# Patient Record
Sex: FEMALE | Race: WHITE | Employment: PART TIME | URBAN - METROPOLITAN AREA
[De-identification: names, ages, dates, MRNs, and addresses within clinical notes are randomized per-mention and may not be internally consistent; named-entity substitution may affect disease eponyms.]

---

## 2017-01-17 ENCOUNTER — TELEPHONE (OUTPATIENT)
Dept: ONCOLOGY | Facility: CLINIC | Age: 47
End: 2017-01-17

## 2017-01-17 DIAGNOSIS — Z80.0 FAMILY HISTORY OF MALIGNANT NEOPLASM OF GASTROINTESTINAL TRACT: ICD-10-CM

## 2017-01-17 DIAGNOSIS — Z80.3 FAMILY HISTORY OF MALIGNANT NEOPLASM OF BREAST: Primary | ICD-10-CM

## 2017-01-17 DIAGNOSIS — Z80.8 FAMILY HISTORY OF THYROID CANCER: ICD-10-CM

## 2017-01-17 NOTE — Clinical Note
Cancer Risk Management  Program [facility]  [facility]  Mailing Address  Cancer Risk Management Program  [facility]  [address]  [facility] [address]  [place] [address]    New patient appointments  [phone]  January 17, 2017    Laura Jackson  [address]  [place] [address]      Dear Laura,    It was a pleasure speaking with you on the phone on 1/17/2017.  Here is a copy of the progress note from our discussion.  If you have any additional questions, please feel free to call.     Presenting Information:  I spoke to Laura by phone today to discuss her genetic testing results. Her blood was drawn on 12/6/2016. OvaNext testing was ordered  from [facility]. This testing was done because of Laura's family history of breast, GI and possible ovarian cancer.      Genetic Testing Result: NEGATIVE  Laura is negative for mutations in the  JEWELS, BARD1, BRCA1, BRCA2, BRIP1, CDH1, CHEK2, EPCAM, MLH1, MRE11A, MSH2, MSH6, MUTYH, NBN, NF1, PALB2, PMS2, PTEN, RAD50, RAD51C, RAD51D, SMARCA4, STK11, and TP53  genes.    No mutations were found in any of the 24 genes analyzed.  This test involved sequencing and deletion/duplication analysis of all genes with the exception of EPCAM (deletions/duplications only).    Testing did not detect a mutation associated with  Hereditary Breast and Ovarian Cancer syndrome (BRCA1, BRCA2), Stanley syndrome (MLH1, MSH2, MSH6, PMS2, EPCAM), Hereditary Diffuse Gastric Cancer syndrome (CDH1), Cowden syndrome (PTEN), Li Fraumeni syndrome (TP53), Peutz-Jeghers syndrome (STK11), MUTYH Associated Polyposis syndrome (MAP), or Neurofibromatosis (NF1).     Interpretation:  We discussed several different interpretations of this negative test result.    1. One explanation may be that there is a  different gene or combination of genes and environment that are associated with the cancers in Laura's relatives.    2. It is possible that one of her relatives diagnosed with cancer, such as her paternal aunt or maternal first cousin, did have a mutation in one of the genes tested and that Laura did not inherit it.  3. There is also a small possibility that there is a mutation in one of these genes and we could not find it with our current testing methods.       Screening:  Based on this negative test result, it is important for Laura and her relatives to refer back to the family history for appropriate cancer screening.    Based on her personal and family history, Laura has a 24.9% lifetime risk of developing breast cancer based on the IBIS7 model.  As such, Laura meets current National Comprehensive Cancer Network (NCCN) guidelines for high risk breast screening.  This includes annual breast MRI in addition to annual mammogram.  In addition, Laura should be receiving clinical breast exams by her physician.  A referral will be made to meet with JUSTO Padilla to discuss high risk breast screening.    We discussed that Laura's female relatives should talk to their physicians about early breast screening.  Women in Laura's family with a first- or second-degree relative diagnosed with breast cancer remain at increased risk for breast cancer given their family history.  Current recommendations are that mammograms begin 10 years prior to the youngest age of diagnosis, or at age 40, whichever comes first.  Breast screening should include monthly self-breast exam, annual or semi-annual clinical breast exam, and annual mammogram.  These screening options should be discussed with their care providers, to determine what screening is appropriate, or if additional screening (such as breast MRI) is necessary based on personal/family history factors. In addition, some relatives (such as her maternal  first cousin diagnosed with breast cancer in her 40's) may benefit from genetic counseling to discuss genetic testing options.  I also encouraged Laura to obtain additional information, if possible, regarding the gene mutation previously identified in her paternal first cousin's as this information may change how we assess her family's cancer risk, screening, and the testing we would offer.       Final screening recommendations for relatives should be made by each individual's managing physician. Other age appropriate cancer screening, including the recommendations by the American Cancer Society, are appropriate for Laura at this time.  These screening recommendations may change if there are changes to Laura's personal and/or family history.  As discussed previously, some family members may benefit from earlier and/or more frequent cancer screening and are encouraged to share their family history information with their physician.      Inheritance:  We reviewed autosomal dominant inheritance and the fact that mutations do not skip generations. Because Laura does not have an identifiable mutation in the 24 genes tested, she did not pass on a mutation in these genes to her children.    Summary:  We do not have an explanation for Laura's family history of cancer.  Because of that, it is important that she continue with cancer screening based on her personal and family history as discussed above.    Genetic testing is rapidly advancing, and new cancer susceptibility genes will most likely be identified in the future.  Therefore, I encouraged Laura to contact me annually or if there are changes in her personal or family history.  This may change how we assess her cancer risk, screening, and the testing we would offer.    Plan:  1.  I will send Laura a copy of her test results with this letter.    2. She plans to follow-up with Natalie Lim for high risk breast screening.  3. She should contact me annually,  or sooner if her family history changes.    If Laura has any further questions, I encouraged her to contact me at [phone].    Alee Muhammad MS, [facility]  Certified Genetic Counselor  [phone]

## 2017-01-17 NOTE — Clinical Note
Please print and send a copy of this letter and the patient's genetic testing report to the patient.    Please enclose test report: SEND OUTS MISCELLANEOUS order #[id number] (DOS 12/6/2016)

## 2017-01-24 DIAGNOSIS — J45.30 MILD PERSISTENT ASTHMA WITHOUT COMPLICATION: ICD-10-CM

## 2017-01-24 NOTE — TELEPHONE ENCOUNTER
levalbuterol (XOPENEX HFA) 45 MCG/ACT inhaler       Last Written Prescription Date: 04/22/2015  Last Fill Quantity: 1, # refills: 5    Last Office Visit with G, P or [facility] prescribing provider:  12/05/2015   Future Office Visit:       Date of Last Asthma Action Plan Letter:   Asthma Action Plan Q1 Year    Topic Date Due     Asthma Action Plan - yearly  04/14/2017      Asthma Control Test:   ACT Total Scores 4/14/2016   ACT TOTAL SCORE -   ASTHMA ER VISITS -   ASTHMA HOSPITALIZATIONS -   ACT TOTAL SCORE (Goal Greater than or Equal to 20) 21   In the past 12 months, how many times did you visit the emergency room for your asthma without being admitted to the hospital? 1   In the past 12 months, how many times were you hospitalized overnight because of your asthma? 0       Date of Last Spirometry Test:   No results found for this or any previous visit.        Tammi Casillas, CMA

## 2017-01-27 NOTE — TELEPHONE ENCOUNTER
1/17/2017    Referring Provider: Whitney Alva MD    Presenting Information:  I spoke to Laura by phone today to discuss her genetic testing results. Her blood was drawn on 12/6/2016. OvaNext testing was ordered  from [facility]. This testing was done because of Laura's family history of breast, GI and possible ovarian cancer.      Genetic Testing Result: NEGATIVE  Laura is negative for mutations in the  JEWELS, BARD1, BRCA1, BRCA2, BRIP1, CDH1, CHEK2, EPCAM, MLH1, MRE11A, MSH2, MSH6, MUTYH, NBN, NF1, PALB2, PMS2, PTEN, RAD50, RAD51C, RAD51D, SMARCA4, STK11, and TP53  genes.    No mutations were found in any of the 24 genes analyzed.  This test involved sequencing and deletion/duplication analysis of all genes with the exception of EPCAM (deletions/duplications only).    Testing did not detect a mutation associated with  Hereditary Breast and Ovarian Cancer syndrome (BRCA1, BRCA2), Stanley syndrome (MLH1, MSH2, MSH6, PMS2, EPCAM), Hereditary Diffuse Gastric Cancer syndrome (CDH1), Cowden syndrome (PTEN), Li Fraumeni syndrome (TP53), Peutz-Jeghers syndrome (STK11), MUTYH Associated Polyposis syndrome (MAP), or Neurofibromatosis (NF1).     Interpretation:  We discussed several different interpretations of this negative test result.    1. One explanation may be that there is a different gene or combination of genes and environment that are associated with the cancers in Laura's relatives.    2. It is possible that one of her relatives diagnosed with cancer, such as her paternal aunt or maternal first cousin, did have a mutation in one of the genes tested and that Laura did not inherit it.  3. There is also a small possibility that there is a mutation in one of these genes and we could not find it with our current testing methods.       Screening:  Based on this negative test result, it is important for Laura and her relatives to refer back to the family history for appropriate cancer screening.    Based on  her personal and family history, Laura has a 24.9% lifetime risk of developing breast cancer based on the IBIS7 model.  As such, Laura meets current National Comprehensive Cancer Network (NCCN) guidelines for high risk breast screening.  This includes annual breast MRI in addition to annual mammogram.  In addition, Laura should be receiving clinical breast exams by her physician.  A referral will be made to meet with JUSTO Padilla to discuss high risk breast screening.    We discussed that Larua's female relatives should talk to their physicians about early breast screening.  Women in Laura's family with a first- or second-degree relative diagnosed with breast cancer remain at increased risk for breast cancer given their family history.  Current recommendations are that mammograms begin 10 years prior to the youngest age of diagnosis, or at age 40, whichever comes first.  Breast screening should include monthly self-breast exam, annual or semi-annual clinical breast exam, and annual mammogram.  These screening options should be discussed with their care providers, to determine what screening is appropriate, or if additional screening (such as breast MRI) is necessary based on personal/family history factors. In addition, some relatives (such as her maternal first cousin diagnosed with breast cancer in her 40's) may benefit from genetic counseling to discuss genetic testing options.  I also encouraged Laura to obtain additional information, if possible, regarding the gene mutation previously identified in her paternal first cousin's as this information may change how we assess her family's cancer risk, screening, and the testing we would offer.       Final screening recommendations for relatives should be made by each individual's managing physician. Other age appropriate cancer screening, including the recommendations by the American Cancer Society, are appropriate for Laura at this time.   These screening recommendations may change if there are changes to Laura's personal and/or family history.  As discussed previously, some family members may benefit from earlier and/or more frequent cancer screening and are encouraged to share their family history information with their physician.      Inheritance:  We reviewed autosomal dominant inheritance and the fact that mutations do not skip generations. Because Laura does not have an identifiable mutation in the 24 genes tested, she did not pass on a mutation in these genes to her children.    Summary:  We do not have an explanation for Laura's family history of cancer.  Because of that, it is important that she continue with cancer screening based on her personal and family history as discussed above.    Genetic testing is rapidly advancing, and new cancer susceptibility genes will most likely be identified in the future.  Therefore, I encouraged Laura to contact me annually or if there are changes in her personal or family history.  This may change how we assess her cancer risk, screening, and the testing we would offer.    Plan:  1.  I will send Laura a copy of her test results with this letter.    2. She plans to follow-up with Natalie Lim for high risk breast screening.  3. She should contact me annually, or sooner if her family history changes.    If Laura has any further questions, I encouraged her to contact me at [phone].    Alee Muhammad MS, [employer]  Certified Genetic Counselor  [phone]

## 2017-01-30 ENCOUNTER — TELEPHONE (OUTPATIENT)
Dept: FAMILY MEDICINE | Facility: CLINIC | Age: 47
End: 2017-01-30

## 2017-01-30 RX ORDER — LEVALBUTEROL TARTRATE 45 UG/1
1-2 AEROSOL, METERED ORAL EVERY 4 HOURS PRN
Qty: 1 INHALER | Refills: 5 | Status: SHIPPED | OUTPATIENT
Start: 2017-01-30 | End: 2017-12-21

## 2017-01-30 NOTE — TELEPHONE ENCOUNTER
Xopenex is a not covered product. It requires prerequisite step therapy.    Wondering if you would be willing to change to either Proair or Ventolin???    If you want to continue with the Xopenex,     Here is her insurance info:    [id number]  BIN# [id number]  ID# [id number]  No GR#  And no PCN#    Please contact us when approved/denied for the Xopenex.  Joyce Rao  Pharmacy Technician  [facility]  Ph# [phone]  Fax# [phone]

## 2017-01-31 NOTE — TELEPHONE ENCOUNTER
Received fax from pharmacy stating patient requires Prior Authorization for levalbuterol (XOPENEX HFA) 45 MCG/ACT Inhaler.     Insurance information:   Name: Stephanie  Phone number: [phone]  ID number: [id number]    Initiate prior authorization or change medication?    If a prior authorization is to be initiated, please list the following:    -any medications the patient has tried and failed or any contraindications.  -is the patient currently on this medication, or has tried before?  -What is the diagnosis?  -Justification or other information that may be helpful.

## 2017-01-31 NOTE — TELEPHONE ENCOUNTER
Received fax from pharmacy stating patient requires Prior Authorization for levalbuterol (XOPENEX HFA) 45 MCG/ACT Inhaler.     Insurance information:    Name: Stephanie  Phone number: [phone]  ID number: [id number]    Initiate prior authorization or change medication?Xoponex inhaler    If a prior authorization is to be initiated, please list the following:    -any medications the patient has tried and failed or any contraindications. Albuterol causes Tachycardia  -is the patient currently on this medication, or has tried before?yes  -What is the diagnosis?asthma  -Justification or other information that may be helpful.   Pt needs this medicine to control her asthma

## 2017-02-06 NOTE — TELEPHONE ENCOUNTER
Called patient states she has tried this in the past and it caused tachycardia, prefers us to obtain PA for the Xopenex. Starla Mccurdy MA

## 2017-02-09 NOTE — TELEPHONE ENCOUNTER
Called to do the PA over the phone: Approval #[id number] good for 2/9/2017-2/9/2018. Mely Alvarado,     [facility] [phone] and let them know of the approval. They will contact

## 2017-06-15 DIAGNOSIS — J45.30 MILD PERSISTENT ASTHMA WITHOUT COMPLICATION: ICD-10-CM

## 2017-06-15 NOTE — TELEPHONE ENCOUNTER
Advair      Last Written Prescription Date: 8-11-16  Last Fill Quantity: 3,  # refills: 1   Last Office Visit with G, P or [facility] prescribing provider: 12-5-16    [facility]  Pharmacy [facility]  On Behalf of [facility]

## 2017-06-15 NOTE — LETTER
[facility]  [address] [place] [address]. [place] [address]    June 20, 2017    Laura Jackson  [address] [facility] [address]      Dear Laura,    We have been unsuccessful reaching you by telephone. You are do for an office visit regarding your asthma medications with me. You can schedule this one of a few ways. First using My Chart Scheduling, second is calling our Main number [phone] this line is open for scheduling 24 hours 7 days a week. Please let us know if you are getting care elsewhere.      Sincerely,        Whitney Alva MD/dt

## 2017-06-19 ENCOUNTER — HOSPITAL ENCOUNTER (OUTPATIENT)
Facility: CLINIC | Age: 47
Setting detail: OBSERVATION
Discharge: HOME | End: 2017-06-21
Attending: EMERGENCY MEDICINE | Admitting: OBSTETRICS & GYNECOLOGY
Payer: COMMERCIAL

## 2017-06-19 DIAGNOSIS — D25.1 INTRAMURAL LEIOMYOMA OF UTERUS: ICD-10-CM

## 2017-06-19 DIAGNOSIS — N93.9 ABNORMAL VAGINAL BLEEDING: ICD-10-CM

## 2017-06-19 DIAGNOSIS — N93.9 VAGINAL BLEEDING: Primary | ICD-10-CM

## 2017-06-19 LAB
BASOPHILS # BLD AUTO: 0 10E9/L (ref 0–0.2)
BASOPHILS NFR BLD AUTO: 0.2 %
DIFFERENTIAL METHOD BLD: ABNORMAL
EOSINOPHIL # BLD AUTO: 0.1 10E9/L (ref 0–0.7)
EOSINOPHIL NFR BLD AUTO: 1.6 %
ERYTHROCYTE [DISTWIDTH] IN BLOOD BY AUTOMATED COUNT: 13.5 % (ref 10–15)
HCG UR QL: NEGATIVE
HCT VFR BLD AUTO: 27 % (ref 35–47)
HGB BLD-MCNC: 8.6 G/DL (ref 11.7–15.7)
IMM GRANULOCYTES # BLD: 0 10E9/L (ref 0–0.4)
IMM GRANULOCYTES NFR BLD: 0.2 %
LYMPHOCYTES # BLD AUTO: 1.5 10E9/L (ref 0.8–5.3)
LYMPHOCYTES NFR BLD AUTO: 26 %
MCH RBC QN AUTO: 27.6 PG (ref 26.5–33)
MCHC RBC AUTO-ENTMCNC: 31.9 G/DL (ref 31.5–36.5)
MCV RBC AUTO: 87 FL (ref 78–100)
MONOCYTES # BLD AUTO: 0.4 10E9/L (ref 0–1.3)
MONOCYTES NFR BLD AUTO: 6.1 %
NEUTROPHILS # BLD AUTO: 3.8 10E9/L (ref 1.6–8.3)
NEUTROPHILS NFR BLD AUTO: 65.9 %
NRBC # BLD AUTO: 0 10*3/UL
NRBC BLD AUTO-RTO: 0 /100
PLATELET # BLD AUTO: 231 10E9/L (ref 150–450)
RBC # BLD AUTO: 3.12 10E12/L (ref 3.8–5.2)
WBC # BLD AUTO: 5.7 10E9/L (ref 4–11)

## 2017-06-19 PROCEDURE — 81025 URINE PREGNANCY TEST: CPT | Performed by: EMERGENCY MEDICINE

## 2017-06-19 PROCEDURE — 99285 EMERGENCY DEPT VISIT HI MDM: CPT | Mod: Z6 | Performed by: EMERGENCY MEDICINE

## 2017-06-19 PROCEDURE — 86923 COMPATIBILITY TEST ELECTRIC: CPT | Performed by: EMERGENCY MEDICINE

## 2017-06-19 PROCEDURE — 86850 RBC ANTIBODY SCREEN: CPT | Performed by: EMERGENCY MEDICINE

## 2017-06-19 PROCEDURE — 86901 BLOOD TYPING SEROLOGIC RH(D): CPT | Performed by: EMERGENCY MEDICINE

## 2017-06-19 PROCEDURE — 85025 COMPLETE CBC W/AUTO DIFF WBC: CPT | Performed by: EMERGENCY MEDICINE

## 2017-06-19 PROCEDURE — 25000128 H RX IP 250 OP 636: Performed by: EMERGENCY MEDICINE

## 2017-06-19 PROCEDURE — 96361 HYDRATE IV INFUSION ADD-ON: CPT | Performed by: EMERGENCY MEDICINE

## 2017-06-19 PROCEDURE — 99285 EMERGENCY DEPT VISIT HI MDM: CPT | Mod: 25 | Performed by: EMERGENCY MEDICINE

## 2017-06-19 PROCEDURE — 86900 BLOOD TYPING SEROLOGIC ABO: CPT | Performed by: EMERGENCY MEDICINE

## 2017-06-19 PROCEDURE — 96360 HYDRATION IV INFUSION INIT: CPT | Performed by: EMERGENCY MEDICINE

## 2017-06-19 PROCEDURE — 99207 ZZC APP CREDIT; MD BILLING SHARED VISIT: CPT | Mod: Z6 | Performed by: EMERGENCY MEDICINE

## 2017-06-19 RX ORDER — SODIUM CHLORIDE 9 MG/ML
INJECTION, SOLUTION INTRAVENOUS CONTINUOUS
Status: DISCONTINUED | OUTPATIENT
Start: 2017-06-19 | End: 2017-06-20

## 2017-06-19 RX ADMIN — SODIUM CHLORIDE: 9 INJECTION, SOLUTION INTRAVENOUS at 23:44

## 2017-06-19 ASSESSMENT — ENCOUNTER SYMPTOMS
WEAKNESS: 1
LIGHT-HEADEDNESS: 1

## 2017-06-19 NOTE — IP AVS SNAPSHOT
MRN:[id number]                      After Visit Summary   6/19/2017    Laura Jackson    MRN: [id number]           Thank you!     Thank you for choosing [facility] for your care. Our goal is always to provide you with excellent care. Hearing back from our patients is one way we can continue to improve our services. Please take a few minutes to complete the written survey that you may receive in the mail after you visit with us. Thank you!        Patient Information     Date Of Birth          1970        Designated Caregiver       Most Recent Value    Caregiver    Will someone help with your care after discharge? yes    Name of designated caregiver luann    Phone number of caregiver [phone] (cell)    Caregiver address same      About your hospital stay     You were admitted on:  June 20, 2017 You last received care in the:  [facility]    You were discharged on:  June 21, 2017       Who to Call     For medical emergencies, please call 911.  For non-urgent questions about your medical care, please call your primary care provider or clinic, [phone]  For questions related to your surgery, please call your surgery clinic        Attending Provider     Provider Specialty    Duglas Michelle MD Emergency Medicine    Ernestina Saunders MD OB/Gyn       Primary Care Provider Office Phone # Fax #    Whitney Alva [phone]      After Care Instructions     Diet Instructions       Resume pre-procedure diet            Discharge Instructions       Patient to follow up with appointment in 1-2 weeks. You may take 400-600mg ibuprofen every 6 hours for pain control. You may also take tylenol 650mg every 4 hours as needed. You should start taking a daily iron supplement 325mg once daily. As a caution, iron may cause constipation and you should take either Miralax and/or Senna to help prevent constipation. You can get these medications over the counter at any store.  If you have loose stools, discontinue Miralax and Senna. If your pain worsens or your nausea/vomiting is not resolved with pain medications return to ED for evaluation or go to urgent care if able. You should make follow up appointment with your primary doctor or OB/GYN in 2 weeks if you do not already have an appointment scheduled. Call clinic sooner if you are not feeling well, have questions or other concerns.            Discharge Instructions       Call for temperature > 100.4, foul smelling vaginal discharge, bleeding > 1 pad per hour x 2 hrs, pain not controlled by oral pain meds, severe constipation or severe nausea or vomiting.            Ice to affected area       Ice to operative site PRN            No Alcohol       For 24 hours post procedure            No driving or operating machinery        until the day after procedure            No lifting        No lifting over 15 lbs and no strenuous physical activity for 2 weeks            Shower       No shower for 24 hours post procedure. May shower Postoperative Day (POD)  0            Weight bearing status - As tolerated                 Further instructions from your care team       Start Provera to slow the bleeding  Follow-up as recommended by Gynecology, Dr Saunders  Return if any problems or concerns    Additional Information     If you use hormonal birth control (such as the pill, patch, ring or implants): You'll need a second form of birth control for 7 days (condoms, a diaphragm or contraceptive foam). While in the hospital, you received a medicine called Bridion. Your normal birth control will not work as well for a week after taking this medicine.          Pending Results     Date and Time Order Name Status Description    6/20/2017 0609 Surgical pathology exam In process             Statement of Approval     Ordered          06/21/17 0906  I have reviewed and agree with all the recommendations and orders detailed in this document.  EFFECTIVE NOW      Approved and electronically signed by:  Zulay Horowitz MD             Admission Information     Date & Time Provider Department Dept. Phone    6/19/2017 Ernestina Saunders MD [facility] [phone]      Your Vitals Were     Blood Pressure Pulse Temperature Respirations Weight Last Period    111/69 (BP Location: Left arm) 98 98.9  F (37.2  C) (Oral) 18 75.2 kg (165 lb 12.8 oz) 06/19/2017 (Exact Date)    Pulse Oximetry BMI (Body Mass Index)                99% 27.59 kg/m2          MyChart Information     [employer] gives you secure access to your electronic health record. If you see a primary care provider, you can also send messages to your care team and make appointments. If you have questions, please call your primary care clinic.  If you do not have a primary care provider, please call [phone] and they will assist you.        Care EveryWhere ID     This is your Care EveryWhere ID. This could be used by other organizations to access your [facility] medical records  [id number]        Equal Access to Services     ODESSA HOWARD AH: Hadii latoya wayneo Soandreia, waaxda luqadaha, qaybta kaalmada adeegyadiaz, ludmila alberts. So [facility] [phone].    ATENCIÓN: Si habla español, tiene a guido disposición servicios gratuitos de asistencia lingüística. Llame al [phone].    We comply with applicable federal civil rights laws and [place] laws. We do not discriminate on the basis of race, color, national origin, age, disability sex, sexual orientation or gender identity.               Review of your medicines      START taking        Dose / Directions    medroxyPROGESTERone 10 MG tablet   Commonly known as:  PROVERA        Dose:  10 mg   Take 1 tablet (10 mg) by mouth daily for 10 days   Quantity:  10 tablet   Refills:  1       ondansetron 4 MG ODT tab   Commonly known as:  ZOFRAN ODT        Dose:  4 mg   Take 1 tablet (4 mg) by mouth every 8 hours as needed for nausea   Quantity:  15 tablet    Refills:  0         CONTINUE these medicines which have NOT CHANGED        Dose / Directions    fluticasone 50 MCG/ACT spray   Commonly known as:  FLONASE   Used for:  Seasonal allergic rhinitis        Dose:  1-2 spray   Spray 1-2 sprays into both nostrils daily   Quantity:  3 Bottle   Refills:  3       fluticasone-salmeterol 100-50 MCG/DOSE diskus inhaler   Commonly known as:  ADVAIR   Used for:  Mild persistent asthma without complication        Dose:  1 puff   Inhale 1 puff into the lungs 2 times daily   Quantity:  3 Inhaler   Refills:  1       ibuprofen 800 MG tablet   Commonly known as:  ADVIL/MOTRIN   Used for:  Cervical strain, subsequent encounter, Acute pain of left shoulder        Dose:  800 mg   Take 1 tablet (800 mg) by mouth every 8 hours as needed for moderate pain   Quantity:  30 tablet   Refills:  1       levalbuterol 45 MCG/ACT Inhaler   Commonly known as:  XOPENEX HFA   Used for:  Mild persistent asthma without complication        Dose:  1-2 puff   Inhale 1-2 puffs into the lungs every 4 hours as needed for shortness of breath / dyspnea   Quantity:  1 Inhaler   Refills:  5       vitamin D 1000 UNITS capsule        Dose:  5 capsule   Take 5 capsules by mouth daily   Refills:  0            Where to get your medicines      Some of these will need a paper prescription and others can be bought over the counter. Ask your nurse if you have questions.     Bring a paper prescription for each of these medications     medroxyPROGESTERone 10 MG tablet    ondansetron 4 MG ODT tab                Protect others around you: Learn how to safely use, store and throw away your medicines at www.disposemymeds.org.             Medication List: This is a list of all your medications and when to take them. Check marks below indicate your daily home schedule. Keep this list as a reference.      Medications           Morning Afternoon Evening Bedtime As Needed    fluticasone 50 MCG/ACT spray   Commonly known as:  FLONASE    Spray 1-2 sprays into both nostrils daily   Next Dose Due:  Tomorrow AM                                   fluticasone-salmeterol 100-50 MCG/DOSE diskus inhaler   Commonly known as:  ADVAIR   Inhale 1 puff into the lungs 2 times daily   Next Dose Due:  This evening                                      ibuprofen 800 MG tablet   Commonly known as:  ADVIL/MOTRIN   Take 1 tablet (800 mg) by mouth every 8 hours as needed for moderate pain   Next Dose Due:  As needed                                   levalbuterol 45 MCG/ACT Inhaler   Commonly known as:  XOPENEX HFA   Inhale 1-2 puffs into the lungs every 4 hours as needed for shortness of breath / dyspnea   Next Dose Due:  As needed                                   medroxyPROGESTERone 10 MG tablet   Commonly known as:  PROVERA   Take 1 tablet (10 mg) by mouth daily for 10 days   Next Dose Due:  Tomorrow AM                                   ondansetron 4 MG ODT tab   Commonly known as:  ZOFRAN ODT   Take 1 tablet (4 mg) by mouth every 8 hours as needed for nausea   Last time this was given:  4 mg on 6/21/2017  9:24 AM   Next Dose Due:  3:00P if needed                                   vitamin D 1000 UNITS capsule   Take 5 capsules by mouth daily   Next Dose Due:  Tomorrow AM

## 2017-06-19 NOTE — LETTER
Transition Communication Hand-off for Care Transitions to Next Level of Care Provider    Name: Laura Jackson  MRN #: [id number]  Primary Care Provider: Whitney Alva     Primary Clinic: [address] [address]     Reason for Hospitalization:  Intramural leiomyoma of uterus [D25.1]  Abnormal vaginal bleeding [N93.9]  Admit Date/Time: 6/19/2017 10:13 PM  Discharge Date: 06/21/17    Payor Source: Payor: PREFERREDONE / Plan: [employer] ADMIN SERV OPEN ACCESS / Product Type: PPO /          Please see attached AVS for changes in medical plan of care and needs.    Mady Trevino, RN, BSN, PHN  RNCC  PH: [phone]  Pager: [phone]      AVS/Discharge Summary is the source of truth; this is a helpful guide for improved communication of patient story

## 2017-06-19 NOTE — IP AVS SNAPSHOT
[facility] [address]    [address] [place] [address]    Phone:  [phone]                                       After Visit Summary   6/19/2017    Laura Jackson    MRN: [id number]           After Visit Summary Signature Page     I have received my discharge instructions, and my questions have been answered. I have discussed any challenges I see with this plan with the nurse or doctor.    ..........................................................................................................................................  Patient/Patient Representative Signature      ..........................................................................................................................................  Patient Representative Print Name and Relationship to Patient    ..................................................               ................................................  Date                                            Time    ..........................................................................................................................................  Reviewed by Signature/Title    ...................................................              ..............................................  Date                                                            Time

## 2017-06-19 NOTE — ED AVS SNAPSHOT
[facility], Emergency Department    [address]    [place] [address]    Phone:  [phone]    Fax:  [phone]                                       Laura Jackson   MRN: [id number]    Department:  [facility], Emergency Department   Date of Visit:  6/19/2017           Patient Information     Date Of Birth          1970        Your diagnoses for this visit were:     Abnormal vaginal bleeding     Intramural leiomyoma of uterus        You were seen by Duglas Michelle MD.        Discharge Instructions       Start Provera to slow the bleeding  Follow-up as recommended by Gynecology, Dr Saunders  Return if any problems or concerns    24 Hour Appointment Hotline       To make an appointment at any [facility] clinic, call [phone] ([phone]). If you don't have a family doctor or clinic, we will help you find one. [facility] clinics are conveniently located to serve the needs of you and your family.             Review of your medicines      START taking        Dose / Directions Last dose taken    medroxyPROGESTERone 10 MG tablet   Commonly known as:  PROVERA   Dose:  10 mg   Quantity:  10 tablet        Take 1 tablet (10 mg) by mouth daily for 10 days   Refills:  1        ondansetron 4 MG ODT tab   Commonly known as:  ZOFRAN ODT   Dose:  4 mg   Quantity:  15 tablet        Take 1 tablet (4 mg) by mouth every 8 hours as needed for nausea   Refills:  0          Our records show that you are taking the medicines listed below. If these are incorrect, please call your family doctor or clinic.        Dose / Directions Last dose taken    fluticasone 50 MCG/ACT spray   Commonly known as:  FLONASE   Dose:  1-2 spray   Quantity:  3 Bottle        Spray 1-2 sprays into both nostrils daily   Refills:  3        fluticasone-salmeterol 100-50 MCG/DOSE diskus inhaler   Commonly known as:  ADVAIR   Dose:  1 puff   Quantity:  3 Inhaler        Inhale 1 puff into the lungs 2 times daily   Refills:  1         ibuprofen 800 MG tablet   Commonly known as:  ADVIL/MOTRIN   Dose:  800 mg   Quantity:  30 tablet        Take 1 tablet (800 mg) by mouth every 8 hours as needed for moderate pain   Refills:  1        levalbuterol 45 MCG/ACT Inhaler   Commonly known as:  XOPENEX HFA   Dose:  1-2 puff   Quantity:  1 Inhaler        Inhale 1-2 puffs into the lungs every 4 hours as needed for shortness of breath / dyspnea   Refills:  5        vitamin D 1000 UNITS capsule   Dose:  5 capsule        Take 5 capsules by mouth daily   Refills:  0                Prescriptions were sent or printed at these locations (2 Prescriptions)                   Other Prescriptions                Printed at Department/Unit printer (2 of 2)         medroxyPROGESTERone (PROVERA) 10 MG tablet               ondansetron (ZOFRAN ODT) 4 MG ODT tab                Procedures and tests performed during your visit     Assign Team    CBC with platelets differential    HCG qualitative urine    US Pelvic Complete w Transvaginal      Orders Needing Specimen Collection     None      Pending Results     No orders found for last 3 day(s).            Pending Culture Results     No orders found for last 3 day(s).            Pending Results Instructions     If you had any lab results that were not finalized at the time of your Discharge, you can call the ED Lab Result RN at [phone]. You will be contacted by this team for any positive Lab results or changes in treatment. The nurses are available 7 days a week from 10A to 6:30P.  You can leave a message 24 hours per day and they will return your call.        Thank you for choosing [facility]       Thank you for choosing [facility] for your care. Our goal is always to provide you with excellent care. Hearing back from our patients is one way we can continue to improve our services. Please take a few minutes to complete the written survey that you may receive in the mail after you visit with us. Thank you!        [facility]  Information     [employer] gives you secure access to your electronic health record. If you see a primary care provider, you can also send messages to your care team and make appointments. If you have questions, please call your primary care clinic.  If you do not have a primary care provider, please call [phone] and they will assist you.        Care EveryWhere ID     This is your Care EveryWhere ID. This could be used by other organizations to access your [facility] medical records  [id number]        After Visit Summary       This is your record. Keep this with you and show to your community pharmacist(s) and doctor(s) at your next visit.

## 2017-06-19 NOTE — ED AVS SNAPSHOT
[facility], [place], Emergency Department    [address]    [facility] [address]    Phone:  [phone]    Fax:  [phone]                                       Laura Jackson   MRN: [id number]    Department:  [facility], Emergency Department   Date of Visit:  6/19/2017           After Visit Summary Signature Page     I have received my discharge instructions, and my questions have been answered. I have discussed any challenges I see with this plan with the nurse or doctor.    ..........................................................................................................................................  Patient/Patient Representative Signature      ..........................................................................................................................................  Patient Representative Print Name and Relationship to Patient    ..................................................               ................................................  Date                                            Time    ..........................................................................................................................................  Reviewed by Signature/Title    ...................................................              ..............................................  Date                                                            Time

## 2017-06-20 ENCOUNTER — ANESTHESIA EVENT (OUTPATIENT)
Dept: SURGERY | Facility: CLINIC | Age: 47
End: 2017-06-20
Payer: COMMERCIAL

## 2017-06-20 ENCOUNTER — APPOINTMENT (OUTPATIENT)
Dept: ULTRASOUND IMAGING | Facility: CLINIC | Age: 47
End: 2017-06-20
Attending: EMERGENCY MEDICINE
Payer: COMMERCIAL

## 2017-06-20 ENCOUNTER — ANESTHESIA (OUTPATIENT)
Dept: SURGERY | Facility: CLINIC | Age: 47
End: 2017-06-20
Payer: COMMERCIAL

## 2017-06-20 PROBLEM — N93.9 VAGINAL BLEEDING: Status: ACTIVE | Noted: 2017-06-20

## 2017-06-20 LAB
ABO + RH BLD: NORMAL
ABO + RH BLD: NORMAL
BLD GP AB SCN SERPL QL: NORMAL
BLD PROD TYP BPU: NORMAL
BLD UNIT ID BPU: 0
BLD UNIT ID BPU: 0
BLOOD BANK CMNT PATIENT-IMP: NORMAL
BLOOD PRODUCT CODE: NORMAL
BLOOD PRODUCT CODE: NORMAL
BPU ID: NORMAL
BPU ID: NORMAL
CA-I BLD-SCNC: 4.1 MG/DL (ref 4.4–5.2)
CO2 BLDCOV-SCNC: 18 MMOL/L (ref 21–28)
ERYTHROCYTE [DISTWIDTH] IN BLOOD BY AUTOMATED COUNT: 13.6 % (ref 10–15)
ERYTHROCYTE [DISTWIDTH] IN BLOOD BY AUTOMATED COUNT: 14.2 % (ref 10–15)
FIBRINOGEN PPP-MCNC: 211 MG/DL (ref 200–420)
FIBRINOGEN PPP-MCNC: 215 MG/DL (ref 200–420)
GLUCOSE BLD-MCNC: 128 MG/DL (ref 70–99)
HCT VFR BLD AUTO: 23.4 % (ref 35–47)
HCT VFR BLD AUTO: 25.7 % (ref 35–47)
HCT VFR BLD CALC: 21 %PCV (ref 35–47)
HGB BLD CALC-MCNC: 7.1 G/DL (ref 11.7–15.7)
HGB BLD-MCNC: 7.6 G/DL (ref 11.7–15.7)
HGB BLD-MCNC: 8.4 G/DL (ref 11.7–15.7)
HGB BLD-MCNC: 8.5 G/DL (ref 11.7–15.7)
HGB BLD-MCNC: 8.6 G/DL (ref 11.7–15.7)
INR PPP: 1.12 (ref 0.86–1.14)
INR PPP: 1.12 (ref 0.86–1.14)
MAGNESIUM SERPL-MCNC: 1.7 MG/DL (ref 1.6–2.3)
MCH RBC QN AUTO: 27.6 PG (ref 26.5–33)
MCH RBC QN AUTO: 28 PG (ref 26.5–33)
MCHC RBC AUTO-ENTMCNC: 32.5 G/DL (ref 31.5–36.5)
MCHC RBC AUTO-ENTMCNC: 32.7 G/DL (ref 31.5–36.5)
MCV RBC AUTO: 85 FL (ref 78–100)
MCV RBC AUTO: 86 FL (ref 78–100)
NUM BPU REQUESTED: 6
PCO2 BLDV: 24 MM HG (ref 40–50)
PH BLDV: 7.49 PH (ref 7.32–7.43)
PHOSPHATE SERPL-MCNC: 2.7 MG/DL (ref 2.5–4.5)
PLATELET # BLD AUTO: 192 10E9/L (ref 150–450)
PLATELET # BLD AUTO: 236 10E9/L (ref 150–450)
PO2 BLDV: 75 MM HG (ref 25–47)
POTASSIUM BLD-SCNC: 3.1 MMOL/L (ref 3.4–5.3)
POTASSIUM SERPL-SCNC: 3.7 MMOL/L (ref 3.4–5.3)
RBC # BLD AUTO: 2.71 10E12/L (ref 3.8–5.2)
RBC # BLD AUTO: 3.04 10E12/L (ref 3.8–5.2)
SAO2 % BLDV FROM PO2: 96 %
SODIUM BLD-SCNC: 142 MMOL/L (ref 133–144)
SPECIMEN EXP DATE BLD: NORMAL
TRANSFUSION STATUS PATIENT QL: NORMAL
WBC # BLD AUTO: 6.9 10E9/L (ref 4–11)
WBC # BLD AUTO: 7.4 10E9/L (ref 4–11)

## 2017-06-20 PROCEDURE — 36000051 ZZH SURGERY LEVEL 2 1ST 30 MIN - UMMC: Performed by: OBSTETRICS & GYNECOLOGY

## 2017-06-20 PROCEDURE — 27210794 ZZH OR GENERAL SUPPLY STERILE: Performed by: OBSTETRICS & GYNECOLOGY

## 2017-06-20 PROCEDURE — 84100 ASSAY OF PHOSPHORUS: CPT | Performed by: OBSTETRICS & GYNECOLOGY

## 2017-06-20 PROCEDURE — 25000132 ZZH RX MED GY IP 250 OP 250 PS 637: Performed by: OBSTETRICS & GYNECOLOGY

## 2017-06-20 PROCEDURE — 84132 ASSAY OF SERUM POTASSIUM: CPT | Performed by: OBSTETRICS & GYNECOLOGY

## 2017-06-20 PROCEDURE — 85610 PROTHROMBIN TIME: CPT | Performed by: OBSTETRICS & GYNECOLOGY

## 2017-06-20 PROCEDURE — 96361 HYDRATE IV INFUSION ADD-ON: CPT | Performed by: EMERGENCY MEDICINE

## 2017-06-20 PROCEDURE — 82803 BLOOD GASES ANY COMBINATION: CPT

## 2017-06-20 PROCEDURE — 85384 FIBRINOGEN ACTIVITY: CPT | Mod: 91 | Performed by: EMERGENCY MEDICINE

## 2017-06-20 PROCEDURE — 25000565 ZZH ISOFLURANE, EA 15 MIN: Performed by: OBSTETRICS & GYNECOLOGY

## 2017-06-20 PROCEDURE — 25000125 ZZHC RX 250: Performed by: ANESTHESIOLOGY

## 2017-06-20 PROCEDURE — 25000128 H RX IP 250 OP 636: Performed by: NURSE ANESTHETIST, CERTIFIED REGISTERED

## 2017-06-20 PROCEDURE — 40000501 ZZHCL STATISTIC HEMATOCRIT ED POCT

## 2017-06-20 PROCEDURE — 36000053 ZZH SURGERY LEVEL 2 EA 15 ADDTL MIN - UMMC: Performed by: OBSTETRICS & GYNECOLOGY

## 2017-06-20 PROCEDURE — P9016 RBC LEUKOCYTES REDUCED: HCPCS | Performed by: EMERGENCY MEDICINE

## 2017-06-20 PROCEDURE — 83735 ASSAY OF MAGNESIUM: CPT | Performed by: OBSTETRICS & GYNECOLOGY

## 2017-06-20 PROCEDURE — 88305 TISSUE EXAM BY PATHOLOGIST: CPT | Performed by: OBSTETRICS & GYNECOLOGY

## 2017-06-20 PROCEDURE — 85610 PROTHROMBIN TIME: CPT | Performed by: EMERGENCY MEDICINE

## 2017-06-20 PROCEDURE — 88305 TISSUE EXAM BY PATHOLOGIST: CPT | Mod: 26 | Performed by: OBSTETRICS & GYNECOLOGY

## 2017-06-20 PROCEDURE — 36415 COLL VENOUS BLD VENIPUNCTURE: CPT | Performed by: OBSTETRICS & GYNECOLOGY

## 2017-06-20 PROCEDURE — 25000128 H RX IP 250 OP 636: Performed by: ANESTHESIOLOGY

## 2017-06-20 PROCEDURE — 40000498 ZZHCL STATISTIC POTASSIUM ED POCT

## 2017-06-20 PROCEDURE — 76830 TRANSVAGINAL US NON-OB: CPT

## 2017-06-20 PROCEDURE — 85027 COMPLETE CBC AUTOMATED: CPT | Mod: 91 | Performed by: EMERGENCY MEDICINE

## 2017-06-20 PROCEDURE — 37000008 ZZH ANESTHESIA TECHNICAL FEE, 1ST 30 MIN: Performed by: OBSTETRICS & GYNECOLOGY

## 2017-06-20 PROCEDURE — 85018 HEMOGLOBIN: CPT | Mod: 91 | Performed by: OBSTETRICS & GYNECOLOGY

## 2017-06-20 PROCEDURE — 71000014 ZZH RECOVERY PHASE 1 LEVEL 2 FIRST HR: Performed by: OBSTETRICS & GYNECOLOGY

## 2017-06-20 PROCEDURE — 59899 UNLISTED PX MAT CARE&DLVR: CPT | Mod: GC | Performed by: OBSTETRICS & GYNECOLOGY

## 2017-06-20 PROCEDURE — 71000015 ZZH RECOVERY PHASE 1 LEVEL 2 EA ADDTL HR: Performed by: OBSTETRICS & GYNECOLOGY

## 2017-06-20 PROCEDURE — 40000171 ZZH STATISTIC PRE-PROCEDURE ASSESSMENT III: Performed by: OBSTETRICS & GYNECOLOGY

## 2017-06-20 PROCEDURE — 25000128 H RX IP 250 OP 636: Performed by: OBSTETRICS & GYNECOLOGY

## 2017-06-20 PROCEDURE — 82330 ASSAY OF CALCIUM: CPT

## 2017-06-20 PROCEDURE — 27211024 ZZHC OR SUPPLY OTHER OPNP: Performed by: OBSTETRICS & GYNECOLOGY

## 2017-06-20 PROCEDURE — 25000128 H RX IP 250 OP 636: Performed by: EMERGENCY MEDICINE

## 2017-06-20 PROCEDURE — 85027 COMPLETE CBC AUTOMATED: CPT | Performed by: OBSTETRICS & GYNECOLOGY

## 2017-06-20 PROCEDURE — 25000125 ZZHC RX 250: Performed by: NURSE ANESTHETIST, CERTIFIED REGISTERED

## 2017-06-20 PROCEDURE — 40000502 ZZHCL STATISTIC GLUCOSE ED POCT

## 2017-06-20 PROCEDURE — 37000009 ZZH ANESTHESIA TECHNICAL FEE, EACH ADDTL 15 MIN: Performed by: OBSTETRICS & GYNECOLOGY

## 2017-06-20 PROCEDURE — 40000497 ZZHCL STATISTIC SODIUM ED POCT

## 2017-06-20 PROCEDURE — G0378 HOSPITAL OBSERVATION PER HR: HCPCS

## 2017-06-20 PROCEDURE — 85384 FIBRINOGEN ACTIVITY: CPT | Performed by: OBSTETRICS & GYNECOLOGY

## 2017-06-20 PROCEDURE — 58145 MYOMECTOMY VAG METHOD: CPT | Mod: GC | Performed by: OBSTETRICS & GYNECOLOGY

## 2017-06-20 PROCEDURE — C9399 UNCLASSIFIED DRUGS OR BIOLOG: HCPCS | Performed by: NURSE ANESTHETIST, CERTIFIED REGISTERED

## 2017-06-20 RX ORDER — ONDANSETRON 4 MG/1
4 TABLET, ORALLY DISINTEGRATING ORAL EVERY 6 HOURS PRN
Status: DISCONTINUED | OUTPATIENT
Start: 2017-06-20 | End: 2017-06-21 | Stop reason: HOSPADM

## 2017-06-20 RX ORDER — LIDOCAINE HYDROCHLORIDE 20 MG/ML
INJECTION, SOLUTION INFILTRATION; PERINEURAL PRN
Status: DISCONTINUED | OUTPATIENT
Start: 2017-06-20 | End: 2017-06-20

## 2017-06-20 RX ORDER — POTASSIUM CHLORIDE 750 MG/1
20-40 TABLET, EXTENDED RELEASE ORAL
Status: DISCONTINUED | OUTPATIENT
Start: 2017-06-20 | End: 2017-06-21 | Stop reason: HOSPADM

## 2017-06-20 RX ORDER — AMOXICILLIN 250 MG
1-2 CAPSULE ORAL 2 TIMES DAILY
Status: DISCONTINUED | OUTPATIENT
Start: 2017-06-20 | End: 2017-06-21 | Stop reason: HOSPADM

## 2017-06-20 RX ORDER — ONDANSETRON 2 MG/ML
4 INJECTION INTRAMUSCULAR; INTRAVENOUS EVERY 6 HOURS PRN
Status: DISCONTINUED | OUTPATIENT
Start: 2017-06-20 | End: 2017-06-21 | Stop reason: HOSPADM

## 2017-06-20 RX ORDER — POTASSIUM CHLORIDE 7.45 MG/ML
10 INJECTION INTRAVENOUS
Status: DISCONTINUED | OUTPATIENT
Start: 2017-06-20 | End: 2017-06-21 | Stop reason: HOSPADM

## 2017-06-20 RX ORDER — DEXAMETHASONE SODIUM PHOSPHATE 4 MG/ML
4 INJECTION, SOLUTION INTRA-ARTICULAR; INTRALESIONAL; INTRAMUSCULAR; INTRAVENOUS; SOFT TISSUE
Status: DISCONTINUED | OUTPATIENT
Start: 2017-06-20 | End: 2017-06-20 | Stop reason: HOSPADM

## 2017-06-20 RX ORDER — ACETAMINOPHEN 325 MG/1
650 TABLET ORAL EVERY 4 HOURS PRN
Status: DISCONTINUED | OUTPATIENT
Start: 2017-06-20 | End: 2017-06-21 | Stop reason: HOSPADM

## 2017-06-20 RX ORDER — OXYCODONE HYDROCHLORIDE 5 MG/1
5-10 TABLET ORAL
Status: DISCONTINUED | OUTPATIENT
Start: 2017-06-20 | End: 2017-06-21 | Stop reason: HOSPADM

## 2017-06-20 RX ORDER — NALOXONE HYDROCHLORIDE 0.4 MG/ML
.1-.4 INJECTION, SOLUTION INTRAMUSCULAR; INTRAVENOUS; SUBCUTANEOUS
Status: DISCONTINUED | OUTPATIENT
Start: 2017-06-20 | End: 2017-06-21 | Stop reason: HOSPADM

## 2017-06-20 RX ORDER — FENTANYL CITRATE 50 UG/ML
25-50 INJECTION, SOLUTION INTRAMUSCULAR; INTRAVENOUS
Status: DISCONTINUED | OUTPATIENT
Start: 2017-06-20 | End: 2017-06-20 | Stop reason: HOSPADM

## 2017-06-20 RX ORDER — MAGNESIUM SULFATE HEPTAHYDRATE 40 MG/ML
4 INJECTION, SOLUTION INTRAVENOUS EVERY 4 HOURS PRN
Status: DISCONTINUED | OUTPATIENT
Start: 2017-06-20 | End: 2017-06-21 | Stop reason: HOSPADM

## 2017-06-20 RX ORDER — NALOXONE HYDROCHLORIDE 0.4 MG/ML
.1-.4 INJECTION, SOLUTION INTRAMUSCULAR; INTRAVENOUS; SUBCUTANEOUS
Status: DISCONTINUED | OUTPATIENT
Start: 2017-06-20 | End: 2017-06-20

## 2017-06-20 RX ORDER — MEDROXYPROGESTERONE ACETATE 10 MG
10 TABLET ORAL DAILY
Qty: 10 TABLET | Refills: 1 | Status: SHIPPED | OUTPATIENT
Start: 2017-06-20 | End: 2017-06-21

## 2017-06-20 RX ORDER — SODIUM CHLORIDE 9 MG/ML
INJECTION, SOLUTION INTRAVENOUS
Status: DISCONTINUED
Start: 2017-06-20 | End: 2017-06-20 | Stop reason: HOSPADM

## 2017-06-20 RX ORDER — SODIUM CHLORIDE, SODIUM LACTATE, POTASSIUM CHLORIDE, CALCIUM CHLORIDE 600; 310; 30; 20 MG/100ML; MG/100ML; MG/100ML; MG/100ML
INJECTION, SOLUTION INTRAVENOUS CONTINUOUS
Status: DISCONTINUED | OUTPATIENT
Start: 2017-06-20 | End: 2017-06-21

## 2017-06-20 RX ORDER — SODIUM CHLORIDE 9 MG/ML
INJECTION, SOLUTION INTRAVENOUS CONTINUOUS PRN
Status: DISCONTINUED | OUTPATIENT
Start: 2017-06-20 | End: 2017-06-20

## 2017-06-20 RX ORDER — ONDANSETRON 4 MG/1
4 TABLET, ORALLY DISINTEGRATING ORAL EVERY 8 HOURS PRN
Qty: 15 TABLET | Refills: 0 | Status: SHIPPED | OUTPATIENT
Start: 2017-06-20 | End: 2017-06-26

## 2017-06-20 RX ORDER — ONDANSETRON 2 MG/ML
INJECTION INTRAMUSCULAR; INTRAVENOUS PRN
Status: DISCONTINUED | OUTPATIENT
Start: 2017-06-20 | End: 2017-06-20

## 2017-06-20 RX ORDER — POTASSIUM CL/LIDO/0.9 % NACL 10MEQ/0.1L
10 INTRAVENOUS SOLUTION, PIGGYBACK (ML) INTRAVENOUS
Status: DISCONTINUED | OUTPATIENT
Start: 2017-06-20 | End: 2017-06-21 | Stop reason: HOSPADM

## 2017-06-20 RX ORDER — POTASSIUM CHLORIDE 1.5 G/1.58G
20-40 POWDER, FOR SOLUTION ORAL
Status: DISCONTINUED | OUTPATIENT
Start: 2017-06-20 | End: 2017-06-21 | Stop reason: HOSPADM

## 2017-06-20 RX ORDER — ONDANSETRON 2 MG/ML
4 INJECTION INTRAMUSCULAR; INTRAVENOUS EVERY 30 MIN PRN
Status: DISCONTINUED | OUTPATIENT
Start: 2017-06-20 | End: 2017-06-20 | Stop reason: HOSPADM

## 2017-06-20 RX ORDER — MEPERIDINE HYDROCHLORIDE 25 MG/ML
12.5 INJECTION INTRAMUSCULAR; INTRAVENOUS; SUBCUTANEOUS EVERY 5 MIN PRN
Status: DISCONTINUED | OUTPATIENT
Start: 2017-06-20 | End: 2017-06-20 | Stop reason: HOSPADM

## 2017-06-20 RX ORDER — SODIUM CHLORIDE, SODIUM LACTATE, POTASSIUM CHLORIDE, CALCIUM CHLORIDE 600; 310; 30; 20 MG/100ML; MG/100ML; MG/100ML; MG/100ML
INJECTION, SOLUTION INTRAVENOUS CONTINUOUS
Status: DISCONTINUED | OUTPATIENT
Start: 2017-06-20 | End: 2017-06-20 | Stop reason: HOSPADM

## 2017-06-20 RX ORDER — ONDANSETRON 4 MG/1
4 TABLET, ORALLY DISINTEGRATING ORAL EVERY 30 MIN PRN
Status: DISCONTINUED | OUTPATIENT
Start: 2017-06-20 | End: 2017-06-20 | Stop reason: HOSPADM

## 2017-06-20 RX ORDER — POTASSIUM CHLORIDE 29.8 MG/ML
20 INJECTION INTRAVENOUS
Status: DISCONTINUED | OUTPATIENT
Start: 2017-06-20 | End: 2017-06-21 | Stop reason: HOSPADM

## 2017-06-20 RX ORDER — ETOMIDATE 2 MG/ML
INJECTION INTRAVENOUS PRN
Status: DISCONTINUED | OUTPATIENT
Start: 2017-06-20 | End: 2017-06-20

## 2017-06-20 RX ORDER — FENTANYL CITRATE 50 UG/ML
INJECTION, SOLUTION INTRAMUSCULAR; INTRAVENOUS PRN
Status: DISCONTINUED | OUTPATIENT
Start: 2017-06-20 | End: 2017-06-20

## 2017-06-20 RX ORDER — HYDROMORPHONE HYDROCHLORIDE 1 MG/ML
.3-.5 INJECTION, SOLUTION INTRAMUSCULAR; INTRAVENOUS; SUBCUTANEOUS EVERY 5 MIN PRN
Status: DISCONTINUED | OUTPATIENT
Start: 2017-06-20 | End: 2017-06-20 | Stop reason: HOSPADM

## 2017-06-20 RX ADMIN — HYDROMORPHONE HYDROCHLORIDE 0.3 MG: 1 INJECTION, SOLUTION INTRAMUSCULAR; INTRAVENOUS; SUBCUTANEOUS at 07:45

## 2017-06-20 RX ADMIN — PHENYLEPHRINE HYDROCHLORIDE 100 MCG: 10 INJECTION, SOLUTION INTRAMUSCULAR; INTRAVENOUS; SUBCUTANEOUS at 05:52

## 2017-06-20 RX ADMIN — ACETAMINOPHEN 650 MG: 325 TABLET, FILM COATED ORAL at 13:56

## 2017-06-20 RX ADMIN — FENTANYL CITRATE 25 MCG: 50 INJECTION, SOLUTION INTRAMUSCULAR; INTRAVENOUS at 07:08

## 2017-06-20 RX ADMIN — SODIUM CHLORIDE, POTASSIUM CHLORIDE, SODIUM LACTATE AND CALCIUM CHLORIDE 1000 ML: 600; 310; 30; 20 INJECTION, SOLUTION INTRAVENOUS at 01:26

## 2017-06-20 RX ADMIN — SODIUM CHLORIDE 1000 ML: 9 INJECTION, SOLUTION INTRAVENOUS at 02:48

## 2017-06-20 RX ADMIN — SENNOSIDES AND DOCUSATE SODIUM 2 TABLET: 8.6; 5 TABLET ORAL at 20:54

## 2017-06-20 RX ADMIN — PHENYLEPHRINE HYDROCHLORIDE 100 MCG: 10 INJECTION, SOLUTION INTRAMUSCULAR; INTRAVENOUS; SUBCUTANEOUS at 05:35

## 2017-06-20 RX ADMIN — SENNOSIDES AND DOCUSATE SODIUM 2 TABLET: 8.6; 5 TABLET ORAL at 11:36

## 2017-06-20 RX ADMIN — PHENYLEPHRINE HYDROCHLORIDE 100 MCG: 10 INJECTION, SOLUTION INTRAMUSCULAR; INTRAVENOUS; SUBCUTANEOUS at 06:01

## 2017-06-20 RX ADMIN — FENTANYL CITRATE 100 MCG: 50 INJECTION, SOLUTION INTRAMUSCULAR; INTRAVENOUS at 04:57

## 2017-06-20 RX ADMIN — SODIUM CHLORIDE, POTASSIUM CHLORIDE, SODIUM LACTATE AND CALCIUM CHLORIDE: 600; 310; 30; 20 INJECTION, SOLUTION INTRAVENOUS at 23:45

## 2017-06-20 RX ADMIN — SODIUM CHLORIDE, POTASSIUM CHLORIDE, SODIUM LACTATE AND CALCIUM CHLORIDE: 600; 310; 30; 20 INJECTION, SOLUTION INTRAVENOUS at 14:00

## 2017-06-20 RX ADMIN — ONDANSETRON 4 MG: 2 INJECTION INTRAMUSCULAR; INTRAVENOUS at 06:12

## 2017-06-20 RX ADMIN — FENTANYL CITRATE 25 MCG: 50 INJECTION, SOLUTION INTRAMUSCULAR; INTRAVENOUS at 06:50

## 2017-06-20 RX ADMIN — Medication 10 MG: at 05:48

## 2017-06-20 RX ADMIN — ACETAMINOPHEN 650 MG: 325 TABLET, FILM COATED ORAL at 07:35

## 2017-06-20 RX ADMIN — PHENYLEPHRINE HYDROCHLORIDE 100 MCG: 10 INJECTION, SOLUTION INTRAMUSCULAR; INTRAVENOUS; SUBCUTANEOUS at 05:17

## 2017-06-20 RX ADMIN — HYDROMORPHONE HYDROCHLORIDE 0.4 MG: 1 INJECTION, SOLUTION INTRAMUSCULAR; INTRAVENOUS; SUBCUTANEOUS at 07:33

## 2017-06-20 RX ADMIN — SODIUM CHLORIDE: 9 INJECTION, SOLUTION INTRAVENOUS at 04:51

## 2017-06-20 RX ADMIN — PHENYLEPHRINE HYDROCHLORIDE 100 MCG: 10 INJECTION, SOLUTION INTRAMUSCULAR; INTRAVENOUS; SUBCUTANEOUS at 05:28

## 2017-06-20 RX ADMIN — PHENYLEPHRINE HYDROCHLORIDE 100 MCG: 10 INJECTION, SOLUTION INTRAMUSCULAR; INTRAVENOUS; SUBCUTANEOUS at 05:22

## 2017-06-20 RX ADMIN — OXYCODONE HYDROCHLORIDE 5 MG: 5 TABLET ORAL at 11:36

## 2017-06-20 RX ADMIN — ACETAMINOPHEN 650 MG: 325 TABLET, FILM COATED ORAL at 20:54

## 2017-06-20 RX ADMIN — MIDAZOLAM HYDROCHLORIDE 2 MG: 1 INJECTION, SOLUTION INTRAMUSCULAR; INTRAVENOUS at 04:51

## 2017-06-20 RX ADMIN — SUGAMMADEX 200 MG: 100 INJECTION, SOLUTION INTRAVENOUS at 06:12

## 2017-06-20 RX ADMIN — ETOMIDATE 18 MG: 2 INJECTION INTRAVENOUS at 04:57

## 2017-06-20 RX ADMIN — OXYCODONE HYDROCHLORIDE 5 MG: 5 TABLET ORAL at 17:17

## 2017-06-20 RX ADMIN — LIDOCAINE HYDROCHLORIDE 100 MG: 20 INJECTION, SOLUTION INFILTRATION; PERINEURAL at 04:57

## 2017-06-20 RX ADMIN — SODIUM CHLORIDE, POTASSIUM CHLORIDE, SODIUM LACTATE AND CALCIUM CHLORIDE 100 ML/HR: 600; 310; 30; 20 INJECTION, SOLUTION INTRAVENOUS at 06:53

## 2017-06-20 RX ADMIN — SODIUM CHLORIDE 1000 ML: 9 INJECTION, SOLUTION INTRAVENOUS at 03:34

## 2017-06-20 RX ADMIN — PHENYLEPHRINE HYDROCHLORIDE 100 MCG: 10 INJECTION, SOLUTION INTRAMUSCULAR; INTRAVENOUS; SUBCUTANEOUS at 05:44

## 2017-06-20 RX ADMIN — Medication 50 MG: at 04:57

## 2017-06-20 ASSESSMENT — ACTIVITIES OF DAILY LIVING (ADL)
SWALLOWING: 0-->SWALLOWS FOODS/LIQUIDS WITHOUT DIFFICULTY
DRESS: 0-->INDEPENDENT
RETIRED_COMMUNICATION: 0-->UNDERSTANDS/COMMUNICATES WITHOUT DIFFICULTY
BATHING: 0-->INDEPENDENT
COGNITION: 0 - NO COGNITION ISSUES REPORTED
AMBULATION: 0-->INDEPENDENT
FALL_HISTORY_WITHIN_LAST_SIX_MONTHS: NO
TRANSFERRING: 0-->INDEPENDENT
TOILETING: 0-->INDEPENDENT
RETIRED_EATING: 0-->INDEPENDENT

## 2017-06-20 NOTE — ED PROVIDER NOTES
"  History     Chief Complaint   Patient presents with     Vaginal Bleeding     Pt has had heavy bleeding, worsening since yesterday. Reports she can go through a large pad in 10 secs. Reports dizziness/weakness. Has known fibroids     HPI  Laura Jackson is a 47 year old female who presents to the ED with vaginal bleeding. Patient states she is currently in the middle of her menses. She states she normally has heavier menses, but states for the past 4-5 days it has been irregular. She states she has \"gushing\" blood to the point where she cannot leave the toilet and has filled a karlo cup to try to measure how much bleeding she was having. She denies seeing clots and states the blood is bright red. She also has complaints of lightheadedness and weakness. She also reports she has known fibroids and ovarian cysts.     PAST MEDICAL HISTORY  Past Medical History:   Diagnosis Date     Family history of breast cancer 2/19/2014     Family history of breast cancer 2/19/2014     SVT (supraventricular tachycardia):  history of, no recurrence since 2008 10/11/2013    no recurrence since 2008      Uncomplicated asthma      PAST SURGICAL HISTORY  Past Surgical History:   Procedure Laterality Date     APPENDECTOMY       TONSILLECTOMY       FAMILY HISTORY  Family History   Problem Relation Age of Onset     DIABETES Mother      Cardiovascular Father      CHF and COPD     Breast Cancer Maternal Grandfather      Breast Cancer Paternal Grandmother      Breast Cancer Paternal Aunt      C.A.D. Paternal Grandfather      Breast Cancer Cousin      Asthma Father      Asthma Son      DIABETES Maternal Grandmother      Hypertension Mother      Hypertension Maternal Grandmother      Hyperlipidemia Mother      Breast Cancer Cousin      Colon Cancer Maternal Grandfather      SOCIAL HISTORY  Social History   Substance Use Topics     Smoking status: Never Smoker     Smokeless tobacco: Never Used     Alcohol use Yes      Comment: occ "     MEDICATIONS  Current Facility-Administered Medications   Medication     0.9% sodium chloride infusion     lactated ringers BOLUS 1,000 mL     Current Outpatient Prescriptions   Medication     fluticasone-salmeterol (ADVAIR) 100-50 MCG/DOSE diskus inhaler     levalbuterol (XOPENEX HFA) 45 MCG/ACT Inhaler     fluticasone (FLONASE) 50 MCG/ACT nasal spray     ibuprofen (ADVIL,MOTRIN) 800 MG tablet     Cholecalciferol (VITAMIN D) 1000 UNITS capsule     ALLERGIES  Allergies   Allergen Reactions     Penicillins      Swelling throat; age 4     Tetracycline      Vomiting; nauseous         I have reviewed the Medications, Allergies, Past Medical and Surgical History, and Social History in the Epic system.    Review of Systems   Genitourinary: Positive for vaginal bleeding.   Neurological: Positive for weakness and light-headedness.   All other systems reviewed and are negative.      Physical Exam   BP: (!) 161/108  Pulse: 120  Temp: 96.9  F (36.1  C)  Resp: 18  Weight: 75.2 kg (165 lb 12.8 oz)  SpO2: 100 %  Physical Exam   Constitutional: She is oriented to person, place, and time. No distress.   Cardiovascular: Tachycardia present.    Pulmonary/Chest: No respiratory distress.   Abdominal: She exhibits no distension. There is no tenderness.   Genitourinary: Uterus is enlarged.   Genitourinary Comments: Funduscopic exam showed a small amount of clotted blood and minimal fresh blood in the vaginal vault.  The cervix was effaced and what appears to be a uterine fibroid is evident at the dilated cervical os   Neurological: She is alert and oriented to person, place, and time.   Skin: There is pallor.   Nursing note and vitals reviewed.      ED Course     ED Course     Procedures        Medications   0.9% sodium chloride infusion ( Intravenous New Bag 6/19/17 4928)   lactated ringers BOLUS 1,000 mL (not administered)              Labs Ordered and Resulted from Time of ED Arrival Up to the Time of Departure from the ED   CBC  WITH PLATELETS DIFFERENTIAL - Abnormal; Notable for the following:        Result Value    RBC Count 3.12 (*)     Hemoglobin 8.6 (*)     Hematocrit 27.0 (*)     All other components within normal limits   HCG QUALITATIVE URINE   IP ASSIGN PROVIDER TEAM TO TREATMENT TEAM            Assessments & Plan (with Medical Decision Making)   This is a 47 year old female with heavy vaginal bleeding for the last for 5 days.  She has bled  down to 8.6 hemoglobin from 12.9.  On my examination, she has what I suspect to be a uterine fibroid easily visible at the cervical os. I suspect this is the source of her bleeding.  She is not pregnant. She s receiving IV fluids. She is going to get a pelvic ultrasound and also ObGyn will see her and evaluate and consult and decide treatment and disposition.  She ll be signed out to the overnight physician.  I suspect that ultimately she will need a hysterectomy as an outpatient.    This part of the medical record was transcribed by Randolph José Medical Scribe, from a dictation done by Duglas Michelle MD.       I have reviewed the nursing notes.    I have reviewed the findings, diagnosis, plan and need for follow up with the patient.    New Prescriptions    No medications on file       Final diagnoses:   Abnormal vaginal bleeding   Intramural leiomyoma of uterus       6/19/2017   [facility], [employer], EMERGENCY DEPARTMENT     Duglas Michelle MD  06/20/17 0129

## 2017-06-20 NOTE — PROGRESS NOTES
Brief Gyn Note    Called urgently to ED for ongoing VB. Pt symptomatic with dizziness and tachycardia. EBL ~350 in the last 30 minutes, just passed large 100cc clot.     Vitals:    06/20/17 0324 06/20/17 0325 06/20/17 0327 06/20/17 0328   BP:       Pulse:       Resp:       Temp:       TempSrc:       SpO2: 100% 100% 99% 100%   Weight:         Gen: Breathing rapidly  CV: Tachycardic, regular  Pelvic: Large clot passed, Tellez placed, 1 packing placed. Brisk bleeding noted.    I stat Hgb 7.1    A/P:  - Type and cross 4 units  - Anesthesia alerted  - Coags pending  - Second IV placed, bolus running  - NPO  - To OR for vaginal myomectomy, possible total abdominal hysterectomy. Discussed risks of surgery including bleeding requiring uterotonics, transfusion or additional procedures including hysterectomy; injury to bowel, bladder, blood vessels, nerves, uterus, fallopian tubes, ovaries; postoperative complications including infection, pain and DVT/PE. Consent signed. All questions answered.  - Transfuse 2U now    Dr. Saunders alerted and at bedside, agrees with plan as above.    Светлана De Leon MD   OB GYN PGY-3

## 2017-06-20 NOTE — PROGRESS NOTES
[facility] Gyn Progress Note     S: Patient is feeling SOB and unwell. She notes this just began. She took 1 tab oxycodone ~ 20 minutes ago. She has not noted any heavy vaginal bleeding. Pain well controlled on PO meds. No nausea or vomiting. Tolerating PO intake with regular diet. Ambulating without difficulty. Tellez in place.     O:  Patient Vitals for the past 24 hrs:   BP Temp Temp src Pulse Heart Rate Resp SpO2 Weight   06/20/17 1750 115/74 - - - 92 - - -   06/20/17 1735 136/78 98.8  F (37.1  C) Oral - 119 20 - -   06/20/17 1546 102/63 98.6  F (37  C) Oral - 72 16 98 % -   06/20/17 1406 112/63 98.5  F (36.9  C) Oral - 84 14 100 % -   06/20/17 1200 118/70 98.6  F (37  C) Oral 94 - 14 100 % -   06/20/17 1002 108/72 98.2  F (36.8  C) Oral - 73 16 100 % -   06/20/17 0813 125/78 97.4  F (36.3  C) Oral 80 - 14 100 % -   06/20/17 0745 125/81 - - - 73 10 100 % -   06/20/17 0730 130/85 - - - 83 10 100 % -   06/20/17 0715 129/85 - - - 83 10 100 % -   06/20/17 0701 - 99  F (37.2  C) Oral - - - - -   06/20/17 0700 130/85 - - - 93 10 100 % -     Gen: awake, alert, cooperative, no apparent distress, appears concerned  Cardiovascular: tachycardia  Respiratory: no increased work of breathing on room air  Abdomen: Soft, non distended, non tender, no masses palpated, no uterine fundus palpated  Extremeties: warm, well perfused, no edema    Assessment and Plan: Laura Jackson is a 47 year old female HD#1 for acute vaginal bleeding, POD#1 s/p transvaginal myomectomy & cook catheter placement. Vaginal balloon deflated. Doing well but with acute onset SOB and tachycardia to 118. Abdominal exam not concerning and no vaginal bleeding around uterine balloon, low concern for acute hemorrhage; however, given symptoms and possible need for further blood transfusion will assess Hgb now.     Postop Cares  - Pain: PO ibuprofen, tylenol and PRN oxycodone   - GI: regular, will place NPO if acute bleed. Discussed next step if acute  hemorrhage after cook balloon removed would be hysterectomy, she is ok with this if needed. PRN antiemetics and bowel regimen  - Encourage ambulation  - Plan to remove uterine balloon and cuadra at 2200 tonight    Dispo: 1-2 days pending resolution of vaginal bleeding with uterine balloon removed    Eva Crespo MD, MPH     OB/GYN Resident G4  6/20/2017 5:56 PM

## 2017-06-20 NOTE — ED NOTES
Patient felt large gush of bleeding upon discharge,  hat placed on toilet.   mL over 15 minutes.  Bleeding continuous stream, bright red.  IV placed, fluid bolus initiated.  Patient reporting she feels lightheaded and dizzy. BP wdl, pulse 105.

## 2017-06-20 NOTE — PROGRESS NOTES
Care Coordinator Progress Note     Admission Date/Time:  6/19/2017  Attending MD:  Ernestina Saunders MD     Data  Chart reviewed, discussed with interdisciplinary team.   Patient was admitted for:    Abnormal vaginal bleeding  Intramural leiomyoma of uterus.    Concerns with insurance coverage for discharge needs: None.  Current Living Situation: Patient lives with spouse.  Support System: Supportive  Services Involved: none at admit  Transportation: Family or Friend will provide      Assessment  Patient continues to require medical treatment, anticipate discharge to home independently.  RNCC will continue to follow as needs arise.     Plan  Anticipated Discharge Date:  1-2 days  Anticipated Discharge Plan:  Home with clinic follow up     Mady Trevino RN, BSN, PHN  RNCC  PH: [phone]  Pager: [phone]

## 2017-06-20 NOTE — ED NOTES
PGY-3 OB resident present to assess patient.  Several large clots passed vaginally  mL.  Vaginal packing placed by resident.  Patient continues to feel light headed and dizzy.  Patient pale and lethargic.  BP wdl.  Tachycardic, pulse 120.  Type and screen drawn and sent to lab,  Both PIVs infusing.  O2 applied via oxygen mask.

## 2017-06-20 NOTE — ANESTHESIA POSTPROCEDURE EVALUATION
Patient: Laura Jackson    Procedure(s):  Uterine Curettings and Fibroid Removal, Cook catheter placement; (No hysterectomy done at this time) - Wound Class: II-Clean Contaminated    Diagnosis:Prolapsing bleeding uterine fibroid  Diagnosis Additional Information: No value filed.    Anesthesia Type:  General, RSI, ETT    Note:  Anesthesia Post Evaluation    Patient location during evaluation: PACU  Patient participation: Able to fully participate in evaluation  Level of consciousness: awake and alert  Pain management: adequate  Airway patency: patent  Cardiovascular status: acceptable  Respiratory status: acceptable  Hydration status: acceptable  PONV: none     Anesthetic complications: None          Last vitals:  Vitals:    06/20/17 0436 06/20/17 0446 06/20/17 0628   BP: 129/77 128/80 129/85   Pulse:      Resp: 13 13    Temp: 37.3  C (99.1  F) 37.5  C (99.5  F)    SpO2: 100% 100% 100%         Electronically Signed By: Duglas Dixon MD, MD  June 20, 2017  6:38 AM

## 2017-06-20 NOTE — OP NOTE
[facility]  Operative Note    Patient: Laura Jackson   : 1970   MRN: [id number]     Date of Service: 2017     Pre-operative diagnosis:  1. Abnormal uterine bleeding  2. Suspected prolapsing uterine fibroid  3. Acute blood loss anemia    Post-operative diagnosis:  1. Same    Procedure:   1. Dilation and Curettage  2. Partial transvaginal myomectomy    Surgeon: Ernestina Saunders MD  Assistants: Светлана De Leon MD PGY-3    Anesthesia: General endotracheal    EBL: 300 mL  Urine: 800 mL clear  Fluids: 500 cystalloid, 600 mL (2U) PRBC    Specimens: Uterine fibroid, uterine curettings  Complications: none  Lines: Tellez, cook catheter with 70cc intrauterine 60cc vaginal balloon    Findings: EUA revealed 3 cm dilated cervix with palpable intracervical fibroid, 15 week uterus, no adnexal masses. Fibroid removed in pieces, no discreet fibroid palpable at end of case although bleeding ensued, requiring cook catheter placement for tamponade. Moderate tissue removed, submitted to Pathology.     Indications: Laura Jackson is a 47 year old female who presented to the ED with symptomatic acute blood loss anemia and vaginal bleeding. Ultrasound revealed prolapsing suspected uterine fibroid. Risks, benefits, and alternatives to the procedure were discussed. The patient's questions were answered, understanding confirmed, and the patient signed written informed consent.    Technique: The patient was taken to the operating room where she was placed in the dorsal lithotomy position. Anesthesia was administered and the patient was prepped and draped in the usual sterile fashion. A speculum was inserted into the vagina and the cervix visualized. A single-toothed tenaculum was placed at 12 o'clock on the cervix. The fibroid was grasped with double toothed tanaculum and ring forceps and removed with gentle tension and sequential grasping. The fibroid was removed in pieces as it was necrotic. A sharp curettage was performed with minimal  return of tissue. The cervix was palpated with no discrete fibroid revealed at end of case. Palpation of the uterine cavity to the fundus revealed apparent deformity secondary to intramural fibroids.  Slow bleeding continued from the site of the submucous fibroid.  The bleeding was controlled with tamponade using a Cook catheter balloon. No vaginal bleeding noted after insertion of Cook catheter. The tenaculum was removed from the cervix and good hemostasis was noted.  The speculum was removed from the vagina.  The patient tolerated the procedure well and was taken to the recovery room in stable condition.  Instrument, needle, and sponge counts were correct x2.  Dr. Saunders was present and scrubbed for the entire procedure.    Светлана De Leon MD  OB/GYN PGY3  06/20/17  6:14 AM

## 2017-06-20 NOTE — ANESTHESIA CARE TRANSFER NOTE
Patient: Laura Jackson    Procedure(s):  Vaginal myomectomy, possible abdominal hysterectomy - Wound Class: II-Clean Contaminated    Diagnosis: Prolapsing bleeding uterine fibroid  Diagnosis Additional Information: No value filed.    Anesthesia Type:   General, RSI, ETT     Note:  Airway :Face Mask  Patient transferred to:PACU        Vitals: (Last set prior to Anesthesia Care Transfer)    CRNA VITALS  6/20/2017 0555 - 6/20/2017 0630      6/20/2017             NIBP: 129/85    Ht Rate: 110                Electronically Signed By: SALAZAR Fournier CRNA  June 20, 2017  6:30 AM

## 2017-06-20 NOTE — PLAN OF CARE
Problem: Goal Outcome Summary  Goal: Goal Outcome Summary  Outcome: No Change  Pt in bed all shift. Arrived from PACU at 0830 this morning. CMS intact. Geoff patent. Cook device in place. MD will be up later to start deflating balloons. Pain meds given as needed. Tolerating clears. VSS. Electrolytes stable. Hgb 8.6 this morning. Pt able to make needs known.

## 2017-06-20 NOTE — DISCHARGE INSTRUCTIONS
Start Provera to slow the bleeding  Follow-up as recommended by Gynecology, Dr Saunders  Return if any problems or concerns

## 2017-06-20 NOTE — PLAN OF CARE
"Problem: Goal Outcome Summary  Goal: Goal Outcome Summary  VSS, cuadra patent and draining adequate amounts of clear yellow urine. Pt ambulated in hallway and sat up in the chair for about 10 minutes, pt reported some dizziness and got back into bed. Oxycodone given for pain, shortly after pt then c/o of a feeling of gushing vaginal bleeding. Janiya pad checked, with scant amount of drainage. Pt stated, \"I just don't feel right\", heart rate up to 135, /78. OB notified. OB then deflated one balloon of cook device at 1745. STAT Hemoglobin lab draw ordered and is currently in process. OB will return between 9-10 pm tonight to partially deflate the other balloon of the cook device. PIV infusing, pt is able to make needs known, call light within reach.      "

## 2017-06-20 NOTE — ANESTHESIA PREPROCEDURE EVALUATION
Anesthesia Evaluation     . Pt has had prior anesthetic. Type: General    No history of anesthetic complications          ROS/MED HX    ENT/Pulmonary:     (+)Mild Persistent asthma Treatment: Inhaler prn,  , . .    Neurologic:  - neg neurologic ROS     Cardiovascular:  - neg cardiovascular ROS   (+) ----. : . . . :. Other, .       METS/Exercise Tolerance:     Hematologic:  - neg hematologic  ROS       Musculoskeletal:  - neg musculoskeletal ROS       GI/Hepatic:     (+) GERD Asymptomatic on medication,       Renal/Genitourinary:  - ROS Renal section negative       Endo:  - neg endo ROS       Psychiatric:  - neg psychiatric ROS       Infectious Disease:  - neg infectious disease ROS       Malignancy:         Other:                     Physical Exam  Normal systems: cardiovascular, pulmonary and dental    Airway   Mallampati: II  TM distance: >3 FB  Neck ROM: full    Dental     Cardiovascular       Pulmonary                     Anesthesia Plan      History & Physical Review  History and physical reviewed and following examination; no interval change.    ASA Status:  3 emergent.        Plan for General, RSI and ETT with Intravenous and Etomidate induction. Maintenance will be Balanced.    PONV prophylaxis:  Ondansetron (or other 5HT-3) and Dexamethasone or Solumedrol  Additional equipment: Videolaryngoscope, 2nd IV and Arterial Line      Postoperative Care  Postoperative pain management:  IV analgesics and Oral pain medications.      Consents  Anesthetic plan, risks, benefits and alternatives discussed with:  Patient.  Use of blood products discussed: Yes.   Use of blood products discussed with Patient.  .                          .

## 2017-06-20 NOTE — CONSULTS
[facility] History and Physical    Laura Jackson MRN# [id number]   Age: 47 year old YOB: 1970     Date of Admission:  2017    Primary care provider: Whitney Alva             Chief Complaint:   Vaginal bleeding         History of Present Illness:   This patient is a 47 year old female with a known PMH of fibroids who presents to the ED with acute vaginal bleeding. She reports bleeding for the last 5 days, timed with her period. She has had multiple gushing episodes, filled 1.5 karlo cups of blood on the toilet this evening. This occurs about 3-5x daily, returns to normal period flow but then has another large gush. Overall she feels as though her bleeding is improving, this is the longest she has gone in the last 5 days with no bleeding. Bleeding has been scant since she has been in the ED. She has not seen gynecology for this but was aware of a fibroid history in pregnancy - she did see an OB at unknown location ~5 years ago for heavy bleeding, but then symptoms improved. Associated symptoms include dizziness and light headedness, which improved since she has been in the ED. No SOB or CP.     Update:  Of note, after evaluating the patient initially, she had a large gush of blood before discharge with ongoing bleeding >60cc in less than 5 minutes.          Past OB GYN History:   Ob Hx:    -    -      Gyn Hx:  - Known history of fibroids, no recent ultrasound - saw these 10 years ago with pregnancy US  - Menses is regular lasting 4-7 days every 4 weeks, LMP is exactly 4 weeks ago. Last month's period was slightly heavier lasting 9 days, before this periods were completely normal.  - Last pap 2 years ago  - Denies h/o abnormal pap smear  - Denies h/o STI  - Denies h/o ovarian cyst  - Denies incontinence, dyspareunia, dysuria. Reports urgency.  - Contraception: Vasectomy  - Denies recent weight gain or loss, reports recent bulk symptoms including feeling of fullness  when bending over         Past Medical History:     Past Medical History:   Diagnosis Date     Family history of breast cancer 2/19/2014     Family history of breast cancer 2/19/2014     SVT (supraventricular tachycardia):  history of, no recurrence since 2008 10/11/2013    no recurrence since 2008      Uncomplicated asthma    Fibroids  Patient Active Problem List    Diagnosis Date Noted     Foreign body (FB) in soft tissue 08/22/2016     Priority: Medium     History of supraventricular tachycardia 10/11/2013     Priority: Medium     no recurrence since 2008       Mild persistent asthma 02/19/2014     Family history of breast cancer 02/19/2014     CARDIOVASCULAR SCREENING; LDL GOAL LESS THAN 160 04/24/2013     Irritable bowel syndrome 04/24/2013     GERD (gastroesophageal reflux disease) 04/24/2013              Past Surgical History:      Past Surgical History:   Procedure Laterality Date     APPENDECTOMY       TONSILLECTOMY              Social History:     Social History   Substance Use Topics     Smoking status: Never Smoker     Smokeless tobacco: Never Used     Alcohol use Yes      Comment: occ            Family History:     Family History   Problem Relation Age of Onset     DIABETES Mother      Cardiovascular Father      CHF and COPD     Breast Cancer Maternal Grandfather      Breast Cancer Paternal Grandmother      Breast Cancer Paternal Aunt      C.A.D. Paternal Grandfather      Breast Cancer Cousin      Asthma Father      Asthma Son      DIABETES Maternal Grandmother      Hypertension Mother      Hypertension Maternal Grandmother      Hyperlipidemia Mother      Breast Cancer Cousin      Colon Cancer Maternal Grandfather             Immunizations:     Immunization History   Administered Date(s) Administered     Influenza (IIV3) 10/15/2013     Mantoux 08/30/2013, 09/10/2014     Pneumococcal 23 valent 05/27/2014     Tdap (Adacel,Boostrix) 05/04/2012            Allergies:     Allergies   Allergen Reactions      Penicillins      Swelling throat; age 4     Tetracycline      Vomiting; nauseous            Medications:     Current Facility-Administered Medications   Medication     0.9% sodium chloride infusion     Current Outpatient Prescriptions   Medication Sig     medroxyPROGESTERone (PROVERA) 10 MG tablet Take 1 tablet (10 mg) by mouth daily for 10 days     ondansetron (ZOFRAN ODT) 4 MG ODT tab Take 1 tablet (4 mg) by mouth every 8 hours as needed for nausea     fluticasone-salmeterol (ADVAIR) 100-50 MCG/DOSE diskus inhaler Inhale 1 puff into the lungs 2 times daily     levalbuterol (XOPENEX HFA) 45 MCG/ACT Inhaler Inhale 1-2 puffs into the lungs every 4 hours as needed for shortness of breath / dyspnea     fluticasone (FLONASE) 50 MCG/ACT nasal spray Spray 1-2 sprays into both nostrils daily     ibuprofen (ADVIL,MOTRIN) 800 MG tablet Take 1 tablet (800 mg) by mouth every 8 hours as needed for moderate pain     Cholecalciferol (VITAMIN D) 1000 UNITS capsule Take 5 capsules by mouth daily             Review of Systems:     CONSTITUTIONAL: negative for  fevers, chills, sweats, malaise and weight loss  RESPIRATORY:  negative for  chest pain, dyspnea  CARDIOVASCULAR:  negative for  chest pain, palpitations, edema  GASTROINTESTINAL:  positive for abdominal distention, negative for nausea, vomiting, diarrhea, constipation  GENITOURINARY:  positive for frequency and vaginal bleeding  negative for dysuria, urinary incontinence, hesitancy, hematuria and vaginal discharge, hot flashes and dyspareunia  HEMATOLOGIC/LYMPHATIC:  negative for easy bruising, lymphadenopathy and petechiae  ENDOCRINE:  negative for heat intolerance, cold intolerance, tremor, weight changes and change in bowel habits         Physical Exam:     Vitals:    06/19/17 2211 06/20/17 0200   BP: (!) 161/108 134/88   Pulse: 120 96   Resp: 18 16   Temp: 96.9  F (36.1  C)    TempSrc: Oral    SpO2: 100% 99%   Weight: 75.2 kg (165 lb 12.8 oz)      General: NAD, a/o  CV:  Tachycardic - resolved, regular, no m/r/g  Resp: CTAB, no increased WOB  Abdomen: Nontender, fundus not palpable, no guarding  : Normal external genitalia, normal vagina, cervix appears 3 cm dilated with submucosal fibroid visualized within the cervical canal, no active bleeding on exam. Bimanual mobile 12cm uterus, cervix 2cm dilated bimanually  Extremities: No edema or tenderness         Data:     Results for orders placed or performed during the hospital encounter of 06/19/17 (from the past 24 hour(s))   CBC with platelets differential   Result Value Ref Range    WBC 5.7 4.0 - 11.0 10e9/L    RBC Count 3.12 (L) 3.8 - 5.2 10e12/L    Hemoglobin 8.6 (L) 11.7 - 15.7 g/dL    Hematocrit 27.0 (L) 35.0 - 47.0 %    MCV 87 78 - 100 fl    MCH 27.6 26.5 - 33.0 pg    MCHC 31.9 31.5 - 36.5 g/dL    RDW 13.5 10.0 - 15.0 %    Platelet Count 231 150 - 450 10e9/L    Diff Method Automated Method     % Neutrophils 65.9 %    % Lymphocytes 26.0 %    % Monocytes 6.1 %    % Eosinophils 1.6 %    % Basophils 0.2 %    % Immature Granulocytes 0.2 %    Nucleated RBCs 0 0 /100    Absolute Neutrophil 3.8 1.6 - 8.3 10e9/L    Absolute Lymphocytes 1.5 0.8 - 5.3 10e9/L    Absolute Monocytes 0.4 0.0 - 1.3 10e9/L    Absolute Eosinophils 0.1 0.0 - 0.7 10e9/L    Absolute Basophils 0.0 0.0 - 0.2 10e9/L    Abs Immature Granulocytes 0.0 0 - 0.4 10e9/L    Absolute Nucleated RBC 0.0    HCG qualitative urine   Result Value Ref Range    HCG Qual Urine Negative NEG   US Pelvic Complete w Transvaginal    Narrative    US PELVIC COMPLETE WITH TRANSVAGINAL  6/20/2017 12:36 AM     INDICATION: Pelvic pain. Bleeding.    COMPARISON: None.    FINDINGS: Transabdominal followed by endovaginal ultrasound to better  evaluate uterus and ovaries. Uterus measures 11.9 x 5.0 x 7.1 cm.  Endometrial stripe measures 0.4 cm in thickness. There is a large  mixed echogenicity mass in the lower uterine segment/cervix measuring  6.4 x 5.7 x 6.8 cm most consistent with a fibroid.  There are two  smaller fibroids in the fundus, one on the right measuring 3.2 cm and  one on the left measuring 2.9 cm in maximal diameters. The right ovary  is not visualized. The left ovary contains a 2.6 cm cyst/dominant  follicle. No free fluid or fluid collections.      Impression    IMPRESSION:  1. Several uterine lesions most consistent with fibroids, the largest  is in the lower uterine segment/cervix measuring 6.8 cm.  2. Nonvisualized right ovary.  3. 2.6 cm cyst/follicle left ovary.    BRIGIDO KINGSTON MD             Assessment and Plan:   Assessment: 47 year old female with AUB-L and acute blood loss anemia with ongoing bleeding and large 6.8cm apparently prolapsing fibroid on pelvic US and pelvic exam. Initially tachycardic with symptomatic ABLA, however responded immediately to volume resuscitation in the ED and now stable. Initially scant vaginal bleeding and planned for outpatient surgical follow up with PO Provera for interval management, however she had a large vaginal bleed immediately before discharge from ED.     At this time we recommend admission to observation for acute vaginal bleeding. We discussed management including vaginal myomectomy. We also reviewed other management options if vaginal myomectomy was deemed inappropriate or not possible, including uterine artery embolization, medical management with lupron, or hysterectomy. We will plan on admitting for observation, serial CBC and transfusion as indicated. Anticipate add-on surgery in am 6/20/17 with Dr. Louise, exam under anesthesia and vaginal myomectomy. NPO at 0500 6/20 given busy OR schedule and likely later afternoon surgical case.    - CBC in am, transfuse as clinically indicated  - NPO at 0500  - LR 100cc/hr  - I/O's including pad weights for EBL  - Tylenol and roxicodone prn pain    The patient was discussed with Dr. Saunders who is in agreement with the treatment plan.  Patient understands and agrees with plan of care, all  questions answered.     Светлана De Leon MD PGY-3  6/19/2017 11:36 PM       Patient evaluated and examined by me.  Agree with resident note.  See later progress notes.       Ernestina Saunders MD

## 2017-06-20 NOTE — PROGRESS NOTES
S; feeling ok, tired.  Has napped a little.  She reports minimal cramping.  Minimal vaginal bleeding. Cook cath pulls when she gets up, so has mostly stayed in bed.    O: /70 (BP Location: Left arm)  Pulse 94  Temp 98.6  F (37  C) (Oral)  Resp 14  Wt 75.2 kg (165 lb 12.8 oz)  LMP 06/19/2017 (Exact Date)  SpO2 100%  Breastfeeding? No  BMI 27.59 kg/m2    Gen: lying comfortably in bed, NAD  Abd; SNT  Ex: no c/c/e    Hgb: 8.4 (s/p 2u PRBCs)    A/P; 47 year old female with prolapsing uterine fibroid causing acute hemorrhage.  S/p vaginal myomectomy and placement of cook catheter.   She is doing well, bleeding has been minimal with cook in place.  No s/sx of anemia at this time.  Discussed plan to monitor bleeding and if remains stable, will deflate vaginal balloon at  and monitor.  If bleeding still minimal, will sequentially deflate cervical balloon over several hours.  If hemorrhage restarts, plan for abdominal hysterectomy.  Will continue with clear liquid diet only until after balloon is deflated and bleeding stable.  Discussed need to remain inpt at least one more night to monitor bleeding after catheter removed.  She is agreeable.  Questions answered.    BARI NAM MD

## 2017-06-20 NOTE — ED NOTES
ED to Floor Handoff      S:  Laura Jackson is a 47 year old female who speaks English and lives with a spouse,  in a home  They arrived in the ED by car from home with a complaint of Vaginal Bleeding (Pt has had heavy bleeding, worsening since yesterday. Reports she can go through a large pad in 10 secs. Reports dizziness/weakness. Has known fibroids)    Initial vitals were:   BP: (!) 161/108  Pulse: 120  Temp: 96.9  F (36.1  C)  Resp: 18  Weight: 75.2 kg (165 lb 12.8 oz)  SpO2: 100 %  Allergies:   Allergies   Allergen Reactions     Penicillins      Swelling throat; age 4     Tetracycline      Vomiting; nauseous   .  The meds given in the ED and their home medications are:   Current Facility-Administered Medications   Medication     0.9% sodium chloride BOLUS     0.9% sodium chloride infusion     Current Outpatient Prescriptions   Medication     medroxyPROGESTERone (PROVERA) 10 MG tablet     ondansetron (ZOFRAN ODT) 4 MG ODT tab     fluticasone-salmeterol (ADVAIR) 100-50 MCG/DOSE diskus inhaler     levalbuterol (XOPENEX HFA) 45 MCG/ACT Inhaler     fluticasone (FLONASE) 50 MCG/ACT nasal spray     ibuprofen (ADVIL,MOTRIN) 800 MG tablet     Cholecalciferol (VITAMIN D) 1000 UNITS capsule     Social demographics are   Social History     Social History     Marital status:      Spouse name: N/A     Number of children: 2     Years of education: N/A     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 No     Social History Narrative       B:   The patient has been ill for 5 day(s) and during this time the symptoms have increased.  In the ED was diagnosed with    Final diagnoses:   Abnormal vaginal bleeding   Intramural leiomyoma of uterus    Infection/sepsis suspected:No Isolation type; No active isolations   A:   In the ED these meds were given:   Medications   0.9% sodium chloride infusion ( Intravenous Stopped 6/20/17 0126)   0.9% sodium chloride BOLUS (1,000 mLs Intravenous New Bag 6/20/17 0248)   lactated ringers BOLUS 1,000 mL (0 mLs Intravenous Stopped 6/20/17 0202)     Drips running?  Yes  Labs results   Labs Ordered and Resulted from Time of ED Arrival Up to the Time of Departure from the ED   CBC WITH PLATELETS DIFFERENTIAL - Abnormal; Notable for the following:        Result Value    RBC Count 3.12 (*)     Hemoglobin 8.6 (*)     Hematocrit 27.0 (*)     All other components within normal limits   HCG QUALITATIVE URINE   IP ASSIGN PROVIDER TEAM TO TREATMENT TEAM     Imaging Studies:   Recent Results (from the past 24 hour(s))   US Pelvic Complete w Transvaginal    Narrative    US PELVIC COMPLETE WITH TRANSVAGINAL  6/20/2017 12:36 AM     INDICATION: Pelvic pain. Bleeding.    COMPARISON: None.    FINDINGS: Transabdominal followed by endovaginal ultrasound to better  evaluate uterus and ovaries. Uterus measures 11.9 x 5.0 x 7.1 cm.  Endometrial stripe measures 0.4 cm in thickness. There is a large  mixed echogenicity mass in the lower uterine segment/cervix measuring  6.4 x 5.7 x 6.8 cm most consistent with a fibroid. There are two  smaller fibroids in the fundus, one on the right measuring 3.2 cm and  one on the left measuring 2.9 cm in maximal diameters. The right ovary  is not visualized. The left ovary contains a 2.6 cm cyst/dominant  follicle. No free fluid or fluid collections.      Impression    IMPRESSION:  1. Several uterine lesions most consistent with fibroids, the largest  is in the lower uterine segment/cervix measuring 6.8 cm.  2. Nonvisualized right ovary.  3. 2.6 cm cyst/follicle left ovary.    BRIGIDO KINGSTON MD     Recent vital signs BP (!)  136/96  Pulse 96  Temp 96.9  F (36.1  C) (Oral)  Resp 16  Wt 75.2 kg (165 lb 12.8 oz)  LMP 06/19/2017 (Exact Date)  SpO2 100%  Breastfeeding? No  BMI 27.59 kg/m2  Cardiac Rhythm: ,      Abnormal labs/tests/findings requiring intervention:---  Pain control: pt had none  Nausea control: pt had none  R:   Transfer assistance needed: Independent  Family present during ED course? No   Family currently present? No  Pt needs tele? No  Code Status: Full Code  Tasks needing to be completed:---    Melva Patterson  ascom-- [id number] [phone] ED  [phone] ED

## 2017-06-20 NOTE — ED NOTES
46 yo F with vaginal bleeding who was being discharged to home when she passed another 150cc of clot and bright red blood.  She was also lightheaded.  Discussed with OB/GYN and will admit for hysterectomy tomorrow.     Zack Lyles MD  06/20/17 7142

## 2017-06-21 VITALS
BODY MASS INDEX: 27.59 KG/M2 | TEMPERATURE: 98.9 F | RESPIRATION RATE: 18 BRPM | WEIGHT: 165.8 LBS | OXYGEN SATURATION: 99 % | SYSTOLIC BLOOD PRESSURE: 111 MMHG | HEART RATE: 98 BPM | DIASTOLIC BLOOD PRESSURE: 69 MMHG

## 2017-06-21 DIAGNOSIS — D25.9 FIBROID UTERUS: Primary | ICD-10-CM

## 2017-06-21 LAB
ERYTHROCYTE [DISTWIDTH] IN BLOOD BY AUTOMATED COUNT: 14.8 % (ref 10–15)
HCT VFR BLD AUTO: 25.1 % (ref 35–47)
HGB BLD-MCNC: 8.2 G/DL (ref 11.7–15.7)
MCH RBC QN AUTO: 27.7 PG (ref 26.5–33)
MCHC RBC AUTO-ENTMCNC: 32.7 G/DL (ref 31.5–36.5)
MCV RBC AUTO: 85 FL (ref 78–100)
PLATELET # BLD AUTO: 180 10E9/L (ref 150–450)
RBC # BLD AUTO: 2.96 10E12/L (ref 3.8–5.2)
WBC # BLD AUTO: 7 10E9/L (ref 4–11)

## 2017-06-21 PROCEDURE — 86900 BLOOD TYPING SEROLOGIC ABO: CPT | Performed by: EMERGENCY MEDICINE

## 2017-06-21 PROCEDURE — 25000125 ZZHC RX 250: Performed by: OBSTETRICS & GYNECOLOGY

## 2017-06-21 PROCEDURE — 25000132 ZZH RX MED GY IP 250 OP 250 PS 637: Performed by: OBSTETRICS & GYNECOLOGY

## 2017-06-21 PROCEDURE — 85027 COMPLETE CBC AUTOMATED: CPT | Performed by: EMERGENCY MEDICINE

## 2017-06-21 PROCEDURE — 86901 BLOOD TYPING SEROLOGIC RH(D): CPT | Performed by: EMERGENCY MEDICINE

## 2017-06-21 PROCEDURE — 36415 COLL VENOUS BLD VENIPUNCTURE: CPT | Performed by: EMERGENCY MEDICINE

## 2017-06-21 PROCEDURE — 86850 RBC ANTIBODY SCREEN: CPT | Performed by: EMERGENCY MEDICINE

## 2017-06-21 PROCEDURE — G0378 HOSPITAL OBSERVATION PER HR: HCPCS

## 2017-06-21 RX ADMIN — RANITIDINE HYDROCHLORIDE 150 MG: 150 TABLET, FILM COATED ORAL at 09:58

## 2017-06-21 RX ADMIN — ACETAMINOPHEN 650 MG: 325 TABLET, FILM COATED ORAL at 09:24

## 2017-06-21 RX ADMIN — ONDANSETRON 4 MG: 4 TABLET, ORALLY DISINTEGRATING ORAL at 09:24

## 2017-06-21 RX ADMIN — OXYCODONE HYDROCHLORIDE 5 MG: 5 TABLET ORAL at 02:44

## 2017-06-21 RX ADMIN — ACETAMINOPHEN 650 MG: 325 TABLET, FILM COATED ORAL at 02:44

## 2017-06-21 NOTE — DISCHARGE SUMMARY
[facility] Discharge Summary    Laura Jackson MRN# [id number]   Age: 47 year old YOB: 1970     Date of Admission:  6/19/2017  Date of Discharge:  6/21/2017  Admitting Physician:  Ernestina Saunders MD  Discharge Physician:  Zulay Horowitz MD     Admit Dx:   - AUB-L  - Acute blood loss anemia    Discharge Dx:  - s/p procedure below  - s/p blood transfusion  - acute blood loss anemia    Procedures:  - Transvaginal partial myomectomy, dilation and curettage  - Blood transfusion    Admit HPI:  Laura Jackson is a 47 year old female who presented to the ED with symptomatic acute blood loss anemia and vaginal bleeding. Ultrasound revealed prolapsing suspected uterine fibroid. She had an acute hemorrhage in the ED requiring vaginal packing. Urgent transvaginal myomectomy was recommended and she was taken to the OR for the above noted procedure.     Please see her admit H&P for full details of her PMH, PSH, Meds, Allergies and exam on admit.    Operative Course:  Surgery was uncomplicated. EBL from the delivery was 300. Findings include: EUA revealed 3 cm dilated cervix with palpable intracervical fibroid, 15 week uterus, no adnexal masses. Fibroid removed in pieces, no discreet fibroid palpable at end of case although bleeding ensued, requiring cook catheter placement for tamponade. The tissue removed did not amount to a 6.7cm fibroid as visualized on ultrasound.     Postoperative Course:  Her postoperative course was uncomplicated. She received a total of 2U PRBC intraoperatively. Postoperative hemoglobin remained stable at 8.1. The cook catheter was removed on POD#1. She . On POD#1, she was meeting all of her postoperative goals and deemed stable for discharge. She was voiding without difficulty, tolerating a regular diet without nausea and vomiting, her pain was well controlled on oral pain medicines and her vaginal bleeding was appropriate.    Discharge Medications:     Review of  your medicines      UNREVIEWED medicines. Ask your doctor about these medicines       Dose / Directions    fluticasone 50 MCG/ACT spray   Commonly known as:  FLONASE   Used for:  Seasonal allergic rhinitis        Dose:  1-2 spray   Spray 1-2 sprays into both nostrils daily   Quantity:  3 Bottle   Refills:  3       fluticasone-salmeterol 100-50 MCG/DOSE diskus inhaler   Commonly known as:  ADVAIR   Used for:  Mild persistent asthma without complication        Dose:  1 puff   Inhale 1 puff into the lungs 2 times daily   Quantity:  3 Inhaler   Refills:  1       ibuprofen 800 MG tablet   Commonly known as:  ADVIL/MOTRIN   Used for:  Cervical strain, subsequent encounter, Acute pain of left shoulder        Dose:  800 mg   Take 1 tablet (800 mg) by mouth every 8 hours as needed for moderate pain   Quantity:  30 tablet   Refills:  1       levalbuterol 45 MCG/ACT Inhaler   Commonly known as:  XOPENEX HFA   Used for:  Mild persistent asthma without complication        Dose:  1-2 puff   Inhale 1-2 puffs into the lungs every 4 hours as needed for shortness of breath / dyspnea   Quantity:  1 Inhaler   Refills:  5       vitamin D 1000 UNITS capsule        Dose:  5 capsule   Take 5 capsules by mouth daily   Refills:  0         START taking       Dose / Directions    medroxyPROGESTERone 10 MG tablet   Commonly known as:  PROVERA        Dose:  10 mg   Take 1 tablet (10 mg) by mouth daily for 10 days   Quantity:  10 tablet   Refills:  1       ondansetron 4 MG ODT tab   Commonly known as:  ZOFRAN ODT        Dose:  4 mg   Take 1 tablet (4 mg) by mouth every 8 hours as needed for nausea   Quantity:  15 tablet   Refills:  0            Where to get your medicines      Some of these will need a paper prescription and others can be bought over the counter. Ask your nurse if you have questions.     Bring a paper prescription for each of these medications      medroxyPROGESTERone 10 MG tablet     ondansetron 4 MG ODT tab                Discharge/Disposition:  Laura Jackson was discharged to home in stable condition with the following instructions/medications:  1) Call for temperature > 100.4, bright red vaginal bleeding >1 pad an hour x 2 hours, foul smelling vaginal discharge, pain not controlled by usual oral pain meds, persistent nausea and vomiting not controlled on medications, drainage or redness from incision site  4) She was instructed to follow-up in 2 weeks with ultrasound and MD appointment thereafter   5) Discharge activity:  No heavy lifting >20 lbs or strenuous activity for 6 weeks, pelvic rest for 6 weeks, no driving or operating machinery while on narcotics.    6:  SloFe two tablets po daily and iron rich diet

## 2017-06-21 NOTE — PROGRESS NOTES
Brief Progress Note    Evaluated patient at 12:30 am. Scant VB, almost none. Pain well controlled.    Vitals:    06/20/17 1750 06/20/17 1940 06/20/17 2202 06/21/17 0000   BP: 115/74 104/54 108/66 107/66   BP Location: Left arm Left arm Left arm Left arm   Pulse:  88 89 100   Resp:  18 16 18   Temp:  98.5  F (36.9  C) 98.7  F (37.1  C) 98.3  F (36.8  C)   TempSrc:  Oral Oral Oral   SpO2:  99% 98% 100%   Weight:         Gen: NAD, a/o  Pelvic: Cuadra removed. Cook catheter removed by 1/2, total 35cc in uterine balloon now. No VB observed.      A/P:  - Remove uterine balloon at ~0400 - continue to observe closely for vaginal bleeding. hgb stable with s/s of ongoing bleeding or hematometra at this time.  - Anticipate discharge in am  - Await spontaneous void, cuadra removed    Светлана De Leon MD  OB GYN PGY-3

## 2017-06-21 NOTE — PROGRESS NOTES
"[facility] Gyn Progress Note     S: Patient is feeling well. Notes no more CP, SOB or heart racing. She has not noted any heavy vaginal bleeding. Vaginal bleeding is \"like light period.\" She does desire definitive management going forward. Pain well controlled on PO meds. No nausea or vomiting. Tolerating PO intake with regular diet. Ambulating without difficulty or symptoms. Voiding w/o difficulty. Passing flatus and BMs.     O:  Patient Vitals for the past 24 hrs:   BP Temp Temp src Pulse Heart Rate Resp SpO2   06/21/17 0247 108/68 98.9  F (37.2  C) Oral - 91 18 100 %   06/21/17 0000 107/66 98.3  F (36.8  C) Oral 100 - 18 100 %   06/20/17 2202 108/66 98.7  F (37.1  C) Oral 89 - 16 98 %   06/20/17 1940 104/54 98.5  F (36.9  C) Oral 88 - 18 99 %   06/20/17 1750 115/74 - - - 92 - -   06/20/17 1735 136/78 98.8  F (37.1  C) Oral - 119 20 -   06/20/17 1546 102/63 98.6  F (37  C) Oral - 72 16 98 %   06/20/17 1406 112/63 98.5  F (36.9  C) Oral - 84 14 100 %   06/20/17 1200 118/70 98.6  F (37  C) Oral 94 - 14 100 %   06/20/17 1002 108/72 98.2  F (36.8  C) Oral - 73 16 100 %   06/20/17 0813 125/78 97.4  F (36.3  C) Oral 80 - 14 100 %   06/20/17 0745 125/81 - - - 73 10 100 %   06/20/17 0730 130/85 - - - 83 10 100 %   06/20/17 0715 129/85 - - - 83 10 100 %   06/20/17 0701 - 99  F (37.2  C) Oral - - - -   06/20/17 0700 130/85 - - - 93 10 100 %   06/20/17 0645 135/85 - - - 93 12 100 %     Gen: awake, alert, cooperative, no apparent distress, appears concerned  Cardiovascular: normal rate  Respiratory: no increased work of breathing on room air  Abdomen: Soft, non distended, non tender, no masses palpated, no uterine fundus palpated  Extremeties: warm, well perfused, no edema    Assessment and Plan: Laura Jackson is a 47 year old female HD#2 for acute vaginal bleeding, POD#2 s/p transvaginal myomectomy & cook catheter placement, now with cook catheter removed since 0430, light vaginal bleeding and asymptomatic from " acute blood loss anemia. Her Hgb is stable after 2 units pRBC transfusion. She is not meeting criteria for transfusion at this time. She will be recommended to take PO iron supplements on discharge. She is stable for discharge today.     Postop Cares  - Pain: PO ibuprofen, tylenol and PRN oxycodone. Declines Rx for tylenol/ibuprofen on discharge.  - GI: regular. PRN antiemetics and bowel regimen  - Encourage ambulation  - Discussed next steps and definitive management going forward. She is interested in hysterectomy. Discussed as long as bleeding stays stable that GYN team would recommend giving her time to increase her Hgb after acute blood loss anemia with timeframe for surgery being 1-2 months pending improvement in bleeding. Plan to return to clinic to discuss management options moving forward. Follow up in 1-2 weeks, sooner if concerns arise. Reviewed bleeding precautions.     Dispo: today as resolution of heavy vaginal bleeding with uterine balloon removed    Eva Crespo MD, MPH     OB/GYN Resident G4  6/21/2017 6:34 AM

## 2017-06-21 NOTE — PLAN OF CARE
Problem: Goal Outcome Summary  Goal: Goal Outcome Summary  Outcome: Improving  Pt is alert and oriented x 4. Pain is under control with Tylenol. DP, CMS and Neuro intact. VS monitored every 2 hours and stable. MD came and said to check every 4 hours now. Scant bleeding in the pad and was changed once. Cuadra is draining well. MD came and pulled out cuadra at 0040. MD also deflated cook catheter but still in place as this time. PIV is patent with IVF running. Pt sleeping most of the time want to rest.      2000  Improved vaginal bleeding - yes scant only  Postoperative status - Getting better -  pain under control.  Meeting postoperative goals - No     2200  Improved vaginal bleeding - Sleeping  Postoperative status - Getting better -  pain under control.  Meeting postoperative goals - No     0000  Improved vaginal bleeding - yes scant only  Postoperative status - Getting better -  pain under control.  Meeting postoperative goals - No

## 2017-06-21 NOTE — PLAN OF CARE
Problem: Goal Outcome Summary  Goal: Goal Outcome Summary  Outcome: Improving  MD came around 0400 and pulled out cook catheter. Pt Voiding good twice.      0200  Improved vaginal bleeding - yes scant only  Postoperative status - Getting better -  pain under control.  Meeting postoperative goals - No      0400  Improved vaginal bleeding - scant - cook catheter was pulled out  Postoperative status - Getting better -  pain under control.  Meeting postoperative goals - yes      0600  Improved vaginal bleeding - yes scant only  Postoperative status - Getting better -  pain under control.  Meeting postoperative goals - yes

## 2017-06-21 NOTE — PLAN OF CARE
Problem: Goal Outcome Summary  Goal: Goal Outcome Summary  Outcome: Improving    Patient VSS. Up and walking independently. Neuros and CMS intact. Tolerating regular diet. Voiding spontaneously. Pain adequately controlled with PRNs. Cramping pains, offered heat packs, declined. Educated on heat and ibuprofen. Reviewed discharge paperwork and patient given discharge medications. Sent Fe slow release to  Pharmacy in [facility]. [facility] and support home.    1200  Stable anemia: Yes, 8.2  Improved vaginal bleeding Yes, light spotting reported.   Postoperative status: Stable.  Meeting postoperative goals: Yes.     1000  Stable anemia:  Yes, 8.2.  Improved vaginal bleeding: Yes, not saturating pad placed this AM.   Postoperative status: Stable.  Meeting postoperative goals: Yes. Given Zantac due to GERD symptoms; also given Zofran for nausea and Tylenol for headache.     0800  Stable anemia:  Yes, 8.2.  Improved vaginal bleeding: Yes, not saturating pad placed this AM.   Postoperative status: Stable.  Meeting postoperative goals: Yes

## 2017-06-21 NOTE — PROGRESS NOTES
Brief Progress Note    Doing well. Voided. Scant VB. Reports stable cramping since procedure. Pain well controlled. No dizziness.    Vitals:    06/20/17 1940 06/20/17 2202 06/21/17 0000 06/21/17 0247   BP: 104/54 108/66 107/66 108/68   BP Location: Left arm Left arm Left arm Left arm   Pulse: 88 89 100    Resp: 18 16 18 18   Temp: 98.5  F (36.9  C) 98.7  F (37.1  C) 98.3  F (36.8  C) 98.9  F (37.2  C)   TempSrc: Oral Oral Oral Oral   SpO2: 99% 98% 100% 100%   Weight:         Gen: NAD, a/o  Pelvic: Cook catheter deflated entirely and removed. Scant VB on pad, no active bleeding observed.      A/P:  - Continue close monitoring, anticipate discharge in am if bleeding pattern continues to be minimal  - Pt has questions about possible hysterectomy for definitive treatment if fibroid was only partially removed; will establish f/u plan prior to DC including likely outpatient repeat pelvic US and surgical planning. She has plans for [facility] in August and would like prompt follow up.    Светлана De Leon MD  OB GYN PGY-3

## 2017-06-22 LAB — COPATH REPORT: NORMAL

## 2017-06-23 ENCOUNTER — TELEPHONE (OUTPATIENT)
Dept: FAMILY MEDICINE | Facility: CLINIC | Age: 47
End: 2017-06-23

## 2017-06-23 LAB
BLD PROD TYP BPU: NORMAL
BLD UNIT ID BPU: 0
BLOOD PRODUCT CODE: NORMAL
BPU ID: NORMAL
TRANSFUSION STATUS PATIENT QL: NORMAL

## 2017-06-23 NOTE — TELEPHONE ENCOUNTER
This patient was discharged from [facility] on 6/21/2017.    Discharge Diagnosis: Vaginal Bleeding, Abnormal Vaginal Bleeding    A follow-up visit has not been scheduled.   Next 5 appointments (look out 90 days)     Jul 13, 2017 11:30 AM CDT   SHORT with Ernestina Saunders MD   [facility]    [address] [place] [id number] [phone]              Number of ED/ER visits in the last 12 months:       Please follow-up with patient.    Mely Alvarado,

## 2017-06-23 NOTE — TELEPHONE ENCOUNTER
ED / Discharge Outreach Protocol    Patient Contact    Attempt # 1    Was call answered?  No.  Left message on voicemail with information to call me back.    Tamia Martinez RN - BC

## 2017-06-24 ENCOUNTER — HOSPITAL ENCOUNTER (EMERGENCY)
Facility: CLINIC | Age: 47
Discharge: HOME | End: 2017-06-24
Attending: EMERGENCY MEDICINE | Admitting: EMERGENCY MEDICINE
Payer: COMMERCIAL

## 2017-06-24 VITALS
DIASTOLIC BLOOD PRESSURE: 86 MMHG | TEMPERATURE: 98.1 F | OXYGEN SATURATION: 100 % | SYSTOLIC BLOOD PRESSURE: 126 MMHG | HEART RATE: 83 BPM | RESPIRATION RATE: 16 BRPM

## 2017-06-24 DIAGNOSIS — D62 ANEMIA DUE TO BLOOD LOSS, ACUTE: ICD-10-CM

## 2017-06-24 DIAGNOSIS — R53.83 FATIGUE, UNSPECIFIED TYPE: ICD-10-CM

## 2017-06-24 DIAGNOSIS — R00.2 PALPITATIONS: ICD-10-CM

## 2017-06-24 LAB
ABO + RH BLD: NORMAL
ABO + RH BLD: NORMAL
ALBUMIN SERPL-MCNC: 3.2 G/DL (ref 3.4–5)
ALBUMIN UR-MCNC: NEGATIVE MG/DL
ALP SERPL-CCNC: 40 U/L (ref 40–150)
ALT SERPL W P-5'-P-CCNC: 27 U/L (ref 0–50)
ANION GAP SERPL CALCULATED.3IONS-SCNC: 12 MMOL/L (ref 3–14)
APPEARANCE UR: CLEAR
AST SERPL W P-5'-P-CCNC: 16 U/L (ref 0–45)
BACTERIA #/AREA URNS HPF: ABNORMAL /HPF
BACTERIA SPEC CULT: NORMAL
BASOPHILS # BLD AUTO: 0 10E9/L (ref 0–0.2)
BASOPHILS NFR BLD AUTO: 0.2 %
BILIRUB SERPL-MCNC: 0.2 MG/DL (ref 0.2–1.3)
BILIRUB UR QL STRIP: NEGATIVE
BLD GP AB SCN SERPL QL: NORMAL
BLOOD BANK CMNT PATIENT-IMP: NORMAL
BUN SERPL-MCNC: 7 MG/DL (ref 7–30)
CALCIUM SERPL-MCNC: 8.8 MG/DL (ref 8.5–10.1)
CHLORIDE SERPL-SCNC: 109 MMOL/L (ref 94–109)
CO2 SERPL-SCNC: 24 MMOL/L (ref 20–32)
COLOR UR AUTO: ABNORMAL
CREAT SERPL-MCNC: 0.86 MG/DL (ref 0.52–1.04)
DIFFERENTIAL METHOD BLD: ABNORMAL
EOSINOPHIL # BLD AUTO: 0.1 10E9/L (ref 0–0.7)
EOSINOPHIL NFR BLD AUTO: 1.7 %
ERYTHROCYTE [DISTWIDTH] IN BLOOD BY AUTOMATED COUNT: 14.3 % (ref 10–15)
GFR SERPL CREATININE-BSD FRML MDRD: 71 ML/MIN/1.7M2
GLUCOSE SERPL-MCNC: 101 MG/DL (ref 70–99)
GLUCOSE UR STRIP-MCNC: NEGATIVE MG/DL
HCT VFR BLD AUTO: 27.5 % (ref 35–47)
HGB BLD-MCNC: 8.9 G/DL (ref 11.7–15.7)
HGB UR QL STRIP: ABNORMAL
IMM GRANULOCYTES # BLD: 0 10E9/L (ref 0–0.4)
IMM GRANULOCYTES NFR BLD: 0.5 %
KETONES UR STRIP-MCNC: NEGATIVE MG/DL
LEUKOCYTE ESTERASE UR QL STRIP: ABNORMAL
LYMPHOCYTES # BLD AUTO: 0.9 10E9/L (ref 0.8–5.3)
LYMPHOCYTES NFR BLD AUTO: 15.2 %
MCH RBC QN AUTO: 28 PG (ref 26.5–33)
MCHC RBC AUTO-ENTMCNC: 32.4 G/DL (ref 31.5–36.5)
MCV RBC AUTO: 87 FL (ref 78–100)
MICRO REPORT STATUS: NORMAL
MONOCYTES # BLD AUTO: 0.2 10E9/L (ref 0–1.3)
MONOCYTES NFR BLD AUTO: 3.7 %
MUCOUS THREADS #/AREA URNS LPF: PRESENT /LPF
NEUTROPHILS # BLD AUTO: 4.7 10E9/L (ref 1.6–8.3)
NEUTROPHILS NFR BLD AUTO: 78.7 %
NITRATE UR QL: NEGATIVE
NRBC # BLD AUTO: 0 10*3/UL
NRBC BLD AUTO-RTO: 0 /100
PH UR STRIP: 7 PH (ref 5–7)
PLATELET # BLD AUTO: 268 10E9/L (ref 150–450)
POTASSIUM SERPL-SCNC: 3.6 MMOL/L (ref 3.4–5.3)
PROT SERPL-MCNC: 6.4 G/DL (ref 6.8–8.8)
RBC # BLD AUTO: 3.18 10E12/L (ref 3.8–5.2)
RBC #/AREA URNS AUTO: 1 /HPF (ref 0–2)
SODIUM SERPL-SCNC: 145 MMOL/L (ref 133–144)
SP GR UR STRIP: 1 (ref 1–1.03)
SPECIMEN EXP DATE BLD: NORMAL
SPECIMEN SOURCE: NORMAL
SQUAMOUS #/AREA URNS AUTO: 1 /HPF (ref 0–1)
URN SPEC COLLECT METH UR: ABNORMAL
UROBILINOGEN UR STRIP-MCNC: NORMAL MG/DL (ref 0–2)
WBC # BLD AUTO: 6 10E9/L (ref 4–11)
WBC #/AREA URNS AUTO: 8 /HPF (ref 0–2)

## 2017-06-24 PROCEDURE — 25000125 ZZHC RX 250: Performed by: EMERGENCY MEDICINE

## 2017-06-24 PROCEDURE — 85025 COMPLETE CBC W/AUTO DIFF WBC: CPT | Performed by: EMERGENCY MEDICINE

## 2017-06-24 PROCEDURE — 81001 URINALYSIS AUTO W/SCOPE: CPT | Performed by: EMERGENCY MEDICINE

## 2017-06-24 PROCEDURE — 86901 BLOOD TYPING SEROLOGIC RH(D): CPT | Performed by: EMERGENCY MEDICINE

## 2017-06-24 PROCEDURE — 86850 RBC ANTIBODY SCREEN: CPT | Performed by: EMERGENCY MEDICINE

## 2017-06-24 PROCEDURE — 87086 URINE CULTURE/COLONY COUNT: CPT | Performed by: EMERGENCY MEDICINE

## 2017-06-24 PROCEDURE — 99285 EMERGENCY DEPT VISIT HI MDM: CPT | Mod: Z6 | Performed by: EMERGENCY MEDICINE

## 2017-06-24 PROCEDURE — 36415 COLL VENOUS BLD VENIPUNCTURE: CPT | Performed by: EMERGENCY MEDICINE

## 2017-06-24 PROCEDURE — 86900 BLOOD TYPING SEROLOGIC ABO: CPT | Performed by: EMERGENCY MEDICINE

## 2017-06-24 PROCEDURE — 80053 COMPREHEN METABOLIC PANEL: CPT | Performed by: EMERGENCY MEDICINE

## 2017-06-24 PROCEDURE — 96374 THER/PROPH/DIAG INJ IV PUSH: CPT | Performed by: EMERGENCY MEDICINE

## 2017-06-24 PROCEDURE — S0028 INJECTION, FAMOTIDINE, 20 MG: HCPCS | Performed by: EMERGENCY MEDICINE

## 2017-06-24 PROCEDURE — 25000128 H RX IP 250 OP 636: Performed by: EMERGENCY MEDICINE

## 2017-06-24 PROCEDURE — 99284 EMERGENCY DEPT VISIT MOD MDM: CPT | Mod: 25 | Performed by: EMERGENCY MEDICINE

## 2017-06-24 RX ORDER — SODIUM CHLORIDE 9 MG/ML
1000 INJECTION, SOLUTION INTRAVENOUS CONTINUOUS
Status: DISCONTINUED | OUTPATIENT
Start: 2017-06-24 | End: 2017-06-24 | Stop reason: HOSPADM

## 2017-06-24 RX ORDER — ONDANSETRON 2 MG/ML
4 INJECTION INTRAMUSCULAR; INTRAVENOUS EVERY 30 MIN PRN
Status: DISCONTINUED | OUTPATIENT
Start: 2017-06-24 | End: 2017-06-24 | Stop reason: HOSPADM

## 2017-06-24 RX ADMIN — SODIUM CHLORIDE 1000 ML: 9 INJECTION, SOLUTION INTRAVENOUS at 13:39

## 2017-06-24 RX ADMIN — SODIUM CHLORIDE 1000 ML: 9 INJECTION, SOLUTION INTRAVENOUS at 13:15

## 2017-06-24 RX ADMIN — FAMOTIDINE 20 MG: 10 INJECTION, SOLUTION INTRAVENOUS at 13:11

## 2017-06-24 ASSESSMENT — ENCOUNTER SYMPTOMS
SHORTNESS OF BREATH: 1
NAUSEA: 1
DIARRHEA: 0
DYSURIA: 0
VOMITING: 0
CONSTIPATION: 0
ROS SKIN COMMENTS: CLAMMY SKIN
HEMATURIA: 0
PALPITATIONS: 1

## 2017-06-24 NOTE — ED NOTES
Bed: ED05  Expected date: 6/24/17  Expected time: 11:56 AM  Means of arrival:   Comments:  A623 47 F SOB post uterine hemnorage

## 2017-06-24 NOTE — ED AVS SNAPSHOT
[facility], Emergency Department    [address]    [place] [address]    Phone:  [phone]    Fax:  [phone]                                       Laura Jackson   MRN: [id number]    Department:  [facility], Emergency Department   Date of Visit:  6/24/2017           Patient Information     Date Of Birth          1970        Your diagnoses for this visit were:     Anemia due to blood loss, acute     Fatigue, unspecified type     Palpitations        You were seen by Emmanuelle Phillips MD.        Discharge Instructions       Continue taking the tylenol 1000 mg up to 4 times a day and ibuprofen 400 mg-600 mg 3-4 times a day for discomfort.   Continue taking your iron tablets.   Drink plenty of fluids.   Your iron is stable.   Return to the ER if any other problems/concerns.     Future Appointments        Provider Department Dept Phone Center    7/13/2017 10:20 AM [facility] ULTRASOUND ROOM [address] [place] [phone] [facility]    7/13/2017 11:30 AM Ernestina Saunders MD [place] [phone] [facility]      24 Hour Appointment Hotline       To make an appointment at any [facility], call [phone] ([phone]). If you don't have a family doctor or clinic, we will help you find one. [facility] are conveniently located to serve the needs of you and your family.             Review of your medicines      Our records show that you are taking the medicines listed below. If these are incorrect, please call your family doctor or clinic.        Dose / Directions Last dose taken    ferrous sulfate 142 (45 FE) MG Tbcr   Commonly known as:  SLO-FE   Dose:  142 mg   Quantity:  90 tablet        Take 1 tablet (142 mg) by mouth daily   Refills:  0        fluticasone 50 MCG/ACT spray   Commonly known as:  FLONASE   Dose:  1-2 spray   Quantity:  3 Bottle        Spray 1-2 sprays into both nostrils daily   Refills:  3        fluticasone-salmeterol 100-50 MCG/DOSE  diskus inhaler   Commonly known as:  ADVAIR   Dose:  1 puff   Quantity:  3 Inhaler        Inhale 1 puff into the lungs 2 times daily   Refills:  1        ibuprofen 800 MG tablet   Commonly known as:  ADVIL/MOTRIN   Dose:  800 mg   Quantity:  30 tablet        Take 1 tablet (800 mg) by mouth every 8 hours as needed for moderate pain   Refills:  1        levalbuterol 45 MCG/ACT Inhaler   Commonly known as:  XOPENEX HFA   Dose:  1-2 puff   Quantity:  1 Inhaler        Inhale 1-2 puffs into the lungs every 4 hours as needed for shortness of breath / dyspnea   Refills:  5        ondansetron 4 MG ODT tab   Commonly known as:  ZOFRAN ODT   Dose:  4 mg   Quantity:  15 tablet        Take 1 tablet (4 mg) by mouth every 8 hours as needed for nausea   Refills:  0        vitamin D 1000 UNITS capsule   Dose:  5 capsule        Take 5 capsules by mouth daily   Refills:  0                Procedures and tests performed during your visit     ABO/Rh type and screen    CBC with platelets differential    Comprehensive metabolic panel    EKG 12-lead, tracing only    UA with Microscopic      Orders Needing Specimen Collection     Ordered          06/24/17 1344  Urine Culture - ROUTINE, Prio: Routine, Needs to be Collected     Scheduled Task Status   06/24/17 1345 Collect Urine Culture Open   Order Class:  PCU Collect                  Pending Results     Date and Time Order Name Status Description    6/24/2017 1221 EKG 12-lead, tracing only Preliminary             Pending Culture Results     No orders found from 6/22/2017 to 6/25/2017.            Pending Results Instructions     If you had any lab results that were not finalized at the time of your Discharge, you can call the ED Lab Result RN at [phone]. You will be contacted by this team for any positive Lab results or changes in treatment. The nurses are available 7 days a week from 10A to 6:30P.  You can leave a message 24 hours per day and they will return your call.        Thank you  for choosing [facility]       Thank you for choosing [facility] for your care. Our goal is always to provide you with excellent care. Hearing back from our patients is one way we can continue to improve our services. Please take a few minutes to complete the written survey that you may receive in the mail after you visit with us. Thank you!        [facility] Information     [facility] gives you secure access to your electronic health record. If you see a primary care provider, you can also send messages to your care team and make appointments. If you have questions, please call your primary care clinic.  If you do not have a primary care provider, please call [phone] and they will assist you.        Care EveryWhere ID     This is your Care EveryWhere ID. This could be used by other organizations to access your [facility] medical records  [id number]        Equal Access to Services     ODESSA HOWARD : Anna Cordero, natali dailey, adilene martinez, ludmila alberts. So [facility] [phone].    ATENCIÓN: Si habla español, tiene a guido disposición servicios gratuitos de asistencia lingüística. Llame al [phone].    We comply with applicable federal civil rights laws and [place] laws. We do not discriminate on the basis of race, color, national origin, age, disability sex, sexual orientation or gender identity.            After Visit Summary       This is your record. Keep this with you and show to your community pharmacist(s) and doctor(s) at your next visit.

## 2017-06-24 NOTE — ED PROVIDER NOTES
"  History     Chief Complaint   Patient presents with     Shortness of Breath     pt seen here 5 days ago for uterine hemorrage, went to surgery. Pt since has ongoing moderate low abd cramping \" like light labor\" and poor appitite/nausea/queasy stomach and has been taking zofran and zantac. (no emisis). this am pt felt weak short of breath, \" clammy\" enroute SOB resolved and pt feeling some better     Vaginal Bleeding     KARAN Jackson is a 47 year old female who has a history of fibroids. She was seen in the Emergency Department on 6/19/2017 for vaginal bleeding requiring emergent vaginal packing and subsequent admission for a transvaginal partial myomectomy (discharged 06/21/17). At that encounter, she reported that she was in the middle of her menses. She stated her menses were usually heavy; however, this cycle was unusually heavy.     Today, she presents with continued vaginal bleeding, cramping, and shortness of breath. She describes her vaginal bleeding as minimal with bright red with a few tiny clots. She states that her vaginal bleeding worsens while she is urinating or sitting. Patient reports bleeding through her \"panty shields\", but has not needed a full-size pad. She reports a \"labor like\" pelvic cramping yesterday (6/23/2017) with more bleeding. Today, she was lying down in her bed resting, when she began to have increased heart rate, \"clamy\" hands, and she felt \"shaky\" all of which lasted for 45 minutes with no increased vaginal bleeding. When these symptoms began, she took a Zofran, but her symptoms continued to worsen. She reports taking ibuprofen 2 tablets PO 2-3 times a day which improves her cramping. She is also taking Zantac and Zofran which improves her symptoms. Patient is currently on extended release iron and began taking MiraLAX at home. Patient reports normal bowel movements at home. She denies any vomiting, any problems with urination and denies any history of heart " problems.    I have reviewed the Medications, Allergies, Past Medical and Surgical History, and Social History in the [facility] system.    Past Medical History:   Diagnosis Date     Family history of breast cancer 2/19/2014     Family history of breast cancer 2/19/2014     SVT (supraventricular tachycardia):  history of, no recurrence since 2008 10/11/2013    no recurrence since 2008      Uncomplicated asthma        Past Surgical History:   Procedure Laterality Date     APPENDECTOMY       DILATION AND CURETTAGE N/A 6/20/2017    Procedure: DILATION AND CURETTAGE;  Uterine Curettings and Fibroid Removal, Cook catheter placement; (No hysterectomy done at this time);  Surgeon: Ernestina Saunders MD;  Location: [facility] OR     TONSILLECTOMY         Family History   Problem Relation Age of Onset     DIABETES Mother      Hypertension Mother      Hyperlipidemia Mother      Cardiovascular Father      CHF and COPD     Asthma Father      Breast Cancer Maternal Grandfather      Colon Cancer Maternal Grandfather      Breast Cancer Paternal Grandmother      Breast Cancer Paternal Aunt      C.A.D. Paternal Grandfather      Breast Cancer Cousin      Asthma Son      DIABETES Maternal Grandmother      Hypertension Maternal Grandmother        Social History   Substance Use Topics     Smoking status: Never Smoker     Smokeless tobacco: Never Used     Alcohol use Yes      Comment: occ       Current Facility-Administered Medications   Medication     ondansetron (ZOFRAN) injection 4 mg     0.9% sodium chloride infusion     Current Outpatient Prescriptions   Medication     ferrous sulfate (SLO-FE) 142 (45 FE) MG TBCR     ondansetron (ZOFRAN ODT) 4 MG ODT tab     fluticasone-salmeterol (ADVAIR) 100-50 MCG/DOSE diskus inhaler     fluticasone (FLONASE) 50 MCG/ACT nasal spray     ibuprofen (ADVIL,MOTRIN) 800 MG tablet     levalbuterol (XOPENEX HFA) 45 MCG/ACT Inhaler     Cholecalciferol (VITAMIN D) 1000 UNITS capsule        Allergies   Allergen Reactions      Penicillins      Swelling throat; age 4     Tetracycline      Vomiting; nauseous         Review of Systems   Respiratory: Positive for shortness of breath.    Cardiovascular: Positive for palpitations.   Gastrointestinal: Positive for nausea. Negative for constipation, diarrhea and vomiting.   Genitourinary: Positive for pelvic pain (cramping) and vaginal bleeding. Negative for dysuria and hematuria.   Skin:        Clammy skin   All other systems reviewed and are negative.      Physical Exam   BP: (!) 149/96  Pulse: 104  Temp: 98.5  F (36.9  C)  Resp: 18  SpO2: 100 %  Physical Exam   Constitutional: She is oriented to person, place, and time. She appears well-developed and well-nourished.   HENT:   Head: Normocephalic and atraumatic.   Neck: Normal range of motion. Neck supple.   Cardiovascular: Normal rate, regular rhythm and normal heart sounds.    Pulmonary/Chest: Effort normal and breath sounds normal. No respiratory distress. She has no wheezes. She has no rales.   Abdominal: Soft. She exhibits no distension. There is no tenderness. There is no rebound.   Genitourinary: Vagina normal. No vaginal discharge found.   Genitourinary Comments: Minimal vaginal spotting; no tenderness on exam   Musculoskeletal: She exhibits no tenderness.   Neurological: She is alert and oriented to person, place, and time.   Skin: Skin is warm and dry.   Psychiatric: She has a normal mood and affect. Her behavior is normal. Thought content normal.       ED Course   12:40 PM  The patient was seen and examined by Dr. Phillips in Room 5.     ED Course     Procedures             Critical Care time:  none     Results for orders placed or performed during the hospital encounter of 06/24/17   CBC with platelets differential   Result Value Ref Range    WBC 6.0 4.0 - 11.0 10e9/L    RBC Count 3.18 (L) 3.8 - 5.2 10e12/L    Hemoglobin 8.9 (L) 11.7 - 15.7 g/dL    Hematocrit 27.5 (L) 35.0 - 47.0 %    MCV 87 78 - 100 fl    MCH 28.0 26.5 - 33.0 pg     MCHC 32.4 31.5 - 36.5 g/dL    RDW 14.3 10.0 - 15.0 %    Platelet Count 268 150 - 450 10e9/L    Diff Method Automated Method     % Neutrophils 78.7 %    % Lymphocytes 15.2 %    % Monocytes 3.7 %    % Eosinophils 1.7 %    % Basophils 0.2 %    % Immature Granulocytes 0.5 %    Nucleated RBCs 0 0 /100    Absolute Neutrophil 4.7 1.6 - 8.3 10e9/L    Absolute Lymphocytes 0.9 0.8 - 5.3 10e9/L    Absolute Monocytes 0.2 0.0 - 1.3 10e9/L    Absolute Eosinophils 0.1 0.0 - 0.7 10e9/L    Absolute Basophils 0.0 0.0 - 0.2 10e9/L    Abs Immature Granulocytes 0.0 0 - 0.4 10e9/L    Absolute Nucleated RBC 0.0    Comprehensive metabolic panel   Result Value Ref Range    Sodium 145 (H) 133 - 144 mmol/L    Potassium 3.6 3.4 - 5.3 mmol/L    Chloride 109 94 - 109 mmol/L    Carbon Dioxide 24 20 - 32 mmol/L    Anion Gap 12 3 - 14 mmol/L    Glucose 101 (H) 70 - 99 mg/dL    Urea Nitrogen 7 7 - 30 mg/dL    Creatinine 0.86 0.52 - 1.04 mg/dL    GFR Estimate 71 >60 mL/min/1.7m2    GFR Estimate If Black 85 >60 mL/min/1.7m2    Calcium 8.8 8.5 - 10.1 mg/dL    Bilirubin Total 0.2 0.2 - 1.3 mg/dL    Albumin 3.2 (L) 3.4 - 5.0 g/dL    Protein Total 6.4 (L) 6.8 - 8.8 g/dL    Alkaline Phosphatase 40 40 - 150 U/L    ALT 27 0 - 50 U/L    AST 16 0 - 45 U/L   UA with Microscopic   Result Value Ref Range    Color Urine Straw     Appearance Urine Clear     Glucose Urine Negative NEG mg/dL    Bilirubin Urine Negative NEG    Ketones Urine Negative NEG mg/dL    Specific Gravity Urine 1.001 (L) 1.003 - 1.035    Blood Urine Small (A) NEG    pH Urine 7.0 5.0 - 7.0 pH    Protein Albumin Urine Negative NEG mg/dL    Urobilinogen mg/dL Normal 0.0 - 2.0 mg/dL    Nitrite Urine Negative NEG    Leukocyte Esterase Urine Trace (A) NEG    Source Midstream Urine     WBC Urine 8 (H) 0 - 2 /HPF    RBC Urine 1 0 - 2 /HPF    Bacteria Urine Few (A) NEG /HPF    Squamous Epithelial /HPF Urine 1 0 - 1 /HPF    Mucous Urine Present (A) NEG /LPF   EKG 12-lead, tracing only   Result Value Ref  Range    Interpretation ECG Click View Image link to view waveform and result    ABO/Rh type and screen   Result Value Ref Range    ABO A     RH(D)  Pos     Antibody Screen Neg     Test Valid Only At       [facility]    Specimen Expires 06/27/2017    Urine Culture   Result Value Ref Range    Specimen Description Midstream Urine     Special Requests Specimen received in preservative     Culture Micro Pending     Micro Report Status Pending    Urine Culture   Result Value Ref Range    Specimen Description Midstream Urine     Culture Micro       Duplicate request Canceled, Test credited 6/24/17.JT    Micro Report Status FINAL 06/24/2017      Medications   ondansetron (ZOFRAN) injection 4 mg (4 mg Intravenous Not Given 6/24/17 1249)   0.9% sodium chloride BOLUS (0 mLs Intravenous Hold 6/24/17 1404)     Followed by   0.9% sodium chloride infusion (0 mLs Intravenous Stopped 6/24/17 1405)   famotidine (PEPCID) injection 20 mg (20 mg Intravenous Given 6/24/17 1311)                 Labs Ordered and Resulted from Time of ED Arrival Up to the Time of Departure from the ED   CBC WITH PLATELETS DIFFERENTIAL - Abnormal; Notable for the following:        Result Value    RBC Count 3.18 (*)     Hemoglobin 8.9 (*)     Hematocrit 27.5 (*)     All other components within normal limits   COMPREHENSIVE METABOLIC PANEL - Abnormal; Notable for the following:     Sodium 145 (*)     Glucose 101 (*)     Albumin 3.2 (*)     Protein Total 6.4 (*)     All other components within normal limits   ROUTINE UA WITH MICROSCOPIC - Abnormal; Notable for the following:     Specific Gravity Urine 1.001 (*)     Blood Urine Small (*)     Leukocyte Esterase Urine Trace (*)     WBC Urine 8 (*)     Bacteria Urine Few (*)     Mucous Urine Present (*)     All other components within normal limits   ABO/RH TYPE AND SCREEN            Assessments & Plan (with Medical Decision Making): 47 year old female who presented to  the ER d/t feeling unwell and dizzy as well as some palpitations. Patient was recently discharged from the hospital a few days ago after being admitted for abrupt onset of worsening vaginal bleeding and subsequent vaginal myomectomy. Patient had lost blood at that time and had refused to transfusion prior to discharge period. Per review patients medical chart, her normal baseline hemoglobin is 12-13, then she bled down to 7.1. Patient here received IV fluids and is currently feeling better. Patients labs showed that her hemoglobin has increased to 8.9 since discharge. Patients symptoms were likely from abdominal cramping, ongoing weakness and fatigue from blood loss anemia. At this time patient has no tenderness and has not had any significant bleeding. I feel like she is stable for discharge. Case was discussed with the OB resident who agrees that patient s symptoms were likely due to post-surgical and persistent anemia. Patient verbalized understanding and is stable for discharge.       I have reviewed the nursing notes.    I have reviewed the findings, diagnosis, plan and need for follow up with the patient.    Discharge Medication List as of 6/24/2017  2:05 PM          Final diagnoses:   Anemia due to blood loss, acute   Fatigue, unspecified type   Palpitations     IWolfgang, am serving as a trained medical scribe to document services personally performed by Emmanuelle Phillips MD, based on the provider's statements to me.   IEmmanuelle MD, was physically present and have reviewed and verified the accuracy of this note documented by Wolfgang Holt.    6/24/2017   [facility], [place], EMERGENCY DEPARTMENT     Emmanuelle Phillips MD  06/24/17 9435

## 2017-06-24 NOTE — ED AVS SNAPSHOT
[facility], [place], Emergency Department    [address]    [facility] [address]    Phone:  [phone]    Fax:  [phone]                                       Laura Jackson   MRN: [id number]    Department:  [facility], Emergency Department   Date of Visit:  6/24/2017           After Visit Summary Signature Page     I have received my discharge instructions, and my questions have been answered. I have discussed any challenges I see with this plan with the nurse or doctor.    ..........................................................................................................................................  Patient/Patient Representative Signature      ..........................................................................................................................................  Patient Representative Print Name and Relationship to Patient    ..................................................               ................................................  Date                                            Time    ..........................................................................................................................................  Reviewed by Signature/Title    ...................................................              ..............................................  Date                                                            Time

## 2017-06-24 NOTE — DISCHARGE INSTRUCTIONS
Continue taking the tylenol 1000 mg up to 4 times a day and ibuprofen 400 mg-600 mg 3-4 times a day for discomfort.   Continue taking your iron tablets.   Drink plenty of fluids.   Your iron is stable.   Return to the ER if any other problems/concerns.

## 2017-06-25 LAB
BACTERIA SPEC CULT: NORMAL
Lab: NORMAL
MICRO REPORT STATUS: NORMAL
SPECIMEN SOURCE: NORMAL

## 2017-06-26 ENCOUNTER — HOSPITAL ENCOUNTER (EMERGENCY)
Facility: CLINIC | Age: 47
Discharge: HOME | End: 2017-06-26
Attending: EMERGENCY MEDICINE | Admitting: EMERGENCY MEDICINE
Payer: COMMERCIAL

## 2017-06-26 ENCOUNTER — APPOINTMENT (OUTPATIENT)
Dept: CT IMAGING | Facility: CLINIC | Age: 47
End: 2017-06-26
Attending: EMERGENCY MEDICINE
Payer: COMMERCIAL

## 2017-06-26 ENCOUNTER — OFFICE VISIT (OUTPATIENT)
Dept: FAMILY MEDICINE | Facility: CLINIC | Age: 47
End: 2017-06-26
Payer: COMMERCIAL

## 2017-06-26 VITALS
TEMPERATURE: 98.4 F | HEART RATE: 128 BPM | DIASTOLIC BLOOD PRESSURE: 74 MMHG | BODY MASS INDEX: 26.88 KG/M2 | SYSTOLIC BLOOD PRESSURE: 114 MMHG | WEIGHT: 161.5 LBS | OXYGEN SATURATION: 100 %

## 2017-06-26 VITALS
HEART RATE: 90 BPM | SYSTOLIC BLOOD PRESSURE: 145 MMHG | RESPIRATION RATE: 18 BRPM | TEMPERATURE: 98.5 F | DIASTOLIC BLOOD PRESSURE: 90 MMHG | OXYGEN SATURATION: 96 %

## 2017-06-26 DIAGNOSIS — R06.02 SOB (SHORTNESS OF BREATH): ICD-10-CM

## 2017-06-26 DIAGNOSIS — D62 ACUTE POSTHEMORRHAGIC ANEMIA: Primary | ICD-10-CM

## 2017-06-26 DIAGNOSIS — R00.0 TACHYCARDIA: ICD-10-CM

## 2017-06-26 DIAGNOSIS — R00.0 HEART RATE FAST: ICD-10-CM

## 2017-06-26 DIAGNOSIS — R07.9 CHEST PAIN, UNSPECIFIED TYPE: ICD-10-CM

## 2017-06-26 LAB
ANION GAP SERPL CALCULATED.3IONS-SCNC: 12 MMOL/L (ref 3–14)
BASOPHILS # BLD AUTO: 0 10E9/L (ref 0–0.2)
BASOPHILS NFR BLD AUTO: 0.4 %
BUN SERPL-MCNC: 9 MG/DL (ref 7–30)
CALCIUM SERPL-MCNC: 8.8 MG/DL (ref 8.5–10.1)
CHLORIDE SERPL-SCNC: 106 MMOL/L (ref 94–109)
CO2 SERPL-SCNC: 24 MMOL/L (ref 20–32)
CREAT SERPL-MCNC: 0.81 MG/DL (ref 0.52–1.04)
D DIMER PPP FEU-MCNC: 0.4 UG/ML FEU (ref 0–0.5)
DIFFERENTIAL METHOD BLD: ABNORMAL
EOSINOPHIL # BLD AUTO: 0.1 10E9/L (ref 0–0.7)
EOSINOPHIL NFR BLD AUTO: 0.9 %
ERYTHROCYTE [DISTWIDTH] IN BLOOD BY AUTOMATED COUNT: 13.9 % (ref 10–15)
GFR SERPL CREATININE-BSD FRML MDRD: 75 ML/MIN/1.7M2
GLUCOSE SERPL-MCNC: 101 MG/DL (ref 70–99)
HCT VFR BLD AUTO: 32.2 % (ref 35–47)
HGB BLD-MCNC: 10.2 G/DL (ref 11.7–15.7)
HGB BLD-MCNC: 9.6 G/DL (ref 11.7–15.7)
IMM GRANULOCYTES # BLD: 0 10E9/L (ref 0–0.4)
IMM GRANULOCYTES NFR BLD: 0.4 %
INTERPRETATION ECG - MUSE: NORMAL
LYMPHOCYTES # BLD AUTO: 0.8 10E9/L (ref 0.8–5.3)
LYMPHOCYTES NFR BLD AUTO: 14.9 %
MCH RBC QN AUTO: 27.3 PG (ref 26.5–33)
MCHC RBC AUTO-ENTMCNC: 31.7 G/DL (ref 31.5–36.5)
MCV RBC AUTO: 86 FL (ref 78–100)
MONOCYTES # BLD AUTO: 0.3 10E9/L (ref 0–1.3)
MONOCYTES NFR BLD AUTO: 4.8 %
NEUTROPHILS # BLD AUTO: 4.4 10E9/L (ref 1.6–8.3)
NEUTROPHILS NFR BLD AUTO: 78.6 %
NRBC # BLD AUTO: 0 10*3/UL
NRBC BLD AUTO-RTO: 0 /100
PLATELET # BLD AUTO: 341 10E9/L (ref 150–450)
POTASSIUM SERPL-SCNC: 3.8 MMOL/L (ref 3.4–5.3)
RBC # BLD AUTO: 3.74 10E12/L (ref 3.8–5.2)
SODIUM SERPL-SCNC: 142 MMOL/L (ref 133–144)
TROPONIN I SERPL-MCNC: NORMAL UG/L (ref 0–0.04)
TSH SERPL DL<=0.005 MIU/L-ACNC: 2.53 MU/L (ref 0.4–4)
WBC # BLD AUTO: 5.6 10E9/L (ref 4–11)

## 2017-06-26 PROCEDURE — 80048 BASIC METABOLIC PNL TOTAL CA: CPT | Performed by: EMERGENCY MEDICINE

## 2017-06-26 PROCEDURE — 25000125 ZZHC RX 250: Performed by: EMERGENCY MEDICINE

## 2017-06-26 PROCEDURE — 96360 HYDRATION IV INFUSION INIT: CPT | Mod: 59 | Performed by: EMERGENCY MEDICINE

## 2017-06-26 PROCEDURE — 36415 COLL VENOUS BLD VENIPUNCTURE: CPT | Performed by: NURSE PRACTITIONER

## 2017-06-26 PROCEDURE — 84443 ASSAY THYROID STIM HORMONE: CPT | Performed by: EMERGENCY MEDICINE

## 2017-06-26 PROCEDURE — 84484 ASSAY OF TROPONIN QUANT: CPT | Performed by: EMERGENCY MEDICINE

## 2017-06-26 PROCEDURE — 99285 EMERGENCY DEPT VISIT HI MDM: CPT | Mod: 25 | Performed by: EMERGENCY MEDICINE

## 2017-06-26 PROCEDURE — 93005 ELECTROCARDIOGRAM TRACING: CPT | Performed by: EMERGENCY MEDICINE

## 2017-06-26 PROCEDURE — 85018 HEMOGLOBIN: CPT | Performed by: NURSE PRACTITIONER

## 2017-06-26 PROCEDURE — 71260 CT THORAX DX C+: CPT

## 2017-06-26 PROCEDURE — 93010 ELECTROCARDIOGRAM REPORT: CPT | Mod: Z6 | Performed by: EMERGENCY MEDICINE

## 2017-06-26 PROCEDURE — 85379 FIBRIN DEGRADATION QUANT: CPT | Performed by: EMERGENCY MEDICINE

## 2017-06-26 PROCEDURE — 85025 COMPLETE CBC W/AUTO DIFF WBC: CPT | Performed by: EMERGENCY MEDICINE

## 2017-06-26 PROCEDURE — 99214 OFFICE O/P EST MOD 30 MIN: CPT | Performed by: NURSE PRACTITIONER

## 2017-06-26 PROCEDURE — 25000128 H RX IP 250 OP 636: Performed by: EMERGENCY MEDICINE

## 2017-06-26 RX ORDER — POLYETHYLENE GLYCOL 3350 17 G/17G
17 POWDER, FOR SOLUTION ORAL DAILY
COMMUNITY
End: 2017-08-01

## 2017-06-26 RX ORDER — SODIUM CHLORIDE 9 MG/ML
1000 INJECTION, SOLUTION INTRAVENOUS CONTINUOUS
Status: DISCONTINUED | OUTPATIENT
Start: 2017-06-26 | End: 2017-06-26 | Stop reason: HOSPADM

## 2017-06-26 RX ORDER — IOPAMIDOL 755 MG/ML
100 INJECTION, SOLUTION INTRAVASCULAR ONCE
Status: COMPLETED | OUTPATIENT
Start: 2017-06-26 | End: 2017-06-26

## 2017-06-26 RX ADMIN — SODIUM CHLORIDE 1000 ML: 9 INJECTION, SOLUTION INTRAVENOUS at 18:00

## 2017-06-26 RX ADMIN — SODIUM CHLORIDE 75 ML: 9 INJECTION, SOLUTION INTRAVENOUS at 18:49

## 2017-06-26 RX ADMIN — SODIUM CHLORIDE 1000 ML: 9 INJECTION, SOLUTION INTRAVENOUS at 19:34

## 2017-06-26 RX ADMIN — IOPAMIDOL 54 ML: 755 INJECTION, SOLUTION INTRAVENOUS at 18:46

## 2017-06-26 ASSESSMENT — PAIN SCALES - GENERAL: PAINLEVEL: MILD PAIN (3)

## 2017-06-26 NOTE — NURSING NOTE
"Chief Complaint   Patient presents with     Surgical Followup     right arm pain from IV, had surgery x1 week ago. Heart rate increases when walking and SOB. Chest pain earlier today with excessive burping.       Initial BP (!) 166/96 (BP Location: Left arm, Cuff Size: Adult Regular)  Pulse 120  Temp 98.4  F (36.9  C) (Oral)  Wt 161 lb 8 oz (73.3 kg)  LMP 06/19/2017 (Exact Date)  SpO2 100%  Breastfeeding? No  BMI 26.88 kg/m2 Estimated body mass index is 26.88 kg/(m^2) as calculated from the following:    Height as of 12/5/16: 5' 5\" (1.651 m).    Weight as of this encounter: 161 lb 8 oz (73.3 kg).  Medication Reconciliation: complete       Jeane Pacheco CMA      "

## 2017-06-26 NOTE — ED AVS SNAPSHOT
[facility], Emergency Department    [address] [place] [address]    Phone:  [phone]    Fax:  [phone]                                       Laura Jackson   MRN: [id number]    Department:  [facility], Emergency Department   Date of Visit:  6/26/2017           Patient Information     Date Of Birth          1970        Your diagnoses for this visit were:     Heart rate fast        You were seen by Ronnie Izquierdo MD.      Follow-up Information     Schedule an appointment as soon as possible for a visit with Whitney Alva MD.    Specialty:  Family Practice    Contact information:    [facility]  [address] [facility] [address] [phone]          Follow up with [facility], Emergency Department.    Specialty:  EMERGENCY MEDICINE    Why:  If symptoms worsen    Contact information:    [address] [place] [address] [phone]    Additional information:    The [facility] is located in the [facility].  is easily accessible from virtually any point in the [facility] area, via [address]        Discharge Instructions         Understanding Tachycardia    The heart has an electrical system that sends signals to control the heartbeat. Any abnormal change in the speed or pattern of the heartbeat is called an arrhythmia. If you have an arrhythmia that causes the heart to beat faster than normal, this is known as tachycardia. There are many types of tachycardia. They can affect the heart s upper chambers (atria), the heart s lower chambers (ventricles), or both.  What causes tachycardia?  Many things can cause tachycardia, including:    Damage to heart tissue from heart disease, past heart attack, or heart surgery    Abnormal electrical pathways in the heart    Problems with the heart s structure that you are born with     High blood pressure    Overactive thyroid    Use of certain medicines    Severe blood loss or  anemia    Dehydration    Severe stress, fear, or anxiety    Smoking    Too much alcohol or caffeine    Abuse of certain street drugs, such as cocaine    Infections    Certain inflammatory conditions    Chronic  pain syndromes  What are the symptoms of tachycardia?  Tachycardia can cause a fast, pounding, or irregular heartbeat. It can also make it harder for the heart to pump blood efficiently to the body. This may cause symptoms such as:    Shortness of breath    Tiredness    Dizziness or fainting    Chest pain  Some people with tachycardia may have no symptoms at all.  How is tachycardiatreated?  Treatment for tachycardia depends on the cause. It also depends on the type you have and how severe your symptoms are. Tachycardia in the ventricles is often more serious than in the atria. For this reason, it may need to be treated right away. Possible treatments include:    Treating the underlying cause. For instance, if a medicine is causing your tachycardia, changing the dosage or stopping the medicine with your doctor s guidance may correct the problem.    Lifestyle changes. These include getting enough sleep and reducing stress. They also include avoiding caffeine, alcohol, tobacco, and street drugs.    Vagal maneuvers.These are techniques that may help interrupt a fast heartbeat and slow it down. One example is to take a deep breath and bear down hard while holding your breath.     Medicines. These may be used to help slow down a fast heartbeat. They may also be used to regulate the pattern of the heartbeat.    Electrical cardioversion. Special pads or paddles are used to send one or more brief  electrical shocks to the heart. This can help restore the heartbeat to normal.    Ablation. A long thin tube called a catheter is inserted into a blood vessel and threaded to the heart. The catheter sends out hot or cold energy to the areas causing abnormal signals. This destroys the problem tissue or cells. This may stop  certain types of tachycardia.    Pacemaker. This is a device that is placed just under the skin in the chest. It sends paced signals to make the heart beat at a more normal rate and rhythm. You may need this when certain medicines that treat tachycardia also result in a slow heart rate.      Implantable cardioverter defibrillator (ICD). This is a device that is placed just under the skin in the chest or armpit. The ICD monitors your heart rate. When needed, it sends controlled burst of signals to the heart to overdrive a tachycardia.  It can also send a single stronger shock to the heart to stop a life-threatening type of tachycardia, if needed.    Surgery. During surgery, different techniques may be used to create scar tissue in the areas of the heart causing abnormal signals. This may help stop certain types of tachycardia.  What are the complications of tachycardia?  These can include:    Blood clots or stroke    Heart failure. With this problem, the heart muscle is so weak it no longer pumps blood well.    Sudden cardiac arrest. This is when the heart suddenly stops beating.  When should I call my healthcare provider?  Call your healthcare provider right away if you have any of these:    Symptoms that don t get better with treatment, or get worse    New symptoms   Date Last Reviewed: 5/1/2016 2000-2017 [employer]. [address] [place] [address], [place] [address]. All rights reserved. This information is not intended as a substitute for professional medical care. Always follow your healthcare professional's instructions.          Future Appointments        Provider Department Dept Phone Center    7/13/2017 10:20 AM [facility] ULTRASOUND ROOM 1 [facility] [phone] [facility]    7/13/2017 11:30 AM Ernestina Saunders MD [facility] [phone] [facility]      24 Hour Appointment Hotline       To make an appointment at any [facility], call [phone] ([phone]). If  you don't have a family doctor or clinic, we will help you find one. [facility] are conveniently located to serve the needs of you and your family.             Review of your medicines      Our records show that you are taking the medicines listed below. If these are incorrect, please call your family doctor or clinic.        Dose / Directions Last dose taken    ferrous sulfate 142 (45 FE) MG Tbcr   Commonly known as:  SLO-FE   Dose:  142 mg   Quantity:  90 tablet        Take 1 tablet (142 mg) by mouth daily   Refills:  0        fluticasone-salmeterol 100-50 MCG/DOSE diskus inhaler   Commonly known as:  ADVAIR   Dose:  1 puff   Quantity:  3 Inhaler        Inhale 1 puff into the lungs 2 times daily   Refills:  1        levalbuterol 45 MCG/ACT Inhaler   Commonly known as:  XOPENEX HFA   Dose:  1-2 puff   Quantity:  1 Inhaler        Inhale 1-2 puffs into the lungs every 4 hours as needed for shortness of breath / dyspnea   Refills:  5        polyethylene glycol powder   Commonly known as:  MIRALAX/GLYCOLAX   Dose:  17 g        Take 17 g by mouth daily   Refills:  0        TYLENOL PO   Dose:  325 mg        Take 325 mg by mouth every 8 hours as needed for mild pain or fever   Refills:  0        ZANTAC PO   Dose:  137 mg        Take 137 mg by mouth   Refills:  0                Procedures and tests performed during your visit     Basic metabolic panel    CBC with platelets differential    CT Chest Pulmonary Embolism w Contrast    Cardiac Continuous Monitoring    D dimer quantitative    EKG 12 lead    EKG 12-lead, tracing only    ISTAT creatinine nursing POCT    TSH with free T4 reflex    Troponin I      Orders Needing Specimen Collection     None      Pending Results     Date and Time Order Name Status Description    6/26/2017 1824 CT Chest Pulmonary Embolism w Contrast Preliminary     6/26/2017 1721 EKG 12-lead, tracing only Preliminary             Pending Culture Results     No orders found from 6/24/2017 to  6/27/2017.            Pending Results Instructions     If you had any lab results that were not finalized at the time of your Discharge, you can call the ED Lab Result RN at [phone]. You will be contacted by this team for any positive Lab results or changes in treatment. The nurses are available 7 days a week from 10A to 6:30P.  You can leave a message 24 hours per day and they will return your call.        Thank you for choosing [facility]       Thank you for choosing [facility] for your care. Our goal is always to provide you with excellent care. Hearing back from our patients is one way we can continue to improve our services. Please take a few minutes to complete the written survey that you may receive in the mail after you visit with us. Thank you!        [employer] Information     [employer] gives you secure access to your electronic health record. If you see a primary care provider, you can also send messages to your care team and make appointments. If you have questions, please call your primary care clinic.  If you do not have a primary care provider, please call [phone] and they will assist you.        Care EveryWhere ID     This is your Care EveryWhere ID. This could be used by other organizations to access your [facility] medical records  [id number]        Equal Access to Services     ODESSA HOWARD : Hadii latoya Cordero, natali dailey, adilene martinez, ludmila alberts. So [facility] [phone].    ATENCIÓN: Si habla español, tiene a guido disposición servicios gratuitos de asistencia lingüística. Llame al [phone].    We comply with applicable federal civil rights laws and [place] laws. We do not discriminate on the basis of race, color, national origin, age, disability sex, sexual orientation or gender identity.            After Visit Summary       This is your record. Keep this with you and show to your community pharmacist(s) and doctor(s) at your next  visit.

## 2017-06-26 NOTE — TELEPHONE ENCOUNTER
ED / Discharge Outreach Protocol    Patient Contact    Attempt # 2    Was call answered?  No.  Left message on voicemail with information to call me back.    Theresa Maldonado RN

## 2017-06-26 NOTE — ED PROVIDER NOTES
"    [facility] EMERGENCY DEPARTMENT ([facility])    June 26, 2017    ED 3 5:55 PM   History     Chief Complaint   Patient presents with     Shortness of Breath     palpitations and SOB off and on with exertion since 10:30 this am; did have chest pain this afternoon for \"a little bit\" but not now     Palpitations     palpitations and SOB off and on with exertion since 10:30 this am     The history is provided by the patient and medical records.     Laura Jackson is a 47 year old female who presents with intermittent palpitations and shortness of breath with exertion since this morning followed by a brief episode of chest pain. She has a history of asthma and SVT. She recently underwent D&C and partial transvaginal myomectomy for abnormal uterine bleeding, suspected prolapsing uterine fibroid and acute blood loss anemia. This was performed on 6/20/17 by Dr. Ernestina Saunders.  She had an ED visit on 6/24/17 for persistent vaginal bleeding as well as shortness of breath, clamminess, shakiness and tachycardia. She underwent extensive workup notable for hemoglobin of 8.9 and symptoms were felt to be due to anemia and discomfort with abdominal cramping. She was given IV fluids with improvement to symptoms.     Yesterday she felt much improved and only a little tired. She was active, went out for lunch without difficulty.  She had follow up in Primary care clinic this afternoon and noted recurrence of  tachycardia, lightheadedness and shortness of breath with exertion for the past 5 hours. In fact she was short of breath just walking to the .  She feels clammy short of breath and has palpitations similar to SVT whenever she walks short distances. When she sits for 20 minutes the symptoms subside.   She was noted to be tachycardic to 128, appearing pale and fatigued without peripheral edema. Clinic performed hemoglobin which was 9.6, improved from a week ago. She was sent to the ER for PE rule out.     She had 1 " episode of chest pain for 15 minutes and resolved with rest early this afternoon, over 6 hours ago. She denies changes in vision, dizziness. She states if it was worse she would've called 911 but didn't feel that it was that bad. She has not tried using her inhalers. She has had some spotting and old clots but no letha vaginal bleeding.  No abdominal pain or vaginal pain. She is not on exogenous hormones, no hx/o VTE.     I have reviewed the Medications, Allergies, Past Medical and Surgical History, and Social History in the [employer] system.  PAST MEDICAL HISTORY:   Past Medical History:   Diagnosis Date     Family history of breast cancer 2/19/2014     Family history of breast cancer 2/19/2014     SVT (supraventricular tachycardia):  history of, no recurrence since 2008 10/11/2013    no recurrence since 2008      Uncomplicated asthma        PAST SURGICAL HISTORY:   Past Surgical History:   Procedure Laterality Date     APPENDECTOMY       DILATION AND CURETTAGE N/A 6/20/2017    Procedure: DILATION AND CURETTAGE;  Uterine Curettings and Fibroid Removal, Cook catheter placement; (No hysterectomy done at this time);  Surgeon: Ernestina Saunders MD;  Location: [facility] OR     TONSILLECTOMY         FAMILY HISTORY:   Family History   Problem Relation Age of Onset     DIABETES Mother      Hypertension Mother      Hyperlipidemia Mother      Cardiovascular Father      CHF and COPD     Asthma Father      Breast Cancer Maternal Grandfather      Colon Cancer Maternal Grandfather      Breast Cancer Paternal Grandmother      Breast Cancer Paternal Aunt      C.A.D. Paternal Grandfather      Breast Cancer Cousin      Asthma Son      DIABETES Maternal Grandmother      Hypertension Maternal Grandmother        SOCIAL HISTORY:   Social History   Substance Use Topics     Smoking status: Never Smoker     Smokeless tobacco: Never Used     Alcohol use Yes      Comment: occ       Patient's Medications   New Prescriptions    No medications on file    Previous Medications    ACETAMINOPHEN (TYLENOL PO)    Take 325 mg by mouth every 8 hours as needed for mild pain or fever    FERROUS SULFATE (SLO-FE) 142 (45 FE) MG TBCR    Take 1 tablet (142 mg) by mouth daily    FLUTICASONE-SALMETEROL (ADVAIR) 100-50 MCG/DOSE DISKUS INHALER    Inhale 1 puff into the lungs 2 times daily    LEVALBUTEROL (XOPENEX HFA) 45 MCG/ACT INHALER    Inhale 1-2 puffs into the lungs every 4 hours as needed for shortness of breath / dyspnea    POLYETHYLENE GLYCOL (MIRALAX/GLYCOLAX) POWDER    Take 17 g by mouth daily    RANITIDINE HCL (ZANTAC PO)    Take 137 mg by mouth   Modified Medications    No medications on file   Discontinued Medications    No medications on file          Allergies   Allergen Reactions     Penicillins      Swelling throat; age 4     Tetracycline      Vomiting; nauseous      Review of Systems  All other systems negative except as noted in the HPI.    Physical Exam   BP: (!) 147/93  Pulse: 90  Temp: 98.3  F (36.8  C)  Resp: 16  SpO2: 100 %  Physical Exam  Gen: NAD, sitting on stretcher, conversant and pleasant, non-toxic appearing  HEENT: NCAT, PERRL, EOMI, MMM  Neck: trachea midline, supple with FROM  Cardio: tachy rate regular rhythm, no R/M/G, normally perfused and warm  Pulm: steady, non-labored respirations, normal WOB, CTAB, no w/r/r  Abd: soft nt/nd, no organomegaly, no CVA tenderness  Ext: normal peripheral pulses, no edema, neurovascularly intact distally.  Skin: no rashes or signs of trauma  Neuro: no focal deficits, 5/5 strength in all ext    ED Course     ED Course     Procedures             EKG Interpretation:      Interpreted by Ronnie Izquierdo  Symptoms at time of EKG: none   Rhythm: normal sinus   Rate: tachy  Axis: normal  Ectopy: none  Conduction: normal  ST Segments/ T Waves: No ST-T wave changes  Q Waves: none  Comparison to prior: Unchanged    Clinical Impression: normal EKG             Labs Ordered and Resulted from Time of ED Arrival Up to the Time of  Departure from the ED   CBC WITH PLATELETS DIFFERENTIAL - Abnormal; Notable for the following:        Result Value    RBC Count 3.74 (*)     Hemoglobin 10.2 (*)     Hematocrit 32.2 (*)     All other components within normal limits   BASIC METABOLIC PANEL - Abnormal; Notable for the following:     Glucose 101 (*)     All other components within normal limits   D DIMER QUANTITATIVE   TROPONIN I   CARDIAC CONTINUOUS MONITORING   ISTAT CREATININE NURSING POCT            Assessments & Plan (with Medical Decision Making)   Laura Jackson is a 47 year old female with a remote hx/o SVT, asthma, and recent DUB s/p myomectomy, with acute blood loss anemia, presenting with a few days of exertional SOB and palpitations. Minor episodes of CP, last one earlier today. On exam she is well appearing, interactive, but as I was watching her, her HR jumped to the 120s and she got hypoxic to the low 90s, which self-resolved about 2 min later, and on telemetry, was noted to be in sinus rhythm throughout, and this was a slow transition in HRs, didn't appear to have jumped into SVT or afib/flutter during this episode. After episode, she was back to feeling fine, well appearing, with mildly elevated HR to low 100s.     Certainly dysrhythmias or ischemia are on the ddx, as well as persistent symptoms from her anemia. Also likely is PE in the setting of recent surgery. Lower suspicion for PTX, PNA, pulmonary edema or cardiomyopathy. Initial ECG reassuring, non ischemic sinus rhythm. Labs all unremarkable with an improving hgb up to 10.2 today. Electrolytes & TSH nl. Trop neg, effectively ruling out ACS given timing of symptoms. Dimer neg, but given high risk, will obtain CTPA to definitively rule out. CTPE unremarkable, without acute PE, no obvious pulmonary pathology. I re-evaluated the patient, who was feeling much better after some IVF. She was very reassured by the negative work up and I explained it certainly could be due to  dysrhythmias vs dehydration, encouraged her to f/u with PCP for holter eval and possible cardiology f/u. Patient in agreement with plan, dcd to home with close PCP f/u and given RTED precautions.     I have reviewed the nursing notes.    I have reviewed the findings, diagnosis, plan and need for follow up with the patient.    New Prescriptions    No medications on file       Final diagnoses:   Heart rate fast   I, Citlali José, am serving as a trained medical scribe to document services personally performed by Ronnie Izquierdo MD, based on the provider's statements to me on December 22, 2016.  This document has been checked and approved by the attending provider.    I, Ronnie Izquierdo MD, was physically present and have reviewed and verified the accuracy of this note documented by Citlali José, medical scribe.       6/26/2017   [facility], [place], EMERGENCY DEPARTMENT     Ronnie Izquierdo MD  06/26/17 2013

## 2017-06-26 NOTE — ED NOTES
"AT 1808 Pt had sudden onset ST, rate 135 upon this nurse's arrival to pt room with O2 sats of 84%, and shortness of breath; then HR slowly decreased back down to low 110's by 1810; Started on NC, 3 lpm oxygen. Now at 1812, . Also reports \"my chest hurts a tiny bit\"; MD aware.  "

## 2017-06-26 NOTE — ED AVS SNAPSHOT
[facility], [place], Emergency Department    [address]    [facility] [address]    Phone:  [phone]    Fax:  [phone]                                       Laura Jackson   MRN: [id number]    Department:  [facility], Emergency Department   Date of Visit:  6/26/2017           After Visit Summary Signature Page     I have received my discharge instructions, and my questions have been answered. I have discussed any challenges I see with this plan with the nurse or doctor.    ..........................................................................................................................................  Patient/Patient Representative Signature      ..........................................................................................................................................  Patient Representative Print Name and Relationship to Patient    ..................................................               ................................................  Date                                            Time    ..........................................................................................................................................  Reviewed by Signature/Title    ...................................................              ..............................................  Date                                                            Time

## 2017-06-26 NOTE — PROGRESS NOTES
SUBJECTIVE:                                                    Laura Jackson is a 47 year old female who presents to clinic today for the following health issues:    Patient presents with:  Surgical Followup: right arm pain from IV, had surgery x1 week ago. Heart rate increases when walking and SOB. Chest pain earlier today with excessive burping.    Patient had a D and C and for uterine hemorrhage on 6/20/17.  Patient was seen in the ED on 6/24/17 for continued vaginal bleeding, tachycardia.  Her hgb had actually improved from surgery to 8.9 and her symptoms were thought to be due to anemia.    Patient complains of tachycardia, dizziness, and shortness of breath for the past 5 hours.  Patient had some mild chest pain this afternoon, which resolved.  This morning patient had an episode of excessive belching this am, but it has resolved as well.  She denies nausea, abdominal pain, cramping.  She complains of scant vaginal bleeding.    Problem list and histories reviewed & adjusted, as indicated.  Additional history: as documented    Patient Active Problem List   Diagnosis     CARDIOVASCULAR SCREENING; LDL GOAL LESS THAN 160     Irritable bowel syndrome     GERD (gastroesophageal reflux disease)     History of supraventricular tachycardia     Mild persistent asthma     Family history of breast cancer     Foreign body (FB) in soft tissue     Vaginal bleeding     Past Surgical History:   Procedure Laterality Date     APPENDECTOMY       DILATION AND CURETTAGE N/A 6/20/2017    Procedure: DILATION AND CURETTAGE;  Uterine Curettings and Fibroid Removal, Cook catheter placement; (No hysterectomy done at this time);  Surgeon: Ernestina Saunders MD;  Location: [facility]     TONSILLECTOMY         Social History   Substance Use Topics     Smoking status: Never Smoker     Smokeless tobacco: Never Used     Alcohol use Yes      Comment: occ     Family History   Problem Relation Age of Onset     DIABETES Mother      Hypertension Mother       Hyperlipidemia Mother      Cardiovascular Father      CHF and COPD     Asthma Father      Breast Cancer Maternal Grandfather      Colon Cancer Maternal Grandfather      Breast Cancer Paternal Grandmother      Breast Cancer Paternal Aunt      C.ABuzzD. Paternal Grandfather      Breast Cancer Cousin      Asthma Son      DIABETES Maternal Grandmother      Hypertension Maternal Grandmother          Current Outpatient Prescriptions   Medication Sig Dispense Refill     Acetaminophen (TYLENOL PO) Take 325 mg by mouth every 8 hours as needed for mild pain or fever       ferrous sulfate (SLO-FE) 142 (45 FE) MG TBCR Take 1 tablet (142 mg) by mouth daily 90 tablet 0     fluticasone-salmeterol (ADVAIR) 100-50 MCG/DOSE diskus inhaler Inhale 1 puff into the lungs 2 times daily 3 Inhaler 1     levalbuterol (XOPENEX HFA) 45 MCG/ACT Inhaler Inhale 1-2 puffs into the lungs every 4 hours as needed for shortness of breath / dyspnea 1 Inhaler 5     Allergies   Allergen Reactions     Penicillins      Swelling throat; age 4     Tetracycline      Vomiting; nauseous     BP Readings from Last 3 Encounters:   06/26/17 114/74   06/24/17 126/86   06/21/17 111/69    Wt Readings from Last 3 Encounters:   06/26/17 161 lb 8 oz (73.3 kg)   06/19/17 165 lb 12.8 oz (75.2 kg)   12/05/16 158 lb (71.7 kg)                  Labs reviewed in EPIC    Reviewed and updated as needed this visit by clinical staff  Tobacco  Allergies  Meds  Med Hx  Surg Hx  Fam Hx  Soc Hx      Reviewed and updated as needed this visit by Provider         ROS:  Constitutional, HEENT, cardiovascular, pulmonary, gi and gu systems are negative, except as otherwise noted.    OBJECTIVE:     /74  Pulse 128  Temp 98.4  F (36.9  C) (Oral)  Wt 161 lb 8 oz (73.3 kg)  LMP 06/19/2017 (Exact Date)  SpO2 100%  Breastfeeding? No  BMI 26.88 kg/m2  Body mass index is 26.88 kg/(m^2).  GENERAL: alert, mild distress, pale and fatigued  RESP: lungs clear to auscultation - no rales,  rhonchi or wheezes  CV: tachycardia, normal S1 S2, no S3 or S4, no murmur, click or rub, peripheral pulses strong and no peripheral edema  MS: no gross musculoskeletal defects noted, no edema    Diagnostic Test Results:  Results for orders placed or performed in visit on 06/26/17 (from the past 24 hour(s))   Hemoglobin   Result Value Ref Range    Hemoglobin 9.6 (L) 11.7 - 15.7 g/dL       ASSESSMENT/PLAN:     1. Acute posthemorrhagic anemia  Hgb is improving  - Hemoglobin    2. Tachycardia  Given patient's sudden onset shortness of breath, tachycardia with walking only several steps and chest pain, I recommended that she be seen in the ED to rule out PE and have further evaluation.  Patient agreed.  She declined ambulance transfer.  Report called to [facility] ED.    3. SOB (shortness of breath)  As above.     4. Chest pain, unspecified type  As above.       FUTURE APPOINTMENTS:       - Follow-up for annual visit or as needed    SALAZAR Koehler CNP  [facility]

## 2017-06-26 NOTE — MR AVS SNAPSHOT
After Visit Summary   6/26/2017    Laura Jackson    MRN: [id number]           Patient Information     Date Of Birth          1970        Visit Information        Provider Department      6/26/2017 3:40 PM Annie Kang APRN CNP [facility]        Today's Diagnoses     Acute posthemorrhagic anemia    -  1    Tachycardia        SOB (shortness of breath)        Chest pain, unspecified type           Follow-ups after your visit        Your next 10 appointments already scheduled     Jul 13, 2017 10:20 AM CDT   US PELVIC COMPLETE W TRANSVAGINAL with RDUS1   [facility] ([facility])    [address] [place] [address]  [address] [place] [id number] [phone]           Please bring a list of your medicines (including vitamins, minerals and over-the-counter drugs). Also, tell your doctor about any allergies you may have. Wear comfortable clothes and leave your valuables at home.  Adults: Drink six 8-ounce glasses of fluid one hour before your exam. Do NOT empty your bladder.  If you need to empty your bladder before your exam, try to release only a little bit of urine. Then, drink another 8oz glass of fluid.  Children: Children who are potty trained should drink at least 4 cups (32 oz) of liquid 45 minutes to one hour prior to the exam. The child s bladder must be full in order to achieve a diagnostic exam. If your child is very uncomfortable or has an urgent need to pee, please notify a technologist; they will try to find out how much longer the wait may be and provide instructions to help relieve the pressure. Occasionally it is medically necessary to insert a urinary catheter to fill the bladder.  Please call the Imaging Department at your exam site with any questions.            Jul 13, 2017 11:30 AM CDT   SHORT with Ernestina Saunders MD   [facility] ([facility])    [address] [place] [address]  [address] [place] [address]  [id number] [phone]              Who to contact     If you have questions or need follow up information about today's clinic visit or your schedule please contact [facility] directly at [phone].  Normal or non-critical lab and imaging results will be communicated to you by MyChart, letter or phone within 4 business days after the clinic has received the results. If you do not hear from us within 7 days, please contact the clinic through [employer] or phone. If you have a critical or abnormal lab result, we will notify you by phone as soon as possible.  Submit refill requests through [employer] or call your pharmacy and they will forward the refill request to us. Please allow 3 business days for your refill to be completed.          Additional Information About Your Visit        [employer] Information     [employer] gives you secure access to your electronic health record. If you see a primary care provider, you can also send messages to your care team and make appointments. If you have questions, please call your primary care clinic.  If you do not have a primary care provider, please call [phone] and they will assist you.        Care EveryWhere ID     This is your Care EveryWhere ID. This could be used by other organizations to access your [facility] medical records  [id number]        Your Vitals Were     Pulse Temperature Last Period Pulse Oximetry Breastfeeding? BMI (Body Mass Index)    128 98.4  F (36.9  C) (Oral) 06/19/2017 (Exact Date) 100% No 26.88 kg/m2       Blood Pressure from Last 3 Encounters:   06/26/17 114/74   06/24/17 126/86   06/21/17 111/69    Weight from Last 3 Encounters:   06/26/17 161 lb 8 oz (73.3 kg)   06/19/17 165 lb 12.8 oz (75.2 kg)   12/05/16 158 lb (71.7 kg)              We Performed the Following     Hemoglobin        Primary Care Provider Office Phone # Fax #    Whitney Alva [phone]       [address] [place]  [address]        Equal Access to Services     [facility]: Hadii latoya ruffin anastacio Cordero, waapda luqadaha, qaybta kadariadiaz martinez, ludmila alberts. So [facility] [phone].    ATENCIÓN: Si habla español, tiene a guiod disposición servicios gratuitos de asistencia lingüística. Llame al [phone].    We comply with applicable federal civil rights laws and [place] laws. We do not discriminate on the basis of race, color, national origin, age, disability sex, sexual orientation or gender identity.            Thank you!     Thank you for choosing [facility]  for your care. Our goal is always to provide you with excellent care. Hearing back from our patients is one way we can continue to improve our services. Please take a few minutes to complete the written survey that you may receive in the mail after your visit with us. Thank you!             Your Updated Medication List - Protect others around you: Learn how to safely use, store and throw away your medicines at www.disposemymeds.org.          This list is accurate as of: 6/26/17  4:50 PM.  Always use your most recent med list.                   Brand Name Dispense Instructions for use Diagnosis    ferrous sulfate 142 (45 FE) MG Tbcr    SLO-FE    90 tablet    Take 1 tablet (142 mg) by mouth daily    Abnormal vaginal bleeding, Vaginal bleeding       fluticasone-salmeterol 100-50 MCG/DOSE diskus inhaler    ADVAIR    3 Inhaler    Inhale 1 puff into the lungs 2 times daily    Mild persistent asthma without complication       levalbuterol 45 MCG/ACT Inhaler    XOPENEX HFA    1 Inhaler    Inhale 1-2 puffs into the lungs every 4 hours as needed for shortness of breath / dyspnea    Mild persistent asthma without complication       TYLENOL PO      Take 325 mg by mouth every 8 hours as needed for mild pain or fever

## 2017-06-27 LAB
INTERPRETATION ECG - MUSE: NORMAL
INTERPRETATION ECG - MUSE: NORMAL

## 2017-06-27 NOTE — DISCHARGE INSTRUCTIONS
Understanding Tachycardia    The heart has an electrical system that sends signals to control the heartbeat. Any abnormal change in the speed or pattern of the heartbeat is called an arrhythmia. If you have an arrhythmia that causes the heart to beat faster than normal, this is known as tachycardia. There are many types of tachycardia. They can affect the heart s upper chambers (atria), the heart s lower chambers (ventricles), or both.  What causes tachycardia?  Many things can cause tachycardia, including:    Damage to heart tissue from heart disease, past heart attack, or heart surgery    Abnormal electrical pathways in the heart    Problems with the heart s structure that you are born with     High blood pressure    Overactive thyroid    Use of certain medicines    Severe blood loss or anemia    Dehydration    Severe stress, fear, or anxiety    Smoking    Too much alcohol or caffeine    Abuse of certain street drugs, such as cocaine    Infections    Certain inflammatory conditions    Chronic  pain syndromes  What are the symptoms of tachycardia?  Tachycardia can cause a fast, pounding, or irregular heartbeat. It can also make it harder for the heart to pump blood efficiently to the body. This may cause symptoms such as:    Shortness of breath    Tiredness    Dizziness or fainting    Chest pain  Some people with tachycardia may have no symptoms at all.  How is tachycardiatreated?  Treatment for tachycardia depends on the cause. It also depends on the type you have and how severe your symptoms are. Tachycardia in the ventricles is often more serious than in the atria. For this reason, it may need to be treated right away. Possible treatments include:    Treating the underlying cause. For instance, if a medicine is causing your tachycardia, changing the dosage or stopping the medicine with your doctor s guidance may correct the problem.    Lifestyle changes. These include getting enough sleep and reducing stress.  They also include avoiding caffeine, alcohol, tobacco, and street drugs.    Vagal maneuvers.These are techniques that may help interrupt a fast heartbeat and slow it down. One example is to take a deep breath and bear down hard while holding your breath.     Medicines. These may be used to help slow down a fast heartbeat. They may also be used to regulate the pattern of the heartbeat.    Electrical cardioversion. Special pads or paddles are used to send one or more brief  electrical shocks to the heart. This can help restore the heartbeat to normal.    Ablation. A long thin tube called a catheter is inserted into a blood vessel and threaded to the heart. The catheter sends out hot or cold energy to the areas causing abnormal signals. This destroys the problem tissue or cells. This may stop certain types of tachycardia.    Pacemaker. This is a device that is placed just under the skin in the chest. It sends paced signals to make the heart beat at a more normal rate and rhythm. You may need this when certain medicines that treat tachycardia also result in a slow heart rate.      Implantable cardioverter defibrillator (ICD). This is a device that is placed just under the skin in the chest or armpit. The ICD monitors your heart rate. When needed, it sends controlled burst of signals to the heart to overdrive a tachycardia.  It can also send a single stronger shock to the heart to stop a life-threatening type of tachycardia, if needed.    Surgery. During surgery, different techniques may be used to create scar tissue in the areas of the heart causing abnormal signals. This may help stop certain types of tachycardia.  What are the complications of tachycardia?  These can include:    Blood clots or stroke    Heart failure. With this problem, the heart muscle is so weak it no longer pumps blood well.    Sudden cardiac arrest. This is when the heart suddenly stops beating.  When should I call my healthcare provider?  Call  your healthcare provider right away if you have any of these:    Symptoms that don t get better with treatment, or get worse    New symptoms   Date Last Reviewed: 5/1/2016 2000-2017 [employer]. [address] [place] [address], [place] [address]. All rights reserved. This information is not intended as a substitute for professional medical care. Always follow your healthcare professional's instructions.

## 2017-06-27 NOTE — ED NOTES
Responded to a call light, pt not feeling well. Upon arrival, per pt she had been back from imaging for about 5 minutes; Reconnected to monitor, HR . Switched to wall O2; pulse ox applied, reading 84% but pulse wave forms not correlating to EKG rhythm; readjusted fit and connections and sats improved to 97%.

## 2017-06-28 NOTE — PROGRESS NOTES
SUBJECTIVE:                                                    Laura Jackson is a 47 year old female who presents to clinic today for the following health issues:          Hospital Follow-up Visit:    Hospital/Nursing Home/IP Rehab Facility: [facility]  Date of Admission: 06/19/2017 06/24/2017 06/26/2017  Date of Discharge: 06/21/2017  Reason(s) for Admission:   Pt is s/p Myomectomy  Has been see at [place] [facility] twice  For tachycardia, Dizziness and Vaginal Bleeding  Her hemoglobin last ER visit was 10.2  She Feels better  Vaginal Bleeding is Less  ER notes reviewed             Problems taking medications regularly:  None       Medication changes since discharge: None       Problems adhering to non-medication therapy:  No  Pt is on Iron tablets    Summary of hospitalization:  [facility] discharge summary reviewed  Diagnostic Tests/Treatments reviewed.  Follow up needed: none  Other Healthcare Providers Involved in Patient s Care:         [facility]  Update since discharge: improved.     Post Discharge Medication Reconciliation: discharge medications reconciled, continue medications without change.  Plan of care communicated with patient     Coding guidelines for this visit:  Type of Medical   Decision Making Face-to-Face Visit       within 7 Days of discharge Face-to-Face Visit        within 14 days of discharge   Moderate Complexity [id number]   High Complexity [id number]            Asthma Follow-Up    Was ACT completed today?    Yes    ACT Total Scores 6/29/2017   ACT TOTAL SCORE (Goal Greater than or Equal to 20) 25   In the past 12 months, how many times did you visit the emergency room for your asthma without being admitted to the hospital? 0   In the past 12 months, how many times were you hospitalized overnight because of your asthma? 0       Recent asthma triggers that patient is dealing with: None        Problem list and histories reviewed & adjusted, as indicated.  Additional  history: as documented    Patient Active Problem List   Diagnosis     CARDIOVASCULAR SCREENING; LDL GOAL LESS THAN 160     Irritable bowel syndrome     GERD (gastroesophageal reflux disease)     History of supraventricular tachycardia     Mild persistent asthma     Family history of breast cancer     Foreign body (FB) in soft tissue     S/P myomectomy     Past Surgical History:   Procedure Laterality Date     APPENDECTOMY       DILATION AND CURETTAGE N/A 6/20/2017    Procedure: DILATION AND CURETTAGE;  Uterine Curettings and Fibroid Removal, Cook catheter placement; (No hysterectomy done at this time);  Surgeon: Ernestina Saunders MD;  Location: [facility] OR     TONSILLECTOMY         Social History   Substance Use Topics     Smoking status: Never Smoker     Smokeless tobacco: Never Used     Alcohol use Yes      Comment: occ     Family History   Problem Relation Age of Onset     DIABETES Mother      Hypertension Mother      Hyperlipidemia Mother      Cardiovascular Father      CHF and COPD     Asthma Father      Breast Cancer Maternal Grandfather      Colon Cancer Maternal Grandfather      Breast Cancer Paternal Grandmother      Breast Cancer Paternal Aunt      C.A.D. Paternal Grandfather      Breast Cancer Cousin      Asthma Son      DIABETES Maternal Grandmother      Hypertension Maternal Grandmother          Current Outpatient Prescriptions   Medication Sig Dispense Refill     Acetaminophen (TYLENOL PO) Take 325 mg by mouth every 8 hours as needed for mild pain or fever       polyethylene glycol (MIRALAX/GLYCOLAX) powder Take 17 g by mouth daily       RaNITidine HCl (ZANTAC PO) Take 137 mg by mouth       ferrous sulfate (SLO-FE) 142 (45 FE) MG TBCR Take 1 tablet (142 mg) by mouth daily 90 tablet 0     fluticasone-salmeterol (ADVAIR) 100-50 MCG/DOSE diskus inhaler Inhale 1 puff into the lungs 2 times daily 3 Inhaler 1     levalbuterol (XOPENEX HFA) 45 MCG/ACT Inhaler Inhale 1-2 puffs into the lungs every 4 hours as needed for  "shortness of breath / dyspnea 1 Inhaler 5     Allergies   Allergen Reactions     Penicillins      Swelling throat; age 4     Tetracycline      Vomiting; nauseous     Recent Labs   Lab Test  06/26/17   1737  06/24/17   1236   02/12/13   0852  07/20/11   LDL   --    --    --    --    --   110   HDL   --    --    --    --    --   65   TRIG   --    --    --    --    --   72   ALT   --   27   --   43   --    --    CR  0.81  0.86   --   0.73   < >   --    GFRESTIMATED  75  71   --   87   < >   --    GFRESTBLACK  >90   GFR Calc    85   --   >90   < >   --    POTASSIUM  3.8  3.6   < >  4.0   < >   --    TSH  2.53   --    --   1.97   < >   --     < > = values in this interval not displayed.      BP Readings from Last 3 Encounters:   06/29/17 130/76   06/26/17 145/90   06/26/17 114/74    Wt Readings from Last 3 Encounters:   06/29/17 158 lb 12.8 oz (72 kg)   06/26/17 161 lb 8 oz (73.3 kg)   06/19/17 165 lb 12.8 oz (75.2 kg)                  Labs reviewed in EPIC    Reviewed and updated as needed this visit by clinical staff       Reviewed and updated as needed this visit by Provider         ROS:  C: NEGATIVE for fever, chills, change in weight  E/M: NEGATIVE for ear, mouth and throat problems  R: NEGATIVE for significant cough or SOB  CV: gets palpitation if she walks -But better  MUSCULOSKELETAL: NEGATIVE for significant arthralgias or myalgia  PSYCHIATRIC: NEGATIVE for changes in mood or affect    OBJECTIVE:     /76  Pulse 88  Temp 98.6  F (37  C) (Oral)  Ht 5' 4.49\" (1.638 m)  Wt 158 lb 12.8 oz (72 kg)  LMP 06/15/2017  SpO2 100%  BMI 26.85 kg/m2  Body mass index is 26.85 kg/(m^2).  GENERAL: healthy, alert and no distress  NECK: no adenopathy, no asymmetry, masses, or scars and thyroid normal to palpation  RESP: lungs clear to auscultation - no rales, rhonchi or wheezes  CV: regular rate and rhythm, normal S1 S2, no S3 or S4, no murmur, click or rub, no peripheral edema and peripheral pulses " strong  ABDOMEN: soft, nontender, no hepatosplenomegaly, no masses and bowel sounds normal  MS: no gross musculoskeletal defects noted, no edema    Diagnostic Test Results:  No results found for this or any previous visit (from the past 24 hour(s)).  Results for orders placed or performed during the hospital encounter of 06/26/17   CT Chest Pulmonary Embolism w Contrast    Narrative    CT CHEST WITH CONTRAST   6/26/2017 7:00 PM     HISTORY: Evaluate for pulmonary embolus.    COMPARISON: None.    TECHNIQUE: Following the uneventful administration of Isovue-370, 54mL  intravenous contrast, helical sections were acquired through the lungs  according to the pulmonary embolism protocol. Coronal reconstructions  were generated. Radiation dose for this scan was reduced using  automated exposure control, adjustment of the mA and/or kV according  to the patient's size, or iterative reconstruction technique.    FINDINGS: No visualized pulmonary embolus. The thoracic aorta is  normal in caliber without dissection.    The lungs are clear. No pleural or pericardial effusion. No enlarged  lymph nodes in the chest.    Scan through the upper abdomen is unremarkable.      Impression    IMPRESSION:   1. No visualized pulmonary embolus.  2. No evidence of active pulmonary disease.    RASHAD GALVAN MD   CBC with platelets differential   Result Value Ref Range    WBC 5.6 4.0 - 11.0 10e9/L    RBC Count 3.74 (L) 3.8 - 5.2 10e12/L    Hemoglobin 10.2 (L) 11.7 - 15.7 g/dL    Hematocrit 32.2 (L) 35.0 - 47.0 %    MCV 86 78 - 100 fl    MCH 27.3 26.5 - 33.0 pg    MCHC 31.7 31.5 - 36.5 g/dL    RDW 13.9 10.0 - 15.0 %    Platelet Count 341 150 - 450 10e9/L    Diff Method Automated Method     % Neutrophils 78.6 %    % Lymphocytes 14.9 %    % Monocytes 4.8 %    % Eosinophils 0.9 %    % Basophils 0.4 %    % Immature Granulocytes 0.4 %    Nucleated RBCs 0 0 /100    Absolute Neutrophil 4.4 1.6 - 8.3 10e9/L    Absolute Lymphocytes 0.8 0.8 - 5.3 10e9/L     Absolute Monocytes 0.3 0.0 - 1.3 10e9/L    Absolute Eosinophils 0.1 0.0 - 0.7 10e9/L    Absolute Basophils 0.0 0.0 - 0.2 10e9/L    Abs Immature Granulocytes 0.0 0 - 0.4 10e9/L    Absolute Nucleated RBC 0.0    D dimer quantitative   Result Value Ref Range    D Dimer 0.4 0.0 - 0.50 ug/ml FEU   Basic metabolic panel   Result Value Ref Range    Sodium 142 133 - 144 mmol/L    Potassium 3.8 3.4 - 5.3 mmol/L    Chloride 106 94 - 109 mmol/L    Carbon Dioxide 24 20 - 32 mmol/L    Anion Gap 12 3 - 14 mmol/L    Glucose 101 (H) 70 - 99 mg/dL    Urea Nitrogen 9 7 - 30 mg/dL    Creatinine 0.81 0.52 - 1.04 mg/dL    GFR Estimate 75 >60 mL/min/1.7m2    GFR Estimate If Black >90   GFR Calc   >60 mL/min/1.7m2    Calcium 8.8 8.5 - 10.1 mg/dL   Troponin I   Result Value Ref Range    Troponin I ES  0.000 - 0.045 ug/L     <0.015  The 99th percentile for upper reference range is 0.045 ug/L.  Troponin values in   the range of 0.045 - 0.120 ug/L may be associated with risks of adverse   clinical events.     TSH with free T4 reflex   Result Value Ref Range    TSH 2.53 0.40 - 4.00 mU/L   EKG 12-lead, tracing only   Result Value Ref Range    Interpretation ECG Click View Image link to view waveform and result    EKG 12 lead   Result Value Ref Range    Interpretation ECG Click View Image link to view waveform and result        ASSESSMENT/PLAN:         1. Dizziness  better    2. Anemia, unspecified type  Improving  Pt on Iron tablets    3. S/P myomectomy  Stable   Has follow up with GYN    4. Mild persistent asthma without complication  controlled    5. History of supraventricular tachycardia      6. Visit for screening mammogram  Advised   - MA SCREENING DIGITAL BILAT - Future  (s+30); Future    7. Family history of breast cancer  Pt has been advised if any further symptoms Recommend a Holter  She will follow up as needed if any worse  Meanwhile Continue Iron tablets    Whitney Alva MD  [facility]  Pt has Been  off From work 6-19-17  To July 10th

## 2017-06-29 ENCOUNTER — OFFICE VISIT (OUTPATIENT)
Dept: FAMILY MEDICINE | Facility: CLINIC | Age: 47
End: 2017-06-29
Payer: COMMERCIAL

## 2017-06-29 VITALS
TEMPERATURE: 98.6 F | HEART RATE: 88 BPM | SYSTOLIC BLOOD PRESSURE: 130 MMHG | BODY MASS INDEX: 27.11 KG/M2 | OXYGEN SATURATION: 100 % | DIASTOLIC BLOOD PRESSURE: 76 MMHG | WEIGHT: 158.8 LBS | HEIGHT: 64 IN

## 2017-06-29 DIAGNOSIS — Z12.31 VISIT FOR SCREENING MAMMOGRAM: ICD-10-CM

## 2017-06-29 DIAGNOSIS — Z98.890 S/P MYOMECTOMY: ICD-10-CM

## 2017-06-29 DIAGNOSIS — Z86.79 HISTORY OF SUPRAVENTRICULAR TACHYCARDIA: ICD-10-CM

## 2017-06-29 DIAGNOSIS — R42 DIZZINESS: Primary | ICD-10-CM

## 2017-06-29 DIAGNOSIS — Z80.3 FAMILY HISTORY OF BREAST CANCER: ICD-10-CM

## 2017-06-29 DIAGNOSIS — D64.9 ANEMIA, UNSPECIFIED TYPE: ICD-10-CM

## 2017-06-29 DIAGNOSIS — J45.30 MILD PERSISTENT ASTHMA WITHOUT COMPLICATION: ICD-10-CM

## 2017-06-29 PROBLEM — N93.9 VAGINAL BLEEDING: Status: RESOLVED | Noted: 2017-06-20 | Resolved: 2017-06-29

## 2017-06-29 PROCEDURE — 99214 OFFICE O/P EST MOD 30 MIN: CPT | Performed by: FAMILY MEDICINE

## 2017-06-29 NOTE — LETTER
My Asthma Action Plan  Name: Laura Jackson   YOB: 1970  Date: 6/29/2017   My doctor: Whitney Alva MD   My clinic: [facility]        My Control Medicine: Fluticasone + salmeterol (Advair) -  Diskus 100/50 mcg 1 puff twice daily  My Rescue Medicine: Levalbuterol (Xopenex) inhaler see med sheet   My Asthma Severity: mild persistent  Avoid your asthma triggers: Levalbuterol (Xopenex) inhaler see med sheet               GREEN ZONE   Good Control    I feel good    No cough or wheeze    Can work, sleep and play without asthma symptoms       Take your asthma control medicine every day.     1. If exercise triggers your asthma, take your rescue medication    15 minutes before exercise or sports, and    During exercise if you have asthma symptoms  2. Spacer to use with inhaler: If you have a spacer, make sure to use it with your inhaler             YELLOW ZONE Getting Worse  I have ANY of these:    I do not feel good    Cough or wheeze    Chest feels tight    Wake up at night   1. Keep taking your Green Zone medications  2. Start taking your rescue medicine:    every 20 minutes for up to 1 hour. Then every 4 hours for 24-48 hours.  3. If you stay in the Yellow Zone for more than 12-24 hours, contact your doctor.  4. If you do not return to the Green Zone in 12-24 hours or you get worse, start taking your oral steroid medicine if prescribed by your provider.           RED ZONE Medical Alert - Get Help  I have ANY of these:    I feel awful    Medicine is not helping    Breathing getting harder    Trouble walking or talking    Nose opens wide to breathe       1. Take your rescue medicine NOW  2. If your provider has prescribed an oral steroid medicine, start taking it NOW  3. Call your doctor NOW  4. If you are still in the Red Zone after 20 minutes and you have not reached your doctor:    Take your rescue medicine again and    Call 911 or go to the emergency room right away    See your regular  doctor within 2 weeks of an Emergency Room or Urgent Care visit for follow-up treatment.        Electronically signed by: Whitney Alva, June 29, 2017    Annual Reminders:  Meet with Asthma Educator,  Flu Shot in the Fall, consider Pneumonia Vaccination for patients with asthma (aged 19 and older).    Pharmacy: [facility] [place] - [phone]                    Asthma Triggers  How To Control Things That Make Your Asthma Worse    Triggers are things that make your asthma worse.  Look at the list below to help you find your triggers and what you can do about them.  You can help prevent asthma flare-ups by staying away from your triggers.      Trigger                                                          What you can do   Cigarette Smoke  Tobacco smoke can make asthma worse. Do not allow smoking in your home, car or around you.  Be sure no one smokes at a child s day care or school.  If you smoke, ask your health care provider for ways to help you quit.  Ask family members to quit too.  Ask your health care provider for a referral to Quit Plan to help you quit smoking, or call 1-209-165-PLAN.     Colds, Flu, Bronchitis  These are common triggers of asthma. Wash your hands often.  Don t touch your eyes, nose or mouth.  Get a flu shot every year.     Dust Mites  These are tiny bugs that live in cloth or carpet. They are too small to see. Wash sheets and blankets in hot water every week.   Encase pillows and mattress in dust mite proof covers.  Avoid having carpet if you can. If you have carpet, vacuum weekly.   Use a dust mask and HEPA vacuum.   Pollen and Outdoor Mold  Some people are allergic to trees, grass, or weed pollen, or molds. Try to keep your windows closed.  Limit time out doors when pollen count is high.   Ask you health care provider about taking medicine during allergy season.     Animal Dander  Some people are allergic to skin flakes, urine or saliva from pets with fur  or feathers. Keep pets with fur or feathers out of your home.    If you can t keep the pet outdoors, then keep the pet out of your bedroom.  Keep the bedroom door closed.  Keep pets off cloth furniture and away from stuffed toys.     Mice, Rats, and Cockroaches  Some people are allergic to the waste from these pests.   Cover food and garbage.  Clean up spills and food crumbs.  Store grease in the refrigerator.   Keep food out of the bedroom.   Indoor Mold  This can be a trigger if your home has high moisture. Fix leaking faucets, pipes, or other sources of water.   Clean moldy surfaces.  Dehumidify basement if it is damp and smelly.   Smoke, Strong Odors, and Sprays  These can reduce air quality. Stay away from strong odors and sprays, such as perfume, powder, hair spray, paints, smoke incense, paint, cleaning products, candles and new carpet.   Exercise or Sports  Some people with asthma have this trigger. Be active!  Ask your doctor about taking medicine before sports or exercise to prevent symptoms.    Warm up for 5-10 minutes before and after sports or exercise.     Other Triggers of Asthma  Cold air:  Cover your nose and mouth with a scarf.  Sometimes laughing or crying can be a trigger.  Some medicines and food can trigger asthma.

## 2017-06-29 NOTE — PATIENT INSTRUCTIONS
[facility]    If you have any questions regarding to your visit please contact your care team:       Team Red:   Clinic Hours Telephone Number   Dr. Mitzi Torres, NP   7am-7pm  Monday - Thursday   7am-5pm  Fridays  [phone]  (Appointment scheduling available 24/7)    Questions about your visit?   Team Line  [phone]   [facility] [place] [facility] 11am-9pm Monday-Friday Saturday-Sunday- 9am-5pm   [place] - 5pm-9pm Monday-Friday Saturday-Sunday- 9am-5pm  [phone] - [place]   [phone] - [place]       What options do I have for visits at the clinic other than the traditional office visit?  To expand how we care for you, many of our providers are utilizing electronic visits (e-visits) and telephone visits, when medically appropriate, for interactions with their patients rather than a visit in the clinic.   We also offer nurse visits for many medical concerns. Just like any other service, we will bill your insurance company for this type of visit based on time spent on the phone with your provider. Not all insurance companies cover these visits. Please check with your medical insurance if this type of visit is covered. You will be responsible for any charges that are not paid by your insurance.      E-visits via NoFlo:  generally incur a $35.00 fee.  Telephone visits:  Time spent on the phone: *charged based on time that is spent on the phone in increments of 10 minutes. Estimated cost:   5-10 mins $30.00   11-20 mins. $59.00   21-30 mins. $85.00     Use Ubitexxt (secure email communication and access to your chart) to send your primary care provider a message or make an appointment. Ask someone on your Team how to sign up for NoFlo.  For a Price Quote for your services, please call our Consumer Price Line at [phone].      As always, Thank you for trusting us with your health care needs!  Discharged  by EVELIA Alejandro

## 2017-06-29 NOTE — NURSING NOTE
"Chief Complaint   Patient presents with     Hospital F/U     Letter for School/Work     Needs a letter for missing work       Initial /76  Pulse 121  Temp 98.6  F (37  C) (Oral)  Ht 5' 4.49\" (1.638 m)  Wt 158 lb 12.8 oz (72 kg)  LMP 06/15/2017  SpO2 100%  BMI 26.85 kg/m2 Estimated body mass index is 26.85 kg/(m^2) as calculated from the following:    Height as of this encounter: 5' 4.49\" (1.638 m).    Weight as of this encounter: 158 lb 12.8 oz (72 kg).  Medication Reconciliation: complete     Catia Simmons MA    "

## 2017-06-29 NOTE — MR AVS SNAPSHOT
After Visit Summary   6/29/2017    Laura Jackson    MRN: [id number]           Patient Information     Date Of Birth          1970        Visit Information        Provider Department      6/29/2017 9:40 AM Whitney Alva MD [facility]        Today's Diagnoses     Dizziness    -  1    Anemia, unspecified type        S/P myomectomy        Visit for screening mammogram          Care Instructions    [facility]    If you have any questions regarding to your visit please contact your care team:       Team Red:   Clinic Hours Telephone Number   Dr. Mitzi Torres, NP   7am-7pm  Monday - Thursday   7am-5pm  Fridays  [phone]  (Appointment scheduling available 24/7)    Questions about your visit?   Team Line  [phone]   [facility] [place] [facility] 11am-9pm Monday-Friday Saturday-Sunday- 9am-5pm   [place] - 5pm-9pm Monday-Friday Saturday-Sunday- 9am-5pm  [phone] - [facility]  [phone] - [place]       What options do I have for visits at the clinic other than the traditional office visit?  To expand how we care for you, many of our providers are utilizing electronic visits (e-visits) and telephone visits, when medically appropriate, for interactions with their patients rather than a visit in the clinic.   We also offer nurse visits for many medical concerns. Just like any other service, we will bill your insurance company for this type of visit based on time spent on the phone with your provider. Not all insurance companies cover these visits. Please check with your medical insurance if this type of visit is covered. You will be responsible for any charges that are not paid by your insurance.      E-visits via [employer]:  generally incur a $35.00 fee.  Telephone visits:  Time spent on the phone: *charged based on time that is spent on the phone in increments of 10 minutes. Estimated  cost:   5-10 mins $30.00   11-20 mins. $59.00   21-30 mins. $85.00     Use [employer] (secure email communication and access to your chart) to send your primary care provider a message or make an appointment. Ask someone on your Team how to sign up for [employer].  For a Price Quote for your services, please call our [employer] Line at [phone].      As always, Thank you for trusting us with your health care needs!  Discharged by EVELIA Alejandro            Follow-ups after your visit        Your next 10 appointments already scheduled     Jul 06, 2017 12:40 PM CDT   US PELVIC COMPLETE W TRANSVAGINAL with [email or web address]   [facility] ([facility])    [address] [place] [id number] [phone]           Please bring a list of your medicines (including vitamins, minerals and over-the-counter drugs). Also, tell your doctor about any allergies you may have. Wear comfortable clothes and leave your valuables at home.  Adults: Drink six 8-ounce glasses of fluid one hour before your exam. Do NOT empty your bladder.  If you need to empty your bladder before your exam, try to release only a little bit of urine. Then, drink another 8oz glass of fluid.  Children: Children who are potty trained should drink at least 4 cups (32 oz) of liquid 45 minutes to one hour prior to the exam. The child s bladder must be full in order to achieve a diagnostic exam. If your child is very uncomfortable or has an urgent need to pee, please notify a technologist; they will try to find out how much longer the wait may be and provide instructions to help relieve the pressure. Occasionally it is medically necessary to insert a urinary catheter to fill the bladder.  Please call the Imaging Department at your exam site with any questions.            Jul 13, 2017 11:30 AM CDT   SHORT with Ernestina Saunders MD   [facility] ([facility])    [address] [place] [address]  "[address]  [facility] [address] [phone]              Who to contact     If you have questions or need follow up information about today's clinic visit or your schedule please contact [facility] directly at [phone].  Normal or non-critical lab and imaging results will be communicated to you by MyChart, letter or phone within 4 business days after the clinic has received the results. If you do not hear from us within 7 days, please contact the clinic through MyChart or phone. If you have a critical or abnormal lab result, we will notify you by phone as soon as possible.  Submit refill requests through [employer] or call your pharmacy and they will forward the refill request to us. Please allow 3 business days for your refill to be completed.          Additional Information About Your Visit        [employer] Information     [employer] gives you secure access to your electronic health record. If you see a primary care provider, you can also send messages to your care team and make appointments. If you have questions, please call your primary care clinic.  If you do not have a primary care provider, please call [phone] and they will assist you.        Care EveryWhere ID     This is your Care EveryWhere ID. This could be used by other organizations to access your [facility] medical records  [id number]        Your Vitals Were     Pulse Temperature Height Last Period Pulse Oximetry BMI (Body Mass Index)    88 98.6  F (37  C) (Oral) 5' 4.49\" (1.638 m) 06/15/2017 100% 26.85 kg/m2       Blood Pressure from Last 3 Encounters:   06/29/17 130/76   06/26/17 145/90   06/26/17 114/74    Weight from Last 3 Encounters:   06/29/17 158 lb 12.8 oz (72 kg)   06/26/17 161 lb 8 oz (73.3 kg)   06/19/17 165 lb 12.8 oz (75.2 kg)              Today, you had the following     No orders found for display       Primary Care Provider Office Phone # Fax #    Whitney Alva [phone]       [facility] " [address] [facility] [address]        Equal Access to Services     JUAN ALBERTOELIGIO LEE : Hadii latoya ku anastacio Soandreia, waaxda luqadaha, qaybta kadariada raqueljesse, waxvon rafiain hayaanelsy garcíaleandra galiciamary alberts. So [facility] [place] [phone].    ATENCIÓN: Si habla español, tiene a guido disposición servicios gratuitos de asistencia lingüística. [facility] [phone].    We comply with applicable federal civil rights laws and [place] laws. We do not discriminate on the basis of race, color, national origin, age, disability sex, sexual orientation or gender identity.            Thank you!     Thank you for choosing [facility]  for your care. Our goal is always to provide you with excellent care. Hearing back from our patients is one way we can continue to improve our services. Please take a few minutes to complete the written survey that you may receive in the mail after your visit with us. Thank you!             Your Updated Medication List - Protect others around you: Learn how to safely use, store and throw away your medicines at www.disposemymeds.org.          This list is accurate as of: 6/29/17 10:10 AM.  Always use your most recent med list.                   Brand Name Dispense Instructions for use Diagnosis    ferrous sulfate 142 (45 FE) MG Tbcr    SLO-FE    90 tablet    Take 1 tablet (142 mg) by mouth daily    Abnormal vaginal bleeding, Vaginal bleeding       fluticasone-salmeterol 100-50 MCG/DOSE diskus inhaler    ADVAIR    3 Inhaler    Inhale 1 puff into the lungs 2 times daily    Mild persistent asthma without complication       levalbuterol 45 MCG/ACT Inhaler    XOPENEX HFA    1 Inhaler    Inhale 1-2 puffs into the lungs every 4 hours as needed for shortness of breath / dyspnea    Mild persistent asthma without complication       polyethylene glycol powder    MIRALAX/GLYCOLAX     Take 17 g by mouth daily        TYLENOL PO      Take 325 mg by mouth every 8 hours as needed for mild pain or fever         ZANTAC PO      Take 137 mg by mouth

## 2017-06-30 ASSESSMENT — ASTHMA QUESTIONNAIRES: ACT_TOTALSCORE: 25

## 2017-07-06 ENCOUNTER — TELEPHONE (OUTPATIENT)
Dept: OBGYN | Facility: CLINIC | Age: 47
End: 2017-07-06

## 2017-07-06 ENCOUNTER — OFFICE VISIT (OUTPATIENT)
Dept: FAMILY MEDICINE | Facility: CLINIC | Age: 47
End: 2017-07-06
Payer: COMMERCIAL

## 2017-07-06 ENCOUNTER — RADIANT APPOINTMENT (OUTPATIENT)
Dept: ULTRASOUND IMAGING | Facility: CLINIC | Age: 47
End: 2017-07-06
Attending: OBSTETRICS & GYNECOLOGY
Payer: COMMERCIAL

## 2017-07-06 VITALS
DIASTOLIC BLOOD PRESSURE: 82 MMHG | SYSTOLIC BLOOD PRESSURE: 120 MMHG | TEMPERATURE: 98.2 F | WEIGHT: 161.8 LBS | OXYGEN SATURATION: 100 % | BODY MASS INDEX: 27.35 KG/M2 | HEART RATE: 112 BPM

## 2017-07-06 DIAGNOSIS — R42 DIZZINESS: ICD-10-CM

## 2017-07-06 DIAGNOSIS — R00.2 PALPITATIONS: ICD-10-CM

## 2017-07-06 DIAGNOSIS — D25.9 FIBROID UTERUS: ICD-10-CM

## 2017-07-06 DIAGNOSIS — D50.9 IRON DEFICIENCY ANEMIA, UNSPECIFIED IRON DEFICIENCY ANEMIA TYPE: Primary | ICD-10-CM

## 2017-07-06 DIAGNOSIS — Z98.890 S/P MYOMECTOMY: ICD-10-CM

## 2017-07-06 DIAGNOSIS — Z86.79 HISTORY OF SUPRAVENTRICULAR TACHYCARDIA: ICD-10-CM

## 2017-07-06 LAB
ERYTHROCYTE [DISTWIDTH] IN BLOOD BY AUTOMATED COUNT: 14.3 % (ref 10–15)
HCT VFR BLD AUTO: 32.2 % (ref 35–47)
HGB BLD-MCNC: 10.1 G/DL (ref 11.7–15.7)
MCH RBC QN AUTO: 27.6 PG (ref 26.5–33)
MCHC RBC AUTO-ENTMCNC: 31.4 G/DL (ref 31.5–36.5)
MCV RBC AUTO: 88 FL (ref 78–100)
PLATELET # BLD AUTO: 346 10E9/L (ref 150–450)
RBC # BLD AUTO: 3.66 10E12/L (ref 3.8–5.2)
WBC # BLD AUTO: 6.1 10E9/L (ref 4–11)

## 2017-07-06 PROCEDURE — 85027 COMPLETE CBC AUTOMATED: CPT | Performed by: FAMILY MEDICINE

## 2017-07-06 PROCEDURE — 76830 TRANSVAGINAL US NON-OB: CPT | Performed by: OBSTETRICS & GYNECOLOGY

## 2017-07-06 PROCEDURE — 99213 OFFICE O/P EST LOW 20 MIN: CPT | Performed by: FAMILY MEDICINE

## 2017-07-06 PROCEDURE — 36415 COLL VENOUS BLD VENIPUNCTURE: CPT | Performed by: FAMILY MEDICINE

## 2017-07-06 ASSESSMENT — PAIN SCALES - GENERAL: PAINLEVEL: NO PAIN (0)

## 2017-07-06 NOTE — PROGRESS NOTES
SUBJECTIVE:                                                    Laura Jackson is a 47 year old female who presents to clinic today for the following health issues:      Chief Complaint   Patient presents with     RECHECK     For increased heart rate. Patient requesting to extend short term disability    pt needs Several Forms filled out  She is still getting palpitations with activity  No chest pain  No sob  She had a Ultrasound and has appointment with GYN  She had a Myomectomy 6-19-17  And has Been Off since then due To dizziness  And palpitations and still not feeling that she is able To go back to work          Problem list and histories reviewed & adjusted, as indicated.  Additional history: as documented    Patient Active Problem List   Diagnosis     CARDIOVASCULAR SCREENING; LDL GOAL LESS THAN 160     Irritable bowel syndrome     GERD (gastroesophageal reflux disease)     History of supraventricular tachycardia     Mild persistent asthma     Family history of breast cancer     Foreign body (FB) in soft tissue     S/P myomectomy     Past Surgical History:   Procedure Laterality Date     APPENDECTOMY       DILATION AND CURETTAGE N/A 6/20/2017    Procedure: DILATION AND CURETTAGE;  Uterine Curettings and Fibroid Removal, Cook catheter placement; (No hysterectomy done at this time);  Surgeon: Ernestina Saunders MD;  Location: [facility] OR     TONSILLECTOMY         Social History   Substance Use Topics     Smoking status: Never Smoker     Smokeless tobacco: Never Used     Alcohol use Yes      Comment: occ     Family History   Problem Relation Age of Onset     DIABETES Mother      Hypertension Mother      Hyperlipidemia Mother      Cardiovascular Father      CHF and COPD     Asthma Father      Breast Cancer Maternal Grandfather      Colon Cancer Maternal Grandfather      Breast Cancer Paternal Grandmother      Breast Cancer Paternal Aunt      C.ABuzzDBuzz Paternal Grandfather      Breast Cancer Cousin      Asthma Son       DIABETES Maternal Grandmother      Hypertension Maternal Grandmother          Current Outpatient Prescriptions   Medication Sig Dispense Refill     polyethylene glycol (MIRALAX/GLYCOLAX) powder Take 17 g by mouth daily       RaNITidine HCl (ZANTAC PO) Take 137 mg by mouth       ferrous sulfate (SLO-FE) 142 (45 FE) MG TBCR Take 1 tablet (142 mg) by mouth daily 90 tablet 0     fluticasone-salmeterol (ADVAIR) 100-50 MCG/DOSE diskus inhaler Inhale 1 puff into the lungs 2 times daily 3 Inhaler 1     levalbuterol (XOPENEX HFA) 45 MCG/ACT Inhaler Inhale 1-2 puffs into the lungs every 4 hours as needed for shortness of breath / dyspnea 1 Inhaler 5     Allergies   Allergen Reactions     Penicillins      Swelling throat; age 4     Tetracycline      Vomiting; nauseous     Recent Labs   Lab Test  06/26/17   1737  06/24/17   1236   02/12/13   0852  07/20/11   LDL   --    --    --    --    --   110   HDL   --    --    --    --    --   65   TRIG   --    --    --    --    --   72   ALT   --   27   --   43   --    --    CR  0.81  0.86   --   0.73   < >   --    GFRESTIMATED  75  71   --   87   < >   --    GFRESTBLACK  >90   GFR Calc    85   --   >90   < >   --    POTASSIUM  3.8  3.6   < >  4.0   < >   --    TSH  2.53   --    --   1.97   < >   --     < > = values in this interval not displayed.      BP Readings from Last 3 Encounters:   07/06/17 120/82   06/29/17 130/76   06/26/17 145/90    Wt Readings from Last 3 Encounters:   07/06/17 161 lb 12.8 oz (73.4 kg)   06/29/17 158 lb 12.8 oz (72 kg)   06/26/17 161 lb 8 oz (73.3 kg)                  Labs reviewed in EPIC    Reviewed and updated as needed this visit by clinical staff  Tobacco  Allergies  Meds  Med Hx  Surg Hx  Fam Hx  Soc Hx      Reviewed and updated as needed this visit by Provider         ROS:  C: NEGATIVE for fever, chills, change in weight  INTEGUMENTARY/SKIN: NEGATIVE for worrisome rashes, moles or lesions  E/M: NEGATIVE for ear, mouth and throat  problems  R: NEGATIVE for significant cough or SOB  CV: as above  : vaginal Bleeding has stopped  PSYCHIATRIC: NEGATIVE for changes in mood or affect    OBJECTIVE:     /82  Pulse 112  Temp 98.2  F (36.8  C) (Oral)  Wt 161 lb 12.8 oz (73.4 kg)  LMP 06/15/2017  SpO2 100%  Breastfeeding? No  BMI 27.35 kg/m2  Body mass index is 27.35 kg/(m^2).  GENERAL: healthy, alert and no distress  NECK: no adenopathy, no asymmetry, masses, or scars and thyroid normal to palpation  RESP: lungs clear to auscultation - no rales, rhonchi or wheezes  CV: regular rate and rhythm, normal S1 S2, no S3 or S4, no murmur, click or rub, no peripheral edema and peripheral pulses strong  ABDOMEN: soft, nontender, no hepatosplenomegaly, no masses and bowel sounds normal  MS: no gross musculoskeletal defects noted, no edema    Diagnostic Test Results:  Results for orders placed or performed in visit on 07/06/17 (from the past 24 hour(s))   CBC with platelets   Result Value Ref Range    WBC 6.1 4.0 - 11.0 10e9/L    RBC Count 3.66 (L) 3.8 - 5.2 10e12/L    Hemoglobin 10.1 (L) 11.7 - 15.7 g/dL    Hematocrit 32.2 (L) 35.0 - 47.0 %    MCV 88 78 - 100 fl    MCH 27.6 26.5 - 33.0 pg    MCHC 31.4 (L) 31.5 - 36.5 g/dL    RDW 14.3 10.0 - 15.0 %    Platelet Count 346 150 - 450 10e9/L       ASSESSMENT/PLAN:         ICD-10-CM    1. Iron deficiency anemia, unspecified iron deficiency anemia type D50.9 CBC with platelets   2. Palpitations R00.2 Zio Patch 48 Hours   3. S/P myomectomy Z98.890    4. History of supraventricular tachycardia Z86.79    5. Dizziness R42      Labs pending   Recommend Zio  If continued Problems  Recommend Echo  Forms filled out  Took 20 minutes to fill forms and the rest spent examining pt  Follow up if any worsening symptoms  Time off 6-19-17  To 7-17-17      Whitney Alva MD  [facility]

## 2017-07-06 NOTE — NURSING NOTE
"Chief Complaint   Patient presents with     RECHECK     For increased heart rate. Patient requesting to extend short term disability        Initial /90 (BP Location: Left arm, Cuff Size: Adult Regular)  Pulse 112  Temp 98.2  F (36.8  C) (Oral)  Wt 161 lb 12.8 oz (73.4 kg)  LMP 06/15/2017  SpO2 100%  Breastfeeding? No  BMI 27.35 kg/m2 Estimated body mass index is 27.35 kg/(m^2) as calculated from the following:    Height as of 6/29/17: 5' 4.49\" (1.638 m).    Weight as of this encounter: 161 lb 12.8 oz (73.4 kg).  Medication Reconciliation: complete       Jeane Pacheco, CMA      "

## 2017-07-06 NOTE — TELEPHONE ENCOUNTER
Pt had ultrasound done today, 7/6/17, and has a follow up to review the ultrasound 7/13. She would like the results of the ultrasound called to her before the appointment, and she will still see Dr. Saunders on 7/13 to go over the results as well.

## 2017-07-06 NOTE — MR AVS SNAPSHOT
After Visit Summary   7/6/2017    Laura Jackson    MRN: [id number]           Patient Information     Date Of Birth          1970        Visit Information        Provider Department      7/6/2017 6:40 PM Whitney Alva MD [facility]        Today's Diagnoses     Visit for screening mammogram    -  1    Iron deficiency anemia, unspecified iron deficiency anemia type        Palpitations          Care Instructions    [facility]    If you have any questions regarding to your visit please contact your care team:       Team Red:   Clinic Hours Telephone Number   Dr. Mitzi Torres, NP   7am-7pm  Monday - Thursday   7am-5pm  Fridays  [phone]  (Appointment scheduling available 24/7)    Questions about your visit?   Team Line  [phone]   [facility] [place] [facility] 11am-9pm Monday-Friday Saturday-Sunday- 9am-5pm   [place] - 5pm-9pm Monday-Friday Saturday-Sunday- 9am-5pm  [phone] - [facility]  [phone] - [place]       What options do I have for visits at the clinic other than the traditional office visit?  To expand how we care for you, many of our providers are utilizing electronic visits (e-visits) and telephone visits, when medically appropriate, for interactions with their patients rather than a visit in the clinic.   We also offer nurse visits for many medical concerns. Just like any other service, we will bill your insurance company for this type of visit based on time spent on the phone with your provider. Not all insurance companies cover these visits. Please check with your medical insurance if this type of visit is covered. You will be responsible for any charges that are not paid by your insurance.      E-visits via [employer]:  generally incur a $35.00 fee.  Telephone visits:  Time spent on the phone: *charged based on time that is spent on the phone in increments of 10  minutes. Estimated cost:   5-10 mins $30.00   11-20 mins. $59.00   21-30 mins. $85.00     Use [employer] (secure email communication and access to your chart) to send your primary care provider a message or make an appointment. Ask someone on your Team how to sign up for [employer].  For a Price Quote for your services, please call our [employer] Price Line at [phone].      As always, Thank you for trusting us with your health care needs!  Discharged By: An            Follow-ups after your visit        Your next 10 appointments already scheduled     Jul 13, 2017 10:00 AM CDT   SHORT with Ernestina Saunders MD   [facility] ([facility])    [address] [place] [id number] [phone]              Future tests that were ordered for you today     Open Future Orders        Priority Expected Expires Ordered    Zio Patch 48 Hours Routine  8/20/2017 7/6/2017    MA SCREENING DIGITAL BILAT - Future  (s+30) Routine  7/6/2018 7/6/2017            Who to contact     If you have questions or need follow up information about today's clinic visit or your schedule please contact [facility] directly at [phone].  Normal or non-critical lab and imaging results will be communicated to you by [employer], letter or phone within 4 business days after the clinic has received the results. If you do not hear from us within 7 days, please contact the clinic through [employer] or phone. If you have a critical or abnormal lab result, we will notify you by phone as soon as possible.  Submit refill requests through [employer] or call your pharmacy and they will forward the refill request to us. Please allow 3 business days for your refill to be completed.          Additional Information About Your Visit        [employer] Information     [employer] gives you secure access to your electronic health record. If you see a primary care provider, you can also send messages to your care team and make  appointments. If you have questions, please call your primary care clinic.  If you do not have a primary care provider, please call [phone] and they will assist you.        Care EveryWhere ID     This is your Care EveryWhere ID. This could be used by other organizations to access your [facility] medical records  [id number]        Your Vitals Were     Pulse Temperature Last Period Pulse Oximetry Breastfeeding? BMI (Body Mass Index)    112 98.2  F (36.8  C) (Oral) 06/15/2017 100% No 27.35 kg/m2       Blood Pressure from Last 3 Encounters:   07/06/17 120/82   06/29/17 130/76   06/26/17 145/90    Weight from Last 3 Encounters:   07/06/17 161 lb 12.8 oz (73.4 kg)   06/29/17 158 lb 12.8 oz (72 kg)   06/26/17 161 lb 8 oz (73.3 kg)              We Performed the Following     CBC with platelets        Primary Care Provider Office Phone # Fax #    Whitney Alva [phone]       [address] [address]        Equal Access to Services     [facility] New[facility] Memorial HospitalELSA : Hadii aad ku hadasho Sokrissali, waaxda luqadaha, qaybta kaalmada adeleandrayada, ludmila waters . So [facility] [phone].    ATENCIÓN: Si habla español, tiene a guido disposición servicios gratuitos de asistencia lingüística. [facility] [phone].    We comply with applicable federal civil rights laws and [place] laws. We do not discriminate on the basis of race, color, national origin, age, disability sex, sexual orientation or gender identity.            Thank you!     Thank you for choosing [facility]  for your care. Our goal is always to provide you with excellent care. Hearing back from our patients is one way we can continue to improve our services. Please take a few minutes to complete the written survey that you may receive in the mail after your visit with us. Thank you!             Your Updated Medication List - Protect others around you: Learn how to safely use, store and  throw away your medicines at www.disposemymeds.org.          This list is accurate as of: 7/6/17  7:12 PM.  Always use your most recent med list.                   Brand Name Dispense Instructions for use Diagnosis    ferrous sulfate 142 (45 FE) MG Tbcr    SLO-FE    90 tablet    Take 1 tablet (142 mg) by mouth daily    Abnormal vaginal bleeding, Vaginal bleeding       fluticasone-salmeterol 100-50 MCG/DOSE diskus inhaler    ADVAIR    3 Inhaler    Inhale 1 puff into the lungs 2 times daily    Mild persistent asthma without complication       levalbuterol 45 MCG/ACT Inhaler    XOPENEX HFA    1 Inhaler    Inhale 1-2 puffs into the lungs every 4 hours as needed for shortness of breath / dyspnea    Mild persistent asthma without complication       polyethylene glycol powder    MIRALAX/GLYCOLAX     Take 17 g by mouth daily        ZANTAC PO      Take 137 mg by mouth

## 2017-07-07 ENCOUNTER — ALLIED HEALTH/NURSE VISIT (OUTPATIENT)
Dept: NURSING | Facility: CLINIC | Age: 47
End: 2017-07-07
Payer: COMMERCIAL

## 2017-07-07 DIAGNOSIS — R00.2 PALPITATIONS: ICD-10-CM

## 2017-07-07 PROCEDURE — 0296T ZZHC  EXT ECG > 48HR TO 21 DAY RCRD W/CONECT INTL RCRD: CPT

## 2017-07-07 PROCEDURE — 0298T ZZC EXT ECG > 48HR TO 21 DAY REVIEW AND INTERPRETATN: CPT | Performed by: INTERNAL MEDICINE

## 2017-07-07 PROCEDURE — 0298T ZIO PATCH 48 HOURS: CPT

## 2017-07-07 NOTE — NURSING NOTE
"Chief Complaint   Patient presents with     Zio Patch     hook up per Dr. Alva       Initial LMP 06/15/2017 Estimated body mass index is 27.35 kg/(m^2) as calculated from the following:    Height as of 6/29/17: 5' 4.49\" (1.638 m).    Weight as of 7/6/17: 161 lb 12.8 oz (73.4 kg).  Medication Reconciliation: unable or not appropriate to perform   Per Dr. Alva, patient came in for zio patch hook up. Direction were given to patient and questions were answered.  Greta MARCANO CMA (AAMA)            "

## 2017-07-07 NOTE — PATIENT INSTRUCTIONS
[facility]    If you have any questions regarding to your visit please contact your care team:       Team Red:   Clinic Hours Telephone Number   Dr. Mitzi Torres, NP   7am-7pm  Monday - Thursday   7am-5pm  Fridays  [phone]  (Appointment scheduling available 24/7)    Questions about your visit?   Team Line  [phone]   [facility] [place] [facility] 11am-9pm Monday-Friday Saturday-Sunday- 9am-5pm   [place] - 5pm-9pm Monday-Friday Saturday-Sunday- 9am-5pm  [phone] - [place]   [phone] - [place]       What options do I have for visits at the clinic other than the traditional office visit?  To expand how we care for you, many of our providers are utilizing electronic visits (e-visits) and telephone visits, when medically appropriate, for interactions with their patients rather than a visit in the clinic.   We also offer nurse visits for many medical concerns. Just like any other service, we will bill your insurance company for this type of visit based on time spent on the phone with your provider. Not all insurance companies cover these visits. Please check with your medical insurance if this type of visit is covered. You will be responsible for any charges that are not paid by your insurance.      E-visits via [employer]:  generally incur a $35.00 fee.  Telephone visits:  Time spent on the phone: *charged based on time that is spent on the phone in increments of 10 minutes. Estimated cost:   5-10 mins $30.00   11-20 mins. $59.00   21-30 mins. $85.00     Use [employer] (secure email communication and access to your chart) to send your primary care provider a message or make an appointment. Ask someone on your Team how to sign up for [employer].  For a Price Quote for your services, please call our Consumer Price Line at [phone].      As always, Thank you for trusting us with your health care needs!  Discharged  By: An

## 2017-07-07 NOTE — MR AVS SNAPSHOT
After Visit Summary   7/7/2017    Laura Jackson    MRN: [id number]           Patient Information     Date Of Birth          1970        Visit Information        Provider Department      7/7/2017 9:00 AM FZ ANCILLARY [facility]        Today's Diagnoses     Palpitations           Follow-ups after your visit        Your next 10 appointments already scheduled     Jul 13, 2017 10:00 AM CDT   SHORT with Ernestina Saunders MD   [facility]    [address] [place] [id number] [phone]              Future tests that were ordered for you today     Open Future Orders        Priority Expected Expires Ordered    MA SCREENING DIGITAL BILAT - Future  (s+30) Routine  7/6/2018 7/6/2017            Who to contact     If you have questions or need follow up information about today's clinic visit or your schedule please contact [facility] directly at [phone].  Normal or non-critical lab and imaging results will be communicated to you by MyChart, letter or phone within 4 business days after the clinic has received the results. If you do not hear from us within 7 days, please contact the clinic through [employer] or phone. If you have a critical or abnormal lab result, we will notify you by phone as soon as possible.  Submit refill requests through [employer] or call your pharmacy and they will forward the refill request to us. Please allow 3 business days for your refill to be completed.          Additional Information About Your Visit        MyChart Information     [employer] gives you secure access to your electronic health record. If you see a primary care provider, you can also send messages to your care team and make appointments. If you have questions, please call your primary care clinic.  If you do not have a primary care provider, please call [phone] and they will assist you.        Care EveryWhere ID      This is your Care EveryWhere ID. This could be used by other organizations to access your [place] medical records  [id number]        Your Vitals Were     Last Period                   06/15/2017            Blood Pressure from Last 3 Encounters:   07/06/17 120/82   06/29/17 130/76   06/26/17 145/90    Weight from Last 3 Encounters:   07/06/17 161 lb 12.8 oz (73.4 kg)   06/29/17 158 lb 12.8 oz (72 kg)   06/26/17 161 lb 8 oz (73.3 kg)              We Performed the Following     Zio Patch 48 Hours        Primary Care Provider Office Phone # Fax #    Whitney Alva [phone]       [address] [address]        Equal Access to Services     ODESSA HOWARD : Hadii latoya ruffin hadasho Soomaali, waaxda luqadaha, qaybta kaalmada adeegyada, ludmila waters . So [facility] [phone].    ATENCIÓN: Si habla español, tiene a guido disposición servicios gratuitos de asistencia lingüística. Llame al [phone].    We comply with applicable federal civil rights laws and [place] laws. We do not discriminate on the basis of race, color, national origin, age, disability sex, sexual orientation or gender identity.            Thank you!     Thank you for choosing [facility]  for your care. Our goal is always to provide you with excellent care. Hearing back from our patients is one way we can continue to improve our services. Please take a few minutes to complete the written survey that you may receive in the mail after your visit with us. Thank you!             Your Updated Medication List - Protect others around you: Learn how to safely use, store and throw away your medicines at www.disposemymeds.org.          This list is accurate as of: 7/7/17  9:14 AM.  Always use your most recent med list.                   Brand Name Dispense Instructions for use Diagnosis    ferrous sulfate 142 (45 FE) MG Tbcr    SLO-FE    90 tablet    Take 1 tablet (142 mg)  by mouth daily    Abnormal vaginal bleeding, Vaginal bleeding       fluticasone-salmeterol 100-50 MCG/DOSE diskus inhaler    ADVAIR    3 Inhaler    Inhale 1 puff into the lungs 2 times daily    Mild persistent asthma without complication       levalbuterol 45 MCG/ACT Inhaler    XOPENEX HFA    1 Inhaler    Inhale 1-2 puffs into the lungs every 4 hours as needed for shortness of breath / dyspnea    Mild persistent asthma without complication       polyethylene glycol powder    MIRALAX/GLYCOLAX     Take 17 g by mouth daily        ZANTAC PO      Take 137 mg by mouth

## 2017-07-12 ENCOUNTER — APPOINTMENT (OUTPATIENT)
Dept: GENERAL RADIOLOGY | Facility: CLINIC | Age: 47
End: 2017-07-12
Attending: EMERGENCY MEDICINE
Payer: COMMERCIAL

## 2017-07-12 ENCOUNTER — HOSPITAL ENCOUNTER (EMERGENCY)
Facility: CLINIC | Age: 47
Discharge: HOME | End: 2017-07-12
Attending: EMERGENCY MEDICINE | Admitting: EMERGENCY MEDICINE
Payer: COMMERCIAL

## 2017-07-12 ENCOUNTER — APPOINTMENT (OUTPATIENT)
Dept: ULTRASOUND IMAGING | Facility: CLINIC | Age: 47
End: 2017-07-12
Attending: EMERGENCY MEDICINE
Payer: COMMERCIAL

## 2017-07-12 VITALS
SYSTOLIC BLOOD PRESSURE: 139 MMHG | HEIGHT: 65 IN | TEMPERATURE: 98.7 F | RESPIRATION RATE: 16 BRPM | OXYGEN SATURATION: 100 % | HEART RATE: 89 BPM | WEIGHT: 161 LBS | DIASTOLIC BLOOD PRESSURE: 92 MMHG | BODY MASS INDEX: 26.82 KG/M2

## 2017-07-12 DIAGNOSIS — R14.1 FLATULENCE, ERUCTATION, AND GAS PAIN: ICD-10-CM

## 2017-07-12 DIAGNOSIS — R14.3 FLATULENCE, ERUCTATION, AND GAS PAIN: ICD-10-CM

## 2017-07-12 DIAGNOSIS — R10.11 ABDOMINAL PAIN, RIGHT UPPER QUADRANT: ICD-10-CM

## 2017-07-12 DIAGNOSIS — R14.2 FLATULENCE, ERUCTATION, AND GAS PAIN: ICD-10-CM

## 2017-07-12 DIAGNOSIS — R06.09 DOE (DYSPNEA ON EXERTION): ICD-10-CM

## 2017-07-12 LAB
ALBUMIN SERPL-MCNC: 4.5 G/DL (ref 3.4–5)
ALP SERPL-CCNC: 54 U/L (ref 40–150)
ALT SERPL W P-5'-P-CCNC: 24 U/L (ref 0–50)
ANION GAP SERPL CALCULATED.3IONS-SCNC: 13 MMOL/L (ref 3–14)
AST SERPL W P-5'-P-CCNC: 19 U/L (ref 0–45)
BASOPHILS # BLD AUTO: 0 10E9/L (ref 0–0.2)
BASOPHILS NFR BLD AUTO: 0.4 %
BILIRUB SERPL-MCNC: 0.4 MG/DL (ref 0.2–1.3)
BUN SERPL-MCNC: 12 MG/DL (ref 7–30)
CALCIUM SERPL-MCNC: 9.3 MG/DL (ref 8.5–10.1)
CHLORIDE SERPL-SCNC: 104 MMOL/L (ref 94–109)
CO2 SERPL-SCNC: 24 MMOL/L (ref 20–32)
CREAT SERPL-MCNC: 0.84 MG/DL (ref 0.52–1.04)
DIFFERENTIAL METHOD BLD: ABNORMAL
EOSINOPHIL # BLD AUTO: 0.1 10E9/L (ref 0–0.7)
EOSINOPHIL NFR BLD AUTO: 0.7 %
ERYTHROCYTE [DISTWIDTH] IN BLOOD BY AUTOMATED COUNT: 14.5 % (ref 10–15)
GFR SERPL CREATININE-BSD FRML MDRD: 72 ML/MIN/1.7M2
GLUCOSE SERPL-MCNC: 100 MG/DL (ref 70–99)
HCT VFR BLD AUTO: 35.4 % (ref 35–47)
HGB BLD-MCNC: 11.3 G/DL (ref 11.7–15.7)
IMM GRANULOCYTES # BLD: 0 10E9/L (ref 0–0.4)
IMM GRANULOCYTES NFR BLD: 0.1 %
LIPASE SERPL-CCNC: 181 U/L (ref 73–393)
LYMPHOCYTES # BLD AUTO: 1.1 10E9/L (ref 0.8–5.3)
LYMPHOCYTES NFR BLD AUTO: 14.6 %
MCH RBC QN AUTO: 27.6 PG (ref 26.5–33)
MCHC RBC AUTO-ENTMCNC: 31.9 G/DL (ref 31.5–36.5)
MCV RBC AUTO: 87 FL (ref 78–100)
MONOCYTES # BLD AUTO: 0.5 10E9/L (ref 0–1.3)
MONOCYTES NFR BLD AUTO: 6.2 %
NEUTROPHILS # BLD AUTO: 5.7 10E9/L (ref 1.6–8.3)
NEUTROPHILS NFR BLD AUTO: 78 %
NRBC # BLD AUTO: 0 10*3/UL
NRBC BLD AUTO-RTO: 0 /100
PLATELET # BLD AUTO: 333 10E9/L (ref 150–450)
POTASSIUM SERPL-SCNC: 3.7 MMOL/L (ref 3.4–5.3)
PROT SERPL-MCNC: 8 G/DL (ref 6.8–8.8)
RBC # BLD AUTO: 4.09 10E12/L (ref 3.8–5.2)
SODIUM SERPL-SCNC: 141 MMOL/L (ref 133–144)
TROPONIN I SERPL-MCNC: NORMAL UG/L (ref 0–0.04)
WBC # BLD AUTO: 7.3 10E9/L (ref 4–11)

## 2017-07-12 PROCEDURE — S0028 INJECTION, FAMOTIDINE, 20 MG: HCPCS | Performed by: EMERGENCY MEDICINE

## 2017-07-12 PROCEDURE — 80053 COMPREHEN METABOLIC PANEL: CPT | Performed by: EMERGENCY MEDICINE

## 2017-07-12 PROCEDURE — 76705 ECHO EXAM OF ABDOMEN: CPT

## 2017-07-12 PROCEDURE — 99285 EMERGENCY DEPT VISIT HI MDM: CPT | Mod: 25 | Performed by: EMERGENCY MEDICINE

## 2017-07-12 PROCEDURE — 96361 HYDRATE IV INFUSION ADD-ON: CPT | Performed by: EMERGENCY MEDICINE

## 2017-07-12 PROCEDURE — 85025 COMPLETE CBC W/AUTO DIFF WBC: CPT | Performed by: EMERGENCY MEDICINE

## 2017-07-12 PROCEDURE — 25000128 H RX IP 250 OP 636: Performed by: EMERGENCY MEDICINE

## 2017-07-12 PROCEDURE — 83690 ASSAY OF LIPASE: CPT | Performed by: EMERGENCY MEDICINE

## 2017-07-12 PROCEDURE — 93005 ELECTROCARDIOGRAM TRACING: CPT | Performed by: EMERGENCY MEDICINE

## 2017-07-12 PROCEDURE — 74020 XR ABDOMEN 2 VW: CPT

## 2017-07-12 PROCEDURE — 25000125 ZZHC RX 250: Performed by: EMERGENCY MEDICINE

## 2017-07-12 PROCEDURE — 84484 ASSAY OF TROPONIN QUANT: CPT | Performed by: EMERGENCY MEDICINE

## 2017-07-12 PROCEDURE — 96375 TX/PRO/DX INJ NEW DRUG ADDON: CPT | Performed by: EMERGENCY MEDICINE

## 2017-07-12 PROCEDURE — 96374 THER/PROPH/DIAG INJ IV PUSH: CPT | Performed by: EMERGENCY MEDICINE

## 2017-07-12 PROCEDURE — 93010 ELECTROCARDIOGRAM REPORT: CPT | Mod: Z6 | Performed by: EMERGENCY MEDICINE

## 2017-07-12 PROCEDURE — 71020 XR CHEST 2 VW: CPT

## 2017-07-12 RX ORDER — SODIUM CHLORIDE 9 MG/ML
1000 INJECTION, SOLUTION INTRAVENOUS CONTINUOUS
Status: DISCONTINUED | OUTPATIENT
Start: 2017-07-12 | End: 2017-07-13 | Stop reason: HOSPADM

## 2017-07-12 RX ADMIN — SODIUM CHLORIDE 1000 ML: 9 INJECTION, SOLUTION INTRAVENOUS at 20:34

## 2017-07-12 RX ADMIN — FAMOTIDINE 20 MG: 10 INJECTION INTRAVENOUS at 20:31

## 2017-07-12 RX ADMIN — PROCHLORPERAZINE EDISYLATE 5 MG: 5 INJECTION INTRAMUSCULAR; INTRAVENOUS at 20:36

## 2017-07-12 ASSESSMENT — ENCOUNTER SYMPTOMS
ABDOMINAL PAIN: 1
LIGHT-HEADEDNESS: 0
DIZZINESS: 1
DIARRHEA: 0
NAUSEA: 0
VOMITING: 0
SHORTNESS OF BREATH: 1
ABDOMINAL DISTENTION: 1
BLOOD IN STOOL: 0

## 2017-07-12 NOTE — ED PROVIDER NOTES
"  History     Chief Complaint   Patient presents with     Abdominal Pain     Pt c/o RUQ abdominal pain with nausea. Pt also had vaginal bleeding this past weekend. Pt had recent uterus surgery due to bleeding fibroids.      KARAN Jackson is a 47 year old female with history of appendectomy who presents to the ED with RUQ abdominal pain. Per review of her chart, she recently had D&C and partial transvaginal myomectomy for abnormal uterine bleeding on 6/20/17 by Dr. Ernestina Subramanian. She states today she had a \"burping episode\" which started after breakfast this morning. She reports she has a history of reflux and has had similar symptoms before, but it usually resolves when she takes Zantac. She also has complaints of abdominal distension and RUQ abdominal pain which has been intermittent with episodes lasting 45-60 minutes and seems to be triggered with eating and drinking.  She states today she has taken Zantac and omeprazole.  She states her last bowel movement was 2 hours ago and looked normal.  She has been having regular bowel movements in the last few days.  She otherwise currently denies fevers, vomiting, nausea, melena, lightheadedness and chest pain.  She reports she still has exercise intolerance since her surgery.     She states she also feels \"anemic\" after her recent procedure and had her menses this past weekend. She reports her bleeding was less than baseline and changed pads every couple of hours.   Bleeding has since resolved with just trace brown spotting.      She has had her appendix removed.  Reports one previous evaluation for biliary issues many years ago but still has her GB.      PAST MEDICAL HISTORY  Past Medical History:   Diagnosis Date     Family history of breast cancer 2/19/2014     SVT (supraventricular tachycardia):  history of, no recurrence since 2008 10/11/2013    no recurrence since 2008      Uncomplicated asthma      PAST SURGICAL HISTORY  Past Surgical History:   Procedure " Laterality Date     APPENDECTOMY       DILATION AND CURETTAGE N/A 6/20/2017    Procedure: DILATION AND CURETTAGE;  Uterine Curettings and Fibroid Removal, [place] catheter placement; (No hysterectomy done at this time);  Surgeon: Ernestina Saunders MD;  Location: [facility] OR     TONSILLECTOMY       FAMILY HISTORY  Family History   Problem Relation Age of Onset     DIABETES Mother      Hypertension Mother      Hyperlipidemia Mother      Cardiovascular Father      CHF and COPD     Asthma Father      Breast Cancer Maternal Grandfather      Colon Cancer Maternal Grandfather      Breast Cancer Paternal Grandmother      Breast Cancer Paternal Aunt      C.A.D. Paternal Grandfather      Breast Cancer Cousin      Asthma Son      DIABETES Maternal Grandmother      Hypertension Maternal Grandmother      SOCIAL HISTORY  Social History   Substance Use Topics     Smoking status: Never Smoker     Smokeless tobacco: Never Used     Alcohol use Yes      Comment: occ     MEDICATIONS  No current facility-administered medications for this encounter.      Current Outpatient Prescriptions   Medication     Simethicone (GAS-X PO)     polyethylene glycol (MIRALAX/GLYCOLAX) powder     RaNITidine HCl (ZANTAC PO)     ferrous sulfate (SLO-FE) 142 (45 FE) MG TBCR     fluticasone-salmeterol (ADVAIR) 100-50 MCG/DOSE diskus inhaler     levalbuterol (XOPENEX HFA) 45 MCG/ACT Inhaler     ALLERGIES  Allergies   Allergen Reactions     Penicillins      Swelling throat; age 4     Tetracycline      Vomiting; nauseous       I have reviewed the Medications, Allergies, Past Medical and Surgical History, and Social History in the Epic system.    Review of Systems   Respiratory: Positive for shortness of breath.    Cardiovascular: Negative for chest pain.   Gastrointestinal: Positive for abdominal distention and abdominal pain. Negative for blood in stool, diarrhea, nausea and vomiting.   Genitourinary: Negative for vaginal bleeding.   Neurological: Positive for dizziness.  "Negative for light-headedness.   All other systems reviewed and are negative.      Physical Exam   BP: (!) 146/91  Pulse: 96  Temp: 98  F (36.7  C)  Resp: 18  Height: 165.1 cm (5' 5\")  Weight: 73 kg (161 lb)  SpO2: 100 %  Physical Exam  General: patient is alert and oriented and in no acute distress, frequently belching  Head: atraumatic and normocephalic   EENT: moist mucus membranes, pupils round and reactive, sclera anicteric  Neck: supple   Cardiovascular: regular rate and rhythm, extremities warm and well perfused, no lower extremity edema  Pulmonary: lungs clear to auscultation bilaterally   Abdomen: soft, nondistended, normal bowel sounds, no tenderness to palpation of the abdomen, no guarding, no CVA tenderness  Musculoskeletal: normal range of motion   Neurological: alert and oriented, moving all extremities symmetrically, gait normal   Skin: warm, dry     ED Course     ED Course     Procedures   6:39 PM  The patient was seen and examined by Dr. Victoria in Room 17.                EKG Interpretation:      Interpreted by Louise Victoria  Time reviewed: 1905  Symptoms at time of EKG: belching, SOB with exertion  Rhythm: normal sinus   Rate: normal  Axis: normal  Ectopy: none  Conduction: normal  ST Segments/ T Waves: No ST-T wave changes  Q Waves: none  Comparison to prior: Unchanged    Clinical Impression: normal EKG          Critical Care time:  none               Labs Ordered and Resulted from Time of ED Arrival Up to the Time of Departure from the ED - No data to display         Assessments & Plan (with Medical Decision Making)   Ms. Jackson is a 47 year old female with a history of SVT, asthma, and dysfunctional uterine bleeding status post myomectomy who presents with a prolonged episode of belching and right upper quadrant abdominal pain.  Differential diagnosis includes but is not limited to: biliary disease including cholelithiasis, cholecystitis, pancreatitis, gastritis, bowel obstruction, less " likely ACS, pleural effusion or  diaphragmatic irritation.  She is afebrile and hemodynamically stable.  She is able to swallow without difficulty and is tolerating her secretions.  She reports that she is able to get fluids down and does not feel like anything becomes lodged within her throat to suggest esophageal foreign body. However she does feel like eating or drinking anything tends to exacerbate her right upper quadrant pain. I had obtained an ultrasound of the gallbladder which is negative.  In addition, she has had a plain film of the abdomen shows no evidence of obstruction or free air.  She reports that she has been having regular bowel movements and denies any vomiting. At this point suspicion for bowel obstruction is low.  In addition, I have obtained a chest x-ray which shows no infiltrate or pleural effusion.  Less likely as the cause of her belching and abdominal pain would be ACS.  I have obtained an ECG as well as a troponin which is negative.  At this point,  I do not feel that further inpatient stress testing would be indicated based on her symptoms.  Given her recent history of significant uterine bleeding resulting in emergent packing and myomectomy as well as a 5-6 g hemoglobin drop, I  have obtained labs which show a hemoglobin today up to 11.3.  She does report that she just had her period over the weekend.  However she is not having any ongoing bleeding at this time.  She was given IV fluids, famotidine and compazine in the emergency department.  On reevaluation she is feeling much improved and belching as resolved.  Will plan to discharge from the emergency department with close return instructions.      This part of the medical record was transcribed by Radha Hong Scribe, from a dictation done by Louise Victoria MD.       I have reviewed the nursing notes.    I have reviewed the findings, diagnosis, plan and need for follow up with the patient.    New Prescriptions    No  medications on file       Final diagnoses:   Abdominal pain, right upper quadrant     I, Randolph José, am serving as a trained medical scribe to document services personally performed by Louise Victoria MD, based on the provider's statements to me.      I, Louise Victoria MD, was physically present and have reviewed and verified the accuracy of this note documented by Randloph José.     7/12/2017   [facility], [place], EMERGENCY DEPARTMENT     Louise Victoria MD  07/12/17 2035

## 2017-07-12 NOTE — ED AVS SNAPSHOT
[facility], [place], Emergency Department    [address]    [facility] [address]    Phone:  [phone]    Fax:  [phone]                                       Laura Jackson   MRN: [id number]    Department:  [facility], Emergency Department   Date of Visit:  7/12/2017           After Visit Summary Signature Page     I have received my discharge instructions, and my questions have been answered. I have discussed any challenges I see with this plan with the nurse or doctor.    ..........................................................................................................................................  Patient/Patient Representative Signature      ..........................................................................................................................................  Patient Representative Print Name and Relationship to Patient    ..................................................               ................................................  Date                                            Time    ..........................................................................................................................................  Reviewed by Signature/Title    ...................................................              ..............................................  Date                                                            Time

## 2017-07-12 NOTE — ED AVS SNAPSHOT
[facility], Emergency Department    [address]    [place] [address]    Phone:  [phone]    Fax:  [phone]                                       Laura Jackson   MRN: [id number]    Department:  [facility], Emergency Department   Date of Visit:  7/12/2017           Patient Information     Date Of Birth          1970        Your diagnoses for this visit were:     Abdominal pain, right upper quadrant        You were seen by Louise Victoria MD.        Discharge Instructions       Please make an appointment to follow up with Your Primary Care Provider in 2-5 days as needed.    Continue taking ranitidine at home.  Avoid gas-inducing foods and carbonated beverages.      If you have any worsening pain, fevers, intractable vomiting, chest pain or other concerns, return to the emergency department for re-evaluation.            *Abdominal Pain, Unknown Cause (Female)    The exact cause of your abdominal (stomach) pain is not certain. This does not mean that this is something to worry about, or the right tests were not done. Everyone likes to know the exact cause of the problem, but sometimes with abdominal pain, there is no clear-cut cause, and this could be a good thing. The good news is that your symptoms can be treated, and you will feel better.   Your condition does not seem serious now; however, sometimes the signs of a serious problem may take more time to appear. For this reason, it is important for you to watch for any new symptoms, problems, or worsening of your condition.  Over the next few days, the abdominal pain may come and go, or be continuous. Other common symptoms can include nausea and vomiting. Sometimes it can be difficult to tell if you feel nauseous, you may just feel bad and not associate that feeling with nausea. Constipation, diarrhea, and a fever may go along with the pain.  The pain may continue even if treated correctly over the following days. Depending on how  things go, sometimes the cause can become clear and may require further or different treatment. Additional evaluations, medications, or tests may be needed.  Home care  Your health care provider may prescribe medications for pain, symptoms, or an infection.  Follow the health care provider's instructions for taking these medications.  General care    Rest until your next exam. No strenuous activities.    Try to find positions that ease discomfort. A small pillow placed on the abdomen may help relieve pain.    Something warm on your abdomen (such as a heating pad) may help, but be careful not to burn yourself.  Diet    Do not force yourself to eat, especially if having cramps, vomiting, or diarrhea.    Water is important so you do not get dehydrated. Soup may also be good. Sports drinks may also help, especially if they are not too acidic. Make sure you don't drink sugary drinks as this can make things worse. Take liquids in small amounts. Do not guzzle them.    Caffeine sometimes makes the pain and cramping worse.    Avoid dairy products if you have vomiting or diarrhea.    Don't eat large amounts at a time. Wait a few minutes between bites.    Eat a diet low in fiber (called a low-residue diet). Foods allowed include refined breads, white rice, fruit and vegetable juices without pulp, tender meats. These foods will pass more easily through the intestine.    Avoid fried or fatty foods, dairy, alcohol and spicy foods until your symptoms go away.  Follow-up care  Follow up with your health care provider as instructed, or if your pain does not begin to improve in the next 24 hours.  When to seek medical care  Seek prompt medical care if any of the following occur:    Pain gets worse or moves to the right lower abdomen    New or worsening vomiting or diarrhea    Swelling of the abdomen    Unable to pass stool for more than three days    New fever over 101  F (38.3 C), or rising fever    Blood in vomit or bowel movements  (dark red or black color)    Jaundice (yellow color of eyes and skin)    Weakness, dizziness    Chest, arm, back, neck or jaw pain    Unexpected vaginal bleeding or missed period  Call 911  Call emergency services if any of the following occur:    Trouble breathing    Confusion    Fainting or loss of consciousness    Rapid heart rate    Seizure    9129-9692 Darrius Solares, [address] [facility], [place] [address]. All rights reserved. This information is not intended as a substitute for professional medical care. Always follow your healthcare professional's instructions.            Future Appointments        Provider Department Dept Phone Center    7/13/2017 10:00 AM Ernestina Saunders MD [facility] [phone] [facility]      24 Hour Appointment Hotline       To make an appointment at any [facility], call [phone] ([phone]). If you don't have a family doctor or clinic, we will help you find one. [facility] clinics are conveniently located to serve the needs of you and your family.             Review of your medicines      Our records show that you are taking the medicines listed below. If these are incorrect, please call your family doctor or clinic.        Dose / Directions Last dose taken    ferrous sulfate 142 (45 FE) MG Tbcr   Commonly known as:  SLO-FE   Dose:  142 mg   Quantity:  90 tablet        Take 1 tablet (142 mg) by mouth daily   Refills:  0        fluticasone-salmeterol 100-50 MCG/DOSE diskus inhaler   Commonly known as:  ADVAIR   Dose:  1 puff   Quantity:  3 Inhaler        Inhale 1 puff into the lungs 2 times daily   Refills:  1        GAS-X PO        Take by mouth as needed for intestinal gas   Refills:  0        levalbuterol 45 MCG/ACT Inhaler   Commonly known as:  XOPENEX HFA   Dose:  1-2 puff   Quantity:  1 Inhaler        Inhale 1-2 puffs into the lungs every 4 hours as needed for shortness of breath / dyspnea   Refills:  5        polyethylene glycol powder   Commonly known  as:  MIRALAX/GLYCOLAX   Dose:  17 g        Take 17 g by mouth daily   Refills:  0        ZANTAC PO   Dose:  137 mg        Take 137 mg by mouth   Refills:  0                Procedures and tests performed during your visit     CBC with platelets differential    Comprehensive metabolic panel    EKG 12-lead, tracing only    Lipase    Troponin I    US Abdomen Limited    XR Abdomen 2 Views    XR Chest 2 Views      Orders Needing Specimen Collection     None      Pending Results     No orders found from 7/10/2017 to 7/13/2017.            Pending Culture Results     No orders found from 7/10/2017 to 7/13/2017.            Pending Results Instructions     If you had any lab results that were not finalized at the time of your Discharge, you can call the ED Lab Result RN at [phone]. You will be contacted by this team for any positive Lab results or changes in treatment. The nurses are available 7 days a week from 10A to 6:30P.  You can leave a message 24 hours per day and they will return your call.        Thank you for choosing [facility]       Thank you for choosing [facility] for your care. Our goal is always to provide you with excellent care. Hearing back from our patients is one way we can continue to improve our services. Please take a few minutes to complete the written survey that you may receive in the mail after you visit with us. Thank you!        [employer] Information     [employer] gives you secure access to your electronic health record. If you see a primary care provider, you can also send messages to your care team and make appointments. If you have questions, please call your primary care clinic.  If you do not have a primary care provider, please call [phone] and they will assist you.        Care EveryWhere ID     This is your Care EveryWhere ID. This could be used by other organizations to access your [facility] medical records  [id number]        Equal Access to Services     ODESSA HOWARD AH: Domii latoya ruffin  anastacio Cordero, natali dailey, adilene sullivanmadiaz martinez, ludmila alberts. So [facility] [phone].    ATENCIÓN: Si habla español, tiene a guido disposición servicios gratuitos de asistencia lingüística. Llame al [phone].    We comply with applicable federal civil rights laws and [place] laws. We do not discriminate on the basis of race, color, national origin, age, disability sex, sexual orientation or gender identity.            After Visit Summary       This is your record. Keep this with you and show to your community pharmacist(s) and doctor(s) at your next visit.

## 2017-07-13 ENCOUNTER — OFFICE VISIT (OUTPATIENT)
Dept: OBGYN | Facility: CLINIC | Age: 47
End: 2017-07-13
Payer: COMMERCIAL

## 2017-07-13 ENCOUNTER — TELEPHONE (OUTPATIENT)
Dept: OTHER | Facility: CLINIC | Age: 47
End: 2017-07-13

## 2017-07-13 VITALS
DIASTOLIC BLOOD PRESSURE: 81 MMHG | BODY MASS INDEX: 26.79 KG/M2 | HEART RATE: 114 BPM | WEIGHT: 161 LBS | SYSTOLIC BLOOD PRESSURE: 136 MMHG | OXYGEN SATURATION: 98 %

## 2017-07-13 DIAGNOSIS — N92.4 EXCESSIVE BLEEDING IN PREMENOPAUSAL PERIOD: ICD-10-CM

## 2017-07-13 DIAGNOSIS — D25.9 UTERINE LEIOMYOMA, UNSPECIFIED LOCATION: Primary | ICD-10-CM

## 2017-07-13 LAB — INTERPRETATION ECG - MUSE: NORMAL

## 2017-07-13 PROCEDURE — 99214 OFFICE O/P EST MOD 30 MIN: CPT | Mod: 24 | Performed by: OBSTETRICS & GYNECOLOGY

## 2017-07-13 NOTE — NURSING NOTE
"Chief Complaint   Patient presents with     Consult       Initial LMP 07/08/2017 Estimated body mass index is 26.79 kg/(m^2) as calculated from the following:    Height as of 7/12/17: 5' 5\" (1.651 m).    Weight as of 7/12/17: 161 lb (73 kg).  BP completed using cuff size: regular    No obstetric history on file.    The following HM Due: NONE      The following patient reported/Care Every where data was sent to:  P ABSTRACT QUALITY INITIATIVES [13051]  none     n/a             "

## 2017-07-13 NOTE — DISCHARGE INSTRUCTIONS
Please make an appointment to follow up with Your Primary Care Provider in 2-5 days as needed.    Continue taking ranitidine at home.  Avoid gas-inducing foods and carbonated beverages.      If you have any worsening pain, fevers, intractable vomiting, chest pain or other concerns, return to the emergency department for re-evaluation.            *Abdominal Pain, Unknown Cause (Female)    The exact cause of your abdominal (stomach) pain is not certain. This does not mean that this is something to worry about, or the right tests were not done. Everyone likes to know the exact cause of the problem, but sometimes with abdominal pain, there is no clear-cut cause, and this could be a good thing. The good news is that your symptoms can be treated, and you will feel better.   Your condition does not seem serious now; however, sometimes the signs of a serious problem may take more time to appear. For this reason, it is important for you to watch for any new symptoms, problems, or worsening of your condition.  Over the next few days, the abdominal pain may come and go, or be continuous. Other common symptoms can include nausea and vomiting. Sometimes it can be difficult to tell if you feel nauseous, you may just feel bad and not associate that feeling with nausea. Constipation, diarrhea, and a fever may go along with the pain.  The pain may continue even if treated correctly over the following days. Depending on how things go, sometimes the cause can become clear and may require further or different treatment. Additional evaluations, medications, or tests may be needed.  Home care  Your health care provider may prescribe medications for pain, symptoms, or an infection.  Follow the health care provider's instructions for taking these medications.  General care    Rest until your next exam. No strenuous activities.    Try to find positions that ease discomfort. A small pillow placed on the abdomen may help relieve  pain.    Something warm on your abdomen (such as a heating pad) may help, but be careful not to burn yourself.  Diet    Do not force yourself to eat, especially if having cramps, vomiting, or diarrhea.    Water is important so you do not get dehydrated. Soup may also be good. Sports drinks may also help, especially if they are not too acidic. Make sure you don't drink sugary drinks as this can make things worse. Take liquids in small amounts. Do not guzzle them.    Caffeine sometimes makes the pain and cramping worse.    Avoid dairy products if you have vomiting or diarrhea.    Don't eat large amounts at a time. Wait a few minutes between bites.    Eat a diet low in fiber (called a low-residue diet). Foods allowed include refined breads, white rice, fruit and vegetable juices without pulp, tender meats. These foods will pass more easily through the intestine.    Avoid fried or fatty foods, dairy, alcohol and spicy foods until your symptoms go away.  Follow-up care  Follow up with your health care provider as instructed, or if your pain does not begin to improve in the next 24 hours.  When to seek medical care  Seek prompt medical care if any of the following occur:    Pain gets worse or moves to the right lower abdomen    New or worsening vomiting or diarrhea    Swelling of the abdomen    Unable to pass stool for more than three days    New fever over 101  F (38.3 C), or rising fever    Blood in vomit or bowel movements (dark red or black color)    Jaundice (yellow color of eyes and skin)    Weakness, dizziness    Chest, arm, back, neck or jaw pain    Unexpected vaginal bleeding or missed period  Call 911  Call emergency services if any of the following occur:    Trouble breathing    Confusion    Fainting or loss of consciousness    Rapid heart rate    Seizure    [id number] [facility], [address] [place] [address], [place] [address]. All rights reserved. This information is not intended as a substitute for  professional medical care. Always follow your healthcare professional's instructions.

## 2017-07-13 NOTE — MR AVS SNAPSHOT
After Visit Summary   7/13/2017    Laura Jackson    MRN: [id number]           Patient Information     Date Of Birth          1970        Visit Information        Provider Department      7/13/2017 10:00 AM Ernestina Saunders MD [facility]        Today's Diagnoses     Uterine leiomyoma, unspecified location    -  1    Excessive bleeding in premenopausal period           Follow-ups after your visit        Your next 10 appointments already scheduled     Jul 14, 2017  9:00 AM CDT   [facility]Chart Long with [facility] MD   [facility]    [address] [address]   [phone]            Jul 24, 2017  2:30 PM CDT   New Visit with Natalio Villa MD   [facility] ([facility]    [address]  [facility] [id number] [phone]              Who to contact     If you have questions or need follow up information about today's clinic visit or your schedule please contact [facility] directly at [phone].  Normal or non-critical lab and imaging results will be communicated to you by MyChart, letter or phone within 4 business days after the clinic has received the results. If you do not hear from us within 7 days, please contact the clinic through [employer] or phone. If you have a critical or abnormal lab result, we will notify you by phone as soon as possible.  Submit refill requests through [employer] or call your pharmacy and they will forward the refill request to us. Please allow 3 business days for your refill to be completed.          Additional Information About Your Visit        MyChart Information     [employer] gives you secure access to your electronic health record. If you see a primary care provider, you can also send messages to your care team and make appointments. If you have questions, please call your primary care  clinic.  If you do not have a primary care provider, please call [phone] and they will assist you.        Care EveryWhere ID     This is your Care EveryWhere ID. This could be used by other organizations to access your [facility] medical records  [id number]        Your Vitals Were     Pulse Last Period Pulse Oximetry Breastfeeding? BMI (Body Mass Index)       114 07/08/2017 98% No 26.79 kg/m2        Blood Pressure from Last 3 Encounters:   07/13/17 136/81   07/12/17 (!) 139/92   07/06/17 120/82    Weight from Last 3 Encounters:   07/13/17 161 lb (73 kg)   07/12/17 161 lb (73 kg)   07/06/17 161 lb 12.8 oz (73.4 kg)              Today, you had the following     No orders found for display       Primary Care Provider Office Phone # Fax #    Whitney Alva [phone]       [address] [address]        Equal Access to Services     ODESSA HOWARD AH: Hadii aad ku hadasho Soomaali, waaxda luqadaha, qaybta kaalmada adeegyada, waxay idiin haypriyankan bryan waters . So [facility] [phone].    ATENCIÓN: Si habla español, tiene a guido disposición servicios gratuitos de asistencia lingüística. Llame al [phone].    We comply with applicable federal civil rights laws and [place] laws. We do not discriminate on the basis of race, color, national origin, age, disability sex, sexual orientation or gender identity.            Thank you!     Thank you for choosing [facility]  for your care. Our goal is always to provide you with excellent care. Hearing back from our patients is one way we can continue to improve our services. Please take a few minutes to complete the written survey that you may receive in the mail after your visit with us. Thank you!             Your Updated Medication List - Protect others around you: Learn how to safely use, store and throw away your medicines at www.disposemymeds.org.          This list is accurate as of: 7/13/17   3:44 PM.  Always use your most recent med list.                   Brand Name Dispense Instructions for use Diagnosis    ferrous sulfate 142 (45 FE) MG Tbcr    SLO-FE    90 tablet    Take 1 tablet (142 mg) by mouth daily    Abnormal vaginal bleeding, Vaginal bleeding       fluticasone-salmeterol 100-50 MCG/DOSE diskus inhaler    ADVAIR    3 Inhaler    Inhale 1 puff into the lungs 2 times daily    Mild persistent asthma without complication       GAS-X PO      Take by mouth as needed for intestinal gas        levalbuterol 45 MCG/ACT Inhaler    XOPENEX HFA    1 Inhaler    Inhale 1-2 puffs into the lungs every 4 hours as needed for shortness of breath / dyspnea    Mild persistent asthma without complication       polyethylene glycol powder    MIRALAX/GLYCOLAX     Take 17 g by mouth daily        ZANTAC PO      Take 137 mg by mouth

## 2017-07-13 NOTE — PROGRESS NOTES
SUBJECTIVE:  Laura Jackson is a 47 year old  ( x 2) who presents to discuss fibroid uterus.  She is s/p emergent transvaginal partial myomectomy 17.  Blood transfusion was required, the myomectomy site was treated overnight with a Cook catheter to control hemorrhage.  Pathology showed leiomyoma with degenerate changes, no mitotic figures/tumor.  She just finished a period, flow was moderate, cramping was increased.  She still feels fatigued, went to the ED yesterday to assess RUQ pain, feels fine now.  Normal pap 2015.  She has cancelled her August travel plans ([place]).      We reviewed the ultrasound done 17.  A 5-6 cm lower uterine segment/cervical fibroid is seen, as well as two smaller intramural fibroids.        OBJECTIVE: /81  Pulse 114  Wt 161 lb (73 kg)  LMP 2017  SpO2 98%  Breastfeeding? No  BMI 26.79 kg/m2 Patient was not otherwise examined.      ASSESSMENT: Menorrhagia.  Fibroids.      PLAN: We discussed the findings, and the options.  We discussed hormonal suppression of ovulation with progesterone treatment, which might control menses, but discussed limitations of this option.   We discussed hysterectomy, noting that L/S and vaginal might not be feasible, and that the markedly enlarged fibroid would create increased surgical risk even if an open procedure were planned.  We discussed [place], and she is interested in this.  Questions answered.  Will refer to Dr. Farley at [facility].  Call prn.       Please note greater than 50% of this 25 minute appointment were spent in counseling with the patient of the issues described above in the history of present illness and in the plan, including evaluation and managment of menorrhagia, fibroids.

## 2017-07-13 NOTE — TELEPHONE ENCOUNTER
Pt referred from [facility] , Dr. Ernestina Saunders for consult regarding UFE.  Pt has h/o abnormal bleeding.  According to the patient she is unable to have a hysterectomy at this point due to the size of the fibroid, that the OB/GYN physician states it would be challenging.  She is pursuing UFE in hopes that it would shrink the fibroid and make it possible to have a hysterectomy in the future.  She no longer wishes to have children.    Instructed that we will need an MR of the pelvis to evaluate size and location of fibroids. This will be done at [facility], she agreed to the plan.  Informational packet will be sent to patient.   Consult scheduled with Dr. Villa on 7/24 at 2:30pm.  She has my contact number if needed to reschedule.  Gina Serrano RN  IR nurse clinician  [phone]

## 2017-07-14 ENCOUNTER — HOSPITAL ENCOUNTER (EMERGENCY)
Facility: CLINIC | Age: 47
Discharge: HOME | End: 2017-07-14
Attending: EMERGENCY MEDICINE | Admitting: EMERGENCY MEDICINE
Payer: COMMERCIAL

## 2017-07-14 ENCOUNTER — OFFICE VISIT (OUTPATIENT)
Dept: FAMILY MEDICINE | Facility: CLINIC | Age: 47
End: 2017-07-14
Payer: COMMERCIAL

## 2017-07-14 VITALS
HEART RATE: 79 BPM | WEIGHT: 159.13 LBS | RESPIRATION RATE: 16 BRPM | TEMPERATURE: 98.4 F | SYSTOLIC BLOOD PRESSURE: 121 MMHG | BODY MASS INDEX: 26.48 KG/M2 | DIASTOLIC BLOOD PRESSURE: 85 MMHG | OXYGEN SATURATION: 100 %

## 2017-07-14 VITALS
TEMPERATURE: 98 F | WEIGHT: 159 LBS | BODY MASS INDEX: 26.49 KG/M2 | HEART RATE: 95 BPM | OXYGEN SATURATION: 98 % | DIASTOLIC BLOOD PRESSURE: 74 MMHG | SYSTOLIC BLOOD PRESSURE: 112 MMHG | HEIGHT: 65 IN

## 2017-07-14 DIAGNOSIS — R10.2 PELVIC PAIN IN FEMALE: ICD-10-CM

## 2017-07-14 DIAGNOSIS — Z86.79 HISTORY OF SUPRAVENTRICULAR TACHYCARDIA: ICD-10-CM

## 2017-07-14 DIAGNOSIS — R42 DIZZINESS: ICD-10-CM

## 2017-07-14 DIAGNOSIS — D25.9 UTERINE LEIOMYOMA, UNSPECIFIED LOCATION: ICD-10-CM

## 2017-07-14 DIAGNOSIS — R11.0 NAUSEA: ICD-10-CM

## 2017-07-14 DIAGNOSIS — R10.84 ABDOMINAL PAIN, GENERALIZED: Primary | ICD-10-CM

## 2017-07-14 LAB
ALBUMIN UR-MCNC: NEGATIVE MG/DL
APPEARANCE UR: CLEAR
BASOPHILS # BLD AUTO: 0 10E9/L (ref 0–0.2)
BASOPHILS NFR BLD AUTO: 0.5 %
BILIRUB UR QL STRIP: NEGATIVE
COLOR UR AUTO: NORMAL
DIFFERENTIAL METHOD BLD: ABNORMAL
EOSINOPHIL # BLD AUTO: 0 10E9/L (ref 0–0.7)
EOSINOPHIL NFR BLD AUTO: 1 %
ERYTHROCYTE [DISTWIDTH] IN BLOOD BY AUTOMATED COUNT: 14.6 % (ref 10–15)
GLUCOSE UR STRIP-MCNC: NEGATIVE MG/DL
HCT VFR BLD AUTO: 36.2 % (ref 35–47)
HGB BLD-MCNC: 11.3 G/DL (ref 11.7–15.7)
HGB UR QL STRIP: NEGATIVE
IMM GRANULOCYTES # BLD: 0 10E9/L (ref 0–0.4)
IMM GRANULOCYTES NFR BLD: 0.3 %
KETONES UR STRIP-MCNC: NEGATIVE MG/DL
LEUKOCYTE ESTERASE UR QL STRIP: NEGATIVE
LYMPHOCYTES # BLD AUTO: 0.8 10E9/L (ref 0.8–5.3)
LYMPHOCYTES NFR BLD AUTO: 20.7 %
MCH RBC QN AUTO: 27 PG (ref 26.5–33)
MCHC RBC AUTO-ENTMCNC: 31.2 G/DL (ref 31.5–36.5)
MCV RBC AUTO: 87 FL (ref 78–100)
MONOCYTES # BLD AUTO: 0.2 10E9/L (ref 0–1.3)
MONOCYTES NFR BLD AUTO: 5.6 %
NEUTROPHILS # BLD AUTO: 2.9 10E9/L (ref 1.6–8.3)
NEUTROPHILS NFR BLD AUTO: 71.9 %
NITRATE UR QL: NEGATIVE
NRBC # BLD AUTO: 0 10*3/UL
NRBC BLD AUTO-RTO: 0 /100
PH UR STRIP: 7 PH (ref 5–7)
PLATELET # BLD AUTO: 277 10E9/L (ref 150–450)
RBC # BLD AUTO: 4.18 10E12/L (ref 3.8–5.2)
SP GR UR STRIP: 1.01 (ref 1–1.03)
URN SPEC COLLECT METH UR: NORMAL
UROBILINOGEN UR STRIP-MCNC: NORMAL MG/DL (ref 0–2)
WBC # BLD AUTO: 4 10E9/L (ref 4–11)

## 2017-07-14 PROCEDURE — 81003 URINALYSIS AUTO W/O SCOPE: CPT | Performed by: FAMILY MEDICINE

## 2017-07-14 PROCEDURE — 25000128 H RX IP 250 OP 636: Performed by: FAMILY MEDICINE

## 2017-07-14 PROCEDURE — 99213 OFFICE O/P EST LOW 20 MIN: CPT | Performed by: FAMILY MEDICINE

## 2017-07-14 PROCEDURE — 96374 THER/PROPH/DIAG INJ IV PUSH: CPT | Performed by: EMERGENCY MEDICINE

## 2017-07-14 PROCEDURE — 99284 EMERGENCY DEPT VISIT MOD MDM: CPT | Mod: 25 | Performed by: EMERGENCY MEDICINE

## 2017-07-14 PROCEDURE — 85025 COMPLETE CBC W/AUTO DIFF WBC: CPT | Performed by: FAMILY MEDICINE

## 2017-07-14 PROCEDURE — 99283 EMERGENCY DEPT VISIT LOW MDM: CPT | Mod: GC | Performed by: EMERGENCY MEDICINE

## 2017-07-14 RX ORDER — PROCHLORPERAZINE MALEATE 5 MG
5 TABLET ORAL EVERY 6 HOURS PRN
Qty: 10 TABLET | Refills: 0 | Status: SHIPPED | OUTPATIENT
Start: 2017-07-14 | End: 2017-08-01

## 2017-07-14 RX ADMIN — PROCHLORPERAZINE EDISYLATE 10 MG: 5 INJECTION INTRAMUSCULAR; INTRAVENOUS at 09:11

## 2017-07-14 ASSESSMENT — ENCOUNTER SYMPTOMS
POLYDIPSIA: 0
VOMITING: 0
ARTHRALGIAS: 0
FATIGUE: 1
DYSURIA: 0
ABDOMINAL PAIN: 1
DIARRHEA: 0
WHEEZING: 0
NAUSEA: 0
FEVER: 1
LIGHT-HEADEDNESS: 1
HEMATURIA: 0
COUGH: 0
FREQUENCY: 0
CHILLS: 1
SHORTNESS OF BREATH: 0
NUMBNESS: 0
WEAKNESS: 0
PALPITATIONS: 0
CONSTIPATION: 0
EYE PAIN: 0
FLANK PAIN: 0
MYALGIAS: 0

## 2017-07-14 NOTE — PATIENT INSTRUCTIONS
[facility]    If you have any questions regarding to your visit please contact your care team:       Team Red:   Clinic Hours Telephone Number   Dr. Mitzi Torres, NP   7am-7pm  Monday - Thursday   7am-5pm  Fridays  [phone]  (Appointment scheduling available 24/7)    Questions about your visit?   Team Line  [phone]   [facility] [place] [facility] 11am-9pm Monday-Friday Saturday-Sunday- 9am-5pm   [place] - 5pm-9pm Monday-Friday Saturday-Sunday- 9am-5pm  [phone] - [place]   [phone] - [place]       What options do I have for visits at the clinic other than the traditional office visit?  To expand how we care for you, many of our providers are utilizing electronic visits (e-visits) and telephone visits, when medically appropriate, for interactions with their patients rather than a visit in the clinic.   We also offer nurse visits for many medical concerns. Just like any other service, we will bill your insurance company for this type of visit based on time spent on the phone with your provider. Not all insurance companies cover these visits. Please check with your medical insurance if this type of visit is covered. You will be responsible for any charges that are not paid by your insurance.      E-visits via [employer]):  generally incur a $35.00 fee.  Telephone visits:  Time spent on the phone: *charged based on time that is spent on the phone in increments of 10 minutes. Estimated cost:   5-10 mins $30.00   11-20 mins. $59.00   21-30 mins. $85.00     Use [employer] (secure email communication and access to your chart) to send your primary care provider a message or make an appointment. Ask someone on your Team how to sign up for [employer]).  For a Price Quote for your services, please call our Consumer Price Line at [phone].      As always, Thank you for trusting us with your health care needs!    Discharged  by Starla Mccurdy MA.

## 2017-07-14 NOTE — MR AVS SNAPSHOT
After Visit Summary   7/14/2017    Laura Jackson    MRN: [id number]           Patient Information     Date Of Birth          1970        Visit Information        Provider Department      7/14/2017 2:20 PM Whitney Alva MD [facility]        Today's Diagnoses     Abdominal pain, generalized    -  1    Dizziness          Care Instructions    [facility]    If you have any questions regarding to your visit please contact your care team:       Team Red:   Clinic Hours Telephone Number   Dr. Mitzi Torres NP   7am-7pm  Monday - Thursday   7am-5pm  Fridays  [phone]  (Appointment scheduling available 24/7)    Questions about your visit?   Team Line  [phone]   Urgent Care - [place] [facility] - 11am-9pm Monday-Friday Saturday-Sunday- 9am-5pm   [place] - 5pm-9pm Monday-Friday Saturday-Sunday- 9am-5pm  [phone] - [facility]  [phone] - [place]       What options do I have for visits at the clinic other than the traditional office visit?  To expand how we care for you, many of our providers are utilizing electronic visits (e-visits) and telephone visits, when medically appropriate, for interactions with their patients rather than a visit in the clinic.   We also offer nurse visits for many medical concerns. Just like any other service, we will bill your insurance company for this type of visit based on time spent on the phone with your provider. Not all insurance companies cover these visits. Please check with your medical insurance if this type of visit is covered. You will be responsible for any charges that are not paid by your insurance.      E-visits via Inktank:  generally incur a $35.00 fee.  Telephone visits:  Time spent on the phone: *charged based on time that is spent on the phone in increments of 10 minutes. Estimated cost:   5-10 mins $30.00   11-20 mins. $59.00   21-30  mins. $85.00     Use IDEA SPHERE (secure email communication and access to your chart) to send your primary care provider a message or make an appointment. Ask someone on your Team how to sign up for IDEA SPHERE.  For a Price Quote for your services, please call our Consumer Price Line at [phone].      As always, Thank you for trusting us with your health care needs!    Discharged by Starla Mccurdy MA.            Follow-ups after your visit        Your next 10 appointments already scheduled     Jul 24, 2017  2:30 PM CDT   New Visit with Natalio Villa MD   [facility] ([facility])    [address]  [facility] [id number] [phone]              Future tests that were ordered for you today     Open Future Orders        Priority Expected Expires Ordered    NM Hepatobiliary Scan w GB EF Routine  7/14/2018 7/14/2017    MA SCREENING DIGITAL BILAT - Future  (s+30) Routine  7/14/2018 7/14/2017            Who to contact     If you have questions or need follow up information about today's clinic visit or your schedule please contact [facility] directly at [phone].  Normal or non-critical lab and imaging results will be communicated to you by Verutahart, letter or phone within 4 business days after the clinic has received the results. If you do not hear from us within 7 days, please contact the clinic through Verutahart or phone. If you have a critical or abnormal lab result, we will notify you by phone as soon as possible.  Submit refill requests through IDEA SPHERE or call your pharmacy and they will forward the refill request to us. Please allow 3 business days for your refill to be completed.          Additional Information About Your Visit        IDEA SPHERE Information     IDEA SPHERE gives you secure access to your electronic health record. If you see a primary care provider, you can also send messages to your care team and  "make appointments. If you have questions, please call your primary care clinic.  If you do not have a primary care provider, please call [phone] and they will assist you.        Care EveryWhere ID     This is your Care EveryWhere ID. This could be used by other organizations to access your [place] medical records  [id number]        Your Vitals Were     Pulse Temperature Height Last Period Pulse Oximetry BMI (Body Mass Index)    95 98  F (36.7  C) (Oral) 5' 5\" (1.651 m) 07/08/2017 98% 26.46 kg/m2       Blood Pressure from Last 3 Encounters:   07/14/17 112/74   07/14/17 121/85   07/13/17 136/81    Weight from Last 3 Encounters:   07/14/17 159 lb (72.1 kg)   07/14/17 159 lb 2 oz (72.2 kg)   07/13/17 161 lb (73 kg)               Primary Care Provider Office Phone # Fax #    Whitney Alva [phone]       [address] [address]        Equal Access to Services     ODESSA HOWARD AH: Hadii latoya chaves Soandreia, waaxda lutanya, qaybta karonnie martinez, ludmila alberts. So [facility] [phone].    ATENCIÓN: Si habla español, tiene a guido disposición servicios gratuitos de asistencia lingüística. [facility] [phone].    We comply with applicable federal civil rights laws and [place] laws. We do not discriminate on the basis of race, color, national origin, age, disability sex, sexual orientation or gender identity.            Thank you!     Thank you for choosing [facility]  for your care. Our goal is always to provide you with excellent care. Hearing back from our patients is one way we can continue to improve our services. Please take a few minutes to complete the written survey that you may receive in the mail after your visit with us. Thank you!             Your Updated Medication List - Protect others around you: Learn how to safely use, store and throw away your medicines at www.disposemymeds.org.          This " list is accurate as of: 7/14/17  2:53 PM.  Always use your most recent med list.                   Brand Name Dispense Instructions for use Diagnosis    ferrous sulfate 142 (45 FE) MG Tbcr    SLO-FE    90 tablet    Take 1 tablet (142 mg) by mouth daily    Abnormal vaginal bleeding, Vaginal bleeding       fluticasone-salmeterol 100-50 MCG/DOSE diskus inhaler    ADVAIR    3 Inhaler    Inhale 1 puff into the lungs 2 times daily    Mild persistent asthma without complication       levalbuterol 45 MCG/ACT Inhaler    XOPENEX HFA    1 Inhaler    Inhale 1-2 puffs into the lungs every 4 hours as needed for shortness of breath / dyspnea    Mild persistent asthma without complication       polyethylene glycol powder    MIRALAX/GLYCOLAX     Take 17 g by mouth daily        prochlorperazine 5 MG tablet    COMPAZINE    10 tablet    Take 1 tablet (5 mg) by mouth every 6 hours as needed for nausea or vomiting        ZANTAC PO      Take 137 mg by mouth        ZOFRAN PO

## 2017-07-14 NOTE — NURSING NOTE
"Chief Complaint   Patient presents with     Er F/u       Initial /74 (BP Location: Left arm, Patient Position: Chair, Cuff Size: Adult Regular)  Pulse 95  Temp 98  F (36.7  C) (Oral)  Ht 5' 5\" (1.651 m)  Wt 159 lb (72.1 kg)  LMP 07/08/2017  SpO2 98%  BMI 26.46 kg/m2 Estimated body mass index is 26.46 kg/(m^2) as calculated from the following:    Height as of this encounter: 5' 5\" (1.651 m).    Weight as of this encounter: 159 lb (72.1 kg).  Medication Reconciliation: complete    "

## 2017-07-14 NOTE — ED PROVIDER NOTES
"  History     Chief Complaint   Patient presents with     Abdominal Pain     states she had surgery 3 weeks ago for uterine issues. saw md yesterday. here today for sweating,shaking,cramping and not feeling well     HPI  Laura Jackson is a 47 year old female with a history of SVT and s/p myomectomy 6/20/17 for persistent uterine bleeding who is here for evaluation of pelvic and abdominal pain. Two days ago she was seen here in the ED for RUQ pain and belching with a negative US, EKG, and labs wnl. She was treated with compazine, famotidine, and IVF with resolution of her symptoms. Yesterday she was feeling well and saw her OB to review US and plans for possible uterine artery embolization. Per notes in chart she has been scheduled for pelvic MRI and a consult with IR at [facility] on 7/24, 10 days from now.     This morning she awoke at 5 am with sweating and shaking with abdominal pain. Her pain was localized to the pelvic area without radiation. She describes the pain as a severe cramping pain associated with tenderness when she pushed on the lower abdomen. Her pain has since resolved and she denies any abdominal pain at this time. She has not noted any vaginal bleeding but does report some clear yellow vaginal discharge since the myomectomy. The patient is feeling nauseous, but has not had any vomiting or diarrhea. She did take some zantac and zofran this morning without any relief. The patient denies any chest pain, shortness of breath, or palpitations. She stated \"I just feel awful\" and that she feels as if she would pass out if she stood up but does not further elaborate. Her LMP was approximately one week ago.     I have reviewed the Medications, Allergies, Past Medical and Surgical History, and Social History in the Epic system.    Review of Systems   Constitutional: Positive for chills, fatigue and fever (subjective).   HENT: Negative.    Eyes: Negative for pain and visual disturbance.   Respiratory: " Negative for cough, shortness of breath and wheezing.    Cardiovascular: Negative for chest pain and palpitations.   Gastrointestinal: Positive for abdominal pain (pelvic, now resolved ). Negative for constipation, diarrhea, nausea and vomiting.   Endocrine: Negative for polydipsia and polyuria.   Genitourinary: Positive for pelvic pain. Negative for dysuria, flank pain, frequency, hematuria and urgency.   Musculoskeletal: Negative for arthralgias and myalgias.   Skin: Negative.    Neurological: Positive for light-headedness (see HPI). Negative for weakness and numbness.   All other systems reviewed and are negative.      Physical Exam   BP: (P) 135/90  Pulse: (P) 87  Temp: (P) 98.4  F (36.9  C)  Resp: (P) 16  Weight: (P) 72.2 kg (159 lb 2 oz)  Physical Exam   Constitutional: She is oriented to person, place, and time. She appears well-developed and well-nourished. No distress.   HENT:   Head: Normocephalic and atraumatic.   Mouth/Throat: Oropharynx is clear and moist.   Eyes: EOM are normal. Pupils are equal, round, and reactive to light. Right eye exhibits no discharge. Left eye exhibits no discharge. No scleral icterus.   Neck: Normal range of motion. Neck supple. No tracheal deviation present.   Cardiovascular: Normal rate, regular rhythm and normal heart sounds.  Exam reveals no gallop and no friction rub.    No murmur heard.  Pulmonary/Chest: Effort normal and breath sounds normal. No stridor. No respiratory distress. She has no wheezes. She has no rales.   Abdominal: Soft. She exhibits no distension and no mass. There is tenderness (mild left pelvic tenderness with deep palpation). There is no rebound and no guarding.   Musculoskeletal: Normal range of motion. She exhibits no deformity.   Neurological: She is alert and oriented to person, place, and time. No cranial nerve deficit.   Skin: Skin is warm and dry. No rash noted. She is not diaphoretic. No erythema.   On visualized skin.    Psychiatric: She has a  normal mood and affect. Her behavior is normal.   Nursing note and vitals reviewed.      ED Course     ED Course   8:48 AM patient seen and evaluated, discussed with Dr Onofre, orders placed   9:47 AM rechecked patient she is feeling better after compazine, discussed lab results and plan for discharge   Procedures             Critical Care time:  none               Results for orders placed or performed during the hospital encounter of 07/14/17   CBC with platelets differential   Result Value Ref Range    WBC 4.0 4.0 - 11.0 10e9/L    RBC Count 4.18 3.8 - 5.2 10e12/L    Hemoglobin 11.3 (L) 11.7 - 15.7 g/dL    Hematocrit 36.2 35.0 - 47.0 %    MCV 87 78 - 100 fl    MCH 27.0 26.5 - 33.0 pg    MCHC 31.2 (L) 31.5 - 36.5 g/dL    RDW 14.6 10.0 - 15.0 %    Platelet Count 277 150 - 450 10e9/L    Diff Method Automated Method     % Neutrophils 71.9 %    % Lymphocytes 20.7 %    % Monocytes 5.6 %    % Eosinophils 1.0 %    % Basophils 0.5 %    % Immature Granulocytes 0.3 %    Nucleated RBCs 0 0 /100    Absolute Neutrophil 2.9 1.6 - 8.3 10e9/L    Absolute Lymphocytes 0.8 0.8 - 5.3 10e9/L    Absolute Monocytes 0.2 0.0 - 1.3 10e9/L    Absolute Eosinophils 0.0 0.0 - 0.7 10e9/L    Absolute Basophils 0.0 0.0 - 0.2 10e9/L    Abs Immature Granulocytes 0.0 0 - 0.4 10e9/L    Absolute Nucleated RBC 0.0    UA reflex to Microscopic and Culture   Result Value Ref Range    Color Urine Light Yellow     Appearance Urine Clear     Glucose Urine Negative NEG mg/dL    Bilirubin Urine Negative NEG    Ketones Urine Negative NEG mg/dL    Specific Gravity Urine 1.007 1.003 - 1.035    Blood Urine Negative NEG    pH Urine 7.0 5.0 - 7.0 pH    Protein Albumin Urine Negative NEG mg/dL    Urobilinogen mg/dL Normal 0.0 - 2.0 mg/dL    Nitrite Urine Negative NEG    Leukocyte Esterase Urine Negative NEG    Source Midstream Urine             Assessments & Plan (with Medical Decision Making)   Laura Jackson is a 47 year old female with hx of SVT and s/p myomectomy  on 6/20 here with pelvic pain, chills, sweats, and lightheadedness. DDx includes uterine fibroid pain, uterine bleeding, uterine infection, ovarian torsion, dysmenorrhea, and ovarian cyst as well as less likely SVT or dehydration. Exam showed only mild left pelvic tenderness with deep palpation and she appeared anxious. Orthostatic VS were unremarkable. Labs significant for wbc normal, hgb at baseline of 11.3, and a negative UA. She was treated with compazine in the ED and had resolution of her symptoms. Given the resolution of her pain and negative workup with labs and exam here in the ED this is most likely pain secondary to her uterine fibroids. Discussed the importance of following up as scheduled and discharged with prescription for compazine if nausea recurs. She was also advised of symptoms for which to seek further evaluation such as pain that does not subside, bleeding, or fever.     I have reviewed the nursing notes.    I have reviewed the findings, diagnosis, plan and need for follow up with the patient.    Discharge Medication List as of 7/14/2017  9:48 AM      START taking these medications    Details   prochlorperazine (COMPAZINE) 5 MG tablet Take 1 tablet (5 mg) by mouth every 6 hours as needed for nausea or vomiting, Disp-10 tablet, R-0, Local Print             Final diagnoses:   Uterine leiomyoma, unspecified location   Pelvic pain in female     Tha Rutherford MD   Family Medicine     7/14/2017   [facility], [place], EMERGENCY DEPARTMENT  This data collected with the Resident working in the Emergency Department.  Patient was seen and evaluated by myself and I repeated the history and physical exam with the patient.  The plan of care was discussed with them.  The key portions of the note including the entire assessment and plan reflect my documentation.           Braden Onofre MD  07/14/17 1111

## 2017-07-14 NOTE — ED AVS SNAPSHOT
[facility], Emergency Department    [address]    [place] [address]    Phone:  [phone]    Fax:  [phone]                                       Laura Jackson   MRN: [id number]    Department:  [facility], Emergency Department   Date of Visit:  7/14/2017           Patient Information     Date Of Birth          1970        Your diagnoses for this visit were:     Uterine leiomyoma, unspecified location     Pelvic pain in female        You were seen by Braden Onofre MD.        Discharge Instructions       Please follow up with your previously scheduled appointment with interventional radiology on 7/24.      Use ibuprofen and tylenol for pain as needed. You were also given a prescription for compazine to use as needed for nausea.     Call your healthcare provider right away if any of these occur:    Heavy bleeding or painful periods that continue or don t get better with treatment    Signs of anemia such as extreme fatigue, pale skin, shortness of breath with little exertion, or rapid heartbeat    Weakness, dizziness, or fainting    Severe pain in the pelvic or belly region    Swollen or enlarged belly    Fever greater than 100.4       Future Appointments        Provider Department Dept Phone Center    7/24/2017 2:30 PM Natalio Villa MD [facility] [phone] [facility]      24 Hour Appointment Hotline       To make an appointment at any [facility] clinic, call [phone]). If you don't have a family doctor or clinic, we will help you find one. [facility] clinics are conveniently located to serve the needs of you and your family.             Review of your medicines      START taking        Dose / Directions Last dose taken    prochlorperazine 5 MG tablet   Commonly known as:  COMPAZINE   Dose:  5 mg   Quantity:  10 tablet        Take 1 tablet (5 mg) by mouth every 6 hours as needed for nausea or vomiting   Refills:  0          Our records  show that you are taking the medicines listed below. If these are incorrect, please call your family doctor or clinic.        Dose / Directions Last dose taken    ferrous sulfate 142 (45 FE) MG Tbcr   Commonly known as:  SLO-FE   Dose:  142 mg   Quantity:  90 tablet        Take 1 tablet (142 mg) by mouth daily   Refills:  0        fluticasone-salmeterol 100-50 MCG/DOSE diskus inhaler   Commonly known as:  ADVAIR   Dose:  1 puff   Quantity:  3 Inhaler        Inhale 1 puff into the lungs 2 times daily   Refills:  1        GAS-X PO        Take by mouth as needed for intestinal gas   Refills:  0        levalbuterol 45 MCG/ACT Inhaler   Commonly known as:  XOPENEX HFA   Dose:  1-2 puff   Quantity:  1 Inhaler        Inhale 1-2 puffs into the lungs every 4 hours as needed for shortness of breath / dyspnea   Refills:  5        polyethylene glycol powder   Commonly known as:  MIRALAX/GLYCOLAX   Dose:  17 g        Take 17 g by mouth daily   Refills:  0        ZANTAC PO   Dose:  137 mg        Take 137 mg by mouth   Refills:  0        ZOFRAN PO        Refills:  0                Prescriptions were sent or printed at these locations (1 Prescription)                   Other Prescriptions                Printed at Department/Unit printer (1 of 1)         prochlorperazine (COMPAZINE) 5 MG tablet                Procedures and tests performed during your visit     CBC with platelets differential    Encourage fluids    Orthostatic blood pressure and pulse    UA reflex to Microscopic and Culture      Orders Needing Specimen Collection     None      Pending Results     No orders found from 7/12/2017 to 7/15/2017.            Pending Culture Results     No orders found from 7/12/2017 to 7/15/2017.            Pending Results Instructions     If you had any lab results that were not finalized at the time of your Discharge, you can call the ED Lab Result RN at [phone]. You will be contacted by this team for any positive Lab results or  changes in treatment. The nurses are available 7 days a week from 10A to 6:30P.  You can leave a message 24 hours per day and they will return your call.        Thank you for choosing [facility]       Thank you for choosing [facility] for your care. Our goal is always to provide you with excellent care. Hearing back from our patients is one way we can continue to improve our services. Please take a few minutes to complete the written survey that you may receive in the mail after you visit with us. Thank you!        [email or web address]Former[employer] Information     [employer] gives you secure access to your electronic health record. If you see a primary care provider, you can also send messages to your care team and make appointments. If you have questions, please call your primary care clinic.  If you do not have a primary care provider, please call [phone] and they will assist you.        Care EveryWhere ID     This is your Care EveryWhere ID. This could be used by other organizations to access your [facility] medical records  [id number]        Equal Access to Services     ODESSA HOWARD : Anna Cordero, natali dailey, adilene martinez, ludmila alberts. So [facility] [phone].    ATENCIÓN: Si habla español, tiene a guido disposición servicios gratuitos de asistencia lingüística. Llame al [phone].    We comply with applicable federal civil rights laws and [place] laws. We do not discriminate on the basis of race, color, national origin, age, disability sex, sexual orientation or gender identity.            After Visit Summary       This is your record. Keep this with you and show to your community pharmacist(s) and doctor(s) at your next visit.

## 2017-07-14 NOTE — DISCHARGE INSTRUCTIONS
Please follow up with your previously scheduled appointment with interventional radiology on 7/24.      Use ibuprofen and tylenol for pain as needed. You were also given a prescription for compazine to use as needed for nausea.     Call your healthcare provider right away if any of these occur:    Heavy bleeding or painful periods that continue or don t get better with treatment    Signs of anemia such as extreme fatigue, pale skin, shortness of breath with little exertion, or rapid heartbeat    Weakness, dizziness, or fainting    Severe pain in the pelvic or belly region    Swollen or enlarged belly    Fever greater than 100.4

## 2017-07-14 NOTE — ED AVS SNAPSHOT
[facility], [place], Emergency Department    [address]    [facility] [address]    Phone:  [phone]    Fax:  [phone]                                       Laura Jackson   MRN: [id number]    Department:  [facility], Emergency Department   Date of Visit:  7/14/2017           After Visit Summary Signature Page     I have received my discharge instructions, and my questions have been answered. I have discussed any challenges I see with this plan with the nurse or doctor.    ..........................................................................................................................................  Patient/Patient Representative Signature      ..........................................................................................................................................  Patient Representative Print Name and Relationship to Patient    ..................................................               ................................................  Date                                            Time    ..........................................................................................................................................  Reviewed by Signature/Title    ...................................................              ..............................................  Date                                                            Time

## 2017-07-14 NOTE — PROGRESS NOTES
SUBJECTIVE:                                                    Laura Jackson is a 47 year old female who presents to clinic today for the following health issues:      ED/UC Followup:    Facility: [facility]  Date of visit: 07/12/2017 and 07/13/2017  Reason for visit:  Abdominal Pain RUQ/some Belching/gets pain with Fatty foods  No pain at Present  Light headedness off and on  Current Status: states feels foggy, possibly from the compazine that was given earlier  Has been seen Twice in the last 1week-Notes ER reviewed   Has days when she has No symptoms .  These symptoms can wake her up from sleep  Pain will be RUQ or pelvic  Feels Dizzy and nauseated  She Feels Shaky  No Blurry vision  No headaches  No palpitations Now  All these symptoms started after her partial  Myomectomy  Feels Fine at Present  She has seen her GYN and is going to get surgery  Notes reviewed            Problem list and histories reviewed & adjusted, as indicated.  Additional history: as documented    Patient Active Problem List   Diagnosis     CARDIOVASCULAR SCREENING; LDL GOAL LESS THAN 160     Irritable bowel syndrome     GERD (gastroesophageal reflux disease)     History of supraventricular tachycardia     Mild persistent asthma     Family history of breast cancer     Foreign body (FB) in soft tissue     S/P myomectomy     Past Surgical History:   Procedure Laterality Date     APPENDECTOMY       DILATION AND CURETTAGE N/A 6/20/2017    Procedure: DILATION AND CURETTAGE;  Uterine Curettings and Fibroid Removal, Cook catheter placement; (No hysterectomy done at this time);  Surgeon: Ernestina Saunders MD;  Location: [facility] OR     TONSILLECTOMY         Social History   Substance Use Topics     Smoking status: Never Smoker     Smokeless tobacco: Never Used     Alcohol use Yes      Comment: occ     Family History   Problem Relation Age of Onset     DIABETES Mother      Hypertension Mother      Hyperlipidemia Mother      Cardiovascular Father       CHF and COPD     Asthma Father      Breast Cancer Maternal Grandfather      Colon Cancer Maternal Grandfather      Breast Cancer Paternal Grandmother      Breast Cancer Paternal Aunt      JAGDISHSTIVENJUAN DANIEL. Paternal Grandfather      Breast Cancer Cousin      Asthma Son      DIABETES Maternal Grandmother      Hypertension Maternal Grandmother          Current Outpatient Prescriptions   Medication Sig Dispense Refill     Ondansetron HCl (ZOFRAN PO)        prochlorperazine (COMPAZINE) 5 MG tablet Take 1 tablet (5 mg) by mouth every 6 hours as needed for nausea or vomiting 10 tablet 0     polyethylene glycol (MIRALAX/GLYCOLAX) powder Take 17 g by mouth daily       RaNITidine HCl (ZANTAC PO) Take 137 mg by mouth       ferrous sulfate (SLO-FE) 142 (45 FE) MG TBCR Take 1 tablet (142 mg) by mouth daily 90 tablet 0     fluticasone-salmeterol (ADVAIR) 100-50 MCG/DOSE diskus inhaler Inhale 1 puff into the lungs 2 times daily 3 Inhaler 1     levalbuterol (XOPENEX HFA) 45 MCG/ACT Inhaler Inhale 1-2 puffs into the lungs every 4 hours as needed for shortness of breath / dyspnea 1 Inhaler 5     Allergies   Allergen Reactions     Penicillins      Swelling throat; age 4     Tetracycline      Vomiting; nauseous     Recent Labs   Lab Test  07/12/17   1911  06/26/17   1737  06/24/17   1236   02/12/13   0852  07/20/11   LDL   --    --    --    --    --    --   110   HDL   --    --    --    --    --    --   65   TRIG   --    --    --    --    --    --   72   ALT  24   --   27   --   43   --    --    CR  0.84  0.81  0.86   --   0.73   < >   --    GFRESTIMATED  72  75  71   --   87   < >   --    GFRESTBLACK  87  >90   GFR Calc    85   --   >90   < >   --    POTASSIUM  3.7  3.8  3.6   < >  4.0   < >   --    TSH   --   2.53   --    --   1.97   < >   --     < > = values in this interval not displayed.      BP Readings from Last 3 Encounters:   07/14/17 112/74   07/14/17 121/85   07/13/17 136/81    Wt Readings from Last 3 Encounters:  "  07/14/17 159 lb (72.1 kg)   07/14/17 159 lb 2 oz (72.2 kg)   07/13/17 161 lb (73 kg)                  Labs reviewed in EPIC    Reviewed and updated as needed this visit by clinical staff  Tobacco  Allergies  Meds  Problems  Med Hx  Surg Hx  Fam Hx  Soc Hx        Reviewed and updated as needed this visit by Provider  Allergies  Meds  Problems         ROS:  C: NEGATIVE for fever, chills, change in weight  E/M: NEGATIVE for ear, mouth and throat problems  R: NEGATIVE for significant cough or SOB  CV: NEGATIVE for chest pain, palpitations or peripheral edema  GI: as above  : as above  HGB is better  ENDOCRINE: NEGATIVE for temperature intolerance, skin/hair changes    OBJECTIVE:     /74 (BP Location: Left arm, Patient Position: Chair, Cuff Size: Adult Regular)  Pulse 95  Temp 98  F (36.7  C) (Oral)  Ht 5' 5\" (1.651 m)  Wt 159 lb (72.1 kg)  LMP 07/08/2017  SpO2 98%  BMI 26.46 kg/m2  Body mass index is 26.46 kg/(m^2).  GENERAL: healthy, alert and no distress  NECK: no adenopathy, no asymmetry, masses, or scars and thyroid normal to palpation  RESP: lungs clear to auscultation - no rales, rhonchi or wheezes  CV: regular rate and rhythm, normal S1 S2, no S3 or S4, no murmur, click or rub, no peripheral edema and peripheral pulses strong  ABDOMEN: soft, nontender, no hepatosplenomegaly, no masses and bowel sounds normal  MS: no gross musculoskeletal defects noted, no edema    Diagnostic Test Results:  Labs reviewed   Imaging reviewed in epic    ASSESSMENT/PLAN:       ICD-10-CM    1. Abdominal pain, generalized R10.84 NM Hepatobiliary Scan w GB EF     CANCELED: MA SCREENING DIGITAL BILAT - Future  (s+30)   2. Dizziness R42 NM Hepatobiliary Scan w GB EF     CANCELED: MA SCREENING DIGITAL BILAT - Future  (s+30)   3. Nausea R11.0    4. Uterine leiomyoma, unspecified location D25.9    5. History of supraventricular tachycardia Z86.79      zio patch results pending   Advised If HIDA negative I would Like " her to see Internist at [place] if she continues to have these symptoms  Follow up if any further symptoms  Avoid fatty foods as Pt does Feel her symptoms get aggravated with this  Advised To Hold Iron in case she is having a Reaction to this and her HGB is better  Whitney Alva MD  [facility]

## 2017-07-17 ENCOUNTER — TELEPHONE (OUTPATIENT)
Dept: OBGYN | Facility: CLINIC | Age: 47
End: 2017-07-17

## 2017-07-17 NOTE — TELEPHONE ENCOUNTER
Paperwork was signed by Dr. Saunders for pt to be out of work 7/17/17-7/24/17.  It was faxed to [facility].  Left detailed message for pt on her home number. Isatu Biswas RN

## 2017-07-19 ENCOUNTER — HOSPITAL ENCOUNTER (OUTPATIENT)
Dept: NUCLEAR MEDICINE | Facility: CLINIC | Age: 47
Setting detail: NUCLEAR MEDICINE
Discharge: HOME | End: 2017-07-19
Attending: FAMILY MEDICINE | Admitting: FAMILY MEDICINE
Payer: COMMERCIAL

## 2017-07-19 DIAGNOSIS — R42 DIZZINESS: ICD-10-CM

## 2017-07-19 DIAGNOSIS — R10.84 ABDOMINAL PAIN, GENERALIZED: ICD-10-CM

## 2017-07-19 PROCEDURE — 78227 HEPATOBIL SYST IMAGE W/DRUG: CPT

## 2017-07-19 PROCEDURE — A9537 TC99M MEBROFENIN: HCPCS | Performed by: FAMILY MEDICINE

## 2017-07-19 PROCEDURE — 34300033 ZZH RX 343: Performed by: FAMILY MEDICINE

## 2017-07-19 RX ORDER — KIT FOR THE PREPARATION OF TECHNETIUM TC 99M MEBROFENIN 45 MG/10ML
4.8-7.2 INJECTION, POWDER, LYOPHILIZED, FOR SOLUTION INTRAVENOUS ONCE
Status: COMPLETED | OUTPATIENT
Start: 2017-07-19 | End: 2017-07-19

## 2017-07-19 RX ADMIN — MEBROFENIN 5 MCI.: 45 INJECTION, POWDER, LYOPHILIZED, FOR SOLUTION INTRAVENOUS at 14:54

## 2017-07-21 ENCOUNTER — TRANSFERRED RECORDS (OUTPATIENT)
Dept: HEALTH INFORMATION MANAGEMENT | Facility: CLINIC | Age: 47
End: 2017-07-21

## 2017-07-22 ENCOUNTER — HEALTH MAINTENANCE LETTER (OUTPATIENT)
Age: 47
End: 2017-07-22

## 2017-07-24 ENCOUNTER — OFFICE VISIT (OUTPATIENT)
Dept: OTHER | Facility: CLINIC | Age: 47
End: 2017-07-24
Attending: RADIOLOGY
Payer: COMMERCIAL

## 2017-07-24 ENCOUNTER — HOSPITAL ENCOUNTER (OUTPATIENT)
Facility: CLINIC | Age: 47
End: 2017-07-24
Admitting: RADIOLOGY

## 2017-07-24 VITALS — HEART RATE: 97 BPM | OXYGEN SATURATION: 98 % | DIASTOLIC BLOOD PRESSURE: 96 MMHG | SYSTOLIC BLOOD PRESSURE: 133 MMHG

## 2017-07-24 DIAGNOSIS — D25.0 INTRAMURAL AND SUBMUCOUS LEIOMYOMA OF UTERUS: ICD-10-CM

## 2017-07-24 DIAGNOSIS — D25.1 INTRAMURAL AND SUBMUCOUS LEIOMYOMA OF UTERUS: ICD-10-CM

## 2017-07-24 DIAGNOSIS — D25.9 FIBROID UTERUS: Primary | ICD-10-CM

## 2017-07-24 DIAGNOSIS — D25.1 INTRAMURAL LEIOMYOMA OF UTERUS: ICD-10-CM

## 2017-07-24 PROCEDURE — 99211 OFF/OP EST MAY X REQ PHY/QHP: CPT

## 2017-07-24 RX ORDER — SODIUM CHLORIDE 9 MG/ML
1000 INJECTION, SOLUTION INTRAVENOUS CONTINUOUS
Status: CANCELLED | OUTPATIENT
Start: 2017-07-24

## 2017-07-24 RX ORDER — LIDOCAINE 40 MG/G
CREAM TOPICAL
Status: CANCELLED | OUTPATIENT
Start: 2017-07-24

## 2017-07-24 RX ORDER — CLINDAMYCIN PHOSPHATE 900 MG/50ML
900 INJECTION, SOLUTION INTRAVENOUS
Status: CANCELLED | OUTPATIENT
Start: 2017-07-24

## 2017-07-24 NOTE — MR AVS SNAPSHOT
After Visit Summary   7/24/2017    Laura Jackson    MRN: [id number]           Patient Information     Date Of Birth          1970        Visit Information        Provider Department      7/24/2017 2:30 PM Natalio Villa MD [facility]        Today's Diagnoses     Fibroid uterus    -  1    Intramural leiomyoma of uterus          Care Instructions    Pt scheduled for UFE on 8/7/2017 with Dr. Villa at [facility].  Will need pre-op.  Instructions reviewed with patient.  Has contact number if needed.          Follow-ups after your visit        Your next 10 appointments already scheduled     Aug 01, 2017  3:40 PM CDT   Javiert Long with Whitney Alva MD   [facility] ([facility]    [address] [facility] [address]   [phone]            Aug 07, 2017 11:00 AM CDT   IR UTERINE ARTERY FIBROID EMBO with SHIR1   [facility] ([facility]    [address] [place] [id number]   [phone]           1. You need to be evaluated by a Gynecologist first. You must have an up to date pap smear. If your doctor feels it is necessary, an endometrial biopsy should be performed. 2. You will need an MRI exam to adequately visualize the uterus and the fibroids. We can without a doubt identify whether the fibroids are subserosal or pedunculated (on the outside of the uterus), intramural (within the muscle), or submucosal (on the inside lining of the uterus). There is no prep for this test. 3. You will need to meet with the Advanced Practice Nurse from Interventional Radiology in the clinic in preparation for the procedure. This appointment takes about one hour. 4. Laboratory tests are to be obtained prior to the exam (creatinine, Hgb/Hct, platelet count, and INR) 5. If you are over 50 years of age or have a history of heart disease you will need a 12 lead EKG prior to having this procedure  performed 6. If you have allergies to contrast medium (x-ray dye) or iodine, contact your doctor prior to the exam day for instructions. 7. If you have diabetes, check with your doctor to see if your insulin needs to be adjusted for the exam. 8. If you are taking Coumadin (to thin your blood) please contact your doctor at least 5 days before the exam for special instructions. 9. You should not have received barium (x-ray contrast) within 48 hours of the exam. 10. You should take Motrin 400 mg four times a day for two days prior to the exam, to help decrease the inflammatory process. 11. The day before your exam you may eat your regular diet and are encouraged to drink at least 2 quarts of clear liquids. Drink no alcoholic beverages for 24 hours prior to the exam. 12. The morning of the exam you may brush your teeth and take medications as directed with a sip of water. 13. Do not smoke for 24 hours prior to the exam. 14. Do not eat any solid food or milk products for 6 hours prior the exam. You may drink clear liquids until 2 hours prior the exam. Clear liquids include the following: water, Jell-O, clear broth, apple juice or any non-carbonated drink that you can see through (no pop!). 15. Tell the Radiology nurse if you have any allergies. 16. You will have a cuadra catheter placed in your bladder prior to the start of the exam. This will be removed once you are off of bedrest following the exam. 17. Following the exam you will need to remain on complete bedrest for 2-6 hours. The nurse will monitor your vital signs, puncture site, and the pulses and temperature of the leg that was punctured. 18. You will spend one night in the hospital for pain control. 19. You will need to have someone drive you home the day following the procedure. 20. Bring a list of all drugs you are taking; include supplements and over-the-counter medications. 21. Wear comfortable clothes and leave your valuables at home.              Who to  contact     If you have questions or need follow up information about today's clinic visit or your schedule please contact [facility] directly at [phone].  Normal or non-critical lab and imaging results will be communicated to you by MyChart, letter or phone within 4 business days after the clinic has received the results. If you do not hear from us within 7 days, please contact the clinic through MyChart or phone. If you have a critical or abnormal lab result, we will notify you by phone as soon as possible.  Submit refill requests through [facility] or call your pharmacy and they will forward the refill request to us. Please allow 3 business days for your refill to be completed.          Additional Information About Your Visit        Include [employer] Information     [facility] gives you secure access to your electronic health record. If you see a primary care provider, you can also send messages to your care team and make appointments. If you have questions, please call your primary care clinic.  If you do not have a primary care provider, please call [phone] and they will assist you.        Care EveryWhere ID     This is your Care EveryWhere ID. This could be used by other organizations to access your [facility] medical records  [id number]        Your Vitals Were     Pulse Last Period Pulse Oximetry             97 07/08/2017 98%          Blood Pressure from Last 3 Encounters:   07/24/17 (!) 133/96   07/14/17 112/74   07/14/17 121/85    Weight from Last 3 Encounters:   07/14/17 159 lb (72.1 kg)   07/14/17 159 lb 2 oz (72.2 kg)   07/13/17 161 lb (73 kg)               Primary Care Provider Office Phone # Fax #    Whitney Alva [phone]       [address] [address]        Equal Access to Services     ODESSA HOWARD : Anna Cordero, natali dailey, adilene martinez, ludmila alberts. So [facility]  [phone].    ATENCIÓN: Si carline patton, tiene a guido disposición servicios gratuitos de asistencia lingüística. Corina ford [phone].    We comply with applicable federal civil rights laws and [place] laws. We do not discriminate on the basis of race, color, national origin, age, disability sex, sexual orientation or gender identity.            Thank you!     Thank you for choosing [facility]  for your care. Our goal is always to provide you with excellent care. Hearing back from our patients is one way we can continue to improve our services. Please take a few minutes to complete the written survey that you may receive in the mail after your visit with us. Thank you!             Your Updated Medication List - Protect others around you: Learn how to safely use, store and throw away your medicines at www.disposemymeds.org.          This list is accurate as of: 7/24/17 11:59 PM.  Always use your most recent med list.                   Brand Name Dispense Instructions for use Diagnosis    ferrous sulfate 142 (45 FE) MG Tbcr    SLO-FE    90 tablet    Take 1 tablet (142 mg) by mouth daily    Abnormal vaginal bleeding, Vaginal bleeding       fluticasone-salmeterol 100-50 MCG/DOSE diskus inhaler    ADVAIR    3 Inhaler    Inhale 1 puff into the lungs 2 times daily    Mild persistent asthma without complication       levalbuterol 45 MCG/ACT Inhaler    XOPENEX HFA    1 Inhaler    Inhale 1-2 puffs into the lungs every 4 hours as needed for shortness of breath / dyspnea    Mild persistent asthma without complication       polyethylene glycol powder    MIRALAX/GLYCOLAX     Take 17 g by mouth daily        prochlorperazine 5 MG tablet    COMPAZINE    10 tablet    Take 1 tablet (5 mg) by mouth every 6 hours as needed for nausea or vomiting        ZANTAC PO      Take 137 mg by mouth        ZOFRAN PO

## 2017-07-24 NOTE — NURSING NOTE
Pt here for UFE consult.  Ob/GYN Dr. Saunders referral.  Pt is not able to have hysterectomy at this time due to size of fibroid and location.  Having heavy menstrual cycles.  Reviewed symptoms with patient.  10 minutes nurse to nurse time  Gina Serrano RN  IR nurse clinician  [phone]

## 2017-07-25 ENCOUNTER — TELEPHONE (OUTPATIENT)
Dept: OBGYN | Facility: CLINIC | Age: 47
End: 2017-07-25

## 2017-07-25 NOTE — TELEPHONE ENCOUNTER
[facility] sent request for more information.  Sent OV notes from 7/13 and discharge summary from the hospital dated 6/21/17.  Information was faxed to [facility].  Isatu Biswas RN

## 2017-07-25 NOTE — PROGRESS NOTES
"July 24, 2017      Ernestina Saunders MD  [facility]  [address]   [address]    Dear Dr. Saunders:    I had the pleasure of seeing your patient in clinic this afternoon.  As you recall, the patient has a fibroid in the lower uterine segment and extending into the cervix posteriorly in her uterus.  The largest fibroid is the fibroid in the lower uterine segment measuring 6.5 x 5.9 x 5.4 cm.  The fibroid does appear to be intramural, though extends to the endometrial/cervical canal, location likely explaining her symptoms of significant bleeding during her menstrual cycle.  The patient has a heavy menstrual cycle, as well as long lasting menstrual cycle.  She recently required a partial myomectomy and blood transfusion for significant blood loss related to her menstrual cycle.    On physical exam, the patient appears healthy and in no distress.  Blood pressure is 133/96, heart rate 97 and oxygenating at 98% on room air.  The patient has 2 children and has no desire for additional pregnancies.  The patient has allergies to PENICILLIN and TETRACYCLINE.    I had a lengthy discussion with the patient risks, benefits and expectations of uterine fibroid embolization.  The hope is that with devascularization of the fibroids, or dominant fibroids, there will be considerable improvement in the patient's menstrual cycle.  The patient likely will still have a menstrual cycle, though hope is that it will become more \"normal.\"  The patient is aware that there will be an overnight stay for uterine fibroid embolization and to expect at least mild pain, cramping and fatigue.  The patient may also have low-grade fevers.  Patient is undecided as to the possibility of subsequent hysterectomy and dependent upon symptom improvement after fibroid embolization.  Hope is that the fibroid would decrease in size after devascularization, perhaps approximately 50% current size of the dominant fibroid.  All " patient's questions were answered and she states desire to proceed with uterine fibroid embolization at this time.  The patient is scheduled on August 7, 2017.      I appreciate the opportunity in helping to care for your patient.  Please do not hesitate to contact me with any questions or concerns.    A total of 30 minutes was spent in face-to-face communication with the patient, greater than 50% of which was spent in counseling and coordination of care.        Natalio Gallo MD    D:  07/25/2017 00:01 T:  07/25/2017 07:58  Document:  [id number] BD\[id number]    cc: MD Whitney Saba MD

## 2017-07-26 NOTE — PATIENT INSTRUCTIONS
Pt scheduled for UFE on 8/7/2017 with Dr. Villa at [facility].  Will need pre-op.  Instructions reviewed with patient.  Has contact number if needed.  7/27 pt called, is pursuing other options.  Procedure cancelled.

## 2017-08-01 ENCOUNTER — OFFICE VISIT (OUTPATIENT)
Dept: FAMILY MEDICINE | Facility: CLINIC | Age: 47
End: 2017-08-01
Payer: COMMERCIAL

## 2017-08-01 VITALS
SYSTOLIC BLOOD PRESSURE: 126 MMHG | BODY MASS INDEX: 26.66 KG/M2 | DIASTOLIC BLOOD PRESSURE: 84 MMHG | TEMPERATURE: 98.7 F | HEART RATE: 90 BPM | HEIGHT: 65 IN | WEIGHT: 160 LBS | OXYGEN SATURATION: 100 %

## 2017-08-01 DIAGNOSIS — Z01.818 PREOP GENERAL PHYSICAL EXAM: Primary | ICD-10-CM

## 2017-08-01 DIAGNOSIS — J45.30 MILD PERSISTENT ASTHMA WITHOUT COMPLICATION: ICD-10-CM

## 2017-08-01 DIAGNOSIS — Z86.79 HISTORY OF SUPRAVENTRICULAR TACHYCARDIA: ICD-10-CM

## 2017-08-01 DIAGNOSIS — Z12.31 VISIT FOR SCREENING MAMMOGRAM: ICD-10-CM

## 2017-08-01 DIAGNOSIS — K21.9 GASTROESOPHAGEAL REFLUX DISEASE WITHOUT ESOPHAGITIS: ICD-10-CM

## 2017-08-01 LAB — BETA HCG QUAL IFA URINE: NEGATIVE

## 2017-08-01 PROCEDURE — 99214 OFFICE O/P EST MOD 30 MIN: CPT | Performed by: FAMILY MEDICINE

## 2017-08-01 PROCEDURE — 84703 CHORIONIC GONADOTROPIN ASSAY: CPT | Performed by: FAMILY MEDICINE

## 2017-08-01 RX ORDER — NICOTINE POLACRILEX 4 MG/1
20 GUM, CHEWING ORAL DAILY
COMMUNITY
End: 2017-09-11

## 2017-08-01 NOTE — MR AVS SNAPSHOT
After Visit Summary   8/1/2017    Laura Jackson    MRN: [id number]           Patient Information     Date Of Birth          1970        Visit Information        Provider Department      8/1/2017 5:40 PM Whitney Alva MD [facility]        Today's Diagnoses     Preop general physical exam    -  1    Visit for screening mammogram        Gastroesophageal reflux disease without esophagitis        History of supraventricular tachycardia        Mild persistent asthma without complication          Care Instructions    [facility] (formerly [facility])    If you have any questions regarding to your visit please contact your care team:       Team Red:   Clinic Hours Telephone Number   Dr. Mitzi Torres, NP   7am-7pm  Monday - Thursday   7am-5pm  Fridays  [phone]  (Appointment scheduling available 24/7)    Questions about your visit?   Team Line  [phone]   [facility] [place] [facility] 11am-9pm Monday-Friday Saturday-Sunday- 9am-5pm   [place] - 5pm-9pm Monday-Friday Saturday-Sunday- 9am-5pm  [phone] - [facility]  [phone] - [place]       What options do I have for visits at the clinic other than the traditional office visit?  To expand how we care for you, many of our providers are utilizing electronic visits (e-visits) and telephone visits, when medically appropriate, for interactions with their patients rather than a visit in the clinic.   We also offer nurse visits for many medical concerns. Just like any other service, we will bill your insurance company for this type of visit based on time spent on the phone with your provider. Not all insurance companies cover these visits. Please check with your medical insurance if this type of visit is covered. You will be responsible for any charges that are not paid by your insurance.      E-visits via [employer]:  generally incur a $35.00 fee.  Telephone  visits:  Time spent on the phone: *charged based on time that is spent on the phone in increments of 10 minutes. Estimated cost:   5-10 mins $30.00   11-20 mins. $59.00   21-30 mins. $85.00     Use Vendscreent (secure email communication and access to your chart) to send your primary care provider a message or make an appointment. Ask someone on your Team how to sign up for [employer].  For a Price Quote for your services, please call our Customcells Line at [phone].      As always, Thank you for trusting us with your health care needs!  Tika MOODY. MA    Before Your Surgery      Call your surgeon if there is any change in your health. This includes signs of a cold or flu (such as a sore throat, runny nose, cough, rash or fever).    Do not smoke, drink alcohol or take over the counter medicine (unless your surgeon or primary care doctor tells you to) for the 24 hours before and after surgery.    If you take prescribed drugs: Follow your doctor s orders about which medicines to take and which to stop until after surgery.    Eating and drinking prior to surgery: follow the instructions from your surgeon    Take a shower or bath the night before surgery. Use the soap your surgeon gave you to gently clean your skin. If you do not have soap from your surgeon, use your regular soap. Do not shave or scrub the surgery site.  Wear clean pajamas and have clean sheets on your bed.           Follow-ups after your visit        Your next 10 appointments already scheduled     Aug 04, 2017   Procedure with George Loyola MD   [facility] (--)    [address]  [facility] [address]   [phone]              Future tests that were ordered for you today     Open Future Orders        Priority Expected Expires Ordered    MA SCREENING DIGITAL BILAT - Future  (s+30) Routine  8/1/2018 8/1/2017            Who to contact     If you have questions or need follow up information about today's clinic  "visit or your schedule please contact [facility] directly at [phone].  Normal or non-critical lab and imaging results will be communicated to you by MyChart, letter or phone within 4 business days after the clinic has received the results. If you do not hear from us within 7 days, please contact the clinic through [employer] or phone. If you have a critical or abnormal lab result, we will notify you by phone as soon as possible.  Submit refill requests through Shopo or call your pharmacy and they will forward the refill request to us. Please allow 3 business days for your refill to be completed.          Additional Information About Your Visit        [employer] Information     Shopo gives you secure access to your electronic health record. If you see a primary care provider, you can also send messages to your care team and make appointments. If you have questions, please call your primary care clinic.  If you do not have a primary care provider, please call [phone] and they will assist you.        Care EveryWhere ID     This is your Care EveryWhere ID. This could be used by other organizations to access your [place] medical records  [id number]        Your Vitals Were     Pulse Temperature Height Last Period Pulse Oximetry Breastfeeding?    90 98.7  F (37.1  C) (Oral) 5' 5\" (1.651 m) 07/28/2017 100% No    BMI (Body Mass Index)                   26.63 kg/m2            Blood Pressure from Last 3 Encounters:   08/01/17 126/84   07/24/17 (!) 133/96   07/14/17 112/74    Weight from Last 3 Encounters:   08/01/17 160 lb (72.6 kg)   07/14/17 159 lb (72.1 kg)   07/14/17 159 lb 2 oz (72.2 kg)              We Performed the Following     Beta HCG qual IFA urine        Primary Care Provider Office Phone # Fax #    Whitney Alva [phone]       [facility] [address] [facility] [address]        Equal Access to Services     ODESSA HOWARD AH: Anna Cordero, " waapdiaz dailey, nashta karonnie martinez, ludmila montalvoaanelsy ah. So [facility] [phone].    ATENCIÓN: Si carline patton, tiene a guido disposición servicios gratuitos de asistencia lingüística. Parasame al [phone].    We comply with applicable federal civil rights laws and [place] laws. We do not discriminate on the basis of race, color, national origin, age, disability sex, sexual orientation or gender identity.            Thank you!     Thank you for choosing [facility]  for your care. Our goal is always to provide you with excellent care. Hearing back from our patients is one way we can continue to improve our services. Please take a few minutes to complete the written survey that you may receive in the mail after your visit with us. Thank you!             Your Updated Medication List - Protect others around you: Learn how to safely use, store and throw away your medicines at www.disposemymeds.org.          This list is accurate as of: 8/1/17  6:27 PM.  Always use your most recent med list.                   Brand Name Dispense Instructions for use Diagnosis    fluticasone-salmeterol 100-50 MCG/DOSE diskus inhaler    ADVAIR    3 Inhaler    Inhale 1 puff into the lungs 2 times daily    Mild persistent asthma without complication       levalbuterol 45 MCG/ACT Inhaler    XOPENEX HFA    1 Inhaler    Inhale 1-2 puffs into the lungs every 4 hours as needed for shortness of breath / dyspnea    Mild persistent asthma without complication       omeprazole 20 MG tablet      Take 20 mg by mouth daily        ZANTAC PO      Take 137 mg by mouth

## 2017-08-01 NOTE — PROGRESS NOTES
[facility]  [address] [place] [address]  [phone]  Dept: [phone]    PRE-OP EVALUATION:  Today's date: 2017    Laura Jackson (: 1970) presents for pre-operative evaluation assessment as requested by Dr. Loyola.  She requires evaluation and anesthesia risk assessment prior to undergoing surgery/procedure for treatment of fibroid .  Proposed procedure: Abdominal hysterectomy     Date of Surgery/ Procedure: 17  Time of Surgery/ Procedure: 2:00  Hospital/Surgical Facility: [facility]  Fax number for surgical facility:   Primary Physician: Whitney Alva  Type of Anesthesia Anticipated: General    Patient has a Health Care Directive or Living Will:  NO    1. NO - Do you have a history of heart attack, stroke, stent, bypass or surgery on an artery in the head, neck, heart or legs?  2. NO - Do you ever have any pain or discomfort in your chest?  3. NO - Do you have a history of  Heart Failure?  4. NO - Are you troubled by shortness of breath when: walking on the level, up a slight hill or at night?  5. YES - DO YOU CURRENTLY HAVE A COLD, BRONCHITIS OR OTHER RESPIRATORY INFECTION? At tail end of URI  6. NO - Do you have a cough, shortness of breath or wheezing?  7. NO - Do you sometimes get pains in the calves of your legs when you walk?  8. NO - Do you or anyone in your family have previous history of blood clots?  9. NO - Do you or does anyone in your family have a serious bleeding problem such as prolonged bleeding following surgeries or cuts?  10. NO - Have you ever had problems with anemia or been told to take iron pills?  11. YES - HAVE YOU HAD ANY ABNORMAL BLOOD LOSS SUCH AS BLACK, TARRY OR BLOODY STOOLS, OR ABNORMAL VAGINAL BLEEDING? Fibroid   12. YES - HAVE YOU EVER HAD A BLOOD TRANSFUSION? No complications  13. NO - Have you or any of your relatives ever had problems with anesthesia?  14. NO - Do you have sleep apnea, excessive snoring or daytime  drowsiness?  15. NO - Do you have any prosthetic heart valves?  16. NO - Do you have prosthetic joints?  17. NO - Is there any chance that you may be pregnant?        HPI:                                                      Brief HPI related to upcoming procedure: Pt is scheduled For above surgery-she has a Uterine Fibroid          MEDICAL HISTORY:                                                    Patient Active Problem List    Diagnosis Date Noted     S/P myomectomy 06/29/2017     Priority: Medium     Foreign body (FB) in soft tissue 08/22/2016     Priority: Medium     Mild persistent asthma 02/19/2014     Priority: Medium     Family history of breast cancer 02/19/2014     Priority: Medium     History of supraventricular tachycardia 10/11/2013     Priority: Medium     no recurrence since 2008       CARDIOVASCULAR SCREENING; LDL GOAL LESS THAN 160 04/24/2013     Priority: Medium     Irritable bowel syndrome 04/24/2013     Priority: Medium     GERD (gastroesophageal reflux disease) 04/24/2013     Priority: Medium      Past Medical History:   Diagnosis Date     Family history of breast cancer 2/19/2014     SVT (supraventricular tachycardia):  history of, no recurrence since 2008 10/11/2013    no recurrence since 2008      Uncomplicated asthma      Past Surgical History:   Procedure Laterality Date     APPENDECTOMY       DILATION AND CURETTAGE N/A 6/20/2017    Procedure: DILATION AND CURETTAGE;  Uterine Curettings and Fibroid Removal, Cook catheter placement; (No hysterectomy done at this time);  Surgeon: Ernestina Saunders MD;  Location: [facility] OR     TONSILLECTOMY       Current Outpatient Prescriptions   Medication Sig Dispense Refill     omeprazole 20 MG tablet Take 20 mg by mouth daily       RaNITidine HCl (ZANTAC PO) Take 137 mg by mouth       fluticasone-salmeterol (ADVAIR) 100-50 MCG/DOSE diskus inhaler Inhale 1 puff into the lungs 2 times daily 3 Inhaler 1     levalbuterol (XOPENEX HFA) 45 MCG/ACT Inhaler Inhale 1-2  puffs into the lungs every 4 hours as needed for shortness of breath / dyspnea 1 Inhaler 5     OTC products: None, except as noted above    Allergies   Allergen Reactions     Penicillins      Swelling throat; age 4     Tetracycline      Vomiting; nauseous      Latex Allergy: NO    Social History   Substance Use Topics     Smoking status: Never Smoker     Smokeless tobacco: Never Used     Alcohol use Yes      Comment: occ     History   Drug Use No     Social History     Social History     Marital status:      Spouse name: N/A     Number of children: 2     Years of education: N/A     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 No     Social History Narrative     Family History   Problem Relation Age of Onset     DIABETES Mother      Hypertension Mother      Hyperlipidemia Mother      Cardiovascular Father      CHF and COPD     Asthma Father      Breast Cancer Maternal Grandfather      Colon Cancer Maternal Grandfather      Breast Cancer Paternal Grandmother      Breast Cancer Paternal Aunt      C.A.D. Paternal Grandfather      Breast Cancer Cousin      Asthma Son      DIABETES Maternal Grandmother      Hypertension Maternal Grandmother        REVIEW OF SYSTEMS:                                                    C: NEGATIVE for fever, chills, change in weight  I: NEGATIVE for worrisome rashes, moles or lesions  E: NEGATIVE for vision changes or irritation  E/M: NEGATIVE for ear, mouth and throat problems  R: NEGATIVE for significant cough or SOB  B: NEGATIVE for masses, tenderness or discharge  CV: NEGATIVE for chest pain, palpitations or peripheral  "edema  GI: NEGATIVE for nausea, abdominal pain, heartburn, or change in bowel habits   female: as above  M: NEGATIVE for significant arthralgias or myalgia  N: NEGATIVE for weakness, dizziness or paresthesias  E: NEGATIVE for temperature intolerance, skin/hair changes  H: NEGATIVE for bleeding problems  P: NEGATIVE for changes in mood or affect    EXAM:                                                    /84  Pulse 90  Temp 98.7  F (37.1  C) (Oral)  Ht 5' 5\" (1.651 m)  Wt 160 lb (72.6 kg)  LMP 07/28/2017  SpO2 100%  Breastfeeding? No  BMI 26.63 kg/m2    GENERAL APPEARANCE: healthy, alert and no distress     EYES: EOMI, PERRL     HENT: ear canals and TM's normal and nose and mouth without ulcers or lesions     NECK: no adenopathy, no asymmetry, masses, or scars and thyroid normal to palpation     RESP: lungs clear to auscultation - no rales, rhonchi or wheezes     CV: regular rates and rhythm, normal S1 S2, no S3 or S4 and no murmur, click or rub     ABDOMEN:  soft, nontender, no HSM or masses and bowel sounds normal     MS: extremities normal- no gross deformities noted, no evidence of inflammation in joints, FROM in all extremities.     SKIN: no suspicious lesions or rashes     NEURO: Normal strength and tone, sensory exam grossly normal, mentation intact and speech normal     PSYCH: mentation appears normal. and affect normal/bright     LYMPHATICS: No axillary, cervical, or supraclavicular nodes    DIAGNOSTICS:                                                    hgb in epic done 7-12-17 was 11.3    Recent Labs   Lab Test  07/14/17   0903  07/12/17   1911   06/26/17   1737   06/20/17   1026   06/20/17   0351   HGB  11.3*  11.3*   < >  10.2*   < >  8.4*   < >   --    PLT  277  333   < >  341   < >  192   --    --    INR   --    --    --    --    --   1.12   --   1.12   NA   --   141   --   142   < >   --    --    --    POTASSIUM   --   3.7   --   3.8   < >  3.7   --    --    CR   --   0.84   --   0.81  "  < >   --    --    --     < > = values in this interval not displayed.        IMPRESSION:                                                    Reason for surgery/procedure: as above  Diagnosis/reason for consult: Preop    The proposed surgical procedure is considered INTERMEDIATE risk.    REVISED CARDIAC RISK INDEX  The patient has the following serious cardiovascular risks for perioperative complications such as (MI, PE, VFib and 3  AV Block):  No serious cardiac risks  INTERPRETATION: 0 risks: Class I (very low risk - 0.4% complication rate)    The patient has the following additional risks for perioperative complications:  No identified additional risks      ICD-10-CM    1. Visit for screening mammogram Z12.31 MA SCREENING DIGITAL BILAT - Future  (s+30)       RECOMMENDATIONS:                                                     Pt had a  transvaginal partial myomectomy 6/20/17.  Blood transfusion was required due to excessive Bleeding.  Pathology showed leiomyoma with degenerate changes, no mitotic figures/tumor.    She  is now scheduled For   Hysterectomy  Need to watch for excessive Bleeding  Her last hemoglobin had Improved   APPROVAL GIVEN to proceed with proposed procedure.       Signed Electronically by: Whitney Alva MD    Copy of this evaluation report is provided to requesting physician.    [facility] Preop Guidelines

## 2017-08-01 NOTE — NURSING NOTE
"Chief Complaint   Patient presents with     Pre-Op Exam       Initial /84  Pulse 90  Temp 98.7  F (37.1  C) (Oral)  Ht 5' 5\" (1.651 m)  Wt 160 lb (72.6 kg)  LMP 07/28/2017  SpO2 100%  Breastfeeding? No  BMI 26.63 kg/m2 Estimated body mass index is 26.63 kg/(m^2) as calculated from the following:    Height as of this encounter: 5' 5\" (1.651 m).    Weight as of this encounter: 160 lb (72.6 kg).  Medication Reconciliation: complete   Tika SANDOVAL MA      "

## 2017-08-01 NOTE — PATIENT INSTRUCTIONS
[facility]    If you have any questions regarding to your visit please contact your care team:       Team Red:   Clinic Hours Telephone Number   Dr. Mitzi Torres, NP   7am-7pm  Monday - Thursday   7am-5pm  Fridays  [phone]  (Appointment scheduling available 24/7)    Questions about your visit?   Team Line  [phone]   Urgent Care - [place] - 11am-9pm Monday-Friday Saturday-Sunday- 9am-5pm   [place] - 5pm-9pm Monday-Friday Saturday-Sunday- 9am-5pm  [phone] - [place]   [phone] - [place]       What options do I have for visits at the clinic other than the traditional office visit?  To expand how we care for you, many of our providers are utilizing electronic visits (e-visits) and telephone visits, when medically appropriate, for interactions with their patients rather than a visit in the clinic.   We also offer nurse visits for many medical concerns. Just like any other service, we will bill your insurance company for this type of visit based on time spent on the phone with your provider. Not all insurance companies cover these visits. Please check with your medical insurance if this type of visit is covered. You will be responsible for any charges that are not paid by your insurance.      E-visits via [employer]:  generally incur a $35.00 fee.  Telephone visits:  Time spent on the phone: *charged based on time that is spent on the phone in increments of 10 minutes. Estimated cost:   5-10 mins $30.00   11-20 mins. $59.00   21-30 mins. $85.00     Use Rapid Mobilet (secure email communication and access to your chart) to send your primary care provider a message or make an appointment. Ask someone on your Team how to sign up for [employer].  For a Price Quote for your services, please call our Consumer Price Line at [phone].      As always, Thank you for trusting us with your health care needs!  Tika SANDOVAL  MA    Before Your Surgery      Call your surgeon if there is any change in your health. This includes signs of a cold or flu (such as a sore throat, runny nose, cough, rash or fever).    Do not smoke, drink alcohol or take over the counter medicine (unless your surgeon or primary care doctor tells you to) for the 24 hours before and after surgery.    If you take prescribed drugs: Follow your doctor s orders about which medicines to take and which to stop until after surgery.    Eating and drinking prior to surgery: follow the instructions from your surgeon    Take a shower or bath the night before surgery. Use the soap your surgeon gave you to gently clean your skin. If you do not have soap from your surgeon, use your regular soap. Do not shave or scrub the surgery site.  Wear clean pajamas and have clean sheets on your bed.

## 2017-08-02 ASSESSMENT — ASTHMA QUESTIONNAIRES: ACT_TOTALSCORE: 22

## 2017-08-03 NOTE — H&P (VIEW-ONLY)
[facility]  [address] [place] [id number]  [phone]  Dept: [phone]    PRE-OP EVALUATION:  Today's date: 2017    Laura Jackson (: 1970) presents for pre-operative evaluation assessment as requested by Dr. Loyola.  She requires evaluation and anesthesia risk assessment prior to undergoing surgery/procedure for treatment of fibroid .  Proposed procedure: Abdominal hysterectomy     Date of Surgery/ Procedure: 17  Time of Surgery/ Procedure: 2:00  Hospital/Surgical Facility: [facility]  Fax number for surgical facility:   Primary Physician: Whitney Alva  Type of Anesthesia Anticipated: General    Patient has a Health Care Directive or Living Will:  NO    1. NO - Do you have a history of heart attack, stroke, stent, bypass or surgery on an artery in the head, neck, heart or legs?  2. NO - Do you ever have any pain or discomfort in your chest?  3. NO - Do you have a history of  Heart Failure?  4. NO - Are you troubled by shortness of breath when: walking on the level, up a slight hill or at night?  5. YES - DO YOU CURRENTLY HAVE A COLD, BRONCHITIS OR OTHER RESPIRATORY INFECTION? At tail end of URI  6. NO - Do you have a cough, shortness of breath or wheezing?  7. NO - Do you sometimes get pains in the calves of your legs when you walk?  8. NO - Do you or anyone in your family have previous history of blood clots?  9. NO - Do you or does anyone in your family have a serious bleeding problem such as prolonged bleeding following surgeries or cuts?  10. NO - Have you ever had problems with anemia or been told to take iron pills?  11. YES - HAVE YOU HAD ANY ABNORMAL BLOOD LOSS SUCH AS BLACK, TARRY OR BLOODY STOOLS, OR ABNORMAL VAGINAL BLEEDING? Fibroid   12. YES - HAVE YOU EVER HAD A BLOOD TRANSFUSION? No complications  13. NO - Have you or any of your relatives ever had problems with anesthesia?  14. NO - Do you have sleep apnea, excessive snoring or daytime  drowsiness?  15. NO - Do you have any prosthetic heart valves?  16. NO - Do you have prosthetic joints?  17. NO - Is there any chance that you may be pregnant?        HPI:                                                      Brief HPI related to upcoming procedure: Pt is scheduled For above surgery-she has a Uterine Fibroid          MEDICAL HISTORY:                                                    Patient Active Problem List    Diagnosis Date Noted     S/P myomectomy 06/29/2017     Priority: Medium     Foreign body (FB) in soft tissue 08/22/2016     Priority: Medium     Mild persistent asthma 02/19/2014     Priority: Medium     Family history of breast cancer 02/19/2014     Priority: Medium     History of supraventricular tachycardia 10/11/2013     Priority: Medium     no recurrence since 2008       CARDIOVASCULAR SCREENING; LDL GOAL LESS THAN 160 04/24/2013     Priority: Medium     Irritable bowel syndrome 04/24/2013     Priority: Medium     GERD (gastroesophageal reflux disease) 04/24/2013     Priority: Medium      Past Medical History:   Diagnosis Date     Family history of breast cancer 2/19/2014     SVT (supraventricular tachycardia):  history of, no recurrence since 2008 10/11/2013    no recurrence since 2008      Uncomplicated asthma      Past Surgical History:   Procedure Laterality Date     APPENDECTOMY       DILATION AND CURETTAGE N/A 6/20/2017    Procedure: DILATION AND CURETTAGE;  Uterine Curettings and Fibroid Removal, Cook catheter placement; (No hysterectomy done at this time);  Surgeon: Ernestina Saunders MD;  Location: [facility] OR     TONSILLECTOMY       Current Outpatient Prescriptions   Medication Sig Dispense Refill     omeprazole 20 MG tablet Take 20 mg by mouth daily       RaNITidine HCl (ZANTAC PO) Take 137 mg by mouth       fluticasone-salmeterol (ADVAIR) 100-50 MCG/DOSE diskus inhaler Inhale 1 puff into the lungs 2 times daily 3 Inhaler 1     levalbuterol (XOPENEX HFA) 45 MCG/ACT Inhaler Inhale 1-2  puffs into the lungs every 4 hours as needed for shortness of breath / dyspnea 1 Inhaler 5     OTC products: None, except as noted above    Allergies   Allergen Reactions     Penicillins      Swelling throat; age 4     Tetracycline      Vomiting; nauseous      Latex Allergy: NO    Social History   Substance Use Topics     Smoking status: Never Smoker     Smokeless tobacco: Never Used     Alcohol use Yes      Comment: occ     History   Drug Use No     Social History     Social History     Marital status:      Spouse name: N/A     Number of children: 2     Years of education: N/A     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 No     Social History Narrative     Family History   Problem Relation Age of Onset     DIABETES Mother      Hypertension Mother      Hyperlipidemia Mother      Cardiovascular Father      CHF and COPD     Asthma Father      Breast Cancer Maternal Grandfather      Colon Cancer Maternal Grandfather      Breast Cancer Paternal Grandmother      Breast Cancer Paternal Aunt      C.A.D. Paternal Grandfather      Breast Cancer Cousin      Asthma Son      DIABETES Maternal Grandmother      Hypertension Maternal Grandmother        REVIEW OF SYSTEMS:                                                    C: NEGATIVE for fever, chills, change in weight  I: NEGATIVE for worrisome rashes, moles or lesions  E: NEGATIVE for vision changes or irritation  E/M: NEGATIVE for ear, mouth and throat problems  R: NEGATIVE for significant cough or SOB  B: NEGATIVE for masses, tenderness or discharge  CV: NEGATIVE for chest pain, palpitations or peripheral  "edema  GI: NEGATIVE for nausea, abdominal pain, heartburn, or change in bowel habits   female: as above  M: NEGATIVE for significant arthralgias or myalgia  N: NEGATIVE for weakness, dizziness or paresthesias  E: NEGATIVE for temperature intolerance, skin/hair changes  H: NEGATIVE for bleeding problems  P: NEGATIVE for changes in mood or affect    EXAM:                                                    /84  Pulse 90  Temp 98.7  F (37.1  C) (Oral)  Ht 5' 5\" (1.651 m)  Wt 160 lb (72.6 kg)  LMP 07/28/2017  SpO2 100%  Breastfeeding? No  BMI 26.63 kg/m2    GENERAL APPEARANCE: healthy, alert and no distress     EYES: EOMI, PERRL     HENT: ear canals and TM's normal and nose and mouth without ulcers or lesions     NECK: no adenopathy, no asymmetry, masses, or scars and thyroid normal to palpation     RESP: lungs clear to auscultation - no rales, rhonchi or wheezes     CV: regular rates and rhythm, normal S1 S2, no S3 or S4 and no murmur, click or rub     ABDOMEN:  soft, nontender, no HSM or masses and bowel sounds normal     MS: extremities normal- no gross deformities noted, no evidence of inflammation in joints, FROM in all extremities.     SKIN: no suspicious lesions or rashes     NEURO: Normal strength and tone, sensory exam grossly normal, mentation intact and speech normal     PSYCH: mentation appears normal. and affect normal/bright     LYMPHATICS: No axillary, cervical, or supraclavicular nodes    DIAGNOSTICS:                                                    hgb in epic done 7-12-17 was 11.3    Recent Labs   Lab Test  07/14/17   0903  07/12/17   1911   06/26/17   1737   06/20/17   1026   06/20/17   0351   HGB  11.3*  11.3*   < >  10.2*   < >  8.4*   < >   --    PLT  277  333   < >  341   < >  192   --    --    INR   --    --    --    --    --   1.12   --   1.12   NA   --   141   --   142   < >   --    --    --    POTASSIUM   --   3.7   --   3.8   < >  3.7   --    --    CR   --   0.84   --   0.81  "  < >   --    --    --     < > = values in this interval not displayed.        IMPRESSION:                                                    Reason for surgery/procedure: as above  Diagnosis/reason for consult: Preop    The proposed surgical procedure is considered INTERMEDIATE risk.    REVISED CARDIAC RISK INDEX  The patient has the following serious cardiovascular risks for perioperative complications such as (MI, PE, VFib and 3  AV Block):  No serious cardiac risks  INTERPRETATION: 0 risks: Class I (very low risk - 0.4% complication rate)    The patient has the following additional risks for perioperative complications:  No identified additional risks      ICD-10-CM    1. Visit for screening mammogram Z12.31 MA SCREENING DIGITAL BILAT - Future  (s+30)       RECOMMENDATIONS:                                                     Pt had a  transvaginal partial myomectomy 6/20/17.  Blood transfusion was required due to excessive Bleeding.  Pathology showed leiomyoma with degenerate changes, no mitotic figures/tumor.    She  is now scheduled For   Hysterectomy  Need to watch for excessive Bleeding  Her last hemoglobin had Improved   APPROVAL GIVEN to proceed with proposed procedure.       Signed Electronically by: Whitney Alva MD    Copy of this evaluation report is provided to requesting physician.    [facility] Preop Guidelines

## 2017-08-04 ENCOUNTER — ANESTHESIA (OUTPATIENT)
Dept: SURGERY | Facility: CLINIC | Age: 47
End: 2017-08-04
Payer: COMMERCIAL

## 2017-08-04 ENCOUNTER — HOSPITAL ENCOUNTER (OUTPATIENT)
Facility: CLINIC | Age: 47
Discharge: HOME | End: 2017-08-06
Attending: OBSTETRICS & GYNECOLOGY | Admitting: OBSTETRICS & GYNECOLOGY
Payer: COMMERCIAL

## 2017-08-04 ENCOUNTER — ANESTHESIA EVENT (OUTPATIENT)
Dept: SURGERY | Facility: CLINIC | Age: 47
End: 2017-08-04
Payer: COMMERCIAL

## 2017-08-04 DIAGNOSIS — N81.2 UTEROVAGINAL PROLAPSE, INCOMPLETE: ICD-10-CM

## 2017-08-04 DIAGNOSIS — N80.30 ENDOMETRIOSIS OF PELVIC PERITONEUM: ICD-10-CM

## 2017-08-04 DIAGNOSIS — D25.1 INTRAMURAL LEIOMYOMA OF UTERUS: Primary | ICD-10-CM

## 2017-08-04 PROBLEM — R11.0 NAUSEA: Status: ACTIVE | Noted: 2017-08-04

## 2017-08-04 LAB
ABO + RH BLD: NORMAL
ABO + RH BLD: NORMAL
BLD GP AB SCN SERPL QL: NORMAL
BLOOD BANK CMNT PATIENT-IMP: NORMAL
HCG SERPL QL: NEGATIVE
HGB BLD-MCNC: 11.4 G/DL (ref 11.7–15.7)
SPECIMEN EXP DATE BLD: NORMAL

## 2017-08-04 PROCEDURE — 27210794 ZZH OR GENERAL SUPPLY STERILE: Performed by: OBSTETRICS & GYNECOLOGY

## 2017-08-04 PROCEDURE — 86850 RBC ANTIBODY SCREEN: CPT | Performed by: OBSTETRICS & GYNECOLOGY

## 2017-08-04 PROCEDURE — 71000013 ZZH RECOVERY PHASE 1 LEVEL 1 EA ADDTL HR: Performed by: OBSTETRICS & GYNECOLOGY

## 2017-08-04 PROCEDURE — 25000128 H RX IP 250 OP 636: Performed by: NURSE ANESTHETIST, CERTIFIED REGISTERED

## 2017-08-04 PROCEDURE — 25000125 ZZHC RX 250: Performed by: OBSTETRICS & GYNECOLOGY

## 2017-08-04 PROCEDURE — 40000936 ZZH STATISTIC OUTPATIENT (NON-OBS) NIGHT

## 2017-08-04 PROCEDURE — 25800025 ZZH RX 258: Performed by: OBSTETRICS & GYNECOLOGY

## 2017-08-04 PROCEDURE — 36415 COLL VENOUS BLD VENIPUNCTURE: CPT | Performed by: OBSTETRICS & GYNECOLOGY

## 2017-08-04 PROCEDURE — 84703 CHORIONIC GONADOTROPIN ASSAY: CPT | Performed by: OBSTETRICS & GYNECOLOGY

## 2017-08-04 PROCEDURE — 25000128 H RX IP 250 OP 636: Performed by: SURGERY

## 2017-08-04 PROCEDURE — 37000008 ZZH ANESTHESIA TECHNICAL FEE, 1ST 30 MIN: Performed by: OBSTETRICS & GYNECOLOGY

## 2017-08-04 PROCEDURE — 86900 BLOOD TYPING SEROLOGIC ABO: CPT | Performed by: OBSTETRICS & GYNECOLOGY

## 2017-08-04 PROCEDURE — 25000128 H RX IP 250 OP 636: Performed by: OBSTETRICS & GYNECOLOGY

## 2017-08-04 PROCEDURE — 40000935 ZZH STATISTIC OUTPATIENT (NON-OBS) EVE

## 2017-08-04 PROCEDURE — 37000009 ZZH ANESTHESIA TECHNICAL FEE, EACH ADDTL 15 MIN: Performed by: OBSTETRICS & GYNECOLOGY

## 2017-08-04 PROCEDURE — 25000132 ZZH RX MED GY IP 250 OP 250 PS 637: Performed by: OBSTETRICS & GYNECOLOGY

## 2017-08-04 PROCEDURE — 25000125 ZZHC RX 250: Performed by: NURSE ANESTHETIST, CERTIFIED REGISTERED

## 2017-08-04 PROCEDURE — 86901 BLOOD TYPING SEROLOGIC RH(D): CPT | Performed by: OBSTETRICS & GYNECOLOGY

## 2017-08-04 PROCEDURE — 25000566 ZZH SEVOFLURANE, EA 15 MIN: Performed by: OBSTETRICS & GYNECOLOGY

## 2017-08-04 PROCEDURE — 36000087 ZZH SURGERY LEVEL 8 EA 15 ADDTL MIN: Performed by: OBSTETRICS & GYNECOLOGY

## 2017-08-04 PROCEDURE — 27210995 ZZH RX 272: Performed by: OBSTETRICS & GYNECOLOGY

## 2017-08-04 PROCEDURE — 25000128 H RX IP 250 OP 636: Performed by: ANESTHESIOLOGY

## 2017-08-04 PROCEDURE — 40000170 ZZH STATISTIC PRE-PROCEDURE ASSESSMENT II: Performed by: OBSTETRICS & GYNECOLOGY

## 2017-08-04 PROCEDURE — 36000085 ZZH SURGERY LEVEL 8 1ST 30 MIN: Performed by: OBSTETRICS & GYNECOLOGY

## 2017-08-04 PROCEDURE — 71000012 ZZH RECOVERY PHASE 1 LEVEL 1 FIRST HR: Performed by: OBSTETRICS & GYNECOLOGY

## 2017-08-04 PROCEDURE — 85018 HEMOGLOBIN: CPT | Performed by: OBSTETRICS & GYNECOLOGY

## 2017-08-04 RX ORDER — IBUPROFEN 600 MG/1
600 TABLET, FILM COATED ORAL EVERY 6 HOURS PRN
Qty: 60 TABLET | Refills: 0 | Status: SHIPPED | OUTPATIENT
Start: 2017-08-04 | End: 2017-11-09

## 2017-08-04 RX ORDER — METOCLOPRAMIDE 10 MG/1
10 TABLET ORAL EVERY 6 HOURS PRN
Status: DISCONTINUED | OUTPATIENT
Start: 2017-08-04 | End: 2017-08-06 | Stop reason: HOSPADM

## 2017-08-04 RX ORDER — EPHEDRINE SULFATE 50 MG/ML
INJECTION, SOLUTION INTRAMUSCULAR; INTRAVENOUS; SUBCUTANEOUS PRN
Status: DISCONTINUED | OUTPATIENT
Start: 2017-08-04 | End: 2017-08-04

## 2017-08-04 RX ORDER — PROPOFOL 10 MG/ML
INJECTION, EMULSION INTRAVENOUS PRN
Status: DISCONTINUED | OUTPATIENT
Start: 2017-08-04 | End: 2017-08-04

## 2017-08-04 RX ORDER — HYDROMORPHONE HYDROCHLORIDE 1 MG/ML
.3-.5 INJECTION, SOLUTION INTRAMUSCULAR; INTRAVENOUS; SUBCUTANEOUS
Status: DISCONTINUED | OUTPATIENT
Start: 2017-08-04 | End: 2017-08-06 | Stop reason: HOSPADM

## 2017-08-04 RX ORDER — KETOROLAC TROMETHAMINE 30 MG/ML
30 INJECTION, SOLUTION INTRAMUSCULAR; INTRAVENOUS EVERY 6 HOURS
Status: COMPLETED | OUTPATIENT
Start: 2017-08-04 | End: 2017-08-05

## 2017-08-04 RX ORDER — LIDOCAINE HYDROCHLORIDE 20 MG/ML
INJECTION, SOLUTION INFILTRATION; PERINEURAL PRN
Status: DISCONTINUED | OUTPATIENT
Start: 2017-08-04 | End: 2017-08-04

## 2017-08-04 RX ORDER — METOCLOPRAMIDE HYDROCHLORIDE 5 MG/ML
10 INJECTION INTRAMUSCULAR; INTRAVENOUS EVERY 6 HOURS PRN
Status: DISCONTINUED | OUTPATIENT
Start: 2017-08-04 | End: 2017-08-06 | Stop reason: HOSPADM

## 2017-08-04 RX ORDER — OXYCODONE HYDROCHLORIDE 5 MG/1
5 TABLET ORAL ONCE
Status: DISCONTINUED | OUTPATIENT
Start: 2017-08-04 | End: 2017-08-04 | Stop reason: HOSPADM

## 2017-08-04 RX ORDER — NALOXONE HYDROCHLORIDE 0.4 MG/ML
.1-.4 INJECTION, SOLUTION INTRAMUSCULAR; INTRAVENOUS; SUBCUTANEOUS
Status: DISCONTINUED | OUTPATIENT
Start: 2017-08-04 | End: 2017-08-06 | Stop reason: HOSPADM

## 2017-08-04 RX ORDER — SODIUM CHLORIDE, SODIUM LACTATE, POTASSIUM CHLORIDE, CALCIUM CHLORIDE 600; 310; 30; 20 MG/100ML; MG/100ML; MG/100ML; MG/100ML
INJECTION, SOLUTION INTRAVENOUS CONTINUOUS
Status: DISCONTINUED | OUTPATIENT
Start: 2017-08-04 | End: 2017-08-04 | Stop reason: HOSPADM

## 2017-08-04 RX ORDER — GLYCOPYRROLATE 0.2 MG/ML
INJECTION, SOLUTION INTRAMUSCULAR; INTRAVENOUS PRN
Status: DISCONTINUED | OUTPATIENT
Start: 2017-08-04 | End: 2017-08-04

## 2017-08-04 RX ORDER — FENTANYL CITRATE 50 UG/ML
50-100 INJECTION, SOLUTION INTRAMUSCULAR; INTRAVENOUS
Status: DISCONTINUED | OUTPATIENT
Start: 2017-08-04 | End: 2017-08-04 | Stop reason: HOSPADM

## 2017-08-04 RX ORDER — FENTANYL CITRATE 50 UG/ML
INJECTION, SOLUTION INTRAMUSCULAR; INTRAVENOUS PRN
Status: DISCONTINUED | OUTPATIENT
Start: 2017-08-04 | End: 2017-08-04

## 2017-08-04 RX ORDER — OXYCODONE AND ACETAMINOPHEN 5; 325 MG/1; MG/1
1-2 TABLET ORAL
Status: DISCONTINUED | OUTPATIENT
Start: 2017-08-04 | End: 2017-08-06 | Stop reason: HOSPADM

## 2017-08-04 RX ORDER — CIPROFLOXACIN 2 MG/ML
400 INJECTION, SOLUTION INTRAVENOUS SEE ADMIN INSTRUCTIONS
Status: DISCONTINUED | OUTPATIENT
Start: 2017-08-04 | End: 2017-08-04 | Stop reason: HOSPADM

## 2017-08-04 RX ORDER — ONDANSETRON 4 MG/1
4 TABLET, ORALLY DISINTEGRATING ORAL EVERY 30 MIN PRN
Status: DISCONTINUED | OUTPATIENT
Start: 2017-08-04 | End: 2017-08-04 | Stop reason: HOSPADM

## 2017-08-04 RX ORDER — DEXAMETHASONE SODIUM PHOSPHATE 4 MG/ML
INJECTION, SOLUTION INTRA-ARTICULAR; INTRALESIONAL; INTRAMUSCULAR; INTRAVENOUS; SOFT TISSUE PRN
Status: DISCONTINUED | OUTPATIENT
Start: 2017-08-04 | End: 2017-08-04

## 2017-08-04 RX ORDER — SODIUM CHLORIDE, SODIUM LACTATE, POTASSIUM CHLORIDE, CALCIUM CHLORIDE 600; 310; 30; 20 MG/100ML; MG/100ML; MG/100ML; MG/100ML
INJECTION, SOLUTION INTRAVENOUS CONTINUOUS PRN
Status: DISCONTINUED | OUTPATIENT
Start: 2017-08-04 | End: 2017-08-04

## 2017-08-04 RX ORDER — FENTANYL CITRATE 50 UG/ML
25-50 INJECTION, SOLUTION INTRAMUSCULAR; INTRAVENOUS EVERY 5 MIN PRN
Status: DISCONTINUED | OUTPATIENT
Start: 2017-08-04 | End: 2017-08-04 | Stop reason: HOSPADM

## 2017-08-04 RX ORDER — IBUPROFEN 600 MG/1
600 TABLET, FILM COATED ORAL
Status: DISCONTINUED | OUTPATIENT
Start: 2017-08-04 | End: 2017-08-06 | Stop reason: HOSPADM

## 2017-08-04 RX ORDER — BUPIVACAINE HYDROCHLORIDE AND EPINEPHRINE 5; 5 MG/ML; UG/ML
INJECTION, SOLUTION PERINEURAL PRN
Status: DISCONTINUED | OUTPATIENT
Start: 2017-08-04 | End: 2017-08-04 | Stop reason: HOSPADM

## 2017-08-04 RX ORDER — SODIUM CHLORIDE, SODIUM LACTATE, POTASSIUM CHLORIDE, CALCIUM CHLORIDE 600; 310; 30; 20 MG/100ML; MG/100ML; MG/100ML; MG/100ML
INJECTION, SOLUTION INTRAVENOUS CONTINUOUS
Status: DISCONTINUED | OUTPATIENT
Start: 2017-08-04 | End: 2017-08-06 | Stop reason: HOSPADM

## 2017-08-04 RX ORDER — ACETAMINOPHEN 650 MG/1
650 SUPPOSITORY RECTAL EVERY 4 HOURS PRN
Status: DISCONTINUED | OUTPATIENT
Start: 2017-08-04 | End: 2017-08-06 | Stop reason: HOSPADM

## 2017-08-04 RX ORDER — HYDROMORPHONE HYDROCHLORIDE 1 MG/ML
.3-.5 INJECTION, SOLUTION INTRAMUSCULAR; INTRAVENOUS; SUBCUTANEOUS EVERY 10 MIN PRN
Status: DISCONTINUED | OUTPATIENT
Start: 2017-08-04 | End: 2017-08-04 | Stop reason: HOSPADM

## 2017-08-04 RX ORDER — ONDANSETRON 4 MG/1
4 TABLET, ORALLY DISINTEGRATING ORAL EVERY 6 HOURS PRN
Status: DISCONTINUED | OUTPATIENT
Start: 2017-08-04 | End: 2017-08-06 | Stop reason: HOSPADM

## 2017-08-04 RX ORDER — ACETAMINOPHEN 325 MG/1
975 TABLET ORAL ONCE
Status: COMPLETED | OUTPATIENT
Start: 2017-08-04 | End: 2017-08-04

## 2017-08-04 RX ORDER — FENTANYL CITRATE 50 UG/ML
25-50 INJECTION, SOLUTION INTRAMUSCULAR; INTRAVENOUS
Status: DISCONTINUED | OUTPATIENT
Start: 2017-08-04 | End: 2017-08-04 | Stop reason: HOSPADM

## 2017-08-04 RX ORDER — OXYCODONE HYDROCHLORIDE 5 MG/1
5-10 TABLET ORAL
Status: DISCONTINUED | OUTPATIENT
Start: 2017-08-04 | End: 2017-08-06 | Stop reason: HOSPADM

## 2017-08-04 RX ORDER — ONDANSETRON 2 MG/ML
4 INJECTION INTRAMUSCULAR; INTRAVENOUS EVERY 30 MIN PRN
Status: DISCONTINUED | OUTPATIENT
Start: 2017-08-04 | End: 2017-08-04 | Stop reason: HOSPADM

## 2017-08-04 RX ORDER — PROCHLORPERAZINE MALEATE 5 MG
5-10 TABLET ORAL EVERY 6 HOURS PRN
Status: DISCONTINUED | OUTPATIENT
Start: 2017-08-04 | End: 2017-08-06 | Stop reason: HOSPADM

## 2017-08-04 RX ORDER — CIPROFLOXACIN 2 MG/ML
400 INJECTION, SOLUTION INTRAVENOUS
Status: COMPLETED | OUTPATIENT
Start: 2017-08-04 | End: 2017-08-04

## 2017-08-04 RX ORDER — MEPERIDINE HYDROCHLORIDE 25 MG/ML
12.5 INJECTION INTRAMUSCULAR; INTRAVENOUS; SUBCUTANEOUS
Status: DISCONTINUED | OUTPATIENT
Start: 2017-08-04 | End: 2017-08-04 | Stop reason: HOSPADM

## 2017-08-04 RX ORDER — NALOXONE HYDROCHLORIDE 0.4 MG/ML
.1-.4 INJECTION, SOLUTION INTRAMUSCULAR; INTRAVENOUS; SUBCUTANEOUS
Status: DISCONTINUED | OUTPATIENT
Start: 2017-08-04 | End: 2017-08-04 | Stop reason: HOSPADM

## 2017-08-04 RX ORDER — MAGNESIUM HYDROXIDE 1200 MG/15ML
LIQUID ORAL PRN
Status: DISCONTINUED | OUTPATIENT
Start: 2017-08-04 | End: 2017-08-04 | Stop reason: HOSPADM

## 2017-08-04 RX ORDER — CITRIC ACID/SODIUM CITRATE 334-500MG
30 SOLUTION, ORAL ORAL ONCE
Status: DISCONTINUED | OUTPATIENT
Start: 2017-08-04 | End: 2017-08-04 | Stop reason: HOSPADM

## 2017-08-04 RX ORDER — KETOROLAC TROMETHAMINE 30 MG/ML
30 INJECTION, SOLUTION INTRAMUSCULAR; INTRAVENOUS ONCE
Status: COMPLETED | OUTPATIENT
Start: 2017-08-04 | End: 2017-08-04

## 2017-08-04 RX ORDER — NEOSTIGMINE METHYLSULFATE 1 MG/ML
VIAL (ML) INJECTION PRN
Status: DISCONTINUED | OUTPATIENT
Start: 2017-08-04 | End: 2017-08-04

## 2017-08-04 RX ORDER — ONDANSETRON 2 MG/ML
4 INJECTION INTRAMUSCULAR; INTRAVENOUS EVERY 6 HOURS PRN
Status: DISCONTINUED | OUTPATIENT
Start: 2017-08-04 | End: 2017-08-06 | Stop reason: HOSPADM

## 2017-08-04 RX ORDER — OXYCODONE HYDROCHLORIDE 5 MG/1
5-10 TABLET ORAL
Qty: 60 TABLET | Refills: 0 | Status: SHIPPED | OUTPATIENT
Start: 2017-08-04 | End: 2017-08-26

## 2017-08-04 RX ORDER — ACETAMINOPHEN 325 MG/1
650 TABLET ORAL EVERY 4 HOURS PRN
Status: DISCONTINUED | OUTPATIENT
Start: 2017-08-04 | End: 2017-08-06 | Stop reason: HOSPADM

## 2017-08-04 RX ORDER — PROPOFOL 10 MG/ML
INJECTION, EMULSION INTRAVENOUS CONTINUOUS PRN
Status: DISCONTINUED | OUTPATIENT
Start: 2017-08-04 | End: 2017-08-04

## 2017-08-04 RX ADMIN — SODIUM CHLORIDE, POTASSIUM CHLORIDE, SODIUM LACTATE AND CALCIUM CHLORIDE: 600; 310; 30; 20 INJECTION, SOLUTION INTRAVENOUS at 21:07

## 2017-08-04 RX ADMIN — PROPOFOL 150 MG: 10 INJECTION, EMULSION INTRAVENOUS at 14:08

## 2017-08-04 RX ADMIN — ROCURONIUM BROMIDE 50 MG: 10 INJECTION INTRAVENOUS at 14:11

## 2017-08-04 RX ADMIN — HYDROMORPHONE HYDROCHLORIDE 0.5 MG: 1 INJECTION, SOLUTION INTRAMUSCULAR; INTRAVENOUS; SUBCUTANEOUS at 17:37

## 2017-08-04 RX ADMIN — MEPERIDINE HYDROCHLORIDE 12.5 MG: 25 INJECTION, SOLUTION INTRAMUSCULAR; INTRAVENOUS; SUBCUTANEOUS at 17:14

## 2017-08-04 RX ADMIN — METRONIDAZOLE 500 MG: 500 INJECTION, SOLUTION INTRAVENOUS at 14:31

## 2017-08-04 RX ADMIN — PHENYLEPHRINE HYDROCHLORIDE 100 MCG: 10 INJECTION, SOLUTION INTRAMUSCULAR; INTRAVENOUS; SUBCUTANEOUS at 15:06

## 2017-08-04 RX ADMIN — HYDROMORPHONE HYDROCHLORIDE 0.5 MG: 1 INJECTION, SOLUTION INTRAMUSCULAR; INTRAVENOUS; SUBCUTANEOUS at 16:18

## 2017-08-04 RX ADMIN — CIPROFLOXACIN 400 MG: 2 INJECTION INTRAVENOUS at 14:23

## 2017-08-04 RX ADMIN — SODIUM CHLORIDE, POTASSIUM CHLORIDE, SODIUM LACTATE AND CALCIUM CHLORIDE: 600; 310; 30; 20 INJECTION, SOLUTION INTRAVENOUS at 15:40

## 2017-08-04 RX ADMIN — ONDANSETRON 4 MG: 2 SOLUTION INTRAMUSCULAR; INTRAVENOUS at 18:15

## 2017-08-04 RX ADMIN — GLYCOPYRROLATE 0.4 MG: 0.2 INJECTION, SOLUTION INTRAMUSCULAR; INTRAVENOUS at 15:54

## 2017-08-04 RX ADMIN — FENTANYL CITRATE 50 MCG: 50 INJECTION, SOLUTION INTRAMUSCULAR; INTRAVENOUS at 16:28

## 2017-08-04 RX ADMIN — NEOSTIGMINE METHYLSULFATE 3 MG: 1 INJECTION INTRAMUSCULAR; INTRAVENOUS; SUBCUTANEOUS at 15:54

## 2017-08-04 RX ADMIN — FENTANYL CITRATE 50 MCG: 50 INJECTION, SOLUTION INTRAMUSCULAR; INTRAVENOUS at 16:34

## 2017-08-04 RX ADMIN — Medication 5 MG: at 15:15

## 2017-08-04 RX ADMIN — LIDOCAINE HYDROCHLORIDE 100 MG: 20 INJECTION, SOLUTION INFILTRATION; PERINEURAL at 14:08

## 2017-08-04 RX ADMIN — KETOROLAC TROMETHAMINE 30 MG: 30 INJECTION, SOLUTION INTRAMUSCULAR at 16:43

## 2017-08-04 RX ADMIN — ACETAMINOPHEN 650 MG: 325 TABLET, FILM COATED ORAL at 21:15

## 2017-08-04 RX ADMIN — PHENYLEPHRINE HYDROCHLORIDE 100 MCG: 10 INJECTION, SOLUTION INTRAMUSCULAR; INTRAVENOUS; SUBCUTANEOUS at 15:15

## 2017-08-04 RX ADMIN — HYDROMORPHONE HYDROCHLORIDE 0.5 MG: 1 INJECTION, SOLUTION INTRAMUSCULAR; INTRAVENOUS; SUBCUTANEOUS at 16:46

## 2017-08-04 RX ADMIN — ROCURONIUM BROMIDE 10 MG: 10 INJECTION INTRAVENOUS at 15:06

## 2017-08-04 RX ADMIN — PROPOFOL 40 MCG/KG/MIN: 10 INJECTION, EMULSION INTRAVENOUS at 14:25

## 2017-08-04 RX ADMIN — MIDAZOLAM HYDROCHLORIDE 2 MG: 1 INJECTION, SOLUTION INTRAMUSCULAR; INTRAVENOUS at 14:09

## 2017-08-04 RX ADMIN — HYDROMORPHONE HYDROCHLORIDE 0.5 MG: 1 INJECTION, SOLUTION INTRAMUSCULAR; INTRAVENOUS; SUBCUTANEOUS at 17:02

## 2017-08-04 RX ADMIN — KETOROLAC TROMETHAMINE 30 MG: 30 INJECTION, SOLUTION INTRAMUSCULAR at 23:44

## 2017-08-04 RX ADMIN — DEXAMETHASONE SODIUM PHOSPHATE 4 MG: 4 INJECTION, SOLUTION INTRA-ARTICULAR; INTRALESIONAL; INTRAMUSCULAR; INTRAVENOUS; SOFT TISSUE at 14:38

## 2017-08-04 RX ADMIN — ACETAMINOPHEN 975 MG: 325 TABLET, FILM COATED ORAL at 13:54

## 2017-08-04 RX ADMIN — SODIUM CHLORIDE, POTASSIUM CHLORIDE, SODIUM LACTATE AND CALCIUM CHLORIDE: 600; 310; 30; 20 INJECTION, SOLUTION INTRAVENOUS at 14:03

## 2017-08-04 RX ADMIN — PHENYLEPHRINE HYDROCHLORIDE 100 MCG: 10 INJECTION, SOLUTION INTRAMUSCULAR; INTRAVENOUS; SUBCUTANEOUS at 14:23

## 2017-08-04 RX ADMIN — FENTANYL CITRATE 100 MCG: 50 INJECTION, SOLUTION INTRAMUSCULAR; INTRAVENOUS at 14:09

## 2017-08-04 ASSESSMENT — PAIN DESCRIPTION - DESCRIPTORS
DESCRIPTORS: CONSTANT
DESCRIPTORS: DISCOMFORT

## 2017-08-04 ASSESSMENT — ENCOUNTER SYMPTOMS
SEIZURES: 0
ORTHOPNEA: 0
DYSRHYTHMIAS: 1

## 2017-08-04 ASSESSMENT — LIFESTYLE VARIABLES: TOBACCO_USE: 0

## 2017-08-04 ASSESSMENT — COPD QUESTIONNAIRES: COPD: 0

## 2017-08-04 NOTE — ANESTHESIA PREPROCEDURE EVALUATION
Procedure: Procedure(s):  DAVINCI XI HYSTERECTOMY TOTAL, SALPINGECTOMY BILATERAL  Preop diagnosis: FIBROID    Allergies   Allergen Reactions     Penicillins      Swelling throat; age 4     Tetracycline      Vomiting; nauseous     Past Medical History:   Diagnosis Date     Family history of breast cancer 2/19/2014     SVT (supraventricular tachycardia):  history of, no recurrence since 2008 10/11/2013    no recurrence since 2008      Uncomplicated asthma      Past Surgical History:   Procedure Laterality Date     APPENDECTOMY       DILATION AND CURETTAGE N/A 6/20/2017    Procedure: DILATION AND CURETTAGE;  Uterine Curettings and Fibroid Removal, Cook catheter placement; (No hysterectomy done at this time);  Surgeon: Ernestina Saunders MD;  Location: [facility] OR     TONSILLECTOMY       Prior to Admission medications    Medication Sig Start Date End Date Taking? Authorizing Provider   Simethicone Extra Strength 125 MG CAPS Take 2 capsules by mouth daily as needed   Yes Reported, Patient   alpha-d-galactosidase (BEANO) tablet Take 2 tablets by mouth daily as needed for intestinal gas   Yes Reported, Patient   omeprazole 20 MG tablet Take 20 mg by mouth daily   Yes Reported, Patient   RaNITidine HCl (ZANTAC PO) Take 137 mg by mouth   Yes Reported, Patient   fluticasone-salmeterol (ADVAIR) 100-50 MCG/DOSE diskus inhaler Inhale 1 puff into the lungs 2 times daily 6/15/17  Yes Whitney Alva MD   levalbuterol (XOPENEX HFA) 45 MCG/ACT Inhaler Inhale 1-2 puffs into the lungs every 4 hours as needed for shortness of breath / dyspnea 1/30/17   Whitney Alva MD     No current Epic-ordered facility-administered medications on file.      No current [facility]-ordered outpatient prescriptions on file.     Wt Readings from Last 1 Encounters:   08/04/17 72.1 kg (158 lb 14.4 oz)     Temp Readings from Last 1 Encounters:   08/04/17 36.8  C (98.2  F) (Oral)     BP Readings from Last 6 Encounters:   08/04/17 132/87   08/01/17 126/84   07/24/17 (!) 133/96    07/14/17 112/74   07/14/17 121/85   07/13/17 136/81     Pulse Readings from Last 4 Encounters:   08/01/17 90   07/24/17 97   07/14/17 95   07/14/17 79     Resp Readings from Last 1 Encounters:   08/04/17 16     SpO2 Readings from Last 1 Encounters:   08/04/17 98%     Recent Labs   Lab Test  07/12/17   1911  06/26/17   1737   NA  141  142   POTASSIUM  3.7  3.8   CHLORIDE  104  106   CO2  24  24   ANIONGAP  13  12   GLC  100*  101*   BUN  12  9   CR  0.84  0.81   AZAEL  9.3  8.8     Recent Labs   Lab Test  08/04/17   1231  07/14/17   0903  07/12/17   1911   WBC   --   4.0  7.3   HGB  11.4*  11.3*  11.3*   PLT   --   277  333     Recent Labs   Lab Test  06/20/17   1026  06/20/17   0351   INR  1.12  1.12      RECENT LABS:     ECG: NSR, no abnormalities     Anesthesia Evaluation     . Pt has had prior anesthetic. Type: General    No history of anesthetic complications          ROS/MED HX    ENT/Pulmonary:     (+)Mild Persistent asthma Treatment: Inhaler prn,  , . .   (-) tobacco use, COPD, sleep apnea and recent URI   Neurologic:  - neg neurologic ROS    (-) seizures and CVA   Cardiovascular:  - neg cardiovascular ROS   (+) ----. : . . . :. dysrhythmias (Hx of SVT (last 2008) ) Other, .      (-) angina, hypertension, CAD, orthopnea/PND, syncope and angina   METS/Exercise Tolerance:  >4 METS   Hematologic:  - neg hematologic  ROS       Musculoskeletal:  - neg musculoskeletal ROS       GI/Hepatic:     (+) GERD Asymptomatic on medication,      (-) liver disease   Renal/Genitourinary:  - ROS Renal section negative    (-) renal disease   Endo:  - neg endo ROS    (-) Type II DM, thyroid disease and chronic steroid usage   Psychiatric:  - neg psychiatric ROS       Infectious Disease:  - neg infectious disease ROS       Malignancy:         Other:                     Physical Exam  Normal systems: cardiovascular, pulmonary and dental    Airway   Mallampati: II  TM distance: >3 FB  Neck ROM: full    Dental     Cardiovascular        Pulmonary    breath sounds clear to auscultation(-) no wheezes                        Anesthesia Plan      History & Physical Review  History and physical reviewed and following examination; no interval change.    ASA Status:  3 emergent.    NPO Status:  > 8 hours    Plan for General and ETT with Intravenous and Propofol induction. Maintenance will be Balanced.    PONV prophylaxis:  Ondansetron (or other 5HT-3) and Dexamethasone or Solumedrol  Additional equipment: Videolaryngoscope, 2nd IV and Arterial Line Type and screen   No Cephalosporin - would use Clinda for abx (had angioedema with PCN)       Postoperative Care  Postoperative pain management:  IV analgesics and Oral pain medications.      Consents  Anesthetic plan, risks, benefits and alternatives discussed with:  Patient.  Use of blood products discussed: Yes.   Consented to blood products.  .

## 2017-08-04 NOTE — IP AVS SNAPSHOT
[facility]    [address] [place] [id number]    Phone:  [phone]                                       After Visit Summary   8/4/2017    Laura Jackson    MRN: [id number]           After Visit Summary Signature Page     I have received my discharge instructions, and my questions have been answered. I have discussed any challenges I see with this plan with the nurse or doctor.    ..........................................................................................................................................  Patient/Patient Representative Signature      ..........................................................................................................................................  Patient Representative Print Name and Relationship to Patient    ..................................................               ................................................  Date                                            Time    ..........................................................................................................................................  Reviewed by Signature/Title    ...................................................              ..............................................  Date                                                            Time

## 2017-08-04 NOTE — BRIEF OP NOTE
[facility] Brief Operative Note    Pre-operative diagnosis: FIBROID   Post-operative diagnosis Fibroid uterus, Pelvic adhesions, Endometriosis of pelvic peritoneum, Apical prolapse.      Procedure: Procedure(s):  DAVINCI TOTAL HYSTERECTOMY; BILATERAL SALPINGECTOMY. BILATERAL URETERAL LYSIS. UTEROSACRAL-COLPOPEXY. EXCISION OF ENDOMETRIOSIS - Wound Class: II-Clean Contaminated  BILATERAL URETERAL LYSIS - Wound Class: II-Clean Contaminated   - Wound Class: II-Clean Contaminated  UTEROSACRAL COLPOPEXY - Wound Class: II-Clean Contaminated   Surgeon(s): Surgeon(s) and Role:     * George Loyola MD - Primary     * Jayshree Gauthier PA-C - Assisting   Estimated blood loss: 20 mL    Specimens:   ID Type Source Tests Collected by Time Destination   A :  Tissue Uterus, Cervix and Bilateral Fallopian Tubes SURGICAL PATHOLOGY EXAM George Loyola MD 8/4/2017  3:28 PM    B : LEFT PELVIC ENDOMETRIOSIS Tissue Pelvis, Left SURGICAL PATHOLOGY EXAM George Loyola MD 8/4/2017  3:49 PM       Findings: Bowel adehsions taken down from rectum to left lateral sidewall, and sigmoid adhesions taken down to visualize the left pelvic brim. A large fibroid was in the cervix, I had to do a left and right ureterolysis from the mid pelvic ureter, down to the cardinal ligament to perform colpotomy safely. After uterus removed, the apex was sagging, and I added a uterosacral colpopexy to renew her support of the cuff. There was an implant of endometriosis directly over the ureter, and this was excised off of the ureter.     Post-op cystoscopy showed no bladder entry and good urojets from the right and the left orifice.

## 2017-08-04 NOTE — ANESTHESIA CARE TRANSFER NOTE
Patient: Laura Jackson    Procedure(s):  DAVINCI TOTAL HYSTERECTOMY; BILATERAL SALPINGECTOMY. BILATERAL URETERAL LYSIS. UTEROSACRAL-COLPOPEXY. EXCISION OF ENDOMETRIOSIS - Wound Class: II-Clean Contaminated  BILATERAL URETERAL LYSIS - Wound Class: II-Clean Contaminated   - Wound Class: II-Clean Contaminated  UTEROSACRAL COLPOPEXY - Wound Class: II-Clean Contaminated    Diagnosis: FIBROID  Diagnosis Additional Information: No value filed.    Anesthesia Type:   General, ETT     Note:  Airway :Face Mask  Patient transferred to:PACU  Comments: Report to PACU RN      Vitals: (Last set prior to Anesthesia Care Transfer)    CRNA VITALS  8/4/2017 1541 - 8/4/2017 1618      8/4/2017             NIBP: (!)  129/99    Pulse: 109    NIBP Mean: 107    Ht Rate: 108    SpO2: 100 %    Resp Rate (observed): 20                Electronically Signed By: SALAZAR Mercado CRNA  August 4, 2017  4:18 PM

## 2017-08-04 NOTE — IP AVS SNAPSHOT
MRN:[id number]                      After Visit Summary   8/4/2017    Laura Jackson    MRN: [id number]           Thank you!     Thank you for choosing [facility] for your care. Our goal is always to provide you with excellent care. Hearing back from our patients is one way we can continue to improve our services. Please take a few minutes to complete the written survey that you may receive in the mail after you visit with us. Thank you!        Patient Information     Date Of Birth          1970        About your hospital stay     You were admitted on:  August 4, 2017 [facility]    You were discharged on:  August 6, 2017       Who to Call     For medical emergencies, please call 911.  For non-urgent questions about your medical care, please call your primary care provider or clinic, [phone]  For questions related to your surgery, please call your surgery clinic        Attending Provider     Provider George Cochran MD OB/Gyn       Primary Care Provider Office Phone # Fax #    Whitney Alva [phone]      After Care Instructions     Discharge Instructions       Resume pre procedure diet            Discharge Instructions       Pelvic Rest. No tampons, douching or intercourse for  6  weeks.            Discharge Instructions       Patient to arrange follow up appointment in 2  weeks            Ice to affected area       PRN as tolerated            No alcohol       NO ALCOHOL for 24 hours post procedure            No driving or operating machinery       No driving or operating machinery until day after procedure            No lifting       No lifting over 20 pounds and no strenuous physical activity.  For 6 weeks            Shower        Shower on Post-op day  1.   DO NOT take a bath                  Further instructions from your care team       Same Day Surgery Discharge Instructions for  Sedation and General  Anesthesia       It's not unusual to feel dizzy, light-headed or faint for up to 24 hours after surgery or while taking pain medication.  If you have these symptoms: sit for a few minutes before standing and have someone assist you when you get up to walk or use the bathroom.      You should rest and relax for the next 24 hours. We recommend you make arrangements to have an adult stay with you for at least 24 hours after your discharge.  Avoid hazardous and strenuous activity.      DO NOT DRIVE any vehicle or operate mechanical equipment for 24 hours following the end of your surgery.  Even though you may feel normal, your reactions may be affected by the medication you have received.      Do not drink alcoholic beverages for 24 hours following surgery.       Slowly progress to your regular diet as you feel able. It's not unusual to feel nauseated and/or vomit after receiving anesthesia.  If you develop these symptoms, drink clear liquids (apple juice, ginger ale, broth, 7-up, etc. ) until you feel better.  If your nausea and vomiting persists for 24 hours, please notify your surgeon.        All narcotic pain medications, along with inactivity and anesthesia, can cause constipation. Drinking plenty of liquids and increasing fiber intake will help.      For any questions of a medical nature, call your surgeon.      Do not make important decisions for 24 hours.      If you had general anesthesia, you may have a sore throat for a couple of days related to the breathing tube used during surgery.  You may use Cepacol lozenges to help with this discomfort.  If it worsens or if you develop a fever, contact your surgeon.       If you feel your pain is not well managed with the pain medications prescribed by your surgeon, please contact your surgeon's office to let them know so they can address your concerns.       YOU RECEIVED 975 MG ACETAMINOPHEN AT 1:55 pm. MAXIMUM DAILY DOSE IS 4000 MG.    Discharge Instructions for  Laparoscopic or Davinci Hysterectomy    Incisional Care  A small amount of drainage from your incisions is normal. You may wear Band Aids until the drainage stops. The day after surgery, if your incisions are dry, remove the Band Aids. Leave the Steri-Strips (small pieces of tape) in place. Let them fall off on their own, or gently remove them after 7 days.  Once your have removed your Band Aids, you may shower as usual. Wash your incisions with mild soap and water. Pat dry.  Don't use oils, powders, or lotions on your incisions.  General Care  Take your medications exactly as directed by your doctor.    You may have vaginal bleeding that can last for several weeks. Use pads only. Don't put anything in your vagina (tampons, douches or have sexual intercourse) until your doctor says it's safe to do so.  Avoid constipation.  Eat fruits, vegetables, and whole grains.  Drink 6-8 glasses of water a day, unless told to do otherwise.  Use a laxative or a mild stool softener if your doctor says it's okay.  Activity  Don't drive while you are still taking narcotic pain medications.  Don t do strenuous activities until the doctor says it's okay.  Walk as often as you feel able.    Pain  Your incisions may be tender or sore. You may also have pain in your upper back or shoulders. This is from the gas used to enlarge your abdomen to allow your doctor to see inside your pelvis and perform the procedure. This pain usually goes away in a day or two.   When to Call Your Doctor  Call your doctor right away if you have any of the following:  Fever above 100.4 F (38 C) or chills  Vaginal bleeding that soaks more than one sanitary pad per hour  A foul smelling discharge from the vagina  Difficulty urinating  Severe pain   Redness, swelling, or drainage at your incision sites  Follow-Up  Make a follow-up appointment as directed by your surgeon.    Cystoscopy Discharge Instructions      Diet:    Return to the diet that you were on before  "the procedure, unless you are given specific diet instructions.    It is important to drink 6-8 glasses of fluids per day at home - at least 3-4 glasses should be water.    Activity:    Walk short distances and increase as your strength allows.    You may climb stairs.    Do not do strenuous exercise or heavy lifting until approved by surgeon.    Do not drive while taking narcotic pain medications.    Bathing:    You may take a shower.    Call your physician if these signs/symptoms are present:    Pain that is not relieved by a short rest or ordered pain medications.    Temperature at or above 101.0 F or chills.    Inability or difficulty urinating.  Excessive blood in urine.    Any questions or concerns.    **If you have questions or concerns about your procedure,   call Dr. Loyola [phone]**     Telephone order received from DR Rhys Mills.   May take Zofran 4 mg every 6 hour by mouth As needed for nausea.   Prescription called in to [facility] pharmacy at [place]. Telephone no. [phone].  Patient instructed to  medicine at above pharmacy.        Pending Results     Date and Time Order Name Status Description    8/5/2017 0121 EKG 12-lead, tracing only Preliminary             Admission Information     Date & Time Provider Department Dept. Phone    8/4/2017 George Loyola MD [facility] [phone]      Your Vitals Were     Blood Pressure Pulse Temperature Respirations Height Weight    129/85 (BP Location: Left arm) 70 97.6  F (36.4  C) (Oral) 16 1.651 m (5' 5\") 72.1 kg (158 lb 14.4 oz)    Last Period Pulse Oximetry BMI (Body Mass Index)             07/28/2017 99% 26.44 kg/m2         [facility] Information     [facility] [employer] gives you secure access to your electronic health record. If you see a primary care provider, you can also send messages to your care team and make appointments. If you have questions, please call your primary care clinic.  If you do not have a primary care provider, please " call [phone] and they will assist you.        Care EveryWhere ID     This is your Care EveryWhere ID. This could be used by other organizations to access your [facility] medical records  [id number]        Equal Access to Services     ODESSA ALBERTS: Hadii aad ku hadblayneuriel Consuelo, waapda luqadaha, qaybta kaalmada michelle, ludmila myersnelsy adams graysonmary alberts. So [facility] [phone].    ATENCIÓN: Si habla español, tiene a guido disposición servicios gratuitos de asistencia lingüística. Llame al [phone].    We comply with applicable federal civil rights laws and [place] laws. We do not discriminate on the basis of race, color, national origin, age, disability sex, sexual orientation or gender identity.               Review of your medicines      START taking        Dose / Directions    ibuprofen 600 MG tablet   Commonly known as:  ADVIL/MOTRIN   Used for:  Intramural leiomyoma of uterus, Endometriosis of pelvic peritoneum, Uterovaginal prolapse, incomplete   Notes to Patient:  While you were at the hospital today you received Toradol, an antiinflammatory medication similar to Ibuprofen.  You should not take other antiinflammatory medication, such as Ibuprofen, Motrin, Advil, Aleve, Naprosyn, etc, until 10:45 pm  .           Dose:  600 mg   Take 1 tablet (600 mg) by mouth every 6 hours as needed for pain (mild)   Quantity:  60 tablet   Refills:  0       oxyCODONE 5 MG IR tablet   Commonly known as:  ROXICODONE   Used for:  Endometriosis of pelvic peritoneum, Intramural leiomyoma of uterus, Uterovaginal prolapse, incomplete        Dose:  5-10 mg   Take 1-2 tablets (5-10 mg) by mouth every 3 hours as needed for pain or other (Moderate to Severe)   Quantity:  60 tablet   Refills:  0         CONTINUE these medicines which have NOT CHANGED        Dose / Directions    alpha-d-galactosidase tablet        Dose:  2 tablet   Take 2 tablets by mouth daily as needed for intestinal gas   Refills:  0        fluticasone-salmeterol 100-50 MCG/DOSE diskus inhaler   Commonly known as:  ADVAIR   Used for:  Mild persistent asthma without complication        Dose:  1 puff   Inhale 1 puff into the lungs 2 times daily   Quantity:  3 Inhaler   Refills:  1       levalbuterol 45 MCG/ACT Inhaler   Commonly known as:  XOPENEX HFA   Used for:  Mild persistent asthma without complication        Dose:  1-2 puff   Inhale 1-2 puffs into the lungs every 4 hours as needed for shortness of breath / dyspnea   Quantity:  1 Inhaler   Refills:  5       omeprazole 20 MG tablet        Dose:  20 mg   Take 20 mg by mouth daily   Refills:  0       Simethicone Extra Strength 125 MG Caps        Dose:  2 capsule   Take 2 capsules by mouth daily as needed   Refills:  0       ZANTAC PO        Dose:  137 mg   Take 137 mg by mouth   Refills:  0            Where to get your medicines      Some of these will need a paper prescription and others can be bought over the counter. Ask your nurse if you have questions.     Bring a paper prescription for each of these medications     ibuprofen 600 MG tablet    oxyCODONE 5 MG IR tablet                Protect others around you: Learn how to safely use, store and throw away your medicines at www.disposemymeds.org.             Medication List: This is a list of all your medications and when to take them. Check marks below indicate your daily home schedule. Keep this list as a reference.      Medications           Morning Afternoon Evening Bedtime As Needed    alpha-d-galactosidase tablet   Take 2 tablets by mouth daily as needed for intestinal gas                                   fluticasone-salmeterol 100-50 MCG/DOSE diskus inhaler   Commonly known as:  ADVAIR   Inhale 1 puff into the lungs 2 times daily   Last time this was given:  1 puff on 8/6/2017  8:05 AM                                      ibuprofen 600 MG tablet   Commonly known as:  ADVIL/MOTRIN   Take 1 tablet (600 mg) by mouth every 6 hours as needed for  pain (mild)   Notes to Patient:  While you were at the hospital today you received Toradol, an antiinflammatory medication similar to Ibuprofen.  You should not take other antiinflammatory medication, such as Ibuprofen, Motrin, Advil, Aleve, Naprosyn, etc, until 10:45 pm  .                                      levalbuterol 45 MCG/ACT Inhaler   Commonly known as:  XOPENEX HFA   Inhale 1-2 puffs into the lungs every 4 hours as needed for shortness of breath / dyspnea                                   omeprazole 20 MG tablet   Take 20 mg by mouth daily                                   oxyCODONE 5 MG IR tablet   Commonly known as:  ROXICODONE   Take 1-2 tablets (5-10 mg) by mouth every 3 hours as needed for pain or other (Moderate to Severe)   Last time this was given:  5 mg on 8/6/2017  9:36 AM                                   Simethicone Extra Strength 125 MG Caps   Take 2 capsules by mouth daily as needed                                   ZANTAC PO   Take 137 mg by mouth   Last time this was given:  150 mg on 8/6/2017  8:04 AM

## 2017-08-04 NOTE — ANESTHESIA POSTPROCEDURE EVALUATION
Patient: Laura Jackson    Procedure(s):  DAVINCI TOTAL HYSTERECTOMY; BILATERAL SALPINGECTOMY. BILATERAL URETERAL LYSIS. UTEROSACRAL-COLPOPEXY. EXCISION OF ENDOMETRIOSIS - Wound Class: II-Clean Contaminated  BILATERAL URETERAL LYSIS - Wound Class: II-Clean Contaminated   - Wound Class: II-Clean Contaminated  UTEROSACRAL COLPOPEXY - Wound Class: II-Clean Contaminated    Diagnosis:FIBROID  Diagnosis Additional Information: No value filed.    Anesthesia Type:  General, ETT    Note:  Anesthesia Post Evaluation    Patient location during evaluation: PACU  Patient participation: Able to fully participate in evaluation  Level of consciousness: sleepy but conscious and responsive to verbal stimuli  Pain management: adequate  Airway patency: patent  Cardiovascular status: acceptable and hemodynamically stable  Respiratory status: acceptable and unassisted  Hydration status: acceptable  PONV: none     Anesthetic complications: None          Last vitals:  Vitals:    08/04/17 1613 08/04/17 1615 08/04/17 1630   BP: 136/84 136/84 129/83   Resp: 14 14 14   Temp: 36.2  C (97.1  F)     SpO2: 100% 100% 100%         Electronically Signed By: Jagdish Crocker MD  August 4, 2017  4:34 PM

## 2017-08-05 ENCOUNTER — APPOINTMENT (OUTPATIENT)
Dept: GENERAL RADIOLOGY | Facility: CLINIC | Age: 47
End: 2017-08-05
Attending: INTERNAL MEDICINE
Payer: COMMERCIAL

## 2017-08-05 LAB
ANION GAP SERPL CALCULATED.3IONS-SCNC: 7 MMOL/L (ref 3–14)
BUN SERPL-MCNC: 7 MG/DL (ref 7–30)
CALCIUM SERPL-MCNC: 8.4 MG/DL (ref 8.5–10.1)
CHLORIDE SERPL-SCNC: 106 MMOL/L (ref 94–109)
CO2 SERPL-SCNC: 25 MMOL/L (ref 20–32)
CREAT SERPL-MCNC: 0.66 MG/DL (ref 0.52–1.04)
D DIMER PPP FEU-MCNC: 0.9 UG/ML FEU (ref 0–0.5)
ERYTHROCYTE [DISTWIDTH] IN BLOOD BY AUTOMATED COUNT: 13.6 % (ref 10–15)
GFR SERPL CREATININE-BSD FRML MDRD: ABNORMAL ML/MIN/1.7M2
GLUCOSE SERPL-MCNC: 128 MG/DL (ref 70–99)
HCT VFR BLD AUTO: 32.4 % (ref 35–47)
HGB BLD-MCNC: 10.7 G/DL (ref 11.7–15.7)
MCH RBC QN AUTO: 27.1 PG (ref 26.5–33)
MCHC RBC AUTO-ENTMCNC: 33 G/DL (ref 31.5–36.5)
MCV RBC AUTO: 82 FL (ref 78–100)
PLATELET # BLD AUTO: 282 10E9/L (ref 150–450)
POTASSIUM SERPL-SCNC: 3.9 MMOL/L (ref 3.4–5.3)
RBC # BLD AUTO: 3.95 10E12/L (ref 3.8–5.2)
SODIUM SERPL-SCNC: 138 MMOL/L (ref 133–144)
TROPONIN I SERPL-MCNC: NORMAL UG/L (ref 0–0.04)
TROPONIN I SERPL-MCNC: NORMAL UG/L (ref 0–0.04)
WBC # BLD AUTO: 11.3 10E9/L (ref 4–11)

## 2017-08-05 PROCEDURE — 25000128 H RX IP 250 OP 636: Performed by: OBSTETRICS & GYNECOLOGY

## 2017-08-05 PROCEDURE — 93005 ELECTROCARDIOGRAM TRACING: CPT

## 2017-08-05 PROCEDURE — 85027 COMPLETE CBC AUTOMATED: CPT | Performed by: INTERNAL MEDICINE

## 2017-08-05 PROCEDURE — 40000934 ZZH STATISTIC OUTPATIENT (NON-OBS) DAY

## 2017-08-05 PROCEDURE — 25000128 H RX IP 250 OP 636: Performed by: INTERNAL MEDICINE

## 2017-08-05 PROCEDURE — 84484 ASSAY OF TROPONIN QUANT: CPT | Mod: 91 | Performed by: INTERNAL MEDICINE

## 2017-08-05 PROCEDURE — 85379 FIBRIN DEGRADATION QUANT: CPT | Performed by: INTERNAL MEDICINE

## 2017-08-05 PROCEDURE — 25000125 ZZHC RX 250: Performed by: OBSTETRICS & GYNECOLOGY

## 2017-08-05 PROCEDURE — 36415 COLL VENOUS BLD VENIPUNCTURE: CPT | Performed by: INTERNAL MEDICINE

## 2017-08-05 PROCEDURE — 71010 XR CHEST PORT 1 VW: CPT

## 2017-08-05 PROCEDURE — 40000936 ZZH STATISTIC OUTPATIENT (NON-OBS) NIGHT

## 2017-08-05 PROCEDURE — 25000132 ZZH RX MED GY IP 250 OP 250 PS 637: Performed by: OBSTETRICS & GYNECOLOGY

## 2017-08-05 PROCEDURE — 99202 OFFICE O/P NEW SF 15 MIN: CPT | Performed by: INTERNAL MEDICINE

## 2017-08-05 PROCEDURE — 25000132 ZZH RX MED GY IP 250 OP 250 PS 637: Performed by: PHYSICIAN ASSISTANT

## 2017-08-05 PROCEDURE — 80048 BASIC METABOLIC PNL TOTAL CA: CPT | Performed by: INTERNAL MEDICINE

## 2017-08-05 PROCEDURE — 40000935 ZZH STATISTIC OUTPATIENT (NON-OBS) EVE

## 2017-08-05 RX ORDER — SIMETHICONE 80 MG
80 TABLET,CHEWABLE ORAL DAILY PRN
Status: DISCONTINUED | OUTPATIENT
Start: 2017-08-05 | End: 2017-08-06 | Stop reason: HOSPADM

## 2017-08-05 RX ORDER — LEVALBUTEROL TARTRATE 45 UG/1
1-2 AEROSOL, METERED ORAL EVERY 4 HOURS PRN
Status: DISCONTINUED | OUTPATIENT
Start: 2017-08-05 | End: 2017-08-06 | Stop reason: HOSPADM

## 2017-08-05 RX ORDER — AMOXICILLIN 250 MG
1-2 CAPSULE ORAL 2 TIMES DAILY
Status: DISCONTINUED | OUTPATIENT
Start: 2017-08-05 | End: 2017-08-06 | Stop reason: HOSPADM

## 2017-08-05 RX ADMIN — ACETAMINOPHEN 650 MG: 325 TABLET, FILM COATED ORAL at 07:41

## 2017-08-05 RX ADMIN — ONDANSETRON 4 MG: 4 TABLET, ORALLY DISINTEGRATING ORAL at 23:59

## 2017-08-05 RX ADMIN — FLUTICASONE PROPIONATE AND SALMETEROL 1 PUFF: 50; 100 POWDER RESPIRATORY (INHALATION) at 20:21

## 2017-08-05 RX ADMIN — SODIUM CHLORIDE, POTASSIUM CHLORIDE, SODIUM LACTATE AND CALCIUM CHLORIDE: 600; 310; 30; 20 INJECTION, SOLUTION INTRAVENOUS at 03:40

## 2017-08-05 RX ADMIN — ACETAMINOPHEN 650 MG: 325 TABLET, FILM COATED ORAL at 13:52

## 2017-08-05 RX ADMIN — SODIUM CHLORIDE 2000 ML: 9 INJECTION, SOLUTION INTRAVENOUS at 01:34

## 2017-08-05 RX ADMIN — KETOROLAC TROMETHAMINE 30 MG: 30 INJECTION, SOLUTION INTRAMUSCULAR at 05:52

## 2017-08-05 RX ADMIN — RANITIDINE 150 MG: 150 TABLET ORAL at 10:57

## 2017-08-05 RX ADMIN — KETOROLAC TROMETHAMINE 30 MG: 30 INJECTION, SOLUTION INTRAMUSCULAR at 18:34

## 2017-08-05 RX ADMIN — SODIUM CHLORIDE, POTASSIUM CHLORIDE, SODIUM LACTATE AND CALCIUM CHLORIDE: 600; 310; 30; 20 INJECTION, SOLUTION INTRAVENOUS at 13:48

## 2017-08-05 RX ADMIN — ACETAMINOPHEN 650 MG: 325 TABLET, FILM COATED ORAL at 18:04

## 2017-08-05 RX ADMIN — ONDANSETRON 4 MG: 4 TABLET, ORALLY DISINTEGRATING ORAL at 09:30

## 2017-08-05 RX ADMIN — ONDANSETRON 4 MG: 4 TABLET, ORALLY DISINTEGRATING ORAL at 15:41

## 2017-08-05 RX ADMIN — FLUTICASONE PROPIONATE AND SALMETEROL 1 PUFF: 50; 100 POWDER RESPIRATORY (INHALATION) at 13:20

## 2017-08-05 RX ADMIN — SODIUM CHLORIDE, POTASSIUM CHLORIDE, SODIUM LACTATE AND CALCIUM CHLORIDE: 600; 310; 30; 20 INJECTION, SOLUTION INTRAVENOUS at 23:55

## 2017-08-05 RX ADMIN — SENNOSIDES AND DOCUSATE SODIUM 2 TABLET: 8.6; 5 TABLET ORAL at 16:13

## 2017-08-05 RX ADMIN — RANITIDINE 150 MG: 150 TABLET ORAL at 20:22

## 2017-08-05 RX ADMIN — KETOROLAC TROMETHAMINE 30 MG: 30 INJECTION, SOLUTION INTRAMUSCULAR at 12:33

## 2017-08-05 RX ADMIN — OXYCODONE HYDROCHLORIDE 5 MG: 5 TABLET ORAL at 10:46

## 2017-08-05 ASSESSMENT — PAIN DESCRIPTION - DESCRIPTORS: DESCRIPTORS: POUNDING

## 2017-08-05 NOTE — PLAN OF CARE
Problem: Goal Outcome Summary  Goal: Goal Outcome Summary  Outcome: Improving  Patient A&O. VSS,RA. IV infusing. On regular diet but not tolerating it well. C/O continuous nausea. PRN Zofran administered. C/O pain at incision sites. Incision CDI. PRN Oxycodone and Tylenol administered. Ice pack offered. SBA for transfer. Tele NSR.  Continue to monitor.

## 2017-08-05 NOTE — CODE/RAPID RESPONSE
Date of service: 8/5/2017     I came to evaluate this patient for chest pain and tachycardia. The patient is a 46 yo female s/p recent hysterectomy now under observation. The patient started having chest pain early this am and HR noted to be in the 120s. The patient reported having similar symptoms in the past but never given a reason why. On exam BP was 140s/70s, HR 120s, spO2 in upper 90s on room air. The patient's HR was regular with rate in the 100s. Lung sounds were clear. The patient's chest pain was non reproducible. EKG c/w sinus tachycardia. The patient appears to have symptomatic tachycardia with etiology unclear. She may have an AVNRT; however, may also be due to dehydration, anemia, or an electrolyte disturbance. Given recent operation, PE also on the differential. Will order CBC, BMP, d dimer, and troponin. Will order 2L NS.     15 minutes spent with patient     Rub[facility] Physician

## 2017-08-05 NOTE — PLAN OF CARE
Problem: Discharge Planning  Goal: Discharge Planning (Adult, OB, Behavioral, Peds)  Outcome: No Change  Patient began to c/o of nausea and wanted zofran.  She then started to have TC with heart rate reaching ~155.  This was associated with some moderate chest pain rating it at 7.  An RRT was called.  Doc then ordered a bolus, chest x-ray, EKG, and labs. Her HR subsequently came down but it did raise again into the 120s for short duration  ~5min each with her feeling not well.  She has had this happen before and was given fluids in the ED which helped.  She has only voided ~100cc since her return from surgery.  No diaphoresis but she did say she felt hot.  Temperature in room turned down.

## 2017-08-05 NOTE — PLAN OF CARE
Problem: Discharge Planning  Goal: Discharge Planning (Adult, OB, Behavioral, Peds)  Outcome: No Change  Patient A & O x 4.  Doing better since her bolus of 2000cc of fluid.  She voided 800cc.  No further TC with heartrate in the 70's.  Troponin negative.  DD slightly elevated and this was called to the house doc who said it is most likely elevated due to her surgery in lieu of her feeling better.  She has not had adequate intake of fluids yet but only ice chips and sips.  Her abdomin lap sites continue CDI with hypoactive BS. Telemetry SR.

## 2017-08-05 NOTE — PROGRESS NOTES
Doing well overall. She has to drink more and eat a little today. HEr pain is contrrolled but she has an easily upset stomach and that is what we have been contending with. She has bouts of tachycardia, and is weeks past, but is otherwise stable and doing well. I wonder if this is a Levoalbuterol effect. Anticipate her discharge home later today.

## 2017-08-05 NOTE — OR NURSING
Patient became tachycardic, , and states she was having a hard time catching her breath. Dr. Moran at bedside, patient states she has had similar episodes in the past with eating. Patient took her own meds for the discomfort B&O, simethicone and zantac, OK'd per Dr. Moran.

## 2017-08-06 VITALS
SYSTOLIC BLOOD PRESSURE: 129 MMHG | BODY MASS INDEX: 26.48 KG/M2 | OXYGEN SATURATION: 99 % | HEIGHT: 65 IN | HEART RATE: 70 BPM | WEIGHT: 158.9 LBS | DIASTOLIC BLOOD PRESSURE: 85 MMHG | TEMPERATURE: 97.6 F | RESPIRATION RATE: 16 BRPM

## 2017-08-06 LAB — GLUCOSE SERPL-MCNC: 100 MG/DL (ref 70–99)

## 2017-08-06 PROCEDURE — 88305 TISSUE EXAM BY PATHOLOGIST: CPT | Performed by: OBSTETRICS & GYNECOLOGY

## 2017-08-06 PROCEDURE — 25000125 ZZHC RX 250: Performed by: OBSTETRICS & GYNECOLOGY

## 2017-08-06 PROCEDURE — 88307 TISSUE EXAM BY PATHOLOGIST: CPT | Mod: 26 | Performed by: OBSTETRICS & GYNECOLOGY

## 2017-08-06 PROCEDURE — 88307 TISSUE EXAM BY PATHOLOGIST: CPT | Performed by: OBSTETRICS & GYNECOLOGY

## 2017-08-06 PROCEDURE — 25000132 ZZH RX MED GY IP 250 OP 250 PS 637: Performed by: PHYSICIAN ASSISTANT

## 2017-08-06 PROCEDURE — 88305 TISSUE EXAM BY PATHOLOGIST: CPT | Mod: 26 | Performed by: OBSTETRICS & GYNECOLOGY

## 2017-08-06 PROCEDURE — 82947 ASSAY GLUCOSE BLOOD QUANT: CPT | Performed by: OBSTETRICS & GYNECOLOGY

## 2017-08-06 PROCEDURE — 36415 COLL VENOUS BLD VENIPUNCTURE: CPT | Performed by: OBSTETRICS & GYNECOLOGY

## 2017-08-06 PROCEDURE — 25000132 ZZH RX MED GY IP 250 OP 250 PS 637: Performed by: OBSTETRICS & GYNECOLOGY

## 2017-08-06 PROCEDURE — 40000934 ZZH STATISTIC OUTPATIENT (NON-OBS) DAY

## 2017-08-06 RX ADMIN — ONDANSETRON 4 MG: 4 TABLET, ORALLY DISINTEGRATING ORAL at 09:36

## 2017-08-06 RX ADMIN — FLUTICASONE PROPIONATE AND SALMETEROL 1 PUFF: 50; 100 POWDER RESPIRATORY (INHALATION) at 08:05

## 2017-08-06 RX ADMIN — SENNOSIDES AND DOCUSATE SODIUM 2 TABLET: 8.6; 5 TABLET ORAL at 08:04

## 2017-08-06 RX ADMIN — OXYCODONE HYDROCHLORIDE 5 MG: 5 TABLET ORAL at 00:00

## 2017-08-06 RX ADMIN — ACETAMINOPHEN 650 MG: 325 TABLET, FILM COATED ORAL at 00:54

## 2017-08-06 RX ADMIN — OMEPRAZOLE 20 MG: 20 CAPSULE, DELAYED RELEASE ORAL at 08:04

## 2017-08-06 RX ADMIN — ACETAMINOPHEN 650 MG: 325 TABLET, FILM COATED ORAL at 04:53

## 2017-08-06 RX ADMIN — OXYCODONE HYDROCHLORIDE 5 MG: 5 TABLET ORAL at 09:36

## 2017-08-06 RX ADMIN — OXYCODONE HYDROCHLORIDE 5 MG: 5 TABLET ORAL at 04:53

## 2017-08-06 RX ADMIN — ACETAMINOPHEN 650 MG: 325 TABLET, FILM COATED ORAL at 09:36

## 2017-08-06 RX ADMIN — RANITIDINE 150 MG: 150 TABLET ORAL at 08:04

## 2017-08-06 NOTE — PLAN OF CARE
Problem: Goal Outcome Summary  Goal: Goal Outcome Summary  Outcome: Improving  A&O.  VSS, RA.  Lap site CDI/KIARA.  Pain managed with schedule tramadol and prn tylenol.  Nausea getting better, zofran given around 1600, since then no c/o nausea.  IV infusing @ 100 ml/hr.  Tolerating regular diet.  Tele NSR, no episode of tachy this shift.  Up ind in room.  Discharge tomorrow.

## 2017-08-06 NOTE — PROGRESS NOTES
Patient requested to Go home PRN Zofran for nausea. Called on call DR Mills for the order. Got verbal telephone order. Per patient order called in to [facility] pharmacy at [place].

## 2017-08-06 NOTE — DISCHARGE INSTRUCTIONS
Same Day Surgery Discharge Instructions for  Sedation and General Anesthesia       It's not unusual to feel dizzy, light-headed or faint for up to 24 hours after surgery or while taking pain medication.  If you have these symptoms: sit for a few minutes before standing and have someone assist you when you get up to walk or use the bathroom.      You should rest and relax for the next 24 hours. We recommend you make arrangements to have an adult stay with you for at least 24 hours after your discharge.  Avoid hazardous and strenuous activity.      DO NOT DRIVE any vehicle or operate mechanical equipment for 24 hours following the end of your surgery.  Even though you may feel normal, your reactions may be affected by the medication you have received.      Do not drink alcoholic beverages for 24 hours following surgery.       Slowly progress to your regular diet as you feel able. It's not unusual to feel nauseated and/or vomit after receiving anesthesia.  If you develop these symptoms, drink clear liquids (apple juice, ginger ale, broth, 7-up, etc. ) until you feel better.  If your nausea and vomiting persists for 24 hours, please notify your surgeon.        All narcotic pain medications, along with inactivity and anesthesia, can cause constipation. Drinking plenty of liquids and increasing fiber intake will help.      For any questions of a medical nature, call your surgeon.      Do not make important decisions for 24 hours.      If you had general anesthesia, you may have a sore throat for a couple of days related to the breathing tube used during surgery.  You may use Cepacol lozenges to help with this discomfort.  If it worsens or if you develop a fever, contact your surgeon.       If you feel your pain is not well managed with the pain medications prescribed by your surgeon, please contact your surgeon's office to let them know so they can address your concerns.       YOU RECEIVED 975 MG ACETAMINOPHEN AT 1:55 pm.  MAXIMUM DAILY DOSE IS 4000 MG.    Discharge Instructions for Laparoscopic or Davinci Hysterectomy    Incisional Care  A small amount of drainage from your incisions is normal. You may wear Band Aids until the drainage stops. The day after surgery, if your incisions are dry, remove the Band Aids. Leave the Steri-Strips (small pieces of tape) in place. Let them fall off on their own, or gently remove them after 7 days.  Once your have removed your Band Aids, you may shower as usual. Wash your incisions with mild soap and water. Pat dry.  Don't use oils, powders, or lotions on your incisions.  General Care  Take your medications exactly as directed by your doctor.    You may have vaginal bleeding that can last for several weeks. Use pads only. Don't put anything in your vagina (tampons, douches or have sexual intercourse) until your doctor says it's safe to do so.  Avoid constipation.  Eat fruits, vegetables, and whole grains.  Drink 6-8 glasses of water a day, unless told to do otherwise.  Use a laxative or a mild stool softener if your doctor says it's okay.  Activity  Don't drive while you are still taking narcotic pain medications.  Don t do strenuous activities until the doctor says it's okay.  Walk as often as you feel able.    Pain  Your incisions may be tender or sore. You may also have pain in your upper back or shoulders. This is from the gas used to enlarge your abdomen to allow your doctor to see inside your pelvis and perform the procedure. This pain usually goes away in a day or two.   When to Call Your Doctor  Call your doctor right away if you have any of the following:  Fever above 100.4 F (38 C) or chills  Vaginal bleeding that soaks more than one sanitary pad per hour  A foul smelling discharge from the vagina  Difficulty urinating  Severe pain   Redness, swelling, or drainage at your incision sites  Follow-Up  Make a follow-up appointment as directed by your surgeon.    Cystoscopy Discharge  Instructions      Diet:    Return to the diet that you were on before the procedure, unless you are given specific diet instructions.    It is important to drink 6-8 glasses of fluids per day at home - at least 3-4 glasses should be water.    Activity:    Walk short distances and increase as your strength allows.    You may climb stairs.    Do not do strenuous exercise or heavy lifting until approved by surgeon.    Do not drive while taking narcotic pain medications.    Bathing:    You may take a shower.    Call your physician if these signs/symptoms are present:    Pain that is not relieved by a short rest or ordered pain medications.    Temperature at or above 101.0 F or chills.    Inability or difficulty urinating.  Excessive blood in urine.    Any questions or concerns.    **If you have questions or concerns about your procedure,   call Dr. Loyola [phone]**     Telephone order received from DR Rhys Mills.   May take Zofran 4 mg every 6 hour by mouth As needed for nausea.   Prescription called in to [facility] pharmacy at [place]. Telephone no. [phone].  Patient instructed to  medicine at above pharmacy.

## 2017-08-06 NOTE — PLAN OF CARE
Problem: Goal Outcome Summary  Goal: Goal Outcome Summary  A&O. VSS, RA. Up IND.  Lap site CDI with liquid bandages, open to air. BS+. Belching, not passing flatus per pt. C/o 6/10 abdominal and back pain, received PRN Oxycodone, Tylenol, and ice packs w/relief. Voiding without difficulty. Nausea getting better, controlled on SL Zofran. IV infusing @ 100 ml/hr. Tolerating regular diet-rice chex overnight. Tele NSR, no episode of tachy this shift. Pending discharge today.

## 2017-08-06 NOTE — PROGRESS NOTES
POD 2    Feels better but still gassy. Scant bleeding, no bladder issues  VS reviewed  Inc healing well without erythema  abd soft  extr neg    A/P POD 2 s/p divinvi TL  D/C home, has meds and f/u scheduled

## 2017-08-06 NOTE — PROGRESS NOTES
Patient discharged to home by wheelchair at 11:49 AM 08/06/17.  Medication regimen and new medications discussed with patient and patient verbalizes understanding. Diet and activity and wound care discussed with patient. Upcoming appointments reviewed. No questions at this time.

## 2017-08-08 LAB
COPATH REPORT: NORMAL
INTERPRETATION ECG - MUSE: NORMAL

## 2017-08-24 NOTE — OP NOTE
DATE OF PROCEDURE:  08/04/2017       PREOPERATIVE DIAGNOSIS:  Symptomatic fibroid uterus.      POSTOPERATIVE DIAGNOSES:   1.  Symptomatic fibroid uterus.   2.  Pelvic adhesions.   3.  Endometriosis of the pelvic peritoneum.   4.  Apical prolapse of the vagina.      PROCEDURES:   1.  da Magdalene total laparoscopic hysterectomy.   2.  Bilateral salpingectomy.   3.  Bilateral ureterolysis.   4.  Uterosacral colpopexy.   5.  Excision of endometriosis.      ASSISTANT:  Jayshree Gauthier PA-C      ANESTHESIA:  General.      ESTIMATED BLOOD LOSS:  20 mL.      COMPLICATIONS:  None.      SPECIMENS:   1.  Uterus, cervix and bilateral fallopian tubes.   2.  Left pelvic endometriosis.      FINDINGS:  Bowel adhesions were taken down from the rectum to the left lateral sidewall, and sigmoid adhesions were taken down to help visualize the left pelvic brim.  A large fibroid was in the cervix, and this almost doubled the length of the otherwise normal uterus that sat up on top of it.  The fibroid was low enough that we had to do left and right ureterolysis down to the cardinal ligament to prevent from cutting the ureters, which were in direct contact with the distorted fibrotic cervix.  We did this ureterolysis from mid pelvis down to the cardinal ligament.  After this was done, we performed the colpotomy safely.  The uterus was removed and we saw that the vaginal apex was prolapsed after the removal of the fibroid, so we added a uterosacral colpopexy to renew the support of the vaginal apex.      We also incidentally found an implant of endometriosis that was directly overlying the left ureter.  This was excised off the ureter without entry into the ureter.      Postoperative cystoscopy showed no bladder entry and good ureteral jets from the right and left ureteral orifices.      DESCRIPTION OF OPERATIVE PROCEDURE:  After obtaining informed consent, Laura Jackson was prepped and draped in the usual manner for abdominovaginal  procedure.  A VCare device was inserted in the lower field with some difficulty.  A Tellez catheter was inserted in the bladder.  Gloves were changed and attention was turned to the abdomen.  We created a 3 cm umbilical incision and carried dissection down to the level of the fascia.  Fascia was nicked, undermined with Earl scissors and extended superiorly and inferiorly.  The parietal peritoneum was entered bluntly and the GelPort would retractor element was inserted without incident.  We then placed our GelPort cap, and pneumoperitoneum was created to 15.  I placed bilateral da Magdalene ports for the instrumentation 10 cm from midline on the right and left of the umbilicus.  At this point, the patient was placed in steep Trendelenburg and the da Magdalene robot was docked.      My assistant worked with the GelPort.  I took my place at the operative console.      Using a fenestrated bipolar grasper and a permanent cautery hook, I amputated the left fallopian tube from the left mesosalpinx.  We also then removed it from the ovary, taking care to cauterize isolated bleeders.  The tube was then sent off the field.  I then electrocoagulated the left utero-ovarian ligament and the left round ligament.  These were cut with scissors and we entered the broad ligament.  The broad ligament was  in the anterior and posterior leaves.  We took the anterior leaf down over the front of the uterus between the uterine specimen and the fibroid distortion.  The VCare cup could not be seen, but I started a bladder flap.  We cut the posterior aspect of the broad ligament down to the cervical isthmus, and this skeletonized the vasculature, which we initially took at the cervical isthmus with electrocautery x2, cutting with scissors.  I performed a dissection of the rectal adhesions using electrocautery and blunt dissection until the ureters could be visualized and the entire left ovary could be visualized.  We did then see  endometriosis of the peritoneum overlying the left ureter.  This was elevated and mobilized, we created an entry point use electrocautery on the retroperitoneum and then largely used blunt dissection to peel this growth off the ureter at that location.  The specimen was then cut free and sent off the field.  Next, we performed a similar uterine dissection on the right side as we did on the left, taking it down to the cervical isthmus.  At this point, we had a large amount of upper pressure with the VCare device, but it became evident that the ureters were closely apposed to the distorted fibroid uterus.  We dissected the ureter from the mid pelvic cavity down to the fibroid, and the ureter on the right and left were peeled off of the fibroid so they were .  Eventually using blunt dissection and occasional electrocautery, we were able to peel retroperitoneal tissue away from the fibroid enough to where we could see the cup of the VCare device.  Once this was complete and the ureters were again visualized, I electrocoagulated the pedicles at 3 and 9 o'clock to cut off blood flow to the cervical fibroid.  We then entered the vagina posteriorly over the VCare cup and extended the excision around until the specimen was freed.  The entire uterus was able to be pulled through the vagina.        Next, we assessed the vagina and found it was greatly slumped and the support was poor with the removal of the fibrotic tissue.  For this reason, we closed the fascia with 2-0 Covidien V-Loc 180 stitch in a running manner, left to right, and then we closed the peritoneum and augmented the fascial closure, incorporating the uterosacral ligaments to complete a uterosacral colpopexy and peritoneal closure.  Once this was done, the support looked excellent.  The ureters were seen to the right and left, and photographs were taken.  At this point, I scrubbed back into the case.      The da Magdalene robot was undocked,  instrumentation was removed, gel port was removed, and pneumoperitoneum was let down.  We closed the fascia with 0 looped Maxon in a running stitch.  Skin was closed with 4-0 Vicryl in a subcuticular stitch.  The wound was sealed with Dermabond.      I took my place at the lower field and evaluated the pelvic support, and I found it to be stage 0, and it was excellent.  We placed a cystoscope and inspected the ureteral orifices to the right and left, which were easily propulsing with concentrated urine coming from the right and left.  No bladder damage was visualized.  At this point, the operative procedure was complete.  Needle, sponge and instrument counts were correct.  The patient tolerated the procedure well.      DISPOSITION:  The patient is in satisfactory stable condition and is in recovery.         VENU FULTON MD             D: 2017 17:43   T: 2017 23:13   MT: [id number]      Name:     HUSEYIN MENDIOLA   MRN:      [id number]        Account:        [id number]   :      1970           Procedure Date: 2017      Document: [id number]

## 2017-08-26 ENCOUNTER — HOSPITAL ENCOUNTER (OUTPATIENT)
Facility: CLINIC | Age: 47
Setting detail: OBSERVATION
Discharge: HOME | End: 2017-08-27
Attending: EMERGENCY MEDICINE | Admitting: INTERNAL MEDICINE
Payer: COMMERCIAL

## 2017-08-26 DIAGNOSIS — D50.9 IRON DEFICIENCY ANEMIA, UNSPECIFIED IRON DEFICIENCY ANEMIA TYPE: ICD-10-CM

## 2017-08-26 DIAGNOSIS — R00.2 PALPITATIONS: ICD-10-CM

## 2017-08-26 DIAGNOSIS — R00.0 SINUS TACHYCARDIA: Primary | ICD-10-CM

## 2017-08-26 DIAGNOSIS — E86.0 DEHYDRATION: ICD-10-CM

## 2017-08-26 LAB
BASOPHILS # BLD AUTO: 0 10E9/L (ref 0–0.2)
BASOPHILS NFR BLD AUTO: 0.3 %
DIFFERENTIAL METHOD BLD: ABNORMAL
EOSINOPHIL # BLD AUTO: 0.2 10E9/L (ref 0–0.7)
EOSINOPHIL NFR BLD AUTO: 3.3 %
ERYTHROCYTE [DISTWIDTH] IN BLOOD BY AUTOMATED COUNT: 14.6 % (ref 10–15)
HCT VFR BLD AUTO: 33.6 % (ref 35–47)
HGB BLD-MCNC: 10.7 G/DL (ref 11.7–15.7)
IMM GRANULOCYTES # BLD: 0 10E9/L (ref 0–0.4)
IMM GRANULOCYTES NFR BLD: 0.3 %
LYMPHOCYTES # BLD AUTO: 2.1 10E9/L (ref 0.8–5.3)
LYMPHOCYTES NFR BLD AUTO: 33.8 %
MCH RBC QN AUTO: 25.9 PG (ref 26.5–33)
MCHC RBC AUTO-ENTMCNC: 31.8 G/DL (ref 31.5–36.5)
MCV RBC AUTO: 81 FL (ref 78–100)
MONOCYTES # BLD AUTO: 0.3 10E9/L (ref 0–1.3)
MONOCYTES NFR BLD AUTO: 5.2 %
NEUTROPHILS # BLD AUTO: 3.5 10E9/L (ref 1.6–8.3)
NEUTROPHILS NFR BLD AUTO: 57.1 %
NRBC # BLD AUTO: 0 10*3/UL
NRBC BLD AUTO-RTO: 0 /100
PLATELET # BLD AUTO: 306 10E9/L (ref 150–450)
RBC # BLD AUTO: 4.13 10E12/L (ref 3.8–5.2)
WBC # BLD AUTO: 6.1 10E9/L (ref 4–11)

## 2017-08-26 PROCEDURE — 93005 ELECTROCARDIOGRAM TRACING: CPT | Performed by: EMERGENCY MEDICINE

## 2017-08-26 PROCEDURE — 99285 EMERGENCY DEPT VISIT HI MDM: CPT | Mod: 25 | Performed by: EMERGENCY MEDICINE

## 2017-08-26 PROCEDURE — 93010 ELECTROCARDIOGRAM REPORT: CPT | Mod: Z6 | Performed by: EMERGENCY MEDICINE

## 2017-08-26 PROCEDURE — 85025 COMPLETE CBC W/AUTO DIFF WBC: CPT | Performed by: EMERGENCY MEDICINE

## 2017-08-26 PROCEDURE — 80048 BASIC METABOLIC PNL TOTAL CA: CPT | Performed by: EMERGENCY MEDICINE

## 2017-08-26 PROCEDURE — 84100 ASSAY OF PHOSPHORUS: CPT | Performed by: EMERGENCY MEDICINE

## 2017-08-26 PROCEDURE — 83735 ASSAY OF MAGNESIUM: CPT | Performed by: EMERGENCY MEDICINE

## 2017-08-26 RX ORDER — SODIUM CHLORIDE 9 MG/ML
1000 INJECTION, SOLUTION INTRAVENOUS CONTINUOUS
Status: DISCONTINUED | OUTPATIENT
Start: 2017-08-26 | End: 2017-08-27 | Stop reason: HOSPADM

## 2017-08-26 NOTE — IP AVS SNAPSHOT
MRN:[id number]                      After Visit Summary   8/26/2017    Laura Jackson    MRN: [id number]           Thank you!     Thank you for choosing [facility] for your care. Our goal is always to provide you with excellent care. Hearing back from our patients is one way we can continue to improve our services. Please take a few minutes to complete the written survey that you may receive in the mail after you visit with us. Thank you!        Patient Information     Date Of Birth          1970        Designated Caregiver       Most Recent Value    Caregiver    Will someone help with your care after discharge? yes    Name of designated caregiver llya-    Phone number of caregiver [phone]    Caregiver address same as pt      About your hospital stay     You were admitted on:  August 27, 2017 You last received care in the:  Unit 6D Observation [facility]    You were discharged on:  August 27, 2017        Reason for your hospital stay       Patient admitted for heart palpitations and weakness. Extensive work up including chest x-ray, EKG, chest CT, echocardiogram and blood work did not show contributing factors to patient's sinus tachycardia. Plan is to send patient home with [facility] and treat her tachycardia with Diltiazem.                  Who to Call     For medical emergencies, please call 911.  For non-urgent questions about your medical care, please call your primary care provider or clinic, [phone]          Attending Provider     Provider Specialty    Emmanuelle Phillips MD Emergency Medicine    Therese López MD Internal Medicine       Primary Care Provider Office Phone # Fax #    Whitney Alva [phone]      After Care Instructions     Activity       Your activity upon discharge: activity as tolerated            Diet       Follow this diet upon discharge: Advance to a regular diet as tolerated                  Follow-up  Appointments     Adult [facility] Follow-up and recommended labs and tests       Follow up with primary care provider, Whitney Alva, within 7 days to evaluate medication change response and Ziopatcg result.      Appointments on [place] and/or [facility] (with [facility] or [facility] provider or service). Call [phone] if you haven't heard regarding these appointments within 7 days of discharge.                  Additional Services     CARDIO  ADULT REFERRAL       [facility] is referring you to Cardiology Services.       The  Representative will assist you in the coordination of your Cardiology care as prescribed by your physician.    The  Representative will call you within 24 hours to help schedule your appointment, or you may contact the  Representative at: [phone].         Type of Referral: Cardiology Follow Up            Timeframe requested: 1-2 days       Coverage of these services is subject to the terms and limitations of your health insurance plan.  Please call member services at your health plan with any benefit or coverage questions.      If X-rays, CT or MRI's have been performed, please contact the facility where they were done to arrange for , prior to your scheduled appointment.  Please bring this referral request to your appointment and present it to your specialist.                  Pending Results     Date and Time Order Name Status Description    8/27/2017 1816 Zio Patch Holter In process     8/27/2017 1308 EKG 12-lead, complete Preliminary     8/26/2017 2336 EKG 12-lead, tracing only Preliminary             Statement of Approval     Ordered          08/27/17 1936  I have reviewed and agree with all the recommendations and orders detailed in this document.  EFFECTIVE NOW     Approved and electronically signed by:  Amado Meeks APRN CNP             Admission Information     Date & Time Provider Department Dept. Phone     "8/26/2017 Therese López MD Unit 6D Observation [facility] [phone]      Your Vitals Were     Blood Pressure Pulse Temperature Respirations Height Weight    124/85 (BP Location: Right arm) 109 98.6  F (37  C) (Oral) 18 1.651 m (5' 5\") 73.6 kg (162 lb 4.1 oz)    Last Period Pulse Oximetry BMI (Body Mass Index)             07/28/2017 96% 27 kg/m2         [employer] Information     [employer] gives you secure access to your electronic health record. If you see a primary care provider, you can also send messages to your care team and make appointments. If you have questions, please call your primary care clinic.  If you do not have a primary care provider, please call [phone] and they will assist you.        Care EveryWhere ID     This is your Care EveryWhere ID. This could be used by other organizations to access your [facility] medical records  [id number]        Equal Access to Services     ODESSA HOWARD AH: Anna Cordero, waapda asim, qaybta kaalmadiaz martinez, ludmila alberts. So [facility] [phone].    ATENCIÓN: Si habla español, tiene a guido disposición servicios gratuitos de asistencia lingüística. Llame al [phone].    We comply with applicable federal civil rights laws and [place] laws. We do not discriminate on the basis of race, color, national origin, age, disability sex, sexual orientation or gender identity.               Review of your medicines      START taking        Dose / Directions    diltiazem 120 MG 24 hr capsule   Commonly known as:  CARDIZEM CD   Used for:  Sinus tachycardia, Palpitations        Dose:  120 mg   Take 1 capsule (120 mg) by mouth daily   Quantity:  30 capsule   Refills:  3         CONTINUE these medicines which have NOT CHANGED        Dose / Directions    alpha-d-galactosidase tablet        Dose:  2 tablet   Take 2 tablets by mouth daily as needed for intestinal gas   Refills:  0       fluticasone-salmeterol 100-50 MCG/DOSE " diskus inhaler   Commonly known as:  ADVAIR   Used for:  Mild persistent asthma without complication        Dose:  1 puff   Inhale 1 puff into the lungs 2 times daily   Quantity:  3 Inhaler   Refills:  1       ibuprofen 600 MG tablet   Commonly known as:  ADVIL/MOTRIN   Used for:  Intramural leiomyoma of uterus, Endometriosis of pelvic peritoneum, Uterovaginal prolapse, incomplete        Dose:  600 mg   Take 1 tablet (600 mg) by mouth every 6 hours as needed for pain (mild)   Quantity:  60 tablet   Refills:  0       levalbuterol 45 MCG/ACT Inhaler   Commonly known as:  XOPENEX HFA   Used for:  Mild persistent asthma without complication        Dose:  1-2 puff   Inhale 1-2 puffs into the lungs every 4 hours as needed for shortness of breath / dyspnea   Quantity:  1 Inhaler   Refills:  5       omeprazole 20 MG tablet        Dose:  20 mg   Take 20 mg by mouth daily   Refills:  0       Simethicone Extra Strength 125 MG Caps        Dose:  2 capsule   Take 2 capsules by mouth daily as needed   Refills:  0            Where to get your medicines      These medications were sent to [facility] [place]  [place] - [address] [place] [address]  [address], [facility] [address]     Phone:  [phone]     diltiazem 120 MG 24 hr capsule                Protect others around you: Learn how to safely use, store and throw away your medicines at www.disposemymeds.org.             Medication List: This is a list of all your medications and when to take them. Check marks below indicate your daily home schedule. Keep this list as a reference.      Medications           Morning Afternoon Evening Bedtime As Needed    alpha-d-galactosidase tablet   Take 2 tablets by mouth daily as needed for intestinal gas   Last time this was given:  2 tablets on 8/27/2017  9:21 AM                                diltiazem 120 MG 24 hr capsule   Commonly known as:  CARDIZEM CD   Take 1 capsule (120 mg) by mouth daily                                 fluticasone-salmeterol 100-50 MCG/DOSE diskus inhaler   Commonly known as:  ADVAIR   Inhale 1 puff into the lungs 2 times daily                                ibuprofen 600 MG tablet   Commonly known as:  ADVIL/MOTRIN   Take 1 tablet (600 mg) by mouth every 6 hours as needed for pain (mild)                                levalbuterol 45 MCG/ACT Inhaler   Commonly known as:  XOPENEX HFA   Inhale 1-2 puffs into the lungs every 4 hours as needed for shortness of breath / dyspnea                                omeprazole 20 MG tablet   Take 20 mg by mouth daily                                Simethicone Extra Strength 125 MG Caps   Take 2 capsules by mouth daily as needed

## 2017-08-26 NOTE — IP AVS SNAPSHOT
Unit 6D Observation [address] [address]    Phone:  [phone]    Fax:  [phone]                                       After Visit Summary   8/26/2017    Laura Jackson    MRN: [id number]           After Visit Summary Signature Page     I have received my discharge instructions, and my questions have been answered. I have discussed any challenges I see with this plan with the nurse or doctor.    ..........................................................................................................................................  Patient/Patient Representative Signature      ..........................................................................................................................................  Patient Representative Print Name and Relationship to Patient    ..................................................               ................................................  Date                                            Time    ..........................................................................................................................................  Reviewed by Signature/Title    ...................................................              ..............................................  Date                                                            Time

## 2017-08-27 ENCOUNTER — APPOINTMENT (OUTPATIENT)
Dept: CT IMAGING | Facility: CLINIC | Age: 47
End: 2017-08-27
Attending: NURSE PRACTITIONER
Payer: COMMERCIAL

## 2017-08-27 ENCOUNTER — APPOINTMENT (OUTPATIENT)
Dept: CARDIOLOGY | Facility: CLINIC | Age: 47
End: 2017-08-27
Attending: NURSE PRACTITIONER
Payer: COMMERCIAL

## 2017-08-27 VITALS
HEART RATE: 109 BPM | RESPIRATION RATE: 18 BRPM | TEMPERATURE: 98.6 F | OXYGEN SATURATION: 96 % | DIASTOLIC BLOOD PRESSURE: 85 MMHG | SYSTOLIC BLOOD PRESSURE: 124 MMHG | HEIGHT: 65 IN | WEIGHT: 162.26 LBS | BODY MASS INDEX: 27.03 KG/M2

## 2017-08-27 PROBLEM — E86.0 DEHYDRATION: Status: ACTIVE | Noted: 2017-08-27

## 2017-08-27 LAB
ALBUMIN UR-MCNC: NEGATIVE MG/DL
ANION GAP SERPL CALCULATED.3IONS-SCNC: 6 MMOL/L (ref 3–14)
APPEARANCE UR: ABNORMAL
BASOPHILS # BLD AUTO: 0 10E9/L (ref 0–0.2)
BASOPHILS NFR BLD AUTO: 0.2 %
BILIRUB UR QL STRIP: NEGATIVE
BUN SERPL-MCNC: 13 MG/DL (ref 7–30)
CALCIUM SERPL-MCNC: 8.3 MG/DL (ref 8.5–10.1)
CHLORIDE SERPL-SCNC: 106 MMOL/L (ref 94–109)
CO2 SERPL-SCNC: 26 MMOL/L (ref 20–32)
COLOR UR AUTO: ABNORMAL
CREAT SERPL-MCNC: 0.82 MG/DL (ref 0.52–1.04)
DIFFERENTIAL METHOD BLD: ABNORMAL
EOSINOPHIL # BLD AUTO: 0.1 10E9/L (ref 0–0.7)
EOSINOPHIL NFR BLD AUTO: 2.3 %
ERYTHROCYTE [DISTWIDTH] IN BLOOD BY AUTOMATED COUNT: 14.6 % (ref 10–15)
FERRITIN SERPL-MCNC: 8 NG/ML (ref 8–252)
GFR SERPL CREATININE-BSD FRML MDRD: 75 ML/MIN/1.7M2
GLUCOSE SERPL-MCNC: 106 MG/DL (ref 70–99)
GLUCOSE UR STRIP-MCNC: NEGATIVE MG/DL
HCT VFR BLD AUTO: 32.5 % (ref 35–47)
HGB BLD-MCNC: 10.3 G/DL (ref 11.7–15.7)
HGB UR QL STRIP: NEGATIVE
IMM GRANULOCYTES # BLD: 0 10E9/L (ref 0–0.4)
IMM GRANULOCYTES NFR BLD: 0.4 %
INTERPRETATION ECG - MUSE: NORMAL
IRON SATN MFR SERPL: 7 % (ref 15–46)
IRON SERPL-MCNC: 26 UG/DL (ref 35–180)
KETONES UR STRIP-MCNC: NEGATIVE MG/DL
LEUKOCYTE ESTERASE UR QL STRIP: NEGATIVE
LYMPHOCYTES # BLD AUTO: 1.7 10E9/L (ref 0.8–5.3)
LYMPHOCYTES NFR BLD AUTO: 30 %
MAGNESIUM SERPL-MCNC: 2.2 MG/DL (ref 1.6–2.3)
MCH RBC QN AUTO: 25.5 PG (ref 26.5–33)
MCHC RBC AUTO-ENTMCNC: 31.7 G/DL (ref 31.5–36.5)
MCV RBC AUTO: 80 FL (ref 78–100)
MONOCYTES # BLD AUTO: 0.3 10E9/L (ref 0–1.3)
MONOCYTES NFR BLD AUTO: 4.9 %
NEUTROPHILS # BLD AUTO: 3.6 10E9/L (ref 1.6–8.3)
NEUTROPHILS NFR BLD AUTO: 62.2 %
NITRATE UR QL: NEGATIVE
NRBC # BLD AUTO: 0 10*3/UL
NRBC BLD AUTO-RTO: 0 /100
PH UR STRIP: 7 PH (ref 5–7)
PHOSPHATE SERPL-MCNC: 3.1 MG/DL (ref 2.5–4.5)
PLATELET # BLD AUTO: 288 10E9/L (ref 150–450)
POTASSIUM SERPL-SCNC: 3.6 MMOL/L (ref 3.4–5.3)
RBC # BLD AUTO: 4.04 10E12/L (ref 3.8–5.2)
RBC #/AREA URNS AUTO: 0 /HPF (ref 0–2)
SODIUM SERPL-SCNC: 138 MMOL/L (ref 133–144)
SOURCE: ABNORMAL
SP GR UR STRIP: 1 (ref 1–1.03)
SQUAMOUS #/AREA URNS AUTO: <1 /HPF (ref 0–1)
TIBC SERPL-MCNC: 399 UG/DL (ref 240–430)
TSH SERPL DL<=0.005 MIU/L-ACNC: 3.5 MU/L (ref 0.4–4)
UROBILINOGEN UR STRIP-MCNC: NORMAL MG/DL (ref 0–2)
WBC # BLD AUTO: 5.7 10E9/L (ref 4–11)
WBC #/AREA URNS AUTO: <1 /HPF (ref 0–2)

## 2017-08-27 PROCEDURE — G0378 HOSPITAL OBSERVATION PER HR: HCPCS

## 2017-08-27 PROCEDURE — 0296T ZIO PATCH HOLTER: CPT | Performed by: EMERGENCY MEDICINE

## 2017-08-27 PROCEDURE — 99207 ZZC APP CREDIT; MD BILLING SHARED VISIT: CPT | Mod: Z6 | Performed by: NURSE PRACTITIONER

## 2017-08-27 PROCEDURE — 82728 ASSAY OF FERRITIN: CPT | Performed by: PHYSICIAN ASSISTANT

## 2017-08-27 PROCEDURE — 93306 TTE W/DOPPLER COMPLETE: CPT | Mod: 26 | Performed by: INTERNAL MEDICINE

## 2017-08-27 PROCEDURE — 83550 IRON BINDING TEST: CPT | Performed by: PHYSICIAN ASSISTANT

## 2017-08-27 PROCEDURE — 93005 ELECTROCARDIOGRAM TRACING: CPT

## 2017-08-27 PROCEDURE — 85025 COMPLETE CBC W/AUTO DIFF WBC: CPT | Performed by: PHYSICIAN ASSISTANT

## 2017-08-27 PROCEDURE — 36415 COLL VENOUS BLD VENIPUNCTURE: CPT | Performed by: PHYSICIAN ASSISTANT

## 2017-08-27 PROCEDURE — 93010 ELECTROCARDIOGRAM REPORT: CPT | Performed by: INTERNAL MEDICINE

## 2017-08-27 PROCEDURE — 25000128 H RX IP 250 OP 636: Performed by: EMERGENCY MEDICINE

## 2017-08-27 PROCEDURE — 83540 ASSAY OF IRON: CPT | Performed by: PHYSICIAN ASSISTANT

## 2017-08-27 PROCEDURE — 25000132 ZZH RX MED GY IP 250 OP 250 PS 637: Performed by: PHYSICIAN ASSISTANT

## 2017-08-27 PROCEDURE — 81001 URINALYSIS AUTO W/SCOPE: CPT | Performed by: PHYSICIAN ASSISTANT

## 2017-08-27 PROCEDURE — 96361 HYDRATE IV INFUSION ADD-ON: CPT

## 2017-08-27 PROCEDURE — 96360 HYDRATION IV INFUSION INIT: CPT | Performed by: EMERGENCY MEDICINE

## 2017-08-27 PROCEDURE — 25000128 H RX IP 250 OP 636: Performed by: RADIOLOGY

## 2017-08-27 PROCEDURE — 84443 ASSAY THYROID STIM HORMONE: CPT | Performed by: PHYSICIAN ASSISTANT

## 2017-08-27 PROCEDURE — 93306 TTE W/DOPPLER COMPLETE: CPT

## 2017-08-27 PROCEDURE — 40000141 ZZH STATISTIC PERIPHERAL IV START W/O US GUIDANCE

## 2017-08-27 PROCEDURE — 0298T ZZC EXT ECG > 48HR TO 21 DAY REVIEW AND INTERPRETATN: CPT | Performed by: INTERNAL MEDICINE

## 2017-08-27 PROCEDURE — 25000132 ZZH RX MED GY IP 250 OP 250 PS 637: Performed by: INTERNAL MEDICINE

## 2017-08-27 PROCEDURE — 71260 CT THORAX DX C+: CPT

## 2017-08-27 PROCEDURE — 99203 OFFICE O/P NEW LOW 30 MIN: CPT | Mod: 24 | Performed by: INTERNAL MEDICINE

## 2017-08-27 PROCEDURE — 25000128 H RX IP 250 OP 636: Performed by: NURSE PRACTITIONER

## 2017-08-27 PROCEDURE — 99236 HOSP IP/OBS SAME DATE HI 85: CPT | Mod: Z6 | Performed by: PHYSICIAN ASSISTANT

## 2017-08-27 RX ORDER — ACETAMINOPHEN 325 MG/1
650 TABLET ORAL EVERY 4 HOURS PRN
Status: DISCONTINUED | OUTPATIENT
Start: 2017-08-27 | End: 2017-08-27 | Stop reason: HOSPADM

## 2017-08-27 RX ORDER — DILTIAZEM HYDROCHLORIDE 120 MG/1
120 CAPSULE, COATED, EXTENDED RELEASE ORAL DAILY
Qty: 30 CAPSULE | Refills: 3 | Status: SHIPPED | OUTPATIENT
Start: 2017-08-27 | End: 2017-09-26

## 2017-08-27 RX ORDER — NALOXONE HYDROCHLORIDE 0.4 MG/ML
.1-.4 INJECTION, SOLUTION INTRAMUSCULAR; INTRAVENOUS; SUBCUTANEOUS
Status: DISCONTINUED | OUTPATIENT
Start: 2017-08-27 | End: 2017-08-27 | Stop reason: HOSPADM

## 2017-08-27 RX ORDER — SIMETHICONE 125 MG
250 TABLET,CHEWABLE ORAL DAILY PRN
Status: DISCONTINUED | OUTPATIENT
Start: 2017-08-27 | End: 2017-08-27 | Stop reason: RX

## 2017-08-27 RX ORDER — AMOXICILLIN 250 MG
1-2 CAPSULE ORAL 2 TIMES DAILY PRN
Status: DISCONTINUED | OUTPATIENT
Start: 2017-08-27 | End: 2017-08-27 | Stop reason: HOSPADM

## 2017-08-27 RX ORDER — ONDANSETRON 2 MG/ML
4 INJECTION INTRAMUSCULAR; INTRAVENOUS EVERY 6 HOURS PRN
Status: DISCONTINUED | OUTPATIENT
Start: 2017-08-27 | End: 2017-08-27 | Stop reason: HOSPADM

## 2017-08-27 RX ORDER — IBUPROFEN 600 MG/1
600 TABLET, FILM COATED ORAL EVERY 6 HOURS PRN
Status: DISCONTINUED | OUTPATIENT
Start: 2017-08-27 | End: 2017-08-27 | Stop reason: HOSPADM

## 2017-08-27 RX ORDER — SIMETHICONE 80 MG
240 TABLET,CHEWABLE ORAL DAILY PRN
Status: DISCONTINUED | OUTPATIENT
Start: 2017-08-27 | End: 2017-08-27 | Stop reason: HOSPADM

## 2017-08-27 RX ORDER — POLYETHYLENE GLYCOL 3350 17 G/17G
17 POWDER, FOR SOLUTION ORAL DAILY
Status: DISCONTINUED | OUTPATIENT
Start: 2017-08-27 | End: 2017-08-27 | Stop reason: HOSPADM

## 2017-08-27 RX ORDER — ONDANSETRON 4 MG/1
4 TABLET, ORALLY DISINTEGRATING ORAL EVERY 6 HOURS PRN
Status: DISCONTINUED | OUTPATIENT
Start: 2017-08-27 | End: 2017-08-27 | Stop reason: HOSPADM

## 2017-08-27 RX ORDER — IOPAMIDOL 755 MG/ML
58 INJECTION, SOLUTION INTRAVASCULAR ONCE
Status: COMPLETED | OUTPATIENT
Start: 2017-08-27 | End: 2017-08-27

## 2017-08-27 RX ORDER — SODIUM CHLORIDE 9 MG/ML
INJECTION, SOLUTION INTRAVENOUS CONTINUOUS
Status: DISCONTINUED | OUTPATIENT
Start: 2017-08-27 | End: 2017-08-27 | Stop reason: HOSPADM

## 2017-08-27 RX ADMIN — SODIUM CHLORIDE 2000 ML: 9 INJECTION, SOLUTION INTRAVENOUS at 16:13

## 2017-08-27 RX ADMIN — IOPAMIDOL 58 ML: 755 INJECTION, SOLUTION INTRAVENOUS at 15:39

## 2017-08-27 RX ADMIN — SIMETHICONE CHEW TAB 80 MG 240 MG: 80 TABLET ORAL at 09:22

## 2017-08-27 RX ADMIN — SIMETHICONE CHEW TAB 80 MG 240 MG: 80 TABLET ORAL at 04:16

## 2017-08-27 RX ADMIN — SODIUM CHLORIDE 1000 ML: 9 INJECTION, SOLUTION INTRAVENOUS at 10:25

## 2017-08-27 RX ADMIN — SODIUM CHLORIDE 1000 ML: 9 INJECTION, SOLUTION INTRAVENOUS at 00:00

## 2017-08-27 RX ADMIN — FLUTICASONE FUROATE AND VILANTEROL TRIFENATATE 1 PUFF: 100; 25 POWDER RESPIRATORY (INHALATION) at 07:46

## 2017-08-27 RX ADMIN — SODIUM CHLORIDE: 9 INJECTION, SOLUTION INTRAVENOUS at 14:39

## 2017-08-27 RX ADMIN — OMEPRAZOLE 20 MG: 20 CAPSULE, DELAYED RELEASE ORAL at 07:46

## 2017-08-27 RX ADMIN — SODIUM CHLORIDE: 9 INJECTION, SOLUTION INTRAVENOUS at 11:13

## 2017-08-27 RX ADMIN — SODIUM CHLORIDE 1000 ML: 9 INJECTION, SOLUTION INTRAVENOUS at 17:23

## 2017-08-27 RX ADMIN — Medication 2 TABLET: at 09:21

## 2017-08-27 RX ADMIN — POLYETHYLENE GLYCOL 3350 17 G: 17 POWDER, FOR SOLUTION ORAL at 07:46

## 2017-08-27 ASSESSMENT — ENCOUNTER SYMPTOMS
PALPITATIONS: 1
SHORTNESS OF BREATH: 0
ABDOMINAL PAIN: 0
VOMITING: 0
FEVER: 0
ABDOMINAL DISTENTION: 1

## 2017-08-27 NOTE — CONSULTS
CARDIAC ELECTROPHYSIOLOGY CONSULT SERVICE   INITIAL CONSULT NOTE  August 27, 2017    Patient Name: Laura Jackson  Medical Record Number: [id number]  YOB: 1970  PCP: Whitney Alva      Reason for consult: Sinus tachycardia           Date of Service: 8/27/2017  Admit Date/Time: 8/26/2017 11:27 PM    Consult performed at the request of Therese López MD       Assessment:   Laura Jackson is a 47 year old female with symptoms consistent with autonomic dysfunction that mostly manifests as inappropriate sinus tachycardia and vagal symptoms.  It is not consistent with POTs.  The trigger was likely a combination of the viral illness she had prior to her surgery, the inflammation of surgery, or the blood she received. If her echo is wnl and she has no structural disease, she will need to adjust her po salt intake (increase), increase po fluid intake, and continue with physical rehab as retraining her autonomic system will be essential for complete recovery.        Recommendations:  -echo to rule out any structural dysfunction, if there is any RV strain or enlargement, please order CTPE to evaluate for PT  -start diltizem xl 120 mg daily to limit the tachycardia (start after she receives a zio patch), do order dilt if she has an abnormal ef  -increase salt and h20 intake  -Ziopatch monitor for 2 weeks, if dc'd today she will need to pick it up in [place] or here tomorrow    History of present illness:   Laura Jackson is a 47 year old female with recent urterine bleeding 2/2 to fibroids, she received 2 units of PRBC's secondary to hemorrhage, and had a recent viral illness. She reports that over the past 2 months she has had increasing episodes of palptiations that are associated with flushing, clamminess, and tachycardia with rates above 150 bpm.  She denies chest pain, or significant shortness of breath.  She denies LE edema, pnd, nor orthopnea.  She has notes a hx of SVT in the past ( 8 years ago).   THere are no sx when going for lying supine to standing.  At times she can exert herself, but over the past 2 days her stamina has been significantly decreased.         Allergies   Allergen Reactions     Penicillins      Swelling throat; age 4     Tetracycline      Vomiting; nauseous     Past Medical History:   Diagnosis Date     Family history of breast cancer 2/19/2014     SVT (supraventricular tachycardia):  history of, no recurrence since 2008 10/11/2013    no recurrence since 2008      Uncomplicated asthma      Past Surgical History:   Procedure Laterality Date     APPENDECTOMY       DAVINCI HYSTERECTOMY TOTAL, SALPINGECTOMY BILATERAL N/A 8/4/2017    Procedure: DAVINCI XI HYSTERECTOMY TOTAL, SALPINGECTOMY BILATERAL;  DAVINCI TOTAL HYSTERECTOMY; BILATERAL SALPINGECTOMY. BILATERAL URETERAL LYSIS. UTEROSACRAL-COLPOPEXY. EXCISION OF ENDOMETRIOSIS;  Surgeon: George Loyola MD;  [facility]     DAVINCI LYSIS OF ADHESIONS Bilateral 8/4/2017    Procedure: DAVINCI LYSIS OF ADHESIONS;  BILATERAL URETERAL LYSIS;  Surgeon: George Loyola MD;  Location: [facility]     DAVINCI SACROCOLPOPEXY, CYSTOSCOPY, COMBINED N/A 8/4/2017    Procedure: COMBINED DAVINCI SACROCOLPOPEXY, CYSTOSCOPY;  UTEROSACRAL COLPOPEXY;  Surgeon: George Loyola MD;  Location: [facility]     DAVINCI XI ASSISTED ABLATION / EXCISION OF ENDOMETRIOSIS  8/4/2017    Procedure: DAVINCI XI ASSISTED ABLATION / EXCISION OF ENDOMETRIOSIS;;  Surgeon: George Loyola MD;  Location: [facility]     DILATION AND CURETTAGE N/A 6/20/2017    Procedure: DILATION AND CURETTAGE;  Uterine Curettings and Fibroid Removal, Cook catheter placement; (No hysterectomy done at this time);  Surgeon: Ernestina Saunders MD;  [facility]     TONSILLECTOMY         Prescriptions Prior to Admission   Medication Sig Dispense Refill Last Dose     Simethicone Extra Strength 125 MG CAPS Take 2 capsules by mouth daily as needed   8/4/2017 at am     alpha-d-galactosidase (BEANO) tablet Take  2 tablets by mouth daily as needed for intestinal gas   8/4/2017 at am     ibuprofen (ADVIL/MOTRIN) 600 MG tablet Take 1 tablet (600 mg) by mouth every 6 hours as needed for pain (mild) 60 tablet 0      omeprazole 20 MG tablet Take 20 mg by mouth daily   8/4/2017 at am     fluticasone-salmeterol (ADVAIR) 100-50 MCG/DOSE diskus inhaler Inhale 1 puff into the lungs 2 times daily 3 Inhaler 1 8/4/2017 at am     levalbuterol (XOPENEX HFA) 45 MCG/ACT Inhaler Inhale 1-2 puffs into the lungs every 4 hours as needed for shortness of breath / dyspnea 1 Inhaler 5 More than a month at Unknown time         omeprazole  20 mg Oral Daily     fluticasone-vilanterol  1 puff Inhalation Daily     polyethylene glycol  17 g Oral Daily         Family History   Problem Relation Age of Onset     DIABETES Mother      Hypertension Mother      Hyperlipidemia Mother      Arrhythmia Mother      Cardiovascular Father      CHF and COPD     Asthma Father      Breast Cancer Maternal Grandfather      Colon Cancer Maternal Grandfather      Breast Cancer Paternal Grandmother      Breast Cancer Paternal Aunt      C.A.D. Paternal Grandfather      Breast Cancer Cousin      Asthma Son      DIABETES Maternal Grandmother      Hypertension Maternal Grandmother      Social History     Social History     Marital status:      Spouse name: N/A     Number of children: 2     Years of education: N/A     Occupational History     RN[employer]     Social History Main Topics     Smoking status: Never Smoker     Smokeless tobacco: Never Used     Alcohol use Yes      Comment: occasional     Drug use: No     Sexual activity: Yes     Partners: Male     Birth control/ protection: Male Surgical     Other Topics Concern     Parent/Sibling W/ Cabg, Mi Or Angioplasty Before 65f 55m? No     Social History Narrative        Review Of Systems:  A 4 point ROS was performed and negative other than that described in the HPI    Objective:  "  Vital signs: /81 (BP Location: Right arm)  Temp 98.5  F (36.9  C) (Oral)  Resp 16  Ht 1.651 m (5' 5\")  Wt 73.6 kg (162 lb 4.1 oz)  LMP 07/28/2017  SpO2 97%  BMI 27 kg/m2   No intake or output data in the 24 hours ending 08/27/17 1106  Wt Readings from Last 3 Encounters:   08/27/17 73.6 kg (162 lb 4.1 oz)   08/04/17 72.1 kg (158 lb 14.4 oz)   08/01/17 72.6 kg (160 lb)     General: No acute distress, pleasant and cooperative  HEENT: NC/AT  Neck: carotid pulses present w/o bruits  Chest/Lungs: CTA b/l  Cardiovascular: RRR, +S1/S2, no m/r/g, JVP wnl  Extremities: No cyanosis, clubbing, or edema; pulses intact distally  Muskuloskeletal: Normal muscle bulk and tone  Skin: No wounds or lesions  Neurological: A&Ox3      LABS:  CMP  Recent Labs  Lab 08/26/17  2344      POTASSIUM 3.6   CHLORIDE 106   CO2 26   ANIONGAP 6   *   BUN 13   CR 0.82   GFRESTIMATED 75   GFRESTBLACK >90   AZAEL 8.3*   MAG 2.2   PHOS 3.1     CBC  Recent Labs  Lab 08/27/17  0816 08/26/17  2344   WBC 5.7 6.1   RBC 4.04 4.13   HGB 10.3* 10.7*   HCT 32.5* 33.6*   MCV 80 81   MCH 25.5* 25.9*   MCHC 31.7 31.8   RDW 14.6 14.6    306     INRNo lab results found in last 7 days.  Arterial Blood GasNo lab results found in last 7 days.  LipidsNo lab results found in last 7 days.    Invalid input(s): TRI  TSH  Recent Labs  Lab 08/27/17  0816   TSH 3.50     [id number] input(s): HGBA1C  TroponinNo lab results found in last 7 days.    EKG:  reviewed     ECHO: pending    Imaging/Angiography: n/a        Thank you for allowing us to participate in the care of this very pleasant patient. Please do not hesitate to call if you have further questions or concerns.     Laura Jackson was seen and examined with Dr. Deleon, attending physician, who agrees with the above assessment and plan.       Hardy Godfrey MD  Cardiology Fellow  Pager: [phone]  August 27, 2017    EP STAFF NOTE  Patient seen and examined and management plan " personally reviewed with the patient. I agree with the note above by the CV/EP fellow.  Lalo Deleon MD [facility]  Cardiology - Electrophysiology

## 2017-08-27 NOTE — PLAN OF CARE
"Problem: Discharge Planning  Goal: Discharge Planning (Adult, OB, Behavioral, Peds)  Outpatient/Observation goals to be met before discharge home:     /87 (BP Location: Right arm)  Temp 98.5  F (36.9  C) (Oral)  Resp 16  Ht 1.651 m (5' 5\")  Wt 73.6 kg (162 lb 4.1 oz)  LMP 07/28/2017  SpO2 100%  BMI 27 kg/m2     -diagnostic tests and consults completed and resulted-NO  -vital signs normal or at patient baseline-YES, HR 84   -tolerating oral intake to maintain hydration-YES  -adequate pain control on oral analgesics-PT denies any pain  -returns to baseline functional status-NO  -safe disposition plan has been identified-NO  -resolution of lightheadedness and tachycardia-patient complains of some lightheadedness. No tachy since on unit    Nurse to notify provider when observation goals have been met and patient is ready for discharge.          "

## 2017-08-27 NOTE — PROGRESS NOTES
"Pt briskly walked in hallways, HR went up to 130; pt reports feeling \"not herself\", while resting in bed HR was 103.  Notified provider.  "

## 2017-08-27 NOTE — PLAN OF CARE
Problem: Discharge Planning  Goal: Discharge Planning (Adult, OB, Behavioral, Peds)  -diagnostic tests and consults completed and resulted : No  -vital signs normal or at patient baseline : Yes, except tachycardic  -tolerating oral intake to maintain hydration : Yes  -adequate pain control on oral analgesics : Yes  -returns to baseline functional status : No  -safe disposition plan has been identified: No   -resolution of lightheadedness and tachycardia: No, still tachycardic      Pt a/o x 4; VSS, except still tachycardic rhythm; pt denies pain, headache, dizziness, n/v, or SOB. Pt eating breakfast, ambulated to bathroom and voided without problems.      Pt walked in hallways and HR jumped to 130. Pt had consult with EP; waiting for additional testing; running ordered bolus of fluids.  Pt resting comfortably in bed.

## 2017-08-27 NOTE — ED PROVIDER NOTES
History     Chief Complaint   Patient presents with     Palpitations     HPI  Laura Jackson is a 47 year old female with a history GERD and paroxysmal SVT who presents for evaluation of palpitations. The patient reports that in June she developed an episode of tachycardia after uterine fibroid rupture s/p repair. She was found anemic at that time, required blood transfusion, and her tachycardia was thought blood related. Throughout the summer, the patient has had recurring episodes of tachycardia. She ultimately underwent hysterectomy earlier in the month, on 4 August, did have tachycardia while hospitalized, which was her last episode until today. She reports that tonight she was carrying her son to his bed when she suddenly developed racing, pounding palpitations, so checked her heart rate and measured this to 128. These palpitations lasted around 10 minutes. She reports that she had associated clamminess, diaphoresis, and overall felt unwell so comes in now for evaluation. Here, the patient reports that her palpitations are since resolved. The patient denies any recent inhaler use or caffeine use, and she states she's been eating/drinking as normal, staying well hydrated. The patient denies any shortness of breath presently, though did feel somewhat winded when she was experiencing the palpitations. The patient reports some residual intermittent abdominal discomfort since her recent hysterectomy, otherwise no new pain and no chest pain, specifically. She also has some lasting abdominal distension since her hysterectomy that is unchanged from baseline. She takes simethicone for these issues with relief. The patient denies any change in bowel movements, which have been regular on Miralax and stool softeners. In addition to hysterectomy, the patient does have a history of appendectomy.     No current facility-administered medications for this encounter.      Current Outpatient Prescriptions   Medication      Simethicone Extra Strength 125 MG CAPS     alpha-d-galactosidase (BEANO) tablet     ibuprofen (ADVIL/MOTRIN) 600 MG tablet     omeprazole 20 MG tablet     fluticasone-salmeterol (ADVAIR) 100-50 MCG/DOSE diskus inhaler     levalbuterol (XOPENEX HFA) 45 MCG/ACT Inhaler        Past Medical History:   Diagnosis Date     Family history of breast cancer 2/19/2014     SVT (supraventricular tachycardia):  history of, no recurrence since 2008 10/11/2013    no recurrence since 2008      Uncomplicated asthma        Past Surgical History:   Procedure Laterality Date     APPENDECTOMY       DAVINCI HYSTERECTOMY TOTAL, SALPINGECTOMY BILATERAL N/A 8/4/2017    Procedure: DAVINCI XI HYSTERECTOMY TOTAL, SALPINGECTOMY BILATERAL;  DAVINCI TOTAL HYSTERECTOMY; BILATERAL SALPINGECTOMY. BILATERAL URETERAL LYSIS. UTEROSACRAL-COLPOPEXY. EXCISION OF ENDOMETRIOSIS;  Surgeon: George Loyola MD;  [facility]     DAVINCI LYSIS OF ADHESIONS Bilateral 8/4/2017    Procedure: DAVINCI LYSIS OF ADHESIONS;  BILATERAL URETERAL LYSIS;  Surgeon: George Loyola MD;  [facility]: [facility]     DAVINCI SACROCOLPOPEXY, CYSTOSCOPY, COMBINED N/A 8/4/2017    Procedure: COMBINED DAVINCI SACROCOLPOPEXY, CYSTOSCOPY;  UTEROSACRAL COLPOPEXY;  Surgeon: George Loyola MD;  Location: [facility]     DAVINCI XI ASSISTED ABLATION / EXCISION OF ENDOMETRIOSIS  8/4/2017    Procedure: DAVINCI XI ASSISTED ABLATION / EXCISION OF ENDOMETRIOSIS;;  Surgeon: George Loyola MD;  [facility]: [facility]     DILATION AND CURETTAGE N/A 6/20/2017    Procedure: DILATION AND CURETTAGE;  Uterine Curettings and Fibroid Removal, Cook catheter placement; (No hysterectomy done at this time);  Surgeon: Ernestina Saunders MD;  [facility]     TONSILLECTOMY         Family History   Problem Relation Age of Onset     DIABETES Mother      Hypertension Mother      Hyperlipidemia Mother      Cardiovascular Father      CHF and COPD     Asthma Father      Breast Cancer Maternal Grandfather      Colon  "Cancer Maternal Grandfather      Breast Cancer Paternal Grandmother      Breast Cancer Paternal Aunt      ETHAN. Paternal Grandfather      Breast Cancer Cousin      Asthma Son      DIABETES Maternal Grandmother      Hypertension Maternal Grandmother        Social History   Substance Use Topics     Smoking status: Never Smoker     Smokeless tobacco: Never Used     Alcohol use Yes      Comment: occ        Allergies   Allergen Reactions     Penicillins      Swelling throat; age 4     Tetracycline      Vomiting; nauseous       I have reviewed the Medications, Allergies, Past Medical and Surgical History, and Social History in the Epic system.    Review of Systems   Constitutional: Negative for fever.   Respiratory: Negative for shortness of breath.    Cardiovascular: Positive for palpitations (resolved). Negative for chest pain.   Gastrointestinal: Positive for abdominal distention. Negative for abdominal pain and vomiting.   All other systems reviewed and are negative.      Physical Exam   Height: 165.1 cm (5' 5\")  Weight: 72.1 kg (158 lb 15.2 oz)  Physical Exam   Constitutional: She is oriented to person, place, and time. She appears well-developed and well-nourished.   HENT:   Head: Normocephalic and atraumatic.   Neck: Normal range of motion.   Cardiovascular: Normal rate, regular rhythm and normal heart sounds.    Pulmonary/Chest: Effort normal and breath sounds normal. No respiratory distress. She has no wheezes. She has no rales.   Abdominal: Soft. She exhibits no distension. There is no tenderness. There is no rebound.   Musculoskeletal: She exhibits no tenderness.   Neurological: She is alert and oriented to person, place, and time.   Skin: Skin is warm and dry.   Psychiatric: She has a normal mood and affect. Her behavior is normal. Thought content normal.       ED Course     ED Course     Procedures             EKG Interpretation:      Interpreted by Emmanuelle Phillips  Time reviewed: 8006  Symptoms at " time of EKG: none   Rhythm: normal sinus   Rate: normal  Axis: normal  Ectopy: none  Conduction: normal  ST Segments/ T Waves: No ST-T wave changes  Q Waves: none  Comparison to prior: Unchanged    Clinical Impression: normal EKG            Critical Care time:  none         Results for orders placed or performed during the hospital encounter of 08/26/17   CBC with platelets differential   Result Value Ref Range    WBC 6.1 4.0 - 11.0 10e9/L    RBC Count 4.13 3.8 - 5.2 10e12/L    Hemoglobin 10.7 (L) 11.7 - 15.7 g/dL    Hematocrit 33.6 (L) 35.0 - 47.0 %    MCV 81 78 - 100 fl    MCH 25.9 (L) 26.5 - 33.0 pg    MCHC 31.8 31.5 - 36.5 g/dL    RDW 14.6 10.0 - 15.0 %    Platelet Count 306 150 - 450 10e9/L    Diff Method Automated Method     % Neutrophils 57.1 %    % Lymphocytes 33.8 %    % Monocytes 5.2 %    % Eosinophils 3.3 %    % Basophils 0.3 %    % Immature Granulocytes 0.3 %    Nucleated RBCs 0 0 /100    Absolute Neutrophil 3.5 1.6 - 8.3 10e9/L    Absolute Lymphocytes 2.1 0.8 - 5.3 10e9/L    Absolute Monocytes 0.3 0.0 - 1.3 10e9/L    Absolute Eosinophils 0.2 0.0 - 0.7 10e9/L    Absolute Basophils 0.0 0.0 - 0.2 10e9/L    Abs Immature Granulocytes 0.0 0 - 0.4 10e9/L    Absolute Nucleated RBC 0.0    Basic metabolic panel   Result Value Ref Range    Sodium 138 133 - 144 mmol/L    Potassium 3.6 3.4 - 5.3 mmol/L    Chloride 106 94 - 109 mmol/L    Carbon Dioxide 26 20 - 32 mmol/L    Anion Gap 6 3 - 14 mmol/L    Glucose 106 (H) 70 - 99 mg/dL    Urea Nitrogen 13 7 - 30 mg/dL    Creatinine 0.82 0.52 - 1.04 mg/dL    GFR Estimate 75 >60 mL/min/1.7m2    GFR Estimate If Black >90 >60 mL/min/1.7m2    Calcium 8.3 (L) 8.5 - 10.1 mg/dL   EKG 12-lead, tracing only   Result Value Ref Range    Interpretation ECG Click View Image link to view waveform and result      Medications   0.9% sodium chloride BOLUS (0 mLs Intravenous Stopped 8/27/17 0109)     Followed by   0.9% sodium chloride infusion (not administered)          Labs Ordered and  Resulted from Time of ED Arrival Up to the Time of Departure from the ED - No data to display         Assessments & Plan (with Medical Decision Making)   The patient is a 47-year-old female who presented to the ER due to about 10 mins of palpitations. The patient states that at this time her heart rate had increased to the 130s. The patient's throughout her ER stay has been in a sinus rhythm with a rate of 80s-90s. The patient had a normal exam. Obtained labs that show that her hemoglobin is 10.7. This is similar to her post-op hemoglobin. The patient has no tenderness to be concerned about any acute injury or bleed. The patient has a history of having palpitations postoperatively and has also had a Zio patch done on 7/7/17. At the time the patient was found to have sinus tach but no other episodes of any concerning arrhythmia such as V tach, pauses, blocks, or A-fib. At this time I do not recommend further evaluation in the ER. I recommend that she follow up with cardiology to see whether any other medication would be appropriate. I did discuss possibly putting her on a beta blocker if she continues to have episodes of sinus tachycardia. At this time the patient is well-appearing and she has been evaluated before for concerning arrhythmia, so I do not think that she needs to be admitted for further monitoring at this time. The patient agrees with the plan of care. The patient will be discharged to home in stable condition.    1:27am: Pt ambulated and again HR increased to 130's.  Pt sat down and his HR dropped down to 80's. Possible sinus tachycardia is likely from dehydration. This could be from dehydration--pt is taking miralax for bowel regemin currently. Will admit to the obs unit for further care.     This part of the document was transcribed by Jose Vance for Emmanuelle Phillips MD.    I have reviewed the nursing notes.    I have reviewed the findings, diagnosis, plan and need for follow up with the  patient.    New Prescriptions    No medications on file       Final diagnoses:   Palpitations   Iron deficiency anemia, unspecified iron deficiency anemia type   Dehydration     IJsoe, am serving as a trained medical scribe to document services personally performed by Emmanuelle Phillips MD, based on the provider's statements to me.      Emmanuelle HYDE MD, was physically present and have reviewed and verified the accuracy of this note documented by Jose Vance.    8/26/2017   [facility], [place], EMERGENCY DEPARTMENT     Emmanuelle Phillisp MD  08/27/17 0129

## 2017-08-27 NOTE — H&P
"[facility] ED Observation Admission Note    Chief Complaint   Patient presents with     Palpitations       Assessment/Plan:  1. Palpitations: June 2017 developed an episode of tachycardia after a uterine fibroid rupture s/p repair during which she was tachycardia was thought to be related. Had a hysterectomy 8/4/17 and had tachycardia while hospitalized but resolved. She has done well with no more episodes until tonight when she got up to take her son to his room she suddenly developed felt her heart pounding and racing. Pulse was in the 130's. Associated with clamminess, diaphoresis, and overall felt \"icky\". Denies any associated chest pain, SOB. Denies use of alcohol, illicit drugs, caffeine, inhaler, soda. Denies any fever, chills, night sweats. Reports intermittent dysuria. In ED, HR 85-95, /91, RR 13-16, SaO2 % on RA, Temp 98.5. Labs show BMP with BUN/Creat 13/0.82 (baseline 0.6-0.8) otherwise normal. CBC with H/H 10.7/33.6 (recent baseline 7-11; 2012/2013 was 12-13) otherwise normal. In the ED the patient was given 1L NS bolus and placed on MIVF at 125ml/hr. Her HR throughout her ER stay has been in a sinus rhythm with a rate of 80s-90s. Zio patch done on 7/7/17 showed the patient to have sinus tach but no other episodes of any concerning arrhythmia such as V tach, pauses, blocks, or A-fib. She is being admitted to the observation unit for hydration and possible Cardiology consult.   -Admit to Observation Unit  -Continuous telemetry monitoring  -Check TSH with FT4, Magnesium  -Repeat CBC in AM  -Continue MIVF with NS at 125ml/hr  -Orthostatic vitals now and q8h and if positive give another bolus  -Follow up with Cardiology as outpatient unless persistent symptoms here    2. Dysuria: - Check UA with reflex    3. GERD: - Continue with Prilosec per home regimen    4. Asthma: - Advair per home regimen      HPI:    Laura Jackson is a 47 year old female with a history of asthma, GERD and paroxysmal SVT who " "presented to the ED for evaluation of palpitations. She reports that in June she developed an episode of tachycardia after a uterine fibroid rupture s/p repair. She was found anemic at that time and her tachycardia was thought to be  related. She subsequently had recurring episodes of tachycardia. She had a hysterectomy on 8/4/17 and had tachycardia while hospitalized but resolved. She has done well with no more episodes until tonight. She woke her son up from her bed and was leading him to his room when she suddenly developed felt her heart pounding and racing. She checked her pulse and was in the 130's. This was associated with clamminess, diaphoresis, and overall felt \"icky\" but unable to describe exactly how she felt. She presented to the ED within an hour and upon arrival it had resolved however continued to feel unwell. Denies any associated chest pain, SOB. She denies use of alcohol, illicit drugs, caffeine, inhaler, soda.  She reports a bit of dysuria intermittently. She had urinary frequency prior to the surgery which has resolved now. Denies any fever, chills, night sweats. She reports having ongoing abdominal pain since the surgery thought to be related to some adhesions she had developed from large uterus and fibroids compressing on her intestines. She has felt quite bloated and has been taking miralax and colace for the last 3 days. She has had about 2 soft-watery stools daily. She has been taking galactosidase and simethicone for this with little relief. She believes she has been eating and hydrating well. Her plan was to see a Cardiologist this week to get cleared and then follow up with GI for possible colonoscopy and Surgery.    In the ED, HR 85-95, /91, RR 13-16, SaO2 % on RA, Temp 98.5. Labs show BMP with BUN/Creat 13/0.82 (baseline 0.6-0.8) otherwise normal. CBC with H/H 10.7/33.6 (recent baseline 7-11; 2012/2013 was 12-13) otherwise normal. In the ED the patient was given 1L NS bolus " and placed on MIVF at 125ml/hr. Her HR throughout her ER stay has been in a sinus rhythm with a rate of 80s-90s. Zio patch done on 7/7/17 showed the patient to have sinus tach but no other episodes of any concerning arrhythmia such as V tach, pauses, blocks, or A-fib. She is being admitted to the observation unit for hydration and possible Cardiology consult.     On admission to the observation unit the patient was stable. She reports this is the best she has felt all night. When she stood up to use the bathroom her HR went from the 90's to the 140's. She denied any lightheadedness.     History:    Past Medical History:   Diagnosis Date     Family history of breast cancer 2/19/2014     SVT (supraventricular tachycardia):  history of, no recurrence since 2008 10/11/2013    no recurrence since 2008      Uncomplicated asthma        Past Surgical History:   Procedure Laterality Date     APPENDECTOMY       DAVINCI HYSTERECTOMY TOTAL, SALPINGECTOMY BILATERAL N/A 8/4/2017    Procedure: DAVINCI XI HYSTERECTOMY TOTAL, SALPINGECTOMY BILATERAL;  DAVINCI TOTAL HYSTERECTOMY; BILATERAL SALPINGECTOMY. BILATERAL URETERAL LYSIS. UTEROSACRAL-COLPOPEXY. EXCISION OF ENDOMETRIOSIS;  Surgeon: George Loyola MD;  Location: [facility] OR     DAVINCI LYSIS OF ADHESIONS Bilateral 8/4/2017    Procedure: DAVINCI LYSIS OF ADHESIONS;  BILATERAL URETERAL LYSIS;  Surgeon: George Loyola MD;  Location: [facility] OR     DAVINCI SACROCOLPOPEXY, CYSTOSCOPY, COMBINED N/A 8/4/2017    Procedure: COMBINED DAVINCI SACROCOLPOPEXY, CYSTOSCOPY;  UTEROSACRAL COLPOPEXY;  Surgeon: George Loyola MD;  Location: [facility] OR     DAVINCI XI ASSISTED ABLATION / EXCISION OF ENDOMETRIOSIS  8/4/2017    Procedure: DAVINCI XI ASSISTED ABLATION / EXCISION OF ENDOMETRIOSIS;;  Surgeon: George Loyola MD;  Location:  OR     DILATION AND CURETTAGE N/A 6/20/2017    Procedure: DILATION AND CURETTAGE;  Uterine Curettings and Fibroid Removal, Cook catheter placement; (No  hysterectomy done at this time);  Surgeon: Ernestina Saunders MD;  Location: [facility] OR     TONSILLECTOMY         Family History   Problem Relation Age of Onset     DIABETES Mother      Hypertension Mother      Hyperlipidemia Mother      Cardiovascular Father      CHF and COPD     Asthma Father      Breast Cancer Maternal Grandfather      Colon Cancer Maternal Grandfather      Breast Cancer Paternal Grandmother      Breast Cancer Paternal Aunt      DEANA Paternal Grandfather      Breast Cancer Cousin      Asthma Son      DIABETES Maternal Grandmother      Hypertension Maternal Grandmother        Social History     Social History     Marital status:      Spouse name: N/A     Number of children: 2     Years of education: N/A     Occupational History     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 No     Social History Narrative         No current facility-administered medications on file prior to encounter.   Current Outpatient Prescriptions on File Prior to Encounter:  Simethicone Extra Strength 125 MG CAPS Take 2 capsules by mouth daily as needed   alpha-d-galactosidase (BEANO) tablet Take 2 tablets by mouth daily as needed for intestinal gas   ibuprofen (ADVIL/MOTRIN) 600 MG tablet Take 1 tablet (600 mg) by mouth every 6 hours as needed for pain (mild)   omeprazole 20 MG tablet Take 20 mg by mouth daily   fluticasone-salmeterol (ADVAIR) 100-50 MCG/DOSE diskus inhaler Inhale 1 puff into the lungs 2 times daily   levalbuterol (XOPENEX HFA) 45 MCG/ACT Inhaler Inhale 1-2 puffs into the lungs every 4 hours as needed for shortness of breath / dyspnea       Data:    Results for orders placed or  performed during the hospital encounter of 08/26/17   CBC with platelets differential   Result Value Ref Range    WBC 6.1 4.0 - 11.0 10e9/L    RBC Count 4.13 3.8 - 5.2 10e12/L    Hemoglobin 10.7 (L) 11.7 - 15.7 g/dL    Hematocrit 33.6 (L) 35.0 - 47.0 %    MCV 81 78 - 100 fl    MCH 25.9 (L) 26.5 - 33.0 pg    MCHC 31.8 31.5 - 36.5 g/dL    RDW 14.6 10.0 - 15.0 %    Platelet Count 306 150 - 450 10e9/L    Diff Method Automated Method     % Neutrophils 57.1 %    % Lymphocytes 33.8 %    % Monocytes 5.2 %    % Eosinophils 3.3 %    % Basophils 0.3 %    % Immature Granulocytes 0.3 %    Nucleated RBCs 0 0 /100    Absolute Neutrophil 3.5 1.6 - 8.3 10e9/L    Absolute Lymphocytes 2.1 0.8 - 5.3 10e9/L    Absolute Monocytes 0.3 0.0 - 1.3 10e9/L    Absolute Eosinophils 0.2 0.0 - 0.7 10e9/L    Absolute Basophils 0.0 0.0 - 0.2 10e9/L    Abs Immature Granulocytes 0.0 0 - 0.4 10e9/L    Absolute Nucleated RBC 0.0    Basic metabolic panel   Result Value Ref Range    Sodium 138 133 - 144 mmol/L    Potassium 3.6 3.4 - 5.3 mmol/L    Chloride 106 94 - 109 mmol/L    Carbon Dioxide 26 20 - 32 mmol/L    Anion Gap 6 3 - 14 mmol/L    Glucose 106 (H) 70 - 99 mg/dL    Urea Nitrogen 13 7 - 30 mg/dL    Creatinine 0.82 0.52 - 1.04 mg/dL    GFR Estimate 75 >60 mL/min/1.7m2    GFR Estimate If Black >90 >60 mL/min/1.7m2    Calcium 8.3 (L) 8.5 - 10.1 mg/dL   EKG 12-lead, tracing only   Result Value Ref Range    Interpretation ECG Click View Image link to view waveform and result              EKG Interpretation:      EKG Number: 1  Interpreted by Slava Fong PA-C   Symptoms at time of EKG: none   Rhythm: normal sinus   Rate: normal  Axis: normal  Ectopy: none  Conduction: normal  ST Segments/ T Waves: No ST-T wave changes  Q Waves: none  Comparison to prior: Unchanged  Clinical Impression: normal EKG    ROS:    Review Of Systems  Skin: negative  Eyes: negative  Ears/Nose/Throat: negative  Respiratory: Shortness of breath- associated with SVT,  Dyspnea on exertion- associated with SVT, No cough and No hemoptysis  Cardiovascular: positive for palpitations and tachycardia, negative for, chest pain, exertional chest pain or pressure, lower extremity edema and syncope or near-syncope  Gastrointestinal: positive for heartburn, reflux, abdominal pain, excessive gas or bloating, constipation and diarrhea, negative for, vomiting, melena and hematochezia  Genitourinary: positive for dysuria and frequency, negative for and hematuria  Musculoskeletal: negative  Neurologic: lightheadedness  Psychiatric: negative  Hematologic/Lymphatic/Immunologic: negative for, chills and fever  Endocrine: negative  PCP: Dr. Alva    10 point ROS negative other than the symptoms noted above.      Exam:  Vitals:  B/P: 134/91, T: 98.5, P: Data Unavailable, R: 13    Constitutional: healthy, alert, no distress and cooperative  Head: Normocephalic. No masses, lesions, tenderness or abnormalities  Neck: Neck supple. No adenopathy. Thyroid symmetric, normal size,, Carotids without bruits.  ENT: ENT exam normal, no neck nodes or sinus tenderness  Cardiovascular: negative, PMI normal. No lifts, heaves, or thrills. RRR. No murmurs, clicks gallops or rub  Respiratory: negative, Percussion normal. Good diaphragmatic excursion. Lungs clear  Gastrointestinal: Abdomen soft, non-tender. BS normal. No masses, organomegaly  : Deferred  Musculoskeletal: extremities normal- no gross deformities noted, gait normal and normal muscle tone  Skin: no suspicious lesions or rashes  Neurologic: Gait normal. Reflexes normal and symmetric. Sensation grossly WNL.  Psychiatric: mentation appears normal and affect normal/bright  Hematologic/Lymphatic/Immunologic: normal ant/post cervical, axillary, supraclavicular and inguinal nodes    Consults: none  FEN: regular diet  DVT prophylaxis: encourage ambulation; expect short stay  GI prophylaxis: omeprazole  BM prophylaxis: miralax, pericolace  Code Status: full  code  Disposition: home once consult completed, improved symptoms, stable labs and vitals     Signed:  Slava Fong PA-C   August 27, 2017 at 1:10 AM

## 2017-08-27 NOTE — PROGRESS NOTES
[facility]  Daily Progress Note          Assessment & Plan:   Laura Jackson is a 47 year old female with history of supraventricular tachycardia; s/p Davinci total hysterectomy, bilateral salpingectomy, bilateral ureteral lysis, uterosacral-colpopexy and excision of endometriosis for uterine fibroid, pelvic adhesions, endometriosis of pelvic peritoneum, and apical prolapse; GERD, asthma, and now presenting with second episode of heart palpitation. The first one was in June 2017 when she had uterine fibroid rupture.     1. Sinus tachycardia on exertion  2. Anemia s/p recent surgery  3. Heart palpitation and weakness  EKG from 8/26/17 showed normal sinus rhythm and troponin x2 are negative. Zio patch from 7/7/17 showed sinus tach but no other findings concerning arrhythmia such as V tach, pauses, blocks, or A-fib. Patient received 1L NS bolus in the ED and 2 additional liters have been given in ED Observation unit.  Hgb of 10.7 with recent baseline of 7-11 (historically was 12-13 in 2013). Labs show normal electrolytes, BUN 13 and creatinine 0.82 (baseline 0.6-0.8). TSH is normal. D-dimer is elevated at 0.9 we will rule out PE. Patient's heart rate still goes up to 130s with activity but it is in the 80s while at rest; and BP remains normal 125/81.   -- Echo and chest CT  -- 2 liters of normal saline bolus  -- Iron studies to rule out iron deficiency    4. Dysuria:  -- Urinalysis came back negative, and dysuria has improved this morning.      5. GERD: -   -- Continue with Prilosec per home regimen    Asthma:   -- Advair per home regimen    FEN: General diet.  Lines: Peripheral IV  Prophylaxis: Short hospitalization, ambulate patient.          Consults:   Adult EP cardiology, recommended obtaining echocardiogram.          Discharge Planning:   If echocardiogram is negative, patient will go home with outpatient cardiology follow up.        Interval History:   Chief complaint of  "tachycardia and weakness.     HPI: June 2017 developed an episode of tachycardia after a uterine fibroid rupture s/p repair during which she was tachycardia was thought to be related. Had a hysterectomy 8/4/17 and had tachycardia while hospitalized but resolved. She has done well with no more episodes until tonight when she got up to take her son to his room she suddenly developed felt her heart pounding and racing. Pulse was in the 130's. Associated with clamminess, diaphoresis, and overall felt \"icky\". Denies any associated chest pain, SOB. Denies use of alcohol, illicit drugs, caffeine, inhaler, soda. Denies any fever, chills, night sweats. Reports intermittent dysuria. In ED, HR 85-95, /91, RR 13-16, SaO2 % on RA, Temp 98.5. Labs show BMP with BUN/Creat 13/0.82 (baseline 0.6-0.8) otherwise normal. CBC with H/H 10.7/33.6 (recent baseline 7-11; 2012/2013 was 12-13) otherwise normal. In the ED the patient was given 1L NS bolus and placed on MIVF at 125ml/hr. Her HR throughout her ER stay has been in a sinus rhythm with a rate of 80s-90s. Zio patch done on 7/7/17 showed the patient to have sinus tach but no other episodes of any concerning arrhythmia such as V tach, pauses, blocks, or A-fib. She is being admitted to the observation unit for hydration and possible     ROS:   Constitutional: No fevers/chills. Tolerating diet.   Cardiovascular: No chest pain or palpitations.   Respiratory: No cough or SOB.   GI: No abdominal pain. No N/V.      : No urinary complaints.   Musculoskeletal: Denies pain.           Physical Exam:   /81 (BP Location: Right arm)  Temp 98.5  F (36.9  C) (Oral)  Resp 16  Ht 1.651 m (5' 5\")  Wt 73.6 kg (162 lb 4.1 oz)  LMP 07/28/2017  SpO2 97%  BMI 27 kg/m2     GENERAL: Alert and oriented x 3. NAD.   HEENT: Anicteric sclera. Mucous membranes moist.   CV: RRR. S1, S2. No murmurs appreciated.   RESPIRATORY: Effort normal. Lungs CTAB with no wheezing, rales, rhonchi.   GI: " Abdomen soft and non distended with normoactive bowel sounds present in all quadrants. No tenderness, rebound, guarding.   NEUROLOGICAL: No focal deficits. Moves all extremities.    EXTREMITIES: No peripheral edema. Intact bilateral pedal pulses.   SKIN: No jaundice. No rashes.     Medication list reviewed.   Today's labs and imaging were reviewed.     SALAZAR Serrano, CNP  ED Observation Unit

## 2017-08-27 NOTE — ED NOTES
Pt. Presents to ED with c/o heart palpitations that started about an hour ago. S/P fibroid hemorrhage and hysterectomy about 3 weeks ago complicated by anemia with symptomatic tachycardia. Pt. Reports multiple EKGs, holter, and lab tests that have all been normal. Pt. A & O x 4, independent, ambulatory, AVSS on RA.

## 2017-08-27 NOTE — PLAN OF CARE
Problem: Discharge Planning  Goal: Discharge Planning (Adult, OB, Behavioral, Peds)  -diagnostic tests and consults completed and resulted : No  -vital signs normal or at patient baseline : Yes  -tolerating oral intake to maintain hydration : Yes  -adequate pain control on oral analgesics : Yes  -returns to baseline functional status : No  -safe disposition plan has been identified: No   -resolution of lightheadedness and tachycardia: No, still tachycardic     Pt a/o x 4; VSS, except still tachycardic rhythm; pt denies pain, headache, dizziness, n/v, or SOB. Pt eating breakfast, ambulated to bathroom and voided without problems.     Pt will walk hallways after eating.

## 2017-08-28 ENCOUNTER — TELEPHONE (OUTPATIENT)
Dept: FAMILY MEDICINE | Facility: CLINIC | Age: 47
End: 2017-08-28

## 2017-08-28 NOTE — TELEPHONE ENCOUNTER
This patient was discharged from [facility] on 8/27/2017.    Discharge Diagnosis: Palpitations, Sinus Tachycardia    A follow-up visit has not been scheduled.     Number of ED/ER visits in the last 12 months:       Please follow-up with patient.    Mely Alvarado,

## 2017-08-28 NOTE — PROGRESS NOTES
Discharge instruction reviewed.  Patient verbalized understanding. PIV removed, patient transported to the main lobby. Family awaiting at main lobby. Patient discharged.

## 2017-08-28 NOTE — TELEPHONE ENCOUNTER
ED / Discharge Outreach Protocol    Patient Contact    Attempt # 1    Was call answered?  No.  Left message on voicemail with information to call me back.    Benjamin FLORES, RN, BSN

## 2017-08-28 NOTE — DISCHARGE SUMMARY
ischarge Summary    Laura Jackson MRN# [id number]   YOB: 1970 Age: 47 year old     Date of Admission:  8/26/2017  Date of Discharge:  8/27/2017  Admitting Physician:  Therese López MD  Discharge Physician:  Emmanuelle Phillips MD (Contact: [phone])  Discharging Service:  Emergency Medicine     Primary Provider: Whitney Alva          Discharge Diagnosis:   Heart palpitations   Generalized weakness  Sinus tachycardia             Discharge Disposition:   Discharged to home           Condition on Discharge:   Discharge condition: Stable   Code status on discharge: Full Code           Procedures:   Imaging performed:   Chest CT               Discharge Medications:   Current Discharge Medication List      START taking these medications    Details   diltiazem (CARDIZEM CD) 120 MG 24 hr capsule Take 1 capsule (120 mg) by mouth daily  Qty: 30 capsule, Refills: 3    Associated Diagnoses: Sinus tachycardia; Palpitations         CONTINUE these medications which have NOT CHANGED    Details   Simethicone Extra Strength 125 MG CAPS Take 2 capsules by mouth daily as needed      alpha-d-galactosidase (BEANO) tablet Take 2 tablets by mouth daily as needed for intestinal gas      ibuprofen (ADVIL/MOTRIN) 600 MG tablet Take 1 tablet (600 mg) by mouth every 6 hours as needed for pain (mild)  Qty: 60 tablet, Refills: 0    Associated Diagnoses: Intramural leiomyoma of uterus; Endometriosis of pelvic peritoneum; Uterovaginal prolapse, incomplete      omeprazole 20 MG tablet Take 20 mg by mouth daily      fluticasone-salmeterol (ADVAIR) 100-50 MCG/DOSE diskus inhaler Inhale 1 puff into the lungs 2 times daily  Qty: 3 Inhaler, Refills: 1    Associated Diagnoses: Mild persistent asthma without complication      levalbuterol (XOPENEX HFA) 45 MCG/ACT Inhaler Inhale 1-2 puffs into the lungs every 4 hours as needed for shortness of breath / dyspnea  Qty: 1 Inhaler, Refills: 5    Associated Diagnoses: Mild persistent asthma  without complication                   Consultations:   Consultation during this admission received from cardiology             Brief History of Illness:   Laura Jackson is a 47 year old female with history of supraventricular tachycardia; s/p Davinci total hysterectomy, bilateral salpingectomy, bilateral ureteral lysis, uterosacral-colpopexy and excision of endometriosis for uterine fibroid, pelvic adhesions, endometriosis of pelvic peritoneum, and apical prolapse; GERD, asthma, and now presenting with second episode of heart palpitation. The first one was in June 2017 when she had uterine fibroid rupture. Ziopatch from 7/7/17 showed sinus tach but no other findings concerning arrhythmia such as V tach, pauses, blocks, or A-fib.        Hospital Course:   1. Sinus tachycardia on exertion  2. Anemia s/p recent surgery  3. Heart palpitation and weakness  EKG from 8/26/17 showed normal sinus rhythm and troponin x2 are negative. Patient received 1L NS bolus in the ED and 2 additional liters have been given in ED Observation unit.  Hgb of 10.7 with recent baseline of 7-11 (historically was 12-13 in 2013). Labs show normal electrolytes, BUN 13 and creatinine 0.82 (baseline 0.6-0.8). TSH is normal. D-dimer was elevated at 0.9 with last hospitalization. Chest CT was obtained on 8/27/17, and there was emboli seen on CT. Patient's heart rate still goes up to 130s with activity but it is in the 80s while at rest; and BP remains normal 125/81. Echocardiogram was also completed, which was normal. Plan is to mange patient's sinus tachycardia with Diltiazem, and also repeat Ziopatch.   --Follow up with primary care provider  --Review iron studies and replace if needed  --Review repeat Ziopatch result  --Start 120mg of oral Diltiazem             Final Day of Progress before Discharge:       Physical Exam:  Blood pressure 124/85, pulse 109, temperature 98.6  F (37  C), temperature source Oral, resp. rate 18, height 1.651 m (5'  "5\"), weight 73.6 kg (162 lb 4.1 oz), last menstrual period 07/28/2017, SpO2 96 %, not currently breastfeeding.    EXAM:  Constitutional: healthy, alert and no distress   Head: Normocephalic. No masses, lesions, tenderness or abnormalities   Neck: Neck supple. No adenopathy. Thyroid symmetric, normal size,, Carotids without bruits.   ENT: ENT exam normal, no neck nodes or sinus tenderness   Cardiovascular: RRR. No murmurs, clicks gallops or rub   Respiratory: . Good diaphragmatic excursion. Lungs clear   Gastrointestinal: Abdomen soft,. BS normal. No masses, organomegaly.   : Deferred   Musculoskeletal: extremities normal- no gross deformities noted, gait normal and normal muscle tone   Skin: no suspicious lesions or rashes   Neurologic: Gait normal. Reflexes normal and symmetric. Sensation grossly WNL.   Psychiatric: mentation appears normal and affect normal/bright   Hematologic/Lymphatic/Immunologic: normal ant/post cervical, axillary, supraclavicular and inguinal          Data:  All laboratory data reviewed             Significant Results:   None  Lab Results   Component Value Date    WBC 5.7 08/27/2017    HGB 10.3 (L) 08/27/2017    HCT 32.5 (L) 08/27/2017     08/27/2017     08/26/2017    POTASSIUM 3.6 08/26/2017    CHLORIDE 106 08/26/2017    CO2 26 08/26/2017    BUN 13 08/26/2017    CR 0.82 08/26/2017     (H) 08/26/2017    DD 0.9 (H) 08/05/2017    TROPI  08/05/2017     <0.015  The 99th percentile for upper reference range is 0.045 ug/L.  Troponin values in   the range of 0.045 - 0.120 ug/L may be associated with risks of adverse   clinical events.      AST 19 07/12/2017    ALT 24 07/12/2017    ALKPHOS 54 07/12/2017    BILITOTAL 0.4 07/12/2017    INR 1.12 06/20/2017      Recent Results (from the past 48 hour(s))   CT Chest Pulmonary Embolism w Contrast    Narrative    EXAMINATION: CT CHEST PULMONARY EMBOLISM W CONTRAST, 8/27/2017 3:56 PM    TECHNIQUE:  Helical CT images from the lung apices " through the upper  abdomen were obtained with IV contrast. Three-dimensional (3D)  post-processed angiographic images were reconstructed, archived to  PACS and used in the interpretation of this study.  Contrast: 58cc of Isovue 370    COMPARISON: Chest x-ray 8/5/2017, CT chest 6/26/2017    HISTORY: Sinus tachycardia and D-dimer of 0.9.    FINDINGS:    Chest: The contrast bolus adequately opacify the pulmonary arteries to  evaluate for pulmonary embolism and no pulmonary embolism is  identified. The main pulmonary artery is normal in caliber. Normal  heart size. No pericardial effusion. Normal caliber thoracic aorta. No  lymphadenopathy in the chest by size criteria. No pleural effusion or  pneumothorax.    Lungs: The central tracheobronchial tree is clear. Minimal bibasilar  atelectasis. The lungs are otherwise clear without findings concerning  for infection or suspicious pulmonary nodule.    Upper abdomen: Limited evaluation of the upper abdomen due to coverage  and contrast timing is unremarkable.    Bones and soft tissues: No acute or suspicious osseous abnormality.  Soft tissues are unremarkable.      Impression    IMPRESSION: No pulmonary embolism. Lungs are clear.    I have personally reviewed the examination and initial interpretation  and I agree with the findings.    GLADIS ALEJO MD                Pending Results:   Unresulted Labs Ordered in the Past 30 Days of this Admission     No orders found for last 61 day(s).                  Discharge Instructions and Follow-Up:     Discharge Procedure Orders  CARDIO  ADULT REFERRAL   Referral Type: CV Cardio consult     Reason for your hospital stay   Order Comments: Patient admitted for heart palpitations and weakness. Extensive work up including chest x-ray, EKG, chest CT, echocardiogram and blood work did not show contributing factors to patient's sinus tachycardia. Plan is to send patient home with Ziopatch and treat her tachycardia with  Diltiazem.     Adult [facility] Follow-up and recommended labs and tests   Order Comments: Follow up with primary care provider, Whitney Alva, within 7 days to evaluate medication change response and Ziopatcg result.      Appointments on [facility] and/or [facility] (with [facility] or [facility] provider or service). Call [phone] if you haven't heard regarding these appointments within 7 days of discharge.     Activity   Order Comments: Your activity upon discharge: activity as tolerated   Order Specific Question Answer Comments   Is discharge order? Yes      Full Code     Diet   Order Comments: Follow this diet upon discharge: Advance to a regular diet as tolerated   Order Specific Question Answer Comments   Is discharge order? Yes             Attestation:  Amado Meeks CNP  ED Observation    Time spent on patient: 60 minutes total including face to face and coordinating care time reviewing current illness, any medication changes, and the care plan for today.

## 2017-08-28 NOTE — PLAN OF CARE
Problem: Discharge Planning  Goal: Discharge Planning (Adult, OB, Behavioral, Peds)  -diagnostic tests and consults completed and resulted : No  -vital signs normal or at patient baseline : Yes, except tachycardic  -tolerating oral intake to maintain hydration : Yes  -adequate pain control on oral analgesics : Yes  -returns to baseline functional status : No  -safe disposition plan has been identified: Yes  -resolution of lightheadedness and tachycardia: No, still tachycardic  Pt waiting echo and ziopatch then will be discharging home per MD

## 2017-08-29 LAB — INTERPRETATION ECG - MUSE: NORMAL

## 2017-08-30 NOTE — PROGRESS NOTES
SUBJECTIVE:   Laura Jackson is a 47 year old female who presents to clinic today for the following health issues:          Hospital Follow-up Visit:    Hospital/Nursing Home/IP Rehab Facility: [facility]  Date of Admission:8-26-17  Date of Discharge: 8-27-17  Reason(s) for Admission  Heart palpitations   Generalized weakness  Sinus tachycardia:   Per Hospital Notes  Laura Jackson is a 47 year old female with history of supraventricular tachycardia; s/p Davinci total hysterectomy, bilateral salpingectomy, bilateral ureteral lysis, uterosacral-colpopexy and excision of endometriosis for uterine fibroid, pelvic adhesions, endometriosis of pelvic peritoneum, and apical prolapse; GERD, asthma, and now presenting with second episode of heart palpitation.            Problems taking medications regularly:  None       Medication changes since discharge: None       Problems adhering to non-medication therapy:  None    Summary of hospitalization:  [facility] discharge summary reviewed  Diagnostic Tests/Treatments reviewed.  Follow up needed: none  Other Healthcare Providers Involved in Patient s Care:         None  Update since discharge: improved.     Post Discharge Medication Reconciliation: discharge medications reconciled, continue medications without change.  Plan of care communicated with patient     Coding guidelines for this visit:  Type of Medical   Decision Making Face-to-Face Visit       within 7 Days of discharge Face-to-Face Visit        within 14 days of discharge   Moderate Complexity [id number]   High Complexity [id number]              Pt ahs had No More SVT  She has another Zio patch on  No chest pain or sob    Problem list and histories reviewed & adjusted, as indicated.  Additional history: as documented    Patient Active Problem List   Diagnosis     CARDIOVASCULAR SCREENING; LDL GOAL LESS THAN 160     Irritable bowel syndrome     GERD (gastroesophageal reflux disease)      History of supraventricular tachycardia     Mild persistent asthma     Family history of breast cancer     Foreign body (FB) in soft tissue     S/P myomectomy     Nausea     Dehydration     Past Surgical History:   Procedure Laterality Date     APPENDECTOMY       DAVINCI HYSTERECTOMY TOTAL, SALPINGECTOMY BILATERAL N/A 8/4/2017    Procedure: DAVINCI XI HYSTERECTOMY TOTAL, SALPINGECTOMY BILATERAL;  DAVINCI TOTAL HYSTERECTOMY; BILATERAL SALPINGECTOMY. BILATERAL URETERAL LYSIS. UTEROSACRAL-COLPOPEXY. EXCISION OF ENDOMETRIOSIS;  Surgeon: George Loyola MD;  [facility]     DAVINCI LYSIS OF ADHESIONS Bilateral 8/4/2017    Procedure: DAVINCI LYSIS OF ADHESIONS;  BILATERAL URETERAL LYSIS;  Surgeon: George Loyola MD;  [facility]     DAVINCI SACROCOLPOPEXY, CYSTOSCOPY, COMBINED N/A 8/4/2017    Procedure: COMBINED DAVINCI SACROCOLPOPEXY, CYSTOSCOPY;  UTEROSACRAL COLPOPEXY;  Surgeon: George Loyola MD;  [facility]     DAVINCI XI ASSISTED ABLATION / EXCISION OF ENDOMETRIOSIS  8/4/2017    Procedure: DAVINCI XI ASSISTED ABLATION / EXCISION OF ENDOMETRIOSIS;;  Surgeon: George Loyola MD;  [facility]     DILATION AND CURETTAGE N/A 6/20/2017    Procedure: DILATION AND CURETTAGE;  Uterine Curettings and Fibroid Removal, Cook catheter placement; (No hysterectomy done at this time);  Surgeon: Ernestina Saunders MD;  [facility]     TONSILLECTOMY         Social History   Substance Use Topics     Smoking status: Never Smoker     Smokeless tobacco: Never Used     Alcohol use Yes      Comment: occasional     Family History   Problem Relation Age of Onset     DIABETES Mother      Hypertension Mother      Hyperlipidemia Mother      Arrhythmia Mother      Cardiovascular Father      CHF and COPD     Asthma Father      Breast Cancer Maternal Grandfather      Colon Cancer Maternal Grandfather      Breast Cancer Paternal Grandmother      Breast Cancer Paternal Aunt      C.A.D. Paternal Grandfather       Breast Cancer Cousin      Asthma Son      DIABETES Maternal Grandmother      Hypertension Maternal Grandmother          Current Outpatient Prescriptions   Medication Sig Dispense Refill     IRON PO Take 450 mcg by mouth daily       Simethicone Extra Strength 125 MG CAPS Take 2 capsules by mouth daily as needed       alpha-d-galactosidase (BEANO) tablet Take 2 tablets by mouth daily as needed for intestinal gas       ibuprofen (ADVIL/MOTRIN) 600 MG tablet Take 1 tablet (600 mg) by mouth every 6 hours as needed for pain (mild) 60 tablet 0     omeprazole 20 MG tablet Take 20 mg by mouth daily       fluticasone-salmeterol (ADVAIR) 100-50 MCG/DOSE diskus inhaler Inhale 1 puff into the lungs 2 times daily 3 Inhaler 1     levalbuterol (XOPENEX HFA) 45 MCG/ACT Inhaler Inhale 1-2 puffs into the lungs every 4 hours as needed for shortness of breath / dyspnea 1 Inhaler 5     diltiazem (CARDIZEM CD) 120 MG 24 hr capsule Take 1 capsule (120 mg) by mouth daily (Patient not taking: Reported on 8/31/2017) 30 capsule 3     Allergies   Allergen Reactions     Penicillins      Swelling throat; age 4     Tetracycline      Vomiting; nauseous     Recent Labs   Lab Test  08/27/17   0816  08/26/17   2344  08/05/17   0135  07/12/17   1911  06/26/17   1737  06/24/17   1236   02/12/13   0852  07/20/11   LDL   --    --    --    --    --    --    --    --    --   110   HDL   --    --    --    --    --    --    --    --    --   65   TRIG   --    --    --    --    --    --    --    --    --   72   ALT   --    --    --   24   --   27   --   43   --    --    CR   --   0.82  0.66  0.84  0.81  0.86   --   0.73   < >   --    GFRESTIMATED   --   75  >90  Non  GFR Calc    72  75  71   --   87   < >   --    GFRESTBLACK   --   >90  >90  African American GFR Calc    87  >90   GFR Calc    85   --   >90   < >   --    POTASSIUM   --   3.6  3.9  3.7  3.8  3.6   < >  4.0   < >   --    TSH  3.50   --    --    --   2.53   --    --    "1.97   < >   --     < > = values in this interval not displayed.      BP Readings from Last 3 Encounters:   08/31/17 124/86   08/27/17 124/85   08/06/17 129/85    Wt Readings from Last 3 Encounters:   08/31/17 158 lb (71.7 kg)   08/27/17 162 lb 4.1 oz (73.6 kg)   08/04/17 158 lb 14.4 oz (72.1 kg)                  Labs reviewed in EPIC          Reviewed and updated as needed this visit by clinical staff       Reviewed and updated as needed this visit by Provider         ROS:  C: NEGATIVE for fever, chills, change in weight  INTEGUMENTARY/SKIN: NEGATIVE for worrisome rashes, moles or lesions  E/M: NEGATIVE for ear, mouth and throat problems  R: NEGATIVE for significant cough or SOB  CV: as above -recurrent SVT  GI: NEGATIVE for nausea, abdominal pain, heartburn, or change in bowel habits  MUSCULOSKELETAL: NEGATIVE for significant arthralgias or myalgia    OBJECTIVE:     /86  Pulse 105  Temp 98.3  F (36.8  C) (Oral)  Ht 5' 5\" (1.651 m)  Wt 158 lb (71.7 kg)  LMP 07/28/2017  SpO2 100%  BMI 26.29 kg/m2  Body mass index is 26.29 kg/(m^2).  GENERAL: healthy, alert and no distress  NECK: no adenopathy, no asymmetry, masses, or scars and thyroid normal to palpation  RESP: lungs clear to auscultation - no rales, rhonchi or wheezes  CV: regular rate and rhythm, normal S1 S2, no S3 or S4, no murmur, click or rub, no peripheral edema and peripheral pulses strong  ABDOMEN: soft, nontender, no hepatosplenomegaly, no masses and bowel sounds normal  MS: no gross musculoskeletal defects noted, no edema    Diagnostic Test Results:  No results found for this or any previous visit (from the past 24 hour(s)).  Results for orders placed or performed during the hospital encounter of 08/26/17   CT Chest Pulmonary Embolism w Contrast    Narrative    EXAMINATION: CT CHEST PULMONARY EMBOLISM W CONTRAST, 8/27/2017 3:56 PM    TECHNIQUE:  Helical CT images from the lung apices through the upper  abdomen were obtained with IV contrast. " Three-dimensional (3D)  post-processed angiographic images were reconstructed, archived to  PACS and used in the interpretation of this study.  Contrast: 58cc of Isovue 370    COMPARISON: Chest x-ray 8/5/2017, CT chest 6/26/2017    HISTORY: Sinus tachycardia and D-dimer of 0.9.    FINDINGS:    Chest: The contrast bolus adequately opacify the pulmonary arteries to  evaluate for pulmonary embolism and no pulmonary embolism is  identified. The main pulmonary artery is normal in caliber. Normal  heart size. No pericardial effusion. Normal caliber thoracic aorta. No  lymphadenopathy in the chest by size criteria. No pleural effusion or  pneumothorax.    Lungs: The central tracheobronchial tree is clear. Minimal bibasilar  atelectasis. The lungs are otherwise clear without findings concerning  for infection or suspicious pulmonary nodule.    Upper abdomen: Limited evaluation of the upper abdomen due to coverage  and contrast timing is unremarkable.    Bones and soft tissues: No acute or suspicious osseous abnormality.  Soft tissues are unremarkable.      Impression    IMPRESSION: No pulmonary embolism. Lungs are clear.    I have personally reviewed the examination and initial interpretation  and I agree with the findings.    GLADIS ALEJO MD   CBC with platelets differential   Result Value Ref Range    WBC 6.1 4.0 - 11.0 10e9/L    RBC Count 4.13 3.8 - 5.2 10e12/L    Hemoglobin 10.7 (L) 11.7 - 15.7 g/dL    Hematocrit 33.6 (L) 35.0 - 47.0 %    MCV 81 78 - 100 fl    MCH 25.9 (L) 26.5 - 33.0 pg    MCHC 31.8 31.5 - 36.5 g/dL    RDW 14.6 10.0 - 15.0 %    Platelet Count 306 150 - 450 10e9/L    Diff Method Automated Method     % Neutrophils 57.1 %    % Lymphocytes 33.8 %    % Monocytes 5.2 %    % Eosinophils 3.3 %    % Basophils 0.3 %    % Immature Granulocytes 0.3 %    Nucleated RBCs 0 0 /100    Absolute Neutrophil 3.5 1.6 - 8.3 10e9/L    Absolute Lymphocytes 2.1 0.8 - 5.3 10e9/L    Absolute Monocytes 0.3 0.0 - 1.3  10e9/L    Absolute Eosinophils 0.2 0.0 - 0.7 10e9/L    Absolute Basophils 0.0 0.0 - 0.2 10e9/L    Abs Immature Granulocytes 0.0 0 - 0.4 10e9/L    Absolute Nucleated RBC 0.0    Basic metabolic panel   Result Value Ref Range    Sodium 138 133 - 144 mmol/L    Potassium 3.6 3.4 - 5.3 mmol/L    Chloride 106 94 - 109 mmol/L    Carbon Dioxide 26 20 - 32 mmol/L    Anion Gap 6 3 - 14 mmol/L    Glucose 106 (H) 70 - 99 mg/dL    Urea Nitrogen 13 7 - 30 mg/dL    Creatinine 0.82 0.52 - 1.04 mg/dL    GFR Estimate 75 >60 mL/min/1.7m2    GFR Estimate If Black >90 >60 mL/min/1.7m2    Calcium 8.3 (L) 8.5 - 10.1 mg/dL   Magnesium   Result Value Ref Range    Magnesium 2.2 1.6 - 2.3 mg/dL   CBC with platelets differential   Result Value Ref Range    WBC 5.7 4.0 - 11.0 10e9/L    RBC Count 4.04 3.8 - 5.2 10e12/L    Hemoglobin 10.3 (L) 11.7 - 15.7 g/dL    Hematocrit 32.5 (L) 35.0 - 47.0 %    MCV 80 78 - 100 fl    MCH 25.5 (L) 26.5 - 33.0 pg    MCHC 31.7 31.5 - 36.5 g/dL    RDW 14.6 10.0 - 15.0 %    Platelet Count 288 150 - 450 10e9/L    Diff Method Automated Method     % Neutrophils 62.2 %    % Lymphocytes 30.0 %    % Monocytes 4.9 %    % Eosinophils 2.3 %    % Basophils 0.2 %    % Immature Granulocytes 0.4 %    Nucleated RBCs 0 0 /100    Absolute Neutrophil 3.6 1.6 - 8.3 10e9/L    Absolute Lymphocytes 1.7 0.8 - 5.3 10e9/L    Absolute Monocytes 0.3 0.0 - 1.3 10e9/L    Absolute Eosinophils 0.1 0.0 - 0.7 10e9/L    Absolute Basophils 0.0 0.0 - 0.2 10e9/L    Abs Immature Granulocytes 0.0 0 - 0.4 10e9/L    Absolute Nucleated RBC 0.0    UA reflex to Microscopic and Culture   Result Value Ref Range    Color Urine Straw     Appearance Urine Slightly Cloudy     Glucose Urine Negative NEG^Negative mg/dL    Bilirubin Urine Negative NEG^Negative    Ketones Urine Negative NEG^Negative mg/dL    Specific Gravity Urine 1.001 (L) 1.003 - 1.035    Blood Urine Negative NEG^Negative    pH Urine 7.0 5.0 - 7.0 pH    Protein Albumin Urine Negative NEG^Negative  mg/dL    Urobilinogen mg/dL Normal 0.0 - 2.0 mg/dL    Nitrite Urine Negative NEG^Negative    Leukocyte Esterase Urine Negative NEG^Negative    Source Unspecified Urine     RBC Urine 0 0 - 2 /HPF    WBC Urine <1 0 - 2 /HPF    Squamous Epithelial /HPF Urine <1 0 - 1 /HPF   Phosphorus   Result Value Ref Range    Phosphorus 3.1 2.5 - 4.5 mg/dL   TSH with free T4 reflex   Result Value Ref Range    TSH 3.50 0.40 - 4.00 mU/L   Iron and iron binding capacity   Result Value Ref Range    Iron 26 (L) 35 - 180 ug/dL    Iron Binding Cap 399 240 - 430 ug/dL    Iron Saturation Index 7 (L) 15 - 46 %   Ferritin   Result Value Ref Range    Ferritin 8 8 - 252 ng/mL   EKG 12-lead, tracing only   Result Value Ref Range    Interpretation ECG Click View Image link to view waveform and result    EKG 12-lead, complete   Result Value Ref Range    Interpretation ECG Click View Image link to view waveform and result    Cardiology Electrophysiology (EP) IP Consult: Patient to be seen: Routine within 24 hrs; Call back #: [phone]; Heart palpitations, weakness and sinus tachycardia.; Consultant may enter orders: Yes    Narrative    Lalo Deleon MD     8/27/2017  9:29 PM  CARDIAC ELECTROPHYSIOLOGY CONSULT SERVICE   INITIAL CONSULT NOTE  August 27, 2017    Patient Name: Laura Jackson  Medical Record Number: [id number]  YOB: 1970  PCP: Whitney Alva      Reason for consult: Sinus tachycardia           Date of Service: 8/27/2017  Admit Date/Time: 8/26/2017 11:27 PM    Consult performed at the request of Therese López MD       Assessment:   Laura Jackson is a 47 year old female with symptoms consistent   with autonomic dysfunction that mostly manifests as inappropriate   sinus tachycardia and vagal symptoms.  It is not consistent with   POTs.  The trigger was likely a combination of the viral illness   she had prior to her surgery, the inflammation of surgery, or the   blood she received. If her echo is wnl and she has no  structural   disease, she will need to adjust her po salt intake (increase),   increase po fluid intake, and continue with physical rehab as   retraining her autonomic system will be essential for complete   recovery.        Recommendations:  -echo to rule out any structural dysfunction, if there is any RV   strain or enlargement, please order CTPE to evaluate for PT  -start diltizem xl 120 mg daily to limit the tachycardia (start   after she receives a zio patch), do order dilt if she has an   abnormal ef  -increase salt and h20 intake  -Ziopatch monitor for 2 weeks, if dc'd today she will need to   pick it up in [place] or here tomorrow    History of present illness:   Laura Jackson is a 47 year old female with recent urterine   bleeding 2/2 to fibroids, she received 2 units of PRBC's   secondary to hemorrhage, and had a recent viral illness. She   reports that over the past 2 months she has had increasing   episodes of palptiations that are associated with flushing,   clamminess, and tachycardia with rates above 150 bpm.  She denies   chest pain, or significant shortness of breath.  She denies LE   edema, pnd, nor orthopnea.  She has notes a hx of SVT in the past   ( 8 years ago).  THere are no sx when going for lying supine to   standing.  At times she can exert herself, but over the past 2   days her stamina has been significantly decreased.         Allergies   Allergen Reactions     Penicillins      Swelling throat; age 4     Tetracycline      Vomiting; nauseous     Past Medical History:   Diagnosis Date     Family history of breast cancer 2/19/2014     SVT (supraventricular tachycardia):  history of, no recurrence   since 2008 10/11/2013    no recurrence since 2008      Uncomplicated asthma      Past Surgical History:   Procedure Laterality Date     APPENDECTOMY       DAVINCI HYSTERECTOMY TOTAL, SALPINGECTOMY BILATERAL N/A   8/4/2017    Procedure: DAVINCI XI HYSTERECTOMY TOTAL, SALPINGECTOMY   BILATERAL;   DAVINCI TOTAL HYSTERECTOMY; BILATERAL SALPINGECTOMY.   BILATERAL URETERAL LYSIS. UTEROSACRAL-COLPOPEXY. EXCISION OF   ENDOMETRIOSIS;  Surgeon: George Loyola MD;  [facility]       DAVINCI LYSIS OF ADHESIONS Bilateral 8/4/2017    Procedure: DAVINCI LYSIS OF ADHESIONS;  BILATERAL URETERAL   LYSIS;  Surgeon: George Loyola MD;  [facility]     DAVINCI SACROCOLPOPEXY, CYSTOSCOPY, COMBINED N/A 8/4/2017    Procedure: COMBINED DAVINCI SACROCOLPOPEXY, CYSTOSCOPY;    UTEROSACRAL COLPOPEXY;  Surgeon: George Loyola MD;    [facility]     DAVINCI XI ASSISTED ABLATION / EXCISION OF ENDOMETRIOSIS    8/4/2017    Procedure: DAVINCI XI ASSISTED ABLATION / EXCISION OF   ENDOMETRIOSIS;;  Surgeon: George Loyola MD;  [facility]     DILATION AND CURETTAGE N/A 6/20/2017    Procedure: DILATION AND CURETTAGE;  Uterine Curettings and   Fibroid Removal, Cook catheter placement; (No hysterectomy done   at this time);  Surgeon: Ernestina Saunders MD;  [facility]     TONSILLECTOMY         Prescriptions Prior to Admission   Medication Sig Dispense Refill Last Dose     Simethicone Extra Strength 125 MG CAPS Take 2 capsules by mouth   daily as needed   8/4/2017 at am     alpha-d-galactosidase (BEANO) tablet Take 2 tablets by mouth   daily as needed for intestinal gas   8/4/2017 at am     ibuprofen (ADVIL/MOTRIN) 600 MG tablet Take 1 tablet (600 mg)   by mouth every 6 hours as needed for pain (mild) 60 tablet 0      omeprazole 20 MG tablet Take 20 mg by mouth daily   8/4/2017 at   am     fluticasone-salmeterol (ADVAIR) 100-50 MCG/DOSE diskus inhaler   Inhale 1 puff into the lungs 2 times daily 3 Inhaler 1 8/4/2017   at am     levalbuterol (XOPENEX HFA) 45 MCG/ACT Inhaler Inhale 1-2 puffs   into the lungs every 4 hours as needed for shortness of breath /   dyspnea 1 Inhaler 5 More than a month at Unknown time         omeprazole  20 mg Oral Daily     fluticasone-vilanterol  1 puff Inhalation Daily      "polyethylene glycol  17 g Oral Daily         Family History   Problem Relation Age of Onset     DIABETES Mother      Hypertension Mother      Hyperlipidemia Mother      Arrhythmia Mother      Cardiovascular Father      CHF and COPD     Asthma Father      Breast Cancer Maternal Grandfather      Colon Cancer Maternal Grandfather      Breast Cancer Paternal Grandmother      Breast Cancer Paternal Aunt      DEANA Paternal Grandfather      Breast Cancer Cousin      Asthma Son      DIABETES Maternal Grandmother      Hypertension Maternal Grandmother      Social History     Social History     Marital status:      Spouse name: N/A     Number of children: 2     Years of education: N/A     Occupational History     RN[employer]     Social History Main Topics     Smoking status: Never Smoker     Smokeless tobacco: Never Used     Alcohol use Yes      Comment: occasional     Drug use: No     Sexual activity: Yes     Partners: Male     Birth control/ protection: Male Surgical     Other Topics Concern     Parent/Sibling W/ Cabg, Mi Or Angioplasty Before 65f 55m? No     Social History Narrative        Review Of Systems:  A 4 point ROS was performed and negative other than that   described in the HPI    Objective:   Vital signs: /81 (BP Location: Right arm)  Temp 98.5  F   (36.9  C) (Oral)  Resp 16  Ht 1.651 m (5' 5\")  Wt 73.6 kg (162   lb 4.1 oz)  LMP 07/28/2017  SpO2 97%  BMI 27 kg/m2   No intake or output data in the 24 hours ending 08/27/17 1106  Wt Readings from Last 3 Encounters:   08/27/17 73.6 kg (162 lb 4.1 oz)   08/04/17 72.1 kg (158 lb 14.4 oz)   08/01/17 72.6 kg (160 lb)     General: No acute distress, pleasant and cooperative  HEENT: NC/AT  Neck: carotid pulses present w/o bruits  Chest/Lungs: CTA b/l  Cardiovascular: RRR, +S1/S2, no m/r/g, JVP wnl  Extremities: No cyanosis, clubbing, or edema; pulses intact   distally  Muskuloskeletal: Normal muscle bulk and " tone  Skin: No wounds or lesions  Neurological: A&Ox3      LABS:  CMP  Recent Labs  Lab 17  2344      POTASSIUM 3.6   CHLORIDE 106   CO2 26   ANIONGAP 6   *   BUN 13   CR 0.82   GFRESTIMATED 75   GFRESTBLACK >90   AZAEL 8.3*   MAG 2.2   PHOS 3.1     CBC  Recent Labs  Lab 17  0816 17  2344   WBC 5.7 6.1   RBC 4.04 4.13   HGB 10.3* 10.7*   HCT 32.5* 33.6*   MCV 80 81   MCH 25.5* 25.9*   MCHC 31.7 31.8   RDW 14.6 14.6    306     INRNo lab results found in last 7 days.  Arterial Blood GasNo lab results found in last 7 days.  LipidsNo lab results found in last 7 days.    Invalid input(s): TRI  TSH  Recent Labs  Lab 17  0816   TSH 3.50     [id number] input(s): HGBA1C  TroponinNo lab results found in last 7 days.    EKG:  reviewed     ECHO: pending    Imaging/Angiography: n/a        Thank you for allowing us to participate in the care of this very   pleasant patient. Please do not hesitate to call if you have   further questions or concerns.     Laura Jackson was seen and examined with Dr. Deleon, attending   physician, who agrees with the above assessment and plan.       Hardy Godfrey MD  Cardiology Fellow  Pager: [phone]  2017    EP STAFF NOTE  Patient seen and examined and management plan personally reviewed   with the patient. I agree with the note above by the CV/EP   fellow.  Lalo Deleon MD [facility]  Cardiology - Electrophysiology     Echocardiogram Complete    Narrative    [id number]  [facility]  [id number]  [id number]^CAN^MISBIL^[facility],[place]  Echocardiography Laboratory  [address] [place] [id number]     Name: LAURA JACKSON  MRN: [id number]  : 1970  Study Date: 2017 06:29 PM  Age: 47 yrs  Gender: Female  Patient Location: [facility]  Reason For Study: Tachycardia  Ordering Physician: BARBRA NORTH  Performed By: Joyce Jindra, RDCS     BSA: 1.8 m2  Height: 65 in  Weight:  162 lb  BP: 124/85 mmHg  _____________________________________________________________________________  __        Procedure  Echocardiogram with two-dimensional, color and spectral Doppler performed.  _____________________________________________________________________________  __        Interpretation Summary  Left ventricular size is normal. Global and regional left ventricular function  is normal with an EF of 60-65%.  The right ventricle is normal size. Global right ventricular function is  normal.  The inferior vena cava is normal.  No pericardial effusion is present.  Previous study not available for comparison.  _____________________________________________________________________________  __        Left Ventricle  Left ventricular size is normal. Left ventricular wall thickness is normal.  Global and regional left ventricular function is normal with an EF of 60-65%.  Normal left ventricular filling for age. No regional wall motion abnormalities  are seen.     Right Ventricle  The right ventricle is normal size. Global right ventricular function is  normal.     Atria  Both atria appear normal. The atrial septum is intact as assessed by color  Doppler .        Mitral Valve  The mitral valve is normal. Trace mitral insufficiency is present.     Aortic Valve  Aortic valve is normal in structure and function.     Tricuspid Valve  The tricuspid valve is normal. Trace tricuspid insufficiency is present.  Pulmonary artery systolic pressure cannot be assessed. The peak velocity of  the tricuspid regurgitant jet is not obtainable.     Pulmonic Valve  The pulmonic valve is normal.     Vessels  The aorta root is normal. The inferior vena cava is normal. Estimated mean  right atrial pressure is 3 mmHg.     Pericardium  No pericardial effusion is present.        Compared to Previous Study  Previous study not available for comparison.  _____________________________________________________________________________  __      MMode/2D Measurements & Calculations  LA Volume (BP): 29.9 ml  LA Volume Index (BP): 16.5 ml/m2        Doppler Measurements & Calculations  MV E max mary: 95.1 cm/sec  MV A max mary: 62.1 cm/sec  MV E/A: 1.5  MV dec time: 0.23 sec              _____________________________________________________________________________  __        Report approved by: Yasmin Barrett 08/27/2017 07:15 PM          ASSESSMENT/PLAN:       1. Iron deficiency anemia, unspecified iron deficiency anemia type  Pt is on Iron tablets  Wondering what dose she can take  See PI    - CBC with platelets    2. Palpitations  Advised that she see Cardiologist due to Recurrent symptoms  Pt needs a EP study  - CARDIOLOGY EVAL ADULT REFERRAL    3. Sinus tachycardia  As above  - CARDIOLOGY EVAL ADULT REFERRAL    4. Visit for screening mammogram  Advised   - MA SCREENING DIGITAL BILAT - Future  (s+30); Future      Whitney Alva MD  [facility]

## 2017-08-31 ENCOUNTER — OFFICE VISIT (OUTPATIENT)
Dept: FAMILY MEDICINE | Facility: CLINIC | Age: 47
End: 2017-08-31
Payer: COMMERCIAL

## 2017-08-31 VITALS
OXYGEN SATURATION: 100 % | WEIGHT: 158 LBS | HEIGHT: 65 IN | SYSTOLIC BLOOD PRESSURE: 124 MMHG | BODY MASS INDEX: 26.33 KG/M2 | DIASTOLIC BLOOD PRESSURE: 86 MMHG | HEART RATE: 105 BPM | TEMPERATURE: 98.3 F

## 2017-08-31 DIAGNOSIS — R00.2 PALPITATIONS: ICD-10-CM

## 2017-08-31 DIAGNOSIS — Z12.31 VISIT FOR SCREENING MAMMOGRAM: ICD-10-CM

## 2017-08-31 DIAGNOSIS — D50.9 IRON DEFICIENCY ANEMIA, UNSPECIFIED IRON DEFICIENCY ANEMIA TYPE: Primary | ICD-10-CM

## 2017-08-31 DIAGNOSIS — R00.0 SINUS TACHYCARDIA: ICD-10-CM

## 2017-08-31 PROCEDURE — 99213 OFFICE O/P EST LOW 20 MIN: CPT | Performed by: FAMILY MEDICINE

## 2017-08-31 ASSESSMENT — PAIN SCALES - GENERAL: PAINLEVEL: NO PAIN (0)

## 2017-08-31 NOTE — NURSING NOTE
"Chief Complaint   Patient presents with     Hospital F/U       Initial /86  Pulse 105  Temp 98.3  F (36.8  C) (Oral)  Ht 5' 5\" (1.651 m)  Wt 158 lb (71.7 kg)  LMP 07/28/2017  SpO2 100%  BMI 26.29 kg/m2 Estimated body mass index is 26.29 kg/(m^2) as calculated from the following:    Height as of this encounter: 5' 5\" (1.651 m).    Weight as of this encounter: 158 lb (71.7 kg).  Medication Reconciliation: complete     Haely Pal MA       "

## 2017-08-31 NOTE — MR AVS SNAPSHOT
After Visit Summary   8/31/2017    Laura Jackson    MRN: [id number]           Patient Information     Date Of Birth          1970        Visit Information        Provider Department      8/31/2017 10:20 AM Whitney Alva MD [facility]        Today's Diagnoses     Iron deficiency anemia, unspecified iron deficiency anemia type    -  1    Palpitations        Sinus tachycardia        Visit for screening mammogram          Care Instructions    Take Iron sulfate 325 mg Twice daily  Take Colace 100 mg Twice daily  Follow up if any further symptoms  Take care  Whitney Alva MD    [facility]    If you have any questions regarding to your visit please contact your care team:       Team Red:   Clinic Hours Telephone Number   Dr. Mitzi Torres, NP   7am-7pm  Monday - Thursday   7am-5pm  Fridays  [phone]  (Appointment scheduling available 24/7)    Questions about your visit?   Team Line  [phone]   [facility] [place] [facility] 11am-9pm Monday-Friday Saturday-Sunday- 9am-5pm   [place] - 5pm-9pm Monday-Friday Saturday-Sunday- 9am-5pm  [phone] - [place]   [phone] - [place]       What options do I have for visits at the clinic other than the traditional office visit?  To expand how we care for you, many of our providers are utilizing electronic visits (e-visits) and telephone visits, when medically appropriate, for interactions with their patients rather than a visit in the clinic.   We also offer nurse visits for many medical concerns. Just like any other service, we will bill your insurance company for this type of visit based on time spent on the phone with your provider. Not all insurance companies cover these visits. Please check with your medical insurance if this type of visit is covered. You will be responsible for any charges that are not paid by your insurance.       E-visits via [employer]:  generally incur a $35.00 fee.  Telephone visits:  Time spent on the phone: *charged based on time that is spent on the phone in increments of 10 minutes. Estimated cost:   5-10 mins $30.00   11-20 mins. $59.00   21-30 mins. $85.00     Use [employer] (secure email communication and access to your chart) to send your primary care provider a message or make an appointment. Ask someone on your Team how to sign up for [employer].  For a Price Quote for your services, please call our Consumer Price Line at [phone].      As always, Thank you for trusting us with your health care needs!    Discharged by Starla Mccurdy MA.            Follow-ups after your visit        Additional Services     CARDIOLOGY EVAL ADULT REFERRAL       Your provider [facility] you to:  [facility] [phone]   [email or web address]    Please be aware that coverage of these services is subject to the terms and limitations of your health insurance plan.  Call member services at your health plan with any benefit or coverage questions.      Type of Referral:  New EP Consult    Timeframe requested:  Within 1 month    Please bring the following to your appointment:  >>   Any x-rays, CTs or MRIs which have been performed.  Contact the facility where they were done to arrange for  prior to your scheduled appointment.    >>   List of current medications  >>   This referral request   >>   Any documents/labs given to you for this referral                  Your next 10 appointments already scheduled     Dec 28, 2017  1:30 PM CST   (Arrive by 1:15 PM)   NEW ARRHYTHMIA with Phil Mitchell MD   [facility] [place] [facility])    [address]  3rd Floor  [facility] [address] [phone]              Future tests that were ordered for you today     Open  "Future Orders        Priority Expected Expires Ordered    MA SCREENING DIGITAL BILAT - Future  (s+30) Routine  8/30/2018 8/31/2017            Who to contact     If you have questions or need follow up information about today's clinic visit or your schedule please contact [facility] directly at [phone].  Normal or non-critical lab and imaging results will be communicated to you by MyChart, letter or phone within 4 business days after the clinic has received the results. If you do not hear from us within 7 days, please contact the clinic through [employer] or phone. If you have a critical or abnormal lab result, we will notify you by phone as soon as possible.  Submit refill requests through [employer] or call your pharmacy and they will forward the refill request to us. Please allow 3 business days for your refill to be completed.          Additional Information About Your Visit        [employer] Information     [employer] gives you secure access to your electronic health record. If you see a primary care provider, you can also send messages to your care team and make appointments. If you have questions, please call your primary care clinic.  If you do not have a primary care provider, please call [phone] and they will assist you.        Care EveryWhere ID     This is your Care EveryWhere ID. This could be used by other organizations to access your [place] medical records  [id number]        Your Vitals Were     Pulse Temperature Height Last Period Pulse Oximetry BMI (Body Mass Index)    105 98.3  F (36.8  C) (Oral) 5' 5\" (1.651 m) 07/28/2017 100% 26.29 kg/m2       Blood Pressure from Last 3 Encounters:   08/31/17 124/86   08/27/17 124/85   08/06/17 129/85    Weight from Last 3 Encounters:   08/31/17 158 lb (71.7 kg)   08/27/17 162 lb 4.1 oz (73.6 kg)   08/04/17 158 lb 14.4 oz (72.1 kg)              We Performed the Following     CARDIOLOGY EVAL ADULT REFERRAL     CBC with platelets        Primary Care " Provider Office Phone # Fax #    Whitney Alva [phone] [address] [place] [address]        Equal Access to Services     JUAN ALBERTOELIGIO LEE : Hadjaja ruffin rosanauriel Consuelo, waapda luqsilvia, qaannabellata kadariada michelle, ludmila foss laNelaleeann lexus. So [facility] [phone].    ATENCIÓN: Si habla español, tiene a guido disposición servicios gratuitos de asistencia lingüística. Llame al [phone].    We comply with applicable federal civil rights laws and [place] laws. We do not discriminate on the basis of race, color, national origin, age, disability sex, sexual orientation or gender identity.            Thank you!     Thank you for choosing [facility]  for your care. Our goal is always to provide you with excellent care. Hearing back from our patients is one way we can continue to improve our services. Please take a few minutes to complete the written survey that you may receive in the mail after your visit with us. Thank you!             Your Updated Medication List - Protect others around you: Learn how to safely use, store and throw away your medicines at www.disposemymeds.org.          This list is accurate as of: 8/31/17 10:46 AM.  Always use your most recent med list.                   Brand Name Dispense Instructions for use Diagnosis    alpha-d-galactosidase tablet      Take 2 tablets by mouth daily as needed for intestinal gas        diltiazem 120 MG 24 hr capsule    CARDIZEM CD    30 capsule    Take 1 capsule (120 mg) by mouth daily    Sinus tachycardia, Palpitations       fluticasone-salmeterol 100-50 MCG/DOSE diskus inhaler    ADVAIR    3 Inhaler    Inhale 1 puff into the lungs 2 times daily    Mild persistent asthma without complication       ibuprofen 600 MG tablet    ADVIL/MOTRIN    60 tablet    Take 1 tablet (600 mg) by mouth every 6 hours as needed for pain (mild)    Intramural leiomyoma of uterus, Endometriosis of pelvic peritoneum,  Uterovaginal prolapse, incomplete       IRON PO      Take 450 mcg by mouth daily        levalbuterol 45 MCG/ACT Inhaler    XOPENEX HFA    1 Inhaler    Inhale 1-2 puffs into the lungs every 4 hours as needed for shortness of breath / dyspnea    Mild persistent asthma without complication       omeprazole 20 MG tablet      Take 20 mg by mouth daily        Simethicone Extra Strength 125 MG Caps      Take 2 capsules by mouth daily as needed

## 2017-08-31 NOTE — PATIENT INSTRUCTIONS
Take Iron sulfate 325 mg Twice daily  Take Colace 100 mg Twice daily  Follow up if any further symptoms  Take care  Whitney Alva MD    [facility]    If you have any questions regarding to your visit please contact your care team:       Team Red:   Clinic Hours Telephone Number   Dr. Mitzi Torres, NP   7am-7pm  Monday - Thursday   7am-5pm  Fridays  [phone]  (Appointment scheduling available 24/7)    Questions about your visit?   Team Line  [phone]   Urgent Care - [place] - 11am-9pm Monday-Friday Saturday-Sunday- 9am-5pm   [place] - 5pm-9pm Monday-Friday Saturday-Sunday- 9am-5pm  [phone] - [place]   [phone] - [place]       What options do I have for visits at the clinic other than the traditional office visit?  To expand how we care for you, many of our providers are utilizing electronic visits (e-visits) and telephone visits, when medically appropriate, for interactions with their patients rather than a visit in the clinic.   We also offer nurse visits for many medical concerns. Just like any other service, we will bill your insurance company for this type of visit based on time spent on the phone with your provider. Not all insurance companies cover these visits. Please check with your medical insurance if this type of visit is covered. You will be responsible for any charges that are not paid by your insurance.      E-visits via [employer]:  generally incur a $35.00 fee.  Telephone visits:  Time spent on the phone: *charged based on time that is spent on the phone in increments of 10 minutes. Estimated cost:   5-10 mins $30.00   11-20 mins. $59.00   21-30 mins. $85.00     Use [employer] (secure email communication and access to your chart) to send your primary care provider a message or make an appointment. Ask someone on your Team how to sign up for [employer].  For a Price Quote for your services,  please call our Consumer Price Line at [phone].      As always, Thank you for trusting us with your health care needs!    Discharged by Starla Mccurdy MA.

## 2017-09-01 ENCOUNTER — CARE COORDINATION (OUTPATIENT)
Dept: CARDIOLOGY | Facility: CLINIC | Age: 47
End: 2017-09-01

## 2017-09-01 NOTE — PROGRESS NOTES
"Per call center:  \"Type of call: Sooner Appointment     Sooner Appointment   New or Return patient: New   Does a prior appointment exist: yes, 12/28/17   Time frame requested: 1-2 days   Reason caller wants sooner appointment: per discharge notes   Who to call back and call back number: Laura Ramirez at [phone] (ok to leave a message)\"    Reviewed w/ Theresa Sauceda who saw the pt in the hospital w/ Dr. Deleon.  She states no need to f/u w/ cardiology, primary is ok.  Spoke to pt - her primary yesterday suggested Dr. Mitchell for ? POTS.  Informed pt that the December appt is soonest for this, she saw EP who thought she would be ok w/ the current plan.  Pt verbalizes understanding - she will keep the 12/2017 appt for now if she continues to have sx's.  "

## 2017-09-08 NOTE — PROGRESS NOTES
SUBJECTIVE:   Laura Jackson is a 47 year old female who presents to clinic today for the following health issues:    Note 9/11/2017 --  Hospital Follow-up Visit:    Hospital/Nursing Home/IP Rehab Facility: [facility]  Date of Admission: 08/26/2017  Date of Discharge: 08/27/2017  Reason(s) for Admission: sinus tachycardia            Problems taking medications regularly:  None       Medication changes since discharge: diltazeam       Problems adhering to non-medication therapy:  None    Summary of hospitalization:  [facility] discharge summary reviewed  Diagnostic Tests/Treatments reviewed.  Follow up needed: probably cardiologist for further treatment of presumptive diagnosis of POTS (postural orthostatic tachycardia syndrome) , see assessment and plan section    Other Healthcare Providers Involved in Patient s Care:         as above  Update since discharge: slightly improved.     Post Discharge Medication Reconciliation: discharge medications reconciled and changed, per note/orders (see AVS).  Plan of care communicated with patient     Coding guidelines for this visit:  Type of Medical   Decision Making Face-to-Face Visit       within 7 Days of discharge Face-to-Face Visit        within 14 days of discharge   Moderate Complexity [id number]   High Complexity [id number]        -- Her PCP is Dr. Whitney Alva. Notes she had a hemorrhaging fibroid in July and that's when her sinus tachycardia began. Her hemoglobin was 7.1 in the hospital and they believe that's what was causing tachycardia. Hemoglobin is not normally low. After that, she had intermittent tachycardia until her hysterectomy. Then she had a hysterectomy on 8/4. During that time she had post-op sinus tachycardia and had to stay 2 days in the hospital. She went home and felt fine for 3 weeks, then had another episode of sinus tachycardia at home, at least 140 pulse. When she has tachycardia, she feels clammy and cold.  Patient just finished using a Zio patch. She saw cardiology and they believe it is related to some kind of autonomic dysfunction. Describes that when getting up to walk or sitting up, her heart rate increases. She has been taking iron supplements and she's had increases with her fatigue. She has felt fatigued recently and notes fluids tend to help her symptoms. Patient started on diltiazem after her Zio patch was done. Does not feel she has improved very much in the last two weeks. Denies caffeine use, pain, or anxiety.    Patient believes she has had symptoms of short lived tachycardia about 8 years ago.    Indicative for possible POTS ?    Right Wrist Pain -- Patient states she has developed a cyst in her right wrist and now her fingers are starting to become numb. Symptoms have been developing over the last year.     Right Elbow Pain -- In addition, she reports her right elbow has been painful since she had an IV in the hospital.    Additional Notes -- Declines influenza vaccination today.    Discharge Summary 8/29/2017 --  Physician Attestation   I, Emmanuelle Phillips, personally saw and evaluated Laura Jackson as part of a shared visit.  I have reviewed and discussed with the advanced practice provider their discharge plan.    My key history or physical exam findings from the day of discharge: Pt is a healthy 46 yo female who was admitted for palpitations and sinus tachycardia that occurs with walking.  Pt with normal CT chest and echocardiogram. Pt seen by Cards who feels symptoms are likely due to POTS.  Recommend d/c home with diltiaziem.  Pt is well appearing.   Will d/c home.      Key management decisions made by me: stable for d/c home with zio patch.      [facility] Note 8/27/2017 --  Brief History of Illness:  Laura Jackson is a 47 year old female with history of supraventricular tachycardia; s/p Davinci total hysterectomy, bilateral salpingectomy, bilateral ureteral  lysis, uterosacral-colpopexy and excision of endometriosis for uterine fibroid, pelvic adhesions, endometriosis of pelvic peritoneum, and apical prolapse; GERD, asthma, and now presenting with second episode of heart palpitation. The first one was in June 2017 when she had uterine fibroid rupture. Ziopatch from 7/7/17 showed sinus tach but no other findings concerning arrhythmia such as V tach, pauses, blocks, or A-fib.     Hospital Course:  1. Sinus tachycardia on exertion  2. Anemia s/p recent surgery  3. Heart palpitation and weakness  EKG from 8/26/17 showed normal sinus rhythm and troponin x2 are negative. Patient received 1L NS bolus in the ED and 2 additional liters have been given in ED Observation unit.  Hgb of 10.7 with recent baseline of 7-11 (historically was 12-13 in 2013). Labs show normal electrolytes, BUN 13 and creatinine 0.82 (baseline 0.6-0.8). TSH is normal. D-dimer was elevated at 0.9 with last hospitalization. Chest CT was obtained on 8/27/17, and there was emboli seen on CT. Patient's heart rate still goes up to 130s with activity but it is in the 80s while at rest; and BP remains normal 125/81. Echocardiogram was also completed, which was normal. Plan is to mange patient's sinus tachycardia with Diltiazem, and also repeat Ziopatch.   --Follow up with primary care provider  --Review iron studies and replace if needed  --Review repeat Ziopatch result  --Start 120mg of oral Diltiazem       Problem list and histories reviewed & adjusted, as indicated.  Additional history: as documented    Patient Active Problem List   Diagnosis     CARDIOVASCULAR SCREENING; LDL GOAL LESS THAN 160     Irritable bowel syndrome     GERD (gastroesophageal reflux disease)     History of supraventricular tachycardia     Mild persistent asthma     Family history of breast cancer     Foreign body (FB) in soft tissue     S/P myomectomy     Nausea     Dehydration     S/P ANAID (total abdominal hysterectomy)      Hypovitaminosis D     Past Surgical History:   Procedure Laterality Date     APPENDECTOMY       DAVINCI HYSTERECTOMY TOTAL, SALPINGECTOMY BILATERAL N/A 8/4/2017    Procedure: DAVINCI XI HYSTERECTOMY TOTAL, SALPINGECTOMY BILATERAL;  DAVINCI TOTAL HYSTERECTOMY; BILATERAL SALPINGECTOMY. BILATERAL URETERAL LYSIS. UTEROSACRAL-COLPOPEXY. EXCISION OF ENDOMETRIOSIS;  Surgeon: George Loyola MD;  Location[facility]     DAVINCI LYSIS OF ADHESIONS Bilateral 8/4/2017    Procedure: DAVINCI LYSIS OF ADHESIONS;  BILATERAL URETERAL LYSIS;  Surgeon: George Loyola MD;  Location[facility]     DAVINCI SACROCOLPOPEXY, CYSTOSCOPY, COMBINED N/A 8/4/2017    Procedure: COMBINED DAVINCI SACROCOLPOPEXY, CYSTOSCOPY;  UTEROSACRAL COLPOPEXY;  Surgeon: George Loyola MD;  Location: [facility] OR     DAVINCI XI ASSISTED ABLATION / EXCISION OF ENDOMETRIOSIS  8/4/2017    Procedure: DAVINCI XI ASSISTED ABLATION / EXCISION OF ENDOMETRIOSIS;;  Surgeon: George Loyola MD;  Location[facility]     DILATION AND CURETTAGE N/A 6/20/2017    Procedure: DILATION AND CURETTAGE;  Uterine Curettings and Fibroid Removal, Cook catheter placement; (No hysterectomy done at this time);  Surgeon: Ernestina Saunders MD;  [facility]     TONSILLECTOMY         Social History   Substance Use Topics     Smoking status: Never Smoker     Smokeless tobacco: Never Used     Alcohol use Yes      Comment: occasional     Family History   Problem Relation Age of Onset     DIABETES Mother      Hypertension Mother      Hyperlipidemia Mother      Arrhythmia Mother      Cardiovascular Father      CHF and COPD     Asthma Father      Breast Cancer Maternal Grandfather      Colon Cancer Maternal Grandfather      Breast Cancer Paternal Grandmother      Breast Cancer Paternal Aunt      C.A.D. Paternal Grandfather      Breast Cancer Cousin      Asthma Son      DIABETES Maternal Grandmother      Hypertension Maternal Grandmother          Current Outpatient Prescriptions   Medication Sig  "Dispense Refill     IRON PO Take 325 mg by mouth daily        diltiazem (CARDIZEM CD) 120 MG 24 hr capsule Take 1 capsule (120 mg) by mouth daily 30 capsule 3     Simethicone Extra Strength 125 MG CAPS Take 2 capsules by mouth daily as needed       fluticasone-salmeterol (ADVAIR) 100-50 MCG/DOSE diskus inhaler Inhale 1 puff into the lungs 2 times daily 3 Inhaler 1     levalbuterol (XOPENEX HFA) 45 MCG/ACT Inhaler Inhale 1-2 puffs into the lungs every 4 hours as needed for shortness of breath / dyspnea 1 Inhaler 5     ibuprofen (ADVIL/MOTRIN) 600 MG tablet Take 1 tablet (600 mg) by mouth every 6 hours as needed for pain (mild) (Patient not taking: Reported on 9/11/2017) 60 tablet 0     Labs reviewed in EPIC      Reviewed and updated as needed this visit by clinical staffTobacco  Allergies  Meds  Med Hx  Surg Hx  Fam Hx  Soc Hx      Reviewed and updated as needed this visit by Provider         ROS:  Constitutional, HEENT, cardiovascular, pulmonary, GI, , musculoskeletal, neuro, skin, endocrine and psych systems are negative, except as otherwise noted.    This document serves as a record of the services and decisions personally performed and made by Bj Butler MD. It was created on their behalf by Rickey Matos, a trained medical scribe. The creation of this document is based the provider's statements to the medical scribe.  Rickey Matos September 11, 2017 9:23 AM    OBJECTIVE:   /80  Pulse 97  Temp 97.2  F (36.2  C) (Oral)  Ht 1.651 m (5' 5\")  Wt 71.7 kg (158 lb)  LMP 07/28/2017  SpO2 100%  BMI 26.29 kg/m2  Body mass index is 26.29 kg/(m^2).  GENERAL: healthy, alert and no distress  EYES: Eyes grossly normal to inspection, EOMI, conjunctivae and sclerae normal  RESP: lungs clear to auscultation - no rales, rhonchi or wheezes  CV: regular rate and rhythm, normal S1 S2, no S3 or S4, no murmur, click or rub, no peripheral edema and peripheral pulses strong  SKIN: no suspicious lesions or " rashes to visible skin   NEURO: mentation intact and speech normal  PSYCH: mentation appears normal, affect normal/bright    Diagnostic Test Results:  Results for orders placed or performed during the hospital encounter of 08/26/17   CT Chest Pulmonary Embolism w Contrast    Narrative    EXAMINATION: CT CHEST PULMONARY EMBOLISM W CONTRAST, 8/27/2017 3:56 PM    TECHNIQUE:  Helical CT images from the lung apices through the upper  abdomen were obtained with IV contrast. Three-dimensional (3D)  post-processed angiographic images were reconstructed, archived to  PACS and used in the interpretation of this study.  Contrast: 58cc of Isovue 370    COMPARISON: Chest x-ray 8/5/2017, CT chest 6/26/2017    HISTORY: Sinus tachycardia and D-dimer of 0.9.    FINDINGS:    Chest: The contrast bolus adequately opacify the pulmonary arteries to  evaluate for pulmonary embolism and no pulmonary embolism is  identified. The main pulmonary artery is normal in caliber. Normal  heart size. No pericardial effusion. Normal caliber thoracic aorta. No  lymphadenopathy in the chest by size criteria. No pleural effusion or  pneumothorax.    Lungs: The central tracheobronchial tree is clear. Minimal bibasilar  atelectasis. The lungs are otherwise clear without findings concerning  for infection or suspicious pulmonary nodule.    Upper abdomen: Limited evaluation of the upper abdomen due to coverage  and contrast timing is unremarkable.    Bones and soft tissues: No acute or suspicious osseous abnormality.  Soft tissues are unremarkable.      Impression    IMPRESSION: No pulmonary embolism. Lungs are clear.    I have personally reviewed the examination and initial interpretation  and I agree with the findings.    GLADIS ALEJO MD   CBC with platelets differential   Result Value Ref Range    WBC 6.1 4.0 - 11.0 10e9/L    RBC Count 4.13 3.8 - 5.2 10e12/L    Hemoglobin 10.7 (L) 11.7 - 15.7 g/dL    Hematocrit 33.6 (L) 35.0 - 47.0 %    MCV 81  78 - 100 fl    MCH 25.9 (L) 26.5 - 33.0 pg    MCHC 31.8 31.5 - 36.5 g/dL    RDW 14.6 10.0 - 15.0 %    Platelet Count 306 150 - 450 10e9/L    Diff Method Automated Method     % Neutrophils 57.1 %    % Lymphocytes 33.8 %    % Monocytes 5.2 %    % Eosinophils 3.3 %    % Basophils 0.3 %    % Immature Granulocytes 0.3 %    Nucleated RBCs 0 0 /100    Absolute Neutrophil 3.5 1.6 - 8.3 10e9/L    Absolute Lymphocytes 2.1 0.8 - 5.3 10e9/L    Absolute Monocytes 0.3 0.0 - 1.3 10e9/L    Absolute Eosinophils 0.2 0.0 - 0.7 10e9/L    Absolute Basophils 0.0 0.0 - 0.2 10e9/L    Abs Immature Granulocytes 0.0 0 - 0.4 10e9/L    Absolute Nucleated RBC 0.0    Basic metabolic panel   Result Value Ref Range    Sodium 138 133 - 144 mmol/L    Potassium 3.6 3.4 - 5.3 mmol/L    Chloride 106 94 - 109 mmol/L    Carbon Dioxide 26 20 - 32 mmol/L    Anion Gap 6 3 - 14 mmol/L    Glucose 106 (H) 70 - 99 mg/dL    Urea Nitrogen 13 7 - 30 mg/dL    Creatinine 0.82 0.52 - 1.04 mg/dL    GFR Estimate 75 >60 mL/min/1.7m2    GFR Estimate If Black >90 >60 mL/min/1.7m2    Calcium 8.3 (L) 8.5 - 10.1 mg/dL   Magnesium   Result Value Ref Range    Magnesium 2.2 1.6 - 2.3 mg/dL   CBC with platelets differential   Result Value Ref Range    WBC 5.7 4.0 - 11.0 10e9/L    RBC Count 4.04 3.8 - 5.2 10e12/L    Hemoglobin 10.3 (L) 11.7 - 15.7 g/dL    Hematocrit 32.5 (L) 35.0 - 47.0 %    MCV 80 78 - 100 fl    MCH 25.5 (L) 26.5 - 33.0 pg    MCHC 31.7 31.5 - 36.5 g/dL    RDW 14.6 10.0 - 15.0 %    Platelet Count 288 150 - 450 10e9/L    Diff Method Automated Method     % Neutrophils 62.2 %    % Lymphocytes 30.0 %    % Monocytes 4.9 %    % Eosinophils 2.3 %    % Basophils 0.2 %    % Immature Granulocytes 0.4 %    Nucleated RBCs 0 0 /100    Absolute Neutrophil 3.6 1.6 - 8.3 10e9/L    Absolute Lymphocytes 1.7 0.8 - 5.3 10e9/L    Absolute Monocytes 0.3 0.0 - 1.3 10e9/L    Absolute Eosinophils 0.1 0.0 - 0.7 10e9/L    Absolute Basophils 0.0 0.0 - 0.2 10e9/L    Abs Immature Granulocytes  0.0 0 - 0.4 10e9/L    Absolute Nucleated RBC 0.0    UA reflex to Microscopic and Culture   Result Value Ref Range    Color Urine Straw     Appearance Urine Slightly Cloudy     Glucose Urine Negative NEG^Negative mg/dL    Bilirubin Urine Negative NEG^Negative    Ketones Urine Negative NEG^Negative mg/dL    Specific Gravity Urine 1.001 (L) 1.003 - 1.035    Blood Urine Negative NEG^Negative    pH Urine 7.0 5.0 - 7.0 pH    Protein Albumin Urine Negative NEG^Negative mg/dL    Urobilinogen mg/dL Normal 0.0 - 2.0 mg/dL    Nitrite Urine Negative NEG^Negative    Leukocyte Esterase Urine Negative NEG^Negative    Source Unspecified Urine     RBC Urine 0 0 - 2 /HPF    WBC Urine <1 0 - 2 /HPF    Squamous Epithelial /HPF Urine <1 0 - 1 /HPF   Phosphorus   Result Value Ref Range    Phosphorus 3.1 2.5 - 4.5 mg/dL   TSH with free T4 reflex   Result Value Ref Range    TSH 3.50 0.40 - 4.00 mU/L   Iron and iron binding capacity   Result Value Ref Range    Iron 26 (L) 35 - 180 ug/dL    Iron Binding Cap 399 240 - 430 ug/dL    Iron Saturation Index 7 (L) 15 - 46 %   Ferritin   Result Value Ref Range    Ferritin 8 8 - 252 ng/mL   EKG 12-lead, tracing only   Result Value Ref Range    Interpretation ECG Click View Image link to view waveform and result    EKG 12-lead, complete   Result Value Ref Range    Interpretation ECG Click View Image link to view waveform and result    Cardiology Electrophysiology (EP) IP Consult: Patient to be seen: Routine within 24 hrs; Call back #: [id number]; Heart palpitations, weakness and sinus tachycardia.; Consultant may enter orders: Yes    Narrative    Lalo Deleon MD     8/27/2017  9:29 PM  CARDIAC ELECTROPHYSIOLOGY CONSULT SERVICE   INITIAL CONSULT NOTE  August 27, 2017    Patient Name: Laura Jackson  Medical Record Number: [id number]  YOB: 1970  PCP: Whitney Alva      Reason for consult: Sinus tachycardia           Date of Service: 8/27/2017  Admit Date/Time: 8/26/2017 11:27 PM    Consult  performed at the request of Therese López MD       Assessment:   Laura Jackson is a 47 year old female with symptoms consistent   with autonomic dysfunction that mostly manifests as inappropriate   sinus tachycardia and vagal symptoms.  It is not consistent with   POTs.  The trigger was likely a combination of the viral illness   she had prior to her surgery, the inflammation of surgery, or the   blood she received. If her echo is wnl and she has no structural   disease, she will need to adjust her po salt intake (increase),   increase po fluid intake, and continue with physical rehab as   retraining her autonomic system will be essential for complete   recovery.        Recommendations:  -echo to rule out any structural dysfunction, if there is any RV   strain or enlargement, please order CTPE to evaluate for PT  -start diltizem xl 120 mg daily to limit the tachycardia (start   after she receives a zio patch), do order dilt if she has an   abnormal ef  -increase salt and h20 intake  -Ziopatch monitor for 2 weeks, if dc'd today she will need to   pick it up in [place] or here tomorrow    History of present illness:   Laura Jackson is a 47 year old female with recent urterine   bleeding 2/2 to fibroids, she received 2 units of PRBC's   secondary to hemorrhage, and had a recent viral illness. She   reports that over the past 2 months she has had increasing   episodes of palptiations that are associated with flushing,   clamminess, and tachycardia with rates above 150 bpm.  She denies   chest pain, or significant shortness of breath.  She denies LE   edema, pnd, nor orthopnea.  She has notes a hx of SVT in the past   ( 8 years ago).  THere are no sx when going for lying supine to   standing.  At times she can exert herself, but over the past 2   days her stamina has been significantly decreased.         Allergies   Allergen Reactions     Penicillins      Swelling throat; age 4     Tetracycline      Vomiting;  nauseous     Past Medical History:   Diagnosis Date     Family history of breast cancer 2/19/2014     SVT (supraventricular tachycardia):  history of, no recurrence   since 2008 10/11/2013    no recurrence since 2008      Uncomplicated asthma      Past Surgical History:   Procedure Laterality Date     APPENDECTOMY       DAVINCI HYSTERECTOMY TOTAL, SALPINGECTOMY BILATERAL N/A   8/4/2017    Procedure: DAVINCI XI HYSTERECTOMY TOTAL, SALPINGECTOMY   BILATERAL;  DAVINCI TOTAL HYSTERECTOMY; BILATERAL SALPINGECTOMY.   BILATERAL URETERAL LYSIS. UTEROSACRAL-COLPOPEXY. EXCISION OF   ENDOMETRIOSIS;  Surgeon: George Loyola MD;  [facility]       DAVINCI LYSIS OF ADHESIONS Bilateral 8/4/2017    Procedure: DAVINCI LYSIS OF ADHESIONS;  BILATERAL URETERAL   LYSIS;  Surgeon: George Loyola MD;  [facility]     DAVINCI SACROCOLPOPEXY, CYSTOSCOPY, COMBINED N/A 8/4/2017    Procedure: COMBINED DAVINCI SACROCOLPOPEXY, CYSTOSCOPY;    UTEROSACRAL COLPOPEXY;  Surgeon: George Loyola MD;    Location[facility]     DAVINCI XI ASSISTED ABLATION / EXCISION OF ENDOMETRIOSIS    8/4/2017    Procedure: DAVINCI XI ASSISTED ABLATION / EXCISION OF   ENDOMETRIOSIS;;  Surgeon: George Loyola MD;  [facility]   OR     DILATION AND CURETTAGE N/A 6/20/2017    Procedure: DILATION AND CURETTAGE;  Uterine Curettings and   Fibroid Removal, Cook catheter placement; (No hysterectomy done   at this time);  Surgeon: Ernestina Saunders MD;  [facility]     TONSILLECTOMY         Prescriptions Prior to Admission   Medication Sig Dispense Refill Last Dose     Simethicone Extra Strength 125 MG CAPS Take 2 capsules by mouth   daily as needed   8/4/2017 at am     alpha-d-galactosidase (BEANO) tablet Take 2 tablets by mouth   daily as needed for intestinal gas   8/4/2017 at am     ibuprofen (ADVIL/MOTRIN) 600 MG tablet Take 1 tablet (600 mg)   by mouth every 6 hours as needed for pain (mild) 60 tablet 0      omeprazole 20 MG tablet Take 20 mg by  "mouth daily   8/4/2017 at   am     fluticasone-salmeterol (ADVAIR) 100-50 MCG/DOSE diskus inhaler   Inhale 1 puff into the lungs 2 times daily 3 Inhaler 1 8/4/2017   at am     levalbuterol (XOPENEX HFA) 45 MCG/ACT Inhaler Inhale 1-2 puffs   into the lungs every 4 hours as needed for shortness of breath /   dyspnea 1 Inhaler 5 More than a month at Unknown time         omeprazole  20 mg Oral Daily     fluticasone-vilanterol  1 puff Inhalation Daily     polyethylene glycol  17 g Oral Daily         Family History   Problem Relation Age of Onset     DIABETES Mother      Hypertension Mother      Hyperlipidemia Mother      Arrhythmia Mother      Cardiovascular Father      CHF and COPD     Asthma Father      Breast Cancer Maternal Grandfather      Colon Cancer Maternal Grandfather      Breast Cancer Paternal Grandmother      Breast Cancer Paternal Aunt      C.A.D. Paternal Grandfather      Breast Cancer Cousin      Asthma Son      DIABETES Maternal Grandmother      Hypertension Maternal Grandmother      Social History     Social History     Marital status:      Spouse name: N/A     Number of children: 2     Years of education: N/A     Occupational History     RN[employer]     Social History Main Topics     Smoking status: Never Smoker     Smokeless tobacco: Never Used     Alcohol use Yes      Comment: occasional     Drug use: No     Sexual activity: Yes     Partners: Male     Birth control/ protection: Male Surgical     Other Topics Concern     Parent/Sibling W/ Cabg, Mi Or Angioplasty Before 65f 55m? No     Social History Narrative        Review Of Systems:  A 4 point ROS was performed and negative other than that   described in the HPI    Objective:   Vital signs: /81 (BP Location: Right arm)  Temp 98.5  F   (36.9  C) (Oral)  Resp 16  Ht 1.651 m (5' 5\")  Wt 73.6 kg (162   lb 4.1 oz)  LMP 07/28/2017  SpO2 97%  BMI 27 kg/m2   No intake or output data in the 24 hours " ending 08/27/17 1106  Wt Readings from Last 3 Encounters:   08/27/17 73.6 kg (162 lb 4.1 oz)   08/04/17 72.1 kg (158 lb 14.4 oz)   08/01/17 72.6 kg (160 lb)     General: No acute distress, pleasant and cooperative  HEENT: NC/AT  Neck: carotid pulses present w/o bruits  Chest/Lungs: CTA b/l  Cardiovascular: RRR, +S1/S2, no m/r/g, JVP wnl  Extremities: No cyanosis, clubbing, or edema; pulses intact   distally  Muskuloskeletal: Normal muscle bulk and tone  Skin: No wounds or lesions  Neurological: A&Ox3      LABS:  CMP  Recent Labs  Lab 08/26/17  2344      POTASSIUM 3.6   CHLORIDE 106   CO2 26   ANIONGAP 6   *   BUN 13   CR 0.82   GFRESTIMATED 75   GFRESTBLACK >90   AZAEL 8.3*   MAG 2.2   PHOS 3.1     CBC  Recent Labs  Lab 08/27/17  0816 08/26/17  2344   WBC 5.7 6.1   RBC 4.04 4.13   HGB 10.3* 10.7*   HCT 32.5* 33.6*   MCV 80 81   MCH 25.5* 25.9*   MCHC 31.7 31.8   RDW 14.6 14.6    306     INRNo lab results found in last 7 days.  Arterial Blood GasNo lab results found in last 7 days.  LipidsNo lab results found in last 7 days.    Invalid input(s): TRI  TSH  Recent Labs  Lab 08/27/17  0816   TSH 3.50     [id number] input(s): HGBA1C  TroponinNo lab results found in last 7 days.    EKG:  reviewed     ECHO: pending    Imaging/Angiography: n/a        Thank you for allowing us to participate in the care of this very   pleasant patient. Please do not hesitate to call if you have   further questions or concerns.     Laura Jackson was seen and examined with Dr. Deleon, attending   physician, who agrees with the above assessment and plan.       Hardy Godfrey MD  Cardiology Fellow  Pager: [phone]  August 27, 2017    EP STAFF NOTE  Patient seen and examined and management plan personally reviewed   with the patient. I agree with the note above by the CV/EP   fellow.  Lalo Deleon MD [facility]  Cardiology - Electrophysiology     Echocardiogram Complete    Narrative     [id number]  [facility]19  [id number]  [id number]^SHAWNBARRETTROCÍO^BARBRA^[facility],[place]  Echocardiography Laboratory  [address] [place] [id number]     Name: HUSEYIN MENDIOLA  MRN: [id number]  : 1970  Study Date: 2017 06:29 PM  Age: 47 yrs  Gender: Female  Patient Location: [facility]  Reason For Study: Tachycardia  Ordering Physician: BARBRA NORTH  Performed By: Joyce Schulte RDCS     BSA: 1.8 m2  Height: 65 in  Weight: 162 lb  BP: 124/85 mmHg  _____________________________________________________________________________  __        Procedure  Echocardiogram with two-dimensional, color and spectral Doppler performed.  _____________________________________________________________________________  __        Interpretation Summary  Left ventricular size is normal. Global and regional left ventricular function  is normal with an EF of 60-65%.  The right ventricle is normal size. Global right ventricular function is  normal.  The inferior vena cava is normal.  No pericardial effusion is present.  Previous study not available for comparison.  _____________________________________________________________________________  __        Left Ventricle  Left ventricular size is normal. Left ventricular wall thickness is normal.  Global and regional left ventricular function is normal with an EF of 60-65%.  Normal left ventricular filling for age. No regional wall motion abnormalities  are seen.     Right Ventricle  The right ventricle is normal size. Global right ventricular function is  normal.     Atria  Both atria appear normal. The atrial septum is intact as assessed by color  Doppler .        Mitral Valve  The mitral valve is normal. Trace mitral insufficiency is present.     Aortic Valve  Aortic valve is normal in structure and function.     Tricuspid Valve  The tricuspid valve is normal. Trace tricuspid insufficiency is present.  Pulmonary artery systolic pressure  cannot be assessed. The peak velocity of  the tricuspid regurgitant jet is not obtainable.     Pulmonic Valve  The pulmonic valve is normal.     Vessels  The aorta root is normal. The inferior vena cava is normal. Estimated mean  right atrial pressure is 3 mmHg.     Pericardium  No pericardial effusion is present.        Compared to Previous Study  Previous study not available for comparison.  _____________________________________________________________________________  __     MMode/2D Measurements & Calculations  LA Volume (BP): 29.9 ml  LA Volume Index (BP): 16.5 ml/m2        Doppler Measurements & Calculations  MV E max mary: 95.1 cm/sec  MV A max mary: 62.1 cm/sec  MV E/A: 1.5  MV dec time: 0.23 sec              _____________________________________________________________________________  __        Report approved by: Yasmin Barrett 08/27/2017 07:15 PM        ASSESSMENT/PLAN:     (R00.0) Sinus tachycardia  (primary encounter diagnosis)  Comment: new patient to the Internal Medicine department with a complicated history. We needed to review quite a bit of material. Based on history and symptoms there's recurrence sinus tachycardia associated marked fatigue and a postural component to all of this with standing generally a provoking factor. She has some remote history [ at least as far back as 8 years ago ] with occasional and not physiological, bursts of sinus tachycardia. What seems to have happened new is the recent history of bleeding and a severe anemia [ hemoglobin down to 7 range ] from uterine myoma and subsequently had a total abdominal hysterectomy. Her anemia has been steadily improving but she is slow to bounce back. There was some disagreement it seems at the hospital with the electrophysiology cardiologist not supporting the POTS (postural orthostatic tachycardia syndrome) diagnosis but this is nevertheless listed as a discharge diagnosis per the discharge summary. I am hopeful that as time passes  and her hemoglobin is fully corrected we may see this problem resolve. Further thoughts  1. Zio Patch still pending  2. Will complete short term disability paper work  For another month off work  3. Clearly this sinus tachycardia is not volume depleted status and her blood pressure was also high and this is also being treated with diltiazem and needs to be followed.  4. If necessary we can re-involve cardiologist as I have seen POTS (postural orthostatic tachycardia syndrome) really snowball and require daily infusion of saline ; patient acknowledged that she always feels a lot better after saline infusions  Plan: CBC with platelets differential, Iron and iron         binding capacity, Ferritin        Follow up as indicated on results , recheck appointment in one month     (D62) Anemia due to blood loss, acute  Comment: we need iron studies , further follow up depending on the test results   Plan: CBC with platelets differential, Iron and iron         binding capacity, Ferritin            (R53.83) Fatigue, unspecified type  Comment: multifactorial most likely , will check vitamin B 12    Plan: Vitamin B12, CBC with platelets differential,         Iron and iron binding capacity, Ferritin, Basic        metabolic panel            (Z90.710) S/P ANAID (total abdominal hysterectomy)  Comment: noted as a point of historical importance   Plan: as above     (Z12.31) Visit for screening mammogram  Comment: postponed for today   Plan: MA SCREENING DIGITAL BILAT - Future  (s+30)            (Z23) Need for prophylactic vaccination and inoculation against influenza  Comment: declines   Plan: as above     (R79.89) Low serum calcium  Comment: this is a minor finding and suspect secondary to low albumin but this warrants some follow up testing with free and ionized calcium   Plan: Calcium ionized, Basic metabolic panel            (E55.9) Hypovitaminosis D  Comment: recheck   Plan: Vitamin D Deficiency, Basic metabolic panel             (Z13.6) CARDIOVASCULAR SCREENING; LDL GOAL LESS THAN 160  Comment: unrelated problem to discuss   Plan: Lipid panel reflex to direct LDL        Follow up as indicated on results     (R20.0) Finger numbness, right 2nd/3rd digits, Active  Comment: her symptoms are of uncertain etiologies and she is concerned and wishes for a orthopedic consultation   Plan: ORTHOPEDICS ADULT REFERRAL            (M67.40) Ganglion cyst  Comment: as detailed above   Plan: ORTHOPEDICS ADULT REFERRAL            (M25.521) Right elbow pain  Comment: as detailed above   Plan: ORTHOPEDICS ADULT REFERRAL              Patient Instructions     - I will follow up with you about lab results, including ZIO patch results.    - Follow up with me in 2-4 weeks.    - You can follow up with an orthopedist about your cyst.      [facility]    If you have any questions regarding to your visit please contact your care team:     Team Pink:   Clinic Hours Telephone Number   Internal Medicine:  Dr. Trisha Kang, NP       7am-7pm  Monday - Thursday   7am-5pm  Fridays  [phone]  (Appointment scheduling available 24/7)    Questions about your visit?  Team Line  [phone]   Urgent Care - [place] and [place] - 11am-9pm Monday-Friday Saturday-Sunday- 9am-5pm   [place] - 5pm-9pm Monday-Friday Saturday-Sunday- 9am-5pm  [phone] - [place]   [phone] - [place]       What options do I have for visits at the clinic other than the traditional office visit?  To expand how we care for you, many of our providers are utilizing electronic visits (e-visits) and telephone visits, when medically appropriate, for interactions with their patients rather than a visit in the clinic.   We also offer nurse visits for many medical concerns. Just like any other service, we will bill your insurance company for this type of visit based on time spent on the phone with your provider. Not all  insurance companies cover these visits. Please check with your medical insurance if this type of visit is covered. You will be responsible for any charges that are not paid by your insurance.      E-visits via [employer]:  generally incur a $35.00 fee.  Telephone visits:  Time spent on the phone: *charged based on time that is spent on the phone in increments of 10 minutes. Estimated cost:   5-10 mins $30.00   11-20 mins. $59.00   21-30 mins. $85.00   Use [employer] (secure email communication and access to your chart) to send your primary care provider a message or make an appointment. Ask someone on your Team how to sign up for [employer]at[employer].    For a Price Quote for your services, please call our Consumer Price Line at [phone].    As always, Thank you for trusting us with your health care needs!    Discharged by Rocio JULIO CMA ([facility])      The information in this document, created by the medical scribe for me, accurately reflects the services I personally performed and the decisions made by me. I have reviewed and approved this document for accuracy.   MD Bj Mcknight MD  [facility]

## 2017-09-11 ENCOUNTER — OFFICE VISIT (OUTPATIENT)
Dept: INTERNAL MEDICINE | Facility: CLINIC | Age: 47
End: 2017-09-11
Payer: COMMERCIAL

## 2017-09-11 VITALS
WEIGHT: 158 LBS | BODY MASS INDEX: 26.33 KG/M2 | HEART RATE: 97 BPM | TEMPERATURE: 97.2 F | DIASTOLIC BLOOD PRESSURE: 80 MMHG | HEIGHT: 65 IN | SYSTOLIC BLOOD PRESSURE: 122 MMHG | OXYGEN SATURATION: 100 %

## 2017-09-11 DIAGNOSIS — Z90.710 S/P TAH (TOTAL ABDOMINAL HYSTERECTOMY): ICD-10-CM

## 2017-09-11 DIAGNOSIS — R00.0 SINUS TACHYCARDIA: Primary | ICD-10-CM

## 2017-09-11 DIAGNOSIS — R20.0 FINGER NUMBNESS: ICD-10-CM

## 2017-09-11 DIAGNOSIS — M25.521 RIGHT ELBOW PAIN: ICD-10-CM

## 2017-09-11 DIAGNOSIS — M67.40 GANGLION CYST: ICD-10-CM

## 2017-09-11 DIAGNOSIS — Z23 NEED FOR PROPHYLACTIC VACCINATION AND INOCULATION AGAINST INFLUENZA: ICD-10-CM

## 2017-09-11 DIAGNOSIS — R53.83 FATIGUE, UNSPECIFIED TYPE: ICD-10-CM

## 2017-09-11 DIAGNOSIS — E55.9 HYPOVITAMINOSIS D: ICD-10-CM

## 2017-09-11 DIAGNOSIS — R79.89 LOW SERUM CALCIUM: ICD-10-CM

## 2017-09-11 DIAGNOSIS — Z12.31 VISIT FOR SCREENING MAMMOGRAM: ICD-10-CM

## 2017-09-11 DIAGNOSIS — D62 ANEMIA DUE TO BLOOD LOSS, ACUTE: ICD-10-CM

## 2017-09-11 DIAGNOSIS — Z13.6 CARDIOVASCULAR SCREENING; LDL GOAL LESS THAN 160: ICD-10-CM

## 2017-09-11 LAB
ANION GAP SERPL CALCULATED.3IONS-SCNC: 8 MMOL/L (ref 3–14)
BASOPHILS # BLD AUTO: 0 10E9/L (ref 0–0.2)
BASOPHILS NFR BLD AUTO: 0.2 %
BUN SERPL-MCNC: 11 MG/DL (ref 7–30)
CA-I SERPL ISE-MCNC: 4.8 MG/DL (ref 4.4–5.2)
CALCIUM SERPL-MCNC: 9.2 MG/DL (ref 8.5–10.1)
CHLORIDE SERPL-SCNC: 103 MMOL/L (ref 94–109)
CHOLEST SERPL-MCNC: 194 MG/DL
CO2 SERPL-SCNC: 27 MMOL/L (ref 20–32)
CREAT SERPL-MCNC: 0.77 MG/DL (ref 0.52–1.04)
DIFFERENTIAL METHOD BLD: ABNORMAL
EOSINOPHIL # BLD AUTO: 0.1 10E9/L (ref 0–0.7)
EOSINOPHIL NFR BLD AUTO: 2.6 %
ERYTHROCYTE [DISTWIDTH] IN BLOOD BY AUTOMATED COUNT: 16.7 % (ref 10–15)
FERRITIN SERPL-MCNC: 14 NG/ML (ref 8–252)
GFR SERPL CREATININE-BSD FRML MDRD: 79 ML/MIN/1.7M2
GLUCOSE SERPL-MCNC: 96 MG/DL (ref 70–99)
HCT VFR BLD AUTO: 39 % (ref 35–47)
HDLC SERPL-MCNC: 65 MG/DL
HGB BLD-MCNC: 12.6 G/DL (ref 11.7–15.7)
IRON SATN MFR SERPL: 35 % (ref 15–46)
IRON SERPL-MCNC: 150 UG/DL (ref 35–180)
LDLC SERPL CALC-MCNC: 110 MG/DL
LYMPHOCYTES # BLD AUTO: 1.2 10E9/L (ref 0.8–5.3)
LYMPHOCYTES NFR BLD AUTO: 25.4 %
MCH RBC QN AUTO: 27 PG (ref 26.5–33)
MCHC RBC AUTO-ENTMCNC: 32.3 G/DL (ref 31.5–36.5)
MCV RBC AUTO: 84 FL (ref 78–100)
MONOCYTES # BLD AUTO: 0.3 10E9/L (ref 0–1.3)
MONOCYTES NFR BLD AUTO: 6.1 %
NEUTROPHILS # BLD AUTO: 3 10E9/L (ref 1.6–8.3)
NEUTROPHILS NFR BLD AUTO: 65.7 %
NONHDLC SERPL-MCNC: 129 MG/DL
PLATELET # BLD AUTO: 262 10E9/L (ref 150–450)
POTASSIUM SERPL-SCNC: 4 MMOL/L (ref 3.4–5.3)
RBC # BLD AUTO: 4.67 10E12/L (ref 3.8–5.2)
SODIUM SERPL-SCNC: 138 MMOL/L (ref 133–144)
TIBC SERPL-MCNC: 433 UG/DL (ref 240–430)
TRIGL SERPL-MCNC: 97 MG/DL
VIT B12 SERPL-MCNC: 477 PG/ML (ref 193–986)
WBC # BLD AUTO: 4.6 10E9/L (ref 4–11)

## 2017-09-11 PROCEDURE — 36415 COLL VENOUS BLD VENIPUNCTURE: CPT | Performed by: INTERNAL MEDICINE

## 2017-09-11 PROCEDURE — 82330 ASSAY OF CALCIUM: CPT | Performed by: INTERNAL MEDICINE

## 2017-09-11 PROCEDURE — 82607 VITAMIN B-12: CPT | Performed by: INTERNAL MEDICINE

## 2017-09-11 PROCEDURE — 83540 ASSAY OF IRON: CPT | Performed by: INTERNAL MEDICINE

## 2017-09-11 PROCEDURE — 83550 IRON BINDING TEST: CPT | Performed by: INTERNAL MEDICINE

## 2017-09-11 PROCEDURE — 80061 LIPID PANEL: CPT | Performed by: INTERNAL MEDICINE

## 2017-09-11 PROCEDURE — 80048 BASIC METABOLIC PNL TOTAL CA: CPT | Performed by: INTERNAL MEDICINE

## 2017-09-11 PROCEDURE — 82728 ASSAY OF FERRITIN: CPT | Performed by: INTERNAL MEDICINE

## 2017-09-11 PROCEDURE — 85025 COMPLETE CBC W/AUTO DIFF WBC: CPT | Performed by: INTERNAL MEDICINE

## 2017-09-11 PROCEDURE — 82306 VITAMIN D 25 HYDROXY: CPT | Performed by: INTERNAL MEDICINE

## 2017-09-11 PROCEDURE — 99215 OFFICE O/P EST HI 40 MIN: CPT | Performed by: INTERNAL MEDICINE

## 2017-09-11 NOTE — PATIENT INSTRUCTIONS
- I will follow up with you about lab results, including ZIO patch results.    - Follow up with me in 2-4 weeks.    - You can follow up with an orthopedist about your cyst.      [facility]    If you have any questions regarding to your visit please contact your care team:     Team Pink:   Clinic Hours Telephone Number   Internal Medicine:  Dr. Trisha Kang, NP       7am-7pm  Monday - Thursday   7am-5pm  Fridays  [phone]  (Appointment scheduling available 24/7)    Questions about your visit?  Team Line  [phone]   [facility] [place] [facility] 11am-9pm Monday-Friday Saturday-Sunday- 9am-5pm   [place] - 5pm-9pm Monday-Friday Saturday-Sunday- 9am-5pm  [phone] - [place]   [phone] - [place]       What options do I have for visits at the clinic other than the traditional office visit?  To expand how we care for you, many of our providers are utilizing electronic visits (e-visits) and telephone visits, when medically appropriate, for interactions with their patients rather than a visit in the clinic.   We also offer nurse visits for many medical concerns. Just like any other service, we will bill your insurance company for this type of visit based on time spent on the phone with your provider. Not all insurance companies cover these visits. Please check with your medical insurance if this type of visit is covered. You will be responsible for any charges that are not paid by your insurance.      E-visits via [employer]:  generally incur a $35.00 fee.  Telephone visits:  Time spent on the phone: *charged based on time that is spent on the phone in increments of 10 minutes. Estimated cost:   5-10 mins $30.00   11-20 mins. $59.00   21-30 mins. $85.00   Use [employer] (secure email communication and access to your chart) to send your primary care provider a message or make an appointment. Ask someone on your Team how to sign up for  [employer].    For a Price Quote for your services, please call our Consumer Price Line at [phone].    As always, Thank you for trusting us with your health care needs!    Discharged by Rocio JULIO CMA ([facility])

## 2017-09-11 NOTE — NURSING NOTE
"Chief Complaint   Patient presents with     Musculoskeletal Problem     right arm and wrist pain, possible cyst     Fatigue     still not feeling any better     Hospital F/U     sinus tachycardia        Initial /80  Pulse 97  Temp 97.2  F (36.2  C) (Oral)  Ht 5' 5\" (1.651 m)  Wt 158 lb (71.7 kg)  LMP 07/28/2017  SpO2 100%  BMI 26.29 kg/m2 Estimated body mass index is 26.29 kg/(m^2) as calculated from the following:    Height as of this encounter: 5' 5\" (1.651 m).    Weight as of this encounter: 158 lb (71.7 kg).  Medication Reconciliation: complete   Rocio JULIO CMA ([facility])      "

## 2017-09-11 NOTE — MR AVS SNAPSHOT
After Visit Summary   9/11/2017    Laura Jackson    MRN: [id number]           Patient Information     Date Of Birth          1970        Visit Information        Provider Department      9/11/2017 8:50 AM Bj Butler MD [facility]        Today's Diagnoses     Sinus tachycardia    -  1    Anemia due to blood loss, acute        Fatigue, unspecified type        S/P ANAID (total abdominal hysterectomy)        Visit for screening mammogram        Need for prophylactic vaccination and inoculation against influenza        Low serum calcium        Hypovitaminosis D        CARDIOVASCULAR SCREENING; LDL GOAL LESS THAN 160        Finger numbness, right 2nd/3rd digits        Ganglion cyst        Right elbow pain          Care Instructions    - I will follow up with you about lab results, including ZIO patch results.    - Follow up with me in 2-4 weeks.    - You can follow up with an orthopedist about your cyst.      [facility]    If you have any questions regarding to your visit please contact your care team:     Team Pink:   Clinic Hours Telephone Number   Internal Medicine:  Dr. Trisha Kang NP       7am-7pm  Monday - Thursday   7am-5pm  Fridays  [phone]  (Appointment scheduling available 24/7)    Questions about your visit?  Team Line  [phone]   Urgent Care - [place] and [place] - 11am-9pm Monday-Friday Saturday-Sunday- 9am-5pm   [place] - 5pm-9pm Monday-Friday Saturday-Sunday- 9am-5pm  [phone] - [place]   [phone] - [place]       What options do I have for visits at the clinic other than the traditional office visit?  To expand how we care for you, many of our providers are utilizing electronic visits (e-visits) and telephone visits, when medically appropriate, for interactions with their patients rather than a visit in the clinic.   We also offer nurse visits for many medical concerns. Just  like any other service, we will bill your insurance company for this type of visit based on time spent on the phone with your provider. Not all insurance companies cover these visits. Please check with your medical insurance if this type of visit is covered. You will be responsible for any charges that are not paid by your insurance.      E-visits via [employer]:  generally incur a $35.00 fee.  Telephone visits:  Time spent on the phone: *charged based on time that is spent on the phone in increments of 10 minutes. Estimated cost:   5-10 mins $30.00   11-20 mins. $59.00   21-30 mins. $85.00   Use [employer] (secure email communication and access to your chart) to send your primary care provider a message or make an appointment. Ask someone on your Team how to sign up for [employer].    For a Price Quote for your services, please call our [employer] Price Line at [phone].    As always, Thank you for trusting us with your health care needs!    Discharged by Rocio JULIO CMA ([facility])            Follow-ups after your visit        Additional Services     ORTHOPEDICS ADULT REFERRAL       Your provider has referred you to: [facility]: [facility] [phone]    [email or web address] [email or web address]    Please be aware that coverage of these services is subject to the terms and limitations of your health insurance plan.  Call member services at your health plan with any benefit or coverage questions.      Please bring the following to your appointment:    >>   Any x-rays, CTs or MRIs which have been performed.  Contact the facility where they were done to arrange for  prior to your scheduled appointment.    >>   List of current medications   >>   This referral request   >>   Any documents/labs given to you for this referral                  Your next 10 appointments already scheduled     Dec 28, 2017  1:30 PM CST   (Arrive by 1:15 PM)   NEW ARRHYTHMIA with Phil Mitchell MD   [facility]    [address]  3rd Floor  [place] [address] [phone]              Future tests that were ordered for you today     Open Future Orders        Priority Expected Expires Ordered    MA SCREENING DIGITAL BILAT - Future  (s+30) Routine  9/8/2018 9/11/2017            Who to contact     If you have questions or need follow up information about today's clinic visit or your schedule please contact [facility] directly at [phone].  Normal or non-critical lab and imaging results will be communicated to you by Fuel ([email or web address])hart, letter or phone within 4 business days after the clinic has received the results. If you do not hear from us within 7 days, please contact the clinic through [employer] or phone. If you have a critical or abnormal lab result, we will notify you by phone as soon as possible.  Submit refill requests through [email or web address] or call your pharmacy and they will forward the refill request to us. Please allow 3 business days for your refill to be completed.          Additional Information About Your Visit        [email or web address] Information     [email or web address] gives you secure access to your electronic health record. If you see a primary care provider, you can also send messages to your care team and make appointments. If you have questions, please call your primary care clinic.  If you do not have a primary care provider, please call [phone] and they will assist you.        Care EveryWhere ID     This is your Care EveryWhere ID. This could be used by other organizations to access your [place] medical records  [id number]        Your Vitals Were     Pulse Temperature Height Last Period Pulse Oximetry BMI (Body Mass Index)    97 97.2  F (36.2  C) (Oral) 5' 5\" (1.651 m) 07/28/2017 100% 26.29 kg/m2       Blood Pressure from Last 3 Encounters:   09/11/17 122/80   08/31/17 124/86   08/27/17 124/85    Weight from Last 3 Encounters:   09/11/17 158 lb (71.7 kg)   08/31/17 " 158 lb (71.7 kg)   08/27/17 162 lb 4.1 oz (73.6 kg)              We Performed the Following     Basic metabolic panel     Calcium ionized     CBC with platelets differential     Ferritin     Iron and iron binding capacity     Lipid panel reflex to direct LDL     ORTHOPEDICS ADULT REFERRAL     Vitamin B12     Vitamin D Deficiency        Primary Care Provider Office Phone # Fax #    Whitney Alva [phone]       [address] [address]        Equal Access to Services     [facility] : Hadii aad ku hadasho Soomaali, waaxda luqadaha, qaybta kaalmada adeegyada, waxay idiin hayaan adeeg khwashingtonmary lajostin . So [facility] [phone].    ATENCIÓN: Si minola espmy, tiene a guido disposición servicios gratuitos de asistencia lingüística. [facility] [phone].    We comply with applicable federal civil rights laws and [place] laws. We do not discriminate on the basis of race, color, national origin, age, disability sex, sexual orientation or gender identity.            Thank you!     Thank you for choosing [facility]  for your care. Our goal is always to provide you with excellent care. Hearing back from our patients is one way we can continue to improve our services. Please take a few minutes to complete the written survey that you may receive in the mail after your visit with us. Thank you!             Your Updated Medication List - Protect others around you: Learn how to safely use, store and throw away your medicines at www.disposemymeds.org.          This list is accurate as of: 9/11/17  9:40 AM.  Always use your most recent med list.                   Brand Name Dispense Instructions for use Diagnosis    diltiazem 120 MG 24 hr capsule    CARDIZEM CD    30 capsule    Take 1 capsule (120 mg) by mouth daily    Sinus tachycardia, Palpitations       fluticasone-salmeterol 100-50 MCG/DOSE diskus inhaler    ADVAIR    3 Inhaler    Inhale 1 puff into the lungs 2 times daily    Mild  persistent asthma without complication       ibuprofen 600 MG tablet    ADVIL/MOTRIN    60 tablet    Take 1 tablet (600 mg) by mouth every 6 hours as needed for pain (mild)    Intramural leiomyoma of uterus, Endometriosis of pelvic peritoneum, Uterovaginal prolapse, incomplete       IRON PO      Take 325 mg by mouth daily        levalbuterol 45 MCG/ACT Inhaler    XOPENEX HFA    1 Inhaler    Inhale 1-2 puffs into the lungs every 4 hours as needed for shortness of breath / dyspnea    Mild persistent asthma without complication       Simethicone Extra Strength 125 MG Caps      Take 2 capsules by mouth daily as needed

## 2017-09-12 ENCOUNTER — TRANSFERRED RECORDS (OUTPATIENT)
Dept: HEALTH INFORMATION MANAGEMENT | Facility: CLINIC | Age: 47
End: 2017-09-12

## 2017-09-12 LAB — DEPRECATED CALCIDIOL+CALCIFEROL SERPL-MC: 47 UG/L (ref 20–75)

## 2017-09-19 ENCOUNTER — TELEPHONE (OUTPATIENT)
Dept: INTERNAL MEDICINE | Facility: CLINIC | Age: 47
End: 2017-09-19

## 2017-09-19 ENCOUNTER — RADIANT APPOINTMENT (OUTPATIENT)
Dept: GENERAL RADIOLOGY | Facility: CLINIC | Age: 47
End: 2017-09-19
Attending: ORTHOPAEDIC SURGERY
Payer: COMMERCIAL

## 2017-09-19 ENCOUNTER — OFFICE VISIT (OUTPATIENT)
Dept: ORTHOPEDICS | Facility: CLINIC | Age: 47
End: 2017-09-19
Payer: COMMERCIAL

## 2017-09-19 VITALS — WEIGHT: 158 LBS | RESPIRATION RATE: 14 BRPM | HEIGHT: 65 IN | BODY MASS INDEX: 26.33 KG/M2

## 2017-09-19 DIAGNOSIS — M77.11 LATERAL EPICONDYLITIS OF RIGHT ELBOW: ICD-10-CM

## 2017-09-19 DIAGNOSIS — M67.431 GANGLION CYST OF WRIST, RIGHT: ICD-10-CM

## 2017-09-19 DIAGNOSIS — M25.521 RIGHT ELBOW PAIN: Primary | ICD-10-CM

## 2017-09-19 DIAGNOSIS — T82.898A: ICD-10-CM

## 2017-09-19 DIAGNOSIS — M25.521 RIGHT ELBOW PAIN: ICD-10-CM

## 2017-09-19 PROCEDURE — 73080 X-RAY EXAM OF ELBOW: CPT | Mod: RT

## 2017-09-19 PROCEDURE — 73110 X-RAY EXAM OF WRIST: CPT | Mod: RT

## 2017-09-19 PROCEDURE — 99214 OFFICE O/P EST MOD 30 MIN: CPT | Performed by: ORTHOPAEDIC SURGERY

## 2017-09-19 NOTE — MR AVS SNAPSHOT
After Visit Summary   9/19/2017    Laura Jackson    MRN: [id number]           Patient Information     Date Of Birth          1970        Visit Information        Provider Department      9/19/2017 10:15 AM Carmine Ribeiro MD [facility]        Today's Diagnoses     Right elbow pain    -  1    Ganglion cyst of wrist, right           Follow-ups after your visit        Your next 10 appointments already scheduled     Dec 28, 2017  1:30 PM CST   (Arrive by 1:15 PM)   NEW ARRHYTHMIA with Phil Mitchell MD   [facility] ([facility])    [address] [place] [address]  3rd [facility] [address] [phone]              Who to contact     If you have questions or need follow up information about today's clinic visit or your schedule please contact [facility] directly at [phone].  Normal or non-critical lab and imaging results will be communicated to you by MyChart, letter or phone within 4 business days after the clinic has received the results. If you do not hear from us within 7 days, please contact the clinic through [employer] or phone. If you have a critical or abnormal lab result, we will notify you by phone as soon as possible.  Submit refill requests through [employer] or call your pharmacy and they will forward the refill request to us. Please allow 3 business days for your refill to be completed.          Additional Information About Your Visit        MyChart Information     [employer] gives you secure access to your electronic health record. If you see a primary care provider, you can also send messages to your care team and make appointments. If you have questions, please call your primary care clinic.  If you do not have a primary care provider, please call [phone] and they will assist you.        Care EveryWhere ID     This is your Care EveryWhere ID. This could be used by other organizations to access your [place]  "medical records  [id number]        Your Vitals Were     Respirations Height Last Period BMI (Body Mass Index)          14 1.651 m (5' 5\") 07/28/2017 26.29 kg/m2         Blood Pressure from Last 3 Encounters:   09/11/17 122/80   08/31/17 124/86   08/27/17 124/85    Weight from Last 3 Encounters:   09/19/17 71.7 kg (158 lb)   09/11/17 71.7 kg (158 lb)   08/31/17 71.7 kg (158 lb)               Primary Care Provider Office Phone # Fax #    Whitney Alva [phone]       [address] [facility] [address]        Equal Access to Services     ELIGIO HOWARD : Hadii latoya ruffin hadasho Soandreia, waaxda luqadaha, qaybta kaalmada adeegyada, ludmila waters . [facility] [phone].    ATENCIÓN: Si habla español, tiene a guido disposición servicios gratuitos de asistencia lingüística. [facility] [phone].    We comply with applicable federal civil rights laws and [place] laws. We do not discriminate on the basis of race, color, national origin, age, disability sex, sexual orientation or gender identity.            Thank you!     Thank you for choosing [facility]  for your care. Our goal is always to provide you with excellent care. Hearing back from our patients is one way we can continue to improve our services. Please take a few minutes to complete the written survey that you may receive in the mail after your visit with us. Thank you!             Your Updated Medication List - Protect others around you: Learn how to safely use, store and throw away your medicines at www.disposemymeds.org.          This list is accurate as of: 9/19/17 11:42 AM.  Always use your most recent med list.                   Brand Name Dispense Instructions for use Diagnosis    diltiazem 120 MG 24 hr capsule    CARDIZEM CD    30 capsule    Take 1 capsule (120 mg) by mouth daily    Sinus tachycardia, Palpitations       fluticasone-salmeterol 100-50 MCG/DOSE diskus inhaler    ADVAIR    3 Inhaler "    Inhale 1 puff into the lungs 2 times daily    Mild persistent asthma without complication       ibuprofen 600 MG tablet    ADVIL/MOTRIN    60 tablet    Take 1 tablet (600 mg) by mouth every 6 hours as needed for pain (mild)    Intramural leiomyoma of uterus, Endometriosis of pelvic peritoneum, Uterovaginal prolapse, incomplete       IRON PO      Take 325 mg by mouth daily        levalbuterol 45 MCG/ACT Inhaler    XOPENEX HFA    1 Inhaler    Inhale 1-2 puffs into the lungs every 4 hours as needed for shortness of breath / dyspnea    Mild persistent asthma without complication       Simethicone Extra Strength 125 MG Caps      Take 2 capsules by mouth daily as needed

## 2017-09-19 NOTE — TELEPHONE ENCOUNTER
"Workability form filled out (one line) to state \"patient is unable to work from 6-19-17 to 10-10-17\" and will have Dr. Butler sign when he returns to the office on Thursday, September 21st.  Patient called and informed. Pam Taveras,     "

## 2017-09-19 NOTE — NURSING NOTE
"Chief Complaint   Patient presents with     Consult     Right elbow pain Pt states bothers her while sleeping and having elbow bent in some positions. Pt also states cyst in right wrist       Initial Resp 14  Ht 1.651 m (5' 5\")  Wt 71.7 kg (158 lb)  LMP 07/28/2017  BMI 26.29 kg/m2 Estimated body mass index is 26.29 kg/(m^2) as calculated from the following:    Height as of this encounter: 1.651 m (5' 5\").    Weight as of this encounter: 71.7 kg (158 lb).  Medication Reconciliation: complete   Laila Mora, EVELIA 9/19/2017 10:08 AM      "

## 2017-09-19 NOTE — PROGRESS NOTES
SUBJECTIVE:  Laura Jackson is a 47 year old female who is seen in consultation at the request of Bj Butler MD for right elbow pain and finger numbness that started 3 months ago.   Onset: Suspected cause due to IV placed in the antecubital fossa area that was in place there for 2 days. The site got red, swollen, and mild ecchymosis.  She doesn't think there was leakage of the IV or extravasation/    Present symptoms: Symptoms have been intermittent lately.   Pain flexing the elbow, swelling, stretching out the arm, driving a car, pain grasping/sqeezing in volar forearm.   Other problems: for years she had sharp severe pain in right wrist, pain putting pressure on the wrist, diffuse pain, ganglion cyst that popped up when getting blood pressure taken that has gradually resolved, numbness in dorsal 2nd and 3rd fingers     Previous treatment: Nothing    Patients past medical, surgical, social and family histories reviewed.   Social Hx: Occupation: Nurse.  [employer]    Past Medical History:   Diagnosis Date     Family history of breast cancer 2/19/2014     SVT (supraventricular tachycardia):  history of, no recurrence since 2008 10/11/2013    no recurrence since 2008      Uncomplicated asthma       Past Surgical History:   Procedure Laterality Date     APPENDECTOMY       DAVINCI HYSTERECTOMY TOTAL, SALPINGECTOMY BILATERAL N/A 8/4/2017    Procedure: DAVINCI XI HYSTERECTOMY TOTAL, SALPINGECTOMY BILATERAL;  DAVINCI TOTAL HYSTERECTOMY; BILATERAL SALPINGECTOMY. BILATERAL URETERAL LYSIS. UTEROSACRAL-COLPOPEXY. EXCISION OF ENDOMETRIOSIS;  Surgeon: George Loyola MD;  Location[facility]     DAVINCI LYSIS OF ADHESIONS Bilateral 8/4/2017    Procedure: DAVINCI LYSIS OF ADHESIONS;  BILATERAL URETERAL LYSIS;  Surgeon: George Loyola MD;  [facility]     DAVINCI SACROCOLPOPEXY, CYSTOSCOPY, COMBINED N/A 8/4/2017    Procedure: COMBINED DAVINCI SACROCOLPOPEXY, CYSTOSCOPY;  UTEROSACRAL COLPOPEXY;   "Surgeon: George Loyola MD;  Location[facility]     DAVINCI XI ASSISTED ABLATION / EXCISION OF ENDOMETRIOSIS  8/4/2017    Procedure: DAVINCI XI ASSISTED ABLATION / EXCISION OF ENDOMETRIOSIS;;  Surgeon: George Loyola MD;  [facility]     DILATION AND CURETTAGE N/A 6/20/2017    Procedure: DILATION AND CURETTAGE;  Uterine Curettings and Fibroid Removal, Cook catheter placement; (No hysterectomy done at this time);  Surgeon: Ernestina Saunders MD;  [facility]     TONSILLECTOMY        REVIEW OF SYSTEMS:   CONSTITUTIONAL:  NEGATIVE for fever, chills, change in weight  INTEGUMENTARY/SKIN:  NEGATIVE for worrisome rashes, moles or lesions  EYES:  NEGATIVE for vision changes or irritation  ENT/MOUTH:  NEGATIVE for ear, mouth and throat problems  RESP:  NEGATIVE for significant cough or SOB  BREAST:  NEGATIVE for masses, tenderness or discharge  CV:  NEGATIVE for chest pain, palpitations or peripheral edema  GI:  NEGATIVE for nausea, abdominal pain, heartburn, or change in bowel habits  :  Negative   MUSCULOSKELETAL:  See HPI above  NEURO:  NEGATIVE for weakness, dizziness or paresthesias  ENDOCRINE:  NEGATIVE for temperature intolerance, skin/hair changes  HEME/ALLERGY/IMMUNE:  NEGATIVE for bleeding problems  PSYCHIATRIC:  NEGATIVE for changes in mood or affect    EXAM:  Resp 14  Ht 1.651 m (5' 5\")  Wt 71.7 kg (158 lb)  LMP 07/28/2017  BMI 26.29 kg/m2  GENERAL APPEARANCE: healthy, alert and no distress   GAIT: NORMAL  SKIN: no suspicious lesions or rashes  NEURO: normal strength and tone, sentation intact, reflexes normal, mentation intact, speech normal, DTR symmetrically normal in upper extremities and DTR symmetrically normal in lower extremities   Sensation: normal    strength: somewhat diminished due to pain in the volar forearm   PSYCH:  mentation appears normal and affect normal/bright    MUSCULOSKELETAL:  RIGHT ELBOW:    Inspection: slight fullness in the area just below the antecubital " fossa  Tender: medial border of brachial radialis near the elbow, lateral epicondyle   Range of Motion: full, elbow; full, wrist  Some pain with resisted dorsiflexion of the wrist and 3rd finger, pain at the elbow   Pain in lateral elbow with resisted supination, worse with resisted pronation with pain in the wrist also  Tinel's: negative over pronator teres, superificial radial nerve  Mild pain with full radial and ulna deviation  Small mass in mid-carpal area, mobile, and soft   Finkelstein's: negative   Non-tender: 1st dorsal compartment, 3rd dorsal compartment, mass, wrist itself     X-RAY INTEPRETATION: Obtained today of the RIGHT ELBOW: 3-views, reviewed in the office with the patient by myself today and are essentially normal. No calcifications near the epicondyle.     Obtained today of the RIGHT WRIST: 3-views, reviewed in the office with the patient by myself today and are essentially normal, good spacing.     ASSESSMENT/PLAN:  1. Ganglion cyst, right wrist   2. Lateral epicondylitis   3. Suspected soft tissue inflammation due to traumatic IV placement     * discussed with patient history and clinical exam consistent with tennis elbow, or lateral epicondylitis, which is tendonitis at the lateral aspect of the elbow.    Treatment options:  * treatment is nonoperative, with surgical treatment reserved for recalcitrant symptoms despite long-term nonperative treatment regimen. Most cases expected to resolve over 1-2 years based on natural history.    Treatment includes:  * rest  * activity modification - avoid aggravating activities and reduce strenuous activities for 6-8 weeks  * ice, ice massage  * Physical therapy, modalities as indicated including ultrasound, massage, iontophoresis  * counterforce elbow brace/strap, available OTC  * wrist brace to prevent wrist extension  * modify lifting technique, palm upwards with lifting to relieve stress on extensor tendons of wrist.  * NSAIDS regularly three times  daily x2-3 weeks, if able to take  * cortisone injection discussed, which is normally placed at point of maximal tenderness, and distributed in a peppering fashion to stimulate a healing response.  *surgery    We decided to proceed with:  - NSAIDS  - PT ordered for lateral epicondylitis, and forearm IV trauma.  - Wrist brace for wrist pain and lateral epicondylitis pain    If numbness and tingling worsens, consider EMG at that time.     * return to clinic as needed.  Consider other treatment options listed if the present plan fails.    SUMANTH Ribeiro MD  Dept. Orthopedic Surgery  [facility]    This document serves as a record of the services and decisions personally performed and made by Dr. SUMANTH Ribeiro MD. It was created on his behalf by Willy Upton, a trained medical scribe. The creation of this record is based on the provider's personal observations and the statements of the patient. This document has been checked and approved by the attending provider.   Willy Upton September 19, 2017 11:18 AM

## 2017-09-19 NOTE — LETTER
9/19/2017         RE: Laura Jackson  [address]  [place] [address]        Dear Colleague,    Thank you for referring your patient, Laura Jackson, to the [facility]. Please see a copy of my visit note below.    SUBJECTIVE:  Laura Jackson is a 47 year old female who is seen in consultation at the request of Bj Butler MD for right elbow pain and finger numbness that started 3 months ago.   Onset: Suspected cause due to IV placed in the antecubital fossa area that was in place there for 2 days. The site got red, swollen, and mild ecchymosis.  She doesn't think there was leakage of the IV or extravasation/    Present symptoms: Symptoms have been intermittent lately.   Pain flexing the elbow, swelling, stretching out the arm, driving a car, pain grasping/sqeezing in volar forearm.   Other problems: for years she had sharp severe pain in right wrist, pain putting pressure on the wrist, diffuse pain, ganglion cyst that popped up when getting blood pressure taken that has gradually resolved, numbness in dorsal 2nd and 3rd fingers     Previous treatment: Nothing    Patients past medical, surgical, social and family histories reviewed.   Social Hx: Occupation: Nurse.  [employer]    Past Medical History:   Diagnosis Date     Family history of breast cancer 2/19/2014     SVT (supraventricular tachycardia):  history of, no recurrence since 2008 10/11/2013    no recurrence since 2008      Uncomplicated asthma       Past Surgical History:   Procedure Laterality Date     APPENDECTOMY       DAVINCI HYSTERECTOMY TOTAL, SALPINGECTOMY BILATERAL N/A 8/4/2017    Procedure: DAVINCI XI HYSTERECTOMY TOTAL, SALPINGECTOMY BILATERAL;  DAVINCI TOTAL HYSTERECTOMY; BILATERAL SALPINGECTOMY. BILATERAL URETERAL LYSIS. UTEROSACRAL-COLPOPEXY. EXCISION OF ENDOMETRIOSIS;  Surgeon: George Loyola MD;  [facility]     DAVINCI LYSIS OF ADHESIONS Bilateral 8/4/2017    Procedure: DAVINCI LYSIS  "OF ADHESIONS;  BILATERAL URETERAL LYSIS;  Surgeon: George Loyola MD;  [facility]     DAVINCI SACROCOLPOPEXY, CYSTOSCOPY, COMBINED N/A 8/4/2017    Procedure: COMBINED DAVINCI SACROCOLPOPEXY, CYSTOSCOPY;  UTEROSACRAL COLPOPEXY;  Surgeon: George Loyola MD;  [facility]     DAVINCI XI ASSISTED ABLATION / EXCISION OF ENDOMETRIOSIS  8/4/2017    Procedure: DAVINCI XI ASSISTED ABLATION / EXCISION OF ENDOMETRIOSIS;;  Surgeon: George Loyola MD;  [facility]     DILATION AND CURETTAGE N/A 6/20/2017    Procedure: DILATION AND CURETTAGE;  Uterine Curettings and Fibroid Removal, Cook catheter placement; (No hysterectomy done at this time);  Surgeon: Ernestina Saunders MD;  [facility]     TONSILLECTOMY        REVIEW OF SYSTEMS:   CONSTITUTIONAL:  NEGATIVE for fever, chills, change in weight  INTEGUMENTARY/SKIN:  NEGATIVE for worrisome rashes, moles or lesions  EYES:  NEGATIVE for vision changes or irritation  ENT/MOUTH:  NEGATIVE for ear, mouth and throat problems  RESP:  NEGATIVE for significant cough or SOB  BREAST:  NEGATIVE for masses, tenderness or discharge  CV:  NEGATIVE for chest pain, palpitations or peripheral edema  GI:  NEGATIVE for nausea, abdominal pain, heartburn, or change in bowel habits  :  Negative   MUSCULOSKELETAL:  See HPI above  NEURO:  NEGATIVE for weakness, dizziness or paresthesias  ENDOCRINE:  NEGATIVE for temperature intolerance, skin/hair changes  HEME/ALLERGY/IMMUNE:  NEGATIVE for bleeding problems  PSYCHIATRIC:  NEGATIVE for changes in mood or affect    EXAM:  Resp 14  Ht 1.651 m (5' 5\")  Wt 71.7 kg (158 lb)  LMP 07/28/2017  BMI 26.29 kg/m2  GENERAL APPEARANCE: healthy, alert and no distress   GAIT: NORMAL  SKIN: no suspicious lesions or rashes  NEURO: normal strength and tone, sentation intact, reflexes normal, mentation intact, speech normal, DTR symmetrically normal in upper extremities and DTR symmetrically normal in lower extremities   Sensation: normal "    strength: somewhat diminished due to pain in the volar forearm   PSYCH:  mentation appears normal and affect normal/bright    MUSCULOSKELETAL:  RIGHT ELBOW:    Inspection: slight fullness in the area just below the antecubital fossa  Tender: medial border of brachial radialis near the elbow, lateral epicondyle   Range of Motion: full, elbow; full, wrist  Some pain with resisted dorsiflexion of the wrist and 3rd finger, pain at the elbow   Pain in lateral elbow with resisted supination, worse with resisted pronation with pain in the wrist also  Tinel's: negative over pronator teres, superificial radial nerve  Mild pain with full radial and ulna deviation  Small mass in mid-carpal area, mobile, and soft   Finkelstein's: negative   Non-tender: 1st dorsal compartment, 3rd dorsal compartment, mass, wrist itself     X-RAY INTEPRETATION: Obtained today of the RIGHT ELBOW: 3-views, reviewed in the office with the patient by myself today and are essentially normal. No calcifications near the epicondyle.     Obtained today of the RIGHT WRIST: 3-views, reviewed in the office with the patient by myself today and are essentially normal, good spacing.     ASSESSMENT/PLAN:  1. Ganglion cyst, right wrist   2. Lateral epicondylitis   3. Suspected soft tissue inflammation due to traumatic IV placement     * discussed with patient history and clinical exam consistent with tennis elbow, or lateral epicondylitis, which is tendonitis at the lateral aspect of the elbow.    Treatment options:  * treatment is nonoperative, with surgical treatment reserved for recalcitrant symptoms despite long-term nonperative treatment regimen. Most cases expected to resolve over 1-2 years based on natural history.    Treatment includes:  * rest  * activity modification - avoid aggravating activities and reduce strenuous activities for 6-8 weeks  * ice, ice massage  * Physical therapy, modalities as indicated including ultrasound, massage,  iontophoresis  * counterforce elbow brace/strap, available OTC  * wrist brace to prevent wrist extension  * modify lifting technique, palm upwards with lifting to relieve stress on extensor tendons of wrist.  * NSAIDS regularly three times daily x2-3 weeks, if able to take  * cortisone injection discussed, which is normally placed at point of maximal tenderness, and distributed in a peppering fashion to stimulate a healing response.  *surgery    We decided to proceed with:  - NSAIDS  - PT ordered for lateral epicondylitis, and forearm IV trauma.  - Wrist brace for wrist pain and lateral epicondylitis pain    If numbness and tingling worsens, consider EMG at that time.     * return to clinic as needed.  Consider other treatment options listed if the present plan fails.    SUMANTH Ribeiro MD  Dept. Orthopedic Surgery  [facility]    This document serves as a record of the services and decisions personally performed and made by Dr. SUMANTH Ribeiro MD. It was created on his behalf by Willy Upton, a trained medical scribe. The creation of this record is based on the provider's personal observations and the statements of the patient. This document has been checked and approved by the attending provider.   Willy Upton September 19, 2017 11:18 AM    Again, thank you for allowing me to participate in the care of your patient.        Sincerely,        Carmine Ribeiro MD

## 2017-09-19 NOTE — TELEPHONE ENCOUNTER
Reason for Call:  Other call back    Detailed comments: Patient had a workability form faxed over. This was discussed yesterday with Dr. Butler's nurse. However, there is a line that needs to be filled out (thrid line ?) that she is unable to return to work at this time. Please state potential date back is October 10th.     Phone Number Patient can be reached at: Cell number on file:    Telephone Information:   Mobile [phone]       Best Time: any    Can we leave a detailed message on this number? YES    Call taken on 9/19/2017 at 12:43 PM by Ki Turner

## 2017-10-04 NOTE — PROGRESS NOTES
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 I met her for the first time last month and the reader is referred there for background.     Tachycardia -- Patient states she is not doing any better than last month. She has had recurrent bouts of tachycardia, abdominal symptoms, and cold and clammy hands, essentially Every single time she stands for much more then 5-10 minutes. She has difficulty doing house chores because of tachycardia. She tried to push herself for a while few hours last week but then had to rest for a few hours and considered going to the ER. Reports she tries not to stand often because of tachycardia and feels better when she sits or lays down. Has noticed hypertension with her tachycardia. We have absolutely convincing evidence of a problem here with hypertension AND sinus tachycardia which doesn't make any clinical sense to me.    She has not been able to work since June. Notes she had a hypovolemic event during her hemorrhage in June. She also had her first episode tachycardia at that time. At that time she also had abdominal symptoms develop. Describes abdominal symptoms as bloating and burping. Patient believes these symptoms are also onset with hot water and brushing her teeth. Also has fatigue and is flushed intermittently. Symptoms tend to last about 45 minutes to 3-4 hours. Saw a gastroenterologist and had a gastric emptying study. Was diagnosed with GERD and IBS at that time. She has been avoiding caffeine and alcohol. Interestingly we do have with the vital signs flow sheets evidence of recurrent sinus tachycardia for years and some history of gastrointestinal symptoms for 6-7 years .    Patient  had two Zio patches but never heard the results. The first 3 days of her second Zio patch were without Cardizem and the last few days were with Cardizem. She is following up with Cardiology. We did review these Zio Patch results and these were unremarkable     Reports she had Noro virus and it took her two years to recover. She notes her abdominal symptoms during this time are similar to now.    Problem list and histories reviewed & adjusted, as indicated.  Additional history: as documented    Patient Active Problem List   Diagnosis     CARDIOVASCULAR SCREENING; LDL GOAL LESS THAN 160     Irritable bowel syndrome     GERD (gastroesophageal reflux disease)     History of supraventricular tachycardia     Mild persistent asthma     Family history of breast cancer     Foreign body (FB) in soft tissue     S/P myomectomy     Nausea     Dehydration     S/P ANAID (total abdominal hysterectomy)     Hypovitaminosis D     Past Surgical History:   Procedure Laterality Date     APPENDECTOMY       DAVINCI HYSTERECTOMY TOTAL, SALPINGECTOMY BILATERAL N/A 8/4/2017    Procedure: DAVINCI XI HYSTERECTOMY TOTAL, SALPINGECTOMY BILATERAL;  DAVINCI TOTAL HYSTERECTOMY; BILATERAL SALPINGECTOMY. BILATERAL URETERAL LYSIS. UTEROSACRAL-COLPOPEXY. EXCISION OF ENDOMETRIOSIS;  Surgeon: George Loyola MD;  Location:  OR     DAVINCI LYSIS OF ADHESIONS Bilateral 8/4/2017    Procedure: DAVINCI LYSIS OF ADHESIONS;  BILATERAL URETERAL LYSIS;  Surgeon: George Loyola MD;  Location:  OR     DAVINCI SACROCOLPOPEXY, CYSTOSCOPY, COMBINED N/A 8/4/2017    Procedure: COMBINED DAVINCI SACROCOLPOPEXY, CYSTOSCOPY;  UTEROSACRAL COLPOPEXY;  Surgeon: George Loyola MD;  Location:  OR     DAVINCI XI ASSISTED ABLATION / EXCISION OF ENDOMETRIOSIS  8/4/2017    Procedure: DAVINCI XI ASSISTED ABLATION / EXCISION OF ENDOMETRIOSIS;;  Surgeon: George Loyola MD;  Location:  OR     DILATION AND CURETTAGE N/A 6/20/2017    Procedure: DILATION AND  CURETTAGE;  Uterine Curettings and Fibroid Removal, Cook catheter placement; (No hysterectomy done at this time);  Surgeon: Ernestina Saunders MD;  Location: [facility]     TONSILLECTOMY         Social History   Substance Use Topics     Smoking status: Never Smoker     Smokeless tobacco: Never Used     Alcohol use Yes      Comment: occasional     Family History   Problem Relation Age of Onset     DIABETES Mother      Hypertension Mother      Hyperlipidemia Mother      Arrhythmia Mother      Cardiovascular Father      CHF and COPD     Asthma Father      Breast Cancer Maternal Grandfather      Colon Cancer Maternal Grandfather      Breast Cancer Paternal Grandmother      Breast Cancer Paternal Aunt      C.A.D. Paternal Grandfather      Breast Cancer Cousin      Asthma Son      DIABETES Maternal Grandmother      Hypertension Maternal Grandmother          Current Outpatient Prescriptions   Medication Sig Dispense Refill     CARTIA  MG 24 hr capsule        DOCUSATE SODIUM PO Take 50 mg by mouth daily       IRON PO Take 325 mg by mouth daily        Simethicone Extra Strength 125 MG CAPS Take 2 capsules by mouth daily as needed       fluticasone-salmeterol (ADVAIR) 100-50 MCG/DOSE diskus inhaler Inhale 1 puff into the lungs 2 times daily 3 Inhaler 1     levalbuterol (XOPENEX HFA) 45 MCG/ACT Inhaler Inhale 1-2 puffs into the lungs every 4 hours as needed for shortness of breath / dyspnea 1 Inhaler 5     ibuprofen (ADVIL/MOTRIN) 600 MG tablet Take 1 tablet (600 mg) by mouth every 6 hours as needed for pain (mild) (Patient not taking: Reported on 10/5/2017) 60 tablet 0     Labs reviewed in EPIC      Reviewed and updated as needed this visit by clinical staff  Tobacco  Allergies  Meds       Reviewed and updated as needed this visit by Provider         ROS:  Constitutional, HEENT, cardiovascular, pulmonary, GI, , musculoskeletal, neuro, skin, endocrine and psych systems are negative, except as otherwise noted.    This document  serves as a record of the services and decisions personally performed and made by Bj Butler MD. It was created on their behalf by Rickey Matos, a trained medical scribe. The creation of this document is based the provider's statements to the medical scribe.  Rickey Matos October 5, 2017 4:25 PM    OBJECTIVE:   /90  Pulse 104  Temp 97.9  F (36.6  C) (Oral)  Wt 73.3 kg (161 lb 9.6 oz)  LMP 07/28/2017  SpO2 100%  BMI 26.89 kg/m2  Body mass index is 26.89 kg/(m^2).  GENERAL: healthy, alert and no distress  EYES: Eyes grossly normal to inspection, EOMI, conjunctivae and sclerae normal  RESP: lungs clear to auscultation - no rales, rhonchi or wheezes  CV: pulse rapid at 112  SKIN: no suspicious lesions or rashes to visible skin, skin cold and clammy  NEURO: mentation intact and speech normal  PSYCH: mentation appears normal, affect normal/bright    Diagnostic Test Results:  Results for orders placed or performed in visit on 09/19/17   X-ray rt Wrist G/E 3 vws*    Narrative    RIGHT WRIST THREE OR MORE VIEWS 9/19/2017 11:28 AM     HISTORY: Ganglion, right wrist.    COMPARISON: None      Impression    IMPRESSION: No evidence of fracture or osseous lesion. Joint spaces  are maintained.    ZAINAB MESSINA MD     ASSESSMENT/PLAN:     (R00.0) Sinus tachycardia  (primary encounter diagnosis)  Comment: the hospitalist notes and records from [facility] are suggestive of but not diagnostic for POTS (postural orthostatic tachycardia syndrome) versus other autonomic nervous system dysfunction   Plan: patient has decided she wants to go to the POTS (postural orthostatic tachycardia syndrome) center and the [facility] to be further evaluated. This is a reasonable goal and I will complete the [facility] referral form    (R23.1) Cold and clammy skin  Comment: a symptoms of POTS (postural orthostatic tachycardia syndrome) versus other  Plan: [facility] clinic referral     (Z12.31) Visit  for screening mammogram  Comment: as above   Plan: MA SCREENING DIGITAL BILAT - Future  (s+30)            (Z23) Need for prophylactic vaccination and inoculation against influenza  Comment: declines   Plan: declines     Patient Instructions     - I will generate a referral for the [facility].    [facility]    If you have any questions regarding to your visit please contact your care team:     Team Pink:   Clinic Hours Telephone Number   Internal Medicine:  Dr. Trisha Kang, NP       7am-7pm  Monday - Thursday   7am-5pm  Fridays  [phone]  (Appointment scheduling available 24/7)    Questions about your visit?  Team Line  [phone]   Urgent Care - [place] Lewis and [place] - 11am-9pm Monday-Friday Saturday-Sunday- 9am-5pm   [place] - 5pm-9pm Monday-Friday Saturday-Sunday- 9am-5pm  [phone] - [place]   [phone] - [place]       What options do I have for visits at the clinic other than the traditional office visit?  To expand how we care for you, many of our providers are utilizing electronic visits (e-visits) and telephone visits, when medically appropriate, for interactions with their patients rather than a visit in the clinic.   We also offer nurse visits for many medical concerns. Just like any other service, we will bill your insurance company for this type of visit based on time spent on the phone with your provider. Not all insurance companies cover these visits. Please check with your medical insurance if this type of visit is covered. You will be responsible for any charges that are not paid by your insurance.      E-visits via [employer]:  generally incur a $35.00 fee.  Telephone visits:  Time spent on the phone: *charged based on time that is spent on the phone in increments of 10 minutes. Estimated cost:   5-10 mins $30.00   11-20 mins. $59.00   21-30 mins. $85.00   Use [employer] (secure email communication and access  to your chart) to send your primary care provider a message or make an appointment. Ask someone on your Team how to sign up for [employer].    For a Price Quote for your services, please call our Consumer Price Line at [phone].    As always, Thank you for trusting us with your health care needs!    Jeane Pacheco CMA      The information in this document, created by the medical scribe for me, accurately reflects the services I personally performed and the decisions made by me. I have reviewed and approved this document for accuracy.   MD Bj Mcknight MD  [facility]

## 2017-10-05 ENCOUNTER — OFFICE VISIT (OUTPATIENT)
Dept: INTERNAL MEDICINE | Facility: CLINIC | Age: 47
End: 2017-10-05
Payer: COMMERCIAL

## 2017-10-05 VITALS
WEIGHT: 161.6 LBS | DIASTOLIC BLOOD PRESSURE: 90 MMHG | SYSTOLIC BLOOD PRESSURE: 140 MMHG | TEMPERATURE: 97.9 F | BODY MASS INDEX: 26.89 KG/M2 | OXYGEN SATURATION: 100 % | HEART RATE: 104 BPM

## 2017-10-05 DIAGNOSIS — Z12.31 VISIT FOR SCREENING MAMMOGRAM: ICD-10-CM

## 2017-10-05 DIAGNOSIS — Z23 NEED FOR PROPHYLACTIC VACCINATION AND INOCULATION AGAINST INFLUENZA: ICD-10-CM

## 2017-10-05 DIAGNOSIS — R20.9 COLD AND CLAMMY SKIN: ICD-10-CM

## 2017-10-05 DIAGNOSIS — R00.0 SINUS TACHYCARDIA: Primary | ICD-10-CM

## 2017-10-05 DIAGNOSIS — R23.1 COLD AND CLAMMY SKIN: ICD-10-CM

## 2017-10-05 PROCEDURE — 99214 OFFICE O/P EST MOD 30 MIN: CPT | Performed by: INTERNAL MEDICINE

## 2017-10-05 RX ORDER — DILTIAZEM HYDROCHLORIDE 120 MG/1
CAPSULE, EXTENDED RELEASE ORAL
COMMUNITY
Start: 2017-09-29 | End: 2017-11-09

## 2017-10-05 NOTE — NURSING NOTE
"Chief Complaint   Patient presents with     Other     Due for PHQ2     Tachycardia     Started acting up today but been ongoing condition ; POTS       Initial /90  Pulse 110  Temp 97.9  F (36.6  C) (Oral)  Wt 161 lb 9.6 oz (73.3 kg)  LMP 07/28/2017  SpO2 100%  BMI 26.89 kg/m2 Estimated body mass index is 26.89 kg/(m^2) as calculated from the following:    Height as of 9/19/17: 5' 5\" (1.651 m).    Weight as of this encounter: 161 lb 9.6 oz (73.3 kg).  Medication Reconciliation: complete     Catia Simmons MA    "

## 2017-10-05 NOTE — MR AVS SNAPSHOT
After Visit Summary   10/5/2017    Laura Jackson    MRN: [id number]           Patient Information     Date Of Birth          1970        Visit Information        Provider Department      10/5/2017 4:10 PM Bj Butler MD [facility]        Today's Diagnoses     Visit for screening mammogram        Need for prophylactic vaccination and inoculation against influenza          Care Instructions    - I will generate a referral for the [facility].    [facility]    If you have any questions regarding to your visit please contact your care team:     Team Pink:   Clinic Hours Telephone Number   Internal Medicine:  Dr. Trisha Kang NP       7am-7pm  Monday - Thursday   7am-5pm  Fridays  [phone]  (Appointment scheduling available 24/7)    Questions about your visit?  Team Line  [phone]   Urgent Care - [place] Lewis and [place] - 11am-9pm Monday-Friday Saturday-Sunday- 9am-5pm   [place] - 5pm-9pm Monday-Friday Saturday-Sunday- 9am-5pm  [phone] - [place]   [phone] - [place]       What options do I have for visits at the clinic other than the traditional office visit?  To expand how we care for you, many of our providers are utilizing electronic visits (e-visits) and telephone visits, when medically appropriate, for interactions with their patients rather than a visit in the clinic.   We also offer nurse visits for many medical concerns. Just like any other service, we will bill your insurance company for this type of visit based on time spent on the phone with your provider. Not all insurance companies cover these visits. Please check with your medical insurance if this type of visit is covered. You will be responsible for any charges that are not paid by your insurance.      E-visits via [employer]:  generally incur a $35.00 fee.  Telephone visits:  Time spent on the phone: *charged based on  time that is spent on the phone in increments of 10 minutes. Estimated cost:   5-10 mins $30.00   11-20 mins. $59.00   21-30 mins. $85.00   Use [employer] (secure email communication and access to your chart) to send your primary care provider a message or make an appointment. Ask someone on your Team how to sign up for [employer].    For a Price Quote for your services, please call our [employer] Line at [phone].    As always, Thank you for trusting us with your health care needs!    Jeane Pacheco CMA            Follow-ups after your visit        Your next 10 appointments already scheduled     Dec 28, 2017  1:30 PM CST   (Arrive by 1:15 PM)   NEW ARRHYTHMIA with Phil Mitchell MD   [facility]    [address] [place] [id number] [phone]              Who to contact     If you have questions or need follow up information about today's clinic visit or your schedule please contact [facility] directly at [phone].  Normal or non-critical lab and imaging results will be communicated to you by MyChart, letter or phone within 4 business days after the clinic has received the results. If you do not hear from us within 7 days, please contact the clinic through [employer] or phone. If you have a critical or abnormal lab result, we will notify you by phone as soon as possible.  Submit refill requests through [employer] or call your pharmacy and they will forward the refill request to us. Please allow 3 business days for your refill to be completed.          Additional Information About Your Visit        [employer] Information     [employer] gives you secure access to your electronic health record. If you see a primary care provider, you can also send messages to your care team and make appointments. If you have questions, please call your primary care clinic.  If you do not have a primary care provider, please call [phone] and they  will assist you.        Care EveryWhere ID     This is your Care EveryWhere ID. This could be used by other organizations to access your [facility] medical records  [id number]        Your Vitals Were     Pulse Temperature Last Period Pulse Oximetry BMI (Body Mass Index)       104 97.9  F (36.6  C) (Oral) 07/28/2017 100% 26.89 kg/m2        Blood Pressure from Last 3 Encounters:   10/05/17 140/90   09/11/17 122/80   08/31/17 124/86    Weight from Last 3 Encounters:   10/05/17 161 lb 9.6 oz (73.3 kg)   09/19/17 158 lb (71.7 kg)   09/11/17 158 lb (71.7 kg)              Today, you had the following     No orders found for display       Primary Care Provider Office Phone # Fax #    Whitney Alva [phone] [address] [facility] [address]        Equal Access to Services     ELIGIO HOWARD AH: Hadii latoya ruffin hadasho Soomaali, waaxda luqadaha, qaybta kaalmada adeegyada, ludmila waters . So [facility] [phone].    ATENCIÓN: Si habla español, tiene a guido disposición servicios gratuitos de asistencia lingüística. Llame al [phone].    We comply with applicable federal civil rights laws and [place] laws. We do not discriminate on the basis of race, color, national origin, age, disability, sex, sexual orientation, or gender identity.            Thank you!     Thank you for choosing [facility]  for your care. Our goal is always to provide you with excellent care. Hearing back from our patients is one way we can continue to improve our services. Please take a few minutes to complete the written survey that you may receive in the mail after your visit with us. Thank you!             Your Updated Medication List - Protect others around you: Learn how to safely use, store and throw away your medicines at www.disposemymeds.org.          This list is accurate as of: 10/5/17  4:47 PM.  Always use your most recent med list.                   Brand Name Dispense  Instructions for use Diagnosis    * diltiazem 120 MG 24 hr capsule    CARDIZEM CD    30 capsule    Take 1 capsule (120 mg) by mouth daily    Sinus tachycardia, Palpitations       * CARTIA  MG 24 hr capsule   Generic drug:  diltiazem           DOCUSATE SODIUM PO      Take 50 mg by mouth daily        fluticasone-salmeterol 100-50 MCG/DOSE diskus inhaler    ADVAIR    3 Inhaler    Inhale 1 puff into the lungs 2 times daily    Mild persistent asthma without complication       ibuprofen 600 MG tablet    ADVIL/MOTRIN    60 tablet    Take 1 tablet (600 mg) by mouth every 6 hours as needed for pain (mild)    Intramural leiomyoma of uterus, Endometriosis of pelvic peritoneum, Uterovaginal prolapse, incomplete       IRON PO      Take 325 mg by mouth daily        levalbuterol 45 MCG/ACT Inhaler    XOPENEX HFA    1 Inhaler    Inhale 1-2 puffs into the lungs every 4 hours as needed for shortness of breath / dyspnea    Mild persistent asthma without complication       Simethicone Extra Strength 125 MG Caps      Take 2 capsules by mouth daily as needed        * Notice:  This list has 2 medication(s) that are the same as other medications prescribed for you. Read the directions carefully, and ask your doctor or other care provider to review them with you.

## 2017-10-05 NOTE — PATIENT INSTRUCTIONS
- I will generate a referral for the [facility].    [facility]    If you have any questions regarding to your visit please contact your care team:     Team Pink:   Clinic Hours Telephone Number   Internal Medicine:  Dr. Trisha Kang, NP       7am-7pm  Monday - Thursday   7am-5pm  Fridays  [phone]  (Appointment scheduling available 24/7)    Questions about your visit?  Team Line  [phone]   Urgent Care - [place] Lewis and [place] - 11am-9pm Monday-Friday Saturday-Sunday- 9am-5pm   [place] - 5pm-9pm Monday-Friday Saturday-Sunday- 9am-5pm  [phone] - [place]   [phone] - [place]       What options do I have for visits at the clinic other than the traditional office visit?  To expand how we care for you, many of our providers are utilizing electronic visits (e-visits) and telephone visits, when medically appropriate, for interactions with their patients rather than a visit in the clinic.   We also offer nurse visits for many medical concerns. Just like any other service, we will bill your insurance company for this type of visit based on time spent on the phone with your provider. Not all insurance companies cover these visits. Please check with your medical insurance if this type of visit is covered. You will be responsible for any charges that are not paid by your insurance.      E-visits via [employer]:  generally incur a $35.00 fee.  Telephone visits:  Time spent on the phone: *charged based on time that is spent on the phone in increments of 10 minutes. Estimated cost:   5-10 mins $30.00   11-20 mins. $59.00   21-30 mins. $85.00   Use [employer] (secure email communication and access to your chart) to send your primary care provider a message or make an appointment. Ask someone on your Team how to sign up for [employer].    For a Price Quote for your services, please call our Consumer Price Line at [phone].    As always,  Thank you for trusting us with your health care needs!    Jeane Pacheco, EVELIA

## 2017-11-08 NOTE — PROGRESS NOTES
SUBJECTIVE:   Laura Jackson is a 47 year old female who presents to clinic today for the following health issues:       Fatigue, unspecified type  Visit for screening mammogram  Need for prophylactic vaccination and inoculation against influenza  Excessive daytime sleepiness  Hypermobile joints  Raynaud's phenomenon without gangrene  Multiple joint pain  Irritable bowel syndrome, unspecified type  History of supraventricular tachycardia  Heberden's nodes  Endometriosis  Pelvic adhesions  Rectal disorder     Patient has a tremendously wide array of different symptoms and it was all we could do today just to keep an accurate of the different issues discussed by patient . 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 Her history is well documented in prior office visit notes with me; it's a patient with a probable POTS (postural orthostatic tachycardia syndrome) diagnosis with paradoxical sinus tachycardia and hypertension . Today is  Further follow up in order to review many concerns patient has.    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 Also thinks her son has similar symptoms. She has tested her flexibility and can touch her hands flat to the floor. Many other of  Lit[place] of symptoms she has are reportedly consistent with Edilma-Danlos Syndrome. In addition, believes she has Raynaud's in her hands and feet because she noticed whitish color in her hands intermittently this summer. She showed me some photos she'd taken on her cell phone . Reports she has bumps on the medial aspect of her feet. Also reports she intermittently has joint edema in her fingers, which has been going on for years. She had a hysterectomy this summer at which they found adhesions and endometriosis. She'd had apparently no symptoms of endometriosis and so this was a surprise to  her.    Sinus Tachycardia, Cardiology -- Patient states the fogginess in her head is better. She feels much better if she gets at least 8 solid hours of sleep. Does not believe her fatigue has improved and has the desire to nap during the day. Has occasional snoring, but does not think she has sleep apnea. She says she called the [facility] and the case was not accepted at this time. Reports the tachycardia symptoms are improving for her.    GI Symptoms -- Patient notes she has had GI issues since she was young. Says she had abdominal pain, cramping, and diarrhea when she was young. She has an irritable bowel syndrome diagnosis However, the burping and bloating symptoms are new for her. Believes bloating symptoms are related to POTS. She feels her anal tissue is also bulging outwards but denies bleeding and it is not firm. She had children and rectal prolapse is recognized as a possibility emerging condition      Additional Notes -- Says she wants to get back to work, but does not think she is there yet. Possibly another 3-4 weeks.    Problem list and histories reviewed & adjusted, as indicated.  Additional history: as documented    Patient Active Problem List   Diagnosis     CARDIOVASCULAR SCREENING; LDL GOAL LESS THAN 160     Irritable bowel syndrome     GERD (gastroesophageal reflux disease)     History of supraventricular tachycardia     Mild persistent asthma     Family history of breast cancer     Foreign body (FB) in soft tissue     S/P myomectomy     Nausea     Dehydration     S/P ANAID (total abdominal hysterectomy)     Hypovitaminosis D     Pelvic adhesions     Endometriosis     Heberden's nodes     Past Surgical History:   Procedure Laterality Date     APPENDECTOMY       DAVINCI HYSTERECTOMY TOTAL, SALPINGECTOMY BILATERAL N/A 8/4/2017    Procedure: DAVINCI XI HYSTERECTOMY TOTAL, SALPINGECTOMY BILATERAL;  DAVINCI TOTAL HYSTERECTOMY; BILATERAL SALPINGECTOMY. BILATERAL URETERAL LYSIS. UTEROSACRAL-COLPOPEXY.  EXCISION OF ENDOMETRIOSIS;  Surgeon: George Loyola MD;  Location: [facility] OR     DAVINCI LYSIS OF ADHESIONS Bilateral 8/4/2017    Procedure: DAVINCI LYSIS OF ADHESIONS;  BILATERAL URETERAL LYSIS;  Surgeon: George Loyola MD;  Location: [facility] OR     DAVINCI SACROCOLPOPEXY, CYSTOSCOPY, COMBINED N/A 8/4/2017    Procedure: COMBINED DAVINCI SACROCOLPOPEXY, CYSTOSCOPY;  UTEROSACRAL COLPOPEXY;  Surgeon: George Loyola MD;  Location: [facility] OR     DAVINCI XI ASSISTED ABLATION / EXCISION OF ENDOMETRIOSIS  8/4/2017    Procedure: DAVINCI XI ASSISTED ABLATION / EXCISION OF ENDOMETRIOSIS;;  Surgeon: George Loyola MD;  Location: [facility] OR     DILATION AND CURETTAGE N/A 6/20/2017    Procedure: DILATION AND CURETTAGE;  Uterine Curettings and Fibroid Removal, Cook catheter placement; (No hysterectomy done at this time);  Surgeon: Ernestina Saunders MD;  Location: [facility]     TONSILLECTOMY         Social History   Substance Use Topics     Smoking status: Never Smoker     Smokeless tobacco: Never Used     Alcohol use Yes      Comment: occasional     Family History   Problem Relation Age of Onset     DIABETES Mother      Hypertension Mother      Hyperlipidemia Mother      Arrhythmia Mother      Cardiovascular Father      CHF and COPD     Asthma Father      Breast Cancer Maternal Grandfather      Colon Cancer Maternal Grandfather      Breast Cancer Paternal Grandmother      Breast Cancer Paternal Aunt      C.A.D. Paternal Grandfather      Breast Cancer Cousin      Asthma Son      DIABETES Maternal Grandmother      Hypertension Maternal Grandmother          Current Outpatient Prescriptions   Medication Sig Dispense Refill     multivitamin, therapeutic with minerals (MULTI-VITAMIN) TABS tablet Take 1 tablet by mouth daily       RaNITidine HCl (ZANTAC PO) Take 150 mg by mouth as needed for heartburn       CARTIA  MG 24 hr capsule Take 1 capsule (120 mg) by mouth daily 90 capsule 1     Simethicone Extra Strength 125 MG CAPS Take 2  capsules by mouth daily as needed       fluticasone-salmeterol (ADVAIR) 100-50 MCG/DOSE diskus inhaler Inhale 1 puff into the lungs 2 times daily 3 Inhaler 1     levalbuterol (XOPENEX HFA) 45 MCG/ACT Inhaler Inhale 1-2 puffs into the lungs every 4 hours as needed for shortness of breath / dyspnea 1 Inhaler 5     [DISCONTINUED] CARTIA  MG 24 hr capsule        Labs reviewed in EPIC      Reviewed and updated as needed this visit by clinical staff  Tobacco  Allergies  Meds  Med Hx  Surg Hx  Fam Hx  Soc Hx      Reviewed and updated as needed this visit by Provider         ROS:  Constitutional, HEENT, cardiovascular, pulmonary, GI, , musculoskeletal, neuro, skin, endocrine and psych systems are negative, except as otherwise noted.    This document serves as a record of the services and decisions personally performed and made by Bj Butler MD. It was created on their behalf by Rickey Matos, a trained medical scribe. The creation of this document is based the provider's statements to the medical scribe.  Rickey Matos November 9, 2017 12:19 PM    OBJECTIVE:   /88 (BP Location: Left arm, Cuff Size: Adult Regular)  Pulse 100  Temp 98.6  F (37  C) (Oral)  Wt 72.9 kg (160 lb 12.8 oz)  LMP 07/28/2017  SpO2 100%  Breastfeeding? No  BMI 26.76 kg/m2  Body mass index is 26.76 kg/(m^2).  GENERAL: healthy, alert and no distress  EYES: Eyes grossly normal to inspection, EOMI, conjunctivae and sclerae normal  SKIN: no suspicious lesions or rashes to visible skin   NEURO: mentation intact and speech normal  PSYCH: mentation appears normal, affect normal/bright  MUSCULOSKELETAL: some evidence of hypermobility with joints, no synovitis findings. There's some Heberdens nodes with both hands    Diagnostic Test Results:  Results for orders placed or performed in visit on 09/19/17   X-ray rt Wrist G/E 3 vws*    Narrative    RIGHT WRIST THREE OR MORE VIEWS 9/19/2017 11:28 AM     HISTORY: Ganglion, right  wrist.    COMPARISON: None      Impression    IMPRESSION: No evidence of fracture or osseous lesion. Joint spaces  are maintained.    ZAINAB MESSINA MD     ASSESSMENT/PLAN:     (R53.83) Fatigue, unspecified type  (primary encounter diagnosis)  Comment: patient is pushing me for answers / what's the treatment for POTS (postural orthostatic tachycardia syndrome), how is the syndrome diagnosed with certainty. Patient wants to stay with and keep her case within [place]. I suggested that we can ask around and try to find a health care provider who has special interest with POTS (postural orthostatic tachycardia syndrome).  Plan: NEUROLOGY ADULT REFERRAL            (Z12.31) Visit for screening mammogram  Comment: same day service today   Plan: MA SCREENING DIGITAL BILAT - Future  (s+30)            (Z23) Need for prophylactic vaccination and inoculation against influenza  Comment:   Plan:     (G47.19) Excessive daytime sleepiness  Comment: there is enough evidence here to warrant a consultation with a sleep disorder specialist  And probably a sleep study   Plan: SLEEP EVALUATION & MANAGEMENT REFERRAL - ADULT         -[facility]          [phone] (Age 15 and up)            (M24.9) Hypermobile joints  Comment: I am not sure what the diagnostic criteria are for Edilma-Danlos Syndrome and would wonder if rheumatology is the way to go. Will bounce this case off of Dr. Sebastian. Even if Edilma-Danlos Syndrome is not a rheumatologic diagnosis, the Raynauds phenomenon and joint pain suggest a reason for rheumatological consultation , patient agrees  Plan: RHEUMATOLOGY REFERRAL            (I73.00) Raynaud's phenomenon without gangrene  Comment: as it turns out , cartia which was originally prescribed for tachycardia , also treats Raynauds phenomenon   Plan: CARTIA  MG 24 hr capsule, RHEUMATOLOGY         REFERRAL            (M25.50) Multiple joint pain  Comment: as detailed above   Plan:  RHEUMATOLOGY REFERRAL            (K58.9) Irritable bowel syndrome, unspecified type  Comment: I find it hard to believe her gut symptoms are different then the underlying history of irritable bowel syndrome   Plan: we are not going to go the gastroenterology direction at this point . Circumstances may change down the road     (Z86.79) History of supraventricular tachycardia  Comment: as detailed above   Plan: CARTIA  MG 24 hr capsule, NEUROLOGY ADULT        REFERRAL            (M15.1) Heberden's nodes  Comment: as above   Plan: RHEUMATOLOGY REFERRAL            (N80.9) Endometriosis  Comment: this is a red herring I think  Plan: see operative report from her hysterectomy     (N73.6) Pelvic adhesions  Comment: as above   Plan: as above     (K62.9) Rectal disorder  Comment: this is again, likely a red herring  Plan: consider obstetrics and gynecology evaluation down the road , formally    Patient Instructions   - Follow up for a sleep study.    - Follow up for a mammogram.    - Follow up with a rheumatologist.     - Follow up with a neurologist.     The information in this document, created by the medical scribe for me, accurately reflects the services I personally performed and the decisions made by me. I have reviewed and approved this document for accuracy.   MD Bj Mcknight MD  [facility]

## 2017-11-09 ENCOUNTER — OFFICE VISIT (OUTPATIENT)
Dept: INTERNAL MEDICINE | Facility: CLINIC | Age: 47
End: 2017-11-09
Payer: COMMERCIAL

## 2017-11-09 VITALS
BODY MASS INDEX: 26.76 KG/M2 | DIASTOLIC BLOOD PRESSURE: 88 MMHG | SYSTOLIC BLOOD PRESSURE: 120 MMHG | OXYGEN SATURATION: 100 % | HEART RATE: 100 BPM | WEIGHT: 160.8 LBS | TEMPERATURE: 98.6 F

## 2017-11-09 DIAGNOSIS — Z86.79 HISTORY OF SUPRAVENTRICULAR TACHYCARDIA: ICD-10-CM

## 2017-11-09 DIAGNOSIS — M24.9 HYPERMOBILE JOINTS: ICD-10-CM

## 2017-11-09 DIAGNOSIS — M25.50 MULTIPLE JOINT PAIN: ICD-10-CM

## 2017-11-09 DIAGNOSIS — M15.1 HEBERDEN'S NODES: ICD-10-CM

## 2017-11-09 DIAGNOSIS — K62.9 RECTAL DISORDER: ICD-10-CM

## 2017-11-09 DIAGNOSIS — K58.9 IRRITABLE BOWEL SYNDROME, UNSPECIFIED TYPE: ICD-10-CM

## 2017-11-09 DIAGNOSIS — R53.83 FATIGUE, UNSPECIFIED TYPE: Primary | ICD-10-CM

## 2017-11-09 DIAGNOSIS — N73.6 PELVIC ADHESIONS: ICD-10-CM

## 2017-11-09 DIAGNOSIS — Z23 NEED FOR PROPHYLACTIC VACCINATION AND INOCULATION AGAINST INFLUENZA: ICD-10-CM

## 2017-11-09 DIAGNOSIS — I73.00 RAYNAUD'S PHENOMENON WITHOUT GANGRENE: ICD-10-CM

## 2017-11-09 DIAGNOSIS — G47.19 EXCESSIVE DAYTIME SLEEPINESS: ICD-10-CM

## 2017-11-09 DIAGNOSIS — Z12.31 VISIT FOR SCREENING MAMMOGRAM: ICD-10-CM

## 2017-11-09 DIAGNOSIS — N80.9 ENDOMETRIOSIS: ICD-10-CM

## 2017-11-09 PROCEDURE — 99214 OFFICE O/P EST MOD 30 MIN: CPT | Performed by: INTERNAL MEDICINE

## 2017-11-09 RX ORDER — MULTIPLE VITAMINS W/ MINERALS TAB 9MG-400MCG
1 TAB ORAL DAILY
COMMUNITY
End: 2018-06-25

## 2017-11-09 RX ORDER — DILTIAZEM HYDROCHLORIDE 120 MG/1
120 CAPSULE, EXTENDED RELEASE ORAL DAILY
Qty: 90 CAPSULE | Refills: 1 | Status: SHIPPED | OUTPATIENT
Start: 2017-11-09 | End: 2017-12-21

## 2017-11-09 ASSESSMENT — PAIN SCALES - GENERAL: PAINLEVEL: MODERATE PAIN (4)

## 2017-11-09 NOTE — MR AVS SNAPSHOT
After Visit Summary   11/9/2017    Laura Jackson    MRN: [id number]           Patient Information     Date Of Birth          1970        Visit Information        Provider Department      11/9/2017 11:50 AM Bj Butler MD [facility]        Today's Diagnoses     Fatigue, unspecified type    -  1    Visit for screening mammogram        Need for prophylactic vaccination and inoculation against influenza        Excessive daytime sleepiness        Hypermobile joints        Raynaud's phenomenon without gangrene        Multiple joint pain        Irritable bowel syndrome, unspecified type        History of supraventricular tachycardia        Heberden's nodes        Endometriosis        Pelvic adhesions        Rectal disorder          Care Instructions    - Follow up for a sleep study.    - Follow up for a mammogram.    - Follow up with a rheumatologist.     - Follow up with a neurologist.     [facility]    If you have any questions regarding to your visit please contact your care team:     Team Pink:   Clinic Hours Telephone Number   Internal Medicine:  Dr. Trisha Kang NP       7am-7pm  Monday - Thursday   7am-5pm  Fridays  [phone]  (Appointment scheduling available 24/7)    Questions about your visit?  Team Line  [phone]   Urgent Care - [place] - 11am-9pm Monday-Friday Saturday-Sunday- 9am-5pm   [place] - 5pm-9pm Monday-Friday Saturday-Sunday- 9am-5pm  [phone] - [place]   [phone] - [place]       What options do I have for visits at the clinic other than the traditional office visit?  To expand how we care for you, many of our providers are utilizing electronic visits (e-visits) and telephone visits, when medically appropriate, for interactions with their patients rather than a visit in the clinic.   We also offer nurse visits for many medical concerns. Just like any other  service, we will bill your insurance company for this type of visit based on time spent on the phone with your provider. Not all insurance companies cover these visits. Please check with your medical insurance if this type of visit is covered. You will be responsible for any charges that are not paid by your insurance.      E-visits via [employer]:  generally incur a $35.00 fee.  Telephone visits:  Time spent on the phone: *charged based on time that is spent on the phone in increments of 10 minutes. Estimated cost:   5-10 mins $30.00   11-20 mins. $59.00   21-30 mins. $85.00   Use Futurlink (secure email communication and access to your chart) to send your primary care provider a message or make an appointment. Ask someone on your Team how to sign up for Futurlink.    For a Price Quote for your services, please call our [employer] Price Line at [phone].    As always, Thank you for trusting us with your health care needs!    Discharge by JESSIE MESSINA             Follow-ups after your visit        Additional Services     NEUROLOGY ADULT REFERRAL       Your provider has referred you for the following:   Consult at [facility]: Neurology Clinic [facility] [phone]   [email or web address]  General Neurology    Please be aware that coverage of these services is subject to the terms and limitations of your health insurance plan.  Call member services at your health plan with any benefit or coverage questions.      Please bring the following with you to your appointment:    (1) Any X-Rays, CTs or MRIs which have been performed.  Contact the facility where they were done to arrange for  prior to your scheduled appointment.    (2) List of current medications  (3) This referral request   (4) Any documents/labs given to you for this referral            RHEUMATOLOGY REFERRAL       Your provider has referred you to: [facility]: [facility] [phone]    [email or web address]    Please be aware that coverage of these services is subject to the terms and limitations of your health insurance plan.  Call member services at your health plan with any benefit or coverage questions.      Please bring the following with you to your appointment:    (1) Any X-Rays, CTs or MRIs which have been performed.  Contact the facility where they were done to arrange for  prior to your scheduled appointment.    (2) List of current medications   (3) This referral request   (4) Any documents/labs given to you for this referral            SLEEP EVALUATION & MANAGEMENT REFERRAL - [facility]  [phone] (Age 15 and up)       Please be aware that coverage of these services is subject to the terms and limitations of your health insurance plan.  Call member services at your health plan with any benefit or coverage questions.      Please bring the following to your appointment:    >>   List of current medications   >>   This referral request   >>   Any documents/labs given to you for this referral                      Your next 10 appointments already scheduled     Dec 28, 2017  1:30 PM CST   (Arrive by 1:15 PM)   NEW ARRHYTHMIA with Phil Mitchell MD   [facility] [place]    [address] [place] [id number] [phone]              Future tests that were ordered for you today     Open Future Orders        Priority Expected Expires Ordered    SLEEP EVALUATION & MANAGEMENT REFERRAL - [facility]  [phone] (Age 15 and up) Routine  11/9/2018 11/9/2017    MA SCREENING DIGITAL BILAT - Future  (s+30) Routine  11/8/2018 11/9/2017            Who to contact     If you have questions or need follow up information about today's clinic visit or your schedule please contact [facility] directly at [phone].  Normal or  non-critical lab and imaging results will be communicated to you by ShinyBytehart, letter or phone within 4 business days after the clinic has received the results. If you do not hear from us within 7 days, please contact the clinic through Vertra or phone. If you have a critical or abnormal lab result, we will notify you by phone as soon as possible.  Submit refill requests through Vertra or call your pharmacy and they will forward the refill request to us. Please allow 3 business days for your refill to be completed.          Additional Information About Your Visit        Vertra Information     Vertra gives you secure access to your electronic health record. If you see a primary care provider, you can also send messages to your care team and make appointments. If you have questions, please call your primary care clinic.  If you do not have a primary care provider, please call [phone] and they will assist you.        Care EveryWhere ID     This is your Care EveryWhere ID. This could be used by other organizations to access your [place] medical records  [id number]        Your Vitals Were     Pulse Temperature Last Period Pulse Oximetry Breastfeeding? BMI (Body Mass Index)    100 98.6  F (37  C) (Oral) 07/28/2017 100% No 26.76 kg/m2       Blood Pressure from Last 3 Encounters:   11/09/17 120/88   10/05/17 140/90   09/11/17 122/80    Weight from Last 3 Encounters:   11/09/17 160 lb 12.8 oz (72.9 kg)   10/05/17 161 lb 9.6 oz (73.3 kg)   09/19/17 158 lb (71.7 kg)              We Performed the Following     NEUROLOGY ADULT REFERRAL     RHEUMATOLOGY REFERRAL          Today's Medication Changes          These changes are accurate as of: 11/9/17 12:34 PM.  If you have any questions, ask your nurse or doctor.               These medicines have changed or have updated prescriptions.        Dose/Directions    CARTIA  MG 24 hr capsule   This may have changed:    - how much to take  - how to take this  - when to take  this   Used for:  Raynaud's phenomenon without gangrene, History of supraventricular tachycardia   Generic drug:  diltiazem   Changed by:  Bj Butler MD        Dose:  120 mg   Take 1 capsule (120 mg) by mouth daily   Quantity:  90 capsule   Refills:  1            Where to get your medicines      These medications were sent to [facility] [place], [place] - [address]  [address], [place] [address]     Phone:  [phone]     CARTIA  MG 24 hr capsule                Primary Care Provider Office Phone # Fax #    Whitney Alva -[phone]       [address] [facility] [address]        Equal Access to Services     [facility]: Hadii aad ku hadasho Soomaali, waaxda luqadaha, qaybta kaalmada adeegyada, waxay rafiain hayleeann waters . So [facility] [phone].    ATENCIÓN: Si habla español, tiene a guido disposición servicios gratuitos de asistencia lingüística. [facility] [phone].    We comply with applicable federal civil rights laws and [place] laws. We do not discriminate on the basis of race, color, national origin, age, disability, sex, sexual orientation, or gender identity.            Thank you!     Thank you for choosing [facility]  for your care. Our goal is always to provide you with excellent care. Hearing back from our patients is one way we can continue to improve our services. Please take a few minutes to complete the written survey that you may receive in the mail after your visit with us. Thank you!             Your Updated Medication List - Protect others around you: Learn how to safely use, store and throw away your medicines at www.disposemymeds.org.          This list is accurate as of: 11/9/17 12:34 PM.  Always use your most recent med list.                   Brand Name Dispense Instructions for use Diagnosis    CARTIA  MG 24 hr capsule   Generic drug:  diltiazem     90 capsule    Take 1 capsule  (120 mg) by mouth daily    Raynaud's phenomenon without gangrene, History of supraventricular tachycardia       fluticasone-salmeterol 100-50 MCG/DOSE diskus inhaler    ADVAIR    3 Inhaler    Inhale 1 puff into the lungs 2 times daily    Mild persistent asthma without complication       levalbuterol 45 MCG/ACT Inhaler    XOPENEX HFA    1 Inhaler    Inhale 1-2 puffs into the lungs every 4 hours as needed for shortness of breath / dyspnea    Mild persistent asthma without complication       Multi-vitamin Tabs tablet      Take 1 tablet by mouth daily        Simethicone Extra Strength 125 MG Caps      Take 2 capsules by mouth daily as needed        ZANTAC PO      Take 150 mg by mouth as needed for heartburn

## 2017-11-09 NOTE — PATIENT INSTRUCTIONS
- Follow up for a sleep study.    - Follow up for a mammogram.    - Follow up with a rheumatologist.     - Follow up with a neurologist.     [facility]    If you have any questions regarding to your visit please contact your care team:     Team Pink:   Clinic Hours Telephone Number   Internal Medicine:  Dr. Trisha Kang, NP       7am-7pm  Monday - Thursday   7am-5pm  Fridays  [phone]  (Appointment scheduling available 24/7)    Questions about your visit?  Team Line  [phone]   [facility] [place] [facility] 11am-9pm Monday-Friday Saturday-Sunday- 9am-5pm   [place] - 5pm-9pm Monday-Friday Saturday-Sunday- 9am-5pm  [phone] - [place]   [phone] - [place]       What options do I have for visits at the clinic other than the traditional office visit?  To expand how we care for you, many of our providers are utilizing electronic visits (e-visits) and telephone visits, when medically appropriate, for interactions with their patients rather than a visit in the clinic.   We also offer nurse visits for many medical concerns. Just like any other service, we will bill your insurance company for this type of visit based on time spent on the phone with your provider. Not all insurance companies cover these visits. Please check with your medical insurance if this type of visit is covered. You will be responsible for any charges that are not paid by your insurance.      E-visits via [employer]:  generally incur a $35.00 fee.  Telephone visits:  Time spent on the phone: *charged based on time that is spent on the phone in increments of 10 minutes. Estimated cost:   5-10 mins $30.00   11-20 mins. $59.00   21-30 mins. $85.00   Use [employer] (secure email communication and access to your chart) to send your primary care provider a message or make an appointment. Ask someone on your Team how to sign up for Vyclone.    For a Price Quote for  your services, please call our Consumer Price Line at [phone].    As always, Thank you for trusting us with your health care needs!    Discharge by JESSIE MESSINA

## 2017-11-09 NOTE — NURSING NOTE
"Chief Complaint   Patient presents with     Heart Problem     follow-up POTS, due for PHQ2       Initial /88 (BP Location: Left arm, Cuff Size: Adult Regular)  Pulse 100  Temp 98.6  F (37  C) (Oral)  Wt 160 lb 12.8 oz (72.9 kg)  LMP 07/28/2017  SpO2 100%  Breastfeeding? No  BMI 26.76 kg/m2 Estimated body mass index is 26.76 kg/(m^2) as calculated from the following:    Height as of 9/19/17: 5' 5\" (1.651 m).    Weight as of this encounter: 160 lb 12.8 oz (72.9 kg).  Medication Reconciliation: complete       Jeane Pacheco, CMA      "

## 2017-11-14 ENCOUNTER — TELEPHONE (OUTPATIENT)
Dept: INTERNAL MEDICINE | Facility: CLINIC | Age: 47
End: 2017-11-14

## 2017-11-14 NOTE — TELEPHONE ENCOUNTER
Workability form from [facility] was faxed to [phone].  Left message for patient to return.  Need to know if patient has returned to work yet. Pam Taveras,

## 2017-11-14 NOTE — TELEPHONE ENCOUNTER
APPT INFO    Date /Time: 11/16/17   Reason for Appt: Fatigue/Tachycardia   Ref Provider/Clinic: Dr Butler, TODD Shaffer   Are there internal records? If yes, list: Yes,  See Above   Patient Contact (Y/N) & Call Details: No referred   Action: Reviewed records; Records are in EPIC     OUTSIDE RECORDS CHECKLIST     CLINIC NAME COMMENTS REC (x) IMG (x)

## 2017-11-14 NOTE — LETTER
[facility]  [address] [place] [id number]  [phone]          November 17, 2017      Re:  Laura Jackson   YOB: 1970      To Whom it May Concern:                                                                                                              Please allow patient to attend class on Wednesday, November 29th,   from 12:00pm till 1:30pm.     Sincerely,         Bj Butler MD/prem

## 2017-11-15 NOTE — TELEPHONE ENCOUNTER
Left messages on both phone numbers for patient to call back in regards to question below.    Jeane Pacheco, CMA

## 2017-11-16 ENCOUNTER — PRE VISIT (OUTPATIENT)
Dept: NEUROLOGY | Facility: CLINIC | Age: 47
End: 2017-11-16

## 2017-11-16 ENCOUNTER — OFFICE VISIT (OUTPATIENT)
Dept: NEUROLOGY | Facility: CLINIC | Age: 47
End: 2017-11-16

## 2017-11-16 VITALS
BODY MASS INDEX: 26.66 KG/M2 | WEIGHT: 160 LBS | SYSTOLIC BLOOD PRESSURE: 139 MMHG | DIASTOLIC BLOOD PRESSURE: 80 MMHG | HEART RATE: 88 BPM | HEIGHT: 65 IN

## 2017-11-16 DIAGNOSIS — G56.01 CARPAL TUNNEL SYNDROME OF RIGHT WRIST: Primary | ICD-10-CM

## 2017-11-16 DIAGNOSIS — M54.2 CERVICALGIA: ICD-10-CM

## 2017-11-16 ASSESSMENT — ENCOUNTER SYMPTOMS
HEARTBURN: 1
STIFFNESS: 1
COUGH DISTURBING SLEEP: 0
POSTURAL DYSPNEA: 0
HALLUCINATIONS: 0
HYPOTENSION: 0
MUSCLE CRAMPS: 1
PALPITATIONS: 1
ARTHRALGIAS: 1
NAUSEA: 1
DYSURIA: 0
HEMATURIA: 0
SPUTUM PRODUCTION: 0
COUGH: 0
HYPERTENSION: 1
JOINT SWELLING: 1
LIGHT-HEADEDNESS: 0
SYNCOPE: 0
SHORTNESS OF BREATH: 1
LOSS OF CONSCIOUSNESS: 0
BRUISES/BLEEDS EASILY: 0
EXERCISE INTOLERANCE: 1
POLYPHAGIA: 0
HEMOPTYSIS: 0
SNORES LOUDLY: 0
INCREASED ENERGY: 1
MEMORY LOSS: 0
SPEECH CHANGE: 1
FEVER: 0
JAUNDICE: 0
TREMORS: 0
DYSPNEA ON EXERTION: 0
DIZZINESS: 0
PARALYSIS: 0
NAIL CHANGES: 0
ORTHOPNEA: 0
WEIGHT GAIN: 0
BACK PAIN: 1
CHILLS: 0
BLOOD IN STOOL: 0
TINGLING: 0
SKIN CHANGES: 0
RECTAL PAIN: 1
DECREASED APPETITE: 0
DIFFICULTY URINATING: 0
LEG PAIN: 0
DISTURBANCES IN COORDINATION: 0
VOMITING: 0
FATIGUE: 1
BLOATING: 1
DEPRESSION: 0
WHEEZING: 0
FLANK PAIN: 0
ABDOMINAL PAIN: 0
ALTERED TEMPERATURE REGULATION: 1
DIARRHEA: 1
NIGHT SWEATS: 0
NECK PAIN: 1
POOR WOUND HEALING: 0
SLEEP DISTURBANCES DUE TO BREATHING: 0
CONSTIPATION: 0
DECREASED CONCENTRATION: 0
NERVOUS/ANXIOUS: 0
SWOLLEN GLANDS: 0
BOWEL INCONTINENCE: 0
HEADACHES: 1
INSOMNIA: 0
POLYDIPSIA: 0
NUMBNESS: 1
WEAKNESS: 0
SEIZURES: 0
WEIGHT LOSS: 0
MYALGIAS: 1
PANIC: 0
MUSCLE WEAKNESS: 0

## 2017-11-16 ASSESSMENT — PAIN SCALES - GENERAL: PAINLEVEL: NO PAIN (0)

## 2017-11-16 NOTE — MR AVS SNAPSHOT
After Visit Summary   11/16/2017    Laura Jackson    MRN: [id number]           Patient Information     Date Of Birth          1970        Visit Information        Provider Department      11/16/2017 11:30 AM Caio Antony MD [facility] Neurology        Today's Diagnoses     Carpal tunnel syndrome of right wrist    -  1    Cervicalgia           Follow-ups after your visit        Additional Services     ORTHOPEDICS ADULT REFERRAL       Your provider has referred you to: [facility]: Orthopaedic [facility] [phone]   [email or web address]   Hand surgery  Please be aware that coverage of these services is subject to the terms and limitations of your health insurance plan.  Call member services at your health plan with any benefit or coverage questions.      Please bring the following to your appointment:    >>   Any x-rays, CTs or MRIs which have been performed.  Contact the facility where they were done to arrange for  prior to your scheduled appointment.    >>   List of current medications   >>   This referral request   >>   Any documents/labs given to you for this referral            PT Referral ([facility])       *This therapy referral will be filtered to a centralized scheduling office at [facility] and the patient will receive a call to schedule an appointment at a [facility] location most convenient for them. *     [facility] provides Physical Therapy evaluation and treatment and many specialty services across the [facility] system.  If requesting a specialty program, please choose from the list below.    If you have not heard from the scheduling office within 2 business days, please call [phone] for all locations, with the exception of [place], please call [phone].  Treatment: Evaluation & Treatment  Special Instructions/Modalities:   Special Programs: eval and treat    Please be aware  "that coverage of these services is subject to the terms and limitations of your health insurance plan.  Call member services at your health plan with any benefit or coverage questions.      **Note to Provider:  If you are referring outside of [facility] for the therapy appointment, please list the name of the location in the \"special instructions\" above, print the referral and give to the patient to schedule the appointment.                  Your next 10 appointments already scheduled     Nov 28, 2017 10:00 AM CST   (Arrive by 9:45 AM)   New Patient Visit with SALAZAR [facility]al Surgery ([facility] [place])    [address] [place] [address]  4th [facility] [id number] [phone]            Dec 19, 2017 10:15 AM CST   (Arrive by 10:00 AM)   EMG with Chino Gatica MD   [facility] ([facility])    [address] [place] [id number] [phone]           Do not use lotions or creams on the area to be tested. If you are on blood thinners (Warfarin, Coumadin, Lovenox, etc), please contact your primary care physician to check if it is safe to stop them 3 days prior to testing. If you have anxiety, please check with your referring physician to obtain anti-anxiety medication for the procedure.            Dec 28, 2017  1:30 PM CST   (Arrive by 1:15 PM)   NEW ARRHYTHMIA with Phil Mitchell MD   [facility] ([facility])    [address] [place] [address]  3rd [facility] [address]   [phone]            Jan 05, 2018 10:00 AM CST   (Arrive by 9:45 AM)   NEW HAND with Rickey Rea MD   [facility] ([facility])    [address] [place] [id number] [phone]            Feb 08, 2018 10:20 AM CST   New Visit with Elliot Sebastian MD   [facility] [place] ([facility] " "[place])    [address] [facility]  [place] [address]   [phone]              Future tests that were ordered for you today     Open Future Orders        Priority Expected Expires Ordered    Erythrocyte sedimentation rate auto Routine  11/16/2018 11/16/2017    CRP inflammation Routine  11/16/2018 11/16/2017    ELP Routine  11/16/2018 11/16/2017    EMG - Needle EMG 2 Extremities ([address]) Routine  11/16/2018 11/16/2017            Who to contact     Please call your clinic at [phone] to:    Ask questions about your health    Make or cancel appointments    Discuss your medicines    Learn about your test results    Speak to your doctor   If you have compliments or concerns about an experience at your clinic, or if you wish to file a complaint, please contact [facility] Patient Relations at [phone] or email us at [email or web address]         Additional Information About Your Visit        Link[employer] Sounds Information     [employer] gives you secure access to your electronic health record. If you see a primary care provider, you can also send messages to your care team and make appointments. If you have questions, please call your primary care clinic.  If you do not have a primary care provider, please call [phone] and they will assist you.      [employer] is an electronic gateway that provides easy, online access to your medical records. With [employer], you can request a clinic appointment, read your test results, renew a prescription or communicate with your care team.     To access your existing account, please contact your [facility] or call [phone] for assistance.        Care EveryWhere ID     This is your Care EveryWhere ID. This could be used by other organizations to access your [facility] medical records  [id number]        Your Vitals Were     Pulse Height Last Period BMI (Body Mass Index)          88 1.651 m (5' 5\") 07/28/2017 26.63 kg/m2 "         Blood Pressure from Last 3 Encounters:   11/16/17 139/80   11/09/17 120/88   10/05/17 140/90    Weight from Last 3 Encounters:   11/16/17 72.6 kg (160 lb)   11/09/17 72.9 kg (160 lb 12.8 oz)   10/05/17 73.3 kg (161 lb 9.6 oz)              We Performed the Following     ORTHOPEDICS ADULT REFERRAL     PT Referral ([place])        Primary Care Provider Office Phone # Fax #    Whitney Alva [phone]       [address] [facility] [address]        Equal Access to Services     [facility]: Hadii aad ku hadasho Soomaali, waaxda luqadaha, qaybta kaalmada adeleandrayada, ludmila waters . So [facility] [phone].    ATENCIÓN: Si habla español, tiene a guido disposición servicios gratuitos de asistencia lingüística. Ll[facility] [phone].    We comply with applicable federal civil rights laws and [place] laws. We do not discriminate on the basis of race, color, national origin, age, disability, sex, sexual orientation, or gender identity.            Thank you!     Thank you for choosing [facility]  for your care. Our goal is always to provide you with excellent care. Hearing back from our patients is one way we can continue to improve our services. Please take a few minutes to complete the written survey that you may receive in the mail after your visit with us. Thank you!             Your Updated Medication List - Protect others around you: Learn how to safely use, store and throw away your medicines at www.disposemymeds.org.          This list is accurate as of: 11/16/17 12:48 PM.  Always use your most recent med list.                   Brand Name Dispense Instructions for use Diagnosis    CARTIA  MG 24 hr capsule   Generic drug:  diltiazem     90 capsule    Take 1 capsule (120 mg) by mouth daily    Raynaud's phenomenon without gangrene, History of supraventricular tachycardia       fluticasone-salmeterol 100-50 MCG/DOSE diskus inhaler    ADVAIR    3 Inhaler     Inhale 1 puff into the lungs 2 times daily    Mild persistent asthma without complication       levalbuterol 45 MCG/ACT Inhaler    XOPENEX HFA    1 Inhaler    Inhale 1-2 puffs into the lungs every 4 hours as needed for shortness of breath / dyspnea    Mild persistent asthma without complication       Multi-vitamin Tabs tablet      Take 1 tablet by mouth daily        Simethicone Extra Strength 125 MG Caps      Take 2 capsules by mouth daily as needed        ZANTAC PO      Take 150 mg by mouth as needed for heartburn

## 2017-11-16 NOTE — LETTER
2017      Bj Butler MD   [address] [place] [address]      RE: Laura Jackson   MRN: [id number]   : 1970      Dear Dr. Butler:      Thank you for referring Laura Jackson for neurologic consultation on 2017.  The patient comes with chief complaints of chronic neck pain, headaches related to neck pain, hypermobility, arthralgias, POTS disease, right hand paresthesias, and right hand weakness and pain.      The patient has a complicated history.  She developed uterine bleeding this early summer.  She ended up having a blood transfusion.  She initially had what sounds like an attempted D&C and partial resection of a fibroid.  She ended up having a partial hysterectomy done in August.  She was found at the time of the last surgery to have had endometriosis that she was not aware of.  She also had some adhesions that were released.  The patient still is anemic, but she has been on iron and she is gradually improving.  She developed orthostatic tachycardia syndrome when she started to have hemorrhage.  This exacerbated what she thought she probably had had in the past.  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  She also has had a previous history of what was thought to be SVT, but she thinks now this could have been POTS.  The patient has now been referred to our Cardiology specialist in orthostasis at [facility].  That consult is pending.  The patient also noted severe fatigue this last summer.  She developed Raynaud syndrome involving her hands.  She had it today when she came to see me.  With cold weather her hands turn white and blanched.  She has had for 6-7 years the same sensation in her feet with a hot shower.  She also did discuss with me chronic issues with burping as well as irritable bowel and diarrheal issues.  She has been diagnosed with irritable bowel years ago and she  had an extensive workup a few years ago when she was under insurance with [employer].  I was able to review that through the [employer] website.  The patient now has probably developed rectal prolapse.  She is going to have followup surgical referral for that and it is pending.  The patient has described hypermobility in all of her joints as long as she can recall.  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  She has noted chronic pain involving her fingers and toes.  The last few months she has noted nocturnal paresthesias in particular involving her right thumb and first 2 fingers.  She has developed a ganglion.  She did have orthopedic consultation and was diagnosed with epicondylitis.  She did discuss with me her work as a nurse and at home.  She really does not do repetitive work.  Coinciding with the development of her carpal tunnel symptoms she did develop the right wrist pain.  The patient also has noted some paresthesias of the left hand at night.  She has had chronic neck pain for as long as she can recall.  It hurts for her to turn her head to either side.  She cannot hold a phone to her shoulder.  She said with her chronic neck pain she has pain that refers to the back of her head.  She said it is difficult for her to turn her head in her car.  The patient did describe to me chronic joint issues, but she has had no radicular symptoms.  She denied Lhermitte sign.  She has not had decreased perirectal or genital sensation issues and no paresthesias involving her feet.  Except for her hand grasp she feels strong.  She denied any imbalance issues.  The patient has had some earlier urinary leakage before she had her hysterectomy.  She has not had chiropractic manipulation and has not done any yoga or unusual exercises with her neck.  The patient did not recall any recent neck trauma or head trauma.  She has noted that ibuprofen helps her headache and neck pain as well as her  joint pain.  She has had dog ticks on her dog in the past but not recently and she has not had wood tick exposure otherwise herself, including deer ticks, nor has she had skin rash.  The patient did note that she had had his ZIO Patch placed for 2 weeks after hospitalization.  She has not had a tilt table test.  She had blood work on 2017 that I reviewed with her.  She had normal metabolic profile.  Her vitamin D level was 47.  She had a ferritin level of only 14.  Her B12 level was 477 picograms.  Her TSH on 2017 was 3.5.  The patient described to me also back pain that she had about 10 years ago with the birth of one of her children.  She said she had physical therapy and that helped.  She discussed her family history with me.  The patient described to me her son's history.  One who is 11, at age 3, had some viral illness and had to have an NG tube placed.  He is still recovering from that.  The other has had asthma who is 10.  One child does have quite hypermobility of the joints.  She described her brother having prostate problems.  Her parents are both alive.  Her mother is alive at 85 and has hypertension, diabetes and lipid disorder.  Her father  at 74 of heart failure and chronic obstructive lung disease.  He was a chronic smoker.  The patient has also had prior tonsillectomy.  In addition, she has had an appendectomy.  She has had various medical problems including GERD, mild persistent asthma, as well as previously noted low vitamin D level.        ALLERGIES:   The patient has had allergies to tetracycline and penicillin.      CURRENT MEDICATIONS:  Include:   1.  Cardia that was started in 2017.  This has helped her tachycardia.     2.  She is on intermittent ranitidine.     3.  Daily multivitamin.     4.  Vitamin D.   5.  Dimethicone.    6.  p.r.n. Advair.   7.  Levalbuterol.      The patient has had an x-ray of her right wrist from 2017 when she was found to have the ganglion.   She has no evidence of fracture or osseous lesions.      The patient did have normal liver function tests done on 07/12/2017.  On 08/27/2017 her hemoglobin was 10.3 and her platelet count and white count were normal.  Her glucose was 106.      Neurologic examination revealed a pleasant woman.  Her blood pressure supine was 140/91 with a pulse of 77.  Waiting 3 minutes in a seated position it was 143/93 with a pulse of 83 and then waiting standing it was 128/90 with a pulse of 85.  I could not auscultate cervical bruits.  She had a regular cardiac rhythm without gallops or murmurs.  She had fairly good range of motion of her lower back.  In her neck she had mild decreased extension and slight decreased lateral rotation.  She had some tenderness in the midline of her cervical spine.  The patient did endorse having some pain in the front of her neck, especially on the left side.  She otherwise had normal gait, station, cerebellar testing, muscle stretch reflexes, plantar stimulation, strength except for moderate weakness of the right thenar muscles, careful cranial nerve examination, superficial and cortical sensory testing except for decreased sensation to pinprick involving the palmar surface of the second and third fingers were otherwise unremarkable.      IMPRESSION:   1.  Carpal tunnel syndrome.   2.  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   3.  Chronic neck pain.   4.  Cervicogenic headaches.   5.  Prior history to suggest POTS disease with improvement.   6.  Raynaud phenomenon.      The patient is going to keep her appointments now with Cardiology and Rheumatology.  She is going to undergo EMG testing of her right hand.  I have talked with her about going back to her orthopedist or hand surgeon.  I did discuss with her using a neutral wrist splint.  She is going to have ELP drawn for monoclonal protein today as well as sedimentation rate and C-reactive protein.  I did discuss with her physical therapy  for her neck.  She does want to take a conservative approach for her neck pain.  If she does not improve with that suggestion, I did suggest she return to me and have further tests done including MRI scan of her cervical spine.        Thank you for allowing me to see this patient.      Sincerely yours,      Caio Antony MD      The patient spent 1 hour with me today.  Over 50% of the time this involved counseling and coordination of care.  A complete review of medical systems was done and a positive review is listed in the report above.      D: 2017 19:28   T: 2017 07:37   MT: LOUISE    Name:     HUSEYIN MENDIOLA   MRN:      [id number]        Account:      [id number]   :      1970           Service Date: 2017    Document: [id number]

## 2017-11-17 NOTE — PROGRESS NOTES
2017      Bj Butler MD   [address] [place] [address]      RE: Laura Jackson   MRN: [id number]   : 1970      Dear Dr. Butler:      Thank you for referring Laura Jackson for neurologic consultation on 2017.  The patient comes with chief complaints of chronic neck pain, headaches related to neck pain, hypermobility, arthralgias, POTS disease, right hand paresthesias, and right hand weakness and pain.      The patient has a complicated history.  She developed uterine bleeding this early summer.  She ended up having a blood transfusion.  She initially had what sounds like an attempted D&C and partial resection of a fibroid.  She ended up having a partial hysterectomy done in August.  She was found at the time of the last surgery to have had endometriosis that she was not aware of.  She also had some adhesions that were released.  The patient still is anemic, but she has been on iron and she is gradually improving.  She developed orthostatic tachycardia syndrome when she started to have hemorrhage.  This exacerbated what she thought she probably had had in the past.  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  She also has had a previous history of what was thought to be SVT, but she thinks now this could have been POTS.  The patient has now been referred to our Cardiology specialist in orthostasis at [facility].  That consult is pending.  The patient also noted severe fatigue this last summer.  She developed Raynaud syndrome involving her hands.  She had it today when she came to see me.  With cold weather her hands turn white and blanched.  She has had for 6-7 years the same sensation in her feet with a hot shower.  She also did discuss with me chronic issues with burping as well as irritable bowel and diarrheal issues.  She has been diagnosed with irritable bowel years ago and she  had an extensive workup a few years ago when she was under insurance with [employer].  I was able to review that through the [employer] website.  The patient now has probably developed rectal prolapse.  She is going to have followup surgical referral for that and it is pending.  The patient has described hypermobility in all of her joints as long as she can recall.  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  She has noted chronic pain involving her fingers and toes.  The last few months she has noted nocturnal paresthesias in particular involving her right thumb and first 2 fingers.  She has developed a ganglion.  She did have orthopedic consultation and was diagnosed with epicondylitis.  She did discuss with me her work as a nurse and at home.  She really does not do repetitive work.  Coinciding with the development of her carpal tunnel symptoms she did develop the right wrist pain.  The patient also has noted some paresthesias of the left hand at night.  She has had chronic neck pain for as long as she can recall.  It hurts for her to turn her head to either side.  She cannot hold a phone to her shoulder.  She said with her chronic neck pain she has pain that refers to the back of her head.  She said it is difficult for her to turn her head in her car.  The patient did describe to me chronic joint issues, but she has had no radicular symptoms.  She denied Lhermitte sign.  She has not had decreased perirectal or genital sensation issues and no paresthesias involving her feet.  Except for her hand grasp she feels strong.  She denied any imbalance issues.  The patient has had some earlier urinary leakage before she had her hysterectomy.  She has not had chiropractic manipulation and has not done any yoga or unusual exercises with her neck.  The patient did not recall any recent neck trauma or head trauma.  She has noted that ibuprofen helps her headache and neck pain as well as her  joint pain.  She has had dog ticks on her dog in the past but not recently and she has not had wood tick exposure otherwise herself, including deer ticks, nor has she had skin rash.  The patient did note that she had had his ZIO Patch placed for 2 weeks after hospitalization.  She has not had a tilt table test.  She had blood work on 2017 that I reviewed with her.  She had normal metabolic profile.  Her vitamin D level was 47.  She had a ferritin level of only 14.  Her B12 level was 477 picograms.  Her TSH on 2017 was 3.5.  The patient described to me also back pain that she had about 10 years ago with the birth of one of her children.  She said she had physical therapy and that helped.  She discussed her family history with me.  The patient described to me her son's history.  One who is 11, at age 3, had some viral illness and had to have an NG tube placed.  He is still recovering from that.  The other has had asthma who is 10.  One child does have quite hypermobility of the joints.  She described her brother having prostate problems.  Her parents are both alive.  Her mother is alive at 85 and has hypertension, diabetes and lipid disorder.  Her father  at 74 of heart failure and chronic obstructive lung disease.  He was a chronic smoker.  The patient has also had prior tonsillectomy.  In addition, she has had an appendectomy.  She has had various medical problems including GERD, mild persistent asthma, as well as previously noted low vitamin D level.        ALLERGIES:   The patient has had allergies to tetracycline and penicillin.      CURRENT MEDICATIONS:  Include:   1.  Cardia that was started in 2017.  This has helped her tachycardia.     2.  She is on intermittent ranitidine.     3.  Daily multivitamin.     4.  Vitamin D.   5.  Dimethicone.    6.  p.r.n. Advair.   7.  Levalbuterol.      The patient has had an x-ray of her right wrist from 2017 when she was found to have the ganglion.   She has no evidence of fracture or osseous lesions.      The patient did have normal liver function tests done on 07/12/2017.  On 08/27/2017 her hemoglobin was 10.3 and her platelet count and white count were normal.  Her glucose was 106.      Neurologic examination revealed a pleasant woman.  Her blood pressure supine was 140/91 with a pulse of 77.  Waiting 3 minutes in a seated position it was 143/93 with a pulse of 83 and then waiting standing it was 128/90 with a pulse of 85.  I could not auscultate cervical bruits.  She had a regular cardiac rhythm without gallops or murmurs.  She had fairly good range of motion of her lower back.  In her neck she had mild decreased extension and slight decreased lateral rotation.  She had some tenderness in the midline of her cervical spine.  The patient did endorse having some pain in the front of her neck, especially on the left side.  She otherwise had normal gait, station, cerebellar testing, muscle stretch reflexes, plantar stimulation, strength except for moderate weakness of the right thenar muscles, careful cranial nerve examination, superficial and cortical sensory testing except for decreased sensation to pinprick involving the palmar surface of the second and third fingers were otherwise unremarkable.      IMPRESSION:   1.  Carpal tunnel syndrome.   2.  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   3.  Chronic neck pain.   4.  Cervicogenic headaches.   5.  Prior history to suggest POTS disease with improvement.   6.  Raynaud phenomenon.      The patient is going to keep her appointments now with Cardiology and Rheumatology.  She is going to undergo EMG testing of her right hand.  I have talked with her about going back to her orthopedist or hand surgeon.  I did discuss with her using a neutral wrist splint.  She is going to have ELP drawn for monoclonal protein today as well as sedimentation rate and C-reactive protein.  I did discuss with her physical therapy  for her neck.  She does want to take a conservative approach for her neck pain.  If she does not improve with that suggestion, I did suggest she return to me and have further tests done including MRI scan of her cervical spine.        Thank you for allowing me to see this patient.      Sincerely yours,      Gisele Antony MD      The patient spent 1 hour with me today.  Over 50% of the time this involved counseling and coordination of care.  A complete review of medical systems was done and a positive review is listed in the report above.         GISELE ANTONY MD             D: 2017 19:28   T: 2017 07:37   MT: LOUISE      Name:     HUSEYIN MENDIOLA   MRN:      [id number]        Account:      [id number]   :      1970           Service Date: 2017      Document: [id number]

## 2017-11-17 NOTE — TELEPHONE ENCOUNTER
LM for patient to return call.  Need to know if letter is to be faxed or picked up?  Also need to verify that she is returning to work 12-4-17, with no restrictions for 8 hour shift.  Pam Taveras,

## 2017-11-17 NOTE — TELEPHONE ENCOUNTER
Patient returned call.  Letter to be faxed to same number as work ability form ([phone]).  Patient to return to work 12-4-17 thru 1-4-18, 8 hour shifts only.  Plan is to return to 12 hour shift after that.  Form and letter given to Dr. Butler to sign. Pam Taveras,

## 2017-11-17 NOTE — TELEPHONE ENCOUNTER
Called patient and patient stated she is going back to work December 4th, 2017, Patient also stated that she will do only 8 hour shift instead of 12 hours, (Needs to be on work abilty forms). She needs to take a class prior starting work and needs approval letter saying it is ok to go to this class on November 29th, 12:00pm till 1:30pm. Please advise.       Rocio JULIO CMA ([facility])

## 2017-11-20 NOTE — TELEPHONE ENCOUNTER
Letter and form signed by Dr. Butler.  Letter and work ability form faxed to [phone]. Pam Taveras,

## 2017-11-21 ENCOUNTER — THERAPY VISIT (OUTPATIENT)
Dept: PHYSICAL THERAPY | Facility: CLINIC | Age: 47
End: 2017-11-21
Payer: COMMERCIAL

## 2017-11-21 DIAGNOSIS — M54.2 CERVICALGIA: Primary | ICD-10-CM

## 2017-11-21 PROCEDURE — 97161 PT EVAL LOW COMPLEX 20 MIN: CPT | Mod: GP | Performed by: PHYSICAL THERAPIST

## 2017-11-21 PROCEDURE — 97110 THERAPEUTIC EXERCISES: CPT | Mod: GP | Performed by: PHYSICAL THERAPIST

## 2017-11-21 NOTE — MR AVS SNAPSHOT
After Visit Summary   11/21/2017    Laura Jackson    MRN: [id number]           Patient Information     Date Of Birth          1970        Visit Information        Provider Department      11/21/2017 11:30 AM Moreno Frank, PT [facility]        Today's Diagnoses     Cervicalgia    -  1       Follow-ups after your visit        Your next 10 appointments already scheduled     Nov 28, 2017 10:00 AM CST   (Arrive by 9:45 AM)   New Patient Visit with SALAZAR Barnes [facility] ([facility] [place])    [address] [place] [id number]   [phone]            Nov 30, 2017  8:50 AM CST   [facility] Spine with Moreno Frank, PT   [facility] ([facility] [place])    [address] [address]   [phone]            Dec 08, 2017 10:10 AM CST   [facility] Spine with Moreno Frank, PT   [facility] ([facility] [place])    [address] [facility]  [address] [place] [address]   [phone]            Dec 19, 2017 10:15 AM CST   (Arrive by 10:00 AM)   EMG with Chino Gatica MD   [facility] ([facility])    [address] [place] [id number] [phone]           Do not use lotions or creams on the area to be tested. If you are on blood thinners (Warfarin, Coumadin, Lovenox, etc), please contact your primary care physician to check if it is safe to stop them 3 days prior to testing. If you have anxiety, please check with your referring physician to obtain anti-anxiety medication for the procedure.            Dec 28, 2017  1:30 PM CST   (Arrive by 1:15 PM)   NEW ARRHYTHMIA with Phil Mitchell MD   [facility] ([facility])    [address] [place] [id number]   [phone]            Jan 05, 2018 10:00 AM CST   (Arrive by 9:45 AM)   NEW HAND with Rickey Lou  MD Álvaro   [facility] ([facility])    [address]  4th Floor  [facility] [address] [phone]            Feb 08, 2018 10:20 AM CST   New Visit with Elliot Sebastian MD   [facility] ([facility])    [address]  [place] [id number] [phone]              Who to contact     If you have questions or need follow up information about today's clinic visit or your schedule please contact [facility] directly at [phone].  Normal or non-critical lab and imaging results will be communicated to you by MyChart, letter or phone within 4 business days after the clinic has received the results. If you do not hear from us within 7 days, please contact the clinic through [facility] or phone. If you have a critical or abnormal lab result, we will notify you by phone as soon as possible.  Submit refill requests through [employer] or call your pharmacy and they will forward the refill request to us. Please allow 3 business days for your refill to be completed.          Additional Information About Your Visit        MyChart Information     [employer] gives you secure access to your electronic health record. If you see a primary care provider, you can also send messages to your care team and make appointments. If you have questions, please call your primary care clinic.  If you do not have a primary care provider, please call [phone] and they will assist you.        Care EveryWhere ID     This is your Care EveryWhere ID. This could be used by other organizations to access your [facility] medical records  [id number]        Your Vitals Were     Last Period                   07/28/2017            Blood Pressure from Last 3 Encounters:   11/16/17 139/80   11/09/17 120/88   10/05/17 140/90    Weight from Last 3 Encounters:   11/16/17 72.6 kg (160 lb)   11/09/17 72.9 kg (160 lb 12.8 oz)   10/05/17 73.3 kg (161 lb 9.6 oz)              We  Performed the Following     HC PT EVAL, LOW COMPLEXITY     [facility] INITIAL EVAL REPORT     THERAPEUTIC EXERCISES        Primary Care Provider Office Phone # Fax #    Whitney Alva [phone] [address] [facility] [place] [address]        Equal Access to Services     ELIGIO LEE : Hadii aad ku hadasho Soomaali, waaxda luqadaha, qaybta kaalmada adeegyada, waxay rafiain hayaan adeeg graysonmary lajostin . So [place] [phone].    ATENCIÓN: Si habla español, tiene a guido disposición servicios gratuitos de asistencia lingüística. Llame al [phone].    We comply with applicable federal civil rights laws and [place] laws. We do not discriminate on the basis of race, color, national origin, age, disability, sex, sexual orientation, or gender identity.            Thank you!     Thank you for choosing [facility]  for your care. Our goal is always to provide you with excellent care. Hearing back from our patients is one way we can continue to improve our services. Please take a few minutes to complete the written survey that you may receive in the mail after your visit with us. Thank you!             Your Updated Medication List - Protect others around you: Learn how to safely use, store and throw away your medicines at www.disposemymeds.org.          This list is accurate as of: 11/21/17  1:12 PM.  Always use your most recent med list.                   Brand Name Dispense Instructions for use Diagnosis    CARTIA  MG 24 hr capsule   Generic drug:  diltiazem     90 capsule    Take 1 capsule (120 mg) by mouth daily    Raynaud's phenomenon without gangrene, History of supraventricular tachycardia       fluticasone-salmeterol 100-50 MCG/DOSE diskus inhaler    ADVAIR    3 Inhaler    Inhale 1 puff into the lungs 2 times daily    Mild persistent asthma without complication       levalbuterol 45 MCG/ACT Inhaler    XOPENEX HFA    1 Inhaler    Inhale 1-2 puffs into the lungs every 4 hours as needed for  shortness of breath / dyspnea    Mild persistent asthma without complication       Multi-vitamin Tabs tablet      Take 1 tablet by mouth daily        Simethicone Extra Strength 125 MG Caps      Take 2 capsules by mouth daily as needed        ZANTAC PO      Take 150 mg by mouth as needed for heartburn

## 2017-11-21 NOTE — PROGRESS NOTES
Subjective:    Patient is a 47 year old female presenting with rehab cervical spine hpi. The history is provided by the patient.   Laura Jackson is a 47 year old female with a cervical spine condition.  Condition occurred with:  Degenerative joint disease.  Condition occurred: at home.  This is a chronic condition  Pt reports many years of neck pain possibly related to hyper mobility.  .    Patient reports pain:  Cervical right side, cervical left side, lower cervical spine and upper cervical spine.  Radiates to:  Head.  Pain is described as sharp and is constant and reported as 3/10.  Associated symptoms:  Headache and loss of motion/stiffness.   Symptoms are exacerbated by change of position, looking up or down and rotating head             Pertinent medical history includes:  Osteoarthritis and heart problems.      Current medications:  Cardiac medication.  Current occupation is RN .                                    Objective:    System              Cervical/Thoracic Evaluation    AROM:  AROM Cervical:    Flexion:          40% loss ++   Extension:       LCS 80% loss, 60% loss Retrxn   Rotation:         Left: 60% loss ++      Right: 50% loss   Side Bend:      Left:     Right:       Headaches: cervical  Cervical Myotomes:  not assessed                  DTR's:  not assessed          Cervical Dermatomes:  not assessed                    Cervical Palpation:    Tenderness present at Left:    Upper Trap; Levator; Erector Spinae and Suboccipitals  Tenderness present at Right:    Upper Trap; Levator; Erector Spinae and Suboccipitals    Cervical Stability/Joint Clearing:      Left negative at: TLA LAT    Right negative at:  TLA LAT  Negative:ALAR Ligament and TLA AP  Spinal Segmental Conclusions:    Level:  Hypo at T2, T1, C7, C6, C5, C4, C3, C2 and C1      Cord Sign:  not assessed                                            General     ROS    Assessment/Plan:      Patient is a 47 year old female with cervical  complaints.    Patient has the following significant findings with corresponding treatment plan.                Diagnosis 1:  Neck pain   Pain -  manual therapy, self management and home program  Decreased ROM/flexibility - manual therapy, therapeutic exercise and home program  Impaired muscle performance - neuro re-education  Decreased function - therapeutic activities and home program    Therapy Evaluation Codes:   1) History comprised of:   Personal factors that impact the plan of care:      Past/current experiences.    Comorbidity factors that impact the plan of care are:      Edilma Danlos syndr.     Medications impacting care: Pain.  2) Examination of Body Systems comprised of:   Body structures and functions that impact the plan of care:      Cervical spine.   Activity limitations that impact the plan of care are:      Bathing, Driving, Reading/Computer work and Working.  3) Clinical presentation characteristics are:   Stable/Uncomplicated.  4) Decision-Making    Low complexity using standardized patient assessment instrument and/or measureable assessment of functional outcome.  Cumulative Therapy Evaluation is: Low complexity.    Previous and current functional limitations:  (See Goal Flow Sheet for this information)    Short term and Long term goals: (See Goal Flow Sheet for this information)     Communication ability:  Patient appears to be able to clearly communicate and understand verbal and written communication and follow directions correctly.  Treatment Explanation - The following has been discussed with the patient:   RX ordered/plan of care  Anticipated outcomes  Possible risks and side effects  This patient would benefit from PT intervention to resume normal activities.   Rehab potential is good.    Frequency:  1 X week, once daily  Duration:  for 6 weeks  Discharge Plan:  Achieve all LTG.  Independent in home treatment program.  Reach maximal therapeutic benefit.    Please refer to the daily  flowsheet for treatment today, total treatment time and time spent performing 1:1 timed codes.

## 2017-11-21 NOTE — LETTER
[facility]O PT  [address] [facility]  [address]  [place] [address]  [phone]    2017    Re: Laura Jackson   :   1970  MRN:  [id number]   REFERRING PHYSICIAN:   MD IBETH Tucker PT  Date of Initial Evaluation:  2017  Visits:  Rxs Used: 1  Reason for Referral:  Cervicalgia    EVALUATION SUMMARY    Subjective:  Patient is a 47 year old female presenting with rehab cervical spine hpi. The history is provided by the patient.   Laura Jackson is a 47 year old female with a cervical spine condition.  Condition occurred with:  Degenerative joint disease.  Condition occurred: at home.  This is a chronic condition  Pt reports many years of neck pain possibly related to hyper mobility.    Patient reports pain:  Cervical right side, cervical left side, lower cervical spine and upper cervical spine.  Radiates to:  Head.  Pain is described as sharp and is constant and reported as 3/10.  Associated symptoms:  Headache and loss of motion/stiffness.   Symptoms are exacerbated by change of position, looking up or down and rotating head Pertinent medical history includes:  Osteoarthritis and heart problems. Current medications:  Cardiac medication.  Current occupation is RN .                 Objective:  Cervical/Thoracic Evaluation  AROM:  AROM Cervical:  Flexion:          40% loss ++   Extension:       LCS 80% loss, 60% loss Retrxn   Rotation:         Left: 60% loss ++      Right: 50% loss   Side Bend:      Left:     Right:     Headaches: cervical  Cervical Myotomes:  not assessed  DTR's:  not assessed  Cervical Dermatomes:  not assessed  Cervical Palpation:    Tenderness present at Left:    Upper Trap; Levator; Erector Spinae and Suboccipitals  Tenderness present at Right:    Upper Trap; Levator; Erector Spinae and Suboccipitals  Cervical Stability/Joint Clearing:    Left negative at: TLA LAT  Right negative at:  TLA LAT  Negative:ALAR Ligament and TLA AP    Re:  Laura Jackson   :   1970    Spinal Segmental Conclusions:    Level:  Hypo at T2, T1, C7, C6, C5, C4, C3, C2 and C1  Cord Sign:  not assessed    Assessment/Plan:    Patient is a 47 year old female with cervical complaints.    Patient has the following significant findings with corresponding treatment plan.                Diagnosis 1:  Neck pain   Pain -  manual therapy, self management and home program  Decreased ROM/flexibility - manual therapy, therapeutic exercise and home program  Impaired muscle performance - neuro re-education  Decreased function - therapeutic activities and home program    Therapy Evaluation Codes:   1) History comprised of:   Personal factors that impact the plan of care:      Past/current experiences.    Comorbidity factors that impact the plan of care are:      Edilma Danlos syndr.     Medications impacting care: Pain.  2) Examination of Body Systems comprised of:   Body structures and functions that impact the plan of care:      Cervical spine.   Activity limitations that impact the plan of care are:      Bathing, Driving, Reading/Computer work and Working.  3) Clinical presentation characteristics are:   Stable/Uncomplicated.  4) Decision-Making    Low complexity using standardized patient assessment instrument and/or measureable assessment of functional outcome.  Cumulative Therapy Evaluation is: Low complexity.    Previous and current functional limitations:  (See Goal Flow Sheet for this information)    Short term and Long term goals: (See Goal Flow Sheet for this information)     Communication ability:  Patient appears to be able to clearly communicate and understand verbal and written communication and follow directions correctly.  Treatment Explanation - The following has been discussed with the patient:   RX ordered/plan of care  Anticipated outcomes  Possible risks and side effects  This patient would benefit from PT intervention to resume normal activities.   Rehab  potential is good.    Frequency:  1 X week, once daily  Duration:  for 6 weeks  Discharge Plan:  Achieve all LTG.  Independent in home treatment program.  Reach maximal therapeutic benefit.      Re: Laura MARK Renetta   :   1970    Please refer to the daily flowsheet for treatment today, total treatment time and time spent performing 1:1 timed codes.       Thank you for your referral.    INQUIRIES  Therapist: Moreno Frank, PT   IBETH [facility]COSO PT  [address] [address]  Phone: [phone]  Fax: [phone]

## 2017-11-22 NOTE — TELEPHONE ENCOUNTER
APPT INFO    Date /Time: 11/28/17   Reason for Appt: Rectal Prolapse   Ref Provider/Clinic: Self   Are there internal records? If yes, list: Yes  TODD Butler 11/09/17   Patient Contact (Y/N) & Call Details: No established pt  Told , no outside records   Action: Reviewed records; Records are in EPIC     OUTSIDE RECORDS CHECKLIST     CLINIC NAME COMMENTS REC (x) IMG (x)

## 2017-11-27 ENCOUNTER — TELEPHONE (OUTPATIENT)
Dept: SURGERY | Facility: CLINIC | Age: 47
End: 2017-11-27

## 2017-11-27 NOTE — TELEPHONE ENCOUNTER
Established Patient Telephone Reminder Call    Date of call:  11/27/17 12:40 PM  Phone numbers:  Cell number on file:    Telephone Information:   Mobile [phone]       Reached patient/confirmed appointment:  Yes  Appointment with: Amber Gregg  Reason for visit:  New patient Rectal prolapse  Jenny Shetlon, CMA

## 2017-11-28 ENCOUNTER — PRE VISIT (OUTPATIENT)
Dept: SURGERY | Facility: CLINIC | Age: 47
End: 2017-11-28

## 2017-11-28 ENCOUNTER — OFFICE VISIT (OUTPATIENT)
Dept: SURGERY | Facility: CLINIC | Age: 47
End: 2017-11-28

## 2017-11-28 VITALS
TEMPERATURE: 98.8 F | SYSTOLIC BLOOD PRESSURE: 131 MMHG | HEIGHT: 65 IN | OXYGEN SATURATION: 100 % | DIASTOLIC BLOOD PRESSURE: 90 MMHG | HEART RATE: 98 BPM | WEIGHT: 159.6 LBS | BODY MASS INDEX: 26.59 KG/M2

## 2017-11-28 DIAGNOSIS — D50.9 IRON DEFICIENCY ANEMIA, UNSPECIFIED IRON DEFICIENCY ANEMIA TYPE: ICD-10-CM

## 2017-11-28 DIAGNOSIS — K64.8 HEMORRHOID PROLAPSE: Primary | ICD-10-CM

## 2017-11-28 DIAGNOSIS — G56.01 CARPAL TUNNEL SYNDROME OF RIGHT WRIST: ICD-10-CM

## 2017-11-28 LAB
CRP SERPL-MCNC: <2.9 MG/L (ref 0–8)
ERYTHROCYTE [DISTWIDTH] IN BLOOD BY AUTOMATED COUNT: 14.3 % (ref 10–15)
ERYTHROCYTE [SEDIMENTATION RATE] IN BLOOD BY WESTERGREN METHOD: 8 MM/H (ref 0–20)
HCT VFR BLD AUTO: 41.9 % (ref 35–47)
HGB BLD-MCNC: 13.7 G/DL (ref 11.7–15.7)
MCH RBC QN AUTO: 29 PG (ref 26.5–33)
MCHC RBC AUTO-ENTMCNC: 32.7 G/DL (ref 31.5–36.5)
MCV RBC AUTO: 89 FL (ref 78–100)
PLATELET # BLD AUTO: 236 10E9/L (ref 150–450)
RBC # BLD AUTO: 4.72 10E12/L (ref 3.8–5.2)
WBC # BLD AUTO: 5 10E9/L (ref 4–11)

## 2017-11-28 RX ORDER — MULTIVIT-MIN/IRON/FOLIC ACID/K 18-600-40
1 CAPSULE ORAL DAILY
COMMUNITY

## 2017-11-28 ASSESSMENT — ENCOUNTER SYMPTOMS
DIARRHEA: 1
JOINT SWELLING: 1
BLOATING: 1
STIFFNESS: 1
TINGLING: 0
SMELL DISTURBANCE: 0
ALTERED TEMPERATURE REGULATION: 1
WEIGHT GAIN: 0
NUMBNESS: 1
MYALGIAS: 1
NECK PAIN: 1
INCREASED ENERGY: 1
BLOOD IN STOOL: 0
NAUSEA: 1
VOMITING: 0
TASTE DISTURBANCE: 0
HEADACHES: 1
NECK MASS: 0
FATIGUE: 1
PARALYSIS: 0
DIZZINESS: 0
POLYPHAGIA: 0
LOSS OF CONSCIOUSNESS: 0
SEIZURES: 0
HOARSE VOICE: 0
HEARTBURN: 1
WEAKNESS: 0
PALPITATIONS: 1
BACK PAIN: 0
EXERCISE INTOLERANCE: 1
RECTAL PAIN: 1
CHILLS: 0
CONSTIPATION: 0
SLEEP DISTURBANCES DUE TO BREATHING: 0
NIGHT SWEATS: 0
HYPERTENSION: 1
TROUBLE SWALLOWING: 0
SINUS CONGESTION: 0
DECREASED APPETITE: 0
HYPOTENSION: 0
ARTHRALGIAS: 1
POLYDIPSIA: 0
ABDOMINAL PAIN: 1
JAUNDICE: 0
SINUS PAIN: 0
SORE THROAT: 0
LEG PAIN: 0
FEVER: 0
LIGHT-HEADEDNESS: 0
MUSCLE WEAKNESS: 0
TREMORS: 0
MUSCLE CRAMPS: 1
HALLUCINATIONS: 0
BOWEL INCONTINENCE: 0
DISTURBANCES IN COORDINATION: 0
ORTHOPNEA: 0
WEIGHT LOSS: 0
SPEECH CHANGE: 0
MEMORY LOSS: 1

## 2017-11-28 ASSESSMENT — PAIN SCALES - GENERAL: PAINLEVEL: NO PAIN (0)

## 2017-11-28 NOTE — MR AVS SNAPSHOT
After Visit Summary   11/28/2017    Laura Jackson    MRN: [id number]           Patient Information     Date Of Birth          1970        Visit Information        Provider Department      11/28/2017 10:00 AM Amber Aceves APRN [facility] Colon and Rectal Surgery        Today's Diagnoses     Hemorrhoid prolapse    -  1       Follow-ups after your visit        Your next 10 appointments already scheduled     Nov 28, 2017 12:15 PM CST   LAB with  LAB   [facility] ([facility])    [address] [place] [id number] [phone]           Please do not eat 10-12 hours before your appointment if you are coming in fasting for labs on lipids, cholesterol, or glucose (sugar). This does not apply to pregnant women. Water, hot tea and black coffee (with nothing added) are okay. Do not drink other fluids, diet soda or chew gum.            Nov 30, 2017  8:50 AM CST   [facility] Spine with Moreno Frank, PT   [facility] [facility] ([facility] [place])    [address] [facility]  [address]  [facility] [address]   [phone]            Dec 08, 2017 10:10 AM CST   IBETH Spine with Moreno Beaversol, PT   [facility] ([facility] [place])    [address] [facility]  [address]  [facility] [address]   [phone]            Dec 19, 2017 10:15 AM CST   (Arrive by 10:00 AM)   EMG with Chino Gatica MD   [facility] ([facility])    [address] [place] [id number] [phone]           Do not use lotions or creams on the area to be tested. If you are on blood thinners (Warfarin, Coumadin, Lovenox, etc), please contact your primary care physician to check if it is safe to stop them 3 days prior to testing. If you have anxiety, please check with your referring physician to obtain anti-anxiety medication for the procedure.            Dec 28, 2017  1:30 PM CST   (Arrive by 1:15 PM)   NEW ARRHYTHMIA  with Phil Mitchell MD   [facility] ([facility])    [address]  3rd Floor  [facility] [address]   [phone]            Jan 05, 2018 10:00 AM CST   (Arrive by 9:45 AM)   NEW HAND with Rickey Rea MD   [facility] ([facility])    [address]  4th Floor  [facility] [address]   [phone]            Feb 08, 2018 10:20 AM CST   New Visit with Elliot [place] MD   [facility] ([facility])    [address] [facility] [id number] [phone]              Who to contact     Please call your clinic at [phone] to:    Ask questions about your health    Make or cancel appointments    Discuss your medicines    Learn about your test results    Speak to your doctor   If you have compliments or concerns about an experience at your clinic, or if you wish to file a complaint, please contact [facility] Patient Relations at [phone] or email us at [email or web address]         Additional Information About Your Visit        WebThriftStoreharAssmbly Information     Lovethelookt gives you secure access to your electronic health record. If you see a primary care provider, you can also send messages to your care team and make appointments. If you have questions, please call your primary care clinic.  If you do not have a primary care provider, please call [phone] and they will assist you.      Wimdu is an electronic gateway that provides easy, online access to your medical records. With Wimdu, you can request a clinic appointment, read your test results, renew a prescription or communicate with your care team.     To access your existing account, please contact your [facility] or call [phone] for assistance.        Care EveryWhere ID     This is your Care EveryWhere ID. This could be used by other organizations to  "access your [place] medical records  [id number]        Your Vitals Were     Pulse Temperature Height Last Period Pulse Oximetry BMI (Body Mass Index)    98 98.8  F (37.1  C) 5' 5\" 07/28/2017 100% 26.56 kg/m2       Blood Pressure from Last 3 Encounters:   11/28/17 131/90   11/16/17 139/80   11/09/17 120/88    Weight from Last 3 Encounters:   11/28/17 159 lb 9.6 oz   11/16/17 160 lb   11/09/17 160 lb 12.8 oz              We Performed the Following     ANOSCOPY W/WO BRUSH/WASH        Primary Care Provider Office Phone # Fax #    Whitney Alva [phone] [address] [facility]  [place] [address]        Equal Access to Services     [facility] : Hadii latoya chaves Soandreia, waaxda luqadaha, qaybta kaalmada adeegyada, ludmila waters . So [facility] [phone].    ATENCIÓN: Si habla español, tiene a guido disposición servicios gratuitos de asistencia lingüística. [facility] [phone].    We comply with applicable federal civil rights laws and [place] laws. We do not discriminate on the basis of race, color, national origin, age, disability, sex, sexual orientation, or gender identity.            Thank you!     Thank you for choosing [facility] COLON AND RECTAL SURGERY  for your care. Our goal is always to provide you with excellent care. Hearing back from our patients is one way we can continue to improve our services. Please take a few minutes to complete the written survey that you may receive in the mail after your visit with us. Thank you!             Your Updated Medication List - Protect others around you: Learn how to safely use, store and throw away your medicines at www.disposemymeds.org.          This list is accurate as of: 11/28/17 10:43 AM.  Always use your most recent med list.                   Brand Name Dispense Instructions for use Diagnosis    CARTIA  MG 24 hr capsule   Generic drug:  diltiazem     90 capsule    Take 1 capsule (120 mg) by mouth daily "    Raynaud's phenomenon without gangrene, History of supraventricular tachycardia       fluticasone-salmeterol 100-50 MCG/DOSE diskus inhaler    ADVAIR    3 Inhaler    Inhale 1 puff into the lungs 2 times daily    Mild persistent asthma without complication       levalbuterol 45 MCG/ACT Inhaler    XOPENEX HFA    1 Inhaler    Inhale 1-2 puffs into the lungs every 4 hours as needed for shortness of breath / dyspnea    Mild persistent asthma without complication       Multi-vitamin Tabs tablet      Take 1 tablet by mouth daily        Simethicone Extra Strength 125 MG Caps      Take 2 capsules by mouth daily as needed        vitamin D 2000 UNITS Caps      Take 1 capsule by mouth daily        ZANTAC PO      Take 150 mg by mouth as needed for heartburn

## 2017-11-28 NOTE — NURSING NOTE
"Chief Complaint   Patient presents with     Clinic Care Coordination - Initial     RECTAL PROLAPSE       Vitals:    11/28/17 0958   BP: 131/90   Pulse: 98   Temp: 98.8  F (37.1  C)   SpO2: 100%   Weight: 72.4 kg (159 lb 9.6 oz)   Height: 1.651 m (5' 5\")       Body mass index is 26.56 kg/(m^2).    Jenny Shelton, CMA                          "

## 2017-11-28 NOTE — LETTER
2017      RE: Laura Jackson  [address] [place] [address]       Colon and Rectal Surgery Consult Clinic Note    Date: 2017     Referring provider:  Bj Butler MD  [address] [facility] [place] [address]     RE: Laura Jackson  : 1970  JAN: 2017    Laura Jackson is a very pleasant 47 year old female with a complicated past medical history including POTS with a recent diagnosis of hemorrhoids or rectal prolapse.  Given these findings they were subsequently sent to the Colon and Rectal Surgery Clinic for an opinion on this and a new patient consultation.     Laura reports developing hemorrhoids after she had a hysterectomy in August for fibroids. Since that time she feels that her hemorrhoids are getting worse and she is concerned that she may have rectal prolapse. She is currently being worked up for possible Edilma Danlos syndrome and is concerned that this may be causing rectal prolapse. She has some skin on the outside of her anus between her anus and vagina that seems to be getting larger. She feels as though her anus is no longer as tight as it used to be and that increasing walking recently has made this worse. It is hard to keep clean. She has had some diarrhea recently with her POTS this seemed to make her symptoms worse. The diarrhea is now resolved. She has some tissue that comes out with bowel movements that occasionally needs to be manually reduced but occasionally goes back in on its own. If she has a pulling sensation in her rectum but denies any pain. She denies any bleeding. She has never had any anorectal procedures in the past. She has never had a colonoscopy. She does have a family history of her maternal grandfather with colon cancer at the age of 61. She is not on any blood thinners.    Assessment/Plan: 47 year old female with hemorrhoid prolapse. Examination on the commode reveals hemorrhoid prolapse with no evidence of full-thickness rectal  prolapse. Anoscopy with grade 2-3 internal hemorrhoids. No active bleeding. She does have a fairly large skin tags in the anterior perianal position extending toward the vagina. I discussed that this would need to be surgically removed if it is symptomatic that we cannot promise a good cosmetic result. Discussed banding of prolapsing internal hemorrhoids. Discussed the risks of bleeding and the band is placed and would've falls off in 2 weeks and that she may need multiple banding procedures until her symptoms improve. I do not think her hemorrhoids are large enough to warrant surgical hemorrhoidectomy at this time. She would like to think about this and come back for hemorrhoid banding at a more convenient time. Advised her to continue to manage constipation and diarrhea and avoid any straining and prolonged sitting on the toilet in the meantime.  Patient's questions were answered to her stated satisfaction and she is in agreement with this plan.    Medical history:  Past Medical History:   Diagnosis Date     Family history of breast cancer 2/19/2014     SVT (supraventricular tachycardia):  history of, no recurrence since 2008 10/11/2013    no recurrence since 2008      Uncomplicated asthma        Surgical history:  Past Surgical History:   Procedure Laterality Date     APPENDECTOMY       DAVINCI HYSTERECTOMY TOTAL, SALPINGECTOMY BILATERAL N/A 8/4/2017    Procedure: DAVINCI XI HYSTERECTOMY TOTAL, SALPINGECTOMY BILATERAL;  DAVINCI TOTAL HYSTERECTOMY; BILATERAL SALPINGECTOMY. BILATERAL URETERAL LYSIS. UTEROSACRAL-COLPOPEXY. EXCISION OF ENDOMETRIOSIS;  Surgeon: George Loyola MD;  Location: [facility] OR     DAVINCI LYSIS OF ADHESIONS Bilateral 8/4/2017    Procedure: DAVINCI LYSIS OF ADHESIONS;  BILATERAL URETERAL LYSIS;  Surgeon: George Loyola MD;  Location: [facility] OR     DAVINCI SACROCOLPOPEXY, CYSTOSCOPY, COMBINED N/A 8/4/2017    Procedure: COMBINED DAVINCI SACROCOLPOPEXY, CYSTOSCOPY;  UTEROSACRAL COLPOPEXY;   Surgeon: George Loyola MD;  Location:  OR     DAVINCI XI ASSISTED ABLATION / EXCISION OF ENDOMETRIOSIS  8/4/2017    Procedure: DAVINCI XI ASSISTED ABLATION / EXCISION OF ENDOMETRIOSIS;;  Surgeon: George Loyola MD;  Location: [facility] OR     DILATION AND CURETTAGE N/A 6/20/2017    Procedure: DILATION AND CURETTAGE;  Uterine Curettings and Fibroid Removal, Cook catheter placement; (No hysterectomy done at this time);  Surgeon: Ernestina Saunders MD;  Location: [facility] OR     TONSILLECTOMY         Problem list:  Patient Active Problem List    Diagnosis Date Noted     Cervicalgia 11/21/2017     Priority: Medium     Pelvic adhesions 11/09/2017     Priority: Medium     Endometriosis 11/09/2017     Priority: Medium     Heberden's nodes 11/09/2017     Priority: Medium     S/P ANAID (total abdominal hysterectomy) 09/11/2017     Priority: Medium     Hypovitaminosis D 09/11/2017     Priority: Medium     Dehydration 08/27/2017     Priority: Medium     Nausea 08/04/2017     Priority: Medium     S/P myomectomy 06/29/2017     Priority: Medium     Foreign body (FB) in soft tissue 08/22/2016     Priority: Medium     Mild persistent asthma 02/19/2014     Priority: Medium     Family history of breast cancer 02/19/2014     Priority: Medium     History of supraventricular tachycardia 10/11/2013     Priority: Medium     no recurrence since 2008       CARDIOVASCULAR SCREENING; LDL GOAL LESS THAN 160 04/24/2013     Priority: Medium     Irritable bowel syndrome 04/24/2013     Priority: Medium     GERD (gastroesophageal reflux disease) 04/24/2013     Priority: Medium       Medications:  Current Outpatient Prescriptions   Medication Sig Dispense Refill     Cholecalciferol (VITAMIN D) 2000 UNITS CAPS Take 1 capsule by mouth daily       multivitamin, therapeutic with minerals (MULTI-VITAMIN) TABS tablet Take 1 tablet by mouth daily       RaNITidine HCl (ZANTAC PO) Take 150 mg by mouth as needed for heartburn       CARTIA  MG 24 hr capsule  "Take 1 capsule (120 mg) by mouth daily 90 capsule 1     Simethicone Extra Strength 125 MG CAPS Take 2 capsules by mouth daily as needed       fluticasone-salmeterol (ADVAIR) 100-50 MCG/DOSE diskus inhaler Inhale 1 puff into the lungs 2 times daily 3 Inhaler 1     levalbuterol (XOPENEX HFA) 45 MCG/ACT Inhaler Inhale 1-2 puffs into the lungs every 4 hours as needed for shortness of breath / dyspnea 1 Inhaler 5       Allergies:  Allergies   Allergen Reactions     Penicillins      Swelling throat; age 4     Tetracycline      Vomiting; nauseous       Family history:  Family History   Problem Relation Age of Onset     DIABETES Mother      Hypertension Mother      Hyperlipidemia Mother      Arrhythmia Mother      Cardiovascular Father      CHF and COPD     Asthma Father      Breast Cancer Maternal Grandfather      Colon Cancer Maternal Grandfather      Breast Cancer Paternal Grandmother      Breast Cancer Paternal Aunt      C.A.D. Paternal Grandfather      Breast Cancer Cousin      Asthma Son      DIABETES Maternal Grandmother      Hypertension Maternal Grandmother        Social history:  Social History   Substance Use Topics     Smoking status: Never Smoker     Smokeless tobacco: Never Used     Alcohol use Yes      Comment: occasional    Marital status: .  Occupation: pediatric ICU nurse.    Nursing Notes:   Jenny Shelton CMA  11/28/2017 10:01 AM  Signed  Chief Complaint   Patient presents with     Clinic Care Coordination - Initial     RECTAL PROLAPSE       Vitals:    11/28/17 0958   BP: 131/90   Pulse: 98   Temp: 98.8  F (37.1  C)   SpO2: 100%   Weight: 72.4 kg (159 lb 9.6 oz)   Height: 1.651 m (5' 5\")       Body mass index is 26.56 kg/(m^2).    Jenny Shelton CMA                             Physical Examination:  /90  Pulse 98  Temp 98.8  F (37.1  C)  Ht 5' 5\"  Wt 159 lb 9.6 oz  LMP 07/28/2017  SpO2 100%  BMI 26.56 kg/m2  General: alert, oriented, in no acute distress, sitting " comfortably  HEENT: mucous membranes moist  Perianal external examination:  Perianal skin: Intact with no excoriation or lichenification.  Lesions: No evidence of an external lesion, nodularity, or induration in the perianal region.  Eversion of buttocks: There was not evidence of an anal fissure. Details: N/A.  Skin tags or external hemorrhoids: Yes: skin tag in the anterior midline.  Digital rectal examination: Was performed.   Sphincter tone: Good.  Palpable lesions: No.  Other: None.  Bimanual examination: was not performed      Anoscopy: Was performed.   Hemorrhoids: Yes. Grade 2-3 internal hemorrhoids without bleeding  Lesions: No    Patient was examined after straining on the commode. No full thickness prolapse noted. Hemorrhoidal prolapse circumferentially.    Total face to face time was 30 minutes, >50% counseling.    SALAZAR Franco, NP-C  Colon and Rectal Surgery   [facility]    This note was created using speech recognition software and may contain unintended word substitutions.    SALAZAR Franco CNP

## 2017-11-30 ENCOUNTER — THERAPY VISIT (OUTPATIENT)
Dept: PHYSICAL THERAPY | Facility: CLINIC | Age: 47
End: 2017-11-30
Payer: COMMERCIAL

## 2017-11-30 ENCOUNTER — CARE COORDINATION (OUTPATIENT)
Dept: NEUROLOGY | Facility: CLINIC | Age: 47
End: 2017-11-30

## 2017-11-30 DIAGNOSIS — M54.2 CERVICALGIA: ICD-10-CM

## 2017-11-30 LAB
ALBUMIN SERPL ELPH-MCNC: 4.5 G/DL (ref 3.7–5.1)
ALPHA1 GLOB SERPL ELPH-MCNC: 0.3 G/DL (ref 0.2–0.4)
ALPHA2 GLOB SERPL ELPH-MCNC: 0.8 G/DL (ref 0.5–0.9)
B-GLOBULIN SERPL ELPH-MCNC: 0.7 G/DL (ref 0.6–1)
GAMMA GLOB SERPL ELPH-MCNC: 0.8 G/DL (ref 0.7–1.6)
M PROTEIN SERPL ELPH-MCNC: 0 G/DL
PROT PATTERN SERPL ELPH-IMP: NORMAL

## 2017-11-30 PROCEDURE — 97140 MANUAL THERAPY 1/> REGIONS: CPT | Mod: GP | Performed by: PHYSICAL THERAPIST

## 2017-11-30 PROCEDURE — 97110 THERAPEUTIC EXERCISES: CPT | Mod: GP | Performed by: PHYSICAL THERAPIST

## 2017-11-30 PROCEDURE — 97112 NEUROMUSCULAR REEDUCATION: CPT | Mod: GP | Performed by: PHYSICAL THERAPIST

## 2017-11-30 NOTE — PROGRESS NOTES
Caio Antony MD McAllister, Angela, RN                   PLEASE TELL HER ALL BLOOD TESTS ARE FINE         11/30/17: called patient with above message. She verbalized understanding.

## 2017-11-30 NOTE — MR AVS SNAPSHOT
After Visit Summary   11/30/2017    Laura Jackson    MRN: [id number]           Patient Information     Date Of Birth          1970        Visit Information        Provider Department      11/30/2017 8:50 AM Moreno Frank, PT [facility]        Today's Diagnoses     Cervicalgia           Follow-ups after your visit        Your next 10 appointments already scheduled     Dec 01, 2017 10:30 AM CST   SHORT with Bj Butler MD   [facility] ([facility])    [address] [place] [address]   [phone]            Dec 08, 2017 10:10 AM CST   IBETH Spine with Moreno Frank PT   [facility] ([facility])    [address]  [address]  [facility] [address]   [phone]            Dec 19, 2017 10:15 AM CST   (Arrive by 10:00 AM)   EMG with Chino Gatica MD   [facility])    [address] [place] [address] [phone]           Do not use lotions or creams on the area to be tested. If you are on blood thinners (Warfarin, Coumadin, Lovenox, etc), please contact your primary care physician to check if it is safe to stop them 3 days prior to testing. If you have anxiety, please check with your referring physician to obtain anti-anxiety medication for the procedure.            Dec 28, 2017  1:30 PM CST   (Arrive by 1:15 PM)   NEW ARRHYTHMIA with Phil Mitchell MD   [facility] ([facility])    [address] [place] [address]  [address] [facility] [address] [phone]            Jan 05, 2018 10:00 AM CST   (Arrive by 9:45 AM)   NEW HAND with Rickey Rea MD   [facility])    [address] [place] [address]  [address] [facility] [address] [phone]            Feb 08, 2018 10:20 AM CST   New Visit with Elliot Sebastian MD   [facility] ([facility]  [place]) [address] [facility]  [place] [id number] [phone]              Who to contact     If you have questions or need follow up information about today's clinic visit or your schedule please contact [facility] directly at [phone].  Normal or non-critical lab and imaging results will be communicated to you by MyChart, letter or phone within 4 business days after the clinic has received the results. If you do not hear from us within 7 days, please contact the clinic through MyChart or phone. If you have a critical or abnormal lab result, we will notify you by phone as soon as possible.  Submit refill requests through Taltopia or call your pharmacy and they will forward the refill request to us. Please allow 3 business days for your refill to be completed.          Additional Information About Your Visit        MyChart Information     Taltopia gives you secure access to your electronic health record. If you see a primary care provider, you can also send messages to your care team and make appointments. If you have questions, please call your primary care clinic.  If you do not have a primary care provider, please call [phone] and they will assist you.        Care EveryWhere ID     This is your Care EveryWhere ID. This could be used by other organizations to access your [place] medical records  [id number]        Your Vitals Were     Last Period                   07/28/2017            Blood Pressure from Last 3 Encounters:   11/28/17 131/90   11/16/17 139/80   11/09/17 120/88    Weight from Last 3 Encounters:   11/28/17 72.4 kg (159 lb 9.6 oz)   11/16/17 72.6 kg (160 lb)   11/09/17 72.9 kg (160 lb 12.8 oz)              Today, you had the following     No orders found for display       Primary Care Provider Office Phone # Fax #    Whitney Alva [phone] [address]  [facility] [address]        Equal Access to Services     ODESSA HOWARD AH: Anna chaves  Sokrissali, waaxda luqadaha, qaybta kaalmada michelle, ludmila ferrisnelsy lexus. So [facility] [phone].    ATENCIÓN: Si minola abdi, tiene a guido disposición servicios gratuitos de asistencia lingüística. Corina al [phone].    We comply with applicable federal civil rights laws and [place] laws. We do not discriminate on the basis of race, color, national origin, age, disability, sex, sexual orientation, or gender identity.            Thank you!     Thank you for choosing [facility]  for your care. Our goal is always to provide you with excellent care. Hearing back from our patients is one way we can continue to improve our services. Please take a few minutes to complete the written survey that you may receive in the mail after your visit with us. Thank you!             Your Updated Medication List - Protect others around you: Learn how to safely use, store and throw away your medicines at www.disposemymeds.org.          This list is accurate as of: 11/30/17 12:02 PM.  Always use your most recent med list.                   Brand Name Dispense Instructions for use Diagnosis    CARTIA  MG 24 hr capsule   Generic drug:  diltiazem     90 capsule    Take 1 capsule (120 mg) by mouth daily    Raynaud's phenomenon without gangrene, History of supraventricular tachycardia       fluticasone-salmeterol 100-50 MCG/DOSE diskus inhaler    ADVAIR    3 Inhaler    Inhale 1 puff into the lungs 2 times daily    Mild persistent asthma without complication       levalbuterol 45 MCG/ACT Inhaler    XOPENEX HFA    1 Inhaler    Inhale 1-2 puffs into the lungs every 4 hours as needed for shortness of breath / dyspnea    Mild persistent asthma without complication       Multi-vitamin Tabs tablet      Take 1 tablet by mouth daily        Simethicone Extra Strength 125 MG Caps      Take 2 capsules by mouth daily as needed        vitamin D 2000 UNITS Caps      Take 1 capsule by mouth daily        ZANTAC PO       Take 150 mg by mouth as needed for heartburn

## 2017-12-01 ENCOUNTER — OFFICE VISIT (OUTPATIENT)
Dept: INTERNAL MEDICINE | Facility: CLINIC | Age: 47
End: 2017-12-01
Payer: COMMERCIAL

## 2017-12-01 VITALS
OXYGEN SATURATION: 99 % | DIASTOLIC BLOOD PRESSURE: 74 MMHG | BODY MASS INDEX: 26.46 KG/M2 | WEIGHT: 159 LBS | TEMPERATURE: 98.8 F | SYSTOLIC BLOOD PRESSURE: 122 MMHG | HEART RATE: 112 BPM

## 2017-12-01 DIAGNOSIS — Z12.31 VISIT FOR SCREENING MAMMOGRAM: ICD-10-CM

## 2017-12-01 DIAGNOSIS — Z86.79 HISTORY OF SUPRAVENTRICULAR TACHYCARDIA: Primary | ICD-10-CM

## 2017-12-01 DIAGNOSIS — Z12.39 BREAST CANCER SCREENING, HIGH RISK PATIENT: ICD-10-CM

## 2017-12-01 PROCEDURE — 99214 OFFICE O/P EST MOD 30 MIN: CPT | Performed by: INTERNAL MEDICINE

## 2017-12-01 RX ORDER — DILTIAZEM HYDROCHLORIDE 180 MG/1
180 CAPSULE, COATED, EXTENDED RELEASE ORAL DAILY
Qty: 30 CAPSULE | Refills: 2 | Status: SHIPPED | OUTPATIENT
Start: 2017-12-01 | End: 2018-03-06

## 2017-12-01 NOTE — NURSING NOTE
"Chief Complaint   Patient presents with     Heart Problem     follow-up for POTS, due for PHQ2       Initial /74  Pulse 112  Temp 98.8  F (37.1  C) (Oral)  Wt 159 lb (72.1 kg)  LMP 07/28/2017  SpO2 99%  BMI 26.46 kg/m2 Estimated body mass index is 26.46 kg/(m^2) as calculated from the following:    Height as of 11/28/17: 5' 5\" (1.651 m).    Weight as of this encounter: 159 lb (72.1 kg).  Medication Reconciliation: complete   Rocio JULIO CMA (AAMA)      "

## 2017-12-01 NOTE — MR AVS SNAPSHOT
After Visit Summary   12/1/2017    Laura Jackson    MRN: [id number]           Patient Information     Date Of Birth          1970        Visit Information        Provider Department      12/1/2017 10:30 AM Bj Butler MD [facility]        Today's Diagnoses     History of supraventricular tachycardia    -  1    Visit for screening mammogram        Breast cancer screening, high risk patient          Care Instructions    [facility]    If you have any questions regarding to your visit please contact your care team:     Team Pink:   Clinic Hours Telephone Number   Internal Medicine:  Dr. Trisha Kang NP       7am-7pm  Monday - Thursday   7am-5pm  Fridays  [phone]  (Appointment scheduling available 24/7)    Questions about your visit?  Team Line  [phone]   [facility] [place] [facility] [place] [facility] 11am-9pm Monday-Friday Saturday-Sunday- 9am-5pm   [place] - 5pm-9pm Monday-Friday Saturday-Sunday- 9am-5pm  [phone] - [place]   [phone] - [place]       What options do I have for visits at the clinic other than the traditional office visit?  To expand how we care for you, many of our providers are utilizing electronic visits (e-visits) and telephone visits, when medically appropriate, for interactions with their patients rather than a visit in the clinic.   We also offer nurse visits for many medical concerns. Just like any other service, we will bill your insurance company for this type of visit based on time spent on the phone with your provider. Not all insurance companies cover these visits. Please check with your medical insurance if this type of visit is covered. You will be responsible for any charges that are not paid by your insurance.      E-visits via Sova:  generally incur a $35.00 fee.  Telephone visits:  Time spent on the phone: *charged based on time that is spent on the phone in  increments of 10 minutes. Estimated cost:   5-10 mins $30.00   11-20 mins. $59.00   21-30 mins. $85.00   Use Shahiyahart (secure email communication and access to your chart) to send your primary care provider a message or make an appointment. Ask someone on your Team how to sign up for Shahiyahart.    For a Price Quote for your services, please call our Consumer Price Line at [phone].    As always, Thank you for trusting us with your health care needs!    Discharged by Rocio JULIO CMA ([facility])            Follow-ups after your visit        Your next 10 appointments already scheduled     Dec 08, 2017 10:10 AM CST   IBETH Spine with Moreno Frank PT   [facility] ([facility])    [address]  [address]  [facility] [address]   [phone]            Dec 19, 2017 10:15 AM CST   (Arrive by 10:00 AM)   EMG with Chino Gatica MD   [facility])    [address] [place] [id number] [phone]           Do not use lotions or creams on the area to be tested. If you are on blood thinners (Warfarin, Coumadin, Lovenox, etc), please contact your primary care physician to check if it is safe to stop them 3 days prior to testing. If you have anxiety, please check with your referring physician to obtain anti-anxiety medication for the procedure.            Dec 28, 2017  1:30 PM CST   (Arrive by 1:15 PM)   NEW ARRHYTHMIA with Phil Mitchell MD   [facility] ([facility])    [address] [place] [id number] [phone]            Jan 05, 2018 10:00 AM CST   (Arrive by 9:45 AM)   NEW HAND with Rickey Rea MD   [facility])    [address] [place] [id number] [phone]            Feb 08, 2018 10:20 AM CST   New Visit with Elliot Sebastian MD   [facility] ([facility]  [place]) [address]  [place] [address] [phone]              Who to contact     If you have questions or need follow up information about today's clinic visit or your schedule please contact [facility] directly at [phone].  Normal or non-critical lab and imaging results will be communicated to you by MyChart, letter or phone within 4 business days after the clinic has received the results. If you do not hear from us within 7 days, please contact the clinic through Shopographyhart or phone. If you have a critical or abnormal lab result, we will notify you by phone as soon as possible.  Submit refill requests through Zutux or call your pharmacy and they will forward the refill request to us. Please allow 3 business days for your refill to be completed.          Additional Information About Your Visit        MyChart Information     Zutux gives you secure access to your electronic health record. If you see a primary care provider, you can also send messages to your care team and make appointments. If you have questions, please call your primary care clinic.  If you do not have a primary care provider, please call [phone] and they will assist you.        Care EveryWhere ID     This is your Care EveryWhere ID. This could be used by other organizations to access your [place] medical records  [id number]        Your Vitals Were     Pulse Temperature Last Period Pulse Oximetry BMI (Body Mass Index)       112 98.8  F (37.1  C) (Oral) 07/28/2017 99% 26.46 kg/m2        Blood Pressure from Last 3 Encounters:   12/01/17 122/74   11/28/17 131/90   11/16/17 139/80    Weight from Last 3 Encounters:   12/01/17 159 lb (72.1 kg)   11/28/17 159 lb 9.6 oz (72.4 kg)   11/16/17 160 lb (72.6 kg)              Today, you had the following     No orders found for display         Today's Medication Changes          These changes are accurate as of: 12/1/17 10:56 AM.  If you have any questions, ask  your nurse or doctor.               These medicines have changed or have updated prescriptions.        Dose/Directions    * CARTIA  MG 24 hr capsule   This may have changed:  Another medication with the same name was added. Make sure you understand how and when to take each.   Used for:  Raynaud's phenomenon without gangrene, History of supraventricular tachycardia   Generic drug:  diltiazem   Changed by:  Bj Butler MD        Dose:  120 mg   Take 1 capsule (120 mg) by mouth daily   Quantity:  90 capsule   Refills:  1       * diltiazem 180 MG 24 hr capsule   Commonly known as:  CARDIZEM CD   This may have changed:  You were already taking a medication with the same name, and this prescription was added. Make sure you understand how and when to take each.   Used for:  History of supraventricular tachycardia   Changed by:  Bj Butler MD        Dose:  180 mg   Take 1 capsule (180 mg) by mouth daily   Quantity:  30 capsule   Refills:  2       * Notice:  This list has 2 medication(s) that are the same as other medications prescribed for you. Read the directions carefully, and ask your doctor or other care provider to review them with you.         Where to get your medicines      These medications were [place] [facility] [place] [address] [place] [address]  [address], [facility] [address]     Phone:  [phone]     diltiazem 180 MG 24 hr capsule                Primary Care Provider Office Phone # Fax #    Whitney Alva -[phone]       [address] [place] [address]        Equal Access to Services     [facility] AH: Hadii aad ku hadasho Soomaali, waaxda luqadaha, qaybta kaalmada adeegyada, ludmila waters . So [facility] [phone].    ATENCIÓN: Si habla español, tiene a guido disposición servicios gratuitos de asistencia lingüística. Llame al [phone].    We comply with applicable federal civil rights laws and [place]  laws. We do not discriminate on the basis of race, color, national origin, age, disability, sex, sexual orientation, or gender identity.            Thank you!     Thank you for choosing [facility]  for your care. Our goal is always to provide you with excellent care. Hearing back from our patients is one way we can continue to improve our services. Please take a few minutes to complete the written survey that you may receive in the mail after your visit with us. Thank you!             Your Updated Medication List - Protect others around you: Learn how to safely use, store and throw away your medicines at www.disposemymeds.org.          This list is accurate as of: 12/1/17 10:56 AM.  Always use your most recent med list.                   Brand Name Dispense Instructions for use Diagnosis    * CARTIA  MG 24 hr capsule   Generic drug:  diltiazem     90 capsule    Take 1 capsule (120 mg) by mouth daily    Raynaud's phenomenon without gangrene, History of supraventricular tachycardia       * diltiazem 180 MG 24 hr capsule    CARDIZEM CD    30 capsule    Take 1 capsule (180 mg) by mouth daily    History of supraventricular tachycardia       fluticasone-salmeterol 100-50 MCG/DOSE diskus inhaler    ADVAIR    3 Inhaler    Inhale 1 puff into the lungs 2 times daily    Mild persistent asthma without complication       levalbuterol 45 MCG/ACT Inhaler    XOPENEX HFA    1 Inhaler    Inhale 1-2 puffs into the lungs every 4 hours as needed for shortness of breath / dyspnea    Mild persistent asthma without complication       Multi-vitamin Tabs tablet      Take 1 tablet by mouth daily        Simethicone Extra Strength 125 MG Caps      Take 2 capsules by mouth daily as needed        vitamin D 2000 UNITS Caps      Take 1 capsule by mouth daily        ZANTAC PO      Take 150 mg by mouth as needed for heartburn        * Notice:  This list has 2 medication(s) that are the same as other medications prescribed for you.  Read the directions carefully, and ask your doctor or other care provider to review them with you.

## 2017-12-01 NOTE — PATIENT INSTRUCTIONS
[facility]    If you have any questions regarding to your visit please contact your care team:     Team Pink:   Clinic Hours Telephone Number   Internal Medicine:  Dr. Trisha Kang NP       7am-7pm  Monday - Thursday   7am-5pm  Fridays  [phone]  (Appointment scheduling available 24/7)    Questions about your visit?  Team Line  [phone]   Urgent Care - [place] - 11am-9pm Monday-Friday Saturday-Sunday- 9am-5pm   [place] - 5pm-9pm Monday-Friday Saturday-Sunday- 9am-5pm  [phone] - [place]   [phone] - [place]       What options do I have for visits at the clinic other than the traditional office visit?  To expand how we care for you, many of our providers are utilizing electronic visits (e-visits) and telephone visits, when medically appropriate, for interactions with their patients rather than a visit in the clinic.   We also offer nurse visits for many medical concerns. Just like any other service, we will bill your insurance company for this type of visit based on time spent on the phone with your provider. Not all insurance companies cover these visits. Please check with your medical insurance if this type of visit is covered. You will be responsible for any charges that are not paid by your insurance.      E-visits via FancyBox:  generally incur a $35.00 fee.  Telephone visits:  Time spent on the phone: *charged based on time that is spent on the phone in increments of 10 minutes. Estimated cost:   5-10 mins $30.00   11-20 mins. $59.00   21-30 mins. $85.00   Use The Sea Appt (secure email communication and access to your chart) to send your primary care provider a message or make an appointment. Ask someone on your Team how to sign up for FancyBox.    For a Price Quote for your services, please call our Consumer Price Line at [phone].    As always, Thank you for trusting us with your health care  needs!    Discharged by Rocio JULIO CMA ([facility])

## 2017-12-01 NOTE — PROGRESS NOTES
SUBJECTIVE:   Laura Jackson is a 47 year old female who presents to clinic today for the following health issues:    This is a further follow up with this highly complex challenging patient who has had quite a bit of office visit with me and also with referral to neurologist .    Raynaud syndrome --  Patient reports that she noticed / was diagnosed with Raynaud phenomenon involving her hands while at her neurology visit. She notes that with cold weather, her hands turn white and blanched. She states that she has been having problems handling objects in her freezer because the cold leads to pain in her fingers. Explained to patient the differences between Raynauds phenomenon versus Raynaud disease ; we have evidence of patient having the former not the latter . She does in fact have a pending rheumatologist also for the purpose of evaluation for Edilma-Danlos Syndrome . See most recent previous office visit with me.    Mammogram-- The procedure has previously been discussed with the patient and she states that she has not done it because she had a genetic testing. She was recommended to have a breast MRI after her genetic testing and she states that she is yet to get that done because she is trying to confirm if her insurance will cover the procedure. She states that if her insurance covers the procedure she will like to do the MRI in place of the mammogram. See medical genetics counselor office visit note within Epic electronic medical records.    Work-- Patient would like to go back to work full time. 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 This has been a patient with a POTS (postural orthostatic tachycardia syndrome) diagnosis and this remains not severe and not being treated with intravenous fluid , just Cardizem (diltiazem) at this point. See neurological  consultation office visit note. She does not have a diagnosis of hypertension     Blood pressure--  BP Readings from Last 6 Encounters:   12/01/17 122/74   11/28/17 131/90   11/16/17 139/80   11/09/17 120/88   10/05/17 140/90   09/11/17 122/80         Problem list and histories reviewed & adjusted, as indicated.  Additional history: as documented    Patient Active Problem List   Diagnosis     CARDIOVASCULAR SCREENING; LDL GOAL LESS THAN 160     Irritable bowel syndrome     GERD (gastroesophageal reflux disease)     History of supraventricular tachycardia     Mild persistent asthma     Family history of breast cancer     Foreign body (FB) in soft tissue     S/P myomectomy     Nausea     Dehydration     S/P ANAID (total abdominal hysterectomy)     Hypovitaminosis D     Pelvic adhesions     Endometriosis     Heberden's nodes     Cervicalgia     Past Surgical History:   Procedure Laterality Date     APPENDECTOMY       DAVINCI HYSTERECTOMY TOTAL, SALPINGECTOMY BILATERAL N/A 8/4/2017    Procedure: DAVINCI XI HYSTERECTOMY TOTAL, SALPINGECTOMY BILATERAL;  DAVINCI TOTAL HYSTERECTOMY; BILATERAL SALPINGECTOMY. BILATERAL URETERAL LYSIS. UTEROSACRAL-COLPOPEXY. EXCISION OF ENDOMETRIOSIS;  Surgeon: George Loyola MD;  Location:  OR     DAVINCI LYSIS OF ADHESIONS Bilateral 8/4/2017    Procedure: DAVINCI LYSIS OF ADHESIONS;  BILATERAL URETERAL LYSIS;  Surgeon: George Loyola MD;  Location:  OR     DAVINCI SACROCOLPOPEXY, CYSTOSCOPY, COMBINED N/A 8/4/2017    Procedure: COMBINED DAVINCI SACROCOLPOPEXY, CYSTOSCOPY;  UTEROSACRAL COLPOPEXY;  Surgeon: George Loyola MD;  Location:  OR     DAVINCI XI ASSISTED ABLATION / EXCISION OF ENDOMETRIOSIS  8/4/2017    Procedure: DAVINCI XI ASSISTED ABLATION / EXCISION OF ENDOMETRIOSIS;;  Surgeon: George Loyola MD;  Location:  OR     DILATION AND CURETTAGE N/A 6/20/2017    Procedure: DILATION AND CURETTAGE;  Uterine Curettings and Fibroid Removal, Cook catheter placement;  (No hysterectomy done at this time);  Surgeon: Ernestina Saunders MD;  Location: [facility]     TONSILLECTOMY         Social History   Substance Use Topics     Smoking status: Never Smoker     Smokeless tobacco: Never Used     Alcohol use Yes      Comment: occasional     Family History   Problem Relation Age of Onset     DIABETES Mother      Hypertension Mother      Hyperlipidemia Mother      Arrhythmia Mother      Cardiovascular Father      CHF and COPD     Asthma Father      Breast Cancer Maternal Grandfather      Colon Cancer Maternal Grandfather      Breast Cancer Paternal Grandmother      Breast Cancer Paternal Aunt      C.A.D. Paternal Grandfather      Breast Cancer Cousin      Asthma Son      DIABETES Maternal Grandmother      Hypertension Maternal Grandmother          Current Outpatient Prescriptions   Medication Sig Dispense Refill     diltiazem (CARDIZEM CD) 180 MG 24 hr capsule Take 1 capsule (180 mg) by mouth daily 30 capsule 2     Cholecalciferol (VITAMIN D) 2000 UNITS CAPS Take 1 capsule by mouth daily       multivitamin, therapeutic with minerals (MULTI-VITAMIN) TABS tablet Take 1 tablet by mouth daily       RaNITidine HCl (ZANTAC PO) Take 150 mg by mouth as needed for heartburn       CARTIA  MG 24 hr capsule Take 1 capsule (120 mg) by mouth daily 90 capsule 1     Simethicone Extra Strength 125 MG CAPS Take 2 capsules by mouth daily as needed       fluticasone-salmeterol (ADVAIR) 100-50 MCG/DOSE diskus inhaler Inhale 1 puff into the lungs 2 times daily 3 Inhaler 1     levalbuterol (XOPENEX HFA) 45 MCG/ACT Inhaler Inhale 1-2 puffs into the lungs every 4 hours as needed for shortness of breath / dyspnea 1 Inhaler 5     Labs reviewed in EPIC      Reviewed and updated as needed this visit by clinical staffTobacco  Allergies  Meds  Med Hx  Surg Hx  Fam Hx  Soc Hx      Reviewed and updated as needed this visit by Provider         ROS:  Constitutional, HEENT, cardiovascular, pulmonary, GI, ,  musculoskeletal, neuro, skin, endocrine and psych systems are negative, except as otherwise noted.    This document serves as a record of the services and decisions personally performed and made by Bj Butler MD. It was created on their behalf by Simeon Ceballos, a trained medical scribe. The creation of this document is based the provider's statements to the medical scribe.  Simeon Ceballos  December 1, 2017 10:41 AM    OBJECTIVE:   /74  Pulse 112  Temp 98.8  F (37.1  C) (Oral)  Wt 72.1 kg (159 lb)  LMP 07/28/2017  SpO2 99%  BMI 26.46 kg/m2  Body mass index is 26.46 kg/(m^2).  GENERAL: healthy, alert and no distress, good eye contact. Appears her stated age.  EYES: Eyes grossly normal to inspection, EOMI, conjunctivae and sclerae normal  SKIN: no suspicious lesions or rashes to visible skin   NEURO: Mentation intact and speech normal  PSYCH: mentation appears normal, affect normal/bright    Diagnostic Test Results:  Results for orders placed or performed in visit on 11/28/17   CBC with platelets   Result Value Ref Range    WBC 5.0 4.0 - 11.0 10e9/L    RBC Count 4.72 3.8 - 5.2 10e12/L    Hemoglobin 13.7 11.7 - 15.7 g/dL    Hematocrit 41.9 35.0 - 47.0 %    MCV 89 78 - 100 fl    MCH 29.0 26.5 - 33.0 pg    MCHC 32.7 31.5 - 36.5 g/dL    RDW 14.3 10.0 - 15.0 %    Platelet Count 236 150 - 450 10e9/L   Erythrocyte sedimentation rate auto   Result Value Ref Range    Sed Rate 8 0 - 20 mm/h   CRP inflammation   Result Value Ref Range    CRP Inflammation <2.9 0.0 - 8.0 mg/L   ELP   Result Value Ref Range    Albumin Fraction 4.5 3.7 - 5.1 g/dL    Alpha 1 Fraction 0.3 0.2 - 0.4 g/dL    Alpha 2 Fraction 0.8 0.5 - 0.9 g/dL    Beta Fraction 0.7 0.6 - 1.0 g/dL    Gamma Fraction 0.8 0.7 - 1.6 g/dL    Monoclonal Peak 0.0 0.0 g/dL    ELP Interpretation:       Essentially normal electrophoretic pattern. No monoclonal protein seen. Pathologic   significance requires clinical correlation. Louise Yanez M.D., Ph.D.           ASSESSMENT/PLAN:   (Z86.79) History of supraventricular tachycardia  (primary encounter diagnosis)  Comment: today we reviewed all of her diagnoses to some extent , largely patient wanted to touch base on a few other things before she gets back to work after a long absence. 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 This patient is ready to get back to work as soon as possible ! Because she continues with tachycardia even at the 120 milligram dose of Cardizem (diltiazem) we agreed to increase this to 180 milligrams. Also note that this is often helpful for Raynauds phenomenon symptoms   Plan: diltiazem (CARDIZEM CD) 180 MG 24 hr capsule        And rheumatological consultation pending    (Z12.31) Visit for screening mammogram  Comment: postponed / cancelled  Plan: patient is looking into this but seems stymied    (Z12.31) Breast cancer screening, high risk patient  Comment: I agreed to order breast MRI   Plan: need to clarify health insurance  Information     Patient Instructions     [facility]    If you have any questions regarding to your visit please contact your care team:     Team Pink:   Clinic Hours Telephone Number   Internal Medicine:  Dr. Trisha Kang, NP       7am-7pm  Monday - Thursday   7am-5pm  Fridays  [phone]  (Appointment scheduling available 24/7)    Questions about your visit?  Team Line  [phone]   Urgent Care - [place] and [place] - 11am-9pm Monday-Friday Saturday-Sunday- 9am-5pm   [place] - 5pm-9pm Monday-Friday Saturday-Sunday- 9am-5pm  [phone] - [place]   [phone] - [place]       What options do I have for visits at the clinic other than the traditional office visit?  To expand how we care for you, many of our providers are utilizing electronic visits (e-visits) and telephone visits, when medically appropriate, for interactions with their patients rather than a visit in the  clinic.   We also offer nurse visits for many medical concerns. Just like any other service, we will bill your insurance company for this type of visit based on time spent on the phone with your provider. Not all insurance companies cover these visits. Please check with your medical insurance if this type of visit is covered. You will be responsible for any charges that are not paid by your insurance.      E-visits via [email or web address]:  generally incur a $35.00 fee.  Telephone visits:  Time spent on the phone: *charged based on time that is spent on the phone in increments of 10 minutes. Estimated cost:   5-10 mins $30.00   11-20 mins. $59.00   21-30 mins. $85.00   Use [email or web address] (secure email communication and access to your chart) to send your primary care provider a message or make an appointment. Ask someone on your Team how to sign up for [employer].    For a Price Quote for your services, please call our Consumer Price Line at [phone].    As always, Thank you for trusting us with your health care needs!    Discharged by Rocio JULIO CMA ([facility])        The information in this document, created by the medical scribe for me, accurately reflects the services I personally performed and the decisions made by me. I have reviewed and approved this document for accuracy.   MD Bj Mcknight MD  [facility]

## 2017-12-01 NOTE — Clinical Note
See office visit notes . Breast MRI ordered. We need to find out if procedure is covered by health insurance

## 2017-12-06 ENCOUNTER — TELEPHONE (OUTPATIENT)
Dept: INTERNAL MEDICINE | Facility: CLINIC | Age: 47
End: 2017-12-06

## 2017-12-06 NOTE — TELEPHONE ENCOUNTER
No forms received.  I called patient and informed of this.  She will have them faxed to [phone].  Pam Taveras,

## 2017-12-06 NOTE — TELEPHONE ENCOUNTER
Reason for Call:  Workability form    Detailed comments: Patient would like to know if the workability paper work that was faxed over last Friday (12-1-17) has been completed. Please call.    Phone Number Patient can be reached at: Other phone number:[phone]       Best Time: any    Can we leave a detailed message on this number? YES    Call taken on 12/6/2017 at 9:29 AM by Salima Magana

## 2017-12-08 ENCOUNTER — THERAPY VISIT (OUTPATIENT)
Dept: PHYSICAL THERAPY | Facility: CLINIC | Age: 47
End: 2017-12-08
Payer: COMMERCIAL

## 2017-12-08 DIAGNOSIS — M54.2 CERVICALGIA: ICD-10-CM

## 2017-12-08 PROCEDURE — 97140 MANUAL THERAPY 1/> REGIONS: CPT | Mod: GP | Performed by: PHYSICAL THERAPIST

## 2017-12-08 PROCEDURE — 97110 THERAPEUTIC EXERCISES: CPT | Mod: GP | Performed by: PHYSICAL THERAPIST

## 2017-12-13 ENCOUNTER — TELEPHONE (OUTPATIENT)
Dept: RHEUMATOLOGY | Facility: CLINIC | Age: 47
End: 2017-12-13

## 2017-12-13 NOTE — LETTER
January 22, 2018    Laura Jackson  [address]  [place] [address]    Dear Laura Jackson,    Thank you for your interest in becoming a patient at [facility].    We are writing to inform you that after careful review of your records we have determined that you do not meet criteria for an appointment. Our practice currently consists of six physicians who are responsible for teaching and research commitments as well as patient care and we do not have the capacity to see all patients that are referred to us. In order to provide high quality care to our current patients, we limit new patients according to the likelihood that we would be able to provide additional tests or treatment options that would be of benefit.     It is the recommendation of the Rheumatologist that you would be better served by a community Rheumatologist where you may be able to get in to see someone sooner.     We appreciate your understanding and ask that if you have questions regarding this decision, that you direct them to your referring provider.     Sincerely,    The Division of Arthritis and Autoimmune Disorders

## 2017-12-13 NOTE — TELEPHONE ENCOUNTER
General Rheumatology Intake Form    1. What is the reason that you are referred to our clinic? Aelorstanosis,  diagnosed with clark. Gi Issues. Cracking joints. Joint Pain. Muscles Pulls. Joint hypermobility. Severe hip pain, needs PT. Patient states his son has similar sx's. If arthritis, what kind of arthritis? Osteoarthritis. If sarcoidosis, where is it? NA    2. What is the name of the doctor and clinic that referred you to us? Dr. Butler at [facility].     3. Have you seen a Rheumatologist in the past?  No  If yes, is this a second opinion or transfer of care? NA What is the reason that you do not want to go back to the previous Rheumatologist that you saw? NA    4. Have you been diagnosed with Fibromyalgia? No If yes, when and where? No    5. Who is your primary doctor/primary clinic? Dr. Butler at [facility].     6. Who manages your care for this issue now? Dr. Butler. Has future appt with cardiologist Dr Peres at [facility].      7. Have you had any labs/pathology/ imaging done that pertain to the reason that you are coming here? no If yes, where? NA  If sarcoidosis, have you had a biopsy done? NA If Yes, when and where? NA     8. Have you seen any specialist related to the reason you are coming here? No If yes, who and where? NA    9. Where are we expecting records from? [facility]     Ok to send PETER to [email or web address]    Aisha Smith LPN

## 2017-12-15 ENCOUNTER — THERAPY VISIT (OUTPATIENT)
Dept: PHYSICAL THERAPY | Facility: CLINIC | Age: 47
End: 2017-12-15
Payer: COMMERCIAL

## 2017-12-15 DIAGNOSIS — M54.2 CERVICALGIA: ICD-10-CM

## 2017-12-15 PROCEDURE — 97140 MANUAL THERAPY 1/> REGIONS: CPT | Mod: GP | Performed by: PHYSICAL THERAPIST

## 2017-12-15 PROCEDURE — 97110 THERAPEUTIC EXERCISES: CPT | Mod: GP | Performed by: PHYSICAL THERAPIST

## 2017-12-15 NOTE — TELEPHONE ENCOUNTER
APPT INFO    Date /Time: 1/5/18 10AM   Reason for Appt: Carpal tunnel syndrome of right wrist   Ref Provider/Clinic: Dr. Antony/ [facility]   Are there internal records? Yes/No?  IF YES, list clinic names: YES    [facility]- Dr. Antony   [place]- Dr. Butler    Images are in PACS   Are there outside records? Yes/No? NO   Patient Contact (Y/N) & Call Details: No- referral   Action: Reviewed records

## 2017-12-19 ENCOUNTER — TELEPHONE (OUTPATIENT)
Dept: FAMILY MEDICINE | Facility: CLINIC | Age: 47
End: 2017-12-19

## 2017-12-19 NOTE — PROGRESS NOTES
SUBJECTIVE:   Laura Jackson is a 47 year old female who presents to clinic today for the following health issues:       POTS (postural orthostatic tachycardia syndrome)  Visit for screening mammogram  Mild persistent asthma without complication  Hip pain, right  Family history of breast cancer       GI Symptoms/Tachycardia -- for complete relevant details please see most recent previous office visits with me since this summer. Patient has been on disabled states from work secondary to multiple issues and concerns but primarily a diagnosis of a probable POTS (postural orthostatic tachycardia syndrome) . She  Had went back to work for 2 weeks. However, it was difficult because she noticed worsened nausea, bloating, fatigue, and insomnia. Also describes that heat exaggerates symptoms. She was off of work for about 6 months prior to this attempt. She describes that her heart rate was about 120 while working and about 108 when resting at home. The worst symptom for her is fatigue and does not think this was present prior to June 2017. Reports she had a gastrointestinal infection about 6 years ago and she had similar symptoms. Symptoms gradually recovered over about 1.5 years. She is following up with Dr. Phil Mitchell, cardiologist  at the [facility] next week. 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 She is going to keep trying to go back to work until she is able to succeed.    BP Readings from Last 3 Encounters:   12/21/17 120/80   12/01/17 122/74   11/28/17 131/90     Pulse Readings from Last 3 Encounters:   12/21/17 105   12/01/17 112   11/28/17 98     Right Leg Pain -- Patient states she has developed right leg pain since going back to work. She believes it is sciatic pain and it goes from her right leg into her right toes.    Additional Notes -- She would like to have an MRI of her breasts, as  this is the screening recommended for her secondary to her increased genetic risks for breast cancer based on medical genetics counselor input, see [place] documentation     Problem list and histories reviewed & adjusted, as indicated.  Additional history: as documented    Patient Active Problem List   Diagnosis     CARDIOVASCULAR SCREENING; LDL GOAL LESS THAN 160     Irritable bowel syndrome     GERD (gastroesophageal reflux disease)     History of supraventricular tachycardia     Mild persistent asthma     Family history of breast cancer     Foreign body (FB) in soft tissue     S/P myomectomy     Nausea     Dehydration     S/P ANAID (total abdominal hysterectomy)     Hypovitaminosis D     Pelvic adhesions     Endometriosis     Heberden's nodes     Cervicalgia     Past Surgical History:   Procedure Laterality Date     APPENDECTOMY       DAVINCI HYSTERECTOMY TOTAL, SALPINGECTOMY BILATERAL N/A 8/4/2017    Procedure: DAVINCI XI HYSTERECTOMY TOTAL, SALPINGECTOMY BILATERAL;  DAVINCI TOTAL HYSTERECTOMY; BILATERAL SALPINGECTOMY. BILATERAL URETERAL LYSIS. UTEROSACRAL-COLPOPEXY. EXCISION OF ENDOMETRIOSIS;  Surgeon: George Loyola MD;  Location: [facility] OR     DAVINCI LYSIS OF ADHESIONS Bilateral 8/4/2017    Procedure: DAVINCI LYSIS OF ADHESIONS;  BILATERAL URETERAL LYSIS;  Surgeon: George Loyola MD;  Location:  OR     DAVINCI SACROCOLPOPEXY, CYSTOSCOPY, COMBINED N/A 8/4/2017    Procedure: COMBINED DAVINCI SACROCOLPOPEXY, CYSTOSCOPY;  UTEROSACRAL COLPOPEXY;  Surgeon: George Loyola MD;  Location:  OR     DAVINCI XI ASSISTED ABLATION / EXCISION OF ENDOMETRIOSIS  8/4/2017    Procedure: DAVINCI XI ASSISTED ABLATION / EXCISION OF ENDOMETRIOSIS;;  Surgeon: George Loyola MD;  Location:  OR     DILATION AND CURETTAGE N/A 6/20/2017    Procedure: DILATION AND CURETTAGE;  Uterine Curettings and Fibroid Removal, Cook catheter placement; (No hysterectomy done at this time);  Surgeon: Ernestina Saunders MD;  Location:   OR     TONSILLECTOMY         Social History   Substance Use Topics     Smoking status: Never Smoker     Smokeless tobacco: Never Used     Alcohol use Yes      Comment: occasional     Family History   Problem Relation Age of Onset     DIABETES Mother      Hypertension Mother      Hyperlipidemia Mother      Arrhythmia Mother      Cardiovascular Father      CHF and COPD     Asthma Father      Breast Cancer Maternal Grandfather      Colon Cancer Maternal Grandfather      Breast Cancer Paternal Grandmother      Breast Cancer Paternal Aunt      C.A.D. Paternal Grandfather      Breast Cancer Cousin      Asthma Son      DIABETES Maternal Grandmother      Hypertension Maternal Grandmother          Current Outpatient Prescriptions   Medication Sig Dispense Refill     levalbuterol (XOPENEX HFA) 45 MCG/ACT Inhaler Inhale 1-2 puffs into the lungs every 4 hours as needed for shortness of breath / dyspnea 1 Inhaler 5     fluticasone-salmeterol (ADVAIR) 100-50 MCG/DOSE diskus inhaler Inhale 1 puff into the lungs 2 times daily 3 Inhaler 1     diltiazem (CARDIZEM CD) 180 MG 24 hr capsule Take 1 capsule (180 mg) by mouth daily 30 capsule 2     Cholecalciferol (VITAMIN D) 2000 UNITS CAPS Take 1 capsule by mouth daily       multivitamin, therapeutic with minerals (MULTI-VITAMIN) TABS tablet Take 1 tablet by mouth daily       RaNITidine HCl (ZANTAC PO) Take 150 mg by mouth as needed for heartburn       [DISCONTINUED] CARTIA  MG 24 hr capsule Take 1 capsule (120 mg) by mouth daily (Patient not taking: Reported on 12/21/2017) 90 capsule 1     [DISCONTINUED] fluticasone-salmeterol (ADVAIR) 100-50 MCG/DOSE diskus inhaler Inhale 1 puff into the lungs 2 times daily 3 Inhaler 1     [DISCONTINUED] levalbuterol (XOPENEX HFA) 45 MCG/ACT Inhaler Inhale 1-2 puffs into the lungs every 4 hours as needed for shortness of breath / dyspnea 1 Inhaler 5     Labs reviewed in EPIC      Reviewed and updated as needed this visit by clinical  staff  Tobacco  Allergies  Meds  Med Hx  Surg Hx  Fam Hx  Soc Hx      Reviewed and updated as needed this visit by Provider         ROS:  Constitutional, HEENT, cardiovascular, pulmonary, GI, , musculoskeletal, neuro, skin, endocrine and psych systems are negative, except as otherwise noted.    This document serves as a record of the services and decisions personally performed and made by Bj Butler MD. It was created on their behalf by Rickey Matos, a trained medical scribe. The creation of this document is based the provider's statements to the medical scribe.  Rickey Matos December 21, 2017 5:04 PM    OBJECTIVE:   /80 (BP Location: Right arm, Cuff Size: Adult Regular)  Pulse 105  Temp 98.1  F (36.7  C) (Oral)  Wt 73.4 kg (161 lb 12.8 oz)  LMP 07/28/2017  SpO2 100%  Breastfeeding? No  BMI 26.92 kg/m2  Body mass index is 26.92 kg/(m^2).  GENERAL: healthy, alert and no distress  EYES: Eyes grossly normal to inspection, EOMI, conjunctivae and sclerae normal  SKIN: no suspicious lesions or rashes to visible skin   NEURO: mentation intact and speech normal  PSYCH: mentation appears normal, affect normal/bright    Diagnostic Test Results:  Results for orders placed or performed in visit on 11/28/17   CBC with platelets   Result Value Ref Range    WBC 5.0 4.0 - 11.0 10e9/L    RBC Count 4.72 3.8 - 5.2 10e12/L    Hemoglobin 13.7 11.7 - 15.7 g/dL    Hematocrit 41.9 35.0 - 47.0 %    MCV 89 78 - 100 fl    MCH 29.0 26.5 - 33.0 pg    MCHC 32.7 31.5 - 36.5 g/dL    RDW 14.3 10.0 - 15.0 %    Platelet Count 236 150 - 450 10e9/L   Erythrocyte sedimentation rate auto   Result Value Ref Range    Sed Rate 8 0 - 20 mm/h   CRP inflammation   Result Value Ref Range    CRP Inflammation <2.9 0.0 - 8.0 mg/L   ELP   Result Value Ref Range    Albumin Fraction 4.5 3.7 - 5.1 g/dL    Alpha 1 Fraction 0.3 0.2 - 0.4 g/dL    Alpha 2 Fraction 0.8 0.5 - 0.9 g/dL    Beta Fraction 0.7 0.6 - 1.0 g/dL    Gamma Fraction 0.8  0.7 - 1.6 g/dL    Monoclonal Peak 0.0 0.0 g/dL    ELP Interpretation:       Essentially normal electrophoretic pattern. No monoclonal protein seen. Pathologic   significance requires clinical correlation. Louise Yanez M.D., Ph.D.        ASSESSMENT/PLAN:     (R00.0,  I95.1) POTS (postural orthostatic tachycardia syndrome)  (primary encounter diagnosis)  Comment: I still am not certain this is the correct diagnosis but it is our tentative diagnosis   Plan: upcoming cardiology consultation is going to be a really good place to go from here at this point . We also completed paper work / documentation for her short term disability insurance     (Z12.31) Visit for screening mammogram  Comment: breast MRI ordered as detailed above   Plan: as above     (J45.30) Mild persistent asthma without complication  Comment: doing well. Refills provided   Plan: levalbuterol (XOPENEX HFA) 45 MCG/ACT Inhaler,         fluticasone-salmeterol (ADVAIR) 100-50 MCG/DOSE        diskus inhaler            (M25.551) Hip pain, right  Comment: there remains a question about Edilma-Danlos Syndrome and / or other hip enthesopathy   Plan: [facility] PT, HAND, AND CHIROPRACTIC REFERRAL        Jun Frank, physical therapist with [facility] has been arranged    (Z80.3) Family history of breast cancer  Comment: as detailed above   Plan: MR Breast Bilateral w/o & w Contrast              There are no Patient Instructions on file for this visit.  The information in this document, created by the medical scribe for me, accurately reflects the services I personally performed and the decisions made by me. I have reviewed and approved this document for accuracy.   MD Bj Mcknight MD  [facility]

## 2017-12-19 NOTE — TELEPHONE ENCOUNTER
Reason for call: FYI and form request  Patient called regarding (reason for call): Needs to go back on disability because she got sick after returning to work. [employer] will be sending paperwork to fill out. One of the forms is called workability form. She will be in on Thursday for appointment  Additional comments:She would like a call back from a nurse to talk further      Phone number to reach patient:  Other phone number:  [phone]*    Best Time:  anytime    Can we leave a detailed message on this number?  YES

## 2017-12-19 NOTE — TELEPHONE ENCOUNTER
Tried to go back to work. Symptoms were getting worse. Has an appt on Thursday. Was on disability prior so would like to go back on disability and will be faxing paper work. Just wanted to give a FYI regarding paperwork. Routing to Dr. Butler.    Greta Alfaro RN  [facility]

## 2017-12-21 ENCOUNTER — OFFICE VISIT (OUTPATIENT)
Dept: INTERNAL MEDICINE | Facility: CLINIC | Age: 47
End: 2017-12-21
Payer: COMMERCIAL

## 2017-12-21 VITALS
DIASTOLIC BLOOD PRESSURE: 80 MMHG | HEART RATE: 105 BPM | OXYGEN SATURATION: 100 % | SYSTOLIC BLOOD PRESSURE: 120 MMHG | WEIGHT: 161.8 LBS | TEMPERATURE: 98.1 F | BODY MASS INDEX: 26.92 KG/M2

## 2017-12-21 DIAGNOSIS — M25.551 HIP PAIN, RIGHT: ICD-10-CM

## 2017-12-21 DIAGNOSIS — Z12.31 VISIT FOR SCREENING MAMMOGRAM: ICD-10-CM

## 2017-12-21 DIAGNOSIS — J45.30 MILD PERSISTENT ASTHMA WITHOUT COMPLICATION: ICD-10-CM

## 2017-12-21 DIAGNOSIS — Z80.3 FAMILY HISTORY OF BREAST CANCER: ICD-10-CM

## 2017-12-21 DIAGNOSIS — G90.A POTS (POSTURAL ORTHOSTATIC TACHYCARDIA SYNDROME): Primary | ICD-10-CM

## 2017-12-21 PROCEDURE — 99214 OFFICE O/P EST MOD 30 MIN: CPT | Performed by: INTERNAL MEDICINE

## 2017-12-21 RX ORDER — LEVALBUTEROL TARTRATE 45 UG/1
1-2 AEROSOL, METERED ORAL EVERY 4 HOURS PRN
Qty: 1 INHALER | Refills: 5 | Status: SHIPPED | OUTPATIENT
Start: 2017-12-21 | End: 2019-01-25

## 2017-12-21 ASSESSMENT — PAIN SCALES - GENERAL: PAINLEVEL: MILD PAIN (3)

## 2017-12-21 NOTE — PATIENT INSTRUCTIONS
[facility]    If you have any questions regarding to your visit please contact your care team:     Team Pink:   Clinic Hours Telephone Number   Internal Medicine:  Dr. Trisha Kang NP       7am-7pm  Monday - Thursday   7am-5pm  Fridays  [phone]  (Appointment scheduling available 24/7)    Questions about your visit?  Team Line  [phone]   Urgent Care - [place] [facility] - 11am-9pm Monday-Friday Saturday-Sunday- 9am-5pm   [place] - 5pm-9pm Monday-Friday Saturday-Sunday- 9am-5pm  [phone] - [place]   [phone] - [place]       What options do I have for visits at the clinic other than the traditional office visit?  To expand how we care for you, many of our providers are utilizing electronic visits (e-visits) and telephone visits, when medically appropriate, for interactions with their patients rather than a visit in the clinic.   We also offer nurse visits for many medical concerns. Just like any other service, we will bill your insurance company for this type of visit based on time spent on the phone with your provider. Not all insurance companies cover these visits. Please check with your medical insurance if this type of visit is covered. You will be responsible for any charges that are not paid by your insurance.      E-visits via [employer]:  generally incur a $35.00 fee.  Telephone visits:  Time spent on the phone: *charged based on time that is spent on the phone in increments of 10 minutes. Estimated cost:   5-10 mins $30.00   11-20 mins. $59.00   21-30 mins. $85.00   Use [employer] (secure email communication and access to your chart) to send your primary care provider a message or make an appointment. Ask someone on your Team how to sign up for [employer].    For a Price Quote for your services, please call our Consumer Price Line at [phone].    As always, Thank you for trusting us with your health care  needs!    Jeane Pacheco, CMA

## 2017-12-21 NOTE — MR AVS SNAPSHOT
After Visit Summary   12/21/2017    Laura Jackson    MRN: [id number]           Patient Information     Date Of Birth          1970        Visit Information        Provider Department      12/21/2017 4:30 PM Bj Butler MD [facility]        Today's Diagnoses     Visit for screening mammogram    -  1    Mild persistent asthma without complication        Hip pain, right        Family history of breast cancer          Care Instructions    [facility]    If you have any questions regarding to your visit please contact your care team:     Team Pink:   Clinic Hours Telephone Number   Internal Medicine:  Dr. Trisha Kang NP       7am-7pm  Monday - Thursday   7am-5pm  Fridays  [phone]  (Appointment scheduling available 24/7)    Questions about your visit?  Team Line  [phone]   Urgent Care - [place] [facility] - 11am-9pm Monday-Friday Saturday-Sunday- 9am-5pm   [place] - 5pm-9pm Monday-Friday Saturday-Sunday- 9am-5pm  [phone] - [place]   [phone] - [place]       What options do I have for visits at the clinic other than the traditional office visit?  To expand how we care for you, many of our providers are utilizing electronic visits (e-visits) and telephone visits, when medically appropriate, for interactions with their patients rather than a visit in the clinic.   We also offer nurse visits for many medical concerns. Just like any other service, we will bill your insurance company for this type of visit based on time spent on the phone with your provider. Not all insurance companies cover these visits. Please check with your medical insurance if this type of visit is covered. You will be responsible for any charges that are not paid by your insurance.      E-visits via [employer]:  generally incur a $35.00 fee.  Telephone visits:  Time spent on the phone: *charged based on time that is spent  on the phone in increments of 10 minutes. Estimated cost:   5-10 mins $30.00   11-20 mins. $59.00   21-30 mins. $85.00   Use [employer] (secure email communication and access to your chart) to send your primary care provider a message or make an appointment. Ask someone on your Team how to sign up for [employer].    For a Price Quote for your services, please call our [employer] Price Line at [phone].    As always, Thank you for trusting us with your health care needs!    Jeane Pacheco CMA          Follow-ups after your visit        Additional Services     [facility] PT, HAND, AND CHIROPRACTIC REFERRAL       **This order will print in the [facility] Scheduling Office**    Physical Therapy, Hand Therapy and Chiropractic Care are available through:    *[facility]  *[facility]  *[facility]    Call one number to schedule at any of the above locations: [phone].    Your provider has referred you to: Physical Therapy at [facility] or [facility]    Indication/Reason for Referral: Hip Pain  Onset of Illness: many months   Therapy Orders: Evaluate and Treat  Special Programs: None  Special Request: None    Kieran Alcaraz      Additional Comments for the Therapist or Chiropractor: see office visit notes     Please be aware that coverage of these services is subject to the terms and limitations of your health insurance plan.  Call member services at your health plan with any benefit or coverage questions.      Please bring the following to your appointment:    *Your personal calendar for scheduling future appointments  *Comfortable clothing                  Your next 10 appointments already scheduled     Dec 22, 2017  9:30 AM CST   IB[facility] Spine with Moreno Frank, PT   [facility] ([facility] [place])    [address]  [address]  [facility] [address]   [phone]            Dec 26, 2017  8:45 AM CST   (Arrive by 8:30 AM)   EMG with Chino Gatica MD   [facility]G (  [facility])    [address] [place] [address]  [address] [facility] [place] [address]   [phone]           Do not use lotions or creams on the area to be tested. If you are on blood thinners (Warfarin, Coumadin, Lovenox, etc), please contact your primary care physician to check if it is safe to stop them 3 days prior to testing. If you have anxiety, please check with your referring physician to obtain anti-anxiety medication for the procedure.            Dec 28, 2017  1:30 PM CST   (Arrive by 1:15 PM)   NEW ARRHYTHMIA with Phil Mitchell MD   [facility] ([facility])    [address] [place] [address]   [phone]            Jan 04, 2018 10:10 AM CST   IBETH Spine with Moreno Frank, PT   [facility] ([facility] [place])    [address]  [address]  [facility] [address]   [phone]            Jan 05, 2018 10:00 AM CST   (Arrive by 9:45 AM)   NEW HAND with Rickey Rea MD   [facility] ([facility])    [address] [place] [address]   [phone]            Jan 11, 2018 10:10 AM CST   IBETH Spine with Moreno Frank, PT   [facility] ([facility] [place])    [address]  [address]  [facility] [place] [address]   [phone]            Feb 08, 2018 10:20 AM CST   New Visit with Elliot Sebastian MD   [facility] ([facility] [place])    [address] [facility] [address]   [phone]              Future tests that were ordered for you today     Open Future Orders        Priority Expected Expires Ordered    MR Breast Bilateral w/o & w Contrast Routine 12/21/2017 12/21/2018 12/21/2017            Who to contact     If you have questions or need follow up information about today's clinic visit or your schedule please contact [place] [facility] directly at [phone].  Normal or non-critical lab and imaging results will  be communicated to you by [employer], letter or phone within 4 business days after the clinic has received the results. If you do not hear from us within 7 days, please contact the clinic through [employer] or phone. If you have a critical or abnormal lab result, we will notify you by phone as soon as possible.  Submit refill requests through [employer] or call your pharmacy and they will forward the refill request to us. Please allow 3 business days for your refill to be completed.          Additional Information About Your Visit        [employer] Information     [employer] gives you secure access to your electronic health record. If you see a primary care provider, you can also send messages to your care team and make appointments. If you have questions, please call your primary care clinic.  If you do not have a primary care provider, please call [phone] and they will assist you.        Care EveryWhere ID     This is your Care EveryWhere ID. This could be used by other organizations to access your [place] medical records  [id number]        Your Vitals Were     Pulse Temperature Last Period Pulse Oximetry Breastfeeding? BMI (Body Mass Index)    105 98.1  F (36.7  C) (Oral) 07/28/2017 100% No 26.92 kg/m2       Blood Pressure from Last 3 Encounters:   12/21/17 120/80   12/01/17 122/74   11/28/17 131/90    Weight from Last 3 Encounters:   12/21/17 161 lb 12.8 oz (73.4 kg)   12/01/17 159 lb (72.1 kg)   11/28/17 159 lb 9.6 oz (72.4 kg)              We Performed the Following     [facility] PT, HAND, AND CHIROPRACTIC REFERRAL          Where to get your medicines      These medications were sent to [facility] - [address]  [address], [place] [address]     Phone:  [phone]     fluticasone-salmeterol 100-50 MCG/DOSE diskus inhaler    levalbuterol 45 MCG/ACT Inhaler          Primary Care Provider Office Phone # Fax #    Whitney Alva [phone]  [facility] [address]        Equal Access to Services     ODESSA HOWARD : Hadii aad ku hadblayneuriel Cordero, waapda lusaschagonzálezha, qaybta kadariadiaz martinez, ludmila chappellwashingtonmary alberts. So [facility] [phone].    ATENCIÓN: Si habla español, tiene a guido disposición servicios gratuitos de asistencia lingüística. [facility] [phone].    We comply with applicable federal civil rights laws and [place] laws. We do not discriminate on the basis of race, color, national origin, age, disability, sex, sexual orientation, or gender identity.            Thank you!     Thank you for choosing [facility]  for your care. Our goal is always to provide you with excellent care. Hearing back from our patients is one way we can continue to improve our services. Please take a few minutes to complete the written survey that you may receive in the mail after your visit with us. Thank you!             Your Updated Medication List - Protect others around you: Learn how to safely use, store and throw away your medicines at www.disposemymeds.org.          This list is accurate as of: 12/21/17  5:13 PM.  Always use your most recent med list.                   Brand Name Dispense Instructions for use Diagnosis    diltiazem 180 MG 24 hr capsule    CARDIZEM CD    30 capsule    Take 1 capsule (180 mg) by mouth daily    History of supraventricular tachycardia       fluticasone-salmeterol 100-50 MCG/DOSE diskus inhaler    ADVAIR    3 Inhaler    Inhale 1 puff into the lungs 2 times daily    Mild persistent asthma without complication       levalbuterol 45 MCG/ACT Inhaler    XOPENEX HFA    1 Inhaler    Inhale 1-2 puffs into the lungs every 4 hours as needed for shortness of breath / dyspnea    Mild persistent asthma without complication       Multi-vitamin Tabs tablet      Take 1 tablet by mouth daily        vitamin D 2000 UNITS Caps      Take 1 capsule by mouth daily        ZANTAC PO      Take 150 mg by mouth as  needed for heartburn

## 2017-12-21 NOTE — NURSING NOTE
"Chief Complaint   Patient presents with     RECHECK     POTS, symptoms worsening, could not return to work, fill out disability paperwork.     Other     request for PT for right leg pain, no known injuries       Initial /80 (BP Location: Right arm, Cuff Size: Adult Regular)  Pulse 105  Temp 98.1  F (36.7  C) (Oral)  Wt 161 lb 12.8 oz (73.4 kg)  LMP 07/28/2017  SpO2 100%  Breastfeeding? No  BMI 26.92 kg/m2 Estimated body mass index is 26.92 kg/(m^2) as calculated from the following:    Height as of 11/28/17: 5' 5\" (1.651 m).    Weight as of this encounter: 161 lb 12.8 oz (73.4 kg).  Medication Reconciliation: complete       Jeane Pacheco CMA    "

## 2017-12-22 ENCOUNTER — THERAPY VISIT (OUTPATIENT)
Dept: PHYSICAL THERAPY | Facility: CLINIC | Age: 47
End: 2017-12-22
Payer: COMMERCIAL

## 2017-12-22 DIAGNOSIS — M54.2 CERVICALGIA: ICD-10-CM

## 2017-12-22 PROCEDURE — 97140 MANUAL THERAPY 1/> REGIONS: CPT | Mod: GP | Performed by: PHYSICAL THERAPIST

## 2017-12-22 PROCEDURE — 97110 THERAPEUTIC EXERCISES: CPT | Mod: GP | Performed by: PHYSICAL THERAPIST

## 2017-12-27 ENCOUNTER — PRE VISIT (OUTPATIENT)
Dept: CARDIOLOGY | Facility: CLINIC | Age: 47
End: 2017-12-27

## 2017-12-27 NOTE — TELEPHONE ENCOUNTER
HPI:       Laura Jackson is a 47 year old female with symptoms consistent with autonomic dysfunction that mostly manifests as inappropriate sinus tachycardia and vagal symptoms.  It is not consistent with POTs.  The trigger was likely a combination of the viral illness she had prior to her surgery, the inflammation of surgery, or the blood she received. If her echo is wnl and she has no structural disease, she will need to adjust her po salt intake (increase), increase po fluid intake, and continue with physical rehab as retraining her autonomic system will be essential for complete recovery.     8/27/2017: in patient consult with Dr Deleon    PAST MEDICAL HISTORY:  Past Medical History:   Diagnosis Date     Family history of breast cancer 2/19/2014     SVT (supraventricular tachycardia):  history of, no recurrence since 2008 10/11/2013    no recurrence since 2008      Uncomplicated asthma        CURRENT MEDICATIONS:  Current Outpatient Prescriptions   Medication Sig Dispense Refill     levalbuterol (XOPENEX HFA) 45 MCG/ACT Inhaler Inhale 1-2 puffs into the lungs every 4 hours as needed for shortness of breath / dyspnea 1 Inhaler 5     fluticasone-salmeterol (ADVAIR) 100-50 MCG/DOSE diskus inhaler Inhale 1 puff into the lungs 2 times daily 3 Inhaler 1     diltiazem (CARDIZEM CD) 180 MG 24 hr capsule Take 1 capsule (180 mg) by mouth daily 30 capsule 2     Cholecalciferol (VITAMIN D) 2000 UNITS CAPS Take 1 capsule by mouth daily       multivitamin, therapeutic with minerals (MULTI-VITAMIN) TABS tablet Take 1 tablet by mouth daily       RaNITidine HCl (ZANTAC PO) Take 150 mg by mouth as needed for heartburn         PAST SURGICAL HISTORY:  Past Surgical History:   Procedure Laterality Date     APPENDECTOMY       DAVINCI HYSTERECTOMY TOTAL, SALPINGECTOMY BILATERAL N/A 8/4/2017    Procedure: DAVINCI XI HYSTERECTOMY TOTAL, SALPINGECTOMY BILATERAL;  DAVINCI TOTAL HYSTERECTOMY; BILATERAL SALPINGECTOMY. BILATERAL URETERAL  LYSIS. UTEROSACRAL-COLPOPEXY. EXCISION OF ENDOMETRIOSIS;  Surgeon: George Loyola MD;  [facility]     DAVINCI LYSIS OF ADHESIONS Bilateral 8/4/2017    Procedure: DAVINCI LYSIS OF ADHESIONS;  BILATERAL URETERAL LYSIS;  Surgeon: George Loyola MD;  [facility]     DAVINCI SACROCOLPOPEXY, CYSTOSCOPY, COMBINED N/A 8/4/2017    Procedure: COMBINED DAVINCI SACROCOLPOPEXY, CYSTOSCOPY;  UTEROSACRAL COLPOPEXY;  Surgeon: George Loyola MD;  Location[facility]     DAVINCI XI ASSISTED ABLATION / EXCISION OF ENDOMETRIOSIS  8/4/2017    Procedure: DAVINCI XI ASSISTED ABLATION / EXCISION OF ENDOMETRIOSIS;;  Surgeon: George Loyola MD;  [facility]     DILATION AND CURETTAGE N/A 6/20/2017    Procedure: DILATION AND CURETTAGE;  Uterine Curettings and Fibroid Removal, Cook catheter placement; (No hysterectomy done at this time);  Surgeon: Ernestina Saunders MD;  [facility]     TONSILLECTOMY         ALLERGIES:     Allergies   Allergen Reactions     Penicillins      Swelling throat; age 4     Tetracycline      Vomiting; nauseous       FAMILY HISTORY:  Family History   Problem Relation Age of Onset     DIABETES Mother      Hypertension Mother      Hyperlipidemia Mother      Arrhythmia Mother      Cardiovascular Father      CHF and COPD     Asthma Father      Breast Cancer Maternal Grandfather      Colon Cancer Maternal Grandfather      Breast Cancer Paternal Grandmother      Breast Cancer Paternal Aunt      C.A.D. Paternal Grandfather      Breast Cancer Cousin      Asthma Son      DIABETES Maternal Grandmother      Hypertension Maternal Grandmother          SOCIAL HISTORY:  Social History   Substance Use Topics     Smoking status: Never Smoker     Smokeless tobacco: Never Used     Alcohol use Yes      Comment: occasional       ROS:   Constitutional: No fever, chills, or sweats. Weight stable.   ENT: No visual disturbance, ear ache, epistaxis, sore throat.   Cardiovascular: As per HPI.   Respiratory: No cough,  hemoptysis.    GI: No nausea, vomiting, hematemesis, melena, or hematochezia.   : No hematuria.   Integument: Negative.   Psychiatric: Negative.   Hematologic:  Easy bruising, no easy bleeding.  Neuro: Negative.   Endocrinology: No significant heat or cold intolerance   Musculoskeletal: No myalgia.    Exam:  [facility] 07/28/2017  GENERAL APPEARANCE: healthy, alert and no distress  HEENT: no icterus, no xanthelasmas, normal pupil size and reaction, normal palate, mucosa moist, no central cyanosis  NECK: no adenopathy, no asymmetry, masses, or scars, thyroid normal to palpation and no bruits, JVP not elevated  RESPIRATORY: lungs clear to auscultation - no rales, rhonchi or wheezes, no use of accessory muscles, no retractions, respirations are unlabored, normal respiratory rate  CARDIOVASCULAR: regular rhythm, normal S1 with physiologic split S2, no S3 or S4 and no murmur, click or rub, precordium quiet with normal PMI.  ABDOMEN: soft, non tender, without hepatosplenomegaly, no masses palpable, bowel sounds normal, aorta not enlarged by palpation, no abdominal bruits  EXTREMITIES: peripheral pulses normal, no edema, no bruits  NEURO: alert and oriented to person/place/time, normal speech, gait and affect  VASC: Radial, femoral, dorsalis pedis and posterior tibialis pulses are normal in volumes and symmetric bilaterally. No bruits are heard.  SKIN: no ecchymoses, no rashes    Labs:  CBC RESULTS:   Lab Results   Component Value Date    WBC 5.0 11/28/2017    RBC 4.72 11/28/2017    HGB 13.7 11/28/2017    HCT 41.9 11/28/2017    MCV 89 11/28/2017    MCH 29.0 11/28/2017    MCHC 32.7 11/28/2017    RDW 14.3 11/28/2017     11/28/2017       BMP RESULTS:  Lab Results   Component Value Date     09/11/2017    POTASSIUM 4.0 09/11/2017    CHLORIDE 103 09/11/2017    CO2 27 09/11/2017    ANIONGAP 8 09/11/2017    GLC 96 09/11/2017    BUN 11 09/11/2017    CR 0.77 09/11/2017    GFRESTIMATED 79 09/11/2017    GFRESTBLACK >90  2017    AZAEL 9.2 2017        INR RESULTS:  Lab Results   Component Value Date    INR 1.12 2017    INR 1.12 2017       Procedures:    ECHOCARDIOGRAM:   Recent Results (from the past 8760 hour(s))   Echocardiogram Complete    Narrative    [id number]  ECH19  [id number]  [id number]^CAN^GERMÁNBIL^[facility],[place]  Echocardiography Laboratory  [address] [place] [address]     Name: HUSEYIN MENDIOLA  MRN: [id number]  : 1970  Study Date: 2017 06:29 PM  Age: 47 yrs  Gender: Female  Patient Location: [facility]  Reason For Study: Tachycardia  Ordering Physician: BARBRA NORTH  Performed By: Joyce Schulte RDCS     BSA: 1.8 m2  Height: 65 in  Weight: 162 lb  BP: 124/85 mmHg  _____________________________________________________________________________  __        Procedure  Echocardiogram with two-dimensional, color and spectral Doppler performed.  _____________________________________________________________________________  __        Interpretation Summary  Left ventricular size is normal. Global and regional left ventricular function  is normal with an EF of 60-65%.  The right ventricle is normal size. Global right ventricular function is  normal.  The inferior vena cava is normal.  No pericardial effusion is present.  Previous study not available for comparison.  _____________________________________________________________________________  __        Left Ventricle  Left ventricular size is normal. Left ventricular wall thickness is normal.  Global and regional left ventricular function is normal with an EF of 60-65%.  Normal left ventricular filling for age. No regional wall motion abnormalities  are seen.     Right Ventricle  The right ventricle is normal size. Global right ventricular function is  normal.     Atria  Both atria appear normal. The atrial septum is intact as assessed by color  Doppler .        Mitral Valve  The  mitral valve is normal. Trace mitral insufficiency is present.     Aortic Valve  Aortic valve is normal in structure and function.     Tricuspid Valve  The tricuspid valve is normal. Trace tricuspid insufficiency is present.  Pulmonary artery systolic pressure cannot be assessed. The peak velocity of  the tricuspid regurgitant jet is not obtainable.     Pulmonic Valve  The pulmonic valve is normal.     Vessels  The aorta root is normal. The inferior vena cava is normal. Estimated mean  right atrial pressure is 3 mmHg.     Pericardium  No pericardial effusion is present.        Compared to Previous Study  Previous study not available for comparison.  _____________________________________________________________________________  __     MMode/2D Measurements & Calculations  LA Volume (BP): 29.9 ml  LA Volume Index (BP): 16.5 ml/m2        Doppler Measurements & Calculations  MV E max mary: 95.1 cm/sec  MV A max mary: 62.1 cm/sec  MV E/A: 1.5  MV dec time: 0.23 sec              _____________________________________________________________________________  __        Report approved by: Yasmin Barrett 08/27/2017 07:15 PM            Assessment and Plan:       CC

## 2017-12-28 ENCOUNTER — OFFICE VISIT (OUTPATIENT)
Dept: CARDIOLOGY | Facility: CLINIC | Age: 47
End: 2017-12-28
Attending: INTERNAL MEDICINE
Payer: COMMERCIAL

## 2017-12-28 VITALS
HEART RATE: 124 BPM | DIASTOLIC BLOOD PRESSURE: 97 MMHG | BODY MASS INDEX: 26.99 KG/M2 | SYSTOLIC BLOOD PRESSURE: 147 MMHG | HEIGHT: 65 IN | OXYGEN SATURATION: 100 % | WEIGHT: 162 LBS

## 2017-12-28 DIAGNOSIS — G90.A POTS (POSTURAL ORTHOSTATIC TACHYCARDIA SYNDROME): Primary | ICD-10-CM

## 2017-12-28 PROCEDURE — 99213 OFFICE O/P EST LOW 20 MIN: CPT | Mod: GC | Performed by: INTERNAL MEDICINE

## 2017-12-28 PROCEDURE — 99213 OFFICE O/P EST LOW 20 MIN: CPT | Mod: ZF

## 2017-12-28 ASSESSMENT — PAIN SCALES - GENERAL: PAINLEVEL: NO PAIN (0)

## 2017-12-28 NOTE — PROGRESS NOTES
HPI: 47 year old with a PMHx of asthma, uterine fibroids s/b hysterectomy who presents for evaluation for palpitations and autonomic dysfunction.     Around 06/2017, patient had an episode of severe vaginal bleeding which required hysterectomy. During this acute illness, patient started having multifactorial symptoms throughout the day. Symptoms would include palpitations, fatigue, clamminess, sweating, flushing, nausea, and burping. Most of these symptoms would occur when patient stands for a long period of time. During these episodes, she notes that her HR is in the 150's and improves after she sits down or lays supine. Denies any episodes of passing out. When patient exerts herself (especially at work where she stands/walks a fair amount), her symptoms become much worse. However, when she is walking to dog (around 15 minutes), her symptoms are not exacerbated.      In addition to postural changes, patient does note similar symptoms when she takes a shower (warm or cold).     Interestingly, patient had very similar symptoms 6 years ago when she had a viral infection. Patient states that her symptoms took around 2 years before she felt back to baseline.     Patient was admitted in the hospital for the above symptoms. ECHO was ordered and was negative. Ziopatch showed episodes of sinus tachycardia. Patient was started on Diltiazem, which helped her HR during these episodes (would be around 130 instead of 150). In terms of lifestyle management, patient has tried to increase her water intake; however, she has not increased her salt intake.     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     Lastly, patient has a chart history of SVT. However, patient reports this was never formally diagnosed and likely related to sinus tachycardia.     Vitals:    12/28/17 1328 12/28/17 1331 12/28/17 1332 12/28/17 1333   BP: 131/83 (!) 138/92 (!) 154/94 (!) 147/97   BP  "Location: Right arm Right arm Right arm Right arm   Patient Position: Supine Sitting Standing Standing   Cuff Size: Adult Regular Adult Regular Adult Regular Adult Regular   Pulse: 102 115 121 124   SpO2: 100%      Weight: 73.5 kg (162 lb)      Height: 1.651 m (5' 5\")                PAST MEDICAL HISTORY:  Past Medical History:   Diagnosis Date     Family history of breast cancer 2/19/2014     SVT (supraventricular tachycardia):  history of, no recurrence since 2008 10/11/2013    no recurrence since 2008      Uncomplicated asthma        CURRENT MEDICATIONS:  Current Outpatient Prescriptions   Medication Sig Dispense Refill     levalbuterol (XOPENEX HFA) 45 MCG/ACT Inhaler Inhale 1-2 puffs into the lungs every 4 hours as needed for shortness of breath / dyspnea 1 Inhaler 5     fluticasone-salmeterol (ADVAIR) 100-50 MCG/DOSE diskus inhaler Inhale 1 puff into the lungs 2 times daily 3 Inhaler 1     diltiazem (CARDIZEM CD) 180 MG 24 hr capsule Take 1 capsule (180 mg) by mouth daily 30 capsule 2     Cholecalciferol (VITAMIN D) 2000 UNITS CAPS Take 1 capsule by mouth daily       multivitamin, therapeutic with minerals (MULTI-VITAMIN) TABS tablet Take 1 tablet by mouth daily       RaNITidine HCl (ZANTAC PO) Take 150 mg by mouth as needed for heartburn         PAST SURGICAL HISTORY:  Past Surgical History:   Procedure Laterality Date     APPENDECTOMY       DAVINCI HYSTERECTOMY TOTAL, SALPINGECTOMY BILATERAL N/A 8/4/2017    Procedure: DAVINCI XI HYSTERECTOMY TOTAL, SALPINGECTOMY BILATERAL;  DAVINCI TOTAL HYSTERECTOMY; BILATERAL SALPINGECTOMY. BILATERAL URETERAL LYSIS. UTEROSACRAL-COLPOPEXY. EXCISION OF ENDOMETRIOSIS;  Surgeon: George Loyola MD;  [facility]     DAVINCI LYSIS OF ADHESIONS Bilateral 8/4/2017    Procedure: DAVINCI LYSIS OF ADHESIONS;  BILATERAL URETERAL LYSIS;  Surgeon: George Loyola MD;  [facility]     DAVINCI SACROCOLPOPEXY, CYSTOSCOPY, COMBINED N/A 8/4/2017    Procedure: COMBINED DAVINCI " SACROCOLPOPEXY, CYSTOSCOPY;  UTEROSACRAL COLPOPEXY;  Surgeon: George Loyola MD;  [facility]     DAVINCI XI ASSISTED ABLATION / EXCISION OF ENDOMETRIOSIS  8/4/2017    Procedure: DAVINCI XI ASSISTED ABLATION / EXCISION OF ENDOMETRIOSIS;;  Surgeon: George Loyola MD;  [facility]     DILATION AND CURETTAGE N/A 6/20/2017    Procedure: DILATION AND CURETTAGE;  Uterine Curettings and Fibroid Removal, Cook catheter placement; (No hysterectomy done at this time);  Surgeon: Ernestina Saunders MD;  [facility]     TONSILLECTOMY         ALLERGIES:     Allergies   Allergen Reactions     Penicillins      Swelling throat; age 4     Tetracycline      Vomiting; nauseous       FAMILY HISTORY:  Family History   Problem Relation Age of Onset     DIABETES Mother      Hypertension Mother      Hyperlipidemia Mother      Arrhythmia Mother      Cardiovascular Father      CHF and COPD     Asthma Father      Breast Cancer Maternal Grandfather      Colon Cancer Maternal Grandfather      Breast Cancer Paternal Grandmother      Breast Cancer Paternal Aunt      C.A.D. Paternal Grandfather      Breast Cancer Cousin      Asthma Son      DIABETES Maternal Grandmother      Hypertension Maternal Grandmother          SOCIAL HISTORY:  Social History   Substance Use Topics     Smoking status: Never Smoker     Smokeless tobacco: Never Used     Alcohol use Yes      Comment: occasional       ROS:   Constitutional: No fever, chills, or sweats. Weight stable.   ENT: No visual disturbance, ear ache, epistaxis, sore throat.   Cardiovascular: As per HPI.   Respiratory: No cough, hemoptysis.    GI: No nausea, vomiting, hematemesis, melena, or hematochezia.   : No hematuria.   Integument: Negative.   Psychiatric: Negative.   Hematologic:  Easy bruising, no easy bleeding.  Neuro: Negative.   Endocrinology: No significant heat or cold intolerance   Musculoskeletal: No myalgia.    Exam:  BP (!) 147/97 (BP Location: Right arm, Patient Position:  "Standing, Cuff Size: Adult Regular)  Pulse 124  Ht 1.651 m (5' 5\")  Wt 73.5 kg (162 lb)  LMP 2017  SpO2 100%  BMI 26.96 kg/m2  Gen: NAD   HEENT: NC/AT   CV: RRR   Pulm: CTAB   GI: s/nt/nd   Ext: No edema   Neuro: No focal defects     Labs:  CBC RESULTS:   Lab Results   Component Value Date    WBC 5.0 2017    RBC 4.72 2017    HGB 13.7 2017    HCT 41.9 2017    MCV 89 2017    MCH 29.0 2017    MCHC 32.7 2017    RDW 14.3 2017     2017       BMP RESULTS:  Lab Results   Component Value Date     2017    POTASSIUM 4.0 2017    CHLORIDE 103 2017    CO2 27 2017    ANIONGAP 8 2017    GLC 96 2017    BUN 11 2017    CR 0.77 2017    GFRESTIMATED 79 2017    GFRESTBLACK >90 2017    AZAEL 9.2 2017        INR RESULTS:  Lab Results   Component Value Date    INR 1.12 2017    INR 1.12 2017       Procedures:    ECHOCARDIOGRAM:   Recent Results (from the past 8760 hour(s))   Echocardiogram Complete    Narrative    [id number]  ECH19  [id number]  [id number]^CAN^BARBRA^[facility],[place]  Echocardiography Laboratory  [address] [place] [id number]     Name: HUSEYIN MENDIOLA  MRN: [id number]  : 1970  Study Date: 2017 06:29 PM  Age: 47 yrs  Gender: Female  Patient Location: [facility]  Reason For Study: Tachycardia  Ordering Physician: BARBRA NORTH  Performed By: Joyce Schulte RDCS     BSA: 1.8 m2  Height: 65 in  Weight: 162 lb  BP: 124/85 mmHg  _____________________________________________________________________________  __        Procedure  Echocardiogram with two-dimensional, color and spectral Doppler performed.  _____________________________________________________________________________  __        Interpretation Summary  Left ventricular size is normal. Global and regional left ventricular function  is normal with " an EF of 60-65%.  The right ventricle is normal size. Global right ventricular function is  normal.  The inferior vena cava is normal.  No pericardial effusion is present.  Previous study not available for comparison.  _____________________________________________________________________________  __        Left Ventricle  Left ventricular size is normal. Left ventricular wall thickness is normal.  Global and regional left ventricular function is normal with an EF of 60-65%.  Normal left ventricular filling for age. No regional wall motion abnormalities  are seen.     Right Ventricle  The right ventricle is normal size. Global right ventricular function is  normal.     Atria  Both atria appear normal. The atrial septum is intact as assessed by color  Doppler .        Mitral Valve  The mitral valve is normal. Trace mitral insufficiency is present.     Aortic Valve  Aortic valve is normal in structure and function.     Tricuspid Valve  The tricuspid valve is normal. Trace tricuspid insufficiency is present.  Pulmonary artery systolic pressure cannot be assessed. The peak velocity of  the tricuspid regurgitant jet is not obtainable.     Pulmonic Valve  The pulmonic valve is normal.     Vessels  The aorta root is normal. The inferior vena cava is normal. Estimated mean  right atrial pressure is 3 mmHg.     Pericardium  No pericardial effusion is present.        Compared to Previous Study  Previous study not available for comparison.  _____________________________________________________________________________  __     MMode/2D Measurements & Calculations  LA Volume (BP): 29.9 ml  LA Volume Index (BP): 16.5 ml/m2        Doppler Measurements & Calculations  MV E max mary: 95.1 cm/sec  MV A max mary: 62.1 cm/sec  MV E/A: 1.5  MV dec time: 0.23 sec              _____________________________________________________________________________  __        Report approved by: Yasmin Barrett 08/27/2017 07:15 PM            Assessment  and Plan:   47 year old with a PMHx of asthma, uterine fibroids s/b hysterectomy who presents for evaluation for palpitations and autonomic dysfunction.     Patient's symptoms are consistent with autonomic dysfunction likely triggered by stress of an acute illness. She has symptoms of postural tachycardia syndrome as well as vagal mediated symptoms. Will initially treat with lifestyle management. If this fails, will consider autonomic testing and initiating ivabradine and florinef. Overall, since patient had similar episode 6 years ago that lasted two years; therefore,  it may take a very long time (around 2 years) for symptoms to fully resolve.    Plan:   1. Increase H20 and Salt Intake (given samples for SaltStick)  2. Encourage lower body isometric exercises and standing training.  3. Encouraged to use compression shorts to help improve venous return.   4. Continue Diltiazem. If these above steps do not work, will consider Florinef and Ivabradine.      RTC in 2-3 months to assess progress and adjust Rx as needed.     Sawyer Loco PGY-4   Cardiology Fellow   Pager: [phone]    I very much appreciated the opportunity to see and assess Mrs Laura Jackson in the clinic with CV Fellow Dr Loco. Today I spent 25 min with patient reviewing her history, past history of similar symptoms, and recommendations for management going forward. She voiced understanding and agreement.    Please do not hesitate to contact my office if you have any questions or concerns.      Phil Mitchell MD  Cardiac Arrhythmia Service  [facility]  [phone]        CC

## 2017-12-28 NOTE — NURSING NOTE
Chief Complaint   Patient presents with     New Patient     ortho b/p, new consult for presyncope, IAST (Adrienne consult 8/27/17)- echo and ziopatch 8/26/17     Vitals were taken and medications were reconciled. [place] BP taken  OSEI Prajapati  1:32 PM

## 2017-12-28 NOTE — LETTER
12/28/2017      RE: Laura Jackson  [address]  [place] [address]       Dear Colleague,    Thank you for the opportunity to participate in the care of your patient, Laura Jackson, at the [facility]. Please see a copy of my visit note below.    HPI: 47 year old with a PMHx of asthma, uterine fibroids s/b hysterectomy who presents for evaluation for palpitations and autonomic dysfunction.     Around 06/2017, patient had an episode of severe vaginal bleeding which required hysterectomy. During this acute illness, patient started having multifactorial symptoms throughout the day. Symptoms would include palpitations, fatigue, clamminess, sweating, flushing, nausea, and burping. Most of these symptoms would occur when patient stands for a long period of time. During these episodes, she notes that her HR is in the 150's and improves after she sits down or lays supine. Denies any episodes of passing out. When patient exerts herself (especially at work where she stands/walks a fair amount), her symptoms become much worse. However, when she is walking to dog (around 15 minutes), her symptoms are not exacerbated.      In addition to postural changes, patient does note similar symptoms when she takes a shower (warm or cold).     Interestingly, patient had very similar symptoms 6 years ago when she had a viral infection. Patient states that her symptoms took around 2 years before she felt back to baseline.     Patient was admitted in the hospital for the above symptoms. ECHO was ordered and was negative. Ziopatch showed episodes of sinus tachycardia. Patient was started on Diltiazem, which helped her HR during these episodes (would be around 130 instead of 150). In terms of lifestyle management, patient has tried to increase her water intake; however, she has not increased her salt intake.     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     Lastly, patient has a chart history of SVT. However, patient reports this was never formally diagnosed and likely related to sinus tachycardia.     Vitals:    12/28/17 1328 12/28/17 1331 12/28/17 1332 12/28/17 1333   BP: 131/83 (!) 138/92 (!) 154/94 (!) 147/97   BP Location: Right arm Right arm Right arm Right arm   Patient Position: Supine Sitting Standing Standing   Cuff Size: Adult Regular Adult Regular Adult Regular Adult Regular   Pulse: 102 115 121 124   SpO2: 100%      Weight: 73.5 kg (162 lb)      Height: 1.651 m (5' 5\")                PAST MEDICAL HISTORY:  Past Medical History:   Diagnosis Date     Family history of breast cancer 2/19/2014     SVT (supraventricular tachycardia):  history of, no recurrence since 2008 10/11/2013    no recurrence since 2008      Uncomplicated asthma        CURRENT MEDICATIONS:  Current Outpatient Prescriptions   Medication Sig Dispense Refill     levalbuterol (XOPENEX HFA) 45 MCG/ACT Inhaler Inhale 1-2 puffs into the lungs every 4 hours as needed for shortness of breath / dyspnea 1 Inhaler 5     fluticasone-salmeterol (ADVAIR) 100-50 MCG/DOSE diskus inhaler Inhale 1 puff into the lungs 2 times daily 3 Inhaler 1     diltiazem (CARDIZEM CD) 180 MG 24 hr capsule Take 1 capsule (180 mg) by mouth daily 30 capsule 2     Cholecalciferol (VITAMIN D) 2000 UNITS CAPS Take 1 capsule by mouth daily       multivitamin, therapeutic with minerals (MULTI-VITAMIN) TABS tablet Take 1 tablet by mouth daily       RaNITidine HCl (ZANTAC PO) Take 150 mg by mouth as needed for heartburn         PAST SURGICAL HISTORY:  Past Surgical History:   Procedure Laterality Date     APPENDECTOMY       DAVINCI HYSTERECTOMY TOTAL, SALPINGECTOMY BILATERAL N/A 8/4/2017    Procedure: DAVINCI XI HYSTERECTOMY TOTAL, SALPINGECTOMY BILATERAL;  DAVINCI TOTAL HYSTERECTOMY; BILATERAL SALPINGECTOMY. BILATERAL URETERAL LYSIS. UTEROSACRAL-COLPOPEXY. EXCISION OF " ENDOMETRIOSIS;  Surgeon: George Loyola MD;  Location: [facility] OR     DAVINCI LYSIS OF ADHESIONS Bilateral 8/4/2017    Procedure: DAVINCI LYSIS OF ADHESIONS;  BILATERAL URETERAL LYSIS;  Surgeon: George Loyola MD;  Location: [facility] OR     DAVINCI SACROCOLPOPEXY, CYSTOSCOPY, COMBINED N/A 8/4/2017    Procedure: COMBINED DAVINCI SACROCOLPOPEXY, CYSTOSCOPY;  UTEROSACRAL COLPOPEXY;  Surgeon: George Loyola MD;  Location: [facility] OR     DAVINCI XI ASSISTED ABLATION / EXCISION OF ENDOMETRIOSIS  8/4/2017    Procedure: DAVINCI XI ASSISTED ABLATION / EXCISION OF ENDOMETRIOSIS;;  Surgeon: George Loyola MD;  Location: [facility] OR     DILATION AND CURETTAGE N/A 6/20/2017    Procedure: DILATION AND CURETTAGE;  Uterine Curettings and Fibroid Removal, Cook catheter placement; (No hysterectomy done at this time);  Surgeon: Ernestina Saunders MD;  Location: [facility] OR     TONSILLECTOMY         ALLERGIES:     Allergies   Allergen Reactions     Penicillins      Swelling throat; age 4     Tetracycline      Vomiting; nauseous       FAMILY HISTORY:  Family History   Problem Relation Age of Onset     DIABETES Mother      Hypertension Mother      Hyperlipidemia Mother      Arrhythmia Mother      Cardiovascular Father      CHF and COPD     Asthma Father      Breast Cancer Maternal Grandfather      Colon Cancer Maternal Grandfather      Breast Cancer Paternal Grandmother      Breast Cancer Paternal Aunt      C.A.D. Paternal Grandfather      Breast Cancer Cousin      Asthma Son      DIABETES Maternal Grandmother      Hypertension Maternal Grandmother          SOCIAL HISTORY:  Social History   Substance Use Topics     Smoking status: Never Smoker     Smokeless tobacco: Never Used     Alcohol use Yes      Comment: occasional       ROS:   Constitutional: No fever, chills, or sweats. Weight stable.   ENT: No visual disturbance, ear ache, epistaxis, sore throat.   Cardiovascular: As per HPI.   Respiratory: No cough, hemoptysis.    GI: No nausea, vomiting,  "hematemesis, melena, or hematochezia.   : No hematuria.   Integument: Negative.   Psychiatric: Negative.   Hematologic:  Easy bruising, no easy bleeding.  Neuro: Negative.   Endocrinology: No significant heat or cold intolerance   Musculoskeletal: No myalgia.    Exam:  BP (!) 147/97 (BP Location: Right arm, Patient Position: Standing, Cuff Size: Adult Regular)  Pulse 124  Ht 1.651 m (5' 5\")  Wt 73.5 kg (162 lb)  LMP 2017  SpO2 100%  BMI 26.96 kg/m2  Gen: NAD   HEENT: NC/AT   CV: RRR   Pulm: CTAB   GI: s/nt/nd   Ext: No edema   Neuro: No focal defects     Labs:  CBC RESULTS:   Lab Results   Component Value Date    WBC 5.0 2017    RBC 4.72 2017    HGB 13.7 2017    HCT 41.9 2017    MCV 89 2017    MCH 29.0 2017    MCHC 32.7 2017    RDW 14.3 2017     2017       BMP RESULTS:  Lab Results   Component Value Date     2017    POTASSIUM 4.0 2017    CHLORIDE 103 2017    CO2 27 2017    ANIONGAP 8 2017    GLC 96 2017    BUN 11 2017    CR 0.77 2017    GFRESTIMATED 79 2017    GFRESTBLACK >90 2017    AZAEL 9.2 2017        INR RESULTS:  Lab Results   Component Value Date    INR 1.12 2017    INR 1.12 2017       Procedures:    ECHOCARDIOGRAM:   Recent Results (from the past 8760 hour(s))   Echocardiogram Complete    Narrative    [id number]  [id number]  [id number]  [id number]^CAN^BARBRA^[facility]  Echocardiography Laboratory  [address] [place] [id number]     Name: HUSEYIN MENDIOLA  MRN: [id number]  : 1970  Study Date: 2017 06:29 PM  Age: 47 yrs  Gender: Female  Patient Location: [facility]  Reason For Study: Tachycardia  Ordering Physician: BARBRA NORTH  Performed By: Joyce Schulte RDCS     BSA: 1.8 m2  Height: 65 in  Weight: 162 lb  BP: 124/85 " mmHg  _____________________________________________________________________________  __        Procedure  Echocardiogram with two-dimensional, color and spectral Doppler performed.  _____________________________________________________________________________  __        Interpretation Summary  Left ventricular size is normal. Global and regional left ventricular function  is normal with an EF of 60-65%.  The right ventricle is normal size. Global right ventricular function is  normal.  The inferior vena cava is normal.  No pericardial effusion is present.  Previous study not available for comparison.  _____________________________________________________________________________  __        Left Ventricle  Left ventricular size is normal. Left ventricular wall thickness is normal.  Global and regional left ventricular function is normal with an EF of 60-65%.  Normal left ventricular filling for age. No regional wall motion abnormalities  are seen.     Right Ventricle  The right ventricle is normal size. Global right ventricular function is  normal.     Atria  Both atria appear normal. The atrial septum is intact as assessed by color  Doppler .        Mitral Valve  The mitral valve is normal. Trace mitral insufficiency is present.     Aortic Valve  Aortic valve is normal in structure and function.     Tricuspid Valve  The tricuspid valve is normal. Trace tricuspid insufficiency is present.  Pulmonary artery systolic pressure cannot be assessed. The peak velocity of  the tricuspid regurgitant jet is not obtainable.     Pulmonic Valve  The pulmonic valve is normal.     Vessels  The aorta root is normal. The inferior vena cava is normal. Estimated mean  right atrial pressure is 3 mmHg.     Pericardium  No pericardial effusion is present.        Compared to Previous Study  Previous study not available for comparison.  _____________________________________________________________________________  __     MMode/2D  Measurements & Calculations  LA Volume (BP): 29.9 ml  LA Volume Index (BP): 16.5 ml/m2        Doppler Measurements & Calculations  MV E max mary: 95.1 cm/sec  MV A max mary: 62.1 cm/sec  MV E/A: 1.5  MV dec time: 0.23 sec              _____________________________________________________________________________  __        Report approved by: Yasmin Barrett 08/27/2017 07:15 PM            Assessment and Plan:   47 year old with a PMHx of asthma, uterine fibroids s/b hysterectomy who presents for evaluation for palpitations and autonomic dysfunction.     Patient's symptoms are consistent with autonomic dysfunction likely triggered by stress of an acute illness. She has symptoms of postural tachycardia syndrome as well as vagal mediated symptoms. Will initially treat with lifestyle management. If this fails, will consider autonomic testing and initiating ivabradine and florinef. Overall, since patient had similar episode 6 years ago that lasted two years; therefore,  it may take a very long time (around 2 years) for symptoms to fully resolve.    Plan:   1. Increase H20 and Salt Intake (given samples for SaltStick)  2. Encourage lower body isometric exercises and standing training.  3. Encouraged to use compression shorts to help improve venous return.   4. Continue Diltiazem. If these above steps do not work, will consider Florinef and Ivabradine.      RTC in 2-3 months to assess progress and adjust Rx as needed.     Sawyer Loco PGY-4   Cardiology Fellow   Pager: [phone]    I very much appreciated the opportunity to see and assess Mrs Laura Jackson in the clinic with CV Fellow Dr Loco. Today I spent 25 min with patient reviewing her history, past history of similar symptoms, and recommendations for management going forward. She voiced understanding and agreement.    Please do not hesitate to contact my office if you have any questions or concerns.      Sincerely,     Phil Mitchell MD

## 2017-12-28 NOTE — PATIENT INSTRUCTIONS
You will be scheduled for a follow up visit: 3 months with Dr Mitchell.    Medication changes: none    New Medications: none    Strategies to help with symptoms:   - Drink at least 60 ounces of water per day. In addition to 2 vitassium tablets twice daily. First dose early am and second dose early afternoon    - Standing Training.  Follow pamphlet included. Work up to maximum time gradually. You need to be consistent with these exercises.     - Supine and isometric exercises with or without band. The band will give you more resistence as you progress. These exercises will assist in increasing your intravascular pressure.  They also include swimming, rowing, recumbent bike.    If you have any questions regarding to your visit please contact your care team:     Cardiology  Telephone Number    Zulay Hardin [phone]   Or send a message to your provider via my chart.   For scheduling appts or procedures:    Gina Walker     [phone] or  [phone]   For the Device Clinic (Pacemakers and ICD's)   RN's :   Yen Marrero  During business hours: [phone]    After business hours:   [phone]- select option 4 and ask for job code [id number].    You can also contact us using My Chart. If you need assistance in setting this up, please contact our office or ask at your follow up visit.     If you need a medication refill please contact your pharmacy. Please allow 3 business days for your refill to be completed.     As always, Thank you for trusting us with your health care needs!    We encourage you to use My Chart as your primary form of communication if possible. If you need assistance in setting this up, please contact our office or ask at your follow up visit.     If you need a medication refill please contact your pharmacy. Please allow at least 3 business days for your refill to be completed.       Cardiology  Telephone Number    Zulay Hardin [phone]   Or send a message to your provider  via my chart.   For scheduling procedures:    [facility] [place]    Clinic appointments       [phone]   For the Device Clinic (Pacemakers and ICD's)   RN's :   Yen Marrero  During business hours: [phone]    After business hours:   [phone]- select option 4 and ask for job code [id number].          As always, Thank you for trusting us with your health care needs!

## 2017-12-28 NOTE — MR AVS SNAPSHOT
After Visit Summary   12/28/2017    Laura Jackson    MRN: [id number]           Patient Information     Date Of Birth          1970        Visit Information        Provider Department      12/28/2017 1:30 PM Phil Mitchell MD [facility]        Today's Diagnoses     POTS (postural orthostatic tachycardia syndrome)    -  1      Care Instructions    You will be scheduled for a follow up visit: 3 months with Dr Mitchell.    Medication changes: none    New Medications: none    Strategies to help with symptoms:   - Drink at least 60 ounces of water per day. In addition to 2 vitassium tablets twice daily. First dose early am and second dose early afternoon    - Standing Training.  Follow pamphlet included. Work up to maximum time gradually. You need to be consistent with these exercises.     - Supine and isometric exercises with or without band. The band will give you more resistence as you progress. These exercises will assist in increasing your intravascular pressure.  They also include swimming, rowing, recumbent bike.    If you have any questions regarding to your visit please contact your care team:     Cardiology  Telephone Number    Zulay Hardin [phone]   Or send a message to your provider via my chart.   For scheduling appts or procedures:    Gina Walker     [phone] or  [phone]   For the Device Clinic (Pacemakers and ICD's)   RN's :   Yen Marrero  During business hours: [phone]    After business hours:   [phone]- select option 4 and ask for job code [id number].    You can also contact us using My Chart. If you need assistance in setting this up, please contact our office or ask at your follow up visit.     If you need a medication refill please contact your pharmacy. Please allow 3 business days for your refill to be completed.     As always, Thank you for trusting us with your health care needs!    We encourage you to use My Chart as your  primary form of communication if possible. If you need assistance in setting this up, please contact our office or ask at your follow up visit.     If you need a medication refill please contact your pharmacy. Please allow at least 3 business days for your refill to be completed.       Cardiology  Telephone Number    Zulay Hardin [phone]   Or send a message to your provider via my chart.   For scheduling procedures:    [facility]    Clinic appointments       [phone]   For the Device Clinic (Pacemakers and ICD's)   RN's :   Yen Marrero  During business hours: [phone]    After business hours:   [phone]- select option 4 and ask for job code [id number].          As always, Thank you for trusting us with your health care needs!          Follow-ups after your visit        Additional Services     Follow-Up with Electrophysiologist       isabel                  Your next 10 appointments already scheduled     Jan 04, 2018 10:10 AM CST   IB[facility] Spine with Moreno Beaversol, PT   [facility] ([facility] [place])    [address] [address]   [phone]            Jan 05, 2018 10:00 AM CST   (Arrive by 9:45 AM)   NEW HAND with Rickey Rea MD   [facility] ([facility])    [address] [place] [id number] [phone]            Jan 11, 2018 10:10 AM CST   [facility] Spine with Moreno Frank, PT   [facility] ([facility] [place])    [address] [address]   [phone]            Jan 23, 2018 10:15 AM CST   (Arrive by 10:00 AM)   EMG with Chino Gatica MD   [facility] ([facility] [place])    [address] [place] [id number] [phone]           Do not use lotions or creams on the area to be tested. If you are on blood thinners (Warfarin, Coumadin, Lovenox, etc), please contact  your primary care physician to check if it is safe to stop them 3 days prior to testing. If you have anxiety, please check with your referring physician to obtain anti-anxiety medication for the procedure.            Feb 08, 2018 10:20 AM CST   New Visit with Elliot Sebastian MD   [facility] ([facility])    [address] [facility]  [place] [address]   [phone]            Mar 08, 2018  4:00 PM CST   (Arrive by 3:45 PM)   RETURN ARRHYTHMIA with Phil Mitchell MD   [facility] ([facility] [place])    [address] [facility]  3rd Floor  [facility] [address] [phone]              Future tests that were ordered for you today     Open Future Orders        Priority Expected Expires Ordered    Follow-Up with Electrophysiologist Routine 3/28/2018 6/26/2018 12/28/2017            Who to contact     If you have questions or need follow up information about today's clinic visit or your schedule please contact [facility] directly at [phone].  Normal or non-critical lab and imaging results will be communicated to you by [employer], letter or phone within 4 business days after the clinic has received the results. If you do not hear from us within 7 days, please contact the clinic through [employer] or phone. If you have a critical or abnormal lab result, we will notify you by phone as soon as possible.  Submit refill requests through [employer] or call your pharmacy and they will forward the refill request to us. Please allow 3 business days for your refill to be completed.          Additional Information About Your Visit        [employer] Information     [employer] gives you secure access to your electronic health record. If you see a primary care provider, you can also send messages to your care team and make appointments. If you have questions, please call your primary care clinic.  If you do not have a primary care provider, please call [phone] and they  "will assist you.        Care EveryWhere ID     This is your Care EveryWhere ID. This could be used by other organizations to access your [place] medical records  [id number]        Your Vitals Were     Pulse Height Last Period Pulse Oximetry BMI (Body Mass Index)       124 1.651 m (5' 5\") 07/28/2017 100% 26.96 kg/m2        Blood Pressure from Last 3 Encounters:   12/28/17 (!) 147/97   12/21/17 120/80   12/01/17 122/74    Weight from Last 3 Encounters:   12/28/17 73.5 kg (162 lb)   12/21/17 73.4 kg (161 lb 12.8 oz)   12/01/17 72.1 kg (159 lb)               Primary Care Provider Office Phone # Fax #    Bj Butler [phone] [address] [facility] [address]        Equal Access to Services     [facility] : Hadii aad ku hadasho Soomaali, waaxda luqadaha, qaybta kaalmada adeegyada, waxay rafiain haypriyankan bryan waters . So [facility] [phone].    ATENCIÓN: Si habla español, tiene a guido disposición servicios gratuitos de asistencia lingüística. Corina al [phone].    We comply with applicable federal civil rights laws and [place] laws. We do not discriminate on the basis of race, color, national origin, age, disability, sex, sexual orientation, or gender identity.            Thank you!     Thank you for choosing [facility]  for your care. Our goal is always to provide you with excellent care. Hearing back from our patients is one way we can continue to improve our services. Please take a few minutes to complete the written survey that you may receive in the mail after your visit with us. Thank you!             Your Updated Medication List - Protect others around you: Learn how to safely use, store and throw away your medicines at www.disposemymeds.org.          This list is accurate as of: 12/28/17  3:12 PM.  Always use your most recent med list.                   Brand Name Dispense Instructions for use Diagnosis    * diltiazem 120 MG 24 hr capsule    CARDIZEM CD    30 " capsule    Take 1 capsule (120 mg) by mouth daily    Sinus tachycardia, Palpitations       * diltiazem 180 MG 24 hr capsule    CARDIZEM CD    30 capsule    Take 1 capsule (180 mg) by mouth daily    History of supraventricular tachycardia       fluticasone-salmeterol 100-50 MCG/DOSE diskus inhaler    ADVAIR    3 Inhaler    Inhale 1 puff into the lungs 2 times daily    Mild persistent asthma without complication       levalbuterol 45 MCG/ACT Inhaler    XOPENEX HFA    1 Inhaler    Inhale 1-2 puffs into the lungs every 4 hours as needed for shortness of breath / dyspnea    Mild persistent asthma without complication       Multi-vitamin Tabs tablet      Take 1 tablet by mouth daily        vitamin D 2000 UNITS Caps      Take 1 capsule by mouth daily        ZANTAC PO      Take 150 mg by mouth as needed for heartburn        * Notice:  This list has 2 medication(s) that are the same as other medications prescribed for you. Read the directions carefully, and ask your doctor or other care provider to review them with you.

## 2018-01-02 ENCOUNTER — TELEPHONE (OUTPATIENT)
Dept: INTERNAL MEDICINE | Facility: CLINIC | Age: 48
End: 2018-01-02

## 2018-01-05 ENCOUNTER — PRE VISIT (OUTPATIENT)
Dept: ORTHOPEDICS | Facility: CLINIC | Age: 48
End: 2018-01-05

## 2018-01-05 ENCOUNTER — TELEPHONE (OUTPATIENT)
Dept: ORTHOPEDICS | Facility: CLINIC | Age: 48
End: 2018-01-05

## 2018-01-05 NOTE — TELEPHONE ENCOUNTER
Voicemail left for patient with new appointment information or 2/2/18 at 10:00am with Dr. Rea.  She was given our phone number to call back and confirm this date and time.

## 2018-01-11 ENCOUNTER — THERAPY VISIT (OUTPATIENT)
Dept: PHYSICAL THERAPY | Facility: CLINIC | Age: 48
End: 2018-01-11
Payer: COMMERCIAL

## 2018-01-11 DIAGNOSIS — M54.2 CERVICALGIA: ICD-10-CM

## 2018-01-11 PROCEDURE — 97140 MANUAL THERAPY 1/> REGIONS: CPT | Mod: GP | Performed by: PHYSICAL THERAPIST

## 2018-01-11 PROCEDURE — 97110 THERAPEUTIC EXERCISES: CPT | Mod: GP | Performed by: PHYSICAL THERAPIST

## 2018-01-11 NOTE — PROGRESS NOTES
[facility] Initial Evaluation  Subjective:  Patient is a 47 year old female presenting with rehab back hpi. The history is provided by the patient.                       Objective:  System    Physical Exam    General     ROS    Assessment/Plan:    {REHAB NOTES:798001}

## 2018-01-11 NOTE — MR AVS SNAPSHOT
After Visit Summary   1/11/2018    Laura Jackson    MRN: [id number]           Patient Information     Date Of Birth          1970        Visit Information        Provider Department      1/11/2018 10:10 AM Moreno Frank, PT [facility]        Today's Diagnoses     Cervicalgia           Follow-ups after your visit        Your next 10 appointments already scheduled     Jan 23, 2018 10:15 AM CST   (Arrive by 10:00 AM)   EMG with Chino Gatica MD   [facility] ([facility])    [address] [place] [address]  3rd [facility] [address]   [phone]           Do not use lotions or creams on the area to be tested. If you are on blood thinners (Warfarin, Coumadin, Lovenox, etc), please contact your primary care physician to check if it is safe to stop them 3 days prior to testing. If you have anxiety, please check with your referring physician to obtain anti-anxiety medication for the procedure.            Jan 26, 2018 10:50 AM CST   [facility] Spine with Moreno Frank, PT   [facility] ([facility])    [address]  [address]  [facility] [address]   [phone]            Feb 02, 2018 10:00 AM CST   (Arrive by 9:45 AM)   NEW HAND with Rickey Rea MD   [facility] ([facility])    [address] [place] [address]   [phone]            Feb 08, 2018 10:20 AM CST   New Visit with Elliot Sebastian MD   [facility] ([facility])    [address] [facility] [address]   [phone]            Feb 09, 2018  9:30 AM CST   [facility] Spine with Moreno Frank, PT   [facility] ([facility])    [address]  [address]  [facility] [address]   [phone]            Mar 08, 2018  4:00 PM CST   (Arrive by 3:45 PM)   RETURN ARRHYTHMIA with Phil Mitchell MD   [facility] ([facility])    [facility]  [facility]  [address] [place] [address] [phone]              Who to contact     If you have questions or need follow up information about today's clinic visit or your schedule please contact [facility] directly at [phone].  Normal or non-critical lab and imaging results will be communicated to you by MyChart, letter or phone within 4 business days after the clinic has received the results. If you do not hear from us within 7 days, please contact the clinic through MyChart or phone. If you have a critical or abnormal lab result, we will notify you by phone as soon as possible.  Submit refill requests through [employer] or call your pharmacy and they will forward the refill request to us. Please allow 3 business days for your refill to be completed.          Additional Information About Your Visit        MyChart Information     [employer] gives you secure access to your electronic health record. If you see a primary care provider, you can also send messages to your care team and make appointments. If you have questions, please call your primary care clinic.  If you do not have a primary care provider, please call [phone] and they will assist you.        Care EveryWhere ID     This is your Care EveryWhere ID. This could be used by other organizations to access your [facility] medical records  [id number]        Your Vitals Were     Last Period                   07/28/2017            Blood Pressure from Last 3 Encounters:   12/28/17 (!) 147/97   12/21/17 120/80   12/01/17 122/74    Weight from Last 3 Encounters:   12/28/17 73.5 kg (162 lb)   12/21/17 73.4 kg (161 lb 12.8 oz)   12/01/17 72.1 kg (159 lb)              We Performed the Following     MANUAL THER TECH,1+REGIONS,EA 15 MIN     THERAPEUTIC EXERCISES        Primary Care Provider Office Phone # Fax #    Bj Butler [phone]       [address] [place] [address]        Equal Access to Services     ODESSA  GAAR : Hadii aad ku hadblayeno Sokrissali, waaxda luqadaha, qaybta kaalmada adeegmirandada, ludmila rafiain hayaan bryan pipo jt . So [facility] [phone].    ATENCIÓN: Si habla español, tiene a guido disposición servicios gratuitos de asistencia lingüística. Llame al [phone].    We comply with applicable federal civil rights laws and [place] laws. We do not discriminate on the basis of race, color, national origin, age, disability, sex, sexual orientation, or gender identity.            Thank you!     Thank you for choosing [facility]  for your care. Our goal is always to provide you with excellent care. Hearing back from our patients is one way we can continue to improve our services. Please take a few minutes to complete the written survey that you may receive in the mail after your visit with us. Thank you!             Your Updated Medication List - Protect others around you: Learn how to safely use, store and throw away your medicines at www.disposemymeds.org.          This list is accurate as of: 1/11/18 11:01 AM.  Always use your most recent med list.                   Brand Name Dispense Instructions for use Diagnosis    diltiazem 180 MG 24 hr capsule    CARDIZEM CD    30 capsule    Take 1 capsule (180 mg) by mouth daily    History of supraventricular tachycardia       fluticasone-salmeterol 100-50 MCG/DOSE diskus inhaler    ADVAIR    3 Inhaler    Inhale 1 puff into the lungs 2 times daily    Mild persistent asthma without complication       levalbuterol 45 MCG/ACT Inhaler    XOPENEX HFA    1 Inhaler    Inhale 1-2 puffs into the lungs every 4 hours as needed for shortness of breath / dyspnea    Mild persistent asthma without complication       Multi-vitamin Tabs tablet      Take 1 tablet by mouth daily        vitamin D 2000 UNITS Caps      Take 1 capsule by mouth daily        ZANTAC PO      Take 150 mg by mouth as needed for heartburn

## 2018-01-22 NOTE — TELEPHONE ENCOUNTER
Referral has been processed and reviewed. It has been determined by the Rheumatologists that the patient does not meet the criteria for an appointment and would best be served by a community Rheumatologist. The patient has been informed of this.   Joyce Harris CMA  1/22/2018 11:55 AM

## 2018-01-23 ENCOUNTER — OFFICE VISIT (OUTPATIENT)
Dept: NEUROLOGY | Facility: CLINIC | Age: 48
End: 2018-01-23
Payer: COMMERCIAL

## 2018-01-23 DIAGNOSIS — G56.03 BILATERAL CARPAL TUNNEL SYNDROME: Primary | ICD-10-CM

## 2018-01-23 NOTE — MR AVS SNAPSHOT
After Visit Summary   1/23/2018    Laura Jackson    MRN: [id number]           Patient Information     Date Of Birth          1970        Visit Information        Provider Department      1/23/2018 10:15 AM Chino Gatica MD [facility] EMG        Today's Diagnoses     Bilateral carpal tunnel syndrome    -  1       Follow-ups after your visit        Your next 10 appointments already scheduled     Jan 26, 2018 10:50 AM CST   [facility] Spine with Moreno Beavers[place], PT   [facility] ([facility] [place])    [address] [facility] [address]  [facility] [address]   [phone]            Feb 02, 2018 10:00 AM CST   (Arrive by 9:45 AM)   NEW HAND with Rickey Rea MD   [facility] ([facility])    [address] [place] [address] [phone]            Feb 08, 2018 10:20 AM CST   New Visit with Elliot Sebastian MD   [facility]    [address] [facility] [address]   [phone]            Feb 09, 2018  9:30 AM CST   [facility] Spine with Moreno Beaversol, PT   [facility] ([facility] [place])    [address]  [facility] [address]   [phone]            Mar 08, 2018  4:00 PM CST   (Arrive by 3:45 PM)   RETURN ARRHYTHMIA with Phil Mitchell MD   [facility] ([facility])    [address] [place] [address] [phone]              Who to contact     Please call your clinic at [phone] to:    Ask questions about your health    Make or cancel appointments    Discuss your medicines    Learn about your test results    Speak to your doctor   If you have compliments or concerns about an experience at your clinic, or if you wish to file a complaint, please contact [facility] Patient Relations at [phone] or email us at [email or web address]          Additional Information About Your Visit        Emotte [employer] Information     [employer] gives you secure access to your electronic health record. If you see a primary care provider, you can also send messages to your care team and make appointments. If you have questions, please call your primary care clinic.  If you do not have a primary care provider, please call [phone] and they will assist you.      [employer] is an electronic gateway that provides easy, online access to your medical records. With [employer], you can request a clinic appointment, read your test results, renew a prescription or communicate with your care team.     To access your existing account, please contact your [facility] or call [phone] for assistance.        Care EveryWhere ID     This is your Care EveryWhere ID. This could be used by other organizations to access your [facility] medical records  [id number]        Your Vitals Were     Last Period                   07/28/2017            Blood Pressure from Last 3 Encounters:   12/28/17 (!) 147/97   12/21/17 120/80   12/01/17 122/74    Weight from Last 3 Encounters:   12/28/17 73.5 kg (162 lb)   12/21/17 73.4 kg (161 lb 12.8 oz)   12/01/17 72.1 kg (159 lb)              We Performed the Following     HC NCS MOTOR W OR W/O F-WAVE, 7 OR 8        Primary Care Provider Office Phone # Fax #    Bj Butler [phone] [address] [facility] [address]        Equal Access to Services     [facility]: Hadii aad ku hadasho Soomaali, waaxda luqadaha, qaybta kaalmada adeegyada, waxvon esther waters . So [facility] [phone].    ATENCIÓN: Si habla kimiañol, tiene a guido disposición servicios gratuitos de asistencia lingüística. Llame al [phone].    We comply with applicable federal civil rights laws and [place] laws. We do not discriminate on the basis of race, color, national origin, age, disability, sex, sexual  orientation, or gender identity.            Thank you!     Thank you for choosing [facility]  for your care. Our goal is always to provide you with excellent care. Hearing back from our patients is one way we can continue to improve our services. Please take a few minutes to complete the written survey that you may receive in the mail after your visit with us. Thank you!             Your Updated Medication List - Protect others around you: Learn how to safely use, store and throw away your medicines at www.disposemymeds.org.          This list is accurate as of: 1/23/18 10:50 AM.  Always use your most recent med list.                   Brand Name Dispense Instructions for use Diagnosis    diltiazem 180 MG 24 hr capsule    CARDIZEM CD    30 capsule    Take 1 capsule (180 mg) by mouth daily    History of supraventricular tachycardia       fluticasone-salmeterol 100-50 MCG/DOSE diskus inhaler    ADVAIR    3 Inhaler    Inhale 1 puff into the lungs 2 times daily    Mild persistent asthma without complication       levalbuterol 45 MCG/ACT Inhaler    XOPENEX HFA    1 Inhaler    Inhale 1-2 puffs into the lungs every 4 hours as needed for shortness of breath / dyspnea    Mild persistent asthma without complication       Multi-vitamin Tabs tablet      Take 1 tablet by mouth daily        vitamin D 2000 UNITS Caps      Take 1 capsule by mouth daily        ZANTAC PO      Take 150 mg by mouth as needed for heartburn

## 2018-01-23 NOTE — PROGRESS NOTES
[facility]  Electrodiagnostic Laboratory    Nerve Conduction & EMG Report          Patient:       Laura Jackson  Patient ID:    [id number]  Gender:        Female  YOB: 1970  Age:           47 Years 9 Months      Referring Physician: Caio Antony MD    History & Examination:    47 year old woman with right>left wrist pain and finger paresthesias. Query carpal tunnel syndrome.     Techniques: Motor and sensory conduction studies were done with surface recording electrodes. Temperature was monitored and recorded throughout the study. Upper extremities were maintained at a temperature of 32 degrees Centigrade or higher.       Results:    Bilateral median antidromic sensory NCSs showed mildly attenuated conduction velocities and normal SNAP amplitudes. Bilateral ulnar antidromic sensory NCSs showed normal SNAP amplitudes; conduction velocities were normal on the right and very mildly attenuated on the left. Bilateral median orthodromic mixed NCSs done with stimulation at the palm showed prolonged peak latencies (right>left); bilateral ulnar orthodromic mixed NCSs were normal. Right median motor NCS showed minimally prolonged distal latency, normal CMAP amplitudes and conduction velocity. Left median, and bilateral ulnar motor NCSs were normal. Right median and ulnar F-wave latencies were normal. Needle EMG examination was deferred (reason: not necessary to answer referral question).     Interpretation:    Mild-to-moderate right and mild left, median neuropathies at the wrist, as seen in carpal tunnel syndrome.    EMG Physician:    Chino Gatica MD       Sensory NCS      Nerve / Sites Rec. Site Onset Peak Ref. NP Amp Ref. PP Amp Dist Festus Ref. Temp     ms ms ms  V  V  V cm m/s m/s  C   R MEDIAN - Dig II Anti      Wrist Dig II 3.13 4.17  23.3 10.0 29.6 14 44.8 48.0 32.1   L MEDIAN - Dig II Anti      Wrist Dig II 3.07 3.96  14.7 10.0 25.4 14 45.6 48.0 32.1   R ULNAR - Dig V Anti       Wrist Dig V 2.40 3.28  14.6 8.0 13.4 12.5 52.2 48.0 32.1   L ULNAR - Dig V Anti      Wrist Dig V 2.71 3.59  20.5 8.0 38.3 12.5 46.2 48.0 30.8   R MEDIAN - Ulnar - Palmar      Median Wrist 1.98 2.60 2.40 23.6  34.5 8 40.4  32.1      Ulnar Wrist 1.15 1.72 2.40 19.9  18.1 8 69.8  32.2   L MEDIAN - Ulnar - Palmar      Median Wrist 1.77 2.40 2.40 29.5  51.1 8 45.2  32.1      Ulnar Wrist 1.25 1.77 2.40 17.4  22.9 8 64.0  32       Motor NCS      Nerve / Sites Rec. Site Lat Ref. Amp Ref. Rel Amp Dist Festus Ref. Dur. Area Temp.     ms ms mV mV % cm m/s m/s ms %  C   R MEDIAN - APB      Wrist APB 4.43 4.40 9.0 5.0 100 8   8.91 100 32.1      Elbow APB 8.33  8.7  96.6 23 58.9 48.0 8.70 86 32.2   L MEDIAN - APB      Wrist APB 4.06 4.40 8.3 5.0 100 8   8.70 100 32.1      Elbow APB 7.71  7.9  95.1 21 57.6 48.0 8.39 79.3 32   R ULNAR - ADM      Wrist ADM 2.81 3.50 11.2 5.0 100 8   7.76 100 32.2      B.Elbow ADM 6.25  11.1  98.8 20 58.2 48.0 7.45 96.1 32.2      A.Elbow ADM 7.81  9.2  82.6 9 57.6 48.0 8.07 86.7 32.2   L ULNAR - ADM      Wrist ADM 3.02 3.50 12.8 5.0 100 8   7.34 100 32.1      B.Elbow ADM 5.94  12.4  97 17 58.3 48.0 7.45 98.1 32      A.Elbow ADM 7.71  11.5  90.1 10 56.5 48.0 7.76 94.4 32.1       F  Wave      Nerve Min F Lat Max F Lat Mean FLat Temp.    ms ms ms  C   R MEDIAN 26.77 30.73 28.61 32.2   R ULNAR 26.67 29.32 27.34 32

## 2018-01-23 NOTE — LETTER
1/23/2018       RE: Laura Jackson  [address] [place] [address]     Dear Colleague,    Thank you for referring your patient, Laura Jackson, to the [facility] EMG at [facility]. Please see a copy of my visit note below.        [facility]  Electrodiagnostic Laboratory    Nerve Conduction & EMG Report          Patient:       Laura Jackson  Patient ID:    [id number]  Gender:        Female  YOB: 1970  Age:           47 Years 9 Months      Referring Physician: Caio Antony MD    History & Examination:    47 year old woman with right>left wrist pain and finger paresthesias. Query carpal tunnel syndrome.     Techniques: Motor and sensory conduction studies were done with surface recording electrodes. Temperature was monitored and recorded throughout the study. Upper extremities were maintained at a temperature of 32 degrees Centigrade or higher.       Results:    Bilateral median antidromic sensory NCSs showed mildly attenuated conduction velocities and normal SNAP amplitudes. Bilateral ulnar antidromic sensory NCSs showed normal SNAP amplitudes; conduction velocities were normal on the right and very mildly attenuated on the left. Bilateral median orthodromic mixed NCSs done with stimulation at the palm showed prolonged peak latencies (right>left); bilateral ulnar orthodromic mixed NCSs were normal. Right median motor NCS showed minimally prolonged distal latency, normal CMAP amplitudes and conduction velocity. Left median, and bilateral ulnar motor NCSs were normal. Right median and ulnar F-wave latencies were normal. Needle EMG examination was deferred (reason: not necessary to answer referral question).     Interpretation:    Mild-to-moderate right and mild left, median neuropathies at the wrist, as seen in carpal tunnel syndrome.    EMG Physician:    Chino Gatica MD       Sensory NCS      Nerve / Sites Rec. Site Onset Peak Ref. NP  Amp Ref. PP Amp Dist Festus Ref. Temp     ms ms ms  V  V  V cm m/s m/s  C   R MEDIAN - Dig II Anti      Wrist Dig II 3.13 4.17  23.3 10.0 29.6 14 44.8 48.0 32.1   L MEDIAN - Dig II Anti      Wrist Dig II 3.07 3.96  14.7 10.0 25.4 14 45.6 48.0 32.1   R ULNAR - Dig V Anti      Wrist Dig V 2.40 3.28  14.6 8.0 13.4 12.5 52.2 48.0 32.1   L ULNAR - Dig V Anti      Wrist Dig V 2.71 3.59  20.5 8.0 38.3 12.5 46.2 48.0 30.8   R MEDIAN - Ulnar - Palmar      Median Wrist 1.98 2.60 2.40 23.6  34.5 8 40.4  32.1      Ulnar Wrist 1.15 1.72 2.40 19.9  18.1 8 69.8  32.2   L MEDIAN - Ulnar - Palmar      Median Wrist 1.77 2.40 2.40 29.5  51.1 8 45.2  32.1      Ulnar Wrist 1.25 1.77 2.40 17.4  22.9 8 64.0  32       Motor NCS      Nerve / Sites Rec. Site Lat Ref. Amp Ref. Rel Amp Dist Festus Ref. Dur. Area Temp.     ms ms mV mV % cm m/s m/s ms %  C   R MEDIAN - APB      Wrist APB 4.43 4.40 9.0 5.0 100 8   8.91 100 32.1      Elbow APB 8.33  8.7  96.6 23 58.9 48.0 8.70 86 32.2   L MEDIAN - APB      Wrist APB 4.06 4.40 8.3 5.0 100 8   8.70 100 32.1      Elbow APB 7.71  7.9  95.1 21 57.6 48.0 8.39 79.3 32   R ULNAR - ADM      Wrist ADM 2.81 3.50 11.2 5.0 100 8   7.76 100 32.2      B.Elbow ADM 6.25  11.1  98.8 20 58.2 48.0 7.45 96.1 32.2      A.Elbow ADM 7.81  9.2  82.6 9 57.6 48.0 8.07 86.7 32.2   L ULNAR - ADM      Wrist ADM 3.02 3.50 12.8 5.0 100 8   7.34 100 32.1      B.Elbow ADM 5.94  12.4  97 17 58.3 48.0 7.45 98.1 32      A.Elbow ADM 7.71  11.5  90.1 10 56.5 48.0 7.76 94.4 32.1       F  Wave      Nerve Min F Lat Max F Lat Mean FLat Temp.    ms ms ms  C   R MEDIAN 26.77 30.73 28.61 32.2   R ULNAR 26.67 29.32 27.34 32                                    Again, thank you for allowing me to participate in the care of your patient.      Sincerely,    Chino Gatica MD

## 2018-01-25 ENCOUNTER — CARE COORDINATION (OUTPATIENT)
Dept: NEUROLOGY | Facility: CLINIC | Age: 48
End: 2018-01-25

## 2018-01-25 NOTE — PROGRESS NOTES
Caio Antony MD McAllister, Angela, RN                   thanks            Previous Messages       ----- Message -----      From: Jacque Betancur RN      Sent: 1/25/2018   2:46 PM        To: Caio Antony MD     She is already scheduled to see Álvaro 2/2     ThanksKiara   ----- Message -----      From: Caio Antony MD      Sent: 1/25/2018   1:58 PM        To: Jacque Betnacur RN     Emg confirms cts both hands; should return to prior orthopedist or be referred to Dr Summers here          1/25/18: called pt and she is already scheduled to see DR Rea. Above message sent to Cassia.

## 2018-01-26 ENCOUNTER — THERAPY VISIT (OUTPATIENT)
Dept: PHYSICAL THERAPY | Facility: CLINIC | Age: 48
End: 2018-01-26
Payer: COMMERCIAL

## 2018-01-26 DIAGNOSIS — M54.2 CERVICALGIA: ICD-10-CM

## 2018-01-26 PROCEDURE — 97110 THERAPEUTIC EXERCISES: CPT | Mod: GP | Performed by: PHYSICAL THERAPIST

## 2018-01-26 PROCEDURE — 97140 MANUAL THERAPY 1/> REGIONS: CPT | Mod: GP | Performed by: PHYSICAL THERAPIST

## 2018-02-02 ENCOUNTER — RADIANT APPOINTMENT (OUTPATIENT)
Dept: MRI IMAGING | Facility: CLINIC | Age: 48
End: 2018-02-02
Attending: ORTHOPAEDIC SURGERY
Payer: COMMERCIAL

## 2018-02-02 ENCOUNTER — OFFICE VISIT (OUTPATIENT)
Dept: ORTHOPEDICS | Facility: CLINIC | Age: 48
End: 2018-02-02
Attending: PSYCHIATRY & NEUROLOGY
Payer: COMMERCIAL

## 2018-02-02 VITALS — BODY MASS INDEX: 26.76 KG/M2 | HEIGHT: 65 IN | WEIGHT: 160.6 LBS

## 2018-02-02 DIAGNOSIS — M25.531 RIGHT WRIST PAIN: ICD-10-CM

## 2018-02-02 DIAGNOSIS — M25.531 RIGHT WRIST PAIN: Primary | ICD-10-CM

## 2018-02-02 ASSESSMENT — ENCOUNTER SYMPTOMS
SLEEP DISTURBANCES DUE TO BREATHING: 0
DIZZINESS: 0
DECREASED APPETITE: 0
HEMATURIA: 0
STIFFNESS: 1
SYNCOPE: 0
FLANK PAIN: 0
ABDOMINAL PAIN: 0
EXERCISE INTOLERANCE: 1
BLOOD IN STOOL: 0
FEVER: 0
LOSS OF CONSCIOUSNESS: 0
POLYDIPSIA: 0
TREMORS: 0
INCREASED ENERGY: 1
NECK PAIN: 1
DISTURBANCES IN COORDINATION: 0
MEMORY LOSS: 0
HEARTBURN: 1
MUSCLE WEAKNESS: 1
HYPOTENSION: 0
FATIGUE: 1
BOWEL INCONTINENCE: 0
SEIZURES: 0
ALTERED TEMPERATURE REGULATION: 1
NIGHT SWEATS: 0
RECTAL PAIN: 0
POLYPHAGIA: 0
ARTHRALGIAS: 1
WEAKNESS: 0
DIARRHEA: 1
NUMBNESS: 1
NAIL CHANGES: 0
CHILLS: 1
HYPERTENSION: 1
CONSTIPATION: 0
TINGLING: 1
MUSCLE CRAMPS: 1
VOMITING: 0
POOR WOUND HEALING: 0
LEG PAIN: 0
PALPITATIONS: 1
HALLUCINATIONS: 0
SKIN CHANGES: 0
BLOATING: 1
HEADACHES: 1
WEIGHT LOSS: 0
SPEECH CHANGE: 0
JAUNDICE: 0
LIGHT-HEADEDNESS: 0
DYSURIA: 0
NAUSEA: 1
BACK PAIN: 0
ORTHOPNEA: 0
PARALYSIS: 0
WEIGHT GAIN: 0
MYALGIAS: 1
DIFFICULTY URINATING: 0

## 2018-02-02 NOTE — NURSING NOTE
"Reason For Visit:   Chief Complaint   Patient presents with     Consult     Carpal tunnel syndrome of right wrist, per clinic coordinator in Neurology, EMG 1-23-17. Cyst on the top of right wrist. Pain im right arm.       Primary MD: Bj Butler  Ref. MD: Caio Antony.    Age: 47 year old    ?  No      Ht 1.651 m (5' 5\")  Wt 72.8 kg (160 lb 9.6 oz)  LMP 07/28/2017  BMI 26.73 kg/m2      Pain Assessment  Patient Currently in Pain: Yes  0-10 Pain Scale: 2  Primary Pain Location: Wrist (Arm as well)  Pain Orientation: Right  Pain Descriptors: Sharp, Throbbing, Numbness, Aching  Alleviating Factors: Stretching, Rest, NSAIDS, Exercise  Aggravating Factors: Movement (lifting heavy items and opening things)    Hand Dominance Evaluation  Hand Dominance: Right          QuickDASH Assessment  QuickDASH Main 2/2/2018   1.Open a tight or new jar. Moderate difficulty   2. Do heavy household chores (e.g., wash walls, floors) Moderate difficulty   3. Carry a shopping bag or briefcase. Mild difficulty   4. Wash your back. No difficulty   5. Use a knife to cut food. Mild difficulty   6. Recreational activities in which you take some force or impact through your arm, shoulder or hand (e.g., golf, hammering, tennis, etc.). Moderate difficulty   7. During the past week, to what extent has your arm, shoulder or hand problem interfered with your normal social activities with family, friends, neighbours or groups? Slightly   8. During the past week, were you limited in your work or other regular daily activities as a result of your arm, shoulder or hand problem? Moderately limited   9. Arm, shoulder or hand pain. Moderate   10.Tingling (pins and needles) in your arm,shoulder or hand. Mild   11. During the past week, how much difficulty have you had sleeping because of the pain in your arm, shoulder or hand? ([place] number) Mild difficulty   Quickdash Ability Score 34.09          Current Outpatient Prescriptions "   Medication Sig Dispense Refill     levalbuterol (XOPENEX HFA) 45 MCG/ACT Inhaler Inhale 1-2 puffs into the lungs every 4 hours as needed for shortness of breath / dyspnea 1 Inhaler 5     fluticasone-salmeterol (ADVAIR) 100-50 MCG/DOSE diskus inhaler Inhale 1 puff into the lungs 2 times daily 3 Inhaler 1     diltiazem (CARDIZEM CD) 180 MG 24 hr capsule Take 1 capsule (180 mg) by mouth daily 30 capsule 2     Cholecalciferol (VITAMIN D) 2000 UNITS CAPS Take 1 capsule by mouth daily       multivitamin, therapeutic with minerals (MULTI-VITAMIN) TABS tablet Take 1 tablet by mouth daily       RaNITidine HCl (ZANTAC PO) Take 150 mg by mouth as needed for heartburn         Allergies   Allergen Reactions     Penicillins      Swelling throat; age 4     Tetracycline      Vomiting; nauseous       JAYDEN, ESTUS, CMA

## 2018-02-02 NOTE — MR AVS SNAPSHOT
After Visit Summary   2/2/2018    Laura Jackson    MRN: [id number]           Patient Information     Date Of Birth          1970        Visit Information        Provider Department      2/2/2018 10:00 AM Rickey Rea MD [facility]        Today's Diagnoses     Right wrist pain    -  1       Follow-ups after your visit        Your next 10 appointments already scheduled     Feb 02, 2018 12:15 PM CST   (Arrive by 12:00 PM)   MR WRIST RIGHT W/O CONTRAST with [id number]   [facility] ([facility])    [address] [place] [id number] [phone]           Take your medicines as usual, unless your doctor tells you not to. Bring a list of your current medicines to your exam (including vitamins, minerals and over-the-counter drugs). Also bring the results of similar scans you may have had.  Please remove any body piercings and hair extensions before you arrive.  Follow your doctor s orders. If you do not, we may have to postpone your exam.  You will not have contrast for this exam. You do not need to do anything special to prepare.  The MRI machine uses a strong magnet. Please wear clothes without metal (snaps, zippers). A sweatsuit works well, or we may give you a hospital gown.   **IMPORTANT** THE INSTRUCTIONS BELOW ARE ONLY FOR THOSE PATIENTS WHO HAVE BEEN TOLD THEY WILL RECEIVE SEDATION OR GENERAL ANESTHESIA DURING THEIR MRI PROCEDURE:  IF YOU WILL RECEIVE SEDATION (take medicine to help you relax during your exam):   You must get the medicine from your doctor before you arrive. Bring the medicine to the exam. Do not take it at home.   Arrive one hour early. Bring someone who can take you home after the test. Your medicine will make you sleepy. After the exam, you may not drive, take a bus or take a taxi by yourself.   No eating 8 hours before your exam. You may have clear liquids up until 4 hours before your  exam. (Clear liquids include water, clear tea, black coffee and fruit juice without pulp.)  IF YOU WILL RECEIVE ANESTHESIA (be asleep for your exam):   Arrive 1 1/2 hours early. Bring someone who can take you home after the test. You may not drive, take a bus or take a taxi by yourself.   No eating 8 hours before your exam. You may have clear liquids up until 4 hours before your exam. (Clear liquids include water, clear tea, black coffee and fruit juice without pulp.)   You will spend four to five hours in the recovery room.  Please call the Imaging Department at your exam site with any questions.            Feb 08, 2018 10:20 AM CST   New Visit with Elliot Sebastian MD   [facility] ([facility])    [address] [facility] [address]   [phone]            Feb 09, 2018  9:30 AM CST   [facility] with Moreno Frank, PT   [facility] ([facility])    [address] [facility]  [address]  [facility] [address]   [phone]            Feb 23, 2018  9:40 AM CST   (Arrive by 9:25 AM)   NEW HAND with Rickey Rea MD   [facility] ([facility])    [address] [place] [id number] [phone]            Mar 08, 2018  4:00 PM CST   (Arrive by 3:45 PM)   RETURN ARRHYTHMIA with Phil Mitchell MD   [facility] ([facility])    [address] [place] [address]  [address] [place] [id number] [phone]              Future tests that were ordered for you today     Open Future Orders        Priority Expected Expires Ordered    MR Wrist Right w/o Contrast Routine  2/2/2019 2/2/2018            Who to contact     Please call your clinic at [phone] to:    Ask questions about your health    Make or cancel appointments    Discuss your medicines    Learn about your test results    Speak to your doctor   If you have compliments or concerns about an experience at your clinic, or if  "you wish to file a complaint, please contact [facility] Patient Relations at [phone] or email us at [email or web address]         Additional Information About Your Visit        [employer] Information     [employer] gives you secure access to your electronic health record. If you see a primary care provider, you can also send messages to your care team and make appointments. If you have questions, please call your primary care clinic.  If you do not have a primary care provider, please call [phone] and they will assist you.      [employer] is an electronic gateway that provides easy, online access to your medical records. With [employer], you can request a clinic appointment, read your test results, renew a prescription or communicate with your care team.     To access your existing account, please contact your [facility] or call [phone] for assistance.        Care EveryWhere ID     This is your Care EveryWhere ID. This could be used by other organizations to access your [place] medical records  [id number]        Your Vitals Were     Height Last Period BMI (Body Mass Index)             1.651 m (5' 5\") 07/28/2017 26.73 kg/m2          Blood Pressure from Last 3 Encounters:   12/28/17 (!) 147/97   12/21/17 120/80   12/01/17 122/74    Weight from Last 3 Encounters:   02/02/18 72.8 kg (160 lb 9.6 oz)   12/28/17 73.5 kg (162 lb)   12/21/17 73.4 kg (161 lb 12.8 oz)               Primary Care Provider Office Phone # Fax #    Bj Butler [phone] [address] [facility] [address]        Equal Access to Services     ELIGIO HOWARD : Hadii latoya chaves Soandreia, waaxda luqadaha, qaybta kaalmada michelle, ludmila alberts. So [facility] [phone].    ATENCIÓN: Si habla español, tiene a guido disposición servicios gratuitos de asistencia lingüística. Llame al [phone].    We comply with applicable " federal civil rights laws and [place] laws. We do not discriminate on the basis of race, color, national origin, age, disability, sex, sexual orientation, or gender identity.            Thank you!     Thank you for choosing [facility]  for your care. Our goal is always to provide you with excellent care. Hearing back from our patients is one way we can continue to improve our services. Please take a few minutes to complete the written survey that you may receive in the mail after your visit with us. Thank you!             Your Updated Medication List - Protect others around you: Learn how to safely use, store and throw away your medicines at www.disposemymeds.org.          This list is accurate as of 2/2/18 10:28 AM.  Always use your most recent med list.                   Brand Name Dispense Instructions for use Diagnosis    diltiazem 180 MG 24 hr capsule    CARDIZEM CD    30 capsule    Take 1 capsule (180 mg) by mouth daily    History of supraventricular tachycardia       fluticasone-salmeterol 100-50 MCG/DOSE diskus inhaler    ADVAIR    3 Inhaler    Inhale 1 puff into the lungs 2 times daily    Mild persistent asthma without complication       levalbuterol 45 MCG/ACT Inhaler    XOPENEX HFA    1 Inhaler    Inhale 1-2 puffs into the lungs every 4 hours as needed for shortness of breath / dyspnea    Mild persistent asthma without complication       Multi-vitamin Tabs tablet      Take 1 tablet by mouth daily        vitamin D 2000 UNITS Caps      Take 1 capsule by mouth daily        ZANTAC PO      Take 150 mg by mouth as needed for heartburn

## 2018-02-02 NOTE — PROGRESS NOTES
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 She said the numbness and tingling for years. Became worse during pregnancy, resolved somewhat after. She's been wearing splints. Also she completed dorsal wrist pain, swelling, mass. The mass has gotten smaller. No history of any trauma. Also had a history of an IV placed in the right antecubital fossa last year. She developed some erythema, tenderness around the IV site. That did resolve but she has ongoing intermittent pain in the antecubital crease.    The past medical history is reviewed and documented in the chart including allergies, medications, medical diagnoses, surgical procedures and review of systems. Significant for cardiac diagnoses supraventricular tachycardia, currently being seen by cardiology.    Physical examination demonstrates a very pleasant 47-year-old female, alert and oriented ×3, no apparent distress. 160 pounds, 5 foot 5.    Physical examination of the right wrist demonstrates slightly reduced flexion and extension. She has dorsal fullness consistent with either synovitis versus a dorsal wrist ganglion. It is mildly tender to palpation. No overlying skin changes. Phalen's and Tinel's and carpal tunnel compression test are all positive. No wrist instability, Putnam's is negative. No ulnar-sided tenderness.There is a palpable radial pulse and brisk capillary refill. No overlying skin changes, no erythema, no wounds or signs of infection. There is full sensation to light touch throughout. No motor deficits noted. Left hand demonstrates full range of motion, no neurovascular deficits but a very mildly positive Tinel's and Phalen's.    Examination of the right elbow demonstrates full range of motion. She is tender mostly over the biceps tendon. Biceps tendon is intact. Mild tenderness with resisted flexion but no significant tenderness  with resisted supination. No lateral or medial tenderness with respect to the elbow. No instability, no crepitance.    X-rays reviewed of the right wrist, right elbow. No fractures, no dislocations, no acute changes. She does have some very early mild ST-T joint arthritic changes.    Impression:  #1 right dorsal wrist mass, possible ganglion versus synovitis  #2 right carpal tunnel syndrome, mild left carpal tunnel syndrome  #3 right anterior elbow pain, biceps tendinitis, mild    Plan:    We discussed options, reviewed her x-rays. We'll start with an MRI scan of her right wrist to differentiate ganglion versus synovitis. We discussed possibility of a right carpal tunnel release and dorsal wrist mass excision. I'll see her back after the MRI is complete. She'll continue with the splint p.r.n. in the meantime.  Answers for HPI/ROS submitted by the patient on 2/2/2018   General Symptoms: Yes  Skin Symptoms: Yes  HENT Symptoms: No  EYE SYMPTOMS: No  HEART SYMPTOMS: Yes  LUNG SYMPTOMS: No  INTESTINAL SYMPTOMS: Yes  URINARY SYMPTOMS: Yes  GYNECOLOGIC SYMPTOMS: No  BREAST SYMPTOMS: No  SKELETAL SYMPTOMS: Yes  BLOOD SYMPTOMS: No  NERVOUS SYSTEM SYMPTOMS: Yes  MENTAL HEALTH SYMPTOMS: No  Fever: No  Loss of appetite: No  Weight loss: No  Weight gain: No  Fatigue: Yes  Night sweats: No  Chills: Yes  Increased stress: No  Excessive hunger: No  Excessive thirst: No  Feeling hot or cold when others believe the temperature is normal: Yes  Loss of height: No  Post-operative complications: No  Surgical site pain: No  Hallucinations: No  Change in or Loss of Energy: Yes  Hyperactivity: No  Confusion: No  Changes in hair: No  Changes in moles/birth marks: No  Itching: No  Rashes: No  Changes in nails: No  Acne: No  Hair in places you don't want it: No  Change in facial hair: No  Warts: No  Non-healing sores: No  Scarring: No  Flaking of skin: No  Color changes of hands/feet in cold : Yes  Sun sensitivity: No  Skin thickening:  No  Chest pain or pressure: No  Fast or irregular heartbeat: Yes  Pain in legs with walking: No  Trouble breathing while lying down: No  Fingers or toes appear blue: Yes  High blood pressure: Yes  Low blood pressure: No  Fainting: No  Murmurs: No  Pacemaker: No  Varicose veins: No  Edema or swelling: Yes  Wake up at night with shortness of breath: No  Light-headedness: No  Exercise intolerance: Yes  Heart burn or indigestion: Yes  Nausea: Yes  Vomiting: No  Abdominal pain: No  Bloating: Yes  Constipation: No  Diarrhea: Yes  Blood in stool: No  Black stools: No  Rectal or Anal pain: No  Fecal incontinence: No  Yellowing of skin or eyes: No  Vomit with blood: No  Change in stools: Yes  Trouble holding urine or incontinence: No  Pain or burning: No  Trouble starting or stopping: No  Increased frequency of urination: No  Blood in urine: No  Decreased frequency of urination: No  Frequent nighttime urination: No  Flank pain: No  Difficulty emptying bladder: No  Back pain: No  Muscle aches: Yes  Neck pain: Yes  Joint pain: Yes  Bone pain: No  Muscle cramps: Yes  Muscle weakness: Yes  Joint stiffness: Yes  Bone fracture: No  Trouble with coordination: No  Dizziness or trouble with balance: No  Fainting or black-out spells: No  Memory loss: No  Headache: Yes  Seizures: No  Speech problems: No  Tingling: Yes  Tremor: No  Weakness: No  Difficulty walking: No  Paralysis: No  Numbness: Yes

## 2018-02-02 NOTE — LETTER
2/2/2018       RE: Laura Jackson  [address]  [place] [address]     Dear Colleague,    Thank you for referring your patient, Laura Jackson, to the [facility] at [facility]. Please see a copy of my visit note below.    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 She said the numbness and tingling for years. Became worse during pregnancy, resolved somewhat after. She's been wearing splints. Also she completed dorsal wrist pain, swelling, mass. The mass has gotten smaller. No history of any trauma. Also had a history of an IV placed in the right antecubital fossa last year. She developed some erythema, tenderness around the IV site. That did resolve but she has ongoing intermittent pain in the antecubital crease.    The past medical history is reviewed and documented in the chart including allergies, medications, medical diagnoses, surgical procedures and review of systems. Significant for cardiac diagnoses supraventricular tachycardia, currently being seen by cardiology.    Physical examination demonstrates a very pleasant 47-year-old female, alert and oriented ×3, no apparent distress. 160 pounds, 5 foot 5.    Physical examination of the right wrist demonstrates slightly reduced flexion and extension. She has dorsal fullness consistent with either synovitis versus a dorsal wrist ganglion. It is mildly tender to palpation. No overlying skin changes. Phalen's and Tinel's and carpal tunnel compression test are all positive. No wrist instability, Putnam's is negative. No ulnar-sided tenderness.There is a palpable radial pulse and brisk capillary refill. No overlying skin changes, no erythema, no wounds or signs of infection. There is full sensation to light touch throughout. No motor deficits noted. Left hand demonstrates full range of  motion, no neurovascular deficits but a very mildly positive Tinel's and Phalen's.    Examination of the right elbow demonstrates full range of motion. She is tender mostly over the biceps tendon. Biceps tendon is intact. Mild tenderness with resisted flexion but no significant tenderness with resisted supination. No lateral or medial tenderness with respect to the elbow. No instability, no crepitance.    X-rays reviewed of the right wrist, right elbow. No fractures, no dislocations, no acute changes. She does have some very early mild ST-T joint arthritic changes.    Impression:  #1 right dorsal wrist mass, possible ganglion versus synovitis  #2 right carpal tunnel syndrome, mild left carpal tunnel syndrome  #3 right anterior elbow pain, biceps tendinitis, mild    Plan:    We discussed options, reviewed her x-rays. We'll start with an MRI scan of her right wrist to differentiate ganglion versus synovitis. We discussed possibility of a right carpal tunnel release and dorsal wrist mass excision. I'll see her back after the MRI is complete. She'll continue with the splint p.r.n. in the meantime.  Answers for HPI/ROS submitted by the patient on 2/2/2018   General Symptoms: Yes  Skin Symptoms: Yes  HENT Symptoms: No  EYE SYMPTOMS: No  HEART SYMPTOMS: Yes  LUNG SYMPTOMS: No  INTESTINAL SYMPTOMS: Yes  URINARY SYMPTOMS: Yes  GYNECOLOGIC SYMPTOMS: No  BREAST SYMPTOMS: No  SKELETAL SYMPTOMS: Yes  BLOOD SYMPTOMS: No  NERVOUS SYSTEM SYMPTOMS: Yes  MENTAL HEALTH SYMPTOMS: No  Fever: No  Loss of appetite: No  Weight loss: No  Weight gain: No  Fatigue: Yes  Night sweats: No  Chills: Yes  Increased stress: No  Excessive hunger: No  Excessive thirst: No  Feeling hot or cold when others believe the temperature is normal: Yes  Loss of height: No  Post-operative complications: No  Surgical site pain: No  Hallucinations: No  Change in or Loss of Energy: Yes  Hyperactivity: No  Confusion: No  Changes in hair: No  Changes in moles/birth  marks: No  Itching: No  Rashes: No  Changes in nails: No  Acne: No  Hair in places you don't want it: No  Change in facial hair: No  Warts: No  Non-healing sores: No  Scarring: No  Flaking of skin: No  Color changes of hands/feet in cold : Yes  Sun sensitivity: No  Skin thickening: No  Chest pain or pressure: No  Fast or irregular heartbeat: Yes  Pain in legs with walking: No  Trouble breathing while lying down: No  Fingers or toes appear blue: Yes  High blood pressure: Yes  Low blood pressure: No  Fainting: No  Murmurs: No  Pacemaker: No  Varicose veins: No  Edema or swelling: Yes  Wake up at night with shortness of breath: No  Light-headedness: No  Exercise intolerance: Yes  Heart burn or indigestion: Yes  Nausea: Yes  Vomiting: No  Abdominal pain: No  Bloating: Yes  Constipation: No  Diarrhea: Yes  Blood in stool: No  Black stools: No  Rectal or Anal pain: No  Fecal incontinence: No  Yellowing of skin or eyes: No  Vomit with blood: No  Change in stools: Yes  Trouble holding urine or incontinence: No  Pain or burning: No  Trouble starting or stopping: No  Increased frequency of urination: No  Blood in urine: No  Decreased frequency of urination: No  Frequent nighttime urination: No  Flank pain: No  Difficulty emptying bladder: No  Back pain: No  Muscle aches: Yes  Neck pain: Yes  Joint pain: Yes  Bone pain: No  Muscle cramps: Yes  Muscle weakness: Yes  Joint stiffness: Yes  Bone fracture: No  Trouble with coordination: No  Dizziness or trouble with balance: No  Fainting or black-out spells: No  Memory loss: No  Headache: Yes  Seizures: No  Speech problems: No  Tingling: Yes  Tremor: No  Weakness: No  Difficulty walking: No  Paralysis: No  Numbness: Yes    Rickey Rea MD

## 2018-02-06 ENCOUNTER — OFFICE VISIT (OUTPATIENT)
Dept: INTERNAL MEDICINE | Facility: CLINIC | Age: 48
End: 2018-02-06
Payer: COMMERCIAL

## 2018-02-06 VITALS
OXYGEN SATURATION: 100 % | DIASTOLIC BLOOD PRESSURE: 76 MMHG | RESPIRATION RATE: 18 BRPM | BODY MASS INDEX: 26.79 KG/M2 | SYSTOLIC BLOOD PRESSURE: 124 MMHG | WEIGHT: 161 LBS | TEMPERATURE: 97.3 F | HEART RATE: 106 BPM

## 2018-02-06 DIAGNOSIS — G90.9 AUTONOMIC NERVOUS SYSTEM DISORDER: Primary | ICD-10-CM

## 2018-02-06 PROBLEM — G90.A POTS (POSTURAL ORTHOSTATIC TACHYCARDIA SYNDROME): Status: ACTIVE | Noted: 2018-02-06

## 2018-02-06 PROCEDURE — 99213 OFFICE O/P EST LOW 20 MIN: CPT | Performed by: INTERNAL MEDICINE

## 2018-02-06 ASSESSMENT — PAIN SCALES - GENERAL: PAINLEVEL: NO PAIN (0)

## 2018-02-06 NOTE — PROGRESS NOTES
SUBJECTIVE:   Laura Jackson is a 47 year old female who presents to clinic today for the following health issues:    Follow-Up- POTS (postural orthostatic tachycardia syndrome)     Today is a further follow up regarding this patient with an unusual problem which appears to be an autonomic nervous system dysfunction . This is well documented history with multiple previous office visit notes. She has been limited in her abilities to function secondary to recurrent orthostatic hypertension and orthostatic sinus tachycardia as well as other symptoms of feeling extremely poorly. 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     She went to see Dr. Mitchell in Cardiology and he confirmed the POTS. He prescribed her some salt supplements, but they made her very edematous. She is following up with him every 3 months, he is guessing a 2 year recovery for her at this point based on prior experiences and history. She reports improvement with sleeping at least 8-10 hours a night, but is not currently getting that. When she misses her diltiazem she has a HR of 130 just walking up stairs. She still reports plenty of nausea, fatigue, and memory fog. Standing is still very hard for her. She tries to go for a walk everyday. If she stands for more than 20 minutes she feels her heart rate start to increase. The last time she felt improvement was when her hemoglobin was normal again. She feels she hasn't made too much progress since then. The diagnosis and long recovery time have reassured her when she doesn't seem to be making any progress. She definitely feels that being on the heart medicine has kept her out of the ER.     She is planning on seeing the rheumatologist as well this week for her joint hypermobility which she knows has been associated with POTS. She is worried that her son who has joint hypermobility will develop POTS as well. She is  considering having further testing done at [facility], but isn't sure if it would be beneficial for her because she already has the diagnosis of POTS. If she decides to have the testing done, she isn't sure if she will need a referral, but will call if she does. She has some additional long term disability insurance paper work that needs to be filled out for her unemployment and will fax them over later. 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       Problem list and histories reviewed & adjusted, as indicated.  Additional history: as documented    Patient Active Problem List   Diagnosis     CARDIOVASCULAR SCREENING; LDL GOAL LESS THAN 160     Irritable bowel syndrome     GERD (gastroesophageal reflux disease)     History of supraventricular tachycardia     Mild persistent asthma     Family history of breast cancer     Foreign body (FB) in soft tissue     S/P myomectomy     Nausea     Dehydration     S/P ANAID (total abdominal hysterectomy)     Hypovitaminosis D     Pelvic adhesions     Endometriosis     Heberden's nodes     Cervicalgia     Past Surgical History:   Procedure Laterality Date     APPENDECTOMY       DAVINCI HYSTERECTOMY TOTAL, SALPINGECTOMY BILATERAL N/A 8/4/2017    Procedure: DAVINCI XI HYSTERECTOMY TOTAL, SALPINGECTOMY BILATERAL;  DAVINCI TOTAL HYSTERECTOMY; BILATERAL SALPINGECTOMY. BILATERAL URETERAL LYSIS. UTEROSACRAL-COLPOPEXY. EXCISION OF ENDOMETRIOSIS;  Surgeon: George Loyola MD;  Location: [facility] OR     DAVINCI LYSIS OF ADHESIONS Bilateral 8/4/2017    Procedure: DAVINCI LYSIS OF ADHESIONS;  BILATERAL URETERAL LYSIS;  Surgeon: George Loyola MD;  Location: [facility] OR     DAVINCI SACROCOLPOPEXY, CYSTOSCOPY, COMBINED N/A 8/4/2017    Procedure: COMBINED DAVINCI SACROCOLPOPEXY, CYSTOSCOPY;  UTEROSACRAL COLPOPEXY;  Surgeon: George Loyola MD;  Location: [facility] OR     DAVINCI XI ASSISTED ABLATION / EXCISION OF ENDOMETRIOSIS  8/4/2017    Procedure: DAVINCI XI ASSISTED ABLATION / EXCISION  OF ENDOMETRIOSIS;;  Surgeon: George Loyola MD;  Location: [facility] OR     DILATION AND CURETTAGE N/A 6/20/2017    Procedure: DILATION AND CURETTAGE;  Uterine Curettings and Fibroid Removal, Cook catheter placement; (No hysterectomy done at this time);  Surgeon: Ernestina Saunders MD;  Location: [facility] OR     TONSILLECTOMY         Social History   Substance Use Topics     Smoking status: Never Smoker     Smokeless tobacco: Never Used     Alcohol use Yes      Comment: occasional     Family History   Problem Relation Age of Onset     DIABETES Mother      Hypertension Mother      Hyperlipidemia Mother      Arrhythmia Mother      Cardiovascular Father      CHF and COPD     Asthma Father      Breast Cancer Maternal Grandfather      Colon Cancer Maternal Grandfather      Breast Cancer Paternal Grandmother      Breast Cancer Paternal Aunt      C.A.D. Paternal Grandfather      Breast Cancer Cousin      Asthma Son      DIABETES Maternal Grandmother      Hypertension Maternal Grandmother            Reviewed and updated as needed this visit by clinical staff  Tobacco  Allergies  Meds  Med Hx  Surg Hx  Fam Hx  Soc Hx      Reviewed and updated as needed this visit by Provider     ROS:  Constitutional, HEENT, cardiovascular, pulmonary, GI, , musculoskeletal, neuro, skin, endocrine and psych systems are negative, except as otherwise noted.    This document serves as a record of the services and decisions personally performed and made by Bj Butler MD. It was created on his behalf by Kylah Gordillo, a trained medical scribe. The creation of this document is based on the provider's statements to the medical scribe.  Kylah Gordillo February 6, 2018 10:23 AM      OBJECTIVE:     /76  Pulse 106  Temp 97.3  F (36.3  C) (Oral)  Resp 18  Wt 73 kg (161 lb)  LMP 07/28/2017  SpO2 100%  BMI 26.79 kg/m2  Body mass index is 26.79 kg/(m^2).  GENERAL: healthy, alert and no distress  SKIN: no suspicious lesions or rashes to visible  skin  NEURO: mentation intact and speech normal  PSYCH: mentation appears normal, affect normal/bright    Diagnostic Test Results:  No results found for this or any previous visit (from the past 24 hour(s)).    ASSESSMENT/PLAN:       (G90.9) Autonomic nervous system disorder  (primary encounter diagnosis)  Comment: I am curious to see if the POTS (postural orthostatic tachycardia syndrome) is an accurate enough diagnosis to formally add to her problem list at this point . I think it is but will run this past Dr. Mitchell  Plan: I will continue to complete paper work for this patient . Will see her back in 3 months for an update / revision of her paper work     The information in this document, created by the medical scribe for me, accurately reflects the services I personally performed and the decisions made by me. I have reviewed and approved this document for accuracy.   MD Bj Mcknight MD  [facility]

## 2018-02-06 NOTE — MR AVS SNAPSHOT
After Visit Summary   2/6/2018    Laura Jackson    MRN: [id number]           Patient Information     Date Of Birth          1970        Visit Information        Provider Department      2/6/2018 10:10 AM Bj Butler MD [facility] Instructions    [facility]    If you have any questions regarding to your visit please contact your care team:     Team Pink:   Clinic Hours Telephone Number   Internal Medicine:  Dr. Trisha Kang NP       7am-7pm  Monday - Thursday   7am-5pm  Fridays  [phone]  (Appointment scheduling available 24/7)    Questions about your visit?  Team Line  [phone]   Urgent Care - [place] and [place] - 11am-9pm Monday-Friday Saturday-Sunday- 9am-5pm   [place] - 5pm-9pm Monday-Friday Saturday-Sunday- 9am-5pm  [phone] - [place]   [phone] - [place]       What options do I have for visits at the clinic other than the traditional office visit?  To expand how we care for you, many of our providers are utilizing electronic visits (e-visits) and telephone visits, when medically appropriate, for interactions with their patients rather than a visit in the clinic.   We also offer nurse visits for many medical concerns. Just like any other service, we will bill your insurance company for this type of visit based on time spent on the phone with your provider. Not all insurance companies cover these visits. Please check with your medical insurance if this type of visit is covered. You will be responsible for any charges that are not paid by your insurance.      E-visits via [email or web address]:  generally incur a $35.00 fee.  Telephone visits:  Time spent on the phone: *charged based on time that is spent on the phone in increments of 10 minutes. Estimated cost:   5-10 mins $30.00   11-20 mins. $59.00   21-30 mins. $85.00   Use [email or web address] (secure email communication and access to your  chart) to send your primary care provider a message or make an appointment. Ask someone on your Team how to sign up for Elephantit.    For a Price Quote for your services, please call our Consumer Price Line at [phone].    As always, Thank you for trusting us with your health care needs!    Discharged By:  JESSIE CLARK            Follow-ups after your visit        Your next 10 appointments already scheduled     Feb 08, 2018 10:20 AM CST   [facility]   [facility]    [address] [address]   [phone]            Feb 09, 2018  9:30 AM CST   [facility] Sp[facility] with Moreno Frank PT   [facility] ([facility])    [address]  [address]  [facility] [facility] [address]   [phone]            Feb 23, 2018  9:40 AM CST   (Arrive by 9:25 AM)   NEW HAND with Rickey Rea MD   [facility] ([facility])    [address] [place] [address]  4th Floor  [facility] [id number] [phone]            Mar 08, 2018  4:00 PM CST   (Arrive by 3:45 PM)   RETURN ARRHYTHMIA with Phil Mitchell MD   [facility] ([facility])    [address] [place] [address]  [address] [place] [id number] [phone]              Who to contact     If you have questions or need follow up information about today's clinic visit or your schedule please contact [facility] directly at [phone].  Normal or non-critical lab and imaging results will be communicated to you by MyChart, letter or phone within 4 business days after the clinic has received the results. If you do not hear from us within 7 days, please contact the clinic through Rosterbothart or phone. If you have a critical or abnormal lab result, we will notify you by phone as soon as possible.  Submit refill requests through [employer]Perfect or call your pharmacy and they will forward the refill request to us. Please allow 3 business  days for your refill to be completed.          Additional Information About Your Visit        MyChart Information     [employer] gives you secure access to your electronic health record. If you see a primary care provider, you can also send messages to your care team and make appointments. If you have questions, please call your primary care clinic.  If you do not have a primary care provider, please call [phone] and they will assist you.        Care EveryWhere ID     This is your Care EveryWhere ID. This could be used by other organizations to access your [facility] medical records  [id number]        Your Vitals Were     Pulse Temperature Respirations Last Period Pulse Oximetry BMI (Body Mass Index)    106 97.3  F (36.3  C) (Oral) 18 07/28/2017 100% 26.79 kg/m2       Blood Pressure from Last 3 Encounters:   02/06/18 124/76   12/28/17 (!) 147/97   12/21/17 120/80    Weight from Last 3 Encounters:   02/06/18 161 lb (73 kg)   02/02/18 160 lb 9.6 oz (72.8 kg)   12/28/17 162 lb (73.5 kg)              Today, you had the following     No orders found for display       Primary Care Provider Office Phone # Fax #    Bj Butler [phone] [address] [address]        Equal Access to Services     [facility]: Hadii aad ku hadasho Soomaali, waaxda luqadaha, qaybta kaalmada adeegyada, waxay idiin hayleeann waters . So [facility] [phone].    ATENCIÓN: Si habla español, tiene a guido disposición servicios gratuitos de asistencia lingüística. Llame al [phone].    We comply with applicable federal civil rights laws and [place] laws. We do not discriminate on the basis of race, color, national origin, age, disability, sex, sexual orientation, or gender identity.            Thank you!     Thank you for choosing [facility]  for your care. Our goal is always to provide you with excellent care. Hearing back from our patients is one way we can continue to  improve our services. Please take a few minutes to complete the written survey that you may receive in the mail after your visit with us. Thank you!             Your Updated Medication List - Protect others around you: Learn how to safely use, store and throw away your medicines at www.disposemymeds.org.          This list is accurate as of 2/6/18 10:43 AM.  Always use your most recent med list.                   Brand Name Dispense Instructions for use Diagnosis    diltiazem 180 MG 24 hr capsule    CARDIZEM CD    30 capsule    Take 1 capsule (180 mg) by mouth daily    History of supraventricular tachycardia       fluticasone-salmeterol 100-50 MCG/DOSE diskus inhaler    ADVAIR    3 Inhaler    Inhale 1 puff into the lungs 2 times daily    Mild persistent asthma without complication       levalbuterol 45 MCG/ACT Inhaler    XOPENEX HFA    1 Inhaler    Inhale 1-2 puffs into the lungs every 4 hours as needed for shortness of breath / dyspnea    Mild persistent asthma without complication       Multi-vitamin Tabs tablet      Take 1 tablet by mouth daily        vitamin D 2000 UNITS Caps      Take 1 capsule by mouth daily        ZANTAC PO      Take 150 mg by mouth as needed for heartburn

## 2018-02-06 NOTE — Clinical Note
Asif Mitchell, I am curious as to what you think about this patient . Do you think we should add POTS (postural orthostatic tachycardia syndrome) to her problem list at this point ? Etc. Thank you so very much !   Bj Butler MD

## 2018-02-06 NOTE — PATIENT INSTRUCTIONS
[facility]    If you have any questions regarding to your visit please contact your care team:     Team Pink:   Clinic Hours Telephone Number   Internal Medicine:  Dr. Trisha Kang NP       7am-7pm  Monday - Thursday   7am-5pm  Fridays  [phone]  (Appointment scheduling available 24/7)    Questions about your visit?  Team Line  [phone]   [facility] [place] [facility] - 11am-9pm Monday-Friday Saturday-Sunday- 9am-5pm   [place] - 5pm-9pm Monday-Friday Saturday-Sunday- 9am-5pm  [phone] - [place]   [phone] - [place]       What options do I have for visits at the clinic other than the traditional office visit?  To expand how we care for you, many of our providers are utilizing electronic visits (e-visits) and telephone visits, when medically appropriate, for interactions with their patients rather than a visit in the clinic.   We also offer nurse visits for many medical concerns. Just like any other service, we will bill your insurance company for this type of visit based on time spent on the phone with your provider. Not all insurance companies cover these visits. Please check with your medical insurance if this type of visit is covered. You will be responsible for any charges that are not paid by your insurance.      E-visits via EverythingMe:  generally incur a $35.00 fee.  Telephone visits:  Time spent on the phone: *charged based on time that is spent on the phone in increments of 10 minutes. Estimated cost:   5-10 mins $30.00   11-20 mins. $59.00   21-30 mins. $85.00   Use "[employer]t (secure email communication and access to your chart) to send your primary care provider a message or make an appointment. Ask someone on your Team how to sign up for EverythingMe.    For a Price Quote for your services, please call our Consumer Price Line at [phone].    As always, Thank you for trusting us with your health care  needs!    Discharged By:  JESSIE CLARK

## 2018-02-09 ENCOUNTER — THERAPY VISIT (OUTPATIENT)
Dept: PHYSICAL THERAPY | Facility: CLINIC | Age: 48
End: 2018-02-09
Payer: COMMERCIAL

## 2018-02-09 DIAGNOSIS — M54.2 CERVICALGIA: ICD-10-CM

## 2018-02-09 PROCEDURE — 97110 THERAPEUTIC EXERCISES: CPT | Mod: GP | Performed by: PHYSICAL THERAPIST

## 2018-02-09 PROCEDURE — 97140 MANUAL THERAPY 1/> REGIONS: CPT | Mod: GP | Performed by: PHYSICAL THERAPIST

## 2018-02-09 PROCEDURE — 97112 NEUROMUSCULAR REEDUCATION: CPT | Mod: GP | Performed by: PHYSICAL THERAPIST

## 2018-02-16 ENCOUNTER — OFFICE VISIT (OUTPATIENT)
Dept: RHEUMATOLOGY | Facility: CLINIC | Age: 48
End: 2018-02-16
Payer: COMMERCIAL

## 2018-02-16 VITALS
DIASTOLIC BLOOD PRESSURE: 86 MMHG | HEART RATE: 88 BPM | SYSTOLIC BLOOD PRESSURE: 125 MMHG | BODY MASS INDEX: 27.16 KG/M2 | HEIGHT: 65 IN | OXYGEN SATURATION: 99 % | WEIGHT: 163 LBS

## 2018-02-16 DIAGNOSIS — M19.041 PRIMARY OSTEOARTHRITIS OF BOTH HANDS: ICD-10-CM

## 2018-02-16 DIAGNOSIS — M19.042 PRIMARY OSTEOARTHRITIS OF BOTH HANDS: ICD-10-CM

## 2018-02-16 DIAGNOSIS — M35.7 HYPERMOBILITY SYNDROME: Primary | ICD-10-CM

## 2018-02-16 PROCEDURE — 99203 OFFICE O/P NEW LOW 30 MIN: CPT | Performed by: INTERNAL MEDICINE

## 2018-02-16 NOTE — PROGRESS NOTES
Rheumatology Clinic Visit      Laura Jackson MRN# [id number]   YOB: 1970 Age: 47 year old      Date of visit: 2/16/18   Referring provider: Dr. Bj Butler  PCP: Dr. Bj Butler    Chief Complaint   Patient presents with:  Consult: hypermobile      Assessment and Plan     1.  Hypermobility syndrome: Beighton score for joint hypermobility: 7.  Reportedly since childhood she has been having joint issues.  She also reports having breathing issues with some shortness of breath but no wheezing that does respond to inhalers; she was also diagnosed with asthma, per patient.  Also with POTS and is following with cardiology.  We reviewed the diagnosis of hypermobility syndrome.  Note that her son also appears to have hypermobility syndrome based on pictures that she showed me on her phone.  Refer to genetics at the [facility].  Refer to physical therapy.  Encouraged her to continue following with cardiology.  Note that I also encouraged her to have her 9yo son evaluated by a pediatric geneticist; she is going to speak with her son's pediatrician for a referral.    2.  Osteoarthritis the bilateral hands: Heberden's and Virginie's nodes bilaterally.  Symptoms are consistent with osteoarthritis.    3.  Raynaud's phenomenon: She does not have other symptoms to suggest an associated rheumatologic disorder, and it is well controlled with Cardizem at this time.  We reviewed the diagnosis and other treatment options if needed.  We also reviewed the importance of staying warm.    Ms. Jackson verbalized agreement with and understanding of the rational for the diagnosis and treatment plan.  All questions were answered to best of my ability and the patient's satisfaction. Ms. Jackson was advised to contact the clinic with any questions that may arise after the clinic visit.      Thank you for involving me in the care of the patient    Return to clinic: No scheduled return appointment in rheumatology needed at this  time. Return PRN.    HPI   Laura Jackson is a 47 year old female with a past medical history significant for irritable bowel syndrome, GERD, asthma, total abdominal hysterectomy, history of endometriosis, postural orthostatic tachycardia syndrome, and hypermobility syndrome who is seen in consultation at the request of Dr. Bj Butler for evaluation of hypermobility syndrome, and Heberden's nodes, Raynaud's phenomenon, and joint pain.    Today, Ms. Jackson reports that she has hypermobile joints.  She says it for a long time, ever since she was a child, she realized that many of her joint issues are probably due to having hypermobile joints.  She often has clicking and grinding of several joints.  Some pain in her PIPs and DIPs, much worse of the DIPs.  DIP and PIP pain is worse with more activity and improves with rest.  Morning stiffness for no more than 30 minutes.  She says that she is able to put her thumbs to the ipsilateral forearms, hyperextend her fingers at the MCP joints, and place her hands flat on the floor without bending her knees.  She says that her son, who is 10 years old, can also do many like this and shows me pictures on her phone of him standing with 1 foot facing forward and one foot facing backwards, also with one knee bent backwards but it was difficult to see the entire picture for that one.  Followed by cardiology for postural orthostatic tachycardia syndrome and has had an echocardiogram.  Has not been to physical therapy.  Raynaud's phenomenon is treated with Cardizem that controls symptoms well.    Denies fevers, chills, nausea, vomiting, constipation, diarrhea; but does have a history of stomachaches on occasion and has been diagnosed with irritable bowel syndrome in the past. No chest pain/pressure.  She will palpitations when she does not take Cardizem.  Occasional shortness of breath without wheezing that responds to albuterol; she says that she has been diagnosed with asthma.   No LE swelling. No neck pain. No oral or nasal sores.  Erythematous areas on her face that have been diagnosed as acne by dermatology.  No sicca symptoms. No photosensitivity or photophobia. No eye pain or redness. No history of inflammatory eye disease.  No history of DVT or pulmonary embolism; 1 first trimester miscarriage.  No history of serositis.  No seizure history.  No renal disorder.  No pain or difficulty swallowing.  No skin thickening or tightening.    Tobacco: None  EtOH: Occasional  Drugs: None  Occupation: Nurse    ROS   GEN: No fevers, chills, night sweats, or weight change  SKIN: See HPI  HEENT: No epistaxis. No oral or nasal ulcers.  CV: See HPI  PULM: See HPI  GI: See HPI  : No blood in urine.  MSK: See HPI.  NEURO: No numbness, tingling, or weakness.  EXT: No LE swelling  PSYCH: Negative    Active Problem List     Patient Active Problem List   Diagnosis     CARDIOVASCULAR SCREENING; LDL GOAL LESS THAN 160     Irritable bowel syndrome     GERD (gastroesophageal reflux disease)     History of supraventricular tachycardia     Mild persistent asthma     Family history of breast cancer     Foreign body (FB) in soft tissue     S/P myomectomy     Nausea     Dehydration     S/P ANAID (total abdominal hysterectomy)     Hypovitaminosis D     Pelvic adhesions     Endometriosis     Heberden's nodes     Cervicalgia     POTS (postural orthostatic tachycardia syndrome)     Hypermobility syndrome     Past Medical History     Past Medical History:   Diagnosis Date     Family history of breast cancer 2/19/2014     SVT (supraventricular tachycardia):  history of, no recurrence since 2008 10/11/2013    no recurrence since 2008      Uncomplicated asthma      Past Surgical History     Past Surgical History:   Procedure Laterality Date     APPENDECTOMY       DAVINCI HYSTERECTOMY TOTAL, SALPINGECTOMY BILATERAL N/A 8/4/2017    Procedure: DAVINCI XI HYSTERECTOMY TOTAL, SALPINGECTOMY BILATERAL;  DAVINCI TOTAL HYSTERECTOMY;  BILATERAL SALPINGECTOMY. BILATERAL URETERAL LYSIS. UTEROSACRAL-COLPOPEXY. EXCISION OF ENDOMETRIOSIS;  Surgeon: George Loyola MD;  Location: [facility]     DAVINCI LYSIS OF ADHESIONS Bilateral 8/4/2017    Procedure: DAVINCI LYSIS OF ADHESIONS;  BILATERAL URETERAL LYSIS;  Surgeon: George Loyola MD;  Location: [facility]     DAVINCI SACROCOLPOPEXY, CYSTOSCOPY, COMBINED N/A 8/4/2017    Procedure: COMBINED DAVINCI SACROCOLPOPEXY, CYSTOSCOPY;  UTEROSACRAL COLPOPEXY;  Surgeon: George Loyola MD;  Location: [facility]     DAVINCI XI ASSISTED ABLATION / EXCISION OF ENDOMETRIOSIS  8/4/2017    Procedure: DAVINCI XI ASSISTED ABLATION / EXCISION OF ENDOMETRIOSIS;;  Surgeon: George Loyola MD;  Location: [facility]     DILATION AND CURETTAGE N/A 6/20/2017    Procedure: DILATION AND CURETTAGE;  Uterine Curettings and Fibroid Removal, Cook catheter placement; (No hysterectomy done at this time);  Surgeon: Ernestina Saunders MD;  Location: [facility]     TONSILLECTOMY       Allergy     Allergies   Allergen Reactions     Penicillins      Swelling throat; age 4     Tetracycline      Vomiting; nauseous     Current Medication List     Current Outpatient Prescriptions   Medication Sig     levalbuterol (XOPENEX HFA) 45 MCG/ACT Inhaler Inhale 1-2 puffs into the lungs every 4 hours as needed for shortness of breath / dyspnea     fluticasone-salmeterol (ADVAIR) 100-50 MCG/DOSE diskus inhaler Inhale 1 puff into the lungs 2 times daily     diltiazem (CARDIZEM CD) 180 MG 24 hr capsule Take 1 capsule (180 mg) by mouth daily     Cholecalciferol (VITAMIN D) 2000 UNITS CAPS Take 1 capsule by mouth daily     multivitamin, therapeutic with minerals (MULTI-VITAMIN) TABS tablet Take 1 tablet by mouth daily     RaNITidine HCl (ZANTAC PO) Take 150 mg by mouth as needed for heartburn     No current facility-administered medications for this visit.          Social History   See HPI    Family History     Family History   Problem Relation Age of Onset     DIABETES  "Mother      Hypertension Mother      Hyperlipidemia Mother      Arrhythmia Mother      Cardiovascular Father      CHF and COPD     Asthma Father      Breast Cancer Maternal Grandfather      Colon Cancer Maternal Grandfather      Breast Cancer Paternal Grandmother      Breast Cancer Paternal Aunt      RAVEN.A.D. Paternal Grandfather      Breast Cancer Cousin      Asthma Son      DIABETES Maternal Grandmother      Hypertension Maternal Grandmother        Physical Exam     Temp Readings from Last 3 Encounters:   02/06/18 97.3  F (36.3  C) (Oral)   12/21/17 98.1  F (36.7  C) (Oral)   12/01/17 98.8  F (37.1  C) (Oral)     BP Readings from Last 5 Encounters:   02/16/18 125/86   02/06/18 124/76   12/28/17 (!) 147/97   12/21/17 120/80   12/01/17 122/74     Pulse Readings from Last 1 Encounters:   02/16/18 88     Resp Readings from Last 1 Encounters:   02/06/18 18     Estimated body mass index is 27.12 kg/(m^2) as calculated from the following:    Height as of this encounter: 1.651 m (5' 5\").    Weight as of this encounter: 73.9 kg (163 lb).    GEN: NAD  HEENT: MMM. No oral lesions.  Anicteric, noninjected sclera  CV: S1, S2. RRR. No m/r/g.  PULM: CTA bilaterally. No w/c.  ABD: +BS.   MSK: Heberden's and Virginie's nodes present.  Grinding of the bilateral first CMC joints there were tender to palpation. No synovial swelling of the hands.  No synovial swelling or tenderness to palpation of the wrists, elbows, shoulders, knees, ankles, or MTPs.  Negative MCP and MTP squeeze.  Hips nontender to palpation.  She is able to passively dorsiflex the fifth MCPs by at least 90 , oppose the thumbs to the volar aspect of the ipsilateral forearms, hyperextend the elbows by at least 10 , and place hands flat on the floor without bending the knees.  She was unable to hyperextend the knees by at least 10 .    NEURO: UE and LE strengths 5/5 and equal bilaterally.   SKIN: No rash  EXT: No LE edema  PSYCH: Alert. Appropriate.    Labs / Imaging " (select studies)     CBC  Recent Labs   Lab Test  11/28/17   1046  09/11/17   0942  08/27/17   0816  08/26/17   2344   WBC  5.0  4.6  5.7  6.1   RBC  4.72  4.67  4.04  4.13   HGB  13.7  12.6  10.3*  10.7*   HCT  41.9  39.0  32.5*  33.6*   MCV  89  84  80  81   RDW  14.3  16.7*  14.6  14.6   PLT  236  262  288  306   MCH  29.0  27.0  25.5*  25.9*   MCHC  32.7  32.3  31.7  31.8   NEUTROPHIL   --   65.7  62.2  57.1   LYMPH   --   25.4  30.0  33.8   MONOCYTE   --   6.1  4.9  5.2   EOSINOPHIL   --   2.6  2.3  3.3   BASOPHIL   --   0.2  0.2  0.3   ANEU   --   3.0  3.6  3.5   ALYM   --   1.2  1.7  2.1   ELISEO   --   0.3  0.3  0.3   AEOS   --   0.1  0.1  0.2   ABAS   --   0.0  0.0  0.0     CMP  Recent Labs   Lab Test  09/11/17   0942  08/26/17   2344  08/06/17   0645  08/05/17   0135  07/12/17   1911  06/26/17   1737  06/24/17   1236   02/12/13   0852   NA  138  138   --   138  141  142  145*   < >  139   POTASSIUM  4.0  3.6   --   3.9  3.7  3.8  3.6   < >  4.0   CHLORIDE  103  106   --   106  104  106  109   --   102   CO2  27  26   --   25  24  24  24   --   27   ANIONGAP  8  6   --   7  13  12  12   --   9   GLC  96  106*  100*  128*  100*  101*  101*   < >  83   BUN  11  13   --   7  12  9  7   --   13   CR  0.77  0.82   --   0.66  0.84  0.81  0.86   --   0.73   GFRESTIMATED  79  75   --   >90  Non  GFR Calc    72  75  71   --   87   GFRESTBLACK  >90  >90   --   >90   GFR Calc    87  >90   GFR Calc    85   --   >90   AZAEL  9.2  8.3*   --   8.4*  9.3  8.8  8.8   --   9.3   BILITOTAL   --    --    --    --   0.4   --   0.2   --   0.5   ALBUMIN   --    --    --    --   4.5   --   3.2*   --   4.1   PROTTOTAL   --    --    --    --   8.0   --   6.4*   --   7.0   ALKPHOS   --    --    --    --   54   --   40   --   42   AST   --    --    --    --   19   --   16   --   34   ALT   --    --    --    --   24   --   27   --   43    < > = values in this interval not displayed.      Calcium/VitaminD  Recent Labs   Lab Test  09/11/17   0942  08/26/17   2344  08/05/17   0135   AZAEL  9.2  8.3*  8.4*   VITDT  47   --    --      ESR/CRP  Recent Labs   Lab Test  11/28/17   1046   SED  8   CRP  <2.9     TSH/T4  Recent Labs   Lab Test  08/27/17   0816  06/26/17   1737  02/12/13   0852   TSH  3.50  2.53  1.97     Lipid Panel  Recent Labs   Lab Test  09/11/17   0942 07/20/11   CHOL  194  189   TRIG  97  72   HDL  65  65   LDL  110*  110   NHDL  129   --      UA  Recent Labs   Lab Test  08/27/17   0300  07/14/17   0908  06/24/17   1235  09/03/14   1640  01/19/13   1408   COLOR  Straw  Light Yellow  Straw  Yellow  Yellow   APPEARANCE  Slightly Cloudy  Clear  Clear  Clear  Clear   URINEGLC  Negative  Negative  Negative  Negative  Negative   URINEBILI  Negative  Negative  Negative  Negative  Negative   SG  1.001*  1.007  1.001*  1.015  <=1.005   URINEPH  7.0  7.0  7.0  6.5  6.0   PROTEIN  Negative  Negative  Negative  Negative  Negative   UROBILINOGEN   --    --    --   0.2  0.2   NITRITE  Negative  Negative  Negative  Negative  Negative   UBLD  Negative  Negative  Small*  Moderate*  Negative   LEUKEST  Negative  Negative  Trace*  Negative  Trace*   WBCU  <1   --   8*  2-5*  O - 2   RBCU  0   --   1  2-5*  O - 2   SQUAMOUSEPI   --    --    --   Few  Few   BACTERIA   --    --   Few*   --    --    MUCOUS   --    --   Present*   --    --      Urine Microscopic  Recent Labs   Lab Test  08/27/17   0300  06/24/17   1235  09/03/14   1640  01/19/13   1408   WBCU  <1  8*  2-5*  O - 2   RBCU  0  1  2-5*  O - 2   SQUAMOUSEPI   --    --   Few  Few   BACTERIA   --   Few*   --    --    MUCOUS   --   Present*   --    --      Immunization History     Immunization History   Administered Date(s) Administered     Influenza (IIV3) PF 10/15/2013, 10/27/2017     Mantoux Tuberculin Skin Test 08/30/2013, 09/10/2014     Pneumococcal 23 valent 05/27/2014     Tdap (Adacel,Boostrix) 05/04/2012          Chart documentation done in  part with Dragon Voice recognition Software. Although reviewed after completion, some word and grammatical error may remain.    Elliot Sebastian MD

## 2018-02-16 NOTE — PATIENT INSTRUCTIONS
Dr. Sebastian s Rheumatology Clinics  Locations Clinic Hours Telephone Number     Miesha [facility]er  [address]  [place] [address]     Wednesday: 7:20AM - 4:00PM  Thursday:     7:20AM - 4:00PM     Friday:          7:20AM - 11:00AM       To schedule an appointment with  Dr. Sebastian,  please contact  Specialty [phone]       Miesha Lopez  [address]  [place] [address]       Monday:       7:20AM - 4:00PM        Miesha Lewis  [address]  [place] [address]       Tuesday:      7:20AM - 4:00PM          Thank you!    Nhi Bass CMA

## 2018-02-16 NOTE — MR AVS SNAPSHOT
After Visit Summary   2018    Laura Jackson    MRN: [id number]           Patient Information     Date Of Birth          1970        Visit Information        Provider Department      2018 9:20 AM Elliot Sebastian MD [facility]        Today's Diagnoses     Hypermobility syndrome    -  1      Care Instructions    Dr. Sebastian s Rheumatology Clinics  Locations Clinic Hours Telephone Number     Miesha Graham  [address]  [place] [address]     Wednesday: 7:20AM - 4:00PM  Thursday:     7:20AM - 4:00PM     Friday:          7:20AM - 11:00AM       To schedule an appointment with  Dr. Sebastian,  please contact  Specialty [phone]       Heth John  [address]  [place] [address]       Monday:       7:20AM - 4:00PM        Heth Angelika Lewis  [address] [place] [address]       Tuesday:      7:20AM - 4:00PM          Thank you!    Nhi Bass CMA              Follow-ups after your visit        Additional Services     GENETICS REFERRAL       Your provider has referred you to: [facility]    Hypermobility syndrome; POTS (following with cardiology at the [facility])    Please be aware that coverage of these services is subject to the terms and limitations of your health insurance plan.  Call member services at your health plan with any benefit or coverage questions.      Please bring the following with you to your appointment:    (1) Any X-Rays, CTs or MRIs which have been performed.  Contact the facility where they were done to arrange for  prior to your scheduled appointment.   (2) List of current medications   (3) This referral request   (4) Any documents/labs given to you for this referral            [facility] PT, HAND, AND CHIROPRACTIC REFERRAL       **This order will print in the [facility] Scheduling Office**    Physical Therapy, Hand Therapy and Chiropractic Care are available through:    *[facility]  *[facility]  *[facility]    Call one number to schedule at any of the above locations: [phone].    Your provider has referred you to: Physical Therapy at [facility] or [facility]    Indication/Reason for Referral: Multiple joint pain due to hypermobility.  Needs general strengthening to help prevent hyperextension  Onset of Illness: years  Therapy Orders: Evaluate and Treat  Special Programs: None  Special Request: None    Kieran Alcaraz      Additional Comments for the Therapist or Chiropractor: hypermobility syndrome    Please be aware that coverage of these services is subject to the terms and limitations of your health insurance plan.  Call member services at your health plan with any benefit or coverage questions.      Please bring the following to your appointment:    *Your personal calendar for scheduling future appointments  *Comfortable clothing                  Your next 10 appointments already scheduled     Feb 23, 2018  9:40 AM CST   (Arrive by 9:25 AM)   NEW HAND with Rickey Rea MD   [facility] ([facility])    [address] [place] [address]  4th Floor  [facility] [address] [phone]            Feb 23, 2018 12:40 PM CST   [facility] Spine with Moreno S Sondrol, PT   [facility] ([facility])    [address]  [facility] [address]   [phone]            Mar 08, 2018 10:10 AM CST   [facility] Spine with Moreno S Sondrol, PT   [facility] ([facility])    [address]  [facility] [address]   [phone]            Mar 08, 2018  4:00 PM CST   (Arrive by 3:45 PM)   RETURN ARRHYTHMIA with Phil Mitchell MD   [facility] ([facility])    [address] [place] [address]  [address] [place] [address] [phone]            Mar 22, 2018 10:10 AM CDT   [facility] Spine with Moreno S Sondrol, PT   [facility] ([facility])    [address]  [place] [id number] [phone]              Who to contact     If you have questions or need follow up information about today's clinic visit or your schedule please contact [facility] directly at [phone].  Normal or non-critical lab and imaging results will be communicated to you by MyChart, letter or phone within 4 business days after the clinic has received the results. If you do not hear from us within 7 days, please contact the clinic through [employer] or phone. If you have a critical or abnormal lab result, we will notify you by phone as soon as possible.  Submit refill requests through [employer] or call your pharmacy and they will forward the refill request to us. Please allow 3 business days for your refill to be completed.          Additional Information About Your Visit        [employer] Information     [employer] gives you secure access to your electronic health record. If you see a primary care provider, you can also send messages to your care team and make appointments. If you have questions, please call your primary care clinic.  If you do not have a primary care provider, please call [phone] and they will assist you.        Care EveryWhere ID     This is your Care EveryWhere ID. This could be used by other organizations to access your [facility] medical records  [id number]        Your Vitals Were     Pulse Height Last Period Pulse Oximetry BMI (Body Mass Index)       88 1.651 m (5' 5\") 07/28/2017 99% 27.12 kg/m2        Blood Pressure from Last 3 Encounters:   02/16/18 125/86   02/06/18 124/76   12/28/17 (!) 147/97    Weight from Last 3 Encounters:   02/16/18 73.9 kg (163 lb)   02/06/18 73 kg (161 lb)   02/02/18 72.8 kg (160 lb 9.6 oz)              We Performed the Following     GENETICS REFERRAL     [facility] PT, HAND, AND CHIROPRACTIC REFERRAL        Primary Care Provider Office Phone # Fax #    Bj Butler [phone] [address] [place] [facility] [address]        Equal Access to Services     ELIGIO HOWARD : Hadii latoya ruffin rosanauriel Sokrissali, waaxda luqadaha, qaybta kaalmadiaz martinez, ludmila chappellwashingtonmary alberts. So [facility] [phone].    ATENCIÓN: Si habla español, tiene a guido disposición servicios gratuitos de asistencia lingüística. [facility] [phone].    We comply with applicable federal civil rights laws and [place] laws. We do not discriminate on the basis of race, color, national origin, age, disability, sex, sexual orientation, or gender identity.            Thank you!     Thank you for choosing [facility]  for your care. Our goal is always to provide you with excellent care. Hearing back from our patients is one way we can continue to improve our services. Please take a few minutes to complete the written survey that you may receive in the mail after your visit with us. Thank you!             Your Updated Medication List - Protect others around you: Learn how to safely use, store and throw away your medicines at www.disposemymeds.org.          This list is accurate as of 2/16/18 10:11 AM.  Always use your most recent med list.                   Brand Name Dispense Instructions for use Diagnosis    diltiazem 180 MG 24 hr capsule    CARDIZEM CD    30 capsule    Take 1 capsule (180 mg) by mouth daily    History of supraventricular tachycardia       fluticasone-salmeterol 100-50 MCG/DOSE diskus inhaler    ADVAIR    3 Inhaler    Inhale 1 puff into the lungs 2 times daily    Mild persistent asthma without complication       levalbuterol 45 MCG/ACT Inhaler    XOPENEX HFA    1 Inhaler    Inhale 1-2 puffs into the lungs every 4 hours as needed for shortness of breath / dyspnea    Mild persistent asthma without complication       Multi-vitamin Tabs tablet      Take 1 tablet by mouth daily        vitamin D 2000 UNITS Caps      Take 1 capsule by mouth daily        ZANTAC PO      Take 150 mg by mouth as needed for heartburn

## 2018-02-16 NOTE — NURSING NOTE
"Chief Complaint   Patient presents with     Consult     Patient states she has POTS and would like to talk about it and see if there is anything she can do.       Initial /86 (BP Location: Left arm, Patient Position: Chair, Cuff Size: Adult Regular)  Pulse 88  Ht 1.651 m (5' 5\")  Wt 73.9 kg (163 lb)  LMP 07/28/2017  SpO2 99%  BMI 27.12 kg/m2 Estimated body mass index is 27.12 kg/(m^2) as calculated from the following:    Height as of this encounter: 1.651 m (5' 5\").    Weight as of this encounter: 73.9 kg (163 lb).  BP completed using cuff size: regular         RAPID3 (0-30) Cumulative Score  9.3          RAPID3 Weighted Score (divide #4 by 3 and that is the weighted score)  3.1         "

## 2018-02-21 ENCOUNTER — TELEPHONE (OUTPATIENT)
Dept: INTERNAL MEDICINE | Facility: CLINIC | Age: 48
End: 2018-02-21

## 2018-02-21 NOTE — TELEPHONE ENCOUNTER
Reason for Call:  Workability paper work    Detailed comments: Please call to discuss dates on the paper work are incorrect.     Phone Number Patient can be reached at: [phone]    Best Time: any    Can we leave a detailed message on this number? YES    Call taken on 2/21/2018 at 9:59 AM by Salima Magana

## 2018-02-21 NOTE — TELEPHONE ENCOUNTER
Spoke to patient.  She said the last form we faxed to [facility] and to Munira Solomon was incorrect.  The dates we listed were 12-14-17 to 3-14-18.  Patient stated she and Dr. Butler had discussed this when she was seen last on 2-6-18, and he was giving her 3 months from that date.  New workability form completed with dates patient unable to work as 12-14-17 to 5-6-18, and faxed to [facility] at [phone], and also copy to Munira Borrero at [phone].  Pam Taveras,

## 2018-02-23 ENCOUNTER — OFFICE VISIT (OUTPATIENT)
Dept: ORTHOPEDICS | Facility: CLINIC | Age: 48
End: 2018-02-23
Payer: COMMERCIAL

## 2018-02-23 ENCOUNTER — THERAPY VISIT (OUTPATIENT)
Dept: PHYSICAL THERAPY | Facility: CLINIC | Age: 48
End: 2018-02-23
Payer: COMMERCIAL

## 2018-02-23 DIAGNOSIS — G56.01 CARPAL TUNNEL SYNDROME OF RIGHT WRIST: ICD-10-CM

## 2018-02-23 DIAGNOSIS — M77.11 LATERAL EPICONDYLITIS OF RIGHT ELBOW: ICD-10-CM

## 2018-02-23 DIAGNOSIS — M67.431 GANGLION CYST OF WRIST, RIGHT: Primary | ICD-10-CM

## 2018-02-23 DIAGNOSIS — M54.2 CERVICALGIA: ICD-10-CM

## 2018-02-23 PROCEDURE — 97110 THERAPEUTIC EXERCISES: CPT | Mod: GP | Performed by: PHYSICAL THERAPIST

## 2018-02-23 PROCEDURE — 97140 MANUAL THERAPY 1/> REGIONS: CPT | Mod: GP | Performed by: PHYSICAL THERAPIST

## 2018-02-23 ASSESSMENT — ENCOUNTER SYMPTOMS
CHILLS: 1
RECTAL PAIN: 0
BLOOD IN STOOL: 0
MYALGIAS: 1
WEIGHT LOSS: 0
LEG PAIN: 0
HEARTBURN: 1
FEVER: 0
NECK PAIN: 1
JOINT SWELLING: 1
NUMBNESS: 0
DYSURIA: 0
MUSCLE CRAMPS: 0
HEMATURIA: 0
DIZZINESS: 0
ABDOMINAL PAIN: 0
HYPERTENSION: 1
LIGHT-HEADEDNESS: 0
BOWEL INCONTINENCE: 0
TREMORS: 0
BLOATING: 1
LOSS OF CONSCIOUSNESS: 0
WEIGHT GAIN: 1
POLYPHAGIA: 0
SPEECH CHANGE: 0
DISTURBANCES IN COORDINATION: 0
HALLUCINATIONS: 0
DIFFICULTY URINATING: 0
BACK PAIN: 0
PARALYSIS: 0
POLYDIPSIA: 0
JAUNDICE: 0
SEIZURES: 0
ORTHOPNEA: 0
DIARRHEA: 1
HEADACHES: 1
FLANK PAIN: 0
DOUBLE VISION: 0
EYE REDNESS: 0
SYNCOPE: 0
WEAKNESS: 1
INCREASED ENERGY: 1
EXERCISE INTOLERANCE: 1
DECREASED APPETITE: 0
MEMORY LOSS: 0
CONSTIPATION: 1
STIFFNESS: 1
EYE IRRITATION: 0
PALPITATIONS: 1
TINGLING: 1
ARTHRALGIAS: 1
EYE WATERING: 0
ALTERED TEMPERATURE REGULATION: 1
FATIGUE: 1
SLEEP DISTURBANCES DUE TO BREATHING: 0
HYPOTENSION: 0
EYE PAIN: 0
NAUSEA: 1
NIGHT SWEATS: 0
MUSCLE WEAKNESS: 1
VOMITING: 0

## 2018-02-23 NOTE — NURSING NOTE
Reason For Visit:   Chief Complaint   Patient presents with     RECHECK     Carpal tunnel, right wrist. Pt stated that she would like to discuss her right elbow because that seems to be botering her more then the wrist.      Results     MRI follow up.        Primary MD: Bj Butler    Age: 47 year old    ?  No      Pain Assessment  Patient Currently in Pain: Yes  0-10 Pain Scale: 3  Primary Pain Location: Wrist  Pain Orientation: Right  Other Pain Locations: Right elbow.     Hand Dominance Evaluation  Hand Dominance: Right          QuickDASH Assessment  QuickDASH Main 2/23/2018   1.Open a tight or new jar. Moderate difficulty   2. Do heavy household chores (e.g., wash walls, floors) Moderate difficulty   3. Carry a shopping bag or briefcase. Mild difficulty   4. Wash your back. No difficulty   5. Use a knife to cut food. Mild difficulty   6. Recreational activities in which you take some force or impact through your arm, shoulder or hand (e.g., golf, hammering, tennis, etc.). Moderate difficulty   7. During the past week, to what extent has your arm, shoulder or hand problem interfered with your normal social activities with family, friends, neighbours or groups? Slightly   8. During the past week, were you limited in your work or other regular daily activities as a result of your arm, shoulder or hand problem? Moderately limited   9. Arm, shoulder or hand pain. Moderate   10.Tingling (pins and needles) in your arm,shoulder or hand. Mild   11. During the past week, how much difficulty have you had sleeping because of the pain in your arm, shoulder or hand? (Kiana number) Moderate difficulty   Quickdash Ability Score 36.36          Current Outpatient Prescriptions   Medication Sig Dispense Refill     levalbuterol (XOPENEX HFA) 45 MCG/ACT Inhaler Inhale 1-2 puffs into the lungs every 4 hours as needed for shortness of breath / dyspnea 1 Inhaler 5     fluticasone-salmeterol (ADVAIR) 100-50 MCG/DOSE diskus  inhaler Inhale 1 puff into the lungs 2 times daily 3 Inhaler 1     diltiazem (CARDIZEM CD) 180 MG 24 hr capsule Take 1 capsule (180 mg) by mouth daily 30 capsule 2     Cholecalciferol (VITAMIN D) 2000 UNITS CAPS Take 1 capsule by mouth daily       multivitamin, therapeutic with minerals (MULTI-VITAMIN) TABS tablet Take 1 tablet by mouth daily       RaNITidine HCl (ZANTAC PO) Take 150 mg by mouth as needed for heartburn         Allergies   Allergen Reactions     Penicillins      Swelling throat; age 4     Tetracycline      Vomiting; nauseous       Mara NANI BurnhamN

## 2018-02-23 NOTE — MR AVS SNAPSHOT
After Visit Summary   2/23/2018    Laura Jackson    MRN: [id number]           Patient Information     Date Of Birth          1970        Visit Information        Provider Department      2/23/2018 9:40 AM Rickey Rea MD [facility]        Today's Diagnoses     Ganglion cyst of wrist, right    -  1    Lateral epicondylitis of right elbow        Carpal tunnel syndrome of right wrist           Follow-ups after your visit        Your next 10 appointments already scheduled     Feb 23, 2018 12:40 PM CST   [facility] with Moreno LUTZ Sondrol, PT   [facility] [place] [facility] ([facility] [place])    [address] [facility]  [address]  [place] [address]   [phone]            Mar 08, 2018 10:10 AM CST   [facility] with Moreno LUTZ Sondrol, PT   [facility] [place] [facility] ([facility] [place])    [address]  [place] [address]   [phone]            Mar 08, 2018  4:00 PM CST   (Arrive by 3:45 PM)   RETURN ARRHYTHMIA with Phil Mitchell MD   [facility] ([facility]    [address] [place] [address]  [address] [place] [id number] [phone]            Mar 22, 2018 10:10 AM CDT   [facility] Sp[facility] with Moreno Thrasherdrol, PT   [facility] [place] [facility] ([facility] [place])    [address]  [address]  [place] [address]   [phone]              Who to contact     Please call your clinic at [phone] to:    Ask questions about your health    Make or cancel appointments    Discuss your medicines    Learn about your test results    Speak to your doctor            Additional Information About Your Visit        MyChart Information     [employer] gives you secure access to your electronic health record. If you see a primary care provider, you can also send messages to your care team and make appointments. If you have questions, please call your primary care clinic.  If you do not have a primary care provider, please call [phone] and they will assist you.       [employer] is an electronic gateway that provides easy, online access to your medical records. With [employer], you can request a clinic appointment, read your test results, renew a prescription or communicate with your care team.     To access your existing account, please contact your [facility] or call [phone] for assistance.        Care EveryWhere ID     This is your Care EveryWhere ID. This could be used by other organizations to access your [facility] medical records  [id number]        Your Vitals Were     Last Period                   07/28/2017            Blood Pressure from Last 3 Encounters:   02/16/18 125/86   02/06/18 124/76   12/28/17 (!) 147/97    Weight from Last 3 Encounters:   02/16/18 73.9 kg (163 lb)   02/06/18 73 kg (161 lb)   02/02/18 72.8 kg (160 lb 9.6 oz)              Today, you had the following     No orders found for display       Primary Care Provider Office Phone # Fax #    Bj Butler [phone] [address] [facility] [address] [address]        Equal Access to Services     [facility]: Hadii latoya ku hadasho Soandreia, waaxda luqadaha, qaybta kaalmada michelle, ludmila waters . So [facility] [phone].    ATENCIÓN: Si habla español, tiene a guido disposición servicios gratalissaos de asistencia lingüística. [facility] [phone].    We comply with applicable federal civil rights laws and [place] laws. We do not discriminate on the basis of race, color, national origin, age, disability, sex, sexual orientation, or gender identity.            Thank you!     Thank you for choosing [facility]  for your care. Our goal is always to provide you with excellent care. Hearing back from our patients is one way we can continue to improve our services. Please take a few minutes to complete the written survey that you may receive in the mail after your visit with us. Thank you!             Your Updated  Medication List - Protect others around you: Learn how to safely use, store and throw away your medicines at www.disposemymeds.org.          This list is accurate as of 2/23/18 10:15 AM.  Always use your most recent med list.                   Brand Name Dispense Instructions for use Diagnosis    diltiazem 180 MG 24 hr capsule    CARDIZEM CD    30 capsule    Take 1 capsule (180 mg) by mouth daily    History of supraventricular tachycardia       fluticasone-salmeterol 100-50 MCG/DOSE diskus inhaler    ADVAIR    3 Inhaler    Inhale 1 puff into the lungs 2 times daily    Mild persistent asthma without complication       levalbuterol 45 MCG/ACT Inhaler    XOPENEX HFA    1 Inhaler    Inhale 1-2 puffs into the lungs every 4 hours as needed for shortness of breath / dyspnea    Mild persistent asthma without complication       Multi-vitamin Tabs tablet      Take 1 tablet by mouth daily        vitamin D 2000 UNITS Caps      Take 1 capsule by mouth daily        ZANTAC PO      Take 150 mg by mouth as needed for heartburn

## 2018-02-23 NOTE — LETTER
2/23/2018       RE: Laura Jackson  [address]  [place] [address]     Dear Colleague,    Thank you for referring your patient, Laura Jackson, to the [facility] at [facility]. Please see a copy of my visit note below.    Follow-up right upper extremity. Complains of right elbow pain, lateral aspect. No trauma. Also intermittent right wrist pain, occasional numbness and tingling in the right hand. Again she has had an EMG in the past and that was consistent with carpal tunnel syndrome. Overall her wrist feels better though. Minimal ulnar-sided wrist pain but does get some clicking, that has been going on quite a long time.  We did review her MRI together. That demonstrated a dorsal ganglion cyst and possible tear of the central portion of her TFCC.    The past medical history is reviewed and documented in the chart including allergies, medications, medical diagnoses, surgical procedures and review of systems.    Physical examination of the right upper extremity demonstrates full range of motion of the right elbow, wrist, and small joints of the hand. She has tenderness over the right elbow lateral epicondyle and pain with resisted wrist extension. Slight swelling over the dorsal wrist consistent with a ganglion is noted, minimal tenderness. Tinel's with respect to carpal tunnel is negative. Phalen's is positive. No tenderness over the TFCC, no DRUJ instability, no tenderness in the area of the fovea. No radial sided wrist tenderness today.There is a palpable radial pulse and brisk capillary refill. No overlying skin changes, no erythema, no wounds or signs of infection. There is full sensation to light touch throughout. No motor deficits noted.    Impression:  #1 right dorsal wrist ganglion  #2 right carpal tunnel syndrome  #3 right elbow lateral epicondylitis  #4 possible right TFCC tear    Plan:   Mostly she is symptomatic at this point from the  elbow. We discussed lateral epicondylitis and its treatment. I discussed with her home exercise program and stretching and some activity modifications. She is starting hand therapy soon and I think they can work on her elbow as well. We discussed surgical versus nonsurgical treatment. I don't think her symptoms with respect to her wrist ganglion or her carpal tunnel syndrome are severe enough to warrant any surgical treatment yet. She is minimally symptomatic with respect to the ulnar wrist, no indications for wrist arthroscopy. We did discuss all of her diagnoses and treatment options. She has a wrist splint, she'll continue with that p.r.n. I'll plan on seeing her back on an as-needed basis. She has our contact information should problems arise.   Answers for HPI/ROS submitted by the patient on 2/23/2018   General Symptoms: Yes  Skin Symptoms: No  HENT Symptoms: No  EYE SYMPTOMS: Yes  HEART SYMPTOMS: Yes  LUNG SYMPTOMS: No  INTESTINAL SYMPTOMS: Yes  URINARY SYMPTOMS: Yes  GYNECOLOGIC SYMPTOMS: No  BREAST SYMPTOMS: No  SKELETAL SYMPTOMS: Yes  BLOOD SYMPTOMS: No  NERVOUS SYSTEM SYMPTOMS: Yes  MENTAL HEALTH SYMPTOMS: No  Fever: No  Loss of appetite: No  Weight loss: No  Weight gain: Yes  Fatigue: Yes  Night sweats: No  Chills: Yes  Increased stress: Yes  Excessive hunger: No  Excessive thirst: No  Feeling hot or cold when others believe the temperature is normal: Yes  Loss of height: No  Post-operative complications: No  Surgical site pain: No  Hallucinations: No  Change in or Loss of Energy: Yes  Hyperactivity: No  Confusion: No  Eye pain: No  Vision loss: Yes  Dry eyes: No  Watery eyes: No  Eye bulging: No  Double vision: No  Flashing of lights: No  Spots: No  Floaters: No  Redness: No  Crossed eyes: No  Yellowing of eyes: No  Eye irritation: No  Chest pain or pressure: No  Fast or irregular heartbeat: Yes  Pain in legs with walking: No  Trouble breathing while lying down: No  Fingers or toes appear blue: Yes  High  blood pressure: Yes  Low blood pressure: No  Fainting: No  Murmurs: No  Pacemaker: No  Varicose veins: No  Edema or swelling: Yes  Wake up at night with shortness of breath: No  Light-headedness: No  Exercise intolerance: Yes  Heart burn or indigestion: Yes  Nausea: Yes  Vomiting: No  Abdominal pain: No  Bloating: Yes  Constipation: Yes  Diarrhea: Yes  Blood in stool: No  Black stools: No  Rectal or Anal pain: No  Fecal incontinence: No  Yellowing of skin or eyes: No  Vomit with blood: No  Change in stools: Yes  Trouble holding urine or incontinence: No  Pain or burning: No  Trouble starting or stopping: No  Increased frequency of urination: No  Blood in urine: No  Decreased frequency of urination: No  Frequent nighttime urination: No  Flank pain: No  Difficulty emptying bladder: No  Back pain: No  Muscle aches: Yes  Neck pain: Yes  Swollen joints: Yes  Joint pain: Yes  Bone pain: No  Muscle cramps: No  Muscle weakness: Yes  Joint stiffness: Yes  Bone fracture: No  Trouble with coordination: No  Dizziness or trouble with balance: No  Fainting or black-out spells: No  Memory loss: No  Headache: Yes  Seizures: No  Speech problems: No  Tingling: Yes  Tremor: No  Weakness: Yes  Difficulty walking: No  Paralysis: No  Numbness: No    Again, thank you for allowing me to participate in the care of your patient.      Sincerely,    Rickey Rea MD

## 2018-02-23 NOTE — PROGRESS NOTES
Follow-up right upper extremity. Complains of right elbow pain, lateral aspect. No trauma. Also intermittent right wrist pain, occasional numbness and tingling in the right hand. Again she has had an EMG in the past and that was consistent with carpal tunnel syndrome. Overall her wrist feels better though. Minimal ulnar-sided wrist pain but does get some clicking, that has been going on quite a long time.  We did review her MRI together. That demonstrated a dorsal ganglion cyst and possible tear of the central portion of her TFCC.    The past medical history is reviewed and documented in the chart including allergies, medications, medical diagnoses, surgical procedures and review of systems.    Physical examination of the right upper extremity demonstrates full range of motion of the right elbow, wrist, and small joints of the hand. She has tenderness over the right elbow lateral epicondyle and pain with resisted wrist extension. Slight swelling over the dorsal wrist consistent with a ganglion is noted, minimal tenderness. Tinel's with respect to carpal tunnel is negative. Phalen's is positive. No tenderness over the TFCC, no DRUJ instability, no tenderness in the area of the fovea. No radial sided wrist tenderness today.There is a palpable radial pulse and brisk capillary refill. No overlying skin changes, no erythema, no wounds or signs of infection. There is full sensation to light touch throughout. No motor deficits noted.    Impression:  #1 right dorsal wrist ganglion  #2 right carpal tunnel syndrome  #3 right elbow lateral epicondylitis  #4 possible right TFCC tear    Plan:   Mostly she is symptomatic at this point from the elbow. We discussed lateral epicondylitis and its treatment. I discussed with her home exercise program and stretching and some activity modifications. She is starting hand therapy soon and I think they can work on her elbow as well. We discussed surgical versus nonsurgical treatment. I  don't think her symptoms with respect to her wrist ganglion or her carpal tunnel syndrome are severe enough to warrant any surgical treatment yet. She is minimally symptomatic with respect to the ulnar wrist, no indications for wrist arthroscopy. We did discuss all of her diagnoses and treatment options. She has a wrist splint, she'll continue with that p.r.n. I'll plan on seeing her back on an as-needed basis. She has our contact information should problems arise.   Answers for HPI/ROS submitted by the patient on 2/23/2018   General Symptoms: Yes  Skin Symptoms: No  HENT Symptoms: No  EYE SYMPTOMS: Yes  HEART SYMPTOMS: Yes  LUNG SYMPTOMS: No  INTESTINAL SYMPTOMS: Yes  URINARY SYMPTOMS: Yes  GYNECOLOGIC SYMPTOMS: No  BREAST SYMPTOMS: No  SKELETAL SYMPTOMS: Yes  BLOOD SYMPTOMS: No  NERVOUS SYSTEM SYMPTOMS: Yes  MENTAL HEALTH SYMPTOMS: No  Fever: No  Loss of appetite: No  Weight loss: No  Weight gain: Yes  Fatigue: Yes  Night sweats: No  Chills: Yes  Increased stress: Yes  Excessive hunger: No  Excessive thirst: No  Feeling hot or cold when others believe the temperature is normal: Yes  Loss of height: No  Post-operative complications: No  Surgical site pain: No  Hallucinations: No  Change in or Loss of Energy: Yes  Hyperactivity: No  Confusion: No  Eye pain: No  Vision loss: Yes  Dry eyes: No  Watery eyes: No  Eye bulging: No  Double vision: No  Flashing of lights: No  Spots: No  Floaters: No  Redness: No  Crossed eyes: No  Yellowing of eyes: No  Eye irritation: No  Chest pain or pressure: No  Fast or irregular heartbeat: Yes  Pain in legs with walking: No  Trouble breathing while lying down: No  Fingers or toes appear blue: Yes  High blood pressure: Yes  Low blood pressure: No  Fainting: No  Murmurs: No  Pacemaker: No  Varicose veins: No  Edema or swelling: Yes  Wake up at night with shortness of breath: No  Light-headedness: No  Exercise intolerance: Yes  Heart burn or indigestion: Yes  Nausea: Yes  Vomiting:  No  Abdominal pain: No  Bloating: Yes  Constipation: Yes  Diarrhea: Yes  Blood in stool: No  Black stools: No  Rectal or Anal pain: No  Fecal incontinence: No  Yellowing of skin or eyes: No  Vomit with blood: No  Change in stools: Yes  Trouble holding urine or incontinence: No  Pain or burning: No  Trouble starting or stopping: No  Increased frequency of urination: No  Blood in urine: No  Decreased frequency of urination: No  Frequent nighttime urination: No  Flank pain: No  Difficulty emptying bladder: No  Back pain: No  Muscle aches: Yes  Neck pain: Yes  Swollen joints: Yes  Joint pain: Yes  Bone pain: No  Muscle cramps: No  Muscle weakness: Yes  Joint stiffness: Yes  Bone fracture: No  Trouble with coordination: No  Dizziness or trouble with balance: No  Fainting or black-out spells: No  Memory loss: No  Headache: Yes  Seizures: No  Speech problems: No  Tingling: Yes  Tremor: No  Weakness: Yes  Difficulty walking: No  Paralysis: No  Numbness: No

## 2018-03-06 ENCOUNTER — MYC REFILL (OUTPATIENT)
Dept: INTERNAL MEDICINE | Facility: CLINIC | Age: 48
End: 2018-03-06

## 2018-03-06 DIAGNOSIS — J45.30 MILD PERSISTENT ASTHMA WITHOUT COMPLICATION: ICD-10-CM

## 2018-03-06 NOTE — TELEPHONE ENCOUNTER
Message from [employer]:  Original authorizing provider: MD Laura Mcknight would like a refill of the following medications:  levalbuterol (XOPENEX HFA) 45 MCG/ACT Inhaler [Bj Butler MD]  fluticasone-salmeterol (ADVAIR) 100-50 MCG/DOSE diskus inhaler [Bj Butler MD]    Preferred pharmacy: [facility] [place] [place] [facility] [address]    Comment:

## 2018-03-06 NOTE — LETTER
[facility]  [address]  [place] [address]  [phone]    March 7, 2018      Laura Jackson  [address] [facility]  [place] [address]      Dear Laura,     Your clinic record indicates that you are due for an asthma update. We have a survey tool called an ACT (or Asthma Control Test) we use to measure the level of control of your asthma. Please complete the enclosed questionnaire and mail it back to us in the self-addressed stamped envelope.     If you have questions about this letter please contact your provider.     Sincerely,    Your [facility]

## 2018-03-06 NOTE — TELEPHONE ENCOUNTER
MA please call and confirm this is not a duplicate request. Please obtain new ACT from patient as well, and then route back to RN refills. Thank you!    Sylvia Plata RN on 3/6/2018 at 2:32 PM

## 2018-03-07 ENCOUNTER — TELEPHONE (OUTPATIENT)
Dept: INTERNAL MEDICINE | Facility: CLINIC | Age: 48
End: 2018-03-07

## 2018-03-07 RX ORDER — LEVALBUTEROL TARTRATE 45 UG/1
1-2 AEROSOL, METERED ORAL EVERY 4 HOURS PRN
Qty: 1 INHALER | Refills: 5
Start: 2018-03-07

## 2018-03-07 NOTE — TELEPHONE ENCOUNTER
Prior Authorization Retail Medication Request    Updated Specialty Medication: levalbuterol (XOPENEX HFA) 45 MCG/ACT Inhaler  Medication/Dose:  ICD code (if different than what is on RX):  Previously Tried and Failed:    Important Lab Values:  Rationale:    Insurance Name: Brijesh  Insurance ID: [id number]  Insurance Phone Number: [phone]    Pharmacy Information (if different than what is on RX)  Name:  Phone:      Updated Retail Medication Request:  Medication/Dose:  ICD code (if different than what is on RX):  Previously Tried and Failed:??????  Rationale:    Insurance Name:   Insurance ID: [id number]      Pharmacy Information (if different than what is on RX)  Name:[facility]  Phone:[phone]

## 2018-03-07 NOTE — TELEPHONE ENCOUNTER
Pharmacy closed. Please call after 9 to confirm duplicate.    fluticasone-salmeterol (ADVAIR) 100-50 MCG/DOSE diskus inhaler 3 Inhaler 1 12/21/2017  No      Sig: Inhale 1 puff into the lungs 2 times daily     Class: E-Prescribe     Route: Inhalation     Order: [id number]     E-Prescribing Status: Receipt confirmed by pharmacy (12/21/2017  5:07 PM CST)     levalbuterol (XOPENEX HFA) 45 MCG/ACT Inhaler 1 Inhaler 5 12/21/2017  No      Sig: Inhale 1-2 puffs into the lungs every 4 hours as needed for shortness of breath / dyspnea     Class: E-Prescribe     Route: Inhalation     Order: [id number]     E-Prescribing Status: Receipt confirmed by pharmacy (12/21/2017  5:07 PM CST)     ACT and letter mailed to patient.  Greta MARCANO CMA ([facility])

## 2018-03-07 NOTE — TELEPHONE ENCOUNTER
Spoke to pharmacy. Patient has refills on file still. Spoke to patient and informed her she still has refills and an ACT will be mailed out today for her to complete.   Greta MARCANO CMA ([facility])

## 2018-03-08 ENCOUNTER — THERAPY VISIT (OUTPATIENT)
Dept: PHYSICAL THERAPY | Facility: CLINIC | Age: 48
End: 2018-03-08
Payer: COMMERCIAL

## 2018-03-08 DIAGNOSIS — M54.2 CERVICALGIA: ICD-10-CM

## 2018-03-08 PROCEDURE — 97110 THERAPEUTIC EXERCISES: CPT | Mod: GP | Performed by: PHYSICAL THERAPIST

## 2018-03-08 PROCEDURE — 97140 MANUAL THERAPY 1/> REGIONS: CPT | Mod: GP | Performed by: PHYSICAL THERAPIST

## 2018-03-08 NOTE — MR AVS SNAPSHOT
After Visit Summary   3/8/2018    Laura Jackson    MRN: [id number]           Patient Information     Date Of Birth          1970        Visit Information        Provider Department      3/8/2018 10:10 AM Moreno Frank PT [facility]        Today's Diagnoses     Cervicalgia           Follow-ups after your visit        Your next 10 appointments already scheduled     Mar 22, 2018 10:10 AM CDT   [facility] Spine with BELTRAN Majano PT ([facility]er)    [address]  [address]  [facility] [id number] [phone]            Jun 21, 2018  6:00 PM CDT   (Arrive by 5:45 PM)   RETURN ARRHYTHMIA with Phil Mitchell MD   [facility]    [address]  [address]  [facility] [address] [phone]              Who to contact     If you have questions or need follow up information about today's clinic visit or your schedule please contact [facility] directly at [phone].  Normal or non-critical lab and imaging results will be communicated to you by Stykyhart, letter or phone within 4 business days after the clinic has received the results. If you do not hear from us within 7 days, please contact the clinic through [employer] Deskt[employer] or phone. If you have a critical or abnormal lab result, we will notify you by phone as soon as possible.  Submit refill requests through [employer] or call your pharmacy and they will forward the refill request to us. Please allow 3 business days for your refill to be completed.          Additional Information About Your Visit        Stykyhart Information     TravelShark gives you secure access to your electronic health record. If you see a primary care provider, you can also send messages to your care team and make appointments. If you have questions, please call your primary care clinic.  If you do not have a primary care provider, please call [phone] and they will assist you.        Care  EveryWhere ID     This is your Care EveryWhere ID. This could be used by other organizations to access your [place] medical records  [id number]        Your Vitals Were     Last Period                   07/28/2017            Blood Pressure from Last 3 Encounters:   02/16/18 125/86   02/06/18 124/76   12/28/17 (!) 147/97    Weight from Last 3 Encounters:   02/16/18 73.9 kg (163 lb)   02/06/18 73 kg (161 lb)   02/02/18 72.8 kg (160 lb 9.6 oz)              We Performed the Following     MANUAL THER TECH,1+REGIONS,EA 15 MIN     THERAPEUTIC EXERCISES        Primary Care Provider Office Phone # Fax #    Bj Butler [phone] [address] [facility] [place] [address]        Equal Access to Services     [facility]: Hadii aad ku hadasho Soomaali, waaxda luqadaha, qaybta kaalmada adeegyada, waxay rafiain hayaanelsy adams khvalentin waters . So [facility] [phone].    ATENCIÓN: Si habla español, tiene a guido disposición servicios gratuitos de asistencia lingüística. [facility] [phone].    We comply with applicable federal civil rights laws and [place] laws. We do not discriminate on the basis of race, color, national origin, age, disability, sex, sexual orientation, or gender identity.            Thank you!     Thank you for choosing [facility]  for your care. Our goal is always to provide you with excellent care. Hearing back from our patients is one way we can continue to improve our services. Please take a few minutes to complete the written survey that you may receive in the mail after your visit with us. Thank you!             Your Updated Medication List - Protect others around you: Learn how to safely use, store and throw away your medicines at www.disposemymeds.org.          This list is accurate as of 3/8/18 10:56 AM.  Always use your most recent med list.                   Brand Name Dispense Instructions for use Diagnosis    diltiazem 180 MG 24 hr capsule    CARDIZEM CD    30 capsule     Take 1 capsule (180 mg) by mouth daily    History of supraventricular tachycardia       fluticasone-salmeterol 100-50 MCG/DOSE diskus inhaler    ADVAIR    3 Inhaler    Inhale 1 puff into the lungs 2 times daily    Mild persistent asthma without complication       levalbuterol 45 MCG/ACT Inhaler    XOPENEX HFA    1 Inhaler    Inhale 1-2 puffs into the lungs every 4 hours as needed for shortness of breath / dyspnea    Mild persistent asthma without complication       Multi-vitamin Tabs tablet      Take 1 tablet by mouth daily        vitamin D 2000 UNITS Caps      Take 1 capsule by mouth daily        ZANTAC PO      Take 150 mg by mouth as needed for heartburn

## 2018-03-13 NOTE — TELEPHONE ENCOUNTER
PA Initiation    Medication: xopenex hfa requires prerequisite therapy  Insurance Company: [facility] - Phone [phone] Fax [phone]  Pharmacy Filling the Rx: [facility] - [address]  Filling Pharmacy Phone: [phone]  Filling Pharmacy Fax:    Start Date: 3/13/2018

## 2018-03-15 NOTE — TELEPHONE ENCOUNTER
Prior Authorization Approval    Authorization Effective Date: 3/14/2018  Authorization Expiration Date: 3/14/2019  Medication: xopenex hfa - APPROVED  Approved Dose/Quantity: 1 FOR 30  Reference #: [id number]   Insurance Company: [employer] [phone] Fax [phone]  Expected CoPay: $11.00     CoPay Card Available:      Foundation Assistance Needed:    Which Pharmacy is filling the prescription (Not needed for infusion/clinic administered): [facility] - [address] [facility]  Pharmacy Notified: Yes  Patient Notified: Yes

## 2018-03-26 ENCOUNTER — TELEPHONE (OUTPATIENT)
Dept: RHEUMATOLOGY | Facility: CLINIC | Age: 48
End: 2018-03-26

## 2018-03-26 ENCOUNTER — THERAPY VISIT (OUTPATIENT)
Dept: OCCUPATIONAL THERAPY | Facility: CLINIC | Age: 48
End: 2018-03-26
Payer: COMMERCIAL

## 2018-03-26 DIAGNOSIS — M79.642 BILATERAL HAND PAIN: Primary | ICD-10-CM

## 2018-03-26 DIAGNOSIS — M79.641 BILATERAL HAND PAIN: Primary | ICD-10-CM

## 2018-03-26 PROCEDURE — 97110 THERAPEUTIC EXERCISES: CPT | Mod: GO | Performed by: OCCUPATIONAL THERAPIST

## 2018-03-26 PROCEDURE — 97165 OT EVAL LOW COMPLEX 30 MIN: CPT | Mod: GO | Performed by: OCCUPATIONAL THERAPIST

## 2018-03-26 PROCEDURE — 97112 NEUROMUSCULAR REEDUCATION: CPT | Mod: GO | Performed by: OCCUPATIONAL THERAPIST

## 2018-03-26 ASSESSMENT — ENCOUNTER SYMPTOMS
NUMBNESS: 0
BLOOD IN STOOL: 0
CHILLS: 0
FEVER: 0
WHEEZING: 0
WEIGHT LOSS: 0
INCREASED ENERGY: 1
POSTURAL DYSPNEA: 0
NECK PAIN: 1
MYALGIAS: 1
POLYDIPSIA: 0
MUSCLE CRAMPS: 1
HYPERTENSION: 1
ABDOMINAL PAIN: 0
DOUBLE VISION: 0
WEIGHT GAIN: 1
EYE REDNESS: 0
LIGHT-HEADEDNESS: 1
DIZZINESS: 0
SLEEP DISTURBANCES DUE TO BREATHING: 1
COUGH DISTURBING SLEEP: 0
DIARRHEA: 1
ORTHOPNEA: 1
DECREASED APPETITE: 0
EYE PAIN: 0
CONSTIPATION: 0
SNORES LOUDLY: 0
HYPOTENSION: 0
PARALYSIS: 0
WEAKNESS: 0
SEIZURES: 0
NAIL CHANGES: 0
LEG PAIN: 0
RECTAL PAIN: 0
JOINT SWELLING: 1
SYNCOPE: 0
SHORTNESS OF BREATH: 1
TREMORS: 0
ALTERED TEMPERATURE REGULATION: 1
DIFFICULTY URINATING: 0
HEMATURIA: 0
BLOATING: 1
FLANK PAIN: 0
ARTHRALGIAS: 1
BOWEL INCONTINENCE: 0
DYSURIA: 0
SPEECH CHANGE: 0
NAUSEA: 1
JAUNDICE: 0
HALLUCINATIONS: 0
COUGH: 0
SPUTUM PRODUCTION: 0
FATIGUE: 1
MUSCLE WEAKNESS: 1
HEMOPTYSIS: 0
LOSS OF CONSCIOUSNESS: 0
EXERCISE INTOLERANCE: 1
NIGHT SWEATS: 1
PALPITATIONS: 1
EYE IRRITATION: 0
TINGLING: 0
EYE WATERING: 0
POLYPHAGIA: 0
STIFFNESS: 1
HEADACHES: 1
HEARTBURN: 1
MEMORY LOSS: 1
BACK PAIN: 0
SKIN CHANGES: 0
POOR WOUND HEALING: 0
DYSPNEA ON EXERTION: 0
VOMITING: 0

## 2018-03-26 NOTE — TELEPHONE ENCOUNTER
Referral was placed on 2/16/2018.  Please send a referral to the patient; and get consent from the patient to [facility] the [facility] and when the consent is received then also fax the referral to the [facility].    Elliot Sebastian MD  3/26/2018 5:02 PM

## 2018-03-26 NOTE — PROGRESS NOTES
"Hand Therapy Initial Evaluation    Current Date:  3/26/2018    Subjective:  Laura Jackson is a 47 year old right hand dominant female.    Diagnosis:   Bilateral hand and wrist pain due to hypermobilitity   DOI:  2/16/18 (therapy referral)    Patient reports symptoms of pain, stiffness/loss of motion, weakness/loss of strength and snapping of bilateral elbows, wrists and hands which occurred due to hypermobility since a young age. Also history of right wrist ganglion, CTS and LEP in the past year. Since onset symptoms are gradually getting worse.  Special tests:  x-ray, EMG and MRI.  Previous treatment: none.  General health as reported by patient is fair.  Pertinent medical history includes:asthma, Postural orthostatic tachycardia syndrome (POTS), CTS, joint hypermobility (to be tested for EDS).  Medical allergies:see list in EMR.  Surgical history: other: hysterectomy.  Medication history: cardiac, pain.    Occupational Profile Information:  Current occupation is RN  Currently not working due to recent diagnosis of POTS  Job Tasks: prolonged standing  Prior functional level:  independent-shared household chores  Barriers include:none  Mobility: No difficulty  Transportation: drives  Leisure activities/hobbies: Piano    Functional Outcome Measure:  Upper Extremity Functional Index  SCORE:   Column Totals: 63/80  (A lower score indicates greater disability.)    Objective:  Pain Report:  VAS(0-10) 3/26/18   Today: 2/10   Location:  elbows, wrists and hands  Pain Quality:  Aching  Frequency: constant    Pain is worst:  daytime  Exacerbated by:  Gripping, cutting, firm pressure  Relieved by:  Cracking joints, stretching  Progression:  Gradually worsing  ROM:  Moderate hypermobility especially of thumbs and finger DIP > PIP joints; pt frequently \"cracks\" and \"snaps\" thumb and finger joints to relieve pain    Observation:   3/26/18     Right Left   PIP joint nodules + +   DIP joint nodules + +     Strength:   "   (Measured in pounds)   3/26/18    Trials Right Left   1 50+ slight wrist pain 55-     Lat Pinch 3/26/18    Trials Right Left   1 15- 15-     3 Pt Pinch 3/26/18    Trials Right Left   1 15+ 14-     Edema:  Reports intermittent swelling of fingers, none on exam today    Sensation:  Decreased Median Nerve distribution right > left per patient report    Special Tests:   3/26/18   Tinels at CT R:-  L:+   Phalens R:+ 15 secs  L:-   Resisted ECRB R:+   Resisted ECU R:-   Resisted EDC R:+ slight   Resisted Supination R:-     Palpation:  Right elbow 3/26/18   ECRB +   ECU -   EDC -   PIN site + slight   Extensors  -     Assessment:  Patient presents with symptoms consistent with diagnosis of right LEP, right CTS and bilateral hand/thumb pain due to hypermobility, with conservative intervention.     Patient's limitations or Problem List includes:  Pain, Weakness, Decreased stability, Hypermobility, Decreased  and Decreased pinch of right elbow and bilateral hands and thumbs which interferes with the patient's ability to perform Self Care Tasks (eating, bathing), Work Tasks, Sleep Patterns, Recreational Activities, Household Chores and Driving  as compared to previous level of function.    Rehab Potential:  Good - Return to full activity, some limitations    Patient will benefit from skilled Occupational Therapy to increase overall strength,  strength, pinch strength and stability of wrist and decrease pain to return to previous activity level and resume normal daily tasks and to reach their rehab potential.    Barriers to Learning:  No barrier    Communication Issues:  Patient appears to be able to clearly communicate and understand verbal and written communication and follow directions correctly.    Chart Review: Chart Review and Simple history review with patient    Identified Performance Deficits: bathing/showering, driving and community mobility, home establishment and management, meal preparation and  "cleanup, school, work and leisure activities    Assessment of Occupational Performance:  5 or more Performance Deficits    Clinical Decision Making (Complexity): Low complexity    Treatment Explanation:  The following has been discussed with the patient:  RX ordered/plan of care  Anticipated outcomes  Possible risks and side effects    Frequency:  2 X a month, once daily  Duration:  for 2 months    Treatment Plan:   Modalities:  Paraffin  Therapeutic Exercise:  AROM, Tendon Gliding, Isometrics and Stabilization  Neuromuscular re-education:  Proprioceptive Training  Orthotic Fabrication:  Finger, hand or forearm based orthoses  Self Care:  Self Care Tasks; Diagnostic education, joint protection education    Discharge Plan:  Achieve all LTG.  Independent in home treatment program.  Reach maximal therapeutic benefit.    Home Exercise Program:  Place and hold pinch for stable thumb position  Hard \"C\" for thumb stabilization  Wrist isometrics in DTM for ECRB and FCU  Avoid positions of deformity and hyperextension  TFM to right LEP  Keep elbows at sides and palms up when lifting to decrease load to right LEP  Wear right prefab wrist splint sleeping    Next Visit:  See in 2 weeks  Assess response to HEP  Continue to progress stabilization exercises for thumb, hand and wrist  Continue to work on retraining and education to avoid hyperextension of joints with ADL's  Consider orthoses to prevent hyperextension or to resting joints while sleeping  "

## 2018-03-26 NOTE — MR AVS SNAPSHOT
After Visit Summary   3/26/2018    Laura Jackson    MRN: [id number]           Patient Information     Date Of Birth          1970        Visit Information        Provider Department      3/26/2018 3:30 PM Louise Bowers [facility] [place] [facility]        Today's Diagnoses     Bilateral hand pain    -  1       Follow-ups after your visit        Your next 10 appointments already scheduled     Mar 29, 2018  2:00 PM CDT   (Arrive by 1:45 PM)   RETURN ARRHYTHMIA with Phil Mitchell MD   [facility] ([facility])    [address] [place] [address]  [address] [place] [address] [phone]              Who to contact     If you have questions or need follow up information about today's clinic visit or your schedule please contact [facility] directly at [phone].  Normal or non-critical lab and imaging results will be communicated to you by MyChart, letter or phone within 4 business days after the clinic has received the results. If you do not hear from us within 7 days, please contact the clinic through Footmarkshart or phone. If you have a critical or abnormal lab result, we will notify you by phone as soon as possible.  Submit refill requests through [employer] or call your pharmacy and they will forward the refill request to us. Please allow 3 business days for your refill to be completed.          Additional Information About Your Visit        MyChart Information     [employer] gives you secure access to your electronic health record. If you see a primary care provider, you can also send messages to your care team and make appointments. If you have questions, please call your primary care clinic.  If you do not have a primary care provider, please call [phone] and they will assist you.        Care EveryWhere ID     This is your Care EveryWhere ID. This could be used by other organizations to  access your [place] medical records  [id number]        Your Vitals Were     Last Period                   07/28/2017            Blood Pressure from Last 3 Encounters:   02/16/18 125/86   02/06/18 124/76   12/28/17 (!) 147/97    Weight from Last 3 Encounters:   02/16/18 73.9 kg (163 lb)   02/06/18 73 kg (161 lb)   02/02/18 72.8 kg (160 lb 9.6 oz)              We Performed the Following     HC OT EVAL, LOW COMPLEXITY     [facility] INITIAL EVAL REPORT     NEUROMUSCULAR RE-EDUCATION     THERAPEUTIC EXERCISES        Primary Care Provider Office Phone # Fax #    Bj Butler [phone]       [address] [facility] [address]        Equal Access to Services     ODESSA HOWARD : Hadii aad ku hadasho Soomaali, waaxda luqadaha, qaybta kaalmada adeegyada, waxay rafiain hayleeann waters . So [facility] [phone].    ATENCIÓN: Si habla español, tiene a guido disposición servicios gratuitos de asistencia lingüística. Llame al [phone].    We comply with applicable federal civil rights laws and [place] laws. We do not discriminate on the basis of race, color, national origin, age, disability, sex, sexual orientation, or gender identity.            Thank you!     Thank you for choosing [facility]  for your care. Our goal is always to provide you with excellent care. Hearing back from our patients is one way we can continue to improve our services. Please take a few minutes to complete the written survey that you may receive in the mail after your visit with us. Thank you!             Your Updated Medication List - Protect others around you: Learn how to safely use, store and throw away your medicines at www.disposemymeds.org.          This list is accurate as of 3/26/18  5:46 PM.  Always use your most recent med list.                   Brand Name Dispense Instructions for use Diagnosis    diltiazem 180 MG 24 hr capsule    CARDIZEM CD    30 capsule    Take 1  capsule (180 mg) by mouth daily    History of supraventricular tachycardia       fluticasone-salmeterol 100-50 MCG/DOSE diskus inhaler    ADVAIR    3 Inhaler    Inhale 1 puff into the lungs 2 times daily    Mild persistent asthma without complication       levalbuterol 45 MCG/ACT Inhaler    XOPENEX HFA    1 Inhaler    Inhale 1-2 puffs into the lungs every 4 hours as needed for shortness of breath / dyspnea    Mild persistent asthma without complication       Multi-vitamin Tabs tablet      Take 1 tablet by mouth daily        vitamin D 2000 UNITS Caps      Take 1 capsule by mouth daily        ZANTAC PO      Take 150 mg by mouth as needed for heartburn

## 2018-03-26 NOTE — TELEPHONE ENCOUNTER
Reason for call:  Other   Patient called regarding (reason for call): Referral to [facility]   Additional comments: patient needs order/referral to [facility] faxed [phone]    Phone: [phone]    Phone number to reach patient:  Cell number on file:    Telephone Information:   Mobile [phone]       Best Time:  anytime    Can we leave a detailed message on this number?  YES

## 2018-03-27 RX ORDER — LEVALBUTEROL TARTRATE 45 UG/1
1-2 AEROSOL, METERED ORAL EVERY 4 HOURS PRN
Qty: 1 INHALER | Refills: 5 | Status: CANCELLED | OUTPATIENT
Start: 2018-03-27

## 2018-03-27 NOTE — TELEPHONE ENCOUNTER
Noted. Pt has active refills on file. Closing encounter. No further action needed.    Greta Alfaro RN  [facility]

## 2018-03-27 NOTE — TELEPHONE ENCOUNTER
Referral faxed to [facility] at [phone]. Patient notified and gave verbal consent.     Jose Rafael Kinney, EVELIA

## 2018-03-28 ASSESSMENT — ASTHMA QUESTIONNAIRES: ACT_TOTALSCORE: 23

## 2018-03-29 ENCOUNTER — PRE VISIT (OUTPATIENT)
Dept: CARDIOLOGY | Facility: CLINIC | Age: 48
End: 2018-03-29

## 2018-03-29 ENCOUNTER — OFFICE VISIT (OUTPATIENT)
Dept: CARDIOLOGY | Facility: CLINIC | Age: 48
End: 2018-03-29
Attending: INTERNAL MEDICINE
Payer: COMMERCIAL

## 2018-03-29 ENCOUNTER — HOSPITAL ENCOUNTER (OUTPATIENT)
Facility: CLINIC | Age: 48
End: 2018-03-29
Admitting: INTERNAL MEDICINE

## 2018-03-29 VITALS
SYSTOLIC BLOOD PRESSURE: 142 MMHG | OXYGEN SATURATION: 96 % | HEIGHT: 65 IN | WEIGHT: 165.1 LBS | DIASTOLIC BLOOD PRESSURE: 90 MMHG | BODY MASS INDEX: 27.51 KG/M2 | HEART RATE: 103 BPM

## 2018-03-29 DIAGNOSIS — G90.A POTS (POSTURAL ORTHOSTATIC TACHYCARDIA SYNDROME): Primary | ICD-10-CM

## 2018-03-29 DIAGNOSIS — G90.9 AUTONOMIC DYSFUNCTION: Primary | ICD-10-CM

## 2018-03-29 DIAGNOSIS — G90.A POTS (POSTURAL ORTHOSTATIC TACHYCARDIA SYNDROME): ICD-10-CM

## 2018-03-29 DIAGNOSIS — G90.9 AUTONOMIC DYSFUNCTION: ICD-10-CM

## 2018-03-29 LAB — ERYTHROCYTE [SEDIMENTATION RATE] IN BLOOD BY WESTERGREN METHOD: 5 MM/H (ref 0–20)

## 2018-03-29 PROCEDURE — 86141 C-REACTIVE PROTEIN HS: CPT | Performed by: INTERNAL MEDICINE

## 2018-03-29 PROCEDURE — G0463 HOSPITAL OUTPT CLINIC VISIT: HCPCS | Mod: ZF

## 2018-03-29 PROCEDURE — 36415 COLL VENOUS BLD VENIPUNCTURE: CPT | Performed by: INTERNAL MEDICINE

## 2018-03-29 PROCEDURE — 99203 OFFICE O/P NEW LOW 30 MIN: CPT | Mod: GC | Performed by: INTERNAL MEDICINE

## 2018-03-29 PROCEDURE — 85652 RBC SED RATE AUTOMATED: CPT | Performed by: INTERNAL MEDICINE

## 2018-03-29 RX ORDER — LIDOCAINE 40 MG/G
CREAM TOPICAL
Status: CANCELLED | OUTPATIENT
Start: 2018-03-29

## 2018-03-29 RX ORDER — SODIUM CHLORIDE 9 MG/ML
INJECTION, SOLUTION INTRAVENOUS CONTINUOUS
Status: CANCELLED | OUTPATIENT
Start: 2018-03-29

## 2018-03-29 ASSESSMENT — PAIN SCALES - GENERAL: PAINLEVEL: NO PAIN (0)

## 2018-03-29 NOTE — PATIENT INSTRUCTIONS
You will be scheduled for a follow up visit: To be determined.      Medication changes: None     New Medications: None    1.  Have labs drawn today- CRP and ESR  2.  It is recommended that you follow up with your primary care provider in regards to a referral to Gastroenterology.    3.  Please hold your Diltiazem 1 day prior to your Tilt Table Study    You have been scheduled for a Tilt Table/Autonomic study with Dr Mitchell on April 25, 2018.    Below are instructions, if you have questions please contact our office.     You should arrive at the [address] at 7 am    2. Report to the [facility] waiting room to check in. Your procedure will be done in the Catheterization Lab.     3. Wear comfortable clothing. (sweat/yoga pants, t-shirt). Please allow 4-5 hours for this procedure to be completed.     4. Do not eat or drink ANYTHING for 6 hours prior to arrival.     5. You may take any scheduled medications the morning of your procedure with a SIP of water- unless instructed by your physician differently.     DO NOT TAKE ANY VITAMINS, MINERALS OR HERBAL SUPPLEMENTS.     6. You may drive yourself to and from this procedure.       Thanks,     FRANK [facility]   Office: [phone]           If you have any questions regarding to your visit please contact your care team:      Cardiology  Telephone Number    Zulay Hardin [phone]   Or send a message to your provider via my chart.   For scheduling appts or procedures:    Gina Walker    [phone] or  [phone]   For the Device Clinic (Pacemakers and ICD's)   RN's :   Yen Marrero  During business hours: [phone]     After business hours:   [phone]- select option 4 and ask for job code [id number].    You can also contact us using My Chart. If you need assistance in setting this up, please contact our office or ask at your follow up visit.      If you need a medication refill please contact your pharmacy.  Please allow 3 business days for your refill to be completed.      As always, Thank you for trusting us with your health care needs!     We encourage you to use My Chart as your primary form of communication if possible. If you need assistance in setting this up, please contact our office or ask at your follow up visit.      If you need a medication refill please contact your pharmacy. Please allow at least 3 business days for your refill to be completed.         Cardiology  Telephone Number    Zulay Hardin [phone]   Or send a message to your provider via my chart.   For scheduling procedures:    [facility]     Clinic appointments       [phone]   For the Device Clinic (Pacemakers and ICD's)   RN's :   Yen Marrero  During business hours: [phone]     After business hours:   [phone]- select option 4 and ask for job code [id number].             As always, Thank you for trusting us with your health care needs!

## 2018-03-29 NOTE — MR AVS SNAPSHOT
After Visit Summary   3/29/2018    Laura Jackson    MRN: [id number]           Patient Information     Date Of Birth          1970        Visit Information        Provider Department      3/29/2018 2:00 PM Phil Mitchell MD [facility]        Today's Diagnoses     Autonomic dysfunction    -  1    POTS (postural orthostatic tachycardia syndrome)          Care Instructions    You will be scheduled for a follow up visit: To be determined.      Medication changes: None     New Medications: None    1.  Have labs drawn today- CRP and ESR  2.  It is recommended that you follow up with your primary care provider in regards to a referral to Gastroenterology.    3.  Please hold your Diltiazem 1 day prior to your Tilt Table Study    You have been scheduled for a Tilt Table/Autonomic study with Dr Mitchell on April 25, 2018.    Below are instructions, if you have questions please contact our office.     You should arrive at the [address] at 7 am    2. Report to the [facility] waiting room to check in. Your procedure will be done in the Catheterization Lab.     3. Wear comfortable clothing. (sweat/yoga pants, t-shirt). Please allow 4-5 hours for this procedure to be completed.     4. Do not eat or drink ANYTHING for 6 hours prior to arrival.     5. You may take any scheduled medications the morning of your procedure with a SIP of water- unless instructed by your physician differently.     DO NOT TAKE ANY VITAMINS, MINERALS OR HERBAL SUPPLEMENTS.     6. You may drive yourself to and from this procedure.       Thanks,     FRANK Willson Procedure   Office: [phone]           If you have any questions regarding to your visit please contact your care team:      Cardiology  Telephone Number    Zulay Hardin [phone]   Or send a message to your provider via my chart.   For scheduling appts or procedures:    Gina Walker    [phone] or  [phone]   For the  Device Clinic (Pacemakers and ICD's)   RN's :   Yen Marrero  During business hours: [phone]     After business hours:   [phone]- select option 4 and ask for job code [id number].    You can also contact us using My Chart. If you need assistance in setting this up, please contact our office or ask at your follow up visit.      If you need a medication refill please contact your pharmacy. Please allow 3 business days for your refill to be completed.      As always, Thank you for trusting us with your health care needs!     We encourage you to use My Chart as your primary form of communication if possible. If you need assistance in setting this up, please contact our office or ask at your follow up visit.      If you need a medication refill please contact your pharmacy. Please allow at least 3 business days for your refill to be completed.         Cardiology  Telephone Number    Zulay Hardin [phone]   Or send a message to your provider via my chart.   For scheduling procedures:    [facility] appointments       [phone]   For the Device Clinic (Pacemakers and ICD's)   RN's :   Yen Marrero  During business hours: [phone]     After business hours:   [phone]- select option 4 and ask for job code [id number].             As always, Thank you for trusting us with your health care needs!                Follow-ups after your visit        Your next 10 appointments already scheduled     Mar 29, 2018  3:15 PM CDT   Lab with  LAB    Health Lab ([facility])    [address] [address] [phone]              Future tests that were ordered for you today     Open Future Orders        Priority Expected Expires Ordered    CRP cardiac risk Routine 3/29/2018 3/29/2019 3/29/2018    Erythrocyte sedimentation rate auto Routine 3/29/2018 3/29/2019 3/29/2018    EP Tilt Table Study Routine  "4/5/2018 7/4/2018 3/29/2018            Who to contact     If you have questions or need follow up information about today's clinic visit or your schedule please contact [facility] directly at [phone].  Normal or non-critical lab and imaging results will be communicated to you by MyChart, letter or phone within 4 business days after the clinic has received the results. If you do not hear from us within 7 days, please contact the clinic through Innobitshart or phone. If you have a critical or abnormal lab result, we will notify you by phone as soon as possible.  Submit refill requests through [employer]Rock or call your pharmacy and they will forward the refill request to us. Please allow 3 business days for your refill to be completed.          Additional Information About Your Visit        InnobitsharPetLove Information     [employer] gives you secure access to your electronic health record. If you see a primary care provider, you can also send messages to your care team and make appointments. If you have questions, please call your primary care clinic.  If you do not have a primary care provider, please call [phone] and they will assist you.        Care EveryWhere ID     This is your Care EveryWhere ID. This could be used by other organizations to access your [place] medical records  [id number]        Your Vitals Were     Pulse Height Last Period Pulse Oximetry BMI (Body Mass Index)       103 1.651 m (5' 5\") 07/28/2017 96% 27.47 kg/m2        Blood Pressure from Last 3 Encounters:   03/29/18 142/90   02/16/18 125/86   02/06/18 124/76    Weight from Last 3 Encounters:   03/29/18 74.9 kg (165 lb 1.6 oz)   02/16/18 73.9 kg (163 lb)   02/06/18 73 kg (161 lb)              We Performed the Following     Follow-Up with Electrophysiologist        Primary Care Provider Office Phone # Fax #    Bj Butler [phone] [address] [facility] [place] [address]        Equal Access to Services     ODESSA HOWARD " AH: Hadii latoya booneblayneo Sokrissali, waaxda luqadaha, qaybta kaalmada michelle, ludmlia rafiain hayaanelsy garcíaleandra foss jt alberts. So [facility] [phone].    ATENCIÓN: Si habla español, tiene a guido disposición servicios gratuitos de asistencia lingüística. Llame al [phone].    We comply with applicable federal civil rights laws and [place] laws. We do not discriminate on the basis of race, color, national origin, age, disability, sex, sexual orientation, or gender identity.            Thank you!     Thank you for choosing [facility]  for your care. Our goal is always to provide you with excellent care. Hearing back from our patients is one way we can continue to improve our services. Please take a few minutes to complete the written survey that you may receive in the mail after your visit with us. Thank you!             Your Updated Medication List - Protect others around you: Learn how to safely use, store and throw away your medicines at www.disposemymeds.org.          This list is accurate as of 3/29/18  3:11 PM.  Always use your most recent med list.                   Brand Name Dispense Instructions for use Diagnosis    diltiazem 180 MG 24 hr capsule    CARDIZEM CD    30 capsule    Take 1 capsule (180 mg) by mouth daily    History of supraventricular tachycardia       fluticasone-salmeterol 100-50 MCG/DOSE diskus inhaler    ADVAIR    3 Inhaler    Inhale 1 puff into the lungs 2 times daily    Mild persistent asthma without complication       levalbuterol 45 MCG/ACT Inhaler    XOPENEX HFA    1 Inhaler    Inhale 1-2 puffs into the lungs every 4 hours as needed for shortness of breath / dyspnea    Mild persistent asthma without complication       Multi-vitamin Tabs tablet      Take 1 tablet by mouth daily        vitamin D 2000 UNITS Caps      Take 1 capsule by mouth daily        ZANTAC PO      Take 150 mg by mouth as needed for heartburn

## 2018-03-29 NOTE — PROGRESS NOTES
HPI: 47 year old with a PMHx of asthma, uterine fibroids s/b hysterectomy who presents for follow up.     Around 06/2017, patient had an episode of severe vaginal bleeding which required hysterectomy. During this acute illness, patient started having multifactorial symptoms throughout the day. Symptoms would include palpitations, fatigue, clamminess, sweating, flushing, nausea, and burping. Most of these symptoms would occur when patient stands for a long period of time. During these episodes, she notes that her HR is in the 150's and improves after she sits down or lays supine. Denies any episodes of passing out. When patient exerts herself (especially at work where she stands/walks a fair amount), her symptoms become much worse. However, when she is walking to dog (around 15 minutes), her symptoms are not exacerbated.      In addition to postural changes, patient does note similar symptoms when she takes a shower (warm or cold).     Interestingly, patient had very similar symptoms 6 years ago when she had a viral infection. Patient states that her symptoms took around 2 years before she felt back to baseline.     Patient was admitted in the hospital for the above symptoms. ECHO was ordered and was negative. Ziopatch showed episodes of sinus tachycardia. Patient was started on Diltiazem, which helped her HR during these episodes (would be around 130 instead of 150). In terms of lifestyle management, patient has tried to increase her water intake; however, she has not increased her salt intake.     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     Lastly, patient has a chart history of SVT. However, patient reports this was never formally diagnosed and likely related to sinus tachycardia.     Since her last visit, she has noted only slight improvement in her symptoms. She still gets daily palpitations and fatigue associated with postural change or with  "prolonged standing. She does not get chest pain or dizziness. She notes her HR is >130 and -450s during these symptoms. Her HR is otherwise in 80s when she is asymptomatic.  She also has GI symptoms specifically with shower or some foods she will get bloated. She is nauseated all the time.   She drinks ~90 oz water mostly daily.  She also described intermittent rash in her right leg and dust mite allergy.    Vitals:    03/29/18 1353 03/29/18 1359 03/29/18 1401 03/29/18 1402   BP: 130/80 135/87 137/88 142/90   BP Location: Left arm Left arm Left arm Left arm   Patient Position: Supine Sitting Standing Standing   Cuff Size: Adult Regular Adult Regular Adult Regular Adult Regular   Pulse: 90 105 109 103   SpO2: 97% 96% 97% 96%   Weight: 74.9 kg (165 lb 1.6 oz)      Height: 1.651 m (5' 5\")                PAST MEDICAL HISTORY:  Past Medical History:   Diagnosis Date     Family history of breast cancer 2/19/2014     SVT (supraventricular tachycardia):  history of, no recurrence since 2008 10/11/2013    no recurrence since 2008      Uncomplicated asthma        CURRENT MEDICATIONS:  Current Outpatient Prescriptions   Medication Sig Dispense Refill     diltiazem (CARDIZEM CD) 180 MG 24 hr capsule Take 1 capsule (180 mg) by mouth daily 30 capsule 2     levalbuterol (XOPENEX HFA) 45 MCG/ACT Inhaler Inhale 1-2 puffs into the lungs every 4 hours as needed for shortness of breath / dyspnea 1 Inhaler 5     fluticasone-salmeterol (ADVAIR) 100-50 MCG/DOSE diskus inhaler Inhale 1 puff into the lungs 2 times daily 3 Inhaler 1     Cholecalciferol (VITAMIN D) 2000 UNITS CAPS Take 1 capsule by mouth daily       multivitamin, therapeutic with minerals (MULTI-VITAMIN) TABS tablet Take 1 tablet by mouth daily       RaNITidine HCl (ZANTAC PO) Take 150 mg by mouth as needed for heartburn         PAST SURGICAL HISTORY:  Past Surgical History:   Procedure Laterality Date     APPENDECTOMY       C STOMACH SURGERY PROCEDURE UNLISTED       " DAVINCI HYSTERECTOMY TOTAL, SALPINGECTOMY BILATERAL N/A 8/4/2017    Procedure: DAVINCI XI HYSTERECTOMY TOTAL, SALPINGECTOMY BILATERAL;  DAVINCI TOTAL HYSTERECTOMY; BILATERAL SALPINGECTOMY. BILATERAL URETERAL LYSIS. UTEROSACRAL-COLPOPEXY. EXCISION OF ENDOMETRIOSIS;  Surgeon: George Loyola MD;  Location: [facility] OR     DAVINCI LYSIS OF ADHESIONS Bilateral 8/4/2017    Procedure: DAVINCI LYSIS OF ADHESIONS;  BILATERAL URETERAL LYSIS;  Surgeon: George Loyola MD;  Location: [facility] OR     DAVINCI SACROCOLPOPEXY, CYSTOSCOPY, COMBINED N/A 8/4/2017    Procedure: COMBINED DAVINCI SACROCOLPOPEXY, CYSTOSCOPY;  UTEROSACRAL COLPOPEXY;  Surgeon: George Loyola MD;  Location: [facility] OR     DAVINCI XI ASSISTED ABLATION / EXCISION OF ENDOMETRIOSIS  8/4/2017    Procedure: DAVINCI XI ASSISTED ABLATION / EXCISION OF ENDOMETRIOSIS;;  Surgeon: George Loyola MD;  Location: [facility] OR     DILATION AND CURETTAGE N/A 6/20/2017    Procedure: DILATION AND CURETTAGE;  Uterine Curettings and Fibroid Removal, Cook catheter placement; (No hysterectomy done at this time);  Surgeon: Ernestina Saunders MD;  Location: [facility] OR     TONSILLECTOMY         ALLERGIES:     Allergies   Allergen Reactions     Penicillins      Swelling throat; age 4     Tetracycline      Vomiting; nauseous       FAMILY HISTORY:  Family History   Problem Relation Age of Onset     DIABETES Mother      Hypertension Mother      Hyperlipidemia Mother      Arrhythmia Mother      Cardiovascular Father      CHF and COPD     Asthma Father      Breast Cancer Maternal Grandfather      Colon Cancer Maternal Grandfather      Breast Cancer Paternal Grandmother      Breast Cancer Paternal Aunt      C.A.D. Paternal Grandfather      Breast Cancer Cousin      Asthma Son      DIABETES Maternal Grandmother      Hypertension Maternal Grandmother          SOCIAL HISTORY:  Social History   Substance Use Topics     Smoking status: Never Smoker     Smokeless tobacco: Never Used     Alcohol use Yes       Comment: occasional       ROS:   Constitutional: No fever, chills, or sweats. Weight stable.   ENT: No visual disturbance, ear ache, epistaxis, sore throat.   Cardiovascular: As per HPI.   Respiratory: No cough, hemoptysis.    GI: No nausea, vomiting, hematemesis, melena, or hematochezia.   : No hematuria.   Integument: Negative.   Psychiatric: Negative.   Hematologic:  Easy bruising, no easy bleeding.  Neuro: Negative.   Endocrinology: No significant heat or cold intolerance   Musculoskeletal: No myalgia.    Answers for HPI/ROS submitted by the patient on 3/26/2018   General Symptoms: Yes  Skin Symptoms: Yes  HENT Symptoms: No  EYE SYMPTOMS: Yes  HEART SYMPTOMS: Yes  LUNG SYMPTOMS: Yes  INTESTINAL SYMPTOMS: Yes  URINARY SYMPTOMS: Yes  GYNECOLOGIC SYMPTOMS: No  BREAST SYMPTOMS: No  SKELETAL SYMPTOMS: Yes  BLOOD SYMPTOMS: No  NERVOUS SYSTEM SYMPTOMS: Yes  MENTAL HEALTH SYMPTOMS: No  Fever: No  Loss of appetite: No  Weight loss: No  Weight gain: Yes  Fatigue: Yes  Night sweats: Yes  Chills: No  Increased stress: Yes  Excessive hunger: No  Excessive thirst: No  Feeling hot or cold when others believe the temperature is normal: Yes  Loss of height: No  Post-operative complications: No  Surgical site pain: No  Hallucinations: No  Change in or Loss of Energy: Yes  Hyperactivity: No  Confusion: No  Changes in hair: No  Changes in moles/birth marks: No  Itching: No  Rashes: No  Changes in nails: No  Acne: No  Hair in places you don't want it: No  Change in facial hair: No  Warts: No  Non-healing sores: No  Scarring: No  Flaking of skin: No  Color changes of hands/feet in cold : Yes  Sun sensitivity: No  Skin thickening: No  Eye pain: No  Vision loss: Yes  Dry eyes: No  Watery eyes: No  Eye bulging: No  Double vision: No  Flashing of lights: No  Spots: No  Floaters: No  Redness: No  Crossed eyes: No  Tunnel Vision: No  Yellowing of eyes: No  Eye irritation: No  Cough: No  Sputum or phlegm: No  Coughing up blood:  "No  Difficulty breating or shortness of breath: Yes  Snoring: No  Wheezing: No  Difficulty breathing on exertion: No  Nighttime Cough: No  Difficulty breathing when lying flat: No  Chest pain or pressure: Yes  Fast or irregular heartbeat: Yes  Pain in legs with walking: No  Trouble breathing while lying down: Yes  Fingers or toes appear blue: No  High blood pressure: Yes  Low blood pressure: No  Fainting: No  Murmurs: No  Pacemaker: No  Varicose veins: No  Edema or swelling: Yes  Wake up at night with shortness of breath: Yes  Light-headedness: Yes  Exercise intolerance: Yes  Heart burn or indigestion: Yes  Nausea: Yes  Vomiting: No  Abdominal pain: No  Bloating: Yes  Constipation: No  Diarrhea: Yes  Blood in stool: No  Black stools: No  Rectal or Anal pain: No  Fecal incontinence: No  Yellowing of skin or eyes: No  Vomit with blood: No  Change in stools: Yes  Trouble holding urine or incontinence: Yes  Pain or burning: No  Trouble starting or stopping: No  Increased frequency of urination: Yes  Blood in urine: No  Decreased frequency of urination: No  Frequent nighttime urination: No  Flank pain: No  Difficulty emptying bladder: No  Back pain: No  Muscle aches: Yes  Neck pain: Yes  Swollen joints: Yes  Joint pain: Yes  Bone pain: No  Muscle cramps: Yes  Muscle weakness: Yes  Joint stiffness: Yes  Bone fracture: No  Dizziness or trouble with balance: No  Fainting or black-out spells: No  Memory loss: Yes  Headache: Yes  Seizures: No  Speech problems: No  Tingling: No  Tremor: No  Weakness: No  Difficulty walking: No  Paralysis: No  Numbness: No    Exam:  /90 (BP Location: Left arm, Patient Position: Standing, Cuff Size: Adult Regular)  Pulse 103  Ht 1.651 m (5' 5\")  Wt 74.9 kg (165 lb 1.6 oz)  LMP 07/28/2017  SpO2 96%  BMI 27.47 kg/m2  Gen: NAD   HEENT: NC/AT   CV: RRR   Pulm: CTAB   GI: s/nt/nd   Ext: No edema   Neuro: No focal defects     Labs:  CBC RESULTS:   Lab Results   Component Value Date    WBC " 5.0 2017    RBC 4.72 2017    HGB 13.7 2017    HCT 41.9 2017    MCV 89 2017    MCH 29.0 2017    MCHC 32.7 2017    RDW 14.3 2017     2017       BMP RESULTS:  Lab Results   Component Value Date     2017    POTASSIUM 4.0 2017    CHLORIDE 103 2017    CO2 27 2017    ANIONGAP 8 2017    GLC 96 2017    BUN 11 2017    CR 0.77 2017    GFRESTIMATED 79 2017    GFRESTBLACK >90 2017    AZAEL 9.2 2017        INR RESULTS:  Lab Results   Component Value Date    INR 1.12 2017    INR 1.12 2017       Procedures:    ECHOCARDIOGRAM:   Recent Results (from the past 8760 hour(s))   Echocardiogram Complete    Narrative    [id number]  [id number]  [id number]  [id number]^CAN^BARBRA^[facility]  Echocardiography Laboratory  [address] [place] [address]     Name: HUSEYIN MENDIOLA  MRN: [id number]  : 1970  Study Date: 2017 06:29 PM  Age: 47 yrs  Gender: Female  Patient Location: [facility]  Reason For Study: Tachycardia  Ordering Physician: BARBRA NORTH  Performed By: Joyce Schulte RDCS     BSA: 1.8 m2  Height: 65 in  Weight: 162 lb  BP: 124/85 mmHg  _____________________________________________________________________________  __        Procedure  Echocardiogram with two-dimensional, color and spectral Doppler performed.  _____________________________________________________________________________  __        Interpretation Summary  Left ventricular size is normal. Global and regional left ventricular function  is normal with an EF of 60-65%.  The right ventricle is normal size. Global right ventricular function is  normal.  The inferior vena cava is normal.  No pericardial effusion is present.  Previous study not available for comparison.  _____________________________________________________________________________  __         Left Ventricle  Left ventricular size is normal. Left ventricular wall thickness is normal.  Global and regional left ventricular function is normal with an EF of 60-65%.  Normal left ventricular filling for age. No regional wall motion abnormalities  are seen.     Right Ventricle  The right ventricle is normal size. Global right ventricular function is  normal.     Atria  Both atria appear normal. The atrial septum is intact as assessed by color  Doppler .        Mitral Valve  The mitral valve is normal. Trace mitral insufficiency is present.     Aortic Valve  Aortic valve is normal in structure and function.     Tricuspid Valve  The tricuspid valve is normal. Trace tricuspid insufficiency is present.  Pulmonary artery systolic pressure cannot be assessed. The peak velocity of  the tricuspid regurgitant jet is not obtainable.     Pulmonic Valve  The pulmonic valve is normal.     Vessels  The aorta root is normal. The inferior vena cava is normal. Estimated mean  right atrial pressure is 3 mmHg.     Pericardium  No pericardial effusion is present.        Compared to Previous Study  Previous study not available for comparison.  _____________________________________________________________________________  __     MMode/2D Measurements & Calculations  LA Volume (BP): 29.9 ml  LA Volume Index (BP): 16.5 ml/m2        Doppler Measurements & Calculations  MV E max mary: 95.1 cm/sec  MV A max mary: 62.1 cm/sec  MV E/A: 1.5  MV dec time: 0.23 sec              _____________________________________________________________________________  __        Report approved by: Yasmin Barrett 08/27/2017 07:15 PM            Assessment and Plan:   47 year old with a PMHx of asthma, uterine fibroids s/b hysterectomy who presents for followup.    # Possible autonomic dysfunction  # GI symptoms      Plan:   -Tilt table study to better understand her physiology  - Consider Mast cell activation labs after tilt table study given intermittent  right leg rash and dust mite allergy  -will ask her PCP to refer her to GI for evaluation of her GI symptoms.     Follow up will be decided based on results of the tilt table study    Bakari Thao PGY-5   Cardiology Fellow   Pager: [phone]        I very much appreciated the opportunity to see and assess Ms Laura Jackson in the clinic with CV Fellow Dr Thao.    Today I spent 40 min with p[atient reviewing history, Rx strategy and proposed autonomic studies. She voiced understanding and agreement     Please do not hesitate to contact my office if you have any questions or concerns.      Phil Mitchell MD  Cardiac Arrhythmia Service  [facility]  [phone]    CC

## 2018-03-29 NOTE — PROGRESS NOTES
"Patient seen 12/2017 - \"Patient's symptoms are consistent with autonomic dysfunction likely triggered by stress of an acute illness. She has symptoms of postural tachycardia syndrome as well as vagal mediated symptoms. Will initially treat with lifestyle management. If this fails, will consider autonomic testing and initiating ivabradine and florinef\"   "

## 2018-03-29 NOTE — NURSING NOTE
Chief Complaint   Patient presents with     Follow Up For     47 year old female with history of palpitations and autonomic dysfunction presenting for follow up     Vitals were taken and medications were reconciled.  OSEI Cabrera  1:55 PM

## 2018-03-29 NOTE — LETTER
3/29/2018      RE: Laura Jackson  [address]  [place] [address]       Dear Colleague,    Thank you for the opportunity to participate in the care of your patient, Laura Jackson, at the [facility] at [facility]. Please see a copy of my visit note below.    HPI: 47 year old with a PMHx of asthma, uterine fibroids s/b hysterectomy who presents for follow up.     Around 06/2017, patient had an episode of severe vaginal bleeding which required hysterectomy. During this acute illness, patient started having multifactorial symptoms throughout the day. Symptoms would include palpitations, fatigue, clamminess, sweating, flushing, nausea, and burping. Most of these symptoms would occur when patient stands for a long period of time. During these episodes, she notes that her HR is in the 150's and improves after she sits down or lays supine. Denies any episodes of passing out. When patient exerts herself (especially at work where she stands/walks a fair amount), her symptoms become much worse. However, when she is walking to dog (around 15 minutes), her symptoms are not exacerbated.      In addition to postural changes, patient does note similar symptoms when she takes a shower (warm or cold).     Interestingly, patient had very similar symptoms 6 years ago when she had a viral infection. Patient states that her symptoms took around 2 years before she felt back to baseline.     Patient was admitted in the hospital for the above symptoms. ECHO was ordered and was negative. Ziopatch showed episodes of sinus tachycardia. Patient was started on Diltiazem, which helped her HR during these episodes (would be around 130 instead of 150). In terms of lifestyle management, patient has tried to increase her water intake; however, she has not increased her salt intake.     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; however, she currently on disability given the above symptoms.  "    Lastly, patient has a chart history of SVT. However, patient reports this was never formally diagnosed and likely related to sinus tachycardia.     Since her last visit, she has noted only slight improvement in her symptoms. She still gets daily palpitations and fatigue associated with postural change or with prolonged standing. She does not get chest pain or dizziness. She notes her HR is >130 and -450s during these symptoms. Her HR is otherwise in 80s when she is asymptomatic.  She also has GI symptoms specifically with shower or some foods she will get bloated. She is nauseated all the time.   She drinks ~90 oz water mostly daily.  She also described intermittent rash in her right leg and dust mite allergy.    Vitals:    03/29/18 1353 03/29/18 1359 03/29/18 1401 03/29/18 1402   BP: 130/80 135/87 137/88 142/90   BP Location: Left arm Left arm Left arm Left arm   Patient Position: Supine Sitting Standing Standing   Cuff Size: Adult Regular Adult Regular Adult Regular Adult Regular   Pulse: 90 105 109 103   SpO2: 97% 96% 97% 96%   Weight: 74.9 kg (165 lb 1.6 oz)      Height: 1.651 m (5' 5\")                PAST MEDICAL HISTORY:  Past Medical History:   Diagnosis Date     Family history of breast cancer 2/19/2014     SVT (supraventricular tachycardia):  history of, no recurrence since 2008 10/11/2013    no recurrence since 2008      Uncomplicated asthma        CURRENT MEDICATIONS:  Current Outpatient Prescriptions   Medication Sig Dispense Refill     diltiazem (CARDIZEM CD) 180 MG 24 hr capsule Take 1 capsule (180 mg) by mouth daily 30 capsule 2     levalbuterol (XOPENEX HFA) 45 MCG/ACT Inhaler Inhale 1-2 puffs into the lungs every 4 hours as needed for shortness of breath / dyspnea 1 Inhaler 5     fluticasone-salmeterol (ADVAIR) 100-50 MCG/DOSE diskus inhaler Inhale 1 puff into the lungs 2 times daily 3 Inhaler 1     Cholecalciferol (VITAMIN D) 2000 UNITS CAPS Take 1 capsule by mouth daily       " multivitamin, therapeutic with minerals (MULTI-VITAMIN) TABS tablet Take 1 tablet by mouth daily       RaNITidine HCl (ZANTAC PO) Take 150 mg by mouth as needed for heartburn         PAST SURGICAL HISTORY:  Past Surgical History:   Procedure Laterality Date     APPENDECTOMY       C STOMACH SURGERY PROCEDURE UNLISTED       DAVINCI HYSTERECTOMY TOTAL, SALPINGECTOMY BILATERAL N/A 8/4/2017    Procedure: DAVINCI XI HYSTERECTOMY TOTAL, SALPINGECTOMY BILATERAL;  DAVINCI TOTAL HYSTERECTOMY; BILATERAL SALPINGECTOMY. BILATERAL URETERAL LYSIS. UTEROSACRAL-COLPOPEXY. EXCISION OF ENDOMETRIOSIS;  Surgeon: Geroge Loyola MD;  Location: [facility] OR     DAVINCI LYSIS OF ADHESIONS Bilateral 8/4/2017    Procedure: DAVINCI LYSIS OF ADHESIONS;  BILATERAL URETERAL LYSIS;  Surgeon: George Loyola MD;  Location: [facility] OR     DAVINCI SACROCOLPOPEXY, CYSTOSCOPY, COMBINED N/A 8/4/2017    Procedure: COMBINED DAVINCI SACROCOLPOPEXY, CYSTOSCOPY;  UTEROSACRAL COLPOPEXY;  Surgeon: George Loyola MD;  Location: [facility] OR     DAVINCI XI ASSISTED ABLATION / EXCISION OF ENDOMETRIOSIS  8/4/2017    Procedure: DAVINCI XI ASSISTED ABLATION / EXCISION OF ENDOMETRIOSIS;;  Surgeon: George Loyola MD;  Location: [facility] OR     DILATION AND CURETTAGE N/A 6/20/2017    Procedure: DILATION AND CURETTAGE;  Uterine Curettings and Fibroid Removal, Cook catheter placement; (No hysterectomy done at this time);  Surgeon: Ernestina Saunders MD;  Location: [facility]     TONSILLECTOMY         ALLERGIES:     Allergies   Allergen Reactions     Penicillins      Swelling throat; age 4     Tetracycline      Vomiting; nauseous       FAMILY HISTORY:  Family History   Problem Relation Age of Onset     DIABETES Mother      Hypertension Mother      Hyperlipidemia Mother      Arrhythmia Mother      Cardiovascular Father      CHF and COPD     Asthma Father      Breast Cancer Maternal Grandfather      Colon Cancer Maternal Grandfather      Breast Cancer Paternal Grandmother      Breast  "Cancer Paternal Aunt      ETHAN. Paternal Grandfather      Breast Cancer Cousin      Asthma Son      DIABETES Maternal Grandmother      Hypertension Maternal Grandmother          SOCIAL HISTORY:  Social History   Substance Use Topics     Smoking status: Never Smoker     Smokeless tobacco: Never Used     Alcohol use Yes      Comment: occasional       ROS:   Constitutional: No fever, chills, or sweats. Weight stable.   ENT: No visual disturbance, ear ache, epistaxis, sore throat.   Cardiovascular: As per HPI.   Respiratory: No cough, hemoptysis.    GI: No nausea, vomiting, hematemesis, melena, or hematochezia.   : No hematuria.   Integument: Negative.   Psychiatric: Negative.   Hematologic:  Easy bruising, no easy bleeding.  Neuro: Negative.   Endocrinology: No significant heat or cold intolerance   Musculoskeletal: No myalgia.      Exam:  /90 (BP Location: Left arm, Patient Position: Standing, Cuff Size: Adult Regular)  Pulse 103  Ht 1.651 m (5' 5\")  Wt 74.9 kg (165 lb 1.6 oz)  LMP 07/28/2017  SpO2 96%  BMI 27.47 kg/m2  Gen: NAD   HEENT: NC/AT   CV: RRR   Pulm: CTAB   GI: s/nt/nd   Ext: No edema   Neuro: No focal defects     Labs:  CBC RESULTS:   Lab Results   Component Value Date    WBC 5.0 11/28/2017    RBC 4.72 11/28/2017    HGB 13.7 11/28/2017    HCT 41.9 11/28/2017    MCV 89 11/28/2017    MCH 29.0 11/28/2017    MCHC 32.7 11/28/2017    RDW 14.3 11/28/2017     11/28/2017       BMP RESULTS:  Lab Results   Component Value Date     09/11/2017    POTASSIUM 4.0 09/11/2017    CHLORIDE 103 09/11/2017    CO2 27 09/11/2017    ANIONGAP 8 09/11/2017    GLC 96 09/11/2017    BUN 11 09/11/2017    CR 0.77 09/11/2017    GFRESTIMATED 79 09/11/2017    GFRESTBLACK >90 09/11/2017    AZAEL 9.2 09/11/2017        INR RESULTS:  Lab Results   Component Value Date    INR 1.12 06/20/2017    INR 1.12 06/20/2017       Procedures:    ECHOCARDIOGRAM:   Recent Results (from the past 8760 hour(s))   Echocardiogram " Complete    Narrative    [id number]  [id number]  [id number]  [id number]^BRENTJEMIMAJANNETH^GERMÁNBIL^[facility]  Echocardiography Laboratory  [address] [place] [id number]     Name: HUSEYIN MENDIOLA  MRN: [id number]  : 1970  Study Date: 2017 06:29 PM  Age: 47 yrs  Gender: Female  Patient Location: [facility]  Reason For Study: Tachycardia  Ordering Physician: BARBRA NORTH  Performed By: Joyce Schulte RDCS     BSA: 1.8 m2  Height: 65 in  Weight: 162 lb  BP: 124/85 mmHg  _____________________________________________________________________________  __        Procedure  Echocardiogram with two-dimensional, color and spectral Doppler performed.  _____________________________________________________________________________  __        Interpretation Summary  Left ventricular size is normal. Global and regional left ventricular function  is normal with an EF of 60-65%.  The right ventricle is normal size. Global right ventricular function is  normal.  The inferior vena cava is normal.  No pericardial effusion is present.  Previous study not available for comparison.  _____________________________________________________________________________  __        Left Ventricle  Left ventricular size is normal. Left ventricular wall thickness is normal.  Global and regional left ventricular function is normal with an EF of 60-65%.  Normal left ventricular filling for age. No regional wall motion abnormalities  are seen.     Right Ventricle  The right ventricle is normal size. Global right ventricular function is  normal.     Atria  Both atria appear normal. The atrial septum is intact as assessed by color  Doppler .        Mitral Valve  The mitral valve is normal. Trace mitral insufficiency is present.     Aortic Valve  Aortic valve is normal in structure and function.     Tricuspid Valve  The tricuspid valve is normal. Trace tricuspid insufficiency is present.  Pulmonary  artery systolic pressure cannot be assessed. The peak velocity of  the tricuspid regurgitant jet is not obtainable.     Pulmonic Valve  The pulmonic valve is normal.     Vessels  The aorta root is normal. The inferior vena cava is normal. Estimated mean  right atrial pressure is 3 mmHg.     Pericardium  No pericardial effusion is present.        Compared to Previous Study  Previous study not available for comparison.  _____________________________________________________________________________  __     MMode/2D Measurements & Calculations  LA Volume (BP): 29.9 ml  LA Volume Index (BP): 16.5 ml/m2        Doppler Measurements & Calculations  MV E max mary: 95.1 cm/sec  MV A max mary: 62.1 cm/sec  MV E/A: 1.5  MV dec time: 0.23 sec              _____________________________________________________________________________  __        Report approved by: Yasmin Barrett 08/27/2017 07:15 PM            Assessment and Plan:   47 year old with a PMHx of asthma, uterine fibroids s/b hysterectomy who presents for followup.    # Possible autonomic dysfunction  # GI symptoms      Plan:   -Tilt table study to better understand her physiology  - Consider Mast cell activation labs after tilt table study given intermittent right leg rash and dust mite allergy  -will ask her PCP to refer her to GI for evaluation of her GI symptoms.     Follow up will be decided based on results of the tilt table study    Bakari Thao PGY-5   Cardiology Fellow   Pager: [phone]        I very much appreciated the opportunity to see and assess Ms Laura Jackson in the clinic with CV Fellow Dr Thao.    Today I spent 40 min with p[atient reviewing history, Rx strategy and proposed autonomic studies. She voiced understanding and agreement     Please do not hesitate to contact my office if you have any questions or concerns.      Phil Mitchell MD  Cardiac Arrhythmia Service  [facility]  [phone]

## 2018-03-29 NOTE — NURSING NOTE
Labs:  Will call patient with results of labs.  Patient demonstrated understanding of this information and agreed to call with further questions or concerns.     Med Reconcile: Reviewed and verified all current medications with the patient. The updated medication list was printed and given to the patient.    Return Appointment: Follow up to be determined based on results of testing. Patient given instructions regarding scheduling next clinic visit. Patient demonstrated understanding of this information and agreed to call with further questions or concerns.    EP Procedure: Patient given instructions regarding  tilt table Discussed purpose, preparation, and procedure with the patient. Patient demonstrated understanding of this information and agreed to call with further questions or concerns.    Patient stated she understood all health information given and agreed to call with further questions or concerns.    Scott Bass RN  RN Care Coordinator  [facility]  [phone]

## 2018-03-30 LAB — CRP SERPL HS-MCNC: 1.1 MG/L

## 2018-04-09 ENCOUNTER — TELEPHONE (OUTPATIENT)
Dept: INTERNAL MEDICINE | Facility: CLINIC | Age: 48
End: 2018-04-09

## 2018-04-09 DIAGNOSIS — G90.A POTS (POSTURAL ORTHOSTATIC TACHYCARDIA SYNDROME): Primary | ICD-10-CM

## 2018-04-09 DIAGNOSIS — G90.9 AUTONOMIC DYSFUNCTION: ICD-10-CM

## 2018-04-09 NOTE — TELEPHONE ENCOUNTER
This has been previously discussed with patient . Please help see to it that appointment is generated , please place orders as requested for referral order     Bj Butler MD

## 2018-04-09 NOTE — TELEPHONE ENCOUNTER
Noted that Dr. Socrates Phan is a neurologist at [facility].   Please advise and review referral amina'd up    Lizy José RN

## 2018-04-09 NOTE — TELEPHONE ENCOUNTER
Reason for Call:  Other call back    Detailed comments:  Patient calling to get a referral to see a doctor for her POTS diagnosis. She would also like to have autonomic testing. Please call and let know.     The testing is done at [facility]. Dr lul angel. The fax number is [phone] they need a referral.         Phone Number Patient can be reached at: Cell number on file:    Telephone Information:   Mobile [phone]       Best Time:  Any     Can we leave a detailed message on this number? YES    Call taken on 4/9/2018 at 10:57 AM by Zulay López

## 2018-04-09 NOTE — TELEPHONE ENCOUNTER
Called patient let her know referral was faxed to [facility] [place].  Tracey Chamberlain,

## 2018-04-23 ENCOUNTER — TELEPHONE (OUTPATIENT)
Dept: INTERNAL MEDICINE | Facility: CLINIC | Age: 48
End: 2018-04-23

## 2018-04-23 NOTE — TELEPHONE ENCOUNTER
Reason for Call:  Other Referral    Detailed comments: patient stating [facility] still has not received the referral to see the Neurologist and is asking for it to be resent. Please fax To # [phone]     Phone Number Patient can be reached at: Cell number on file:    Telephone Information:   Mobile [phone]       Best Time: any time    Can we leave a detailed message on this number? YES    Call taken on 4/23/2018 at 2:23 PM by Celine Lagunas

## 2018-04-24 ENCOUNTER — TELEPHONE (OUTPATIENT)
Dept: CARDIOLOGY | Facility: CLINIC | Age: 48
End: 2018-04-24

## 2018-04-24 NOTE — TELEPHONE ENCOUNTER
Patient calling. [facility] needs more info for the referral.  They need ? Did not say.     Please call [phone].

## 2018-04-24 NOTE — TELEPHONE ENCOUNTER
See 4-9-18, telephone encounter.  Referral reprinted and faxed to [facility] at [phone].  Patient called and informed that this was re-faxed. Pam Taveras,

## 2018-04-24 NOTE — TELEPHONE ENCOUNTER
Provided further details to [facility] regarding referral. Faxed LOV to genetics dept. per their request. ([phone])     Ramon Viveros RN....4/24/2018 2:34 PM

## 2018-04-24 NOTE — TELEPHONE ENCOUNTER
Health Call Center    Phone Message    May a detailed message be left on voicemail: no    Reason for Call: Other: Pt called in to cancel her tilt table test tomorrow, 4/25, with Dr. Mitchell. Pt wants to wait until she sees a neurologist to reschedule.     Action Taken: Message routed to:  [facility] CARDIOLOGY ADULT [facility]       Call to cath lab, cancelled tilt study for tomorrow  My chart msg sent to pt to call when/if she would like to reschedule

## 2018-04-29 ENCOUNTER — HOSPITAL ENCOUNTER (EMERGENCY)
Facility: CLINIC | Age: 48
Discharge: HOME | End: 2018-04-30
Attending: INTERNAL MEDICINE | Admitting: INTERNAL MEDICINE
Payer: COMMERCIAL

## 2018-04-29 DIAGNOSIS — R23.2 FLUSHING: ICD-10-CM

## 2018-04-29 DIAGNOSIS — I73.00 RAYNAUD'S DISEASE WITHOUT GANGRENE: ICD-10-CM

## 2018-04-29 DIAGNOSIS — G90.A POTS (POSTURAL ORTHOSTATIC TACHYCARDIA SYNDROME): ICD-10-CM

## 2018-04-29 DIAGNOSIS — R00.0 SINUS TACHYCARDIA: ICD-10-CM

## 2018-04-29 PROCEDURE — 85025 COMPLETE CBC W/AUTO DIFF WBC: CPT | Performed by: INTERNAL MEDICINE

## 2018-04-29 PROCEDURE — 80048 BASIC METABOLIC PNL TOTAL CA: CPT | Performed by: INTERNAL MEDICINE

## 2018-04-29 PROCEDURE — 93005 ELECTROCARDIOGRAM TRACING: CPT | Performed by: INTERNAL MEDICINE

## 2018-04-29 PROCEDURE — 93010 ELECTROCARDIOGRAM REPORT: CPT | Mod: Z6 | Performed by: INTERNAL MEDICINE

## 2018-04-29 PROCEDURE — 84484 ASSAY OF TROPONIN QUANT: CPT | Performed by: INTERNAL MEDICINE

## 2018-04-29 PROCEDURE — 83735 ASSAY OF MAGNESIUM: CPT | Performed by: INTERNAL MEDICINE

## 2018-04-29 PROCEDURE — 99285 EMERGENCY DEPT VISIT HI MDM: CPT | Mod: 25 | Performed by: INTERNAL MEDICINE

## 2018-04-29 NOTE — ED AVS SNAPSHOT
[facility], Emergency Department    [address], [place] [address]    Phone:  [phone]                                       Laura Jackson   MRN: [id number]    Department:  [facility], Emergency Department   Date of Visit:  4/29/2018           Patient Information     Date Of Birth          1970        Your diagnoses for this visit were:     POTS (postural orthostatic tachycardia syndrome)     Sinus tachycardia     Flushing     Raynaud's disease without gangrene        You were seen by Roscoe Winn MD.      Follow-up Information     Follow up with Bj Butler MD.    Specialty:  Internal Medicine    Contact information:    [address] [facility] [address] [facility] [address] [facility] [place] [id number] [phone]          Discharge Instructions       Continue the diltiazem.  Follow up with your POTS specialist as scheduled.  Return as needed for new or worsening symptoms.    Your next 10 appointments already scheduled     Apr 30, 2018  8:45 AM CDT   (Arrive by 8:30 AM)   MR BREAST BILATERAL W/O & W CONTRAST with MGMR1   [facility]    [address] [place] [address] [phone]           Take your medicines as usual, unless your doctor tells you not to. Bring a list of your current medicines to your exam (including vitamins, minerals and over-the-counter drugs).  The timing of your exam may depend on the start of your last period. If you re in menopause, you may have the exam anytime.  Please bring any previous mammograms or breast MRIs from other facilities to the MRI dept. Do not mail these items to us.   You will have IV contrast for this exam.  You do not need to do anything special to prepare.  The MRI machine uses a strong magnet. Please wear clothes without metal (snaps, zippers). A sweatsuit works well, or we may give you a hospital gown.  Please remove any body piercings and hair extensions before you arrive. You will also remove watches,  jewelry, hairpins, wallets, dentures, partial dental plates and hearing aids. You may wear contact lenses, and you may be able to wear your rings. We have a safe place to keep your personal items, but it is safer to leave them at home.  **IMPORTANT** THE INSTRUCTIONS BELOW ARE ONLY FOR THOSE PATIENTS WHO HAVE BEEN PRESCRIBED SEDATION OR GENERAL ANESTHESIA DURING THEIR MRI PROCEDURE:  IF YOUR DOCTOR PRESCRIBED ORAL SEDATION (take medicine to help you relax during your exam):   You must get the medicine from your doctor (oral medication) before you arrive. Bring the medicine to the exam. Do not take it at home. You ll be told when to take it upon arriving for your exam.   Arrive one hour early. Bring someone who can take you home after the test. Your medicine will make you sleepy. After the exam, you may not drive, take a bus or take a taxi by yourself.  IF YOUR DOCTOR PRESCRIBED IV SEDATION:   Arrive one hour early. Bring someone who can take you home after the test. Your medicine will make you sleepy. After the exam, you may not drive, take a bus or take a taxi by yourself.   No eating 6 hours before your exam. You may have clear liquids up until 4 hours before your exam. (Clear liquids include water, clear tea, black coffee and fruit juice without pulp.)  IF YOUR DOCTOR PRESCRIBED ANESTHESIA (be asleep for your exam):   Arrive 1 1/2 hours early. Bring someone who can take you home after the test. You may not drive, take a bus or take a taxi by yourself.   No eating 8 hours before your exam. You may have clear liquids up until 4 hours before your exam. (Clear liquids include water, clear tea, black coffee and fruit juice without pulp.)   You will spend four to five hours in the recovery room.  If you have any questions, please contact your Imaging Department exam site.              24 Hour Appointment Hotline       To make an appointment at any [facility], call [phone] ([phone]). If you don't have  a family doctor or clinic, we will help you find one. [place] clinics are conveniently located to serve the needs of you and your family.             Review of your medicines      Our records show that you are taking the medicines listed below. If these are incorrect, please call your family doctor or clinic.        Dose / Directions Last dose taken    diltiazem 180 MG 24 hr capsule   Commonly known as:  CARDIZEM CD   Dose:  180 mg   Quantity:  30 capsule        Take 1 capsule (180 mg) by mouth daily   Refills:  2        fluticasone-salmeterol 100-50 MCG/DOSE diskus inhaler   Commonly known as:  ADVAIR   Dose:  1 puff   Quantity:  3 Inhaler        Inhale 1 puff into the lungs 2 times daily   Refills:  1        levalbuterol 45 MCG/ACT Inhaler   Commonly known as:  XOPENEX HFA   Dose:  1-2 puff   Quantity:  1 Inhaler        Inhale 1-2 puffs into the lungs every 4 hours as needed for shortness of breath / dyspnea   Refills:  5        Multi-vitamin Tabs tablet   Dose:  1 tablet        Take 1 tablet by mouth daily   Refills:  0        vitamin D 2000 units Caps   Dose:  1 capsule        Take 1 capsule by mouth daily   Refills:  0        ZANTAC PO   Dose:  150 mg        Take 150 mg by mouth as needed for heartburn   Refills:  0                Procedures and tests performed during your visit     Basic metabolic panel    CBC with platelets differential    EKG 12 lead    Magnesium    Troponin I      Orders Needing Specimen Collection     None      Pending Results     Date and Time Order Name Status Description    4/29/2018 2254 EKG 12 lead Preliminary             Pending Culture Results     No orders found for last 3 day(s).            Pending Results Instructions     If you had any lab results that were not finalized at the time of your Discharge, you can call the ED Lab Result RN at [phone]. You will be contacted by this team for any positive Lab results or changes in treatment. The nurses are available 7 days a week  from 10A to 6:30P.  You can leave a message 24 hours per day and they will return your call.        Thank you for choosing [facility]       Thank you for choosing [facility] for your care. Our goal is always to provide you with excellent care. Hearing back from our patients is one way we can continue to improve our services. Please take a few minutes to complete the written survey that you may receive in the mail after you visit with us. Thank you!        [employer] Information     [employer] gives you secure access to your electronic health record. If you see a primary care provider, you can also send messages to your care team and make appointments. If you have questions, please call your primary care clinic.  If you do not have a primary care provider, please call [phone] and they will assist you.        Care EveryWhere ID     This is your Care EveryWhere ID. This could be used by other organizations to access your [facility] medical records  [id number]        Equal Access to Services     ODESSA HOWARD : Anna Cordero, natali dailey, adilene martinez, ludmila waters . So [facility] [phone].    ATENCIÓN: Si habla español, tiene a guido disposición servicios gratuitos de asistencia lingüística. Llame al [phone].    We comply with applicable federal civil rights laws and [place] laws. We do not discriminate on the basis of race, color, national origin, age, disability, sex, sexual orientation, or gender identity.            After Visit Summary       This is your record. Keep this with you and show to your community pharmacist(s) and doctor(s) at your next visit.

## 2018-04-29 NOTE — ED AVS SNAPSHOT
[facility], [place], Emergency Department    [address] [place] [id number]    Phone:  [phone]                                       Laura Jackson   MRN: [id number]    Department:  [facility], Emergency Department   Date of Visit:  4/29/2018           After Visit Summary Signature Page     I have received my discharge instructions, and my questions have been answered. I have discussed any challenges I see with this plan with the nurse or doctor.    ..........................................................................................................................................  Patient/Patient Representative Signature      ..........................................................................................................................................  Patient Representative Print Name and Relationship to Patient    ..................................................               ................................................  Date                                            Time    ..........................................................................................................................................  Reviewed by Signature/Title    ...................................................              ..............................................  Date                                                            Time

## 2018-04-30 VITALS
TEMPERATURE: 98.5 F | HEIGHT: 65 IN | WEIGHT: 161 LBS | OXYGEN SATURATION: 98 % | RESPIRATION RATE: 19 BRPM | DIASTOLIC BLOOD PRESSURE: 79 MMHG | HEART RATE: 106 BPM | BODY MASS INDEX: 26.82 KG/M2 | SYSTOLIC BLOOD PRESSURE: 137 MMHG

## 2018-04-30 LAB
ANION GAP SERPL CALCULATED.3IONS-SCNC: 9 MMOL/L (ref 3–14)
BASOPHILS # BLD AUTO: 0 10E9/L (ref 0–0.2)
BASOPHILS NFR BLD AUTO: 0.3 %
BUN SERPL-MCNC: 16 MG/DL (ref 7–30)
CALCIUM SERPL-MCNC: 8.8 MG/DL (ref 8.5–10.1)
CHLORIDE SERPL-SCNC: 106 MMOL/L (ref 94–109)
CO2 SERPL-SCNC: 24 MMOL/L (ref 20–32)
CREAT SERPL-MCNC: 0.87 MG/DL (ref 0.52–1.04)
DIFFERENTIAL METHOD BLD: NORMAL
EOSINOPHIL # BLD AUTO: 0.2 10E9/L (ref 0–0.7)
EOSINOPHIL NFR BLD AUTO: 2 %
ERYTHROCYTE [DISTWIDTH] IN BLOOD BY AUTOMATED COUNT: 13.2 % (ref 10–15)
GFR SERPL CREATININE-BSD FRML MDRD: 69 ML/MIN/1.7M2
GLUCOSE SERPL-MCNC: 95 MG/DL (ref 70–99)
HCT VFR BLD AUTO: 42.1 % (ref 35–47)
HGB BLD-MCNC: 14.3 G/DL (ref 11.7–15.7)
IMM GRANULOCYTES # BLD: 0 10E9/L (ref 0–0.4)
IMM GRANULOCYTES NFR BLD: 0.3 %
LYMPHOCYTES # BLD AUTO: 1.9 10E9/L (ref 0.8–5.3)
LYMPHOCYTES NFR BLD AUTO: 25.4 %
MAGNESIUM SERPL-MCNC: 2.1 MG/DL (ref 1.6–2.3)
MCH RBC QN AUTO: 30.3 PG (ref 26.5–33)
MCHC RBC AUTO-ENTMCNC: 34 G/DL (ref 31.5–36.5)
MCV RBC AUTO: 89 FL (ref 78–100)
MONOCYTES # BLD AUTO: 0.4 10E9/L (ref 0–1.3)
MONOCYTES NFR BLD AUTO: 5.9 %
NEUTROPHILS # BLD AUTO: 5 10E9/L (ref 1.6–8.3)
NEUTROPHILS NFR BLD AUTO: 66.1 %
NRBC # BLD AUTO: 0 10*3/UL
NRBC BLD AUTO-RTO: 0 /100
PLATELET # BLD AUTO: 250 10E9/L (ref 150–450)
POTASSIUM SERPL-SCNC: 3.6 MMOL/L (ref 3.4–5.3)
RBC # BLD AUTO: 4.72 10E12/L (ref 3.8–5.2)
SODIUM SERPL-SCNC: 140 MMOL/L (ref 133–144)
TROPONIN I SERPL-MCNC: <0.015 UG/L (ref 0–0.04)
WBC # BLD AUTO: 7.5 10E9/L (ref 4–11)

## 2018-04-30 PROCEDURE — 25000128 H RX IP 250 OP 636: Performed by: INTERNAL MEDICINE

## 2018-04-30 PROCEDURE — 96360 HYDRATION IV INFUSION INIT: CPT | Performed by: INTERNAL MEDICINE

## 2018-04-30 RX ORDER — SODIUM CHLORIDE 9 MG/ML
1000 INJECTION, SOLUTION INTRAVENOUS CONTINUOUS
Status: DISCONTINUED | OUTPATIENT
Start: 2018-04-30 | End: 2018-04-30 | Stop reason: HOSPADM

## 2018-04-30 RX ADMIN — SODIUM CHLORIDE 1000 ML: 9 INJECTION, SOLUTION INTRAVENOUS at 00:45

## 2018-04-30 ASSESSMENT — ENCOUNTER SYMPTOMS
VOMITING: 0
FEVER: 0
PALPITATIONS: 1
HEADACHES: 0
COUGH: 0
FATIGUE: 1
COLOR CHANGE: 1
ADENOPATHY: 0
CONFUSION: 0
NUMBNESS: 0
BACK PAIN: 0
LIGHT-HEADEDNESS: 0
WHEEZING: 0
DIFFICULTY URINATING: 0
WEAKNESS: 0
NAUSEA: 0
CHILLS: 0
SHORTNESS OF BREATH: 0
DIAPHORESIS: 1
ABDOMINAL PAIN: 0

## 2018-04-30 NOTE — ED PROVIDER NOTES
History     Chief Complaint   Patient presents with     Palpitations     HPI  Laura Jackson is a 48 year old female who presents with tachycardia, flushing, clamminess, and vasospasm in her hands and toes. She has been experiencing similar episodes for months since a hysterectomy. She was diagnosed with hyperadrenergic cariant POTS. Trails of KERRY socks and increased fluids and salt were not much help in the past. Diltiazem has been helpful. She was to have had a tilt table test last week, but decided to seek consultation with a physician who specializes in POTS instead.    She has never had syncope. She usually becomes hypertensive and tachycardic. She has no headache, visual changes, cough, sputum, shortness of breath, nausea, vomiting or abdominal pain. She has no leg pain or swelling.    PAST MEDICAL HISTORY:   Past Medical History:   Diagnosis Date     Family history of breast cancer 2/19/2014     SVT (supraventricular tachycardia):  history of, no recurrence since 2008 10/11/2013    no recurrence since 2008      Uncomplicated asthma        PAST SURGICAL HISTORY:   Past Surgical History:   Procedure Laterality Date     APPENDECTOMY       C STOMACH SURGERY PROCEDURE UNLISTED       DAVINCI HYSTERECTOMY TOTAL, SALPINGECTOMY BILATERAL N/A 8/4/2017    Procedure: DAVINCI XI HYSTERECTOMY TOTAL, SALPINGECTOMY BILATERAL;  DAVINCI TOTAL HYSTERECTOMY; BILATERAL SALPINGECTOMY. BILATERAL URETERAL LYSIS. UTEROSACRAL-COLPOPEXY. EXCISION OF ENDOMETRIOSIS;  Surgeon: George Loyola MD;  Location:  OR     DAVINCI LYSIS OF ADHESIONS Bilateral 8/4/2017    Procedure: DAVINCI LYSIS OF ADHESIONS;  BILATERAL URETERAL LYSIS;  Surgeon: George Loyola MD;  Location:  OR     DENISAINCI SACROCOLPOPEXY, CYSTOSCOPY, COMBINED N/A 8/4/2017    Procedure: COMBINED DAVINCI SACROCOLPOPEXY, CYSTOSCOPY;  UTEROSACRAL COLPOPEXY;  Surgeon: George Loyola MD;  Location: [facility] OR     DAVINCI XI ASSISTED ABLATION / EXCISION OF  ENDOMETRIOSIS  8/4/2017    Procedure: DAVINCI XI ASSISTED ABLATION / EXCISION OF ENDOMETRIOSIS;;  Surgeon: George Loyola MD;  Location: [facility] OR     DILATION AND CURETTAGE N/A 6/20/2017    Procedure: DILATION AND CURETTAGE;  Uterine Curettings and Fibroid Removal, Cook catheter placement; (No hysterectomy done at this time);  Surgeon: Ernestina Saunders MD;  Location: [facility] OR     TONSILLECTOMY         FAMILY HISTORY:   Family History   Problem Relation Age of Onset     DIABETES Mother      Hypertension Mother      Hyperlipidemia Mother      Arrhythmia Mother      Cardiovascular Father      CHF and COPD     Asthma Father      Breast Cancer Maternal Grandfather      Colon Cancer Maternal Grandfather      Breast Cancer Paternal Grandmother      Breast Cancer Paternal Aunt      C.A.D. Paternal Grandfather      Breast Cancer Cousin      Asthma Son      DIABETES Maternal Grandmother      Hypertension Maternal Grandmother        SOCIAL HISTORY:   Social History   Substance Use Topics     Smoking status: Never Smoker     Smokeless tobacco: Never Used     Alcohol use Yes      Comment: occasional         I have reviewed the Medications, Allergies, Past Medical and Surgical History, and Social History in the Epic system.    Review of Systems   Constitutional: Positive for diaphoresis and fatigue. Negative for chills and fever.   HENT: Negative for congestion.    Eyes: Negative for visual disturbance.   Respiratory: Negative for cough, shortness of breath and wheezing.    Cardiovascular: Positive for palpitations. Negative for chest pain.   Gastrointestinal: Negative for abdominal pain, nausea and vomiting.   Genitourinary: Negative for difficulty urinating.   Musculoskeletal: Negative for back pain.   Skin: Positive for color change. Negative for rash.   Neurological: Negative for weakness, light-headedness, numbness and headaches.   Hematological: Negative for adenopathy.   Psychiatric/Behavioral: Negative for confusion.  "      Physical Exam   BP: (!) 162/93  Pulse: 106  Heart Rate: 85  Temp: 98.5  F (36.9  C)  Resp: 18  Height: 165.1 cm (5' 5\")  Weight: 73 kg (161 lb)  SpO2: 100 %      Physical Exam   Constitutional: She is oriented to person, place, and time. She appears well-developed and well-nourished. No distress.   Flushed face. Hyperemia of fingers .   HENT:   Head: Normocephalic and atraumatic.   Right Ear: External ear normal.   Left Ear: External ear normal.   Nose: Nose normal.   Mouth/Throat: Oropharynx is clear and moist.   Eyes: EOM are normal. Pupils are equal, round, and reactive to light.   Neck: Normal range of motion.   Cardiovascular: Regular rhythm, S1 normal and S2 normal.  Tachycardia present.  Exam reveals no gallop.    No murmur heard.  Pulmonary/Chest: Effort normal and breath sounds normal. She has no wheezes. She has no rales.   Abdominal: Soft. Bowel sounds are normal. There is no tenderness. There is no rebound and no guarding.   Musculoskeletal: She exhibits no edema or tenderness.   Neurological: She is alert and oriented to person, place, and time. No cranial nerve deficit. Coordination normal.   Skin: Skin is warm and dry. There is erythema.   Psychiatric: She has a normal mood and affect. Her behavior is normal.   Nursing note and vitals reviewed.      ED Course     ED Course     Procedures             EKG Interpretation:      Interpreted by VANESSA DOE  Time reviewed: 2257  Symptoms at time of EKG: palpitations, flushing   Rhythm: sinus tachycardia  Rate: 100-110  Axis: Normal  Ectopy: none  Conduction: normal  ST Segments/ T Waves: No ST-T wave changes and No acute ischemic changes  Q Waves: none  Comparison to prior: No old EKG available    Clinical Impression: sinus tachycardia    Labs/Imaging    Results for orders placed or performed during the hospital encounter of 04/29/18 (from the past 24 hour(s))   EKG 12 lead   Result Value Ref Range    Interpretation ECG Click View Image link to " view waveform and result    Basic metabolic panel   Result Value Ref Range    Sodium 140 133 - 144 mmol/L    Potassium 3.6 3.4 - 5.3 mmol/L    Chloride 106 94 - 109 mmol/L    Carbon Dioxide 24 20 - 32 mmol/L    Anion Gap 9 3 - 14 mmol/L    Glucose 95 70 - 99 mg/dL    Urea Nitrogen 16 7 - 30 mg/dL    Creatinine 0.87 0.52 - 1.04 mg/dL    GFR Estimate 69 >60 mL/min/1.7m2    GFR Estimate If Black 84 >60 mL/min/1.7m2    Calcium 8.8 8.5 - 10.1 mg/dL   CBC with platelets differential   Result Value Ref Range    WBC 7.5 4.0 - 11.0 10e9/L    RBC Count 4.72 3.8 - 5.2 10e12/L    Hemoglobin 14.3 11.7 - 15.7 g/dL    Hematocrit 42.1 35.0 - 47.0 %    MCV 89 78 - 100 fl    MCH 30.3 26.5 - 33.0 pg    MCHC 34.0 31.5 - 36.5 g/dL    RDW 13.2 10.0 - 15.0 %    Platelet Count 250 150 - 450 10e9/L    Diff Method Automated Method     % Neutrophils 66.1 %    % Lymphocytes 25.4 %    % Monocytes 5.9 %    % Eosinophils 2.0 %    % Basophils 0.3 %    % Immature Granulocytes 0.3 %    Nucleated RBCs 0 0 /100    Absolute Neutrophil 5.0 1.6 - 8.3 10e9/L    Absolute Lymphocytes 1.9 0.8 - 5.3 10e9/L    Absolute Monocytes 0.4 0.0 - 1.3 10e9/L    Absolute Eosinophils 0.2 0.0 - 0.7 10e9/L    Absolute Basophils 0.0 0.0 - 0.2 10e9/L    Abs Immature Granulocytes 0.0 0 - 0.4 10e9/L    Absolute Nucleated RBC 0.0    Magnesium   Result Value Ref Range    Magnesium 2.1 1.6 - 2.3 mg/dL   Troponin I   Result Value Ref Range    Troponin I ES <0.015 0.000 - 0.045 ug/L     0215: Feeling better after 1 liter IV saline. Heart rate down to 85.    Assessments & Plan (with Medical Decision Making)   Impression:  Middle aged female who has developed symptomatic POTS following a hysterectomy last year. She has not had syncope and appears to have hyperadrenergic variant with hypertensive and tachycardic response to postural change along with flushing diaphoresis and vasoconstriction in the hands. She has had improvement with diltiazem, but still has daily symptoms. Today, her  symptoms were more pronounced than usual. She arrived in sinus tachycardia with rate in the 120's. There was no other ectopy. Blood pressure was mildly elevated. She had facial flushing and redness of the fingers without ischemic changes. Labs including CBC, electrolytes and troponin are normal. She felt much improved after 1 liter of IV saline.      I have reviewed the nursing notes.    I have reviewed the findings, diagnosis, plan and need for follow up with the patient.    Discharge Medication List as of 4/30/2018  1:57 AM          Final diagnoses:   POTS (postural orthostatic tachycardia syndrome)   Sinus tachycardia   Flushing   Raynaud's disease without gangrene       4/29/2018   [facility], [place], EMERGENCY DEPARTMENT     Roscoe Winn MD  04/30/18 4546

## 2018-04-30 NOTE — DISCHARGE INSTRUCTIONS
Continue the diltiazem.  Follow up with your POTS specialist as scheduled.  Return as needed for new or worsening symptoms.

## 2018-04-30 NOTE — ED TRIAGE NOTES
Pt arrived in triage with concerns of palpitations, feeling clammy, shakey. Pt says she has POTS (postural orthostatic tachycardia syndrome) and was feeling the symptoms she has before she could be in SVT. Pt HR is 90's in triage. VSS

## 2018-05-01 ENCOUNTER — OFFICE VISIT (OUTPATIENT)
Dept: FAMILY MEDICINE | Facility: CLINIC | Age: 48
End: 2018-05-01
Payer: COMMERCIAL

## 2018-05-01 VITALS
SYSTOLIC BLOOD PRESSURE: 140 MMHG | TEMPERATURE: 99 F | DIASTOLIC BLOOD PRESSURE: 76 MMHG | WEIGHT: 165 LBS | RESPIRATION RATE: 18 BRPM | OXYGEN SATURATION: 99 % | HEART RATE: 110 BPM | BODY MASS INDEX: 27.46 KG/M2

## 2018-05-01 DIAGNOSIS — J45.30 MILD PERSISTENT ASTHMA WITHOUT COMPLICATION: ICD-10-CM

## 2018-05-01 DIAGNOSIS — Z86.79 HISTORY OF SUPRAVENTRICULAR TACHYCARDIA: ICD-10-CM

## 2018-05-01 LAB — INTERPRETATION ECG - MUSE: NORMAL

## 2018-05-01 PROCEDURE — 99213 OFFICE O/P EST LOW 20 MIN: CPT | Performed by: INTERNAL MEDICINE

## 2018-05-01 RX ORDER — DILTIAZEM HYDROCHLORIDE 180 MG/1
180 CAPSULE, COATED, EXTENDED RELEASE ORAL DAILY
Qty: 30 CAPSULE | Refills: 2 | Status: SHIPPED | OUTPATIENT
Start: 2018-05-01 | End: 2018-05-31

## 2018-05-01 NOTE — PATIENT INSTRUCTIONS
[facility]    If you have any questions regarding to your visit please contact your care team:     Team Pink:   Clinic Hours Telephone Number   Internal Medicine:  Dr. Trisha Kang NP       7am-7pm  Monday - Thursday   7am-5pm  Fridays  [phone]  (Appointment scheduling available 24/7)    Questions about your recent visit?  Team Line  [phone]   Urgent Care - [place] and [place] - 11am-9pm Monday-Friday Saturday-Sunday- 9am-5pm   [place] - 5pm-9pm Monday-Friday Saturday-Sunday- 9am-5pm  [phone] - [place]  [phone] - [place]       What options do I have for a visit other than an office visit? We offer electronic visits (e-visits) and telephone visits, when medically appropriate.  Please check with your medical insurance to see if these types of visits are covered, as you will be responsible for any charges that are not paid by your insurance.      You can use [employer] (secure electronic communication) to access to your chart, send your primary care provider a message, or make an appointment. Ask a team member how to get started.     For a price quote for your services, please call our Consumer Price Line at [phone] or our Imaging Cost estimation line at [phone] (for imaging tests).  Rocio JULIO CMA ([facility])

## 2018-05-01 NOTE — MR AVS SNAPSHOT
After Visit Summary   5/1/2018    Laura Jackson    MRN: [id number]           Patient Information     Date Of Birth          1970        Visit Information        Provider Department      5/1/2018 2:30 PM Bj Butler MD [facility]        Today's Diagnoses     History of supraventricular tachycardia        Mild persistent asthma without complication          Care Instructions    [facility]    If you have any questions regarding to your visit please contact your care team:     Team Pink:   Clinic Hours Telephone Number   Internal Medicine:  Dr. Trisha Kang NP       7am-7pm  Monday - Thursday   7am-5pm  Fridays  [phone]  (Appointment scheduling available 24/7)    Questions about your recent visit?  Team Line  [phone]   Urgent Care - [place] and [facility] - 11am-9pm Monday-Friday Saturday-Sunday- 9am-5pm   [place] - 5pm-9pm Monday-Friday Saturday-Sunday- 9am-5pm  [phone] - [place]  [phone] - [place]       What options do I have for a visit other than an office visit? We offer electronic visits (e-visits) and telephone visits, when medically appropriate.  Please check with your medical insurance to see if these types of visits are covered, as you will be responsible for any charges that are not paid by your insurance.      You can use [employer] (secure electronic communication) to access to your chart, send your primary care provider a message, or make an appointment. Ask a team member how to get started.     For a price quote for your services, please call our Consumer Price Line at [phone] or our Imaging Cost estimation line at [phone] (for imaging tests).  Rocio JULIO CMA ([facility])            Follow-ups after your visit        Who to contact     If you have questions or need follow up information about today's clinic visit or your schedule please contact [facility]  directly at [phone].  Normal or non-critical lab and imaging results will be communicated to you by MyChart, letter or phone within 4 business days after the clinic has received the results. If you do not hear from us within 7 days, please contact the clinic through [employer] or phone. If you have a critical or abnormal lab result, we will notify you by phone as soon as possible.  Submit refill requests through [employer] or call your pharmacy and they will forward the refill request to us. Please allow 3 business days for your refill to be completed.          Additional Information About Your Visit        [employer] Information     [employer] gives you secure access to your electronic health record. If you see a primary care provider, you can also send messages to your care team and make appointments. If you have questions, please call your primary care clinic.  If you do not have a primary care provider, please call [phone] and they will assist you.        Care EveryWhere ID     This is your Care EveryWhere ID. This could be used by other organizations to access your [place] medical records  [id number]        Your Vitals Were     Pulse Temperature Respirations Last Period Pulse Oximetry BMI (Body Mass Index)    110 99  F (37.2  C) (Oral) 18 07/28/2017 99% 27.46 kg/m2       Blood Pressure from Last 3 Encounters:   05/01/18 140/76   04/30/18 137/79   03/29/18 142/90    Weight from Last 3 Encounters:   05/01/18 165 lb (74.8 kg)   04/29/18 161 lb (73 kg)   03/29/18 165 lb 1.6 oz (74.9 kg)              Today, you had the following     No orders found for display       Primary Care Provider Office Phone # Fax #    Bj Butler [phone]       [address] [facility] [place] [address]        Equal Access to Services     ODESSA HOWARD : Hadii latoya Cordero, waaxda geminiadaha, qaybta kaalmada michelle, ludmila alberts. So [facility] [phone].    ATENCIÓN: Faraz crowley  español, tiene a guido disposición servicios gratuitos de asistencia lingüística. Corina ford [phone].    We comply with applicable federal civil rights laws and [place] laws. We do not discriminate on the basis of race, color, national origin, age, disability, sex, sexual orientation, or gender identity.            Thank you!     Thank you for choosing [facility]  for your care. Our goal is always to provide you with excellent care. Hearing back from our patients is one way we can continue to improve our services. Please take a few minutes to complete the written survey that you may receive in the mail after your visit with us. Thank you!             Your Updated Medication List - Protect others around you: Learn how to safely use, store and throw away your medicines at www.disposemymeds.org.          This list is accurate as of 5/1/18  3:10 PM.  Always use your most recent med list.                   Brand Name Dispense Instructions for use Diagnosis    diltiazem 180 MG 24 hr capsule    CARDIZEM CD    30 capsule    Take 1 capsule (180 mg) by mouth daily    History of supraventricular tachycardia       fluticasone-salmeterol 100-50 MCG/DOSE diskus inhaler    ADVAIR    3 Inhaler    Inhale 1 puff into the lungs 2 times daily    Mild persistent asthma without complication       levalbuterol 45 MCG/ACT Inhaler    XOPENEX HFA    1 Inhaler    Inhale 1-2 puffs into the lungs every 4 hours as needed for shortness of breath / dyspnea    Mild persistent asthma without complication       Multi-vitamin Tabs tablet      Take 1 tablet by mouth daily        vitamin D 2000 units Caps      Take 1 capsule by mouth daily        ZANTAC PO      Take 150 mg by mouth as needed for heartburn

## 2018-05-01 NOTE — PROGRESS NOTES
SUBJECTIVE:   Laura Jackson is a 48 year old female who presents to clinic today for the following health issues:        ED/UC Followup:    Facility:  [facility]  Date of visit: 04/29/2018  Reason for visit: tachycardia  Current Status: persistent symptoms         History of supraventricular tachycardia  Mild persistent asthma without complication   Probable POTS (postural orthostatic tachycardia syndrome)     This is a patient with a history of probably POTS (postural orthostatic tachycardia syndrome) and other issues. She's unfortunately been unable to work secondary to multiple issues and concerns and symptoms. This is all well documented history with office visit notes including cardiologist and others.    Last week despite a period of stability and possibly some modest improvements, she had a big recurrence of symptoms , and went to the emergency room for tachycardia , cold hands, sweaty feet. See emergency room evaluation with [employer] electronic medical records . Usually sitting helps but not this time. Got intravenous fluid , got better.    She just cannot return to work and brought with her a slip from her disability insurance carrier for completion. This is Epic electronic medical records ; tentative return to work date is 7/20/2018. Meanwhile she is to be seeing a neurologist at [facility] with a speciality with the autonomic nervous system. A tilt table testing was ordered but postponed to see Dr. Davis.    Has gained weight over the last year, roughly 7 pounds which is blamed on her relative inactivity. Trying walking but often heart rate hits 140. Continued feelings of unwellness. There are also symptoms of gastroenterologic distress and a question about Edilma-Danlos Syndrome possibilities .      Family history of a paternal uncle with children with heart diseases such as coarctation of the aorta    Problem list and histories reviewed & adjusted, as  indicated.  Additional history: as documented    Patient Active Problem List   Diagnosis     CARDIOVASCULAR SCREENING; LDL GOAL LESS THAN 160     Irritable bowel syndrome     GERD (gastroesophageal reflux disease)     History of supraventricular tachycardia     Mild persistent asthma     Family history of breast cancer     Foreign body (FB) in soft tissue     S/P myomectomy     Nausea     Dehydration     S/P ANAID (total abdominal hysterectomy)     Hypovitaminosis D     Pelvic adhesions     Endometriosis     Heberden's nodes     Cervicalgia     POTS (postural orthostatic tachycardia syndrome)     Hypermobility syndrome     Bilateral hand pain     Past Surgical History:   Procedure Laterality Date     APPENDECTOMY       C STOMACH SURGERY PROCEDURE UNLISTED       DAVINCI HYSTERECTOMY TOTAL, SALPINGECTOMY BILATERAL N/A 8/4/2017    Procedure: DAVINCI XI HYSTERECTOMY TOTAL, SALPINGECTOMY BILATERAL;  DAVINCI TOTAL HYSTERECTOMY; BILATERAL SALPINGECTOMY. BILATERAL URETERAL LYSIS. UTEROSACRAL-COLPOPEXY. EXCISION OF ENDOMETRIOSIS;  Surgeon: George Loyola MD;  Location: [facility] OR     DAVINCI LYSIS OF ADHESIONS Bilateral 8/4/2017    Procedure: DAVINCI LYSIS OF ADHESIONS;  BILATERAL URETERAL LYSIS;  Surgeon: George Loyola MD;  Location: [facility] OR     DAVINCI SACROCOLPOPEXY, CYSTOSCOPY, COMBINED N/A 8/4/2017    Procedure: COMBINED DAVINCI SACROCOLPOPEXY, CYSTOSCOPY;  UTEROSACRAL COLPOPEXY;  Surgeon: George Loyola MD;  Location: [facility] OR     DAVINCI XI ASSISTED ABLATION / EXCISION OF ENDOMETRIOSIS  8/4/2017    Procedure: DAVINCI XI ASSISTED ABLATION / EXCISION OF ENDOMETRIOSIS;;  Surgeon: George Loyola MD;  Location: [facility] OR     DILATION AND CURETTAGE N/A 6/20/2017    Procedure: DILATION AND CURETTAGE;  Uterine Curettings and Fibroid Removal, Cook catheter placement; (No hysterectomy done at this time);  Surgeon: Ernestina Saunders MD;  Location: [facility]     TONSILLECTOMY         Social History   Substance Use Topics     Smoking  status: Never Smoker     Smokeless tobacco: Never Used     Alcohol use Yes      Comment: occasional     Family History   Problem Relation Age of Onset     DIABETES Mother      Hypertension Mother      Hyperlipidemia Mother      Arrhythmia Mother      Cardiovascular Father      CHF and COPD     Asthma Father      Breast Cancer Maternal Grandfather      Colon Cancer Maternal Grandfather      Breast Cancer Paternal Grandmother      Breast Cancer Paternal Aunt      C.A.D. Paternal Grandfather      Breast Cancer Cousin      Asthma Son      DIABETES Maternal Grandmother      Hypertension Maternal Grandmother      Breast Cancer Cousin      Breast Cancer Cousin      Breast Cancer Cousin          Current Outpatient Prescriptions   Medication Sig Dispense Refill     Cholecalciferol (VITAMIN D) 2000 UNITS CAPS Take 1 capsule by mouth daily       diltiazem (CARDIZEM CD) 180 MG 24 hr capsule Take 1 capsule (180 mg) by mouth daily 30 capsule 2     fluticasone-salmeterol (ADVAIR) 100-50 MCG/DOSE diskus inhaler Inhale 1 puff into the lungs 2 times daily 3 Inhaler 1     levalbuterol (XOPENEX HFA) 45 MCG/ACT Inhaler Inhale 1-2 puffs into the lungs every 4 hours as needed for shortness of breath / dyspnea 1 Inhaler 5     multivitamin, therapeutic with minerals (MULTI-VITAMIN) TABS tablet Take 1 tablet by mouth daily       RaNITidine HCl (ZANTAC PO) Take 150 mg by mouth as needed for heartburn       Allergies   Allergen Reactions     Penicillins      Swelling throat; age 4     Tetracycline      Vomiting; nauseous     BP Readings from Last 3 Encounters:   05/01/18 140/76   04/30/18 137/79   03/29/18 142/90    Wt Readings from Last 3 Encounters:   05/01/18 165 lb (74.8 kg)   04/29/18 161 lb (73 kg)   03/29/18 165 lb 1.6 oz (74.9 kg)                  Labs reviewed in EPIC    Reviewed and updated as needed this visit by clinical staff       Reviewed and updated as needed this visit by Provider         ROS:  Constitutional, HEENT,  cardiovascular, pulmonary, gi and gu systems are negative, except as otherwise noted.    OBJECTIVE:                                                    /76  Pulse 110  Temp 99  F (37.2  C) (Oral)  Resp 18  Wt 165 lb (74.8 kg)  LMP 07/28/2017  SpO2 99%  BMI 27.46 kg/m2  Body mass index is 27.46 kg/(m^2).  GENERAL APPEARANCE: healthy, alert and no distress  EYES: Eyes grossly normal to inspection, PERRL and conjunctivae and sclerae normal  NEURO: Normal strength and tone, mentation intact and speech normal  PSYCH: mentation appears normal and affect normal/bright    Diagnostic test results:  Diagnostic Test Results:  No orders of the defined types were placed in this encounter.         ASSESSMENT/PLAN:                                                    1. History of supraventricular tachycardia  Refills provided   - diltiazem (CARDIZEM CD) 180 MG 24 hr capsule; Take 1 capsule (180 mg) by mouth daily  Dispense: 30 capsule; Refill: 2    2. Mild persistent asthma without complication  Refills provided   - fluticasone-salmeterol (ADVAIR) 100-50 MCG/DOSE diskus inhaler; Inhale 1 puff into the lungs 2 times daily  Dispense: 3 Inhaler; Refill: 1    3. Probable POTS (postural orthostatic tachycardia syndrome) - noted as a point of historical importance . Return to work letter completed [ further time off as detailed above ]/ await input from neurologist associated with [facility]     Follow up with Provider - mid-July     Bj Butler MD  [facility]

## 2018-05-25 ENCOUNTER — TRANSFERRED RECORDS (OUTPATIENT)
Dept: HEALTH INFORMATION MANAGEMENT | Facility: CLINIC | Age: 48
End: 2018-05-25

## 2018-05-29 ENCOUNTER — TELEPHONE (OUTPATIENT)
Dept: FAMILY MEDICINE | Facility: CLINIC | Age: 48
End: 2018-05-29

## 2018-05-29 DIAGNOSIS — Z86.79 HISTORY OF SUPRAVENTRICULAR TACHYCARDIA: ICD-10-CM

## 2018-05-29 NOTE — TELEPHONE ENCOUNTER
Reason for call:  Med request/question  Patient called regarding (reason for call): prescription-cartia xt-she would like to go down to 120 mg. Having decreased reynaud symptoms with the warmer weather and would like to try less  Additional comments:Please call to discuss further    Phone number to reach patient: [phone]    Best Time: anytime    Can we leave a detailed message on this number?  YES

## 2018-05-30 NOTE — TELEPHONE ENCOUNTER
LM on patient's VM with updates that Dr. Butler is not in the office today. We will forward the message to DR. Butler and call her back once DR. Butler addresses    Please advise    Lizy José RN

## 2018-05-31 RX ORDER — DILTIAZEM HYDROCHLORIDE 120 MG/1
120 CAPSULE, COATED, EXTENDED RELEASE ORAL DAILY
Qty: 30 CAPSULE | Refills: 5 | Status: SHIPPED | OUTPATIENT
Start: 2018-05-31 | End: 2018-07-31

## 2018-05-31 NOTE — TELEPHONE ENCOUNTER
Detailed message left on patient's VM with Dr. Butler's message below  Requested that she call back with any questions    Lizy José RN

## 2018-05-31 NOTE — TELEPHONE ENCOUNTER
This is a completely reasonable idea    1. Send in new prescription as detailed below. 30 day with refills times 5  2. Discontinue old dose  3. Patient to update our office in 1-2 weeks with how things go        Bj Butler MD

## 2018-06-11 PROBLEM — M79.641 BILATERAL HAND PAIN: Status: RESOLVED | Noted: 2018-03-26 | Resolved: 2018-06-11

## 2018-06-11 PROBLEM — M79.642 BILATERAL HAND PAIN: Status: RESOLVED | Noted: 2018-03-26 | Resolved: 2018-06-11

## 2018-06-25 ENCOUNTER — OFFICE VISIT (OUTPATIENT)
Dept: CARDIOLOGY | Facility: CLINIC | Age: 48
End: 2018-06-25
Payer: COMMERCIAL

## 2018-06-25 VITALS
WEIGHT: 166 LBS | HEART RATE: 78 BPM | BODY MASS INDEX: 27.62 KG/M2 | SYSTOLIC BLOOD PRESSURE: 122 MMHG | DIASTOLIC BLOOD PRESSURE: 83 MMHG | OXYGEN SATURATION: 99 %

## 2018-06-25 DIAGNOSIS — R00.0 TACHYCARDIA: Primary | ICD-10-CM

## 2018-06-25 PROCEDURE — 99214 OFFICE O/P EST MOD 30 MIN: CPT | Performed by: NURSE PRACTITIONER

## 2018-06-25 RX ORDER — METOPROLOL SUCCINATE 25 MG/1
25 TABLET, EXTENDED RELEASE ORAL DAILY
Qty: 90 TABLET | Refills: 3 | Status: SHIPPED | OUTPATIENT
Start: 2018-06-25 | End: 2018-07-31

## 2018-06-25 NOTE — MR AVS SNAPSHOT
After Visit Summary   6/25/2018    Laura Jackson    MRN: [id number]           Patient Information     Date Of Birth          1970        Visit Information        Provider Department      6/25/2018 9:00 AM Marylou Lim APRN CNP [facility] PHYSICIANS HEART AT [facility]        Today's Diagnoses     Tachycardia    -  1      Care Instructions    Thank you for coming to the [facility] Heart @ [facility]; please note the following instructions:    1. Start Toprol XL 25 mg once daily.    2.  Try to drink 50-60 ounces of 50/50 electrolyte fluids/water mix per day.      Examples: Propel or Pedialyte. Gatorade and Powerade are higher in sugar/calories and are okay in moderation or if you are an athlete.    3.  Avoid salt tablets.     4. Isometric exercises.  These exercises will assist in increasing intravascular pressure.  They also include rowing and recumbent bike. Start with 10 minutes 1-3/day and increase from there with a goal of 30 continuous minutes daily.     5. Try to eat six small meals per day. Try to keep these meals low in carbohydrates and low in gluten.     6. During hot summer hours, try to stay in cool -air conditioned climates.     7. Labs (blood and urine) next week.  HOLD ranitidine for 1 week prior to the labs    Follow up in one month.  7/31 (Tuesday) at 9:00 am     If you have any questions regarding your visit please contact your care team:     Cardiology  Telephone Number   Rosanna WRIGHT, JAS SANDOVAL, RN   Brittany MURRELL, OSEI DUBON MA   [phone]    *After hours: [phone]   For scheduling appts:     [phone] or    [phone] (select option 1)    *After hours: [phone]     For the Device Clinic (Pacemakers and ICD's)  RN's :  Yen White   During business hours: [phone]    *After business hours:  [phone] (select option 4)      Normal test result notifications will be released via SmarterShade or mailed  within 7 business days.  All other test results, will be communicated via telephone once reviewed by your cardiologist.    If you need a medication refill please contact your pharmacy.  Please allow 3 business days for your refill to be completed.    As always, thank you for trusting us with your health care needs!            Follow-ups after your visit        Additional Services     Follow-Up with  Advanced Practice Provider                 Your next 10 appointments already scheduled     Jul 31, 2018  9:00 AM CDT   Return Visit with SALAZAR Rajan CNP   [facility] PHYSICIANS HEART AT [facility])    [address] [place] [address] [address] [facility] [address]   [phone]            Oct 05, 2018 12:00 PM CDT   (Arrive by 11:45 AM)   RETURN ARRHYTHMIA with Phil Mitchell MD   [facility] ([facility])    [address]  [address] [place] [id number] [phone]              Future tests that were ordered for you today     Open Future Orders        Priority Expected Expires Ordered    Follow-Up with  Advanced Practice Provider Routine 7/23/2018 9/30/2018 6/25/2018    Prostaglandins D2 Urine: 24 hour Routine 7/2/2018 9/30/2018 6/25/2018    Histamine urine Routine 7/2/2018 9/30/2018 6/25/2018    Histamine Blood Routine 7/2/2018 9/30/2018 6/25/2018    N-Methylhistamine Urine 24 hour Routine 7/2/2018 9/30/2018 6/25/2018    Metanephrine random or 24 hr urine Routine 7/2/2018 9/30/2018 6/25/2018    Metanephrines Plasma Free Routine 7/2/2018 9/30/2018 6/25/2018    Anti Nuclear Brit IgG by IFA with Reflex Routine 7/2/2018 9/30/2018 6/25/2018            Who to contact     If you have questions or need follow up information about today's clinic visit or your schedule please contact [facility] PHYSIC[facility]ANS HEART [facility] directly at [phone].  Normal or non-critical lab and imaging results will be  communicated to you by Xtraicehart, letter or phone within 4 business days after the clinic has received the results. If you do not hear from us within 7 days, please contact the clinic through Kaleidoscope or phone. If you have a critical or abnormal lab result, we will notify you by phone as soon as possible.  Submit refill requests through Kaleidoscope or call your pharmacy and they will forward the refill request to us. Please allow 3 business days for your refill to be completed.          Additional Information About Your Visit        Kaleidoscope Information     Kaleidoscope gives you secure access to your electronic health record. If you see a primary care provider, you can also send messages to your care team and make appointments. If you have questions, please call your primary care clinic.  If you do not have a primary care provider, please call [phone] and they will assist you.        Care EveryWhere ID     This is your Care EveryWhere ID. This could be used by other organizations to access your [facility] medical records  [id number]        Your Vitals Were     Pulse Last Period Pulse Oximetry BMI (Body Mass Index)          78 07/28/2017 99% 27.62 kg/m2         Blood Pressure from Last 3 Encounters:   06/25/18 122/83   05/01/18 140/76   04/30/18 137/79    Weight from Last 3 Encounters:   06/25/18 75.3 kg (166 lb)   05/01/18 74.8 kg (165 lb)   04/29/18 73 kg (161 lb)                 Today's Medication Changes          These changes are accurate as of 6/25/18  9:53 AM.  If you have any questions, ask your nurse or doctor.               Start taking these medicines.        Dose/Directions    metoprolol succinate 25 MG 24 hr tablet   Commonly known as:  TOPROL-XL   Used for:  Tachycardia   Started by:  Marylou Lim APRN CNP        Dose:  25 mg   Take 1 tablet (25 mg) by mouth daily   Quantity:  90 tablet   Refills:  3            Where to get your medicines      These medications were sent to [facility] [place]  - [place], [place] - [address]  [address], [place] [address]     Phone:  [phone]     metoprolol succinate 25 MG 24 hr tablet                Primary Care Provider Office Phone # Fax #    Bj Butler [phone] [address]  [place] [address]        Equal Access to Services     [facility]: Hadii aad ku hadasho Soomaali, waaxda luqadaha, qaybta kaalmada adeegyada, waxay idiin hayaan adeeg kharash la'aan ah. So [facility] [phone].    ATENCIÓN: Si habla español, tiene a guido disposición servicios gratuitos de asistencia lingüística. Llame al [phone].    We comply with applicable federal civil rights laws and [place] laws. We do not discriminate on the basis of race, color, national origin, age, disability, sex, sexual orientation, or gender identity.            Thank you!     Thank you for choosing [facility] PHYSICIANS HEART AT [facility]  for your care. Our goal is always to provide you with excellent care. Hearing back from our patients is one way we can continue to improve our services. Please take a few minutes to complete the written survey that you may receive in the mail after your visit with us. Thank you!             Your Updated Medication List - Protect others around you: Learn how to safely use, store and throw away your medicines at www.disposemymeds.org.          This list is accurate as of 6/25/18  9:53 AM.  Always use your most recent med list.                   Brand Name Dispense Instructions for use Diagnosis    diltiazem 120 MG 24 hr capsule    CARDIZEM CD    30 capsule    Take 1 capsule (120 mg) by mouth daily    History of supraventricular tachycardia       fluticasone-salmeterol 100-50 MCG/DOSE diskus inhaler    ADVAIR    3 Inhaler    Inhale 1 puff into the lungs 2 times daily    Mild persistent asthma without complication       levalbuterol 45 MCG/ACT Inhaler    XOPENEX HFA    1 Inhaler    Inhale 1-2  puffs into the lungs every 4 hours as needed for shortness of breath / dyspnea    Mild persistent asthma without complication       metoprolol succinate 25 MG 24 hr tablet    TOPROL-XL    90 tablet    Take 1 tablet (25 mg) by mouth daily    Tachycardia       OMEPRAZOLE PO      Take 20 mg by mouth every morning        vitamin D 2000 units Caps      Take 1 capsule by mouth daily        ZANTAC PO      Take 150 mg by mouth as needed for heartburn

## 2018-06-25 NOTE — PATIENT INSTRUCTIONS
Thank you for coming to the [facility] Heart @ [facility]; please note the following instructions:    1. Start Toprol XL 25 mg once daily.    2.  Try to drink 50-60 ounces of 50/50 electrolyte fluids/water mix per day.      Examples: Propel or Pedialyte. Gatorade and Powerade are higher in sugar/calories and are okay in moderation or if you are an athlete.    3.  Avoid salt tablets.     4. Isometric exercises.  These exercises will assist in increasing intravascular pressure.  They also include rowing and recumbent bike. Start with 10 minutes 1-3/day and increase from there with a goal of 30 continuous minutes daily.     5. Try to eat six small meals per day. Try to keep these meals low in carbohydrates and low in gluten.     6. During hot summer hours, try to stay in cool -air conditioned climates.     7. Labs (blood and urine) next week.  HOLD ranitidine for 1 week prior to the labs    Follow up in one month.  7/31 (Tuesday) at 9:00 am     If you have any questions regarding your visit please contact your care team:     Cardiology  Telephone Number   Rosanna WRIGHT, JAS SANDOVAL, JAS MURRELL, OSEI DUBON, MA   [phone]    *After hours: [phone]   For scheduling appts:     [phone] or    [phone] (select option 1)    *After hours: [phone]     For the Device Clinic (Pacemakers and ICD's)  RN's :  Yen White   During business hours: [phone]    *After business hours:  [phone] (select option 4)      Normal test result notifications will be released via CoachBase or mailed within 7 business days.  All other test results, will be communicated via telephone once reviewed by your cardiologist.    If you need a medication refill please contact your pharmacy.  Please allow 3 business days for your refill to be completed.    As always, thank you for trusting us with your health care needs!

## 2018-06-25 NOTE — PROGRESS NOTES
"    Clinical Cardiac Electrophysiology  [facility]    HPI: Laura Jackson is a 48 year old female who presents for follow up of palpitations and sinus tachycardia.  The patient has a past medical history significant for POTS, hypermobility syndrome, asthma, IBS, uterine fibroids s/p hysterectomy, and GERD. Last summer (2017) she underwent a hysterectomy for severe vaginal bleeding and during this time she started having symptoms of palpitations, fatigue, flushing, nausea, and lightheadedness. She also started having numbness, tingling and her fingers would turn white so she was diagnosed with Raynaud's. She was started on diltiazem and today states that her finger symptoms have greatly improved with symptoms only when she is handling items just out of the freezer. Of note, patient had very similar symptoms 6 years ago when she had a viral infection and she states that her symptoms took around 2 years before she felt back to baseline. ECHO was ordered and was negative. Ziopatch showed episodes of sinus tachycardia. Tilt was done at an outside facility and showed normal responses except during deep breathing exercises.     She continues to have significant palpitations and heart rates in the 120-130s (improved from 140-150s a year ago) \"at random\" dozens of times throughout the day such as when she is sitting pulling weeds, bending down to get laundry, or normal light housework. When she exerts herself, such as standing or walking, palpitations are much more prominent.  Of note, patient is a CV ICU nurse at [employer]; however, she currently on disability given the above symptoms. She is drinking five 12 oz (60oz) of water a day. She has taken salt tabs in the past which caused her feet to swell. She also has intermittent rashes and dust mite allergy.  Denies chest pain or pressure, syncope, dyspnea at rest or with exertion,  orthopnea, PND, abdominal edema, pedal edema, or claudication.        Current Outpatient " Prescriptions   Medication Sig Dispense Refill     Cholecalciferol (VITAMIN D) 2000 UNITS CAPS Take 1 capsule by mouth daily       diltiazem (CARDIZEM CD/CARTIA XT) 120 MG 24 hr capsule Take 1 capsule (120 mg) by mouth daily 30 capsule 5     fluticasone-salmeterol (ADVAIR) 100-50 MCG/DOSE diskus inhaler Inhale 1 puff into the lungs 2 times daily 3 Inhaler 1     levalbuterol (XOPENEX HFA) 45 MCG/ACT Inhaler Inhale 1-2 puffs into the lungs every 4 hours as needed for shortness of breath / dyspnea 1 Inhaler 5     OMEPRAZOLE PO Take 20 mg by mouth every morning       RaNITidine HCl (ZANTAC PO) Take 150 mg by mouth as needed for heartburn         Past Medical History:   Diagnosis Date     Family history of breast cancer 2/19/2014     Family history of breast cancer      SVT (supraventricular tachycardia):  history of, no recurrence since 2008 10/11/2013    no recurrence since 2008      Uncomplicated asthma        Past Surgical History:   Procedure Laterality Date     APPENDECTOMY       C STOMACH SURGERY PROCEDURE UNLISTED       DAVINCI HYSTERECTOMY TOTAL, SALPINGECTOMY BILATERAL N/A 8/4/2017    Procedure: DAVINCI XI HYSTERECTOMY TOTAL, SALPINGECTOMY BILATERAL;  DAVINCI TOTAL HYSTERECTOMY; BILATERAL SALPINGECTOMY. BILATERAL URETERAL LYSIS. UTEROSACRAL-COLPOPEXY. EXCISION OF ENDOMETRIOSIS;  Surgeon: George Loyola MD;  Location:  OR     DAVINCI LYSIS OF ADHESIONS Bilateral 8/4/2017    Procedure: DAVINCI LYSIS OF ADHESIONS;  BILATERAL URETERAL LYSIS;  Surgeon: George Loyola MD;  Location:  OR     DAVINCI SACROCOLPOPEXY, CYSTOSCOPY, COMBINED N/A 8/4/2017    Procedure: COMBINED DAVINCI SACROCOLPOPEXY, CYSTOSCOPY;  UTEROSACRAL COLPOPEXY;  Surgeon: George Loyola MD;  Location:  OR     DAVINCI XI ASSISTED ABLATION / EXCISION OF ENDOMETRIOSIS  8/4/2017    Procedure: DAVINCI XI ASSISTED ABLATION / EXCISION OF ENDOMETRIOSIS;;  Surgeon: George Loyola MD;  Location:  OR     DILATION AND CURETTAGE N/A  6/20/2017    Procedure: DILATION AND CURETTAGE;  Uterine Curettings and Fibroid Removal, Cook catheter placement; (No hysterectomy done at this time);  Surgeon: Ernestina Saunders MD;  Location: [facility]     TONSILLECTOMY         Family History   Problem Relation Age of Onset     Diabetes Mother      Hypertension Mother      Hyperlipidemia Mother      Arrhythmia Mother      Cardiovascular Father      CHF and COPD     Asthma Father      Breast Cancer Maternal Grandfather      Colon Cancer Maternal Grandfather      Breast Cancer Paternal Grandmother      Breast Cancer Paternal Aunt      C.A.D. Paternal Grandfather      Breast Cancer Cousin      Asthma Son      Diabetes Maternal Grandmother      Hypertension Maternal Grandmother      Breast Cancer Cousin      Breast Cancer Cousin      Breast Cancer Cousin        Social History   Substance Use Topics     Smoking status: Never Smoker     Smokeless tobacco: Never Used     Alcohol use Yes      Comment: occasional       Allergies   Allergen Reactions     Penicillins      Swelling throat; age 4     Tetracycline      Vomiting; nauseous         ROS:   Negative except for as indicated in HPI.    Physical Examination:  Vitals: /83 (BP Location: Right arm, Patient Position: Chair, Cuff Size: Adult Large)  Pulse 78  Wt 75.3 kg (166 lb)  LMP 07/28/2017  SpO2 99%  BMI 27.62 kg/m2  BMI= Body mass index is 27.62 kg/(m^2).    GENERAL APPEARANCE: healthy, alert, and no acute distress  HEENT: no icterus, no xanthelasmas, normal pupil size and reaction, no cyanosis.  NECK: no asymmetry, no cervical bruits, no JVD   CHEST: lungs clear to auscultation - no rales, rhonchi or wheezes, no use of accessory muscles, no retractions, respirations are unlabored, normal respiratory rate  CARDIOVASCULAR: regular rhythm, normal S1 with physiologic split S2, no S3 or S4 and no murmur, click or rub  ABDOMEN:  no abdominal bruits, soft, non-tender  EXTREMITIES: no clubbing, cyanosis, or edema  NEURO:  alert and oriented to person/place/time, normal speech, gait and affect  VASC: Radial and posterior tibialis pulses +2 and symmetric bilaterally.   SKIN: no ecchymoses, no rashes    Laboratory:  Lab Results   Component Value Date    WBC 7.5 2018    RBC 4.72 2018    HGB 14.3 2018    HCT 42.1 2018    MCV 89 2018    MCH 30.3 2018    MCHC 34.0 2018    RDW 13.2 2018     2018     Lab Results   Component Value Date     2018    POTASSIUM 3.6 2018    CHLORIDE 106 2018    CO2 24 2018    ANIONGAP 9 2018    GLC 95 2018    BUN 16 2018    CR 0.87 2018    GFRESTIMATED 69 2018    GFRESTBLACK 84 2018    AZAEL 8.8 2018      Lab Results   Component Value Date    INR 1.12 2017    INR 1.12 2017     No results found for: CKTOTAL, CKMB, TROPN  Cholesterol (mg/dL)   Date Value   2017 194   2011 189     HDL Cholesterol (mg/dL)   Date Value   2017 65   2011 65     LDL Cholesterol Calculated (mg/dL)   Date Value   2017 110 (H)   2011 110       ECHO:   Recent Results (from the past 8760 hour(s))   Echocardiogram Complete    Narrative    [id number]  [id number]  [id number]  [id number]^CAN^GERMÁNBIL^[facility]  Echocardiography Laboratory  [address] [place] [id number]     Name: HUSEYIN MENDIOLA  MRN: [id number]  : 1970  Study Date: 2017 06:29 PM  Age: 47 yrs  Gender: Female  Patient Location: [facility]  Reason For Study: Tachycardia  Ordering Physician: BARBRA NORTH  Performed By: Joyce Schulte, NATACHA     BSA: 1.8 m2  Height: 65 in  Weight: 162 lb  BP: 124/85 mmHg  _____________________________________________________________________________  __        Procedure  Echocardiogram with two-dimensional, color and spectral Doppler  performed.  _____________________________________________________________________________  __        Interpretation Summary  Left ventricular size is normal. Global and regional left ventricular function  is normal with an EF of 60-65%.  The right ventricle is normal size. Global right ventricular function is  normal.  The inferior vena cava is normal.  No pericardial effusion is present.  Previous study not available for comparison.  _____________________________________________________________________________  __        Left Ventricle  Left ventricular size is normal. Left ventricular wall thickness is normal.  Global and regional left ventricular function is normal with an EF of 60-65%.  Normal left ventricular filling for age. No regional wall motion abnormalities  are seen.     Right Ventricle  The right ventricle is normal size. Global right ventricular function is  normal.     Atria  Both atria appear normal. The atrial septum is intact as assessed by color  Doppler .        Mitral Valve  The mitral valve is normal. Trace mitral insufficiency is present.     Aortic Valve  Aortic valve is normal in structure and function.     Tricuspid Valve  The tricuspid valve is normal. Trace tricuspid insufficiency is present.  Pulmonary artery systolic pressure cannot be assessed. The peak velocity of  the tricuspid regurgitant jet is not obtainable.     Pulmonic Valve  The pulmonic valve is normal.     Vessels  The aorta root is normal. The inferior vena cava is normal. Estimated mean  right atrial pressure is 3 mmHg.     Pericardium  No pericardial effusion is present.        Compared to Previous Study  Previous study not available for comparison.  _____________________________________________________________________________  __     MMode/2D Measurements & Calculations  LA Volume (BP): 29.9 ml  LA Volume Index (BP): 16.5 ml/m2        Doppler Measurements & Calculations  MV E max mary: 95.1 cm/sec  MV A max mary: 62.1  cm/sec  MV E/A: 1.5  MV dec time: 0.23 sec              _____________________________________________________________________________  __        Report approved by: Yasmin Barrett 08/27/2017 07:15 PM        Assessment and recommendations:    #1. Palpitations and sinus tachycardia: Reassurance provided that the cause of palpitations is likely benign as it is not associated with syncope, dyspnea, or chest pain without risk factors for structural heart disease.  1. Try to drink 50-60 ounces of 50/50 electrolyte fluids/water mix per day.    Examples: Propel or Pedialyte. Gatorade and Powerade are higher in sugar/calories and are okay in moderation or if you are an athlete.  2.  Avoid salt tablets.   3. Medications: As her Rynaud's improved significantly, will continue low dose diltiazem 120 mg daily.  Her symptoms are severe and frequent enough that they effect her ability to work and her HR and BP have room for additional therapy, so will start Toprol XL 25 mg daily for palpitations and tachycardia (she has asthma and is not a good candidate for propanolol).  4. Isometric exercises.  These exercises will assist in increasing intravascular pressure.  They also include swimming, rowing, recumbent bike. Discussed starting with 10 minutes 1-3/day and working up to 30 continuous minutes daily.  5. Try to eat six small meals per day. Try to keep these meals low in carbohydrates and low in gluten.   7. During hot summer hours, try to stay in cool -air conditioned climates.   8. She has a history of intermittent skin rashes and dust mite allergies so will do labs next week (her last ranitidine was this past weekend) for work up for Mast Cell Activation.      FOLLOW UP:  Follow up in one month or follow up sooner as needed for symptoms.    Patient expresses understanding and agreement with the plan.    I appreciate the chance to help with Laura pollock. Please let me know if you have any questions or  concerns.    Marylou LINCOLN, NP-C

## 2018-06-25 NOTE — NURSING NOTE
Diet: Patient instructed regarding a heart healthy diet, including discussion of reduced fat and sodium intake. Patient demonstrated understanding of this information and agreed to call with further questions or concerns.  Labs: advised to hold ranitidine 1 week prior to the ablation. Patient demonstrated understanding of this information and agreed to call with further questions or concerns.   Med Reconcile: Reviewed and verified all current medications with the patient. The updated medication list was printed and given to the patient.  New Medication: Patient was educated regarding newly prescribed medication, including discussion of  the indication, administration, side effects, and when to report to MD or RN. Patient demonstrated understanding of this information and agreed to call with further questions or concerns.  Start Toprol 25 mg daily  Return Appointment: Patient given instructions regarding scheduling next clinic visit. Patient demonstrated understanding of this information and agreed to call with further questions or concerns.  1 month f/u     Patient requested office visit notes and autonomic testing to be faxed to [facility] Attn:  Munira Aggarwal [phone].  Information faxed [facility]  Patient stated she understood all health information given and agreed to call with further questions or concerns.  Rosanna Rodriguez RN

## 2018-06-25 NOTE — LETTER
"6/25/2018      RE: Laura Jackson  [address]  [place] [address]       Dear Colleague,    Thank you for the opportunity to participate in the care of your patient, Laura Jackson, at the [facility] AT [facility] at [facility]. Please see a copy of my visit note below.        Clinical Cardiac Electrophysiology  [facility]    HPI: Laura Jackson is a 48 year old female who presents for follow up of palpitations and sinus tachycardia.  The patient has a past medical history significant for POTS, hypermobility syndrome, asthma, IBS, uterine fibroids s/p hysterectomy, and GERD. Last summer (2017) she underwent a hysterectomy for severe vaginal bleeding and during this time she started having symptoms of palpitations, fatigue, flushing, nausea, and lightheadedness. She also started having numbness, tingling and her fingers would turn white so she was diagnosed with Raynaud's. She was started on diltiazem and today states that her finger symptoms have greatly improved with symptoms only when she is handling items just out of the freezer. Of note, patient had very similar symptoms 6 years ago when she had a viral infection and she states that her symptoms took around 2 years before she felt back to baseline. ECHO was ordered and was negative. Ziopatch showed episodes of sinus tachycardia. Tilt was done at an outside facility and showed normal responses except during deep breathing exercises.     She continues to have significant palpitations and heart rates in the 120-130s (improved from 140-150s a year ago) \"at random\" dozens of times throughout the day such as when she is sitting pulling weeds, bending down to get laundry, or normal light housework. When she exerts herself, such as standing or walking, palpitations are much more prominent.  Of note, patient is a CV ICU nurse at [employer]; however, she currently on disability given the " above symptoms. She is drinking five 12 oz (60oz) of water a day. She has taken salt tabs in the past which caused her feet to swell. She also has intermittent rashes and dust mite allergy.  Denies chest pain or pressure, syncope, dyspnea at rest or with exertion,  orthopnea, PND, abdominal edema, pedal edema, or claudication.        Current Outpatient Prescriptions   Medication Sig Dispense Refill     Cholecalciferol (VITAMIN D) 2000 UNITS CAPS Take 1 capsule by mouth daily       diltiazem (CARDIZEM CD/CARTIA XT) 120 MG 24 hr capsule Take 1 capsule (120 mg) by mouth daily 30 capsule 5     fluticasone-salmeterol (ADVAIR) 100-50 MCG/DOSE diskus inhaler Inhale 1 puff into the lungs 2 times daily 3 Inhaler 1     levalbuterol (XOPENEX HFA) 45 MCG/ACT Inhaler Inhale 1-2 puffs into the lungs every 4 hours as needed for shortness of breath / dyspnea 1 Inhaler 5     OMEPRAZOLE PO Take 20 mg by mouth every morning       RaNITidine HCl (ZANTAC PO) Take 150 mg by mouth as needed for heartburn         Past Medical History:   Diagnosis Date     Family history of breast cancer 2/19/2014     Family history of breast cancer      SVT (supraventricular tachycardia):  history of, no recurrence since 2008 10/11/2013    no recurrence since 2008      Uncomplicated asthma        Past Surgical History:   Procedure Laterality Date     APPENDECTOMY       C STOMACH SURGERY PROCEDURE UNLISTED       DAVINCI HYSTERECTOMY TOTAL, SALPINGECTOMY BILATERAL N/A 8/4/2017    Procedure: DAVINCI XI HYSTERECTOMY TOTAL, SALPINGECTOMY BILATERAL;  DAVINCI TOTAL HYSTERECTOMY; BILATERAL SALPINGECTOMY. BILATERAL URETERAL LYSIS. UTEROSACRAL-COLPOPEXY. EXCISION OF ENDOMETRIOSIS;  Surgeon: George Loyola MD;  [facility]     DAVINCI LYSIS OF ADHESIONS Bilateral 8/4/2017    Procedure: DAVINCI LYSIS OF ADHESIONS;  BILATERAL URETERAL LYSIS;  Surgeon: George Loyola MD;  Location: [facility]     DAVINCI SACROCOLPOPEXY, CYSTOSCOPY, COMBINED N/A 8/4/2017     Procedure: COMBINED DAVINCI SACROCOLPOPEXY, CYSTOSCOPY;  UTEROSACRAL COLPOPEXY;  Surgeon: George Loyola MD;  Location: [facility] OR     DAVINCI XI ASSISTED ABLATION / EXCISION OF ENDOMETRIOSIS  8/4/2017    Procedure: DAVINCI XI ASSISTED ABLATION / EXCISION OF ENDOMETRIOSIS;;  Surgeon: George Loyola MD;  [facility]     DILATION AND CURETTAGE N/A 6/20/2017    Procedure: DILATION AND CURETTAGE;  Uterine Curettings and Fibroid Removal, Cook catheter placement; (No hysterectomy done at this time);  Surgeon: Ernestina Saunders MD;  [facility]     TONSILLECTOMY         Family History   Problem Relation Age of Onset     Diabetes Mother      Hypertension Mother      Hyperlipidemia Mother      Arrhythmia Mother      Cardiovascular Father      CHF and COPD     Asthma Father      Breast Cancer Maternal Grandfather      Colon Cancer Maternal Grandfather      Breast Cancer Paternal Grandmother      Breast Cancer Paternal Aunt      C.A.D. Paternal Grandfather      Breast Cancer Cousin      Asthma Son      Diabetes Maternal Grandmother      Hypertension Maternal Grandmother      Breast Cancer Cousin      Breast Cancer Cousin      Breast Cancer Cousin        Social History   Substance Use Topics     Smoking status: Never Smoker     Smokeless tobacco: Never Used     Alcohol use Yes      Comment: occasional       Allergies   Allergen Reactions     Penicillins      Swelling throat; age 4     Tetracycline      Vomiting; nauseous         ROS:   Negative except for as indicated in HPI.    Physical Examination:  Vitals: /83 (BP Location: Right arm, Patient Position: Chair, Cuff Size: Adult Large)  Pulse 78  Wt 75.3 kg (166 lb)  LMP 07/28/2017  SpO2 99%  BMI 27.62 kg/m2  BMI= Body mass index is 27.62 kg/(m^2).    GENERAL APPEARANCE: healthy, alert, and no acute distress  HEENT: no icterus, no xanthelasmas, normal pupil size and reaction, no cyanosis.  NECK: no asymmetry, no cervical bruits, no JVD   CHEST: lungs clear  to auscultation - no rales, rhonchi or wheezes, no use of accessory muscles, no retractions, respirations are unlabored, normal respiratory rate  CARDIOVASCULAR: regular rhythm, normal S1 with physiologic split S2, no S3 or S4 and no murmur, click or rub  ABDOMEN:  no abdominal bruits, soft, non-tender  EXTREMITIES: no clubbing, cyanosis, or edema  NEURO: alert and oriented to person/place/time, normal speech, gait and affect  VASC: Radial and posterior tibialis pulses +2 and symmetric bilaterally.   SKIN: no ecchymoses, no rashes    Laboratory:  Lab Results   Component Value Date    WBC 7.5 2018    RBC 4.72 2018    HGB 14.3 2018    HCT 42.1 2018    MCV 89 2018    MCH 30.3 2018    MCHC 34.0 2018    RDW 13.2 2018     2018     Lab Results   Component Value Date     2018    POTASSIUM 3.6 2018    CHLORIDE 106 2018    CO2 24 2018    ANIONGAP 9 2018    GLC 95 2018    BUN 16 2018    CR 0.87 2018    GFRESTIMATED 69 2018    GFRESTBLACK 84 2018    AZAEL 8.8 2018      Lab Results   Component Value Date    INR 1.12 2017    INR 1.12 2017     No results found for: CKTOTAL, CKMB, TROPN  Cholesterol (mg/dL)   Date Value   2017 194   2011 189     HDL Cholesterol (mg/dL)   Date Value   2017 65   2011 65     LDL Cholesterol Calculated (mg/dL)   Date Value   2017 110 (H)   2011 110       ECHO:   Recent Results (from the past 8760 hour(s))   Echocardiogram Complete    Narrative    [id number]  [id number]  [id number]  [id number]^HAGI-SALAAD^MISBIL^[facility],[place]  Echocardiography Laboratory  [address] [place] [id number]     Name: HUSEYIN MENDIOLA  MRN: [id number]  : 1970  Study Date: 2017 06:29 PM  Age: 47 yrs  Gender: Female  Patient Location: [facility]  Reason For Study: Tachycardia  Ordering  Physician: BARBRA NORTH  Performed By: Joyce Schulte RDCS     BSA: 1.8 m2  Height: 65 in  Weight: 162 lb  BP: 124/85 mmHg  _____________________________________________________________________________  __        Procedure  Echocardiogram with two-dimensional, color and spectral Doppler performed.  _____________________________________________________________________________  __        Interpretation Summary  Left ventricular size is normal. Global and regional left ventricular function  is normal with an EF of 60-65%.  The right ventricle is normal size. Global right ventricular function is  normal.  The inferior vena cava is normal.  No pericardial effusion is present.  Previous study not available for comparison.  _____________________________________________________________________________  __        Left Ventricle  Left ventricular size is normal. Left ventricular wall thickness is normal.  Global and regional left ventricular function is normal with an EF of 60-65%.  Normal left ventricular filling for age. No regional wall motion abnormalities  are seen.     Right Ventricle  The right ventricle is normal size. Global right ventricular function is  normal.     Atria  Both atria appear normal. The atrial septum is intact as assessed by color  Doppler .        Mitral Valve  The mitral valve is normal. Trace mitral insufficiency is present.     Aortic Valve  Aortic valve is normal in structure and function.     Tricuspid Valve  The tricuspid valve is normal. Trace tricuspid insufficiency is present.  Pulmonary artery systolic pressure cannot be assessed. The peak velocity of  the tricuspid regurgitant jet is not obtainable.     Pulmonic Valve  The pulmonic valve is normal.     Vessels  The aorta root is normal. The inferior vena cava is normal. Estimated mean  right atrial pressure is 3 mmHg.     Pericardium  No pericardial effusion is present.        Compared to Previous Study  Previous study not available  for comparison.  _____________________________________________________________________________  __     MMode/2D Measurements & Calculations  LA Volume (BP): 29.9 ml  LA Volume Index (BP): 16.5 ml/m2        Doppler Measurements & Calculations  MV E max mary: 95.1 cm/sec  MV A max mary: 62.1 cm/sec  MV E/A: 1.5  MV dec time: 0.23 sec              _____________________________________________________________________________  __        Report approved by: Yasmin Barrett 08/27/2017 07:15 PM        Assessment and recommendations:    #1. Palpitations and sinus tachycardia: Reassurance provided that the cause of palpitations is likely benign as it is not associated with syncope, dyspnea, or chest pain without risk factors for structural heart disease.  1. Try to drink 50-60 ounces of 50/50 electrolyte fluids/water mix per day.    Examples: Propel or Pedialyte. Gatorade and Powerade are higher in sugar/calories and are okay in moderation or if you are an athlete.  2.  Avoid salt tablets.   3. Medications: As her Rynaud's improved significantly, will continue low dose diltiazem 120 mg daily.  Her symptoms are severe and frequent enough that they effect her ability to work and her HR and BP have room for additional therapy, so will start Toprol XL 25 mg daily for palpitations and tachycardia (she has asthma and is not a good candidate for propanolol).  4. Isometric exercises.  These exercises will assist in increasing intravascular pressure.  They also include swimming, rowing, recumbent bike. Discussed starting with 10 minutes 1-3/day and working up to 30 continuous minutes daily.  5. Try to eat six small meals per day. Try to keep these meals low in carbohydrates and low in gluten.   7. During hot summer hours, try to stay in cool -air conditioned climates.   8. She has a history of intermittent skin rashes and dust mite allergies so will do labs next week (her last ranitidine was this past weekend) for work up for Mast  Cell Activation.      FOLLOW UP:  Follow up in one month or follow up sooner as needed for symptoms.    Patient expresses understanding and agreement with the plan.    I appreciate the chance to help with Laura pollock. Please let me know if you have any questions or concerns.    Marylou LINCOLN, NP-C

## 2018-06-25 NOTE — NURSING NOTE
"Chief Complaint   Patient presents with     RECHECK     Arrhythmia F/U, bringing table results, per Pt, Pt of Dr. Mitchell at [facility]. Pt reports palpitaions, pounding, lightheaded, dizzy and very fatigued.       Initial /83 (BP Location: Right arm, Patient Position: Chair, Cuff Size: Adult Large)  Pulse 78  Wt 75.3 kg (166 lb)  LMP 07/28/2017  SpO2 99%  BMI 27.62 kg/m2 Estimated body mass index is 27.62 kg/(m^2) as calculated from the following:    Height as of 4/29/18: 1.651 m (5' 5\").    Weight as of this encounter: 75.3 kg (166 lb)..  BP completed using cuff size: tatyana Osorio MA    "

## 2018-07-03 DIAGNOSIS — R00.0 TACHYCARDIA: ICD-10-CM

## 2018-07-03 LAB
COLLECT DURATION TIME UR: NORMAL H
MISCELLANEOUS TEST: NORMAL
PROSTAGLANDIN D2 24H UR-MRATE: NORMAL
SPECIMEN VOL 24H UR: NORMAL L

## 2018-07-03 PROCEDURE — 84150 ASSAY OF PROSTAGLANDIN: CPT | Mod: 90 | Performed by: NURSE PRACTITIONER

## 2018-07-03 PROCEDURE — 83088 ASSAY OF HISTAMINE: CPT | Mod: 90 | Performed by: NURSE PRACTITIONER

## 2018-07-03 PROCEDURE — 36415 COLL VENOUS BLD VENIPUNCTURE: CPT | Performed by: NURSE PRACTITIONER

## 2018-07-03 PROCEDURE — 86038 ANTINUCLEAR ANTIBODIES: CPT | Performed by: NURSE PRACTITIONER

## 2018-07-03 PROCEDURE — 99000 SPECIMEN HANDLING OFFICE-LAB: CPT | Performed by: NURSE PRACTITIONER

## 2018-07-03 PROCEDURE — 82542 COL CHROMOTOGRAPHY QUAL/QUAN: CPT | Mod: 90 | Performed by: NURSE PRACTITIONER

## 2018-07-03 PROCEDURE — 83835 ASSAY OF METANEPHRINES: CPT | Mod: 90 | Performed by: NURSE PRACTITIONER

## 2018-07-03 PROCEDURE — 86039 ANTINUCLEAR ANTIBODIES (ANA): CPT | Performed by: NURSE PRACTITIONER

## 2018-07-05 LAB
ANA PAT SER IF-IMP: ABNORMAL
ANA SER QL IF: ABNORMAL
ANA TITR SER IF: ABNORMAL {TITER}

## 2018-07-06 LAB
ANNOTATION COMMENT IMP: NORMAL
COLLECT DURATION TIME UR: 24 HR
CREAT 24H UR-MRATE: 1471 MG/D (ref 700–1600)
CREAT SERPL-MCNC: 55 MG/DL
METANEPH 24H UR-MCNC: 18 UG/L
METANEPH 24H UR-MRATE: 48 UG/D (ref 39–143)
METANEPH+NORMETANEPH UR-IMP: NORMAL
METANEPH/CREAT 24H UR: 33 UG/G CRT (ref 0–300)
METANEPHS SERPL-SCNC: <0.1 NMOL/L (ref 0–0.49)
NORMETANEPHRINE 24H UR-MCNC: 122 UG/L
NORMETANEPHRINE 24H UR-MRATE: 326 UG/D (ref 109–393)
NORMETANEPHRINE SERPL-SCNC: 0.41 NMOL/L (ref 0–0.89)
NORMETANEPHRINE/CREAT 24H UR: 222 UG/G CRT (ref 0–400)
SPECIMEN VOL ?TM UR: 2675 ML

## 2018-07-06 NOTE — PROGRESS NOTES
SUBJECTIVE:   Laura Jackson is a 48 year old female who presents to clinic today for the following health issues:        Patient presents with:  RECHECK: follow up POTS, started new med 1 week ago   Health Maintenance: hiv, mammo, colon cancer, pap, aap       POTS (postural orthostatic tachycardia syndrome)  Cervicalgia  Screen for colon cancer  Visit for screening mammogram  Screening for malignant neoplasm of cervix  Screening for HIV (human immunodeficiency virus)  Hypovitaminosis D     Patient is in to revisit her ongoing issues with POTS (postural orthostatic tachycardia syndrome) and reasons she can't get back to work yet. Remains on long term disability insurance and needs her forms redone. This will be an extension from 7/20/2018 to 10/20/2018    Not a lot of changes, she still sees cardiologist and metoprolol is to be started with planned gradual dose increases as tolerated    She had autonomic nervous system testing at [facility] [ Socrates Phan MD, MS with [facility]]. Per patient the tilt table testing was normal but tachycardia seen with some of the tests, overall inconclusive testing/    Seeing a cardiology nurse practitioner associated with Dr. Mitchell, some pending tests including prostaglandin levels , testing the ama antibody     She's trying to exercise more via swimming / trying rowing. Still is easily fatigued but doing better then a year ago.    Regarding disability form, we agreed to extend this out for 3 more months. 7/20/2018 to 10/20/2018    Had physical therapy for her neck; she had been doing well. Recurrence of neck pain, she has a 25 year history of cervicalgia, on and off . She is doing the exercises but can't get back to the level she was at before. Wants another referral to the [facility]     Problem list and histories reviewed & adjusted, as indicated.  Additional history: as documented    Patient Active  Problem List   Diagnosis     CARDIOVASCULAR SCREENING; LDL GOAL LESS THAN 160     Irritable bowel syndrome     GERD (gastroesophageal reflux disease)     History of supraventricular tachycardia     Mild persistent asthma     Family history of breast cancer     Foreign body (FB) in soft tissue     S/P myomectomy     Nausea     Dehydration     S/P ANAID (total abdominal hysterectomy)     Hypovitaminosis D     Pelvic adhesions     Endometriosis     Heberden's nodes     Cervicalgia     POTS (postural orthostatic tachycardia syndrome)     Hypermobility syndrome     Past Surgical History:   Procedure Laterality Date     APPENDECTOMY       C STOMACH SURGERY PROCEDURE UNLISTED       DAVINCI HYSTERECTOMY TOTAL, SALPINGECTOMY BILATERAL N/A 8/4/2017    Procedure: DAVINCI XI HYSTERECTOMY TOTAL, SALPINGECTOMY BILATERAL;  DAVINCI TOTAL HYSTERECTOMY; BILATERAL SALPINGECTOMY. BILATERAL URETERAL LYSIS. UTEROSACRAL-COLPOPEXY. EXCISION OF ENDOMETRIOSIS;  Surgeon: George Loyola MD;  [facility]     DAVINCI LYSIS OF ADHESIONS Bilateral 8/4/2017    Procedure: DAVINCI LYSIS OF ADHESIONS;  BILATERAL URETERAL LYSIS;  Surgeon: George Loyola MD;  [facility]: [facility]     DAVINCI SACROCOLPOPEXY, CYSTOSCOPY, COMBINED N/A 8/4/2017    Procedure: COMBINED DAVINCI SACROCOLPOPEXY, CYSTOSCOPY;  UTEROSACRAL COLPOPEXY;  Surgeon: George Loyola MD;  Location: [facility]     DAVINCI XI ASSISTED ABLATION / EXCISION OF ENDOMETRIOSIS  8/4/2017    Procedure: DAVINCI XI ASSISTED ABLATION / EXCISION OF ENDOMETRIOSIS;;  Surgeon: George oLyola MD;  [facility]: [facility]     DILATION AND CURETTAGE N/A 6/20/2017    Procedure: DILATION AND CURETTAGE;  Uterine Curettings and Fibroid Removal, Cook catheter placement; (No hysterectomy done at this time);  Surgeon: Ernestina Saunders MD;  [facility]     HYSTERECTOMY, PAP NO LONGER INDICATED       TONSILLECTOMY         Social History   Substance Use Topics     Smoking status: Never Smoker     Smokeless tobacco:  Never Used     Alcohol use Yes      Comment: occasional     Family History   Problem Relation Age of Onset     Diabetes Mother      Hypertension Mother      Hyperlipidemia Mother      Arrhythmia Mother      Cardiovascular Father      CHF and COPD     Asthma Father      Breast Cancer Maternal Grandfather      Colon Cancer Maternal Grandfather      Breast Cancer Paternal Grandmother      Breast Cancer Paternal Aunt      C.A.D. Paternal Grandfather      Breast Cancer Cousin      Asthma Son      Diabetes Maternal Grandmother      Hypertension Maternal Grandmother      Breast Cancer Cousin      Breast Cancer Cousin      Breast Cancer Cousin          Current Outpatient Prescriptions   Medication Sig Dispense Refill     Cholecalciferol (VITAMIN D) 2000 UNITS CAPS Take 1 capsule by mouth daily       diltiazem (CARDIZEM CD/CARTIA XT) 120 MG 24 hr capsule Take 1 capsule (120 mg) by mouth daily 30 capsule 5     fluticasone-salmeterol (ADVAIR) 100-50 MCG/DOSE diskus inhaler Inhale 1 puff into the lungs 2 times daily 3 Inhaler 1     levalbuterol (XOPENEX HFA) 45 MCG/ACT Inhaler Inhale 1-2 puffs into the lungs every 4 hours as needed for shortness of breath / dyspnea 1 Inhaler 5     metoprolol succinate (TOPROL-XL) 25 MG 24 hr tablet Take 1 tablet (25 mg) by mouth daily 90 tablet 3     OMEPRAZOLE PO Take 20 mg by mouth every morning       RaNITidine HCl (ZANTAC PO) Take 150 mg by mouth as needed for heartburn       Allergies   Allergen Reactions     Penicillins Swelling     Swelling throat; age 4     Tetracycline      Other reaction(s): Abdominal Pain  Vomiting; nauseous     BP Readings from Last 3 Encounters:   07/09/18 136/68   06/25/18 122/83   05/01/18 140/76    Wt Readings from Last 3 Encounters:   07/09/18 166 lb (75.3 kg)   06/25/18 166 lb (75.3 kg)   05/01/18 165 lb (74.8 kg)                  Labs reviewed in EPIC    Reviewed and updated as needed this visit by clinical staff       Reviewed and updated as needed this  visit by Provider         ROS:  Constitutional, HEENT, cardiovascular, pulmonary, gi and gu systems are negative, except as otherwise noted.    OBJECTIVE:                                                    /68  Pulse 103  Temp 99.5  F (37.5  C) (Oral)  Resp 16  Wt 166 lb (75.3 kg)  LMP 07/28/2017  SpO2 98%  BMI 27.62 kg/m2  Body mass index is 27.62 kg/(m^2).  GENERAL APPEARANCE: healthy, alert and no distress  EYES: Eyes grossly normal to inspection, PERRL and conjunctivae and sclerae normal  NEURO: Normal strength and tone, mentation intact and speech normal  PSYCH: mentation appears normal and affect normal/bright    Diagnostic test results:  Diagnostic Test Results:  Orders Placed This Encounter   Procedures     GASTROENTEROLOGY ADULT REF PROCEDURE ONLY     [facility] PT, HAND, AND CHIROPRACTIC REFERRAL          ASSESSMENT/PLAN:                                                    1. POTS (postural orthostatic tachycardia syndrome)  As her primary care physician I am filling out the disability forms. I am part of her care team with neurologists and cardiologists    2. Cervicalgia  Re=referred to the [facility]   - [facility] PT, HAND, AND CHIROPRACTIC REFERRAL    3. Screen for colon cancer  A separate matter. She is in fact due for the colonoscopy and patient agrees and we scheduled this today   - GASTROENTEROLOGY ADULT REF PROCEDURE ONLY    4. Visit for screening mammogram  She is waiting to have her breast MRI     5. Screening for malignant neoplasm of cervix  Pap and pelvic exams no longer indicated , I corrected the healthcare maintenance list today , she's had a hysterectomy     6. Screening for HIV (human immunodeficiency virus)  Postponed     7. Hypovitaminosis D  Testing is postponed       Follow up with Provider - 3 months      Bj Butler MD  [facility]

## 2018-07-08 ENCOUNTER — HEALTH MAINTENANCE LETTER (OUTPATIENT)
Age: 48
End: 2018-07-08

## 2018-07-08 LAB
CREAT SERPL-MCNC: 56 MG/DL
HISTAMINE UR-SCNC: 84 NMOL/L
HISTAMINE/CREAT 24H UR-SRTO: 150 NMOL/G CRT (ref 0–450)

## 2018-07-09 ENCOUNTER — OFFICE VISIT (OUTPATIENT)
Dept: FAMILY MEDICINE | Facility: CLINIC | Age: 48
End: 2018-07-09
Payer: COMMERCIAL

## 2018-07-09 VITALS
TEMPERATURE: 99.5 F | SYSTOLIC BLOOD PRESSURE: 136 MMHG | DIASTOLIC BLOOD PRESSURE: 68 MMHG | BODY MASS INDEX: 27.62 KG/M2 | RESPIRATION RATE: 16 BRPM | HEART RATE: 103 BPM | OXYGEN SATURATION: 98 % | WEIGHT: 166 LBS

## 2018-07-09 DIAGNOSIS — E55.9 HYPOVITAMINOSIS D: ICD-10-CM

## 2018-07-09 DIAGNOSIS — G90.A POTS (POSTURAL ORTHOSTATIC TACHYCARDIA SYNDROME): Primary | ICD-10-CM

## 2018-07-09 DIAGNOSIS — Z12.4 SCREENING FOR MALIGNANT NEOPLASM OF CERVIX: ICD-10-CM

## 2018-07-09 DIAGNOSIS — Z12.31 VISIT FOR SCREENING MAMMOGRAM: ICD-10-CM

## 2018-07-09 DIAGNOSIS — M54.2 CERVICALGIA: ICD-10-CM

## 2018-07-09 DIAGNOSIS — Z12.11 SCREEN FOR COLON CANCER: ICD-10-CM

## 2018-07-09 DIAGNOSIS — Z11.4 SCREENING FOR HIV (HUMAN IMMUNODEFICIENCY VIRUS): ICD-10-CM

## 2018-07-09 LAB
HISTAMINE BLD-SCNC: 630 NMOL/L (ref 180–1800)
RESULT: NORMAL
SEND OUTS MISC TEST CODE: NORMAL
SEND OUTS MISC TEST SPECIMEN: NORMAL
TEST NAME: NORMAL

## 2018-07-09 PROCEDURE — 99214 OFFICE O/P EST MOD 30 MIN: CPT | Performed by: INTERNAL MEDICINE

## 2018-07-09 NOTE — PROGRESS NOTES
Subjective:  HPI                    Objective:  System    Physical Exam    General     ROS    Assessment/Plan:    DISCHARGE REPORT    Progress reporting period is from 11.21.17 to 7.9.1.     SUBJECTIVE  Subjective:  i had HAs last week, not severe    Current Pain level: 5/10   Initial Pain level: 8/10   Changes in function: No changes noted in function since last SOAP note   Adverse reactions: None;   ,     Patient has failed to return to therapy so current objective findings are unknown.    OBJECTIVE  Objective: initial tightness, lessens       ASSESSMENT/PLAN  Updated problem list and treatment plan: Diagnosis 1:  Neck/HA     STG/LTGs have been met or progress has been made towards goals:  Yes, see goal sheet at last visit   Assessment of Progress: Patient is meeting short term goals and is progressing towards long term goals.  Patient has not returned to therapy.  Current status is unknown and discharge G code cannot be reported.  Self Management Plans:  Patient has been instructed in a home treatment program.  Patient  has been instructed in self management of symptoms.    Laura continues to require the following intervention to meet STG and LTG's:   The patient failed to complete scheduled/ordered appointments so current information is unknown.  We will discharge this patient from PT.    Recommendations:      Please refer to the daily flowsheet for treatment today, total treatment time and time spent performing 1:1 timed codes.

## 2018-07-09 NOTE — MR AVS SNAPSHOT
After Visit Summary   7/9/2018    Laura Jackson    MRN: [id number]           Patient Information     Date Of Birth          1970        Visit Information        Provider Department      7/9/2018 9:50 AM Bj Butler MD [facility]        Today's Diagnoses     POTS (postural orthostatic tachycardia syndrome)    -  1    Cervicalgia        Screen for colon cancer        Visit for screening mammogram        Screening for malignant neoplasm of cervix        Screening for HIV (human immunodeficiency virus)        Hypovitaminosis D          Care Instructions    [facility]    If you have any questions regarding to your visit please contact your care team:     Team Pink:   Clinic Hours Telephone Number   Internal Medicine:  Dr. Trisha Kang NP       7am-7pm  Monday - Thursday   7am-5pm  Fridays  [phone]  (Appointment scheduling available 24/7)    Questions about your recent visit?  Team Line  [phone]   [facility] [place] [facility] 11am-9pm Monday-Friday Saturday-Sunday- 9am-5pm   [place] - 5pm-9pm Monday-Friday Saturday-Sunday- 9am-5pm  [phone] - [place]  [phone] - [place]       What options do I have for a visit other than an office visit? We offer electronic visits (e-visits) and telephone visits, when medically appropriate.  Please check with your medical insurance to see if these types of visits are covered, as you will be responsible for any charges that are not paid by your insurance.      You can use PurePhoto (secure electronic communication) to access to your chart, send your primary care provider a message, or make an appointment. Ask a team member how to get started.     For a price quote for your services, please call our Consumer Price Line at [phone] or our Imaging Cost estimation line at [phone] (for imaging tests).  Rocio JULIO CMA ([facility])            Follow-ups  after your visit        Additional Services     GASTROENTEROLOGY ADULT REF PROCEDURE ONLY       Last Lab Result: Creatinine (mg/dL)       Date                     Value                 04/29/2018               0.87             ----------  Body mass index is 27.62 kg/(m^2).     Needed:  No  Language:  English    Patient will be contacted to schedule procedure.     Please be aware that coverage of these services is subject to the terms and limitations of your health insurance plan.  Call member services at your health plan with any benefit or coverage questions.  Any procedures must be performed at a [facility] facility OR coordinated by your clinic's referral office.    Please bring the following with you to your appointment:    (1) Any X-Rays, CTs or MRIs which have been performed.  Contact the facility where they were done to arrange for  prior to your scheduled appointment.    (2) List of current medications   (3) This referral request   (4) Any documents/labs given to you for this referral            [facility] PT, HAND, AND CHIROPRACTIC REFERRAL       **This order will print in the [facility] Scheduling Office**    Physical Therapy, Hand Therapy and Chiropractic Care are available through:    *[facility]  *[facility]  *[facility]    Call one number to schedule at any of the above locations: [phone].    Your provider has referred you to: [facility] - PT and/or Chiropractic Care determined by clinical presentation at [facility] or [facility] Initial Visit    Indication/Reason for Referral: Neck Pain  Onset of Illness: chronic   Therapy Orders: Evaluate and Treat  Special Programs: None  Special Request: Jada Alcaraz      Additional Comments for the Therapist or Chiropractor: prior treatment, to be resumed    Please be aware that coverage of these services is subject to the terms and limitations of your health insurance plan.  Call member  services at your health plan with any benefit or coverage questions.      Please bring the following to your appointment:    *Your personal calendar for scheduling future appointments  *Comfortable clothing                  Your next 10 appointments already scheduled     Jul 31, 2018  9:00 AM CDT   Return Visit with SALAZAR Rajan CNP   [facility] PHYSICIANS HEART AT [facility] ([facility])    [address] [place] [address] 2nd Floor  [facility] [address] [phone]            Oct 05, 2018 12:00 PM CDT   (Arrive by 11:45 AM)   RETURN ARRHYTHMIA with Phil Mitchell MD   [facility] ([facility])    [address]  [address] [place] [address] [phone]              Who to contact     If you have questions or need follow up information about today's clinic visit or your schedule please contact [facility] directly at [phone].  Normal or non-critical lab and imaging results will be communicated to you by MyChart, letter or phone within 4 business days after the clinic has received the results. If you do not hear from us within 7 days, please contact the clinic through [employer] or phone. If you have a critical or abnormal lab result, we will notify you by phone as soon as possible.  Submit refill requests through [employer] or call your pharmacy and they will forward the refill request to us. Please allow 3 business days for your refill to be completed.          Additional Information About Your Visit        [employer] [employer] Information     [employer] gives you secure access to your electronic health record. If you see a primary care provider, you can also send messages to your care team and make appointments. If you have questions, please call your primary care clinic.  If you do not have a primary care provider, please call [phone] and they will assist you.        Care EveryWhere ID     This is your Care EveryWhere ID.  This could be used by other organizations to access your [place] medical records  [id number]        Your Vitals Were     Pulse Temperature Respirations Last Period Pulse Oximetry BMI (Body Mass Index)    103 99.5  F (37.5  C) (Oral) 16 07/28/2017 98% 27.62 kg/m2       Blood Pressure from Last 3 Encounters:   07/09/18 136/68   06/25/18 122/83   05/01/18 140/76    Weight from Last 3 Encounters:   07/09/18 166 lb (75.3 kg)   06/25/18 166 lb (75.3 kg)   05/01/18 165 lb (74.8 kg)              We Performed the Following     GASTROENTEROLOGY ADULT REF PROCEDURE ONLY     [facility] PT, HAND, AND CHIROPRACTIC REFERRAL        Primary Care Provider Office Phone # Fax #    Bj Butler [phone] [address] [address]        Equal Access to Services     [facility]: Hadii aad ku hadasho Soomaali, waaxda luqadaha, qaybta kaalmada adeegyada, waxay rafiain hayleeann waters . So [facility] [phone].    ATENCIÓN: Si habla español, tiene a guido disposición servicios gratuitos de asistencia lingüística. Corina al [phone].    We comply with applicable federal civil rights laws and [place] laws. We do not discriminate on the basis of race, color, national origin, age, disability, sex, sexual orientation, or gender identity.            Thank you!     Thank you for choosing [facility]  for your care. Our goal is always to provide you with excellent care. Hearing back from our patients is one way we can continue to improve our services. Please take a few minutes to complete the written survey that you may receive in the mail after your visit with us. Thank you!             Your Updated Medication List - Protect others around you: Learn how to safely use, store and throw away your medicines at www.disposemymeds.org.          This list is accurate as of 7/9/18 10:17 AM.  Always use your most recent med list.                   Brand Name Dispense Instructions for use  Diagnosis    diltiazem 120 MG 24 hr capsule    CARDIZEM CD    30 capsule    Take 1 capsule (120 mg) by mouth daily    History of supraventricular tachycardia       fluticasone-salmeterol 100-50 MCG/DOSE diskus inhaler    ADVAIR    3 Inhaler    Inhale 1 puff into the lungs 2 times daily    Mild persistent asthma without complication       levalbuterol 45 MCG/ACT Inhaler    XOPENEX HFA    1 Inhaler    Inhale 1-2 puffs into the lungs every 4 hours as needed for shortness of breath / dyspnea    Mild persistent asthma without complication       metoprolol succinate 25 MG 24 hr tablet    TOPROL-XL    90 tablet    Take 1 tablet (25 mg) by mouth daily    Tachycardia       OMEPRAZOLE PO      Take 20 mg by mouth every morning        vitamin D 2000 units Caps      Take 1 capsule by mouth daily        ZANTAC PO      Take 150 mg by mouth as needed for heartburn

## 2018-07-09 NOTE — PATIENT INSTRUCTIONS
[facility]    If you have any questions regarding to your visit please contact your care team:     Team Pink:   Clinic Hours Telephone Number   Internal Medicine:  Dr. Trisha Kang NP       7am-7pm  Monday - Thursday   7am-5pm  Fridays  [phone]  (Appointment scheduling available 24/7)    Questions about your recent visit?  Team Line  [phone]   Ur[facility] - [place] [facility] - 11am-9pm Monday-Friday Saturday-Sunday- 9am-5pm   [place] - 5pm-9pm Monday-Friday Saturday-Sunday- 9am-5pm  [phone] - [place]  [phone] - [place]       What options do I have for a visit other than an office visit? We offer electronic visits (e-visits) and telephone visits, when medically appropriate.  Please check with your medical insurance to see if these types of visits are covered, as you will be responsible for any charges that are not paid by your insurance.      You can use MONOQI (secure electronic communication) to access to your chart, send your primary care provider a message, or make an appointment. Ask a team member how to get started.     For a price quote for your services, please call our Consumer Price Line at [phone] or our Imaging Cost estimation line at [phone] (for imaging tests).  Rocio JULIO CMA ([facility])

## 2018-07-12 DIAGNOSIS — G90.A POTS (POSTURAL ORTHOSTATIC TACHYCARDIA SYNDROME): Primary | ICD-10-CM

## 2018-07-16 ENCOUNTER — THERAPY VISIT (OUTPATIENT)
Dept: PHYSICAL THERAPY | Facility: CLINIC | Age: 48
End: 2018-07-16
Payer: COMMERCIAL

## 2018-07-16 DIAGNOSIS — R00.0 TACHYCARDIA: ICD-10-CM

## 2018-07-16 DIAGNOSIS — M54.2 CERVICALGIA: Primary | ICD-10-CM

## 2018-07-16 LAB
COLLECT DURATION TIME UR: NORMAL H
MISCELLANEOUS TEST: NORMAL
PROSTAGLANDIN D2 24H UR-MRATE: NORMAL
RESULT: NORMAL
SEND OUTS MISC TEST CODE: NORMAL
SEND OUTS MISC TEST SPECIMEN: NORMAL
SPECIMEN VOL 24H UR: NORMAL L
TEST NAME: NORMAL

## 2018-07-16 PROCEDURE — 97110 THERAPEUTIC EXERCISES: CPT | Mod: GP | Performed by: PHYSICAL THERAPIST

## 2018-07-16 PROCEDURE — 97140 MANUAL THERAPY 1/> REGIONS: CPT | Mod: GP | Performed by: PHYSICAL THERAPIST

## 2018-07-16 PROCEDURE — 99000 SPECIMEN HANDLING OFFICE-LAB: CPT | Performed by: NURSE PRACTITIONER

## 2018-07-16 PROCEDURE — 84150 ASSAY OF PROSTAGLANDIN: CPT | Mod: 90 | Performed by: NURSE PRACTITIONER

## 2018-07-26 ENCOUNTER — THERAPY VISIT (OUTPATIENT)
Dept: PHYSICAL THERAPY | Facility: CLINIC | Age: 48
End: 2018-07-26
Payer: COMMERCIAL

## 2018-07-26 DIAGNOSIS — M54.2 CERVICALGIA: ICD-10-CM

## 2018-07-26 PROCEDURE — 97140 MANUAL THERAPY 1/> REGIONS: CPT | Mod: GP | Performed by: PHYSICAL THERAPIST

## 2018-07-26 PROCEDURE — 97110 THERAPEUTIC EXERCISES: CPT | Mod: GP | Performed by: PHYSICAL THERAPIST

## 2018-07-29 ENCOUNTER — HEALTH MAINTENANCE LETTER (OUTPATIENT)
Age: 48
End: 2018-07-29

## 2018-07-29 LAB
RESULT: NORMAL
SEND OUTS MISC TEST CODE: NORMAL
SEND OUTS MISC TEST SPECIMEN: NORMAL
TEST NAME: NORMAL

## 2018-07-31 ENCOUNTER — OFFICE VISIT (OUTPATIENT)
Dept: CARDIOLOGY | Facility: CLINIC | Age: 48
End: 2018-07-31
Payer: COMMERCIAL

## 2018-07-31 VITALS
SYSTOLIC BLOOD PRESSURE: 126 MMHG | BODY MASS INDEX: 27.96 KG/M2 | DIASTOLIC BLOOD PRESSURE: 89 MMHG | HEART RATE: 120 BPM | OXYGEN SATURATION: 99 % | WEIGHT: 168 LBS

## 2018-07-31 DIAGNOSIS — Z86.79 HISTORY OF SUPRAVENTRICULAR TACHYCARDIA: ICD-10-CM

## 2018-07-31 DIAGNOSIS — I10 BENIGN ESSENTIAL HYPERTENSION: ICD-10-CM

## 2018-07-31 DIAGNOSIS — R00.0 TACHYCARDIA: ICD-10-CM

## 2018-07-31 DIAGNOSIS — G90.9 AUTONOMIC DYSFUNCTION: Primary | ICD-10-CM

## 2018-07-31 LAB
COLLECT DURATION TIME UR: 24 H
CREAT UR-MCNC: 57 MG/DL
LAB SCANNED RESULT: NORMAL
ME-HISTAMINE/CREAT 24H UR: 79 MCG/G CR (ref 30–200)
SPECIMEN VOL 24H UR: 2675 ML

## 2018-07-31 PROCEDURE — 99214 OFFICE O/P EST MOD 30 MIN: CPT | Performed by: NURSE PRACTITIONER

## 2018-07-31 RX ORDER — DILTIAZEM HYDROCHLORIDE 180 MG/1
180 CAPSULE, COATED, EXTENDED RELEASE ORAL DAILY
Qty: 30 CAPSULE | Refills: 3 | Status: SHIPPED | OUTPATIENT
Start: 2018-07-31 | End: 2018-12-04

## 2018-07-31 NOTE — MR AVS SNAPSHOT
After Visit Summary   7/31/2018    Laura Jackson    MRN: [id number]           Patient Information     Date Of Birth          1970        Visit Information        Provider Department      7/31/2018 9:00 AM Marylou Lim APRN CNP [facility] PHYSICIANS HEART AT [employer]        Today's Diagnoses     Autonomic dysfunction    -  1    Tachycardia        Benign essential hypertension        History of supraventricular tachycardia          Care Instructions    Thank you for coming to the [facility] @ [facility]; please note the following instructions:    1. Increase to diltiazem 180 mg by mouth once daily.    2. Standing training protocol: Discussed the following instructions with patient. Stand 6 inches from wall with ankles together and lean backwards against the wall if needed. Do this for 5 minutes 2x daily for 1st week, 10 minutes 2x daily for 2nd week, 15 minutes 2x daily for 3rd week, 20 minutes 2x daily for 4th week, 25 minutes 2x daily for 5th week, 30 minutes 2x daily for 6th week. If you feel you are going to pass out, lay down and your symptoms should go away. Be sure the area around where you are standing is safe in case fainting results before you have the chance to lay down. Over this 6 week time period, your tolerance to standing training should increase greatly. If standing training is stopped, this benefit will wear off over a period of time.     3. Try to eat six small meals per day. Try to keep these meals low in carbohydrates and low in gluten.     4. During hot summer hours, try to stay in cool -air conditioned climates.     Follow up: Follow up in six weeks.     If you have any questions regarding your visit please contact your care team:     Cardiology  Telephone Number   Rosanna WRIGHT, JAS SANDOVAL, JAS MURRELL, OSEI DUBON MA   [phone]    *After hours: [phone]   For scheduling appts:     [phone] or     [phone] (select option 1)    *After hours: [phone]     For the Device Clinic (Pacemakers and ICD's)  RN's :  Yen White   During business hours: [phone]    *After business hours:  [phone] (select option 4)      Normal test result notifications will be released via OneTagt or mailed within 7 business days.  All other test results, will be communicated via telephone once reviewed by your cardiologist.    If you need a medication refill please contact your pharmacy.  Please allow 3 business days for your refill to be completed.    As always, thank you for trusting us with your health care needs!            Follow-ups after your visit        Additional Services     Follow-Up with  Advanced Practice Provider                 Your next 10 appointments already scheduled     Aug 02, 2018  7:30 AM CDT   [facility] Spine with Moerno Frank, PT   [facility] ([facility])    [address]  [address]  [facility] [address]   [phone]            Aug 13, 2018  9:30 AM CDT   [facility] Spine with Moreno Frank PT   [facility] ([facility] [place])    [address]  [address]  [facility] [address]   [phone]            Aug 20, 2018   Procedure with Osman Hahn MD   [facility] (--)    [address]  [facility] [address]   [phone]            Sep 17, 2018  9:30 AM CDT   Return Visit with SALAZAR Rajan CNP   [facility] PHYSICIANS HEART AT [facility] ([facility])    [address] 2nd [address]  [facility] [address]   [phone]            Oct 05, 2018 12:00 PM CDT   (Arrive by 11:45 AM)   RETURN ARRHYTHMIA with Pihl Mitchell MD   [facility] ([facility])    [address]  [address] [place] [address] [phone]              Future tests that were ordered for you today     Open Future Orders        Priority Expected Expires Ordered    Follow-Up  with  Advanced Practice Provider Routine 9/18/2018 11/5/2018 7/31/2018            Who to contact     If you have questions or need follow up information about today's clinic visit or your schedule please contact [facility] PHYSICIANS HEART AT [facility] directly at [phone].  Normal or non-critical lab and imaging results will be communicated to you by MyChart, letter or phone within 4 business days after the clinic has received the results. If you do not hear from us within 7 days, please contact the clinic through [employer]OC[employer] or phone. If you have a critical or abnormal lab result, we will notify you by phone as soon as possible.  Submit refill requests through angelMD or call your pharmacy and they will forward the refill request to us. Please allow 3 business days for your refill to be completed.          Additional Information About Your Visit        MyChart Information     angelMD gives you secure access to your electronic health record. If you see a primary care provider, you can also send messages to your care team and make appointments. If you have questions, please call your primary care clinic.  If you do not have a primary care provider, please call [phone] and they will assist you.        Care EveryWhere ID     This is your Care EveryWhere ID. This could be used by other organizations to access your [place] medical records  [id number]        Your Vitals Were     Pulse Last Period Pulse Oximetry BMI (Body Mass Index)          93 07/28/2017 99% 27.96 kg/m2         Blood Pressure from Last 3 Encounters:   07/31/18 140/83   07/09/18 136/68   06/25/18 122/83    Weight from Last 3 Encounters:   07/31/18 76.2 kg (168 lb)   07/09/18 75.3 kg (166 lb)   06/25/18 75.3 kg (166 lb)                 Today's Medication Changes          These changes are accurate as of 7/31/18 10:13 AM.  If you have any questions, ask your nurse or doctor.               These medicines have changed or have  updated prescriptions.        Dose/Directions    diltiazem 180 MG 24 hr capsule   This may have changed:    - medication strength  - how much to take   Used for:  History of supraventricular tachycardia   Changed by:  Marylou Lim APRN CNP        Dose:  180 mg   Take 1 capsule (180 mg) by mouth daily   Quantity:  30 capsule   Refills:  3         Stop taking these medicines if you haven't already. Please contact your care team if you have questions.     metoprolol succinate 25 MG 24 hr tablet   Commonly known as:  TOPROL-XL   Stopped by:  Marylou Lim APRN CNP                Where to get your medicines      These medications were sent to [facility] - [place] - [address]  [address], [place] [address]     Phone:  [phone]     diltiazem 180 MG 24 hr capsule                Primary Care Provider Office Phone # Fax #    Bj Butler [phone] [address] [facility] [address]        Equal Access to Services     [facility]: Hadii aad ku hadasho Soomaali, waaxda luqadaha, qaybta kaalmada adeegyada, waxay idiin hayleeann waters . So [facility] [phone].    ATENCIÓN: Si habla español, tiene a guido disposición servicios gratuitos de asistencia lingüística. [facility] [phone].    We comply with applicable federal civil rights laws and [place] laws. We do not discriminate on the basis of race, color, national origin, age, disability, sex, sexual orientation, or gender identity.            Thank you!     Thank you for choosing [facility] PHYSICIANS HEART AT [facility]  for your care. Our goal is always to provide you with excellent care. Hearing back from our patients is one way we can continue to improve our services. Please take a few minutes to complete the written survey that you may receive in the mail after your visit with us. Thank you!             Your Updated Medication List -  Protect others around you: Learn how to safely use, store and throw away your medicines at www.disposemymeds.org.          This list is accurate as of 7/31/18 10:13 AM.  Always use your most recent med list.                   Brand Name Dispense Instructions for use Diagnosis    diltiazem 180 MG 24 hr capsule     30 capsule    Take 1 capsule (180 mg) by mouth daily    History of supraventricular tachycardia       fluticasone-salmeterol 100-50 MCG/DOSE diskus inhaler    ADVAIR    3 Inhaler    Inhale 1 puff into the lungs 2 times daily    Mild persistent asthma without complication       levalbuterol 45 MCG/ACT Inhaler    XOPENEX HFA    1 Inhaler    Inhale 1-2 puffs into the lungs every 4 hours as needed for shortness of breath / dyspnea    Mild persistent asthma without complication       OMEPRAZOLE PO      Take 20 mg by mouth every morning        vitamin D 2000 units Caps      Take 1 capsule by mouth daily        ZANTAC PO      Take 150 mg by mouth as needed for heartburn

## 2018-07-31 NOTE — PATIENT INSTRUCTIONS
Thank you for coming to the [facility] @ [facility]; please note the following instructions:    1. Increase to diltiazem 180 mg by mouth once daily.    2. Standing training protocol: Discussed the following instructions with patient. Stand 6 inches from wall with ankles together and lean backwards against the wall if needed. Do this for 5 minutes 2x daily for 1st week, 10 minutes 2x daily for 2nd week, 15 minutes 2x daily for 3rd week, 20 minutes 2x daily for 4th week, 25 minutes 2x daily for 5th week, 30 minutes 2x daily for 6th week. If you feel you are going to pass out, lay down and your symptoms should go away. Be sure the area around where you are standing is safe in case fainting results before you have the chance to lay down. Over this 6 week time period, your tolerance to standing training should increase greatly. If standing training is stopped, this benefit will wear off over a period of time.     3. Try to eat six small meals per day. Try to keep these meals low in carbohydrates and low in gluten.     4. During hot summer hours, try to stay in cool -air conditioned climates.     Follow up: Follow up in six weeks.     If you have any questions regarding your visit please contact your care team:     Cardiology  Telephone Number   Rosanna WRIGHT, JAS SANDOVAL, JAS MURRELL, OSEI DUBON MA   [phone]    *After hours: [phone]   For scheduling appts:     [phone] or    [phone] (select option 1)    *After hours: [phone]     For the Device Clinic (Pacemakers and ICD's)  RN's :  Yen White   During business hours: [phone]    *After business hours:  [phone] (select option 4)      Normal test result notifications will be released via [employer] or mailed within 7 business days.  All other test results, will be communicated via telephone once reviewed by your cardiologist.    If you need a medication refill please contact your pharmacy.  Please allow 3  business days for your refill to be completed.    As always, thank you for trusting us with your health care needs!

## 2018-07-31 NOTE — PROGRESS NOTES
"    Clinical Cardiac Electrophysiology  [facility]    HPI: Laura Jackson is a 48 year old female who presents for follow up of POTS.  The patient has a past medical history significant for POTS, hypermobility syndrome, asthma, IBS, uterine fibroids s/p hysterectomy, and GERD. Last summer (2017) she underwent a hysterectomy for severe vaginal bleeding and during this time she started having symptoms of palpitations, fatigue, flushing, nausea, and lightheadedness. She also started having numbness, tingling and her fingers would turn white so she was diagnosed with Raynaud's. She was started on diltiazem and stated that her finger symptoms have greatly improved. Of note, patient had very similar symptoms 6 years ago when she had a viral infection and she states that her symptoms took around 2 years before she felt back to baseline. ECHO was ordered and was negative. Ziopatch showed episodes of sinus tachycardia. Tilt was done at an outside facility and showed normal responses except during deep breathing exercises but she states that it was very cold in the room where the test was done and that she usually doesn't have a problem with tachycardia when it is cold. Work up for mast cell disorder was normal.     She trailed metoprolol XL 25 mg daily for 4 weeks and was not able to tolerate due to extreme fatigue and brain \"fogginess\".  She stopped taking the metoprolol 3 days ago and the brain fog and fatigue improved but have not resolved.  GI symptoms of bloating and diarrhea decreased on metoprolol and have worsened now that she is off the medication. She is swimming 5 days a week in short bursts as she could tolerate. She continues to have significant palpitations and heart rates in the 120-130s dozens of times throughout the day with normal activites. She is tolerating standing more now than a year ago but still has to sit and take breaks often because of palpitations, lightheadedness, racing heart, and nausea. " "When she exerts herself, such as standing or walking, or with position changes palpitations are much more prominent. She has lightheadedness and gets nauseated with the tachycardia.  She does not feel like she will pass out but feels very \"off\" and lightheded.  Of note, patient is a CV ICU nurse at [employer]; however, she currently on disability given the above symptoms. She is drinking five 80 oz of water/propel  a day.  Denies chest pain or pressure, syncope, dyspnea at rest or with exertion,  orthopnea, PND, abdominal edema, pedal edema, or claudication.      Orthostatic BPs: Supine 124/79 HR 86, Sitting 135/88 , standing 1 minute 132/88 , and standing 3 minutes 126/89 .    Current Outpatient Prescriptions   Medication Sig Dispense Refill     Cholecalciferol (VITAMIN D) 2000 UNITS CAPS Take 1 capsule by mouth daily       diltiazem (CARDIZEM CD/CARTIA XT) 120 MG 24 hr capsule Take 1 capsule (120 mg) by mouth daily 30 capsule 5     fluticasone-salmeterol (ADVAIR) 100-50 MCG/DOSE diskus inhaler Inhale 1 puff into the lungs 2 times daily 3 Inhaler 1     levalbuterol (XOPENEX HFA) 45 MCG/ACT Inhaler Inhale 1-2 puffs into the lungs every 4 hours as needed for shortness of breath / dyspnea 1 Inhaler 5     OMEPRAZOLE PO Take 20 mg by mouth every morning       RaNITidine HCl (ZANTAC PO) Take 150 mg by mouth as needed for heartburn       metoprolol succinate (TOPROL-XL) 25 MG 24 hr tablet Take 1 tablet (25 mg) by mouth daily (Patient not taking: Reported on 7/31/2018) 90 tablet 3       Past Medical History:   Diagnosis Date     Family history of breast cancer 2/19/2014     Family history of breast cancer      SVT (supraventricular tachycardia):  history of, no recurrence since 2008 10/11/2013    no recurrence since 2008      Uncomplicated asthma        Past Surgical History:   Procedure Laterality Date     APPENDECTOMY       C STOMACH SURGERY PROCEDURE UNLISTED       DAVINCI HYSTERECTOMY TOTAL, " SALPINGECTOMY BILATERAL N/A 8/4/2017    Procedure: DAVINCI XI HYSTERECTOMY TOTAL, SALPINGECTOMY BILATERAL;  DAVINCI TOTAL HYSTERECTOMY; BILATERAL SALPINGECTOMY. BILATERAL URETERAL LYSIS. UTEROSACRAL-COLPOPEXY. EXCISION OF ENDOMETRIOSIS;  Surgeon: George Loyola MD;  Location: [facility] OR     DAVINCI LYSIS OF ADHESIONS Bilateral 8/4/2017    Procedure: DAVINCI LYSIS OF ADHESIONS;  BILATERAL URETERAL LYSIS;  Surgeon: George Loyola MD;  Location: [facility] OR     DAVINCI SACROCOLPOPEXY, CYSTOSCOPY, COMBINED N/A 8/4/2017    Procedure: COMBINED DAVINCI SACROCOLPOPEXY, CYSTOSCOPY;  UTEROSACRAL COLPOPEXY;  Surgeon: George Loyola MD;  Location: [facility] OR     DAVINCI XI ASSISTED ABLATION / EXCISION OF ENDOMETRIOSIS  8/4/2017    Procedure: DAVINCI XI ASSISTED ABLATION / EXCISION OF ENDOMETRIOSIS;;  Surgeon: George Loyola MD;  Location: [facility] OR     DILATION AND CURETTAGE N/A 6/20/2017    Procedure: DILATION AND CURETTAGE;  Uterine Curettings and Fibroid Removal, Cook catheter placement; (No hysterectomy done at this time);  Surgeon: Ernestina Saunders MD;  Location: [facility]     HYSTERECTOMY, PAP NO LONGER INDICATED       TONSILLECTOMY         Family History   Problem Relation Age of Onset     Diabetes Mother      Hypertension Mother      Hyperlipidemia Mother      Arrhythmia Mother      Cardiovascular Father      CHF and COPD     Asthma Father      Breast Cancer Maternal Grandfather      Colon Cancer Maternal Grandfather      Breast Cancer Paternal Grandmother      Breast Cancer Paternal Aunt      C.A.D. Paternal Grandfather      Breast Cancer Cousin      Asthma Son      Diabetes Maternal Grandmother      Hypertension Maternal Grandmother      Breast Cancer Cousin      Breast Cancer Cousin      Breast Cancer Cousin        Social History   Substance Use Topics     Smoking status: Never Smoker     Smokeless tobacco: Never Used     Alcohol use Yes      Comment: occasional       Allergies   Allergen Reactions     Penicillins Swelling      Swelling throat; age 4     Tetracycline      Other reaction(s): Abdominal Pain  Vomiting; nauseous         ROS:   Negative except for as indicated in HPI.    Physical Examination:  Vitals: /83 (BP Location: Right arm, Patient Position: Chair, Cuff Size: Adult Regular)  Pulse 93  Wt 76.2 kg (168 lb)  LMP 07/28/2017  SpO2 99%  BMI 27.96 kg/m2  BMI= Body mass index is 27.96 kg/(m^2).    GENERAL APPEARANCE: healthy, alert, and no acute distress  HEENT: no icterus, no xanthelasmas, normal pupil size and reaction, no cyanosis.  NECK: no asymmetry, no cervical bruits, no JVD   CHEST: lungs clear to auscultation - no rales, rhonchi or wheezes, no use of accessory muscles, no retractions, respirations are unlabored, normal respiratory rate  CARDIOVASCULAR: regular rhythm, normal S1 with physiologic split S2, no S3 or S4 and no murmur, click or rub  ABDOMEN:  no abdominal bruits, soft, non-tender  EXTREMITIES: no clubbing, cyanosis, or edema  NEURO: alert and oriented to person/place/time, normal speech, gait and affect  VASC: Radial and posterior tibialis pulses +2 and symmetric bilaterally.   SKIN: no ecchymoses, no rashes    Laboratory:  Lab Results   Component Value Date    WBC 7.5 04/29/2018    RBC 4.72 04/29/2018    HGB 14.3 04/29/2018    HCT 42.1 04/29/2018    MCV 89 04/29/2018    MCH 30.3 04/29/2018    MCHC 34.0 04/29/2018    RDW 13.2 04/29/2018     04/29/2018     Lab Results   Component Value Date     04/29/2018    POTASSIUM 3.6 04/29/2018    CHLORIDE 106 04/29/2018    CO2 24 04/29/2018    ANIONGAP 9 04/29/2018    GLC 95 04/29/2018    BUN 16 04/29/2018    CR 0.87 04/29/2018    GFRESTIMATED 69 04/29/2018    GFRESTBLACK 84 04/29/2018    AZAEL 8.8 04/29/2018      Lab Results   Component Value Date    INR 1.12 06/20/2017    INR 1.12 06/20/2017     No results found for: CKTOTAL, CKMB, TROPN  Cholesterol (mg/dL)   Date Value   09/11/2017 194   07/20/2011 189     HDL Cholesterol (mg/dL)   Date  Value   2017 65   2011 65     LDL Cholesterol Calculated (mg/dL)   Date Value   2017 110 (H)   2011 110       ECHO:   Recent Results (from the past 8760 hour(s))   Echocardiogram Complete    Narrative    [id number]  ECH19  [id number]  [id number]^CAN^BARBRA^[facility],[place]  Echocardiography Laboratory  [address] [place] [address]     Name: HUSEYIN MENDIOLA  MRN: [id number]  : 1970  Study Date: 2017 06:29 PM  Age: 47 yrs  Gender: Female  Patient Location: [facility]  Reason For Study: Tachycardia  Ordering Physician: BARBRA NORTH  Performed By: Joyce Schulte RDCS     BSA: 1.8 m2  Height: 65 in  Weight: 162 lb  BP: 124/85 mmHg  _____________________________________________________________________________  __        Procedure  Echocardiogram with two-dimensional, color and spectral Doppler performed.  _____________________________________________________________________________  __        Interpretation Summary  Left ventricular size is normal. Global and regional left ventricular function  is normal with an EF of 60-65%.  The right ventricle is normal size. Global right ventricular function is  normal.  The inferior vena cava is normal.  No pericardial effusion is present.  Previous study not available for comparison.  _____________________________________________________________________________  __        Left Ventricle  Left ventricular size is normal. Left ventricular wall thickness is normal.  Global and regional left ventricular function is normal with an EF of 60-65%.  Normal left ventricular filling for age. No regional wall motion abnormalities  are seen.     Right Ventricle  The right ventricle is normal size. Global right ventricular function is  normal.     Atria  Both atria appear normal. The atrial septum is intact as assessed by color  Doppler .        Mitral Valve  The mitral valve is normal. Trace  mitral insufficiency is present.     Aortic Valve  Aortic valve is normal in structure and function.     Tricuspid Valve  The tricuspid valve is normal. Trace tricuspid insufficiency is present.  Pulmonary artery systolic pressure cannot be assessed. The peak velocity of  the tricuspid regurgitant jet is not obtainable.     Pulmonic Valve  The pulmonic valve is normal.     Vessels  The aorta root is normal. The inferior vena cava is normal. Estimated mean  right atrial pressure is 3 mmHg.     Pericardium  No pericardial effusion is present.        Compared to Previous Study  Previous study not available for comparison.  _____________________________________________________________________________  __     MMode/2D Measurements & Calculations  LA Volume (BP): 29.9 ml  LA Volume Index (BP): 16.5 ml/m2        Doppler Measurements & Calculations  MV E max mary: 95.1 cm/sec  MV A max mary: 62.1 cm/sec  MV E/A: 1.5  MV dec time: 0.23 sec              _____________________________________________________________________________  __        Report approved by: Yasmin Barrett 08/27/2017 07:15 PM        Assessment and recommendations:    #Autonomic dysfunction/POTS:   1. Try to drink 50-60 ounces of 50/50 electrolyte fluids/water mix per day.    Examples: Propel or Pedialyte. Gatorade and Powerade are higher in sugar/calories and are okay in moderation or if you are an athlete.    2. Standing training protocol: Discussed the following instructions with patient. Stand 6 inches from wall with ankles together and lean backwards against the wall if needed. Do this for 5 minutes 2x daily for 1st week, 10 minutes 2x daily for 2nd week, 15 minutes 2x daily for 3rd week, 20 minutes 2x daily for 4th week, 25 minutes 2x daily for 5th week, 30 minutes 2x daily for 6th week. If you feel you are going to pass out, lay down and your symptoms should go away. Be sure the area around where you are standing is safe in case fainting results  "before you have the chance to lay down. Over this 6 week time period, your tolerance to standing training should increase greatly. If standing training is stopped, this benefit will wear off over a period of time.     3. Try to eat six small meals per day. Try to keep these meals low in carbohydrates and low in gluten.     4. During hot summer hours, try to stay in cool -air conditioned climates.     #3. Hypertension: She has been unable to tolerate BB therapy due to fatigue and brain \"fogginess\". She noted an improvement in her hands when she was started on low dose diltiazem. Will uptitrate diltiazem at night to control both HR and BP.    1. Increase to diltiazem 180 mg by mouth once daily.     FOLLOW UP:  Follow up in six weeks or follow up sooner as needed for symptoms.    Patient expresses understanding and agreement with the plan.    I appreciate the chance to help with Laura pollock. Please let me know if you have any questions or concerns.    Marylou LINCOLN, NP-C  "

## 2018-07-31 NOTE — NURSING NOTE
"Chief Complaint   Patient presents with     RECHECK      1 mo fu POTS. Tachycardia. Pt reports feeling palpitations, rapid heart rate, very fatigued.        Initial /83 (BP Location: Right arm, Patient Position: Chair, Cuff Size: Adult Regular)  Pulse 93  Wt 76.2 kg (168 lb)  LMP 07/28/2017  SpO2 99%  BMI 27.96 kg/m2 Estimated body mass index is 27.96 kg/(m^2) as calculated from the following:    Height as of 4/29/18: 1.651 m (5' 5\").    Weight as of this encounter: 76.2 kg (168 lb)..  BP completed using cuff size: regular    Elida Osorio MA    "

## 2018-07-31 NOTE — LETTER
"7/31/2018      RE: Laura Jackson  [address]  [facility] [address]       Dear Colleague,    Thank you for the opportunity to participate in the care of your patient, Laura Jackson, at the [facility] AT [facility] at [facility]. Please see a copy of my visit note below.        Clinical Cardiac Electrophysiology  [facility]    HPI: Laura Jackson is a 48 year old female who presents for follow up of POTS.  The patient has a past medical history significant for POTS, hypermobility syndrome, asthma, IBS, uterine fibroids s/p hysterectomy, and GERD. Last summer (2017) she underwent a hysterectomy for severe vaginal bleeding and during this time she started having symptoms of palpitations, fatigue, flushing, nausea, and lightheadedness. She also started having numbness, tingling and her fingers would turn white so she was diagnosed with Raynaud's. She was started on diltiazem and stated that her finger symptoms have greatly improved. Of note, patient had very similar symptoms 6 years ago when she had a viral infection and she states that her symptoms took around 2 years before she felt back to baseline. ECHO was ordered and was negative. Ziopatch showed episodes of sinus tachycardia. Tilt was done at an outside facility and showed normal responses except during deep breathing exercises but she states that it was very cold in the room where the test was done and that she usually doesn't have a problem with tachycardia when it is cold. Work up for mast cell disorder was normal.     She trailed metoprolol XL 25 mg daily for 4 weeks and was not able to tolerate due to extreme fatigue and brain \"fogginess\".  She stopped taking the metoprolol 3 days ago and the brain fog and fatigue improved but have not resolved.  GI symptoms of bloating and diarrhea decreased on metoprolol and have worsened now that she is off the medication. She is " "swimming 5 days a week in short bursts as she could tolerate. She continues to have significant palpitations and heart rates in the 120-130s dozens of times throughout the day with normal activites. She is tolerating standing more now than a year ago but still has to sit and take breaks often because of palpitations, lightheadedness, racing heart, and nausea. When she exerts herself, such as standing or walking, or with position changes palpitations are much more prominent. She has lightheadedness and gets nauseated with the tachycardia.  She does not feel like she will pass out but feels very \"off\" and lightheded.  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 She is drinking five 80 oz of water/propel  a day.  Denies chest pain or pressure, syncope, dyspnea at rest or with exertion,  orthopnea, PND, abdominal edema, pedal edema, or claudication.      Orthostatic BPs: Supine 124/79 HR 86, Sitting 135/88 , standing 1 minute 132/88 , and standing 3 minutes 126/89 .    Current Outpatient Prescriptions   Medication Sig Dispense Refill     Cholecalciferol (VITAMIN D) 2000 UNITS CAPS Take 1 capsule by mouth daily       diltiazem (CARDIZEM CD/CARTIA XT) 120 MG 24 hr capsule Take 1 capsule (120 mg) by mouth daily 30 capsule 5     fluticasone-salmeterol (ADVAIR) 100-50 MCG/DOSE diskus inhaler Inhale 1 puff into the lungs 2 times daily 3 Inhaler 1     levalbuterol (XOPENEX HFA) 45 MCG/ACT Inhaler Inhale 1-2 puffs into the lungs every 4 hours as needed for shortness of breath / dyspnea 1 Inhaler 5     OMEPRAZOLE PO Take 20 mg by mouth every morning       RaNITidine HCl (ZANTAC PO) Take 150 mg by mouth as needed for heartburn       metoprolol succinate (TOPROL-XL) 25 MG 24 hr tablet Take 1 tablet (25 mg) by mouth daily (Patient not taking: Reported on 7/31/2018) 90 tablet 3       Past Medical History:   Diagnosis Date     Family history of breast " cancer 2/19/2014     Family history of breast cancer      SVT (supraventricular tachycardia):  history of, no recurrence since 2008 10/11/2013    no recurrence since 2008      Uncomplicated asthma        Past Surgical History:   Procedure Laterality Date     APPENDECTOMY       C STOMACH SURGERY PROCEDURE UNLISTED       DAVINCI HYSTERECTOMY TOTAL, SALPINGECTOMY BILATERAL N/A 8/4/2017    Procedure: DAVINCI XI HYSTERECTOMY TOTAL, SALPINGECTOMY BILATERAL;  DAVINCI TOTAL HYSTERECTOMY; BILATERAL SALPINGECTOMY. BILATERAL URETERAL LYSIS. UTEROSACRAL-COLPOPEXY. EXCISION OF ENDOMETRIOSIS;  Surgeon: George Loyola MD;  Location: [facility] OR     DAVINCI LYSIS OF ADHESIONS Bilateral 8/4/2017    Procedure: DAVINCI LYSIS OF ADHESIONS;  BILATERAL URETERAL LYSIS;  Surgeon: George Loyola MD;  Location: [facility] OR     DAVINCI SACROCOLPOPEXY, CYSTOSCOPY, COMBINED N/A 8/4/2017    Procedure: COMBINED DAVINCI SACROCOLPOPEXY, CYSTOSCOPY;  UTEROSACRAL COLPOPEXY;  Surgeon: George Loyola MD;  Location: [facility] OR     DAVINCI XI ASSISTED ABLATION / EXCISION OF ENDOMETRIOSIS  8/4/2017    Procedure: DAVINCI XI ASSISTED ABLATION / EXCISION OF ENDOMETRIOSIS;;  Surgeon: George Loyola MD;  Location: [facility] OR     DILATION AND CURETTAGE N/A 6/20/2017    Procedure: DILATION AND CURETTAGE;  Uterine Curettings and Fibroid Removal, Cook catheter placement; (No hysterectomy done at this time);  Surgeon: Ernestina Saunders MD;  Location: [facility] OR     HYSTERECTOMY, PAP NO LONGER INDICATED       TONSILLECTOMY         Family History   Problem Relation Age of Onset     Diabetes Mother      Hypertension Mother      Hyperlipidemia Mother      Arrhythmia Mother      Cardiovascular Father      CHF and COPD     Asthma Father      Breast Cancer Maternal Grandfather      Colon Cancer Maternal Grandfather      Breast Cancer Paternal Grandmother      Breast Cancer Paternal Aunt      C.A.D. Paternal Grandfather      Breast Cancer Cousin      Asthma Son      Diabetes Maternal  Grandmother      Hypertension Maternal Grandmother      Breast Cancer Cousin      Breast Cancer Cousin      Breast Cancer Cousin        Social History   Substance Use Topics     Smoking status: Never Smoker     Smokeless tobacco: Never Used     Alcohol use Yes      Comment: occasional       Allergies   Allergen Reactions     Penicillins Swelling     Swelling throat; age 4     Tetracycline      Other reaction(s): Abdominal Pain  Vomiting; nauseous         ROS:   Negative except for as indicated in HPI.    Physical Examination:  Vitals: /83 (BP Location: Right arm, Patient Position: Chair, Cuff Size: Adult Regular)  Pulse 93  Wt 76.2 kg (168 lb)  LMP 07/28/2017  SpO2 99%  BMI 27.96 kg/m2  BMI= Body mass index is 27.96 kg/(m^2).    GENERAL APPEARANCE: healthy, alert, and no acute distress  HEENT: no icterus, no xanthelasmas, normal pupil size and reaction, no cyanosis.  NECK: no asymmetry, no cervical bruits, no JVD   CHEST: lungs clear to auscultation - no rales, rhonchi or wheezes, no use of accessory muscles, no retractions, respirations are unlabored, normal respiratory rate  CARDIOVASCULAR: regular rhythm, normal S1 with physiologic split S2, no S3 or S4 and no murmur, click or rub  ABDOMEN:  no abdominal bruits, soft, non-tender  EXTREMITIES: no clubbing, cyanosis, or edema  NEURO: alert and oriented to person/place/time, normal speech, gait and affect  VASC: Radial and posterior tibialis pulses +2 and symmetric bilaterally.   SKIN: no ecchymoses, no rashes    Laboratory:  Lab Results   Component Value Date    WBC 7.5 04/29/2018    RBC 4.72 04/29/2018    HGB 14.3 04/29/2018    HCT 42.1 04/29/2018    MCV 89 04/29/2018    MCH 30.3 04/29/2018    MCHC 34.0 04/29/2018    RDW 13.2 04/29/2018     04/29/2018     Lab Results   Component Value Date     04/29/2018    POTASSIUM 3.6 04/29/2018    CHLORIDE 106 04/29/2018    CO2 24 04/29/2018    ANIONGAP 9 04/29/2018    GLC 95 04/29/2018    BUN 16  2018    CR 0.87 2018    GFRESTIMATED 69 2018    GFRESTBLACK 84 2018    AZAEL 8.8 2018      Lab Results   Component Value Date    INR 1.12 2017    INR 1.12 2017     No results found for: CKTOTAL, CKMB, TROPN  Cholesterol (mg/dL)   Date Value   2017 194   2011 189     HDL Cholesterol (mg/dL)   Date Value   2017 65   2011 65     LDL Cholesterol Calculated (mg/dL)   Date Value   2017 110 (H)   2011 110       ECHO:   Recent Results (from the past 8760 hour(s))   Echocardiogram Complete    Narrative    [id number]  ECH19  [id number]  [id number]^CAN^BARBRA^[facility],[place]  Echocardiography Laboratory  [address] [place] [id number]     Name: HUSEYIN MENDIOLA  MRN: [id number]  : 1970  Study Date: 2017 06:29 PM  Age: 47 yrs  Gender: Female  Patient Location: [facility]  Reason For Study: Tachycardia  Ordering Physician: BARBRA NORTH  Performed By: Joyce Schulte RDCS     BSA: 1.8 m2  Height: 65 in  Weight: 162 lb  BP: 124/85 mmHg  _____________________________________________________________________________  __        Procedure  Echocardiogram with two-dimensional, color and spectral Doppler performed.  _____________________________________________________________________________  __        Interpretation Summary  Left ventricular size is normal. Global and regional left ventricular function  is normal with an EF of 60-65%.  The right ventricle is normal size. Global right ventricular function is  normal.  The inferior vena cava is normal.  No pericardial effusion is present.  Previous study not available for comparison.  _____________________________________________________________________________  __        Left Ventricle  Left ventricular size is normal. Left ventricular wall thickness is normal.  Global and regional left ventricular function is normal with an EF of  60-65%.  Normal left ventricular filling for age. No regional wall motion abnormalities  are seen.     Right Ventricle  The right ventricle is normal size. Global right ventricular function is  normal.     Atria  Both atria appear normal. The atrial septum is intact as assessed by color  Doppler .        Mitral Valve  The mitral valve is normal. Trace mitral insufficiency is present.     Aortic Valve  Aortic valve is normal in structure and function.     Tricuspid Valve  The tricuspid valve is normal. Trace tricuspid insufficiency is present.  Pulmonary artery systolic pressure cannot be assessed. The peak velocity of  the tricuspid regurgitant jet is not obtainable.     Pulmonic Valve  The pulmonic valve is normal.     Vessels  The aorta root is normal. The inferior vena cava is normal. Estimated mean  right atrial pressure is 3 mmHg.     Pericardium  No pericardial effusion is present.        Compared to Previous Study  Previous study not available for comparison.  _____________________________________________________________________________  __     MMode/2D Measurements & Calculations  LA Volume (BP): 29.9 ml  LA Volume Index (BP): 16.5 ml/m2        Doppler Measurements & Calculations  MV E max mary: 95.1 cm/sec  MV A max mary: 62.1 cm/sec  MV E/A: 1.5  MV dec time: 0.23 sec              _____________________________________________________________________________  __        Report approved by: Yasmin Barrett 08/27/2017 07:15 PM        Assessment and recommendations:    #Autonomic dysfunction/POTS:   1. Try to drink 50-60 ounces of 50/50 electrolyte fluids/water mix per day.    Examples: Propel or Pedialyte. Gatorade and Powerade are higher in sugar/calories and are okay in moderation or if you are an athlete.    2. Standing training protocol: Discussed the following instructions with patient. Stand 6 inches from wall with ankles together and lean backwards against the wall if needed. Do this for 5 minutes 2x  "daily for 1st week, 10 minutes 2x daily for 2nd week, 15 minutes 2x daily for 3rd week, 20 minutes 2x daily for 4th week, 25 minutes 2x daily for 5th week, 30 minutes 2x daily for 6th week. If you feel you are going to pass out, lay down and your symptoms should go away. Be sure the area around where you are standing is safe in case fainting results before you have the chance to lay down. Over this 6 week time period, your tolerance to standing training should increase greatly. If standing training is stopped, this benefit will wear off over a period of time.     3. Try to eat six small meals per day. Try to keep these meals low in carbohydrates and low in gluten.     4. During hot summer hours, try to stay in cool -air conditioned climates.     #3. Hypertension: She has been unable to tolerate BB therapy due to fatigue and brain \"fogginess\". She noted an improvement in her hands when she was started on low dose diltiazem. Will uptitrate diltiazem at night to control both HR and BP.    1. Increase to diltiazem 180 mg by mouth once daily.     FOLLOW UP:  Follow up in six weeks or follow up sooner as needed for symptoms.    Patient expresses understanding and agreement with the plan.    I appreciate the chance to help with Laura pollock. Please let me know if you have any questions or concerns.    Marylou LINCOLN, NP-C      "

## 2018-08-02 ENCOUNTER — THERAPY VISIT (OUTPATIENT)
Dept: PHYSICAL THERAPY | Facility: CLINIC | Age: 48
End: 2018-08-02
Payer: COMMERCIAL

## 2018-08-02 DIAGNOSIS — M54.2 CERVICALGIA: ICD-10-CM

## 2018-08-02 PROCEDURE — 97110 THERAPEUTIC EXERCISES: CPT | Mod: GP | Performed by: PHYSICAL THERAPIST

## 2018-08-02 PROCEDURE — 97140 MANUAL THERAPY 1/> REGIONS: CPT | Mod: GP | Performed by: PHYSICAL THERAPIST

## 2018-08-13 ENCOUNTER — THERAPY VISIT (OUTPATIENT)
Dept: PHYSICAL THERAPY | Facility: CLINIC | Age: 48
End: 2018-08-13
Payer: COMMERCIAL

## 2018-08-13 DIAGNOSIS — M54.2 CERVICALGIA: ICD-10-CM

## 2018-08-13 PROCEDURE — 97110 THERAPEUTIC EXERCISES: CPT | Mod: GP | Performed by: PHYSICAL THERAPIST

## 2018-08-13 PROCEDURE — 97140 MANUAL THERAPY 1/> REGIONS: CPT | Mod: GP | Performed by: PHYSICAL THERAPIST

## 2018-08-20 ENCOUNTER — SURGERY (OUTPATIENT)
Age: 48
End: 2018-08-20

## 2018-08-20 ENCOUNTER — HOSPITAL ENCOUNTER (OUTPATIENT)
Facility: AMBULATORY SURGERY CENTER | Age: 48
Discharge: HOME | End: 2018-08-20
Attending: SURGERY | Admitting: SURGERY
Payer: COMMERCIAL

## 2018-08-20 VITALS
OXYGEN SATURATION: 98 % | TEMPERATURE: 97.7 F | SYSTOLIC BLOOD PRESSURE: 123 MMHG | HEIGHT: 65 IN | RESPIRATION RATE: 16 BRPM | WEIGHT: 168 LBS | BODY MASS INDEX: 27.99 KG/M2 | DIASTOLIC BLOOD PRESSURE: 88 MMHG

## 2018-08-20 LAB — COLONOSCOPY: NORMAL

## 2018-08-20 PROCEDURE — G8918 PT W/O PREOP ORDER IV AB PRO: HCPCS

## 2018-08-20 PROCEDURE — 45380 COLONOSCOPY AND BIOPSY: CPT

## 2018-08-20 PROCEDURE — 88305 TISSUE EXAM BY PATHOLOGIST: CPT | Performed by: PATHOLOGY

## 2018-08-20 PROCEDURE — 45380 COLONOSCOPY AND BIOPSY: CPT | Mod: PT | Performed by: SURGERY

## 2018-08-20 PROCEDURE — 99152 MOD SED SAME PHYS/QHP 5/>YRS: CPT | Mod: 59 | Performed by: SURGERY

## 2018-08-20 PROCEDURE — G8907 PT DOC NO EVENTS ON DISCHARG: HCPCS

## 2018-08-20 RX ORDER — FENTANYL CITRATE 50 UG/ML
INJECTION, SOLUTION INTRAMUSCULAR; INTRAVENOUS PRN
Status: DISCONTINUED | OUTPATIENT
Start: 2018-08-20 | End: 2018-08-20 | Stop reason: HOSPADM

## 2018-08-20 RX ORDER — LIDOCAINE 40 MG/G
CREAM TOPICAL
Status: DISCONTINUED | OUTPATIENT
Start: 2018-08-20 | End: 2018-08-21 | Stop reason: HOSPADM

## 2018-08-20 RX ORDER — SODIUM CHLORIDE, SODIUM LACTATE, POTASSIUM CHLORIDE, CALCIUM CHLORIDE 600; 310; 30; 20 MG/100ML; MG/100ML; MG/100ML; MG/100ML
INJECTION, SOLUTION INTRAVENOUS CONTINUOUS PRN
Status: DISCONTINUED | OUTPATIENT
Start: 2018-08-20 | End: 2018-08-20 | Stop reason: HOSPADM

## 2018-08-20 RX ADMIN — SODIUM CHLORIDE, SODIUM LACTATE, POTASSIUM CHLORIDE, CALCIUM CHLORIDE 75 ML/HR: 600; 310; 30; 20 INJECTION, SOLUTION INTRAVENOUS at 07:50

## 2018-08-20 RX ADMIN — FENTANYL CITRATE 50 MCG: 50 INJECTION, SOLUTION INTRAMUSCULAR; INTRAVENOUS at 07:47

## 2018-08-20 RX ADMIN — FENTANYL CITRATE 50 MCG: 50 INJECTION, SOLUTION INTRAMUSCULAR; INTRAVENOUS at 07:54

## 2018-08-20 RX ADMIN — FENTANYL CITRATE 50 MCG: 50 INJECTION, SOLUTION INTRAMUSCULAR; INTRAVENOUS at 07:50

## 2018-08-20 RX ADMIN — FENTANYL CITRATE 100 MCG: 50 INJECTION, SOLUTION INTRAMUSCULAR; INTRAVENOUS at 07:44

## 2018-08-20 NOTE — DISCHARGE INSTRUCTIONS
If you want to have the banding drink 1 bottle of magnesium citrate the day before the banding.      If not helping then make an appointment by familia [phone],  for banding and will need 45 minutes.     You will benefit from MAC for your next colonoscopy. .  Make sure your doctor orders it with MAC.  This is just added to the orders and is just typed into the area where they ask questions about your taking blood thinners.  This is administered by anesthesia to make you more comfortable than I can do safely during your colonoscopy.  This is done with anesthesia.   You have a lot of sharp turns in your colon and ansesthesia may be able to keep you more comfortable.  You will need and History and physical for this.  Just remind your primary doctor about this when ordering your next colonoscopy.    I would recommend that you see a  gastrointestinal  Doctor for your diarrhea and abdomen discomfort.    Call one of the following  Two Gastrointestinal  Doctors:   Az Chacko at [facility] [phone]   [email or web address]  Or   [facility]: Gastroenterology Clinic [facility] [phone]   [email or web address]

## 2018-08-20 NOTE — LETTER
[facility]           [address] [facility] [place] [address]           Tel [phone]  Laura Jackson  [address] [place] [address]      August 22, 2018    Dear Laura,  This letter is to inform you of the results of your pathology report on your colonoscopy.  If you have questions please feel free to call my assistant  At [phone] .    Your pathology report was:  A. ILEUM BIOPSY:   - Small intestinal mucosa with no significant histologic abnormality   - Negative for active inflammation and lymphocytic enteritis     B. COLON, RANDOM BIOPSY:   - Colonic mucosa with no significant histologic abnormality   - Negative for active inflammation and lymphocytic colitis     No reason seen for your diarrhea.   Remember:  You will benefit from MAC.  Make sure your doctor orders it with MAC.  This is just added to the orders and is just typed into the area where they ask questions about your taking blood thinners.  This is administered by anesthesia to make you more comfortable than I can do safely during your colonoscopy.  This is done with anesthesia.   You have a lot of sharp turns in your colon and ansesthesia may be able to keep you more comfortable.  You will need and History and physical for this.  Just remind your primary doctor about this when ordering your next colonoscopy.    Normal, please follow up by having a repeat colonoscopy in 10 years.  Unless you find out your grandfather had colon cancer.   Then 5 years.    If you do have further questions please don t hesitate to call my assistant at  .  We do not have someone answering the phone all the time at my assistants number so if leave a message may take a day or so to get back to you.  So if more urgent then call the below number.    To make an appointment call [phone]: .   Sincerely,     Osman Hahn M.D.  ___

## 2018-08-22 LAB — COPATH REPORT: NORMAL

## 2018-08-27 ENCOUNTER — THERAPY VISIT (OUTPATIENT)
Dept: PHYSICAL THERAPY | Facility: CLINIC | Age: 48
End: 2018-08-27
Payer: COMMERCIAL

## 2018-08-27 DIAGNOSIS — M54.2 CERVICALGIA: ICD-10-CM

## 2018-08-27 PROCEDURE — 97140 MANUAL THERAPY 1/> REGIONS: CPT | Mod: GP | Performed by: PHYSICAL THERAPIST

## 2018-08-27 PROCEDURE — 97110 THERAPEUTIC EXERCISES: CPT | Mod: GP | Performed by: PHYSICAL THERAPIST

## 2018-09-17 ENCOUNTER — TELEPHONE (OUTPATIENT)
Dept: INTERNAL MEDICINE | Facility: CLINIC | Age: 48
End: 2018-09-17

## 2018-09-17 NOTE — TELEPHONE ENCOUNTER
Patient had workability form done needs to know the date it was done so she knows when she has to renew it. Please call patient on her mobile phone.    Tracey Chamberlain,

## 2018-09-17 NOTE — TELEPHONE ENCOUNTER
Per last work ability, patient unable to work 7-20-18 to 10-20-18.  Patient called and informed.  Pam Taveras,

## 2018-09-18 ENCOUNTER — TELEPHONE (OUTPATIENT)
Dept: INTERNAL MEDICINE | Facility: CLINIC | Age: 48
End: 2018-09-18

## 2018-09-18 ENCOUNTER — OFFICE VISIT (OUTPATIENT)
Dept: CARDIOLOGY | Facility: CLINIC | Age: 48
End: 2018-09-18
Payer: COMMERCIAL

## 2018-09-18 VITALS
OXYGEN SATURATION: 98 % | BODY MASS INDEX: 28.46 KG/M2 | HEART RATE: 98 BPM | WEIGHT: 171 LBS | SYSTOLIC BLOOD PRESSURE: 125 MMHG | DIASTOLIC BLOOD PRESSURE: 78 MMHG

## 2018-09-18 DIAGNOSIS — I10 BENIGN ESSENTIAL HYPERTENSION: ICD-10-CM

## 2018-09-18 DIAGNOSIS — G90.A POTS (POSTURAL ORTHOSTATIC TACHYCARDIA SYNDROME): Primary | ICD-10-CM

## 2018-09-18 DIAGNOSIS — I47.11 INAPPROPRIATE SINUS TACHYCARDIA (H): ICD-10-CM

## 2018-09-18 PROCEDURE — 99213 OFFICE O/P EST LOW 20 MIN: CPT | Performed by: NURSE PRACTITIONER

## 2018-09-18 RX ORDER — IVABRADINE 5 MG/1
5 TABLET, FILM COATED ORAL 2 TIMES DAILY WITH MEALS
Qty: 60 TABLET | Refills: 11 | Status: SHIPPED | OUTPATIENT
Start: 2018-09-18 | End: 2018-09-27

## 2018-09-18 NOTE — LETTER
"9/18/2018      RE: Laura Jackson  [address]  [place] [address]       Dear Colleague,    Thank you for the opportunity to participate in the care of your patient, Laura Jackson, at the [facility] AT [facility]. Please see a copy of my visit note below.        Clinical Cardiac Electrophysiology      HPI:   Laura Jackson is a 48 year old female who presents for follow up of POTS/inappropriate sinus tachycardia and hypertension.  The patient has a past medical history significant for POTS/inappropriate sinus tachycardia, hypermobility syndrome, asthma, IBS, uterine fibroids s/p hysterectomy, and GERD. Last summer (2017) she underwent a hysterectomy for severe vaginal bleeding and during this time she started having symptoms of palpitations, fatigue, flushing, nausea, and lightheadedness. She also started having numbness, tingling and her fingers would turn white so she was diagnosed with Raynaud's. She was started on diltiazem and stated that her finger symptoms have greatly improved. Of note, patient had very similar symptoms 6 years ago when she had a viral infection and she states that her symptoms took around 2 years before she felt back to baseline. ECHO was ordered and was negative. Ziopatch showed episodes of sinus tachycardia. Tilt was done at an outside facility and showed normal responses except during deep breathing exercises but she states that it was very cold in the room where the test was done and that she usually doesn't have a problem with tachycardia when it is cold. Work up for mast cell disorder was normal. She trailed metoprolol XL 25 mg daily for 4 weeks and was not able to tolerate due to extreme fatigue and brain \"fogginess\".     Reviewed current interval:  Orthostatic BPs today: supine 119/78 HR 82, sitting 141/83  , standing 131/85 HR 97, standing 1 minute 135/91 , standing 3 " minutes 139/96     Current interval history:  States that she had to leave a wedding early because she had to stand and after 30 minutes because of symptoms of racing heart, a little nausea, a little lightheadedness, she had to leave and sit down, better after about 45 minutes and drinking a lot of water. Patient states that she has episodes of feeling off and racing heart which are worse with standing or position changes or hot tempatures and improve with sitting or laying down, she gets these episodes several times/day with every time she stands or walks.  States that she has not had episodes of syncope presyncope.  States is drinking 60 oz of water/electrolyte mix daily plus additional water.  Patient has not had problems taking medication, rarely misses dose.  Patient has been doing standing training and has not noted improvement in episodes. Patient is eating small meals throughout the day.  Patient is is not wearing compression shorts. Activity level is light to moderate, has been able to swim daily for short periods of time with frequent rest breaks. About 5 days ago she had some pedal edema for a day which is now resolved. Denies chest pain, pedal or abdominal edema, PND, orthopnea, or claudication.     Current Outpatient Prescriptions   Medication Sig Dispense Refill     Cholecalciferol (VITAMIN D) 2000 UNITS CAPS Take 1 capsule by mouth daily       diltiazem 180 MG 24 hr capsule Take 1 capsule (180 mg) by mouth daily 30 capsule 3     fluticasone-salmeterol (ADVAIR) 100-50 MCG/DOSE diskus inhaler Inhale 1 puff into the lungs 2 times daily 3 Inhaler 1     levalbuterol (XOPENEX HFA) 45 MCG/ACT Inhaler Inhale 1-2 puffs into the lungs every 4 hours as needed for shortness of breath / dyspnea 1 Inhaler 5     OMEPRAZOLE PO Take 20 mg by mouth every morning       RaNITidine HCl (ZANTAC PO) Take 150 mg by mouth as needed for heartburn         Past Medical History:   Diagnosis Date     Benign essential  hypertension 7/31/2018     Family history of breast cancer 2/19/2014     Family history of breast cancer      SVT (supraventricular tachycardia):  history of, no recurrence since 2008 10/11/2013    no recurrence since 2008      Uncomplicated asthma        Past Surgical History:   Procedure Laterality Date     APPENDECTOMY       COLONOSCOPY N/A 8/20/2018    Procedure: COMBINED COLONOSCOPY, SINGLE OR MULTIPLE BIOPSY/POLYPECTOMY BY BIOPSY;;  Surgeon: Osman Hahn MD;  Location: [facility]     COLONOSCOPY WITH CO2 INSUFFLATION N/A 8/20/2018    Procedure: COLONOSCOPY WITH CO2 INSUFFLATION;  COLON-SCREENING / LUBKA ;  Surgeon: Osman Hahn MD;  Location: [facility]     DAVINCI HYSTERECTOMY TOTAL, SALPINGECTOMY BILATERAL N/A 8/4/2017    Procedure: DAVINCI XI HYSTERECTOMY TOTAL, SALPINGECTOMY BILATERAL;  DAVINCI TOTAL HYSTERECTOMY; BILATERAL SALPINGECTOMY. BILATERAL URETERAL LYSIS. UTEROSACRAL-COLPOPEXY. EXCISION OF ENDOMETRIOSIS;  Surgeon: George Loyola MD;  Location: [facility]     DAVINCI LYSIS OF ADHESIONS Bilateral 8/4/2017    Procedure: DAVINCI LYSIS OF ADHESIONS;  BILATERAL URETERAL LYSIS;  Surgeon: George Loyola MD;  Location: [facility]     DAVINCI SACROCOLPOPEXY, CYSTOSCOPY, COMBINED N/A 8/4/2017    Procedure: COMBINED DAVINCI SACROCOLPOPEXY, CYSTOSCOPY;  UTEROSACRAL COLPOPEXY;  Surgeon: George Loyola MD;  Location: [facility]     DAVINCI XI ASSISTED ABLATION / EXCISION OF ENDOMETRIOSIS  8/4/2017    Procedure: DAVINCI XI ASSISTED ABLATION / EXCISION OF ENDOMETRIOSIS;;  Surgeon: George Loyola MD;  Location: [facility]     DILATION AND CURETTAGE N/A 6/20/2017    Procedure: DILATION AND CURETTAGE;  Uterine Curettings and Fibroid Removal, Cook catheter placement; (No hysterectomy done at this time);  Surgeon: Ernestina Saunders MD;  [facility]: [facility]     HYSTERECTOMY, PAP NO LONGER INDICATED       TONSILLECTOMY         Family History   Problem Relation Age of Onset     Diabetes Mother      Hypertension Mother       Hyperlipidemia Mother      Arrhythmia Mother      Cardiovascular Father      CHF and COPD     Asthma Father      Breast Cancer Maternal Grandfather      Colon Cancer Maternal Grandfather      Breast Cancer Paternal Grandmother      Breast Cancer Paternal Aunt      [email or web address]. Paternal Grandfather      Breast Cancer Cousin      Asthma Son      Diabetes Maternal Grandmother      Hypertension Maternal Grandmother      Breast Cancer Cousin      Breast Cancer Cousin      Breast Cancer Cousin        Social History   Substance Use Topics     Smoking status: Never Smoker     Smokeless tobacco: Never Used     Alcohol use Yes      Comment: rare, two sips every six months       Allergies   Allergen Reactions     Penicillins Swelling     Swelling throat; age 4     Tetracycline      Other reaction(s): Abdominal Pain  Vomiting; nauseous         ROS:   A complete review of systems was performed and is negative except as noted in the HPI.    Physical Examination:  Vitals: /78 (BP Location: Right arm, Patient Position: Chair, Cuff Size: Adult Large)  Pulse 98  Wt 77.6 kg (171 lb)  LMP 07/28/2017  SpO2 98%  BMI 28.46 kg/m2  BMI= Body mass index is 28.46 kg/(m^2).    GENERAL APPEARANCE: healthy, alert, and no acute distress  HEENT: no icterus, no xanthelasmas, normal pupil size and reaction, no cyanosis.  NECK: no asymmetry, no cervical bruits, no JVD   RESPIRATORY: lungs clear to auscultation - no rales, rhonchi or wheezes, no use of accessory muscles, no retractions, respirations are unlabored, normal respiratory rate  CARDIOVASCULAR: regular rhythm, normal S1 with physiologic split S2, no S3 or S4 and no murmur, click or rub  GI:  no abdominal bruits, soft, non-tender  EXTREMITIES: no clubbing, cyanosis, or edema  NEURO: alert and oriented to person/place/time, normal speech, gait and affect  VASC: Radial and posterior tibialis pulses +2 and symmetric bilaterally. No cyanosis. No edema  SKIN: no ecchymoses, no  cathryn    Laboratory:  Lab Results   Component Value Date    WBC 7.5 04/29/2018    RBC 4.72 04/29/2018    HGB 14.3 04/29/2018    HCT 42.1 04/29/2018    MCV 89 04/29/2018    MCH 30.3 04/29/2018    MCHC 34.0 04/29/2018    RDW 13.2 04/29/2018     04/29/2018     Lab Results   Component Value Date     04/29/2018    POTASSIUM 3.6 04/29/2018    GLC 95 04/29/2018    BUN 16 04/29/2018    CR 0.87 04/29/2018    GFRESTIMATED 69 04/29/2018    GFRESTBLACK 84 04/29/2018    AZAEL 8.8 04/29/2018      Lab Results   Component Value Date    INR 1.12 06/20/2017    INR 1.12 06/20/2017     No results found for: CKTOTAL, CKMB, TROPN  Cholesterol (mg/dL)   Date Value   09/11/2017 194   07/20/2011 189     HDL Cholesterol (mg/dL)   Date Value   09/11/2017 65   07/20/2011 65     LDL Cholesterol Calculated (mg/dL)   Date Value   09/11/2017 110 (H)   07/20/2011 110     Assessment and recommendations:    #1. POTS/Inappropriate sinus tachycardia:   1. Try to drink 50-60 ounces of 50/50 electrolyte fluids/water mix per day.  Examples: Propel or Pedialyte. Gatorade and Powerade are higher in sugar/calories and are okay in moderation or if you are an athlete.   2. As she has persistent symptoms associated with tachycardia, will initiate ivabradine 5 mg twice daily. Continue diltiazem.  Can consider adding florinef at follow up in no improvement in symptoms. Although ivabradine is not available in all countries and its use for inappropriate sinus tachycardia would be considered an off-label use in the [place], the 2015 HRS consensus statement and the 2015 ACC/AHA/HRS guideline support the use of ivabradine alone or in conjunction with AV mery agent for inappropriate sinus tachycardia.  4. Isometric exercises.  These exercises will assist in increasing intravascular pressure. They also include swimming, rowing, recumbent bike.   5. Try to eat six small meals per day. Try to keep these meals low in carbohydrates and low in gluten.   6.  Bike compression shorts or Spanx- use as much as possible, especially when standing for longer periods of time.   7. During hot summer hours, try to stay in cool -air conditioned climates.     #2. Hypertension: Counseled patient on risks of uncontrolled blood pressure and treatment options and risks.   1. Medications: Continue diltiazem 180 mg daily, take in the evening.       FOLLOW UP:  Follow up in 3 months or follow up sooner as needed for symptoms.    Patient expresses understanding and agreement with the plan.    I appreciate the chance to help with Laura Jackson 's care. Please let me know if you have any questions or concerns.    Marylou LINCOLN, NP-C    Please do not hesitate to contact me if you have any questions/concerns.     Sincerely,     SALAZAR Velásquez CNP

## 2018-09-18 NOTE — PROGRESS NOTES
"    Clinical Cardiac Electrophysiology      HPI:   Laura Jakcson is a 48 year old female who presents for follow up of POTS/inappropriate sinus tachycardia and hypertension.  The patient has a past medical history significant for POTS/inappropriate sinus tachycardia, hypermobility syndrome, asthma, IBS, uterine fibroids s/p hysterectomy, and GERD. Last summer (2017) she underwent a hysterectomy for severe vaginal bleeding and during this time she started having symptoms of palpitations, fatigue, flushing, nausea, and lightheadedness. She also started having numbness, tingling and her fingers would turn white so she was diagnosed with Raynaud's. She was started on diltiazem and stated that her finger symptoms have greatly improved. Of note, patient had very similar symptoms 6 years ago when she had a viral infection and she states that her symptoms took around 2 years before she felt back to baseline. ECHO was ordered and was negative. Ziopatch showed episodes of sinus tachycardia. Tilt was done at an outside facility and showed normal responses except during deep breathing exercises but she states that it was very cold in the room where the test was done and that she usually doesn't have a problem with tachycardia when it is cold. Work up for mast cell disorder was normal. She trailed metoprolol XL 25 mg daily for 4 weeks and was not able to tolerate due to extreme fatigue and brain \"fogginess\".     Reviewed current interval:  Orthostatic BPs today: supine 119/78 HR 82, sitting 141/83  , standing 131/85 HR 97, standing 1 minute 135/91 , standing 3 minutes 139/96     Current interval history:  States that she had to leave a wedding early because she had to stand and after 30 minutes because of symptoms of racing heart, a little nausea, a little lightheadedness, she had to leave and sit down, better after about 45 minutes and drinking a lot of water. Patient states that she has episodes of feeling " off and racing heart which are worse with standing or position changes or hot tempatures and improve with sitting or laying down, she gets these episodes several times/day with every time she stands or walks.  States that she has not had episodes of syncope presyncope.  States is drinking 60 oz of water/electrolyte mix daily plus additional water.  Patient has not had problems taking medication, rarely misses dose.  Patient has been doing standing training and has not noted improvement in episodes. Patient is eating small meals throughout the day.  Patient is is not wearing compression shorts. Activity level is light to moderate, has been able to swim daily for short periods of time with frequent rest breaks. About 5 days ago she had some pedal edema for a day which is now resolved. Denies chest pain, pedal or abdominal edema, PND, orthopnea, or claudication.     Current Outpatient Prescriptions   Medication Sig Dispense Refill     Cholecalciferol (VITAMIN D) 2000 UNITS CAPS Take 1 capsule by mouth daily       diltiazem 180 MG 24 hr capsule Take 1 capsule (180 mg) by mouth daily 30 capsule 3     fluticasone-salmeterol (ADVAIR) 100-50 MCG/DOSE diskus inhaler Inhale 1 puff into the lungs 2 times daily 3 Inhaler 1     levalbuterol (XOPENEX HFA) 45 MCG/ACT Inhaler Inhale 1-2 puffs into the lungs every 4 hours as needed for shortness of breath / dyspnea 1 Inhaler 5     OMEPRAZOLE PO Take 20 mg by mouth every morning       RaNITidine HCl (ZANTAC PO) Take 150 mg by mouth as needed for heartburn         Past Medical History:   Diagnosis Date     Benign essential hypertension 7/31/2018     Family history of breast cancer 2/19/2014     Family history of breast cancer      SVT (supraventricular tachycardia):  history of, no recurrence since 2008 10/11/2013    no recurrence since 2008      Uncomplicated asthma        Past Surgical History:   Procedure Laterality Date     APPENDECTOMY       COLONOSCOPY N/A 8/20/2018     Procedure: COMBINED COLONOSCOPY, SINGLE OR MULTIPLE BIOPSY/POLYPECTOMY BY BIOPSY;;  Surgeon: Osman Hahn MD;  Location: [facility]     COLONOSCOPY WITH CO2 INSUFFLATION N/A 8/20/2018    Procedure: COLONOSCOPY WITH CO2 INSUFFLATION;  COLON-SCREENING / LUBKA ;  Surgeon: Osman Hahn MD;  Location: [facility]     DAVINCI HYSTERECTOMY TOTAL, SALPINGECTOMY BILATERAL N/A 8/4/2017    Procedure: DAVINCI XI HYSTERECTOMY TOTAL, SALPINGECTOMY BILATERAL;  DAVINCI TOTAL HYSTERECTOMY; BILATERAL SALPINGECTOMY. BILATERAL URETERAL LYSIS. UTEROSACRAL-COLPOPEXY. EXCISION OF ENDOMETRIOSIS;  Surgeon: George Loyola MD;  Location: [facility]     DAVINCI LYSIS OF ADHESIONS Bilateral 8/4/2017    Procedure: DAVINCI LYSIS OF ADHESIONS;  BILATERAL URETERAL LYSIS;  Surgeon: George Loyola MD;  Location: [facility]     DAVINCI SACROCOLPOPEXY, CYSTOSCOPY, COMBINED N/A 8/4/2017    Procedure: COMBINED DAVINCI SACROCOLPOPEXY, CYSTOSCOPY;  UTEROSACRAL COLPOPEXY;  Surgeon: George Loyola MD;  Location: [facility]     DAVINCI XI ASSISTED ABLATION / EXCISION OF ENDOMETRIOSIS  8/4/2017    Procedure: DAVINCI XI ASSISTED ABLATION / EXCISION OF ENDOMETRIOSIS;;  Surgeon: George Loyola MD;  Location: [facility]     DILATION AND CURETTAGE N/A 6/20/2017    Procedure: DILATION AND CURETTAGE;  Uterine Curettings and Fibroid Removal, Cook catheter placement; (No hysterectomy done at this time);  Surgeon: Ernestina Saunders MD;  [facility]: [facility]     HYSTERECTOMY, PAP NO LONGER INDICATED       TONSILLECTOMY         Family History   Problem Relation Age of Onset     Diabetes Mother      Hypertension Mother      Hyperlipidemia Mother      Arrhythmia Mother      Cardiovascular Father      CHF and COPD     Asthma Father      Breast Cancer Maternal Grandfather      Colon Cancer Maternal Grandfather      Breast Cancer Paternal Grandmother      Breast Cancer Paternal Aunt      C.A.D. Paternal Grandfather      Breast Cancer Cousin      Asthma Son      Diabetes  Maternal Grandmother      Hypertension Maternal Grandmother      Breast Cancer Cousin      Breast Cancer Cousin      Breast Cancer Cousin        Social History   Substance Use Topics     Smoking status: Never Smoker     Smokeless tobacco: Never Used     Alcohol use Yes      Comment: rare, two sips every six months       Allergies   Allergen Reactions     Penicillins Swelling     Swelling throat; age 4     Tetracycline      Other reaction(s): Abdominal Pain  Vomiting; nauseous         ROS:   A complete review of systems was performed and is negative except as noted in the HPI.    Physical Examination:  Vitals: /78 (BP Location: Right arm, Patient Position: Chair, Cuff Size: Adult Large)  Pulse 98  Wt 77.6 kg (171 lb)  LMP 07/28/2017  SpO2 98%  BMI 28.46 kg/m2  BMI= Body mass index is 28.46 kg/(m^2).    GENERAL APPEARANCE: healthy, alert, and no acute distress  HEENT: no icterus, no xanthelasmas, normal pupil size and reaction, no cyanosis.  NECK: no asymmetry, no cervical bruits, no JVD   RESPIRATORY: lungs clear to auscultation - no rales, rhonchi or wheezes, no use of accessory muscles, no retractions, respirations are unlabored, normal respiratory rate  CARDIOVASCULAR: regular rhythm, normal S1 with physiologic split S2, no S3 or S4 and no murmur, click or rub  GI:  no abdominal bruits, soft, non-tender  EXTREMITIES: no clubbing, cyanosis, or edema  NEURO: alert and oriented to person/place/time, normal speech, gait and affect  VASC: Radial and posterior tibialis pulses +2 and symmetric bilaterally. No cyanosis. No edema  SKIN: no ecchymoses, no rashes    Laboratory:  Lab Results   Component Value Date    WBC 7.5 04/29/2018    RBC 4.72 04/29/2018    HGB 14.3 04/29/2018    HCT 42.1 04/29/2018    MCV 89 04/29/2018    MCH 30.3 04/29/2018    MCHC 34.0 04/29/2018    RDW 13.2 04/29/2018     04/29/2018     Lab Results   Component Value Date     04/29/2018    POTASSIUM 3.6 04/29/2018    GLC 95  04/29/2018    BUN 16 04/29/2018    CR 0.87 04/29/2018    GFRESTIMATED 69 04/29/2018    GFRESTBLACK 84 04/29/2018    AZAEL 8.8 04/29/2018      Lab Results   Component Value Date    INR 1.12 06/20/2017    INR 1.12 06/20/2017     No results found for: CKTOTAL, CKMB, TROPN  Cholesterol (mg/dL)   Date Value   09/11/2017 194   07/20/2011 189     HDL Cholesterol (mg/dL)   Date Value   09/11/2017 65   07/20/2011 65     LDL Cholesterol Calculated (mg/dL)   Date Value   09/11/2017 110 (H)   07/20/2011 110     Assessment and recommendations:    #1. POTS/Inappropriate sinus tachycardia:   1. Try to drink 50-60 ounces of 50/50 electrolyte fluids/water mix per day.  Examples: Propel or Pedialyte. Gatorade and Powerade are higher in sugar/calories and are okay in moderation or if you are an athlete.   2. As she has persistent symptoms associated with tachycardia, will initiate ivabradine 5 mg twice daily. Continue diltiazem.  Can consider adding florinef at follow up in no improvement in symptoms. Although ivabradine is not available in all countries and its use for inappropriate sinus tachycardia would be considered an off-label use in the [place], the 2015 HRS consensus statement and the 2015 ACC/AHA/HRS guideline support the use of ivabradine alone or in conjunction with AV mery agent for inappropriate sinus tachycardia.  4. Isometric exercises.  These exercises will assist in increasing intravascular pressure. They also include swimming, rowing, recumbent bike.   5. Try to eat six small meals per day. Try to keep these meals low in carbohydrates and low in gluten.   6. Bike compression shorts or Spanx- use as much as possible, especially when standing for longer periods of time.   7. During hot summer hours, try to stay in cool -air conditioned climates.     #2. Hypertension: Counseled patient on risks of uncontrolled blood pressure and treatment options and risks.   1. Medications: Continue diltiazem 180 mg daily, take  in the evening.       FOLLOW UP:  Follow up in 3 months or follow up sooner as needed for symptoms.    Patient expresses understanding and agreement with the plan.    I appreciate the chance to help with Laura Jackson 's care. Please let me know if you have any questions or concerns.    Marylou LINCOLN, NP-C

## 2018-09-18 NOTE — MR AVS SNAPSHOT
After Visit Summary   9/18/2018    Laura Jackson    MRN: [id number]           Patient Information     Date Of Birth          1970        Visit Information        Provider Department      9/18/2018 10:15 AM Marylou Lim APRN CNP [facility] AT [facility]        Today's Diagnoses     POTS (postural orthostatic tachycardia syndrome)    -  1    Inappropriate sinus tachycardia        Benign essential hypertension          Care Instructions    Thank you for coming to the [facility] @ [facility]; please note the following instructions:    1. Medication changes today: Start ivabradine 5 mg twice daily. Please contact the clinic if you have trouble getting the medication.   2. Tests needed: None  3. Further instructions:    Isometric exercises.  These exercises will assist in increasing intravascular pressure. They also include swimming, rowing, recumbent bike.     Try to eat six small meals per day. Try to keep these meals low in carbohydrates and low in gluten.     Bike compression shorts or Spanx- use as much as possible, especially when standing for longer periods of time.     During hot summer hours, try to stay in cool -air conditioned climates.    Try to drink 50-60 ounces of 50/50 electrolyte fluids/water mix per day.  Examples: Propel or Pedialyte.    If you have any questions regarding your visit please contact your care team:     Cardiology  Telephone Number   Rosanna WRGIHT, JAS SANDOVAL, JAS MURRELL, OSEI DUBON MA   [phone]    *After hours: [phone]   For scheduling appts:     [phone] or    [phone] (select option 1)    *After hours: [phone]     For the Device Clinic (Pacemakers and ICD's)  RN's :  Yen White   During business hours: [phone]    *After business hours:  [phone] (select option 4)      Normal test result notifications will be released via [employer] or mailed within 7  business days.  All other test results, will be communicated via telephone once reviewed by your cardiologist.    If you need a medication refill please contact your pharmacy.  Please allow 3 business days for your refill to be completed.    As always, thank you for trusting us with your health care needs!    Ivabradine tablets  Brand Name: Corlanor  What is this medicine?  IVABRADINE (eye VAB ra imani) is used to reduce the risk of going to the hospital due to worsening heart failure.  How should I use this medicine?  Take this medicine by mouth with a glass of water. Follow the directions on the prescription label. Avoid grapefruit juice. Take your medicine at regular intervals. Do not take it more often than directed. Do not stop taking except on your doctor's advice.  Talk to your pediatrician regarding the use of this medicine in children. Special care may be needed.  What side effects may I notice from receiving this medicine?  Side effects that you should report to your doctor or health care professional as soon as possible:    allergic reactions like skin rash, itching or hives, swelling of the face, lips, or tongue    high blood pressure    signs and symptoms of a dangerous change in heartbeat or heart rhythm like chest pain; dizziness; fast or irregular heartbeat; palpitations; feeling faint or lightheaded; breathing problems    unusually slow heartbeat  Side effects that usually do not require medical attention (report to your doctor or health care professional if they continue or are bothersome):    changes in vision  What may interact with this medicine?  Do not take this medicine with any of the following medications:    certain medicines for fungal infections like ketoconazole and itraconazole    certain antibiotics called macrolide antibiotics like clarithromycin and telithromycin    certain medicines for HIV disease called protease inhibitors like nelfinavir    nefazodone  This medicine may also  interact with the following medications:    amiodarone    beta-blockers like metoprolol and propranolol    calcium channel blockers like diltiazem and verapamil    certain medicines for seizures like barbiturates and phenytoin    digoxin    grapefruit juice    rifampicin    Hong's wort  What if I miss a dose?  If you miss a dose, take it as soon as you can. If it is almost time for your next dose, take only that dose. Do not take double or extra doses.  Where should I keep my medicine?  Keep out of the reach of children.  Store at room temperature between 20 and 25 degrees C (68 and 77 degrees F). Throw away any unused medicine after the expiration date.  What should I tell my health care provider before I take this medicine?  They need to know if you have any of these conditions:    certain heart conditions like sick sinus syndrome, sinoatrial block, or third degree atrioventricular block    heart failure that has recently worsened    liver disease    low blood pressure (less than 90/50 mmHg)    low resting heart rate (less than 60 beats per minute)    pacemaker    an unusual or allergic reaction to ivabradine, other medicines, foods, dyes, or preservatives    pregnant or trying to get pregnant    breast-feeding  What should I watch for while using this medicine?  Tell your doctor or health care professional if your symptoms do not start to get better or if they get worse.  Tell your doctor or health care professional right away if you have any changes in your eyesight.  Do not become pregnant while taking this medicine. Women who are able to get pregnant must use birth control while taking this medicine. Women should inform their doctor if they wish to become pregnant or think they might be pregnant. There is a potential for serious side effects to an unborn child. Talk to your health care professional or pharmacist for more information.  NOTE:This sheet is a summary. It may not cover all possible information.  If you have questions about this medicine, talk to your doctor, pharmacist, or health care provider. Copyright  2018 Else[employer]                Follow-ups after your visit        Additional Services     Follow-Up with EP Cardiac  Advanced Practice Provider- 3 Months                 Your next 10 appointments already scheduled     Oct 05, 2018 12:00 PM CDT   (Arrive by 11:45 AM)   RETURN ARRHYTHMIA with Phil Mitchell MD   [facility] ([facility])    [address] [place] [address]  [address]  [facility] [address]   [phone]            Dec 18, 2018 12:15 PM CST   Return Visit with SALAZAR Rajan CNP   [facility]YS[facility] AT [facility] ([facility])    [address] [place] [address] 2nd Floor  [facility] [id number] [phone]              Future tests that were ordered for you today     Open Future Orders        Priority Expected Expires Ordered    Follow-Up with EP Cardiac  Advanced Practice Provider- 3 Months Routine 12/17/2018 3/17/2019 9/18/2018            Who to contact     If you have questions or need follow up information about today's clinic visit or your schedule please contact [facility] AT [facility] directly at [phone].  Normal or non-critical lab and imaging results will be communicated to you by MyChart, letter or phone within 4 business days after the clinic has received the results. If you do not hear from us within 7 days, please contact the clinic through MyChart or phone. If you have a critical or abnormal lab result, we will notify you by phone as soon as possible.  Submit refill requests through [employer] or call your pharmacy and they will forward the refill request to us. Please allow 3 business days for your refill to be completed.          Additional Information About Your Visit        Evcarcohart Information     [employer] gives you secure access to your electronic health record. If you see  a primary care provider, you can also send messages to your care team and make appointments. If you have questions, please call your primary care clinic.  If you do not have a primary care provider, please call [phone] and they will assist you.        Care EveryWhere ID     This is your Care EveryWhere ID. This could be used by other organizations to access your [facility] medical records  [id number]        Your Vitals Were     Pulse Last Period Pulse Oximetry BMI (Body Mass Index)          98 07/28/2017 98% 28.46 kg/m2         Blood Pressure from Last 3 Encounters:   09/18/18 125/78   08/20/18 123/88   07/31/18 126/89    Weight from Last 3 Encounters:   09/18/18 77.6 kg (171 lb)   08/15/18 76.2 kg (168 lb)   07/31/18 76.2 kg (168 lb)                 Today's Medication Changes          These changes are accurate as of 9/18/18 11:15 AM.  If you have any questions, ask your nurse or doctor.               Start taking these medicines.        Dose/Directions    ivabradine 5 MG tablet   Commonly known as:  CORLANOR   Used for:  Inappropriate sinus tachycardia   Started by:  Marylou Lim APRN CNP        Dose:  5 mg   Take 1 tablet (5 mg) by mouth 2 times daily (with meals)   Quantity:  60 tablet   Refills:  11            Where to get your medicines      These medications were sent to [facility]  [address] [place] [address]  [address] [facility] [address], [place] [address]     Phone:  [phone]     ivabradine 5 MG tablet                Primary Care Provider Office Phone # Fax #    Bj Butler [phone] [address] [facility]  [facility] [address]        Equal Access to Services     [facility] AH: Hadjaja Cordero, waaxda luqadaha, qaybta kaalmada michelle, ludmila alberts. So [facility] [phone].    ATENCIÓN: Si habla español, tiene a guido disposición servicios gratuitos de asistencia lingüística. Llame al  [phone].    We comply with applicable federal civil rights laws and [place] laws. We do not discriminate on the basis of race, color, national origin, age, disability, sex, sexual orientation, or gender identity.            Thank you!     Thank you for choosing [facility] AT [facility]  for your care. Our goal is always to provide you with excellent care. Hearing back from our patients is one way we can continue to improve our services. Please take a few minutes to complete the written survey that you may receive in the mail after your visit with us. Thank you!             Your Updated Medication List - Protect others around you: Learn how to safely use, store and throw away your medicines at www.disposemymeds.org.          This list is accurate as of 9/18/18 11:15 AM.  Always use your most recent med list.                   Brand Name Dispense Instructions for use Diagnosis    diltiazem 180 MG 24 hr capsule     30 capsule    Take 1 capsule (180 mg) by mouth daily    History of supraventricular tachycardia       fluticasone-salmeterol 100-50 MCG/DOSE diskus inhaler    ADVAIR    3 Inhaler    Inhale 1 puff into the lungs 2 times daily    Mild persistent asthma without complication       ivabradine 5 MG tablet    CORLANOR    60 tablet    Take 1 tablet (5 mg) by mouth 2 times daily (with meals)    Inappropriate sinus tachycardia       levalbuterol 45 MCG/ACT Inhaler    XOPENEX HFA    1 Inhaler    Inhale 1-2 puffs into the lungs every 4 hours as needed for shortness of breath / dyspnea    Mild persistent asthma without complication       OMEPRAZOLE PO      Take 20 mg by mouth every morning        vitamin D 2000 units Caps      Take 1 capsule by mouth daily        ZANTAC PO      Take 150 mg by mouth as needed for heartburn

## 2018-09-18 NOTE — PATIENT INSTRUCTIONS
Thank you for coming to the [facility] @ [facility]; please note the following instructions:    1. Medication changes today: Start ivabradine 5 mg twice daily. Please contact the clinic if you have trouble getting the medication.   2. Tests needed: None  3. Further instructions:    Isometric exercises.  These exercises will assist in increasing intravascular pressure. They also include swimming, rowing, recumbent bike.     Try to eat six small meals per day. Try to keep these meals low in carbohydrates and low in gluten.     Bike compression shorts or Spanx- use as much as possible, especially when standing for longer periods of time.     During hot summer hours, try to stay in cool -air conditioned climates.    Try to drink 50-60 ounces of 50/50 electrolyte fluids/water mix per day.  Examples: Propel or Pedialyte.    If you have any questions regarding your visit please contact your care team:     Cardiology  Telephone Number   Rosanna WRIGHT, JAS SANDOVAL, JAS MURRELL, OSEI DUBON MA   [phone]    *After hours: [phone]   For scheduling appts:     [phone] or    [phone] (select option 1)    *After hours: [phone]     For the Device Clinic (Pacemakers and ICD's)  RN's :  Yen White   During business hours: [phone]    *After business hours:  [phone] (select option 4)      Normal test result notifications will be released via [employer] or mailed within 7 business days.  All other test results, will be communicated via telephone once reviewed by your cardiologist.    If you need a medication refill please contact your pharmacy.  Please allow 3 business days for your refill to be completed.    As always, thank you for trusting us with your health care needs!    Ivabradine tablets  Brand Name: Corlanor  What is this medicine?  IVABRADINE (eye VAB victoria diallo) is used to reduce the risk of going to the hospital due to worsening heart failure.  How should I use  this medicine?  Take this medicine by mouth with a glass of water. Follow the directions on the prescription label. Avoid grapefruit juice. Take your medicine at regular intervals. Do not take it more often than directed. Do not stop taking except on your doctor's advice.  Talk to your pediatrician regarding the use of this medicine in children. Special care may be needed.  What side effects may I notice from receiving this medicine?  Side effects that you should report to your doctor or health care professional as soon as possible:    allergic reactions like skin rash, itching or hives, swelling of the face, lips, or tongue    high blood pressure    signs and symptoms of a dangerous change in heartbeat or heart rhythm like chest pain; dizziness; fast or irregular heartbeat; palpitations; feeling faint or lightheaded; breathing problems    unusually slow heartbeat  Side effects that usually do not require medical attention (report to your doctor or health care professional if they continue or are bothersome):    changes in vision  What may interact with this medicine?  Do not take this medicine with any of the following medications:    certain medicines for fungal infections like ketoconazole and itraconazole    certain antibiotics called macrolide antibiotics like clarithromycin and telithromycin    certain medicines for HIV disease called protease inhibitors like nelfinavir    nefazodone  This medicine may also interact with the following medications:    amiodarone    beta-blockers like metoprolol and propranolol    calcium channel blockers like diltiazem and verapamil    certain medicines for seizures like barbiturates and phenytoin    digoxin    grapefruit juice    rifampicin    [place]'s wort  What if I miss a dose?  If you miss a dose, take it as soon as you can. If it is almost time for your next dose, take only that dose. Do not take double or extra doses.  Where should I keep my medicine?  Keep out of the  reach of children.  Store at room temperature between 20 and 25 degrees C (68 and 77 degrees F). Throw away any unused medicine after the expiration date.  What should I tell my health care provider before I take this medicine?  They need to know if you have any of these conditions:    certain heart conditions like sick sinus syndrome, sinoatrial block, or third degree atrioventricular block    heart failure that has recently worsened    liver disease    low blood pressure (less than 90/50 mmHg)    low resting heart rate (less than 60 beats per minute)    pacemaker    an unusual or allergic reaction to ivabradine, other medicines, foods, dyes, or preservatives    pregnant or trying to get pregnant    breast-feeding  What should I watch for while using this medicine?  Tell your doctor or health care professional if your symptoms do not start to get better or if they get worse.  Tell your doctor or health care professional right away if you have any changes in your eyesight.  Do not become pregnant while taking this medicine. Women who are able to get pregnant must use birth control while taking this medicine. Women should inform their doctor if they wish to become pregnant or think they might be pregnant. There is a potential for serious side effects to an unborn child. Talk to your health care professional or pharmacist for more information.  NOTE:This sheet is a summary. It may not cover all possible information. If you have questions about this medicine, talk to your doctor, pharmacist, or health care provider. Copyright  2018 Elsevier

## 2018-09-18 NOTE — NURSING NOTE
"Chief Complaint   Patient presents with     RECHECK      6 weeks fu POTS. Pt reports chest pressure, SOB, palpitations and fatigue.       Initial /78 (BP Location: Right arm, Patient Position: Chair, Cuff Size: Adult Large)  Pulse 98  Wt 77.6 kg (171 lb)  LMP 07/28/2017  SpO2 98%  BMI 28.46 kg/m2 Estimated body mass index is 28.46 kg/(m^2) as calculated from the following:    Height as of 8/15/18: 1.651 m (5' 5\").    Weight as of this encounter: 77.6 kg (171 lb)..  BP completed using cuff size: tatyana Osorio MA    "

## 2018-09-18 NOTE — TELEPHONE ENCOUNTER
Prior Authorization Retail Medication Request    Medication/Dose: corlanor 5mg requires a prior auth  ICD code (if different than what is on RX):  R000  Previously Tried and Failed:  ???  Rationale:  ???    Insurance Name:  [employer]  Insurance ID:  [id number]      Pharmacy Information (if different than what is on RX)  Name:  [facility]  Phone:  [phone]

## 2018-09-19 ENCOUNTER — TRANSFERRED RECORDS (OUTPATIENT)
Dept: HEALTH INFORMATION MANAGEMENT | Facility: CLINIC | Age: 48
End: 2018-09-19

## 2018-09-20 NOTE — TELEPHONE ENCOUNTER
This PA request was submitted to the insurance by the Central Prior Authorization Team.  If you have any questions in regards to this request, we can be reached at [phone].     Thanks    PA Initiation    Medication: ivabradine (CORLANOR) 5 MG tablet  Insurance Company: [employer] - Phone [phone] Fax [phone]  Pharmacy Filling the Rx: [facility] [place] [place] - [address] [facility]  Filling Pharmacy Phone: [phone]  Filling Pharmacy Fax:    Start Date: 9/20/2018

## 2018-09-24 NOTE — TELEPHONE ENCOUNTER
PRIOR AUTHORIZATION DENIED    Medication: ivabradine (CORLANOR) 5 MG tablet - DENIED     Denial Date: 9/24/2018    Denial Rational: Your left ventricular ejection fraction is not less than or equal to 35%; it is normal 60-65%.            Appeal Information: Please provide a letter of medical necessity

## 2018-09-24 NOTE — PROGRESS NOTES
SUBJECTIVE:   Laura Jackson is a 48 year old female who presents to clinic today for the following health issues:    Postural Orthostatic Tachycardia Syndrome  Laura reports that she was cut off from long term disability insurance [employer] a few months ago. She notes no improvement in her condition yet. She follows with a Marylou Lim CNP of cardiology and Neurologist Dr. Socrates Phan. She expects she will be beginning a new medication shortly called Corlanor  (ivabradine). She plans to return to work and says its not a question of if, but rather of when. She continues to have a great deal of fatigue. With every position change or any exercise she has persistent marked episodes tachycardia. She has been participating in some mild swimming exercises, but continues to note tachycardia.    Her current disability form will   or so and I will redo this to continue her work absence through until late 2019. No repeat office visit necessary with patient until then.    Problem list and histories reviewed & adjusted, as indicated.  Additional history: as documented    Patient Active Problem List   Diagnosis     CARDIOVASCULAR SCREENING; LDL GOAL LESS THAN 160     Irritable bowel syndrome     GERD (gastroesophageal reflux disease)     History of supraventricular tachycardia     Mild persistent asthma     Family history of breast cancer     Foreign body (FB) in soft tissue     S/P myomectomy     Nausea     Dehydration     S/P ANAID (total abdominal hysterectomy)     Hypovitaminosis D     Pelvic adhesions     Endometriosis     Heberden's nodes     POTS (postural orthostatic tachycardia syndrome)     Hypermobility syndrome     Cervicalgia     Autonomic dysfunction     Benign essential hypertension     Past Surgical History:   Procedure Laterality Date     APPENDECTOMY       COLONOSCOPY N/A 2018    Procedure: COMBINED COLONOSCOPY, SINGLE OR MULTIPLE BIOPSY/POLYPECTOMY BY BIOPSY;;   Surgeon: Osman Hahn MD;  Location: [facility]     COLONOSCOPY WITH CO2 INSUFFLATION N/A 8/20/2018    Procedure: COLONOSCOPY WITH CO2 INSUFFLATION;  COLON-SCREENING / LUBKA ;  Surgeon: Osman Hahn MD;  Location: [facility]     DAVINCI HYSTERECTOMY TOTAL, SALPINGECTOMY BILATERAL N/A 8/4/2017    Procedure: DAVINCI XI HYSTERECTOMY TOTAL, SALPINGECTOMY BILATERAL;  DAVINCI TOTAL HYSTERECTOMY; BILATERAL SALPINGECTOMY. BILATERAL URETERAL LYSIS. UTEROSACRAL-COLPOPEXY. EXCISION OF ENDOMETRIOSIS;  Surgeon: George Loyola MD;  Location: [facility] OR     DAVINCI LYSIS OF ADHESIONS Bilateral 8/4/2017    Procedure: DAVINCI LYSIS OF ADHESIONS;  BILATERAL URETERAL LYSIS;  Surgeon: George Loyola MD;  Location: [facility]     DAVINCI SACROCOLPOPEXY, CYSTOSCOPY, COMBINED N/A 8/4/2017    Procedure: COMBINED DAVINCI SACROCOLPOPEXY, CYSTOSCOPY;  UTEROSACRAL COLPOPEXY;  Surgeon: George Loyola MD;  Location: [facility]     DAVINCI XI ASSISTED ABLATION / EXCISION OF ENDOMETRIOSIS  8/4/2017    Procedure: DAVINCI XI ASSISTED ABLATION / EXCISION OF ENDOMETRIOSIS;;  Surgeon: George Loyola MD;  Location: [facility]     DILATION AND CURETTAGE N/A 6/20/2017    Procedure: DILATION AND CURETTAGE;  Uterine Curettings and Fibroid Removal, Cook catheter placement; (No hysterectomy done at this time);  Surgeon: Ernestina Saunders MD;  Location: [facility]     HYSTERECTOMY, PAP NO LONGER INDICATED       TONSILLECTOMY         Social History   Substance Use Topics     Smoking status: Never Smoker     Smokeless tobacco: Never Used     Alcohol use Yes      Comment: rare, two sips every six months     Family History   Problem Relation Age of Onset     Diabetes Mother      Hypertension Mother      Hyperlipidemia Mother      Arrhythmia Mother      Cardiovascular Father      CHF and COPD     Asthma Father      Breast Cancer Maternal Grandfather      Colon Cancer Maternal Grandfather      Breast Cancer Paternal Grandmother      Breast Cancer Paternal Aunt   "    JAGDISHAJUAN DANIEL. Paternal Grandfather      Breast Cancer Cousin      Asthma Son      Diabetes Maternal Grandmother      Hypertension Maternal Grandmother      Breast Cancer Cousin      Breast Cancer Cousin      Breast Cancer Cousin          BP Readings from Last 3 Encounters:   09/28/18 124/80   09/18/18 125/78   08/20/18 123/88    Wt Readings from Last 3 Encounters:   09/28/18 77.3 kg (170 lb 6.4 oz)   09/18/18 77.6 kg (171 lb)   08/15/18 76.2 kg (168 lb)        Reviewed and updated as needed this visit by clinical staff  Tobacco  Allergies  Meds  Med Hx  Surg Hx  Fam Hx  Soc Hx      Reviewed and updated as needed this visit by Provider       ROS:  Constitutional, HEENT, cardiovascular, pulmonary, GI, , musculoskeletal, neuro, skin, endocrine and psych systems are negative, except as otherwise noted.    This document serves as a record of the services and decisions personally performed and made by Bj Butler MD. It was created on his behalf by Troy Doty, a trained medical scribe. The creation of this document is based on the provider's statements to the medical scribe.  Troy Doty 9:27 AM September 28, 2018    OBJECTIVE:     /80  Pulse 95  Temp 99  F (37.2  C) (Oral)  Resp 16  Ht 1.651 m (5' 5\")  Wt 77.3 kg (170 lb 6.4 oz)  LMP 07/28/2017  SpO2 99%  BMI 28.36 kg/m2  Body mass index is 28.36 kg/(m^2).  GENERAL: healthy, alert and no distress  EYES: Eyes grossly normal to inspection, PERRL and conjunctivae and sclerae normal  SKIN: no suspicious lesions or rashes to visible skin  NEURO: Normal strength and tone, mentation intact and speech normal  PSYCH: mentation appears normal, affect normal/bright    Diagnostic Test Results:  No results found for this or any previous visit (from the past 24 hour(s)).    ASSESSMENT/PLAN:     (R00.0,  I95.1) POTS (postural orthostatic tachycardia syndrome)  (primary encounter diagnosis)  Comment: see office visit notes with tilt table testing results and " cardiology consultation   Plan: patient was cut off from long term disability insurance [employer] and has been recommended to pursue social security disability however in this patients case this seems inappropriate to me. The social security disability track is appropriate when we honestly feel a patient has no realistic hopes of recovery and won't be returning to work. 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 She has had a flare-up of this chronic illness and is expected to recover from this most likely within 3-6 months although we don't really have exact answers. I wrote a letter of appeal regarding the [employer] insurance decision and patient will also be discussing this situation with her sub-specialists      (Z12.31) Visit for screening mammogram  Comment:   Plan: MA SCREENING DIGITAL BILAT - Future  (s+30)            (Z11.4) Screening for HIV (human immunodeficiency virus)  Comment: postponed   Plan:     (Z23) Need for prophylactic vaccination and inoculation against influenza  Comment:   Plan:     See Patient Instructions    The information in this document, created by the medical scribe for me, accurately reflects the services I personally performed and the decisions made by me. I have reviewed and approved this document for accuracy prior to leaving the patient care area.  September 28, 2018 9:33 AM    Bj Butler MD  [facility]

## 2018-09-27 DIAGNOSIS — I47.11 INAPPROPRIATE SINUS TACHYCARDIA (H): ICD-10-CM

## 2018-09-27 RX ORDER — IVABRADINE 5 MG/1
5 TABLET, FILM COATED ORAL 2 TIMES DAILY WITH MEALS
Qty: 60 TABLET | Refills: 11 | Status: SHIPPED | OUTPATIENT
Start: 2018-09-27 | End: 2018-12-04

## 2018-09-28 ENCOUNTER — OFFICE VISIT (OUTPATIENT)
Dept: FAMILY MEDICINE | Facility: CLINIC | Age: 48
End: 2018-09-28
Payer: COMMERCIAL

## 2018-09-28 VITALS
HEIGHT: 65 IN | HEART RATE: 95 BPM | TEMPERATURE: 99 F | SYSTOLIC BLOOD PRESSURE: 124 MMHG | DIASTOLIC BLOOD PRESSURE: 80 MMHG | WEIGHT: 170.4 LBS | OXYGEN SATURATION: 99 % | BODY MASS INDEX: 28.39 KG/M2 | RESPIRATION RATE: 16 BRPM

## 2018-09-28 DIAGNOSIS — G90.A POTS (POSTURAL ORTHOSTATIC TACHYCARDIA SYNDROME): Primary | ICD-10-CM

## 2018-09-28 DIAGNOSIS — Z23 NEED FOR PROPHYLACTIC VACCINATION AND INOCULATION AGAINST INFLUENZA: ICD-10-CM

## 2018-09-28 DIAGNOSIS — Z11.4 SCREENING FOR HIV (HUMAN IMMUNODEFICIENCY VIRUS): ICD-10-CM

## 2018-09-28 DIAGNOSIS — Z12.31 VISIT FOR SCREENING MAMMOGRAM: ICD-10-CM

## 2018-09-28 PROCEDURE — 99213 OFFICE O/P EST LOW 20 MIN: CPT | Performed by: INTERNAL MEDICINE

## 2018-09-28 ASSESSMENT — PAIN SCALES - GENERAL: PAINLEVEL: NO PAIN (0)

## 2018-09-28 NOTE — LETTER
[facility]  [address]  [place] [address]  [phone]          September 28, 2018    Laura Jackson                                                                                                                     [address]  [place] [address]      Dear Laura, and to long term disability insurance     This letter accompanies this patients appeal process regarding dismissal from long term disability insurance carrier [employer] [ case number [id number] ]. Issues; this patient has a fundamental diagnosis of POTS (postural orthostatic tachycardia syndrome) . She is being followed and cared for by cardiologist and neurologist . These sub-specialists as well as myself, predict this patient is going to recover from this current bout of disabling symptoms. We foresee patient returning to work and is not an appropriate candidate for a social security disability trajectory .     Symptoms include at this point, severe fatigue and recurrence episodes of marked tachycardia with position changes or any significant exercise. Again, however, it is predicted for patient to recover and return to work ! The exact time of recovery remains uncertain . Current treatment seems promising with trials of a new medication [ Corlanor  (ivabradine) ] and potential cardiac rehabilitation .    Again, my sense here is that converting someone over to social security disability is appropriate only when we are essentially giving up on likelihood of recovery and assume no return to work would happen and I feel this is not an appropriate stance with this patient.    Sincerely,         Bj Butler MD

## 2018-09-28 NOTE — MR AVS SNAPSHOT
After Visit Summary   9/28/2018    Laura Jackson    MRN: [id number]           Patient Information     Date Of Birth          1970        Visit Information        Provider Department      9/28/2018 9:10 AM Bj Butler MD [facility]        Today's Diagnoses     POTS (postural orthostatic tachycardia syndrome)    -  1    Visit for screening mammogram        Screening for HIV (human immunodeficiency virus)        Need for prophylactic vaccination and inoculation against influenza          Care Instructions    [facility]    If you have any questions regarding to your visit please contact your care team:     Team Pink:   Clinic Hours Telephone Number   Internal Medicine:  Dr. Trisha Kang NP 7am-7pm  Monday - Thursday   7am-5pm  Fridays  [phone]  (Appointment scheduling available 24/7)   Urgent Care - [place] - 11am-9pm Monday-Friday Saturday-Sunday- 9am-5pm   [place] - 5pm-9pm Monday-Friday Saturday-Sunday- 9am-5pm  [phone] - [place]  [phone] - [place]       What options do I have for a visit other than an office visit? We offer electronic visits (e-visits) and telephone visits, when medically appropriate.  Please check with your medical insurance to see if these types of visits are covered, as you will be responsible for any charges that are not paid by your insurance.      You can use Newsreps (secure electronic communication) to access to your chart, send your primary care provider a message, or make an appointment. Ask a team member how to get started.     For a price quote for your services, please call our Consumer Price Line at [phone] or our Imaging Cost estimation line at [phone] (for imaging tests).    Catia Simmons MA            Follow-ups after your visit        Your next 10 appointments already scheduled     Oct 05, 2018 12:00 PM CDT   (Arrive by 11:45  "AM)   RETURN ARRHYTHMIA with Phil Mitchell MD   [facility] ([facility]    [address]  [address] [place] [address] [phone]            Dec 18, 2018 12:15 PM CST   Return Visit with SALAZAR Rajan CNP   [facility] ([facility])    [address] [place] [address] 2nd Floor  [facility] [address] [phone]              Who to contact     If you have questions or need follow up information about today's clinic visit or your schedule please contact [facility] directly at [phone].  Normal or non-critical lab and imaging results will be communicated to you by Baboohart, letter or phone within 4 business days after the clinic has received the results. If you do not hear from us within 7 days, please contact the clinic through Baboohart or phone. If you have a critical or abnormal lab result, we will notify you by phone as soon as possible.  Submit refill requests through Bloomz or call your pharmacy and they will forward the refill request to us. Please allow 3 business days for your refill to be completed.          Additional Information About Your Visit        BabooharBuyanihan Information     Bloomz gives you secure access to your electronic health record. If you see a primary care provider, you can also send messages to your care team and make appointments. If you have questions, please call your primary care clinic.  If you do not have a primary care provider, please call [phone] and they will assist you.        Care EveryWhere ID     This is your Care EveryWhere ID. This could be used by other organizations to access your [place] medical records  [id number]        Your Vitals Were     Pulse Temperature Respirations Height Last Period Pulse Oximetry    95 99  F (37.2  C) (Oral) 16 5' 5\" (1.651 m) 07/28/2017 99%    BMI (Body Mass Index)                   28.36 kg/m2            " Blood Pressure from Last 3 Encounters:   09/28/18 124/80   09/18/18 125/78   08/20/18 123/88    Weight from Last 3 Encounters:   09/28/18 170 lb 6.4 oz (77.3 kg)   09/18/18 171 lb (77.6 kg)   08/15/18 168 lb (76.2 kg)              Today, you had the following     No orders found for display       Primary Care Provider Office Phone # Fax #    Bj Butler [phone] [address] [address]        Equal Access to Services     [facility]: Hadii latoya ruffin hadasho Soomaali, waaxda luqadaha, qaybta kaalmada adeleandrayada, ludmila waters . So [facility] [phone].    ATENCIÓN: Si habla español, tiene a guido disposición servicios gratuitos de asistencia lingüística. [facility] [phone].    We comply with applicable federal civil rights laws and [place] laws. We do not discriminate on the basis of race, color, national origin, age, disability, sex, sexual orientation, or gender identity.            Thank you!     Thank you for choosing [facility]  for your care. Our goal is always to provide you with excellent care. Hearing back from our patients is one way we can continue to improve our services. Please take a few minutes to complete the written survey that you may receive in the mail after your visit with us. Thank you!             Your Updated Medication List - Protect others around you: Learn how to safely use, store and throw away your medicines at www.disposemymeds.org.          This list is accurate as of 9/28/18  9:49 AM.  Always use your most recent med list.                   Brand Name Dispense Instructions for use Diagnosis    diltiazem 180 MG 24 hr capsule     30 capsule    Take 1 capsule (180 mg) by mouth daily    History of supraventricular tachycardia       fluticasone-salmeterol 100-50 MCG/DOSE diskus inhaler    ADVAIR    3 Inhaler    Inhale 1 puff into the lungs 2 times daily    Mild persistent asthma without complication        ivabradine 5 MG tablet    CORLANOR    60 tablet    Take 1 tablet (5 mg) by mouth 2 times daily (with meals)    Inappropriate sinus tachycardia       levalbuterol 45 MCG/ACT Inhaler    XOPENEX HFA    1 Inhaler    Inhale 1-2 puffs into the lungs every 4 hours as needed for shortness of breath / dyspnea    Mild persistent asthma without complication       OMEPRAZOLE PO      Take 20 mg by mouth every morning        vitamin D 2000 units Caps      Take 1 capsule by mouth daily        ZANTAC PO      Take 150 mg by mouth as needed for heartburn

## 2018-09-28 NOTE — PATIENT INSTRUCTIONS
[facility] ([facility])    If you have any questions regarding to your visit please contact your care team:     Team Pink:   Clinic Hours Telephone Number   Internal Medicine:  Dr. Trisha Kang NP 7am-7pm  Monday - Thursday   7am-5pm  Fridays  [phone]  (Appointment scheduling available 24/7)   Urgent Care - [place] and [facility] - 11am-9pm Monday-Friday Saturday-Sunday- 9am-5pm   [place] - 5pm-9pm Monday-Friday Saturday-Sunday- 9am-5pm  [phone] - [place]  [phone] - [place]       What options do I have for a visit other than an office visit? We offer electronic visits (e-visits) and telephone visits, when medically appropriate.  Please check with your medical insurance to see if these types of visits are covered, as you will be responsible for any charges that are not paid by your insurance.      You can use [employer] (secure electronic communication) to access to your chart, send your primary care provider a message, or make an appointment. Ask a team member how to get started.     For a price quote for your services, please call our Consumer Price Line at [phone] or our Imaging Cost estimation line at [phone] (for imaging tests).    Catia Simmons MA

## 2018-10-01 NOTE — TELEPHONE ENCOUNTER
Per Marylou Lim:      I called the patient and will mail her a written Rx to take to the pharm of her choice to pay out of pocket.  (Routing comment     Rosanna Rodriguez, RN

## 2018-10-05 ENCOUNTER — OFFICE VISIT (OUTPATIENT)
Dept: CARDIOLOGY | Facility: CLINIC | Age: 48
End: 2018-10-05
Attending: INTERNAL MEDICINE
Payer: COMMERCIAL

## 2018-10-05 VITALS
HEIGHT: 64 IN | DIASTOLIC BLOOD PRESSURE: 85 MMHG | WEIGHT: 169 LBS | HEART RATE: 99 BPM | OXYGEN SATURATION: 96 % | BODY MASS INDEX: 28.85 KG/M2 | SYSTOLIC BLOOD PRESSURE: 121 MMHG

## 2018-10-05 DIAGNOSIS — G90.A POTS (POSTURAL ORTHOSTATIC TACHYCARDIA SYNDROME): Primary | ICD-10-CM

## 2018-10-05 PROCEDURE — G0463 HOSPITAL OUTPT CLINIC VISIT: HCPCS | Mod: ZF

## 2018-10-05 PROCEDURE — 99214 OFFICE O/P EST MOD 30 MIN: CPT | Mod: ZP | Performed by: INTERNAL MEDICINE

## 2018-10-05 ASSESSMENT — PAIN SCALES - GENERAL: PAINLEVEL: NO PAIN (0)

## 2018-10-05 NOTE — PATIENT INSTRUCTIONS
You will be scheduled for a follow up visit: 6 months with Marylou and annually with Dr Mitchell    We encourage you to use My Chart as your primary form of communication if possible. If you need assistance in setting this up, please contact our office or ask at your follow up visit.     If you need a medication refill please contact your pharmacy. Please allow at least 3 business days for your refill to be completed.       Cardiology  Telephone Number    Zulay Hardin [phone]   Or send a message to your provider via my chart.   For scheduling procedures:    [facility]    Clinic appointments       [phone]   For the Device Clinic (Pacemakers and ICD's)   RN's :   Yen Marrero  During business hours: [phone]    After business hours:   [phone]- select option 4 and ask for job code [id number].          As always, Thank you for trusting us with your health care needs!

## 2018-10-05 NOTE — NURSING NOTE
Vitals completed successfully and medication reconciled. Kylah Rodriguez MA  Chief Complaint   Patient presents with     Follow Up For     Fatimah MCMILLAN/NATALY

## 2018-10-05 NOTE — MR AVS SNAPSHOT
After Visit Summary   10/5/2018    Laura Jackson    MRN: [id number]           Patient Information     Date Of Birth          1970        Visit Information        Provider Department      10/5/2018 12:00 PM Phil Mitchell MD [facility] [place]        Today's Diagnoses     POTS (postural orthostatic tachycardia syndrome)    -  1      Care Instructions    You will be scheduled for a follow up visit: 6 months with Marylou and annually with Dr Mitchell    We encourage you to use My Chart as your primary form of communication if possible. If you need assistance in setting this up, please contact our office or ask at your follow up visit.     If you need a medication refill please contact your pharmacy. Please allow at least 3 business days for your refill to be completed.       Cardiology  Telephone Number    Zulay Hardin [phone]   Or send a message to your provider via my chart.   For scheduling procedures:    [place]    Clinic appointments       [phone]   For the Device Clinic (Pacemakers and ICD's)   RN's :   Yen Marrero  During business hours: [phone]    After business hours:   [phone]- select option 4 and ask for job code [id number].          As always, Thank you for trusting us with your health care needs!          Follow-ups after your visit        Additional Services     Follow-Up with EP Cardiac Advanced Practice Provider- 6 Months           Follow-Up with Electrophysiologist - 1 Year                 Your next 10 appointments already scheduled     Dec 18, 2018 12:15 PM CST   Return Visit with SALAZAR Rajan CNP   [facility])    [address] [place] [id number] [phone]              Future tests that were ordered for you today     Open Future Orders        Priority Expected Expires Ordered    Follow-Up with  "Electrophysiologist - 1 Year Routine 10/5/2019 10/6/2019 10/5/2018    Follow-Up with EP Cardiac Advanced Practice Provider- 6 Months Routine 4/3/2019 7/2/2019 10/5/2018            Who to contact     If you have questions or need follow up information about today's clinic visit or your schedule please contact [facility] directly at [phone].  Normal or non-critical lab and imaging results will be communicated to you by Zervanthart, letter or phone within 4 business days after the clinic has received the results. If you do not hear from us within 7 days, please contact the clinic through [employer] or phone. If you have a critical or abnormal lab result, we will notify you by phone as soon as possible.  Submit refill requests through [employer] or call your pharmacy and they will forward the refill request to us. Please allow 3 business days for your refill to be completed.          Additional Information About Your Visit        Zervanthardough Information     [employer] gives you secure access to your electronic health record. If you see a primary care provider, you can also send messages to your care team and make appointments. If you have questions, please call your primary care clinic.  If you do not have a primary care provider, please call [phone] and they will assist you.        Care EveryWhere ID     This is your Care EveryWhere ID. This could be used by other organizations to access your [place] medical records  [id number]        Your Vitals Were     Pulse Height Last Period Pulse Oximetry BMI (Body Mass Index)       99 1.626 m (5' 4\") 07/28/2017 96% 29.01 kg/m2        Blood Pressure from Last 3 Encounters:   10/05/18 121/85   09/28/18 124/80   09/18/18 125/78    Weight from Last 3 Encounters:   10/05/18 76.7 kg (169 lb)   09/28/18 77.3 kg (170 lb 6.4 oz)   09/18/18 77.6 kg (171 lb)               Primary Care Provider Office Phone # Fax #    Bj Butler [phone] [address] [facility] " [place] [address]        Equal Access to Services     [facility] Center[facility] : Hadii latoya ruffin anastacio Soandreia, waaxda luqadaha, qaybta kadariadiaz adamsmirandadiaz, ludmila santana hayleeann chappellwashingtonmary alberts. So [facility] [phone].    ATENCIÓN: Si habla español, tiene a guido disposición servicios gratuitos de asistencia lingüística. [facility] [phone].    We comply with applicable federal civil rights laws and [place] laws. We do not discriminate on the basis of race, color, national origin, age, disability, sex, sexual orientation, or gender identity.            Thank you!     Thank you for choosing [facility]  for your care. Our goal is always to provide you with excellent care. Hearing back from our patients is one way we can continue to improve our services. Please take a few minutes to complete the written survey that you may receive in the mail after your visit with us. Thank you!             Your Updated Medication List - Protect others around you: Learn how to safely use, store and throw away your medicines at www.disposemymeds.org.          This list is accurate as of 10/5/18 12:37 PM.  Always use your most recent med list.                   Brand Name Dispense Instructions for use Diagnosis    diltiazem 180 MG 24 hr capsule     30 capsule    Take 1 capsule (180 mg) by mouth daily    History of supraventricular tachycardia       fluticasone-salmeterol 100-50 MCG/DOSE diskus inhaler    ADVAIR    3 Inhaler    Inhale 1 puff into the lungs 2 times daily    Mild persistent asthma without complication       ivabradine 5 MG tablet    CORLANOR    60 tablet    Take 1 tablet (5 mg) by mouth 2 times daily (with meals)    Inappropriate sinus tachycardia       levalbuterol 45 MCG/ACT Inhaler    XOPENEX HFA    1 Inhaler    Inhale 1-2 puffs into the lungs every 4 hours as needed for shortness of breath / dyspnea    Mild persistent asthma without complication       OMEPRAZOLE PO      Take 20 mg by mouth every morning         vitamin D 2000 units Caps      Take 1 capsule by mouth daily        ZANTAC PO      Take 150 mg by mouth as needed for heartburn

## 2018-10-05 NOTE — LETTER
10/5/2018      RE: Laura Jackson  [address]  [place] [address]       Dear Colleague,    Thank you for the opportunity to participate in the care of your patient, Laura Jackson, at the [facility]. Please see a copy of my visit note below.        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    She is currently disabled from her usual work due to sense of tachycardia and fatigue when standing for long periods.      She has a past medical history significant for POTS/inappropriate sinus tachycardia, hypermobility syndrome, asthma, IBS, uterine fibroids s/p hysterectomy, and GERD.  However autonomic studies undertaken at [facility] last May did not reveal significant dysautonomia.    Patient has had cardiac evaluation by echo which was normal.  Ambulatory ECG monitor showed episodes of sinus tachycardia. She has not had episodes of syncope presyncope.      Laura states is drinking 60 oz of water/electrolyte (Propel) mix daily plus additional water.     Patient has been doing standing training and has not noted improvement in episodes. Patient is eating small meals throughout the day.  Patient is is not wearing compression shorts.     She has ordered ivabradine from [facility] but has not yet received the medication.    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        Current Outpatient Prescriptions   Medication Sig Dispense Refill     Cholecalciferol (VITAMIN D) 2000 UNITS CAPS Take 1 capsule by mouth daily       diltiazem 180 MG 24 hr capsule Take 1 capsule (180 mg) by mouth daily 30  capsule 3     fluticasone-salmeterol (ADVAIR) 100-50 MCG/DOSE diskus inhaler Inhale 1 puff into the lungs 2 times daily 3 Inhaler 1     levalbuterol (XOPENEX HFA) 45 MCG/ACT Inhaler Inhale 1-2 puffs into the lungs every 4 hours as needed for shortness of breath / dyspnea 1 Inhaler 5     OMEPRAZOLE PO Take 20 mg by mouth every morning       RaNITidine HCl (ZANTAC PO) Take 150 mg by mouth as needed for heartburn       ivabradine (CORLANOR) 5 MG tablet Take 1 tablet (5 mg) by mouth 2 times daily (with meals) (Patient not taking: Reported on 9/28/2018) 60 tablet 11       Past Medical History:   Diagnosis Date     Benign essential hypertension 7/31/2018     Family history of breast cancer 2/19/2014     Family history of breast cancer      SVT (supraventricular tachycardia):  history of, no recurrence since 2008 10/11/2013    no recurrence since 2008      Uncomplicated asthma        Past Surgical History:   Procedure Laterality Date     APPENDECTOMY       COLONOSCOPY N/A 8/20/2018    Procedure: COMBINED COLONOSCOPY, SINGLE OR MULTIPLE BIOPSY/POLYPECTOMY BY BIOPSY;;  Surgeon: Osman Hahn MD;  Location: [facility] OR     COLONOSCOPY WITH CO2 INSUFFLATION N/A 8/20/2018    Procedure: COLONOSCOPY WITH CO2 INSUFFLATION;  COLON-SCREENING / LUBKA ;  Surgeon: Osman Hahn MD;  Location: [facility] OR     DAVINCI HYSTERECTOMY TOTAL, SALPINGECTOMY BILATERAL N/A 8/4/2017    Procedure: DAVINCI XI HYSTERECTOMY TOTAL, SALPINGECTOMY BILATERAL;  DAVINCI TOTAL HYSTERECTOMY; BILATERAL SALPINGECTOMY. BILATERAL URETERAL LYSIS. UTEROSACRAL-COLPOPEXY. EXCISION OF ENDOMETRIOSIS;  Surgeon: Geogre Loyola MD;  Location: [facility] OR     DAVINCI LYSIS OF ADHESIONS Bilateral 8/4/2017    Procedure: DAVINCI LYSIS OF ADHESIONS;  BILATERAL URETERAL LYSIS;  Surgeon: George Loyola MD;  Location: [facility] OR     DAVINCI SACROCOLPOPEXY, CYSTOSCOPY, COMBINED N/A 8/4/2017    Procedure: COMBINED DAVINCI SACROCOLPOPEXY, CYSTOSCOPY;  UTEROSACRAL COLPOPEXY;   "Surgeon: George Loyola MD;  Location: [facility] OR     DAVINCI XI ASSISTED ABLATION / EXCISION OF ENDOMETRIOSIS  8/4/2017    Procedure: DAVINCI XI ASSISTED ABLATION / EXCISION OF ENDOMETRIOSIS;;  Surgeon: George Loyola MD;  Location: [facility] OR     DILATION AND CURETTAGE N/A 6/20/2017    Procedure: DILATION AND CURETTAGE;  Uterine Curettings and Fibroid Removal, Cook catheter placement; (No hysterectomy done at this time);  Surgeon: Ernestina Saunders MD;  Location: [facility] OR     HYSTERECTOMY, PAP NO LONGER INDICATED       TONSILLECTOMY         Family History   Problem Relation Age of Onset     Diabetes Mother      Hypertension Mother      Hyperlipidemia Mother      Arrhythmia Mother      Cardiovascular Father      CHF and COPD     Asthma Father      Breast Cancer Maternal Grandfather      Colon Cancer Maternal Grandfather      Breast Cancer Paternal Grandmother      Breast Cancer Paternal Aunt      C.A.D. Paternal Grandfather      Breast Cancer Cousin      Asthma Son      Diabetes Maternal Grandmother      Hypertension Maternal Grandmother      Breast Cancer Cousin      Breast Cancer Cousin      Breast Cancer Cousin        Social History   Substance Use Topics     Smoking status: Never Smoker     Smokeless tobacco: Never Used     Alcohol use Yes      Comment: rare, two sips every six months       Allergies   Allergen Reactions     Penicillins Swelling     Swelling throat; age 4     Tetracycline      Other reaction(s): Abdominal Pain  Vomiting; nauseous         ROS:   A complete review of systems was performed and is negative except as noted in the HPI.    Physical Examination:  Vitals: /85 (BP Location: Left arm, Patient Position: Standing, Cuff Size: Adult Regular)  Pulse 99  Ht 1.626 m (5' 4\")  Wt 76.7 kg (169 lb)  LMP 07/28/2017  SpO2 96%  BMI 29.01 kg/m2  BMI= Body mass index is 29.01 kg/(m^2).    GENERAL APPEARANCE: healthy, alert, and no acute distress  HEENT: no icterus, no xanthelasmas, normal pupil size " and reaction, no cyanosis.  NECK: no asymmetry, no cervical bruits, no JVD   RESPIRATORY: lungs clear to auscultation - no rales, rhonchi or wheezes, no use of accessory muscles, no retractions, respirations are unlabored, normal respiratory rate  CARDIOVASCULAR: regular rhythm, normal S1 with physiologic split S2, no S3 or S4 and no murmur, click or rub  GI:  no abdominal bruits, soft, non-tender  EXTREMITIES: no clubbing, cyanosis, or edema  NEURO: alert and oriented to person/place/time, normal speech, gait and affect  VASC: Radial and posterior tibialis pulses +2 and symmetric bilaterally. No cyanosis. No edema  SKIN: no ecchymoses, no rashes    Laboratory:  Lab Results   Component Value Date    WBC 7.5 04/29/2018    RBC 4.72 04/29/2018    HGB 14.3 04/29/2018    HCT 42.1 04/29/2018    MCV 89 04/29/2018    MCH 30.3 04/29/2018    MCHC 34.0 04/29/2018    RDW 13.2 04/29/2018     04/29/2018     Lab Results   Component Value Date     04/29/2018    POTASSIUM 3.6 04/29/2018    GLC 95 04/29/2018    BUN 16 04/29/2018    CR 0.87 04/29/2018    GFRESTIMATED 69 04/29/2018    GFRESTBLACK 84 04/29/2018    AZAEL 8.8 04/29/2018      Lab Results   Component Value Date    INR 1.12 06/20/2017    INR 1.12 06/20/2017     No results found for: CKTOTAL, CKMB, TROPN  Cholesterol (mg/dL)   Date Value   09/11/2017 194   07/20/2011 189     HDL Cholesterol (mg/dL)   Date Value   09/11/2017 65   07/20/2011 65     LDL Cholesterol Calculated (mg/dL)   Date Value   09/11/2017 110 (H)   07/20/2011 110     Assessment and recommendations:  1.  Pots physiology without underlying evidence of significant autonomic dysfunction    2.  Reported rapid heart beating with moderate physical activity that limits her from returning to work and causes excessive fatigue.  We did discuss the role of recumbent exercise, fluid intake including changing propel to Pedialyte, and maintaining high level of dietary salt.  I pointed out that recovery from her  symptoms may take several years and would rely primarily on the exercise component of her treatment prescription.  However, ivabradine may be useful as a means of allowing her to be more active and exercise more vigorously.  Today I recommended that she find indoor swimming and rolling exercise facility.    We will follow-up Nita in the clinic at [place] with Marylou Lim CNP in about 6 months time.  Absent substantial worsening of her symptoms I would reassess her in this clinic in about 1 year      I very much appreciated the opportunity to see and assess Laura Jackson in the clinic today . Please do not hesitate to contact my office if you have any questions or concerns.      Phil Mitchell MD  Cardiac Arrhythmia Service  [facility]  [phone]

## 2018-10-05 NOTE — PROGRESS NOTES
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    She is currently disabled from her usual work due to sense of tachycardia and fatigue when standing for long periods.      She has a past medical history significant for POTS/inappropriate sinus tachycardia, hypermobility syndrome, asthma, IBS, uterine fibroids s/p hysterectomy, and GERD.  However autonomic studies undertaken at  last May did not reveal significant dysautonomia.    Patient has had cardiac evaluation by echo which was normal.  Ambulatory ECG monitor showed episodes of sinus tachycardia. She has not had episodes of syncope presyncope.      Laura states is drinking 60 oz of water/electrolyte (Propel) mix daily plus additional water.     Patient has been doing standing training and has not noted improvement in episodes. Patient is eating small meals throughout the day.  Patient is is not wearing compression shorts.     She has ordered ivabradine from [facility] but has not yet received the medication.    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        Current Outpatient Prescriptions   Medication Sig Dispense Refill     Cholecalciferol (VITAMIN D) 2000 UNITS CAPS Take 1 capsule by mouth daily       diltiazem 180 MG 24 hr capsule Take 1 capsule (180 mg) by mouth daily 30 capsule 3     fluticasone-salmeterol (ADVAIR) 100-50 MCG/DOSE diskus inhaler Inhale 1 puff into the lungs 2 times daily 3 Inhaler 1     levalbuterol (XOPENEX HFA) 45 MCG/ACT Inhaler Inhale 1-2 puffs into the lungs every 4 hours as needed for shortness of breath / dyspnea 1 Inhaler 5     OMEPRAZOLE PO Take 20 mg by  mouth every morning       RaNITidine HCl (ZANTAC PO) Take 150 mg by mouth as needed for heartburn       ivabradine (CORLANOR) 5 MG tablet Take 1 tablet (5 mg) by mouth 2 times daily (with meals) (Patient not taking: Reported on 9/28/2018) 60 tablet 11       Past Medical History:   Diagnosis Date     Benign essential hypertension 7/31/2018     Family history of breast cancer 2/19/2014     Family history of breast cancer      SVT (supraventricular tachycardia):  history of, no recurrence since 2008 10/11/2013    no recurrence since 2008      Uncomplicated asthma        Past Surgical History:   Procedure Laterality Date     APPENDECTOMY       COLONOSCOPY N/A 8/20/2018    Procedure: COMBINED COLONOSCOPY, SINGLE OR MULTIPLE BIOPSY/POLYPECTOMY BY BIOPSY;;  Surgeon: Osman Hahn MD;  Location: [facility]     COLONOSCOPY WITH CO2 INSUFFLATION N/A 8/20/2018    Procedure: COLONOSCOPY WITH CO2 INSUFFLATION;  COLON-SCREENING / LUBKA ;  Surgeon: Osman Hahn MD;  Location: [facility]     DAVINCI HYSTERECTOMY TOTAL, SALPINGECTOMY BILATERAL N/A 8/4/2017    Procedure: DAVINCI XI HYSTERECTOMY TOTAL, SALPINGECTOMY BILATERAL;  DAVINCI TOTAL HYSTERECTOMY; BILATERAL SALPINGECTOMY. BILATERAL URETERAL LYSIS. UTEROSACRAL-COLPOPEXY. EXCISION OF ENDOMETRIOSIS;  Surgeon: George Loyola MD;  Location:  OR     DAVINCI LYSIS OF ADHESIONS Bilateral 8/4/2017    Procedure: DAVINCI LYSIS OF ADHESIONS;  BILATERAL URETERAL LYSIS;  Surgeon: George Loyola MD;  Location:  OR     DAVINCI SACROCOLPOPEXY, CYSTOSCOPY, COMBINED N/A 8/4/2017    Procedure: COMBINED DAVINCI SACROCOLPOPEXY, CYSTOSCOPY;  UTEROSACRAL COLPOPEXY;  Surgeon: George Loyola MD;  Location:  OR     DAVINCI XI ASSISTED ABLATION / EXCISION OF ENDOMETRIOSIS  8/4/2017    Procedure: DAVINCI XI ASSISTED ABLATION / EXCISION OF ENDOMETRIOSIS;;  Surgeon: George Loyola MD;  Location:  OR     DILATION AND CURETTAGE N/A 6/20/2017    Procedure: DILATION AND  "CURETTAGE;  Uterine Curettings and Fibroid Removal, Cook catheter placement; (No hysterectomy done at this time);  Surgeon: Ernestina Saunders MD;  [facility]: [facility]     HYSTERECTOMY, PAP NO LONGER INDICATED       TONSILLECTOMY         Family History   Problem Relation Age of Onset     Diabetes Mother      Hypertension Mother      Hyperlipidemia Mother      Arrhythmia Mother      Cardiovascular Father      CHF and COPD     Asthma Father      Breast Cancer Maternal Grandfather      Colon Cancer Maternal Grandfather      Breast Cancer Paternal Grandmother      Breast Cancer Paternal Aunt      C.A.D. Paternal Grandfather      Breast Cancer Cousin      Asthma Son      Diabetes Maternal Grandmother      Hypertension Maternal Grandmother      Breast Cancer Cousin      Breast Cancer Cousin      Breast Cancer Cousin        Social History   Substance Use Topics     Smoking status: Never Smoker     Smokeless tobacco: Never Used     Alcohol use Yes      Comment: rare, two sips every six months       Allergies   Allergen Reactions     Penicillins Swelling     Swelling throat; age 4     Tetracycline      Other reaction(s): Abdominal Pain  Vomiting; nauseous         ROS:   A complete review of systems was performed and is negative except as noted in the HPI.    Physical Examination:  Vitals: /85 (BP Location: Left arm, Patient Position: Standing, Cuff Size: Adult Regular)  Pulse 99  Ht 1.626 m (5' 4\")  Wt 76.7 kg (169 lb)  LMP 07/28/2017  SpO2 96%  BMI 29.01 kg/m2  BMI= Body mass index is 29.01 kg/(m^2).    GENERAL APPEARANCE: healthy, alert, and no acute distress  HEENT: no icterus, no xanthelasmas, normal pupil size and reaction, no cyanosis.  NECK: no asymmetry, no cervical bruits, no JVD   RESPIRATORY: lungs clear to auscultation - no rales, rhonchi or wheezes, no use of accessory muscles, no retractions, respirations are unlabored, normal respiratory rate  CARDIOVASCULAR: regular rhythm, normal S1 with physiologic split " S2, no S3 or S4 and no murmur, click or rub  GI:  no abdominal bruits, soft, non-tender  EXTREMITIES: no clubbing, cyanosis, or edema  NEURO: alert and oriented to person/place/time, normal speech, gait and affect  VASC: Radial and posterior tibialis pulses +2 and symmetric bilaterally. No cyanosis. No edema  SKIN: no ecchymoses, no rashes    Laboratory:  Lab Results   Component Value Date    WBC 7.5 04/29/2018    RBC 4.72 04/29/2018    HGB 14.3 04/29/2018    HCT 42.1 04/29/2018    MCV 89 04/29/2018    MCH 30.3 04/29/2018    MCHC 34.0 04/29/2018    RDW 13.2 04/29/2018     04/29/2018     Lab Results   Component Value Date     04/29/2018    POTASSIUM 3.6 04/29/2018    GLC 95 04/29/2018    BUN 16 04/29/2018    CR 0.87 04/29/2018    GFRESTIMATED 69 04/29/2018    GFRESTBLACK 84 04/29/2018    AZAEL 8.8 04/29/2018      Lab Results   Component Value Date    INR 1.12 06/20/2017    INR 1.12 06/20/2017     No results found for: CKTOTAL, CKMB, TROPN  Cholesterol (mg/dL)   Date Value   09/11/2017 194   07/20/2011 189     HDL Cholesterol (mg/dL)   Date Value   09/11/2017 65   07/20/2011 65     LDL Cholesterol Calculated (mg/dL)   Date Value   09/11/2017 110 (H)   07/20/2011 110     Assessment and recommendations:  1.  Pots physiology without underlying evidence of significant autonomic dysfunction    2.  Reported rapid heart beating with moderate physical activity that limits her from returning to work and causes excessive fatigue.  We did discuss the role of recumbent exercise, fluid intake including changing propel to Pedialyte, and maintaining high level of dietary salt.  I pointed out that recovery from her symptoms may take several years and would rely primarily on the exercise component of her treatment prescription.  However, ivabradine may be useful as a means of allowing her to be more active and exercise more vigorously.  Today I recommended that she find indoor swimming and rolling exercise facility.    We  will follow-up Nita in the clinic at [place] with Marylou Lim CNP in about 6 months time.  Absent substantial worsening of her symptoms I would reassess her in this clinic in about 1 year      I very much appreciated the opportunity to see and assess Laura Jackson in the clinic today . Please do not hesitate to contact my office if you have any questions or concerns.      Phil Mitchell MD  Cardiac Arrhythmia Service  [facility]  [phone]

## 2018-10-16 ENCOUNTER — TELEPHONE (OUTPATIENT)
Dept: FAMILY MEDICINE | Facility: CLINIC | Age: 48
End: 2018-10-16

## 2018-10-16 NOTE — TELEPHONE ENCOUNTER
Reason for Call:  Other call back    Detailed comments: Patient called wanting the new workability form filled out and faxed to [phone] and [phone] claim number [id number]. Starting on 10/21/18 for 3 months.     Phone Number Patient can be reached at: Cell number on file:    Telephone Information:   Mobile [phone]       Best Time: any    Can we leave a detailed message on this number? YES    Call taken on 10/16/2018 at 10:55 AM by Soumya Rodriguez

## 2018-10-22 NOTE — TELEPHONE ENCOUNTER
Work ability form completed, signed and faxed to [phone].  Copy also faxed to Munira Solomon at [phone].  Pam Taveras,

## 2018-12-03 ENCOUNTER — TELEPHONE (OUTPATIENT)
Dept: CARDIOLOGY | Facility: CLINIC | Age: 48
End: 2018-12-03

## 2018-12-03 DIAGNOSIS — I47.11 INAPPROPRIATE SINUS TACHYCARDIA (H): ICD-10-CM

## 2018-12-03 DIAGNOSIS — Z86.79 HISTORY OF SUPRAVENTRICULAR TACHYCARDIA: ICD-10-CM

## 2018-12-03 NOTE — TELEPHONE ENCOUNTER
[facility] Call Center    Phone Message    May a detailed message be left on voicemail: yes    Reason for Call: Other: [facility] faxed over Drug interaction form and is waiting for the form to be faxed back before they can release the medication. Please fax the form over to [phone]. Please call Reny with any questions.      Action Taken: Message routed to:  [facility] ([facility]):  cardio       Called patient. Discussed with patient that she will be stopping the diltiazem when she is ready to start taking the ivabradine.  She will not be taking both medications at the same time.  Patient verbalizes understanding and agreement with plan.     Marylou Lim CNP

## 2018-12-04 ENCOUNTER — TELEPHONE (OUTPATIENT)
Dept: INTERNAL MEDICINE | Facility: CLINIC | Age: 48
End: 2018-12-04

## 2018-12-04 RX ORDER — IVABRADINE 5 MG/1
5 TABLET, FILM COATED ORAL 2 TIMES DAILY WITH MEALS
Qty: 180 TABLET | Refills: 3 | Status: SHIPPED | OUTPATIENT
Start: 2018-12-04 | End: 2019-08-26

## 2018-12-04 NOTE — TELEPHONE ENCOUNTER
The prior authorization phone # IS [phone] HER ID # is [id number] and her insurance is [employer] ([employer])

## 2018-12-05 RX ORDER — DILTIAZEM HYDROCHLORIDE 180 MG/1
180 CAPSULE, COATED, EXTENDED RELEASE ORAL DAILY
Qty: 30 CAPSULE | Refills: 0 | Status: SHIPPED | OUTPATIENT
Start: 2018-12-05 | End: 2019-01-22

## 2018-12-05 NOTE — TELEPHONE ENCOUNTER
Prior Authorization Retail Medication Request    Medication/Dose: corlanor 5mg needs a prior authorization see documentation section for notes  ICD code (if different than what is on RX):  R000  Previously Tried and Failed:  Unknown, pa has been tried before  Rationale:  unknown    Insurance Name:  [employer] [place]  Insurance ID:  [id number]      Pharmacy Information (if different than what is on RX)  Name:  [facility]   Phone:  [phone]

## 2018-12-05 NOTE — TELEPHONE ENCOUNTER
Patient is requesting diltiazem. Routing to cardiology.    Routing refill request to provider for review/approval because:  Drug not active on patient's medication list  A break in medication    Shelia Myles RN    She will stop diltiazem when she gets her ivabradine. Ordered her diltiazem to cover until she gets the ivabradine mail order. No refills on diltiazem.    Marylou Lim

## 2018-12-05 NOTE — TELEPHONE ENCOUNTER
Please write a letter of medical necessity and we will submit it to the insurance along with any supporting information that they require.      Thanks,    Central Prior Authorization Team   [phone]

## 2018-12-05 NOTE — TELEPHONE ENCOUNTER
Called [facility] to initiate a prior authorization but the rep informed me that a PA was already initiated and denies in September and the next step would be to appeal the denial.    appeal initiated:  Attn:  [facility] Clinical Review  Fax:  [phone]  Reference # [id number]  Clinical notes and tests attached.    Rosanna Rodriguez RN  Care Coordinator  [facility]  [phone]

## 2018-12-06 NOTE — TELEPHONE ENCOUNTER
MEDICATION APPEAL APPROVED    Medication: corlanor 5mg needs a prior authorization see documentation section for notes  Authorization Effective Date: 12/6/2018  Authorization Expiration Date: 12/6/2019  Approved Dose/Quantity:  Reference #:     Insurance Company: [employer] - Phone [phone] Fax [phone]  Expected CoPay:       CoPay Card Available:      Foundation Assistance Needed:    Which Pharmacy is filling the prescription (Not needed for infusion/clinic administered): [facility] - [address]

## 2018-12-06 NOTE — TELEPHONE ENCOUNTER
PA has been approved; pharmacy informed and they will inform the patient. Please close encounter if finished.     Thanks,     Magi Palmer Prior Authorization Team   [phone]   (Routing comment    NOTED   Rosanna Rodriguez RN

## 2019-01-21 NOTE — PROGRESS NOTES
"    Heart Care - Clinical Cardiac Electrophysiology       HPI:   Laura Jackson is a 48 year old female who presents for follow up of POTS/inappropriate sinus tachycardia and hypertension.  The patient has a past medical history significant for POTS/inappropriate sinus tachycardia, hypermobility syndrome, asthma, IBS, uterine fibroids s/p hysterectomy, and GERD. Last summer (2017) she underwent a hysterectomy for severe vaginal bleeding and during this time she started having symptoms of palpitations, fatigue, flushing, nausea, and lightheadedness. She also started having numbness, tingling and her fingers would turn white so she was diagnosed with Raynaud's. She was started on diltiazem and stated that her finger symptoms have greatly improved. Of note, patient had very similar symptoms 6 years ago when she had a viral infection and she states that her symptoms took around 2 years before she felt back to baseline. ECHO was normal. Ziopatch showed episodes of sinus tachycardia. Tilt was done at an outside facility and showed normal responses except during deep breathing exercises but she states that it was very cold in the room where the test was done and that she usually doesn't have a problem with tachycardia when it is cold. Work up for mast cell disorder was normal. She trailed metoprolol XL 25 mg daily for 4 weeks and was not able to tolerate due to extreme fatigue and brain \"fogginess\".  Ivabradine was initiated for tachycardia. 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     Reviewed current interval:  - Orthostatic BPs today: supine 124/81 HR 72, sitting 127/87  HR75 a little lightheaded, standing 145/87 HR 77 no symptoms, standing 1 minute 139/88 HR 74 no symptoms, standing 3 minutes 129/87 HR 76 no symptoms  - Presenting EKG personally reviewed tracings: NSR, vent rate 71 bpm, OK interval 158 ms, QRS duration 86 ms, QTc 419 " ms.    Current interval history:  Patient states that she was able to start taking the ivabradine a month ago, she stopped her diltiazem when she started the ivabradine.  She notes that her symptoms have not complete resolved but have greatly improved and that her heart rates have decreased from the 100s to the 70s on her Fitbit immediately after starting the ivabradine and have remained improved. States that she has a cold/flu-like symptoms for the past 3 weeks which is now improving. She has had one episode of racing heart and palpitations which occurred a week ago after taking cold medication, lasted about 2.5 hours and then resolved. States she plans to start back to work with light duty in February and increase to regular duty as symptoms allow. She works as an ICU nurse.   States she is drinking 50-60 oz of water/electrolyte mix daily.  Patient has not had problems taking medication.  Activity level is moderate, she has been slowly increasing her exercise over the past month although her progress has been limited by her URI. Denies chest pain or pressure, pedal, PND, orthopnea, or claudication.     Current Outpatient Medications   Medication Sig Dispense Refill     Cholecalciferol (VITAMIN D) 2000 UNITS CAPS Take 1 capsule by mouth daily       diltiazem ER COATED BEADS (CARDIZEM CD/CARTIA XT) 180 MG 24 hr capsule Take 1 capsule (180 mg) by mouth daily 30 capsule 0     fluticasone-salmeterol (ADVAIR) 100-50 MCG/DOSE diskus inhaler Inhale 1 puff into the lungs 2 times daily 3 Inhaler 1     ivabradine (CORLANOR) 5 MG tablet Take 1 tablet (5 mg) by mouth 2 times daily (with meals) 180 tablet 3     levalbuterol (XOPENEX HFA) 45 MCG/ACT Inhaler Inhale 1-2 puffs into the lungs every 4 hours as needed for shortness of breath / dyspnea 1 Inhaler 5     OMEPRAZOLE PO Take 20 mg by mouth every morning       RaNITidine HCl (ZANTAC PO) Take 150 mg by mouth as needed for heartburn         Past Medical History:   Diagnosis  Date     Benign essential hypertension 7/31/2018     Family history of breast cancer 2/19/2014     Family history of breast cancer      SVT (supraventricular tachycardia):  history of, no recurrence since 2008 10/11/2013    no recurrence since 2008      Uncomplicated asthma        Past Surgical History:   Procedure Laterality Date     APPENDECTOMY       COLONOSCOPY N/A 8/20/2018    Procedure: COMBINED COLONOSCOPY, SINGLE OR MULTIPLE BIOPSY/POLYPECTOMY BY BIOPSY;;  Surgeon: Osman Hahn MD;  Location: [facility] OR     COLONOSCOPY WITH CO2 INSUFFLATION N/A 8/20/2018    Procedure: COLONOSCOPY WITH CO2 INSUFFLATION;  COLON-SCREENING / LUBKA ;  Surgeon: Osman Hahn MD;  Location: [facility] OR     DAVINCI HYSTERECTOMY TOTAL, SALPINGECTOMY BILATERAL N/A 8/4/2017    Procedure: DAVINCI XI HYSTERECTOMY TOTAL, SALPINGECTOMY BILATERAL;  DAVINCI TOTAL HYSTERECTOMY; BILATERAL SALPINGECTOMY. BILATERAL URETERAL LYSIS. UTEROSACRAL-COLPOPEXY. EXCISION OF ENDOMETRIOSIS;  Surgeon: George Loyola MD;  Location: [facility] OR     DAVINCI LYSIS OF ADHESIONS Bilateral 8/4/2017    Procedure: DAVINCI LYSIS OF ADHESIONS;  BILATERAL URETERAL LYSIS;  Surgeon: George Loyola MD;  Location: [facility] OR     DAVINCI SACROCOLPOPEXY, CYSTOSCOPY, COMBINED N/A 8/4/2017    Procedure: COMBINED DAVINCI SACROCOLPOPEXY, CYSTOSCOPY;  UTEROSACRAL COLPOPEXY;  Surgeon: George Loyola MD;  Location: [facility] OR     DAVINCI XI ASSISTED ABLATION / EXCISION OF ENDOMETRIOSIS  8/4/2017    Procedure: DAVINCI XI ASSISTED ABLATION / EXCISION OF ENDOMETRIOSIS;;  Surgeon: George Loyola MD;  Location: [facility] OR     DILATION AND CURETTAGE N/A 6/20/2017    Procedure: DILATION AND CURETTAGE;  Uterine Curettings and Fibroid Removal, Cook catheter placement; (No hysterectomy done at this time);  Surgeon: Ernestina Saunders MD;  Location: [facility] OR     HYSTERECTOMY, PAP NO LONGER INDICATED       TONSILLECTOMY         Family History   Problem Relation Age of Onset     Diabetes  Mother      Hypertension Mother      Hyperlipidemia Mother      Arrhythmia Mother      Cardiovascular Father         CHF and COPD     Asthma Father      Breast Cancer Maternal Grandfather      Colon Cancer Maternal Grandfather      Breast Cancer Paternal Grandmother      Breast Cancer Paternal Aunt      C.A.D. Paternal Grandfather      Breast Cancer Cousin      Asthma Son      Diabetes Maternal Grandmother      Hypertension Maternal Grandmother      Breast Cancer Cousin      Breast Cancer Cousin      Breast Cancer Cousin        Social History     Tobacco Use     Smoking status: Never Smoker     Smokeless tobacco: Never Used   Substance Use Topics     Alcohol use: Yes     Comment: rare, two sips every six months       Allergies   Allergen Reactions     Penicillins Swelling     Swelling throat; age 4     Tetracycline      Other reaction(s): Abdominal Pain  Vomiting; nauseous         ROS:   A complete review of systems was performed and is negative except as noted in the HPI.    Physical Examination:  Vitals: LMP 07/28/2017   BMI= There is no height or weight on file to calculate BMI.    GENERAL APPEARANCE: healthy, alert, and no acute distress  HEENT: no icterus, no xanthelasmas, normal pupil size and reaction, no cyanosis.  NECK: no asymmetry, no cervical bruits, no JVD   RESPIRATORY: Cough noted in clinic. Lungs clear to auscultation - no rales, rhonchi or wheezes, no use of accessory muscles, no retractions, respirations are unlabored, normal respiratory rate  CARDIOVASCULAR: regular rhythm, normal S1 with physiologic split S2, no S3 or S4 and no murmur, click or rub  GI:  no abdominal bruits, soft, non-tender  EXTREMITIES: no clubbing, cyanosis, or edema  NEURO: alert and oriented to person/place/time, normal speech, gait and affect  VASC: Radial and posterior tibialis pulses +2 and symmetric bilaterally. No cyanosis. No edema  SKIN: no ecchymoses, no rashes    Assessment and recommendations:    #1.  POTS/Inappropriate sinus tachycardia:   1. Try to drink 50-60 ounces of 50/50 electrolyte fluids/water mix per day.  Examples: Propel or Pedialyte. Gatorade and Powerade are higher in sugar/calories and are okay in moderation or if you are an athlete.   2. Continue initiate ivabradine 5 mg twice daily for inappropriate sinus tachycardia. She stopped the diltiazem a month ago when she started the ivabradine, will not restart now as her heart rates are improved but can consider in the future if she needs duel therapy to control heart rate.    4. Isometric exercises.  These exercises will assist in increasing intravascular pressure. They also include swimming, rowing, recumbent bike. Max heart rate during exercise is 170 bpm.   5. Try to eat six small meals per day. Try to keep these meals low in carbohydrates and low in gluten.   6. Bike compression shorts or Spanx- use as much as possible, especially when standing for longer periods of time.   7. During hot summer hours, try to stay in cool -air conditioned climates.  8. Orthostats and EKG done today normal.      #2. Hypertension: Counseled patient on risks of uncontrolled blood pressure and treatment options and risks.   1. Medications: Her BPs have been normal. None at this time, consider restarting diltiazem 180 mg daily, take in the evening, if needed in the future for blood pressure control.       FOLLOW UP:  Follow up in 6 months or follow up sooner as needed for symptoms.    Patient expresses understanding and agreement with the plan.    I appreciate the chance to help with Laura Jackson 's care. Please let me know if you have any questions or concerns.    Marylou LINCOLN, NP-C

## 2019-01-22 ENCOUNTER — OFFICE VISIT (OUTPATIENT)
Dept: CARDIOLOGY | Facility: CLINIC | Age: 49
End: 2019-01-22
Attending: NURSE PRACTITIONER

## 2019-01-22 ENCOUNTER — TELEPHONE (OUTPATIENT)
Dept: FAMILY MEDICINE | Facility: CLINIC | Age: 49
End: 2019-01-22

## 2019-01-22 DIAGNOSIS — I10 BENIGN ESSENTIAL HYPERTENSION: ICD-10-CM

## 2019-01-22 DIAGNOSIS — G90.A POTS (POSTURAL ORTHOSTATIC TACHYCARDIA SYNDROME): ICD-10-CM

## 2019-01-22 DIAGNOSIS — I47.11 INAPPROPRIATE SINUS TACHYCARDIA (H): Primary | ICD-10-CM

## 2019-01-22 PROCEDURE — 99213 OFFICE O/P EST LOW 20 MIN: CPT | Performed by: NURSE PRACTITIONER

## 2019-01-22 PROCEDURE — 93000 ELECTROCARDIOGRAM COMPLETE: CPT | Performed by: NURSE PRACTITIONER

## 2019-01-22 NOTE — PATIENT INSTRUCTIONS
Thank you for coming to the [facility]; please note the following instructions:    1. Inappropriate sinus tachycardia:   1. Try to drink 50-60 ounces of 50/50 electrolyte fluids/water mix per day.  Examples: Propel or Pedialyte. Gatorade and Powerade are higher in sugar/calories and are okay in moderation or if you are an athlete.   2. Continue ivabradine 5 mg twice daily for inappropriate sinus tachycardia.  Can consider restarting diltiazem in the future if you need duel therapy to control heart rate.    4. Isometric exercises.  These exercises will assist in increasing intravascular pressure. They also include swimming, rowing, recumbent bike. Max heart rate during exercise is 170 bpm.   5. Try to eat six small meals per day. Try to keep these meals low in carbohydrates and low in gluten.   6. Bike compression shorts or Spanx- use as much as possible, especially when standing for longer periods of time.   7. During hot summer hours, try to stay in cool -air conditioned climates.     #2. Hypertension:  1. Your BPs have been normal. No meds at this time, consider restarting diltiazem 180 mg daily (in the evening) if needed in the future for blood pressure control.       FOLLOW UP:  Follow up in 6 months or follow up sooner as needed for symptoms.    If you have any questions regarding your visit please contact your care team:     Cardiology  Telephone Number   Rosanna WRIGHT, JAS SANDOVAL, JAS MURRELL, OSEI DUBON MA   [phone]    *After hours: [phone]   For scheduling appts:     [phone] or    [phone] (select option 1)    *After hours: [phone]       Normal test result notifications will be released via [employer] or mailed within 7 business days.  All other test results, will be communicated via telephone once reviewed by your cardiologist.    If you need a medication refill please contact your pharmacy.  Please allow 3 business days for your refill to be  completed.    As always, thank you for trusting us with your health care needs!

## 2019-01-22 NOTE — TELEPHONE ENCOUNTER
Reason for Call:  Form, our goal is to have forms completed with 72 hours, however, some forms may require a visit or additional information.    Type of letter, form or note:  Workability    Who is the form from?: Patient    Where did the form come from: Patient or family brought in       What clinic location was the form placed at?: [facility]    Where the form was placed: 's Box    What number is listed as a contact on the form?: [phone] Fax        Additional comments: N/A    Call taken on 1/22/2019 at 1:36 PM by Vivienne Bass

## 2019-01-22 NOTE — LETTER
"1/22/2019      RE: Laura Jackson  [address]  [place] [address]       Dear Colleague,    Thank you for the opportunity to participate in the care of your patient, Laura Jackson, at the [facility] HEART AT [facility]. Please see a copy of my visit note below.        Heart Care - Clinical Cardiac Electrophysiology       HPI:   Laura Jackson is a 48 year old female who presents for follow up of POTS/inappropriate sinus tachycardia and hypertension.  The patient has a past medical history significant for POTS/inappropriate sinus tachycardia, hypermobility syndrome, asthma, IBS, uterine fibroids s/p hysterectomy, and GERD. Last summer (2017) she underwent a hysterectomy for severe vaginal bleeding and during this time she started having symptoms of palpitations, fatigue, flushing, nausea, and lightheadedness. She also started having numbness, tingling and her fingers would turn white so she was diagnosed with Raynaud's. She was started on diltiazem and stated that her finger symptoms have greatly improved. Of note, patient had very similar symptoms 6 years ago when she had a viral infection and she states that her symptoms took around 2 years before she felt back to baseline. ECHO was normal. Ziopatch showed episodes of sinus tachycardia. Tilt was done at an outside facility and showed normal responses except during deep breathing exercises but she states that it was very cold in the room where the test was done and that she usually doesn't have a problem with tachycardia when it is cold. Work up for mast cell disorder was normal. She trailed metoprolol XL 25 mg daily for 4 weeks and was not able to tolerate due to extreme fatigue and brain \"fogginess\".  Ivabradine was initiated for tachycardia. 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 "     Reviewed current interval:  - Orthostatic BPs today: supine 124/81 HR 72, sitting 127/87  HR75 a little lightheaded, standing 145/87 HR 77 no symptoms, standing 1 minute 139/88 HR 74 no symptoms, standing 3 minutes 129/87 HR 76 no symptoms  - Presenting EKG personally reviewed tracings: NSR, vent rate 71 bpm, SC interval 158 ms, QRS duration 86 ms, QTc 419 ms.    Current interval history:  Patient states that she was able to start taking the ivabradine a month ago, she stopped her diltiazem when she started the ivabradine.  She notes that her symptoms have not complete resolved but have greatly improved and that her heart rates have decreased from the 100s to the 70s on her Fitbit immediately after starting the ivabradine and have remained improved. States that she has a cold/flu-like symptoms for the past 3 weeks which is now improving. She has had one episode of racing heart and palpitations which occurred a week ago after taking cold medication, lasted about 2.5 hours and then resolved. States she plans to start back to work with light duty in February and increase to regular duty as symptoms allow. She works as an ICU nurse.   States she is drinking 50-60 oz of water/electrolyte mix daily.  Patient has not had problems taking medication.  Activity level is moderate, she has been slowly increasing her exercise over the past month although her progress has been limited by her URI. Denies chest pain or pressure, pedal, PND, orthopnea, or claudication.     Current Outpatient Medications   Medication Sig Dispense Refill     Cholecalciferol (VITAMIN D) 2000 UNITS CAPS Take 1 capsule by mouth daily       diltiazem ER COATED BEADS (CARDIZEM CD/CARTIA XT) 180 MG 24 hr capsule Take 1 capsule (180 mg) by mouth daily 30 capsule 0     fluticasone-salmeterol (ADVAIR) 100-50 MCG/DOSE diskus inhaler Inhale 1 puff into the lungs 2 times daily 3 Inhaler 1     ivabradine (CORLANOR) 5 MG tablet Take 1 tablet (5 mg) by mouth 2  times daily (with meals) 180 tablet 3     levalbuterol (XOPENEX HFA) 45 MCG/ACT Inhaler Inhale 1-2 puffs into the lungs every 4 hours as needed for shortness of breath / dyspnea 1 Inhaler 5     OMEPRAZOLE PO Take 20 mg by mouth every morning       RaNITidine HCl (ZANTAC PO) Take 150 mg by mouth as needed for heartburn         Past Medical History:   Diagnosis Date     Benign essential hypertension 7/31/2018     Family history of breast cancer 2/19/2014     Family history of breast cancer      SVT (supraventricular tachycardia):  history of, no recurrence since 2008 10/11/2013    no recurrence since 2008      Uncomplicated asthma        Past Surgical History:   Procedure Laterality Date     APPENDECTOMY       COLONOSCOPY N/A 8/20/2018    Procedure: COMBINED COLONOSCOPY, SINGLE OR MULTIPLE BIOPSY/POLYPECTOMY BY BIOPSY;;  Surgeon: Osman Hahn MD;  Location: [facility] OR     COLONOSCOPY WITH CO2 INSUFFLATION N/A 8/20/2018    Procedure: COLONOSCOPY WITH CO2 INSUFFLATION;  COLON-SCREENING / LUBKA ;  Surgeon: Osman Hahn MD;  Location: [facility] OR     DAVINCI HYSTERECTOMY TOTAL, SALPINGECTOMY BILATERAL N/A 8/4/2017    Procedure: DAVINCI XI HYSTERECTOMY TOTAL, SALPINGECTOMY BILATERAL;  DAVINCI TOTAL HYSTERECTOMY; BILATERAL SALPINGECTOMY. BILATERAL URETERAL LYSIS. UTEROSACRAL-COLPOPEXY. EXCISION OF ENDOMETRIOSIS;  Surgeon: George Loyola MD;  Location:  OR     DAVINCI LYSIS OF ADHESIONS Bilateral 8/4/2017    Procedure: DAVINCI LYSIS OF ADHESIONS;  BILATERAL URETERAL LYSIS;  Surgeon: George Loyola MD;  Location:  OR     DAVINCI SACROCOLPOPEXY, CYSTOSCOPY, COMBINED N/A 8/4/2017    Procedure: COMBINED DAVINCI SACROCOLPOPEXY, CYSTOSCOPY;  UTEROSACRAL COLPOPEXY;  Surgeon: George Loyola MD;  Location:  OR     DAVINCI XI ASSISTED ABLATION / EXCISION OF ENDOMETRIOSIS  8/4/2017    Procedure: DAVINCI XI ASSISTED ABLATION / EXCISION OF ENDOMETRIOSIS;;  Surgeon: George Loyola MD;  Location:  OR      DILATION AND CURETTAGE N/A 6/20/2017    Procedure: DILATION AND CURETTAGE;  Uterine Curettings and Fibroid Removal, Cook catheter placement; (No hysterectomy done at this time);  Surgeon: Ernestina Saunders MD;  Location: [facility] OR     HYSTERECTOMY, PAP NO LONGER INDICATED       TONSILLECTOMY         Family History   Problem Relation Age of Onset     Diabetes Mother      Hypertension Mother      Hyperlipidemia Mother      Arrhythmia Mother      Cardiovascular Father         CHF and COPD     Asthma Father      Breast Cancer Maternal Grandfather      Colon Cancer Maternal Grandfather      Breast Cancer Paternal Grandmother      Breast Cancer Paternal Aunt      C.A.D. Paternal Grandfather      Breast Cancer Cousin      Asthma Son      Diabetes Maternal Grandmother      Hypertension Maternal Grandmother      Breast Cancer Cousin      Breast Cancer Cousin      Breast Cancer Cousin        Social History     Tobacco Use     Smoking status: Never Smoker     Smokeless tobacco: Never Used   Substance Use Topics     Alcohol use: Yes     Comment: rare, two sips every six months       Allergies   Allergen Reactions     Penicillins Swelling     Swelling throat; age 4     Tetracycline      Other reaction(s): Abdominal Pain  Vomiting; nauseous         ROS:   A complete review of systems was performed and is negative except as noted in the HPI.    Physical Examination:  Vitals: LMP 07/28/2017   BMI= There is no height or weight on file to calculate BMI.    GENERAL APPEARANCE: healthy, alert, and no acute distress  HEENT: no icterus, no xanthelasmas, normal pupil size and reaction, no cyanosis.  NECK: no asymmetry, no cervical bruits, no JVD   RESPIRATORY: Cough noted in clinic. Lungs clear to auscultation - no rales, rhonchi or wheezes, no use of accessory muscles, no retractions, respirations are unlabored, normal respiratory rate  CARDIOVASCULAR: regular rhythm, normal S1 with physiologic split S2, no S3 or S4 and no murmur, click or  rub  GI:  no abdominal bruits, soft, non-tender  EXTREMITIES: no clubbing, cyanosis, or edema  NEURO: alert and oriented to person/place/time, normal speech, gait and affect  VASC: Radial and posterior tibialis pulses +2 and symmetric bilaterally. No cyanosis. No edema  SKIN: no ecchymoses, no rashes    Assessment and recommendations:    #1. POTS/Inappropriate sinus tachycardia:   1. Try to drink 50-60 ounces of 50/50 electrolyte fluids/water mix per day.  Examples: Propel or Pedialyte. Gatorade and Powerade are higher in sugar/calories and are okay in moderation or if you are an athlete.   2. Continue initiate ivabradine 5 mg twice daily for inappropriate sinus tachycardia. She stopped the diltiazem a month ago when she started the ivabradine, will not restart now as her heart rates are improved but can consider in the future if she needs duel therapy to control heart rate.    4. Isometric exercises.  These exercises will assist in increasing intravascular pressure. They also include swimming, rowing, recumbent bike. Max heart rate during exercise is 170 bpm.   5. Try to eat six small meals per day. Try to keep these meals low in carbohydrates and low in gluten.   6. Bike compression shorts or Spanx- use as much as possible, especially when standing for longer periods of time.   7. During hot summer hours, try to stay in cool -air conditioned climates.  8. Orthostats and EKG done today normal.      #2. Hypertension: Counseled patient on risks of uncontrolled blood pressure and treatment options and risks.   1. Medications: Her BPs have been normal. None at this time, consider restarting diltiazem 180 mg daily, take in the evening, if needed in the future for blood pressure control.       FOLLOW UP:  Follow up in 6 months or follow up sooner as needed for symptoms.    Patient expresses understanding and agreement with the plan.    I appreciate the chance to help with Laura Jackson 's care. Please let me know if  you have any questions or concerns.    Marylou LINCOLN, NP-C

## 2019-01-22 NOTE — NURSING NOTE
"  Chief Complaint   Patient presents with     RECHECK     3 month follow up POTS, HTN. Pt reports palpitations, SOB, lightheaded and fatigue.       Initial LMP 07/28/2017  Estimated body mass index is 29.01 kg/m  as calculated from the following:    Height as of 10/5/18: 1.626 m (5' 4\").    Weight as of 10/5/18: 76.7 kg (169 lb)..  BP completed using cuff size: regular    EKG was done.  Orthostatics done. See vitals section.  OSEI Mcnulty    "

## 2019-01-25 ENCOUNTER — OFFICE VISIT (OUTPATIENT)
Dept: FAMILY MEDICINE | Facility: CLINIC | Age: 49
End: 2019-01-25

## 2019-01-25 VITALS
DIASTOLIC BLOOD PRESSURE: 76 MMHG | HEART RATE: 77 BPM | SYSTOLIC BLOOD PRESSURE: 124 MMHG | OXYGEN SATURATION: 100 % | RESPIRATION RATE: 18 BRPM | WEIGHT: 175 LBS | TEMPERATURE: 97.6 F | HEIGHT: 65 IN | BODY MASS INDEX: 29.16 KG/M2

## 2019-01-25 DIAGNOSIS — G90.A POTS (POSTURAL ORTHOSTATIC TACHYCARDIA SYNDROME): Primary | ICD-10-CM

## 2019-01-25 DIAGNOSIS — J45.30 MILD PERSISTENT ASTHMA WITHOUT COMPLICATION: ICD-10-CM

## 2019-01-25 PROCEDURE — 99214 OFFICE O/P EST MOD 30 MIN: CPT | Performed by: INTERNAL MEDICINE

## 2019-01-25 RX ORDER — LEVALBUTEROL TARTRATE 45 UG/1
1-2 AEROSOL, METERED ORAL EVERY 4 HOURS PRN
Qty: 1 INHALER | Refills: 5 | Status: SHIPPED | OUTPATIENT
Start: 2019-01-25 | End: 2019-04-30

## 2019-01-25 ASSESSMENT — MIFFLIN-ST. JEOR: SCORE: 1406.73

## 2019-01-25 NOTE — PROGRESS NOTES
SUBJECTIVE:   Laura Jackson is a 48 year old female who presents to clinic today for the following health issues:    POTS (postural orthostatic tachycardia syndrome)   Patient began medication Corlanor  (ivabradine) in December and this has proved to have great benefit for her. She even showed me a home log of her daily heart rates which drastically improved from before to after beginning this medication. She says that she is still easily fatigued, but she attributes this to an inability to perform physical conditioning. However, she was able to bike for 10 minutes solid yesterday and originally she was not able to do even 2 minutes of exercise without significant tachycardia symptoms. She notes weight gain with inability to exercise. Her symptoms were present for about 20 years or more with more recent exacerbation prompting her most extensive testing for a disease process. She had some gastrointestinal symptoms with use of Corlanor, but these resolved after about 2 weeks. She is planning to return to work soon with light duty and we will fill out paperwork at next visit stating her expected return date.    Wt Readings from Last 5 Encounters:   01/25/19 79.4 kg (175 lb)   10/05/18 76.7 kg (169 lb)   09/28/18 77.3 kg (170 lb 6.4 oz)   09/18/18 77.6 kg (171 lb)   08/15/18 76.2 kg (168 lb)       Problem list and histories reviewed & adjusted, as indicated.  Additional history: as documented    Patient Active Problem List   Diagnosis     CARDIOVASCULAR SCREENING; LDL GOAL LESS THAN 160     Irritable bowel syndrome     GERD (gastroesophageal reflux disease)     History of supraventricular tachycardia     Mild persistent asthma     Family history of breast cancer     Foreign body (FB) in soft tissue     S/P myomectomy     Nausea     Dehydration     S/P ANAID (total abdominal hysterectomy)     Hypovitaminosis D     Pelvic adhesions     Endometriosis     Heberden's nodes     POTS (postural orthostatic tachycardia  syndrome)     Hypermobility syndrome     Cervicalgia     Autonomic dysfunction     Benign essential hypertension     Past Surgical History:   Procedure Laterality Date     APPENDECTOMY       COLONOSCOPY N/A 8/20/2018    Procedure: COMBINED COLONOSCOPY, SINGLE OR MULTIPLE BIOPSY/POLYPECTOMY BY BIOPSY;;  Surgeon: Osman Hahn MD;  Location: [facility] OR     COLONOSCOPY WITH CO2 INSUFFLATION N/A 8/20/2018    Procedure: COLONOSCOPY WITH CO2 INSUFFLATION;  COLON-SCREENING / LUBKA ;  Surgeon: Osman Hahn MD;  Location: [facility] OR     DAVINCI HYSTERECTOMY TOTAL, SALPINGECTOMY BILATERAL N/A 8/4/2017    Procedure: DAVINCI XI HYSTERECTOMY TOTAL, SALPINGECTOMY BILATERAL;  DAVINCI TOTAL HYSTERECTOMY; BILATERAL SALPINGECTOMY. BILATERAL URETERAL LYSIS. UTEROSACRAL-COLPOPEXY. EXCISION OF ENDOMETRIOSIS;  Surgeon: George Loyola MD;  Location: [facility] OR     DAVINCI LYSIS OF ADHESIONS Bilateral 8/4/2017    Procedure: DAVINCI LYSIS OF ADHESIONS;  BILATERAL URETERAL LYSIS;  Surgeon: George Loyola MD;  Location: [facility] OR     DAVINCI SACROCOLPOPEXY, CYSTOSCOPY, COMBINED N/A 8/4/2017    Procedure: COMBINED DAVINCI SACROCOLPOPEXY, CYSTOSCOPY;  UTEROSACRAL COLPOPEXY;  Surgeon: George Loyola MD;  Location: [facility] OR     DAVINCI XI ASSISTED ABLATION / EXCISION OF ENDOMETRIOSIS  8/4/2017    Procedure: DAVINCI XI ASSISTED ABLATION / EXCISION OF ENDOMETRIOSIS;;  Surgeon: George Loyola MD;  Location: [facility] OR     DILATION AND CURETTAGE N/A 6/20/2017    Procedure: DILATION AND CURETTAGE;  Uterine Curettings and Fibroid Removal, Cook catheter placement; (No hysterectomy done at this time);  Surgeon: Ernestina Saunders MD;  Location: [facility] OR     HYSTERECTOMY, PAP NO LONGER INDICATED       TONSILLECTOMY         Social History     Tobacco Use     Smoking status: Never Smoker     Smokeless tobacco: Never Used   Substance Use Topics     Alcohol use: Yes     Comment: rare, two sips every six months     Family History   Problem Relation Age  "of Onset     Diabetes Mother      Hypertension Mother      Hyperlipidemia Mother      Arrhythmia Mother      Cardiovascular Father         CHF and COPD     Asthma Father      Breast Cancer Maternal Grandfather      Colon Cancer Maternal Grandfather      Breast Cancer Paternal Grandmother      Breast Cancer Paternal Aunt      [email or web address]. Paternal Grandfather      Breast Cancer Cousin      Asthma Son      Diabetes Maternal Grandmother      Hypertension Maternal Grandmother      Breast Cancer Cousin      Breast Cancer Cousin      Breast Cancer Cousin          BP Readings from Last 3 Encounters:   01/25/19 124/76   10/05/18 121/85   09/28/18 124/80    Wt Readings from Last 3 Encounters:   01/25/19 79.4 kg (175 lb)   10/05/18 76.7 kg (169 lb)   09/28/18 77.3 kg (170 lb 6.4 oz)        Reviewed and updated as needed this visit by clinical staff       Reviewed and updated as needed this visit by Provider       ROS:  Constitutional, HEENT, cardiovascular, pulmonary, GI, , musculoskeletal, neuro, skin, endocrine and psych systems are negative, except as otherwise noted.    This document serves as a record of the services and decisions personally performed and made by Bj Butler MD. It was created on his behalf by Troy Doty, a trained medical scribe. The creation of this document is based on the provider's statements to the medical scribe.  Troy Doty 11:02 AM January 25, 2019    OBJECTIVE:     /76   Pulse 77   Temp 97.6  F (36.4  C) (Oral)   Resp 18   Ht 1.638 m (5' 4.5\")   Wt 79.4 kg (175 lb)   LMP 07/28/2017   SpO2 100%   BMI 29.57 kg/m    Body mass index is 29.57 kg/m .  GENERAL: healthy, alert and no distress  EYES: Eyes grossly normal to inspection, PERRL and conjunctivae and sclerae normal  SKIN: no suspicious lesions or rashes to visible skin   NEURO: Normal strength and tone, mentation intact and speech normal  PSYCH: mentation appears normal, affect normal/bright    Diagnostic Test Results:  No " results found for this or any previous visit (from the past 24 hour(s)).    ASSESSMENT/PLAN:     (R00.0,  I95.1) POTS (postural orthostatic tachycardia syndrome)  (primary encounter diagnosis)  Comment: paper work is completed. I am amazed to see the positive response this patient seems to have from the Corlanor  (ivabradine)   Plan: it is nothing short of a miracle that it looks like long awaited and long sought for relief from unremitting symptoms of POTS (postural orthostatic tachycardia syndrome) and tachycardia is finally arriving ! Patient is out by my completion of her long term insurance forms until sometime in March but she is chomping at the bit now and really is hoping to get back to work by mid- February     (J45.30) Mild persistent asthma without complication  Comment: refills provided   Plan: fluticasone-salmeterol (ADVAIR) 100-50 MCG/DOSE        inhaler, levalbuterol (XOPENEX HFA) 45 MCG/ACT         inhaler              See Patient Instructions    The information in this document, created by the medical scribe for me, accurately reflects the services I personally performed and the decisions made by me. I have reviewed and approved this document for accuracy prior to leaving the patient care area.  January 25, 2019 11:03 AM    Bj Butler MD  [facility]

## 2019-01-25 NOTE — Clinical Note
Just letting you know that I have never seen a POTS (postural orthostatic tachycardia syndrome) patient with such a tremendously possibly response to anything ! The Corlanor  (ivabradine) is working wonders for this patient !Thank you Bj Butler MD

## 2019-02-08 ENCOUNTER — TELEPHONE (OUTPATIENT)
Dept: FAMILY MEDICINE | Facility: CLINIC | Age: 49
End: 2019-02-08

## 2019-02-08 NOTE — TELEPHONE ENCOUNTER
Reason for Call:  Form, our goal is to have forms completed with 72 hours, however, some forms may require a visit or additional information.    Type of letter, form or note:  disability    Who is the form from?: Patient    Where did the form come from: Patient or family brought in       What clinic location was the form placed at?: [facility]    Where the form was placed: 's Box    What number is listed as a contact on the form?: [phone]       Additional comments: none    Call taken on 2/8/2019 at 1:10 PM by Ken Martini

## 2019-02-11 NOTE — TELEPHONE ENCOUNTER
Patient calling. She would like to go back to work on 2-19-19 instead of 2-20-19.  Please call and let know.

## 2019-02-11 NOTE — TELEPHONE ENCOUNTER
Spoke to patient.  Date changed to 2-19-19, on work ability form and this was faxed to [phone].  Pam Taveras,

## 2019-03-15 ENCOUNTER — OFFICE VISIT (OUTPATIENT)
Dept: FAMILY MEDICINE | Facility: CLINIC | Age: 49
End: 2019-03-15
Payer: COMMERCIAL

## 2019-03-15 ENCOUNTER — ANCILLARY PROCEDURE (OUTPATIENT)
Dept: GENERAL RADIOLOGY | Facility: CLINIC | Age: 49
End: 2019-03-15
Attending: INTERNAL MEDICINE
Payer: COMMERCIAL

## 2019-03-15 VITALS
SYSTOLIC BLOOD PRESSURE: 110 MMHG | DIASTOLIC BLOOD PRESSURE: 70 MMHG | HEIGHT: 65 IN | WEIGHT: 174 LBS | BODY MASS INDEX: 28.99 KG/M2 | RESPIRATION RATE: 20 BRPM | OXYGEN SATURATION: 100 % | HEART RATE: 83 BPM | TEMPERATURE: 97.3 F

## 2019-03-15 DIAGNOSIS — R53.83 FATIGUE, UNSPECIFIED TYPE: ICD-10-CM

## 2019-03-15 DIAGNOSIS — M79.644 THUMB PAIN, RIGHT: ICD-10-CM

## 2019-03-15 DIAGNOSIS — R51.9 NONINTRACTABLE EPISODIC HEADACHE, UNSPECIFIED HEADACHE TYPE: ICD-10-CM

## 2019-03-15 DIAGNOSIS — Z13.6 CARDIOVASCULAR SCREENING; LDL GOAL LESS THAN 160: ICD-10-CM

## 2019-03-15 DIAGNOSIS — E55.9 HYPOVITAMINOSIS D: ICD-10-CM

## 2019-03-15 DIAGNOSIS — G90.A POTS (POSTURAL ORTHOSTATIC TACHYCARDIA SYNDROME): Primary | ICD-10-CM

## 2019-03-15 DIAGNOSIS — I10 BENIGN ESSENTIAL HYPERTENSION: ICD-10-CM

## 2019-03-15 DIAGNOSIS — G90.9 AUTONOMIC DYSFUNCTION: ICD-10-CM

## 2019-03-15 DIAGNOSIS — Z12.31 VISIT FOR SCREENING MAMMOGRAM: ICD-10-CM

## 2019-03-15 DIAGNOSIS — R60.0 EDEMA OF LOWER EXTREMITY: ICD-10-CM

## 2019-03-15 DIAGNOSIS — Z11.4 SCREENING FOR HIV (HUMAN IMMUNODEFICIENCY VIRUS): ICD-10-CM

## 2019-03-15 DIAGNOSIS — R63.5 WEIGHT GAIN: ICD-10-CM

## 2019-03-15 LAB
ALBUMIN SERPL-MCNC: 4.2 G/DL (ref 3.4–5)
ALP SERPL-CCNC: 52 U/L (ref 40–150)
ALT SERPL W P-5'-P-CCNC: 29 U/L (ref 0–50)
ANION GAP SERPL CALCULATED.3IONS-SCNC: 8 MMOL/L (ref 3–14)
AST SERPL W P-5'-P-CCNC: 17 U/L (ref 0–45)
BASOPHILS # BLD AUTO: 0 10E9/L (ref 0–0.2)
BASOPHILS NFR BLD AUTO: 0.6 %
BILIRUB SERPL-MCNC: 0.4 MG/DL (ref 0.2–1.3)
BUN SERPL-MCNC: 12 MG/DL (ref 7–30)
CALCIUM SERPL-MCNC: 9 MG/DL (ref 8.5–10.1)
CHLORIDE SERPL-SCNC: 104 MMOL/L (ref 94–109)
CHOLEST SERPL-MCNC: 209 MG/DL
CO2 SERPL-SCNC: 27 MMOL/L (ref 20–32)
CREAT SERPL-MCNC: 0.9 MG/DL (ref 0.52–1.04)
DIFFERENTIAL METHOD BLD: NORMAL
EOSINOPHIL # BLD AUTO: 0.1 10E9/L (ref 0–0.7)
EOSINOPHIL NFR BLD AUTO: 1.8 %
ERYTHROCYTE [DISTWIDTH] IN BLOOD BY AUTOMATED COUNT: 13.3 % (ref 10–15)
GFR SERPL CREATININE-BSD FRML MDRD: 74 ML/MIN/{1.73_M2}
GLUCOSE SERPL-MCNC: 90 MG/DL (ref 70–99)
HCT VFR BLD AUTO: 43.4 % (ref 35–47)
HDLC SERPL-MCNC: 65 MG/DL
HGB BLD-MCNC: 14.3 G/DL (ref 11.7–15.7)
LDLC SERPL CALC-MCNC: 123 MG/DL
LYMPHOCYTES # BLD AUTO: 1.3 10E9/L (ref 0.8–5.3)
LYMPHOCYTES NFR BLD AUTO: 26.5 %
MCH RBC QN AUTO: 30 PG (ref 26.5–33)
MCHC RBC AUTO-ENTMCNC: 32.9 G/DL (ref 31.5–36.5)
MCV RBC AUTO: 91 FL (ref 78–100)
MONOCYTES # BLD AUTO: 0.3 10E9/L (ref 0–1.3)
MONOCYTES NFR BLD AUTO: 5.9 %
NEUTROPHILS # BLD AUTO: 3.3 10E9/L (ref 1.6–8.3)
NEUTROPHILS NFR BLD AUTO: 65.2 %
NONHDLC SERPL-MCNC: 144 MG/DL
PLATELET # BLD AUTO: 259 10E9/L (ref 150–450)
POTASSIUM SERPL-SCNC: 4.2 MMOL/L (ref 3.4–5.3)
PROT SERPL-MCNC: 7.5 G/DL (ref 6.8–8.8)
RBC # BLD AUTO: 4.76 10E12/L (ref 3.8–5.2)
SODIUM SERPL-SCNC: 139 MMOL/L (ref 133–144)
TRIGL SERPL-MCNC: 105 MG/DL
TSH SERPL DL<=0.005 MIU/L-ACNC: 1.88 MU/L (ref 0.4–4)
WBC # BLD AUTO: 5.1 10E9/L (ref 4–11)

## 2019-03-15 PROCEDURE — 73140 X-RAY EXAM OF FINGER(S): CPT | Mod: RT

## 2019-03-15 PROCEDURE — 80053 COMPREHEN METABOLIC PANEL: CPT | Performed by: INTERNAL MEDICINE

## 2019-03-15 PROCEDURE — 87389 HIV-1 AG W/HIV-1&-2 AB AG IA: CPT | Performed by: INTERNAL MEDICINE

## 2019-03-15 PROCEDURE — 84443 ASSAY THYROID STIM HORMONE: CPT | Performed by: INTERNAL MEDICINE

## 2019-03-15 PROCEDURE — 82306 VITAMIN D 25 HYDROXY: CPT | Performed by: INTERNAL MEDICINE

## 2019-03-15 PROCEDURE — 99214 OFFICE O/P EST MOD 30 MIN: CPT | Performed by: INTERNAL MEDICINE

## 2019-03-15 PROCEDURE — 80061 LIPID PANEL: CPT | Performed by: INTERNAL MEDICINE

## 2019-03-15 PROCEDURE — 36415 COLL VENOUS BLD VENIPUNCTURE: CPT | Performed by: INTERNAL MEDICINE

## 2019-03-15 PROCEDURE — 85025 COMPLETE CBC W/AUTO DIFF WBC: CPT | Performed by: INTERNAL MEDICINE

## 2019-03-15 ASSESSMENT — MIFFLIN-ST. JEOR: SCORE: 1402.2

## 2019-03-15 ASSESSMENT — PAIN SCALES - GENERAL: PAINLEVEL: MILD PAIN (3)

## 2019-03-15 NOTE — PATIENT INSTRUCTIONS
1) Follow-up with your cardiologist for recommendations on dosing of your medication    2) We will be doing lab testing today and follow-up with your results    3) We have a brain MRI ordered to find a possible cause for headaches    4) We will do an x-ray of the right thumb today

## 2019-03-15 NOTE — PROGRESS NOTES
SUBJECTIVE:   Laura Jackson is a 48 year old female who presents to clinic today for the following health issues:       POTS (postural orthostatic tachycardia syndrome)  Autonomic dysfunction  Visit for screening mammogram  Screening for HIV (human immunodeficiency virus)  Benign essential hypertension  Edema of lower extremity  Nonintractable episodic headache, unspecified headache type  Fatigue, unspecified type  Weight gain  CARDIOVASCULAR SCREENING; LDL GOAL LESS THAN 160  Hypovitaminosis D  Thumb pain, right     Laura Jackson is a patient with a diagnosis of POTS (postural orthostatic tachycardia syndrome) and has returned to work part time after close to 2 years on disabled status. Her great breakthrough came from Corlanor  (ivabradine) and she's really done well with this. Unfortunately today there was really a plethora of unrelated matters to discuss , and this appointment took 30 minutes     POTS, Headaches, Fatigue   Laura has been back to work for 3 weeks now. She notes improvements in tachycardia symptoms. She continues with pounding headaches though that fluctuate in intensity and often occur when standing up. These have been occurring for more than a year, but recently became much more intense, especially over the last 10 days. She has to sit down during these episodes which helps. She has associated symptoms of neck pain and pressure with her headaches. Her headaches are often worse in the evenings. Patient also reports associated symptoms of Tinnitus and has a large amount of pain with pressure changes. She had a few times where her vision blacked out 2-3 years ago, but denies feeling faint during these. Based on her own research a correlation between POTS (postural orthostatic tachycardia syndrome) and Arnold Chiari malformation has been reported in the literature which greatly concerns her in addition to these other symptoms. Denies symptoms of nausea, vomiting. She says that over the  last few weeks her heart rate is lower than usual at night, but is still taking Corlanor  (ivabradine) medication. She plans to follow-up with cardiologist for recommendations on dose. She does note a significant improvement in cognition. She has requested thyroid testing and other tests due to weight gain and symptoms of fatigue. She often takes, or wishes she could take, naps during the day.    BP Readings from Last 6 Encounters:   03/15/19 110/70   01/25/19 124/76   10/05/18 121/85   09/28/18 124/80   09/18/18 125/78   08/20/18 123/88     Wt Readings from Last 5 Encounters:   03/15/19 78.9 kg (174 lb)   01/25/19 79.4 kg (175 lb)   10/05/18 76.7 kg (169 lb)   09/28/18 77.3 kg (170 lb 6.4 oz)   09/18/18 77.6 kg (171 lb)     Lower extremity edema  She notes increase in weight and increased water retention throughout the day. She wears compression stockings to mitigate lower extremity swelling.     IBS  She appreciated an increase in Irritable bowel syndrome symptoms since she has been back to work 3 weeks ago, including diarrhea and cramping. She has clearly diarrhea predominant irritable bowel syndrome     Right hand pain/lump  She reports a hard lump on the right thumb that appeared recently. She noted swelling when this appeared. When she bends her wrist she feels pain. She has had ganglion cysts in this arm previously.     Additional Information   She has requested a 3-D breast MRI due to complications with mammogram screening including dense breast tissue as well as a strong family history of breast cancer and reportedly some genetic counseling     Problem list and histories reviewed & adjusted, as indicated.  Additional history: as documented    Patient Active Problem List   Diagnosis     CARDIOVASCULAR SCREENING; LDL GOAL LESS THAN 160     Irritable bowel syndrome     GERD (gastroesophageal reflux disease)     History of supraventricular tachycardia     Mild persistent asthma     Family history of breast  cancer     Foreign body (FB) in soft tissue     S/P myomectomy     Nausea     Dehydration     S/P ANAID (total abdominal hysterectomy)     Hypovitaminosis D     Pelvic adhesions     Endometriosis     Heberden's nodes     POTS (postural orthostatic tachycardia syndrome)     Hypermobility syndrome     Cervicalgia     Autonomic dysfunction     Benign essential hypertension     Past Surgical History:   Procedure Laterality Date     APPENDECTOMY       COLONOSCOPY N/A 8/20/2018    Procedure: COMBINED COLONOSCOPY, SINGLE OR MULTIPLE BIOPSY/POLYPECTOMY BY BIOPSY;;  Surgeon: Osman Hahn MD;  [facility]     COLONOSCOPY WITH CO2 INSUFFLATION N/A 8/20/2018    Procedure: COLONOSCOPY WITH CO2 INSUFFLATION;  COLON-SCREENING / LUBKA ;  Surgeon: Osman Hahn MD;  Location: [facility]     DAVINCI HYSTERECTOMY TOTAL, SALPINGECTOMY BILATERAL N/A 8/4/2017    Procedure: DAVINCI XI HYSTERECTOMY TOTAL, SALPINGECTOMY BILATERAL;  DAVINCI TOTAL HYSTERECTOMY; BILATERAL SALPINGECTOMY. BILATERAL URETERAL LYSIS. UTEROSACRAL-COLPOPEXY. EXCISION OF ENDOMETRIOSIS;  Surgeon: George Loyola MD;  Location: [facility]     DAVINCI LYSIS OF ADHESIONS Bilateral 8/4/2017    Procedure: DAVINCI LYSIS OF ADHESIONS;  BILATERAL URETERAL LYSIS;  Surgeon: George Loyola MD;  [facility]     DAVINCI SACROCOLPOPEXY, CYSTOSCOPY, COMBINED N/A 8/4/2017    Procedure: COMBINED DAVINCI SACROCOLPOPEXY, CYSTOSCOPY;  UTEROSACRAL COLPOPEXY;  Surgeon: George Loyola MD;  [facility]     DAVINCI XI ASSISTED ABLATION / EXCISION OF ENDOMETRIOSIS  8/4/2017    Procedure: DAVINCI XI ASSISTED ABLATION / EXCISION OF ENDOMETRIOSIS;;  Surgeon: George Loyola MD;  [facility]     DILATION AND CURETTAGE N/A 6/20/2017    Procedure: DILATION AND CURETTAGE;  Uterine Curettings and Fibroid Removal, Cook catheter placement; (No hysterectomy done at this time);  Surgeon: Ernestina Saunders MD;  [facility]     HYSTERECTOMY, PAP NO LONGER INDICATED   "     TONSILLECTOMY         Social History     Tobacco Use     Smoking status: Never Smoker     Smokeless tobacco: Never Used   Substance Use Topics     Alcohol use: Yes     Comment: rare, two sips every six months     Family History   Problem Relation Age of Onset     Diabetes Mother      Hypertension Mother      Hyperlipidemia Mother      Arrhythmia Mother      Cardiovascular Father         CHF and COPD     Asthma Father      Breast Cancer Maternal Grandfather      Colon Cancer Maternal Grandfather      Breast Cancer Paternal Grandmother      Breast Cancer Paternal Aunt      C.A.D. Paternal Grandfather      Breast Cancer Cousin      Asthma Son      Diabetes Maternal Grandmother      Hypertension Maternal Grandmother      Breast Cancer Cousin      Breast Cancer Cousin      Breast Cancer Cousin          BP Readings from Last 3 Encounters:   03/15/19 110/70   01/25/19 124/76   10/05/18 121/85    Wt Readings from Last 3 Encounters:   03/15/19 78.9 kg (174 lb)   01/25/19 79.4 kg (175 lb)   10/05/18 76.7 kg (169 lb)           Reviewed and updated as needed this visit by clinical staff  Tobacco  Allergies  Meds  Med Hx  Surg Hx  Fam Hx  Soc Hx      Reviewed and updated as needed this visit by Provider       ROS:  Constitutional, HEENT, cardiovascular, pulmonary, GI, , musculoskeletal, neuro, skin, endocrine and psych systems are negative, except as otherwise noted.    This document serves as a record of the services and decisions personally performed and made by Bj Butler MD. It was created on his behalf by Troy Doty, a trained medical scribe. The creation of this document is based on the provider's statements to the medical scribe.  Troy Doty 10:55 AM March 15, 2019    OBJECTIVE:     /70   Pulse 83   Temp 97.3  F (36.3  C) (Oral)   Resp 20   Ht 1.638 m (5' 4.5\")   Wt 78.9 kg (174 lb)   LMP 07/28/2017   SpO2 100%   Breastfeeding? No   BMI 29.41 kg/m    Body mass index is 29.41 " kg/m .  GENERAL: healthy, alert and no distress  EYES: Eyes grossly normal to inspection, PERRL and conjunctivae and sclerae normal  MS: she has a hard lump on the base joint of the right thumb  SKIN: no suspicious lesions or rashes to visible skin   NEURO: Normal strength and tone, mentation intact and speech normal  PSYCH: mentation appears normal, affect normal/bright    Diagnostic Test Results:  No results found for this or any previous visit (from the past 24 hour(s)).    ASSESSMENT/PLAN:     (R00.0,  I95.1) POTS (postural orthostatic tachycardia syndrome)  (primary encounter diagnosis)  Comment: testing as appropriate   Plan: TSH with free T4 reflex, CBC with platelets         differential, Comprehensive metabolic panel            (G90.9) Autonomic dysfunction  Comment: as detailed above   Plan: TSH with free T4 reflex, CBC with platelets         differential, Comprehensive metabolic panel            (Z12.31) Visit for screening mammogram  Comment: an unrelated matter   Plan: MR Breast Bilateral w Contrast            (Z11.4) Screening for HIV (human immunodeficiency virus)  Comment: an unrelated matter ,    Routine screening   Plan: HIV Screening            (I10) Benign essential hypertension  Comment: controlled within acceptable limits   Plan:     (R60.0) Edema of lower extremity  Comment: controlled within acceptable limits   Plan:     (R51) Nonintractable episodic headache, unspecified headache type  Comment: MRI of brain is ordered  Plan:     (R53.83) Fatigue, unspecified type  Comment: screening  Laboratory studies   Plan: TSH with free T4 reflex, CBC with platelets         differential, Comprehensive metabolic panel            (R63.5) Weight gain  Comment:   Plan: TSH with free T4 reflex, CBC with platelets         differential, Comprehensive metabolic panel            (Z13.6) CARDIOVASCULAR SCREENING; LDL GOAL LESS THAN 160  Comment: routine screening   Plan: Lipid panel reflex to direct LDL Fasting             (E55.9) Hypovitaminosis D  Comment: routine screening   Plan: Vitamin D Deficiency            (M79.644) Thumb pain, right  Comment: check xray  Plan: XR Finger Right G/E 2 Views              See Patient Instructions    The information in this document, created by the medical scribe for me, accurately reflects the services I personally performed and the decisions made by me. I have reviewed and approved this document for accuracy prior to leaving the patient care area.  March 15, 2019 10:56 AM    Bj Butler MD  [facility]

## 2019-03-16 ASSESSMENT — ASTHMA QUESTIONNAIRES: ACT_TOTALSCORE: 23

## 2019-03-18 LAB
DEPRECATED CALCIDIOL+CALCIFEROL SERPL-MC: 69 UG/L (ref 20–75)
HIV 1+2 AB+HIV1 P24 AG SERPL QL IA: NONREACTIVE

## 2019-04-04 ENCOUNTER — HOSPITAL ENCOUNTER (OUTPATIENT)
Dept: MRI IMAGING | Facility: CLINIC | Age: 49
Discharge: HOME | End: 2019-04-04
Attending: INTERNAL MEDICINE | Admitting: INTERNAL MEDICINE
Payer: COMMERCIAL

## 2019-04-04 ENCOUNTER — HOSPITAL ENCOUNTER (OUTPATIENT)
Dept: MRI IMAGING | Facility: CLINIC | Age: 49
End: 2019-04-04
Attending: INTERNAL MEDICINE
Payer: COMMERCIAL

## 2019-04-04 DIAGNOSIS — Z12.31 VISIT FOR SCREENING MAMMOGRAM: ICD-10-CM

## 2019-04-04 DIAGNOSIS — R51.9 NONINTRACTABLE EPISODIC HEADACHE, UNSPECIFIED HEADACHE TYPE: ICD-10-CM

## 2019-04-04 DIAGNOSIS — G90.9 AUTONOMIC DYSFUNCTION: ICD-10-CM

## 2019-04-04 DIAGNOSIS — G90.A POTS (POSTURAL ORTHOSTATIC TACHYCARDIA SYNDROME): ICD-10-CM

## 2019-04-04 PROCEDURE — 70553 MRI BRAIN STEM W/O & W/DYE: CPT

## 2019-04-04 PROCEDURE — 25500064 ZZH RX 255 OP 636: Performed by: INTERNAL MEDICINE

## 2019-04-04 PROCEDURE — 77049 MRI BREAST C-+ W/CAD BI: CPT

## 2019-04-04 PROCEDURE — A9585 GADOBUTROL INJECTION: HCPCS | Performed by: INTERNAL MEDICINE

## 2019-04-04 RX ORDER — GADOBUTROL 604.72 MG/ML
7.5 INJECTION INTRAVENOUS ONCE
Status: COMPLETED | OUTPATIENT
Start: 2019-04-04 | End: 2019-04-04

## 2019-04-04 RX ADMIN — GADOBUTROL 7.5 ML: 604.72 INJECTION INTRAVENOUS at 12:48

## 2019-04-12 ENCOUNTER — OFFICE VISIT (OUTPATIENT)
Dept: FAMILY MEDICINE | Facility: CLINIC | Age: 49
End: 2019-04-12
Payer: COMMERCIAL

## 2019-04-12 VITALS
DIASTOLIC BLOOD PRESSURE: 74 MMHG | OXYGEN SATURATION: 98 % | WEIGHT: 177.8 LBS | SYSTOLIC BLOOD PRESSURE: 116 MMHG | RESPIRATION RATE: 18 BRPM | TEMPERATURE: 97 F | BODY MASS INDEX: 30.05 KG/M2 | HEART RATE: 75 BPM

## 2019-04-12 DIAGNOSIS — M18.11 PRIMARY OSTEOARTHRITIS OF FIRST CARPOMETACARPAL JOINT OF RIGHT HAND: Primary | ICD-10-CM

## 2019-04-12 DIAGNOSIS — G90.A POTS (POSTURAL ORTHOSTATIC TACHYCARDIA SYNDROME): ICD-10-CM

## 2019-04-12 PROCEDURE — 99213 OFFICE O/P EST LOW 20 MIN: CPT | Performed by: INTERNAL MEDICINE

## 2019-04-12 NOTE — PROGRESS NOTES
Last appointment with me was 3-15-19    Preventative healthcare maintenance goals including asthma action plan [ printed ] , ShingRx vaccination against herpes zoster, preventative health care visit

## 2019-04-12 NOTE — PROGRESS NOTES
SUBJECTIVE:   Laura Jackson is a 48 year old female who presents to clinic today for the following   health issues:       Last appointment with me was 3-15-19     Preventative healthcare maintenance goals including asthma action plan [ printed ] , ShingRx vaccination against herpes zoster, preventative health care visit     YESI   Laura came in today for the completion of a form. She is planning on working 8-12 hours/week at this time and no more than 2 shifts in a row. Continues with fatigue but has notable improvement in her symptoms. She plans to return back to work full time without restrictions around the end of May. Her highest measured heart rate in the last few weeks has been around 115-118. We agreed as long as everything goes according to plan, to sign off on her final form we don't need a face to face encounter. Forms will be faxed to our office on an as needed basis     Osteoarthritis of the Right Thumb   She notes increased stiffness and now a clicking noise that began recently in her right thumb. She had issues with her thumb previously. However, since returning to work she has been using a computer for multiple hours at a time and attributes her recent increase in thumb pain to this. She used to see a hand therapist and plans to return to this specialist for additional treatment options.    Additional history: as documented    Reviewed  and updated as needed this visit by clinical staff  Tobacco  Allergies  Med Hx  Surg Hx  Fam Hx  Soc Hx      Reviewed and updated as needed this visit by Provider       Patient Active Problem List   Diagnosis     CARDIOVASCULAR SCREENING; LDL GOAL LESS THAN 160     Irritable bowel syndrome     GERD (gastroesophageal reflux disease)     History of supraventricular tachycardia     Mild persistent asthma     Family history of breast cancer     Foreign body (FB) in soft tissue     S/P myomectomy     Nausea     Dehydration     S/P ANAID (total abdominal  hysterectomy)     Hypovitaminosis D     Pelvic adhesions     Endometriosis     Heberden's nodes     POTS (postural orthostatic tachycardia syndrome)     Hypermobility syndrome     Cervicalgia     Autonomic dysfunction     Benign essential hypertension     Past Surgical History:   Procedure Laterality Date     APPENDECTOMY       COLONOSCOPY N/A 8/20/2018    Procedure: COMBINED COLONOSCOPY, SINGLE OR MULTIPLE BIOPSY/POLYPECTOMY BY BIOPSY;;  Surgeon: Osman Hahn MD;  [facility]: [facility]     COLONOSCOPY WITH CO2 INSUFFLATION N/A 8/20/2018    Procedure: COLONOSCOPY WITH CO2 INSUFFLATION;  COLON-SCREENING / LUBKA ;  Surgeon: Osman Hahn MD;  [facility]: [facility]     DAVINCI HYSTERECTOMY TOTAL, SALPINGECTOMY BILATERAL N/A 8/4/2017    Procedure: DAVINCI XI HYSTERECTOMY TOTAL, SALPINGECTOMY BILATERAL;  DAVINCI TOTAL HYSTERECTOMY; BILATERAL SALPINGECTOMY. BILATERAL URETERAL LYSIS. UTEROSACRAL-COLPOPEXY. EXCISION OF ENDOMETRIOSIS;  Surgeon: George Loyola MD;  Location: [facility]     DAVINCI LYSIS OF ADHESIONS Bilateral 8/4/2017    Procedure: DAVINCI LYSIS OF ADHESIONS;  BILATERAL URETERAL LYSIS;  Surgeon: George Loyola MD;  [facility]     DAVINCI SACROCOLPOPEXY, CYSTOSCOPY, COMBINED N/A 8/4/2017    Procedure: COMBINED DAVINCI SACROCOLPOPEXY, CYSTOSCOPY;  UTEROSACRAL COLPOPEXY;  Surgeon: George Loyola MD;  Location: [facility]     DAVINCI XI ASSISTED ABLATION / EXCISION OF ENDOMETRIOSIS  8/4/2017    Procedure: DAVINCI XI ASSISTED ABLATION / EXCISION OF ENDOMETRIOSIS;;  Surgeon: George Loyola MD;  [facility]     DILATION AND CURETTAGE N/A 6/20/2017    Procedure: DILATION AND CURETTAGE;  Uterine Curettings and Fibroid Removal, Cook catheter placement; (No hysterectomy done at this time);  Surgeon: Ernestina Saunders MD;  [facility]     HYSTERECTOMY, PAP NO LONGER INDICATED       TONSILLECTOMY         Social History     Tobacco Use     Smoking status: Never Smoker     Smokeless tobacco:  Never Used   Substance Use Topics     Alcohol use: Yes     Comment: rare, two sips every six months     Family History   Problem Relation Age of Onset     Diabetes Mother      Hypertension Mother      Hyperlipidemia Mother      Arrhythmia Mother      Cardiovascular Father         CHF and COPD     Asthma Father      Breast Cancer Maternal Grandfather      Colon Cancer Maternal Grandfather      Breast Cancer Paternal Grandmother      Breast Cancer Paternal Aunt      C.A.D. Paternal Grandfather      Breast Cancer Cousin      Asthma Son      Diabetes Maternal Grandmother      Hypertension Maternal Grandmother      Breast Cancer Cousin      Breast Cancer Cousin      Breast Cancer Cousin          BP Readings from Last 3 Encounters:   04/12/19 116/74   03/15/19 110/70   01/25/19 124/76    Wt Readings from Last 3 Encounters:   04/12/19 80.6 kg (177 lb 12.8 oz)   03/15/19 78.9 kg (174 lb)   01/25/19 79.4 kg (175 lb)         ROS:  Constitutional, HEENT, cardiovascular, pulmonary, GI, , musculoskeletal, neuro, skin, endocrine and psych systems are negative, except as otherwise noted.    This document serves as a record of the services and decisions personally performed and made by Bj Butler MD. It was created on his behalf by Troy Doty, a trained medical scribe. The creation of this document is based on the provider's statements to the medical scribe.  Troy Doty 8:55 AM April 12, 2019    OBJECTIVE:     /74   Pulse 75   Temp 97  F (36.1  C) (Oral)   Resp 18   Wt 80.6 kg (177 lb 12.8 oz)   LMP 07/28/2017   SpO2 98%   BMI 30.05 kg/m    Body mass index is 30.05 kg/m .  GENERAL: healthy, alert and no distress  EYES: Eyes grossly normal to inspection, PERRL and conjunctivae and sclerae normal  SKIN: no suspicious lesions or rashes to visible skin   MUSCULOSKELETAL; there is some subtle thenar eminence atrophy and evidence of 1st carpometacarpal (CMC) joint degenerative joint disease   NEURO: Normal  strength and tone, mentation intact and speech normal  PSYCH: mentation appears normal, affect normal/bright    Diagnostic Test Results:  No results found for this or any previous visit (from the past 24 hour(s)).    ASSESSMENT/PLAN:     (M18.11) Primary osteoarthritis of first carpometacarpal joint of right hand  (primary encounter diagnosis)  Comment: a splint is provided today for thumb support. To be worn on an as needed basis   Plan: order for DME            (R00.0,  I95.1) POTS (postural orthostatic tachycardia syndrome)  Comment: paper work signed and stamped with our address stamp  Plan: anticipate patient with a full return to work without restrictions as of 5- [ keeping our fingers crossed ! ]    See Patient Instructions    The information in this document, created by the medical scribe for me, accurately reflects the services I personally performed and the decisions made by me. I have reviewed and approved this document for accuracy prior to leaving the patient care area.  April 12, 2019 8:56 AM    Bj Butler MD  [facility]

## 2019-04-12 NOTE — LETTER
My Asthma Action Plan  Name: Laura Jackson   YOB: 1970  Date: 4/11/2019   My doctor: Bj Butler MD   My clinic: [facility]        My Control Medicine: Fluticasone + salmeterol (Advair) -  Diskus 100/50 mcg  1 puffs bid  My Rescue Medicine: Levalbuterol (Xopenex) nebulizer solution     My Asthma Severity: mild persistent  Avoid your asthma triggers: Patient is unaware of triggers  None            GREEN ZONE   Good Control    I feel good    No cough or wheeze    Can work, sleep and play without asthma symptoms       Take your asthma control medicine every day.     1. If exercise triggers your asthma, take your rescue medication    15 minutes before exercise or sports, and    During exercise if you have asthma symptoms  2. Spacer to use with inhaler: If you have a spacer, make sure to use it with your inhaler             YELLOW ZONE Getting Worse  I have ANY of these:    I do not feel good    Cough or wheeze    Chest feels tight    Wake up at night   1. Keep taking your Green Zone medications  2. Start taking your rescue medicine:    every 20 minutes for up to 1 hour. Then every 4 hours for 24-48 hours.  3. If you stay in the Yellow Zone for more than 12-24 hours, contact your doctor.  4. If you do not return to the Green Zone in 12-24 hours or you get worse, start taking your oral steroid medicine if prescribed by your provider.           RED ZONE Medical Alert - Get Help  I have ANY of these:    I feel awful    Medicine is not helping    Breathing getting harder    Trouble walking or talking    Nose opens wide to breathe       1. Take your rescue medicine NOW  2. If your provider has prescribed an oral steroid medicine, start taking it NOW  3. Call your doctor NOW  4. If you are still in the Red Zone after 20 minutes and you have not reached your doctor:    Take your rescue medicine again and    Call 911 or go to the emergency room right away    See your regular doctor within 2  weeks of an Emergency Room or Urgent Care visit for follow-up treatment.          Annual Reminders:  Meet with Asthma Educator,  Flu Shot in the Fall, consider Pneumonia Vaccination for patients with asthma (aged 19 and older).    Pharmacy: [facility] - [address] [facility]                      Asthma Triggers  How To Control Things That Make Your Asthma Worse    Triggers are things that make your asthma worse.  Look at the list below to help you find your triggers and what you can do about them.  You can help prevent asthma flare-ups by staying away from your triggers.      Trigger                                                          What you can do   Cigarette Smoke  Tobacco smoke can make asthma worse. Do not allow smoking in your home, car or around you.  Be sure no one smokes at a child s day care or school.  If you smoke, ask your health care provider for ways to help you quit.  Ask family members to quit too.  Ask your health care provider for a referral to Quit Plan to help you quit smoking, or call 1-505-571-PLAN.     Colds, Flu, Bronchitis  These are common triggers of asthma. Wash your hands often.  Don t touch your eyes, nose or mouth.  Get a flu shot every year.     Dust Mites  These are tiny bugs that live in cloth or carpet. They are too small to see. Wash sheets and blankets in hot water every week.   Encase pillows and mattress in dust mite proof covers.  Avoid having carpet if you can. If you have carpet, vacuum weekly.   Use a dust mask and HEPA vacuum.   Pollen and Outdoor Mold  Some people are allergic to trees, grass, or weed pollen, or molds. Try to keep your windows closed.  Limit time out doors when pollen count is high.   Ask you health care provider about taking medicine during allergy season.     Animal Dander  Some people are allergic to skin flakes, urine or saliva from pets with fur or feathers. Keep pets with fur or feathers out of your home.    If  you can t keep the pet outdoors, then keep the pet out of your bedroom.  Keep the bedroom door closed.  Keep pets off cloth furniture and away from stuffed toys.     Mice, Rats, and Cockroaches  Some people are allergic to the waste from these pests.   Cover food and garbage.  Clean up spills and food crumbs.  Store grease in the refrigerator.   Keep food out of the bedroom.   Indoor Mold  This can be a trigger if your home has high moisture. Fix leaking faucets, pipes, or other sources of water.   Clean moldy surfaces.  Dehumidify basement if it is damp and smelly.   Smoke, Strong Odors, and Sprays  These can reduce air quality. Stay away from strong odors and sprays, such as perfume, powder, hair spray, paints, smoke incense, paint, cleaning products, candles and new carpet.   Exercise or Sports  Some people with asthma have this trigger. Be active!  Ask your doctor about taking medicine before sports or exercise to prevent symptoms.    Warm up for 5-10 minutes before and after sports or exercise.     Other Triggers of Asthma  Cold air:  Cover your nose and mouth with a scarf.  Sometimes laughing or crying can be a trigger.  Some medicines and food can trigger asthma.

## 2019-04-30 ENCOUNTER — TELEPHONE (OUTPATIENT)
Dept: FAMILY MEDICINE | Facility: CLINIC | Age: 49
End: 2019-04-30

## 2019-04-30 ENCOUNTER — MYC REFILL (OUTPATIENT)
Dept: FAMILY MEDICINE | Facility: CLINIC | Age: 49
End: 2019-04-30

## 2019-04-30 DIAGNOSIS — J45.30 MILD PERSISTENT ASTHMA WITHOUT COMPLICATION: ICD-10-CM

## 2019-04-30 NOTE — TELEPHONE ENCOUNTER
Prior Authorization Retail Medication Request    Medication/Dose: (Xopenex HFA 45mcg/Act Aero)  ICD code (if different than what is on RX):  J45.30  Previously Tried and Failed:  None  Rationale:  Patient stable on current medications    Insurance Name:  [facility]  Insurance ID:  [id number]      Pharmacy Information (if different than what is on RX)  Name:  [facility]  Phone:  [phone]

## 2019-05-01 RX ORDER — LEVALBUTEROL TARTRATE 45 UG/1
1-2 AEROSOL, METERED ORAL EVERY 4 HOURS PRN
Qty: 1 INHALER | Refills: 5 | Status: SHIPPED | OUTPATIENT
Start: 2019-05-01 | End: 2020-03-11

## 2019-05-01 NOTE — TELEPHONE ENCOUNTER
We need prior authorization. Because patient has paroxsysmal supraventricular tachycardia and sensitivity to beta agonist therapy , needs XOPENEX HFA  (levalbuterol tartrate) Inhalation Aerosol     Bj Butler MD

## 2019-05-06 NOTE — TELEPHONE ENCOUNTER
Central Prior Authorization Team   Phone: [phone]      PA Initiation    Medication: (Xopenex HFA 45mcg/Act Aero)  Insurance Company: [employer] - Phone [phone] Fax [phone]  Pharmacy Filling the Rx: [facility] - [address]  Filling Pharmacy Phone: [phone]  Filling Pharmacy Fax:    Start Date: 5/6/2019

## 2019-05-06 NOTE — TELEPHONE ENCOUNTER
Prior Authorization Approval    Authorization Effective Date: 5/6/2019  Authorization Expiration Date: 5/5/2020  Medication: (Xopenex HFA 45mcg/Act Aero)  Approved Dose/Quantity:    Reference #: [id number]   Insurance Company: "[employer]" - Phone [phone] Fax [phone]  Expected CoPay:       CoPay Card Available:      Foundation Assistance Needed:    Which Pharmacy is filling the prescription (Not needed for infusion/clinic administered): [facility] - [address]  Pharmacy Notified: Yes  Patient Notified: Yes

## 2019-05-30 ENCOUNTER — TELEPHONE (OUTPATIENT)
Dept: FAMILY MEDICINE | Facility: CLINIC | Age: 49
End: 2019-05-30

## 2019-05-30 NOTE — TELEPHONE ENCOUNTER
Reason for Call:  Form, our goal is to have forms completed with 72 hours, however, some forms may require a visit or additional information.    Type of letter, form or note:  medical    Who is the form from?: Patient    Where did the form come from: Patient or family brought in       What clinic location was the form placed at?: [facility]    Where the form was placed: Dr. Espinoza Box/Folder    What number is listed as a contact on the form?: [phone]       Additional comments: please fax forms to [phone] when signed    Call taken on 5/30/2019 at 1:14 PM by Iris Driscoll

## 2019-06-03 ENCOUNTER — TELEPHONE (OUTPATIENT)
Dept: INTERNAL MEDICINE | Facility: CLINIC | Age: 49
End: 2019-06-03

## 2019-06-03 ENCOUNTER — THERAPY VISIT (OUTPATIENT)
Dept: PHYSICAL THERAPY | Facility: CLINIC | Age: 49
End: 2019-06-03
Attending: INTERNAL MEDICINE
Payer: COMMERCIAL

## 2019-06-03 DIAGNOSIS — M54.50 LOW BACK PAIN: Primary | ICD-10-CM

## 2019-06-03 DIAGNOSIS — M54.59 MECHANICAL LOW BACK PAIN: ICD-10-CM

## 2019-06-03 PROCEDURE — 97140 MANUAL THERAPY 1/> REGIONS: CPT | Mod: GP | Performed by: PHYSICAL THERAPIST

## 2019-06-03 PROCEDURE — 97110 THERAPEUTIC EXERCISES: CPT | Mod: GP | Performed by: PHYSICAL THERAPIST

## 2019-06-03 PROCEDURE — 97161 PT EVAL LOW COMPLEX 20 MIN: CPT | Mod: GP | Performed by: PHYSICAL THERAPIST

## 2019-06-03 NOTE — TELEPHONE ENCOUNTER
----- Message from Bj Butler MD sent at 6/3/2019 10:04 AM CDT -----  Can you get this started with an encounter ? I can sign and / or finish [facility] ordering if necessary     Bj Butler MD      ----- Message -----  From: Moreno Frank, PT  Sent: 6/3/2019   9:38 AM  To: MD Dr Carrie Mcknight Kirsten has returned for treatment of her LBP.  Could I ask you to enter an order for PT?? Thank you. She last saw you in April.    Lexus LUTZ

## 2019-06-03 NOTE — PROGRESS NOTES
[facility] Initial Evaluation  Subjective:  Pt reports reaggrivation of her usual LBP. She might have done some heavy lifting. She denies LE pain but mostly left side LBP.     The history is provided by the patient.   Laura Jackson is a 49 year old female with a sacroiliac and lumbar condition.  Condition occurred with:  Lifting.  Condition occurred: at home.  This is a recurrent and new condition  4.1.19.    Patient reports pain:  SI joint left and lumbar spine left.  Radiates to:  Gluteals left.  Pain is described as aching, stabbing and sharp and is intermittent and reported as 4/10 and 8/10.  Associated symptoms:  Loss of motion/stiffness. Pain is the same all the time.  Symptoms are exacerbated by bending and lifting and relieved by nothing.  Since onset symptoms are gradually worsening.        General health as reported by patient is good.  Pertinent medical history includes:  Other (alison Danlos syndrome.).      Current medications:  Pain medication.  Current occupation is RN, now working again .        Barriers include:  None as reported by the patient.    Red flags:  None as reported by the patient.                        Objective:  System         Lumbar/SI Evaluation              Lumbar Palpation:    Tenderness present at Left:    Quadratus Lumborum; Piriformis; Iliac Crest; Gluteus Medius and Greater Trochanter  Tenderness present at Right: Piriformis; Iliac Crest; Gluteus Medius and Greater Trochanter  Tenderness not present at Right:  Quadratus Lumborum    Lumbar Provocation:    Left positive with:  Mobility  Right positive with: Mobility  Spinal Segmental Conclusions:     Level: Hypo noted at S1      SI joint/Sacrum:    Rt SI dysf, post rot, flare       Left negative at:    Ilium Ventromedial  Right positive at:    Ilium Ventromedial    Sacral conclusion right:  Upslip, posterior inominate, locked and outflare                                             General      ROS    Assessment/Plan:    Patient is a 49 year old female with lumbar complaints.    Patient has the following significant findings with corresponding treatment plan.                Diagnosis 1:  lbp  Pain -  manual therapy, self management, directional preference exercise and home program  Impaired muscle performance - neuro re-education  Decreased function - therapeutic activities    Therapy Evaluation Codes:   1) History comprised of:   Personal factors that impact the plan of care:      Coping style, Overall behavior pattern and Past/current experiences.    Comorbidity factors that impact the plan of care are:      alison danlos.     Medications impacting care: Pain.  2) Examination of Body Systems comprised of:   Body structures and functions that impact the plan of care:      Lumbar spine.   Activity limitations that impact the plan of care are:      Bending, Lifting, Sitting, Squatting/kneeling and Walking.  3) Clinical presentation characteristics are:   Stable/Uncomplicated.  4) Decision-Making    Low complexity using standardized patient assessment instrument and/or measureable assessment of functional outcome.  Cumulative Therapy Evaluation is: Low complexity.    Previous and current functional limitations:  (See Goal Flow Sheet for this information)    Short term and Long term goals: (See Goal Flow Sheet for this information)     Communication ability:  Patient appears to be able to clearly communicate and understand verbal and written communication and follow directions correctly.  Treatment Explanation - The following has been discussed with the patient:   RX ordered/plan of care  Anticipated outcomes  Possible risks and side effects  This patient would benefit from PT intervention to resume normal activities.   Rehab potential is good.    Frequency:  1 X week, once daily  Duration:  for 6 weeks  Discharge Plan:  Achieve all LTG.  Independent in home treatment program.  Reach maximal therapeutic  benefit.    Please refer to the daily flowsheet for treatment today, total treatment time and time spent performing 1:1 timed codes.

## 2019-06-25 ENCOUNTER — THERAPY VISIT (OUTPATIENT)
Dept: PHYSICAL THERAPY | Facility: CLINIC | Age: 49
End: 2019-06-25
Payer: COMMERCIAL

## 2019-06-25 DIAGNOSIS — M54.59 MECHANICAL LOW BACK PAIN: ICD-10-CM

## 2019-06-25 PROCEDURE — 97112 NEUROMUSCULAR REEDUCATION: CPT | Mod: GP | Performed by: PHYSICAL THERAPIST

## 2019-06-25 NOTE — PROGRESS NOTES
Subjective:  HPI                    Objective:  System    Physical Exam    General     ROS    Assessment/Plan:    DISCHARGE REPORT    Progress reporting period is from 6.3 to 6.25.19.     SUBJECTIVE              Changes in function: Yes, see goal flow sheet for change in function   Adverse reactions: None;   ,     The subjective and objective information are from the last SOAP note on this patient.    OBJECTIVE  Objective: neg SI  tests, indep HEP , full LS AROM       ASSESSMENT/PLAN  Updated problem list and treatment plan: Diagnosis 1:  lbp    STG/LTGs have been met or progress has been made towards goals:  Yes (See Goal flow sheet completed today.)  Assessment of Progress: The patient's condition is improving.  The patient has met all of their long term goals.  Self Management Plans:  Patient is independent in a home treatment program.  Patient is independent in self management of symptoms.    Laura continues to require the following intervention to meet STG and LTG's:   We will discharge this patient from PT.    Recommendations:  This patient is ready to be discharged from therapy and continue their home treatment program.    Please refer to the daily flowsheet for treatment today, total treatment time and time spent performing 1:1 timed codes.

## 2019-07-19 ENCOUNTER — OFFICE VISIT (OUTPATIENT)
Dept: FAMILY MEDICINE | Facility: CLINIC | Age: 49
End: 2019-07-19
Payer: COMMERCIAL

## 2019-07-19 VITALS
SYSTOLIC BLOOD PRESSURE: 126 MMHG | BODY MASS INDEX: 30.18 KG/M2 | OXYGEN SATURATION: 98 % | HEART RATE: 78 BPM | DIASTOLIC BLOOD PRESSURE: 72 MMHG | TEMPERATURE: 98.1 F | WEIGHT: 178.6 LBS | RESPIRATION RATE: 20 BRPM

## 2019-07-19 DIAGNOSIS — M79.644 CHRONIC THUMB PAIN, RIGHT: ICD-10-CM

## 2019-07-19 DIAGNOSIS — G89.29 CHRONIC THUMB PAIN, RIGHT: ICD-10-CM

## 2019-07-19 DIAGNOSIS — M65.311 TRIGGER THUMB OF RIGHT HAND: Primary | ICD-10-CM

## 2019-07-19 PROCEDURE — 99213 OFFICE O/P EST LOW 20 MIN: CPT | Performed by: FAMILY MEDICINE

## 2019-07-19 NOTE — PROGRESS NOTES
Subjective     Laura Jackson is a 49 year old female who presents to clinic today for the following health issues:    HPI   Chief Complaint   Patient presents with     RECHECK     Right thumb ; decreased in movement and sometimes locks up ; swelling and now interfering with work     Patient presents with right thumb pain  Lump in joint, painful, worsening, limiting activity  Resting for about 1 week, so a little better today  Applying ice as well.    BP Readings from Last 3 Encounters:   07/23/19 142/78   07/19/19 126/72   04/12/19 116/74    Wt Readings from Last 3 Encounters:   07/23/19 80.7 kg (178 lb)   07/19/19 81 kg (178 lb 9.6 oz)   04/12/19 80.6 kg (177 lb 12.8 oz)                      Reviewed and updated as needed this visit by Provider  Tobacco  Allergies  Meds  Problems  Med Hx  Surg Hx  Fam Hx         Review of Systems   ROS COMP: Constitutional, HEENT, cardiovascular, pulmonary, gi and gu systems are negative, except as otherwise noted.      Objective    /72   Pulse 78   Temp 98.1  F (36.7  C) (Oral)   Resp 20   Wt 81 kg (178 lb 9.6 oz)   LMP 07/28/2017   SpO2 98%   BMI 30.18 kg/m    Body mass index is 30.18 kg/m .  Physical Exam   GENERAL: healthy, alert and no distress  EYES: Eyes grossly normal to inspection, PERRL and conjunctivae and sclerae normal  NECK: no adenopathy, no asymmetry, masses, or scars and thyroid normal to palpation  MS: Pain with thumb extension, palpable contracture palmar aspect base of right thumb  SKIN: no suspicious lesions or rashes  NEURO: Normal strength and tone, mentation intact and speech normal  PSYCH: mentation appears normal, affect normal/bright          Assessment & Plan       ICD-10-CM    1. Trigger thumb of right hand M65.311    2. Chronic thumb pain, right M79.644 ORTHO  REFERRAL    G89.29      Will refer to ortho for definitive treatment.     Follow up if symptoms worsen or fail to improve.   Rickey Sorto MD  [facility]

## 2019-07-23 ENCOUNTER — OFFICE VISIT (OUTPATIENT)
Dept: ORTHOPEDICS | Facility: CLINIC | Age: 49
End: 2019-07-23
Payer: COMMERCIAL

## 2019-07-23 VITALS
HEART RATE: 82 BPM | OXYGEN SATURATION: 99 % | HEIGHT: 64 IN | DIASTOLIC BLOOD PRESSURE: 78 MMHG | BODY MASS INDEX: 30.39 KG/M2 | SYSTOLIC BLOOD PRESSURE: 142 MMHG | WEIGHT: 178 LBS

## 2019-07-23 DIAGNOSIS — M65.311 TRIGGER THUMB OF RIGHT HAND: Primary | ICD-10-CM

## 2019-07-23 DIAGNOSIS — M79.644 THUMB PAIN, RIGHT: ICD-10-CM

## 2019-07-23 DIAGNOSIS — M67.431 GANGLION CYST OF WRIST, RIGHT: ICD-10-CM

## 2019-07-23 DIAGNOSIS — G56.01 CARPAL TUNNEL SYNDROME OF RIGHT WRIST: ICD-10-CM

## 2019-07-23 DIAGNOSIS — M24.131 DEGENERATIVE TEAR OF TRIANGULAR FIBROCARTILAGE COMPLEX (TFCC) OF RIGHT WRIST: ICD-10-CM

## 2019-07-23 PROCEDURE — 99213 OFFICE O/P EST LOW 20 MIN: CPT | Mod: 25 | Performed by: ORTHOPAEDIC SURGERY

## 2019-07-23 PROCEDURE — 20550 NJX 1 TENDON SHEATH/LIGAMENT: CPT | Mod: F5 | Performed by: ORTHOPAEDIC SURGERY

## 2019-07-23 RX ORDER — TRIAMCINOLONE ACETONIDE 40 MG/ML
20 INJECTION, SUSPENSION INTRA-ARTICULAR; INTRAMUSCULAR
Status: DISCONTINUED | OUTPATIENT
Start: 2019-07-23 | End: 2024-05-20

## 2019-07-23 RX ADMIN — TRIAMCINOLONE ACETONIDE 20 MG: 40 INJECTION, SUSPENSION INTRA-ARTICULAR; INTRAMUSCULAR at 09:01

## 2019-07-23 ASSESSMENT — MIFFLIN-ST. JEOR: SCORE: 1407.4

## 2019-07-23 NOTE — PROGRESS NOTES
Hand / Upper Extremity Injection/Arthrocentesis: R thumb A1  Date/Time: 7/23/2019 9:01 AM  Performed by: Carmine Ribeiro MD  Authorized by: Carmine Ribeiro MD     Indications:  Pain  Needle Size:  25 G  Guidance: landmark    Approach:  Volar  Condition: trigger finger    Location:  Thumb    Site:  R thumb A1  Medications:  20 mg triamcinolone 40 MG/ML  Outcome:  Tolerated well, no immediate complications  Procedure discussed: discussed risks, benefits, and alternatives    Consent Given by:  Patient  Timeout: timeout called immediately prior to procedure    Prep: patient was prepped and draped in usual sterile fashion

## 2019-07-23 NOTE — LETTER
"    7/23/2019         RE: Laura Jackson  [address]  [place] [address]        Dear Colleague,    Thank you for referring your patient, Laura Jackson, to the [facility]. Please see a copy of my visit note below.    SUBJECTIVE:   Laura Jackson is a 49 year old female who is seen in consultation at the request of Dr. Vazquez for evaluation of right hand/thumb pain. Has been present approximately 4 months. No known injury.    Present symptoms:   Right thumb ; decreased in movement and sometimes locks up ; swelling and now interfering with work   Patient presents with right thumb pain  Lump in joint, painful, worsening, limiting activity     Locks up, and now is too stiff to bend now.  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  Also has a history of carpal tunnel syndrome, and was told she needed surgery by Dr. Rea, and history of ganglion cyst of the wrist, and TFC tear seen on MRI    carpal tunnel syndrome bilateral: EMG 2018:  \"Mild-to-moderate right and mild left, median neuropathies at the wrist, as seen in carpal tunnel syndrome\"    Recently has been back to work after a 2 year hiatus, and the wrist is worse    Treatments tried to this point: thumb spica brace never helped, nsaids    Orthopedic PMH: history of ganglion cyst of wrist, lateral epicondylitis    Review of Systems:  Constitutional:  NEGATIVE for fever, chills, change in weight  Integumentary/Skin:  NEGATIVE for worrisome rashes, moles or lesions  Eyes:  NEGATIVE for vision changes or irritation  ENT/Mouth:  NEGATIVE for ear, mouth and throat problems  Resp:  NEGATIVE for significant cough or SOB  Breast:  NEGATIVE for masses, tenderness or discharge  CV:  NEGATIVE for chest pain, palpitations or peripheral edema  GI:  NEGATIVE for nausea, abdominal pain, heartburn, or change in bowel habits  :  Negative   Musculoskeletal:  See HPI above  Neuro:  NEGATIVE for weakness, dizziness or paresthesias  Endocrine:  NEGATIVE for " temperature intolerance, skin/hair changes  Heme/allergy/immune:  NEGATIVE for bleeding problems  Psychiatric:  NEGATIVE for changes in mood or affect    Past Medical History:   Past Medical History:   Diagnosis Date     Benign essential hypertension 7/31/2018     Family history of breast cancer 2/19/2014     Family history of breast cancer      SVT (supraventricular tachycardia):  history of, no recurrence since 2008 10/11/2013    no recurrence since 2008      Uncomplicated asthma      Past Surgical History:   Past Surgical History:   Procedure Laterality Date     APPENDECTOMY       COLONOSCOPY N/A 8/20/2018    Procedure: COMBINED COLONOSCOPY, SINGLE OR MULTIPLE BIOPSY/POLYPECTOMY BY BIOPSY;;  Surgeon: Osman Hahn MD;  Location: [facility]     COLONOSCOPY WITH CO2 INSUFFLATION N/A 8/20/2018    Procedure: COLONOSCOPY WITH CO2 INSUFFLATION;  COLON-SCREENING / LUBKA ;  Surgeon: Osman Hanh MD;  Location: [facility]     DAVINCI HYSTERECTOMY TOTAL, SALPINGECTOMY BILATERAL N/A 8/4/2017    Procedure: DAVINCI XI HYSTERECTOMY TOTAL, SALPINGECTOMY BILATERAL;  DAVINCI TOTAL HYSTERECTOMY; BILATERAL SALPINGECTOMY. BILATERAL URETERAL LYSIS. UTEROSACRAL-COLPOPEXY. EXCISION OF ENDOMETRIOSIS;  Surgeon: George Loyola MD;  Location: [facility]     DAVINCI LYSIS OF ADHESIONS Bilateral 8/4/2017    Procedure: DAVINCI LYSIS OF ADHESIONS;  BILATERAL URETERAL LYSIS;  Surgeon: George Loyola MD;  [facility]     DAVINCI SACROCOLPOPEXY, CYSTOSCOPY, COMBINED N/A 8/4/2017    Procedure: COMBINED DAVINCI SACROCOLPOPEXY, CYSTOSCOPY;  UTEROSACRAL COLPOPEXY;  Surgeon: George Loyola MD;  Location[facility]     DAVINCI XI ASSISTED ABLATION / EXCISION OF ENDOMETRIOSIS  8/4/2017    Procedure: DAVINCI XI ASSISTED ABLATION / EXCISION OF ENDOMETRIOSIS;;  Surgeon: George Loyola MD;  Location[facility]     DILATION AND CURETTAGE N/A 6/20/2017    Procedure: DILATION AND CURETTAGE;  Uterine Curettings and Fibroid Removal, Cook  "catheter placement; (No hysterectomy done at this time);  Surgeon: Ernestina Saunders MD;  [facility]     HYSTERECTOMY, PAP NO LONGER INDICATED       TONSILLECTOMY       Family History:   Family History   Problem Relation Age of Onset     Diabetes Mother      Hypertension Mother      Hyperlipidemia Mother      Arrhythmia Mother      Cardiovascular Father         CHF and COPD     Asthma Father      Breast Cancer Maternal Grandfather      Colon Cancer Maternal Grandfather      Breast Cancer Paternal Grandmother      Breast Cancer Paternal Aunt      C.A.D. Paternal Grandfather      Breast Cancer Cousin      Asthma Son      Diabetes Maternal Grandmother      Hypertension Maternal Grandmother      Breast Cancer Cousin      Breast Cancer Cousin      Breast Cancer Cousin      Social History:   Social History     Tobacco Use     Smoking status: Never Smoker     Smokeless tobacco: Never Used   Substance Use Topics     Alcohol use: Yes     Comment: rare, two sips every six months     OBJECTIVE:  Physical Exam:  /78 (BP Location: Left arm, Patient Position: Sitting, Cuff Size: Adult Regular)   Pulse 82   Ht 1.626 m (5' 4\")   Wt 80.7 kg (178 lb)   LMP 07/28/2017   SpO2 99%   BMI 30.55 kg/m     General Appearance: healthy, alert and no distress   Skin: no suspicious lesions or rashes  Neuro: Normal strength and tone, mentation intact and speech normal  Vascular: good pulses, and cappillary refill   Lymph: no lymphadenopathy   Psych:  mentation appears normal and affect normal/bright  Resp: no increased work of breathing     Right Hand Exam:  Inspection: small dorsal ganglion. No thenar atrophy  ROM: wrist is full.  Without pain  Tender: A1 pulley right thum.  ++ triggering, can't flex enough to lock, or \"extend enough to lock\"     X-rays:  None    MRI, 2/2/18: right wrist:   1. Suspected partial thickness tearing of the central portion of  triangular fibrocartilage.  2.  20 x 4 x 12 mm ganglion cyst intimate with " dorsal intercarpal  ligament.     ASSESSMENT:   Right trigger thumb.  Atypical presentation.  Has hypermobility, and triggering in extension.  right carpal tunnel syndrome --mild-moderate  Right wrist ganglion--smaller, and less symptomatic than 2 years ago  Right wrist TFC tear    PLAN:   Not considering surgery now  Trigger thumb injection: Procedure Note:   Informed consent obtained. Risks, benefits and complications of the injection were discussed with the patient and the patient elected to proceed. A Right thumb trigger finger/flexor tenosynovial, A1 pulley- area steroid injection was performed using 0.5 cc  Kenalog-40 40mg per mL after sterile prep. This was tolerated well by the patient.     ultrasound guided carpal tunnel injection ordered.      SUMANTH Ribeiro MD  Dept. Orthopedic Surgery  [facility]       Hand / Upper Extremity Injection/Arthrocentesis: R thumb A1  Date/Time: 7/23/2019 9:01 AM  Performed by: Carmine Ribeiro MD  Authorized by: Carmine Ribeiro MD     Indications:  Pain  Needle Size:  25 G  Guidance: landmark    Approach:  Volar  Condition: trigger finger    Location:  Thumb    Site:  R thumb A1  Medications:  20 mg triamcinolone 40 MG/ML  Outcome:  Tolerated well, no immediate complications  Procedure discussed: discussed risks, benefits, and alternatives    Consent Given by:  Patient  Timeout: timeout called immediately prior to procedure    Prep: patient was prepped and draped in usual sterile fashion            Again, thank you for allowing me to participate in the care of your patient.        Sincerely,        Carmine Ribeiro MD

## 2019-07-23 NOTE — PROGRESS NOTES
"SUBJECTIVE:   Laura Jackson is a 49 year old female who is seen in consultation at the request of Dr. Vazquez for evaluation of right hand/thumb pain. Has been present approximately 4 months. No known injury.    Present symptoms:   Right thumb ; decreased in movement and sometimes locks up ; swelling and now interfering with work   Patient presents with right thumb pain  Lump in joint, painful, worsening, limiting activity     Locks up, and now is too stiff to bend now.  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  Also has a history of carpal tunnel syndrome, and was told she needed surgery by Dr. Rea, and history of ganglion cyst of the wrist, and TFC tear seen on MRI    carpal tunnel syndrome bilateral: EMG 2018:  \"Mild-to-moderate right and mild left, median neuropathies at the wrist, as seen in carpal tunnel syndrome\"    Recently has been back to work after a 2 year hiatus, and the wrist is worse    Treatments tried to this point: thumb spica brace never helped, nsaids    Orthopedic PMH: history of ganglion cyst of wrist, lateral epicondylitis    Review of Systems:  Constitutional:  NEGATIVE for fever, chills, change in weight  Integumentary/Skin:  NEGATIVE for worrisome rashes, moles or lesions  Eyes:  NEGATIVE for vision changes or irritation  ENT/Mouth:  NEGATIVE for ear, mouth and throat problems  Resp:  NEGATIVE for significant cough or SOB  Breast:  NEGATIVE for masses, tenderness or discharge  CV:  NEGATIVE for chest pain, palpitations or peripheral edema  GI:  NEGATIVE for nausea, abdominal pain, heartburn, or change in bowel habits  :  Negative   Musculoskeletal:  See HPI above  Neuro:  NEGATIVE for weakness, dizziness or paresthesias  Endocrine:  NEGATIVE for temperature intolerance, skin/hair changes  Heme/allergy/immune:  NEGATIVE for bleeding problems  Psychiatric:  NEGATIVE for changes in mood or affect    Past Medical History:   Past Medical History:   Diagnosis Date     Benign essential " hypertension 7/31/2018     Family history of breast cancer 2/19/2014     Family history of breast cancer      SVT (supraventricular tachycardia):  history of, no recurrence since 2008 10/11/2013    no recurrence since 2008      Uncomplicated asthma      Past Surgical History:   Past Surgical History:   Procedure Laterality Date     APPENDECTOMY       COLONOSCOPY N/A 8/20/2018    Procedure: COMBINED COLONOSCOPY, SINGLE OR MULTIPLE BIOPSY/POLYPECTOMY BY BIOPSY;;  Surgeon: Osman Hahn MD;  [facility]     COLONOSCOPY WITH CO2 INSUFFLATION N/A 8/20/2018    Procedure: COLONOSCOPY WITH CO2 INSUFFLATION;  COLON-SCREENING / LUBKA ;  Surgeon: Osman Hahn MD;  [facility]     DAVINCI HYSTERECTOMY TOTAL, SALPINGECTOMY BILATERAL N/A 8/4/2017    Procedure: DAVINCI XI HYSTERECTOMY TOTAL, SALPINGECTOMY BILATERAL;  DAVINCI TOTAL HYSTERECTOMY; BILATERAL SALPINGECTOMY. BILATERAL URETERAL LYSIS. UTEROSACRAL-COLPOPEXY. EXCISION OF ENDOMETRIOSIS;  Surgeon: George Loyola MD;  [facility]     DAVINCI LYSIS OF ADHESIONS Bilateral 8/4/2017    Procedure: DAVINCI LYSIS OF ADHESIONS;  BILATERAL URETERAL LYSIS;  Surgeon: George Loyola MD;  [facility]     DAVINCI SACROCOLPOPEXY, CYSTOSCOPY, COMBINED N/A 8/4/2017    Procedure: COMBINED DAVINCI SACROCOLPOPEXY, CYSTOSCOPY;  UTEROSACRAL COLPOPEXY;  Surgeon: George Loyola MD;  [facility]     DAVINCI XI ASSISTED ABLATION / EXCISION OF ENDOMETRIOSIS  8/4/2017    Procedure: DAVINCI XI ASSISTED ABLATION / EXCISION OF ENDOMETRIOSIS;;  Surgeon: George Loyola MD;  [facility]     DILATION AND CURETTAGE N/A 6/20/2017    Procedure: DILATION AND CURETTAGE;  Uterine Curettings and Fibroid Removal, Cook catheter placement; (No hysterectomy done at this time);  Surgeon: Ernestina Saunders MD;  [facility]     HYSTERECTOMY, PAP NO LONGER INDICATED       TONSILLECTOMY       Family History:   Family History   Problem Relation Age of Onset      "Diabetes Mother      Hypertension Mother      Hyperlipidemia Mother      Arrhythmia Mother      Cardiovascular Father         CHF and COPD     Asthma Father      Breast Cancer Maternal Grandfather      Colon Cancer Maternal Grandfather      Breast Cancer Paternal Grandmother      Breast Cancer Paternal Aunt      C.A.D. Paternal Grandfather      Breast Cancer Cousin      Asthma Son      Diabetes Maternal Grandmother      Hypertension Maternal Grandmother      Breast Cancer Cousin      Breast Cancer Cousin      Breast Cancer Cousin      Social History:   Social History     Tobacco Use     Smoking status: Never Smoker     Smokeless tobacco: Never Used   Substance Use Topics     Alcohol use: Yes     Comment: rare, two sips every six months     OBJECTIVE:  Physical Exam:  /78 (BP Location: Left arm, Patient Position: Sitting, Cuff Size: Adult Regular)   Pulse 82   Ht 1.626 m (5' 4\")   Wt 80.7 kg (178 lb)   LMP 07/28/2017   SpO2 99%   BMI 30.55 kg/m    General Appearance: healthy, alert and no distress   Skin: no suspicious lesions or rashes  Neuro: Normal strength and tone, mentation intact and speech normal  Vascular: good pulses, and cappillary refill   Lymph: no lymphadenopathy   Psych:  mentation appears normal and affect normal/bright  Resp: no increased work of breathing     Right Hand Exam:  Inspection: small dorsal ganglion. No thenar atrophy  ROM: wrist is full.  Without pain  Tender: A1 pulley right thum.  ++ triggering, can't flex enough to lock, or \"extend enough to lock\"     X-rays:  None    MRI, 2/2/18: right wrist:   1. Suspected partial thickness tearing of the central portion of  triangular fibrocartilage.  2.  20 x 4 x 12 mm ganglion cyst intimate with dorsal intercarpal  ligament.     ASSESSMENT:   Right trigger thumb.  Atypical presentation.  Has hypermobility, and triggering in extension.  right carpal tunnel syndrome --mild-moderate  Right wrist ganglion--smaller, and less symptomatic " than 2 years ago  Right wrist TFC tear    PLAN:   Not considering surgery now  Trigger thumb injection: Procedure Note:   Informed consent obtained. Risks, benefits and complications of the injection were discussed with the patient and the patient elected to proceed. A Right thumb trigger finger/flexor tenosynovial, A1 pulley- area steroid injection was performed using 0.5 cc  Kenalog-40 40mg per mL after sterile prep. This was tolerated well by the patient.     ultrasound guided carpal tunnel injection ordered.      SUMANTH Ribeiro MD  Dept. Orthopedic Surgery  [facility]

## 2019-07-25 ENCOUNTER — TELEPHONE (OUTPATIENT)
Dept: CARDIOLOGY | Facility: CLINIC | Age: 49
End: 2019-07-25

## 2019-07-25 NOTE — TELEPHONE ENCOUNTER
Aug. 13 needs to be rescheduled to 8/27/19 or later note.        Left message to return clinic call to .  Torin Brown L.P.N.

## 2019-08-08 ENCOUNTER — OFFICE VISIT (OUTPATIENT)
Dept: ORTHOPEDICS | Facility: CLINIC | Age: 49
End: 2019-08-08
Payer: COMMERCIAL

## 2019-08-08 VITALS — HEIGHT: 64 IN | BODY MASS INDEX: 30.55 KG/M2

## 2019-08-08 DIAGNOSIS — G56.01 RIGHT CARPAL TUNNEL SYNDROME: Primary | ICD-10-CM

## 2019-08-08 PROCEDURE — 20526 THER INJECTION CARP TUNNEL: CPT | Mod: RT | Performed by: FAMILY MEDICINE

## 2019-08-08 PROCEDURE — 76942 ECHO GUIDE FOR BIOPSY: CPT | Performed by: FAMILY MEDICINE

## 2019-08-08 RX ORDER — BETAMETHASONE SODIUM PHOSPHATE AND BETAMETHASONE ACETATE 3; 3 MG/ML; MG/ML
6 INJECTION, SUSPENSION INTRA-ARTICULAR; INTRALESIONAL; INTRAMUSCULAR; SOFT TISSUE
Status: DISCONTINUED | OUTPATIENT
Start: 2019-08-08 | End: 2022-09-21

## 2019-08-08 RX ORDER — ROPIVACAINE HYDROCHLORIDE 5 MG/ML
1 INJECTION, SOLUTION EPIDURAL; INFILTRATION; PERINEURAL
Status: DISCONTINUED | OUTPATIENT
Start: 2019-08-08 | End: 2024-05-20

## 2019-08-08 RX ADMIN — BETAMETHASONE SODIUM PHOSPHATE AND BETAMETHASONE ACETATE 6 MG: 3; 3 INJECTION, SUSPENSION INTRA-ARTICULAR; INTRALESIONAL; INTRAMUSCULAR; SOFT TISSUE at 10:25

## 2019-08-08 RX ADMIN — ROPIVACAINE HYDROCHLORIDE 1 ML: 5 INJECTION, SOLUTION EPIDURAL; INFILTRATION; PERINEURAL at 10:25

## 2019-08-08 NOTE — PATIENT INSTRUCTIONS
[facility] Injection Discharge Instructions      You may shower, however avoid swimming, tub baths or hot tubs for 24 hours following your procedure    You may have a mild to moderate increase in pain for several days following the injection.    It may take up to 14 days for the steroid medication to start working although you may feel the effect as early as a few days after the procedure.    You may use ice packs for 10-15 minutes, 3 to 4 times a day at the injection site for comfort    You may use anti-inflammatory medications (such as Ibuprofen or Aleve or Advil) or Tylenol for pain control if necessary    If you were fasting, you may resume your normal diet and medications after the procedure      If you experience any of the following, call [facility] @ [phone] or [phone]  -Fever over 100 degree F  -Swelling, bleeding, redness, drainage, warmth at the injection site  - New or worsening pain

## 2019-08-08 NOTE — PROGRESS NOTES
Hand / Upper Extremity Injection/Arthrocentesis: R carpal tunnel  Date/Time: 8/8/2019 10:25 AM  Performed by: Castillo Latif MD  Authorized by: Castillo Latif MD     Indications:  Pain and therapeutic  Needle Size:  27 G  Guidance: ultrasound    Approach:  Volar  Condition: carpal tunnel      Site:  R carpal tunnel  Medications:  6 mg betamethasone acet & sod phos 6 (3-3) MG/ML; 1 mL ropivacaine 5 MG/ML  Outcome:  Tolerated well, no immediate complications  Procedure discussed: discussed risks, benefits, and alternatives    Consent Given by:  Patient  Timeout: timeout called immediately prior to procedure    Prep: patient was prepped and draped in usual sterile fashion     Patient reported increased numbness in median distribution after injection.  Aftercare instructions given to patient.  Plan to follow-up as previously discussed with referring provider.     Castillo Latif MD [facility]

## 2019-08-08 NOTE — LETTER
8/8/2019         RE: Laura Jackson  [address]  [place] [address]        Dear Colleague,    Thank you for referring your patient, Laura Jackson, to the [facility]. Please see a copy of my visit note below.    Hand / Upper Extremity Injection/Arthrocentesis: R carpal tunnel  Date/Time: 8/8/2019 10:25 AM  Performed by: Castillo Latif MD  Authorized by: Castillo Latif MD     Indications:  Pain and therapeutic  Needle Size:  27 G  Guidance: ultrasound    Approach:  Volar  Condition: carpal tunnel      Site:  R carpal tunnel  Medications:  6 mg betamethasone acet & sod phos 6 (3-3) MG/ML; 1 mL ropivacaine 5 MG/ML  Outcome:  Tolerated well, no immediate complications  Procedure discussed: discussed risks, benefits, and alternatives    Consent Given by:  Patient  Timeout: timeout called immediately prior to procedure    Prep: patient was prepped and draped in usual sterile fashion     Patient reported increased numbness in median distribution after injection.  Aftercare instructions given to patient.  Plan to follow-up as previously discussed with referring provider.     Castillo Latif MD [facility]            Again, thank you for allowing me to participate in the care of your patient.        Sincerely,        Castillo Latif MD

## 2019-08-20 ENCOUNTER — DOCUMENTATION ONLY (OUTPATIENT)
Dept: CARE COORDINATION | Facility: CLINIC | Age: 49
End: 2019-08-20

## 2019-08-26 ENCOUNTER — OFFICE VISIT (OUTPATIENT)
Dept: CARDIOLOGY | Facility: CLINIC | Age: 49
End: 2019-08-26
Attending: NURSE PRACTITIONER
Payer: COMMERCIAL

## 2019-08-26 ENCOUNTER — TELEPHONE (OUTPATIENT)
Dept: CARDIOLOGY | Facility: CLINIC | Age: 49
End: 2019-08-26

## 2019-08-26 VITALS
BODY MASS INDEX: 30.59 KG/M2 | SYSTOLIC BLOOD PRESSURE: 131 MMHG | DIASTOLIC BLOOD PRESSURE: 86 MMHG | HEART RATE: 77 BPM | HEIGHT: 64 IN | WEIGHT: 179.2 LBS | OXYGEN SATURATION: 99 %

## 2019-08-26 DIAGNOSIS — I10 BENIGN ESSENTIAL HYPERTENSION: ICD-10-CM

## 2019-08-26 DIAGNOSIS — I47.11 INAPPROPRIATE SINUS TACHYCARDIA (H): Primary | ICD-10-CM

## 2019-08-26 PROCEDURE — G0463 HOSPITAL OUTPT CLINIC VISIT: HCPCS | Mod: ZF

## 2019-08-26 PROCEDURE — 99212 OFFICE O/P EST SF 10 MIN: CPT | Mod: ZP | Performed by: NURSE PRACTITIONER

## 2019-08-26 RX ORDER — IVABRADINE 5 MG/1
5 TABLET, FILM COATED ORAL 2 TIMES DAILY WITH MEALS
Qty: 180 TABLET | Refills: 3 | Status: SHIPPED | OUTPATIENT
Start: 2019-08-26 | End: 2020-02-20

## 2019-08-26 ASSESSMENT — ENCOUNTER SYMPTOMS
MYALGIAS: 0
CHILLS: 0
POLYPHAGIA: 0
DECREASED APPETITE: 0
DIARRHEA: 1
BACK PAIN: 0
ABDOMINAL PAIN: 0
BOWEL INCONTINENCE: 0
HALLUCINATIONS: 0
ALTERED TEMPERATURE REGULATION: 0
SKIN CHANGES: 1
FEVER: 0
SYNCOPE: 0
LIGHT-HEADEDNESS: 0
SLEEP DISTURBANCES DUE TO BREATHING: 0
WEIGHT LOSS: 0
HYPOTENSION: 0
POOR WOUND HEALING: 0
NAIL CHANGES: 0
CONSTIPATION: 0
JOINT SWELLING: 1
HEARTBURN: 1
BLOOD IN STOOL: 0
FATIGUE: 1
STIFFNESS: 1
LEG PAIN: 0
RECTAL PAIN: 1
ORTHOPNEA: 0
WEIGHT GAIN: 0
ARTHRALGIAS: 1
MUSCLE CRAMPS: 0
VOMITING: 0
PALPITATIONS: 1
INCREASED ENERGY: 0
NIGHT SWEATS: 0
BLOATING: 1
NAUSEA: 1
JAUNDICE: 0
POLYDIPSIA: 0
HYPERTENSION: 0
EXERCISE INTOLERANCE: 1
MUSCLE WEAKNESS: 1
NECK PAIN: 1

## 2019-08-26 ASSESSMENT — MIFFLIN-ST. JEOR: SCORE: 1412.85

## 2019-08-26 ASSESSMENT — PAIN SCALES - GENERAL: PAINLEVEL: NO PAIN (0)

## 2019-08-26 NOTE — TELEPHONE ENCOUNTER
The provider Marylou Lim is aware that the patient rescheduled her appointment for today at [facility].

## 2019-08-26 NOTE — TELEPHONE ENCOUNTER
[facility] Call Center    Phone Message    May a detailed message be left on voicemail: yes    Reason for Call: Other: Pt thought she had an appt with Nicola today in [place]. Rescheduling today at 3pm at the [facility] location.      Action Taken: Message routed to:  [facility] ([facility]):  Cardiology

## 2019-08-26 NOTE — PROGRESS NOTES
"    Heart Care - Clinical Cardiac Electrophysiology       HPI: Laura Jackson is a 49 year old female who presents for follow up of inappropriate sinus tachycardia and hypertension.  The patient has a past medical history significant for inappropriate sinus tachycardia, hypermobility syndrome, asthma, IBS, uterine fibroids s/p hysterectomy, and GERD. In summer of 2017, she underwent a hysterectomy for severe vaginal bleeding and during this time she started having symptoms of palpitations, fatigue, flushing, nausea, and lightheadedness. She also started having numbness, tingling and her fingers would turn white so she was diagnosed with Raynaud's. She was started on diltiazem and stated that her finger symptoms have greatly improved. Of note, patient had very similar symptoms 6 years prior to hysterectomy when she had a viral infection and she states that her symptoms took around 2 years before she felt back to baseline. ECHO was normal. Ziopatch showed episodes of sinus tachycardia. Tilt was done at an outside facility and showed normal responses except during deep breathing exercises but she states that it was very cold in the room where the test was done and that she usually doesn't have a problem with tachycardia when it is cold. Work up for mast cell disorder was normal. She trailed metoprolol XL 25 mg daily for 4 weeks and was not able to tolerate due to extreme fatigue and brain \"fogginess\". 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  Ivabradine was initiated for inappropriate sinus tachycardia earlier this year and her symptoms improved to the point that she was able to return to work.     Current Interval history:   Patient states she has had increased fatigue off and on over the past 6 weeks, feels that it is worse when it is very hot outside. States that she has been able to work and exercise without problems. Patient " states that never misses doses of medication.  States that if she is even 2 hours late with her ivabradine she will have racing heart so she is careful to take her medicine at the same time every day. States that activity level is moderate, swims 3-5 days/week without problems.  Had one episode of dizziness in July when it was very hot outside at her cousins wedding, lasted less than an hour. Notes some pedal edema at the end of the work day, improves with elevation and compression stocking use. Denies chest pain or pressure, syncope, dyspnea at rest or with exertion, palpitations, orthopnea, PND, abdominal edema, or claudication.      Current Outpatient Medications   Medication Sig Dispense Refill     Cholecalciferol (VITAMIN D) 2000 UNITS CAPS Take 1 capsule by mouth daily       fluticasone-salmeterol (ADVAIR) 100-50 MCG/DOSE inhaler Inhale 1 puff into the lungs 2 times daily 3 Inhaler 1     ivabradine (CORLANOR) 5 MG tablet Take 1 tablet (5 mg) by mouth 2 times daily (with meals) 180 tablet 3     levalbuterol (XOPENEX HFA) 45 MCG/ACT inhaler Inhale 1-2 puffs into the lungs every 4 hours as needed for shortness of breath / dyspnea 1 Inhaler 5     OMEPRAZOLE PO Take 20 mg by mouth every morning       order for DME Equipment being ordered: wrist splint with thumb support 1 Device 0     RaNITidine HCl (ZANTAC PO) Take 150 mg by mouth as needed for heartburn         Past Medical History:   Diagnosis Date     Benign essential hypertension 7/31/2018     Family history of breast cancer 2/19/2014     Family history of breast cancer      SVT (supraventricular tachycardia):  history of, no recurrence since 2008 10/11/2013    no recurrence since 2008      Uncomplicated asthma        Past Surgical History:   Procedure Laterality Date     APPENDECTOMY       COLONOSCOPY N/A 8/20/2018    Procedure: COMBINED COLONOSCOPY, SINGLE OR MULTIPLE BIOPSY/POLYPECTOMY BY BIOPSY;;  Surgeon: Osman Hahn MD;  Location:  OR      COLONOSCOPY WITH CO2 INSUFFLATION N/A 8/20/2018    Procedure: COLONOSCOPY WITH CO2 INSUFFLATION;  COLON-SCREENING / LUBKA ;  Surgeon: Osman Hahn MD;  Location: [facility] OR     DAVINCI HYSTERECTOMY TOTAL, SALPINGECTOMY BILATERAL N/A 8/4/2017    Procedure: DAVINCI XI HYSTERECTOMY TOTAL, SALPINGECTOMY BILATERAL;  DAVINCI TOTAL HYSTERECTOMY; BILATERAL SALPINGECTOMY. BILATERAL URETERAL LYSIS. UTEROSACRAL-COLPOPEXY. EXCISION OF ENDOMETRIOSIS;  Surgeon: George Loyola MD;  Location: [facility] OR     DAVINCI LYSIS OF ADHESIONS Bilateral 8/4/2017    Procedure: DAVINCI LYSIS OF ADHESIONS;  BILATERAL URETERAL LYSIS;  Surgeon: George Loyola MD;  Location: [facility] OR     DAVINCI SACROCOLPOPEXY, CYSTOSCOPY, COMBINED N/A 8/4/2017    Procedure: COMBINED DAVINCI SACROCOLPOPEXY, CYSTOSCOPY;  UTEROSACRAL COLPOPEXY;  Surgeon: George Loyola MD;  Location: [facility] OR     DAVINCI XI ASSISTED ABLATION / EXCISION OF ENDOMETRIOSIS  8/4/2017    Procedure: DAVINCI XI ASSISTED ABLATION / EXCISION OF ENDOMETRIOSIS;;  Surgeon: George Loyola MD;  Location: [facility] OR     DILATION AND CURETTAGE N/A 6/20/2017    Procedure: DILATION AND CURETTAGE;  Uterine Curettings and Fibroid Removal, Cook catheter placement; (No hysterectomy done at this time);  Surgeon: Ernestina Saunders MD;  Location: [facility] OR     HYSTERECTOMY, PAP NO LONGER INDICATED       TONSILLECTOMY         Family History   Problem Relation Age of Onset     Diabetes Mother      Hypertension Mother      Hyperlipidemia Mother      Arrhythmia Mother      Cardiovascular Father         CHF and COPD     Asthma Father      Breast Cancer Maternal Grandfather      Colon Cancer Maternal Grandfather      Breast Cancer Paternal Grandmother      Breast Cancer Paternal Aunt      C.A.D. Paternal Grandfather      Breast Cancer Cousin      Asthma Son      Diabetes Maternal Grandmother      Hypertension Maternal Grandmother      Breast Cancer Cousin      Breast Cancer Cousin      Breast Cancer Cousin   "      Social History     Tobacco Use     Smoking status: Never Smoker     Smokeless tobacco: Never Used   Substance Use Topics     Alcohol use: Yes     Comment: rare, two sips every six months       Allergies   Allergen Reactions     Penicillins Swelling     Swelling throat; age 4     Tetracycline GI Disturbance     Other reaction(s): Abdominal Pain  Vomiting; nauseous  Other reaction(s): Abdominal Pain  Vomiting; nauseous         ROS:   A complete review of systems was performed and is negative except as noted in the HPI.     Physical Examination:  Vitals: /86 (BP Location: Left arm, Patient Position: Chair, Cuff Size: Adult Regular)   Pulse 77   Ht 1.626 m (5' 4\")   Wt 81.3 kg (179 lb 3.2 oz)   LMP 07/28/2017   SpO2 99%   BMI 30.76 kg/m    BMI= Body mass index is 30.76 kg/m .    GENERAL APPEARANCE: healthy, alert, and no acute distress  HEENT: no icterus, no xanthelasmas, normal pupil size and reaction, no cyanosis.  NECK: no asymmetry, no cervical bruits, no JVD   RESPIRATORY: lungs clear to auscultation - no rales, rhonchi or wheezes, no use of accessory muscles, no retractions, respirations are unlabored, normal respiratory rate  CARDIOVASCULAR: regular rhythm, normal S1 with physiologic split S2, no S3 or S4 and no murmur, click or rub  GI:  no abdominal bruits, soft, non-tender  EXTREMITIES: no clubbing  NEURO: alert and oriented to person/place/time, normal speech, gait and affect  VASC: Radial and posterior tibialis pulses +2 and symmetric bilaterally. No cyanosis or edema.   SKIN: no ecchymoses, no rashes    Assessment and recommendations:    # Inappropriate sinus tachycardia:   1. Try to drink 50-60 ounces of 50/50 electrolyte fluids/water mix per day.  Examples: Propel or Pedialyte. Gatorade and Powerade are higher in sugar/calories and are okay in moderation or if you are an athlete.   2. Continue ivabradine 5 mg twice daily for inappropriate sinus tachycardia.   3. Isometric exercises. "  These exercises will assist in increasing intravascular pressure. They also include swimming, rowing, recumbent bike. Max heart rate during exercise is 170 bpm.   4. Try to eat six small meals per day. Try to keep these meals low in carbohydrates and low in gluten.   5. Bike compression shorts or Spanx- use as much as possible, especially when standing for longer periods of time.   6. During hot summer hours, try to stay in cool -air conditioned climates.    # Hypertension: Counseled patient on risks of uncontrolled blood pressure and treatment options and risks.   1. Medications: Her BPs have been good. None at this time. Consider restarting diltiazem if needed in the future for blood pressure control.       FOLLOW UP:  Follow up in 6 months or follow up sooner as needed for symptoms.    Patient expresses understanding and agreement with the plan.    I appreciate the chance to help with Laura Jackson's care. Please let me know if you have any questions or concerns.    Marylou LINCOLN, CNP

## 2019-08-26 NOTE — NURSING NOTE
Chief Complaint   Patient presents with     Follow Up     : 6 month follow up     Vitals were taken and medications reconciled.     Jimmy Pineda CMA  3:11 PM

## 2019-08-26 NOTE — PATIENT INSTRUCTIONS
Cardiology Provider you saw in clinic today: Marylou LINCOLN NP-C    Labs/Tests needed:  None today.      Follow-up Visit:  6 months or sooner as needed for symptoms or problems    Further Instructions:    1. Try to drink 50-60 ounces of 50/50 electrolyte fluids/water mix per day.  Examples: Propel or Pedialyte. Gatorade and Powerade are higher in sugar/calories and are okay in moderation or if you are an athlete.   2. Continue ivabradine 5 mg twice daily for inappropriate sinus tachycardia.   3. Isometric exercises.  These exercises will assist in increasing intravascular pressure. They also include swimming, rowing, recumbent bike. Max heart rate during exercise is 170 bpm.   4. Try to eat six small meals per day. Try to keep these meals low in carbohydrates and low in gluten.   5. Bike compression shorts or Spanx- use as much as possible, especially when standing for longer periods of time.   6. During hot summer hours, try to stay in cool -air conditioned climates.    You will receive all normal lab and testing results via [employer] or mail if not reviewed in clinic today. Please contact our office if you need assistance with setting up [facility].    If you need a medication refill please contact your pharmacy. Please allow 3 business days for your refill to be completed.     As always, thank you for trusting us with your health care needs!    If you have any questions regarding your visit please contact your care team:   Cardiology  Telephone Number    EP RN  Electrophysiology Nurse Coordinator [phone]     Call for EP procedure scheduling concerns  FRANK Willson  [phone]           Device Clinic (Pacemakers, ICDs, Loop)   RN's : Theresa Barlow Connie, Dawn During business hours: [phone]    After business hours:   [phone]- select option 4 and ask for job code [id number].

## 2019-08-26 NOTE — LETTER
"8/26/2019      RE: Laura Jackson  [address]  [place] [address]       Dear Colleague,    Thank you for the opportunity to participate in the care of your patient, Laura Jackson, at the [facility]. Please see a copy of my visit note below.  Heart Care - Clinical Cardiac Electrophysiology       HPI: Laura Jackson is a 49 year old female who presents for follow up of inappropriate sinus tachycardia and hypertension.  The patient has a past medical history significant for inappropriate sinus tachycardia, hypermobility syndrome, asthma, IBS, uterine fibroids s/p hysterectomy, and GERD. In summer of 2017, she underwent a hysterectomy for severe vaginal bleeding and during this time she started having symptoms of palpitations, fatigue, flushing, nausea, and lightheadedness. She also started having numbness, tingling and her fingers would turn white so she was diagnosed with Raynaud's. She was started on diltiazem and stated that her finger symptoms have greatly improved. Of note, patient had very similar symptoms 6 years prior to hysterectomy when she had a viral infection and she states that her symptoms took around 2 years before she felt back to baseline. ECHO was normal. Ziopatch showed episodes of sinus tachycardia. Tilt was done at an outside facility and showed normal responses except during deep breathing exercises but she states that it was very cold in the room where the test was done and that she usually doesn't have a problem with tachycardia when it is cold. Work up for mast cell disorder was normal. She trailed metoprolol XL 25 mg daily for 4 weeks and was not able to tolerate due to extreme fatigue and brain \"fogginess\". 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  Ivabradine was initiated for inappropriate sinus tachycardia earlier this year and her " symptoms improved to the point that she was able to return to work.     Current Interval history:   Patient states she has had increased fatigue off and on over the past 6 weeks, feels that it is worse when it is very hot outside. States that she has been able to work and exercise without problems. Patient states that never misses doses of medication.  States that if she is even 2 hours late with her ivabradine she will have racing heart so she is careful to take her medicine at the same time every day. States that activity level is moderate, swims 3-5 days/week without problems.  Had one episode of dizziness in July when it was very hot outside at her cousins wedding, lasted less than an hour. Notes some pedal edema at the end of the work day, improves with elevation and compression stocking use. Denies chest pain or pressure, syncope, dyspnea at rest or with exertion, palpitations, orthopnea, PND, abdominal edema, or claudication.      Current Outpatient Medications   Medication Sig Dispense Refill     Cholecalciferol (VITAMIN D) 2000 UNITS CAPS Take 1 capsule by mouth daily       fluticasone-salmeterol (ADVAIR) 100-50 MCG/DOSE inhaler Inhale 1 puff into the lungs 2 times daily 3 Inhaler 1     ivabradine (CORLANOR) 5 MG tablet Take 1 tablet (5 mg) by mouth 2 times daily (with meals) 180 tablet 3     levalbuterol (XOPENEX HFA) 45 MCG/ACT inhaler Inhale 1-2 puffs into the lungs every 4 hours as needed for shortness of breath / dyspnea 1 Inhaler 5     OMEPRAZOLE PO Take 20 mg by mouth every morning       order for DME Equipment being ordered: wrist splint with thumb support 1 Device 0     RaNITidine HCl (ZANTAC PO) Take 150 mg by mouth as needed for heartburn         Past Medical History:   Diagnosis Date     Benign essential hypertension 7/31/2018     Family history of breast cancer 2/19/2014     Family history of breast cancer      SVT (supraventricular tachycardia):  history of, no recurrence since 2008 10/11/2013     no recurrence since 2008      Uncomplicated asthma        Past Surgical History:   Procedure Laterality Date     APPENDECTOMY       COLONOSCOPY N/A 8/20/2018    Procedure: COMBINED COLONOSCOPY, SINGLE OR MULTIPLE BIOPSY/POLYPECTOMY BY BIOPSY;;  Surgeon: Osman Hahn MD;  Location: [facility]     COLONOSCOPY WITH CO2 INSUFFLATION N/A 8/20/2018    Procedure: COLONOSCOPY WITH CO2 INSUFFLATION;  COLON-SCREENING / LUBKA ;  Surgeon: Osman Hahn MD;  Location: [facility] OR     DAVINCI HYSTERECTOMY TOTAL, SALPINGECTOMY BILATERAL N/A 8/4/2017    Procedure: DAVINCI XI HYSTERECTOMY TOTAL, SALPINGECTOMY BILATERAL;  DAVINCI TOTAL HYSTERECTOMY; BILATERAL SALPINGECTOMY. BILATERAL URETERAL LYSIS. UTEROSACRAL-COLPOPEXY. EXCISION OF ENDOMETRIOSIS;  Surgeon: George Loyola MD;  Location: [facility] OR     DAVINCI LYSIS OF ADHESIONS Bilateral 8/4/2017    Procedure: DAVINCI LYSIS OF ADHESIONS;  BILATERAL URETERAL LYSIS;  Surgeon: George Loyola MD;  Location: [facility] OR     DAVINCI SACROCOLPOPEXY, CYSTOSCOPY, COMBINED N/A 8/4/2017    Procedure: COMBINED DAVINCI SACROCOLPOPEXY, CYSTOSCOPY;  UTEROSACRAL COLPOPEXY;  Surgeon: George Loyola MD;  Location: [facility] OR     DAVINCI XI ASSISTED ABLATION / EXCISION OF ENDOMETRIOSIS  8/4/2017    Procedure: DAVINCI XI ASSISTED ABLATION / EXCISION OF ENDOMETRIOSIS;;  Surgeon: George Loyola MD;  Location: [facility] OR     DILATION AND CURETTAGE N/A 6/20/2017    Procedure: DILATION AND CURETTAGE;  Uterine Curettings and Fibroid Removal, Cook catheter placement; (No hysterectomy done at this time);  Surgeon: Ernestina Saunders MD;  [facility]     HYSTERECTOMY, PAP NO LONGER INDICATED       TONSILLECTOMY         Family History   Problem Relation Age of Onset     Diabetes Mother      Hypertension Mother      Hyperlipidemia Mother      Arrhythmia Mother      Cardiovascular Father         CHF and COPD     Asthma Father      Breast Cancer Maternal Grandfather      Colon Cancer Maternal Grandfather  "     Breast Cancer Paternal Grandmother      Breast Cancer Paternal Aunt      DEANA Paternal Grandfather      Breast Cancer Cousin      Asthma Son      Diabetes Maternal Grandmother      Hypertension Maternal Grandmother      Breast Cancer Cousin      Breast Cancer Cousin      Breast Cancer Cousin        Social History     Tobacco Use     Smoking status: Never Smoker     Smokeless tobacco: Never Used   Substance Use Topics     Alcohol use: Yes     Comment: rare, two sips every six months       Allergies   Allergen Reactions     Penicillins Swelling     Swelling throat; age 4     Tetracycline GI Disturbance     Other reaction(s): Abdominal Pain  Vomiting; nauseous  Other reaction(s): Abdominal Pain  Vomiting; nauseous         ROS:   A complete review of systems was performed and is negative except as noted in the HPI.     Physical Examination:  Vitals: /86 (BP Location: Left arm, Patient Position: Chair, Cuff Size: Adult Regular)   Pulse 77   Ht 1.626 m (5' 4\")   Wt 81.3 kg (179 lb 3.2 oz)   LMP 07/28/2017   SpO2 99%   BMI 30.76 kg/m     BMI= Body mass index is 30.76 kg/m .    GENERAL APPEARANCE: healthy, alert, and no acute distress  HEENT: no icterus, no xanthelasmas, normal pupil size and reaction, no cyanosis.  NECK: no asymmetry, no cervical bruits, no JVD   RESPIRATORY: lungs clear to auscultation - no rales, rhonchi or wheezes, no use of accessory muscles, no retractions, respirations are unlabored, normal respiratory rate  CARDIOVASCULAR: regular rhythm, normal S1 with physiologic split S2, no S3 or S4 and no murmur, click or rub  GI:  no abdominal bruits, soft, non-tender  EXTREMITIES: no clubbing  NEURO: alert and oriented to person/place/time, normal speech, gait and affect  VASC: Radial and posterior tibialis pulses +2 and symmetric bilaterally. No cyanosis or edema.   SKIN: no ecchymoses, no rashes    Assessment and recommendations:    # Inappropriate sinus tachycardia:   1. Try to drink " 50-60 ounces of 50/50 electrolyte fluids/water mix per day.  Examples: Propel or Pedialyte. Gatorade and Powerade are higher in sugar/calories and are okay in moderation or if you are an athlete.   2. Continue ivabradine 5 mg twice daily for inappropriate sinus tachycardia.   3. Isometric exercises.  These exercises will assist in increasing intravascular pressure. They also include swimming, rowing, recumbent bike. Max heart rate during exercise is 170 bpm.   4. Try to eat six small meals per day. Try to keep these meals low in carbohydrates and low in gluten.   5. Bike compression shorts or Spanx- use as much as possible, especially when standing for longer periods of time.   6. During hot summer hours, try to stay in cool -air conditioned climates.    # Hypertension: Counseled patient on risks of uncontrolled blood pressure and treatment options and risks.   1. Medications: Her BPs have been good. None at this time. Consider restarting diltiazem if needed in the future for blood pressure control.       FOLLOW UP:  Follow up in 6 months or follow up sooner as needed for symptoms.    Patient expresses understanding and agreement with the plan.    I appreciate the chance to help with Laura Jackson's care. Please let me know if you have any questions or concerns.    Marylou LINCOLN, CNP

## 2019-11-03 ENCOUNTER — HEALTH MAINTENANCE LETTER (OUTPATIENT)
Age: 49
End: 2019-11-03

## 2020-01-17 ENCOUNTER — OFFICE VISIT (OUTPATIENT)
Dept: FAMILY MEDICINE | Facility: CLINIC | Age: 50
End: 2020-01-17
Payer: COMMERCIAL

## 2020-01-17 VITALS
BODY MASS INDEX: 30.55 KG/M2 | TEMPERATURE: 98.2 F | HEART RATE: 99 BPM | SYSTOLIC BLOOD PRESSURE: 132 MMHG | OXYGEN SATURATION: 100 % | WEIGHT: 178 LBS | DIASTOLIC BLOOD PRESSURE: 72 MMHG

## 2020-01-17 DIAGNOSIS — R23.8 RASH, VESICULAR: Primary | ICD-10-CM

## 2020-01-17 DIAGNOSIS — B02.9 HERPES ZOSTER WITHOUT COMPLICATION: ICD-10-CM

## 2020-01-17 PROCEDURE — 99213 OFFICE O/P EST LOW 20 MIN: CPT | Performed by: FAMILY MEDICINE

## 2020-01-17 RX ORDER — VALACYCLOVIR HYDROCHLORIDE 1 G/1
1000 TABLET, FILM COATED ORAL 3 TIMES DAILY
Qty: 21 TABLET | Refills: 0 | Status: SHIPPED | OUTPATIENT
Start: 2020-01-17 | End: 2020-01-24

## 2020-01-17 NOTE — PROGRESS NOTES
Subjective     Laura Jackson is a 49 year old female who presents to clinic today for the following health issues:    HPI   Rash      Duration: 24 hours    Description  Location: back of left thigh  Itching: moderate    Intensity:  moderate    Accompanying signs and symptoms: burns    History (similar episodes/previous evaluation): None    Precipitating or alleviating factors:  New exposures:  None  Recent travel: no      Therapies tried and outcome: hydrocortisone cream -  usually effective    Noticed rash yesterday in the back of left thigh.  It is itchy and noted that had some bumps.  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    Review of Systems    HEENT, cardiovascular, pulmonary, gi and gu systems are negative, except as otherwise noted.      Objective    /72   Pulse 99   Temp 98.2  F (36.8  C) (Oral)   Wt 80.7 kg (178 lb)   LMP 07/28/2017   SpO2 100%   BMI 30.55 kg/m    Body mass index is 30.55 kg/m .  Physical Exam   GENERAL: healthy, alert and no distress  RESP: lungs clear to auscultation - no rales, rhonchi or wheezes  CV: regular rate and rhythm, no murmur, click or rub, no peripheral edema   MS: Erythematous patch in back of Lt thigh with vesicles    Assessment & Plan     Laura was seen today for derm problem.    Diagnoses and all orders for this visit:    Rash, vesicular   Discussed nature of rash, could be shingles or contact dermatitis, given location unlikely to be contact dermatitis. Given the nature of her job will empirically treat her for shingles.  -     valACYclovir (VALTREX) 1000 mg tablet; Take 1 tablet (1,000 mg) by mouth 3 times daily for 7 days    Herpes zoster without complication  -     valACYclovir (VALTREX) 1000 mg tablet; Take 1 tablet (1,000 mg) by mouth 3 times daily for 7 days    Return in about 2 weeks (around 1/31/2020).    Osman Hutchison MD  [facility]

## 2020-01-17 NOTE — PATIENT INSTRUCTIONS
Patient Education     Shingles (Herpes Zoster)     Talk to your healthcare provider about the shingles vaccine.     Shingles is also called herpes zoster. It is a painful skin rash caused by the herpes zoster virus. This is the same virus that causes chickenpox. After a person has chickenpox, the virus remains inactive in the nerve cells. Years later, the virus can become active again and travel to the skin. Most people have shingles only once, but it is possible to have it more than once.  What are the risk factors for shingles?  Anyone who has ever had chickenpox can develop shingles. But your risk is greater if you:    Are 50 years of age or older    Have an illness that weakens your immune system, such as HIV/AIDS    Have cancer, especially Hodgkin disease or lymphoma    Take medicines that weaken your immune system  What are the symptoms of shingles?    The first sign of shingles is usually pain, burning, tingling, or itching on one part of your face or body. You may also feel as if you have the flu, with fever and chills.    A red rash with small blisters appears within a few days. The rash may appear as follows:   ? The blisters can occur anywhere, but they re most common on the back, chest, or abdomen.  ? They usually appear on only one side of the body, spreading along the nerve pathway where the virus was inactive.   ? The rash can also form around an eye, along one side of the face or neck, or in the mouth.  ? In a few people, usually those with weakened immune systems, shingles appear on more than one part of the body at once.    After a few days, the blisters become dry and form a crust. The crust falls off in days to weeks. The blisters generally do not leave scars.  How is shingles treated?  For most people, shingles heals on its own in a few weeks. But treatment is recommended to help relieve pain, speed healing, and reduce the risk of complications. Antiviral medicines are prescribed within the  first 72 hours of the appearance of the rash. To lessen symptoms:    Apply ice packs (wrapped in a thin towel) or cool compresses, or soak in a cool bath.    Use calamine lotion to calm itchy skin.    Ask your healthcare provider about over-the-counter pain relievers. If your pain is severe, your healthcare provider may prescribe stronger pain medicines.  What are the complications of shingles?  Shingles often goes away with no lasting effects. But some people have serious problems long after the blisters have healed:    Postherpetic neuralgia. This is the most common complication. It is severe nerve pain at the place where the rash used to be. It can last for months, or even years after you have had shingles. Medicines can be prescribed to help relieve the pain and improve quality of life.    Bacterial infection. Shingles blisters may become infected with bacteria. Antibiotic medicine is used to treat the infection.    Eye problems. A person with shingles on the face should see his or her healthcare provider right away. Shingles can cause serious problems with vision, and even blindness.  Very rarely shingles can also lead to pneumonia, hearing problems, brain inflammation, or even death.   When to seek medical care  Contact your healthcare provider if you experience any of the following:    Symptoms that don t go away with treatment    A rash or blisters near your eye    Increased drainage, fever, or rash after treatment, or severe pain that doesn t go away   How can shingles be prevented?  You can only get shingles if you have had chickenpox in the past. Those who have never had chickenpox can get the virus from you. Although instead of developing shingles, the person may get chickenpox. Until your blisters form scabs, avoid contact with others, especially the following:    Pregnant women who have never had chickenpox or the vaccine    Infants who were born early (prematurely) or who had low weight at  birth    People with weak immune system (for example, people receiving chemotherapy for cancer, people who have had organ transplants, or people with HIV infections)     The shingles vaccine  Shingles vaccines are available to help prevent shingles or make it less painful. Vaccination is recommended for adults 50 and older, even if you've had shingles in the past. Talk with your healthcare provider about the most appropriate time for you to get vaccinated, and which vaccine is best for you.   Date Last Reviewed: 10/1/2016    5908-4676 [employer]. [address] [place] [address], [place] [address]. All rights reserved. This information is not intended as a substitute for professional medical care. Always follow your healthcare professional's instructions.

## 2020-01-24 ENCOUNTER — OFFICE VISIT (OUTPATIENT)
Dept: INTERNAL MEDICINE | Facility: CLINIC | Age: 50
End: 2020-01-24
Payer: COMMERCIAL

## 2020-01-24 VITALS
OXYGEN SATURATION: 99 % | TEMPERATURE: 97.4 F | DIASTOLIC BLOOD PRESSURE: 74 MMHG | HEART RATE: 74 BPM | HEIGHT: 64 IN | WEIGHT: 181.2 LBS | BODY MASS INDEX: 30.93 KG/M2 | RESPIRATION RATE: 16 BRPM | SYSTOLIC BLOOD PRESSURE: 122 MMHG

## 2020-01-24 DIAGNOSIS — B02.9 HERPES ZOSTER WITHOUT COMPLICATION: ICD-10-CM

## 2020-01-24 DIAGNOSIS — R10.84 ABDOMINAL PAIN, GENERALIZED: Primary | ICD-10-CM

## 2020-01-24 PROCEDURE — 99213 OFFICE O/P EST LOW 20 MIN: CPT | Performed by: INTERNAL MEDICINE

## 2020-01-24 RX ORDER — FAMOTIDINE 20 MG/1
20 TABLET, FILM COATED ORAL 2 TIMES DAILY
COMMUNITY
End: 2020-10-08

## 2020-01-24 ASSESSMENT — MIFFLIN-ST. JEOR: SCORE: 1421.92

## 2020-01-24 NOTE — PROGRESS NOTES
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 I was quite involved with her case when she was disabled for approximately 2 years so this an amazingly good situation now.    Today has 2 issues , the main one is that she had a minor flare-up with Herpes zoster . This was with left lower extremity , roughly a L3-4 dermatomal distribution. She was treated with Valaciclovir (Valtrex) and was taken out of work because of her diagnosis as she has frequent contact with bone marrow transplant patients and other immune compromised state patients. She needs a  Note to return to work     The other concern is her chronic abdominal symptoms which have never resolved since her earlier times when she was still off work from POTS (postural orthostatic tachycardia syndrome). She wants a referral to a gastroenterologist.     Patient Active Problem List   Diagnosis     CARDIOVASCULAR SCREENING; LDL GOAL LESS THAN 160     Irritable bowel syndrome     GERD (gastroesophageal reflux disease)     History of supraventricular tachycardia     Mild persistent asthma     Family history of breast cancer     Foreign body (FB) in soft tissue     S/P myomectomy     Nausea     Dehydration     S/P ANAID (total abdominal hysterectomy)     Hypovitaminosis D     Pelvic adhesions     Endometriosis     Heberden's nodes     POTS (postural orthostatic tachycardia syndrome)     Hypermobility syndrome     Cervicalgia     Autonomic dysfunction     Benign essential hypertension      Past Surgical History:   Procedure Laterality Date     APPENDECTOMY       COLONOSCOPY N/A 8/20/2018    Procedure: COMBINED COLONOSCOPY, SINGLE OR MULTIPLE BIOPSY/POLYPECTOMY BY BIOPSY;;  Surgeon: Osman Hahn MD;  Location: [facility] OR     COLONOSCOPY WITH CO2 INSUFFLATION N/A 8/20/2018    Procedure: COLONOSCOPY WITH CO2 INSUFFLATION;  COLON-SCREENING / LUBKA ;  Surgeon: Osman Hahn MD;  Location: [facility] OR     DAVINCI HYSTERECTOMY TOTAL, SALPINGECTOMY BILATERAL N/A 8/4/2017    Procedure: DAVINCI XI HYSTERECTOMY TOTAL, SALPINGECTOMY BILATERAL;  DAVINCI TOTAL HYSTERECTOMY; BILATERAL SALPINGECTOMY. BILATERAL URETERAL LYSIS. UTEROSACRAL-COLPOPEXY. EXCISION OF ENDOMETRIOSIS;  Surgeon: George Loyola MD;  Location: [facility] OR     DAVINCI LYSIS OF ADHESIONS Bilateral 8/4/2017    Procedure: DAVINCI LYSIS OF ADHESIONS;  BILATERAL URETERAL LYSIS;  Surgeon: George Loyola MD;  Location: [facility] OR     DAVINCI SACROCOLPOPEXY, CYSTOSCOPY, COMBINED N/A 8/4/2017    Procedure: COMBINED DAVINCI SACROCOLPOPEXY, CYSTOSCOPY;  UTEROSACRAL COLPOPEXY;  Surgeon: George Loyola MD;  Location: [facility] OR     DAVINCI XI ASSISTED ABLATION / EXCISION OF ENDOMETRIOSIS  8/4/2017    Procedure: DAVINCI XI ASSISTED ABLATION / EXCISION OF ENDOMETRIOSIS;;  Surgeon: George Loyola MD;  Location: [facility] OR     DILATION AND CURETTAGE N/A 6/20/2017    Procedure: DILATION AND CURETTAGE;  Uterine Curettings and Fibroid Removal, Cook catheter placement; (No hysterectomy done at this time);  Surgeon: Ernestina Saunders MD;  Location: [facility]     HYSTERECTOMY, PAP NO LONGER INDICATED       TONSILLECTOMY         Social History     Tobacco Use     Smoking status: Never Smoker     Smokeless tobacco: Never Used   Substance Use Topics     Alcohol use: Yes     Comment: rare, two sips every six months     Family History   Problem Relation Age of Onset     Diabetes Mother      Hypertension Mother      Hyperlipidemia Mother      Arrhythmia Mother       Cardiovascular Father         CHF and COPD     Asthma Father      Breast Cancer Maternal Grandfather      Colon Cancer Maternal Grandfather      Breast Cancer Paternal Grandmother      Breast Cancer Paternal Aunt      RAVEN.A.D. Paternal Grandfather      Breast Cancer Cousin      Asthma Son      Diabetes Maternal Grandmother      Hypertension Maternal Grandmother      Breast Cancer Cousin      Breast Cancer Cousin      Breast Cancer Cousin          Current Outpatient Medications   Medication Sig Dispense Refill     Cholecalciferol (VITAMIN D) 2000 UNITS CAPS Take 1 capsule by mouth daily       famotidine (PEPCID) 20 MG tablet Take 20 mg by mouth 2 times daily       fluticasone-salmeterol (ADVAIR) 100-50 MCG/DOSE inhaler Inhale 1 puff into the lungs 2 times daily 3 Inhaler 1     ivabradine (CORLANOR) 5 MG tablet Take 1 tablet (5 mg) by mouth 2 times daily (with meals) 180 tablet 3     levalbuterol (XOPENEX HFA) 45 MCG/ACT inhaler Inhale 1-2 puffs into the lungs every 4 hours as needed for shortness of breath / dyspnea 1 Inhaler 5     OMEPRAZOLE PO Take 20 mg by mouth every morning       order for DME Equipment being ordered: wrist splint with thumb support 1 Device 0     Allergies   Allergen Reactions     Penicillins Swelling     Swelling throat; age 4     Tetracycline GI Disturbance     Other reaction(s): Abdominal Pain  Vomiting; nauseous  Other reaction(s): Abdominal Pain  Vomiting; nauseous     BP Readings from Last 3 Encounters:   01/24/20 122/74   01/17/20 132/72   08/26/19 131/86    Wt Readings from Last 3 Encounters:   01/24/20 82.2 kg (181 lb 3.2 oz)   01/17/20 80.7 kg (178 lb)   08/26/19 81.3 kg (179 lb 3.2 oz)                  Reviewed and updated as needed this visit by Provider         Review of Systems   ROS COMP: Constitutional, HEENT, cardiovascular, pulmonary, gi and gu systems are negative, except as otherwise noted.      Objective    /74   Pulse 74   Temp 97.4  F (36.3  C) (Tympanic)   Resp 16  "  Ht 1.626 m (5' 4\")   Wt 82.2 kg (181 lb 3.2 oz)   LMP 07/28/2017   SpO2 99%   BMI 31.10 kg/m    Body mass index is 31.1 kg/m .  Physical Exam   GENERAL: healthy, alert and no distress  EYES: Eyes grossly normal to inspection, PERRL and conjunctivae and sclerae normal  MS: no gross musculoskeletal defects noted, no edema  NEURO: Normal strength and tone, mentation intact and speech normal  PSYCH: mentation appears normal, affect normal/bright    Diagnostic Test Results:  Orders Placed This Encounter   Procedures     GASTROENTEROLOGY ADULT REF CONSULT ONLY             (R10.84) Abdominal pain, generalized  (primary encounter diagnosis)  Comment: we agreed on [facility] as a referral source  Plan: GASTROENTEROLOGY ADULT REF CONSULT ONLY            (B02.9) Herpes zoster without complication  Comment: recommended she still get the ShingRx vaccination against herpes zoster but needs to wait at the very least 2-4 weeks and best is a few months . Likelihood of a second case of shingles is low however well documented as a possibility   Plan: see letter section with physicians letter of support for return to work        "

## 2020-01-24 NOTE — LETTER
[facility]  [address]  [place] [address]  [phone]          January 24, 2020    Laura Jackson                                                                                                                     [address]  [place] [address]            Dear Laura, and To Whom it May Concern,     This patient had a minor eruption of shingles [ Herpes zoster ]. This was a small cluster of blisters on left thigh. She was treated with Valaciclovir (Valtrex) and the lesions have resolved. I think she should be allowed to return to work right away .    Sincerely,         Bj Butler MD

## 2020-01-28 NOTE — PROGRESS NOTES
SUBJECTIVE:   Laura Jackson is here in follow up of right hand/thumb pain. She was also sent for a carpal tunnel steroid injection. Since the injections, she hasn't had anymore more triggering of the thumb, and no further carpal tunnel symptoms. The last few weeks, she's had pain around the base of the thumb. She denies numbness in the fingers.     Last injection(s):  1. Right thumb A1 pulley-area steroid injection on 7/23/2019. Length of effectiveness: It continues to be effective.     Review of Systems:  Constitutional/General: Negative for fever, chills, change in weight  Integumentary/Skin: Negative for worrisome rashes, moles, or lesions  Neuro: Negative for weakness, dizziness, or paresthesias   Psychiatric: negative for changes in mood or affect    This document serves as a record of the services and decisions personally performed and made by SUMANTH Ribeiro MD. It was created on his behalf by Kaykay Euceda, a trained medical scribe. The creation of this document is based the provider's statements to the medical scribe.    Scribe Kaykay Euceda 4:06 PM 1/29/2020       OBJECTIVE:  Physical Exam:  /76 (BP Location: Left arm, Patient Position: Sitting, Cuff Size: Adult Regular)   Pulse 75   LMP 07/28/2017   SpO2 97%   General Appearance: healthy, alert and no distress   Skin: no suspicious lesions or rashes  Neuro: Normal strength and tone, mentation intact and speech normal  Vascular: good pulses, and capillary refill   Lymph: no lymphadenopathy   Psych:  mentation appears normal and affect normal/bright  Resp: no increased work of breathing    Right Hand Exam:  Tender: Over 1st CMC joint  ROM: No restrictions in motion of the 1st CMC joint, but there is pain with 1st CMC motion.  Some crepitations.  Ganglion prominence when she palmar flexes her wrist.   Non-tender: over the ganglion cyst.     MRI:  From 2/2/2018 of the right wrist showed: Partial thickness TFC tear and a ganglion cyst.       ASSESSMENT:   1. 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 Scapho lunate ganglion cyst - I don't think this is contributing much to her pain    PLAN:   We discussed wearing a thumb spica brace for pain intermittently.  nsaids as needed    We will consider steroid injection later, if needed.     Return to clinic: AS NEEDED     The information in this document, created by a scribe for me, accurately reflects the services I personally performed and the decisions made by me. I have reviewed and approved this document for accuracy.     SUMANTH Ribeiro MD  Dept. Orthopedic Surgery  [facility]

## 2020-01-29 ENCOUNTER — OFFICE VISIT (OUTPATIENT)
Dept: ORTHOPEDICS | Facility: CLINIC | Age: 50
End: 2020-01-29
Payer: COMMERCIAL

## 2020-01-29 VITALS — SYSTOLIC BLOOD PRESSURE: 134 MMHG | HEART RATE: 75 BPM | DIASTOLIC BLOOD PRESSURE: 76 MMHG | OXYGEN SATURATION: 97 %

## 2020-01-29 DIAGNOSIS — M67.431 GANGLION CYST OF WRIST, RIGHT: ICD-10-CM

## 2020-01-29 DIAGNOSIS — M79.644 THUMB PAIN, RIGHT: ICD-10-CM

## 2020-01-29 DIAGNOSIS — M18.11 PRIMARY OSTEOARTHRITIS OF FIRST CARPOMETACARPAL JOINT OF RIGHT HAND: Primary | ICD-10-CM

## 2020-01-29 PROCEDURE — 99213 OFFICE O/P EST LOW 20 MIN: CPT | Performed by: ORTHOPAEDIC SURGERY

## 2020-01-29 ASSESSMENT — PAIN SCALES - GENERAL: PAINLEVEL: MODERATE PAIN (4)

## 2020-01-29 NOTE — LETTER
1/29/2020         RE: Laura Jackson  [address]  [place] [address]        Dear Colleague,    Thank you for referring your patient, Laura Jackson, to the [facility]. Please see a copy of my visit note below.    SUBJECTIVE:   Laura Jackson is here in follow up of right hand/thumb pain. She was also sent for a carpal tunnel steroid injection. Since the injections, she hasn't had anymore more triggering of the thumb, and no further carpal tunnel symptoms. The last few weeks, she's had pain around the base of the thumb. She denies numbness in the fingers.     Last injection(s):  1. Right thumb A1 pulley-area steroid injection on 7/23/2019. Length of effectiveness: It continues to be effective.     Review of Systems:  Constitutional/General: Negative for fever, chills, change in weight  Integumentary/Skin: Negative for worrisome rashes, moles, or lesions  Neuro: Negative for weakness, dizziness, or paresthesias   Psychiatric: negative for changes in mood or affect    This document serves as a record of the services and decisions personally performed and made by SUMANTH Ribeiro MD. It was created on his behalf by Kaykay Euceda, a trained medical scribe. The creation of this document is based the provider's statements to the medical scribe.    Scribanna Ornelas Said 4:06 PM 1/29/2020       OBJECTIVE:  Physical Exam:  /76 (BP Location: Left arm, Patient Position: Sitting, Cuff Size: Adult Regular)   Pulse 75   LMP 07/28/2017   SpO2 97%   General Appearance: healthy, alert and no distress   Skin: no suspicious lesions or rashes  Neuro: Normal strength and tone, mentation intact and speech normal  Vascular: good pulses, and capillary refill   Lymph: no lymphadenopathy   Psych:  mentation appears normal and affect normal/bright  Resp: no increased work of breathing    Right Hand Exam:  Tender: Over 1st CMC joint  ROM: No restrictions in motion of the 1st CMC joint, but there is  pain with 1st CMC motion.  Some crepitations.  Ganglion prominence when she palmar flexes her wrist.   Non-tender: over the ganglion cyst.     MRI:  From 2/2/2018 of the right wrist showed: Partial thickness TFC tear and a ganglion cyst.      ASSESSMENT:   1. 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 Scapho lunate ganglion cyst - I don't think this is contributing much to her pain    PLAN:   We discussed wearing a thumb spica brace for pain intermittently.  nsaids as needed    We will consider steroid injection later, if needed.     Return to clinic: AS NEEDED     The information in this document, created by a scribe for me, accurately reflects the services I personally performed and the decisions made by me. I have reviewed and approved this document for accuracy.     SUMANTH Ribeiro MD  Dept. Orthopedic Surgery  [facility]     Again, thank you for allowing me to participate in the care of your patient.        Sincerely,        Carmine Ribeiro MD

## 2020-02-03 ENCOUNTER — TELEPHONE (OUTPATIENT)
Dept: FAMILY MEDICINE | Facility: CLINIC | Age: 50
End: 2020-02-03

## 2020-02-03 DIAGNOSIS — M54.2 CERVICALGIA: Primary | ICD-10-CM

## 2020-02-03 DIAGNOSIS — M35.7 HYPERMOBILITY SYNDROME: ICD-10-CM

## 2020-02-03 DIAGNOSIS — M54.59 MECHANICAL LOW BACK PAIN: ICD-10-CM

## 2020-02-03 NOTE — TELEPHONE ENCOUNTER
Reason for Call:  Other Referral    Detailed comments: Patient would like referral for Physical Therapy for neck pain. Physical Therapy requesting she gets a new referral before they can make an appointment with her.    Phone Number Patient can be reached at: Cell number on file:    Telephone Information:   Mobile [phone]       Best Time: any    Can we leave a detailed message on this number? YES    Call taken on 2/3/2020 at 3:59 PM by Hardy Moya

## 2020-02-04 NOTE — TELEPHONE ENCOUNTER
Called and spoke with patient. Reports she has been seeing PT at [facility] for neck and back pain and they are requesting a new order for PT for neck pain, has other referrals but those are only for low back pain.    Referral amina'd up if appropriate.    Shelia Langston, RN

## 2020-02-10 ENCOUNTER — THERAPY VISIT (OUTPATIENT)
Dept: PHYSICAL THERAPY | Facility: CLINIC | Age: 50
End: 2020-02-10
Attending: INTERNAL MEDICINE
Payer: COMMERCIAL

## 2020-02-10 DIAGNOSIS — M54.59 MECHANICAL LOW BACK PAIN: ICD-10-CM

## 2020-02-10 DIAGNOSIS — M54.2 CERVICALGIA: ICD-10-CM

## 2020-02-10 PROCEDURE — 97161 PT EVAL LOW COMPLEX 20 MIN: CPT | Mod: GP | Performed by: PHYSICAL THERAPIST

## 2020-02-10 PROCEDURE — 97110 THERAPEUTIC EXERCISES: CPT | Mod: GP | Performed by: PHYSICAL THERAPIST

## 2020-02-10 NOTE — PROGRESS NOTES
[facility] Initial Evaluation  Subjective:  The history is provided by the patient.   Laura Jackson being seen for daily recurring neck pain, worsening w/ time. aggrivated by sleeping. .   Date of Onset: ongoing, chronic  Where condition occurred: at home.HPI: Documentation: chronic, Edilma Danlos   and reported as 6/10 on pain scale. General health as reported by patient is good. Pertinent medical history includes:  Other, heart problems and high blood pressure. Other medical history details: DDD, Edilma Danlos , POTS, tachycardia.      Current medications:  Cardiac medication.   Primary job tasks include:  Prolonged standing, repetitive tasks, pushing/pulling and lifting/carrying.  Pain is described as aching, sharp and stabbing and is constant. Pain is worse during the night. Since onset symptoms are gradually worsening.   There was significant improvement following previous treatment.   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    Barriers include:  None as reported by patient.  Red flags:  None as reported by patient.  Type of problem:  Cervical spine   Condition occurred with:  Degenerative joint disease. This is a chronic condition    Patient reports pain:  Cervical left side. Radiates to:  Head, shoulder right and shoulder left. Associated symptoms:  Loss of motion/stiffness and headache. Symptoms are exacerbated by certain positions and lying down and relieved by nothing.                      Objective:  System              Cervical/Thoracic Evaluation    AROM:  AROM Cervical:    Flexion:            Extension:       Retxn 50% loss, + pain to ext   Rotation:         Left: 30% loss ++      Right: full   Side Bend:      Left: 30% loss ++     Right:  30% loss ++       Headaches: cervical  Cervical Myotomes:  not assessed                  DTR's:  not assessed          Cervical Dermatomes:  not assessed                    Cervical Palpation:     Tenderness present at Left:    Upper Trap; Levator; Erector Spinae and Suboccipitals  Tenderness present at Right:    Upper Trap; Levator; Erector Spinae and Suboccipitals        Cord Sign:  not assessed                                            General     ROS    Assessment/Plan:    Patient is a 49 year old female with cervical complaints.    Patient has the following significant findings with corresponding treatment plan.                Diagnosis 1:  Neck pain   Pain -  manual therapy, self management and home program  Decreased ROM/flexibility - manual therapy, therapeutic exercise and home program  Decreased joint mobility - manual therapy, therapeutic exercise and home program  Decreased proprioception - neuro re-education, therapeutic activities and home program  Impaired muscle performance - neuro re-education and home program  Decreased function - therapeutic activities and home program    Therapy Evaluation Codes:   1) History comprised of:   Personal factors that impact the plan of care:      Coping style, Overall behavior pattern and Past/current experiences.    Comorbidity factors that impact the plan of care are:      Overweight and POTS, tachycardia, alison danlos .     Medications impacting care: High blood pressure and Pain.  2) Examination of Body Systems comprised of:   Body structures and functions that impact the plan of care:      Cervical spine.   Activity limitations that impact the plan of care are:      Driving, Dressing, Lifting, Reading/Computer work and Working.  3) Clinical presentation characteristics are:   Stable/Uncomplicated.  4) Decision-Making    Low complexity using standardized patient assessment instrument and/or measureable assessment of functional outcome.  Cumulative Therapy Evaluation is: Low complexity.    Previous and current functional limitations:  (See Goal Flow Sheet for this information)    Short term and Long term goals: (See Goal Flow Sheet for this information)      Communication ability:  Patient appears to be able to clearly communicate and understand verbal and written communication and follow directions correctly.  Treatment Explanation - The following has been discussed with the patient:   RX ordered/plan of care  Anticipated outcomes  Possible risks and side effects  This patient would benefit from PT intervention to resume normal activities.   Rehab potential is good.    Frequency:  1 X week, once daily every other wk   Duration:  for 8 weeks  Discharge Plan:  Achieve all LTG.  Independent in home treatment program.  Reach maximal therapeutic benefit.    Please refer to the daily flowsheet for treatment today, total treatment time and time spent performing 1:1 timed codes.

## 2020-02-10 NOTE — LETTER
IBETH MOSCOSO PT  [address]  [address]  [place] [address]  [phone]    2020    Re: Laura Jackson   :   1970  MRN:  [id number]   REFERRING PHYSICIAN:   MD IBETH Mcknight PT  Date of Initial Evaluation:  2/10/2020  Visits:  Rxs Used: 1  Reason for Referral:     Cervicalgia  Mechanical low back pain    EVALUATION SUMMARY    [facility] Initial Evaluation  Subjective:  The history is provided by the patient.   Laura Jackson being seen for daily recurring neck pain, worsening w/ time. aggrivated by sleeping. .   Date of Onset: ongoing, chronic  Where condition occurred: at home.HPI: Documentation: chronic, Edilma Danlos   and reported as 6/10 on pain scale. General health as reported by patient is good. Pertinent medical history includes:  Other, heart problems and high blood pressure. Other medical history details: DDD, Edilma Danlos , POTS, tachycardia.  Current medications:  Cardiac medication.   Primary job tasks include:  Prolonged standing, repetitive tasks, pushing/pulling and lifting/carrying.  Pain is described as aching, sharp and stabbing and is constant. Pain is worse during the night. Since onset symptoms are gradually worsening.   There was significant improvement following previous treatment.   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    Barriers include:  None as reported by patient.  Red flags:  None as reported by patient.  Type of problem:  Cervical spine  Condition occurred with:  Degenerative joint disease. This is a chronic condition    Patient reports pain:  Cervical left side. Radiates to:  Head, shoulder right and shoulder left. Associated symptoms:  Loss of motion/stiffness and headache. Symptoms are exacerbated by certain positions and lying down and relieved by nothing.               Objective:  Cervical/Thoracic Evaluation  AROM:  AROM Cervical:  Flexion:             Extension:       Retxn 50% loss, + pain to ext   Rotation:         Left: 30% loss ++      Right: full   Side Bend:      Left: 30% loss ++     Right:  30% loss ++     Re: Laura Jackson   :   1970    Headaches: cervical  Cervical Myotomes:  not assessed  DTR's:  not assessed  Cervical Dermatomes:  not assessed    Cervical Palpation:    Tenderness present at Left:    Upper Trap; Levator; Erector Spinae and Suboccipitals  Tenderness present at Right:    Upper Trap; Levator; Erector Spinae and Suboccipitals    Cord Sign:  not assessed    Assessment/Plan:    Patient is a 49 year old female with cervical complaints.    Patient has the following significant findings with corresponding treatment plan.                Diagnosis 1:  Neck pain   Pain -  manual therapy, self management and home program  Decreased ROM/flexibility - manual therapy, therapeutic exercise and home program  Decreased joint mobility - manual therapy, therapeutic exercise and home program  Decreased proprioception - neuro re-education, therapeutic activities and home program  Impaired muscle performance - neuro re-education and home program  Decreased function - therapeutic activities and home program    Therapy Evaluation Codes:   1) History comprised of:   Personal factors that impact the plan of care:      Coping style, Overall behavior pattern and Past/current experiences.    Comorbidity factors that impact the plan of care are:      Overweight and POTS, tachycardia, alison danlos .     Medications impacting care: High blood pressure and Pain.  2) Examination of Body Systems comprised of:   Body structures and functions that impact the plan of care:      Cervical spine.   Activity limitations that impact the plan of care are:      Driving, Dressing, Lifting, Reading/Computer work and Working.  3) Clinical presentation characteristics are:   Stable/Uncomplicated.  4) Decision-Making    Low complexity using standardized patient assessment  instrument and/or measureable assessment of functional outcome.  Cumulative Therapy Evaluation is: Low complexity.    Previous and current functional limitations:  (See Goal Flow Sheet for this information)    Short term and Long term goals: (See Goal Flow Sheet for this information)     Communication ability:  Patient appears to be able to clearly communicate and understand verbal and written communication and follow directions correctly.  Treatment Explanation - The following has been discussed with the patient:   RX ordered/plan of care  Anticipated outcomes  Possible risks and side effects  This patient would benefit from PT intervention to resume normal activities.   Rehab potential is good.  Re: Laura Jackson   :   1970    Frequency:  1 X week, once daily every other wk   Duration:  for 8 weeks  Discharge Plan:  Achieve all LTG.  Independent in home treatment program.  Reach maximal therapeutic benefit.    Please refer to the daily flowsheet for treatment today, total treatment time and time spent performing 1:1 timed codes.         Thank you for your referral.    INQUIRIES  Therapist: Moreno Frank PT   IBETH MOSCOSO PT  [address] [address]  [place] [address]  Phone: [phone]  Fax: [phone]

## 2020-02-19 ASSESSMENT — ENCOUNTER SYMPTOMS
VOMITING: 0
DECREASED APPETITE: 0
HEARTBURN: 1
FATIGUE: 1
SKIN CHANGES: 1
ORTHOPNEA: 0
BOWEL INCONTINENCE: 0
RECTAL PAIN: 0
JAUNDICE: 0
POOR WOUND HEALING: 0
NECK MASS: 0
CONSTIPATION: 0
BLOOD IN STOOL: 0
CHILLS: 0
TASTE DISTURBANCE: 0
NIGHT SWEATS: 0
MUSCLE WEAKNESS: 1
MUSCLE CRAMPS: 0
STIFFNESS: 1
ALTERED TEMPERATURE REGULATION: 0
HALLUCINATIONS: 0
NAIL CHANGES: 0
ABDOMINAL PAIN: 1
DIARRHEA: 1
HOARSE VOICE: 1
EXERCISE INTOLERANCE: 1
SLEEP DISTURBANCES DUE TO BREATHING: 0
JOINT SWELLING: 0
HYPERTENSION: 0
BLOATING: 0
FEVER: 0
SYNCOPE: 0
INCREASED ENERGY: 0
LIGHT-HEADEDNESS: 1
BACK PAIN: 0
WEIGHT GAIN: 0
POLYPHAGIA: 0
NECK PAIN: 1
SORE THROAT: 1
ARTHRALGIAS: 1
POLYDIPSIA: 0
SMELL DISTURBANCE: 0
LEG PAIN: 1
PALPITATIONS: 1
NAUSEA: 1
WEIGHT LOSS: 0
TROUBLE SWALLOWING: 0
SINUS PAIN: 0
HYPOTENSION: 0

## 2020-02-20 ENCOUNTER — OFFICE VISIT (OUTPATIENT)
Dept: CARDIOLOGY | Facility: CLINIC | Age: 50
End: 2020-02-20
Attending: NURSE PRACTITIONER
Payer: COMMERCIAL

## 2020-02-20 VITALS — OXYGEN SATURATION: 99 % | WEIGHT: 177.8 LBS | HEIGHT: 64 IN | BODY MASS INDEX: 30.35 KG/M2

## 2020-02-20 DIAGNOSIS — R00.0 SINUS TACHYCARDIA: ICD-10-CM

## 2020-02-20 DIAGNOSIS — I47.11 INAPPROPRIATE SINUS TACHYCARDIA (H): Primary | ICD-10-CM

## 2020-02-20 LAB — INTERPRETATION ECG - MUSE: NORMAL

## 2020-02-20 PROCEDURE — 99214 OFFICE O/P EST MOD 30 MIN: CPT | Mod: 25 | Performed by: INTERNAL MEDICINE

## 2020-02-20 PROCEDURE — 93005 ELECTROCARDIOGRAM TRACING: CPT | Mod: ZF

## 2020-02-20 PROCEDURE — G0463 HOSPITAL OUTPT CLINIC VISIT: HCPCS | Mod: 25,ZF

## 2020-02-20 RX ORDER — IVABRADINE 5 MG/1
5 TABLET, FILM COATED ORAL 2 TIMES DAILY WITH MEALS
Qty: 180 TABLET | Refills: 4 | Status: SHIPPED | OUTPATIENT
Start: 2020-02-20 | End: 2021-03-31

## 2020-02-20 ASSESSMENT — PAIN SCALES - GENERAL: PAINLEVEL: NO PAIN (0)

## 2020-02-20 ASSESSMENT — MIFFLIN-ST. JEOR: SCORE: 1406.5

## 2020-02-20 NOTE — NURSING NOTE
Chief Complaint   Patient presents with     Follow Up     49yoF w/ hx of IST, OH, HTN, presents for annual f/up.     Vitals were taken, medications reconciled, EKG performed and Orthostatic blood pressure done.     Jimmy Pineda CMA    11:56 AM

## 2020-02-20 NOTE — LETTER
2/20/2020      RE: Laura Jackson  [address]  [place] [address]       Dear Colleague,    Thank you for the opportunity to participate in the care of your patient, Laura Jackson, at the [facility] at [facility]. Please see a copy of my visit note below.    Clinical Cardiac Electrophysiology      HPI:     Laura Jackson is a 49 year old female who presents for follow up of inappropriate sinus tachycardia and hypertension.  The patient has a past medical history significant for inappropriate sinus tachycardia, hypermobility syndrome, asthma, IBS, uterine fibroids s/p hysterectomy, and GERD.    Laura underwent autonomic studies at [facility] some years ago no significant abnormalities were identified.  Nonetheless she continues to have symptoms consistent with autonomic dysfunction, particularly in terms of gastrointestinal paresis.  This occurs intermittently and she has learned to live with it.    Her overall exertional energy level is much improved compared to what it was when she was first seen here.  Ivabradine has made a major difference from her perspective.  Initially her insurance company refused coverage but subsequently they relented and she gets her medicines locally.  We will refill her prescription today.    Today I reviewed with Laura the long-term effects of ivabradine.  I pointed out that we do not have adequate experience but that she should be aware that the drug is probably not safe in pregnancy.  What will happen when she becomes older is entirely uncertain but the drug does seem to be tolerated in patients with heart failure who 1 would think would be higher risk individuals.    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  She has no new cardiac or other illness issues to report at the present time.      Current Outpatient Medications   Medication Sig  Dispense Refill     Cholecalciferol (VITAMIN D) 2000 UNITS CAPS Take 1 capsule by mouth daily       famotidine (PEPCID) 20 MG tablet Take 20 mg by mouth 2 times daily       fluticasone-salmeterol (ADVAIR) 100-50 MCG/DOSE inhaler Inhale 1 puff into the lungs 2 times daily 3 Inhaler 1     ivabradine (CORLANOR) 5 MG tablet Take 1 tablet (5 mg) by mouth 2 times daily (with meals) 180 tablet 3     levalbuterol (XOPENEX HFA) 45 MCG/ACT inhaler Inhale 1-2 puffs into the lungs every 4 hours as needed for shortness of breath / dyspnea 1 Inhaler 5     OMEPRAZOLE PO Take 20 mg by mouth every morning       order for DME Equipment being ordered: wrist splint with thumb support 1 Device 0       Past Medical History:   Diagnosis Date     Benign essential hypertension 7/31/2018     Family history of breast cancer 2/19/2014     Family history of breast cancer      SVT (supraventricular tachycardia):  history of, no recurrence since 2008 10/11/2013    no recurrence since 2008      Uncomplicated asthma        Past Surgical History:   Procedure Laterality Date     APPENDECTOMY       COLONOSCOPY N/A 8/20/2018    Procedure: COMBINED COLONOSCOPY, SINGLE OR MULTIPLE BIOPSY/POLYPECTOMY BY BIOPSY;;  Surgeon: Osman Hahn MD;  Location: [facility] OR     COLONOSCOPY WITH CO2 INSUFFLATION N/A 8/20/2018    Procedure: COLONOSCOPY WITH CO2 INSUFFLATION;  COLON-SCREENING / LUBKA ;  Surgeon: Osman Hahn MD;  Location: [facility] OR     DAVINCI HYSTERECTOMY TOTAL, SALPINGECTOMY BILATERAL N/A 8/4/2017    Procedure: DAVINCI XI HYSTERECTOMY TOTAL, SALPINGECTOMY BILATERAL;  DAVINCI TOTAL HYSTERECTOMY; BILATERAL SALPINGECTOMY. BILATERAL URETERAL LYSIS. UTEROSACRAL-COLPOPEXY. EXCISION OF ENDOMETRIOSIS;  Surgeon: George Loyola MD;  Location: [facility] OR     DAVINCI LYSIS OF ADHESIONS Bilateral 8/4/2017    Procedure: DAVINCI LYSIS OF ADHESIONS;  BILATERAL URETERAL LYSIS;  Surgeon: George Loyola MD;  Location: [facility] OR     DAVINCI SACROCOLPOPEXY,  "CYSTOSCOPY, COMBINED N/A 8/4/2017    Procedure: COMBINED DAVINCI SACROCOLPOPEXY, CYSTOSCOPY;  UTEROSACRAL COLPOPEXY;  Surgeon: George Loyola MD;  Location: [facility] OR     DAVINCI XI ASSISTED ABLATION / EXCISION OF ENDOMETRIOSIS  8/4/2017    Procedure: DAVINCI XI ASSISTED ABLATION / EXCISION OF ENDOMETRIOSIS;;  Surgeon: George Loyola MD;  Location: [facility] OR     DILATION AND CURETTAGE N/A 6/20/2017    Procedure: DILATION AND CURETTAGE;  Uterine Curettings and Fibroid Removal, Cook catheter placement; (No hysterectomy done at this time);  Surgeon: Ernestina Saunders MD;  Location: [facility] OR     HYSTERECTOMY, PAP NO LONGER INDICATED       TONSILLECTOMY         Family History   Problem Relation Age of Onset     Diabetes Mother      Hypertension Mother      Hyperlipidemia Mother      Arrhythmia Mother      Cardiovascular Father         CHF and COPD     Asthma Father      Breast Cancer Maternal Grandfather      Colon Cancer Maternal Grandfather      Breast Cancer Paternal Grandmother      Breast Cancer Paternal Aunt      C.A.D. Paternal Grandfather      Breast Cancer Cousin      Asthma Son      Diabetes Maternal Grandmother      Hypertension Maternal Grandmother      Breast Cancer Cousin      Breast Cancer Cousin      Breast Cancer Cousin        Social History     Tobacco Use     Smoking status: Never Smoker     Smokeless tobacco: Never Used   Substance Use Topics     Alcohol use: Yes     Comment: rare, two sips every six months       Allergies   Allergen Reactions     Penicillins Swelling     Swelling throat; age 4     Tetracycline GI Disturbance     Other reaction(s): Abdominal Pain  Vomiting; nauseous  Other reaction(s): Abdominal Pain  Vomiting; nauseous         ROS:   A complete review of systems was performed and is negative except as noted in the HPI.    Physical Examination:  Vitals: Ht 1.626 m (5' 4\")   Wt 80.6 kg (177 lb 12.8 oz)   LMP 07/28/2017   SpO2 99%   BMI 30.52 kg/m     BMI= Body mass index is 30.52 " kg/m .    GENERAL APPEARANCE: healthy, alert, and no acute distress  HEENT: no icterus, no xanthelasmas, normal pupil size and reaction, no cyanosis.  NECK: no asymmetry, no cervical bruits, no JVD   RESPIRATORY: lungs clear to auscultation - no rales, rhonchi or wheezes, no use of accessory muscles, no retractions, respirations are unlabored, normal respiratory rate  CARDIOVASCULAR: regular rhythm, normal S1 with physiologic split S2, no S3 or S4 and no murmur, click or rub  GI:  no abdominal bruits, soft, non-tender  EXTREMITIES: no clubbing, cyanosis, or edema  NEURO: alert and oriented to person/place/time, normal speech, gait and affect  VASC:  pulses +2 and symmetric bilaterally. No cyanosis. No edema  SKIN: no ecchymoses, no rashes    Laboratory:  Lab Results   Component Value Date    WBC 5.1 03/15/2019    RBC 4.76 03/15/2019    HGB 14.3 03/15/2019    HCT 43.4 03/15/2019    MCV 91 03/15/2019    MCH 30.0 03/15/2019    MCHC 32.9 03/15/2019    RDW 13.3 03/15/2019     03/15/2019     Lab Results   Component Value Date     03/15/2019    POTASSIUM 4.2 03/15/2019    GLC 90 03/15/2019    BUN 12 03/15/2019    CR 0.90 03/15/2019    GFRESTIMATED 74 03/15/2019    GFRESTBLACK 86 03/15/2019    AZAEL 9.0 03/15/2019      Lab Results   Component Value Date    INR 1.12 06/20/2017    INR 1.12 06/20/2017     No results found for: CKTOTAL, CKMB, TROPN  Cholesterol (mg/dL)   Date Value   03/15/2019 209 (H)   09/11/2017 194   07/20/2011 189     HDL Cholesterol (mg/dL)   Date Value   03/15/2019 65   09/11/2017 65   07/20/2011 65     LDL Cholesterol Calculated (mg/dL)   Date Value   03/15/2019 123 (H)   09/11/2017 110 (H)   07/20/2011 110     Assessment and recommendations:  1.  Pots physiology which is much improved with the ivabradine.  Patient is now able to return to work    We will refill her ivabradine prescription today.  Follow-up appointment in 1 year will be scheduled but we would be happy to see her sooner should  circumstances necessitate.      I very much appreciated the opportunity to see and assess Laura Jackson in the clinic today . Please do not hesitate to contact my office if you have any questions or concerns.      Phil Mitchell MD  Cardiac Arrhythmia Service  [facility]  [phone]

## 2020-02-20 NOTE — PATIENT INSTRUCTIONS
You were seen in the Electrophysiology Clinic today by: Dr. Phil Mitchell MD    Plan:     Medication Changes:     None    Labs/Tests Needed:    None    Follow up visit:    1 year with Dr. Mitchell    Further Instructions:    Ivabradine has been refilled.      Your Care Team:  [facility] Cardiology   Telephone Number     Coreen Aponte RN [phone]     For scheduling appts or procedures:    Gina Walker   [phone]   For the Device Clinic (Pacemakers, ICDs, Loop Recorders)    During business hours: [phone]  After business hours:   [phone]- select option 4 and ask for job code [id number].       Cardiovascular Clinic:   [address] [place] [address]. [place] [address]      As always, Thank you for trusting us with your health care needs!

## 2020-02-20 NOTE — PROGRESS NOTES
Clinical Cardiac Electrophysiology      HPI:     Laura Jackson is a 49 year old female who presents for follow up of inappropriate sinus tachycardia and hypertension.  The patient has a past medical history significant for inappropriate sinus tachycardia, hypermobility syndrome, asthma, IBS, uterine fibroids s/p hysterectomy, and GERD.    Laura underwent autonomic studies at [facility] some years ago no significant abnormalities were identified.  Nonetheless she continues to have symptoms consistent with autonomic dysfunction, particularly in terms of gastrointestinal paresis.  This occurs intermittently and she has learned to live with it.    Her overall exertional energy level is much improved compared to what it was when she was first seen here.  Ivabradine has made a major difference from her perspective.  Initially her insurance company refused coverage but subsequently they relented and she gets her medicines locally.  We will refill her prescription today.    Today I reviewed with Laura the long-term effects of ivabradine.  I pointed out that we do not have adequate experience but that she should be aware that the drug is probably not safe in pregnancy.  What will happen when she becomes older is entirely uncertain but the drug does seem to be tolerated in patients with heart failure who 1 would think would be higher risk individuals.    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  She has no new cardiac or other illness issues to report at the present time.      Current Outpatient Medications   Medication Sig Dispense Refill     Cholecalciferol (VITAMIN D) 2000 UNITS CAPS Take 1 capsule by mouth daily       famotidine (PEPCID) 20 MG tablet Take 20 mg by mouth 2 times daily       fluticasone-salmeterol (ADVAIR) 100-50 MCG/DOSE inhaler Inhale 1 puff into the lungs 2 times daily 3 Inhaler 1     ivabradine (CORLANOR) 5  MG tablet Take 1 tablet (5 mg) by mouth 2 times daily (with meals) 180 tablet 3     levalbuterol (XOPENEX HFA) 45 MCG/ACT inhaler Inhale 1-2 puffs into the lungs every 4 hours as needed for shortness of breath / dyspnea 1 Inhaler 5     OMEPRAZOLE PO Take 20 mg by mouth every morning       order for DME Equipment being ordered: wrist splint with thumb support 1 Device 0       Past Medical History:   Diagnosis Date     Benign essential hypertension 7/31/2018     Family history of breast cancer 2/19/2014     Family history of breast cancer      SVT (supraventricular tachycardia):  history of, no recurrence since 2008 10/11/2013    no recurrence since 2008      Uncomplicated asthma        Past Surgical History:   Procedure Laterality Date     APPENDECTOMY       COLONOSCOPY N/A 8/20/2018    Procedure: COMBINED COLONOSCOPY, SINGLE OR MULTIPLE BIOPSY/POLYPECTOMY BY BIOPSY;;  Surgeon: Osman Hahn MD;  [facility]     COLONOSCOPY WITH CO2 INSUFFLATION N/A 8/20/2018    Procedure: COLONOSCOPY WITH CO2 INSUFFLATION;  COLON-SCREENING / LUBKA ;  Surgeon: Osman Hahn MD;  Location[facility]     DAVINCI HYSTERECTOMY TOTAL, SALPINGECTOMY BILATERAL N/A 8/4/2017    Procedure: DAVINCI XI HYSTERECTOMY TOTAL, SALPINGECTOMY BILATERAL;  DAVINCI TOTAL HYSTERECTOMY; BILATERAL SALPINGECTOMY. BILATERAL URETERAL LYSIS. UTEROSACRAL-COLPOPEXY. EXCISION OF ENDOMETRIOSIS;  Surgeon: George Loyola MD;  Location[facility]     DAVINCI LYSIS OF ADHESIONS Bilateral 8/4/2017    Procedure: DAVINCI LYSIS OF ADHESIONS;  BILATERAL URETERAL LYSIS;  Surgeon: George Loyola MD;  [facility]     DAVINCI SACROCOLPOPEXY, CYSTOSCOPY, COMBINED N/A 8/4/2017    Procedure: COMBINED DAVINCI SACROCOLPOPEXY, CYSTOSCOPY;  UTEROSACRAL COLPOPEXY;  Surgeon: George Loyola MD;  [facility]     DAVINCI XI ASSISTED ABLATION / EXCISION OF ENDOMETRIOSIS  8/4/2017    Procedure: DAVINCI XI ASSISTED ABLATION / EXCISION OF ENDOMETRIOSIS;;   "Surgeon: George Loyola MD;  [facility]     DILATION AND CURETTAGE N/A 6/20/2017    Procedure: DILATION AND CURETTAGE;  Uterine Curettings and Fibroid Removal, Cook catheter placement; (No hysterectomy done at this time);  Surgeon: Ernestina Saunders MD;  [facility]     HYSTERECTOMY, PAP NO LONGER INDICATED       TONSILLECTOMY         Family History   Problem Relation Age of Onset     Diabetes Mother      Hypertension Mother      Hyperlipidemia Mother      Arrhythmia Mother      Cardiovascular Father         CHF and COPD     Asthma Father      Breast Cancer Maternal Grandfather      Colon Cancer Maternal Grandfather      Breast Cancer Paternal Grandmother      Breast Cancer Paternal Aunt      C.A.D. Paternal Grandfather      Breast Cancer Cousin      Asthma Son      Diabetes Maternal Grandmother      Hypertension Maternal Grandmother      Breast Cancer Cousin      Breast Cancer Cousin      Breast Cancer Cousin        Social History     Tobacco Use     Smoking status: Never Smoker     Smokeless tobacco: Never Used   Substance Use Topics     Alcohol use: Yes     Comment: rare, two sips every six months       Allergies   Allergen Reactions     Penicillins Swelling     Swelling throat; age 4     Tetracycline GI Disturbance     Other reaction(s): Abdominal Pain  Vomiting; nauseous  Other reaction(s): Abdominal Pain  Vomiting; nauseous         ROS:   A complete review of systems was performed and is negative except as noted in the HPI.    Physical Examination:  Vitals: Ht 1.626 m (5' 4\")   Wt 80.6 kg (177 lb 12.8 oz)   LMP 07/28/2017   SpO2 99%   BMI 30.52 kg/m    BMI= Body mass index is 30.52 kg/m .    GENERAL APPEARANCE: healthy, alert, and no acute distress  HEENT: no icterus, no xanthelasmas, normal pupil size and reaction, no cyanosis.  NECK: no asymmetry, no cervical bruits, no JVD   RESPIRATORY: lungs clear to auscultation - no rales, rhonchi or wheezes, no use of accessory muscles, no retractions, " respirations are unlabored, normal respiratory rate  CARDIOVASCULAR: regular rhythm, normal S1 with physiologic split S2, no S3 or S4 and no murmur, click or rub  GI:  no abdominal bruits, soft, non-tender  EXTREMITIES: no clubbing, cyanosis, or edema  NEURO: alert and oriented to person/place/time, normal speech, gait and affect  VASC:  pulses +2 and symmetric bilaterally. No cyanosis. No edema  SKIN: no ecchymoses, no rashes    Laboratory:  Lab Results   Component Value Date    WBC 5.1 03/15/2019    RBC 4.76 03/15/2019    HGB 14.3 03/15/2019    HCT 43.4 03/15/2019    MCV 91 03/15/2019    MCH 30.0 03/15/2019    MCHC 32.9 03/15/2019    RDW 13.3 03/15/2019     03/15/2019     Lab Results   Component Value Date     03/15/2019    POTASSIUM 4.2 03/15/2019    GLC 90 03/15/2019    BUN 12 03/15/2019    CR 0.90 03/15/2019    GFRESTIMATED 74 03/15/2019    GFRESTBLACK 86 03/15/2019    AZAEL 9.0 03/15/2019      Lab Results   Component Value Date    INR 1.12 06/20/2017    INR 1.12 06/20/2017     No results found for: CKTOTAL, CKMB, TROPN  Cholesterol (mg/dL)   Date Value   03/15/2019 209 (H)   09/11/2017 194   07/20/2011 189     HDL Cholesterol (mg/dL)   Date Value   03/15/2019 65   09/11/2017 65   07/20/2011 65     LDL Cholesterol Calculated (mg/dL)   Date Value   03/15/2019 123 (H)   09/11/2017 110 (H)   07/20/2011 110     Assessment and recommendations:  1.  Pots physiology which is much improved with the ivabradine.  Patient is now able to return to work    We will refill her ivabradine prescription today.  Follow-up appointment in 1 year will be scheduled but we would be happy to see her sooner should circumstances necessitate.      I very much appreciated the opportunity to see and assess Laura Jackson in the clinic today . Please do not hesitate to contact my office if you have any questions or concerns.      Phil Mitchell MD  Cardiac Arrhythmia Service  [facility]  [phone]  Answers for  HPI/ROS submitted by the patient on 2/19/2020   General Symptoms: Yes  Skin Symptoms: Yes  HENT Symptoms: Yes  EYE SYMPTOMS: No  HEART SYMPTOMS: Yes  LUNG SYMPTOMS: No  INTESTINAL SYMPTOMS: Yes  URINARY SYMPTOMS: No  GYNECOLOGIC SYMPTOMS: No  BREAST SYMPTOMS: No  SKELETAL SYMPTOMS: Yes  BLOOD SYMPTOMS: No  NERVOUS SYSTEM SYMPTOMS: No  MENTAL HEALTH SYMPTOMS: No  Fever: No  Loss of appetite: No  Weight loss: No  Weight gain: No  Fatigue: Yes  Night sweats: No  Chills: No  Increased stress: No  Excessive hunger: No  Excessive thirst: No  Feeling hot or cold when others believe the temperature is normal: No  Loss of height: No  Post-operative complications: No  Surgical site pain: No  Hallucinations: No  Change in or Loss of Energy: No  Hyperactivity: No  Confusion: No  Changes in hair: No  Changes in moles/birth marks: Yes  Itching: No  Rashes: No  Changes in nails: No  Acne: No  Hair in places you don't want it: No  Change in facial hair: No  Warts: No  Non-healing sores: No  Scarring: No  Flaking of skin: No  Color changes of hands/feet in cold : Yes  Sun sensitivity: No  Skin thickening: No  Ear pain: No  Ear discharge: No  Hearing loss: No  Tinnitus: No  Nosebleeds: No  Sinus pain: No  Trouble swallowing: No   Voice hoarseness: Yes  Mouth sores: No  Sore throat: Yes  Tooth pain: No  Gum tenderness: No  Bleeding gums: No  Change in taste: No  Change in sense of smell: No  Dry mouth: No  Hearing aid used: No  Neck lump: No  Chest pain or pressure: No  Fast or irregular heartbeat: Yes  Pain in legs with walking: Yes  Trouble breathing while lying down: No  Fingers or toes appear blue: No  High blood pressure: No  Low blood pressure: No  Fainting: No  Murmurs: No  Pacemaker: No  Varicose veins: No  Edema or swelling: Yes  Wake up at night with shortness of breath: No  Light-headedness: Yes  Exercise intolerance: Yes  Heart burn or indigestion: Yes  Nausea: Yes  Vomiting: No  Abdominal pain: Yes  Bloating:  No  Constipation: No  Diarrhea: Yes  Blood in stool: No  Black stools: No  Rectal or Anal pain: No  Fecal incontinence: No  Yellowing of skin or eyes: No  Vomit with blood: No  Change in stools: No  Back pain: No  Neck pain: Yes  Swollen joints: No  Joint pain: Yes  Bone pain: No  Muscle cramps: No  Muscle weakness: Yes  Joint stiffness: Yes  Bone fracture: No

## 2020-02-21 ENCOUNTER — OFFICE VISIT (OUTPATIENT)
Dept: ORTHOPEDICS | Facility: CLINIC | Age: 50
End: 2020-02-21
Payer: COMMERCIAL

## 2020-02-21 ENCOUNTER — ANCILLARY PROCEDURE (OUTPATIENT)
Dept: GENERAL RADIOLOGY | Facility: CLINIC | Age: 50
End: 2020-02-21
Attending: PEDIATRICS
Payer: COMMERCIAL

## 2020-02-21 VITALS
DIASTOLIC BLOOD PRESSURE: 82 MMHG | SYSTOLIC BLOOD PRESSURE: 122 MMHG | BODY MASS INDEX: 30.22 KG/M2 | WEIGHT: 177 LBS | HEIGHT: 64 IN

## 2020-02-21 DIAGNOSIS — S89.91XA INJURY OF RIGHT KNEE, INITIAL ENCOUNTER: Primary | ICD-10-CM

## 2020-02-21 DIAGNOSIS — S89.91XA INJURY OF RIGHT KNEE, INITIAL ENCOUNTER: ICD-10-CM

## 2020-02-21 PROCEDURE — 99204 OFFICE O/P NEW MOD 45 MIN: CPT | Performed by: PEDIATRICS

## 2020-02-21 PROCEDURE — 73562 X-RAY EXAM OF KNEE 3: CPT

## 2020-02-21 ASSESSMENT — MIFFLIN-ST. JEOR: SCORE: 1402.87

## 2020-02-21 NOTE — LETTER
2/21/2020         RE: Laura Jackson  [address]  [place] [address]        Dear Colleague,    Thank you for referring your patient, Laura Jackson, to the [facility]. Please see a copy of my visit note below.    Sports Medicine Clinic Visit    PCP: Bj Butler    Laura Jackson is a 49 year old female who is seen  as a self referral presenting with a right knee injury.  While lifting her dog off of the bed, she twisted her knee which was still on the bed when she was lifting and twisting.  She has continued to have instability and swelling.  Worse at night.  Works 12 hour shifts.    Pain with walking, stairs.      Injury: twisted knee   **  Resting past 3 days, off work.   Has noted more giving way sensation. + nighttime pain.  Thinks was flexed, twisted to some valgus stress. Noted a pop type sensation, not audible.  + pain at rest.   Still some swelling, more swelling initially.  limping with pain.  Stairs difficult.      Location of Pain: right lateral knee   Duration of Pain: 11 day(s)  Rating of Pain at worst: 8/10  Rating of Pain Currently: 3/10  Symptoms are better with: Ice, Ibuprofen and Rest  Symptoms are worse with: twisting, walking, stairs   Additional Features:   Positive: swelling, bruising, popping, grinding and instability   Negative: catching, locking, paresthesias, numbness, weakness, pain in other joints and systemic symptoms  Other evaluation and/or treatments so far consists of: Ice and Ibuprofen  Prior History of related problems: denies     Social History: Nurse    Review of Systems  Musculoskeletal: as above  Remainder of review of systems is negative including constitutional, CV, pulmonary, GI, Skin and Neurologic except as noted in HPI or medical history.    Past Medical History:   Diagnosis Date     Benign essential hypertension 7/31/2018     Family history of breast cancer 2/19/2014     Family history of breast cancer      SVT  (supraventricular tachycardia):  history of, no recurrence since 2008 10/11/2013    no recurrence since 2008      Uncomplicated asthma      Past Surgical History:   Procedure Laterality Date     APPENDECTOMY       COLONOSCOPY N/A 8/20/2018    Procedure: COMBINED COLONOSCOPY, SINGLE OR MULTIPLE BIOPSY/POLYPECTOMY BY BIOPSY;;  Surgeon: Osman Hahn MD;  [facility]: [facility]     COLONOSCOPY WITH CO2 INSUFFLATION N/A 8/20/2018    Procedure: COLONOSCOPY WITH CO2 INSUFFLATION;  COLON-SCREENING / LUBKA ;  Surgeon: Osman Hahn MD;  [facility]: [facility]     DAVINCI HYSTERECTOMY TOTAL, SALPINGECTOMY BILATERAL N/A 8/4/2017    Procedure: DAVINCI XI HYSTERECTOMY TOTAL, SALPINGECTOMY BILATERAL;  DAVINCI TOTAL HYSTERECTOMY; BILATERAL SALPINGECTOMY. BILATERAL URETERAL LYSIS. UTEROSACRAL-COLPOPEXY. EXCISION OF ENDOMETRIOSIS;  Surgeon: George Loyola MD;  Location: [facility]     DAVINCI LYSIS OF ADHESIONS Bilateral 8/4/2017    Procedure: DAVINCI LYSIS OF ADHESIONS;  BILATERAL URETERAL LYSIS;  Surgeon: George Loyola MD;  [facility]     DAVINCI SACROCOLPOPEXY, CYSTOSCOPY, COMBINED N/A 8/4/2017    Procedure: COMBINED DAVINCI SACROCOLPOPEXY, CYSTOSCOPY;  UTEROSACRAL COLPOPEXY;  Surgeon: George Loyola MD;  [facility]     DAVINCI XI ASSISTED ABLATION / EXCISION OF ENDOMETRIOSIS  8/4/2017    Procedure: DAVINCI XI ASSISTED ABLATION / EXCISION OF ENDOMETRIOSIS;;  Surgeon: George Loyola MD;  [facility]     DILATION AND CURETTAGE N/A 6/20/2017    Procedure: DILATION AND CURETTAGE;  Uterine Curettings and Fibroid Removal, Cook catheter placement; (No hysterectomy done at this time);  Surgeon: Ernestina Saunders MD;  [facility]     HYSTERECTOMY, PAP NO LONGER INDICATED       TONSILLECTOMY       Family History   Problem Relation Age of Onset     Diabetes Mother      Hypertension Mother      Hyperlipidemia Mother      Arrhythmia Mother      Cardiovascular Father         CHF and COPD     Asthma Father       Breast Cancer Maternal Grandfather      Colon Cancer Maternal Grandfather      Breast Cancer Paternal Grandmother      Breast Cancer Paternal Aunt      RAVEN.[email or web address] Paternal Grandfather      Breast Cancer Cousin      Asthma Son      Diabetes Maternal Grandmother      Hypertension Maternal Grandmother      Breast Cancer Cousin      Breast Cancer Cousin      Breast Cancer Cousin      Social History     Socioeconomic History     Marital status:      Spouse name: Not on file     Number of children: 2     Years of education: Not on file     Highest education level: Not on file   Occupational History     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[employer]     Employer: [employer]   Social Needs     Financial resource strain: Not on file     Food insecurity:     Worry: Not on file     Inability: Not on file     Transportation needs:     Medical: Not on file     Non-medical: Not on file   Tobacco Use     Smoking status: Never Smoker     Smokeless tobacco: Never Used   Substance and Sexual Activity     Alcohol use: Yes     Comment: rare, two sips every six months     Drug use: No     Sexual activity: Yes     Partners: Male     Birth control/protection: Male Surgical, Female Surgical   Lifestyle     Physical activity:     Days per week: Not on file     Minutes per session: Not on file     Stress: Not on file   Relationships     Social connections:     Talks on phone: Not on file     Gets together: Not on file     Attends [employer] service: Not on file     Active member of club or organization: Not on file     Attends meetings of clubs or organizations: Not on file     Relationship status: Not on file     Intimate partner violence:     Fear of current or ex partner: Not on file     Emotionally abused: Not on file     Physically abused: Not on file     Forced sexual activity: Not on file   Other Topics Concern     Parent/sibling w/ CABG, MI or angioplasty before 65F 55M? No   Social History Narrative     Not on file  "      Objective  /82   Ht 1.626 m (5' 4\")   Wt 80.3 kg (177 lb)   LMP 07/28/2017   BMI 30.38 kg/m       GENERAL APPEARANCE: healthy, alert and no distress   GAIT: NORMAL  SKIN: no suspicious lesions or rashes  NEURO: Normal strength and tone, mentation intact and speech normal  PSYCH:  mentation appears normal and affect normal/bright  HEENT: no scleral icterus  CV: distal perfusion intact  RESP: nonlabored breathing    Right Knee exam    ROM:        Full active and passive ROM with flexion and extension    Inspection:       no visible ecchymosis        effusion noted mild to moderate    Skin:       no visible deformities       well perfused       capillary refill brisk    Patellar Motion:        Crepitus noted in the patellofemoral joint bilat    Tender:        medial joint line       lateral joint line    Non Tender:         remainder of knee area    Special Tests:        + pain with Ryland       neg (-) Lachman       neg (-) anterior drawer       neg (-) posterior drawer       neg (-) varus       neg (-) valgus       + lateral pain with forced extension          Radiology  Visualized radiographs of right knee, and reviewed the images with the patient.  Impression: no acute abnormality. Appears to have minimal right knee degenerative change.      Recent Results (from the past 744 hour(s))   XR Knee Standing AP Bilat [place] Bilat Lat Right    Narrative    KNEE STANDING AP BILATERAL [place] BILATERAL LATERAL RIGHT 2/21/2020  11:01 AM     HISTORY: Injury of right knee, initial encounter.    COMPARISON: None.      Impression    IMPRESSION: 3 views of the right knee show no acute bony abnormality.  Minimal medial joint space narrowing and minimal marginal bony  spurring at the patellofemoral joint are noted. No joint space  effusion. Two views of the left knee are included and show no acute or  chronic bony or soft tissue abnormality.    BRETT BOBO MD           Assessment:  1. Injury of right knee, " initial encounter        Plan:  Discussed the assessment with the patient. Potential underlying degenerative change; other cartilage abnormality (meniscus) also consideration.  We discussed the following: symptom treatment, activity modification/rest, imaging, rehab, medication and support for the affected area. Following discussion, plan:  Topical Treatments: Ice or Heat prn  Over the counter medication: prn  MRI of the right knee next. Evaluate for internal derangement, vs arthrosis.  Activity Modification: discussed  Rehab: may be consideration  Medical Equipment: compression, bracing options reviewed; she desired hinge brace, provided.  Follow up: call with MRI results.  Questions answered. The patient indicates understanding of these issues and agrees with the plan.    Glen Gutierrez DO, [facility]                Patient Instructions   MRI right knee next; will call with results  In the meantime, icing, over the counter medication, rest from activities as needed  May use compression/brace for comfort with activities if desired       Advanced imaging is done by appointment. Some insurance require a prior authorization to be completed which may delay the time until you are able to schedule your appointment.   Please call [facility] [place] [facility] [place]: [phone] to schedule your MRI.  Depending on your availability you can usually schedule within the next 1-2 days.    The clinic will call you with results, if you have not heard from the clinic within 3-4 days following your MRI please contact us at the number listed below.   If you have any further questions for your physician or physician s care team you can call [phone] and use option 3 to leave a voice message. Calls received during business hours will be returned same day.                Disclaimer: This note consists of symbols derived from keyboarding, dictation and/or voice recognition software. As a result, there may be errors in the script  that have gone undetected. Please consider this when interpreting information found in this chart.        Again, thank you for allowing me to participate in the care of your patient.        Sincerely,        Glen Gutierrez, DO

## 2020-02-21 NOTE — PROGRESS NOTES
Sports Medicine Clinic Visit    PCP: Bj Butler Renetta is a 49 year old female who is seen  as a self referral presenting with a right knee injury.  While lifting her dog off of the bed, she twisted her knee which was still on the bed when she was lifting and twisting.  She has continued to have instability and swelling.  Worse at night.  Works 12 hour shifts.    Pain with walking, stairs.      Injury: twisted knee   **  Resting past 3 days, off work.   Has noted more giving way sensation. + nighttime pain.  Thinks was flexed, twisted to some valgus stress. Noted a pop type sensation, not audible.  + pain at rest.   Still some swelling, more swelling initially.  limping with pain.  Stairs difficult.      Location of Pain: right lateral knee   Duration of Pain: 11 day(s)  Rating of Pain at worst: 8/10  Rating of Pain Currently: 3/10  Symptoms are better with: Ice, Ibuprofen and Rest  Symptoms are worse with: twisting, walking, stairs   Additional Features:   Positive: swelling, bruising, popping, grinding and instability   Negative: catching, locking, paresthesias, numbness, weakness, pain in other joints and systemic symptoms  Other evaluation and/or treatments so far consists of: Ice and Ibuprofen  Prior History of related problems: denies     Social History: Nurse    Review of Systems  Musculoskeletal: as above  Remainder of review of systems is negative including constitutional, CV, pulmonary, GI, Skin and Neurologic except as noted in HPI or medical history.    Past Medical History:   Diagnosis Date     Benign essential hypertension 7/31/2018     Family history of breast cancer 2/19/2014     Family history of breast cancer      SVT (supraventricular tachycardia):  history of, no recurrence since 2008 10/11/2013    no recurrence since 2008      Uncomplicated asthma      Past Surgical History:   Procedure Laterality Date     APPENDECTOMY       COLONOSCOPY N/A 8/20/2018    Procedure: COMBINED  COLONOSCOPY, SINGLE OR MULTIPLE BIOPSY/POLYPECTOMY BY BIOPSY;;  Surgeon: Osman Hahn MD;  Location: [facility]     COLONOSCOPY WITH CO2 INSUFFLATION N/A 8/20/2018    Procedure: COLONOSCOPY WITH CO2 INSUFFLATION;  COLON-SCREENING / LUBKA ;  Surgeon: Osman Hahn MD;  Location: [facility]     DAVINCI HYSTERECTOMY TOTAL, SALPINGECTOMY BILATERAL N/A 8/4/2017    Procedure: DAVINCI XI HYSTERECTOMY TOTAL, SALPINGECTOMY BILATERAL;  DAVINCI TOTAL HYSTERECTOMY; BILATERAL SALPINGECTOMY. BILATERAL URETERAL LYSIS. UTEROSACRAL-COLPOPEXY. EXCISION OF ENDOMETRIOSIS;  Surgeon: George Loyola MD;  Location: [facility]     DAVINCI LYSIS OF ADHESIONS Bilateral 8/4/2017    Procedure: DAVINCI LYSIS OF ADHESIONS;  BILATERAL URETERAL LYSIS;  Surgeon: George Loyola MD;  Location: [facility]     DAVINCI SACROCOLPOPEXY, CYSTOSCOPY, COMBINED N/A 8/4/2017    Procedure: COMBINED DAVINCI SACROCOLPOPEXY, CYSTOSCOPY;  UTEROSACRAL COLPOPEXY;  Surgeon: George Loyola MD;  Location: [facility]     DAVINCI XI ASSISTED ABLATION / EXCISION OF ENDOMETRIOSIS  8/4/2017    Procedure: DAVINCI XI ASSISTED ABLATION / EXCISION OF ENDOMETRIOSIS;;  Surgeon: George Loyola MD;  Location: [facility]     DILATION AND CURETTAGE N/A 6/20/2017    Procedure: DILATION AND CURETTAGE;  Uterine Curettings and Fibroid Removal, Cook catheter placement; (No hysterectomy done at this time);  Surgeon: Ernestina Saunders MD;  Location: [facility]     HYSTERECTOMY, PAP NO LONGER INDICATED       TONSILLECTOMY       Family History   Problem Relation Age of Onset     Diabetes Mother      Hypertension Mother      Hyperlipidemia Mother      Arrhythmia Mother      Cardiovascular Father         CHF and COPD     Asthma Father      Breast Cancer Maternal Grandfather      Colon Cancer Maternal Grandfather      Breast Cancer Paternal Grandmother      Breast Cancer Paternal Aunt      C.A.D. 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[employer]     Employer: [employer]   Social Needs     Financial resource strain: Not on file     Food insecurity:     Worry: Not on file     Inability: Not on file     Transportation needs:     Medical: Not on file     Non-medical: Not on file   Tobacco Use     Smoking status: Never Smoker     Smokeless tobacco: Never Used   Substance and Sexual Activity     Alcohol use: Yes     Comment: rare, two sips every six months     Drug use: No     Sexual activity: Yes     Partners: Male     Birth control/protection: Male Surgical, Female Surgical   Lifestyle     Physical activity:     Days per week: Not on file     Minutes per session: Not on file     Stress: Not on file   Relationships     Social connections:     Talks on phone: Not on file     Gets together: Not on file     Attends Mandaen service: Not on file     Active member of club or organization: Not on file     Attends meetings of clubs or organizations: Not on file     Relationship status: Not on file     Intimate partner violence:     Fear of current or ex partner: Not on file     Emotionally abused: Not on file     Physically abused: Not on file     Forced sexual activity: Not on file   Other Topics Concern     Parent/sibling w/ CABG, MI or angioplasty before 65F 55M? No   Social History Narrative     Not on file       Objective  /82   Ht 1.626 m (5' 4\")   Wt 80.3 kg (177 lb)   LMP 07/28/2017   BMI 30.38 kg/m      GENERAL APPEARANCE: healthy, alert and no distress   GAIT: NORMAL  SKIN: no suspicious lesions or rashes  NEURO: Normal strength and tone, mentation intact " and speech normal  PSYCH:  mentation appears normal and affect normal/bright  HEENT: no scleral icterus  CV: distal perfusion intact  RESP: nonlabored breathing    Right Knee exam    ROM:        Full active and passive ROM with flexion and extension    Inspection:       no visible ecchymosis        effusion noted mild to moderate    Skin:       no visible deformities       well perfused       capillary refill brisk    Patellar Motion:        Crepitus noted in the patellofemoral joint bilat    Tender:        medial joint line       lateral joint line    Non Tender:         remainder of knee area    Special Tests:        + pain with Ryland       neg (-) Lachman       neg (-) anterior drawer       neg (-) posterior drawer       neg (-) varus       neg (-) valgus       + lateral pain with forced extension          Radiology  Visualized radiographs of right knee, and reviewed the images with the patient.  Impression: no acute abnormality. Appears to have minimal right knee degenerative change.      Recent Results (from the past 744 hour(s))   XR Knee Standing AP Bilat [place] Bilat Lat Right    Narrative    KNEE STANDING AP BILATERAL [place] BILATERAL LATERAL RIGHT 2/21/2020  11:01 AM     HISTORY: Injury of right knee, initial encounter.    COMPARISON: None.      Impression    IMPRESSION: 3 views of the right knee show no acute bony abnormality.  Minimal medial joint space narrowing and minimal marginal bony  spurring at the patellofemoral joint are noted. No joint space  effusion. Two views of the left knee are included and show no acute or  chronic bony or soft tissue abnormality.    BRETT BOBO MD           Assessment:  1. Injury of right knee, initial encounter        Plan:  Discussed the assessment with the patient. Potential underlying degenerative change; other cartilage abnormality (meniscus) also consideration.  We discussed the following: symptom treatment, activity modification/rest, imaging, rehab,  medication and support for the affected area. Following discussion, plan:  Topical Treatments: Ice or Heat prn  Over the counter medication: prn  MRI of the right knee next. Evaluate for internal derangement, vs arthrosis.  Activity Modification: discussed  Rehab: may be consideration  Medical Equipment: compression, bracing options reviewed; she desired hinge brace, provided.  Follow up: call with MRI results.  Questions answered. The patient indicates understanding of these issues and agrees with the plan.    Glen Gutierrez, DO, CAQ                Patient Instructions   MRI right knee next; will call with results  In the meantime, icing, over the counter medication, rest from activities as needed  May use compression/brace for comfort with activities if desired       Advanced imaging is done by appointment. Some insurance require a prior authorization to be completed which may delay the time until you are able to schedule your appointment.   Please call [place] [facility] [place] [facility] [place]: [phone] to schedule your MRI.  Depending on your availability you can usually schedule within the next 1-2 days.    The clinic will call you with results, if you have not heard from the clinic within 3-4 days following your MRI please contact us at the number listed below.   If you have any further questions for your physician or physician s care team you can call [phone] and use option 3 to leave a voice message. Calls received during business hours will be returned same day.                Disclaimer: This note consists of symbols derived from keyboarding, dictation and/or voice recognition software. As a result, there may be errors in the script that have gone undetected. Please consider this when interpreting information found in this chart.

## 2020-02-21 NOTE — PATIENT INSTRUCTIONS
MRI right knee next; will call with results  In the meantime, icing, over the counter medication, rest from activities as needed  May use compression/brace for comfort with activities if desired       Advanced imaging is done by appointment. Some insurance require a prior authorization to be completed which may delay the time until you are able to schedule your appointment.   Please call [place] [facility] [place]: [phone] to schedule your MRI.  Depending on your availability you can usually schedule within the next 1-2 days.    The clinic will call you with results, if you have not heard from the clinic within 3-4 days following your MRI please contact us at the number listed below.   If you have any further questions for your physician or physician s care team you can call [phone] and use option 3 to leave a voice message. Calls received during business hours will be returned same day.

## 2020-02-24 ENCOUNTER — THERAPY VISIT (OUTPATIENT)
Dept: PHYSICAL THERAPY | Facility: CLINIC | Age: 50
End: 2020-02-24
Payer: COMMERCIAL

## 2020-02-24 DIAGNOSIS — M54.2 CERVICALGIA: Primary | ICD-10-CM

## 2020-02-24 PROCEDURE — 97140 MANUAL THERAPY 1/> REGIONS: CPT | Mod: GP | Performed by: PHYSICAL THERAPIST

## 2020-02-25 ENCOUNTER — ANCILLARY PROCEDURE (OUTPATIENT)
Dept: MRI IMAGING | Facility: CLINIC | Age: 50
End: 2020-02-25
Attending: PEDIATRICS
Payer: COMMERCIAL

## 2020-02-25 ENCOUNTER — TELEPHONE (OUTPATIENT)
Dept: ORTHOPEDICS | Facility: CLINIC | Age: 50
End: 2020-02-25

## 2020-02-25 DIAGNOSIS — S89.91XA INJURY OF RIGHT KNEE, INITIAL ENCOUNTER: ICD-10-CM

## 2020-02-25 DIAGNOSIS — S89.91XA INJURY OF RIGHT KNEE, INITIAL ENCOUNTER: Primary | ICD-10-CM

## 2020-02-25 DIAGNOSIS — M76.31 IT BAND SYNDROME, RIGHT: ICD-10-CM

## 2020-02-25 DIAGNOSIS — J45.30 MILD PERSISTENT ASTHMA WITHOUT COMPLICATION: ICD-10-CM

## 2020-02-25 DIAGNOSIS — S80.00XA CONTUSION OF KNEE: ICD-10-CM

## 2020-02-25 PROCEDURE — 73721 MRI JNT OF LWR EXTRE W/O DYE: CPT | Mod: TC

## 2020-02-25 NOTE — TELEPHONE ENCOUNTER
Results for orders placed or performed in visit on 02/25/20   MR Knee Right w/o Contrast    Narrative    MR right knee without contrast 2/25/2020 1:08 PM    Techniques: Multiplanar multisequence imaging of the right knee was  obtained without administration of intra-articular or intravenous  contrast using routing protocol.    History: evaluate lateral knee pain and effusion; Injury of right  knee, initial encounter    Comparison: Radiograph dated 2/21/2020    Findings:    MENISCI:  Medial meniscus: In the medial meniscus that is linear increased  signal within the posterior horn that does not communicate with the  articular surfaces, consistent with intrameniscal early degeneration.    Lateral meniscus: The lateral meniscus is intact, no evidence of tear,  mildly increased intrasubstance signal..    LIGAMENTS  Cruciate ligaments: Intact.  Medial supporting structures: Intact.  Lateral supporting structures: There is fluid deep and superficial to  the iliotibial band with some edema extending into the meniscal  capsular junction of the lateral meniscus. Overall, the iliotibial  band is intact the lateral collateral ligament and biceps femoris  tendons are preserved, the popliteus tendon is intact.     EXTENSOR MECHANISM  Intact.    FLUID  Small joint effusion. No substantial Baker's cyst.    OSSEOUS and ARTICULAR STRUCTURES  Bones: No fracture, contusion, or osseous lesion is seen.    Patellofemoral compartment: Moderate to high-grade cartilage loss of  the median ridge and medial patellar facet..    Medial compartment: Moderate grade cartilage fissuring of the distal  femoral condyle of the medial compartment.    Lateral compartment: Mild cartilage fraying.        Impression    Impression:  1. Contusion pattern of the anterolateral aspect of the knee with  edema deep and superficial to the iliotibial band, this can be also  significant anterior tibial band syndrome. Edema at the  meniscocapsular junction of the  lateral meniscus, also in keeping with  an contusion pattern. No evidence of fracture or high-grade  ligamentous tear.  2. Increased signal within both, the medial and lateral menisci that  is not communicating with the articular surfaces this can be seen in  early meniscal degeneration.  3. Small joint effusion.   4. Moderate to high-grade cartilage loss of the median ridge of the  patella and moderate grade cartilage fissuring of the distal femoral  condyle in the medial compartment.    JUTTA ELLERMANN, MD

## 2020-02-28 NOTE — TELEPHONE ENCOUNTER
"Fluid around IT band laterally. Also cartilage loss in patellofemoral compartment and medial compartment (degenerative change, \"arthritis\"). Meniscal degeneration, not clearly a tear. No ligament tear.  Given the more prominent lateral knee pain after injury, this is consistent with inflammation at distal IT band. Contusion per report. Would consider this more of a strain, given the mechanism at onset.  Advise icing. Would offer PT. IT band issue has potential for improvement with time.  Additional consideration may be injection; would have her recheck first if interested (injection for joint vs IT band), favor letting this calm down first if able, given acute issue.  If PT, then recheck 3-4 weeks.  Thanks.  Glen Gutierrez, , CAQ      "

## 2020-02-28 NOTE — TELEPHONE ENCOUNTER
Went over results and recommendations. Requested results and recommendations be available through [employer]. Send her a message and released results.     PT order placed.    Patient did note that her pain has shifted and is now on the inside.    Paula Ruiz ATC     Gilbert's disease    Headache    Meniere disease    Vertigo

## 2020-03-02 ENCOUNTER — TRANSFERRED RECORDS (OUTPATIENT)
Dept: HEALTH INFORMATION MANAGEMENT | Facility: CLINIC | Age: 50
End: 2020-03-02

## 2020-03-11 ENCOUNTER — MYC REFILL (OUTPATIENT)
Dept: FAMILY MEDICINE | Facility: CLINIC | Age: 50
End: 2020-03-11

## 2020-03-11 DIAGNOSIS — J45.30 MILD PERSISTENT ASTHMA WITHOUT COMPLICATION: ICD-10-CM

## 2020-03-11 NOTE — TELEPHONE ENCOUNTER
Disp  Refills  Start  End  RACHID    levalbuterol (XOPENEX HFA) 45 MCG/ACT inhaler  1 Inhaler  5  5/1/2019   No    Sig - Route: Inhale 1-2 puffs into the lungs every 4 hours as needed for shortness of breath / dyspnea - Inhalation    Sent to pharmacy as: levalbuterol (XOPENEX HFA) 45 MCG/ACT inhaler    Class: E-Prescribe    Order: [id number]    E-Prescribing Status: Receipt confirmed by pharmacy (5/1/2019  2:35 PM CDT)      Debra Nguyen MA on 3/11/2020 at 12:27 PM

## 2020-03-11 NOTE — LETTER
March 13, 2020      Laura Jackson  [address] [facility]  [place] [address]      Dear Laura,       Your provider has sent a 30 day sb refill of levalbuterol (XOPENEX HFA) 45 MCG/ACT inhaler. You are due for an appointment for further refills. Appointment options could include: an in person office visit, telephone visit or Evisit through [employer]. Please contact the clinic to schedule an appointment for further refills.     Sincerely,     [facility] [place]

## 2020-03-13 RX ORDER — LEVALBUTEROL TARTRATE 45 UG/1
1-2 AEROSOL, METERED ORAL EVERY 4 HOURS PRN
Qty: 1 INHALER | Refills: 0 | Status: SHIPPED | OUTPATIENT
Start: 2020-03-13 | End: 2021-12-20

## 2020-03-13 NOTE — TELEPHONE ENCOUNTER
"Patient is due for appointment for asthma. Please notify.    Medication is being filled for 1 time refill only due to:  Patient needs to be seen because due for asthma f/u appt.    Requested Prescriptions   Pending Prescriptions Disp Refills     levalbuterol (XOPENEX HFA) 45 MCG/ACT inhaler 1 Inhaler 5     Sig: Inhale 1-2 puffs into the lungs every 4 hours as needed for shortness of breath / dyspnea       Short-Acting Beta Agonist Inhalers Protocol  Failed - 3/11/2020 12:33 PM        Failed - Asthma control assessment score within normal limits in last 6 months     Please review ACT score.           Passed - Patient is age 12 or older        Passed - Medication is active on med list        Passed - Recent (6 mo) or future (30 days) visit within the authorizing provider's specialty     Patient had office visit in the last 6 months or has a visit in the next 30 days with authorizing provider or within the authorizing provider's specialty.  See \"Patient Info\" tab in inbasket, or \"Choose Columns\" in Meds & Orders section of the refill encounter.               "

## 2020-03-16 ENCOUNTER — VIRTUAL VISIT (OUTPATIENT)
Dept: FAMILY MEDICINE | Facility: OTHER | Age: 50
End: 2020-03-16

## 2020-03-16 NOTE — PROGRESS NOTES
"Date: 2020 15:38:37  Clinician: Keagan Sheldon  Clinician NPI: [id number]  Patient: Laura oseguera  Patient : 1970  Patient Address: [address] [place], [place] [address] [address]  Patient Phone: [phone]  Visit Protocol: URI  Patient Summary:  Laura is a 49 year old ( : 1970 ) female who initiated a Visit for COVID-19 (Coronavirus) evaluation and screening. When asked the question \"Please sign me up to receive news, health information and promotions from [employer].\", Laura responded \"No\".    Laura states her symptoms started 1-2 days ago.   Her symptoms consist of malaise, a headache, enlarged lymph nodes, facial pain or pressure, myalgia, a sore throat, and a cough.   Symptom details     Cough: Laura coughs a few times an hour and her cough is not more bothersome at night. Phlegm does not come into her throat when she coughs. She does not believe her cough is caused by post-nasal drip.     Sore throat: Laura reports having mild throat pain (1-3 on a 10 point pain scale), does not have exudate on her tonsils, and can swallow liquids. The lymph nodes in her neck are enlarged. A rash has not appeared on the skin since the sore throat started.     Facial pain or pressure: The facial pain or pressure does not feel worse when bending or leaning forward.     Headache: She states the headache is moderate (4-6 on a 10 point pain scale).      Laura denies having ear pain, rhinitis, fever, chills, wheezing, nasal congestion, and teeth pain. She also denies having recent facial or sinus surgery in the past 60 days and taking antibiotic medication for the symptoms. She is not experiencing dyspnea.   Precipitating events  Laura is not sure if she has been exposed to someone with strep throat. She has not recently been exposed to someone with influenza. Laura has been in close contact with the following high risk individuals: people with asthma, heart disease or diabetes, immunocompromised people, " adults 65 or older, pregnant women, and children under the age of 5.   Pertinent COVID-19 (Coronavirus) information  Laura has not traveled internationally or to the areas where COVID-19 (Coronavirus) is widespread in the last 14 days before the start of her symptoms.   Laura has not had close contact with a suspected or laboratory-confirmed COVID-19 patient within 14 days of symptom onset.   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   Pertinent medical history  Laura does not get yeast infections when she takes antibiotics.   Laura does not need a return to work/school note.   Weight: 179 lbs   Laura does not smoke or use smokeless tobacco.   Additional information as reported by the patient (free text):  had fever sat, Sunday. Returned Sat 3/14 from 8 day driving trip to [place]. Stayed at hotels, foreign travelers present.   Weight: 179 lbs    MEDICATIONS: famotidine oral, Xopenex inhalation, omeprazole-sodium bicarbonate oral, Advair Diskus inhalation, Corlanor oral, ALLERGIES: Penicillins, Tetracyclines  Clinician Response:  Dear Laura,   Based on the information you have provided, it is recommended that you go to one of our designated Coronavirus (Covid-19) testing centers to get a test done from your car.  We are offering testing from your car in [facility] and [place].   To schedule a visit at [facility] or [place], please call [phone] to find out when the next available testing window is. Testing will be done in 1 hour blocks so that you can wait at home until we are available to more quickly perform your testing from the car.   To complete testing in [place] ONLY, follow the instructions below:    Go as soon as possible during the hours noted to [facility] [address], [place] [address].  Hours: M-F 7:30am-5pm    What to expect:   When you arrive please come park in the parking lot.   Be prepared to present photo ID   Call [phone] and let them know you have arrived.    They will ask for information to get you registered for a visit and will ask for the description of your car and where you are parked.    They will add you to the queue to get your test (you will stay in your car the entire time).   You will then be met by a provider who will perform a brief assessment in your car and collect samples to test for coronavirus and possibly influenza or RSV.    Isolate yourself while traveling.  Do Not allow any visitors within 6 feet.  Do Not go to work or school.  Do Not go to [facility],  centers, shopping, or other public places.  Do Not shake hands.  Avoid close contact with others (hugging, kissing).Protect Others:     Cover Your Mouth and Nose with a mask, disposable tissue or wash cloth to avoid spreading germs to others.  Wash your hands and face frequently with soap and water   Fever Medicines:    For fever relief, take acetaminophen or ibuprofen.  Treat fevers above 101deg F (38.3deg C) to lower fevers and make you more comfortable.   Acetaminophen (e.g., Tylenol): Take 650 mg (two 325 mg pills) by mouth every 4-6 hours as needed of regular strength Tylenol or 1,000 mg (two 500 mg pills) every 8 hours as needed of Extra Strength Tylenol.   Ibuprofen (e.g., Motrin, Advil): Take 400 mg (two 200 mg pills) by mouth every 6 hours as needed.   Acetaminophen is thought to be safer than ibuprofen or naproxen for people over 65 years old. Acetaminophen is in many OTC and prescription medicines. It might be in more than one medicine that you are taking. You need to be careful and not take an overdose. Before taking any medicine, read all the instructions on the package.  Caution -NSAIDs (e.g., ibuprofen, naproxen): Do not take nonsteroidal anti-inflammatory drugs (NSAIDs) if you have stomach  problems, kidney disease, heart failure, or other contraindications to using this type of medicine. Do not take NSAID medicines for over 7 days without consulting your PCP. Do not take NSAID medicines if you are pregnant. Do not take NSAID medicines if you are also taking blood thinners.    Call or submit a new visit if: Breathing difficulty develops or you become worse.  Thank you for limiting contact with others, wearing a simple mask to cover your cough, practice good hand hygiene habits and accessing our virtual services where possible to limit the spread of this virus.  For more information about COVID19 and options for caring for yourself at home, please visit the CDC website at https://www.cdc.gov/coronavirus/2019-ncov/about/steps-when-sick.html  For more options for care at [facility], please visit our website at [email or web address]    Diagnosis: Cough  Diagnosis ICD: R05

## 2020-03-19 ENCOUNTER — VIRTUAL VISIT (OUTPATIENT)
Dept: FAMILY MEDICINE | Facility: OTHER | Age: 50
End: 2020-03-19

## 2020-03-19 NOTE — PROGRESS NOTES
"Date: 2020 09:22:14  Clinician: Jeane Hdz  Clinician NPI: [id number]  Patient: Laura oseugera  Patient : 1970  Patient Address: [address] [place] [address] [place] [address]  Patient Phone: [phone]  Visit Protocol: URI  Patient Summary:  Laura is a 49 year old ( : 1970 ) female who initiated a Visit for COVID-19 (Coronavirus) evaluation and screening. When asked the question \"Please sign me up to receive news, health information and promotions from [employer].\", Laura responded \"No\".    Laura states her symptoms started suddenly 3-6 days ago. After her symptoms started, they improved and then got worse again.   Her symptoms consist of a sore throat, a cough, malaise, a headache, enlarged lymph nodes, facial pain or pressure, myalgia, and chills. Laura also feels feverish but was unable to measure her temperature.   Symptom details     Cough: Laura coughs a few times an hour and her cough is not more bothersome at night. Phlegm does not come into her throat when she coughs. She does not believe her cough is caused by post-nasal drip.     Sore throat: Laura reports having mild throat pain (1-3 on a 10 point pain scale), does not have exudate on her tonsils, and can swallow liquids. The lymph nodes in her neck are enlarged. A rash has not appeared on the skin since the sore throat started.     Facial pain or pressure: The facial pain or pressure does not feel worse when bending or leaning forward.     Headache: She states the headache is mild (1-3 on a 10 point pain scale).      Laura denies having nasal congestion, ear pain, rhinitis, teeth pain, and wheezing. She also denies having a sinus infection within the past year, having recent facial or sinus surgery in the past 60 days, and taking antibiotic medication for the symptoms. She is not experiencing dyspnea.   Precipitating events  Laura is not sure if she has been exposed to someone with strep throat. She has not recently been " exposed to someone with influenza. Laura has been in close contact with the following high risk individuals: children under the age of 5, people with asthma, heart disease or diabetes, adults 65 or older, and immunocompromised people.   Pertinent COVID-19 (Coronavirus) information  Laura has not traveled internationally or to the areas where COVID-19 (Coronavirus) is widespread, including cruise ship travel in the last 14 days before the start of her symptoms.   Laura has not had a close contact with a laboratory-confirmed COVID-19 patient within 14 days of symptom onset. She also has not had a close contact with a suspected COVID-19 patient within 14 days of symptom onset.   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   Pertinent medical history  Laura does not get yeast infections when she takes antibiotics.   Laura needs a return to work/school note.   Weight: 178 lbs   Laura does not smoke or use smokeless tobacco.   Additional information as reported by the patient (free text): I have POTS, mild asthma   Weight: 178 lbs    MEDICATIONS: famotidine oral, Xopenex inhalation, omeprazole-sodium bicarbonate oral, Advair Diskus inhalation, Corlanor oral, ALLERGIES: Tetracyclines, Penicillins  Clinician Response:  Dear Laura,   Based on the information you have provided, you do have symptoms that are consistent with Coronavirus (COVID-19).  The coronavirus causes mild to severe respiratory illness with the most common symptoms including fever, cough and difficulty breathing. Unfortunately, many viruses cause similar symptoms and it can be difficult to distinguish between viruses, especially in mild cases, so we are presuming that anyone with cough or fever has coronavirus at this time.  Coronavirus/COVID-19 has reached the point of community spread in [place], meaning that we are finding the virus in people with no known exposure risk for kristofer the virus. Given the increasing  commonness of coronavirus in the community we are no longer testing patients who are not critically ill.  If you are a health care worker, you should refer to your employee health office for instructions about returning to work.  For everyone else who has cough or fever, you should assume you are infected with coronavirus. Accordingly, you should self-quarantine for seven days from the first day your symptoms started OR 72 hours after your cough and fever completely resolve - WHICHEVER is LONGER. You should call if you find increasing shortness of breath, wheezing or sustained fever above 101.5. If you are significantly short of breath or experience chest pain you should call 911 or report to the nearest emergency department for urgent evaluation.    Isolate yourself at home.   Do Not allow any visitors  Do Not go to work or school  Do Not go to [employer],  centers, shopping, or other public places.  Do Not shake hands.  Avoid close contact with others (hugging, kissing).   Protect Others:    Cover Your Mouth and Nose with a mask, disposable tissue or wash cloth to avoid spreading germs to others.  Wash your hands and face frequently with soap and water.   If you develop significant shortness of breath that prevents you from doing normal activities, please call 911 or proceed to the nearest emergency room and alert them immediately that you have been in self-isolation for possible coronavirus.   For more information about COVID19 and options for caring for yourself at home, please visit the CDC website at https://www.cdc.gov/coronavirus/2019-ncov/about/steps-when-sick.htmlFor more options for care at [facility], please visit our website at [email or web address]     Diagnosis: Cough  Diagnosis ICD: R05

## 2020-03-25 ENCOUNTER — VIRTUAL VISIT (OUTPATIENT)
Dept: FAMILY MEDICINE | Facility: OTHER | Age: 50
End: 2020-03-25
Payer: COMMERCIAL

## 2020-03-25 PROCEDURE — 99421 OL DIG E/M SVC 5-10 MIN: CPT | Performed by: PHYSICIAN ASSISTANT

## 2020-03-25 NOTE — PROGRESS NOTES
"Date: 2020 07:57:32  Clinician: Valeria Watson  Clinician NPI: [id number]  Patient: Laura oseguera  Patient : 1970  Patient Address: [address] [place] [address] [place] [address]  Patient Phone: [phone]  Visit Protocol: URI  Patient Summary:  Laura is a 49 year old ( : 1970 ) female who initiated a Visit for COVID-19 (Coronavirus) evaluation and screening. When asked the question \"Please sign me up to receive news, health information and promotions from [employer].\", Laura responded \"No\".    Laura states her symptoms started suddenly 10-13 days ago. After her symptoms started, they improved and then got worse again.   Her symptoms consist of malaise and a cough.   Symptom details   Cough: Laura coughs a few times an hour and her cough is more bothersome at night. Phlegm does not come into her throat when she coughs. She does not believe her cough is caused by post-nasal drip.    Laura denies having enlarged lymph nodes, chills, ear pain, rhinitis, teeth pain, headache, fever, facial pain or pressure, myalgias, wheezing, sore throat, and nasal congestion. She also denies taking antibiotic medication for the symptoms and having recent facial or sinus surgery in the past 60 days. She is not experiencing dyspnea.   Precipitating events  She has not recently been exposed to someone with influenza. Laura has been in close contact with the following high risk individuals: adults 65 or older, children under the age of 5, people with asthma, heart disease or diabetes, and immunocompromised people.   Pertinent COVID-19 (Coronavirus) information  Laura has not traveled internationally or to the areas where COVID-19 (Coronavirus) is widespread, including cruise ship travel in the last 14 days before the start of her symptoms.   Laura has not had a close contact with a laboratory-confirmed COVID-19 patient within 14 days of symptom onset. She also has not had a close contact with a suspected " COVID-19 patient within 14 days of symptom onset.   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 She provides direct patient care. She is a family member of a healthcare worker or lives with someone who is a healthcare worker.   Pertinent medical history  Laura does not get yeast infections when she takes antibiotics.   Laura needs a return to work/school note.   Weight: 178 lbs   Laura does not smoke or use smokeless tobacco.   Additional information as reported by the patient (free text): After domestic travel 3/9-14, stayed at hotels with international guests.  and I had fever 3/14-15, mild sore throat, swollen glands. Self iso for 7 days per MD instructions. Then 3/21 nocs started mild cough, thought was my asthma but now also coughing during the day. Non productive, no SOB. 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 Supposed to be at work today but I called in.   Weight: 178 lbs  A synchronous phone visit was initiated by the provider for the following reason: Clarify symptom duration    MEDICATIONS: famotidine oral, Xopenex inhalation, omeprazole-sodium bicarbonate oral, Advair Diskus inhalation, Corlanor oral, ALLERGIES: Tetracyclines, Penicillins  Clinician Response:  Dear Laura,   Based on the information you have provided, you do have symptoms that are consistent with Coronavirus (COVID-19).  The coronavirus causes mild to severe respiratory illness with the most common symptoms including fever, cough and difficulty breathing. Unfortunately, many viruses cause similar symptoms and it can be difficult to distinguish between viruses, especially in mild cases, so we are presuming that anyone with cough or fever has coronavirus at this time.  Coronavirus/COVID-19 has reached the point of community spread in [place], meaning that we are finding the virus in people with no known exposure risk for kristofer the virus. Given the increasing  commonness of coronavirus in the community we are no longer testing patients who are not critically ill.  If you are a health care worker, you should refer to your employee health office for instructions about testing and returning to work.  For everyone else who has cough or fever, you should assume you are infected with coronavirus. Since you will not be tested but have symptoms that may be consistent with coronavirus, the CDC recommends you stay in self-isolation until these three things have happened:    You have had no fever for at least 72 hours (that is three full days of no fever without the use of medicine that reduces fevers)    AND   Other symptoms have improved (for example, when your cough or shortness of breath have improved)   AND   At least 7 days have passed since your symptoms first appeared.   How to Isolate:   Isolate yourself at home.  Do Not allow any visitors  Do Not go to work or school  Do Not go to [employer],  centers, shopping, or other public places.  Do Not shake hands.  Avoid close contact with others (hugging, kissing).   Protect Others:   Cover Your Mouth and Nose with a mask, disposable tissue or wash cloth to avoid spreading germs to others.  Wash your hands and face frequently with soap and water.   We know it can be scary to hear that you might have COVID-19. Our team can help track your symptoms and make sure you are doing ok over the next two weeks using a program called [employer] to keep in touch. When you receive an email from [employer], please consider enrolling in our monitoring program. There is no cost to you for monitoring. Here is a URL where you can learn more: [email or web address]  Managing Symptoms:   At this time, we primarily recommend Tylenol (Acetaminophen) for fever or pain. If you have liver or kidney problems, contact your primary care provider for instructions on use of tylenol. Adults can take 650 mg (two 325 mg pills) by mouth every 4-6  hours as needed OR 1,000 mg (two 500 mg pills) every 8 hours as needed. MAXIMUM DAILY DOSE: 3,000mg. For children, refer to dosing on bottle based on age or weight.   If you develop significant shortness of breath that prevents you from doing normal activities, please call 911 or proceed to the nearest emergency room and alert them immediately that you have been in self-isolation for possible coronavirus.  For more information about COVID19 and options for caring for yourself at home, please visit the CDC website at https://www.cdc.gov/coronavirus/2019-ncov/about/steps-when-sick.htmlFor more options for care at [facility], please visit our website at [email or web address]    Diagnosis: Cough  Diagnosis ICD: R05  Additional Clinician Notes: As we discussed, contact Employee Health for instructions on testing and return to work.  Synchronous Triage: phone, status: completed, duration: 239 seconds

## 2020-03-26 ENCOUNTER — RESULTS ONLY (OUTPATIENT)
Dept: LAB | Age: 50
End: 2020-03-26

## 2020-03-26 ENCOUNTER — OFFICE VISIT (OUTPATIENT)
Dept: URGENT CARE | Facility: URGENT CARE | Age: 50
End: 2020-03-26
Payer: COMMERCIAL

## 2020-03-26 DIAGNOSIS — Z20.822 SUSPECTED COVID-19 VIRUS INFECTION: Primary | ICD-10-CM

## 2020-03-26 PROCEDURE — 99207 ZZC NO BILLABLE SERVICE THIS VISIT: CPT

## 2020-03-26 NOTE — PATIENT INSTRUCTIONS
Please use the information at the end of this document to sign up for [facility] [employer] where you can get your results and a message about those results sent to you through the [employer] application. If you do not have mychart we will call you with your results but it may take longer.    Regardless of if you have been tested or not:  Patient who have symptoms (cough, fever, or shortness of breath), need to isolate for 7 days from when symptoms started AND 72 hours after fever resolves (without fever reducing medications) AND improvement of respiratory symptoms (whichever is longer).      Isolate yourself at home (in own room/own bathroom if possible)    Do Not allow any visitors    Do Not go to work or school    Do Not go to [employer],  centers, shopping, or other public places.    Do Not shake hands.    Avoid close and intimate contact with others (hugging, kissing).    Follow CDC recommendations for household cleaning of frequently touched services.     After the initial 7 days, continue to isolate yourself from household members as much as possible. To continue decrease the risk of community spread and exposure, you and any members of your household should limit activities in public for 14 days after starting home isolation.     You can reference the following CDC link for helpful home isolation/care tips:  https://www.cdc.gov/coronavirus/2019-ncov/downloads/10Things.pdf    Protect Others:    Cover Your Mouth and Nose with a mask, disposable tissue or wash cloth to avoid spreading germs to others.    Wash your hands and face frequently with soap and water    Call Back If: Breathing difficulty develops or you become worse.    For more information about COVID19 and options for caring for yourself at home, please visit the CDC website at https://www.cdc.gov/coronavirus/2019-ncov/about/steps-when-sick.html  For more options for care at [facility], please visit our website at  [email or web address]

## 2020-03-27 LAB
COVID-19 VIRUS PCR TO MAYO - RESULT: NORMAL
SARS-COV-2 RNA SPEC QL NAA+PROBE: ABNORMAL
SPECIMEN SOURCE: ABNORMAL
SPECIMEN SOURCE: NORMAL

## 2020-05-26 DIAGNOSIS — J45.30 MILD PERSISTENT ASTHMA WITHOUT COMPLICATION: ICD-10-CM

## 2020-05-28 ENCOUNTER — MYC MEDICAL ADVICE (OUTPATIENT)
Dept: FAMILY MEDICINE | Facility: CLINIC | Age: 50
End: 2020-05-28

## 2020-05-28 DIAGNOSIS — J45.30 MILD PERSISTENT ASTHMA WITHOUT COMPLICATION: ICD-10-CM

## 2020-05-29 NOTE — TELEPHONE ENCOUNTER
Called and left patient VM to return call to complete ACT  Kenneth Moya CMA on 5/29/2020 at 8:02 AM

## 2020-06-17 ASSESSMENT — ASTHMA QUESTIONNAIRES: ACT_TOTALSCORE: 21

## 2020-06-19 PROCEDURE — 86769 SARS-COV-2 COVID-19 ANTIBODY: CPT | Performed by: FAMILY MEDICINE

## 2020-06-19 PROCEDURE — 36415 COLL VENOUS BLD VENIPUNCTURE: CPT | Performed by: FAMILY MEDICINE

## 2020-06-19 NOTE — LETTER
June 21, 2020        Laura Jackson  [address]  [place] [address]      COVID-19 Antibody, IgG   Date Value Ref Range Status   06/19/2020 Negative NEG^Negative Final     Comment:     Negative results do not rule out SARS-CoV-2 infection, particularly in those   who have been in contact with the virus.  Follow-up testing with a molecular   diagnostic should be considered to rule out infection in these individuals.  Results from antibody testing should not be used as the sole basis to diagnose   or exclude SARS-CoV-2 infection or to inform infection status.           You have tested NEGATIVE for COVID-19 antibodies. This suggests you have not had or been exposed to COVID-19. But it does not mean that for sure.     The test finds antibodies in most people 10 days after they get sick. For some people, it takes longer than 10 days for antibodies to show up. Others may never show antibodies against COVID-19, especially if they have weak immune systems.    If you have COVID-19 symptoms now, please stay home and away from others.     What is antibody testing?    This is a kind of blood test. We take a small sample of your blood, and then test it for something called  antibodies.      Your body makes antibodies to fight infection. If your blood has antibodies for a certain germ, it means you ve been infected with that germ in the past.     Sometimes, antibodies stay in your body for years after you ve had the infection. They can be there even if the germ didn t make you sick. They are a sign that your body fought off the infection.    Will this test find antibodies in everyone who s had COVID-19?    No. The test finds antibodies in most people 10 days after they get sick. For some people, it takes longer than 10 days for antibodies to show up. Others may never show antibodies against COVID-19, especially if they have weak immune systems.    What does it mean if the test finds COVID-19 antibodies?    If we find  these antibodies, it suggests:     This person has had the virus.     Their body s immune system fought the virus.     We don t know if this will help protect someone from getting COVID-19 again. Scientists are still learning about this.    What are the signs of COVID-19?    Signs of COVID-19 can appear from 2 to 14 days (up to 2 weeks) after you re infected. Some people have no symptoms or only mild symptoms. Others get very sick. The most common symptoms are:          Cough    Shortness of breath or trouble breathing  Or at least 2 of these symptoms:    Fever    Chills    Repeated shaking with chills    Muscle pain    Headache    Sore throat    Losing your sense of taste or smell    You may have other symptoms. Please contact your doctor or clinic for any symptoms that worry you.    Where can I get more information?     To learn the [facility] guidelines for staying home, please visit the [place] Department of Health website at [email or web address]    To learn more about COVID-19 and how to care for yourself at home, please visit the CDC website at https://www.cdc.gov/coronavirus/2019-ncov/about/steps-when-sick.html    For more options for care at [facility], please visit our website at [email or web address]    [facility] COVID-19 Hotline:  [phone]

## 2020-06-21 LAB
COVID-19 ANTIBODY IGG: NEGATIVE
LAB TEST METHOD: NORMAL

## 2020-07-20 ENCOUNTER — THERAPY VISIT (OUTPATIENT)
Dept: PHYSICAL THERAPY | Facility: CLINIC | Age: 50
End: 2020-07-20
Payer: COMMERCIAL

## 2020-07-20 DIAGNOSIS — M25.561 RIGHT KNEE PAIN: ICD-10-CM

## 2020-07-20 PROCEDURE — 97161 PT EVAL LOW COMPLEX 20 MIN: CPT | Mod: GP | Performed by: PHYSICAL THERAPIST

## 2020-07-20 PROCEDURE — 97110 THERAPEUTIC EXERCISES: CPT | Mod: GP | Performed by: PHYSICAL THERAPIST

## 2020-07-20 ASSESSMENT — ACTIVITIES OF DAILY LIVING (ADL)
PAIN: THE SYMPTOM AFFECTS MY ACTIVITY SEVERELY
RISE FROM A CHAIR: ACTIVITY IS SOMEWHAT DIFFICULT
WEAKNESS: THE SYMPTOM AFFECTS MY ACTIVITY MODERATELY
GIVING WAY, BUCKLING OR SHIFTING OF KNEE: I DO NOT HAVE THE SYMPTOM
LIMPING: THE SYMPTOM AFFECTS MY ACTIVITY MODERATELY
HOW_WOULD_YOU_RATE_THE_OVERALL_FUNCTION_OF_YOUR_KNEE_DURING_YOUR_USUAL_DAILY_ACTIVITIES?: ABNORMAL
KNEEL ON THE FRONT OF YOUR KNEE: ACTIVITY IS VERY DIFFICULT
GO DOWN STAIRS: ACTIVITY IS VERY DIFFICULT
GO UP STAIRS: ACTIVITY IS VERY DIFFICULT
WALK: ACTIVITY IS MINIMALLY DIFFICULT
KNEE_ACTIVITY_OF_DAILY_LIVING_SUM: 29
SWELLING: THE SYMPTOM AFFECTS MY ACTIVITY SLIGHTLY
SQUAT: I AM UNABLE TO DO THE ACTIVITY
HOW_WOULD_YOU_RATE_THE_CURRENT_FUNCTION_OF_YOUR_KNEE_DURING_YOUR_USUAL_DAILY_ACTIVITIES_ON_A_SCALE_FROM_0_TO_100_WITH_100_BEING_YOUR_LEVEL_OF_KNEE_FUNCTION_PRIOR_TO_YOUR_INJURY_AND_0_BEING_THE_INABILITY_TO_PERFORM_ANY_OF_YOUR_USUAL_DAILY_ACTIVITIES?: 60
SIT WITH YOUR KNEE BENT: ACTIVITY IS VERY DIFFICULT
RAW_SCORE: 29
STAND: ACTIVITY IS SOMEWHAT DIFFICULT
KNEE_ACTIVITY_OF_DAILY_LIVING_SCORE: 41.43
STIFFNESS: THE SYMPTOM AFFECTS MY ACTIVITY MODERATELY
AS_A_RESULT_OF_YOUR_KNEE_INJURY,_HOW_WOULD_YOU_RATE_YOUR_CURRENT_LEVEL_OF_DAILY_ACTIVITY?: ABNORMAL

## 2020-07-20 NOTE — LETTER
IBETH MOSCOSO PT  [address]  [address]  [place] [address]  [phone]    2020    Re: Laura Jackson   :   1970  MRN:  [id number]   REFERRING PHYSICIAN:   MD IBETH Duque PT  Date of Initial Evaluation:  2020  Visits:  Rxs Used: 1  Reason for Referral:  Right knee pain    EVALUATION SUMMARY    [facility] Initial Evaluation  Subjective:  The history is provided by the patient. No  was used.   Patient Health History  Laura Jackson being seen for Knee pain/ mobility changes.   Problem began: 2020.   Problem occurred: Twisted knee getting out of bed   Pain is reported as 2/10 on pain scale.  General health as reported by patient is good.  Pertinent medical history includes: asthma and other. Other medical history details: Joint hyper mobility/ alison danlos, joint pain.   Red flags:  None as reported by patient.  Medical allergies: none.   Surgeries include:  Other. Other surgery history details: Foot, hysterectomy, appendectomy, tonsillectomy.    Current medications:  Cardiac medication.    Current occupation is RN.   Primary job tasks include:  Lifting/carrying, prolonged standing and pushing/pulling.               Therapist Generated HPI Evaluation  Type of problem:  Right knee.  This is a new condition.  Condition occurred with:  A twist.  Patient reports pain:  Anterior.    Associated symptoms:  Loss of strength. Exacerbated by: squatting, getting up off the ground, lunging.  and relieved by rest (exercising in the pool).    Goal: being able to do stairs, getting up off of the ground. Pt reports she cannot squat without increased pain.                         Re: Laura Jackson   :   1970    Objective:  Gait:    Gait Type:  Antalgic   Weight Bearing Status:  WBAT   Assistive Devices:  None  Deviations:  Pelvis:  Incr pelvic rotationHip:  Trendelenberg R and Trendelenberg L       Hip  Evaluation  Hip Strength:    Extension:  Left: 4-/5   Pain:strong/pain freeRight: 3+/5     Pain: strong/pain free    Abduction:  Left: 3+/5      Pain:strong/pain freeRight: 2-/5     Pain:weak/pain free  Adduction:  Left: /5   Pain:strong/pain free       Knee Evaluation:  ROM:    AROM  Extension:  Left: 0    Right:  0  Flexion: Left: 140    Right: 140  Pain: no pain with AROM.    Strength:   Extension:  Left: 4+/5   Strong/pain free  Pain:      Right: 3+/5   Strong/pain free  Pain:  Flexion:  Left: 4+/5   Strong/pain free  Pain:      Right: 4-/5   Strong/pain free  Pain:    Quad Set Left:  Good    Pain: -   Quad Set Right:  Fair and delayed    Pain: -    Assessment/Plan:    Patient is a 50 year old female with right side knee complaints.    Patient has the following significant findings with corresponding treatment plan.                Diagnosis 1:  Right Knee Pain  Pain -  manual therapy, self management, education and home program  Decreased strength - therapeutic exercise and therapeutic activities  Impaired gait - gait training    Previous and current functional limitations:  (See Goal Flow Sheet for this information)    Short term and Long term goals: (See Goal Flow Sheet for this information)     Communication ability:  Patient appears to be able to clearly communicate and understand verbal and written communication and follow directions correctly.  Treatment Explanation - The following has been discussed with the patient:   RX ordered/plan of care  Anticipated outcomes  Possible risks and side effects  This patient would benefit from PT intervention to resume normal activities.   Rehab potential is excellent.    Frequency:  1 X week, once daily  Duration:  for 6 weeks  Discharge Plan:  Achieve all LTG.  Independent in home treatment program.  Reach maximal therapeutic benefit.    Please refer to the daily flowsheet for treatment today, total treatment time and time spent performing 1:1 timed codes.       Thank  you for your referral.    INQUIRIES  Therapist: GLENNY Cramer PT  [address]  [place] [address]  Phone: [phone]  Fax: [phone]

## 2020-07-20 NOTE — PROGRESS NOTES
[facility] Initial Evaluation  Subjective:  The history is provided by the patient. No  was used.   Patient Health History  Laura Jackson being seen for Knee pain/ mobility changes.     Problem began: 2/2/2020.   Problem occurred: Twisted knee getting out of bed   Pain is reported as 2/10 on pain scale.  General health as reported by patient is good.  Pertinent medical history includes: asthma and other. Other medical history details: Joint hyper mobility/ alison danlos, joint pain.   Red flags:  None as reported by patient.  Medical allergies: none.   Surgeries include:  Other. Other surgery history details: Foot, hysterectomy, appendectomy, tonsillectomy.    Current medications:  Cardiac medication.    Current occupation is RN.   Primary job tasks include:  Lifting/carrying, prolonged standing and pushing/pulling.                  Therapist Generated HPI Evaluation         Type of problem:  Right knee.    This is a new condition.  Condition occurred with:  A twist.    Patient reports pain:  Anterior.        Associated symptoms:  Loss of strength. Exacerbated by: squatting, getting up off the ground, lunging.  and relieved by rest (exercising in the pool).          Goal: being able to do stairs, getting up off of the ground. Pt reports she cannot squat without increased pain.                     Objective:    Gait:    Gait Type:  Antalgic   Weight Bearing Status:  WBAT   Assistive Devices:  None  Deviations:  Pelvis:  Incr pelvic rotationHip:  Trendelenberg R and Trendelenberg L                                                 Hip Evaluation    Hip Strength:      Extension:  Left: 4-/5   Pain:strong/pain freeRight: 3+/5     Pain: strong/pain free    Abduction:  Left: 3+/5      Pain:strong/pain freeRight: 2-/5     Pain:weak/pain free  Adduction:  Left: /5   Pain:strong/pain free                         Knee Evaluation:  ROM:    AROM      Extension:  Left: 0    Right:   0  Flexion: Left: 140    Right: 140    Pain: no pain with AROM.    Strength:     Extension:  Left: 4+/5   Strong/pain free  Pain:      Right: 3+/5   Strong/pain free  Pain:  Flexion:  Left: 4+/5   Strong/pain free  Pain:      Right: 4-/5   Strong/pain free  Pain:    Quad Set Left:  Good    Pain: -   Quad Set Right:  Fair and delayed    Pain: -                  General     ROS    Assessment/Plan:    Patient is a 50 year old female with right side knee complaints.    Patient has the following significant findings with corresponding treatment plan.                Diagnosis 1:  Right Knee Pain  Pain -  manual therapy, self management, education and home program  Decreased strength - therapeutic exercise and therapeutic activities  Impaired gait - gait training        Previous and current functional limitations:  (See Goal Flow Sheet for this information)    Short term and Long term goals: (See Goal Flow Sheet for this information)     Communication ability:  Patient appears to be able to clearly communicate and understand verbal and written communication and follow directions correctly.  Treatment Explanation - The following has been discussed with the patient:   RX ordered/plan of care  Anticipated outcomes  Possible risks and side effects  This patient would benefit from PT intervention to resume normal activities.   Rehab potential is excellent.    Frequency:  1 X week, once daily  Duration:  for 6 weeks  Discharge Plan:  Achieve all LTG.  Independent in home treatment program.  Reach maximal therapeutic benefit.    Please refer to the daily flowsheet for treatment today, total treatment time and time spent performing 1:1 timed codes.

## 2020-07-27 ENCOUNTER — THERAPY VISIT (OUTPATIENT)
Dept: PHYSICAL THERAPY | Facility: CLINIC | Age: 50
End: 2020-07-27
Attending: PEDIATRICS
Payer: COMMERCIAL

## 2020-07-27 DIAGNOSIS — M25.561 RIGHT KNEE PAIN: ICD-10-CM

## 2020-07-27 DIAGNOSIS — M76.31 IT BAND SYNDROME, RIGHT: ICD-10-CM

## 2020-07-27 DIAGNOSIS — S89.91XA INJURY OF RIGHT KNEE, INITIAL ENCOUNTER: ICD-10-CM

## 2020-07-27 DIAGNOSIS — S80.00XA CONTUSION OF KNEE: ICD-10-CM

## 2020-07-27 PROCEDURE — 97140 MANUAL THERAPY 1/> REGIONS: CPT | Mod: GP | Performed by: PHYSICAL THERAPIST

## 2020-07-27 PROCEDURE — 97112 NEUROMUSCULAR REEDUCATION: CPT | Mod: GP | Performed by: PHYSICAL THERAPIST

## 2020-07-27 PROCEDURE — 97110 THERAPEUTIC EXERCISES: CPT | Mod: GP | Performed by: PHYSICAL THERAPIST

## 2020-07-27 NOTE — PROGRESS NOTES
Subjective:  HPI  Physical Exam                    Objective:  System    Physical Exam    General     ROS    Assessment/Plan:    SUBJECTIVE  Subjective changes as noted by pt:  Feels the same. Gets swelling in the knee.  Up and down stairs still a problem.      Current pain level: 2/10     Changes in function:  None     Adverse reaction to treatment or activity:  None    OBJECTIVE  Changes in objective findings:  Yes, 6-7/10 pain R knee returning upright from full squat.     Dec pain to 4/10 following repeated R knee extension with self overpressure.         ASSESSMENT  Laura continues to require intervention to meet STG and LTG's: PT  No change of symptoms has been noted.  Response to therapy has shown lack of progress in  pain level  Progress made towards STG/LTG?  None    PLAN  Continue current treatment plan until patient demonstrates readiness to progress to higher level exercises.    PTA/ATC plan:  N/A    Please refer to the daily flowsheet for treatment today, total treatment time and time spent performing 1:1 timed codes.

## 2020-08-05 ENCOUNTER — THERAPY VISIT (OUTPATIENT)
Dept: PHYSICAL THERAPY | Facility: CLINIC | Age: 50
End: 2020-08-05
Payer: COMMERCIAL

## 2020-08-05 DIAGNOSIS — M25.561 RIGHT KNEE PAIN: ICD-10-CM

## 2020-08-05 PROCEDURE — 97140 MANUAL THERAPY 1/> REGIONS: CPT | Mod: GP | Performed by: PHYSICAL THERAPIST

## 2020-08-05 PROCEDURE — 97110 THERAPEUTIC EXERCISES: CPT | Mod: GP | Performed by: PHYSICAL THERAPIST

## 2020-08-05 NOTE — PROGRESS NOTES
Subjective:  HPI  Physical Exam                    Objective:  System    Physical Exam    General     ROS    Assessment/Plan:    SUBJECTIVE  Subjective changes as noted by pt:  Better. Less pain and stairs feel better. Performing the knee extension with self overpressure 2x/day. 80% improvement.        Current pain level: 0/10     Changes in function:  Yes (See Goal flowsheet attached for changes in current functional level)     Adverse reaction to treatment or activity:  None    OBJECTIVE  Changes in objective findings:  Yes, R knee hyperextension AROM 3 deg.         ASSESSMENT  Laura continues to require intervention to meet STG and LTG's: PT  Patient's symptoms are resolving.  Patient is progressing as expected.  Response to therapy has shown an improvement in  pain level and ROM   Progress made towards STG/LTG?  Yes (See Goal flowsheet attached for updates on achievement of STG and LTG)    PLAN  Continue current treatment plan until patient demonstrates readiness to progress to higher level exercises.    PTA/ATC plan:  N/A    Please refer to the daily flowsheet for treatment today, total treatment time and time spent performing 1:1 timed codes.

## 2020-08-11 ENCOUNTER — VIRTUAL VISIT (OUTPATIENT)
Dept: FAMILY MEDICINE | Facility: OTHER | Age: 50
End: 2020-08-11
Payer: COMMERCIAL

## 2020-08-11 PROCEDURE — 99421 OL DIG E/M SVC 5-10 MIN: CPT | Performed by: PHYSICIAN ASSISTANT

## 2020-08-11 NOTE — PROGRESS NOTES
"Date: 2020 10:14:07  Clinician: Farhat Cavanaugh  Clinician NPI: [id number]  Patient: Laura oseguera  Patient : 1970  Patient Address: [address] [place] [address] [place] [address]  Patient Phone: [phone]  Visit Protocol: URI  Patient Summary:  Laura is a 50 year old ( : 1970 ) female who initiated a Visit for COVID-19 (Coronavirus) evaluation and screening. When asked the question \"Please sign me up to receive news, health information and promotions from [employer].\", Laura responded \"No\".    When asked when her symptoms started, Laura reported that she does not have any symptoms.   She denies taking antibiotic medication in the past month and having recent facial or sinus surgery in the past 60 days.    Pertinent COVID-19 (Coronavirus) information  In the past 14 days, Laura has not worked in a congregate living setting.   She either works or volunteers as a healthcare worker or a , or works or volunteers in a healthcare facility. She provides direct patient care. 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 She lives with a healthcare worker.   Laura has had a close contact with a laboratory-confirmed COVID-19 patient in the last 14 days. She was not exposed at her work. Additional information about contact with COVID-19 (Coronavirus) patient as reported by the patient (free text): Post  helped my mother sit down next to relative outside. I wore a mask, they didn't.    Patient reported they are not living in the same household with a COVID-19 positive patient.  Patient was in an enclosed space for greater than 15 minutes with a COVID-19 patient.  Since 2019, Laura and has not had upper respiratory infection or influenza-like illness. Has not been diagnosed with lab-confirmed COVID-19 test   Pertinent medical history  Laura does not get yeast " infections when she takes antibiotics.   Laura needs a return to work/school note.   Weight: 179 lbs   Laura does not smoke or use smokeless tobacco.   Weight: 179 lbs    MEDICATIONS: Corlanor oral, omeprazole-sodium bicarbonate oral, famotidine oral, Advair Diskus inhalation, Xopenex inhalation, ALLERGIES: Tetracyclines, Penicillins  Clinician Response:  Dear Laura,   Your symptoms show that you may have coronavirus (COVID-19). This illness can cause fever, cough and trouble breathing. Many people get a mild case and get better on their own. Some people can get very sick.  What should I do?  We would like to test you for this virus.   1. Please call [phone] to schedule your visit. Explain that you were referred by [facility] to have a COVID-19 test. Be ready to share your [facility] visit ID number.  The following will serve as your written order for this COVID Test, ordered by me, for the indication of suspected COVID [Z20.828]: The test will be ordered in [employer], our electronic health record, after you are scheduled. It will show as ordered and authorized by Keagan Sheldon MD.  Order: COVID-19 (Coronavirus) PCR for SYMPTOMATIC testing from [facility].      2. When it's time for your COVID test:  Stay at least 6 feet away from others. (If someone will drive you to your test, stay in the backseat, as far away from the  as you can.)   Cover your mouth and nose with a mask, tissue or washcloth.  Go straight to the testing site. Don't make any stops on the way there or back.      3.Starting now: Stay home and away from others (self-isolate) until:   You've had no fever---and no medicine that reduces fever---for one full day (24 hours). And...   Your other symptoms have gotten better. For example, your cough or breathing has improved. And...   At least 10 days have passed since your symptoms started.       During this time, don't leave the house except for testing or medical care.   Stay in your own room, even for  "meals. Use your own bathroom if you can.   Stay away from others in your home. No hugging, kissing or shaking hands. No visitors.  Don't go to work, school or anywhere else.    Clean \"high touch\" surfaces often (doorknobs, counters, handles, etc.). Use a household cleaning spray or wipes. You'll find a full list of  on the EPA website: www.epa.gov/pesticide-registration/list-n-disinfectants-use-against-sars-cov-2.   Cover your mouth and nose with a mask, tissue or washcloth to avoid spreading germs.  Wash your hands and face often. Use soap and water.  Caregivers in these groups are at risk for severe illness due to COVID-19:  o People 65 years and older  o People who live in a nursing home or long-term care facility  o People with chronic disease (lung, heart, cancer, diabetes, kidney, liver, immunologic)  o People who have a weakened immune system, including those who:   Are in cancer treatment  Take medicine that weakens the immune system, such as corticosteroids  Had a bone marrow or organ transplant  Have an immune deficiency  Have poorly controlled HIV or AIDS  Are obese (body mass index of 40 or higher)  Smoke regularly   o Caregivers should wear gloves while washing dishes, handling laundry and cleaning bedrooms and bathrooms.  o Use caution when washing and drying laundry: Don't shake dirty laundry, and use the warmest water setting that you can.  o For more tips, go to www.cdc.gov/coronavirus/2019-ncov/downloads/10Things.pdf.    4.Sign up for [employer]. We know it's scary to hear that you might have COVID-19. We want to track your symptoms to make sure you're okay over the next 2 weeks. Please look for an email from [employer]---this is a free, online program that we'll use to keep in touch. To sign up, follow the link in the email. Learn more at http://[email or web address]/[email or web address]  How can I take care of myself?   Get lots of rest. Drink extra fluids (unless a doctor has told you not to).   " Take Tylenol (acetaminophen) for fever or pain. If you have liver or kidney problems, ask your family doctor if it's okay to take Tylenol.   Adults can take either:    650 mg (two 325 mg pills) every 4 to 6 hours, or...   1,000 mg (two 500 mg pills) every 8 hours as needed.    Note: Don't take more than 3,000 mg in one day. Acetaminophen is found in many medicines (both prescribed and over-the-counter medicines). Read all labels to be sure you don't take too much.   For children, check the Tylenol bottle for the right dose. The dose is based on the child's age or weight.    If you have other health problems (like cancer, heart failure, an organ transplant or severe kidney disease): Call your specialty clinic if you don't feel better in the next 2 days.       Know when to call 911. Emergency warning signs include:    Trouble breathing or shortness of breath Pain or pressure in the chest that doesn't go away Feeling confused like you haven't felt before, or not being able to wake up Bluish-colored lips or face.  Where can I get more information?   [facility] -- About COVID-19: [email or web address]   CDC -- What to Do If You're Sick: www.cdc.gov/coronavirus/2019-ncov/about/steps-when-sick.html   CDC -- Ending Home Isolation: www.cdc.gov/coronavirus/2019-ncov/hcp/disposition-in-home-patients.html   CDC -- Caring for Someone: www.cdc.gov/coronavirus/2019-ncov/if-you-are-sick/care-for-someone.html   [facility] -- Interim Guidance for Hospital Discharge to Home: www.health.[facility].[place].us/diseases/coronavirus/hcp/hospdischarge.pdf   [facility] clinical trials (COVID-19 research studies): [email or web address].[email or web address]/umn-clinical-trials    Below are the COVID-19 hotlines at the [place] Department of Health ([facility]). Interpreters are available.    For health questions: Call 847-314-3961 or 1-466.925.2327 (7 a.m. to 7 p.m.) For questions about schools and childcare: Call 873-334-2651 or 1-998.283.9579 (7 a.m.  to 7 p.m.)    Diagnosis: Contact with and (suspected) exposure to other viral communicable diseases  Diagnosis ICD: Z20.828

## 2020-08-12 DIAGNOSIS — Z20.822 ENCOUNTER FOR LABORATORY TESTING FOR COVID-19 VIRUS: Primary | ICD-10-CM

## 2020-08-12 PROCEDURE — U0003 INFECTIOUS AGENT DETECTION BY NUCLEIC ACID (DNA OR RNA); SEVERE ACUTE RESPIRATORY SYNDROME CORONAVIRUS 2 (SARS-COV-2) (CORONAVIRUS DISEASE [COVID-19]), AMPLIFIED PROBE TECHNIQUE, MAKING USE OF HIGH THROUGHPUT TECHNOLOGIES AS DESCRIBED BY CMS-2020-01-R: HCPCS | Performed by: FAMILY MEDICINE

## 2020-08-13 LAB
SARS-COV-2 RNA SPEC QL NAA+PROBE: NOT DETECTED
SPECIMEN SOURCE: NORMAL

## 2020-08-18 NOTE — PROGRESS NOTES
Subjective:  HPI  Physical Exam                    Objective:  System    Physical Exam    General     ROS    Assessment/Plan:    SUBJECTIVE  Subjective changes as noted by pt:  The knee pain is better but still having some issues. The R hamstring feels like it's getting tight sometimes as well as some calf pain which is new. Squatting can still give pain. Notes that after bending a lot yesterday picking weeds her posterior knee and calf hurt more.        Current pain level: 0/10     Changes in function:  Yes (See Goal flowsheet attached for changes in current functional level)     Adverse reaction to treatment or activity:  None    OBJECTIVE  Changes in objective findings:  Yes, cues needed for correct technique on R knee extension stretch. Pain decreased on squat following repeated extension. Lumbar AROM min loss extension. No change in squat pain following pressups.         ASSESSMENT  Laura continues to require intervention to meet STG and LTG's: PT  Patient is progressing as expected.  Response to therapy has shown an improvement in  pain level  Progress made towards STG/LTG?  Yes (See Goal flowsheet attached for updates on achievement of STG and LTG)    PLAN  Continue current treatment plan until patient demonstrates readiness to progress to higher level exercises.    PTA/ATC plan:  N/A    Please refer to the daily flowsheet for treatment today, total treatment time and time spent performing 1:1 timed codes.

## 2020-08-19 ENCOUNTER — THERAPY VISIT (OUTPATIENT)
Dept: PHYSICAL THERAPY | Facility: CLINIC | Age: 50
End: 2020-08-19
Payer: COMMERCIAL

## 2020-08-19 DIAGNOSIS — M25.561 RIGHT KNEE PAIN: ICD-10-CM

## 2020-08-19 PROCEDURE — 97110 THERAPEUTIC EXERCISES: CPT | Mod: GP | Performed by: PHYSICAL THERAPIST

## 2020-08-19 PROCEDURE — 97140 MANUAL THERAPY 1/> REGIONS: CPT | Mod: GP | Performed by: PHYSICAL THERAPIST

## 2020-08-27 ENCOUNTER — THERAPY VISIT (OUTPATIENT)
Dept: PHYSICAL THERAPY | Facility: CLINIC | Age: 50
End: 2020-08-27
Payer: COMMERCIAL

## 2020-08-27 DIAGNOSIS — M25.561 RIGHT KNEE PAIN: ICD-10-CM

## 2020-08-27 PROCEDURE — 97140 MANUAL THERAPY 1/> REGIONS: CPT | Mod: GP | Performed by: PHYSICAL THERAPIST

## 2020-08-27 PROCEDURE — 97110 THERAPEUTIC EXERCISES: CPT | Mod: GP | Performed by: PHYSICAL THERAPIST

## 2020-08-27 NOTE — PROGRESS NOTES
Subjective:  HPI  Physical Exam                    Objective:  System    Physical Exam    General     ROS    Assessment/Plan:    SUBJECTIVE  Subjective changes as noted by pt:  Not a good week because was walking more and on a beach at [place] and the knee has been more sore. It has calmed down a bit the past few days. Having less of the leg pain (possibly back) this week. Has tried pressups a few times.        Current pain level: 1/10     Changes in function:  None  Adverse reaction to treatment or activity:  None    OBJECTIVE  Changes in objective findings:  Yes, R knee hyperextension AROM 2 deg. 7/10 with full squat.         ASSESSMENT  Laura continues to require intervention to meet STG and LTG's: PT  Patient has experienced an exacerbation of symptoms.  Response to therapy has shown lack of progress in  pain level  Progress made towards STG/LTG?  None    PLAN  Continue current treatment plan until patient demonstrates readiness to progress to higher level exercises.    PTA/ATC plan:  N/A    Please refer to the daily flowsheet for treatment today, total treatment time and time spent performing 1:1 timed codes.

## 2020-09-11 ENCOUNTER — OFFICE VISIT (OUTPATIENT)
Dept: ORTHOPEDICS | Facility: CLINIC | Age: 50
End: 2020-09-11
Payer: COMMERCIAL

## 2020-09-11 DIAGNOSIS — G56.01 CARPAL TUNNEL SYNDROME OF RIGHT WRIST: Primary | ICD-10-CM

## 2020-09-11 DIAGNOSIS — G56.02 CARPAL TUNNEL SYNDROME OF LEFT WRIST: ICD-10-CM

## 2020-09-11 NOTE — LETTER
9/11/2020         RE: Laura Jackson  [address]  [place] [address]        Dear Colleague,    Thank you for referring your patient, Laura Jackson, to the [facility]. Please see a copy of my visit note below.    Laura is a former patient of ours, here for evaluation of bilateral hands.  She complains mostly of bilateral hand numbness and tingling, right worse than left.  It does wake her up at night.  Splints do provide some relief.  She also has a mass which is painful over the dorsal right wrist and volar left wrist.  She gets occasional locking catching of her right thumb.  She also has pain at the base of her right thumb.  The most Intermatic aspect of her hands though is definitely the numbness and tingling.    The past medical history was reviewed and documented in the chart.  This includes medications, surgeries, social history as well as review of systems.    Physical examination of bilateral hands demonstrates a ganglion over the dorsal right wrist and the volar left wrist.  These are minimally tender.  She has a positive Tinel's, Phalen's, carpal tunnel compression test bilaterally.  Has some mild tenderness over the right thumb A1 pulley but no locking or catching of the digit noted.  Mild tenderness noted over the right thumb CMC joint.  Bilaterally, no motor, no sensory deficits are noted.  No significant atrophy.  There is brisk capillary refill in all digits and a palpable radial pulse.  No overlying skin changes noted.    Impression:  #1 bilateral, right greater than left carpal tunnel syndrome  #2 right dorsal wrist ganglion, left volar wrist ganglion  #3 mild right thumb CMC osteoarthritis, possible right trigger thumb    Plan:  Laura and I discussed options.  As most of her symptoms are coming from the carpal tunnel syndrome I have recommended starting with that.  We can start with a right carpal tunnel release and then left carpal tunnel release 2 weeks later.   We discussed the risks and benefits of surgery as well as the anticipated perioperative course.  These risks include but are not limited to infection, bleeding, stiffness, scarring, injury to blood vessels, tendons, nerves.  All questions were answered.  Arrangements for surgery will be made at Laura's earliest possible convenience.    Again, thank you for allowing me to participate in the care of your patient.        Sincerely,        Rickey Rea MD

## 2020-09-11 NOTE — PROGRESS NOTES
Laura is a former patient of ours, here for evaluation of bilateral hands.  She complains mostly of bilateral hand numbness and tingling, right worse than left.  It does wake her up at night.  Splints do provide some relief.  She also has a mass which is painful over the dorsal right wrist and volar left wrist.  She gets occasional locking catching of her right thumb.  She also has pain at the base of her right thumb.  The most Intermatic aspect of her hands though is definitely the numbness and tingling.    The past medical history was reviewed and documented in the chart.  This includes medications, surgeries, social history as well as review of systems.    Physical examination of bilateral hands demonstrates a ganglion over the dorsal right wrist and the volar left wrist.  These are minimally tender.  She has a positive Tinel's, Phalen's, carpal tunnel compression test bilaterally.  Has some mild tenderness over the right thumb A1 pulley but no locking or catching of the digit noted.  Mild tenderness noted over the right thumb CMC joint.  Bilaterally, no motor, no sensory deficits are noted.  No significant atrophy.  There is brisk capillary refill in all digits and a palpable radial pulse.  No overlying skin changes noted.    Impression:  #1 bilateral, right greater than left carpal tunnel syndrome  #2 right dorsal wrist ganglion, left volar wrist ganglion  #3 mild right thumb CMC osteoarthritis, possible right trigger thumb    Plan:  Laura and I discussed options.  As most of her symptoms are coming from the carpal tunnel syndrome I have recommended starting with that.  We can start with a right carpal tunnel release and then left carpal tunnel release 2 weeks later.  We discussed the risks and benefits of surgery as well as the anticipated perioperative course.  These risks include but are not limited to infection, bleeding, stiffness, scarring, injury to blood vessels, tendons, nerves.  All questions were  answered.  Arrangements for surgery will be made at Laura's earliest possible convenience.

## 2020-09-11 NOTE — NURSING NOTE
Teaching Flowsheet   Relevant Diagnosis: Bilateral carpal tunnel syndrome  Teaching Topic: Right open carpal tunnel release, Left open carpal tunnel release.    CSC under local anesthetic with Dr Rickey Rea.  MD states she could have the surgeries 2 weeks apart  Patient works as a pediatric ICU nurse, does a lot of lifting so discussed importance of weight limitations of lifting, pushing, pulling etc after this surgery.  BMI 30.38    Person(s) involved in teaching:   Patient     Motivation Level:  Asks Questions: Yes  Eager to Learn: Yes  Cooperative: Yes  Receptive (willing/able to accept information): Yes  Any cultural factors/Gnosticist beliefs that may influence understanding or compliance? No       Patient demonstrates understanding of the following:  Reason for the appointment, diagnosis and treatment plan: Yes  Knowledge of proper use of medications and conditions for which they are ordered (with special attention to potential side effects or drug interactions): Yes  Which situations necessitate calling provider and whom to contact: Yes       Teaching Concerns Addressed:        Proper use and care of  (medical equip, care aids, etc.): Yes  Nutritional needs and diet plan: Yes  Pain management techniques: Yes  Wound Care: Yes  How and/when to access community resources: Yes     Instructional Materials Used/Given: Preoperative teaching packet, antibacterial soap.  Cynthia Gayle RN

## 2020-09-11 NOTE — NURSING NOTE
Reason For Visit:   Chief Complaint   Patient presents with     Follow Up     Discuss right carpal tunnel surgery. Patient stated that her carpal tunnel has worsened. Patient stated that she has increased pain in her right thumb and hand. Patient also stated that she has ganglion cysts on her left hand as well.         Pain Assessment  Patient Currently in Pain: Yes  Primary Pain Location: Hand(Bilateral)        Allergies   Allergen Reactions     Penicillins Swelling     Swelling throat; age 4     Tetracycline GI Disturbance     Other reaction(s): Abdominal Pain  Vomiting; nauseous  Other reaction(s): Abdominal Pain  Vomiting; nauseous           Mara Burnham LPN

## 2020-09-14 ENCOUNTER — TELEPHONE (OUTPATIENT)
Dept: ORTHOPEDICS | Facility: CLINIC | Age: 50
End: 2020-09-14

## 2020-09-14 NOTE — TELEPHONE ENCOUNTER
Attempted to reach out to Laura to schedule surgery with Dr. Rea. Left detailed message that Dr. Rea preforms surgeries on Friday afternoons with the first available being 9/25/20. Also informed patient that I see a note in her chart stating that we can schedule her left side 2 weeks later. Left best call back number of [phone].

## 2020-09-15 ENCOUNTER — THERAPY VISIT (OUTPATIENT)
Dept: PHYSICAL THERAPY | Facility: CLINIC | Age: 50
End: 2020-09-15
Payer: COMMERCIAL

## 2020-09-15 DIAGNOSIS — M25.561 RIGHT KNEE PAIN: Primary | ICD-10-CM

## 2020-09-15 PROCEDURE — 97140 MANUAL THERAPY 1/> REGIONS: CPT | Mod: GP | Performed by: PHYSICAL THERAPIST

## 2020-09-15 PROCEDURE — 97110 THERAPEUTIC EXERCISES: CPT | Mod: GP | Performed by: PHYSICAL THERAPIST

## 2020-09-15 NOTE — PROGRESS NOTES
Subjective:  HPI  Physical Exam                    Objective:  System    Physical Exam    General     ROS    Assessment/Plan:    PROGRESS  REPORT    Progress reporting period is from 7/20/20 to 9/15/20.       SUBJECTIVE  Subjective changes noted by patient:  Feeling pretty good. Walking and stairs better, squatting can still feel it. Feels good after clinic sessions and feels like she can't produce the same stretch on her own for her knee as in clinic. 12 hour shifts at work are fine. Overall about 80% improved.         Current pain level is 0/10  .     Previous pain level was  2/10  .   Changes in function:  Yes (See Goal flowsheet attached for changes in current functional level)  Adverse reaction to treatment or activity: None    OBJECTIVE  Changes noted in objective findings:  Yes, R knee hyperextension PROM 3 deg. 6/10 pain in R knee when rising from full squat.         ASSESSMENT/PLAN  Updated problem list and treatment plan: Diagnosis 1:  R knee pain, strain  Pain -  manual therapy, self management, education, directional preference exercise and home program  Decreased ROM/flexibility - manual therapy, therapeutic exercise, therapeutic activity and home program  Decreased strength - therapeutic exercise, therapeutic activities and home program  Inflammation - self management/home program  Impaired muscle performance - neuro re-education and home program  Decreased function - therapeutic activities and home program  STG/LTGs have been met or progress has been made towards goals:  Yes (See Goal flow sheet completed today.)  Assessment of Progress: The patient's progress has plateaued.  Self Management Plans:  Patient has been instructed in a home treatment program.  Patient  has been instructed in self management of symptoms.  I have re-evaluated this patient and find that the nature, scope, duration and intensity of the therapy is appropriate for the medical condition of the patient.  Laura continues to  require the following intervention to meet STG and LTG's:  PT    Recommendations:  This patient would benefit from continued therapy.     Frequency:  1 X week, once daily  Duration:  for 4 weeks        Please refer to the daily flowsheet for treatment today, total treatment time and time spent performing 1:1 timed codes.

## 2020-09-17 DIAGNOSIS — J45.30 MILD PERSISTENT ASTHMA WITHOUT COMPLICATION: ICD-10-CM

## 2020-09-17 NOTE — TELEPHONE ENCOUNTER
Prescription approved per [facility].    Theresa Maldonado RN

## 2020-09-21 NOTE — PROGRESS NOTES
Subjective:  HPI  Physical Exam                    Objective:  System    Physical Exam    General     ROS    Assessment/Plan:    SUBJECTIVE  Subjective changes as noted by pt:  Aggravated the knee again over the weekend. Was on a small stool changing a lighbulb, slipped off and landed pretty normally but the R knee really bothered afterward. It's been calming down a bit since then.       Current pain level: 2/10     Changes in function:  Yes (See Goal flowsheet attached for changes in current functional level)     Adverse reaction to treatment or activity:  None    OBJECTIVE  Changes in objective findings:  Yes, full squat 6/10. Positive Ryland's on R lateral knee.         ASSESSMENT  Laura continues to require intervention to meet STG and LTG's: PT  Patient has experienced an exacerbation of symptoms.  Response to therapy has shown a worsening of  pain level  Progress made towards STG/LTG?  Yes (See Goal flowsheet attached for updates on achievement of STG and LTG)    PLAN  Continue current treatment plan until patient demonstrates readiness to progress to higher level exercises.    PTA/ATC plan:  N/A    Please refer to the daily flowsheet for treatment today, total treatment time and time spent performing 1:1 timed codes.

## 2020-09-22 ENCOUNTER — THERAPY VISIT (OUTPATIENT)
Dept: PHYSICAL THERAPY | Facility: CLINIC | Age: 50
End: 2020-09-22
Payer: COMMERCIAL

## 2020-09-22 DIAGNOSIS — M25.561 RIGHT KNEE PAIN: ICD-10-CM

## 2020-09-22 PROCEDURE — 97110 THERAPEUTIC EXERCISES: CPT | Mod: GP | Performed by: PHYSICAL THERAPIST

## 2020-09-22 PROCEDURE — 97140 MANUAL THERAPY 1/> REGIONS: CPT | Mod: GP | Performed by: PHYSICAL THERAPIST

## 2020-09-23 PROBLEM — G56.02 CARPAL TUNNEL SYNDROME OF LEFT WRIST: Status: ACTIVE | Noted: 2020-09-23

## 2020-09-23 PROBLEM — G56.01 CARPAL TUNNEL SYNDROME OF RIGHT WRIST: Status: ACTIVE | Noted: 2020-09-23

## 2020-09-23 NOTE — TELEPHONE ENCOUNTER
Patient is scheduled for surgery with Dr. Rea      Spoke or left message with: Spoke with Laura    Date of Surgery: Right 10/23/20 Left 11/6/20    Location: [facility]    Informed patient they will need an adult  Yes    Pre-op with surgeon (if applicable): Complete    H&P: Patient to schedule with Bj Butler      Additional imaging/appointments: Patient will await call from covid team    Surgery packet: Given in clinic    Additional comments: patient will await call from pre op nurses 1-2 days prior to surgery for arrival time

## 2020-09-28 ENCOUNTER — THERAPY VISIT (OUTPATIENT)
Dept: PHYSICAL THERAPY | Facility: CLINIC | Age: 50
End: 2020-09-28
Payer: COMMERCIAL

## 2020-09-28 DIAGNOSIS — M25.561 RIGHT KNEE PAIN: ICD-10-CM

## 2020-09-28 PROCEDURE — 97110 THERAPEUTIC EXERCISES: CPT | Mod: GP | Performed by: PHYSICAL THERAPIST

## 2020-09-28 PROCEDURE — 97140 MANUAL THERAPY 1/> REGIONS: CPT | Mod: GP | Performed by: PHYSICAL THERAPIST

## 2020-09-28 NOTE — PROGRESS NOTES
Subjective:  HPI  Physical Exam                    Objective:  System    Physical Exam    General     ROS    Assessment/Plan:    DISCHARGE REPORT    Progress reporting period is from 7/20/20 to 9/28/20.       SUBJECTIVE  Subjective changes noted by patient:  Laura reports about 50% improvement in symptoms. Has struggled to gain further improvement with PT sessions over the past two months. Continues to notice that any squatting is uncomfortable (particularly at work as a nurse). Plans to seek ortho consult (Dr. Espitia).           Current pain level is 2/10  .     Previous pain level was  2/10  .   Changes in function:  Yes (See Goal flowsheet attached for changes in current functional level)  Adverse reaction to treatment or activity: None    OBJECTIVE  Changes noted in objective findings:  Yes, 5/10 R knee pain with full squat (increases slightly on way up).     Pain dec to 3-4/10 on squat after repeated R knee extension in slight hip IR)        ASSESSMENT/PLAN  Updated problem list and treatment plan: Diagnosis 1:  R knee pain, possible meniscal tear  Pain -  manual therapy, self management, education, directional preference exercise and home program  Decreased ROM/flexibility - manual therapy, therapeutic exercise, therapeutic activity and home program  Decreased joint mobility - manual therapy, therapeutic exercise, therapeutic activity and home program  Inflammation - self management/home program  Impaired muscle performance - neuro re-education and home program  Decreased function - therapeutic activities and home program  STG/LTGs have been met or progress has been made towards goals:  Yes (See Goal flow sheet completed today.)  Assessment of Progress: The patient is no longer making progress in all 3 of the following areas: subjectively, objectively and functionally.  Self Management Plans:  Patient is independent in a home treatment program.  Patient is independent in self management of symptoms.  I  have re-evaluated this patient and find that the nature, scope, duration and intensity of the therapy is appropriate for the medical condition of the patient.  Laura continues to require the following intervention to meet STG and LTG's:  PT intervention is no longer required to meet STG/LTG.    Recommendations:  This patient would benefit from further evaluation.    Please refer to the daily flowsheet for treatment today, total treatment time and time spent performing 1:1 timed codes.

## 2020-09-28 NOTE — TELEPHONE ENCOUNTER
DIAGNOSIS: R knee/ Contusion of knee; R It band syndrome/ [place] [facility] [place],   APPOINTMENT DATE: 10/1   NOTES STATUS DETAILS   OFFICE NOTE from referring provider N/A    OFFICE NOTE from other specialist Internal    DISCHARGE SUMMARY from hospital N/A    DISCHARGE REPORT from the ER N/A    OPERATIVE REPORT N/A    MEDICATION LIST Internal    MRI Internal    CT SCAN N/A    XRAYS (IMAGES & REPORTS) Internal

## 2020-10-06 DIAGNOSIS — Z11.59 ENCOUNTER FOR SCREENING FOR OTHER VIRAL DISEASES: Primary | ICD-10-CM

## 2020-10-07 DIAGNOSIS — Z11.59 ENCOUNTER FOR SCREENING FOR OTHER VIRAL DISEASES: Primary | ICD-10-CM

## 2020-10-08 ENCOUNTER — OFFICE VISIT (OUTPATIENT)
Dept: INTERNAL MEDICINE | Facility: CLINIC | Age: 50
End: 2020-10-08
Payer: COMMERCIAL

## 2020-10-08 VITALS
HEART RATE: 70 BPM | WEIGHT: 181 LBS | DIASTOLIC BLOOD PRESSURE: 86 MMHG | TEMPERATURE: 98.1 F | OXYGEN SATURATION: 98 % | BODY MASS INDEX: 31.07 KG/M2 | SYSTOLIC BLOOD PRESSURE: 120 MMHG | RESPIRATION RATE: 16 BRPM

## 2020-10-08 DIAGNOSIS — Z01.818 PRE-OPERATIVE GENERAL PHYSICAL EXAMINATION: Primary | ICD-10-CM

## 2020-10-08 DIAGNOSIS — Z23 NEED FOR PROPHYLACTIC VACCINATION AND INOCULATION AGAINST INFLUENZA: ICD-10-CM

## 2020-10-08 LAB
ANION GAP SERPL CALCULATED.3IONS-SCNC: 7 MMOL/L (ref 3–14)
BUN SERPL-MCNC: 16 MG/DL (ref 7–30)
CALCIUM SERPL-MCNC: 9.3 MG/DL (ref 8.5–10.1)
CHLORIDE SERPL-SCNC: 104 MMOL/L (ref 94–109)
CO2 SERPL-SCNC: 27 MMOL/L (ref 20–32)
CREAT SERPL-MCNC: 0.89 MG/DL (ref 0.52–1.04)
GFR SERPL CREATININE-BSD FRML MDRD: 74 ML/MIN/{1.73_M2}
GLUCOSE SERPL-MCNC: 87 MG/DL (ref 70–99)
HGB BLD-MCNC: 13.4 G/DL (ref 11.7–15.7)
POTASSIUM SERPL-SCNC: 4.2 MMOL/L (ref 3.4–5.3)
SODIUM SERPL-SCNC: 138 MMOL/L (ref 133–144)

## 2020-10-08 PROCEDURE — 90682 RIV4 VACC RECOMBINANT DNA IM: CPT | Performed by: INTERNAL MEDICINE

## 2020-10-08 PROCEDURE — 80048 BASIC METABOLIC PNL TOTAL CA: CPT | Performed by: INTERNAL MEDICINE

## 2020-10-08 PROCEDURE — 90471 IMMUNIZATION ADMIN: CPT | Performed by: INTERNAL MEDICINE

## 2020-10-08 PROCEDURE — 99214 OFFICE O/P EST MOD 30 MIN: CPT | Mod: 25 | Performed by: INTERNAL MEDICINE

## 2020-10-08 PROCEDURE — 85018 HEMOGLOBIN: CPT | Performed by: INTERNAL MEDICINE

## 2020-10-08 NOTE — PROGRESS NOTES
[facility]  [address] [address]  [phone]  Dept: [phone]    PRE-OP EVALUATION:  Today's date: 10/8/2020    Laura Jackson (: 1970) presents for pre-operative evaluation assessment as requested by Dr. Carey.  She requires evaluation and anesthesia risk assessment prior to undergoing surgery/procedure for treatment of Rt hand . She has bilateral carpal tunnel syndrome but the right side has gotten worse and worse to the point that surgery is planned    Fax number for surgical facility:   Primary Physician: Bj Butler  Type of Anesthesia Anticipated: to be determined    Preop Questionnnaire:  Pre-op Questionnaire 10/7/2020   Surgery Location: [facility] and Surgery- [facility]   Surgeon: Dr. Price   Surgery/Procedure: Carpal tunnel release, both hands   Surgery Date: 10/20/2020, 2020   Time of Surgery: Afternoon   Where patient plans to recover: At home with family   1. Have you ever had a heart attack or stroke? No   2. Have you ever had surgery on your heart or blood vessels, such as a stent placement, a coronary artery bypass, or surgery on an artery in your head, neck, heart, or legs? No   3. Do you have chest pain with activity? No   4. Do you have a history of  heart failure? No   5. Do you currently have a cold, bronchitis or symptoms of other infection? No   6. Do you have a cough, shortness of breath, or wheezing? YES - Asthma    7. Do you or anyone in your family have previous history of blood clots? No   8. Do you or does anyone in your family have a serious bleeding problem such as prolonged bleeding following surgeries or cuts? No   9. Have you ever had problems with anemia or been told to take iron pills? YES - about 3 years    10. Have you had any abnormal blood loss such as black, tarry or bloody stools, or abnormal vaginal bleeding? No   11. Have you ever had a blood transfusion? YES - about 3 years ago    11a. Have  you ever had a transfusion reaction? No   Are you willing to have a blood transfusion if it is medically needed before, during, or after your surgery? Yes   13. Have you or any of your relatives ever had problems with anesthesia? No   14. Do you have sleep apnea, excessive snoring or daytime drowsiness? No   15. Do you have any artifical heart valves or other implanted medical devices like a pacemaker, defibrillator, or continuous glucose monitor? No   16. Do you have artificial joints? No   17. Are you allergic to latex? No   18. Is there any chance that you may be pregnant? No         HPI:     HPI related to upcoming procedure: progressive carpal tunnel syndrome symptoms and has gotten to the point that surgery is necessary       See problem list for active medical problems.  Problems all longstanding and stable, except as noted/documented.  See ROS for pertinent symptoms related to these conditions.      MEDICAL HISTORY:     Patient Active Problem List    Diagnosis Date Noted     Carpal tunnel syndrome of right wrist 09/23/2020     Priority: Medium     Added automatically from request for surgery [id number]       Carpal tunnel syndrome of left wrist 09/23/2020     Priority: Medium     Added automatically from request for surgery [id number]       Autonomic dysfunction 07/31/2018     Priority: Medium     Benign essential hypertension 07/31/2018     Priority: Medium     Cervicalgia 07/16/2018     Priority: Medium     Hypermobility syndrome 02/16/2018     Priority: Medium     POTS (postural orthostatic tachycardia syndrome) 02/06/2018     Priority: Medium     Pelvic adhesions 11/09/2017     Priority: Medium     Endometriosis 11/09/2017     Priority: Medium     Heberden's nodes 11/09/2017     Priority: Medium     S/P ANAID (total abdominal hysterectomy) 09/11/2017     Priority: Medium     Hypovitaminosis D 09/11/2017     Priority: Medium     Dehydration 08/27/2017     Priority: Medium     Nausea 08/04/2017     Priority:  Medium     S/P myomectomy 06/29/2017     Priority: Medium     Foreign body (FB) in soft tissue 08/22/2016     Priority: Medium     Mild persistent asthma 02/19/2014     Priority: Medium     Family history of breast cancer 02/19/2014     Priority: Medium     History of supraventricular tachycardia 10/11/2013     Priority: Medium     no recurrence since 2008       CARDIOVASCULAR SCREENING; LDL GOAL LESS THAN 160 04/24/2013     Priority: Medium     Irritable bowel syndrome 04/24/2013     Priority: Medium     GERD (gastroesophageal reflux disease) 04/24/2013     Priority: Medium      Past Medical History:   Diagnosis Date     Benign essential hypertension 7/31/2018     Family history of breast cancer 2/19/2014     Family history of breast cancer      SVT (supraventricular tachycardia):  history of, no recurrence since 2008 10/11/2013    no recurrence since 2008      Uncomplicated asthma      Past Surgical History:   Procedure Laterality Date     APPENDECTOMY       COLONOSCOPY N/A 8/20/2018    Procedure: COMBINED COLONOSCOPY, SINGLE OR MULTIPLE BIOPSY/POLYPECTOMY BY BIOPSY;;  Surgeon: Osman Hahn MD;  Location: [facility] OR     COLONOSCOPY WITH CO2 INSUFFLATION N/A 8/20/2018    Procedure: COLONOSCOPY WITH CO2 INSUFFLATION;  COLON-SCREENING / LUBKA ;  Surgeon: Osman Hahn MD;  Location: [facility] OR     DAVINCI HYSTERECTOMY TOTAL, SALPINGECTOMY BILATERAL N/A 8/4/2017    Procedure: DAVINCI XI HYSTERECTOMY TOTAL, SALPINGECTOMY BILATERAL;  DAVINCI TOTAL HYSTERECTOMY; BILATERAL SALPINGECTOMY. BILATERAL URETERAL LYSIS. UTEROSACRAL-COLPOPEXY. EXCISION OF ENDOMETRIOSIS;  Surgeon: George Loyola MD;  Location: [facility] OR     DAVINCI LYSIS OF ADHESIONS Bilateral 8/4/2017    Procedure: DAVINCI LYSIS OF ADHESIONS;  BILATERAL URETERAL LYSIS;  Surgeon: George Loyola MD;  Location: [facility] OR     DAVINCI SACROCOLPOPEXY, CYSTOSCOPY, COMBINED N/A 8/4/2017    Procedure: COMBINED DAVINCI SACROCOLPOPEXY, CYSTOSCOPY;  UTEROSACRAL  COLPOPEXY;  Surgeon: George Loyola MD;  Location: [facility] OR     DAVINCI XI ASSISTED ABLATION / EXCISION OF ENDOMETRIOSIS  8/4/2017    Procedure: DAVINCI XI ASSISTED ABLATION / EXCISION OF ENDOMETRIOSIS;;  Surgeon: George Loyola MD;  Location: [facility] OR     DILATION AND CURETTAGE N/A 6/20/2017    Procedure: DILATION AND CURETTAGE;  Uterine Curettings and Fibroid Removal, Cook catheter placement; (No hysterectomy done at this time);  Surgeon: Ernestina Saunders MD;  Location: [facility] OR     HYSTERECTOMY, PAP NO LONGER INDICATED       TONSILLECTOMY       Current Outpatient Medications   Medication Sig Dispense Refill     Cholecalciferol (VITAMIN D) 2000 UNITS CAPS Take 1 capsule by mouth daily       famotidine (PEPCID) 20 MG tablet Take 20 mg by mouth 2 times daily       fluticasone-salmeterol (ADVAIR) 100-50 MCG/DOSE inhaler Inhale 1 puff into the lungs 2 times daily 3 Inhaler 0     ivabradine 5 MG PO tablet Take 1 tablet (5 mg) by mouth 2 times daily (with meals) 180 tablet 4     levalbuterol (XOPENEX HFA) 45 MCG/ACT inhaler Inhale 1-2 puffs into the lungs every 4 hours as needed for shortness of breath / dyspnea Need appointment in clinic for further refills. 1 Inhaler 0     OMEPRAZOLE PO Take 20 mg by mouth every morning       order for DME Equipment being ordered: hinged knee brace, Large 1 Device 0     order for DME Equipment being ordered: wrist splint with thumb support 1 Device 0     OTC products: None, except as noted above    Allergies   Allergen Reactions     Penicillins Swelling     Swelling throat; age 4     Tetracycline GI Disturbance     Other reaction(s): Abdominal Pain  Vomiting; nauseous  Other reaction(s): Abdominal Pain  Vomiting; nauseous      Latex Allergy: NO    Social History     Tobacco Use     Smoking status: Never Smoker     Smokeless tobacco: Never Used   Substance Use Topics     Alcohol use: Yes     Comment: rare, two sips every six months     History   Drug Use No       REVIEW OF SYSTEMS:    CONSTITUTIONAL: NEGATIVE for fever, chills, change in weight  ENT/MOUTH: NEGATIVE for ear, mouth and throat problems  RESP: NEGATIVE for significant cough or SOB  CV: NEGATIVE for chest pain, palpitations or peripheral edema  Review of systems     EXAM:   /86   Pulse 70   Temp 98.1  F (36.7  C) (Oral)   Resp 16   Wt 82.1 kg (181 lb)   LMP 07/28/2017   SpO2 98%   BMI 31.07 kg/m    GENERAL APPEARANCE: healthy, alert and no distress  HENT: ear canals and TM's normal and nose and mouth without ulcers or lesions  RESP: lungs clear to auscultation - no rales, rhonchi or wheezes  CV: regular rate and rhythm, normal S1 S2, no S3 or S4 and no murmur, click or rub   ABDOMEN: soft, nontender, no HSM or masses and bowel sounds normal  NEURO: Normal strength and tone, sensory exam grossly normal, mentation intact and speech normal    DIAGNOSTICS:       Recent Labs   Lab Test 03/15/19  1134 04/29/18  2314 06/20/17  1026 06/20/17  1026 06/20/17  0351 06/20/17  0351   HGB 14.3 14.3   < > 8.4*   < >  --     250   < > 192  --   --    INR  --   --   --  1.12  --  1.12    140   < >  --   --   --    POTASSIUM 4.2 3.6   < > 3.7  --   --    CR 0.90 0.87   < >  --   --   --     < > = values in this interval not displayed.        IMPRESSION:   Reason for surgery/procedure: carpal tunnel syndrome symptoms   Diagnosis/reason for consult: assess and minimize preoperative risk per consulting surgeon     The proposed surgical procedure is considered INTERMEDIATE risk.    REVISED CARDIAC RISK INDEX  The patient has the following serious cardiovascular risks for perioperative complications such as (MI, PE, VFib and 3  AV Block):  No serious cardiac risks  INTERPRETATION: 0 risks: Class I (very low risk - 0.4% complication rate)    The patient has the following additional risks for perioperative complications:  No identified additional risks      ICD-10-CM    1. Need for prophylactic vaccination and inoculation against  influenza  Z23 INFLUENZA QUAD, RECOMBINANT, P-FREE (RIV4) (FLUBLOCK) [23166]     Vaccine Administration, Initial [20468]       RECOMMENDATIONS:       --Patient is to take all scheduled medications on the day of surgery EXCEPT for modifications listed below.    APPROVAL GIVEN to proceed with proposed procedure, without further diagnostic evaluation       Signed Electronically by: Bj Butler MD    Copy of this evaluation report is provided to requesting physician.    [facility] Preop Guidelines    Revised Cardiac Risk Index

## 2020-10-08 NOTE — LETTER
October 9, 2020      Laura Jackson  [address]  [place] [address]        Dear Laura:    We are writing to inform you of your test results.    These are all okay for surgery.     Resulted Orders   Basic metabolic panel   Result Value Ref Range    Sodium 138 133 - 144 mmol/L    Potassium 4.2 3.4 - 5.3 mmol/L    Chloride 104 94 - 109 mmol/L    Carbon Dioxide 27 20 - 32 mmol/L    Anion Gap 7 3 - 14 mmol/L    Glucose 87 70 - 99 mg/dL      Comment:      Non Fasting    Urea Nitrogen 16 7 - 30 mg/dL    Creatinine 0.89 0.52 - 1.04 mg/dL    GFR Estimate 74 >60 mL/min/[1.73_m2]      Comment:      Non  GFR Calc  Starting 12/18/2018, serum creatinine based estimated GFR (eGFR) will be   calculated using the Chronic Kidney Disease Epidemiology Collaboration   (CKD-EPI) equation.      GFR Estimate If Black 86 >60 mL/min/[1.73_m2]      Comment:       GFR Calc  Starting 12/18/2018, serum creatinine based estimated GFR (eGFR) will be   calculated using the Chronic Kidney Disease Epidemiology Collaboration   (CKD-EPI) equation.      Calcium 9.3 8.5 - 10.1 mg/dL   Hemoglobin   Result Value Ref Range    Hemoglobin 13.4 11.7 - 15.7 g/dL     If you have any questions or concerns, please call the clinic at the number listed above.     Sincerely,        Bj Butler MD/prem

## 2020-10-12 ENCOUNTER — OFFICE VISIT (OUTPATIENT)
Dept: ORTHOPEDICS | Facility: CLINIC | Age: 50
End: 2020-10-12
Payer: COMMERCIAL

## 2020-10-12 ENCOUNTER — PRE VISIT (OUTPATIENT)
Dept: ORTHOPEDICS | Facility: CLINIC | Age: 50
End: 2020-10-12

## 2020-10-12 VITALS — BODY MASS INDEX: 30.9 KG/M2 | WEIGHT: 181 LBS | HEIGHT: 64 IN

## 2020-10-12 DIAGNOSIS — G89.29 CHRONIC PAIN OF RIGHT KNEE: Primary | ICD-10-CM

## 2020-10-12 DIAGNOSIS — M25.561 CHRONIC PAIN OF RIGHT KNEE: Primary | ICD-10-CM

## 2020-10-12 PROCEDURE — 20610 DRAIN/INJ JOINT/BURSA W/O US: CPT | Mod: RT | Performed by: ORTHOPAEDIC SURGERY

## 2020-10-12 PROCEDURE — 99207 PR DROP WITH A PROCEDURE: CPT | Performed by: ORTHOPAEDIC SURGERY

## 2020-10-12 RX ORDER — LIDOCAINE HYDROCHLORIDE 5 MG/ML
8 INJECTION, SOLUTION INFILTRATION; INTRAVENOUS
Status: DISCONTINUED | OUTPATIENT
Start: 2020-10-12 | End: 2022-09-21

## 2020-10-12 RX ORDER — TRIAMCINOLONE ACETONIDE 40 MG/ML
40 INJECTION, SUSPENSION INTRA-ARTICULAR; INTRAMUSCULAR
Status: DISCONTINUED | OUTPATIENT
Start: 2020-10-12 | End: 2024-05-20

## 2020-10-12 RX ADMIN — TRIAMCINOLONE ACETONIDE 40 MG: 40 INJECTION, SUSPENSION INTRA-ARTICULAR; INTRAMUSCULAR at 10:58

## 2020-10-12 RX ADMIN — LIDOCAINE HYDROCHLORIDE 8 ML: 5 INJECTION, SOLUTION INFILTRATION; INTRAVENOUS at 10:58

## 2020-10-12 ASSESSMENT — ENCOUNTER SYMPTOMS
MUSCLE CRAMPS: 1
ORTHOPNEA: 0
HYPERTENSION: 0
JOINT SWELLING: 1
DIZZINESS: 0
PARALYSIS: 0
SYNCOPE: 0
BACK PAIN: 0
TINGLING: 1
MUSCLE WEAKNESS: 0
NECK PAIN: 1
HYPOTENSION: 0
LIGHT-HEADEDNESS: 0
MEMORY LOSS: 0
PALPITATIONS: 1
SLEEP DISTURBANCES DUE TO BREATHING: 0
WEAKNESS: 1
STIFFNESS: 1
MYALGIAS: 0
DISTURBANCES IN COORDINATION: 0
LEG PAIN: 0
NUMBNESS: 1
EXERCISE INTOLERANCE: 0
LOSS OF CONSCIOUSNESS: 0
SEIZURES: 0
TREMORS: 0
SPEECH CHANGE: 0
ARTHRALGIAS: 1

## 2020-10-12 ASSESSMENT — MIFFLIN-ST. JEOR: SCORE: 1416.01

## 2020-10-12 NOTE — PROGRESS NOTES
CHIEF CONCERN:   Chief Complaint   Patient presents with     Consult     Right knee        HISTORY:   Laura Jackson is a 50 year old female who presents to clinic today for evaluation of right knee pain. Pt states that the pain began in February after suffering a twisting injury. She underwent an MRI at that time, followed by taking NSAIDs. She was not able to initially attend physical therapy as had been recommended due to COVID, but eventually ended up doing multiple sessions. It helped her, but her knee would flare up again after doing too much work. Denies contralateral symptoms. Denies numbness or tingling. Still has consistent pain which varies in severity. Is still able to work, but has decreased her recreational activity due to this. Is having carpel tunnel surgery performed next week.      PAST MEDICAL HISTORY: (Reviewed with the patient and in the EPIC medical record)  Past Medical History:   Diagnosis Date     Benign essential hypertension 7/31/2018     Family history of breast cancer 2/19/2014     Family history of breast cancer      SVT (supraventricular tachycardia):  history of, no recurrence since 2008 10/11/2013    no recurrence since 2008      Uncomplicated asthma        PAST SURGICAL HISTORY: (Reviewed with the patient and in the EPIC medical record)  Past Surgical History:   Procedure Laterality Date     ABDOMEN SURGERY  6/2017    myectomy     APPENDECTOMY       COLONOSCOPY N/A 8/20/2018    Procedure: COMBINED COLONOSCOPY, SINGLE OR MULTIPLE BIOPSY/POLYPECTOMY BY BIOPSY;;  Surgeon: Osman Hahn MD;  Location: [facility]     COLONOSCOPY WITH CO2 INSUFFLATION N/A 8/20/2018    Procedure: COLONOSCOPY WITH CO2 INSUFFLATION;  COLON-SCREENING / LUBKA ;  Surgeon: Osman Hahn MD;  Location: [facility]     DAVINCI HYSTERECTOMY TOTAL, SALPINGECTOMY BILATERAL N/A 8/4/2017    Procedure: DAVINCI XI HYSTERECTOMY TOTAL, SALPINGECTOMY BILATERAL;  DAVINCI TOTAL HYSTERECTOMY; BILATERAL  SALPINGECTOMY. BILATERAL URETERAL LYSIS. UTEROSACRAL-COLPOPEXY. EXCISION OF ENDOMETRIOSIS;  Surgeon: George Loyola MD;  Location: [facility]     DAVINCI LYSIS OF ADHESIONS Bilateral 8/4/2017    Procedure: DAVINCI LYSIS OF ADHESIONS;  BILATERAL URETERAL LYSIS;  Surgeon: George Loyola MD;  [facility]     DAVINCI SACROCOLPOPEXY, CYSTOSCOPY, COMBINED N/A 8/4/2017    Procedure: COMBINED DAVINCI SACROCOLPOPEXY, CYSTOSCOPY;  UTEROSACRAL COLPOPEXY;  Surgeon: George Loyola MD;  [facility]     DAVINCI XI ASSISTED ABLATION / EXCISION OF ENDOMETRIOSIS  8/4/2017    Procedure: DAVINCI XI ASSISTED ABLATION / EXCISION OF ENDOMETRIOSIS;;  Surgeon: George Loyola MD;  [facility]     DILATION AND CURETTAGE N/A 6/20/2017    Procedure: DILATION AND CURETTAGE;  Uterine Curettings and Fibroid Removal, Cook catheter placement; (No hysterectomy done at this time);  Surgeon: Ernestina Saunders MD;  [facility]     HYSTERECTOMY, PAP NO LONGER INDICATED       ORTHOPEDIC SURGERY  6/1986    Heel spur , toe surgery     TONSILLECTOMY         MEDICATIONS: (Reviewed with the patient and in the [facility] medical record)  Notable medications include:  Current Outpatient Medications   Medication Sig Dispense Refill     Cholecalciferol (VITAMIN D) 2000 UNITS CAPS Take 1 capsule by mouth daily       fluticasone-salmeterol (ADVAIR) 100-50 MCG/DOSE inhaler Inhale 1 puff into the lungs 2 times daily 3 Inhaler 0     ivabradine 5 MG PO tablet Take 1 tablet (5 mg) by mouth 2 times daily (with meals) 180 tablet 4     levalbuterol (XOPENEX HFA) 45 MCG/ACT inhaler Inhale 1-2 puffs into the lungs every 4 hours as needed for shortness of breath / dyspnea Need appointment in clinic for further refills. 1 Inhaler 0     OMEPRAZOLE PO Take 20 mg by mouth every morning       order for DME Equipment being ordered: hinged knee brace, Large 1 Device 0     order for DME Equipment being ordered: wrist splint with thumb support 1 Device 0     "    ALLERGIES: (Reviewed with the patient and in the EPIC medical record)  Allergies   Allergen Reactions     Penicillins Swelling     Swelling throat; age 4     Tetracycline GI Disturbance     Other reaction(s): Abdominal Pain  Vomiting; nauseous  Other reaction(s): Abdominal Pain  Vomiting; nauseous       SOCIAL HISTORY: (Reviewed with the patient and in the medical record)  --Tobacco: no  --Occupation: ICU nurse  --Avocation/Sport: gardening, outdoor activity, walking    FAMILY HISTORY: (Reviewed with the patient and in the medical record)  -- No family history of bleeding, clotting, or difficulty with anesthesia    REVIEW OF SYSTEMS: (Reviewed with the patient and on the health intake form)  -- A comprehensive 10 point review of systems was conducted and is negative except as noted in the HPI    EXAM:   General: Awake, Alert and Oriented, No acute Distress. Articulate and Interactive  Ht 1.626 m (5' 4\")   Wt 82.1 kg (181 lb)   LMP 07/28/2017   BMI 31.07 kg/m       Right lower extremity and knee exam:    Skin is Warm and Well perfused, no suggestion of infection, no previous incisions    Mild effusion. Mild lateral and medial joint line pain. No other tenderness to palpation    Full range of motion, negative McMurrays    Stable to varus and valgus stress    Negative Lachmans, negative posterior drawer    Negative patellar tilt, negative patellar laxity, no J-sign present    EHL/FHL/TA/GS 5/5    Sensation intact L3-S1    Brisk distal capillary refill    IMAGING:  Plain Radiographs: AP, lateral, and sunrise views of right knee reviewed and demonstrate no acute osseous abnormality and mild degenerative change with mild medial joint space narrowing and signs of patellofemoral arthritis    MRI: Right knee MRI demonstrates intact cruciate's, no obvious meniscal pathology, mild degenerative changes of the medial compartment and and moderate patellofemoral changes    ASSESSMENT:  1. 50 year old female with right knee " arthritis, primarily patellofemoral    PLAN:  1. Will provide steroid injection today  2. Continue with therapy and NSAIDs if desired  3. Can follow up as needed if symptoms fail to improve or change    Audie Rao MD  Fellow, Sports Medicine & Shoulder  [facility]

## 2020-10-12 NOTE — PROGRESS NOTES
Patient seen and examined with the resident.     Assesment: Right knee PF OA and early MC OA    Plan: Will try an injection to right knee, I think this could quiet knee down and make more comfortable, patient to do home exercise program.    After written informed consent obtained and signed, after sufficient prepping and sterile technique, 40 mg of kenalog and , 8 cc of 1% lidocaine were injected without complication into the Right knee. The patient tolerated the injection well and a sterile dressing was applied.    I agree with history, physical and imaging as well as the assessment and plan as detailed by Dr. Rao.       Large Joint Injection/Arthocentesis: R knee joint    Date/Time: 10/12/2020 10:58 AM  Performed by: Julio Espitia MD  Authorized by: Julio Espitia MD     Indications:  Pain  Needle Size:  22 G  Guidance: landmark guided    Approach:  Anterolateral  Location:  Knee      Medications:  40 mg triamcinolone 40 MG/ML; 8 mL lidocaine (PF) 0.5 %  Procedure discussed: discussed risks, benefits, and alternatives    Consent Given by:  Patient  Timeout: timeout called immediately prior to procedure    Prep: patient was prepped and draped in usual sterile fashion

## 2020-10-12 NOTE — NURSING NOTE
[address] [address]  Dept: [phone]  ______________________________________________________________________________    Patient: Laura Jackson   : 1970   MRN: [id number]   2020    INVASIVE PROCEDURE SAFETY CHECKLIST    Date: 2020   Procedure:R knee CSI   Patient Name: Laura Jackson  MRN: [id number]  YOB: 1970    Action: Complete sections as appropriate. Any discrepancy results in a HARD COPY until resolved.     PRE PROCEDURE:  Patient ID verified with 2 identifiers (name and  or MRN): Yes  Procedure and site verified with patient/designee (when able): Yes  Accurate consent documentation in medical record: Yes  H&P (or appropriate assessment) documented in medical record: Yes  H&P must be up to 20 days prior to procedure and updates within 24 hours of procedure as applicable: NA  Relevant diagnostic and radiology test results appropriately labeled and displayed as applicable: Yes  Procedure site(s) marked with provider initials: NA    TIMEOUT:  Time-Out performed immediately prior to starting procedure, including verbal and active participation of all team members addressing the following:Yes  * Correct patient identify  * Confirmed that the correct side and site are marked  * An accurate procedure consent form  * Agreement on the procedure to be done  * Correct patient position  * Relevant images and results are properly labeled and appropriately displayed  * The need to administer antibiotics or fluids for irrigation purposes during the procedure as applicable   * Safety precautions based on patient history or medication use    DURING PROCEDURE: Verification of correct person, site, and procedures any time the responsibility for care of the patient is transferred to another member of the care team.       Prior to injection, verified patient identity using patient's name and date of birth.  Due  to injection administration, patient instructed to remain in clinic for 15 minutes  afterwards, and to report any adverse reaction to me immediately.    Joint injection was performed.      Drug Amount Wasted:  Yes: 42 mg/ml   Vial/Syringe: Single dose vial  Expiration Date:  1/1/24      Jeane Barros, ATC  October 12, 2020

## 2020-10-12 NOTE — LETTER
10/12/2020         RE: Laura Jackson  [address]  [place] [address]        Dear Colleague,    Thank you for referring your patient, Laura Jackson, to the [facility] [place]. Please see a copy of my visit note below.    CHIEF CONCERN:   Chief Complaint   Patient presents with     Consult     Right knee        HISTORY:   Laura Jackson is a 50 year old female who presents to clinic today for evaluation of right knee pain. Pt states that the pain began in February after suffering a twisting injury. She underwent an MRI at that time, followed by taking NSAIDs. She was not able to initially attend physical therapy as had been recommended due to COVID, but eventually ended up doing multiple sessions. It helped her, but her knee would flare up again after doing too much work. Denies contralateral symptoms. Denies numbness or tingling. Still has consistent pain which varies in severity. Is still able to work, but has decreased her recreational activity due to this. Is having carpel tunnel surgery performed next week.      PAST MEDICAL HISTORY: (Reviewed with the patient and in the [facility] medical record)  Past Medical History:   Diagnosis Date     Benign essential hypertension 7/31/2018     Family history of breast cancer 2/19/2014     Family history of breast cancer      SVT (supraventricular tachycardia):  history of, no recurrence since 2008 10/11/2013    no recurrence since 2008      Uncomplicated asthma        PAST SURGICAL HISTORY: (Reviewed with the patient and in the [facility] medical record)  Past Surgical History:   Procedure Laterality Date     ABDOMEN SURGERY  6/2017    myectomy     APPENDECTOMY       COLONOSCOPY N/A 8/20/2018    Procedure: COMBINED COLONOSCOPY, SINGLE OR MULTIPLE BIOPSY/POLYPECTOMY BY BIOPSY;;  Surgeon: Osman Hahn MD;  Location: [facility]     COLONOSCOPY WITH CO2 INSUFFLATION N/A 8/20/2018    Procedure: COLONOSCOPY WITH CO2 INSUFFLATION;   COLON-SCREENING / LUBKA ;  Surgeon: Osman Hahn MD;  Location: [facility]     DAVINCI HYSTERECTOMY TOTAL, SALPINGECTOMY BILATERAL N/A 8/4/2017    Procedure: DAVINCI XI HYSTERECTOMY TOTAL, SALPINGECTOMY BILATERAL;  DAVINCI TOTAL HYSTERECTOMY; BILATERAL SALPINGECTOMY. BILATERAL URETERAL LYSIS. UTEROSACRAL-COLPOPEXY. EXCISION OF ENDOMETRIOSIS;  Surgeon: George Loyola MD;  Location: [facility]     DAVINCI LYSIS OF ADHESIONS Bilateral 8/4/2017    Procedure: DAVINCI LYSIS OF ADHESIONS;  BILATERAL URETERAL LYSIS;  Surgeon: George Loyola MD;  [facility]     DAVINCI SACROCOLPOPEXY, CYSTOSCOPY, COMBINED N/A 8/4/2017    Procedure: COMBINED DAVINCI SACROCOLPOPEXY, CYSTOSCOPY;  UTEROSACRAL COLPOPEXY;  Surgeon: George Loyola MD;  Location: [facility]     DAVINCI XI ASSISTED ABLATION / EXCISION OF ENDOMETRIOSIS  8/4/2017    Procedure: DAVINCI XI ASSISTED ABLATION / EXCISION OF ENDOMETRIOSIS;;  Surgeon: George Loyola MD;  [facility]     DILATION AND CURETTAGE N/A 6/20/2017    Procedure: DILATION AND CURETTAGE;  Uterine Curettings and Fibroid Removal, Cook catheter placement; (No hysterectomy done at this time);  Surgeon: Ernestina Saunders MD;  [facility]     HYSTERECTOMY, PAP NO LONGER INDICATED       ORTHOPEDIC SURGERY  6/1986    Heel spur , toe surgery     TONSILLECTOMY         MEDICATIONS: (Reviewed with the patient and in the [facility] medical record)  Notable medications include:  Current Outpatient Medications   Medication Sig Dispense Refill     Cholecalciferol (VITAMIN D) 2000 UNITS CAPS Take 1 capsule by mouth daily       fluticasone-salmeterol (ADVAIR) 100-50 MCG/DOSE inhaler Inhale 1 puff into the lungs 2 times daily 3 Inhaler 0     ivabradine 5 MG PO tablet Take 1 tablet (5 mg) by mouth 2 times daily (with meals) 180 tablet 4     levalbuterol (XOPENEX HFA) 45 MCG/ACT inhaler Inhale 1-2 puffs into the lungs every 4 hours as needed for shortness of breath / dyspnea Need appointment in clinic for  "further refills. 1 Inhaler 0     OMEPRAZOLE PO Take 20 mg by mouth every morning       order for DME Equipment being ordered: hinged knee brace, Large 1 Device 0     order for DME Equipment being ordered: wrist splint with thumb support 1 Device 0        ALLERGIES: (Reviewed with the patient and in the EPIC medical record)  Allergies   Allergen Reactions     Penicillins Swelling     Swelling throat; age 4     Tetracycline GI Disturbance     Other reaction(s): Abdominal Pain  Vomiting; nauseous  Other reaction(s): Abdominal Pain  Vomiting; nauseous       SOCIAL HISTORY: (Reviewed with the patient and in the medical record)  --Tobacco: no  --Occupation: ICU nurse  --Avocation/Sport: gardening, outdoor activity, walking    FAMILY HISTORY: (Reviewed with the patient and in the medical record)  -- No family history of bleeding, clotting, or difficulty with anesthesia    REVIEW OF SYSTEMS: (Reviewed with the patient and on the health intake form)  -- A comprehensive 10 point review of systems was conducted and is negative except as noted in the HPI    EXAM:   General: Awake, Alert and Oriented, No acute Distress. Articulate and Interactive  Ht 1.626 m (5' 4\")   Wt 82.1 kg (181 lb)   LMP 07/28/2017   BMI 31.07 kg/m       Right lower extremity and knee exam:    Skin is Warm and Well perfused, no suggestion of infection, no previous incisions    Mild effusion. Mild lateral and medial joint line pain. No other tenderness to palpation    Full range of motion, negative McMurrays    Stable to varus and valgus stress    Negative Lachmans, negative posterior drawer    Negative patellar tilt, negative patellar laxity, no J-sign present    EHL/FHL/TA/GS 5/5    Sensation intact L3-S1    Brisk distal capillary refill    IMAGING:  Plain Radiographs: AP, lateral, and sunrise views of right knee reviewed and demonstrate no acute osseous abnormality and mild degenerative change with mild medial joint space narrowing and signs of " patellofemoral arthritis    MRI: Right knee MRI demonstrates intact cruciate's, no obvious meniscal pathology, mild degenerative changes of the medial compartment and and moderate patellofemoral changes    ASSESSMENT:  1. 50 year old female with right knee arthritis, primarily patellofemoral    PLAN:  1. Will provide steroid injection today  2. Continue with therapy and NSAIDs if desired  3. Can follow up as needed if symptoms fail to improve or change    Audie Rao MD  Fellow, Sports Medicine & Shoulder  [facility]        Patient seen and examined with the resident.     Assesment: Right knee PF OA and early MC OA    Plan: Will try an injection to right knee, I think this could quiet knee down and make more comfortable, patient to do home exercise program.    After written informed consent obtained and signed, after sufficient prepping and sterile technique, 40 mg of kenalog and , 8 cc of 1% lidocaine were injected without complication into the Right knee. The patient tolerated the injection well and a sterile dressing was applied.    I agree with history, physical and imaging as well as the assessment and plan as detailed by Dr. Rao.       Large Joint Injection/Arthocentesis: R knee joint    Date/Time: 10/12/2020 10:58 AM  Performed by: Julio Espitia MD  Authorized by: Julio Espitia MD     Indications:  Pain  Needle Size:  22 G  Guidance: landmark guided    Approach:  Anterolateral  Location:  Knee      Medications:  40 mg triamcinolone 40 MG/ML; 8 mL lidocaine (PF) 0.5 %  Procedure discussed: discussed risks, benefits, and alternatives    Consent Given by:  Patient  Timeout: timeout called immediately prior to procedure    Prep: patient was prepped and draped in usual sterile fashion         Again, thank you for allowing me to participate in the care of your patient.        Sincerely,        Julio Espitia MD

## 2020-10-15 ENCOUNTER — TELEPHONE (OUTPATIENT)
Dept: ORTHOPEDICS | Facility: CLINIC | Age: 50
End: 2020-10-15

## 2020-10-15 ENCOUNTER — DOCUMENTATION ONLY (OUTPATIENT)
Dept: CARE COORDINATION | Facility: CLINIC | Age: 50
End: 2020-10-15

## 2020-10-15 NOTE — TELEPHONE ENCOUNTER
[facility] Call Center    Phone Message    May a detailed message be left on voicemail: yes     Reason for Call: Other: Patient's date of surgery is 10/20 and she still hasn't received a call to schedule the covid test yet.     Action Taken: Message routed to:  [facility] ([facility] Orthopedic    Travel Screening: Not Applicable

## 2020-10-15 NOTE — TELEPHONE ENCOUNTER
Spoke with patient.  Gave her Covid test scheduling phone number, also called the test number to have them reach out to her.    She has forms for [employer] for work, informed her of the correct phone and fax number and sent message to [employer] to watch for paperwork.  Cynthia Gayle RN

## 2020-10-16 DIAGNOSIS — Z11.59 ENCOUNTER FOR SCREENING FOR OTHER VIRAL DISEASES: ICD-10-CM

## 2020-10-16 PROCEDURE — U0003 INFECTIOUS AGENT DETECTION BY NUCLEIC ACID (DNA OR RNA); SEVERE ACUTE RESPIRATORY SYNDROME CORONAVIRUS 2 (SARS-COV-2) (CORONAVIRUS DISEASE [COVID-19]), AMPLIFIED PROBE TECHNIQUE, MAKING USE OF HIGH THROUGHPUT TECHNOLOGIES AS DESCRIBED BY CMS-2020-01-R: HCPCS | Performed by: ORTHOPAEDIC SURGERY

## 2020-10-17 LAB
SARS-COV-2 RNA SPEC QL NAA+PROBE: NOT DETECTED
SPECIMEN SOURCE: NORMAL

## 2020-10-20 ENCOUNTER — HOSPITAL ENCOUNTER (OUTPATIENT)
Facility: AMBULATORY SURGERY CENTER | Age: 50
Discharge: HOME | End: 2020-10-20
Attending: ORTHOPAEDIC SURGERY | Admitting: ORTHOPAEDIC SURGERY
Payer: COMMERCIAL

## 2020-10-20 ENCOUNTER — DOCUMENTATION ONLY (OUTPATIENT)
Dept: ORTHOPEDICS | Facility: CLINIC | Age: 50
End: 2020-10-20

## 2020-10-20 ENCOUNTER — DOCUMENTATION ONLY (OUTPATIENT)
Dept: CARE COORDINATION | Facility: CLINIC | Age: 50
End: 2020-10-20

## 2020-10-20 VITALS
DIASTOLIC BLOOD PRESSURE: 62 MMHG | BODY MASS INDEX: 29.99 KG/M2 | OXYGEN SATURATION: 95 % | HEIGHT: 65 IN | SYSTOLIC BLOOD PRESSURE: 109 MMHG | TEMPERATURE: 97.5 F | HEART RATE: 74 BPM | RESPIRATION RATE: 14 BRPM | WEIGHT: 180 LBS

## 2020-10-20 DIAGNOSIS — G56.01 CARPAL TUNNEL SYNDROME OF RIGHT WRIST: Primary | ICD-10-CM

## 2020-10-20 PROCEDURE — 64721 CARPAL TUNNEL SURGERY: CPT | Mod: RT | Performed by: ORTHOPAEDIC SURGERY

## 2020-10-20 PROCEDURE — 64721 CARPAL TUNNEL SURGERY: CPT | Mod: RT

## 2020-10-20 RX ORDER — ACETAMINOPHEN 325 MG/1
650 TABLET ORAL EVERY 4 HOURS PRN
Qty: 50 TABLET | Refills: 0 | Status: SHIPPED | OUTPATIENT
Start: 2020-10-20 | End: 2021-03-11

## 2020-10-20 RX ORDER — BACITRACIN ZINC 500 [USP'U]/G
OINTMENT TOPICAL PRN
Status: DISCONTINUED | OUTPATIENT
Start: 2020-10-20 | End: 2020-10-20 | Stop reason: HOSPADM

## 2020-10-20 RX ORDER — LIDOCAINE HYDROCHLORIDE AND EPINEPHRINE 10; 10 MG/ML; UG/ML
10 INJECTION, SOLUTION INFILTRATION; PERINEURAL ONCE
Status: COMPLETED | OUTPATIENT
Start: 2020-10-20 | End: 2020-10-20

## 2020-10-20 RX ORDER — IBUPROFEN 600 MG/1
600 TABLET, FILM COATED ORAL EVERY 6 HOURS PRN
Qty: 30 TABLET | Refills: 0 | Status: SHIPPED | OUTPATIENT
Start: 2020-10-20 | End: 2021-03-11

## 2020-10-20 ASSESSMENT — MIFFLIN-ST. JEOR: SCORE: 1427.35

## 2020-10-20 NOTE — PROGRESS NOTES
Completed patient's [place] medical certification form and faxed copy to [phone]. Also completed [facility] short term disability forms and faxed copy to [phone]. Copies were sent to medical records for scanning. Patient was informed via email.

## 2020-10-20 NOTE — OP NOTE
Procedure Date: 10/20/2020      PREOPERATIVE DIAGNOSIS:  Right carpal tunnel syndrome.      POSTOPERATIVE DIAGNOSIS:  Right carpal tunnel syndrome.      PROCEDURE:  Right carpal tunnel release.      SURGEON:  Rickey Rea MD      ASSISTANT: Elliot Ag MD, PGY-4     ANESTHESIA:  Local anesthesia only.      HISTORY OF PRESENT ILLNESS AND INDICATIONS:  Ms. Jackson is a 50-year-old female with right carpal tunnel syndrome.  She presents today for a right carpal tunnel release.  We discussed the risks and benefits.  All questions were answered:  Operative consents were signed.     PROCEDURE NOTE:  After correct identification of the side and site, a preoperative local anesthetic block was performed in the pre-op holding area with lidocaine and epinephrine into the skin and subcutaneous tissues around the right carpal tunnel.  The patient was taken to the OR and the right hand was placed on a hand table.  Tourniquet was placed on the right forearm but was not inflated.  The hand was prepped and draped in standard sterile fashion.  A 2 cm incision was made over the palm in line with the radial aspect of the ring finger extending proximally from Ellison cardinal line.  The soft tissues were dissected down through the palmar fascia and to the transverse carpal ligament.  This was identified and then released completely from distal to proximal under direct vision.  The distal forearm antebrachial fascia was likewise released under direct vision.  Median nerve was hyperemic after release.  There were no masses within the carpal tunnel.  The flexor tendons themselves appeared to be within normal limits.  The wound was irrigated copiously and then closed with subcuticular 5-0 Monocryl.  Right hand was placed in a soft sterile bandage.  Blood loss approximately 5 mL and she was taken to the recovery room in stable condition.      Dr. Rea was present and scrubbed for the entirety of the procedure.     Elliot  MD Ancelmo  Orthopaedic Surgery PGY-4  #: [phone]

## 2020-10-20 NOTE — DISCHARGE INSTRUCTIONS
[facility]  Home Care Following Your Procedure  Call a doctor if you have signs of infection (fever, growing tenderness at the surgery site, a large amount of drainage or bleeding, severe pain, foul-smelling drainage, redness, swelling).         Tylenol/Acetaminophen Consumption  To help encourage the safe use of acetaminophen, the makers of TYLENOL  have lowered the maximum daily dose for single-ingredient Extra Strength TYLENOL  (acetaminophen) products sold in the [place] from 8 pills per day (4,000 mg) to 6 pills per day (3,000 mg). The dosing interval has also changed from 2 pills every 4-6 hours to 2 pills every 6 hours.    If you feel your pain relief is insufficient, you may take Tylenol/Acetaminophen in addition to your narcotic pain medication.     Be careful not to exceed 3,000 mg of Tylenol/Acetaminophen in a 24 hour period from all sources.    If you are taking extra strength Tylenol/acetaminophen (500 mg), the maximum dose is 6 tablets in 24 hours.    If you are taking regular strength acetaminophen (325 mg), the maximum dose is 9 tablets in 24 hours.  Your doctor is:  Dr. Rickey Rea, Orthopaedics: [phone]                                    Or dial [phone] and ask for the resident on call for:  Orthopaedics  For emergency care, call the:  [place]:  [phone] (TTY for hearing impaired: [phone])

## 2020-10-30 ENCOUNTER — OFFICE VISIT (OUTPATIENT)
Dept: ORTHOPEDICS | Facility: CLINIC | Age: 50
End: 2020-10-30
Payer: COMMERCIAL

## 2020-10-30 DIAGNOSIS — G56.01 CARPAL TUNNEL SYNDROME OF RIGHT WRIST: Primary | ICD-10-CM

## 2020-10-30 PROCEDURE — 99024 POSTOP FOLLOW-UP VISIT: CPT | Performed by: ORTHOPAEDIC SURGERY

## 2020-10-30 NOTE — NURSING NOTE
Reason For Visit:   Chief Complaint   Patient presents with     RECHECK     DOS 10/23/2020 Right CTR       Primary MD: Bj Butler    Age: 50 year old    ?  No      LMP 07/28/2017       Pain Assessment  Patient Currently in Pain: Yes  0-10 Pain Scale: 2(Increases with use)  Primary Pain Location: Hand(Right)               QuickDASH Assessment  QuickDASH Main 2/23/2018   1.Open a tight or new jar. Moderate difficulty   2. Do heavy household chores (e.g., wash walls, floors) Moderate difficulty   3. Carry a shopping bag or briefcase. Mild difficulty   4. Wash your back. No difficulty   5. Use a knife to cut food. Mild difficulty   6. Recreational activities in which you take some force or impact through your arm, shoulder or hand (e.g., golf, hammering, tennis, etc.). Moderate difficulty   7. During the past week, to what extent has your arm, shoulder or hand problem interfered with your normal social activities with family, friends, neighbours or groups? Slightly   8. During the past week, were you limited in your work or other regular daily activities as a result of your arm, shoulder or hand problem? Moderately limited   9. Arm, shoulder or hand pain. Moderate   10.Tingling (pins and needles) in your arm,shoulder or hand. Mild   11. During the past week, how much difficulty have you had sleeping because of the pain in your arm, shoulder or hand? ([place] number) Moderate difficulty   Quickdash Ability Score 36.36          Current Outpatient Medications   Medication Sig Dispense Refill     acetaminophen (TYLENOL) 325 MG tablet Take 2 tablets (650 mg) by mouth every 4 hours as needed for mild pain 50 tablet 0     Cholecalciferol (VITAMIN D) 2000 UNITS CAPS Take 1 capsule by mouth daily       fluticasone-salmeterol (ADVAIR) 100-50 MCG/DOSE inhaler Inhale 1 puff into the lungs 2 times daily 3 Inhaler 0     ibuprofen (ADVIL/MOTRIN) 600 MG tablet Take 1 tablet (600 mg) by mouth every 6 hours as needed for  other (mild and/or inflammatory pain) 30 tablet 0     ivabradine 5 MG PO tablet Take 1 tablet (5 mg) by mouth 2 times daily (with meals) 180 tablet 4     levalbuterol (XOPENEX HFA) 45 MCG/ACT inhaler Inhale 1-2 puffs into the lungs every 4 hours as needed for shortness of breath / dyspnea Need appointment in clinic for further refills. 1 Inhaler 0     OMEPRAZOLE PO Take 20 mg by mouth every morning       order for DME Equipment being ordered: hinged knee brace, Large 1 Device 0     order for DME Equipment being ordered: wrist splint with thumb support 1 Device 0       Allergies   Allergen Reactions     Penicillins Swelling     Swelling throat; age 4     Tetracycline GI Disturbance     Other reaction(s): Abdominal Pain  Vomiting; nauseous  Other reaction(s): Abdominal Pain  Vomiting; nauseous       Alee Romeo, ATC

## 2020-10-30 NOTE — PROGRESS NOTES
Follow-up right carpal tunnel release.  Doing well.  No further numbness or tingling in the right hand.  No further night pain.  Some pain over the small finger, ulnar aspect of the base of her hand.  Some without with gripping and grasping.  No fevers or chills.  Ongoing symptoms in the left hand, numbness and tingling in the median nerve distribution.    The past medical history was reviewed and documented in the chart.  This includes medications, surgeries, social history as well as review of systems.    Physical examination the right hand demonstrates a well-healed incision, no signs of any infection.  Tinel's and Phalen's are negative now.  These remain positive on the contralateral left hand.  Does have some tenderness over the hypothenar eminence.  No tenderness over the thenar eminence.  Bilaterally, no motor, no sensory deficits are noted.  No significant atrophy.  There is brisk capillary refill in all digits and a palpable radial pulse.  No overlying skin changes noted.    Impression: Status post right carpal tunnel release, left carpal tunnel syndrome, right hand mild pillar pain    Plan: We will have her see therapy for her right hand.  She was given that referral today.  We have already planned on a left carpal tunnel release next Friday.  Arrangements have been made.  We again discussed the risk and benefits and anticipated perioperative course.

## 2020-10-31 RX ORDER — LIDOCAINE HYDROCHLORIDE AND EPINEPHRINE 10; 10 MG/ML; UG/ML
10 INJECTION, SOLUTION INFILTRATION; PERINEURAL ONCE
OUTPATIENT
Start: 2020-11-06

## 2020-11-03 DIAGNOSIS — Z11.59 ENCOUNTER FOR SCREENING FOR OTHER VIRAL DISEASES: ICD-10-CM

## 2020-11-03 PROCEDURE — U0003 INFECTIOUS AGENT DETECTION BY NUCLEIC ACID (DNA OR RNA); SEVERE ACUTE RESPIRATORY SYNDROME CORONAVIRUS 2 (SARS-COV-2) (CORONAVIRUS DISEASE [COVID-19]), AMPLIFIED PROBE TECHNIQUE, MAKING USE OF HIGH THROUGHPUT TECHNOLOGIES AS DESCRIBED BY CMS-2020-01-R: HCPCS | Performed by: ORTHOPAEDIC SURGERY

## 2020-11-04 LAB
SARS-COV-2 RNA SPEC QL NAA+PROBE: NOT DETECTED
SPECIMEN SOURCE: NORMAL

## 2020-11-06 ENCOUNTER — HOSPITAL ENCOUNTER (OUTPATIENT)
Facility: AMBULATORY SURGERY CENTER | Age: 50
Discharge: HOME | End: 2020-11-06
Attending: ORTHOPAEDIC SURGERY | Admitting: ORTHOPAEDIC SURGERY
Payer: COMMERCIAL

## 2020-11-06 VITALS
RESPIRATION RATE: 16 BRPM | HEART RATE: 70 BPM | DIASTOLIC BLOOD PRESSURE: 75 MMHG | TEMPERATURE: 97.7 F | HEIGHT: 65 IN | SYSTOLIC BLOOD PRESSURE: 125 MMHG | WEIGHT: 180 LBS | BODY MASS INDEX: 29.99 KG/M2 | OXYGEN SATURATION: 98 %

## 2020-11-06 DIAGNOSIS — G56.02 CARPAL TUNNEL SYNDROME OF LEFT WRIST: Primary | ICD-10-CM

## 2020-11-06 PROCEDURE — 64721 CARPAL TUNNEL SURGERY: CPT | Mod: LT | Performed by: ORTHOPAEDIC SURGERY

## 2020-11-06 PROCEDURE — 64721 CARPAL TUNNEL SURGERY: CPT

## 2020-11-06 RX ORDER — LIDOCAINE HYDROCHLORIDE AND EPINEPHRINE 10; 10 MG/ML; UG/ML
10 INJECTION, SOLUTION INFILTRATION; PERINEURAL ONCE
Status: COMPLETED | OUTPATIENT
Start: 2020-11-06 | End: 2020-11-06

## 2020-11-06 RX ORDER — LIDOCAINE HYDROCHLORIDE AND EPINEPHRINE 10; 10 MG/ML; UG/ML
INJECTION, SOLUTION INFILTRATION; PERINEURAL PRN
Status: DISCONTINUED | OUTPATIENT
Start: 2020-11-06 | End: 2020-11-06 | Stop reason: HOSPADM

## 2020-11-06 RX ADMIN — LIDOCAINE HYDROCHLORIDE AND EPINEPHRINE 10 ML: 10; 10 INJECTION, SOLUTION INFILTRATION; PERINEURAL at 12:58

## 2020-11-06 ASSESSMENT — MIFFLIN-ST. JEOR: SCORE: 1427.35

## 2020-11-06 NOTE — OP NOTE
Procedure Date: 11/06/2020      DATE OF PROCEDURE:  11/06/2020      PREOPERATIVE DIAGNOSIS:  Left carpal tunnel syndrome.      POSTOPERATIVE DIAGNOSIS:  Left carpal tunnel syndrome.      PROCEDURE:  Left carpal tunnel release.      SURGEON:  Breanne Rea MD      ASSISTANT:  Whitney Lazaro MD, PGY-4      ANESTHESIA:  Local.      HISTORY OF PRESENT ILLNESS AND INDICATIONS:  The patient is a 50-year-old female with a history of bilateral carpal tunnel syndrome.  She is status post a recent right carpal tunnel release and doing well from that standpoint.  She presents today for a left carpal tunnel release.  We discussed the risks and benefits.  All questions were answered, and operative consents were signed.      PROCEDURE NOTE:  The patient was anesthetized over the carpal tunnel area with 1% lidocaine with epinephrine in the preoperative holding area.  She was taken to the OR, placed supine on an OR table.  Left hand was placed on a hand table.  Tourniquet was placed on the left forearm.  Left hand was prepped and draped in a standard sterile fashion.  A 1.5 cm incision was made in line with the radial aspect of the ring finger.  The soft tissues were dissected down to the palmar fascia, through the palmar fascia and to the transverse carpal ligament.  This was identified and then released from its midportion distally completely and then proximally completely under direct vision as well.  The distal forearm antebrachial fascia was likewise released under direct vision.  There were no masses within the carpal tunnel.  The flexor tendons appeared to be within normal limits.  No masses were noted.  The wound was irrigated copiously.  The skin was closed with subcuticular 5-0 Monocryl.  The hand was placed in a soft sterile bandage.      BLOOD LOSS:  5 mL.      COMPLICATIONS:  No complications.      She was taken to recovery room in stable condition.         BREANNE REA MD             D: 11/06/2020   T:  2020   MT: al      Name:     HUSEYIN MENDIOLA   MRN:      [id number]        Account:        [id number]   :      1970           Procedure Date: 2020      Document: [id number]

## 2020-11-06 NOTE — DISCHARGE INSTRUCTIONS
[facility]  Home Care Following Your Procedure  Call a doctor if you have signs of infection (fever, growing tenderness at the surgery site, a large amount of drainage or bleeding, severe pain, foul-smelling drainage, redness, swelling).         Tylenol/Acetaminophen Consumption  To help encourage the safe use of acetaminophen, the makers of TYLENOL  have lowered the maximum daily dose for single-ingredient Extra Strength TYLENOL  (acetaminophen) products sold in the [place] from 8 pills per day (4,000 mg) to 6 pills per day (3,000 mg). The dosing interval has also changed from 2 pills every 4-6 hours to 2 pills every 6 hours.    If you feel your pain relief is insufficient, you may take Tylenol/Acetaminophen in addition to your narcotic pain medication.     Be careful not to exceed 3,000 mg of Tylenol/Acetaminophen in a 24 hour period from all sources.    If you are taking extra strength Tylenol/acetaminophen (500 mg), the maximum dose is 6 tablets in 24 hours.    If you are taking regular strength acetaminophen (325 mg), the maximum dose is 9 tablets in 24 hours.    Your doctor is:  Dr. Rickey Rea, Orthopaedics: [phone]                                    Or dial [phone] and ask for the resident on call for:  Orthopaedics  For emergency care, call the:  [place]:  [phone] (TTY for hearing impaired: [phone])

## 2020-11-09 ENCOUNTER — THERAPY VISIT (OUTPATIENT)
Dept: OCCUPATIONAL THERAPY | Facility: CLINIC | Age: 50
End: 2020-11-09
Attending: ORTHOPAEDIC SURGERY
Payer: COMMERCIAL

## 2020-11-09 DIAGNOSIS — G56.02 CARPAL TUNNEL SYNDROME OF LEFT WRIST: ICD-10-CM

## 2020-11-09 DIAGNOSIS — M79.642 BILATERAL HAND PAIN: Primary | ICD-10-CM

## 2020-11-09 DIAGNOSIS — M79.641 BILATERAL HAND PAIN: Primary | ICD-10-CM

## 2020-11-09 DIAGNOSIS — G56.01 CARPAL TUNNEL SYNDROME OF RIGHT WRIST: ICD-10-CM

## 2020-11-09 PROCEDURE — 97140 MANUAL THERAPY 1/> REGIONS: CPT | Mod: GO | Performed by: OCCUPATIONAL THERAPIST

## 2020-11-09 PROCEDURE — 97165 OT EVAL LOW COMPLEX 30 MIN: CPT | Mod: GO | Performed by: OCCUPATIONAL THERAPIST

## 2020-11-09 PROCEDURE — 97110 THERAPEUTIC EXERCISES: CPT | Mod: GO | Performed by: OCCUPATIONAL THERAPIST

## 2020-11-09 NOTE — PROGRESS NOTES
Hand Therapy Initial Evaluation    Current Date:  11/9/2020    Subjective:  Laura Jackson is a 50 year old right hand dominant female.    Diagnosis:  Bilateral CTS R > L  DOS:  10/20/2020 right  Post:  2w 6d  DOS: 11/6/2020 left  Post:  0w 3d  Procedure:  Bilateral CTR    Patient reports symptoms of pain, stiffness/loss of motion, weakness/loss of strength, edema, numbness and tingling of bilateral hands which occurred due to gradual onset over the past 15 years. Since onset symptoms are gradually getting better since surgery.  Special tests:  EMG.  Previous treatment: cortisone injection right hand with temporary relief and night splints.  General health as reported by patient is good.  Pertinent medical history includes:Asthma, tachycardia, EDS/hypermobility.  Medical allergies:see list in EMR.  Surgical history: orthopedic: B CTR, other: hysterectomy, tonsils.  Medication history: Anti-inflammatory, Pain.    Occupational Profile Information:  Current occupation is RN  Currently not working due to present treatment problem, RTW 12/18/2020  Job Tasks: Lifting, Carrying, Prolonged Standing, Pushing, Pulling  Prior functional level:  independent-shared household chores  Barriers include:none  Mobility: No difficulty  Transportation: drives    Functional Outcome Measure:  Upper Extremity Functional Index  SCORE:   Column Totals: 24/80  (A lower score indicates greater disability.)    Objective:  Pain Level (Scale 0-10)   11/9/2020   At Rest 0-2   With Use 5-6     Pain Description  Date 11/9/2020   Location Wrists and palms   Pain Quality Burning and Sharp   Frequency intermittent     Pain is worst  daytime or nighttime   Exacerbated by  overuse   Relieved by rest   Progression Gradually improving     Edema (Circumference measured in cm)  Mild right volar wrist  Left wrist not assessed, still bandaged     Scar   Right:  Sensitivity: mild   Quality:  Moderately adherent     Left:  Still bandaged    Sensation    Decreased Median Nerve distribution per pt report has resolved bilateral hands since surgery    ROM  Pain Report: - none  + mild    ++ moderate    +++ severe   Note: Left wrist ROM not assessed, still bandaged after surgery   Wrist 11/9/2020   AROM (PROM) Right   Extension 65   Flexion 70   RD 15   UD 30     Strength   Contraindicated bilaterally     Assessment:  Patient presents with symptoms consistent with diagnosis of bilateral hand Carpal Tunnel Syndrome, with surgical intervention.    Patient's limitations or Problem List includes:  Pain, Decreased ROM/motion, Increased edema, Weakness, Sensory disturbance, Adherent scarring, Decreased  and Decreased pinch of bilateral hands which interferes with the patient's ability to perform Self Care Tasks (dressing, eating, bathing), Work Tasks, Sleep Patterns, Recreational Activities, Household Chores and Driving  as compared to previous level of function.    Rehab Potential:  Excellent - Return to full activity, no limitations    Patient will benefit from skilled Occupational Therapy to increase ROM, flexibility, overall strength,  strength, pinch strength and sensation and decrease pain, edema and adherence of scarring to return to previous activity level and resume normal daily tasks and to reach their rehab potential.    Barriers to Learning:  No barrier    Communication Issues:  Patient appears to be able to clearly communicate and understand verbal and written communication and follow directions correctly.    Chart Review: Chart Review and Simple history review with patient    Identified Performance Deficits: bathing/showering, dressing, hygiene and grooming, driving and community mobility, home establishment and management, meal preparation and cleanup, sleep, work and leisure activities    Assessment of Occupational Performance:  5 or more Performance Deficits    Clinical Decision Making (Complexity): Low complexity    Treatment Explanation:  The  following has been discussed with the patient:    RX ordered/plan of care  Anticipated outcomes  Possible risks and side effects    Plan:  Frequency:  1 X week, once daily  Duration:  for 6 weeks    Treatment Plan:    Modalities:    US   Therapeutic Exercise:    AROM, PROM, Tendon Gliding, Isotonics, Isometrics and Stabilization  Neuromuscular re-ed:   Nerve Gliding, Desensitization and Kinesiotaping  Manual Techniques:   Scar mobilization  Self Care:    Self Care Tasks and Ergonomic Considerations    Discharge Plan:    Achieve all LTG.  Independent in home treatment program.  Reach maximal therapeutic benefit.    Home Exercise Program:  Warmth for stiffness  Scar massage, circular and 3 vector (right, add left as indicated)  Scar pad with tubigrip sleeve sleeping (right only)  AROM wrist E/F and R/U  Tendon gliding with 3 fists and FDS  Distal median nerve gliding  Consider use of OTC wrist splints sleeping as needed for comfort    Next Visit:  See in 1 week  Progress to  and pinch strengthening at 4 weeks post   Consider US and scar pad for scar softening

## 2020-11-13 ENCOUNTER — OFFICE VISIT (OUTPATIENT)
Dept: ORTHOPEDICS | Facility: CLINIC | Age: 50
End: 2020-11-13
Payer: COMMERCIAL

## 2020-11-13 DIAGNOSIS — G56.01 CARPAL TUNNEL SYNDROME OF RIGHT WRIST: Primary | ICD-10-CM

## 2020-11-13 PROCEDURE — 99024 POSTOP FOLLOW-UP VISIT: CPT | Mod: GC | Performed by: ORTHOPAEDIC SURGERY

## 2020-11-13 NOTE — LETTER
11/13/2020    RE: Laura Jackson  [address]  [place] [address]    Dear Colleague,    Thank you for referring your patient, Laura Jackson, to the [facility] [place]. Please see a copy of my visit note below.    Orthopedic Note    Patient is following up 1 week s/p left carpal tunnel release and 3 weeks s/p right carpal tunnel release Doing well today. Denies pain. Has been working with therapy for hand ROM which is very helpful. She does state she has ongoing redness at the right incision.    On examination of the left wrist incision is healing.  No redness or drainage.  No paresthesias in the median radial or ulnar nerve distributions.  Patient is able to fully make a fist and fully extend her fingers.  Examination of the right wrist with mild erythema around the incision with small palpable nodules, likely where the Monocryl subcutaneous sutures were located.  There is also a small amount of faint erythema proximal to the volar wrist crease.  Able to fully make a fist and extend her fingers.  Sensation intact diffusely throughout the hand.  She does exhibit pillar pain through the left thenar eminence.    Assessment/plan: This is a 50-year-old female 3 weeks status post right open carpal tunnel release in 1 week status post left open carpal tunnel release.  She is doing very well.  She should continue working with physical therapy.  She will return to work 6 weeks after her left-sided surgery.  She can follow-up with us as needed for further questions or concerns.  Patient agreeable with the plan.    Patient seen and discussed with Dr. Rea who agrees with above assessment and plan.    Whitney Lazaro MD  Orthopedic Surgery, PGY4    Patient was seen and examined with the resident.  I agree with the assessment and plan of care.        Rickey Rea MD

## 2020-11-13 NOTE — NURSING NOTE
Reason For Visit:   Chief Complaint   Patient presents with     RECHECK     DOS 10/23/2020 Right CTR, 11/6/2020 Left CTR       Primary MD: Bj Butler    Age: 50 year old    ?  No      LMP 07/28/2017       Pain Assessment  Patient Currently in Pain: Yes  0-10 Pain Scale: 2  Primary Pain Location: Hand(Left)               QuickDASH Assessment  QuickDASH Main 2/23/2018   1.Open a tight or new jar. Moderate difficulty   2. Do heavy household chores (e.g., wash walls, floors) Moderate difficulty   3. Carry a shopping bag or briefcase. Mild difficulty   4. Wash your back. No difficulty   5. Use a knife to cut food. Mild difficulty   6. Recreational activities in which you take some force or impact through your arm, shoulder or hand (e.g., golf, hammering, tennis, etc.). Moderate difficulty   7. During the past week, to what extent has your arm, shoulder or hand problem interfered with your normal social activities with family, friends, neighbours or groups? Slightly   8. During the past week, were you limited in your work or other regular daily activities as a result of your arm, shoulder or hand problem? Moderately limited   9. Arm, shoulder or hand pain. Moderate   10.Tingling (pins and needles) in your arm,shoulder or hand. Mild   11. During the past week, how much difficulty have you had sleeping because of the pain in your arm, shoulder or hand? ([place] number) Moderate difficulty   Quickdash Ability Score 36.36          Current Outpatient Medications   Medication Sig Dispense Refill     acetaminophen (TYLENOL) 325 MG tablet Take 2 tablets (650 mg) by mouth every 4 hours as needed for mild pain 50 tablet 0     Cholecalciferol (VITAMIN D) 2000 UNITS CAPS Take 1 capsule by mouth daily       fluticasone-salmeterol (ADVAIR) 100-50 MCG/DOSE inhaler Inhale 1 puff into the lungs 2 times daily 3 Inhaler 0     ibuprofen (ADVIL/MOTRIN) 600 MG tablet Take 1 tablet (600 mg) by mouth every 6 hours as needed for  other (mild and/or inflammatory pain) 30 tablet 0     ivabradine 5 MG PO tablet Take 1 tablet (5 mg) by mouth 2 times daily (with meals) 180 tablet 4     levalbuterol (XOPENEX HFA) 45 MCG/ACT inhaler Inhale 1-2 puffs into the lungs every 4 hours as needed for shortness of breath / dyspnea Need appointment in clinic for further refills. 1 Inhaler 0     OMEPRAZOLE PO Take 20 mg by mouth every morning       order for DME Equipment being ordered: hinged knee brace, Large 1 Device 0     order for DME Equipment being ordered: wrist splint with thumb support 1 Device 0       Allergies   Allergen Reactions     Penicillins Swelling     Swelling throat; age 4     Tetracycline GI Disturbance     Other reaction(s): Abdominal Pain  Vomiting; nauseous  Other reaction(s): Abdominal Pain  Vomiting; nauseous       Alee Romeo, ATC

## 2020-11-13 NOTE — PROGRESS NOTES
Orthopedic Note    Patient is following up 1 week s/p left carpal tunnel release and 3 weeks s/p right carpal tunnel release Doing well today. Denies pain. Has been working with therapy for hand ROM which is very helpful. She does state she has ongoing redness at the right incision.    On examination of the left wrist incision is healing.  No redness or drainage.  No paresthesias in the median radial or ulnar nerve distributions.  Patient is able to fully make a fist and fully extend her fingers.  Examination of the right wrist with mild erythema around the incision with small palpable nodules, likely where the Monocryl subcutaneous sutures were located.  There is also a small amount of faint erythema proximal to the volar wrist crease.  Able to fully make a fist and extend her fingers.  Sensation intact diffusely throughout the hand.  She does exhibit pillar pain through the left thenar eminence.    Assessment/plan: This is a 50-year-old female 3 weeks status post right open carpal tunnel release in 1 week status post left open carpal tunnel release.  She is doing very well.  She should continue working with physical therapy.  She will return to work 6 weeks after her left-sided surgery.  She can follow-up with us as needed for further questions or concerns.  Patient agreeable with the plan.    Patient seen and discussed with Dr. Rea who agrees with above assessment and plan.    Whitney Lazaro MD  Orthopedic Surgery, PGY4    Patient was seen and examined with the resident.  I agree with the assessment and plan of care.

## 2020-11-16 ENCOUNTER — THERAPY VISIT (OUTPATIENT)
Dept: OCCUPATIONAL THERAPY | Facility: CLINIC | Age: 50
End: 2020-11-16
Payer: COMMERCIAL

## 2020-11-16 ENCOUNTER — HEALTH MAINTENANCE LETTER (OUTPATIENT)
Age: 50
End: 2020-11-16

## 2020-11-16 DIAGNOSIS — M79.642 BILATERAL HAND PAIN: ICD-10-CM

## 2020-11-16 DIAGNOSIS — G56.02 CARPAL TUNNEL SYNDROME OF LEFT WRIST: ICD-10-CM

## 2020-11-16 DIAGNOSIS — M79.641 BILATERAL HAND PAIN: ICD-10-CM

## 2020-11-16 DIAGNOSIS — G56.01 CARPAL TUNNEL SYNDROME OF RIGHT WRIST: ICD-10-CM

## 2020-11-16 PROCEDURE — 97140 MANUAL THERAPY 1/> REGIONS: CPT | Mod: GO | Performed by: OCCUPATIONAL THERAPIST

## 2020-11-16 PROCEDURE — 97110 THERAPEUTIC EXERCISES: CPT | Mod: GO | Performed by: OCCUPATIONAL THERAPIST

## 2020-11-16 NOTE — PROGRESS NOTES
SOAP note objective information for 11/16/2020:    Diagnosis:  Bilateral CTS R > L  DOS:  10/20/2020 right  Post:  4w 0d  DOS: 11/6/2020 left  Post:  1w 3d  Procedure:  Bilateral CTR    Pain Level (Scale 0-10)   11/9/2020 11/16/2020   At Rest 0-2 1-2   With Use 5-6 4     ROM  Wrist 11/9/2020 11/16/2020   AROM (PROM) Right Right   Extension 65 65   Flexion 70 75   RD 15    UD 30      Wrist 11/16/2020   AROM (PROM) Left   Extension 70   Flexion 65   RD 25   UD 40     Strength   (Measured in pounds)  Pain Report: - none  + mild    ++ moderate    +++ severe    11/16/2020 11/16/2020   Trials Left Right   1  2  3 Contra-  indicated 18+  12+  12+   Average  14     Lat Pinch 11/16/2020 11/16/2020   Trials Left Right   1  2  3 Contra-  indicated 5+  6+  7+   Average  6     3 Pt Pinch 11/16/2020 11/16/2020   Trials Left Right   1  2  3 Contra-  indicated 6+  5+  5+   Average  5     Home Exercise Program:  Warmth for stiffness  Scar massage, circular and 3 vector (right, add left as able)  Scar pad with tubigrip sleeve sleeping (right, add left as able)  AROM wrist E/F and R/U  Tendon gliding with 3 fists and FDS  Distal median nerve gliding   and pinch strengthening (right only)  Consider use of OTC wrist splints sleeping as needed for comfort    Next Visit:  See in 1 week  Assess response to  and pinch strengthening for right, add for left at 4 weeks post  Consider US for scar softening as indicated    Please refer to the daily flowsheet for treatment today, total treatment time and time spent performing 1:1 timed codes.

## 2020-11-23 ENCOUNTER — THERAPY VISIT (OUTPATIENT)
Dept: OCCUPATIONAL THERAPY | Facility: CLINIC | Age: 50
End: 2020-11-23
Payer: COMMERCIAL

## 2020-11-23 DIAGNOSIS — G56.01 CARPAL TUNNEL SYNDROME OF RIGHT WRIST: ICD-10-CM

## 2020-11-23 DIAGNOSIS — M79.641 BILATERAL HAND PAIN: ICD-10-CM

## 2020-11-23 DIAGNOSIS — G56.02 CARPAL TUNNEL SYNDROME OF LEFT WRIST: ICD-10-CM

## 2020-11-23 DIAGNOSIS — M79.642 BILATERAL HAND PAIN: ICD-10-CM

## 2020-11-23 PROCEDURE — 97110 THERAPEUTIC EXERCISES: CPT | Mod: GO | Performed by: OCCUPATIONAL THERAPIST

## 2020-11-23 PROCEDURE — 97140 MANUAL THERAPY 1/> REGIONS: CPT | Mod: GO | Performed by: OCCUPATIONAL THERAPIST

## 2020-11-23 PROCEDURE — 97035 APP MDLTY 1+ULTRASOUND EA 15: CPT | Mod: GO | Performed by: OCCUPATIONAL THERAPIST

## 2020-11-23 NOTE — PROGRESS NOTES
SOAP note objective information for 11/23/2020:    Diagnosis:  Bilateral CTS R > L  DOS:  10/20/2020 right  Post:  4w 6d  DOS: 11/6/2020 left  Post:  2w 3d  Procedure:  Bilateral CTR    Pain Level (Scale 0-10)   11/9/2020 11/16/2020 11/23/2020   At Rest 0-2 1-2    With Use 5-6 4 6-7     ROM  Wrist 11/9/2020 11/16/2020 11/23/2020   AROM (PROM) Right Right Right   Extension 65 65 65   Flexion 70 75 70   RD 15     UD 30       Wrist 11/16/2020 11/23/2020   AROM (PROM) Left Left   Extension 70 70   Flexion 65 70   RD 25    UD 40      Strength   (Measured in pounds)  Pain Report: - none  + mild    ++ moderate    +++ severe    11/16/2020 11/16/2020   Trials Left Right   1  2  3 Contra-  indicated 18+  12+  12+   Average  14     Lat Pinch 11/16/2020 11/16/2020   Trials Left Right   1  2  3 Contra-  indicated 5+  6+  7+   Average  6     3 Pt Pinch 11/16/2020 11/16/2020   Trials Left Right   1  2  3 Contra-  indicated 6+  5+  5+   Average  5     Home Exercise Program:  Warmth for stiffness  Scar massage, circular and 3 vector (right, add left as able)  Scar pad with tubigrip sleeve sleeping (right, add left as able)  AROM wrist E/F and R/U  Tendon gliding with 3 fists and FDS  Distal median nerve gliding   and pinch strengthening (right only)  Consider use of OTC wrist splints sleeping as needed for comfort    Next Visit:  See in 1 week  Add  and pinch strengthening for left at 4 weeks post  Continue US for scar softening for right wrist, add left as indicated     Please refer to the daily flowsheet for treatment today, total treatment time and time spent performing 1:1 timed codes.

## 2020-11-30 ENCOUNTER — THERAPY VISIT (OUTPATIENT)
Dept: OCCUPATIONAL THERAPY | Facility: CLINIC | Age: 50
End: 2020-11-30
Payer: COMMERCIAL

## 2020-11-30 DIAGNOSIS — G56.02 CARPAL TUNNEL SYNDROME OF LEFT WRIST: ICD-10-CM

## 2020-11-30 DIAGNOSIS — G56.01 CARPAL TUNNEL SYNDROME OF RIGHT WRIST: ICD-10-CM

## 2020-11-30 DIAGNOSIS — M79.642 BILATERAL HAND PAIN: ICD-10-CM

## 2020-11-30 DIAGNOSIS — M79.641 BILATERAL HAND PAIN: ICD-10-CM

## 2020-11-30 PROCEDURE — 97140 MANUAL THERAPY 1/> REGIONS: CPT | Mod: GO | Performed by: OCCUPATIONAL THERAPIST

## 2020-11-30 PROCEDURE — 97110 THERAPEUTIC EXERCISES: CPT | Mod: GO | Performed by: OCCUPATIONAL THERAPIST

## 2020-11-30 PROCEDURE — 97035 APP MDLTY 1+ULTRASOUND EA 15: CPT | Mod: GO | Performed by: OCCUPATIONAL THERAPIST

## 2020-11-30 NOTE — PROGRESS NOTES
SOAP note objective information for 11/30/2020:    Diagnosis:  Bilateral CTS R > L  DOS:  10/20/2020 right  Post:  5w 6d  DOS: 11/6/2020 left  Post:  3w 3d  Procedure:  Bilateral CTR    Pain Level (Scale 0-10)   11/9/2020 11/16/2020 11/23/2020 11/30/2020   At Rest 0-2 1-2  2   With Use 5-6 4 6-7 6-7     ROM  Wrist 11/9/2020 11/16/2020 11/23/2020   AROM (PROM) Right Right Right   Extension 65 65 65   Flexion 70 75 70   RD 15     UD 30       Wrist 11/16/2020 11/23/2020   AROM (PROM) Left Left   Extension 70 70   Flexion 65 70   RD 25    UD 40      Strength   (Measured in pounds)  Pain Report: - none  + mild    ++ moderate    +++ severe    11/16/2020 11/16/2020   Trials Left Right   1  2  3 Contra-  indicated 18+  12+  12+   Average  14     Lat Pinch 11/16/2020 11/16/2020   Trials Left Right   1  2  3 Contra-  indicated 5+  6+  7+   Average  6     3 Pt Pinch 11/16/2020 11/16/2020   Trials Left Right   1  2  3 Contra-  indicated 6+  5+  5+   Average  5     Home Exercise Program:  Warmth for stiffness  Scar massage, circular and 3 vector (use dycem)  Scar pad with tubigrip sleeve sleeping  AROM wrist E/F and R/U  Tendon gliding with 3 fists and FDS  Distal median nerve gliding   and pinch strengthening (right only, add left in 4 days at 4 weeks post)  Consider use of OTC wrist splints sleeping as needed for comfort    Next Visit:  See in 1 week   Assess response to  and pinch strengthening for left and use of dycem for scar mobs  Continue US for scar softening for bilateral wrists    Please refer to the daily flowsheet for treatment today, total treatment time and time spent performing 1:1 timed codes.

## 2020-12-07 ENCOUNTER — THERAPY VISIT (OUTPATIENT)
Dept: OCCUPATIONAL THERAPY | Facility: CLINIC | Age: 50
End: 2020-12-07
Payer: COMMERCIAL

## 2020-12-07 DIAGNOSIS — G56.01 CARPAL TUNNEL SYNDROME OF RIGHT WRIST: ICD-10-CM

## 2020-12-07 DIAGNOSIS — M79.641 BILATERAL HAND PAIN: ICD-10-CM

## 2020-12-07 DIAGNOSIS — G56.02 CARPAL TUNNEL SYNDROME OF LEFT WRIST: ICD-10-CM

## 2020-12-07 DIAGNOSIS — M79.642 BILATERAL HAND PAIN: ICD-10-CM

## 2020-12-07 PROCEDURE — 97140 MANUAL THERAPY 1/> REGIONS: CPT | Mod: GO | Performed by: OCCUPATIONAL THERAPIST

## 2020-12-07 PROCEDURE — 97035 APP MDLTY 1+ULTRASOUND EA 15: CPT | Mod: GO | Performed by: OCCUPATIONAL THERAPIST

## 2020-12-07 PROCEDURE — 97110 THERAPEUTIC EXERCISES: CPT | Mod: GO | Performed by: OCCUPATIONAL THERAPIST

## 2020-12-07 NOTE — PROGRESS NOTES
SOAP note objective information for 12/7/2020:    Diagnosis:  Bilateral CTS R > L  DOS:  10/20/2020 right  Post:  6w 6d  DOS: 11/6/2020 left  Post:  4w 3d  Procedure:  Bilateral CTR    Pain Level (Scale 0-10)   11/9/2020 11/16/2020 11/23/2020 11/30/2020 12/7/2020   At Rest 0-2 1-2  2 2   With Use 5-6 4 6-7 6-7 5-6     ROM  Wrist 11/9/2020 11/16/2020 11/23/2020   AROM (PROM) Right Right Right   Extension 65 65 65   Flexion 70 75 70   RD 15     UD 30       Wrist 11/16/2020 11/23/2020   AROM (PROM) Left Left   Extension 70 70   Flexion 65 70   RD 25    UD 40      Strength   (Measured in pounds)  Pain Report: - none  + mild    ++ moderate    +++ severe    11/16/2020 11/16/2020 12/7/2020 12/7/2020   Trials Left Right Left Right   1  2  3 Contra-  indicated 18+  12+  12+ 24+  18+  16+ 27+  22+  25+   Average  14 19 25     Lat Pinch 11/16/2020 11/16/2020 12/7/2020 12/7/2020   Trials Left Right Left Right   1  2  3 Contra-  indicated 5+  6+  7+ 7  8  8 6  5  7   Average  6 8 6     3 Pt Pinch 11/16/2020 11/16/2020 12/7/2020 12/7/2020   Trials Left Right Left Ritht   1  2  3 Contra-  indicated 6+  5+  5+ 7+  6+  5+ 6+  5+  5+   Average  5 6 5     Home Exercise Program:  Warmth for stiffness  Scar massage, circular and 3 vector (use dycem)  Textures for desensitization   Scar pad with tubigrip sleeve sleeping  AROM wrist E/F and R/U  Tendon gliding with 3 fists and FDS  Distal median nerve gliding   and pinch strengthening bilaterally   Consider use of OTC wrist splints sleeping as needed for comfort    Next Visit:  See in 1 week   Assess response to desensitization   Continue US for scar softening for bilateral wrists  Progress Report and UEFI to determine POC    Please refer to the daily flowsheet for treatment today, total treatment time and time spent performing 1:1 timed codes.

## 2020-12-14 ENCOUNTER — THERAPY VISIT (OUTPATIENT)
Dept: OCCUPATIONAL THERAPY | Facility: CLINIC | Age: 50
End: 2020-12-14
Payer: COMMERCIAL

## 2020-12-14 ENCOUNTER — TELEPHONE (OUTPATIENT)
Dept: ORTHOPEDICS | Facility: CLINIC | Age: 50
End: 2020-12-14

## 2020-12-14 DIAGNOSIS — M79.641 BILATERAL HAND PAIN: ICD-10-CM

## 2020-12-14 DIAGNOSIS — G56.01 CARPAL TUNNEL SYNDROME OF RIGHT WRIST: ICD-10-CM

## 2020-12-14 DIAGNOSIS — M79.642 BILATERAL HAND PAIN: ICD-10-CM

## 2020-12-14 DIAGNOSIS — G56.02 CARPAL TUNNEL SYNDROME OF LEFT WRIST: ICD-10-CM

## 2020-12-14 PROCEDURE — 97035 APP MDLTY 1+ULTRASOUND EA 15: CPT | Mod: GO | Performed by: OCCUPATIONAL THERAPIST

## 2020-12-14 PROCEDURE — 97140 MANUAL THERAPY 1/> REGIONS: CPT | Mod: GO | Performed by: OCCUPATIONAL THERAPIST

## 2020-12-14 PROCEDURE — 97110 THERAPEUTIC EXERCISES: CPT | Mod: GO | Performed by: OCCUPATIONAL THERAPIST

## 2020-12-14 NOTE — TELEPHONE ENCOUNTER
Patient had bilateral carpal tunnel releases.  Right side was approximately 8 wks ago and just now starting to feel normal in last few days.  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  Her MARIA L will  Friday this week  but she is requesting to extend this and go back on the .  Her left palm is still swollen, scar , wakes early AM and can't sleep due to  pain, but it is much improved.  Takes Tylenol every night and it helps her with pain through night. She is healing but slowly.  Her hand numbness and tingling is resolved so she is happy.  Going to hand therapy.   Message to Dr Rea to approve additional time off, then will have Allyson M update forms. Patient agrees. Cynthia Gayle RN

## 2020-12-14 NOTE — PROGRESS NOTES
Hand Therapy Progress Note    Current Date:  12/14/2020    Reporting period is 11/9/2020 to 12/14/2020    Diagnosis:  Bilateral CTS R > L  DOS:  10/20/2020 right  Post:  7w 6d  DOS: 11/6/2020 left  Post:  5w 3d  Procedure:  Bilateral CTR    Subjective:   Subjective changes noted by patient:  The right hand is finally feeling better, the left is still inflamed and tender and I don't feel ready to RTW.  Functional changes noted by patient:  No Change to Household Chores  Patient has noted adverse reaction to:  None    Objective:  Pain Level (Scale 0-10)   11/9/2020 12/14/2020   At Rest 0-2 2 right  3-4 left   With Use 5-6 4-5 right  7-8 left     Pain Description  Date 11/9/2020 12/14/2020   Location Wrists and palms wrists   Pain Quality Burning and Sharp Aching, burning and sharp   Frequency intermittent   Constant ache, intermittent sharp pain   Pain is worst  daytime or nighttime Daytime and nighttime   Exacerbated by  overuse overuse   Relieved by rest Rest, NSAID's   Progression Gradually improving Slowly improving     Edema (Circumference measured in cm)  Mild right volar wrist  Moderate left volar wrist     Scar   Right:  Sensitivity: Mild   Quality:  Mild to moderately adherent     Left:  Sensitivity: Moderate   Quality:  Moderately adherent     Sensation   Decreased Median Nerve distribution per pt report has resolved bilateral hands since surgery    ROM  Pain Report: - none  + mild    ++ moderate    +++ severe   Wrist 11/9/2020 12/14/2020   AROM (PROM) Right Right   Extension 65 65   Flexion 70 75   RD 15 15   UD 30 35     Wrist 11/16/2020 12/14/2020   AROM (PROM) Left Left   Extension 70 75   Flexion 65 65   RD 25 25   UD 40 40     Strength   (Measured in pounds)  Pain Report: - none  + mild    ++ moderate    +++ severe    11/16/2020 11/16/2020 12/7/2020 12/7/2020   Trials Left Right Left Right   1  2  3 Contra-  indicated 18+  12+  12+ 24+  18+  16+ 27+  22+  25+   Average  14 19 25     Lat Pinch  11/16/2020 11/16/2020 12/7/2020 12/7/2020   Trials Left Right Left Right   1  2  3 Contra-  indicated 5+  6+  7+ 7  8  8 6  5  7   Average  6 8 6     3 Pt Pinch 11/16/2020 11/16/2020 12/7/2020 12/7/2020   Trials Left Right Left Ritht   1  2  3 Contra-  indicated 6+  5+  5+ 7+  6+  5+ 6+  5+  5+   Average  5 6 5     Please refer to the daily flowsheet for treatment provided today.     Assessment:  Response to therapy has been improvement to:  ROM of Wrist:  All Planes  Flexibility:  tendon gliding is improved and scars are less adherent right > left  Edema:  Decreased on observation volar wrists, right > left  Response to therapy has been lack of progress in:  Strength:   and pinch  Pain:  intensity of pain is decreased on right, increased on left    Overall Assessment:  Patient is progressing slowly and would benefit from continued therapy to achieve rehab potential  STG/LTG:  STGoals have been reviewed and no progress has been made;  see goal sheet for details and changes.  I have re-evaluated this patient and find that the nature, scope, duration and intensity of the therapy is appropriate for the medical condition of the patient.    Plan:  Frequency/Duration:  Recommend continuing with the current treatment plan. 1 X week, once daily  for 4 weeks    Recommendations for Continued Therapy  Treatment Plan:    Modalities:    US   Therapeutic Exercise:    AROM, PROM, Tendon Gliding, Isotonics, Isometrics and Stabilization  Neuromuscular re-ed:   Nerve Gliding, Desensitization and Kinesiotaping  Manual Techniques:   Scar mobilization  Self Care:    Self Care Tasks and Ergonomic Considerations    Home Exercise Program:  Warmth for stiffness  Scar massage, circular and 3 vector (use dycem)  Textures for desensitization   Scar pad with tubigrip sleeve sleeping  AROM wrist E/F and R/U  Tendon gliding with 3 fists and FDS  Distal median nerve gliding   and pinch strengthening bilaterally   Consider use of OTC wrist  splints sleeping as needed for comfort    Next Visit:  See in 1 week   Continue US for scar softening for bilateral wrists  Pt to contact MD to discuss extending work MARIA L

## 2020-12-16 NOTE — TELEPHONE ENCOUNTER
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  Information passed along to Allyson BANERJEE to request update of return to work dates.  Patient supposed to return to work this week and she cannot.  Patient informed via My Chart message. Cynthia Gayle RN

## 2020-12-17 NOTE — TELEPHONE ENCOUNTER
Per Allyson BANERJEE, paperwork for updated return to work dates were filled out and faxed to both [place] and [facility] today.  Patient informed. Cynthia Gayle RN

## 2020-12-21 ENCOUNTER — THERAPY VISIT (OUTPATIENT)
Dept: OCCUPATIONAL THERAPY | Facility: CLINIC | Age: 50
End: 2020-12-21
Payer: COMMERCIAL

## 2020-12-21 DIAGNOSIS — M79.642 BILATERAL HAND PAIN: Primary | ICD-10-CM

## 2020-12-21 DIAGNOSIS — G56.02 CARPAL TUNNEL SYNDROME OF LEFT WRIST: ICD-10-CM

## 2020-12-21 DIAGNOSIS — M79.641 BILATERAL HAND PAIN: Primary | ICD-10-CM

## 2020-12-21 DIAGNOSIS — G56.01 CARPAL TUNNEL SYNDROME OF RIGHT WRIST: ICD-10-CM

## 2020-12-21 PROCEDURE — 97035 APP MDLTY 1+ULTRASOUND EA 15: CPT | Mod: GO | Performed by: OCCUPATIONAL THERAPIST

## 2020-12-21 PROCEDURE — 97140 MANUAL THERAPY 1/> REGIONS: CPT | Mod: GO | Performed by: OCCUPATIONAL THERAPIST

## 2020-12-21 PROCEDURE — 97110 THERAPEUTIC EXERCISES: CPT | Mod: GO | Performed by: OCCUPATIONAL THERAPIST

## 2020-12-21 NOTE — PROGRESS NOTES
SOAP note objective information for 12/21/2020:    Diagnosis:  Bilateral CTS R > L  DOS:  10/20/2020 right  Post:  8w 6d  DOS: 11/6/2020 left  Post:  6w 3d  Procedure:  Bilateral CTR    Pain Level (Scale 0-10)   11/9/2020 12/14/2020 12/21/2020   At Rest 0-2 2 right  3-4 left 2-3 right  4 left   With Use 5-6 4-5 right  7-8 left 5 right  7 left     ROM  Pain Report: - none  + mild    ++ moderate    +++ severe   Wrist 11/9/2020 12/14/2020   AROM (PROM) Right Right   Extension 65 65   Flexion 70 75   RD 15 15   UD 30 35     Wrist 11/16/2020 12/14/2020   AROM (PROM) Left Left   Extension 70 75   Flexion 65 65   RD 25 25   UD 40 40     Strength   (Measured in pounds)  Pain Report: - none  + mild    ++ moderate    +++ severe    11/16/2020 11/16/2020 12/7/2020 12/7/2020   Trials Left Right Left Right   1  2  3 Contra-  indicated 18+  12+  12+ 24+  18+  16+ 27+  22+  25+   Average  14 19 25     Home Exercise Program:  Warmth for stiffness  Scar massage, circular and 3 vector (use dycem)  Textures for desensitization   Scar pad with tubigrip sleeve sleeping  AROM wrist E/F and R/U  Tendon gliding with 3 fists and FDS  Distal median nerve gliding   and pinch strengthening bilaterally   Wrist E/F isotonics   Consider use of OTC wrist splints sleeping as needed for comfort    Next Visit:  See in 1 week   Continue US for scar softening for bilateral wrists  Assess response to wrist isotonics     Please refer to the daily flowsheet for treatment today, total treatment time and time spent performing 1:1 timed codes.

## 2020-12-24 DIAGNOSIS — J45.30 MILD PERSISTENT ASTHMA WITHOUT COMPLICATION: ICD-10-CM

## 2020-12-24 NOTE — TELEPHONE ENCOUNTER
Requested Prescriptions   Pending Prescriptions Disp Refills     fluticasone-salmeterol (ADVAIR) 100-50 MCG/DOSE inhaler 3 Inhaler 0     Sig: Inhale 1 puff into the lungs 2 times daily       There is no refill protocol information for this order

## 2020-12-28 ENCOUNTER — THERAPY VISIT (OUTPATIENT)
Dept: OCCUPATIONAL THERAPY | Facility: CLINIC | Age: 50
End: 2020-12-28
Payer: COMMERCIAL

## 2020-12-28 DIAGNOSIS — G56.01 CARPAL TUNNEL SYNDROME OF RIGHT WRIST: ICD-10-CM

## 2020-12-28 DIAGNOSIS — M79.642 BILATERAL HAND PAIN: ICD-10-CM

## 2020-12-28 DIAGNOSIS — M79.641 BILATERAL HAND PAIN: ICD-10-CM

## 2020-12-28 DIAGNOSIS — G56.02 CARPAL TUNNEL SYNDROME OF LEFT WRIST: ICD-10-CM

## 2020-12-28 PROCEDURE — 97140 MANUAL THERAPY 1/> REGIONS: CPT | Mod: GO | Performed by: OCCUPATIONAL THERAPIST

## 2020-12-28 PROCEDURE — 97035 APP MDLTY 1+ULTRASOUND EA 15: CPT | Mod: GO | Performed by: OCCUPATIONAL THERAPIST

## 2020-12-28 PROCEDURE — 97110 THERAPEUTIC EXERCISES: CPT | Mod: GO | Performed by: OCCUPATIONAL THERAPIST

## 2020-12-28 NOTE — PROGRESS NOTES
SOAP note objective information for 12/28/2020:    Diagnosis:  Bilateral CTS R > L  DOS:  10/20/2020 right  Post:  9w 6d  DOS: 11/6/2020 left  Post:  7w 3d  Procedure:  Bilateral CTR    Pain Level (Scale 0-10)   11/9/2020 12/14/2020 12/21/2020 12/28/2020   At Rest 0-2 2 right  3-4 left 2-3 right  4 left 0-2 right  2 left   With Use 5-6 4-5 right  7-8 left 5 right  7 left 4 right  6 left     ROM  Pain Report: - none  + mild    ++ moderate    +++ severe   Wrist 11/9/2020 12/14/2020   AROM (PROM) Right Right   Extension 65 65   Flexion 70 75   RD 15 15   UD 30 35     Wrist 11/16/2020 12/14/2020   AROM (PROM) Left Left   Extension 70 75   Flexion 65 65   RD 25 25   UD 40 40     Strength   (Measured in pounds)  Pain Report: - none  + mild    ++ moderate    +++ severe    11/16/2020 11/16/2020 12/7/2020 12/7/2020   Trials Left Right Left Right   1  2  3 Contra-  indicated 18+  12+  12+ 24+  18+  16+ 27+  22+  25+   Average  14 19 25     Home Exercise Program:  Warmth for stiffness  Scar massage, circular and 3 vector (use dycem)  Textures for desensitization   Scar pad with tubigrip sleeve sleeping  AROM wrist E/F and R/U  Tendon gliding with 3 fists and FDS  Distal median nerve gliding   and pinch strengthening bilaterally   Wrist E/F isotonics   Consider use of OTC wrist splints sleeping as needed for comfort    Next Visit:  See 1 more visit next week prior to RTW  Continue US for scar softening for bilateral wrists  Progress Report and UEFI  Consider discharge to [facility]    Please refer to the daily flowsheet for treatment today, total treatment time and time spent performing 1:1 timed codes.

## 2020-12-29 ENCOUNTER — MYC MEDICAL ADVICE (OUTPATIENT)
Dept: FAMILY MEDICINE | Facility: CLINIC | Age: 50
End: 2020-12-29

## 2020-12-29 NOTE — TELEPHONE ENCOUNTER
Prescription approved per [facility] Refill Protocol.  Theresa Maldonado RN

## 2020-12-30 ASSESSMENT — ASTHMA QUESTIONNAIRES: ACT_TOTALSCORE: 22

## 2021-01-04 ENCOUNTER — THERAPY VISIT (OUTPATIENT)
Dept: OCCUPATIONAL THERAPY | Facility: CLINIC | Age: 51
End: 2021-01-04
Payer: COMMERCIAL

## 2021-01-04 DIAGNOSIS — M79.641 BILATERAL HAND PAIN: ICD-10-CM

## 2021-01-04 DIAGNOSIS — G56.01 CARPAL TUNNEL SYNDROME OF RIGHT WRIST: ICD-10-CM

## 2021-01-04 DIAGNOSIS — M79.642 BILATERAL HAND PAIN: ICD-10-CM

## 2021-01-04 DIAGNOSIS — G56.02 CARPAL TUNNEL SYNDROME OF LEFT WRIST: ICD-10-CM

## 2021-01-04 PROCEDURE — 97140 MANUAL THERAPY 1/> REGIONS: CPT | Mod: GO | Performed by: OCCUPATIONAL THERAPIST

## 2021-01-04 PROCEDURE — 97110 THERAPEUTIC EXERCISES: CPT | Mod: GO | Performed by: OCCUPATIONAL THERAPIST

## 2021-01-04 PROCEDURE — 97035 APP MDLTY 1+ULTRASOUND EA 15: CPT | Mod: GO | Performed by: OCCUPATIONAL THERAPIST

## 2021-01-04 NOTE — PROGRESS NOTES
Hand Therapy Progress Note    Current Date:  1/4/2021    Reporting period is 12/14/2020 to 1/4/2021    Diagnosis:  Bilateral CTS R > L  DOS:  10/20/2020 right  Post:  10w 6d  DOS: 11/6/2020 left  Post:  8w 3d  Procedure:  Bilateral CTR    Subjective:   Subjective changes noted by patient:  The right hand is feeling pretty good, the left is slowly improving, less constant pain.  Functional changes noted by patient:  Improvement in Household Chores  Patient has noted adverse reaction to:  None    Functional Outcome Measure:  Upper Extremity Functional Index  SCORE:   Column Totals: 63/80  (A lower score indicates greater disability.)    Objective:  Pain Level (Scale 0-10)   11/9/2020 12/14/2020 1/4/2021   At Rest 0-2 2 right  3-4 left 0 right  2-3 left   With Use 5-6 4-5 right  7-8 left 3 right  5 left     Pain Description  Date 11/9/2020 12/14/2020 1/4/2021   Location Wrists and palms wrists wrists   Pain Quality Burning and Sharp Aching, burning and sharp burning   Frequency intermittent   Constant ache, intermittent sharp pain intermittent   Pain is worst  daytime or nighttime Daytime and nighttime daytime   Exacerbated by  overuse overuse overuse   Relieved by rest Rest, NSAID's rest   Progression Gradually improving Slowly improving Slowly improving      Edema (Circumference measured in cm)  Mild right volar wrist  Mild to Moderate left volar wrist     Scar   Right:  Sensitivity: Mild   Quality:  Mild to moderately adherent     Left:  Sensitivity: Mild to moderate   Quality:  Moderately adherent     Sensation   Decreased Median Nerve distribution per pt report has resolved bilateral hands since surgery    ROM  Pain Report: - none  + mild    ++ moderate    +++ severe   Wrist 11/9/2020 12/14/2020 1/4/2021   AROM (PROM) Right Right Right   Extension 65 65 65   Flexion 70 75 75   RD 15 15 20   UD 30 35 40     Wrist 11/16/2020 12/14/2020 1/4/2021   AROM (PROM) Left Left Left   Extension 70 75 75   Flexion 65 65 70    RD 25 25 25   UD 40 40 40     Strength   (Measured in pounds)  Pain Report: - none  + mild    ++ moderate    +++ severe   Note: tools out for calibration, unable to reassess 1/4/21   11/16/2020 11/16/2020 12/7/2020 12/7/2020   Trials Left Right Left Right   1  2  3 Contra-  indicated 18+  12+  12+ 24+  18+  16+ 27+  22+  25+   Average  14 19 25     Lat Pinch 11/16/2020 11/16/2020 12/7/2020 12/7/2020   Trials Left Right Left Right   1  2  3 Contra-  indicated 5+  6+  7+ 7  8  8 6  5  7   Average  6 8 6     3 Pt Pinch 11/16/2020 11/16/2020 12/7/2020 12/7/2020   Trials Left Right Left Ritht   1  2  3 Contra-  indicated 6+  5+  5+ 7+  6+  5+ 6+  5+  5+   Average  5 6 5     Please refer to the daily flowsheet for treatment provided today.     Assessment:  Response to therapy has been improvement to:  ROM of Wrists:  All Planes  Flexibility:  tendon gliding is improved and scars are less adherent   Pain:  intensity of pain is decreased    Overall Assessment:  Patient is progressing well and is ready to decrease frequency of treatment in the clinic.  Patient would benefit from continued therapy to achieve rehab potential  STG/LTG:  STGoals have been reviewed and progress or achievement has occurred;  see goal sheet for details and updates.  I have re-evaluated this patient and find that the nature, scope, duration and intensity of the therapy is appropriate for the medical condition of the patient.    Plan:  Frequency/Duration:  Recommend continuing with the current treatment plan. 2 X a month, once daily  for 1month    Recommendations for Continued Therapy  Treatment Plan:    Modalities:    US   Therapeutic Exercise:    AROM, PROM, Tendon Gliding, Isotonics, Isometrics and Stabilization  Neuromuscular re-ed:   Nerve Gliding, Desensitization and Kinesiotaping  Manual Techniques:   Scar mobilization  Self Care:    Self Care Tasks and Ergonomic Considerations    Home Exercise Program:  Warmth for stiffness  Scar  massage, circular and 3 vector (use dycem)  Textures for desensitization   Scar pad with tubigrip sleeve sleeping  AROM wrist E/F and R/U  Tendon gliding with 3 fists and FDS  Distal median nerve gliding   and pinch strengthening bilaterally   Wrist E/F isotonics   Consider use of OTC wrist splints sleeping as needed for comfort    Next Visit:  See 1 more visit after RTW  Continue US for scar softening for bilateral wrists  Plan to discharge to [facility] if doing well

## 2021-01-05 ENCOUNTER — E-VISIT (OUTPATIENT)
Dept: URGENT CARE | Facility: CLINIC | Age: 51
End: 2021-01-05
Payer: COMMERCIAL

## 2021-01-05 DIAGNOSIS — R09.81 NASAL CONGESTION: ICD-10-CM

## 2021-01-05 DIAGNOSIS — Z20.822 SUSPECTED COVID-19 VIRUS INFECTION: ICD-10-CM

## 2021-01-05 PROCEDURE — 99421 OL DIG E/M SVC 5-10 MIN: CPT | Performed by: EMERGENCY MEDICINE

## 2021-01-11 ENCOUNTER — THERAPY VISIT (OUTPATIENT)
Dept: OCCUPATIONAL THERAPY | Facility: CLINIC | Age: 51
End: 2021-01-11
Payer: COMMERCIAL

## 2021-01-11 DIAGNOSIS — M79.642 BILATERAL HAND PAIN: ICD-10-CM

## 2021-01-11 DIAGNOSIS — G56.02 CARPAL TUNNEL SYNDROME OF LEFT WRIST: ICD-10-CM

## 2021-01-11 DIAGNOSIS — M79.641 BILATERAL HAND PAIN: ICD-10-CM

## 2021-01-11 DIAGNOSIS — G56.01 CARPAL TUNNEL SYNDROME OF RIGHT WRIST: ICD-10-CM

## 2021-01-11 PROCEDURE — 97140 MANUAL THERAPY 1/> REGIONS: CPT | Mod: GO | Performed by: OCCUPATIONAL THERAPIST

## 2021-01-11 PROCEDURE — 97110 THERAPEUTIC EXERCISES: CPT | Mod: GO | Performed by: OCCUPATIONAL THERAPIST

## 2021-01-11 PROCEDURE — 97035 APP MDLTY 1+ULTRASOUND EA 15: CPT | Mod: GO | Performed by: OCCUPATIONAL THERAPIST

## 2021-01-11 NOTE — PROGRESS NOTES
Hand Therapy Progress/Discharge Note    Current Date:  1/4/2021    Reporting period is 1/4/2021 to 1/11/2021    Diagnosis:  Bilateral CTS R > L  DOS:  10/20/2020 right  Post:  11w 6d  DOS: 11/6/2020 left  Post:  9w 3d  Procedure:  Bilateral CTR    Subjective:   Subjective changes noted by patient:  The right hand is feeling really good, the left is slowly improving, less constant pain.  Functional changes noted by patient:  Improvement in Household Chores  Patient has noted adverse reaction to:  None    Functional Outcome Measure:  Upper Extremity Functional Index  SCORE:   Column Totals: 63/80  (A lower score indicates greater disability.)    Objective:  Pain Level (Scale 0-10)   11/9/2020 12/14/2020 1/4/2021 1/11/2021   At Rest 0-2 2 right  3-4 left 0 right  2-3 left 0 right  0-2 left   With Use 5-6 4-5 right  7-8 left 3 right  5 left 2-3 right  4-5 left     Pain Description  Date 11/9/2020 12/14/2020 1/4/2021   Location Wrists and palms wrists wrists   Pain Quality Burning and Sharp Aching, burning and sharp burning   Frequency intermittent   Constant ache, intermittent sharp pain intermittent   Pain is worst  daytime or nighttime Daytime and nighttime daytime   Exacerbated by  overuse overuse overuse   Relieved by rest Rest, NSAID's rest   Progression Gradually improving Slowly improving Slowly improving      Edema (Circumference measured in cm)  Mild right volar wrist  Mild to Moderate left volar wrist     Scar   Right:  Sensitivity: Mild   Quality:  Mild to moderately adherent     Left:  Sensitivity: Mild to moderate   Quality:  Moderately adherent     Sensation   Decreased Median Nerve distribution per pt report has resolved bilateral hands since surgery    ROM  Pain Report: - none  + mild    ++ moderate    +++ severe   Wrist 11/9/2020 12/14/2020 1/4/2021   AROM (PROM) Right Right Right   Extension 65 65 65   Flexion 70 75 75   RD 15 15 20   UD 30 35 40     Wrist 11/16/2020 12/14/2020 1/4/2021   AROM (PROM)  Left Left Left   Extension 70 75 75   Flexion 65 65 70   RD 25 25 25   UD 40 40 40     Strength   (Measured in pounds)  Pain Report: - none  + mild    ++ moderate    +++ severe   Note: tools out for calibration, unable to reassess 1/4/21   11/16/2020 11/16/2020 12/7/2020 12/7/2020 1/11/2021 1/11/2021   Trials Left Right Left Right Left Right   1  2  3 Contra-  indicated 18+  12+  12+ 24+  18+  16+ 27+  22+  25+ 34  32  34 39  32  30   Average  14 19 25 33 34     Lat Pinch 11/16/2020 11/16/2020 12/7/2020 12/7/2020 1/11/2021 1/11/2021   Trials Left Right Left Right Left Right   1  2  3 Contra-  indicated 5+  6+  7+ 7  8  8 6  5  7 11  10  10 10  10  12   Average  6 8 6 10 11     3 Pt Pinch 11/16/2020 11/16/2020 12/7/2020 12/7/2020 1/11/2021 1/11/2020   Trials Left Right Left Right Left Right   1  2  3 Contra-  indicated 6+  5+  5+ 7+  6+  5+ 6+  5+  5+ 9  8  8 8  8  7   Average  5 6 5 8 8     Please refer to the daily flowsheet for treatment provided today.     Assessment:  Response to therapy has been improvement to:  ROM of Wrists:  All Planes  Flexibility:  tendon gliding is improved and scars are less adherent   Pain:  intensity of pain is decreased    Overall Assessment:  Patient is progressing well and is independent in home exercise program.  STG/LTG:  STGoals have been reviewed and progress or achievement has occurred;  see goal sheet for details and updates.  I have re-evaluated this patient and find that the nature, scope, duration and intensity of the therapy is appropriate for the medical condition of the patient.    Plan:  Frequency/Duration:  Discharge from Hand Therapy; continue home program.    Recommendations for Continued Therapy  Home Exercise Program:  Warmth for stiffness  Scar massage, circular and 3 vector (use dycem)  Textures for desensitization   Scar pad with tubigrip sleeve sleeping  AROM wrist E/F and R/U  Tendon gliding with 3 fists and FDS  Distal median nerve gliding   and pinch  strengthening bilaterally   Wrist E/F isotonics   Consider use of OTC wrist splints sleeping as needed for comfort

## 2021-02-02 ENCOUNTER — OFFICE VISIT (OUTPATIENT)
Dept: FAMILY MEDICINE | Facility: CLINIC | Age: 51
End: 2021-02-02
Payer: COMMERCIAL

## 2021-02-02 VITALS
SYSTOLIC BLOOD PRESSURE: 160 MMHG | HEART RATE: 72 BPM | DIASTOLIC BLOOD PRESSURE: 77 MMHG | HEIGHT: 64 IN | TEMPERATURE: 97.8 F | BODY MASS INDEX: 31.41 KG/M2 | WEIGHT: 184 LBS | OXYGEN SATURATION: 98 %

## 2021-02-02 DIAGNOSIS — H69.93 DYSFUNCTION OF BOTH EUSTACHIAN TUBES: Primary | ICD-10-CM

## 2021-02-02 PROCEDURE — 99214 OFFICE O/P EST MOD 30 MIN: CPT | Performed by: FAMILY MEDICINE

## 2021-02-02 RX ORDER — FLUTICASONE PROPIONATE 50 MCG
1 SPRAY, SUSPENSION (ML) NASAL DAILY
COMMUNITY
End: 2022-02-23

## 2021-02-02 RX ORDER — AZELASTINE 1 MG/ML
1 SPRAY, METERED NASAL 2 TIMES DAILY
Qty: 30 ML | Refills: 2 | Status: SHIPPED | OUTPATIENT
Start: 2021-02-02 | End: 2021-06-24

## 2021-02-02 RX ORDER — NEOMYCIN SULFATE, POLYMYXIN B SULFATE AND HYDROCORTISONE 10; 3.5; 1 MG/ML; MG/ML; [USP'U]/ML
3 SUSPENSION/ DROPS AURICULAR (OTIC) 3 TIMES DAILY
Qty: 5 ML | Refills: 0 | Status: SHIPPED | OUTPATIENT
Start: 2021-02-02 | End: 2021-02-12

## 2021-02-02 ASSESSMENT — MIFFLIN-ST. JEOR: SCORE: 1426.13

## 2021-02-02 ASSESSMENT — PAIN SCALES - GENERAL: PAINLEVEL: NO PAIN (0)

## 2021-02-02 NOTE — PROGRESS NOTES
"  Assessment & Plan       ICD-10-CM    1. Dysfunction of both eustachian tubes  H69.83 azelastine (ASTELIN) 0.1 % nasal spray     neomycin-polymyxin-hydrocortisone (CORTISPORIN) 3.5-17992-9 otic suspension     OTOLARYNGOLOGY REFERRAL             Return in about 2 weeks (around 2/16/2021) for With Specialist.    Rickey Sorto MD  [facility] BLANKA Sanchez is a 50 year old who presents to clinic today for the following health issues     HPI     Concern - Ear  Onset: 12/26/20  Description: Feels like a head cold, muffled sound, hearing is worse in the right ear  Intensity: moderate to severe  Progression of Symptoms:  same  Accompanying Signs & Symptoms: Floaters in the eye- going back to eye doctor on Friday to follow up, dizzy  Previous history of similar problem: No  Precipitating factors:        Worsened by: None  Alleviating factors:        Improved by: Flonase, laying down  Therapies tried and outcome: Claritin- slightly better      Dec 21's COVID vaccine  Dec 23rd started seeing flashes of light  Saw eye doctor - possible PVD w/ retraction  Dec 26th - right sided hearing deficit, no headache  Patient is an ICU nurse  4 days ago, spinning sensation when laying down, right sided muffled hearing  Started taking flonase along with claritin for about 10 days  Decongestants makes symptoms associated with clark worse    BP Readings from Last 6 Encounters:   02/16/21 (!) 143/88   02/09/21 131/87   02/02/21 (!) 160/77   11/06/20 125/75   10/20/20 109/62   10/08/20 120/86           Review of Systems   Constitutional, HEENT, cardiovascular, pulmonary, gi and gu systems are negative, except as otherwise noted.      Objective    BP (!) 160/77 (BP Location: Right arm, Patient Position: Chair, Cuff Size: Adult Regular)   Pulse 72   Temp 97.8  F (36.6  C) (Oral)   Ht 1.62 m (5' 3.78\")   Wt 83.5 kg (184 lb)   LMP 07/28/2017   SpO2 98%   BMI 31.80 kg/m    Body mass index is 31.8 " kg/m .  Physical Exam   GENERAL: healthy, alert and no distress  EYES: Eyes grossly normal to inspection, PERRL and conjunctivae and sclerae normal  HENT: ear canals and TM's normal, nose and mouth without ulcers or lesions  NECK: no adenopathy, no asymmetry, masses, or scars and thyroid normal to palpation  SKIN: no suspicious lesions or rashes  NEURO: Normal strength and tone, mentation intact and speech normal  PSYCH: mentation appears normal, affect normal/bright

## 2021-02-09 ENCOUNTER — OFFICE VISIT (OUTPATIENT)
Dept: OTOLARYNGOLOGY | Facility: CLINIC | Age: 51
End: 2021-02-09
Payer: COMMERCIAL

## 2021-02-09 ENCOUNTER — OFFICE VISIT (OUTPATIENT)
Dept: AUDIOLOGY | Facility: CLINIC | Age: 51
End: 2021-02-09
Payer: COMMERCIAL

## 2021-02-09 VITALS — SYSTOLIC BLOOD PRESSURE: 131 MMHG | DIASTOLIC BLOOD PRESSURE: 87 MMHG | OXYGEN SATURATION: 99 % | HEART RATE: 64 BPM

## 2021-02-09 DIAGNOSIS — R42 DIZZINESS: ICD-10-CM

## 2021-02-09 DIAGNOSIS — H90.3 SNHL (SENSORY-NEURAL HEARING LOSS), ASYMMETRICAL: Primary | ICD-10-CM

## 2021-02-09 PROCEDURE — 92550 TYMPANOMETRY & REFLEX THRESH: CPT | Performed by: AUDIOLOGIST

## 2021-02-09 PROCEDURE — 69801 INCISE INNER EAR: CPT | Mod: RT | Performed by: OTOLARYNGOLOGY

## 2021-02-09 PROCEDURE — 99203 OFFICE O/P NEW LOW 30 MIN: CPT | Mod: 25 | Performed by: OTOLARYNGOLOGY

## 2021-02-09 PROCEDURE — 92557 COMPREHENSIVE HEARING TEST: CPT | Performed by: AUDIOLOGIST

## 2021-02-09 PROCEDURE — 99207 PR NO CHARGE LOS: CPT | Performed by: AUDIOLOGIST

## 2021-02-09 NOTE — PROGRESS NOTES
AUDIOLOGY REPORT:    Patient was referred from ENT by Dr. Burnett for audiology evaluation. The patient reports ear pressure that started in late December and was not accompanied by any other sinus symptoms. She notes that this started a few days after her first dose of the COVID vaccine. The patient also reports an episode of vertigo 2 weeks ago, noting that it lasted 20-30 minutes until she went to sleep, and was better but not completely back to normal when she woke up. The patient also reports decreased hearing in the right ear. She reports intermittent bilateral tinnitus that she had before the dizziness and ear pressure but she notes that it has gotten worse. The patient notes that her symptoms are better when lying down. She reports a history of ear infections as a child and a family history of hearing loss.    Testing:    Otoscopy:   Otoscopic exam indicates ears are clear of cerumen bilaterally     Tympanograms:    RIGHT: normal eardrum mobility     LEFT:   normal eardrum mobility    Reflexes (reported by stimulus ear):  RIGHT: Ipsilateral is present at normal levels  RIGHT: Contralateral is absent at frequencies tested  LEFT:   Ipsilateral is present at normal levels  LEFT:   Contralateral is absent at frequencies tested    Thresholds:   Pure Tone Thresholds assessed using conventional audiometry with good reliability from 250-8000 Hz bilaterally using insert earphones and circumaural headphones     RIGHT:  normal hearing sensitivity through 500 Hz sloping to mild to moderate  sensorineural hearing loss    LEFT:    normal hearing sensitivity through 4000 Hz sloping to mild likely sensorineural hearing loss    Speech Reception Threshold:    RIGHT: 25 dB HL    LEFT:   10 dB HL  Results are in agreement with pure tone average.     Word Recognition Score:     RIGHT: 92% at 70 dB HL using NU-6 recorded word list.    LEFT:   96% at 55 dB HL using NU-6 recorded word list.    Discussed results with the patient.      Patient was returned to ENT for follow up.     Vernon Castañeda, CCC-A  Licensed Audiologist #[id number]  2/9/2021

## 2021-02-09 NOTE — PROGRESS NOTES
I am seeing this patient in consultation for dysfunction of both eustachian tubes at the request of the provider Dr. Rickey Chen.    Chief Complaint - Hearing loss    History of Present Illness - Laura Jackson is a 50 year old female who presents to me today with hearing loss in the right ear. About six weeks ago she she noted hearing loss with head pressure and right headache, she felt she was in a fog. Then about 10 days ago she got significant vertigo (it lasted for a few hours). She has intermittent head pressure still. The hearing loss is constant. The vertigo has gone away. No prior vertigo episodes. she has right tinnitus now. There is no history of chronic ear disease or ear surgery. She had ear infections as a child, no tubes. With regards to recreational, , and work-related noise exposure she denies. No family history of hearing loss at a young age. She tried ear drops, no help.     Past Medical History -   Patient Active Problem List   Diagnosis     CARDIOVASCULAR SCREENING; LDL GOAL LESS THAN 160     Irritable bowel syndrome     GERD (gastroesophageal reflux disease)     History of supraventricular tachycardia     Mild persistent asthma     Family history of breast cancer     Foreign body (FB) in soft tissue     S/P myomectomy     Nausea     Dehydration     S/P ANAID (total abdominal hysterectomy)     Hypovitaminosis D     Pelvic adhesions     Endometriosis     Heberden's nodes     POTS (postural orthostatic tachycardia syndrome)     Hypermobility syndrome     Cervicalgia     Autonomic dysfunction     Benign essential hypertension       Current Medications -   Current Outpatient Medications:      acetaminophen (TYLENOL) 325 MG tablet, Take 2 tablets (650 mg) by mouth every 4 hours as needed for mild pain, Disp: 50 tablet, Rfl: 0     azelastine (ASTELIN) 0.1 % nasal spray, Spray 1 spray into both nostrils 2 times daily, Disp: 30 mL, Rfl: 2     Cholecalciferol (VITAMIN D) 2000 UNITS CAPS,  Take 1 capsule by mouth daily, Disp: , Rfl:      fluticasone-salmeterol (ADVAIR) 100-50 MCG/DOSE inhaler, Inhale 1 puff into the lungs 2 times daily, Disp: 3 Inhaler, Rfl: 0     ibuprofen (ADVIL/MOTRIN) 600 MG tablet, Take 1 tablet (600 mg) by mouth every 6 hours as needed for other (mild and/or inflammatory pain), Disp: 30 tablet, Rfl: 0     ivabradine 5 MG PO tablet, Take 1 tablet (5 mg) by mouth 2 times daily (with meals), Disp: 180 tablet, Rfl: 4     levalbuterol (XOPENEX HFA) 45 MCG/ACT inhaler, Inhale 1-2 puffs into the lungs every 4 hours as needed for shortness of breath / dyspnea Need appointment in clinic for further refills., Disp: 1 Inhaler, Rfl: 0     neomycin-polymyxin-hydrocortisone (CORTISPORIN) 3.5-67171-9 otic suspension, Place 3 drops into the right ear 3 times daily for 10 days, Disp: 5 mL, Rfl: 0     OMEPRAZOLE PO, Take 20 mg by mouth every morning, Disp: , Rfl:      fluticasone (FLONASE) 50 MCG/ACT nasal spray, Spray 1 spray into both nostrils daily, Disp: , Rfl:      order for DME, Equipment being ordered: hinged knee brace, Large (Patient not taking: Reported on 2/2/2021), Disp: 1 Device, Rfl: 0     order for DME, Equipment being ordered: wrist splint with thumb support (Patient not taking: Reported on 2/2/2021), Disp: 1 Device, Rfl: 0    Current Facility-Administered Medications:      betamethasone acet & sod phos (CELESTONE) injection 6 mg, 6 mg, , , Castillo Latif MD, 6 mg at 08/08/19 1025     lidocaine (PF) 0.5 % injection SOLN 8 mL, 8 mL, , , Julio Espitia MD, 8 mL at 10/12/20 1058     ropivacaine (NAROPIN) injection 1 mL, 1 mL, , , Castillo Latif MD, 1 mL at 08/08/19 1025     triamcinolone (KENALOG-40) injection 20 mg, 20 mg, , , Carmine Ribeiro MD, 20 mg at 07/23/19 0901     triamcinolone (KENALOG-40) injection 40 mg, 40 mg, , , Julio Espitia MD, 40 mg at 10/12/20 1058    Allergies -   Allergies   Allergen Reactions     Penicillins Swelling      Swelling throat; age 4     Tetracycline GI Disturbance     Other reaction(s): Abdominal Pain  Vomiting; nauseous  Other reaction(s): Abdominal Pain  Vomiting; nauseous       Social History -   Social History     Socioeconomic History     Marital status:      Spouse name: Not on file     Number of children: 2     Years of education: Not on file     Highest education level: Not on file   Occupational History     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[employer]     Employer: [employer]   Social Needs     Financial resource strain: Not on file     Food insecurity     Worry: Not on file     Inability: Not on file     Transportation needs     Medical: Not on file     Non-medical: Not on file   Tobacco Use     Smoking status: Never Smoker     Smokeless tobacco: Never Used   Substance and Sexual Activity     Alcohol use: Yes     Comment: 1@ 3 months     Drug use: No     Sexual activity: Yes     Partners: Male     Birth control/protection: Male Surgical, Female Surgical   Lifestyle     Physical activity     Days per week: Not on file     Minutes per session: Not on file     Stress: Not on file   Relationships     Social connections     Talks on phone: Not on file     Gets together: Not on file     Attends Shinto service: Not on file     Active member of club or organization: Not on file     Attends meetings of clubs or organizations: Not on file     Relationship status: Not on file     Intimate partner violence     Fear of current or ex partner: Not on file     Emotionally abused: Not on file     Physically abused: Not on file     Forced sexual activity: Not on file   Other Topics Concern     Parent/sibling w/ CABG, MI or angioplasty before 65F 55M? No   Social History Narrative     Not on file       Family History -   Family History   Problem Relation Age of Onset     Diabetes Mother      Hypertension Mother      Hyperlipidemia Mother      Arrhythmia Mother      Cardiovascular Father         CHF and COPD      Asthma Father      Breast Cancer Maternal Grandfather      Colon Cancer Maternal Grandfather      Breast Cancer Paternal Grandmother      Breast Cancer Paternal Aunt      C.A.D. Paternal Grandfather      Breast Cancer Cousin      Asthma Son      Diabetes Maternal Grandmother      Hypertension Maternal Grandmother      Breast Cancer Cousin      Breast Cancer Cousin      Breast Cancer Cousin        Review of Systems - As per HPI and PMHx, otherwise 7 system review is negative.    Physical Exam  /87   Pulse 64   LMP 07/28/2017   SpO2 99%   General - The patient is in no distress.  Alert and oriented to person and place, answers questions and cooperates with examination appropriately.   Voice and Breathing - The patient was breathing comfortably without the use of accessory muscles. There was no wheezing, stridor, or stertor.  The patients voice was clear and strong.  Ears - The auricles are normal. The tympanic membranes are normal in appearance, bony landmarks are intact.  No retraction, perforation, or masses.  No fluid or purulence was seen in the external canal or the middle ear. No evidence of infection of the middle ear or external canal, cerumen was normal in appearance.  Eyes - Extraocular movements intact. PERRL. Sclera were not icteric or injected.  Mouth - Examination of the oral cavity showed pink, healthy mucosa. No lesions or ulcerations noted.  The tongue was mobile and midline.  Throat - The walls of the oropharynx were smooth, symmetric, and had no lesions or ulcerations. The uvula was midline on elevation.    Neck - Soft, non-tender. Palpation of the occipital, submental, submandibular, internal jugular chain, and supraclavicular nodes did not demonstrate any abnormal lymph nodes or masses. Parotid glands had no masses. Palpation of the thyroid was soft and smooth, with no nodules or goiter appreciated.  The trachea was mobile and midline.  Neurological - Cranial nerves 2 through 12 were  grossly intact. House-Brackmann grade 1 out of 6 bilaterally. Finger-nose-finger normal. Normal gait.       Audiologic Studies - An audiogram and tympanogram were performed today as part of the evaluation and personally reviewed. The tympanogram shows a normal Type A curve, with normal canal volume and middle ear pressure.  There is no sign of eustachian tube dysfunction or middle ear effusion.  The audiogram showed a significant down-sloping mid to high high frequency sensorineural hearing loss on the right, asymmetric from the left which only shows mild sensorineural hearing loss in the high tones.    Procedure - I discussed the risks, benefits, and alternatives to right transtympanic dexamethasone injection including perforation, infection, and possible outcomes. Patient agrees and signed the consent. I placed the patient supine and used the otomicroscope. I used phenol on a small cotton wisp and applied this to the right tympanic membrane. After a good lydia, I used a spinal needle with dexamethasone (10 mg/ml) and injected 0.6 ml into the middle ear space. The patient laid supine for 25 minutes.       Assessment and Plan - Laura Jackson is a 50 year old female who presents to me today with sudden right-sided asymmetric sensorineural hearing loss. Started 6 weeks ago. Etiology is unknown. She has POTS and high dose steroids may significantly negatively effect her. I recommend a right-sided transtympanic dexamethasone injection. I also recommend MRI brain to rule-out retrocochlear pathology. She can consider a right hearing aid if this fails. Return in 1 week for audiogram.       Karri Burnett MD  Otolaryngology  [facility]

## 2021-02-09 NOTE — LETTER
2/9/2021         RE: Laura Jackson  [address]  [place] [address]        Dear Colleague,    Thank you for referring your patient, Laura Jackson, [facility]. Please see a copy of my visit note below.    See clinic note for earlier in the day for H&P and transtympanic dexamethasone injection.       Again, thank you for allowing me to participate in the care of your patient.        Sincerely,        Karri Burnett MD

## 2021-02-09 NOTE — LETTER
2/9/2021         RE: Laura Jackson  [address]  [place] [address]        Dear Colleague,    Thank you for referring your patient, Laura Jackson, to the [facility]. Please see a copy of my visit note below.    I am seeing this patient in consultation for dysfunction of both eustachian tubes at the request of the provider Dr. Rickey Chen.    Chief Complaint - Hearing loss    History of Present Illness - Laura Jackson is a 50 year old female who presents to me today with hearing loss in the right ear. About six weeks ago she she noted hearing loss with head pressure and right headache, she felt she was in a fog. Then about 10 days ago she got significant vertigo (it lasted for a few hours). She has intermittent head pressure still. The hearing loss is constant. The vertigo has gone away. No prior vertigo episodes. she has right tinnitus now. There is no history of chronic ear disease or ear surgery. She had ear infections as a child, no tubes. With regards to recreational, , and work-related noise exposure she denies. No family history of hearing loss at a young age. She tried ear drops, no help.     Past Medical History -   Patient Active Problem List   Diagnosis     CARDIOVASCULAR SCREENING; LDL GOAL LESS THAN 160     Irritable bowel syndrome     GERD (gastroesophageal reflux disease)     History of supraventricular tachycardia     Mild persistent asthma     Family history of breast cancer     Foreign body (FB) in soft tissue     S/P myomectomy     Nausea     Dehydration     S/P ANAID (total abdominal hysterectomy)     Hypovitaminosis D     Pelvic adhesions     Endometriosis     Heberden's nodes     POTS (postural orthostatic tachycardia syndrome)     Hypermobility syndrome     Cervicalgia     Autonomic dysfunction     Benign essential hypertension       Current Medications -   Current Outpatient Medications:      acetaminophen (TYLENOL) 325 MG tablet, Take 2  tablets (650 mg) by mouth every 4 hours as needed for mild pain, Disp: 50 tablet, Rfl: 0     azelastine (ASTELIN) 0.1 % nasal spray, Spray 1 spray into both nostrils 2 times daily, Disp: 30 mL, Rfl: 2     Cholecalciferol (VITAMIN D) 2000 UNITS CAPS, Take 1 capsule by mouth daily, Disp: , Rfl:      fluticasone-salmeterol (ADVAIR) 100-50 MCG/DOSE inhaler, Inhale 1 puff into the lungs 2 times daily, Disp: 3 Inhaler, Rfl: 0     ibuprofen (ADVIL/MOTRIN) 600 MG tablet, Take 1 tablet (600 mg) by mouth every 6 hours as needed for other (mild and/or inflammatory pain), Disp: 30 tablet, Rfl: 0     ivabradine 5 MG PO tablet, Take 1 tablet (5 mg) by mouth 2 times daily (with meals), Disp: 180 tablet, Rfl: 4     levalbuterol (XOPENEX HFA) 45 MCG/ACT inhaler, Inhale 1-2 puffs into the lungs every 4 hours as needed for shortness of breath / dyspnea Need appointment in clinic for further refills., Disp: 1 Inhaler, Rfl: 0     neomycin-polymyxin-hydrocortisone (CORTISPORIN) 3.5-61236-2 otic suspension, Place 3 drops into the right ear 3 times daily for 10 days, Disp: 5 mL, Rfl: 0     OMEPRAZOLE PO, Take 20 mg by mouth every morning, Disp: , Rfl:      fluticasone (FLONASE) 50 MCG/ACT nasal spray, Spray 1 spray into both nostrils daily, Disp: , Rfl:      order for DME, Equipment being ordered: hinged knee brace, Large (Patient not taking: Reported on 2/2/2021), Disp: 1 Device, Rfl: 0     order for DME, Equipment being ordered: wrist splint with thumb support (Patient not taking: Reported on 2/2/2021), Disp: 1 Device, Rfl: 0    Current Facility-Administered Medications:      betamethasone acet & sod phos (CELESTONE) injection 6 mg, 6 mg, , , Castillo Latif MD, 6 mg at 08/08/19 1025     lidocaine (PF) 0.5 % injection SOLN 8 mL, 8 mL, , , Julio Espitia MD, 8 mL at 10/12/20 1058     ropivacaine (NAROPIN) injection 1 mL, 1 mL, , , Castillo Latif MD, 1 mL at 08/08/19 1025     triamcinolone (KENALOG-40) injection 20  mg, 20 mg, , , Carmine Ribeiro MD, 20 mg at 07/23/19 0901     triamcinolone (KENALOG-40) injection 40 mg, 40 mg, , , Julio Espitia MD, 40 mg at 10/12/20 1058    Allergies -   Allergies   Allergen Reactions     Penicillins Swelling     Swelling throat; age 4     Tetracycline GI Disturbance     Other reaction(s): Abdominal Pain  Vomiting; nauseous  Other reaction(s): Abdominal Pain  Vomiting; nauseous       Social History -   Social History     Socioeconomic History     Marital status:      Spouse name: Not on file     Number of children: 2     Years of education: Not on file     Highest education level: Not on file   Occupational History     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[employer]     Employer: [employer]   Social Needs     Financial resource strain: Not on file     Food insecurity     Worry: Not on file     Inability: Not on file     Transportation needs     Medical: Not on file     Non-medical: Not on file   Tobacco Use     Smoking status: Never Smoker     Smokeless tobacco: Never Used   Substance and Sexual Activity     Alcohol use: Yes     Comment: 1@ 3 months     Drug use: No     Sexual activity: Yes     Partners: Male     Birth control/protection: Male Surgical, Female Surgical   Lifestyle     Physical activity     Days per week: Not on file     Minutes per session: Not on file     Stress: Not on file   Relationships     Social connections     Talks on phone: Not on file     Gets together: Not on file     Attends Rastafari service: Not on file     Active member of club or organization: Not on file     Attends meetings of clubs or organizations: Not on file     Relationship status: Not on file     Intimate partner violence     Fear of current or ex partner: Not on file     Emotionally abused: Not on file     Physically abused: Not on file     Forced sexual activity: Not on file   Other Topics Concern     Parent/sibling w/ CABG, MI or angioplasty before 65F 55M? No   Social  History Narrative     Not on file       Family History -   Family History   Problem Relation Age of Onset     Diabetes Mother      Hypertension Mother      Hyperlipidemia Mother      Arrhythmia Mother      Cardiovascular Father         CHF and COPD     Asthma Father      Breast Cancer Maternal Grandfather      Colon Cancer Maternal Grandfather      Breast Cancer Paternal Grandmother      Breast Cancer Paternal Aunt      C.A.D. Paternal Grandfather      Breast Cancer Cousin      Asthma Son      Diabetes Maternal Grandmother      Hypertension Maternal Grandmother      Breast Cancer Cousin      Breast Cancer Cousin      Breast Cancer Cousin        Review of Systems - As per HPI and PMHx, otherwise 7 system review is negative.    Physical Exam  /87   Pulse 64   LMP 07/28/2017   SpO2 99%   General - The patient is in no distress.  Alert and oriented to person and place, answers questions and cooperates with examination appropriately.   Voice and Breathing - The patient was breathing comfortably without the use of accessory muscles. There was no wheezing, stridor, or stertor.  The patients voice was clear and strong.  Ears - The auricles are normal. The tympanic membranes are normal in appearance, bony landmarks are intact.  No retraction, perforation, or masses.  No fluid or purulence was seen in the external canal or the middle ear. No evidence of infection of the middle ear or external canal, cerumen was normal in appearance.  Eyes - Extraocular movements intact. PERRL. Sclera were not icteric or injected.  Mouth - Examination of the oral cavity showed pink, healthy mucosa. No lesions or ulcerations noted.  The tongue was mobile and midline.  Throat - The walls of the oropharynx were smooth, symmetric, and had no lesions or ulcerations. The uvula was midline on elevation.    Neck - Soft, non-tender. Palpation of the occipital, submental, submandibular, internal jugular chain, and supraclavicular nodes did not  demonstrate any abnormal lymph nodes or masses. Parotid glands had no masses. Palpation of the thyroid was soft and smooth, with no nodules or goiter appreciated.  The trachea was mobile and midline.  Neurological - Cranial nerves 2 through 12 were grossly intact. House-Brackmann grade 1 out of 6 bilaterally. Finger-nose-finger normal. Normal gait.       Audiologic Studies - An audiogram and tympanogram were performed today as part of the evaluation and personally reviewed. The tympanogram shows a normal Type A curve, with normal canal volume and middle ear pressure.  There is no sign of eustachian tube dysfunction or middle ear effusion.  The audiogram showed a significant down-sloping mid to high high frequency sensorineural hearing loss on the right, asymmetric from the left which only shows mild sensorineural hearing loss in the high tones.    Procedure - I discussed the risks, benefits, and alternatives to right transtympanic dexamethasone injection including perforation, infection, and possible outcomes. Patient agrees and signed the consent. I placed the patient supine and used the otomicroscope. I used phenol on a small cotton wisp and applied this to the right tympanic membrane. After a good lydia, I used a spinal needle with dexamethasone (10 mg/ml) and injected 0.6 ml into the middle ear space. The patient laid supine for 25 minutes.       Assessment and Plan - Laura Jackson is a 50 year old female who presents to me today with sudden right-sided asymmetric sensorineural hearing loss. Started 6 weeks ago. Etiology is unknown. She has POTS and high dose steroids may significantly negatively effect her. I recommend a right-sided transtympanic dexamethasone injection. I also recommend MRI brain to rule-out retrocochlear pathology. She can consider a right hearing aid if this fails. Return in 1 week for audiogram.       Karri Burnett MD  Otolaryngology  [facility]          Again, thank you for  allowing me to participate in the care of your patient.        Sincerely,        Karri Burnett MD

## 2021-02-12 ENCOUNTER — HOSPITAL ENCOUNTER (OUTPATIENT)
Dept: MRI IMAGING | Facility: CLINIC | Age: 51
Discharge: HOME | End: 2021-02-12
Attending: OTOLARYNGOLOGY | Admitting: OTOLARYNGOLOGY
Payer: COMMERCIAL

## 2021-02-12 DIAGNOSIS — H90.3 SNHL (SENSORY-NEURAL HEARING LOSS), ASYMMETRICAL: ICD-10-CM

## 2021-02-12 PROCEDURE — 70553 MRI BRAIN STEM W/O & W/DYE: CPT

## 2021-02-12 PROCEDURE — 255N000002 HC RX 255 OP 636: Performed by: OTOLARYNGOLOGY

## 2021-02-12 PROCEDURE — A9585 GADOBUTROL INJECTION: HCPCS | Performed by: OTOLARYNGOLOGY

## 2021-02-12 RX ORDER — GADOBUTROL 604.72 MG/ML
8 INJECTION INTRAVENOUS ONCE
Status: COMPLETED | OUTPATIENT
Start: 2021-02-12 | End: 2021-02-12

## 2021-02-12 RX ADMIN — GADOBUTROL 8 ML: 604.72 INJECTION INTRAVENOUS at 09:21

## 2021-02-15 NOTE — PROGRESS NOTES
Chief Complaint - sudden sensorineural hearing loss recheck    History of Present Illness - Laura Jackson is a 50 year old female who returns to me today with hearing loss in the right ear. About 7 weeks ago she she noted hearing loss with head pressure and right headache. She felt she was in a fog. Then about 2.5 weeks ago she got significant vertigo (it lasted for a few hours). She has intermittent head pressure still. The hearing loss is constant. The vertigo has gone away. No prior vertigo episodes. There is no history of chronic ear disease or ear surgery. She had ear infections as a child, no tubes. With regards to recreational, , and work-related noise exposure she denies any. No family history of hearing loss at a young age. She tried ear drops, no help. I noted asymmetric right sensorineural hearing loss last week. She has POTS and she didn't want to try oral prednisone. Therefore, we proceeded with a right transtympanic dexamethasone injection. MRI brain was normal. She returns and notes head pressure is better. However, hearing is unchanged. She has bilateral ear crackling. Tinnitus comes and goes.     Past Medical History -   Patient Active Problem List   Diagnosis     CARDIOVASCULAR SCREENING; LDL GOAL LESS THAN 160     Irritable bowel syndrome     GERD (gastroesophageal reflux disease)     History of supraventricular tachycardia     Mild persistent asthma     Family history of breast cancer     Foreign body (FB) in soft tissue     S/P myomectomy     Nausea     Dehydration     S/P ANAID (total abdominal hysterectomy)     Hypovitaminosis D     Pelvic adhesions     Endometriosis     Heberden's nodes     POTS (postural orthostatic tachycardia syndrome)     Hypermobility syndrome     Cervicalgia     Autonomic dysfunction     Benign essential hypertension       Current Medications -   Current Outpatient Medications:      acetaminophen (TYLENOL) 325 MG tablet, Take 2 tablets (650 mg) by mouth every 4  hours as needed for mild pain, Disp: 50 tablet, Rfl: 0     azelastine (ASTELIN) 0.1 % nasal spray, Spray 1 spray into both nostrils 2 times daily, Disp: 30 mL, Rfl: 2     Cholecalciferol (VITAMIN D) 2000 UNITS CAPS, Take 1 capsule by mouth daily, Disp: , Rfl:      fluticasone (FLONASE) 50 MCG/ACT nasal spray, Spray 1 spray into both nostrils daily, Disp: , Rfl:      fluticasone-salmeterol (ADVAIR) 100-50 MCG/DOSE inhaler, Inhale 1 puff into the lungs 2 times daily, Disp: 3 Inhaler, Rfl: 0     ibuprofen (ADVIL/MOTRIN) 600 MG tablet, Take 1 tablet (600 mg) by mouth every 6 hours as needed for other (mild and/or inflammatory pain), Disp: 30 tablet, Rfl: 0     ivabradine 5 MG PO tablet, Take 1 tablet (5 mg) by mouth 2 times daily (with meals), Disp: 180 tablet, Rfl: 4     levalbuterol (XOPENEX HFA) 45 MCG/ACT inhaler, Inhale 1-2 puffs into the lungs every 4 hours as needed for shortness of breath / dyspnea Need appointment in clinic for further refills., Disp: 1 Inhaler, Rfl: 0     OMEPRAZOLE PO, Take 20 mg by mouth every morning, Disp: , Rfl:      order for DME, Equipment being ordered: hinged knee brace, Large (Patient not taking: Reported on 2/2/2021), Disp: 1 Device, Rfl: 0     order for DME, Equipment being ordered: wrist splint with thumb support (Patient not taking: Reported on 2/2/2021), Disp: 1 Device, Rfl: 0    Current Facility-Administered Medications:      betamethasone acet & sod phos (CELESTONE) injection 6 mg, 6 mg, , , Castillo Latif MD, 6 mg at 08/08/19 1025     lidocaine (PF) 0.5 % injection SOLN 8 mL, 8 mL, , , Julio Espitia MD, 8 mL at 10/12/20 1058     ropivacaine (NAROPIN) injection 1 mL, 1 mL, , , Castillo Latif MD, 1 mL at 08/08/19 1025     triamcinolone (KENALOG-40) injection 20 mg, 20 mg, , , Carmine Ribeiro MD, 20 mg at 07/23/19 0901     triamcinolone (KENALOG-40) injection 40 mg, 40 mg, , , Julio Espitia MD, 40 mg at 10/12/20 1058    Allergies -    Allergies   Allergen Reactions     Penicillins Swelling     Swelling throat; age 4     Tetracycline GI Disturbance     Other reaction(s): Abdominal Pain  Vomiting; nauseous  Other reaction(s): Abdominal Pain  Vomiting; nauseous       Social History -   Social History     Socioeconomic History     Marital status:      Spouse name: Not on file     Number of children: 2     Years of education: Not on file     Highest education level: Not on file   Occupational History     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[employer]     Employer: [employer]   Social Needs     Financial resource strain: Not on file     Food insecurity     Worry: Not on file     Inability: Not on file     Transportation needs     Medical: Not on file     Non-medical: Not on file   Tobacco Use     Smoking status: Never Smoker     Smokeless tobacco: Never Used   Substance and Sexual Activity     Alcohol use: Yes     Comment: 1@ 3 months     Drug use: No     Sexual activity: Yes     Partners: Male     Birth control/protection: Male Surgical, Female Surgical   Lifestyle     Physical activity     Days per week: Not on file     Minutes per session: Not on file     Stress: Not on file   Relationships     Social connections     Talks on phone: Not on file     Gets together: Not on file     Attends Faith service: Not on file     Active member of club or organization: Not on file     Attends meetings of clubs or organizations: Not on file     Relationship status: Not on file     Intimate partner violence     Fear of current or ex partner: Not on file     Emotionally abused: Not on file     Physically abused: Not on file     Forced sexual activity: Not on file   Other Topics Concern     Parent/sibling w/ CABG, MI or angioplasty before 65F 55M? No   Social History Narrative     Not on file       Family History -   Family History   Problem Relation Age of Onset     Diabetes Mother      Hypertension Mother      Hyperlipidemia Mother      Arrhythmia  Mother      Cardiovascular Father         CHF and COPD     Asthma Father      Breast Cancer Maternal Grandfather      Colon Cancer Maternal Grandfather      Breast Cancer Paternal Grandmother      Breast Cancer Paternal Aunt      C.A.D. Paternal Grandfather      Breast Cancer Cousin      Asthma Son      Diabetes Maternal Grandmother      Hypertension Maternal Grandmother      Breast Cancer Cousin      Breast Cancer Cousin      Breast Cancer Cousin        Physical Exam  General - The patient is in no distress.  Alert and oriented to person and place, answers questions and cooperates with examination appropriately.   Voice and Breathing - The patient was breathing comfortably without the use of accessory muscles. There was no wheezing, stridor, or stertor.  The patients voice was clear and strong.  Ears - The auricles are normal. The tympanic membranes are normal in appearance, bony landmarks are intact.  No retraction, perforation, or masses.  Injection site has healed. No fluid or purulence was seen in the external canal or the middle ear. No evidence of infection of the middle ear or external canal, cerumen was normal in appearance.  Eyes - Extraocular movements intact. Sclera were not icteric or injected.  Mouth - Examination of the oral cavity showed pink, healthy mucosa. No lesions or ulcerations noted.  The tongue was mobile and midline.  Throat - The walls of the oropharynx were smooth, symmetric, and had no lesions or ulcerations. The uvula was midline on elevation.    Neck - Soft, non-tender. Palpation of the occipital, submental, submandibular, internal jugular chain, and supraclavicular nodes did not demonstrate any abnormal lymph nodes or masses. Parotid glands had no masses. Palpation of the thyroid was soft and smooth, with no nodules or goiter appreciated.  The trachea was mobile and midline.  Neurological - Cranial nerves 2 through 12 were grossly intact. House-Brackmann grade 1 out of 6 bilaterally.  Finger-nose-finger normal. Normal gait.       Audiologic Studies - An audiogram and tympanogram were performed today as part of the evaluation and personally reviewed. The tympanogram shows a normal Type A curve, with normal canal volume and middle ear pressure.  There is no sign of eustachian tube dysfunction or middle ear effusion.  The audiogram showed a significant down-sloping mid to high high frequency sensorineural hearing loss on the right, asymmetric from the left which only shows mild sensorineural hearing loss in the high tones. No change since last visit.       Assessment and Plan - Laura Jackson is a 50 year old female who returns to me today with sudden right-sided asymmetric sensorineural hearing loss. Started 7 weeks ago. Etiology is unknown. MRI brain was normal. I performed a right-sided transtympanic dexamethasone injection. Hearing is still the same. She is interested in trying oral prednisone. I prescribed a prednisone burst and taper. Return in 2 weeks with audiogram.       Karri Burnett MD  Otolaryngology  [facility]

## 2021-02-16 ENCOUNTER — OFFICE VISIT (OUTPATIENT)
Dept: OTOLARYNGOLOGY | Facility: CLINIC | Age: 51
End: 2021-02-16
Payer: COMMERCIAL

## 2021-02-16 ENCOUNTER — OFFICE VISIT (OUTPATIENT)
Dept: AUDIOLOGY | Facility: CLINIC | Age: 51
End: 2021-02-16
Payer: COMMERCIAL

## 2021-02-16 VITALS — SYSTOLIC BLOOD PRESSURE: 143 MMHG | OXYGEN SATURATION: 100 % | HEART RATE: 61 BPM | DIASTOLIC BLOOD PRESSURE: 88 MMHG

## 2021-02-16 DIAGNOSIS — H90.3 SNHL (SENSORY-NEURAL HEARING LOSS), ASYMMETRICAL: Primary | ICD-10-CM

## 2021-02-16 PROCEDURE — 99207 PR NO CHARGE LOS: CPT | Performed by: AUDIOLOGIST

## 2021-02-16 PROCEDURE — 92557 COMPREHENSIVE HEARING TEST: CPT | Performed by: AUDIOLOGIST

## 2021-02-16 PROCEDURE — 99214 OFFICE O/P EST MOD 30 MIN: CPT | Performed by: OTOLARYNGOLOGY

## 2021-02-16 PROCEDURE — 92550 TYMPANOMETRY & REFLEX THRESH: CPT | Performed by: AUDIOLOGIST

## 2021-02-16 RX ORDER — PREDNISONE 20 MG/1
TABLET ORAL
Qty: 20 TABLET | Refills: 0 | Status: SHIPPED | OUTPATIENT
Start: 2021-02-16 | End: 2021-12-08

## 2021-02-16 NOTE — LETTER
2/16/2021         RE: Laura Jackson  [address]  [place] [address]        Dear Colleague,    Thank you for referring your patient, Laura Jackson, to the [facility]. Please see a copy of my visit note below.    Chief Complaint - sudden sensorineural hearing loss recheck    History of Present Illness - Laura Jackson is a 50 year old female who returns to me today with hearing loss in the right ear. About 7 weeks ago she she noted hearing loss with head pressure and right headache. She felt she was in a fog. Then about 2.5 weeks ago she got significant vertigo (it lasted for a few hours). She has intermittent head pressure still. The hearing loss is constant. The vertigo has gone away. No prior vertigo episodes. There is no history of chronic ear disease or ear surgery. She had ear infections as a child, no tubes. With regards to recreational, , and work-related noise exposure she denies any. No family history of hearing loss at a young age. She tried ear drops, no help. I noted asymmetric right sensorineural hearing loss last week. She has POTS and she didn't want to try oral prednisone. Therefore, we proceeded with a right transtympanic dexamethasone injection. MRI brain was normal. She returns and notes head pressure is better. However, hearing is unchanged. She has bilateral ear crackling. Tinnitus comes and goes.     Past Medical History -   Patient Active Problem List   Diagnosis     CARDIOVASCULAR SCREENING; LDL GOAL LESS THAN 160     Irritable bowel syndrome     GERD (gastroesophageal reflux disease)     History of supraventricular tachycardia     Mild persistent asthma     Family history of breast cancer     Foreign body (FB) in soft tissue     S/P myomectomy     Nausea     Dehydration     S/P ANAID (total abdominal hysterectomy)     Hypovitaminosis D     Pelvic adhesions     Endometriosis     Heberden's nodes     POTS (postural orthostatic tachycardia syndrome)      Hypermobility syndrome     Cervicalgia     Autonomic dysfunction     Benign essential hypertension       Current Medications -   Current Outpatient Medications:      acetaminophen (TYLENOL) 325 MG tablet, Take 2 tablets (650 mg) by mouth every 4 hours as needed for mild pain, Disp: 50 tablet, Rfl: 0     azelastine (ASTELIN) 0.1 % nasal spray, Spray 1 spray into both nostrils 2 times daily, Disp: 30 mL, Rfl: 2     Cholecalciferol (VITAMIN D) 2000 UNITS CAPS, Take 1 capsule by mouth daily, Disp: , Rfl:      fluticasone (FLONASE) 50 MCG/ACT nasal spray, Spray 1 spray into both nostrils daily, Disp: , Rfl:      fluticasone-salmeterol (ADVAIR) 100-50 MCG/DOSE inhaler, Inhale 1 puff into the lungs 2 times daily, Disp: 3 Inhaler, Rfl: 0     ibuprofen (ADVIL/MOTRIN) 600 MG tablet, Take 1 tablet (600 mg) by mouth every 6 hours as needed for other (mild and/or inflammatory pain), Disp: 30 tablet, Rfl: 0     ivabradine 5 MG PO tablet, Take 1 tablet (5 mg) by mouth 2 times daily (with meals), Disp: 180 tablet, Rfl: 4     levalbuterol (XOPENEX HFA) 45 MCG/ACT inhaler, Inhale 1-2 puffs into the lungs every 4 hours as needed for shortness of breath / dyspnea Need appointment in clinic for further refills., Disp: 1 Inhaler, Rfl: 0     OMEPRAZOLE PO, Take 20 mg by mouth every morning, Disp: , Rfl:      order for DME, Equipment being ordered: hinged knee brace, Large (Patient not taking: Reported on 2/2/2021), Disp: 1 Device, Rfl: 0     order for DME, Equipment being ordered: wrist splint with thumb support (Patient not taking: Reported on 2/2/2021), Disp: 1 Device, Rfl: 0    Current Facility-Administered Medications:      betamethasone acet & sod phos (CELESTONE) injection 6 mg, 6 mg, , , Castillo Latif MD, 6 mg at 08/08/19 1025     lidocaine (PF) 0.5 % injection SOLN 8 mL, 8 mL, , , Julio Espitia MD, 8 mL at 10/12/20 1058     ropivacaine (NAROPIN) injection 1 mL, 1 mL, , , Castillo Latif MD, 1 mL at  08/08/19 1025     triamcinolone (KENALOG-40) injection 20 mg, 20 mg, , , Carmine Ribeiro MD, 20 mg at 07/23/19 0901     triamcinolone (KENALOG-40) injection 40 mg, 40 mg, , , Julio Espitia MD, 40 mg at 10/12/20 1058    Allergies -   Allergies   Allergen Reactions     Penicillins Swelling     Swelling throat; age 4     Tetracycline GI Disturbance     Other reaction(s): Abdominal Pain  Vomiting; nauseous  Other reaction(s): Abdominal Pain  Vomiting; nauseous       Social History -   Social History     Socioeconomic History     Marital status:      Spouse name: Not on file     Number of children: 2     Years of education: Not on file     Highest education level: Not on file   Occupational History     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[employer]     Employer: [employer]   Social Needs     Financial resource strain: Not on file     Food insecurity     Worry: Not on file     Inability: Not on file     Transportation needs     Medical: Not on file     Non-medical: Not on file   Tobacco Use     Smoking status: Never Smoker     Smokeless tobacco: Never Used   Substance and Sexual Activity     Alcohol use: Yes     Comment: 1@ 3 months     Drug use: No     Sexual activity: Yes     Partners: Male     Birth control/protection: Male Surgical, Female Surgical   Lifestyle     Physical activity     Days per week: Not on file     Minutes per session: Not on file     Stress: Not on file   Relationships     Social connections     Talks on phone: Not on file     Gets together: Not on file     Attends Jain service: Not on file     Active member of club or organization: Not on file     Attends meetings of clubs or organizations: Not on file     Relationship status: Not on file     Intimate partner violence     Fear of current or ex partner: Not on file     Emotionally abused: Not on file     Physically abused: Not on file     Forced sexual activity: Not on file   Other Topics Concern      Parent/sibling w/ CABG, MI or angioplasty before 65F 55M? No   Social History Narrative     Not on file       Family History -   Family History   Problem Relation Age of Onset     Diabetes Mother      Hypertension Mother      Hyperlipidemia Mother      Arrhythmia Mother      Cardiovascular Father         CHF and COPD     Asthma Father      Breast Cancer Maternal Grandfather      Colon Cancer Maternal Grandfather      Breast Cancer Paternal Grandmother      Breast Cancer Paternal Aunt      C.A.D. Paternal Grandfather      Breast Cancer Cousin      Asthma Son      Diabetes Maternal Grandmother      Hypertension Maternal Grandmother      Breast Cancer Cousin      Breast Cancer Cousin      Breast Cancer Cousin        Physical Exam  General - The patient is in no distress.  Alert and oriented to person and place, answers questions and cooperates with examination appropriately.   Voice and Breathing - The patient was breathing comfortably without the use of accessory muscles. There was no wheezing, stridor, or stertor.  The patients voice was clear and strong.  Ears - The auricles are normal. The tympanic membranes are normal in appearance, bony landmarks are intact.  No retraction, perforation, or masses.  Injection site has healed. No fluid or purulence was seen in the external canal or the middle ear. No evidence of infection of the middle ear or external canal, cerumen was normal in appearance.  Eyes - Extraocular movements intact. Sclera were not icteric or injected.  Mouth - Examination of the oral cavity showed pink, healthy mucosa. No lesions or ulcerations noted.  The tongue was mobile and midline.  Throat - The walls of the oropharynx were smooth, symmetric, and had no lesions or ulcerations. The uvula was midline on elevation.    Neck - Soft, non-tender. Palpation of the occipital, submental, submandibular, internal jugular chain, and supraclavicular nodes did not demonstrate any abnormal lymph nodes or masses.  Parotid glands had no masses. Palpation of the thyroid was soft and smooth, with no nodules or goiter appreciated.  The trachea was mobile and midline.  Neurological - Cranial nerves 2 through 12 were grossly intact. House-Brackmann grade 1 out of 6 bilaterally. Finger-nose-finger normal. Normal gait.       Audiologic Studies - An audiogram and tympanogram were performed today as part of the evaluation and personally reviewed. The tympanogram shows a normal Type A curve, with normal canal volume and middle ear pressure.  There is no sign of eustachian tube dysfunction or middle ear effusion.  The audiogram showed a significant down-sloping mid to high high frequency sensorineural hearing loss on the right, asymmetric from the left which only shows mild sensorineural hearing loss in the high tones. No change since last visit.       Assessment and Plan - Laura Jackson is a 50 year old female who returns to me today with sudden right-sided asymmetric sensorineural hearing loss. Started 7 weeks ago. Etiology is unknown. MRI brain was normal. I performed a right-sided transtympanic dexamethasone injection. Hearing is still the same. She is interested in trying oral prednisone. I prescribed a prednisone burst and taper. Return in 2 weeks with audiogram.       Karri Burnett MD  Otolaryngology  [facility]          Again, thank you for allowing me to participate in the care of your patient.        Sincerely,        Karri Burnett MD

## 2021-02-16 NOTE — PROGRESS NOTES
AUDIOLOGY REPORT:    Patient was referred from ENT by Dr. Burnett for audiology evaluation. The patient was last seen on 2/9/2021 following a sudden hearing loss in the right ear. Results from that day showed mild to moderate sensorineural hearing loss in the mid to high frequencies of the right ear and mild high frequency sensorineural hearing loss in the left ear. The patient returns today for follow up following a steroid injection last week.    Testing:    Otoscopy:   Otoscopic exam indicates ears are clear of cerumen bilaterally     Tympanograms:    RIGHT: normal eardrum mobility     LEFT:   normal eardrum mobility    Reflexes (reported by stimulus ear):  RIGHT: Ipsilateral is present at normal levels  RIGHT: Contralateral is absent at frequencies tested  LEFT:   Ipsilateral is present at normal levels  LEFT:   Contralateral is present at elevated levels     Thresholds:   Pure Tone Thresholds assessed using conventional audiometry with good reliability from 250-8000 Hz bilaterally using insert earphones and circumaural headphones     RIGHT:  mild to moderately severe sensorineural hearing loss with normal hearing sensitivity at 500-1000 Hz    LEFT:    normal hearing sensitivity through 4000 Hz sloping to mild likely sensorineural hearing loss    Speech Reception Threshold:    RIGHT: 20 dB HL    LEFT:   14 dB HL  Results are in agreement with pure tone average.     Word Recognition Score:     RIGHT: 92% at 70 dB HL using NU-6 recorded word list.    LEFT:   100% at 55 dB HL using NU-6 recorded word list.    Discussed results with the patient. Thresholds are essentially stable compared to 2/9/2021, with the exception of 250 Hz in the right ear which is 20 dB poorer today.    Patient was returned to ENT for follow up.     Vernon Castañeda, CCC-A  Licensed Audiologist #[id number]  2/16/2021

## 2021-02-26 ENCOUNTER — OFFICE VISIT (OUTPATIENT)
Dept: INTERNAL MEDICINE | Facility: CLINIC | Age: 51
End: 2021-02-26
Payer: COMMERCIAL

## 2021-02-26 VITALS
WEIGHT: 181.4 LBS | HEART RATE: 76 BPM | TEMPERATURE: 97.7 F | OXYGEN SATURATION: 97 % | RESPIRATION RATE: 18 BRPM | DIASTOLIC BLOOD PRESSURE: 86 MMHG | BODY MASS INDEX: 31.35 KG/M2 | SYSTOLIC BLOOD PRESSURE: 118 MMHG

## 2021-02-26 DIAGNOSIS — M25.50 MULTIPLE JOINT PAIN: ICD-10-CM

## 2021-02-26 DIAGNOSIS — H53.8 FLASHING LIGHTS SEEN: ICD-10-CM

## 2021-02-26 DIAGNOSIS — H90.3 ASYMMETRICAL SENSORINEURAL HEARING LOSS: Primary | ICD-10-CM

## 2021-02-26 DIAGNOSIS — G90.A POTS (POSTURAL ORTHOSTATIC TACHYCARDIA SYNDROME): ICD-10-CM

## 2021-02-26 PROCEDURE — 99214 OFFICE O/P EST MOD 30 MIN: CPT | Performed by: INTERNAL MEDICINE

## 2021-02-26 NOTE — PROGRESS NOTES
Assessment & Plan     Asymmetrical sensorineural hearing loss  Today's appointment was a further follow up appointment with this patient who has had an unusual set of symptoms that began in mid December and these tie in just exactly after she received her first vaccine for Coronavirus (COVID-19) . We are not claiming a causative relationship between the vaccination and her symptoms but this is an observation of what may be a coincidence. At any rate she's had the onset of flashing lights seen with both eyes and some floaters and some feelings of pressure in her head. These symptoms are basically lingered for 2 months nights per week and are only improved recently since she began and is nearly completed with an oral steroid taper. She's had Ear, Nose, and Throat specialist consultation, ophthalmologic consultation and a brain MRI all of which are basically unremarkable. at this point we simply do not know exactly what could be causing these symptoms especially if there was a unifying diagnosis . As of yet we do not have such a diagnosis. Her underlying history is already unusual given her diagnosis of POTS (postural orthostatic tachycardia syndrome) for which she's been treated with Corlanor  (ivabradine) and sees the cardiologist Dr. Mitchell. Patient was more or less asking me for my opinion and requesting we order tests I felt could be appropriate as well as place a referral to see a rheumatology which we did.    - CRP inflammation; Future  - Erythrocyte sedimentation rate auto; Future  - Rheumatology Referral; Future  - CBC with platelets differential; Future  - Comprehensive metabolic panel; Future    Flashing lights seen  Possibly the visual symptoms are explained from a posterior vitreous detachment Flashes  Another common symptom of a posterior vitreous detachment is seeing flashing lights in the very periphery of the vision fields. As the vitreous gel pulls loose from the back of the eye, it tugs on the  "wallpaper lining the back of the eye. This wallpaper is called the retina. I did not actually have access to the ophthalmologic consultation but patient tells me this is what she was told was possibly the cause of her symptoms   - CRP inflammation; Future  - Erythrocyte sedimentation rate auto; Future  - Rheumatology Referral; Future  - CBC with platelets differential; Future  - Comprehensive metabolic panel; Future    POTS (postural orthostatic tachycardia syndrome)  This may be completely an unrelated matter   - CRP inflammation; Future  - Erythrocyte sedimentation rate auto; Future  - Rheumatology Referral; Future  - CBC with platelets differential; Future  - Comprehensive metabolic panel; Future    Multiple joint pain  As we discussed her history she's bringing up chronic generalized joint pains which she felt better targeted the referral the rheumatology   - CRP inflammation; Future  - Erythrocyte sedimentation rate auto; Future  - Rheumatology Referral; Future  - CBC with platelets differential; Future  - Comprehensive metabolic panel; Future    Review of the result(s) of each unique test - reviewed office notes with ENT and her audiogram and brain MRI  25 minutes spent on the date of the encounter doing chart review, history and exam, documentation and further activities as noted above       BMI:   Estimated body mass index is 31.35 kg/m  as calculated from the following:    Height as of 2/2/21: 1.62 m (5' 3.78\").    Weight as of this encounter: 82.3 kg (181 lb 6.4 oz).   Weight management plan: Discussed healthy diet and exercise guidelines    Return in about 6 months (around 8/26/2021), or if symptoms worsen or fail to improve.    Bj Butler MD  [facility] BLANKA Sanchez is a 50 year old who presents for the following health issues   Encounter Diagnoses   Name Primary?     Asymmetrical sensorineural hearing loss Yes     Flashing lights seen      POTS (postural orthostatic " tachycardia syndrome)      Multiple joint pain        HPI     Chief Complaint   Patient presents with     Eye Problem     Milind eyes flashing, floaters      Hearing Problem     taking steriods for this right now     head pressure     head pressure, better when laying down, during the day the worst     December 21st 2020 had the first vaccination for Coronavirus (COVID-19) and then it was December 22nd that she began with flashing lights headachey, epistaxis vertiginous symptoms, loss of balance , and flashing lights  Had Ear, Nose, and Throat specialist consultation and had steroid injection across the tympanic membrane and now oral and on the wwean of this  A lot more ringing / tinnitus, hearing was up and down not so sure now where the hearing loss is. Seems like tinnitus is coming back since  dropping steroids down with the taper  Better with laying down worse with sitting or standing.  The second vaccine was of January 11th and still high pressure symptoms with her head    She thought she'd check it out with me, here to get further evaluation started    With her POTS (postural orthostatic tachycardia syndrome) she sees Dr. Camarena. Taking oral steroids, her joint pains are all gone and a lot of side effects to steroids    She had a wonderful right sided carpal tunnel syndrome surgery in October 2020  Wt Readings from Last 5 Encounters:   02/26/21 82.3 kg (181 lb 6.4 oz)   02/02/21 83.5 kg (184 lb)   11/06/20 81.6 kg (180 lb)   10/20/20 81.6 kg (180 lb)   10/12/20 82.1 kg (181 lb)     Also seen by ophthalmologist - considered the routine age related separate of vitreous humor  Initially was al right sided and only now over the last week or so symptoms do include the left side    Had MRI of brain already this was within normal limits , this was done 2-3 weeks ago    A lot of times we never find the reason according to the ophthalmologist   Sudden hearing loss is oten not found, maybe autoimmune disease   Mother has  Sjogrens syndrome or Sicca complex       2- Per Dr. Karri Burnett, [facility] - Assessment and Plan - Lauraanjel Jackson is a 50 year old female who returns to me today with sudden right-sided asymmetric sensorineural hearing loss. Started 7 weeks ago. Etiology is unknown. MRI brain was normal. I performed a right-sided transtympanic dexamethasone injection. Hearing is still the same. She is interested in trying oral prednisone. I prescribed a prednisone burst and taper. Return in 2 weeks with audiogram.      For eye care she was seen at [facility] , seen by an MD there , we lack these notes but there was no diagnosis made.    Karri Burnett MD  Otolaryngology  [facility]     Snapshot  Seems like pressure symptoms are just a little bit better but she remains with an asymmetry with right sided hearing loss . Since starting the steroids, definitely some improvement, but now some of the pressure coming back  Her vision remains not right, newly having flashes out of left eye, still there with right eye but getting better   Baseline heart rate has gone up [ 64 heart rate resting heart rate  Now 70-72 and now coming back down    She described also Edilma-Danlos Syndrome type symptoms and multiple diffuse joint pains      Review of Systems   Constitutional, HEENT, cardiovascular, pulmonary, gi and gu systems are negative, except as otherwise noted.      Objective    /86   Pulse 76   Temp 97.7  F (36.5  C) (Oral)   Resp 18   Wt 82.3 kg (181 lb 6.4 oz)   LMP 07/28/2017   SpO2 97%   BMI 31.35 kg/m    Body mass index is 31.35 kg/m .  Physical Exam   GENERAL: healthy, alert and no distress  EYES: Eyes grossly normal to inspection, PERRL and conjunctivae and sclerae normal  MS: no gross musculoskeletal defects noted, no edema  NEURO: Normal strength and tone, mentation intact and speech normal  PSYCH: mentation appears normal, affect normal/bright      I spent a total of 25 minutes  face-to-face with Laura Jackson during today's office visit.  Over 50% of this time was spent counseling the patient and/or coordinating care regarding   Encounter Diagnoses   Name Primary?     Asymmetrical sensorineural hearing loss Yes     Flashing lights seen      POTS (postural orthostatic tachycardia syndrome)      Multiple joint pain     .  See note for details.

## 2021-03-07 NOTE — PROGRESS NOTES
Chief Complaint - sudden sensorineural hearing loss recheck    History of Present Illness - Laura Jackson is a 50 year old female who returns to me today with hearing loss in the right ear. About 10 weeks ago she she noted hearing loss with head pressure and right headache. She felt she was in a fog. Then about 5-6 weeks ago she developed significant vertigo (it lasted for a few hours). The hearing loss is constant. The vertigo has gone away. No prior vertigo episodes. There is no history of chronic ear disease or ear surgery. She had ear infections as a child, no tubes. With regards to recreational, , and work-related noise exposure she denies any. No family history of hearing loss at a young age. She tried ear drops, no help. I noted asymmetric right sensorineural hearing loss last week. She has POTS and she didn't want to try oral prednisone initially. Therefore, we proceeded with a right transtympanic dexamethasone injection, but her hearing didn't improve. MRI brain was normal. She decided to proceed with prednisone. She returns and notes the hearing did not improve. However, the head pressure greatly improved. She is happy about that. She does describe fluctuation of the hearing in the right ear while on prednisone, but now it seems stable. Tinnitus is improved. She noted only a very mild bout of dizziness since last visit. Lasted 15 minutes. She seems interested using a right hearing aid.     Past Medical History -   Patient Active Problem List   Diagnosis     CARDIOVASCULAR SCREENING; LDL GOAL LESS THAN 160     Irritable bowel syndrome     GERD (gastroesophageal reflux disease)     History of supraventricular tachycardia     Mild persistent asthma     Family history of breast cancer     Foreign body (FB) in soft tissue     S/P myomectomy     Nausea     Dehydration     S/P ANAID (total abdominal hysterectomy)     Hypovitaminosis D     Pelvic adhesions     Endometriosis     Heberden's nodes     POTS  (postural orthostatic tachycardia syndrome)     Hypermobility syndrome     Cervicalgia     Autonomic dysfunction     Benign essential hypertension       Current Medications -   Current Outpatient Medications:      acetaminophen (TYLENOL) 325 MG tablet, Take 2 tablets (650 mg) by mouth every 4 hours as needed for mild pain, Disp: 50 tablet, Rfl: 0     azelastine (ASTELIN) 0.1 % nasal spray, Spray 1 spray into both nostrils 2 times daily, Disp: 30 mL, Rfl: 2     Cholecalciferol (VITAMIN D) 2000 UNITS CAPS, Take 1 capsule by mouth daily, Disp: , Rfl:      fluticasone (FLONASE) 50 MCG/ACT nasal spray, Spray 1 spray into both nostrils daily, Disp: , Rfl:      fluticasone-salmeterol (ADVAIR) 100-50 MCG/DOSE inhaler, Inhale 1 puff into the lungs 2 times daily, Disp: 3 Inhaler, Rfl: 0     ibuprofen (ADVIL/MOTRIN) 600 MG tablet, Take 1 tablet (600 mg) by mouth every 6 hours as needed for other (mild and/or inflammatory pain), Disp: 30 tablet, Rfl: 0     ivabradine 5 MG PO tablet, Take 1 tablet (5 mg) by mouth 2 times daily (with meals), Disp: 180 tablet, Rfl: 4     levalbuterol (XOPENEX HFA) 45 MCG/ACT inhaler, Inhale 1-2 puffs into the lungs every 4 hours as needed for shortness of breath / dyspnea Need appointment in clinic for further refills., Disp: 1 Inhaler, Rfl: 0     OMEPRAZOLE PO, Take 20 mg by mouth every morning, Disp: , Rfl:      order for DME, Equipment being ordered: hinged knee brace, Large, Disp: 1 Device, Rfl: 0     order for DME, Equipment being ordered: wrist splint with thumb support, Disp: 1 Device, Rfl: 0     predniSONE (DELTASONE) 20 MG tablet, Take 3 tabs by mouth daily x 3 days, then 2 tabs daily x 3 days, then 1 tab daily x 3 days, then 1/2 tab daily x 3 days., Disp: 20 tablet, Rfl: 0    Current Facility-Administered Medications:      betamethasone acet & sod phos (CELESTONE) injection 6 mg, 6 mg, , , Castillo Latif MD, 6 mg at 08/08/19 1025     lidocaine (PF) 0.5 % injection SOLN 8 mL, 8 mL, ,  , Julio Espitia MD, 8 mL at 10/12/20 1058     ropivacaine (NAROPIN) injection 1 mL, 1 mL, , , Castillo Latif MD, 1 mL at 08/08/19 1025     triamcinolone (KENALOG-40) injection 20 mg, 20 mg, , , Carmine Ribeiro MD, 20 mg at 07/23/19 0901     triamcinolone (KENALOG-40) injection 40 mg, 40 mg, , , Julio Espitia MD, 40 mg at 10/12/20 1058    Allergies -   Allergies   Allergen Reactions     Penicillins Swelling     Swelling throat; age 4     Tetracycline GI Disturbance     Other reaction(s): Abdominal Pain  Vomiting; nauseous  Other reaction(s): Abdominal Pain  Vomiting; nauseous       Social History -   Social History     Socioeconomic History     Marital status:      Spouse name: Not on file     Number of children: 2     Years of education: Not on file     Highest education level: Not on file   Occupational History     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[employer]     Employer: [employer]   Social Needs     Financial resource strain: Not on file     Food insecurity     Worry: Not on file     Inability: Not on file     Transportation needs     Medical: Not on file     Non-medical: Not on file   Tobacco Use     Smoking status: Never Smoker     Smokeless tobacco: Never Used   Substance and Sexual Activity     Alcohol use: Yes     Comment: 1@ 3 months     Drug use: No     Sexual activity: Yes     Partners: Male     Birth control/protection: Male Surgical, Female Surgical   Lifestyle     Physical activity     Days per week: Not on file     Minutes per session: Not on file     Stress: Not on file   Relationships     Social connections     Talks on phone: Not on file     Gets together: Not on file     Attends Congregational service: Not on file     Active member of club or organization: Not on file     Attends meetings of clubs or organizations: Not on file     Relationship status: Not on file     Intimate partner violence     Fear of current or ex partner: Not on file      Emotionally abused: Not on file     Physically abused: Not on file     Forced sexual activity: Not on file   Other Topics Concern     Parent/sibling w/ CABG, MI or angioplasty before 65F 55M? No   Social History Narrative     Not on file       Family History -   Family History   Problem Relation Age of Onset     Diabetes Mother      Hypertension Mother      Hyperlipidemia Mother      Arrhythmia Mother      Cardiovascular Father         CHF and COPD     Asthma Father      Breast Cancer Maternal Grandfather      Colon Cancer Maternal Grandfather      Breast Cancer Paternal Grandmother      Breast Cancer Paternal Aunt      C.A.D. Paternal Grandfather      Breast Cancer Cousin      Asthma Son      Diabetes Maternal Grandmother      Hypertension Maternal Grandmother      Breast Cancer Cousin      Breast Cancer Cousin      Breast Cancer Cousin        Physical Exam  /86   Pulse 68   Resp 16   LMP 07/28/2017   SpO2 97%   General - The patient is in no distress.  Alert and oriented to person and place, answers questions and cooperates with examination appropriately.   Voice and Breathing - The patient was breathing comfortably without the use of accessory muscles. There was no wheezing, stridor, or stertor.  The patients voice was clear and strong.  Ears - The auricles are normal. Right tympanic membrane injection site has healed, but has a crust over the injection site. No perforation. No retraction. No fluid or purulence was seen in the external canal or the middle ear. No evidence of infection of the middle ear or external canal, cerumen was normal in appearance.  Eyes - Extraocular movements intact. Sclera were not icteric or injected.  Neurological - Cranial nerves 2 through 12 were grossly intact. House-Brackmann grade 1 out of 6 bilaterally.     Audiologic Studies - An audiogram and tympanogram were performed today as part of the evaluation and personally reviewed. The tympanogram shows a normal Type A  curve, with normal canal volume and middle ear pressure.  There is no sign of eustachian tube dysfunction or middle ear effusion.  The audiogram showed a significant down-sloping mid to high high frequency sensorineural hearing loss on the right, asymmetric from the left which only shows mild sensorineural hearing loss in the high tones. No change since last visit.       Assessment and Plan - Laura Jackson is a 50 year old female who returns to me today with sudden right-sided asymmetric sensorineural hearing loss. Started about 10 weeks ago. Etiology is unknown. MRI brain was normal. I performed a right-sided transtympanic dexamethasone injection. Hearing was still the same. Then we tried oral prednisone. Again, no change in hearing.  What she notes helped the most was the pressure in her head.  She is very happy about that symptom being much improved.  She does describe some fluctuating hearing while on the prednisone.  She had one other brief bout of dizziness but hard to know if this is true vertigo.  It is still possible she has Ménière's disease but is really only had 1 severe bout of vertigo.  I recommend hearing aid consultation for the right ear.  I recommend repeat audiogram in 6 months.  She should return sooner if she has recurring bouts of vertigo or worsening hearing loss.      Karri Burnett MD  Otolaryngology  [facility]

## 2021-03-09 ENCOUNTER — OFFICE VISIT (OUTPATIENT)
Dept: AUDIOLOGY | Facility: CLINIC | Age: 51
End: 2021-03-09
Payer: COMMERCIAL

## 2021-03-09 ENCOUNTER — OFFICE VISIT (OUTPATIENT)
Dept: OTOLARYNGOLOGY | Facility: CLINIC | Age: 51
End: 2021-03-09
Payer: COMMERCIAL

## 2021-03-09 VITALS
SYSTOLIC BLOOD PRESSURE: 127 MMHG | OXYGEN SATURATION: 97 % | RESPIRATION RATE: 16 BRPM | DIASTOLIC BLOOD PRESSURE: 86 MMHG | HEART RATE: 68 BPM

## 2021-03-09 DIAGNOSIS — H90.3 SNHL (SENSORY-NEURAL HEARING LOSS), ASYMMETRICAL: Primary | ICD-10-CM

## 2021-03-09 PROCEDURE — 99213 OFFICE O/P EST LOW 20 MIN: CPT | Performed by: OTOLARYNGOLOGY

## 2021-03-09 PROCEDURE — 99207 PR NO CHARGE LOS: CPT | Performed by: AUDIOLOGIST

## 2021-03-09 PROCEDURE — 92557 COMPREHENSIVE HEARING TEST: CPT | Performed by: AUDIOLOGIST

## 2021-03-09 PROCEDURE — 92550 TYMPANOMETRY & REFLEX THRESH: CPT | Performed by: AUDIOLOGIST

## 2021-03-09 NOTE — PROGRESS NOTES
AUDIOLOGY REPORT:    Patient was referred from ENT by Dr. Burnett for audiology evaluation. The patient was seen last month for a sudden hearing loss in the right ear and again for follow up on 2/16/2021 for follow up after a steroid injection. Results from that day showed mild to moderately severe sensorineural hearing loss in the right ear and mild high frequency sensorineural hearing loss in the left ear. She was treated with oral steroids and returns today for follow up. The patient reports that her hearing seemed to get a little bit better during the steroid treatment but got worse again after it was done. She reports that the pressure in her ears got better and is still mostly gone, with slightly more pressure now in the left ear than the right.     Testing:    Otoscopy:   Otoscopic exam indicates ears are clear of cerumen bilaterally     Tympanograms:    RIGHT: normal eardrum mobility     LEFT:   normal eardrum mobility    Reflexes (reported by stimulus ear):  RIGHT: Ipsilateral is present at normal levels  RIGHT: Contralateral is absent at frequencies tested  LEFT:   Ipsilateral is present at normal levels  LEFT:   Contralateral is present at elevated levels     Thresholds:   Pure Tone Thresholds assessed using conventional audiometry with good reliability from 250-8000 Hz bilaterally using insert earphones and circumaural headphones     RIGHT:  normal hearing sensitivity through 500 Hz sloping to mild to moderate sensorineural hearing loss    LEFT:    normal hearing sensitivity through 4000 Hz sloping to mild likely sensorineural hearing loss    Speech Reception Threshold:    RIGHT: 25 dB HL    LEFT:   15 dB HL  Results are in agreement with pure tone average.     Word Recognition Score:     RIGHT: 96% at 70 dB HL using NU-6 recorded word list.    LEFT:   96% at 55 dB HL using NU-6 recorded word list.    Discussed results with the patient. Thresholds are essentially stable with the exception of 15 dB  improvement today in the right ear at 250 Hz. The patient is interested in amplification for the right ear if no further improvement is anticipated, and she is a good candidate with medical clearance.    Patient was returned to ENT for follow up.     Vernon Castañeda, CCC-A  Licensed Audiologist #[id number]  3/9/2021

## 2021-03-09 NOTE — LETTER
3/9/2021         RE: Laura Jackson  [address]  [place] [address]        Dear Colleague,    Thank you for referring your patient, Laura Jackson, to the [facility]. Please see a copy of my visit note below.    Chief Complaint - sudden sensorineural hearing loss recheck    History of Present Illness - Laura Jackson is a 50 year old female who returns to me today with hearing loss in the right ear. About 10 weeks ago she she noted hearing loss with head pressure and right headache. She felt she was in a fog. Then about 5-6 weeks ago she developed significant vertigo (it lasted for a few hours). The hearing loss is constant. The vertigo has gone away. No prior vertigo episodes. There is no history of chronic ear disease or ear surgery. She had ear infections as a child, no tubes. With regards to recreational, , and work-related noise exposure she denies any. No family history of hearing loss at a young age. She tried ear drops, no help. I noted asymmetric right sensorineural hearing loss last week. She has POTS and she didn't want to try oral prednisone initially. Therefore, we proceeded with a right transtympanic dexamethasone injection, but her hearing didn't improve. MRI brain was normal. She decided to proceed with prednisone. She returns and notes the hearing did not improve. However, the head pressure greatly improved. She is happy about that. She does describe fluctuation of the hearing in the right ear while on prednisone, but now it seems stable. Tinnitus is improved. She noted only a very mild bout of dizziness since last visit. Lasted 15 minutes. She seems interested using a right hearing aid.     Past Medical History -   Patient Active Problem List   Diagnosis     CARDIOVASCULAR SCREENING; LDL GOAL LESS THAN 160     Irritable bowel syndrome     GERD (gastroesophageal reflux disease)     History of supraventricular tachycardia     Mild persistent asthma      Family history of breast cancer     Foreign body (FB) in soft tissue     S/P myomectomy     Nausea     Dehydration     S/P ANAID (total abdominal hysterectomy)     Hypovitaminosis D     Pelvic adhesions     Endometriosis     Heberden's nodes     POTS (postural orthostatic tachycardia syndrome)     Hypermobility syndrome     Cervicalgia     Autonomic dysfunction     Benign essential hypertension       Current Medications -   Current Outpatient Medications:      acetaminophen (TYLENOL) 325 MG tablet, Take 2 tablets (650 mg) by mouth every 4 hours as needed for mild pain, Disp: 50 tablet, Rfl: 0     azelastine (ASTELIN) 0.1 % nasal spray, Spray 1 spray into both nostrils 2 times daily, Disp: 30 mL, Rfl: 2     Cholecalciferol (VITAMIN D) 2000 UNITS CAPS, Take 1 capsule by mouth daily, Disp: , Rfl:      fluticasone (FLONASE) 50 MCG/ACT nasal spray, Spray 1 spray into both nostrils daily, Disp: , Rfl:      fluticasone-salmeterol (ADVAIR) 100-50 MCG/DOSE inhaler, Inhale 1 puff into the lungs 2 times daily, Disp: 3 Inhaler, Rfl: 0     ibuprofen (ADVIL/MOTRIN) 600 MG tablet, Take 1 tablet (600 mg) by mouth every 6 hours as needed for other (mild and/or inflammatory pain), Disp: 30 tablet, Rfl: 0     ivabradine 5 MG PO tablet, Take 1 tablet (5 mg) by mouth 2 times daily (with meals), Disp: 180 tablet, Rfl: 4     levalbuterol (XOPENEX HFA) 45 MCG/ACT inhaler, Inhale 1-2 puffs into the lungs every 4 hours as needed for shortness of breath / dyspnea Need appointment in clinic for further refills., Disp: 1 Inhaler, Rfl: 0     OMEPRAZOLE PO, Take 20 mg by mouth every morning, Disp: , Rfl:      order for DME, Equipment being ordered: hinged knee brace, Large, Disp: 1 Device, Rfl: 0     order for DME, Equipment being ordered: wrist splint with thumb support, Disp: 1 Device, Rfl: 0     predniSONE (DELTASONE) 20 MG tablet, Take 3 tabs by mouth daily x 3 days, then 2 tabs daily x 3 days, then 1 tab daily x 3 days, then 1/2 tab daily x 3  days., Disp: 20 tablet, Rfl: 0    Current Facility-Administered Medications:      betamethasone acet & sod phos (CELESTONE) injection 6 mg, 6 mg, , , Castillo Latif MD, 6 mg at 08/08/19 1025     lidocaine (PF) 0.5 % injection SOLN 8 mL, 8 mL, , , Julio Espitia MD, 8 mL at 10/12/20 1058     ropivacaine (NAROPIN) injection 1 mL, 1 mL, , , Castillo Latif MD, 1 mL at 08/08/19 1025     triamcinolone (KENALOG-40) injection 20 mg, 20 mg, , , Carmine Ribeiro MD, 20 mg at 07/23/19 0901     triamcinolone (KENALOG-40) injection 40 mg, 40 mg, , , Julio Espitia MD, 40 mg at 10/12/20 1058    Allergies -   Allergies   Allergen Reactions     Penicillins Swelling     Swelling throat; age 4     Tetracycline GI Disturbance     Other reaction(s): Abdominal Pain  Vomiting; nauseous  Other reaction(s): Abdominal Pain  Vomiting; nauseous       Social History -   Social History     Socioeconomic History     Marital status:      Spouse name: Not on file     Number of children: 2     Years of education: Not on file     Highest education level: Not on file   Occupational History     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[employer]     Employer: [employer]   Social Needs     Financial resource strain: Not on file     Food insecurity     Worry: Not on file     Inability: Not on file     Transportation needs     Medical: Not on file     Non-medical: Not on file   Tobacco Use     Smoking status: Never Smoker     Smokeless tobacco: Never Used   Substance and Sexual Activity     Alcohol use: Yes     Comment: 1@ 3 months     Drug use: No     Sexual activity: Yes     Partners: Male     Birth control/protection: Male Surgical, Female Surgical   Lifestyle     Physical activity     Days per week: Not on file     Minutes per session: Not on file     Stress: Not on file   Relationships     Social connections     Talks on phone: Not on file     Gets together: Not on file     Attends Yazdanism service:  Not on file     Active member of club or organization: Not on file     Attends meetings of clubs or organizations: Not on file     Relationship status: Not on file     Intimate partner violence     Fear of current or ex partner: Not on file     Emotionally abused: Not on file     Physically abused: Not on file     Forced sexual activity: Not on file   Other Topics Concern     Parent/sibling w/ CABG, MI or angioplasty before 65F 55M? No   Social History Narrative     Not on file       Family History -   Family History   Problem Relation Age of Onset     Diabetes Mother      Hypertension Mother      Hyperlipidemia Mother      Arrhythmia Mother      Cardiovascular Father         CHF and COPD     Asthma Father      Breast Cancer Maternal Grandfather      Colon Cancer Maternal Grandfather      Breast Cancer Paternal Grandmother      Breast Cancer Paternal Aunt      C.A.D. Paternal Grandfather      Breast Cancer Cousin      Asthma Son      Diabetes Maternal Grandmother      Hypertension Maternal Grandmother      Breast Cancer Cousin      Breast Cancer Cousin      Breast Cancer Cousin        Physical Exam  /86   Pulse 68   Resp 16   LMP 07/28/2017   SpO2 97%   General - The patient is in no distress.  Alert and oriented to person and place, answers questions and cooperates with examination appropriately.   Voice and Breathing - The patient was breathing comfortably without the use of accessory muscles. There was no wheezing, stridor, or stertor.  The patients voice was clear and strong.  Ears - The auricles are normal. Right tympanic membrane injection site has healed, but has a crust over the injection site. No perforation. No retraction. No fluid or purulence was seen in the external canal or the middle ear. No evidence of infection of the middle ear or external canal, cerumen was normal in appearance.  Eyes - Extraocular movements intact. Sclera were not icteric or injected.  Neurological - Cranial nerves 2  through 12 were grossly intact. House-Brackmann grade 1 out of 6 bilaterally.     Audiologic Studies - An audiogram and tympanogram were performed today as part of the evaluation and personally reviewed. The tympanogram shows a normal Type A curve, with normal canal volume and middle ear pressure.  There is no sign of eustachian tube dysfunction or middle ear effusion.  The audiogram showed a significant down-sloping mid to high high frequency sensorineural hearing loss on the right, asymmetric from the left which only shows mild sensorineural hearing loss in the high tones. No change since last visit.       Assessment and Plan - Laura Jackson is a 50 year old female who returns to me today with sudden right-sided asymmetric sensorineural hearing loss. Started about 10 weeks ago. Etiology is unknown. MRI brain was normal. I performed a right-sided transtympanic dexamethasone injection. Hearing was still the same. Then we tried oral prednisone. Again, no change in hearing.  What she notes helped the most was the pressure in her head.  She is very happy about that symptom being much improved.  She does describe some fluctuating hearing while on the prednisone.  She had one other brief bout of dizziness but hard to know if this is true vertigo.  It is still possible she has Ménière's disease but is really only had 1 severe bout of vertigo.  I recommend hearing aid consultation for the right ear.  I recommend repeat audiogram in 6 months.  She should return sooner if she has recurring bouts of vertigo or worsening hearing loss.      Karri Burnett MD  Otolaryngology  [facility]          Again, thank you for allowing me to participate in the care of your patient.        Sincerely,        Karri Burnett MD

## 2021-03-10 DIAGNOSIS — I47.11 INAPPROPRIATE SINUS TACHYCARDIA (H): ICD-10-CM

## 2021-03-11 ENCOUNTER — VIRTUAL VISIT (OUTPATIENT)
Dept: CARDIOLOGY | Facility: CLINIC | Age: 51
End: 2021-03-11
Attending: INTERNAL MEDICINE
Payer: COMMERCIAL

## 2021-03-11 DIAGNOSIS — R21 RASH AND NONSPECIFIC SKIN ERUPTION: ICD-10-CM

## 2021-03-11 DIAGNOSIS — R42 VERTIGO: ICD-10-CM

## 2021-03-11 DIAGNOSIS — I47.11 INAPPROPRIATE SINUS TACHYCARDIA (H): Primary | ICD-10-CM

## 2021-03-11 PROCEDURE — 99214 OFFICE O/P EST MOD 30 MIN: CPT | Mod: 95 | Performed by: INTERNAL MEDICINE

## 2021-03-11 NOTE — PATIENT INSTRUCTIONS
You were seen in the Electrophysiology Clinic today by: Phil Mitchell MD    Plan:       Follow up visit: 1 year      Your Care Team:  EP Cardiology   Telephone Number     Coreen Aponte RN [phone]     For scheduling appts or procedures:    Gina Walker   [phone]   For the Device Clinic (Pacemakers, ICDs, Loop Recorders)    During business hours: [phone]  After business hours:   [phone]- select option 4 and ask for job code [id number].     On-call cardiologist for after hours or on weekends: [phone], option #4, and ask to speak to the on-call cardiologist.     Cardiovascular Clinic:   [address] [place] [address]. [place] [address]      As always, Thank you for trusting us with your health care needs!

## 2021-03-11 NOTE — LETTER
"3/11/2021      RE: Laura Jackson  [address]  [place] [address]       Dear Colleague,    Thank you for the opportunity to participate in the care of your patient, Laura Jackson, at the [facility] [place] at [facility]. Please see a copy of my visit note below.    Laura is a 50 year old who is being evaluated via a billable video visit.      How would you like to obtain your AVS? MyChart  If the video visit is dropped, the invitation should be resent by: Text to cell phone: [phone] Doximity  Will anyone else be joining your video visit? No        Vitals - Patient Reported  Systolic (Patient Reported): 120  Diastolic (Patient Reported): 80  Weight (Patient Reported): 82.1 kg (181 lb)  Height (Patient Reported): 163.8 cm (5' 4.5\")  BMI (Based on Pt Reported Ht/Wt): 30.59  Pulse (Patient Reported): 72(72-76 bpm resting)  Pain Score: (No SOB)    Vitals were taken and medication reconciled.    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  She has a past history of inappropriate sinus tachycardia and hypertension and has been doing very well on ivabradine 5 mg twice daily.    Additional comorbidities include hypermobility syndrome, asthma, IBS, uterine fibroids s/p hysterectomy, and GERD.     Ivabradine has made a major difference from her perspective and her insurance company has agreed to support ivabradine.    Carlotta was doing exceptionally well until December 2020.  She times intercurrent problems from having received her COVID-19 vaccine.  Subsequent to that she began to complain of flashing lights in her right eye and difficulties with delayed hearing which resulted in at least a period of vertigo.  Additionally she has had some facial flushing.  The symptoms have waxed and waned but she has been treated with steroids during this illness.  There is no specific diagnosis " established.  She has been able to continue to work but the symptoms have diminished her quality of life.  Whether they are attributable to a reaction to COVID-19 vaccine is unclear.  In any case IST symptoms seem to be reasonably well controlled.    At this visit Carlotta has no new cardiac symptoms and is not complaining any cough, sputum, fever or heart failure related complaints.    PAST MEDICAL HISTORY:  Past Medical History:   Diagnosis Date     Benign essential hypertension 7/31/2018     Family history of breast cancer 2/19/2014     Family history of breast cancer      SVT (supraventricular tachycardia):  history of, no recurrence since 2008 10/11/2013    no recurrence since 2008      Uncomplicated asthma        CURRENT MEDICATIONS:  Current Outpatient Medications   Medication Sig Dispense Refill     Cholecalciferol (VITAMIN D) 2000 UNITS CAPS Take 1 capsule by mouth daily       fluticasone (FLONASE) 50 MCG/ACT nasal spray Spray 1 spray into both nostrils daily       fluticasone-salmeterol (ADVAIR) 100-50 MCG/DOSE inhaler Inhale 1 puff into the lungs 2 times daily 3 Inhaler 0     ivabradine 5 MG PO tablet Take 1 tablet (5 mg) by mouth 2 times daily (with meals) 180 tablet 4     levalbuterol (XOPENEX HFA) 45 MCG/ACT inhaler Inhale 1-2 puffs into the lungs every 4 hours as needed for shortness of breath / dyspnea Need appointment in clinic for further refills. 1 Inhaler 0     OMEPRAZOLE PO Take 20 mg by mouth every morning       azelastine (ASTELIN) 0.1 % nasal spray Spray 1 spray into both nostrils 2 times daily (Patient not taking: Reported on 3/9/2021) 30 mL 2     order for DME Equipment being ordered: hinged knee brace, Large (Patient not taking: Reported on 3/11/2021) 1 Device 0     order for DME Equipment being ordered: wrist splint with thumb support (Patient not taking: Reported on 3/11/2021) 1 Device 0     predniSONE (DELTASONE) 20 MG tablet Take 3 tabs by mouth daily x 3 days, then 2 tabs daily x 3  days, then 1 tab daily x 3 days, then 1/2 tab daily x 3 days. (Patient not taking: Reported on 3/9/2021) 20 tablet 0       PAST SURGICAL HISTORY:  Past Surgical History:   Procedure Laterality Date     ABDOMEN SURGERY  6/2017    myectomy     APPENDECTOMY       COLONOSCOPY N/A 8/20/2018    Procedure: COMBINED COLONOSCOPY, SINGLE OR MULTIPLE BIOPSY/POLYPECTOMY BY BIOPSY;;  Surgeon: Osman Hahn MD;  Location: [facility] OR     COLONOSCOPY WITH CO2 INSUFFLATION N/A 8/20/2018    Procedure: COLONOSCOPY WITH CO2 INSUFFLATION;  COLON-SCREENING / LUBKA ;  Surgeon: Osman Hahn MD;  Location: [facility] OR     DAVINCI HYSTERECTOMY TOTAL, SALPINGECTOMY BILATERAL N/A 8/4/2017    Procedure: DAVINCI XI HYSTERECTOMY TOTAL, SALPINGECTOMY BILATERAL;  DAVINCI TOTAL HYSTERECTOMY; BILATERAL SALPINGECTOMY. BILATERAL URETERAL LYSIS. UTEROSACRAL-COLPOPEXY. EXCISION OF ENDOMETRIOSIS;  Surgeon: George Loyola MD;  Location: [facility] OR     DAVINCI LYSIS OF ADHESIONS Bilateral 8/4/2017    Procedure: DAVINCI LYSIS OF ADHESIONS;  BILATERAL URETERAL LYSIS;  Surgeon: George Loyola MD;  Location: [facility] OR     DAVINCI SACROCOLPOPEXY, CYSTOSCOPY, COMBINED N/A 8/4/2017    Procedure: COMBINED DAVINCI SACROCOLPOPEXY, CYSTOSCOPY;  UTEROSACRAL COLPOPEXY;  Surgeon: George Loyola MD;  Location: [facility] OR     DAVINCI XI ASSISTED ABLATION / EXCISION OF ENDOMETRIOSIS  8/4/2017    Procedure: DAVINCI XI ASSISTED ABLATION / EXCISION OF ENDOMETRIOSIS;;  Surgeon: George Loyola MD;  Location: [facility] OR     DILATION AND CURETTAGE N/A 6/20/2017    Procedure: DILATION AND CURETTAGE;  Uterine Curettings and Fibroid Removal, Cook catheter placement; (No hysterectomy done at this time);  Surgeon: Ernestina Saunders MD;  Location: [facility] OR     HYSTERECTOMY, PAP NO LONGER INDICATED       ORTHOPEDIC SURGERY  6/1986    Heel spur , toe surgery     RELEASE CARPAL TUNNEL Right 10/20/2020    Procedure: RIGHT RELEASE, CARPAL TUNNEL;  Surgeon: Rickey Rea MD;   Location: [facility] OR     RELEASE CARPAL TUNNEL Left 11/6/2020    Procedure: LEFT RELEASE, CARPAL TUNNEL;  Surgeon: Rickey Rea MD;  Location: [facility] OR     TONSILLECTOMY         ALLERGIES:     Allergies   Allergen Reactions     Penicillins Swelling     Swelling throat; age 4     Tetracycline GI Disturbance     Other reaction(s): Abdominal Pain  Vomiting; nauseous  Other reaction(s): Abdominal Pain  Vomiting; nauseous       FAMILY HISTORY:  Family History   Problem Relation Age of Onset     Diabetes Mother      Hypertension Mother      Hyperlipidemia Mother      Arrhythmia Mother      Cardiovascular Father         CHF and COPD     Asthma Father      Breast Cancer Maternal Grandfather      Colon Cancer Maternal Grandfather      Breast Cancer Paternal Grandmother      Breast Cancer Paternal Aunt      C.A.D. Paternal Grandfather      Breast Cancer Cousin      Asthma Son      Diabetes Maternal Grandmother      Hypertension Maternal Grandmother      Breast Cancer Cousin      Breast Cancer Cousin      Breast Cancer Cousin          SOCIAL HISTORY:  Social History     Tobacco Use     Smoking status: Never Smoker     Smokeless tobacco: Never Used   Substance Use Topics     Alcohol use: Yes     Comment: 1@ 3 months     Drug use: No       ROS:   Constitutional: No fever, chills, or sweats. Weight stable.   ENT: See HPI  Cardiovascular: As per HPI.   Respiratory: No cough, hemoptysis.    GI: No nausea, vomiting, hematemesis,   : No hematuria.   Integument: See HPI  Psychiatric: Negative.   Hematologic: no easy bleeding.  Neuro: Negative.   Endocrinology: No significant heat or cold intolerance   Musculoskeletal: No myalgia.    Exam:  [facility] 07/28/2017   GENERAL APPEARANCE: healthy, alert and no distress  RESPIRATORY:- no rales, rhonchi or wheezes, no use of accessory muscles, no retractions, respirations are unlabored, normal respiratory rate    NEURO: alert and oriented to person/place/time, normal speech,and  affect    SKIN: no ecchymoses, facial skin rash noted since COVID-19 vaccine    Labs:  CBC RESULTS:   Lab Results   Component Value Date    WBC 5.1 03/15/2019    RBC 4.76 03/15/2019    HGB 13.4 10/08/2020    HCT 43.4 03/15/2019    MCV 91 03/15/2019    MCH 30.0 03/15/2019    MCHC 32.9 03/15/2019    RDW 13.3 03/15/2019     03/15/2019       BMP RESULTS:  Lab Results   Component Value Date     10/08/2020    POTASSIUM 4.2 10/08/2020    CHLORIDE 104 10/08/2020    CO2 27 10/08/2020    ANIONGAP 7 10/08/2020    GLC 87 10/08/2020    BUN 16 10/08/2020    CR 0.89 10/08/2020    GFRESTIMATED 74 10/08/2020    GFRESTBLACK 86 10/08/2020    AZAEL 9.3 10/08/2020        INR RESULTS:  Lab Results   Component Value Date    INR 1.12 06/20/2017    INR 1.12 06/20/2017       Procedures:  PULMONARY FUNCTION TESTS:   No flowsheet data found.      ECHOCARDIOGRAM:   No results found for this or any previous visit (from the past 8760 hour(s)).      Assessment and Plan:     1.  Inappropriate sinus tachycardia well controlled with ivabradine resulting in heart rates in the 80s  2.  Systemic reaction since December possibly related to COVID-19 vaccination with skin rash, visual disturbance, hearing loss, and transient vertigo.    Plan  1.  Continue current treatment with ivabradine  2.  Follow-up in 1 years time or earlier should patient have new symptoms    Video on 2: 00 p.m.; video off 2: 15 p.m.  Total elapsed time with documentation 20 minutes  Platform Doximity  Patient at home; clinic [facility] heart    I very much appreciated the opportunity to see and assess Laura Jackson in the clinic today. Please do not hesitate to contact my office if you have any questions or concerns.      Phil Mitchell MD  Cardiac Arrhythmia Service  [facility]  [phone]      PHIL PEREZ

## 2021-03-11 NOTE — PROGRESS NOTES
"Laura is a 50 year old who is being evaluated via a billable video visit.      How would you like to obtain your AVS? MyChart  If the video visit is dropped, the invitation should be resent by: Text to cell phone: [phone] Doximity  Will anyone else be joining your video visit? No        Vitals - Patient Reported  Systolic (Patient Reported): 120  Diastolic (Patient Reported): 80  Weight (Patient Reported): 82.1 kg (181 lb)  Height (Patient Reported): 163.8 cm (5' 4.5\")  BMI (Based on Pt Reported Ht/Wt): 30.59  Pulse (Patient Reported): 72(72-76 bpm resting)  Pain Score: (No SOB)    Vitals were taken and medication reconciled.    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  She has a past history of inappropriate sinus tachycardia and hypertension and has been doing very well on ivabradine 5 mg twice daily.    Additional comorbidities include hypermobility syndrome, asthma, IBS, uterine fibroids s/p hysterectomy, and GERD.     Ivabradine has made a major difference from her perspective and her insurance company has agreed to support ivabradine.    Carlotta was doing exceptionally well until December 2020.  She times intercurrent problems from having received her COVID-19 vaccine.  Subsequent to that she began to complain of flashing lights in her right eye and difficulties with delayed hearing which resulted in at least a period of vertigo.  Additionally she has had some facial flushing.  The symptoms have waxed and waned but she has been treated with steroids during this illness.  There is no specific diagnosis established.  She has been able to continue to work but the symptoms have diminished her quality of life.  Whether they are attributable to a reaction to COVID-19 vaccine is unclear.  In any case IST symptoms seem to be reasonably well controlled.    At this visit Carlotta has no new cardiac symptoms and is not complaining any cough, sputum, " fever or heart failure related complaints.    PAST MEDICAL HISTORY:  Past Medical History:   Diagnosis Date     Benign essential hypertension 7/31/2018     Family history of breast cancer 2/19/2014     Family history of breast cancer      SVT (supraventricular tachycardia):  history of, no recurrence since 2008 10/11/2013    no recurrence since 2008      Uncomplicated asthma        CURRENT MEDICATIONS:  Current Outpatient Medications   Medication Sig Dispense Refill     Cholecalciferol (VITAMIN D) 2000 UNITS CAPS Take 1 capsule by mouth daily       fluticasone (FLONASE) 50 MCG/ACT nasal spray Spray 1 spray into both nostrils daily       fluticasone-salmeterol (ADVAIR) 100-50 MCG/DOSE inhaler Inhale 1 puff into the lungs 2 times daily 3 Inhaler 0     ivabradine 5 MG PO tablet Take 1 tablet (5 mg) by mouth 2 times daily (with meals) 180 tablet 4     levalbuterol (XOPENEX HFA) 45 MCG/ACT inhaler Inhale 1-2 puffs into the lungs every 4 hours as needed for shortness of breath / dyspnea Need appointment in clinic for further refills. 1 Inhaler 0     OMEPRAZOLE PO Take 20 mg by mouth every morning       azelastine (ASTELIN) 0.1 % nasal spray Spray 1 spray into both nostrils 2 times daily (Patient not taking: Reported on 3/9/2021) 30 mL 2     order for DME Equipment being ordered: hinged knee brace, Large (Patient not taking: Reported on 3/11/2021) 1 Device 0     order for DME Equipment being ordered: wrist splint with thumb support (Patient not taking: Reported on 3/11/2021) 1 Device 0     predniSONE (DELTASONE) 20 MG tablet Take 3 tabs by mouth daily x 3 days, then 2 tabs daily x 3 days, then 1 tab daily x 3 days, then 1/2 tab daily x 3 days. (Patient not taking: Reported on 3/9/2021) 20 tablet 0       PAST SURGICAL HISTORY:  Past Surgical History:   Procedure Laterality Date     ABDOMEN SURGERY  6/2017    myectomy     APPENDECTOMY       COLONOSCOPY N/A 8/20/2018    Procedure: COMBINED COLONOSCOPY, SINGLE OR MULTIPLE  BIOPSY/POLYPECTOMY BY BIOPSY;;  Surgeon: Osman Hahn MD;  Location: [facility]     COLONOSCOPY WITH CO2 INSUFFLATION N/A 8/20/2018    Procedure: COLONOSCOPY WITH CO2 INSUFFLATION;  COLON-SCREENING / LUBKA ;  Surgeon: Osman Hahn MD;  Location: [facility] OR     DAVINCI HYSTERECTOMY TOTAL, SALPINGECTOMY BILATERAL N/A 8/4/2017    Procedure: DAVINCI XI HYSTERECTOMY TOTAL, SALPINGECTOMY BILATERAL;  DAVINCI TOTAL HYSTERECTOMY; BILATERAL SALPINGECTOMY. BILATERAL URETERAL LYSIS. UTEROSACRAL-COLPOPEXY. EXCISION OF ENDOMETRIOSIS;  Surgeon: George Loyola MD;  Location: [facility] OR     DAVINCI LYSIS OF ADHESIONS Bilateral 8/4/2017    Procedure: DAVINCI LYSIS OF ADHESIONS;  BILATERAL URETERAL LYSIS;  Surgeon: George Loyola MD;  Location: [facility]     DAVINCI SACROCOLPOPEXY, CYSTOSCOPY, COMBINED N/A 8/4/2017    Procedure: COMBINED DAVINCI SACROCOLPOPEXY, CYSTOSCOPY;  UTEROSACRAL COLPOPEXY;  Surgeon: George Loyola MD;  Location: [facility]     DAVINCI XI ASSISTED ABLATION / EXCISION OF ENDOMETRIOSIS  8/4/2017    Procedure: DAVINCI XI ASSISTED ABLATION / EXCISION OF ENDOMETRIOSIS;;  Surgeon: George Loyola MD;  Location: [facility]     DILATION AND CURETTAGE N/A 6/20/2017    Procedure: DILATION AND CURETTAGE;  Uterine Curettings and Fibroid Removal, Cook catheter placement; (No hysterectomy done at this time);  Surgeon: Ernestina Saunders MD;  Location: [facility]     HYSTERECTOMY, PAP NO LONGER INDICATED       ORTHOPEDIC SURGERY  6/1986    Heel spur , toe surgery     RELEASE CARPAL TUNNEL Right 10/20/2020    Procedure: RIGHT RELEASE, CARPAL TUNNEL;  Surgeon: Rickey Rea MD;  Location: [facility] OR     RELEASE CARPAL TUNNEL Left 11/6/2020    Procedure: LEFT RELEASE, CARPAL TUNNEL;  Surgeon: Rickey Rea MD;  Location: [facility] OR     TONSILLECTOMY         ALLERGIES:     Allergies   Allergen Reactions     Penicillins Swelling     Swelling throat; age 4     Tetracycline GI Disturbance     Other reaction(s):  Abdominal Pain  Vomiting; nauseous  Other reaction(s): Abdominal Pain  Vomiting; nauseous       FAMILY HISTORY:  Family History   Problem Relation Age of Onset     Diabetes Mother      Hypertension Mother      Hyperlipidemia Mother      Arrhythmia Mother      Cardiovascular Father         CHF and COPD     Asthma Father      Breast Cancer Maternal Grandfather      Colon Cancer Maternal Grandfather      Breast Cancer Paternal Grandmother      Breast Cancer Paternal Aunt      DEANA Paternal Grandfather      Breast Cancer Cousin      Asthma Son      Diabetes Maternal Grandmother      Hypertension Maternal Grandmother      Breast Cancer Cousin      Breast Cancer Cousin      Breast Cancer Cousin          SOCIAL HISTORY:  Social History     Tobacco Use     Smoking status: Never Smoker     Smokeless tobacco: Never Used   Substance Use Topics     Alcohol use: Yes     Comment: 1@ 3 months     Drug use: No       ROS:   Constitutional: No fever, chills, or sweats. Weight stable.   ENT: See HPI  Cardiovascular: As per HPI.   Respiratory: No cough, hemoptysis.    GI: No nausea, vomiting, hematemesis,   : No hematuria.   Integument: See HPI  Psychiatric: Negative.   Hematologic: no easy bleeding.  Neuro: Negative.   Endocrinology: No significant heat or cold intolerance   Musculoskeletal: No myalgia.    Exam:  LMP 07/28/2017   GENERAL APPEARANCE: healthy, alert and no distress  RESPIRATORY:- no rales, rhonchi or wheezes, no use of accessory muscles, no retractions, respirations are unlabored, normal respiratory rate    NEURO: alert and oriented to person/place/time, normal speech,and affect    SKIN: no ecchymoses, facial skin rash noted since COVID-19 vaccine    Labs:  CBC RESULTS:   Lab Results   Component Value Date    WBC 5.1 03/15/2019    RBC 4.76 03/15/2019    HGB 13.4 10/08/2020    HCT 43.4 03/15/2019    MCV 91 03/15/2019    MCH 30.0 03/15/2019    MCHC 32.9 03/15/2019    RDW 13.3 03/15/2019     03/15/2019       BMP  RESULTS:  Lab Results   Component Value Date     10/08/2020    POTASSIUM 4.2 10/08/2020    CHLORIDE 104 10/08/2020    CO2 27 10/08/2020    ANIONGAP 7 10/08/2020    GLC 87 10/08/2020    BUN 16 10/08/2020    CR 0.89 10/08/2020    GFRESTIMATED 74 10/08/2020    GFRESTBLACK 86 10/08/2020    AZAEL 9.3 10/08/2020        INR RESULTS:  Lab Results   Component Value Date    INR 1.12 06/20/2017    INR 1.12 06/20/2017       Procedures:  PULMONARY FUNCTION TESTS:   No flowsheet data found.      ECHOCARDIOGRAM:   No results found for this or any previous visit (from the past 8760 hour(s)).      Assessment and Plan:     1.  Inappropriate sinus tachycardia well controlled with ivabradine resulting in heart rates in the 80s  2.  Systemic reaction since December possibly related to COVID-19 vaccination with skin rash, visual disturbance, hearing loss, and transient vertigo.    Plan  1.  Continue current treatment with ivabradine  2.  Follow-up in 1 years time or earlier should patient have new symptoms    Video on 2: 00 p.m.; video off 2: 15 p.m.  Total elapsed time with documentation 20 minutes  Platform Doximity  Patient at home; clinic [facility] heart    I very much appreciated the opportunity to see and assess Laura Jackson in the clinic today. Please do not hesitate to contact my office if you have any questions or concerns.      Phil Mitchell MD  Cardiac Arrhythmia Service  [facility]  [phone]      PHIL PEREZ

## 2021-03-22 ENCOUNTER — OFFICE VISIT (OUTPATIENT)
Dept: AUDIOLOGY | Facility: CLINIC | Age: 51
End: 2021-03-22
Payer: COMMERCIAL

## 2021-03-22 PROCEDURE — 99207 PR NO CHARGE LOS: CPT | Performed by: AUDIOLOGIST

## 2021-03-22 PROCEDURE — 92590 PR HEARING AID EXAM MONAURAL: CPT | Performed by: AUDIOLOGIST

## 2021-03-22 NOTE — PROGRESS NOTES
AUDIOLOGY REPORT    SUBJECTIVE: Laura Jackson is a 50 year old female was seen in the Audiology Clinic at [facility] on 3/22/21 to discuss concerns with hearing and functional communication difficulties. Laura has been seen previously on 3/9/2021, and results revealed a asymmetrical sensorineural hearing loss.  The patient was medically evaluated and determined to be cleared for a right hearing aid by Dr. Burnett. 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    OBJECTIVE:  Patient is a hearing aid candidate. Patient would like to move forward with a hearing aid evaluation today. Therefore, the patient was presented with different options for amplification to help aid in communication. Discussed styles, levels of technology and monaural vs. binaural fitting.     The hearing aid(s) mutually chosen were:  Right: Oticon More 3  COLOR: 90 Chroma Beige  BATTERY SIZE: rechargeable  EARMOLD/TIPS: Small/Medium double vent dome  CANAL/ LENGTH: 2 85dB    Otoscopy revealed ears are clear of cerumen bilaterally.     ASSESSMENT:   Bilateral asymmetric sensorineural hearing loss      Reviewed purchase information and warranty information with patient. The 45 day trial period was explained to patient. The patient was given a copy of the [place] Department of [employer] consumer brochure on purchasing hearing instruments. Patient risk factors have been provided to the patient in writing prior to the sale of the hearing aid per FDA regulation. The risk factors are also available in the User Instructional Booklet to be presented on the day of the hearing aid fitting. Hearing aid(s) ordered. Hearing aid evaluation completed. Patient was advised to check with their insurance to ascertain of they are eligible for any hearing aid benefits.     PLAN: Laura is scheduled to return in 2-3 weeks for a hearing aid fitting and  programming. Purchase agreement will be completed on that date. Please contact this clinic with any questions or concerns.      Hardy Noriega CCC-A  Licensed Audiologist #[id number]  3/22/2021

## 2021-03-24 NOTE — TELEPHONE ENCOUNTER
Checking status on refill request from 3-10-21 for Jesús. Please let pharmacy know if approved / denied. Thanks    Kavon Negrete  Pharmacy Float Tech  On Behalf of [facility]

## 2021-03-26 DIAGNOSIS — Z12.31 VISIT FOR SCREENING MAMMOGRAM: ICD-10-CM

## 2021-03-26 PROCEDURE — 77067 SCR MAMMO BI INCL CAD: CPT | Mod: TC | Performed by: RADIOLOGY

## 2021-03-28 DIAGNOSIS — J45.30 MILD PERSISTENT ASTHMA WITHOUT COMPLICATION: ICD-10-CM

## 2021-03-29 NOTE — TELEPHONE ENCOUNTER
Health Call Center    Phone Message    May a detailed message be left on voicemail: no     Reason for Call: Medication Refill Request    Has the patient contacted the pharmacy for the refill? Yes   Name of medication being requested: Corlanor 5mg  Provider who prescribed the medication: Dr. Mitchell  Pharmacy: [facility] [place] - [address] [facility]  Date medication is needed: 03/29/2021    Lima reports the request was put in two weeks ago.  Please give Lima a call back to discuss questions and concerns.      Action Taken: Message routed to:  [facility] ([facility]  Cardiology    Travel Screening: Not Applicable

## 2021-03-30 ENCOUNTER — OFFICE VISIT (OUTPATIENT)
Dept: OTOLARYNGOLOGY | Facility: CLINIC | Age: 51
End: 2021-03-30
Payer: COMMERCIAL

## 2021-03-30 VITALS
RESPIRATION RATE: 16 BRPM | OXYGEN SATURATION: 99 % | SYSTOLIC BLOOD PRESSURE: 151 MMHG | DIASTOLIC BLOOD PRESSURE: 91 MMHG | HEART RATE: 75 BPM

## 2021-03-30 DIAGNOSIS — H72.91 PERFORATION OF TYMPANIC MEMBRANE, RIGHT: ICD-10-CM

## 2021-03-30 DIAGNOSIS — H90.3 SNHL (SENSORY-NEURAL HEARING LOSS), ASYMMETRICAL: Primary | ICD-10-CM

## 2021-03-30 PROCEDURE — 99213 OFFICE O/P EST LOW 20 MIN: CPT | Performed by: OTOLARYNGOLOGY

## 2021-03-30 NOTE — LETTER
3/30/2021         RE: Laura Jackson  [address]  [place] [address]        Dear Colleague,    Thank you for referring your patient, Laura Jackson, to the [facility]. Please see a copy of my visit note below.    Chief Complaint - sudden sensorineural hearing loss recheck    History of Present Illness - Laura Jackson is a 50 year old female who returns to me today with hearing loss in the right ear. About 3 months ago she she noted hearing loss with head pressure and right headache. She felt she was in a fog. Then about 5-6 weeks ago she developed significant vertigo (it lasted for a few hours). The hearing loss is constant. The vertigo has gone away. No prior vertigo episodes. There is no history of chronic ear disease or ear surgery. She had ear infections as a child, no tubes. With regards to recreational, , and work-related noise exposure she denies any. No family history of hearing loss at a young age. She tried ear drops, no help. I noted asymmetric right sensorineural hearing loss last week. She has POTS and she didn't want to try oral prednisone initially. Therefore, we proceeded with a right transtympanic dexamethasone injection, but her hearing didn't improve. MRI brain was normal. She decided to proceed with prednisone. She returns and notes the hearing did not improve. However, the head pressure greatly improved. She is happy about that. She does describe fluctuation of the hearing in the right ear while on prednisone, but now it seems stable. Tinnitus is improved. She noted only a very mild bout of dizziness since last visit. Lasted 15 minutes.     She returns with intermittent right ear pain. Has worn a hat as cold or wind bother right ear. It's sensitive. Moving it bothers her. She has sinus pressure. A few days ago it was the worse. Hearing is stable. She has constant tinnitus. No vertigo since last visit. No drainage. When she blows her nose she  sometimes can hear better right ear.     Past Medical History -   Patient Active Problem List   Diagnosis     CARDIOVASCULAR SCREENING; LDL GOAL LESS THAN 160     Irritable bowel syndrome     GERD (gastroesophageal reflux disease)     History of supraventricular tachycardia     Mild persistent asthma     Family history of breast cancer     Foreign body (FB) in soft tissue     S/P myomectomy     Nausea     Dehydration     S/P ANAID (total abdominal hysterectomy)     Hypovitaminosis D     Pelvic adhesions     Endometriosis     Heberden's nodes     POTS (postural orthostatic tachycardia syndrome)     Hypermobility syndrome     Cervicalgia     Autonomic dysfunction     Benign essential hypertension       Current Medications -   Current Outpatient Medications:      azelastine (ASTELIN) 0.1 % nasal spray, Spray 1 spray into both nostrils 2 times daily (Patient not taking: Reported on 3/9/2021), Disp: 30 mL, Rfl: 2     Cholecalciferol (VITAMIN D) 2000 UNITS CAPS, Take 1 capsule by mouth daily, Disp: , Rfl:      fluticasone (FLONASE) 50 MCG/ACT nasal spray, Spray 1 spray into both nostrils daily, Disp: , Rfl:      fluticasone-salmeterol (ADVAIR) 100-50 MCG/DOSE inhaler, Inhale 1 puff into the lungs 2 times daily, Disp: 3 Inhaler, Rfl: 0     ivabradine 5 MG PO tablet, Take 1 tablet (5 mg) by mouth 2 times daily (with meals), Disp: 180 tablet, Rfl: 4     levalbuterol (XOPENEX HFA) 45 MCG/ACT inhaler, Inhale 1-2 puffs into the lungs every 4 hours as needed for shortness of breath / dyspnea Need appointment in clinic for further refills., Disp: 1 Inhaler, Rfl: 0     OMEPRAZOLE PO, Take 20 mg by mouth every morning, Disp: , Rfl:      order for DME, Equipment being ordered: hinged knee brace, Large (Patient not taking: Reported on 3/11/2021), Disp: 1 Device, Rfl: 0     order for DME, Equipment being ordered: wrist splint with thumb support (Patient not taking: Reported on 3/11/2021), Disp: 1 Device, Rfl: 0     predniSONE  (DELTASONE) 20 MG tablet, Take 3 tabs by mouth daily x 3 days, then 2 tabs daily x 3 days, then 1 tab daily x 3 days, then 1/2 tab daily x 3 days. (Patient not taking: Reported on 3/9/2021), Disp: 20 tablet, Rfl: 0    Current Facility-Administered Medications:      betamethasone acet & sod phos (CELESTONE) injection 6 mg, 6 mg, , , Castillo Latif MD, 6 mg at 08/08/19 1025     lidocaine (PF) 0.5 % injection SOLN 8 mL, 8 mL, , , Julio Espitia MD, 8 mL at 10/12/20 1058     ropivacaine (NAROPIN) injection 1 mL, 1 mL, , , Castillo Latif MD, 1 mL at 08/08/19 1025     triamcinolone (KENALOG-40) injection 20 mg, 20 mg, , , Carmine Ribeiro MD, 20 mg at 07/23/19 0901     triamcinolone (KENALOG-40) injection 40 mg, 40 mg, , , Julio Espitia MD, 40 mg at 10/12/20 1058    Allergies -   Allergies   Allergen Reactions     Penicillins Swelling     Swelling throat; age 4     Tetracycline GI Disturbance     Other reaction(s): Abdominal Pain  Vomiting; nauseous  Other reaction(s): Abdominal Pain  Vomiting; nauseous       Social History -   Social History     Socioeconomic History     Marital status:      Spouse name: Not on file     Number of children: 2     Years of education: Not on file     Highest education level: Not on file   Occupational History     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[employer]     Employer: [employer]   Social Needs     Financial resource strain: Not on file     Food insecurity     Worry: Not on file     Inability: Not on file     Transportation needs     Medical: Not on file     Non-medical: Not on file   Tobacco Use     Smoking status: Never Smoker     Smokeless tobacco: Never Used   Substance and Sexual Activity     Alcohol use: Yes     Comment: 1@ 3 months     Drug use: No     Sexual activity: Yes     Partners: Male     Birth control/protection: Male Surgical, Female Surgical   Lifestyle     Physical activity     Days per week: Not on file      Minutes per session: Not on file     Stress: Not on file   Relationships     Social connections     Talks on phone: Not on file     Gets together: Not on file     Attends Nondenominational service: Not on file     Active member of club or organization: Not on file     Attends meetings of clubs or organizations: Not on file     Relationship status: Not on file     Intimate partner violence     Fear of current or ex partner: Not on file     Emotionally abused: Not on file     Physically abused: Not on file     Forced sexual activity: Not on file   Other Topics Concern     Parent/sibling w/ CABG, MI or angioplasty before 65F 55M? No   Social History Narrative     Not on file       Family History -   Family History   Problem Relation Age of Onset     Diabetes Mother      Hypertension Mother      Hyperlipidemia Mother      Arrhythmia Mother      Cardiovascular Father         CHF and COPD     Asthma Father      Breast Cancer Maternal Grandfather      Colon Cancer Maternal Grandfather      Breast Cancer Paternal Grandmother      Breast Cancer Paternal Aunt      C.A.D. Paternal Grandfather      Breast Cancer Cousin      Asthma Son      Diabetes Maternal Grandmother      Hypertension Maternal Grandmother      Breast Cancer Cousin      Breast Cancer Cousin      Breast Cancer Cousin        Physical Exam  General - The patient is in no distress.  Alert and oriented to person and place, answers questions and cooperates with examination appropriately.   Voice and Breathing - The patient was breathing comfortably without the use of accessory muscles. There was no wheezing, stridor, or stertor.  The patients voice was clear and strong.  Ears - The auricles are normal. Right tympanic membrane injection site has a crust over the injection site with some perforation exposed. I removed the crust with the microscope and an alligator. She has a right posterior and inferior tympanic membrane perforation, 3 mm. No infection. No fluid or purulence  was seen in the external canal or the middle ear. No evidence of infection of the middle ear or external canal, cerumen was normal in appearance.  Eyes - Extraocular movements intact. Sclera were not icteric or injected.  Neurological - Cranial nerves 2 through 12 were grossly intact. House-Brackmann grade 1 out of 6 bilaterally.       Assessment and Plan - Laura Jackson is a 50 year old female who returns to me today with sudden right-sided asymmetric sensorineural hearing loss. Started about 3 months ago. Etiology is unknown. MRI brain was normal. I performed a right-sided transtympanic dexamethasone injection. Hearing was still the same. Then we tried oral prednisone. Again, no change in hearing.  She had pressure in her head that improved with oral prednisone.  She does describe some fluctuating hearing while on the prednisone.  She had one other brief bout of dizziness but hard to know if this is true vertigo.  It is still possible she has Ménière's disease but has really only had 1 severe bout of vertigo.  I recommend hearing aid consultation for the right ear.     She returned with intermittent right ear pain. She has a right tympanic membrane perforation. I cleaned a crust off of this. I discussed water precautions. Return in 2 months to see if this is closing.      Karri Burnett MD  Otolaryngology  [facility]          Again, thank you for allowing me to participate in the care of your patient.        Sincerely,        Karri Burnett MD

## 2021-03-30 NOTE — PROGRESS NOTES
Chief Complaint - sudden sensorineural hearing loss recheck    History of Present Illness - Laura Jackson is a 50 year old female who returns to me today with hearing loss in the right ear. About 3 months ago she she noted hearing loss with head pressure and right headache. She felt she was in a fog. Then about 5-6 weeks ago she developed significant vertigo (it lasted for a few hours). The hearing loss is constant. The vertigo has gone away. No prior vertigo episodes. There is no history of chronic ear disease or ear surgery. She had ear infections as a child, no tubes. With regards to recreational, , and work-related noise exposure she denies any. No family history of hearing loss at a young age. She tried ear drops, no help. I noted asymmetric right sensorineural hearing loss last week. She has POTS and she didn't want to try oral prednisone initially. Therefore, we proceeded with a right transtympanic dexamethasone injection, but her hearing didn't improve. MRI brain was normal. She decided to proceed with prednisone. She returns and notes the hearing did not improve. However, the head pressure greatly improved. She is happy about that. She does describe fluctuation of the hearing in the right ear while on prednisone, but now it seems stable. Tinnitus is improved. She noted only a very mild bout of dizziness since last visit. Lasted 15 minutes.     She returns with intermittent right ear pain. Has worn a hat as cold or wind bother right ear. It's sensitive. Moving it bothers her. She has sinus pressure. A few days ago it was the worse. Hearing is stable. She has constant tinnitus. No vertigo since last visit. No drainage. When she blows her nose she sometimes can hear better right ear.     Past Medical History -   Patient Active Problem List   Diagnosis     CARDIOVASCULAR SCREENING; LDL GOAL LESS THAN 160     Irritable bowel syndrome     GERD (gastroesophageal reflux disease)     History of  supraventricular tachycardia     Mild persistent asthma     Family history of breast cancer     Foreign body (FB) in soft tissue     S/P myomectomy     Nausea     Dehydration     S/P ANAID (total abdominal hysterectomy)     Hypovitaminosis D     Pelvic adhesions     Endometriosis     Heberden's nodes     POTS (postural orthostatic tachycardia syndrome)     Hypermobility syndrome     Cervicalgia     Autonomic dysfunction     Benign essential hypertension       Current Medications -   Current Outpatient Medications:      azelastine (ASTELIN) 0.1 % nasal spray, Spray 1 spray into both nostrils 2 times daily (Patient not taking: Reported on 3/9/2021), Disp: 30 mL, Rfl: 2     Cholecalciferol (VITAMIN D) 2000 UNITS CAPS, Take 1 capsule by mouth daily, Disp: , Rfl:      fluticasone (FLONASE) 50 MCG/ACT nasal spray, Spray 1 spray into both nostrils daily, Disp: , Rfl:      fluticasone-salmeterol (ADVAIR) 100-50 MCG/DOSE inhaler, Inhale 1 puff into the lungs 2 times daily, Disp: 3 Inhaler, Rfl: 0     ivabradine 5 MG PO tablet, Take 1 tablet (5 mg) by mouth 2 times daily (with meals), Disp: 180 tablet, Rfl: 4     levalbuterol (XOPENEX HFA) 45 MCG/ACT inhaler, Inhale 1-2 puffs into the lungs every 4 hours as needed for shortness of breath / dyspnea Need appointment in clinic for further refills., Disp: 1 Inhaler, Rfl: 0     OMEPRAZOLE PO, Take 20 mg by mouth every morning, Disp: , Rfl:      order for DME, Equipment being ordered: hinged knee brace, Large (Patient not taking: Reported on 3/11/2021), Disp: 1 Device, Rfl: 0     order for DME, Equipment being ordered: wrist splint with thumb support (Patient not taking: Reported on 3/11/2021), Disp: 1 Device, Rfl: 0     predniSONE (DELTASONE) 20 MG tablet, Take 3 tabs by mouth daily x 3 days, then 2 tabs daily x 3 days, then 1 tab daily x 3 days, then 1/2 tab daily x 3 days. (Patient not taking: Reported on 3/9/2021), Disp: 20 tablet, Rfl: 0    Current Facility-Administered  Medications:      betamethasone acet & sod phos (CELESTONE) injection 6 mg, 6 mg, , , Castillo Latif MD, 6 mg at 08/08/19 1025     lidocaine (PF) 0.5 % injection SOLN 8 mL, 8 mL, , , Julio Espitia MD, 8 mL at 10/12/20 1058     ropivacaine (NAROPIN) injection 1 mL, 1 mL, , , Castillo Latif MD, 1 mL at 08/08/19 1025     triamcinolone (KENALOG-40) injection 20 mg, 20 mg, , , Carmine Ribeiro MD, 20 mg at 07/23/19 0901     triamcinolone (KENALOG-40) injection 40 mg, 40 mg, , , Julio Espitia MD, 40 mg at 10/12/20 1058    Allergies -   Allergies   Allergen Reactions     Penicillins Swelling     Swelling throat; age 4     Tetracycline GI Disturbance     Other reaction(s): Abdominal Pain  Vomiting; nauseous  Other reaction(s): Abdominal Pain  Vomiting; nauseous       Social History -   Social History     Socioeconomic History     Marital status:      Spouse name: Not on file     Number of children: 2     Years of education: Not on file     Highest education level: Not on file   Occupational History     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[employer]     Employer: [employer]   Social Needs     Financial resource strain: Not on file     Food insecurity     Worry: Not on file     Inability: Not on file     Transportation needs     Medical: Not on file     Non-medical: Not on file   Tobacco Use     Smoking status: Never Smoker     Smokeless tobacco: Never Used   Substance and Sexual Activity     Alcohol use: Yes     Comment: 1@ 3 months     Drug use: No     Sexual activity: Yes     Partners: Male     Birth control/protection: Male Surgical, Female Surgical   Lifestyle     Physical activity     Days per week: Not on file     Minutes per session: Not on file     Stress: Not on file   Relationships     Social connections     Talks on phone: Not on file     Gets together: Not on file     Attends [employer] service: Not on file     Active member of club or organization: Not on  file     Attends meetings of clubs or organizations: Not on file     Relationship status: Not on file     Intimate partner violence     Fear of current or ex partner: Not on file     Emotionally abused: Not on file     Physically abused: Not on file     Forced sexual activity: Not on file   Other Topics Concern     Parent/sibling w/ CABG, MI or angioplasty before 65F 55M? No   Social History Narrative     Not on file       Family History -   Family History   Problem Relation Age of Onset     Diabetes Mother      Hypertension Mother      Hyperlipidemia Mother      Arrhythmia Mother      Cardiovascular Father         CHF and COPD     Asthma Father      Breast Cancer Maternal Grandfather      Colon Cancer Maternal Grandfather      Breast Cancer Paternal Grandmother      Breast Cancer Paternal Aunt      C.A.D. Paternal Grandfather      Breast Cancer Cousin      Asthma Son      Diabetes Maternal Grandmother      Hypertension Maternal Grandmother      Breast Cancer Cousin      Breast Cancer Cousin      Breast Cancer Cousin        Physical Exam  General - The patient is in no distress.  Alert and oriented to person and place, answers questions and cooperates with examination appropriately.   Voice and Breathing - The patient was breathing comfortably without the use of accessory muscles. There was no wheezing, stridor, or stertor.  The patients voice was clear and strong.  Ears - The auricles are normal. Right tympanic membrane injection site has a crust over the injection site with some perforation exposed. I removed the crust with the microscope and an alligator. She has a right posterior and inferior tympanic membrane perforation, 3 mm. No infection. No fluid or purulence was seen in the external canal or the middle ear. No evidence of infection of the middle ear or external canal, cerumen was normal in appearance.  Eyes - Extraocular movements intact. Sclera were not icteric or injected.  Neurological - Cranial nerves  2 through 12 were grossly intact. House-Brackmann grade 1 out of 6 bilaterally.       Assessment and Plan - Laura Jackson is a 50 year old female who returns to me today with sudden right-sided asymmetric sensorineural hearing loss. Started about 3 months ago. Etiology is unknown. MRI brain was normal. I performed a right-sided transtympanic dexamethasone injection. Hearing was still the same. Then we tried oral prednisone. Again, no change in hearing.  She had pressure in her head that improved with oral prednisone.  She does describe some fluctuating hearing while on the prednisone.  She had one other brief bout of dizziness but hard to know if this is true vertigo.  It is still possible she has Ménière's disease but has really only had 1 severe bout of vertigo.  I recommend hearing aid consultation for the right ear.     She returned with intermittent right ear pain. She has a right tympanic membrane perforation. I cleaned a crust off of this. I discussed water precautions. Return in 2 months to see if this is closing.      Karri Burnett MD  Otolaryngology  [facility]

## 2021-03-31 DIAGNOSIS — H90.3 ASYMMETRICAL SENSORINEURAL HEARING LOSS: ICD-10-CM

## 2021-03-31 DIAGNOSIS — M25.50 MULTIPLE JOINT PAIN: ICD-10-CM

## 2021-03-31 DIAGNOSIS — G90.A POTS (POSTURAL ORTHOSTATIC TACHYCARDIA SYNDROME): ICD-10-CM

## 2021-03-31 DIAGNOSIS — H53.8 FLASHING LIGHTS SEEN: ICD-10-CM

## 2021-03-31 PROBLEM — N73.6 PELVIC ADHESIONS: Status: RESOLVED | Noted: 2017-11-09 | Resolved: 2021-03-31

## 2021-03-31 PROBLEM — E86.0 DEHYDRATION: Status: RESOLVED | Noted: 2017-08-27 | Resolved: 2021-03-31

## 2021-03-31 LAB
ALBUMIN SERPL-MCNC: 4.2 G/DL (ref 3.4–5)
ALP SERPL-CCNC: 61 U/L (ref 40–150)
ALT SERPL W P-5'-P-CCNC: 62 U/L (ref 0–50)
ANION GAP SERPL CALCULATED.3IONS-SCNC: 3 MMOL/L (ref 3–14)
AST SERPL W P-5'-P-CCNC: 28 U/L (ref 0–45)
BASOPHILS # BLD AUTO: 0 10E9/L (ref 0–0.2)
BASOPHILS NFR BLD AUTO: 0.5 %
BILIRUB SERPL-MCNC: 0.3 MG/DL (ref 0.2–1.3)
BUN SERPL-MCNC: 15 MG/DL (ref 7–30)
CALCIUM SERPL-MCNC: 9.4 MG/DL (ref 8.5–10.1)
CHLORIDE SERPL-SCNC: 104 MMOL/L (ref 94–109)
CO2 SERPL-SCNC: 31 MMOL/L (ref 20–32)
CREAT SERPL-MCNC: 0.99 MG/DL (ref 0.52–1.04)
CRP SERPL-MCNC: <2.9 MG/L (ref 0–8)
DIFFERENTIAL METHOD BLD: NORMAL
EOSINOPHIL # BLD AUTO: 0.1 10E9/L (ref 0–0.7)
EOSINOPHIL NFR BLD AUTO: 1.9 %
ERYTHROCYTE [DISTWIDTH] IN BLOOD BY AUTOMATED COUNT: 14 % (ref 10–15)
ERYTHROCYTE [SEDIMENTATION RATE] IN BLOOD BY WESTERGREN METHOD: 6 MM/H (ref 0–30)
GFR SERPL CREATININE-BSD FRML MDRD: 65 ML/MIN/{1.73_M2}
GLUCOSE SERPL-MCNC: 87 MG/DL (ref 70–99)
HCT VFR BLD AUTO: 43.2 % (ref 35–47)
HGB BLD-MCNC: 14.1 G/DL (ref 11.7–15.7)
LYMPHOCYTES # BLD AUTO: 1.7 10E9/L (ref 0.8–5.3)
LYMPHOCYTES NFR BLD AUTO: 27.4 %
MCH RBC QN AUTO: 29.7 PG (ref 26.5–33)
MCHC RBC AUTO-ENTMCNC: 32.6 G/DL (ref 31.5–36.5)
MCV RBC AUTO: 91 FL (ref 78–100)
MONOCYTES # BLD AUTO: 0.6 10E9/L (ref 0–1.3)
MONOCYTES NFR BLD AUTO: 8.8 %
NEUTROPHILS # BLD AUTO: 3.8 10E9/L (ref 1.6–8.3)
NEUTROPHILS NFR BLD AUTO: 61.4 %
PLATELET # BLD AUTO: 283 10E9/L (ref 150–450)
POTASSIUM SERPL-SCNC: 4.2 MMOL/L (ref 3.4–5.3)
PROT SERPL-MCNC: 7.5 G/DL (ref 6.8–8.8)
RBC # BLD AUTO: 4.75 10E12/L (ref 3.8–5.2)
SODIUM SERPL-SCNC: 138 MMOL/L (ref 133–144)
WBC # BLD AUTO: 6.2 10E9/L (ref 4–11)

## 2021-03-31 PROCEDURE — 85652 RBC SED RATE AUTOMATED: CPT | Performed by: INTERNAL MEDICINE

## 2021-03-31 PROCEDURE — 36415 COLL VENOUS BLD VENIPUNCTURE: CPT | Performed by: INTERNAL MEDICINE

## 2021-03-31 PROCEDURE — 86140 C-REACTIVE PROTEIN: CPT | Performed by: INTERNAL MEDICINE

## 2021-03-31 PROCEDURE — 85025 COMPLETE CBC W/AUTO DIFF WBC: CPT | Performed by: INTERNAL MEDICINE

## 2021-03-31 PROCEDURE — 80053 COMPREHEN METABOLIC PANEL: CPT | Performed by: INTERNAL MEDICINE

## 2021-03-31 RX ORDER — IVABRADINE 5 MG/1
5 TABLET, FILM COATED ORAL 2 TIMES DAILY WITH MEALS
Qty: 180 TABLET | Refills: 3 | Status: SHIPPED | OUTPATIENT
Start: 2021-03-31 | End: 2021-12-08

## 2021-03-31 NOTE — TELEPHONE ENCOUNTER
ivabradine (CORLANOR) 5 MG tablet    Last Written Prescription Date:  2/20/2020  Last Fill Quantity: 180,   # refills: 4  Last Office Visit : 3/11/2021  Future Office visit:  None    Routing refill request to provider for review/approval because:  Drug not on the [facility], [facility] or [facility] refill protocol or controlled substance      Jacque Sierra RN  Central Triage Red Flags/Med Refills

## 2021-03-31 NOTE — RESULT ENCOUNTER NOTE
Dear Laura,    Your recent test results are attached.      Normal marker of inflammation.    If you have any questions please feel free to contact [phone] or myself via [employer].    Sincerely,  Annie Kang, CNP

## 2021-04-01 NOTE — RESULT ENCOUNTER NOTE
Dear Laura,    Your recent test results are attached.      No anemia.  Normal marker of inflammation.  Normal kidney function and electrolytes.  Mild elevation in liver enzymes.    Bj Butler MD is out of office, returning 4/5/21 and will advise if any further workup for liver enzyme elevation is needed.    If you have any questions please feel free to contact [phone] or myself via [employer].    Sincerely,  Annie Kang, CNP

## 2021-04-07 NOTE — TELEPHONE ENCOUNTER
[place] workability form signed 12-21-17, by Dr. Butler faxed to Munira Cornell [facility] at [phone].  Patient called and informed.  Pam Taveras,     
Reason for Call:  Other forms    Detailed comments: patient needing workability form to be faxed to Denis from office visit 12/21/17. Fax attention to Munira Cornell [phone]     Phone Number Patient can be reached at: Cell number on file:    Telephone Information:   Mobile [phone]       Best Time: any time    Can we leave a detailed message on this number? YES    Call taken on 1/2/2018 at 2:57 PM by Celine Lagunas      
yes

## 2021-04-15 NOTE — PROGRESS NOTES
Laura is a 50 year old who is being evaluated via a billable telephone visit.      What phone number would you like to be contacted at? [phone]  How would you like to obtain your AVS? Hugo  Phone call duration: 45 minutes

## 2021-04-15 NOTE — PROGRESS NOTES
RHEUMATOLOGY INITIAL TELEPHONE CONSULT NOTE    Chief Complaint: asymmetric sensorineural hearing loss    Reason for consult: Dr. Butler from  has requested consultation for this patient for asymmetric sensorineural hearing loss    Multiple attempts made to connect with pt on video via [employer] and [employer] without success. Poor internet connectivity as pt was not connected to wifi     Telephone visits are not as thorough as in-person visits and inability to perform a physical exam may pose challenges and limitations in formulating an accurate diagnosis.       History of Present Illness:    Laura Jackson is a 50 year old female with pmhx POTS, EDS, CTS s/p surgery, asthma, HTN, IBS, is referred to rheumatology clinic for asymmetric hearing loss. She got her first COVID-19 vaccine on 12/21 and started having flashing lights and increased pressure in her head 1-2 days after. Head pressure was worse on her R side. She initially thought it was 2/2 sinus congestion. She got her second COVID-19 vaccine around 1/21. She went to ophthalmology for her flashing lights but her exam was normal. She started having L sided flashing lights after the second dose of COVID-19 vaccine. Started having vertigo 2 weeks later. She sought medical care, was given steroids and abx.     Developed sudden R hearing loss around 12/25 or 12/26 along with R sided headache and vertigo. She has been following with ENT (Dr Karri Walter) and has received R transtympanic dexamethasone injection without improvement in her hearing. MRI brain normal. She was subsequently tried on high dose prednisone which also did not help her hearing improve. Head pressure improved with prednisone. On her recent ENT visit (3/30/21), she was found to have R tympanic membrane perforation. She started noticing head pressure as she was being tapered off prednisone but has now resolved. Prednisone therapy 2/16-2/28.    She reports hx of joint pain involving R ankle, R knee, R  elbow, b/l wrist. She was told she has EDS at [facility] based on hypermobile joints. She initially started developing joint pain many years ago. Pain comes and goes. Pain is variable, can come on with activity or at rest. She is able to walk 4 miles without any issues sometimes but sometimes she is not able to do that. She does not have persistent joint swelling. She takes PRN tylenol and ibuprofen which helps partially. Denies any AM stiffness.     Denies fevers, chills, weight loss, night sweats, eye pain/redness, +dry eyes, denies dry mouth, oral/nasal ulcers, hair loss/thinning, sinusitis, hearing loss, +reports having facial redness for 13 years - gets worse with POTS flare up -gets itchy skin/hives over legs, denies photosensitive rash, +raynaud's for many years which is managed with conservative measures -worse with POTS flare, denies cough, SOB, pleurisy, chest pain, edema, +heartburn, denies difficulty swallowing, abdominal pain, +diarrhea, denies hematochezia, melena, dysuria, recurrent genital ulcers, back pain, enthesitis, dactylitis, numbness, weakness, tingling. No hx of IBD. No hx of blood clots. One first trimester miscarriage.    Pertinent labs and imaging: (per chart review in [facility] and care everywhere)    Labs:   3/31/21: unremarkable CBC w diff, +mild ALT elevation, negative ESR and CRP    Past Medical History:    Past Medical History:   Diagnosis Date     Benign essential hypertension 7/31/2018     Family history of breast cancer 2/19/2014     Family history of breast cancer      SVT (supraventricular tachycardia):  history of, no recurrence since 2008 10/11/2013    no recurrence since 2008      Uncomplicated asthma      Past Surgical History:   Past Surgical History:   Procedure Laterality Date     ABDOMEN SURGERY  6/2017    myectomy     APPENDECTOMY       COLONOSCOPY N/A 8/20/2018    Procedure: COMBINED COLONOSCOPY, SINGLE OR MULTIPLE BIOPSY/POLYPECTOMY BY BIOPSY;;  Surgeon: Osman Hahn  MD Vahid;  Location: [facility] OR     COLONOSCOPY WITH CO2 INSUFFLATION N/A 8/20/2018    Procedure: COLONOSCOPY WITH CO2 INSUFFLATION;  COLON-SCREENING / LUBKA ;  Surgeon: Osman Hahn MD;  Location: [facility] OR     DAVINCI HYSTERECTOMY TOTAL, SALPINGECTOMY BILATERAL N/A 8/4/2017    Procedure: DAVINCI XI HYSTERECTOMY TOTAL, SALPINGECTOMY BILATERAL;  DAVINCI TOTAL HYSTERECTOMY; BILATERAL SALPINGECTOMY. BILATERAL URETERAL LYSIS. UTEROSACRAL-COLPOPEXY. EXCISION OF ENDOMETRIOSIS;  Surgeon: George Loyola MD;  Location: [facility] OR     DAVINCI LYSIS OF ADHESIONS Bilateral 8/4/2017    Procedure: DAVINCI LYSIS OF ADHESIONS;  BILATERAL URETERAL LYSIS;  Surgeon: George Loyola MD;  Location: [facility] OR     DAVINCI SACROCOLPOPEXY, CYSTOSCOPY, COMBINED N/A 8/4/2017    Procedure: COMBINED DAVINCI SACROCOLPOPEXY, CYSTOSCOPY;  UTEROSACRAL COLPOPEXY;  Surgeon: George Loyola MD;  Location: [facility] OR     DAVINCI XI ASSISTED ABLATION / EXCISION OF ENDOMETRIOSIS  8/4/2017    Procedure: DAVINCI XI ASSISTED ABLATION / EXCISION OF ENDOMETRIOSIS;;  Surgeon: George Loyola MD;  Location: [facility] OR     DILATION AND CURETTAGE N/A 6/20/2017    Procedure: DILATION AND CURETTAGE;  Uterine Curettings and Fibroid Removal, Cook catheter placement; (No hysterectomy done at this time);  Surgeon: Ernestina Saunders MD;  Location: [facility] OR     HYSTERECTOMY, PAP NO LONGER INDICATED       ORTHOPEDIC SURGERY  6/1986    Heel spur , toe surgery     RELEASE CARPAL TUNNEL Right 10/20/2020    Procedure: RIGHT RELEASE, CARPAL TUNNEL;  Surgeon: Rickey Rea MD;  Location: [facility] OR     RELEASE CARPAL TUNNEL Left 11/6/2020    Procedure: LEFT RELEASE, CARPAL TUNNEL;  Surgeon: Rickey Rea MD;  Location: [facility] OR     TONSILLECTOMY       Family History:   Denies family hx of RA, SLE, Sjogren's syndrome, scleroderma, PsA, ankylosing spondylitis, psoriasis, vasculitis, gout, pseudogout.    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    Allergies   Allergen Reactions     Penicillins Swelling     Swelling throat; age 4     Tetracycline GI Disturbance     Other reaction(s): Abdominal Pain  Vomiting; nauseous  Other reaction(s): Abdominal Pain  Vomiting; nauseous      Immunization History   Administered Date(s) Administered     COVID-19,PF,Pfizer 12/21/2020, 01/11/2021     Flu, Unspecified 11/29/2006, 11/26/2007, 10/13/2008, 11/12/2010     Influenza (IIV3) PF 10/15/2013, 10/27/2017     Influenza Quad, Recombinant, p-free (RIV4) 12/03/2018, 10/08/2020     Influenza Vaccine IM > 6 months Valent IIV4 10/27/2017, 10/05/2019     Influenza Vaccine IM Ages 6-35 Months 4 Valent (PF) 11/29/2006, 11/26/2007, 10/13/2008, 11/12/2010     MMR 01/02/1999     Mantoux Tuberculin Skin Test 08/30/2013, 09/10/2014     Pneumococcal 23 valent 05/27/2014     TDAP Vaccine (Adacel) 05/04/2012     Td (Adult), Adsorbed 01/01/1995, 05/03/2005       Medications:  Current Outpatient Medications   Medication Sig Dispense Refill     azelastine (ASTELIN) 0.1 % nasal spray Spray 1 spray into both nostrils 2 times daily (Patient not taking: Reported on 3/9/2021) 30 mL 2     Cholecalciferol (VITAMIN D) 2000 UNITS CAPS Take 1 capsule by mouth daily       fluticasone (FLONASE) 50 MCG/ACT nasal spray Spray 1 spray into both nostrils daily       ivabradine (CORLANOR) 5 MG tablet Take 1 tablet (5 mg) by mouth 2 times daily (with meals) 180 tablet 3     levalbuterol (XOPENEX HFA) 45 MCG/ACT inhaler Inhale 1-2 puffs into the lungs every 4 hours as needed for shortness of breath / dyspnea Need appointment in clinic for further refills. 1 Inhaler 0     OMEPRAZOLE PO Take 20 mg by mouth every morning       order for DME Equipment being ordered: hinged knee brace, Large (Patient not taking: Reported on 3/11/2021) 1 Device 0     order for DME Equipment being ordered: wrist splint with thumb support (Patient not taking: Reported  on 3/30/2021) 1 Device 0     predniSONE (DELTASONE) 20 MG tablet Take 3 tabs by mouth daily x 3 days, then 2 tabs daily x 3 days, then 1 tab daily x 3 days, then 1/2 tab daily x 3 days. (Patient not taking: Reported on 3/9/2021) 20 tablet 0     WIXELA INHUB 100-50 MCG/DOSE inhaler INHALE ONE PUFF BY MOUTH EVERY 12 HOURS 180 each 0       PHYSICAL EXAMINATION: not performed as pt evaluated via telephone    Labs:      I have reviewed all pertinent investigations including labs, including outside records if relevant    MANDO  Recent Labs   Lab Test 07/03/18  1011   MARGOTH Borderline Positive*   ANAP1 HOMOGENEOUS   ANAT1 1:40       Send-out Labs  Recent Labs   Lab Test 07/16/18  1220 07/03/18  0925   SRESLT SEE NOTE  Test canceled - Lab  error Canceled, Test credited   STSTNM PROSTAGLADIN D2 URINE   Test canceled - Lab  error Canceled, Test credited   STSTCD 3,000,240  Test canceled - Lab  error Canceled, Test credited   SSPTYP RANDOM URINE  Test canceled - Lab  error Canceled, Test credited     CBC  Recent Labs   Lab Test 03/31/21  1134 10/08/20  1212 03/15/19  1134 04/29/18  2314   WBC 6.2  --  5.1 7.5   RBC 4.75  --  4.76 4.72   HGB 14.1 13.4 14.3 14.3   HCT 43.2  --  43.4 42.1   MCV 91  --  91 89   RDW 14.0  --  13.3 13.2     --  259 250   MCH 29.7  --  30.0 30.3   MCHC 32.6  --  32.9 34.0   NEUTROPHIL 61.4  --  65.2 66.1   LYMPH 27.4  --  26.5 25.4   MONOCYTE 8.8  --  5.9 5.9   EOSINOPHIL 1.9  --  1.8 2.0   BASOPHIL 0.5  --  0.6 0.3   ANEU 3.8  --  3.3 5.0   ALYM 1.7  --  1.3 1.9   ELISEO 0.6  --  0.3 0.4   AEOS 0.1  --  0.1 0.2   ABAS 0.0  --  0.0 0.0     CMP  Recent Labs   Lab Test 03/31/21  1134 10/08/20  1212 03/15/19  1134 04/29/18  2314 09/11/17  0942 08/26/17  2344 07/12/17  1911 07/12/17  1911 06/24/17  1236 06/24/17  1236    138 139 140 138 138   < > 141   < > 145*   POTASSIUM 4.2 4.2 4.2 3.6 4.0 3.6   < > 3.7   < > 3.6   CHLORIDE 104 104 104 106 103  106   < > 104   < > 109   CO2 31 27 27 24 27 26   < > 24   < > 24   ANIONGAP 3 7 8 9 8 6   < > 13   < > 12   GLC 87 87 90 95 96 106*   < > 100*   < > 101*   BUN 15 16 12 16 11 13   < > 12   < > 7   CR 0.99 0.89 0.90 0.87 0.77 0.82   < > 0.84   < > 0.86   GFRESTIMATED 65 74 74 69 79 75   < > 72   < > 71   GFRESTBLACK 76 86 86 84 >90 >90   < > 87   < > 85   AZAEL 9.4 9.3 9.0 8.8 9.2 8.3*   < > 9.3   < > 8.8   BILITOTAL 0.3  --  0.4  --   --   --   --  0.4  --  0.2   ALBUMIN 4.2  --  4.2  --   --   --   --  4.5  --  3.2*   PROTTOTAL 7.5  --  7.5  --   --   --   --  8.0  --  6.4*   ALKPHOS 61  --  52  --   --   --   --  54  --  40   AST 28  --  17  --   --   --   --  19  --  16   ALT 62*  --  29  --   --   --   --  24  --  27    < > = values in this interval not displayed.     Iron Studies  Recent Labs   Lab Test 09/11/17  0942 08/27/17  0816   FIOR 14 8   IRON 150 26*   * 399   IRONSAT 35 7*     Calcium/VitaminD  Recent Labs   Lab Test 03/31/21  1134 10/08/20  1212 03/15/19  1134 09/11/17  0942 09/11/17  0942   AZAEL 9.4 9.3 9.0   < > 9.2   VITDT  --   --  69  --  47    < > = values in this interval not displayed.     ESR/CRP  Recent Labs   Lab Test 03/31/21  1134 03/29/18  1544 11/28/17  1046   SED 6 5 8   CRP <2.9 1.1 <2.9     TSH/T4  Recent Labs   Lab Test 03/15/19  1134 08/27/17  0816 06/26/17  1737   TSH 1.88 3.50 2.53     Lipid Panel  Recent Labs   Lab Test 03/15/19  1134 09/11/17  0942   CHOL 209* 194   TRIG 105 97   HDL 65 65   * 110*   NHDL 144* 129     HIV Screening  Recent Labs   Lab Test 03/15/19  1134   HIAGAB Nonreactive     UA  Recent Labs   Lab Test 08/27/17  0300 07/14/17  0908 06/24/17  1235 09/03/14  1640   COLOR Straw Light Yellow Straw Yellow   APPEARANCE Slightly Cloudy Clear Clear Clear   URINEGLC Negative Negative Negative Negative   URINEBILI Negative Negative Negative Negative   SG 1.001* 1.007 1.001* 1.015   URINEPH 7.0 7.0 7.0 6.5   PROTEIN Negative Negative Negative Negative    UROBILINOGEN  --   --   --  0.2   NITRITE Negative Negative Negative Negative   UBLD Negative Negative Small* Moderate*   LEUKEST Negative Negative Trace* Negative   WBCU <1  --  8* 2-5*   RBCU 0  --  1 2-5*   SQUAMOUSEPI  --   --   --  Few   BACTERIA  --   --  Few*  --    MUCOUS  --   --  Present*  --      Urine Microscopic  Recent Labs   Lab Test 08/27/17  0300 06/24/17  1235 09/03/14  1640   WBCU <1 8* 2-5*   RBCU 0 1 2-5*   SQUAMOUSEPI  --   --  Few   BACTERIA  --  Few*  --    MUCOUS  --  Present*  --        Imaging:     I have reviewed all pertinent investigations including imaging, including outside records if relevant    MRI brain (2/12/21)                                                                   IMPRESSION: Unremarkable internal auditory canal-protocol brain MRI    MRI R knee (2/25/2020)  Impression:  1. Contusion pattern of the anterolateral aspect of the knee with  edema deep and superficial to the iliotibial band, this can be also  significant anterior tibial band syndrome. Edema at the  meniscocapsular junction of the lateral meniscus, also in keeping with  an contusion pattern. No evidence of fracture or high-grade  ligamentous tear.  2. Increased signal within both, the medial and lateral menisci that  is not communicating with the articular surfaces this can be seen in  early meniscal degeneration.  3. Small joint effusion.   4. Moderate to high-grade cartilage loss of the median ridge of the  patella and moderate grade cartilage fissuring of the distal femoral  condyle in the medial compartment.       Assessment / Plan:    Laura Jackson is a 50 year old female with pmhx POTS, EDS, CTS s/p surgery, asthma, HTN, IBS, is referred to rheumatology clinic for asymmetric hearing loss. She was evaluated via telephone due to poor internet connection. I am unable to examine her due to this being a telephone visit. Any prior notes, outside records, laboratory results, and imaging studies were  reviewed if relevant. Pertinent work-up thus far includes negative ESR, CRP, CBC w diff, Cr.  Patient developed asymmetric hearing loss end of December after receiving first dose of COVID-19 vaccine.  Apart from asymmetric hearing loss, also developed flashing lights and head pressure around that time.  Has tried p.o. prednisone without improvement in hearing loss but found improvement in head pressure.  She reports history of EDS diagnosed at [facility] many years ago, but has not had genetic testing. She reports a strong family history of EDS/hypermobility on father's side and son also being worked up for EDS. Denies joint dislocations but describes having soft/velvety skin.There is some literature on association of EDS and POTS as well as some literature on EDS and hearing loss. Her asymmetric hearing loss is of unclear etiology at this time.  Has chronic history of polyarthralgia without a typical pattern for inflammatory arthritis. I am not sure if I can tie all her symptoms to a unifying autoimmune CTD; however, we discussed evaluating for this possibility. Cogan syndrome can cause hearing loss but is usually associated with interstitial keratitis -she does not have any eye pain/redness and reports normal eye exam.  Her normal CBC w diff, ESR, CRP are reassuring.     1) Polyarthralgia, asymmetric R sided hearing loss  -CBC w diff, ESR, CRP, Cr unremarkable 3/31  -check MANDO, WILIAN, dsDNA, C3, C4, centromere ab, Scl-70, RF, CCP, UA, UPC  -obtain plain films of b/l hands, feet and R ankle  -EDS/hypermobility can cause polyarthralgia   -continue follow-up with ENT for hearing loss    2) Pt reports hx of EDS  -recommend discussion with PCP regarding referral to  for further evaluation    3) Flashing lights  -unclear etiology  -recommend continued ophthalmology follow-up      Ms. Jackson verbalized agreement with and understanding of the rationale for the diagnosis and treatment plan.  All questions were  answered to best of my ability and the patient's satisfaction. Ms. Jackson was advised to contact the clinic with any questions that may arise after the clinic visit.        Chart documentation done in part with Dragon Voice recognition Software. Although reviewed after completion, some word and grammatical error may remain.      RTC pending test results    Cee Loco MD

## 2021-04-16 ENCOUNTER — TRANSFERRED RECORDS (OUTPATIENT)
Dept: HEALTH INFORMATION MANAGEMENT | Facility: CLINIC | Age: 51
End: 2021-04-16

## 2021-04-16 ENCOUNTER — VIRTUAL VISIT (OUTPATIENT)
Dept: RHEUMATOLOGY | Facility: CLINIC | Age: 51
End: 2021-04-16
Attending: INTERNAL MEDICINE
Payer: COMMERCIAL

## 2021-04-16 DIAGNOSIS — H90.3 ASYMMETRICAL SENSORINEURAL HEARING LOSS: ICD-10-CM

## 2021-04-16 DIAGNOSIS — M25.50 MULTIPLE JOINT PAIN: ICD-10-CM

## 2021-04-16 PROCEDURE — 99204 OFFICE O/P NEW MOD 45 MIN: CPT | Mod: 95 | Performed by: STUDENT IN AN ORGANIZED HEALTH CARE EDUCATION/TRAINING PROGRAM

## 2021-04-16 NOTE — PATIENT INSTRUCTIONS
-Please make an appointment for lab and x-rays at any [facility] location near you  -I will be in touch once all test results are back  -Follow-up pending test results

## 2021-05-07 ENCOUNTER — TRANSFERRED RECORDS (OUTPATIENT)
Dept: HEALTH INFORMATION MANAGEMENT | Facility: CLINIC | Age: 51
End: 2021-05-07

## 2021-05-12 ENCOUNTER — ANCILLARY PROCEDURE (OUTPATIENT)
Dept: GENERAL RADIOLOGY | Facility: CLINIC | Age: 51
End: 2021-05-12
Attending: STUDENT IN AN ORGANIZED HEALTH CARE EDUCATION/TRAINING PROGRAM
Payer: COMMERCIAL

## 2021-05-12 ENCOUNTER — OFFICE VISIT (OUTPATIENT)
Dept: AUDIOLOGY | Facility: CLINIC | Age: 51
End: 2021-05-12
Payer: COMMERCIAL

## 2021-05-12 ENCOUNTER — OFFICE VISIT (OUTPATIENT)
Dept: OTOLARYNGOLOGY | Facility: CLINIC | Age: 51
End: 2021-05-12
Payer: COMMERCIAL

## 2021-05-12 VITALS — OXYGEN SATURATION: 100 % | RESPIRATION RATE: 16 BRPM | HEART RATE: 75 BPM

## 2021-05-12 DIAGNOSIS — M25.50 MULTIPLE JOINT PAIN: ICD-10-CM

## 2021-05-12 DIAGNOSIS — H90.3 ASYMMETRICAL SENSORINEURAL HEARING LOSS: ICD-10-CM

## 2021-05-12 DIAGNOSIS — H90.3 SNHL (SENSORY-NEURAL HEARING LOSS), ASYMMETRICAL: Primary | ICD-10-CM

## 2021-05-12 LAB
ALBUMIN UR-MCNC: NEGATIVE MG/DL
APPEARANCE UR: CLEAR
BILIRUB UR QL STRIP: NEGATIVE
COLOR UR AUTO: YELLOW
CREAT UR-MCNC: 131 MG/DL
GLUCOSE UR STRIP-MCNC: NEGATIVE MG/DL
HGB UR QL STRIP: NEGATIVE
KETONES UR STRIP-MCNC: NEGATIVE MG/DL
LEUKOCYTE ESTERASE UR QL STRIP: NEGATIVE
NITRATE UR QL: NEGATIVE
NON-SQ EPI CELLS #/AREA URNS LPF: NORMAL /LPF
PH UR STRIP: 5 PH (ref 5–7)
PROT UR-MCNC: 0.06 G/L
PROT/CREAT 24H UR: 0.05 G/G CR (ref 0–0.2)
RBC #/AREA URNS AUTO: NORMAL /HPF
SOURCE: NORMAL
SP GR UR STRIP: 1.02 (ref 1–1.03)
UROBILINOGEN UR STRIP-ACNC: 0.2 EU/DL (ref 0.2–1)
WBC #/AREA URNS AUTO: NORMAL /HPF

## 2021-05-12 PROCEDURE — 73630 X-RAY EXAM OF FOOT: CPT | Mod: 59 | Performed by: RADIOLOGY

## 2021-05-12 PROCEDURE — 73130 X-RAY EXAM OF HAND: CPT | Mod: LT | Performed by: RADIOLOGY

## 2021-05-12 PROCEDURE — 86160 COMPLEMENT ANTIGEN: CPT | Performed by: STUDENT IN AN ORGANIZED HEALTH CARE EDUCATION/TRAINING PROGRAM

## 2021-05-12 PROCEDURE — 86431 RHEUMATOID FACTOR QUANT: CPT | Performed by: STUDENT IN AN ORGANIZED HEALTH CARE EDUCATION/TRAINING PROGRAM

## 2021-05-12 PROCEDURE — 92557 COMPREHENSIVE HEARING TEST: CPT | Performed by: AUDIOLOGIST

## 2021-05-12 PROCEDURE — 86235 NUCLEAR ANTIGEN ANTIBODY: CPT | Performed by: STUDENT IN AN ORGANIZED HEALTH CARE EDUCATION/TRAINING PROGRAM

## 2021-05-12 PROCEDURE — 99213 OFFICE O/P EST LOW 20 MIN: CPT | Performed by: OTOLARYNGOLOGY

## 2021-05-12 PROCEDURE — 81001 URINALYSIS AUTO W/SCOPE: CPT | Performed by: STUDENT IN AN ORGANIZED HEALTH CARE EDUCATION/TRAINING PROGRAM

## 2021-05-12 PROCEDURE — 86225 DNA ANTIBODY NATIVE: CPT | Performed by: STUDENT IN AN ORGANIZED HEALTH CARE EDUCATION/TRAINING PROGRAM

## 2021-05-12 PROCEDURE — 86039 ANTINUCLEAR ANTIBODIES (ANA): CPT | Performed by: STUDENT IN AN ORGANIZED HEALTH CARE EDUCATION/TRAINING PROGRAM

## 2021-05-12 PROCEDURE — 92550 TYMPANOMETRY & REFLEX THRESH: CPT | Performed by: AUDIOLOGIST

## 2021-05-12 PROCEDURE — 84156 ASSAY OF PROTEIN URINE: CPT | Performed by: STUDENT IN AN ORGANIZED HEALTH CARE EDUCATION/TRAINING PROGRAM

## 2021-05-12 PROCEDURE — 86200 CCP ANTIBODY: CPT | Performed by: STUDENT IN AN ORGANIZED HEALTH CARE EDUCATION/TRAINING PROGRAM

## 2021-05-12 PROCEDURE — 73610 X-RAY EXAM OF ANKLE: CPT | Mod: RT | Performed by: RADIOLOGY

## 2021-05-12 PROCEDURE — 86038 ANTINUCLEAR ANTIBODIES: CPT | Performed by: STUDENT IN AN ORGANIZED HEALTH CARE EDUCATION/TRAINING PROGRAM

## 2021-05-12 PROCEDURE — 36415 COLL VENOUS BLD VENIPUNCTURE: CPT | Performed by: STUDENT IN AN ORGANIZED HEALTH CARE EDUCATION/TRAINING PROGRAM

## 2021-05-12 PROCEDURE — 99207 PR NO CHARGE LOS: CPT | Performed by: AUDIOLOGIST

## 2021-05-12 NOTE — PROGRESS NOTES
AUDIOLOGY REPORT:    Patient was referred from ENT by Onofre Burnett MD for audiology evaluation. She was last evaluated in this clinic on 3/9/21. Patient reports continued otalgia on the right side.    Testing:    Otoscopy:   Otoscopic exam indicates ears are clear of cerumen bilaterally     Tympanograms:    RIGHT: normal eardrum mobility     LEFT:   normal eardrum mobility    Reflexes (reported by stimulus ear):  RIGHT: Ipsilateral is present at normal levels  RIGHT: Contralateral is absent at frequencies tested  LEFT:   Ipsilateral is elevated at frequencies tested  LEFT:   Contralateral is present at normal levels    Thresholds:   Pure Tone Thresholds assessed using conventional audiometry with good reliability from 250-8000 Hz bilaterally using insert earphones and circumaural headphones      RIGHT:  normal from 250-500 Hz sloping to mild-moderate sensorineural hearing loss from 8108-9170 Hz    LEFT:    normal from 250-4000 Hz sloping to mild sensorineural hearing loss  from 1371-1794 Hz    Speech Reception Threshold:    RIGHT: 25 dB HL    LEFT:   15 dB HL  Speech Reception Thresholds are in good agreement with pure tone thresholds.    Word Recognition Score:     RIGHT: 100% at 65 dB HL using NU-6 recorded word list.    LEFT:   100% at 55 dB HL using NU-6 recorded word list.    Discussed results with the patient. I have recommended moving forward with hearing  aid fitting in right ear if medically clear.  Hearing has not change since previous audiogram.    Patient was returned to ENT for follow up.     Glen Nation MA, CCC-A  Licensed Audiologist #[id number]  5/12/2021

## 2021-05-12 NOTE — LETTER
5/12/2021         RE: Laura Jackson  [address]  [place] [address]        Dear Colleague,    Thank you for referring your patient, Laura Jackson, to the [facility]. Please see a copy of my visit note below.    Chief Complaint - sudden sensorineural hearing loss recheck    History of Present Illness - Laura Jackson is a 51 year old female who returns to me today with hearing loss in the right ear. About 5 months ago she she noted hearing loss with head pressure and right headache (significant). She felt she was in a fog. Then she developed significant vertigo (it lasted for a few hours). The hearing loss is constant. The vertigo has gone away. No prior vertigo episodes. There is no history of chronic ear disease or ear surgery. She had ear infections as a child, no tubes. With regards to recreational, , and work-related noise exposure she denies any. No family history of hearing loss at a young age. She tried ear drops, no help. I noted asymmetric right sensorineural hearing loss last week. She has POTS and she didn't want to try oral prednisone initially. Therefore, we proceeded with a right transtympanic dexamethasone injection, but her hearing didn't improve. MRI brain was normal. She decided to proceed with prednisone. She returns and notes the hearing did not improve. However, the head pressure greatly improved. She still has some subtle right ear pain and pressure, worse again in the last few days. She denies fluctuation of the hearing in the right ear currently. Tinnitus is intermittent, but improved.     Last visit she had a persistent right tympanic membrane perforation from the transtympanic dexamethasone injection. She returns and reports stable hearing. No vertigo. Has intermittent or fluctuating tinnitus. Some right ear pressure the last few days.     Past Medical History -   Patient Active Problem List   Diagnosis     CARDIOVASCULAR SCREENING; LDL GOAL  LESS THAN 160     Irritable bowel syndrome     GERD (gastroesophageal reflux disease)     History of supraventricular tachycardia     Mild persistent asthma     Family history of breast cancer     S/P myomectomy     Nausea     S/P ANAID (total abdominal hysterectomy)     Hypovitaminosis D     Endometriosis     Heberden's nodes     POTS (postural orthostatic tachycardia syndrome)     Hypermobility syndrome     Cervicalgia     Autonomic dysfunction     Benign essential hypertension       Current Medications -   Current Outpatient Medications:      azelastine (ASTELIN) 0.1 % nasal spray, Spray 1 spray into both nostrils 2 times daily (Patient not taking: Reported on 3/9/2021), Disp: 30 mL, Rfl: 2     Cholecalciferol (VITAMIN D) 2000 UNITS CAPS, Take 1 capsule by mouth daily, Disp: , Rfl:      fluticasone (FLONASE) 50 MCG/ACT nasal spray, Spray 1 spray into both nostrils daily, Disp: , Rfl:      ivabradine (CORLANOR) 5 MG tablet, Take 1 tablet (5 mg) by mouth 2 times daily (with meals), Disp: 180 tablet, Rfl: 3     levalbuterol (XOPENEX HFA) 45 MCG/ACT inhaler, Inhale 1-2 puffs into the lungs every 4 hours as needed for shortness of breath / dyspnea Need appointment in clinic for further refills., Disp: 1 Inhaler, Rfl: 0     OMEPRAZOLE PO, Take 20 mg by mouth every morning, Disp: , Rfl:      order for DME, Equipment being ordered: hinged knee brace, Large (Patient not taking: Reported on 3/11/2021), Disp: 1 Device, Rfl: 0     order for DME, Equipment being ordered: wrist splint with thumb support (Patient not taking: Reported on 3/30/2021), Disp: 1 Device, Rfl: 0     predniSONE (DELTASONE) 20 MG tablet, Take 3 tabs by mouth daily x 3 days, then 2 tabs daily x 3 days, then 1 tab daily x 3 days, then 1/2 tab daily x 3 days. (Patient not taking: Reported on 3/9/2021), Disp: 20 tablet, Rfl: 0     WIXELA INHUB 100-50 MCG/DOSE inhaler, INHALE ONE PUFF BY MOUTH EVERY 12 HOURS, Disp: 180 each, Rfl: 0    Current  Facility-Administered Medications:      betamethasone acet & sod phos (CELESTONE) injection 6 mg, 6 mg, , , Castillo Latif MD, 6 mg at 08/08/19 1025     lidocaine (PF) 0.5 % injection SOLN 8 mL, 8 mL, , , Julio Espitia MD, 8 mL at 10/12/20 1058     ropivacaine (NAROPIN) injection 1 mL, 1 mL, , , Castillo Latif MD, 1 mL at 08/08/19 1025     triamcinolone (KENALOG-40) injection 20 mg, 20 mg, , , Carmine Ribeiro MD, 20 mg at 07/23/19 0901     triamcinolone (KENALOG-40) injection 40 mg, 40 mg, , , Julio Espitia MD, 40 mg at 10/12/20 1058    Allergies -   Allergies   Allergen Reactions     Penicillins Swelling     Swelling throat; age 4     Tetracycline GI Disturbance     Other reaction(s): Abdominal Pain  Vomiting; nauseous  Other reaction(s): Abdominal Pain  Vomiting; nauseous       Social History -   Social History     Socioeconomic History     Marital status:      Spouse name: Not on file     Number of children: 2     Years of education: Not on file     Highest education level: Not on file   Occupational History     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[employer]     Employer: [employer]   Social Needs     Financial resource strain: Not on file     Food insecurity     Worry: Not on file     Inability: Not on file     Transportation needs     Medical: Not on file     Non-medical: Not on file   Tobacco Use     Smoking status: Never Smoker     Smokeless tobacco: Never Used   Substance and Sexual Activity     Alcohol use: Yes     Comment: 1@ 3 months     Drug use: No     Sexual activity: Yes     Partners: Male     Birth control/protection: Male Surgical, Female Surgical   Lifestyle     Physical activity     Days per week: Not on file     Minutes per session: Not on file     Stress: Not on file   Relationships     Social connections     Talks on phone: Not on file     Gets together: Not on file     Attends Taoism service: Not on file     Active member of club or  organization: Not on file     Attends meetings of clubs or organizations: Not on file     Relationship status: Not on file     Intimate partner violence     Fear of current or ex partner: Not on file     Emotionally abused: Not on file     Physically abused: Not on file     Forced sexual activity: Not on file   Other Topics Concern     Parent/sibling w/ CABG, MI or angioplasty before 65F 55M? No   Social History Narrative     Not on file       Family History -   Family History   Problem Relation Age of Onset     Diabetes Mother      Hypertension Mother      Hyperlipidemia Mother      Arrhythmia Mother      Cardiovascular Father         CHF and COPD     Asthma Father      Breast Cancer Maternal Grandfather      Colon Cancer Maternal Grandfather      Breast Cancer Paternal Grandmother      Breast Cancer Paternal Aunt      C.A.D. Paternal Grandfather      Breast Cancer Cousin      Asthma Son      Diabetes Maternal Grandmother      Hypertension Maternal Grandmother      Breast Cancer Cousin      Breast Cancer Cousin      Breast Cancer Cousin        Physical Exam  Pulse 75   Resp 16   LMP 07/28/2017   SpO2 100%   General - The patient is in no distress.  Alert and oriented to person and place, answers questions and cooperates with examination appropriately.   Voice and Breathing - The patient was breathing comfortably without the use of accessory muscles. There was no wheezing, stridor, or stertor.  The patients voice was clear and strong.  Ears - The auricles are normal. Right tympanic membrane injection site has healed. No more tympanic membrane perforation. No fluid or purulence was seen in the external canal or the middle ear. No evidence of infection of the middle ear or external canal, cerumen was normal in appearance. Left ear normal.  Eyes - Extraocular movements intact. Sclera were not icteric or injected.  Neurological - Cranial nerves 2 through 12 were grossly intact. House-Brackmann grade 1 out of 6  bilaterally.     Audiogram - type A tymps bilaterally. Sensorineural hearing loss is stable, still right-sided asymmetric sensorineural hearing loss, but not worse.    Assessment and Plan - Laura Jackson is a 51 year old female who returns to me today with sudden right-sided asymmetric sensorineural hearing loss. Started about 3 months ago. Etiology is unknown. MRI brain was normal. I performed a right-sided transtympanic dexamethasone injection. Hearing was still the same. Then we tried oral prednisone. Again, no change in hearing.  She had pressure in her head that improved with oral prednisone.  She does describe some fluctuating tinnitus. It is still possible she has Ménière's disease but has really only had 1 severe bout of vertigo. I think this was a viral inner ear infection, maybe herpes zoster as she has some residual numbness right ear canal, also some pain.  I recommend hearing aid consultation for the right ear. Recheck audiogram in 6-12 months.       Karri Burnett MD  Otolaryngology  [facility]          Again, thank you for allowing me to participate in the care of your patient.        Sincerely,        Karri Burnett MD

## 2021-05-12 NOTE — RESULT ENCOUNTER NOTE
Shivani Sanchez,     Hand x-rays are negative. Feet x-rays show heel spurs. X-rays of hands, feet and ankles are negative for inflammatory findings.    Please let us know if you have any questions or concerns.    Regards,  Cee Loco MD

## 2021-05-12 NOTE — PROGRESS NOTES
Chief Complaint - sudden sensorineural hearing loss recheck    History of Present Illness - Laura Jackson is a 51 year old female who returns to me today with hearing loss in the right ear. About 5 months ago she she noted hearing loss with head pressure and right headache (significant). She felt she was in a fog. Then she developed significant vertigo (it lasted for a few hours). The hearing loss is constant. The vertigo has gone away. No prior vertigo episodes. There is no history of chronic ear disease or ear surgery. She had ear infections as a child, no tubes. With regards to recreational, , and work-related noise exposure she denies any. No family history of hearing loss at a young age. She tried ear drops, no help. I noted asymmetric right sensorineural hearing loss last week. She has POTS and she didn't want to try oral prednisone initially. Therefore, we proceeded with a right transtympanic dexamethasone injection, but her hearing didn't improve. MRI brain was normal. She decided to proceed with prednisone. She returns and notes the hearing did not improve. However, the head pressure greatly improved. She still has some subtle right ear pain and pressure, worse again in the last few days. She denies fluctuation of the hearing in the right ear currently. Tinnitus is intermittent, but improved.     Last visit she had a persistent right tympanic membrane perforation from the transtympanic dexamethasone injection. She returns and reports stable hearing. No vertigo. Has intermittent or fluctuating tinnitus. Some right ear pressure the last few days.     Past Medical History -   Patient Active Problem List   Diagnosis     CARDIOVASCULAR SCREENING; LDL GOAL LESS THAN 160     Irritable bowel syndrome     GERD (gastroesophageal reflux disease)     History of supraventricular tachycardia     Mild persistent asthma     Family history of breast cancer     S/P myomectomy     Nausea     S/P ANAID (total  abdominal hysterectomy)     Hypovitaminosis D     Endometriosis     Heberden's nodes     POTS (postural orthostatic tachycardia syndrome)     Hypermobility syndrome     Cervicalgia     Autonomic dysfunction     Benign essential hypertension       Current Medications -   Current Outpatient Medications:      azelastine (ASTELIN) 0.1 % nasal spray, Spray 1 spray into both nostrils 2 times daily (Patient not taking: Reported on 3/9/2021), Disp: 30 mL, Rfl: 2     Cholecalciferol (VITAMIN D) 2000 UNITS CAPS, Take 1 capsule by mouth daily, Disp: , Rfl:      fluticasone (FLONASE) 50 MCG/ACT nasal spray, Spray 1 spray into both nostrils daily, Disp: , Rfl:      ivabradine (CORLANOR) 5 MG tablet, Take 1 tablet (5 mg) by mouth 2 times daily (with meals), Disp: 180 tablet, Rfl: 3     levalbuterol (XOPENEX HFA) 45 MCG/ACT inhaler, Inhale 1-2 puffs into the lungs every 4 hours as needed for shortness of breath / dyspnea Need appointment in clinic for further refills., Disp: 1 Inhaler, Rfl: 0     OMEPRAZOLE PO, Take 20 mg by mouth every morning, Disp: , Rfl:      order for DME, Equipment being ordered: hinged knee brace, Large (Patient not taking: Reported on 3/11/2021), Disp: 1 Device, Rfl: 0     order for DME, Equipment being ordered: wrist splint with thumb support (Patient not taking: Reported on 3/30/2021), Disp: 1 Device, Rfl: 0     predniSONE (DELTASONE) 20 MG tablet, Take 3 tabs by mouth daily x 3 days, then 2 tabs daily x 3 days, then 1 tab daily x 3 days, then 1/2 tab daily x 3 days. (Patient not taking: Reported on 3/9/2021), Disp: 20 tablet, Rfl: 0     WIXELA INHUB 100-50 MCG/DOSE inhaler, INHALE ONE PUFF BY MOUTH EVERY 12 HOURS, Disp: 180 each, Rfl: 0    Current Facility-Administered Medications:      betamethasone acet & sod phos (CELESTONE) injection 6 mg, 6 mg, , , Castillo Latif MD, 6 mg at 08/08/19 1025     lidocaine (PF) 0.5 % injection SOLN 8 mL, 8 mL, , , Julio Espitia MD, 8 mL at 10/12/20  1058     ropivacaine (NAROPIN) injection 1 mL, 1 mL, , , Castillo Latif MD, 1 mL at 08/08/19 1025     triamcinolone (KENALOG-40) injection 20 mg, 20 mg, , , Carmine Ribeiro MD, 20 mg at 07/23/19 0901     triamcinolone (KENALOG-40) injection 40 mg, 40 mg, , , Julio Espitia MD, 40 mg at 10/12/20 1058    Allergies -   Allergies   Allergen Reactions     Penicillins Swelling     Swelling throat; age 4     Tetracycline GI Disturbance     Other reaction(s): Abdominal Pain  Vomiting; nauseous  Other reaction(s): Abdominal Pain  Vomiting; nauseous       Social History -   Social History     Socioeconomic History     Marital status:      Spouse name: Not on file     Number of children: 2     Years of education: Not on file     Highest education level: Not on file   Occupational History     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[employer]     Employer: [employer]   Social Needs     Financial resource strain: Not on file     Food insecurity     Worry: Not on file     Inability: Not on file     Transportation needs     Medical: Not on file     Non-medical: Not on file   Tobacco Use     Smoking status: Never Smoker     Smokeless tobacco: Never Used   Substance and Sexual Activity     Alcohol use: Yes     Comment: 1@ 3 months     Drug use: No     Sexual activity: Yes     Partners: Male     Birth control/protection: Male Surgical, Female Surgical   Lifestyle     Physical activity     Days per week: Not on file     Minutes per session: Not on file     Stress: Not on file   Relationships     Social connections     Talks on phone: Not on file     Gets together: Not on file     Attends Christianity service: Not on file     Active member of club or organization: Not on file     Attends meetings of clubs or organizations: Not on file     Relationship status: Not on file     Intimate partner violence     Fear of current or ex partner: Not on file     Emotionally abused: Not on file     Physically abused:  Not on file     Forced sexual activity: Not on file   Other Topics Concern     Parent/sibling w/ CABG, MI or angioplasty before 65F 55M? No   Social History Narrative     Not on file       Family History -   Family History   Problem Relation Age of Onset     Diabetes Mother      Hypertension Mother      Hyperlipidemia Mother      Arrhythmia Mother      Cardiovascular Father         CHF and COPD     Asthma Father      Breast Cancer Maternal Grandfather      Colon Cancer Maternal Grandfather      Breast Cancer Paternal Grandmother      Breast Cancer Paternal Aunt      C.A.D. Paternal Grandfather      Breast Cancer Cousin      Asthma Son      Diabetes Maternal Grandmother      Hypertension Maternal Grandmother      Breast Cancer Cousin      Breast Cancer Cousin      Breast Cancer Cousin        Physical Exam  Pulse 75   Resp 16   LMP 07/28/2017   SpO2 100%   General - The patient is in no distress.  Alert and oriented to person and place, answers questions and cooperates with examination appropriately.   Voice and Breathing - The patient was breathing comfortably without the use of accessory muscles. There was no wheezing, stridor, or stertor.  The patients voice was clear and strong.  Ears - The auricles are normal. Right tympanic membrane injection site has healed. No more tympanic membrane perforation. No fluid or purulence was seen in the external canal or the middle ear. No evidence of infection of the middle ear or external canal, cerumen was normal in appearance. Left ear normal.  Eyes - Extraocular movements intact. Sclera were not icteric or injected.  Neurological - Cranial nerves 2 through 12 were grossly intact. House-Brackmann grade 1 out of 6 bilaterally.     Audiogram - type A tymps bilaterally. Sensorineural hearing loss is stable, still right-sided asymmetric sensorineural hearing loss, but not worse.    Assessment and Plan - Laura Jackson is a 51 year old female who returns to me today with  sudden right-sided asymmetric sensorineural hearing loss. Started about 3 months ago. Etiology is unknown. MRI brain was normal. I performed a right-sided transtympanic dexamethasone injection. Hearing was still the same. Then we tried oral prednisone. Again, no change in hearing.  She had pressure in her head that improved with oral prednisone.  She does describe some fluctuating tinnitus. It is still possible she has Ménière's disease but has really only had 1 severe bout of vertigo. I think this was a viral inner ear infection, maybe herpes zoster as she has some residual numbness right ear canal, also some pain.  I recommend hearing aid consultation for the right ear. Recheck audiogram in 6-12 months.       Karri Burnett MD  Otolaryngology  [facility]

## 2021-05-13 LAB
ANA PAT SER IF-IMP: ABNORMAL
ANA SER QL IF: ABNORMAL
ANA TITR SER IF: ABNORMAL {TITER}
C3 SERPL-MCNC: 136 MG/DL (ref 81–157)
C4 SERPL-MCNC: 30 MG/DL (ref 13–39)
CCP AB SER IA-ACNC: <1 U/ML
CENTROMERE IGG SER-ACNC: <0.2 AI (ref 0–0.9)
DSDNA AB SER-ACNC: 1 IU/ML
ENA RNP IGG SER IA-ACNC: <0.2 AI (ref 0–0.9)
ENA SCL70 IGG SER IA-ACNC: <0.2 AI (ref 0–0.9)
ENA SM IGG SER-ACNC: <0.2 AI (ref 0–0.9)
ENA SS-A IGG SER IA-ACNC: <0.2 AI (ref 0–0.9)
ENA SS-B IGG SER IA-ACNC: <0.2 AI (ref 0–0.9)
RHEUMATOID FACT SER NEPH-ACNC: <7 IU/ML (ref 0–20)

## 2021-05-13 NOTE — RESULT ENCOUNTER NOTE
Dear Laura,     Your labs again showed borderline positive MANDO but specific antibodies for lupus, Sjogren's syndrome, scleroderma, rheumatoid arthritis are negative. No further testing needed from my perspective. Would recommend follow-up as needed.     Please let us know if you have any questions or concerns.    Regards,  Cee Loco MD

## 2021-05-14 ENCOUNTER — TELEPHONE (OUTPATIENT)
Dept: RHEUMATOLOGY | Facility: CLINIC | Age: 51
End: 2021-05-14

## 2021-05-14 NOTE — TELEPHONE ENCOUNTER
Spoke with patient confirmed she reviewed results via [employer]. She does not have any questions at this time.     LAILA MccartyN, RN   Medical Specialty Care Coordinator   [facility]       ----- Message from Cee Loco MD sent at 5/13/2021  4:44 PM CDT -----  Dear Laura,      Your labs again showed borderline positive MANDO but specific antibodies for lupus, Sjogren's syndrome, scleroderma, rheumatoid arthritis are negative. No further testing needed from my perspective. Would recommend follow-up as needed.      Please let us know if you have any questions or concerns.     Regards,  Cee Loco MD

## 2021-05-25 ENCOUNTER — OFFICE VISIT (OUTPATIENT)
Dept: AUDIOLOGY | Facility: CLINIC | Age: 51
End: 2021-05-25
Payer: COMMERCIAL

## 2021-05-25 DIAGNOSIS — H90.3 BILATERAL SENSORINEURAL HEARING LOSS: Primary | ICD-10-CM

## 2021-05-25 PROCEDURE — V5241 DISPENSING FEE, MONAURAL: HCPCS | Mod: RT | Performed by: AUDIOLOGIST

## 2021-05-25 PROCEDURE — V5257 HEARING AID, DIGIT, MON, BTE: HCPCS | Mod: RT | Performed by: AUDIOLOGIST

## 2021-05-25 PROCEDURE — V5011 HEARING AID FITTING/CHECKING: HCPCS | Performed by: AUDIOLOGIST

## 2021-05-25 PROCEDURE — V5020 CONFORMITY EVALUATION: HCPCS | Performed by: AUDIOLOGIST

## 2021-05-25 PROCEDURE — 99207 PR NO CHARGE LOS: CPT | Performed by: AUDIOLOGIST

## 2021-05-25 PROCEDURE — 92592 PR HEARING AID CHECK, MONAURAL: CPT | Mod: RT | Performed by: AUDIOLOGIST

## 2021-05-25 NOTE — PATIENT INSTRUCTIONS

## 2021-05-25 NOTE — PROGRESS NOTES
AUDIOLOGY REPORT    SUBJECTIVE: Laura Jackson, a 51 year old female, was seen in the Audiology Clinic at [facility] today for a Right hearing aid fitting. Previous results have revealed a bilateral asymmetric sensorineural hearing loss. The patient was given medical clearance to pursue amplification by  Onofre Burnett MD. Patient was unaccompanied to today's visit.       OBJECTIVE:  Prior to fitting, a hearing aid check was performed to ensure device functionality. The hearing aid conformity evaluation was completed.The hearing aids were placed and they provided a good fit. Real-ear-probe-microphone measurements were completed on the Tip or Skip system and were an acceptable match to NAL-NL2 target with soft sounds audible, moderate sounds comfortable, and loud sounds below discomfort. UCLs are verified through maximum power output measures and demonstrate appropriate limiting of loud inputs. Ms. Jackson was oriented to proper hearing aid use, care, cleaning (no water, dry brush), batteries (size rechargeable, insertion/removal, toxicity, low-battery signal), aid insertion/removal, user booklet, warranty information, storage cases, and other hearing aid details. The patient confirmed understanding of hearing aid use and care, and showed proper insertion of hearing aid and batteries while in the office today. Ms. Jackson reported good volume and sound quality today.    EAR(S) FIT: Right  HEARING AID MAKE: Right: Oticon; Left:    HEARING AID MODEL #: Right: More 3; Left:    HEARING AID STYLE: Right: RITE; Left:    DOME SIZE: Right:  8 mm open; Left::      LENGTH: Right:  2 85dB; Left:     EARMOLDS: Right:  ; Left:     SERIAL NUMBERS: Right: [id number]; Left:    WARRANTY END DATE: Right: 4/24/2024; Left::        CHARGES:     Hearing Aid Check: Monaural, 38184, $49.00  Dispensing Fee: Monaural, , $400.00  Fit/Orientation: Monaural, , $185.00  Hearing Aid Conformity Evaluation: 1, ,  $87.00  Hearing Aid Digital: Monaural, BTE, .,RT, NU $1579  Total: $2300     ASSESSMENT: Right ear hearing aid fitting completed today. Verification measures were performed. The 45 day trial period was explained to patient, and they expressed understanding. Ms. Jackson signed the Hearing Aid Purchase Agreement and was given a copy, as well as details on her hearing aids. Patient was counseled that exact out of pocket amounts cannot be determined for hearing aid claims being sent to insurance. Any insurance coverage information presented to the patient is an estimate only, and is not a guarantee of payment. Patient has been advised to check with their own insurance.    PLAN: Ms. Jackson will return for follow-up in 2-3 weeks for a hearing aid review appointment. Please call this clinic with questions regarding today s appointment.    Hardy Noriega CCC-A  Licensed Audiologist #[id number]  5/25/2021

## 2021-06-17 ENCOUNTER — OFFICE VISIT (OUTPATIENT)
Dept: AUDIOLOGY | Facility: CLINIC | Age: 51
End: 2021-06-17
Payer: COMMERCIAL

## 2021-06-17 PROCEDURE — V5299 HEARING SERVICE: HCPCS | Performed by: AUDIOLOGIST

## 2021-06-17 PROCEDURE — 99207 PR NO CHARGE LOS: CPT | Performed by: AUDIOLOGIST

## 2021-06-17 NOTE — PROGRESS NOTES
AUDIOLOGY REPORT    SUBJECTIVE:Laura Jackson is a 51 year old female who was seen in the Audiology Clinic at the [facility] on 6/17/2021  for a follow-up check regarding the fitting of new hearing aids. Previous results have revealed a right ear hearing loss.  The patient has been seen previously in this clinic and was fit with a right ear hearing aid on 5/25/2021.  Laura reports good sound quality with the hearing aid(s) and increased wear time with the heaing aids. She also reports that her hearing in the right ear may have improved as the hearing aid is too loud now. Patient was unaccompanied to today's visit.     OBJECTIVE:   The International Outcome Inventory-Hearing Aids (IOI-HA) was administered today.The patient s responses to the 7 questions can be compared to normative data relative to how others are performing with their hearing aids, as well as focusing audiologic care and counseling.This patient s Quality of Life score (Question 7) was 5, which is above normative average.     Based on patient report, the following changes were made; None.    Reviewed 45 day trial period, care, cleaning (no water, dry brush), batteries (size rechargeable) insertion/removal, toxicity, low-battery signal), aid insertion/removal, volume adjustment (if applicable), user booklet, warranty information, storage cases, and other hearing aid details.      ASSESSMENT: A follow-up appointment for hearing aid fitting was completed today. IOI-HA administered today. Changes to hearing aid was completed as outlined above.     PLAN:Laura will return tomorrow 6/18/2021 for a screening of the hearing of her right ear. Please call this clinic with any questions regarding today s appointment.    Hardy Noriega CCC-A  Licensed Audiologist #[id number]  6/17/2021

## 2021-06-18 ENCOUNTER — OFFICE VISIT (OUTPATIENT)
Dept: AUDIOLOGY | Facility: CLINIC | Age: 51
End: 2021-06-18
Payer: COMMERCIAL

## 2021-06-18 PROCEDURE — 92552 PURE TONE AUDIOMETRY AIR: CPT | Mod: 52 | Performed by: AUDIOLOGIST

## 2021-06-18 PROCEDURE — 99207 PR NO CHARGE LOS: CPT | Performed by: AUDIOLOGIST

## 2021-06-18 NOTE — PROGRESS NOTES
AUDIOLOGY REPORT    SUBJECTIVE:  Laura Jackson is a 51 year old female who was seen in the [facility] on 6/18/21 for audiologic evaluation, referred by self.  The patient has been seen previously in this clinic on 3/9/2021 for assessment and results indicated asymmetric sensorineural hearing loss. The patient reports an improvement in right ear hearing. Patient also reports that the pressure sensation of the right ear is finally gone. Patient was unaccompanied to today's visit.     OBJECTIVE:    Otoscopic exam indicates ears are clear of cerumen bilaterally     Pure Tone Thresholds assessed using standard techniques  audiometry with good  reliability from 250-8000 Hz bilaterally using insert earphones and circumaural headphones     RIGHT:  normal and borderline-normal hearing sensitivity through 1000 Hz then a mold to moderate sensorineural hearing loss   NOTE: Per patient request we only retested the air conduction of the right ear    ASSESSMENT:   Asymmetric sensorineural hearing loss      Compared to patient's previous audiogram dated 3/9/2021, hearing has improved to the level of the bone conduction.  Today s results were discussed with the patient in detail.     PLAN:  Patient was counseled regarding hearing loss and impact on communication. It is recommended that the patient return with her hearing aid to have it reprogrammed.  Please call this clinic with questions regarding these results or recommendations.    Hardy Noriega CCC-A  Licensed Audiologist #[id number]  6/18/2021

## 2021-06-21 ENCOUNTER — OFFICE VISIT (OUTPATIENT)
Dept: AUDIOLOGY | Facility: CLINIC | Age: 51
End: 2021-06-21
Payer: COMMERCIAL

## 2021-06-21 PROCEDURE — 99207 PR NO CHARGE LOS: CPT | Performed by: AUDIOLOGIST

## 2021-06-21 PROCEDURE — V5299 HEARING SERVICE: HCPCS | Performed by: AUDIOLOGIST

## 2021-06-21 NOTE — PROGRESS NOTES
HEARING AID RECHECK    Patient Name:  Laura Jackson    Patient Age:   51 year old    :  1970    Background:   Patient's audiogram changed and she needs the gain reduced to new targets.    SIDE: Right    : [place]    TYPE: More 3    S/N: [id number]    WARRANTY: 2024    Procedures:   I reduced high frequency gain so it did not exceed  targets.  Patient reported it sounded much better to her.    Plan:   Return for service as needed.    NO CHARGE VISIT    Glen Nation MA, CCC-A  [place] Licensed Audiologist #[id number]  2021

## 2021-06-22 ENCOUNTER — ALLIED HEALTH/NURSE VISIT (OUTPATIENT)
Dept: NURSING | Facility: CLINIC | Age: 51
End: 2021-06-22
Payer: COMMERCIAL

## 2021-06-22 DIAGNOSIS — Z11.1 SCREENING EXAMINATION FOR PULMONARY TUBERCULOSIS: Primary | ICD-10-CM

## 2021-06-22 PROCEDURE — 86580 TB INTRADERMAL TEST: CPT

## 2021-06-22 PROCEDURE — 99207 PR NO CHARGE NURSE ONLY: CPT

## 2021-06-22 NOTE — LETTER
June 25, 2021      Laura Jackson  [address]  [place] [address]        Dear ,    We are writing to inform you of your test results.    All of these tests are within acceptable limits , things look good ! Negative Tuberculosis skin test       Resulted Orders   TB INTRADERMAL TEST   Result Value Ref Range    PPD Induration 0 0 - 5 mm    PPD Redness 0 mm       If you have any questions or concerns, please call the clinic at the number listed above.       Sincerely,      Bj Butler MD/rhodes

## 2021-06-22 NOTE — PROGRESS NOTES

## 2021-06-24 ENCOUNTER — ALLIED HEALTH/NURSE VISIT (OUTPATIENT)
Dept: NURSING | Facility: CLINIC | Age: 51
End: 2021-06-24
Payer: COMMERCIAL

## 2021-06-24 DIAGNOSIS — H69.93 DYSFUNCTION OF BOTH EUSTACHIAN TUBES: ICD-10-CM

## 2021-06-24 DIAGNOSIS — Z11.1 SCREENING EXAMINATION FOR PULMONARY TUBERCULOSIS: Primary | ICD-10-CM

## 2021-06-24 LAB
PPDINDURATION: 0 MM (ref 0–5)
PPDREDNESS: 0 MM

## 2021-06-24 PROCEDURE — 99207 PR NO CHARGE NURSE ONLY: CPT

## 2021-06-24 RX ORDER — AZELASTINE 1 MG/ML
1 SPRAY, METERED NASAL 2 TIMES DAILY
Qty: 30 ML | Refills: 0 | Status: SHIPPED | OUTPATIENT
Start: 2021-06-24 | End: 2021-08-11

## 2021-06-24 NOTE — PROGRESS NOTES
[place]oux result:  Lab Results   Component Value Date    PPDREDNESS 0 06/24/2021    PPDINDURATIO 0 06/24/2021     Is induration greater than 5mm?  No    LAILA VeeN JAS  [facility], [place]

## 2021-06-28 ENCOUNTER — OFFICE VISIT (OUTPATIENT)
Dept: DERMATOLOGY | Facility: CLINIC | Age: 51
End: 2021-06-28
Payer: COMMERCIAL

## 2021-06-28 VITALS — SYSTOLIC BLOOD PRESSURE: 127 MMHG | HEART RATE: 63 BPM | OXYGEN SATURATION: 100 % | DIASTOLIC BLOOD PRESSURE: 83 MMHG

## 2021-06-28 DIAGNOSIS — L81.4 LENTIGO: ICD-10-CM

## 2021-06-28 DIAGNOSIS — D18.01 ANGIOMA OF SKIN: ICD-10-CM

## 2021-06-28 DIAGNOSIS — L82.1 SEBORRHEIC KERATOSIS: ICD-10-CM

## 2021-06-28 DIAGNOSIS — D22.9 NEVUS: ICD-10-CM

## 2021-06-28 DIAGNOSIS — L82.0 INFLAMED SEBORRHEIC KERATOSIS: ICD-10-CM

## 2021-06-28 DIAGNOSIS — L71.9 ROSACEA: Primary | ICD-10-CM

## 2021-06-28 PROCEDURE — 99214 OFFICE O/P EST MOD 30 MIN: CPT | Mod: 25 | Performed by: PHYSICIAN ASSISTANT

## 2021-06-28 PROCEDURE — 17110 DESTRUCTION B9 LES UP TO 14: CPT | Performed by: PHYSICIAN ASSISTANT

## 2021-06-28 RX ORDER — METRONIDAZOLE 7.5 MG/G
GEL TOPICAL
Qty: 45 G | Refills: 11 | Status: SHIPPED | OUTPATIENT
Start: 2021-06-28 | End: 2023-03-20

## 2021-06-28 NOTE — PROGRESS NOTES
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   ROS of the following were done and are negative: Constitutional, Eyes, Ears, Nose,   Mouth, Throat, Cardiovascular, Respiratory, GI, Genitourinary, Musculoskeletal,   Psychiatric, Endocrine, Allergic/Immunologic.    PHYSICAL EXAM:   /83   Pulse 63   LMP 07/28/2017   SpO2 100%   Skin exam performed as follows: Type 2 skin. Mood appropriate  Alert and Oriented X 3. Well developed, well nourished in no distress.  General appearance: Normal  Head including face: Normal  Eyes: conjunctiva and lids: Normal  Mouth: Lips, teeth, gums: Normal  Neck: Normal  Chest-breast/axillae: Normal  Back: Normal  Spleen and liver: Normal  Cardiovascular: Exam of peripheral vascular system by observation for swelling, varicosities, edema: Normal  Genitalia: groin, buttocks: Normal  Extremities: digits/nails (clubbing): Normal  Eccrine and Apocrine glands: Normal  Right upper extremity: Normal  Left upper extremity: Normal  Right lower extremity: Normal  Left lower extremity: Normal  Skin: Scalp and body hair: See below    Pt deferred exam of breasts, groin, buttocks: No    Other physical findings:  1. Multiple pigmented macules on extremities and trunk  2. Multiple pigmented macules on face, trunk and extremities  3. Multiple vascular papules on trunk, arms and legs  4. Multiple scattered keratotic plaques  5.  Inflamed keratotic papule  6. Verrucous papule on the left upper lip   7. Papules and pustules with background erythema on face         Except as noted above, no other signs of skin cancer or melanoma.     ASSESSMENT/PLAN:   Benign Full skin cancer screening today. . Patient with history of none  Advised on monthly self exams and 1 year  Patient Education: Appropriate brochures given.    1. Multiple benign appearing nevi on arms, legs and trunk. Discussed ABCDEs of melanoma and sunscreen.   2. Multiple lentigos on arms, legs and trunk. Advised benign, no treatment needed.  3. Multiple scattered angiomas. Advised benign, no treatment needed.   4. Seborrheic keratosis on arms, legs and trunk. Advised benign, no treatment needed.  5. Wart vs ISK on the left upper lip. Irritated and bleeding. Cryosurgery performed. Advised on blistering and post op care. Advised to return if not resolved in 3-4 weeks.   6. Acne Rosacea - advised on diagnosis and treatment options. Discussed chronic condition of unknown etiology. Discussed anti-inflammatories, sunscreen, PO and topical medications.   --Start metrogel BID            Follow-up: yearly FSE/PRN sooner    1.) Patient was asked about new and changing moles. YES  2.) Patient received a complete physical skin examination: YES  3.) Patient was counseled to perform a monthly self skin examination: YES  Scribed By: Valeria Villa MS, PA-C

## 2021-06-28 NOTE — LETTER
6/28/2021         RE: Laura Jackson  [address]  [place] [address]        Dear Colleague,    Thank you for referring your patient, Laura Jackson, to the [facility]. Please see a copy of my visit note below.    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   ROS of the following were done and are negative: Constitutional, Eyes, Ears, Nose,   Mouth, Throat, Cardiovascular, Respiratory, GI, Genitourinary, Musculoskeletal,   Psychiatric, Endocrine, Allergic/Immunologic.    PHYSICAL EXAM:   /83   Pulse 63   LMP 07/28/2017   SpO2 100%   Skin exam performed as follows: Type 2 skin. Mood appropriate  Alert and Oriented X 3. Well developed, well nourished in no distress.  General appearance: Normal  Head including face: Normal  Eyes: conjunctiva and lids: Normal  Mouth: Lips, teeth, gums: Normal  Neck: Normal  Chest-breast/axillae: Normal  Back: Normal  Spleen and liver: Normal  Cardiovascular: Exam of peripheral vascular system by observation for swelling, varicosities, edema: Normal  Genitalia: groin, buttocks: Normal  Extremities: digits/nails (clubbing): Normal  Eccrine and Apocrine glands: Normal  Right upper extremity: Normal  Left upper extremity: Normal  Right lower extremity: Normal  Left lower extremity: Normal  Skin: Scalp and body hair: See below    Pt deferred exam of breasts, groin, buttocks:  No    Other physical findings:  1. Multiple pigmented macules on extremities and trunk  2. Multiple pigmented macules on face, trunk and extremities  3. Multiple vascular papules on trunk, arms and legs  4. Multiple scattered keratotic plaques  5. Inflamed keratotic papule  6. Verrucous papule on the left upper lip   7. Papules and pustules with background erythema on face         Except as noted above, no other signs of skin cancer or melanoma.     ASSESSMENT/PLAN:   Benign Full skin cancer screening today. . Patient with history of none  Advised on monthly self exams and 1 year  Patient Education: Appropriate brochures given.    1. Multiple benign appearing nevi on arms, legs and trunk. Discussed ABCDEs of melanoma and sunscreen.   2. Multiple lentigos on arms, legs and trunk. Advised benign, no treatment needed.  3. Multiple scattered angiomas. Advised benign, no treatment needed.   4. Seborrheic keratosis on arms, legs and trunk. Advised benign, no treatment needed.  5. Wart vs ISK on the left upper lip. Irritated and bleeding. Cryosurgery performed. Advised on blistering and post op care. Advised to return if not resolved in 3-4 weeks.   6. Acne Rosacea - advised on diagnosis and treatment options. Discussed chronic condition of unknown etiology. Discussed anti-inflammatories, sunscreen, PO and topical medications.   --Start metrogel BID            Follow-up: yearly FSE/PRN sooner    1.) Patient was asked about new and changing moles. YES  2.) Patient received a complete physical skin examination: YES  3.) Patient was counseled to perform a monthly self skin examination: YES  Scribed By: Valeria Villa, MS, PALyndaC          Again, thank you for allowing me to participate in the care of your patient.        Sincerely,        Valeria Villa PA-C

## 2021-07-05 DIAGNOSIS — J45.30 MILD PERSISTENT ASTHMA WITHOUT COMPLICATION: ICD-10-CM

## 2021-07-05 NOTE — LETTER
July 6, 2021      Laura Jackson  [address]  [place] [address]      Dear Laura,    We recently received a call from your pharmacy requesting a refill of your medication.    A review of your chart indicates that an updated ACT or Asthma Control Test is required with your provider.  Please send back in mail or we will call you in about a week to see if you have completed this.     Thank you,      Bj Butler MD

## 2021-07-06 NOTE — TELEPHONE ENCOUNTER
Mailed to patient to complete, postponing for 1 week to call and see if patient received.         Rocio MURRELL CMA ([facility])

## 2021-07-13 NOTE — TELEPHONE ENCOUNTER
Patient stated she received this in mail and will send it back in mail, postponing for 7 days to check again.       Rocio MURRELL CMA ([facility])

## 2021-07-26 NOTE — TELEPHONE ENCOUNTER
Called patient and left message to call clinic back.       Rocio MURRELL CMA ([facility])

## 2021-07-28 ENCOUNTER — MYC MEDICAL ADVICE (OUTPATIENT)
Dept: FAMILY MEDICINE | Facility: CLINIC | Age: 51
End: 2021-07-28

## 2021-07-28 NOTE — TELEPHONE ENCOUNTER
Spoke to patient and she stated she still has not sent back ACT. Patient stated she is out of town but is okay with sending through my chart. My chart message sent.  Greta MARCANO CMA ([facility])

## 2021-08-05 ASSESSMENT — ASTHMA QUESTIONNAIRES: ACT_TOTALSCORE: 23

## 2021-08-11 DIAGNOSIS — H69.93 DYSFUNCTION OF BOTH EUSTACHIAN TUBES: ICD-10-CM

## 2021-08-11 RX ORDER — AZELASTINE 1 MG/ML
1 SPRAY, METERED NASAL 2 TIMES DAILY
Qty: 30 ML | Refills: 0 | Status: SHIPPED | OUTPATIENT
Start: 2021-08-11 | End: 2022-02-23

## 2021-09-02 NOTE — OR NURSING
Report called to Renu MARK. Patient transferred to observation #20 on cart with belongings and DC meds. Patient's  with patient.    [FreeTextEntry1] : Physical examination \par \par Mental status \par Awake, alert, appropriate.  Gives detailed history.\par Delayed recall 0/3. \par \par MoCA\par October 2, 2019 - 21/30; memory 2/5\par \par Motor exam  \par Muscle tone - spasticity in the right arm and leg.  Very slow and clumsy rapid successive movements in the right hand.\par No atrophy or fasciculations \par Muscle Strength: arms and legs, proximal and distal flexors and extensors are normal except for right: Finger extension 4-; ankle and toe movement 0; others on the right 4—>4+\par \par \par Reflexes \par Right: 3, brachioradialis and biceps with spread to the finger flexors.\par Right patellar 4+\par Right achilles 4+\par Left 2\par \par Plantars right: Upgoing.   \par Plantars left: mute.   \par \par Slow, no ataxia - FNF, EDDIE, HKS on the right. \par \par Sensory\par Intact vibration, PP, and cold sensation. \par \par Gait \par Right spastic, hemiparetic gait. Slow, tentative. \par

## 2021-09-18 ENCOUNTER — HEALTH MAINTENANCE LETTER (OUTPATIENT)
Age: 51
End: 2021-09-18

## 2021-10-04 ENCOUNTER — E-VISIT (OUTPATIENT)
Dept: FAMILY MEDICINE | Facility: CLINIC | Age: 51
End: 2021-10-04
Payer: COMMERCIAL

## 2021-10-04 DIAGNOSIS — S29.012A STRAIN OF RHOMBOID MUSCLE, INITIAL ENCOUNTER: Primary | ICD-10-CM

## 2021-10-04 PROCEDURE — 99421 OL DIG E/M SVC 5-10 MIN: CPT | Performed by: FAMILY MEDICINE

## 2021-11-18 ENCOUNTER — THERAPY VISIT (OUTPATIENT)
Dept: PHYSICAL THERAPY | Facility: CLINIC | Age: 51
End: 2021-11-18
Payer: COMMERCIAL

## 2021-11-18 DIAGNOSIS — M54.9 BACK PAIN: ICD-10-CM

## 2021-11-18 PROCEDURE — 97110 THERAPEUTIC EXERCISES: CPT | Mod: GP | Performed by: PHYSICAL THERAPIST

## 2021-11-18 PROCEDURE — 97161 PT EVAL LOW COMPLEX 20 MIN: CPT | Mod: GP | Performed by: PHYSICAL THERAPIST

## 2021-11-18 NOTE — PROGRESS NOTES
Physical Therapy Initial Evaluation  Subjective:  The history is provided by the patient.   Patient Health History  Laura Jackson being seen for back pain .     Date of Onset: ongoing    Problem occurred: exercising    Pain is reported as 4/10 on pain scale.  General health as reported by patient is good.  Pertinent medical history includes: other (Edilma Danlos).   Red flags:  None as reported by patient.         Current medications:  Pain medication.    Current occupation .   Primary job tasks include:  Computer work and prolonged sitting.                  Therapist Generated HPI Evaluation  Problem details: Pt reports numerous pain in upper back, mid back, Low back,Rt hip and thigh to her knee.  .         Type of problem:  Sacroiliac and lumbar.    This is a recurrent condition.  Condition occurred with:  Other reason.  Where condition occurred: during recreation/sport.  Patient reports pain:  Mid thoracic spine, lower lumbar spine and SI joint right.  Pain is described as aching and sharp   Pain radiates to:  Gluteals right, thigh right and knee right. Pain is the same all the time.  Since onset symptoms are unchanged.  Associated symptoms:  Loss of motion/stiffness and loss of strength. Symptoms are exacerbated by bending, sitting, standing and walking  and relieved by nothing.    Previous treatment includes physical therapy. There was significant improvement following previous treatment.  Restrictions due to condition include:  Working in normal job without restrictions.  Barriers include:  None as reported by patient.                        Objective:  System         Lumbar/SI Evaluation  ROM:  AROM Lumbar: normal                        SI joint/Sacrum:          Right positive at:    Ilium Ventromedial  Sacral conclusion left:  Elevated pubic sym  Sacral conclusion right:  Posterior inominate, locked, upslip, sacral torsion, outflare and descended pubic sym     Cervical/Thoracic  Evaluation  Cervical AROM: normal   Thoracic AROM: normal                                                                  General     ROS    Assessment/Plan:    Patient is a 51 year old female with lumbar, sacral, right side hip and right side knee complaints.    Patient has the following significant findings with corresponding treatment plan.                Diagnosis 1:  Back pain multi levels   Pain -  manual therapy  Decreased ROM/flexibility - manual therapy, therapeutic exercise and home program  Decreased joint mobility - manual therapy, therapeutic exercise and home program  Impaired muscle performance - neuro re-education and home program  Decreased function - therapeutic activities  Diagnosis 2:  Rt hip LE pain    Pain -  manual therapy, self management and home program  Decreased ROM/flexibility - manual therapy, therapeutic exercise and home program  Decreased joint mobility - manual therapy, therapeutic exercise and home program  Impaired muscle performance - neuro re-education and home program  Decreased function - therapeutic activities    Therapy Evaluation Codes:   1) History comprised of:   Personal factors that impact the plan of care:      Coping style, Overall behavior pattern and Past/current experiences.    Comorbidity factors that impact the plan of care are:      alison Danlos .     Medications impacting care: Pain.  2) Examination of Body Systems comprised of:   Body structures and functions that impact the plan of care:      Lumbar spine and Sacral illiac joint.   Activity limitations that impact the plan of care are:      Bending, Cooking, Reading/Computer work, Sitting, Standing and Walking.  3) Clinical presentation characteristics are:   Stable/Uncomplicated.  4) Decision-Making    Low complexity using standardized patient assessment instrument and/or measureable assessment of functional outcome.  Cumulative Therapy Evaluation is: Low complexity.    Previous and current functional  limitations:  (See Goal Flow Sheet for this information)    Short term and Long term goals: (See Goal Flow Sheet for this information)     Communication ability:  Patient appears to be able to clearly communicate and understand verbal and written communication and follow directions correctly.  Treatment Explanation - The following has been discussed with the patient:   RX ordered/plan of care  Anticipated outcomes  Possible risks and side effects  This patient would benefit from PT intervention to resume normal activities.   Rehab potential is good.    Frequency:  1 X week, once daily  Duration:  for 6 weeks  Discharge Plan:  Achieve all LTG.  Independent in home treatment program.  Reach maximal therapeutic benefit.    Please refer to the daily flowsheet for treatment today, total treatment time and time spent performing 1:1 timed codes.

## 2021-11-24 ENCOUNTER — THERAPY VISIT (OUTPATIENT)
Dept: PHYSICAL THERAPY | Facility: CLINIC | Age: 51
End: 2021-11-24
Payer: COMMERCIAL

## 2021-11-24 DIAGNOSIS — M54.59 MECHANICAL LOW BACK PAIN: ICD-10-CM

## 2021-11-24 DIAGNOSIS — M54.2 CERVICALGIA: Primary | ICD-10-CM

## 2021-11-24 DIAGNOSIS — M54.2 CERVICALGIA: ICD-10-CM

## 2021-11-24 DIAGNOSIS — M54.2 NECK PAIN: Primary | ICD-10-CM

## 2021-11-24 DIAGNOSIS — M54.9 BACK PAIN: ICD-10-CM

## 2021-11-24 PROCEDURE — 97140 MANUAL THERAPY 1/> REGIONS: CPT | Mod: GP | Performed by: PHYSICAL THERAPIST

## 2021-11-24 PROCEDURE — 97112 NEUROMUSCULAR REEDUCATION: CPT | Mod: GP | Performed by: PHYSICAL THERAPIST

## 2021-12-08 ENCOUNTER — OFFICE VISIT (OUTPATIENT)
Dept: FAMILY MEDICINE | Facility: CLINIC | Age: 51
End: 2021-12-08
Payer: COMMERCIAL

## 2021-12-08 VITALS
RESPIRATION RATE: 18 BRPM | WEIGHT: 179 LBS | TEMPERATURE: 98.4 F | HEART RATE: 75 BPM | SYSTOLIC BLOOD PRESSURE: 118 MMHG | HEIGHT: 64 IN | OXYGEN SATURATION: 99 % | DIASTOLIC BLOOD PRESSURE: 80 MMHG | BODY MASS INDEX: 30.56 KG/M2

## 2021-12-08 DIAGNOSIS — K21.00 GASTROESOPHAGEAL REFLUX DISEASE WITH ESOPHAGITIS WITHOUT HEMORRHAGE: ICD-10-CM

## 2021-12-08 DIAGNOSIS — I47.11 INAPPROPRIATE SINUS TACHYCARDIA (H): Primary | ICD-10-CM

## 2021-12-08 DIAGNOSIS — M77.31 CALCANEAL SPUR OF BOTH FEET: ICD-10-CM

## 2021-12-08 DIAGNOSIS — M77.32 CALCANEAL SPUR OF BOTH FEET: ICD-10-CM

## 2021-12-08 PROCEDURE — 99214 OFFICE O/P EST MOD 30 MIN: CPT | Performed by: NURSE PRACTITIONER

## 2021-12-08 RX ORDER — OMEPRAZOLE 40 MG/1
40 CAPSULE, DELAYED RELEASE ORAL
Qty: 90 CAPSULE | Refills: 3 | Status: SHIPPED | OUTPATIENT
Start: 2021-12-08 | End: 2023-01-26

## 2021-12-08 RX ORDER — FAMOTIDINE 40 MG/1
40 TABLET, FILM COATED ORAL AT BEDTIME
Qty: 90 TABLET | Refills: 3 | Status: SHIPPED | OUTPATIENT
Start: 2021-12-08 | End: 2023-01-26

## 2021-12-08 RX ORDER — IVABRADINE 5 MG/1
5 TABLET, FILM COATED ORAL 2 TIMES DAILY WITH MEALS
Qty: 180 TABLET | Refills: 3 | Status: SHIPPED | OUTPATIENT
Start: 2021-12-08 | End: 2021-12-17

## 2021-12-08 ASSESSMENT — PAIN SCALES - GENERAL: PAINLEVEL: MILD PAIN (2)

## 2021-12-08 ASSESSMENT — ANXIETY QUESTIONNAIRES
7. FEELING AFRAID AS IF SOMETHING AWFUL MIGHT HAPPEN: NOT AT ALL
1. FEELING NERVOUS, ANXIOUS, OR ON EDGE: NOT AT ALL
5. BEING SO RESTLESS THAT IT IS HARD TO SIT STILL: NOT AT ALL
GAD7 TOTAL SCORE: 0
3. WORRYING TOO MUCH ABOUT DIFFERENT THINGS: NOT AT ALL
6. BECOMING EASILY ANNOYED OR IRRITABLE: NOT AT ALL
IF YOU CHECKED OFF ANY PROBLEMS ON THIS QUESTIONNAIRE, HOW DIFFICULT HAVE THESE PROBLEMS MADE IT FOR YOU TO DO YOUR WORK, TAKE CARE OF THINGS AT HOME, OR GET ALONG WITH OTHER PEOPLE: NOT DIFFICULT AT ALL
2. NOT BEING ABLE TO STOP OR CONTROL WORRYING: NOT AT ALL

## 2021-12-08 ASSESSMENT — MIFFLIN-ST. JEOR: SCORE: 1398.45

## 2021-12-08 ASSESSMENT — PATIENT HEALTH QUESTIONNAIRE - PHQ9: 5. POOR APPETITE OR OVEREATING: NOT AT ALL

## 2021-12-08 NOTE — PROGRESS NOTES
"Assessment & Plan     Inappropriate sinus tachycardia  Well controlled with medications without side effects.  - ivabradine (CORLANOR) 5 MG tablet; Take 1 tablet (5 mg) by mouth 2 times daily (with meals)    Gastroesophageal reflux disease with esophagitis without hemorrhage  Her symptoms have improved with famotidine 40 mg daily and omeprazole 20 mg daily but she is still having daily GERD symptoms, reflux, heartburn, belching, nausea. Will maximize treatment by increasing omeprazole with plan to wean once symptoms are improved. Keep follow up as scheduled with GI specialist. Recommended that she take TUMs and increase calcium in her diet.   - famotidine (PEPCID) 40 MG tablet; Take 1 tablet (40 mg) by mouth At Bedtime  - omeprazole (PRILOSEC) 40 MG DR capsule; Take 1 capsule (40 mg) by mouth daily before breakfast    Calcaneal spur of both feet  - Orthopedic  Referral; Future    Review of the result(s) of each unique test - as noted below  Prescription drug management  32 minutes spent on the date of the encounter doing chart review, history and exam, documentation and further activities per the note     BMI:   Estimated body mass index is 30.94 kg/m  as calculated from the following:    Height as of this encounter: 1.62 m (5' 3.78\").    Weight as of this encounter: 81.2 kg (179 lb).   Weight management plan: Discussed healthy diet and exercise guidelines    Return in about 3 months (around 3/8/2022) for Physical with PCP.    Marylou Lim, SALAZAR CNP  [facility] [place] is a 51 year old who presents for the following health issues     History of Present Illness       She eats 2-3 servings of fruits and vegetables daily.She consumes 0 sweetened beverage(s) daily.She exercises with enough effort to increase her heart rate 20 to 29 minutes per day.  She exercises with enough effort to increase her heart rate 4 days per week.   She is taking medications regularly.   "         Concern - POTS  Onset: recheck; ongoing for many years now - pt. Reports used to see NP in cardiology.   Description: Heart rate changes when she gets up. Patient denies fainting or dizziness or chest tightness pain. She feels it is more anxiety related. Denies irregular heartbeat. Will rarely feel a skipped beat  Progression of Symptoms:  intermittent  Therapies tried and outcome: ivabradine (CORLANOR) 5 MG tablet; working well.     GERD/Heartburn  Onset/Duration: several years  Description: heart burn and acid reflux  Progression of Symptoms: waxing and waning  Accompanying Signs & Symptoms:  Does it feel like food gets stuck or trouble swallowing: yes  Nausea: YES- all the time  Vomiting (bloody?): no  Abdominal Pain: YES- bloating  Black-Tarry stools: no  Bloody stools: no  Belching a lot  History:  Previous similar episodes: YES  Previous ulcers: YES  Precipitating factors:   Caffeine use: no  Alcohol use: YES- once monthly or less  NSAID/Aspirin use: no  Tobacco use: no  Worse with big meal, standing.  Alleviating factors: Pepcid and Prilosec help some  Therapies tried and outcome:             Lifestyle changes: None            Medications: Omeprazole (Prilosec) and Pepcid (famotidine)  Has GI follow up appointment schedule with GI specialist 2/4/2022.    Foot pain:  States that she has bilateral foot pain. Xrays show bilateral calcaneal spurs. 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 Pain is worsening.     Review of Systems   Constitutional, HEENT, cardiovascular, pulmonary, gi and gu systems are negative, except as otherwise noted.      Objective    LMP 07/28/2017   There is no height or weight on file to calculate BMI.  Physical Exam   GENERAL: healthy, alert and no distress  EYES: Eyes grossly normal to inspection, PERRL and conjunctivae and sclerae normal  RESP: lungs clear to auscultation - no rales, rhonchi or wheezes  CV: regular rate and rhythm, normal S1 S2, no S3 or S4, no murmur,  click or rub, no peripheral edema and peripheral pulses strong  PSYCH: mentation appears normal, affect normal/bright    Diagnotics reviewed today:    Exam Information    Exam Date Exam Time Accession # Performing Department Results    5/12/21 10:37 AM [id number] [facility]        PACS Images     Show images for XR Foot Bilateral G/E 3 Views    Study Result    Narrative & Impression   XR FOOT BILATERAL G/E 3 VIEWS 5/12/2021 10:37 AM      HISTORY: Multiple joint pain                                                                      IMPRESSION: Bilateral calcaneal spurs. Otherwise unremarkable  bilateral foot radiographs.       Comprehensive metabolic panel  Order: [id number]   Status: Final result     Visible to patient: Yes (seen)     Dx: Multiple joint pain; Flashing lights ...     1 Result Note     1 Patient Communication      Component Ref Range & Units 8 mo ago   (3/31/21) 1 yr ago   (10/8/20) 2 yr ago   (3/15/19) 3 yr ago   (4/29/18) 4 yr ago   (9/11/17) 4 yr ago   (8/26/17) 4 yr ago   (8/6/17)    Sodium 133 - 144 mmol/L 138  138  139  140  138  138      Potassium 3.4 - 5.3 mmol/L 4.2  4.2  4.2  3.6  4.0  3.6      Chloride 94 - 109 mmol/L 104  104  104  106  103  106      Carbon Dioxide 20 - 32 mmol/L 31  27  27  24  27  26      Anion Gap 3 - 14 mmol/L 3  7  8  9  8  6      Glucose 70 - 99 mg/dL 87  87 CM  90  95  96  106 High   100 High     Comment: Non Fasting    Urea Nitrogen 7 - 30 mg/dL 15  16  12  16  11  13      Creatinine 0.52 - 1.04 mg/dL 0.99  0.89  0.90  0.87  0.77  0.82      GFR Estimate >60 mL/min/ 65  74 CM  74 CM  69 R, CM  79 R, CM  75 R, CM     Comment: Non  GFR Calc   Starting 12/18/2018, serum creatinine based estimated GFR (eGFR) will be   calculated using the Chronic Kidney Disease Epidemiology Collaboration   (CKD-EPI) equation.     GFR Estimate If Black >60 mL/min/ 76  86 CM  86 CM  84 R, CM  >90 R, CM  >90 R, CM     Comment:   American GFR Calc   Starting 12/18/2018, serum creatinine based estimated GFR (eGFR) will be   calculated using the Chronic Kidney Disease Epidemiology Collaboration   (CKD-EPI) equation.     Calcium 8.5 - 10.1 mg/dL 9.4  9.3  9.0  8.8  9.2  8.3 Low       Bilirubin Total 0.2 - 1.3 mg/dL 0.3   0.4         Albumin 3.4 - 5.0 g/dL 4.2   4.2         Protein Total 6.8 - 8.8 g/dL 7.5   7.5         Alkaline Phosphatase 40 - 150 U/L 61   52         ALT 0 - 50 U/L 62 High    29         AST 0 - 45 U/L 28   17

## 2021-12-09 ENCOUNTER — THERAPY VISIT (OUTPATIENT)
Dept: PHYSICAL THERAPY | Facility: CLINIC | Age: 51
End: 2021-12-09
Attending: FAMILY MEDICINE
Payer: COMMERCIAL

## 2021-12-09 DIAGNOSIS — M54.2 CERVICALGIA: ICD-10-CM

## 2021-12-09 DIAGNOSIS — M54.9 BACK PAIN: ICD-10-CM

## 2021-12-09 DIAGNOSIS — S29.012A STRAIN OF RHOMBOID MUSCLE, INITIAL ENCOUNTER: ICD-10-CM

## 2021-12-09 PROCEDURE — 97112 NEUROMUSCULAR REEDUCATION: CPT | Mod: GP | Performed by: PHYSICAL THERAPIST

## 2021-12-09 PROCEDURE — 97140 MANUAL THERAPY 1/> REGIONS: CPT | Mod: GP | Performed by: PHYSICAL THERAPIST

## 2021-12-09 PROCEDURE — 97110 THERAPEUTIC EXERCISES: CPT | Mod: GP | Performed by: PHYSICAL THERAPIST

## 2021-12-09 ASSESSMENT — ASTHMA QUESTIONNAIRES: ACT_TOTALSCORE: 20

## 2021-12-09 ASSESSMENT — ANXIETY QUESTIONNAIRES: GAD7 TOTAL SCORE: 0

## 2021-12-13 ENCOUNTER — TELEPHONE (OUTPATIENT)
Dept: CARDIOLOGY | Facility: CLINIC | Age: 51
End: 2021-12-13
Payer: COMMERCIAL

## 2021-12-13 DIAGNOSIS — I47.11 INAPPROPRIATE SINUS TACHYCARDIA (H): ICD-10-CM

## 2021-12-13 NOTE — TELEPHONE ENCOUNTER
[facility] Call Center    Phone Message    May a detailed message be left on voicemail: yes     Reason for Call: Other: Pt would like a call back as her insurance will not be covering pt medication after the new year and stated is they send a prescription for a year they will cover that order. Prescription is Ivabradine  , pharmacy is [facility]. Please reach out to pt with any questions     Action Taken: Message routed to:  [facility] ([facility]): CArdio    Travel Screening: Not Applicable

## 2021-12-17 RX ORDER — IVABRADINE 5 MG/1
5 TABLET, FILM COATED ORAL 2 TIMES DAILY WITH MEALS
Qty: 720 TABLET | Refills: 0 | Status: SHIPPED | OUTPATIENT
Start: 2021-12-17 | End: 2022-11-16

## 2021-12-20 ENCOUNTER — E-VISIT (OUTPATIENT)
Dept: URGENT CARE | Facility: CLINIC | Age: 51
End: 2021-12-20
Payer: COMMERCIAL

## 2021-12-20 ENCOUNTER — OFFICE VISIT (OUTPATIENT)
Dept: URGENT CARE | Facility: URGENT CARE | Age: 51
End: 2021-12-20
Payer: COMMERCIAL

## 2021-12-20 VITALS
DIASTOLIC BLOOD PRESSURE: 84 MMHG | TEMPERATURE: 98.3 F | BODY MASS INDEX: 30.99 KG/M2 | OXYGEN SATURATION: 100 % | WEIGHT: 179.3 LBS | SYSTOLIC BLOOD PRESSURE: 134 MMHG | HEART RATE: 79 BPM

## 2021-12-20 DIAGNOSIS — R05.9 COUGH: Primary | ICD-10-CM

## 2021-12-20 DIAGNOSIS — J45.30 ASTHMATIC BRONCHITIS, MILD PERSISTENT, UNCOMPLICATED: Primary | ICD-10-CM

## 2021-12-20 DIAGNOSIS — R07.0 THROAT PAIN: ICD-10-CM

## 2021-12-20 LAB
DEPRECATED S PYO AG THROAT QL EIA: NEGATIVE
GROUP A STREP BY PCR: NOT DETECTED
SARS-COV-2 RNA RESP QL NAA+PROBE: NEGATIVE

## 2021-12-20 PROCEDURE — U0003 INFECTIOUS AGENT DETECTION BY NUCLEIC ACID (DNA OR RNA); SEVERE ACUTE RESPIRATORY SYNDROME CORONAVIRUS 2 (SARS-COV-2) (CORONAVIRUS DISEASE [COVID-19]), AMPLIFIED PROBE TECHNIQUE, MAKING USE OF HIGH THROUGHPUT TECHNOLOGIES AS DESCRIBED BY CMS-2020-01-R: HCPCS | Performed by: PHYSICIAN ASSISTANT

## 2021-12-20 PROCEDURE — U0005 INFEC AGEN DETEC AMPLI PROBE: HCPCS | Performed by: PHYSICIAN ASSISTANT

## 2021-12-20 PROCEDURE — 87651 STREP A DNA AMP PROBE: CPT | Performed by: PHYSICIAN ASSISTANT

## 2021-12-20 PROCEDURE — 99214 OFFICE O/P EST MOD 30 MIN: CPT | Performed by: PHYSICIAN ASSISTANT

## 2021-12-20 PROCEDURE — 99207 PR NON-BILLABLE SERV PER CHARTING: CPT | Performed by: FAMILY MEDICINE

## 2021-12-20 RX ORDER — BENZONATATE 200 MG/1
200 CAPSULE ORAL 3 TIMES DAILY PRN
Qty: 30 CAPSULE | Refills: 0 | Status: SHIPPED | OUTPATIENT
Start: 2021-12-20 | End: 2021-12-30

## 2021-12-20 RX ORDER — PREDNISONE 20 MG/1
20 TABLET ORAL 2 TIMES DAILY
Qty: 10 TABLET | Refills: 0 | Status: SHIPPED | OUTPATIENT
Start: 2021-12-20 | End: 2021-12-25

## 2021-12-20 RX ORDER — AZITHROMYCIN 250 MG/1
TABLET, FILM COATED ORAL
Qty: 6 TABLET | Refills: 0 | Status: SHIPPED | OUTPATIENT
Start: 2021-12-20 | End: 2022-02-23

## 2021-12-20 RX ORDER — LEVALBUTEROL TARTRATE 45 UG/1
1-2 AEROSOL, METERED ORAL EVERY 4 HOURS PRN
Qty: 15 G | Refills: 0 | Status: SHIPPED | OUTPATIENT
Start: 2021-12-20 | End: 2023-03-21

## 2021-12-20 ASSESSMENT — ENCOUNTER SYMPTOMS
PALPITATIONS: 0
RHINORRHEA: 1
FATIGUE: 0
CARDIOVASCULAR NEGATIVE: 1
CHILLS: 0
SORE THROAT: 1
SINUS PRESSURE: 0
GASTROINTESTINAL NEGATIVE: 1
FEVER: 1
WHEEZING: 1
SHORTNESS OF BREATH: 0
SINUS PAIN: 0
COUGH: 1

## 2021-12-20 NOTE — PATIENT INSTRUCTIONS
Dear Laura Jackson,    We are sorry you are not feeling well. Based on the responses you provided, it is recommended that you be seen in-person in urgent care so we can better evaluate your symptoms. Please click here to find the nearest urgent care location to you.   You will not be charged for this Visit. Thank you for trusting us with your care.    Sadia Wang MD

## 2021-12-20 NOTE — PROGRESS NOTES
Mitchell Sanchez is a 51 year old who presents for the following health issues   HPI   Acute Illness  Acute illness concerns:   Onset/Duration: 1week  Symptoms:  Fever: YES  Chills/Sweats: no  Headache (location?): no  Sinus Pressure: YES  Conjunctivitis:  no  Ear Pain: no  Rhinorrhea: YES  Congestion: YES  Sore Throat: YES  Cough: YES-productive of green sputum  Wheeze: YES  Decreased Appetite: no  Nausea: no  Vomiting: no  Diarrhea: no  Dysuria/Freq.: no  Dysuria or Hematuria: no  Fatigue/Achiness: no  Sick/Strep Exposure: Ill contacts at home.  Hx of asthma.  Non-smoker.  Therapies tried and outcome: inhaler, tylenol with minimal     Patient Active Problem List   Diagnosis     CARDIOVASCULAR SCREENING; LDL GOAL LESS THAN 160     Irritable bowel syndrome     GERD (gastroesophageal reflux disease)     History of supraventricular tachycardia     Mild persistent asthma     Family history of breast cancer     S/P myomectomy     Nausea     S/P ANAID (total abdominal hysterectomy)     Hypovitaminosis D     Endometriosis     Heberden's nodes     POTS (postural orthostatic tachycardia syndrome)     Hypermobility syndrome     Cervicalgia     Autonomic dysfunction     Benign essential hypertension     Back pain     Current Outpatient Medications   Medication     azelastine (ASTELIN) 0.1 % nasal spray     Cholecalciferol (VITAMIN D) 2000 UNITS CAPS     famotidine (PEPCID) 40 MG tablet     fluticasone (FLONASE) 50 MCG/ACT nasal spray     fluticasone-salmeterol (ADVAIR) 100-50 MCG/DOSE inhaler     ivabradine (CORLANOR) 5 MG tablet     levalbuterol (XOPENEX HFA) 45 MCG/ACT inhaler     metroNIDAZOLE (METROGEL) 0.75 % external gel     omeprazole (PRILOSEC) 40 MG DR capsule     order for DME     order for DME     Current Facility-Administered Medications   Medication     betamethasone acet & sod phos (CELESTONE) injection 6 mg     lidocaine (PF) 0.5 % injection SOLN 8 mL     ropivacaine (NAROPIN) injection 1 mL      triamcinolone (KENALOG-40) injection 20 mg     triamcinolone (KENALOG-40) injection 40 mg        Allergies   Allergen Reactions     Penicillins Swelling     Swelling throat; age 4     Adhesive Tape Hives     Penicillin G      Tetracycline GI Disturbance     Other reaction(s): Abdominal Pain  Vomiting; nauseous  Other reaction(s): Abdominal Pain  Vomiting; nauseous       Review of Systems   Constitutional: Positive for fever. Negative for chills and fatigue.   HENT: Positive for congestion, rhinorrhea and sore throat. Negative for ear discharge, ear pain, hearing loss, sinus pressure and sinus pain.    Respiratory: Positive for cough and wheezing. Negative for shortness of breath.    Cardiovascular: Negative.  Negative for chest pain, palpitations and peripheral edema.   Gastrointestinal: Negative.    All other systems reviewed and are negative.           Objective    /84   Pulse 79   Temp 98.3  F (36.8  C) (Tympanic)   Wt 81.3 kg (179 lb 4.8 oz)   LMP 07/28/2017   SpO2 100%   BMI 30.99 kg/m    Body mass index is 30.99 kg/m .  Physical Exam  Vitals and nursing note reviewed.   Constitutional:       General: She is not in acute distress.     Appearance: Normal appearance. She is well-developed and normal weight. She is not ill-appearing.   HENT:      Head: Normocephalic and atraumatic.      Comments: TMs are intact without any erythema or bulging bilaterally.  Airway is patent.     Nose: Nose normal.      Mouth/Throat:      Lips: Pink.      Mouth: Mucous membranes are moist.      Pharynx: Oropharynx is clear. Uvula midline. No pharyngeal swelling, oropharyngeal exudate or posterior oropharyngeal erythema.      Tonsils: No tonsillar exudate.   Eyes:      General: No scleral icterus.     Extraocular Movements: Extraocular movements intact.      Conjunctiva/sclera: Conjunctivae normal.      Pupils: Pupils are equal, round, and reactive to light.   Neck:      Thyroid: No thyromegaly.   Cardiovascular:       Rate and Rhythm: Normal rate and regular rhythm.      Pulses: Normal pulses.      Heart sounds: Normal heart sounds, S1 normal and S2 normal. No murmur heard.  No friction rub. No gallop.    Pulmonary:      Effort: Pulmonary effort is normal. No accessory muscle usage, respiratory distress or retractions.      Breath sounds: Normal breath sounds and air entry. No stridor. No decreased breath sounds, wheezing, rhonchi or rales.   Musculoskeletal:      Cervical back: Normal range of motion and neck supple.   Lymphadenopathy:      Cervical: No cervical adenopathy.   Skin:     General: Skin is warm and dry.      Nails: There is no clubbing.   Neurological:      Mental Status: She is alert and oriented to person, place, and time.   Psychiatric:         Mood and Affect: Mood normal.         Behavior: Behavior normal.         Thought Content: Thought content normal.         Judgment: Judgment normal.       Results for orders placed or performed in visit on 12/20/21 (from the past 24 hour(s))   Streptococcus A Rapid Screen w/Reflex to PCR - Clinic Collect    Specimen: Throat; Swab   Result Value Ref Range    Group A Strep antigen Negative Negative       Assessment/Plan:  Asthmatic bronchitis, mild persistent, uncomplicated:  Will treat with zithromax X5days, tessalon perles, and [id number] and refill her xopenex inh as needed for symptoms.  Will also test for covid as well.  Recommend treatment with rest, fluids and chicken soup. Tylenol/ibuprofen prn fever/pain.  Recheck in clinic if symptoms worsen or if symptoms do not improve.  To the ER if he develops hemoptysis, chest pain, fevers>102, worsening shortness of breath/wheezing.    -     Symptomatic; Yes; 12/14/2021 COVID-19 Virus (Coronavirus) by PCR Nose  -     levalbuterol (XOPENEX HFA) 45 MCG/ACT inhaler; Inhale 1-2 puffs into the lungs every 4 hours as needed for shortness of breath / dyspnea  -     azithromycin (ZITHROMAX) 250 MG tablet; 2 tablets the first  day, then 1 tablet daily for the next 4 days  -     benzonatate (TESSALON) 200 MG capsule; Take 1 capsule (200 mg) by mouth 3 times daily as needed for cough  -     predniSONE (DELTASONE) 20 MG tablet; Take 1 tablet (20 mg) by mouth 2 times daily for 5 days    Throat pain:  RST is negative, will send for strep PCR testing.    -     Streptococcus A Rapid Screen w/Reflex to PCR - Clinic Collect  -     Group A Streptococcus PCR Throat Swab        Isabel Bailey PA-C

## 2021-12-22 ENCOUNTER — OFFICE VISIT (OUTPATIENT)
Dept: PODIATRY | Facility: CLINIC | Age: 51
End: 2021-12-22
Attending: NURSE PRACTITIONER
Payer: COMMERCIAL

## 2021-12-22 VITALS
OXYGEN SATURATION: 96 % | BODY MASS INDEX: 30.56 KG/M2 | SYSTOLIC BLOOD PRESSURE: 159 MMHG | RESPIRATION RATE: 16 BRPM | HEIGHT: 64 IN | WEIGHT: 179 LBS | DIASTOLIC BLOOD PRESSURE: 93 MMHG | HEART RATE: 87 BPM

## 2021-12-22 DIAGNOSIS — M21.6X1 ACQUIRED PES VALGUS, RIGHT: ICD-10-CM

## 2021-12-22 DIAGNOSIS — M72.2 PLANTAR FASCIITIS, BILATERAL: Primary | ICD-10-CM

## 2021-12-22 PROCEDURE — 99203 OFFICE O/P NEW LOW 30 MIN: CPT | Performed by: PODIATRIST

## 2021-12-22 ASSESSMENT — PAIN SCALES - GENERAL: PAINLEVEL: NO PAIN (0)

## 2021-12-22 ASSESSMENT — MIFFLIN-ST. JEOR: SCORE: 1398.45

## 2021-12-22 NOTE — LETTER
12/22/2021         RE: Laura Jackson  [address]  [place] [address]        Dear Colleague,    Thank you for referring your patient, Laura Jackson, to the [facility]. Please see a copy of my visit note below.    Chief Complaint:     No chief complaint on file.     bilateral heel pain    HPI:  Laura Jackson is a 51 year old year old female who presents for evaluation of heel pain.    Pain location bottom of the heel.  Quality- pain is described as aching and sharp.  Severity- 7/10, symptoms are worsening.  Duration- for about 3-4 months.  Timing- morning, afternoon, evening, nighttime and constant.    Modifying factors- worsened by walking  Associated symptoms- numbness or tingling to heel or bottom of foot? No.  Prior treatments- heel gel.    Pt also notes pain in the ankle medially (not currently) and swelling in sinus tarsi area.    She is a nurse, works on her feet all day.  She is already seeing PT for her hips/knees/back on the R, she feels like it might be connected.    Past Medical & Surgical History:  Past Medical History:   Diagnosis Date     Benign essential hypertension 7/31/2018     Family history of breast cancer 2/19/2014     Family history of breast cancer      SVT (supraventricular tachycardia):  history of, no recurrence since 2008 10/11/2013    no recurrence since 2008      Uncomplicated asthma       Past Surgical History:   Procedure Laterality Date     ABDOMEN SURGERY  6/2017    myectomy     APPENDECTOMY       COLONOSCOPY N/A 8/20/2018    Procedure: COMBINED COLONOSCOPY, SINGLE OR MULTIPLE BIOPSY/POLYPECTOMY BY BIOPSY;;  Surgeon: Osman Hahn MD;  Location: [facility]     COLONOSCOPY WITH CO2 INSUFFLATION N/A 8/20/2018    Procedure: COLONOSCOPY WITH CO2 INSUFFLATION;  COLON-SCREENING / LUBKA ;  Surgeon: Osman Hahn MD;  [facility]     DAVINCI HYSTERECTOMY TOTAL, SALPINGECTOMY BILATERAL N/A 8/4/2017    Procedure: DAVINCI XI  HYSTERECTOMY TOTAL, SALPINGECTOMY BILATERAL;  DAVINCI TOTAL HYSTERECTOMY; BILATERAL SALPINGECTOMY. BILATERAL URETERAL LYSIS. UTEROSACRAL-COLPOPEXY. EXCISION OF ENDOMETRIOSIS;  Surgeon: George Loyola MD;  Location[facility]     DAVINCI LYSIS OF ADHESIONS Bilateral 8/4/2017    Procedure: DAVINCI LYSIS OF ADHESIONS;  BILATERAL URETERAL LYSIS;  Surgeon: George Loyola MD;  [facility]     DAVINCI SACROCOLPOPEXY, CYSTOSCOPY, COMBINED N/A 8/4/2017    Procedure: COMBINED DAVINCI SACROCOLPOPEXY, CYSTOSCOPY;  UTEROSACRAL COLPOPEXY;  Surgeon: George Loyola MD;  [facility]     DAVINCI XI ASSISTED ABLATION / EXCISION OF ENDOMETRIOSIS  8/4/2017    Procedure: DAVINCI XI ASSISTED ABLATION / EXCISION OF ENDOMETRIOSIS;;  Surgeon: George Loyola MD;  [facility]     DILATION AND CURETTAGE N/A 6/20/2017    Procedure: DILATION AND CURETTAGE;  Uterine Curettings and Fibroid Removal, Cook catheter placement; (No hysterectomy done at this time);  Surgeon: Ernestina Saunders MD;  [facility]     HYSTERECTOMY, PAP NO LONGER INDICATED       ORTHOPEDIC SURGERY  6/1986    Heel spur , toe surgery     RELEASE CARPAL TUNNEL Right 10/20/2020    Procedure: RIGHT RELEASE, CARPAL TUNNEL;  Surgeon: Rickey Rea MD;  Location: [facility] OR     RELEASE CARPAL TUNNEL Left 11/6/2020    Procedure: LEFT RELEASE, CARPAL TUNNEL;  Surgeon: Rickey Rea MD;  Location: [facility] OR     TONSILLECTOMY        Family History   Problem Relation Age of Onset     Diabetes Mother      Hypertension Mother      Hyperlipidemia Mother      Arrhythmia Mother      Cardiovascular Father         CHF and COPD     Asthma Father      Breast Cancer Maternal Grandfather      Colon Cancer Maternal Grandfather      Breast Cancer Paternal Grandmother      Breast Cancer Paternal Aunt      C.A.D. Paternal Grandfather      Breast Cancer Cousin      Asthma Son      Diabetes Maternal Grandmother      Hypertension Maternal Grandmother      Breast  "Cancer Cousin      Breast Cancer Cousin      Breast Cancer Cousin         Social History:  ?  History   Smoking Status     Never Smoker   Smokeless Tobacco     Never Used     History   Drug Use No     Social History    Substance and Sexual Activity      Alcohol use: Yes        Comment: 1@ 3 months      Allergies:  ?   Allergies   Allergen Reactions     Penicillins Swelling     Swelling throat; age 4     Adhesive Tape Hives     Penicillin G      Tetracycline GI Disturbance     Other reaction(s): Abdominal Pain  Vomiting; nauseous  Other reaction(s): Abdominal Pain  Vomiting; nauseous        Medications:    Current Outpatient Medications   Medication     azelastine (ASTELIN) 0.1 % nasal spray     azithromycin (ZITHROMAX) 250 MG tablet     benzonatate (TESSALON) 200 MG capsule     Cholecalciferol (VITAMIN D) 2000 UNITS CAPS     famotidine (PEPCID) 40 MG tablet     fluticasone (FLONASE) 50 MCG/ACT nasal spray     fluticasone-salmeterol (ADVAIR) 100-50 MCG/DOSE inhaler     ivabradine (CORLANOR) 5 MG tablet     levalbuterol (XOPENEX HFA) 45 MCG/ACT inhaler     metroNIDAZOLE (METROGEL) 0.75 % external gel     omeprazole (PRILOSEC) 40 MG DR capsule     order for DME     order for DME     predniSONE (DELTASONE) 20 MG tablet     Current Facility-Administered Medications   Medication     betamethasone acet & sod phos (CELESTONE) injection 6 mg     lidocaine (PF) 0.5 % injection SOLN 8 mL     ropivacaine (NAROPIN) injection 1 mL     triamcinolone (KENALOG-40) injection 20 mg     triamcinolone (KENALOG-40) injection 40 mg       Physical Exam:  ?  Vitals:  BP (!) 159/93   Pulse 87   Resp 16   Ht 1.62 m (5' 3.78\")   Wt 81.2 kg (179 lb)   LMP 07/28/2017   SpO2 96%   BMI 30.94 kg/m     General:  WD/WN, in NAD.  A&O x3.  Dermatologic:    Skin is intact, open lesions absent.   Skin texture, turgor is normal.  Vascular:  Pulses palpable right.  Digital capillary refill time normal bilateral.  Skin temperature is normal " Right.  Generalized edema- none bilateral.  Focal edema- none heel right.  Neurologic:    Gross sensation normal.  Gait and balance normal.  Musculoskeletal:  Moderate pain to palpation of plantar heel, sinus tarsi, right.  no pain to palpation of PT tendon inserion or course right.  Ankle,STJ, MTJ ROM full, smooth, not painful, Right.    Double limb heel rise-  heel  reducible bilateral.  Single limb heel rise- pain absent,  weakness absent bilateral.       Stance:  Foot type:  Flexible valgus, R > L      Imaging:   x-ray  Non-Weight-bearing views bilateral foot dated 5/2021, reveal no significant degeneration, inferior spur present.      Assessment:      ICD-10-CM    1. Plantar fasciitis, bilateral  M72.2 [facility]otics and Prosthetics DME   2. Acquired pes valgus, right  M21.6X1         Plan:  Orders Placed This Encounter   Procedures     Orthotics and Prosthetics DME         Discussed the etiology and treatment of the condition with the patient.  Imaging studies reviewed and discussed with the patient.  Discussed surgical and conservative options.  Plantar fasciitis, sinus tarsi pain due to flat foot.  No PT tendon pain  Currently.    -Orthoses- custom- Rx for custom orthoses  today.  Orthosis is needed to reduce painful pronation  & strain on plantar fascia.  Custom orthosis is needed due asymmetry of feet    -PT- posterior tibial tendon eccentrics, inrinsic foot muscle strengthening exercises, calf muscle stretching    -Return for WB foot and ankle x-rays if still symptomatic after orthotic use    Return:  Return if symptoms worsen or fail to improve.    Saida Michael DPM        Again, thank you for allowing me to participate in the care of your patient.        Sincerely,        Saida Michael DPM

## 2021-12-22 NOTE — PATIENT INSTRUCTIONS
PATIENT INSTRUCTIONS    [place] [facility] LOCATIONS  [facility]  [address]  [place] [address]  Phone: [phone]  Fax: [phone] [facility]  [address]  [place] [address]  Phone: [phone]   Fax: [phone]   [facility]  [address]  [place] [address]  Phone: [phone]  Fax: [phone] [facility]  [address]  [place] [address]  Phone: [phone]   Fax: [phone]   [facility]   [address]  [place] [address]  Phone: [phone]  Fax: [phone] * Please call any location listed to make an appointment for a casting/fitting. Your referral was sent to their central office and they will all have the order on file.       Diclofenac Gel- Apply to affected area only.  Apply 3-4 times daily for the first 3-4 days, then 1-2 times daily as needed.

## 2021-12-22 NOTE — PROGRESS NOTES
Chief Complaint:     No chief complaint on file.     bilateral heel pain    HPI:  Laura Jackson is a 51 year old year old female who presents for evaluation of heel pain.    Pain location bottom of the heel.  Quality- pain is described as aching and sharp.  Severity- 7/10, symptoms are worsening.  Duration- for about 3-4 months.  Timing- morning, afternoon, evening, nighttime and constant.    Modifying factors- worsened by walking  Associated symptoms- numbness or tingling to heel or bottom of foot? No.  Prior treatments- heel gel.    Pt also notes pain in the ankle medially (not currently) and swelling in sinus tarsi area.    She is a nurse, works on her feet all day.  She is already seeing PT for her hips/knees/back on the R, she feels like it might be connected.    Past Medical & Surgical History:  Past Medical History:   Diagnosis Date     Benign essential hypertension 7/31/2018     Family history of breast cancer 2/19/2014     Family history of breast cancer      SVT (supraventricular tachycardia):  history of, no recurrence since 2008 10/11/2013    no recurrence since 2008      Uncomplicated asthma       Past Surgical History:   Procedure Laterality Date     ABDOMEN SURGERY  6/2017    myectomy     APPENDECTOMY       COLONOSCOPY N/A 8/20/2018    Procedure: COMBINED COLONOSCOPY, SINGLE OR MULTIPLE BIOPSY/POLYPECTOMY BY BIOPSY;;  Surgeon: Osman Hahn MD;  [facility]     COLONOSCOPY WITH CO2 INSUFFLATION N/A 8/20/2018    Procedure: COLONOSCOPY WITH CO2 INSUFFLATION;  COLON-SCREENING / LUBKA ;  Surgeon: Osman Hanh MD;  [facility]: [facility]     DAVINCI HYSTERECTOMY TOTAL, SALPINGECTOMY BILATERAL N/A 8/4/2017    Procedure: DAVINCI XI HYSTERECTOMY TOTAL, SALPINGECTOMY BILATERAL;  DAVINCI TOTAL HYSTERECTOMY; BILATERAL SALPINGECTOMY. BILATERAL URETERAL LYSIS. UTEROSACRAL-COLPOPEXY. EXCISION OF ENDOMETRIOSIS;  Surgeon: George Loyola MD;  [facility]     DAVINCI LYSIS OF ADHESIONS  Bilateral 8/4/2017    Procedure: DAVINCI LYSIS OF ADHESIONS;  BILATERAL URETERAL LYSIS;  Surgeon: George Loyola MD;  [facility]     DAVINCI SACROCOLPOPEXY, CYSTOSCOPY, COMBINED N/A 8/4/2017    Procedure: COMBINED DAVINCI SACROCOLPOPEXY, CYSTOSCOPY;  UTEROSACRAL COLPOPEXY;  Surgeon: George Loyola MD;  [facility]     DAVINCI XI ASSISTED ABLATION / EXCISION OF ENDOMETRIOSIS  8/4/2017    Procedure: DAVINCI XI ASSISTED ABLATION / EXCISION OF ENDOMETRIOSIS;;  Surgeon: George Loyola MD;  [facility]     DILATION AND CURETTAGE N/A 6/20/2017    Procedure: DILATION AND CURETTAGE;  Uterine Curettings and Fibroid Removal, Cook catheter placement; (No hysterectomy done at this time);  Surgeon: Ernestina Saunders MD;  [facility]     HYSTERECTOMY, PAP NO LONGER INDICATED       ORTHOPEDIC SURGERY  6/1986    Heel spur , toe surgery     RELEASE CARPAL TUNNEL Right 10/20/2020    Procedure: RIGHT RELEASE, CARPAL TUNNEL;  Surgeon: Rickey Rea MD;  Location: [facility] OR     RELEASE CARPAL TUNNEL Left 11/6/2020    Procedure: LEFT RELEASE, CARPAL TUNNEL;  Surgeon: Rickey Rea MD;  Location: [facility] OR     TONSILLECTOMY        Family History   Problem Relation Age of Onset     Diabetes Mother      Hypertension Mother      Hyperlipidemia Mother      Arrhythmia Mother      Cardiovascular Father         CHF and COPD     Asthma Father      Breast Cancer Maternal Grandfather      Colon Cancer Maternal Grandfather      Breast Cancer Paternal Grandmother      Breast Cancer Paternal Aunt      C.A.D. Paternal Grandfather      Breast Cancer Cousin      Asthma Son      Diabetes Maternal Grandmother      Hypertension Maternal Grandmother      Breast Cancer Cousin      Breast Cancer Cousin      Breast Cancer Cousin         Social History:  ?  History   Smoking Status     Never Smoker   Smokeless Tobacco     Never Used     History   Drug Use No     Social History    Substance and Sexual Activity      Alcohol  "use: Yes        Comment: 1@ 3 months      Allergies:  ?   Allergies   Allergen Reactions     Penicillins Swelling     Swelling throat; age 4     Adhesive Tape Hives     Penicillin G      Tetracycline GI Disturbance     Other reaction(s): Abdominal Pain  Vomiting; nauseous  Other reaction(s): Abdominal Pain  Vomiting; nauseous        Medications:    Current Outpatient Medications   Medication     azelastine (ASTELIN) 0.1 % nasal spray     azithromycin (ZITHROMAX) 250 MG tablet     benzonatate (TESSALON) 200 MG capsule     Cholecalciferol (VITAMIN D) 2000 UNITS CAPS     famotidine (PEPCID) 40 MG tablet     fluticasone (FLONASE) 50 MCG/ACT nasal spray     fluticasone-salmeterol (ADVAIR) 100-50 MCG/DOSE inhaler     ivabradine (CORLANOR) 5 MG tablet     levalbuterol (XOPENEX HFA) 45 MCG/ACT inhaler     metroNIDAZOLE (METROGEL) 0.75 % external gel     omeprazole (PRILOSEC) 40 MG DR capsule     order for DME     order for DME     predniSONE (DELTASONE) 20 MG tablet     Current Facility-Administered Medications   Medication     betamethasone acet & sod phos (CELESTONE) injection 6 mg     lidocaine (PF) 0.5 % injection SOLN 8 mL     ropivacaine (NAROPIN) injection 1 mL     triamcinolone (KENALOG-40) injection 20 mg     triamcinolone (KENALOG-40) injection 40 mg       Physical Exam:  ?  Vitals:  BP (!) 159/93   Pulse 87   Resp 16   Ht 1.62 m (5' 3.78\")   Wt 81.2 kg (179 lb)   LMP 07/28/2017   SpO2 96%   BMI 30.94 kg/m     General:  WD/WN, in NAD.  A&O x3.  Dermatologic:    Skin is intact, open lesions absent.   Skin texture, turgor is normal.  Vascular:  Pulses palpable right.  Digital capillary refill time normal bilateral.  Skin temperature is normal Right.  Generalized edema- none bilateral.  Focal edema- none heel right.  Neurologic:    Gross sensation normal.  Gait and balance normal.  Musculoskeletal:  Moderate pain to palpation of plantar heel, sinus tarsi, right.  no pain to palpation of PT tendon inserion or " course right.  Ankle,STJ, MTJ ROM full, smooth, not painful, Right.    Double limb heel rise-  heel  reducible bilateral.  Single limb heel rise- pain absent,  weakness absent bilateral.       Stance:  Foot type:  Flexible valgus, R > L      Imaging:   x-ray  Non-Weight-bearing views bilateral foot dated 5/2021, reveal no significant degeneration, inferior spur present.      Assessment:      ICD-10-CM    1. Plantar fasciitis, bilateral  M72.2 [facility] Prosthetics DME   2. Acquired pes valgus, right  M21.6X1         Plan:  Orders Placed This Encounter   Procedures     Orthotics and Prosthetics DME         Discussed the etiology and treatment of the condition with the patient.  Imaging studies reviewed and discussed with the patient.  Discussed surgical and conservative options.  Plantar fasciitis, sinus tarsi pain due to flat foot.  No PT tendon pain  Currently.    -Orthoses- custom- Rx for custom orthoses  today.  Orthosis is needed to reduce painful pronation  & strain on plantar fascia.  Custom orthosis is needed due asymmetry of feet    -PT- posterior tibial tendon eccentrics, inrinsic foot muscle strengthening exercises, calf muscle stretching    -Return for WB foot and ankle x-rays if still symptomatic after orthotic use    Return:  Return if symptoms worsen or fail to improve.    Saida Michael DPM

## 2021-12-24 ENCOUNTER — THERAPY VISIT (OUTPATIENT)
Dept: PHYSICAL THERAPY | Facility: CLINIC | Age: 51
End: 2021-12-24
Payer: COMMERCIAL

## 2021-12-24 DIAGNOSIS — M54.89 OTHER BACK PAIN, UNSPECIFIED CHRONICITY: ICD-10-CM

## 2021-12-24 PROCEDURE — 97112 NEUROMUSCULAR REEDUCATION: CPT | Mod: GP | Performed by: PHYSICAL THERAPIST

## 2021-12-24 PROCEDURE — 97140 MANUAL THERAPY 1/> REGIONS: CPT | Mod: GP | Performed by: PHYSICAL THERAPIST

## 2022-01-03 ENCOUNTER — THERAPY VISIT (OUTPATIENT)
Dept: PHYSICAL THERAPY | Facility: CLINIC | Age: 52
End: 2022-01-03
Payer: COMMERCIAL

## 2022-01-03 DIAGNOSIS — M54.89 OTHER BACK PAIN, UNSPECIFIED CHRONICITY: ICD-10-CM

## 2022-01-03 PROCEDURE — 97140 MANUAL THERAPY 1/> REGIONS: CPT | Mod: GP | Performed by: PHYSICAL THERAPIST

## 2022-01-08 ENCOUNTER — HEALTH MAINTENANCE LETTER (OUTPATIENT)
Age: 52
End: 2022-01-08

## 2022-01-14 ENCOUNTER — THERAPY VISIT (OUTPATIENT)
Dept: PHYSICAL THERAPY | Facility: CLINIC | Age: 52
End: 2022-01-14
Payer: COMMERCIAL

## 2022-01-14 DIAGNOSIS — M54.89 OTHER BACK PAIN, UNSPECIFIED CHRONICITY: ICD-10-CM

## 2022-01-14 PROCEDURE — 97140 MANUAL THERAPY 1/> REGIONS: CPT | Mod: GP | Performed by: PHYSICAL THERAPIST

## 2022-01-14 PROCEDURE — 97110 THERAPEUTIC EXERCISES: CPT | Mod: GP | Performed by: PHYSICAL THERAPIST

## 2022-01-17 ENCOUNTER — OFFICE VISIT (OUTPATIENT)
Dept: INTERNAL MEDICINE | Facility: CLINIC | Age: 52
End: 2022-01-17
Payer: COMMERCIAL

## 2022-01-17 VITALS
WEIGHT: 181 LBS | SYSTOLIC BLOOD PRESSURE: 137 MMHG | HEART RATE: 74 BPM | BODY MASS INDEX: 30.9 KG/M2 | DIASTOLIC BLOOD PRESSURE: 88 MMHG | OXYGEN SATURATION: 97 % | HEIGHT: 64 IN | TEMPERATURE: 98.8 F | RESPIRATION RATE: 16 BRPM

## 2022-01-17 DIAGNOSIS — Z80.3 FAMILY HISTORY OF BREAST CANCER: ICD-10-CM

## 2022-01-17 DIAGNOSIS — G90.A POTS (POSTURAL ORTHOSTATIC TACHYCARDIA SYNDROME): Primary | ICD-10-CM

## 2022-01-17 DIAGNOSIS — J45.31 MILD PERSISTENT ASTHMA WITH ACUTE EXACERBATION: ICD-10-CM

## 2022-01-17 PROCEDURE — 99213 OFFICE O/P EST LOW 20 MIN: CPT | Performed by: INTERNAL MEDICINE

## 2022-01-17 ASSESSMENT — MIFFLIN-ST. JEOR: SCORE: 1407.04

## 2022-01-17 NOTE — PROGRESS NOTES
Assessment & Plan     POTS (postural orthostatic tachycardia syndrome)  This patient needs a letter drafted regarding her needs with her job. We both agreed what she really needs is a letter of Family Medical Leave Act and this is something we expect to complete within the next few days , see physicians letter of support     Family history of breast cancer  She needed orders for a breast MRI   - MR Breast Bilateral w Contrast; Future    Mild persistent asthma with acute exacerbation  This patient has had smoldering asthma problems and the current low dosage Advair [ fluticasone / serevent ] just isn't enough. She requested we increase the dose and she isn't interested to take any oral prednisone , see as detailed below   - fluticasone-salmeterol (ADVAIR) 250-50 MCG/DOSE inhaler; Inhale 1 puff into the lungs every 12 hours    Prescription drug management  14 minutes spent on the date of the encounter doing chart review, history and exam, documentation and further activities per the note        Return in about 1 year (around 1/17/2023).    Bj Butler MD  [facility] [place] is a 51 year old who presents for the following health issues  accompanied by her self.    KARAN Sanchez presents to the clinic today in need of an intermittent letter of absence from work for her POTS.     She would also like a referral for a breast MRI.    She also complains of a lingering cough after being diagnosed with bronchitis in December 2021. She was prescribed steroids but did not take them because they exacerbate her tachycardia. She is wondering if he needs to increase her lung medications to help improve her symptoms.    She has no other concerns when asked.      Review of Systems   Constitutional, HEENT, cardiovascular, pulmonary, GI and  systems negative unless otherwise noted.      Objective    LMP 07/28/2017   There is no height or weight on file to calculate BMI.  Physical  Exam  Constitutional:       General: She is not in acute distress.     Appearance: Normal appearance.   Cardiovascular:      Rate and Rhythm: Normal rate and regular rhythm.      Pulses: Normal pulses.      Heart sounds: Normal heart sounds. No murmur heard.  No friction rub. No gallop.    Pulmonary:      Effort: Pulmonary effort is normal. No respiratory distress.      Breath sounds: Normal breath sounds. No stridor. No wheezing, rhonchi or rales.   Neurological:      Mental Status: She is alert.

## 2022-01-17 NOTE — LETTER
[facility]  [address] [facility] [place] [address]  [phone]          January 17, 2022    Laura Jackson                                                                                                                     [address] [facility]  [place] [address]            Dear Laura, and To Whom it May Concern,     This patient has a diagnosis of POTS (postural orthostatic tachycardia syndrome) and is going to need to miss some work, I think think this is going to be needing an Family Medical Leave Act paper work form but in the interim , I am recommending this patient be excused from work at a rate of 0-4 days per month , this is likely to continue longterm. There may be a formal Family Medical Leave Act paper work upcoming within the near future.    Sincerely,         Bj Butler MD

## 2022-01-18 ENCOUNTER — LAB REQUISITION (OUTPATIENT)
Dept: LAB | Facility: CLINIC | Age: 52
End: 2022-01-18

## 2022-01-18 PROCEDURE — U0003 INFECTIOUS AGENT DETECTION BY NUCLEIC ACID (DNA OR RNA); SEVERE ACUTE RESPIRATORY SYNDROME CORONAVIRUS 2 (SARS-COV-2) (CORONAVIRUS DISEASE [COVID-19]), AMPLIFIED PROBE TECHNIQUE, MAKING USE OF HIGH THROUGHPUT TECHNOLOGIES AS DESCRIBED BY CMS-2020-01-R: HCPCS | Performed by: INTERNAL MEDICINE

## 2022-01-19 LAB — SARS-COV-2 RNA RESP QL NAA+PROBE: NEGATIVE

## 2022-01-24 ENCOUNTER — LAB REQUISITION (OUTPATIENT)
Dept: LAB | Facility: CLINIC | Age: 52
End: 2022-01-24

## 2022-01-24 LAB — SARS-COV-2 RNA RESP QL NAA+PROBE: POSITIVE

## 2022-01-24 PROCEDURE — U0005 INFEC AGEN DETEC AMPLI PROBE: HCPCS | Performed by: INTERNAL MEDICINE

## 2022-02-04 ENCOUNTER — TRANSFERRED RECORDS (OUTPATIENT)
Dept: HEALTH INFORMATION MANAGEMENT | Facility: CLINIC | Age: 52
End: 2022-02-04
Payer: COMMERCIAL

## 2022-02-08 ENCOUNTER — THERAPY VISIT (OUTPATIENT)
Dept: PHYSICAL THERAPY | Facility: CLINIC | Age: 52
End: 2022-02-08
Payer: COMMERCIAL

## 2022-02-08 DIAGNOSIS — M54.89 OTHER BACK PAIN, UNSPECIFIED CHRONICITY: ICD-10-CM

## 2022-02-08 PROCEDURE — 97112 NEUROMUSCULAR REEDUCATION: CPT | Mod: GP | Performed by: PHYSICAL THERAPIST

## 2022-02-08 PROCEDURE — 97140 MANUAL THERAPY 1/> REGIONS: CPT | Mod: GP | Performed by: PHYSICAL THERAPIST

## 2022-02-10 DIAGNOSIS — J45.31 MILD PERSISTENT ASTHMA WITH ACUTE EXACERBATION: ICD-10-CM

## 2022-02-10 NOTE — TELEPHONE ENCOUNTER
Prescription approved per [facility] Refill Protocol.  Theresa Maldonado RN

## 2022-02-14 ENCOUNTER — THERAPY VISIT (OUTPATIENT)
Dept: PHYSICAL THERAPY | Facility: CLINIC | Age: 52
End: 2022-02-14
Payer: COMMERCIAL

## 2022-02-14 DIAGNOSIS — M54.89 OTHER BACK PAIN, UNSPECIFIED CHRONICITY: ICD-10-CM

## 2022-02-14 PROCEDURE — 97110 THERAPEUTIC EXERCISES: CPT | Mod: GP | Performed by: PHYSICAL THERAPIST

## 2022-02-14 PROCEDURE — 97140 MANUAL THERAPY 1/> REGIONS: CPT | Mod: GP | Performed by: PHYSICAL THERAPIST

## 2022-02-22 ASSESSMENT — ENCOUNTER SYMPTOMS
VOMITING: 1
SINUS PAIN: 0
HEADACHES: 0
WEIGHT GAIN: 0
LEG PAIN: 0
EXERCISE INTOLERANCE: 0
WEIGHT LOSS: 0
TINGLING: 1
HYPERTENSION: 0
TREMORS: 1
JOINT SWELLING: 0
SPEECH CHANGE: 0
SNORES LOUDLY: 0
NIGHT SWEATS: 1
LOSS OF CONSCIOUSNESS: 0
SORE THROAT: 0
SMELL DISTURBANCE: 0
SLEEP DISTURBANCES DUE TO BREATHING: 1
SHORTNESS OF BREATH: 1
WEAKNESS: 0
DIZZINESS: 0
POLYPHAGIA: 0
BLOOD IN STOOL: 0
MUSCLE CRAMPS: 1
BACK PAIN: 1
BOWEL INCONTINENCE: 0
HEARTBURN: 1
SPUTUM PRODUCTION: 0
INCREASED ENERGY: 0
MUSCLE WEAKNESS: 0
CONSTIPATION: 0
POSTURAL DYSPNEA: 0
PALPITATIONS: 1
DISTURBANCES IN COORDINATION: 0
NUMBNESS: 1
TROUBLE SWALLOWING: 0
FATIGUE: 1
HYPOTENSION: 0
ABDOMINAL PAIN: 0
FEVER: 0
COUGH DISTURBING SLEEP: 1
NECK MASS: 0
DIARRHEA: 1
PARALYSIS: 0
HALLUCINATIONS: 0
LIGHT-HEADEDNESS: 0
ORTHOPNEA: 0
MYALGIAS: 1
NECK PAIN: 1
STIFFNESS: 1
ARTHRALGIAS: 1
WHEEZING: 0
DYSPNEA ON EXERTION: 1
SINUS CONGESTION: 0
HEMOPTYSIS: 0
NAUSEA: 1
POLYDIPSIA: 0
CHILLS: 0
HOARSE VOICE: 1
JAUNDICE: 0
DECREASED APPETITE: 0
SYNCOPE: 0
COUGH: 0
MEMORY LOSS: 0
ALTERED TEMPERATURE REGULATION: 1
TASTE DISTURBANCE: 0
SEIZURES: 0
RECTAL PAIN: 1
BLOATING: 1

## 2022-02-23 ENCOUNTER — OFFICE VISIT (OUTPATIENT)
Dept: CARDIOLOGY | Facility: CLINIC | Age: 52
End: 2022-02-23
Attending: INTERNAL MEDICINE
Payer: COMMERCIAL

## 2022-02-23 VITALS
HEART RATE: 66 BPM | DIASTOLIC BLOOD PRESSURE: 89 MMHG | OXYGEN SATURATION: 99 % | BODY MASS INDEX: 30.56 KG/M2 | WEIGHT: 179 LBS | HEIGHT: 64 IN | SYSTOLIC BLOOD PRESSURE: 143 MMHG

## 2022-02-23 DIAGNOSIS — I47.11 INAPPROPRIATE SINUS TACHYCARDIA (H): ICD-10-CM

## 2022-02-23 DIAGNOSIS — R00.0 TACHYCARDIA: Primary | ICD-10-CM

## 2022-02-23 PROCEDURE — 99215 OFFICE O/P EST HI 40 MIN: CPT | Performed by: INTERNAL MEDICINE

## 2022-02-23 PROCEDURE — 93005 ELECTROCARDIOGRAM TRACING: CPT

## 2022-02-23 PROCEDURE — G0463 HOSPITAL OUTPT CLINIC VISIT: HCPCS | Mod: 25

## 2022-02-23 RX ORDER — PYRIDOXINE HCL (VITAMIN B6) 100 MG
TABLET ORAL
COMMUNITY
Start: 2022-02-04

## 2022-02-23 ASSESSMENT — PAIN SCALES - GENERAL: PAINLEVEL: NO PAIN (0)

## 2022-02-23 NOTE — PROGRESS NOTES
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  Additional comorbidities include hypermobility syndrome, asthma, IBS, uterine fibroids s/p hysterectomy, and GERD.    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  Her medications have played a big role in permitting her to continue to work.      Ivabradine has made a major difference from her perspective and her insurance company has agreed to support ivabradine until recently.  Patient has received letters from the insurance company saying that they would no longer support ivabradine or her Xopenex inhaler (previously patient has had adverse increases in heart rate when trying albuterol)..  These drugs however have proved extremely valuable and kept her out of the emergency department.  We will write a letter to the insurance company indicating that the cost of one emergency department visit would be more than compensated for by continuing support of her prescriptions.    Inasmuch as Laura is doing well on current therapy we will have her follow-up with Marylou Barba CNP at [facility] as in the past.  I be happy to see Laura again in the future should Marylou desire.     PAST MEDICAL HISTORY:  Past Medical History:   Diagnosis Date     Benign essential hypertension 7/31/2018     Family history of breast cancer 2/19/2014     Family history of breast cancer      SVT (supraventricular tachycardia):  history of, no recurrence since 2008 10/11/2013    no recurrence since 2008      Uncomplicated asthma        CURRENT MEDICATIONS:  Current Outpatient Medications   Medication Sig Dispense Refill     Cholecalciferol (VITAMIN D) 2000 UNITS CAPS Take 1 capsule by mouth daily       famotidine (PEPCID) 40 MG tablet Take 1 tablet (40 mg) by mouth At Bedtime 90  tablet 3     fluticasone-salmeterol (ADVAIR) 250-50 MCG/DOSE inhaler INHALE 1 PUFF INTO THE LUNGS EVERY 12 HOURS 180 each 0     ivabradine (CORLANOR) 5 MG tablet Take 1 tablet (5 mg) by mouth 2 times daily (with meals) 720 tablet 0     levalbuterol (XOPENEX HFA) 45 MCG/ACT inhaler Inhale 1-2 puffs into the lungs every 4 hours as needed for shortness of breath / dyspnea 15 g 0     metroNIDAZOLE (METROGEL) 0.75 % external gel Apply to face BID 45 g 11     omeprazole (PRILOSEC) 40 MG DR capsule Take 1 capsule (40 mg) by mouth daily before breakfast 90 capsule 3     Pyridoxine HCl (B-6) 100 MG TABS        vitamin B-12 (CYANOCOBALAMIN) 100 MCG tablet        azelastine (ASTELIN) 0.1 % nasal spray SPRAY 1 SPRAY INTO BOTH NOSTRILS 2 TIMES DAILY (Patient not taking: Reported on 2/23/2022) 30 mL 0     azithromycin (ZITHROMAX) 250 MG tablet 2 tablets the first day, then 1 tablet daily for the next 4 days (Patient not taking: Reported on 2/23/2022) 6 tablet 0     fluticasone (FLONASE) 50 MCG/ACT nasal spray Spray 1 spray into both nostrils daily (Patient not taking: Reported on 2/23/2022)       fluticasone-salmeterol (ADVAIR) 100-50 MCG/DOSE inhaler INHALE ONE PUFF BY MOUTH EVERY 12 HOURS (Patient not taking: Reported on 2/23/2022) 180 each 1     order for DME Equipment being ordered: hinged knee brace, Large (Patient not taking: Reported on 2/23/2022) 1 Device 0     order for DME Equipment being ordered: wrist splint with thumb support (Patient not taking: Reported on 2/23/2022) 1 Device 0       PAST SURGICAL HISTORY:  Past Surgical History:   Procedure Laterality Date     ABDOMEN SURGERY  6/2017    myectomy     APPENDECTOMY       COLONOSCOPY N/A 8/20/2018    Procedure: COMBINED COLONOSCOPY, SINGLE OR MULTIPLE BIOPSY/POLYPECTOMY BY BIOPSY;;  Surgeon: Osman Hahn MD;  Location: [facility] OR     COLONOSCOPY WITH CO2 INSUFFLATION N/A 8/20/2018    Procedure: COLONOSCOPY WITH CO2 INSUFFLATION;  COLON-SCREENING / LUBKA ;   Surgeon: Osman Hahn MD;  Location: [facility] OR     DAVINCI HYSTERECTOMY TOTAL, SALPINGECTOMY BILATERAL N/A 8/4/2017    Procedure: DAVINCI XI HYSTERECTOMY TOTAL, SALPINGECTOMY BILATERAL;  DAVINCI TOTAL HYSTERECTOMY; BILATERAL SALPINGECTOMY. BILATERAL URETERAL LYSIS. UTEROSACRAL-COLPOPEXY. EXCISION OF ENDOMETRIOSIS;  Surgeon: George Loyola MD;  Location: [facility] OR     DAVINCI LYSIS OF ADHESIONS Bilateral 8/4/2017    Procedure: DAVINCI LYSIS OF ADHESIONS;  BILATERAL URETERAL LYSIS;  Surgeon: George Loyola MD;  Location: [facility] OR     DAVINCI SACROCOLPOPEXY, CYSTOSCOPY, COMBINED N/A 8/4/2017    Procedure: COMBINED DAVINCI SACROCOLPOPEXY, CYSTOSCOPY;  UTEROSACRAL COLPOPEXY;  Surgeon: George Loyola MD;  Location: [facility] OR     DAVINCI XI ASSISTED ABLATION / EXCISION OF ENDOMETRIOSIS  8/4/2017    Procedure: DAVINCI XI ASSISTED ABLATION / EXCISION OF ENDOMETRIOSIS;;  Surgeon: George Loyola MD;  Location: [facility] OR     DILATION AND CURETTAGE N/A 6/20/2017    Procedure: DILATION AND CURETTAGE;  Uterine Curettings and Fibroid Removal, Cook catheter placement; (No hysterectomy done at this time);  Surgeon: Ernestina Saunders MD;  Location: [facility] OR     HYSTERECTOMY, PAP NO LONGER INDICATED       ORTHOPEDIC SURGERY  6/1986    Heel spur , toe surgery     RELEASE CARPAL TUNNEL Right 10/20/2020    Procedure: RIGHT RELEASE, CARPAL TUNNEL;  Surgeon: Rickey Rea MD;  Location: [facility] OR     RELEASE CARPAL TUNNEL Left 11/6/2020    Procedure: LEFT RELEASE, CARPAL TUNNEL;  Surgeon: Rickey Rea MD;  Location: [facility] OR     TONSILLECTOMY         ALLERGIES:     Allergies   Allergen Reactions     Penicillins Swelling     Swelling throat; age 4     Adhesive Tape Hives     Penicillin G      Tetracycline GI Disturbance     Other reaction(s): Abdominal Pain  Vomiting; nauseous  Other reaction(s): Abdominal Pain  Vomiting; nauseous       FAMILY HISTORY:  Family History   Problem Relation Age of Onset     Diabetes Mother   "    Hypertension Mother      Hyperlipidemia Mother      Arrhythmia Mother      Cardiovascular Father         CHF and COPD     Asthma Father      Breast Cancer Maternal Grandfather      Colon Cancer Maternal Grandfather      Breast Cancer Paternal Grandmother      Breast Cancer Paternal Aunt      RAVEN.A.D. Paternal Grandfather      Breast Cancer Paternal Grandfather      Breast Cancer Cousin      Asthma Son      Diabetes Maternal Grandmother      Hypertension Maternal Grandmother      Breast Cancer Cousin      Breast Cancer Cousin      Breast Cancer Cousin           SOCIAL HISTORY:  Social History     Tobacco Use     Smoking status: Never Smoker     Smokeless tobacco: Never Used   Substance Use Topics     Alcohol use: Yes     Comment: Rare- @1 month     Drug use: Never       ROS:   Constitutional: No fever, chills, or sweats. Weight stable.   ENT: No visual disturbance, ear ache, epistaxis, sore throat.   Cardiovascular: As per HPI.   Respiratory: No cough, hemoptysis.    GI: No nausea, vomiting, .   : No hematuria.   Integument: Negative.   Psychiatric: Negative.   Hematologic: , no easy bleeding.  Neuro: Negative.   Endocrinology: No significant heat or cold intolerance   Musculoskeletal: No myalgia.    Exam:  BP (!) 143/89 (BP Location: Right arm, Patient Position: Chair, Cuff Size: Adult Regular)   Pulse 66   Ht 1.629 m (5' 4.13\")   Wt 81.2 kg (179 lb)   LMP 07/28/2017   SpO2 99%   BMI 30.60 kg/m    GENERAL APPEARANCE: healthy, alert and no distress  HEENT: no icterus,no central cyanosis  NECK: no adenopathy, no asymmetry, masses, or scars, thyroid normal to palpation and no bruits, JVP not elevated  RESPIRATORY: lungs clear to auscultation - no rales, rhonchi or wheezes, no use of accessory muscles, no retractions, respirations are unlabored, normal respiratory rate  CARDIOVASCULAR: regular rhythm, normal S1 with physiologic split S2, no S3 or S4 and no murmur, click or rub, precordium quiet with normal " PMI.  ABDOMEN: soft, non tender, without hepatosplenomegaly, no masses palpable, bowel sounds normal,   EXTREMITIES: peripheral pulses normal, no edema, no bruits  NEURO: alert and oriented to person/place/time, normal speech, gait and affect    SKIN: no ecchymoses, no rashes    Labs:  CBC RESULTS:   Lab Results   Component Value Date    WBC 6.2 03/31/2021    RBC 4.75 03/31/2021    HGB 14.1 03/31/2021    HCT 43.2 03/31/2021    MCV 91 03/31/2021    MCH 29.7 03/31/2021    MCHC 32.6 03/31/2021    RDW 14.0 03/31/2021     03/31/2021       BMP RESULTS:  Lab Results   Component Value Date     03/31/2021    POTASSIUM 4.2 03/31/2021    CHLORIDE 104 03/31/2021    CO2 31 03/31/2021    ANIONGAP 3 03/31/2021    GLC 87 03/31/2021    BUN 15 03/31/2021    CR 0.99 03/31/2021    GFRESTIMATED 65 03/31/2021    GFRESTBLACK 76 03/31/2021    AZAEL 9.4 03/31/2021        INR RESULTS:  Lab Results   Component Value Date    INR 1.12 06/20/2017    INR 1.12 06/20/2017       Procedures:  PULMONARY FUNCTION TESTS:   No flowsheet data found.      ECHOCARDIOGRAM:   No results found for this or any previous visit (from the past 8760 hour(s)).      Assessment and Plan:   1.  Inappropriate sinus tachycardia-well-controlled with ivabradine, and use of Xopenex inhaler to diminish her heart rate response.  Patient is previously had adverse heart rate response to albuterol.    Plan  1.  Send letter to insurance company requesting continued support of patient's prescriptions since the are both effective and keep her out of the emergency department  2.  Follow-up with Marylou Barba in [facility]  3.  Follow-up in this clinic as needed    Total elapsed time today with chart review, visit and documentation 40 minutes    I very much appreciated the opportunity to see and assess Laura Jackson in the clinic today t. Please do not hesitate to contact my office if you have any questions or concerns.      Phil Mitchell MD  Cardiac  Arrhythmia Service  [facility]  [phone]  CC  SELF, REFERRED

## 2022-02-23 NOTE — PATIENT INSTRUCTIONS
You were seen in the Electrophysiology Clinic today by: Dr Mitchell    Plan:         Follow up visit:    1 year with Marylou Lim NP        Your Care Team:  [facility] Cardiology   Telephone Number     Nurse Line  Tatyana Cisneros RN  [phone]     For scheduling appts or procedures:    Gina Walker   [phone]   For the Device Clinic (Pacemakers, ICDs, Loop Recorders)    During business hours: [phone]  After business hours:   [phone]- select option 4 and ask for job code [id number].     On-call cardiologist for after hours or on weekends: [phone], option #4, and ask to speak to the on-call cardiologist.     Cardiovascular Clinic:   [address] [place] [address]. [place] [address]      As always, Thank you for trusting us with your health care needs!

## 2022-02-23 NOTE — NURSING NOTE
Chief Complaint   Patient presents with     Follow Up     1 yr f/u for IST, OH, HTN, saw Carina 12/8/21     Vitals were taken, medications reconciled, and EKG performed.    Sarabjit Astorga  10:29 AM

## 2022-02-23 NOTE — LETTER
2/23/2022      RE: Laura Jackson  [address]  [facility] [address]       Dear Colleague,    Thank you for the opportunity to participate in the care of your patient, Laura Jackson, at the [facility] at [facility]. Please see a copy of my visit note below.    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  Additional comorbidities include hypermobility syndrome, asthma, IBS, uterine fibroids s/p hysterectomy, and GERD.    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  Her medications have played a big role in permitting her to continue to work.      Ivabradine has made a major difference from her perspective and her insurance company has agreed to support ivabradine until recently.  Patient has received letters from the insurance company saying that they would no longer support ivabradine or her Xopenex inhaler (previously patient has had adverse increases in heart rate when trying albuterol)..  These drugs however have proved extremely valuable and kept her out of the emergency department.  We will write a letter to the insurance company indicating that the cost of one emergency department visit would be more than compensated for by continuing support of her prescriptions.    Inasmuch as Laura is doing well on current therapy we will have her follow-up with Marylou Barba CNP at [facility] as in the past.  I be happy to see Laura again in the future should Marylou desire.     PAST MEDICAL HISTORY:  Past Medical History:   Diagnosis Date     Benign essential hypertension 7/31/2018     Family history of breast cancer 2/19/2014     Family history of breast cancer      SVT (supraventricular tachycardia):  history of, no  recurrence since 2008 10/11/2013    no recurrence since 2008      Uncomplicated asthma        CURRENT MEDICATIONS:  Current Outpatient Medications   Medication Sig Dispense Refill     Cholecalciferol (VITAMIN D) 2000 UNITS CAPS Take 1 capsule by mouth daily       famotidine (PEPCID) 40 MG tablet Take 1 tablet (40 mg) by mouth At Bedtime 90 tablet 3     fluticasone-salmeterol (ADVAIR) 250-50 MCG/DOSE inhaler INHALE 1 PUFF INTO THE LUNGS EVERY 12 HOURS 180 each 0     ivabradine (CORLANOR) 5 MG tablet Take 1 tablet (5 mg) by mouth 2 times daily (with meals) 720 tablet 0     levalbuterol (XOPENEX HFA) 45 MCG/ACT inhaler Inhale 1-2 puffs into the lungs every 4 hours as needed for shortness of breath / dyspnea 15 g 0     metroNIDAZOLE (METROGEL) 0.75 % external gel Apply to face BID 45 g 11     omeprazole (PRILOSEC) 40 MG DR capsule Take 1 capsule (40 mg) by mouth daily before breakfast 90 capsule 3     Pyridoxine HCl (B-6) 100 MG TABS        vitamin B-12 (CYANOCOBALAMIN) 100 MCG tablet        azelastine (ASTELIN) 0.1 % nasal spray SPRAY 1 SPRAY INTO BOTH NOSTRILS 2 TIMES DAILY (Patient not taking: Reported on 2/23/2022) 30 mL 0     azithromycin (ZITHROMAX) 250 MG tablet 2 tablets the first day, then 1 tablet daily for the next 4 days (Patient not taking: Reported on 2/23/2022) 6 tablet 0     fluticasone (FLONASE) 50 MCG/ACT nasal spray Spray 1 spray into both nostrils daily (Patient not taking: Reported on 2/23/2022)       fluticasone-salmeterol (ADVAIR) 100-50 MCG/DOSE inhaler INHALE ONE PUFF BY MOUTH EVERY 12 HOURS (Patient not taking: Reported on 2/23/2022) 180 each 1     order for DME Equipment being ordered: hinged knee brace, Large (Patient not taking: Reported on 2/23/2022) 1 Device 0     order for DME Equipment being ordered: wrist splint with thumb support (Patient not taking: Reported on 2/23/2022) 1 Device 0       PAST SURGICAL HISTORY:  Past Surgical History:   Procedure Laterality Date     ABDOMEN SURGERY   6/2017    myectomy     APPENDECTOMY       COLONOSCOPY N/A 8/20/2018    Procedure: COMBINED COLONOSCOPY, SINGLE OR MULTIPLE BIOPSY/POLYPECTOMY BY BIOPSY;;  Surgeon: Osman Hahn MD;  Location: [facility] OR     COLONOSCOPY WITH CO2 INSUFFLATION N/A 8/20/2018    Procedure: COLONOSCOPY WITH CO2 INSUFFLATION;  COLON-SCREENING / LUBKA ;  Surgeon: Osman Hahn MD;  Location: [facility] OR     DAVINCI HYSTERECTOMY TOTAL, SALPINGECTOMY BILATERAL N/A 8/4/2017    Procedure: DAVINCI XI HYSTERECTOMY TOTAL, SALPINGECTOMY BILATERAL;  DAVINCI TOTAL HYSTERECTOMY; BILATERAL SALPINGECTOMY. BILATERAL URETERAL LYSIS. UTEROSACRAL-COLPOPEXY. EXCISION OF ENDOMETRIOSIS;  Surgeon: George Loyola MD;  Location: [facility] OR     DAVINCI LYSIS OF ADHESIONS Bilateral 8/4/2017    Procedure: DAVINCI LYSIS OF ADHESIONS;  BILATERAL URETERAL LYSIS;  Surgeon: George Loyola MD;  Location: [facility] OR     DAVINCI SACROCOLPOPEXY, CYSTOSCOPY, COMBINED N/A 8/4/2017    Procedure: COMBINED DAVINCI SACROCOLPOPEXY, CYSTOSCOPY;  UTEROSACRAL COLPOPEXY;  Surgeon: George Loyola MD;  Location: [facility] OR     DAVINCI XI ASSISTED ABLATION / EXCISION OF ENDOMETRIOSIS  8/4/2017    Procedure: DAVINCI XI ASSISTED ABLATION / EXCISION OF ENDOMETRIOSIS;;  Surgeon: George Loyola MD;  Location: [facility] OR     DILATION AND CURETTAGE N/A 6/20/2017    Procedure: DILATION AND CURETTAGE;  Uterine Curettings and Fibroid Removal, Cook catheter placement; (No hysterectomy done at this time);  Surgeon: Ernestina Saunders MD;  Location: [facility] OR     HYSTERECTOMY, PAP NO LONGER INDICATED       ORTHOPEDIC SURGERY  6/1986    Heel spur , toe surgery     RELEASE CARPAL TUNNEL Right 10/20/2020    Procedure: RIGHT RELEASE, CARPAL TUNNEL;  Surgeon: Rickey Rea MD;  Location: [facility] OR     RELEASE CARPAL TUNNEL Left 11/6/2020    Procedure: LEFT RELEASE, CARPAL TUNNEL;  Surgeon: Rickey Rea MD;  Location: [facility] OR     TONSILLECTOMY         ALLERGIES:     Allergies   Allergen  "Reactions     Penicillins Swelling     Swelling throat; age 4     Adhesive Tape Hives     Penicillin G      Tetracycline GI Disturbance     Other reaction(s): Abdominal Pain  Vomiting; nauseous  Other reaction(s): Abdominal Pain  Vomiting; nauseous       FAMILY HISTORY:  Family History   Problem Relation Age of Onset     Diabetes Mother      Hypertension Mother      Hyperlipidemia Mother      Arrhythmia Mother      Cardiovascular Father         CHF and COPD     Asthma Father      Breast Cancer Maternal Grandfather      Colon Cancer Maternal Grandfather      Breast Cancer Paternal Grandmother      Breast Cancer Paternal Aunt      C.A.D. Paternal Grandfather      Breast Cancer Paternal Grandfather      Breast Cancer Cousin      Asthma Son      Diabetes Maternal Grandmother      Hypertension Maternal Grandmother      Breast Cancer Cousin      Breast Cancer Cousin      Breast Cancer Cousin           SOCIAL HISTORY:  Social History     Tobacco Use     Smoking status: Never Smoker     Smokeless tobacco: Never Used   Substance Use Topics     Alcohol use: Yes     Comment: Rare- @1 month     Drug use: Never       ROS:   Constitutional: No fever, chills, or sweats. Weight stable.   ENT: No visual disturbance, ear ache, epistaxis, sore throat.   Cardiovascular: As per HPI.   Respiratory: No cough, hemoptysis.    GI: No nausea, vomiting, .   : No hematuria.   Integument: Negative.   Psychiatric: Negative.   Hematologic: , no easy bleeding.  Neuro: Negative.   Endocrinology: No significant heat or cold intolerance   Musculoskeletal: No myalgia.    Exam:  BP (!) 143/89 (BP Location: Right arm, Patient Position: Chair, Cuff Size: Adult Regular)   Pulse 66   Ht 1.629 m (5' 4.13\")   Wt 81.2 kg (179 lb)   LMP 07/28/2017   SpO2 99%   BMI 30.60 kg/m    GENERAL APPEARANCE: healthy, alert and no distress  HEENT: no icterus,no central cyanosis  NECK: no adenopathy, no asymmetry, masses, or scars, thyroid normal to palpation and no " bruits, JVP not elevated  RESPIRATORY: lungs clear to auscultation - no rales, rhonchi or wheezes, no use of accessory muscles, no retractions, respirations are unlabored, normal respiratory rate  CARDIOVASCULAR: regular rhythm, normal S1 with physiologic split S2, no S3 or S4 and no murmur, click or rub, precordium quiet with normal PMI.  ABDOMEN: soft, non tender, without hepatosplenomegaly, no masses palpable, bowel sounds normal,   EXTREMITIES: peripheral pulses normal, no edema, no bruits  NEURO: alert and oriented to person/place/time, normal speech, gait and affect    SKIN: no ecchymoses, no rashes    Labs:  CBC RESULTS:   Lab Results   Component Value Date    WBC 6.2 03/31/2021    RBC 4.75 03/31/2021    HGB 14.1 03/31/2021    HCT 43.2 03/31/2021    MCV 91 03/31/2021    MCH 29.7 03/31/2021    MCHC 32.6 03/31/2021    RDW 14.0 03/31/2021     03/31/2021       BMP RESULTS:  Lab Results   Component Value Date     03/31/2021    POTASSIUM 4.2 03/31/2021    CHLORIDE 104 03/31/2021    CO2 31 03/31/2021    ANIONGAP 3 03/31/2021    GLC 87 03/31/2021    BUN 15 03/31/2021    CR 0.99 03/31/2021    GFRESTIMATED 65 03/31/2021    GFRESTBLACK 76 03/31/2021    AZAEL 9.4 03/31/2021        INR RESULTS:  Lab Results   Component Value Date    INR 1.12 06/20/2017    INR 1.12 06/20/2017       Procedures:  PULMONARY FUNCTION TESTS:   No flowsheet data found.      ECHOCARDIOGRAM:   No results found for this or any previous visit (from the past 8760 hour(s)).      Assessment and Plan:   1.  Inappropriate sinus tachycardia-well-controlled with ivabradine, and use of Xopenex inhaler to diminish her heart rate response.  Patient is previously had adverse heart rate response to albuterol.    Plan  1.  Send letter to insurance company requesting continued support of patient's prescriptions since the are both effective and keep her out of the emergency department  2.  Follow-up with Marylou Barba in [facility]  3.   Follow-up in this clinic as needed    Total elapsed time today with chart review, visit and documentation 40 minutes    I very much appreciated the opportunity to see and assess Laura Jackson in the clinic today t. Please do not hesitate to contact my office if you have any questions or concerns.          Please do not hesitate to contact me if you have any questions/concerns.     Sincerely,     Phil Mitchell MD

## 2022-02-24 LAB
ATRIAL RATE - MUSE: 66 BPM
DIASTOLIC BLOOD PRESSURE - MUSE: NORMAL MMHG
INTERPRETATION ECG - MUSE: NORMAL
P AXIS - MUSE: 59 DEGREES
PR INTERVAL - MUSE: 152 MS
QRS DURATION - MUSE: 86 MS
QT - MUSE: 394 MS
QTC - MUSE: 413 MS
R AXIS - MUSE: 16 DEGREES
SYSTOLIC BLOOD PRESSURE - MUSE: NORMAL MMHG
T AXIS - MUSE: 58 DEGREES
VENTRICULAR RATE- MUSE: 66 BPM

## 2022-02-28 ENCOUNTER — TELEPHONE (OUTPATIENT)
Dept: CARDIOLOGY | Facility: CLINIC | Age: 52
End: 2022-02-28
Payer: COMMERCIAL

## 2022-02-28 ENCOUNTER — MYC MEDICAL ADVICE (OUTPATIENT)
Dept: CARDIOLOGY | Facility: CLINIC | Age: 52
End: 2022-02-28
Payer: COMMERCIAL

## 2022-02-28 ENCOUNTER — TELEPHONE (OUTPATIENT)
Dept: URGENT CARE | Facility: URGENT CARE | Age: 52
End: 2022-02-28
Payer: COMMERCIAL

## 2022-02-28 NOTE — TELEPHONE ENCOUNTER
Central Prior Authorization Team   Phone: [phone]      Question set received, placed in [facility] Folder for completion:

## 2022-02-28 NOTE — TELEPHONE ENCOUNTER
Central Prior Authorization Team   Phone: [phone]      Question set received from patient's insurance, placed in the G Folder for completion:  levalbuterol (XOPENEX HFA) 45 MCG/ACT inhaler

## 2022-03-03 ENCOUNTER — THERAPY VISIT (OUTPATIENT)
Dept: PHYSICAL THERAPY | Facility: CLINIC | Age: 52
End: 2022-03-03
Payer: COMMERCIAL

## 2022-03-03 DIAGNOSIS — M54.89 OTHER BACK PAIN, UNSPECIFIED CHRONICITY: ICD-10-CM

## 2022-03-03 PROCEDURE — 97110 THERAPEUTIC EXERCISES: CPT | Mod: GP | Performed by: PHYSICAL THERAPIST

## 2022-03-03 PROCEDURE — 97140 MANUAL THERAPY 1/> REGIONS: CPT | Mod: GP | Performed by: PHYSICAL THERAPIST

## 2022-03-03 NOTE — TELEPHONE ENCOUNTER
Received call from Alise Coley checking on questions set over was received. Please complete and via fax to complete questions set to Brijesh. Thanks.

## 2022-03-07 ENCOUNTER — THERAPY VISIT (OUTPATIENT)
Dept: PHYSICAL THERAPY | Facility: CLINIC | Age: 52
End: 2022-03-07
Payer: COMMERCIAL

## 2022-03-07 DIAGNOSIS — M54.89 OTHER BACK PAIN, UNSPECIFIED CHRONICITY: ICD-10-CM

## 2022-03-07 PROCEDURE — 97112 NEUROMUSCULAR REEDUCATION: CPT | Mod: GP | Performed by: PHYSICAL THERAPIST

## 2022-03-07 PROCEDURE — 97140 MANUAL THERAPY 1/> REGIONS: CPT | Mod: GP | Performed by: PHYSICAL THERAPIST

## 2022-03-07 NOTE — TELEPHONE ENCOUNTER
Central Prior Authorization Team   Phone: [phone]      Received an approval for the patient's levalbuterol (XOPENEX HFA) 45 MCG/ACT inhaler. I am not sure whom submitted the question set for the medication.

## 2022-03-07 NOTE — TELEPHONE ENCOUNTER
Central Prior Authorization Team   Phone: [phone]      Received two Prior authorization approval for the  ivabradine (CORLANOR) 5 MG tablet. I am not sure whom initiated submitted the PA request.

## 2022-03-13 DIAGNOSIS — J45.30 MILD PERSISTENT ASTHMA: Primary | ICD-10-CM

## 2022-03-15 NOTE — TELEPHONE ENCOUNTER
Duplicate-fluticasone-salmeterol (ADVAIR) 250-50 MCG/DOSE inhaler was sent for 3 month supply on 2/10/22.    Valeria Prince RN

## 2022-03-21 ENCOUNTER — THERAPY VISIT (OUTPATIENT)
Dept: PHYSICAL THERAPY | Facility: CLINIC | Age: 52
End: 2022-03-21
Payer: COMMERCIAL

## 2022-03-21 DIAGNOSIS — M54.89 OTHER BACK PAIN, UNSPECIFIED CHRONICITY: ICD-10-CM

## 2022-03-21 PROCEDURE — 97112 NEUROMUSCULAR REEDUCATION: CPT | Mod: GP | Performed by: PHYSICAL THERAPIST

## 2022-03-21 PROCEDURE — 97140 MANUAL THERAPY 1/> REGIONS: CPT | Mod: GP | Performed by: PHYSICAL THERAPIST

## 2022-04-11 ENCOUNTER — THERAPY VISIT (OUTPATIENT)
Dept: PHYSICAL THERAPY | Facility: CLINIC | Age: 52
End: 2022-04-11
Payer: COMMERCIAL

## 2022-04-11 DIAGNOSIS — M54.9 BACK PAIN: Primary | ICD-10-CM

## 2022-04-11 PROCEDURE — 97110 THERAPEUTIC EXERCISES: CPT | Mod: GP

## 2022-04-11 PROCEDURE — 97140 MANUAL THERAPY 1/> REGIONS: CPT | Mod: GP

## 2022-04-18 ENCOUNTER — THERAPY VISIT (OUTPATIENT)
Dept: PHYSICAL THERAPY | Facility: CLINIC | Age: 52
End: 2022-04-18
Payer: COMMERCIAL

## 2022-04-18 DIAGNOSIS — M54.9 BACK PAIN: Primary | ICD-10-CM

## 2022-04-18 PROCEDURE — 97140 MANUAL THERAPY 1/> REGIONS: CPT | Mod: GP | Performed by: PHYSICAL THERAPIST

## 2022-04-27 ENCOUNTER — THERAPY VISIT (OUTPATIENT)
Dept: PHYSICAL THERAPY | Facility: CLINIC | Age: 52
End: 2022-04-27
Payer: COMMERCIAL

## 2022-04-27 DIAGNOSIS — M54.9 BACK PAIN: Primary | ICD-10-CM

## 2022-04-27 PROCEDURE — 97140 MANUAL THERAPY 1/> REGIONS: CPT | Mod: GP | Performed by: PHYSICAL THERAPIST

## 2022-04-27 PROCEDURE — 97112 NEUROMUSCULAR REEDUCATION: CPT | Mod: GP | Performed by: PHYSICAL THERAPIST

## 2022-05-01 DIAGNOSIS — J45.31 MILD PERSISTENT ASTHMA WITH ACUTE EXACERBATION: ICD-10-CM

## 2022-05-02 RX ORDER — FLUTICASONE PROPIONATE AND SALMETEROL 250; 50 UG/1; UG/1
POWDER RESPIRATORY (INHALATION)
Qty: 180 EACH | Refills: 0 | Status: SHIPPED | OUTPATIENT
Start: 2022-05-02 | End: 2022-07-26

## 2022-05-02 NOTE — TELEPHONE ENCOUNTER
Prescription approved per [facility] Refill Protocol.    ACT Total Scores 12/29/2020 8/4/2021 12/8/2021   ACT TOTAL SCORE - - -   ASTHMA ER VISITS - - -   ASTHMA HOSPITALIZATIONS - - -   ACT TOTAL SCORE (Goal Greater than or Equal to 20) 22 23 20   In the past 12 months, how many times did you visit the emergency room for your asthma without being admitted to the hospital? 0 0 0   In the past 12 months, how many times were you hospitalized overnight because of your asthma? 0 0 0

## 2022-05-03 ENCOUNTER — THERAPY VISIT (OUTPATIENT)
Dept: PHYSICAL THERAPY | Facility: CLINIC | Age: 52
End: 2022-05-03
Payer: COMMERCIAL

## 2022-05-03 DIAGNOSIS — M54.2 CERVICALGIA: ICD-10-CM

## 2022-05-03 DIAGNOSIS — M54.9 BACK PAIN: Primary | ICD-10-CM

## 2022-05-03 PROCEDURE — 97112 NEUROMUSCULAR REEDUCATION: CPT | Mod: GP | Performed by: PHYSICAL THERAPIST

## 2022-05-03 PROCEDURE — 97140 MANUAL THERAPY 1/> REGIONS: CPT | Mod: GP | Performed by: PHYSICAL THERAPIST

## 2022-05-18 ENCOUNTER — THERAPY VISIT (OUTPATIENT)
Dept: PHYSICAL THERAPY | Facility: CLINIC | Age: 52
End: 2022-05-18
Payer: COMMERCIAL

## 2022-05-18 DIAGNOSIS — M54.9 BACK PAIN: Primary | ICD-10-CM

## 2022-05-18 PROCEDURE — 97112 NEUROMUSCULAR REEDUCATION: CPT | Mod: GP | Performed by: PHYSICAL THERAPIST

## 2022-05-18 PROCEDURE — 97140 MANUAL THERAPY 1/> REGIONS: CPT | Mod: GP | Performed by: PHYSICAL THERAPIST

## 2022-05-31 ENCOUNTER — THERAPY VISIT (OUTPATIENT)
Dept: PHYSICAL THERAPY | Facility: CLINIC | Age: 52
End: 2022-05-31
Payer: COMMERCIAL

## 2022-05-31 DIAGNOSIS — M54.9 BACK PAIN: Primary | ICD-10-CM

## 2022-05-31 PROCEDURE — 97112 NEUROMUSCULAR REEDUCATION: CPT | Mod: GP | Performed by: PHYSICAL THERAPIST

## 2022-05-31 PROCEDURE — 97140 MANUAL THERAPY 1/> REGIONS: CPT | Mod: GP | Performed by: PHYSICAL THERAPIST

## 2022-06-16 ENCOUNTER — THERAPY VISIT (OUTPATIENT)
Dept: PHYSICAL THERAPY | Facility: CLINIC | Age: 52
End: 2022-06-16
Payer: COMMERCIAL

## 2022-06-16 DIAGNOSIS — M54.9 BACK PAIN: Primary | ICD-10-CM

## 2022-06-16 PROCEDURE — 97112 NEUROMUSCULAR REEDUCATION: CPT | Mod: GP | Performed by: PHYSICAL THERAPIST

## 2022-06-16 PROCEDURE — 97140 MANUAL THERAPY 1/> REGIONS: CPT | Mod: GP | Performed by: PHYSICAL THERAPIST

## 2022-06-22 ENCOUNTER — THERAPY VISIT (OUTPATIENT)
Dept: PHYSICAL THERAPY | Facility: CLINIC | Age: 52
End: 2022-06-22
Payer: COMMERCIAL

## 2022-06-22 DIAGNOSIS — M54.9 BACK PAIN: Primary | ICD-10-CM

## 2022-06-22 PROCEDURE — 97112 NEUROMUSCULAR REEDUCATION: CPT | Mod: GP | Performed by: PHYSICAL THERAPIST

## 2022-06-22 PROCEDURE — 97140 MANUAL THERAPY 1/> REGIONS: CPT | Mod: GP | Performed by: PHYSICAL THERAPIST

## 2022-06-25 ENCOUNTER — HEALTH MAINTENANCE LETTER (OUTPATIENT)
Age: 52
End: 2022-06-25

## 2022-07-11 ENCOUNTER — THERAPY VISIT (OUTPATIENT)
Dept: PHYSICAL THERAPY | Facility: CLINIC | Age: 52
End: 2022-07-11
Payer: COMMERCIAL

## 2022-07-11 DIAGNOSIS — M54.2 CERVICALGIA: Primary | ICD-10-CM

## 2022-07-11 DIAGNOSIS — M54.9 BACK PAIN: ICD-10-CM

## 2022-07-11 PROCEDURE — 97112 NEUROMUSCULAR REEDUCATION: CPT | Mod: GP | Performed by: PHYSICAL THERAPIST

## 2022-07-11 PROCEDURE — 97140 MANUAL THERAPY 1/> REGIONS: CPT | Mod: GP | Performed by: PHYSICAL THERAPIST

## 2022-07-18 ENCOUNTER — THERAPY VISIT (OUTPATIENT)
Dept: PHYSICAL THERAPY | Facility: CLINIC | Age: 52
End: 2022-07-18
Payer: COMMERCIAL

## 2022-07-18 DIAGNOSIS — M54.9 BACK PAIN: Primary | ICD-10-CM

## 2022-07-18 DIAGNOSIS — M54.2 CERVICALGIA: ICD-10-CM

## 2022-07-18 PROCEDURE — 97140 MANUAL THERAPY 1/> REGIONS: CPT | Mod: GP | Performed by: PHYSICAL THERAPIST

## 2022-07-18 PROCEDURE — 97112 NEUROMUSCULAR REEDUCATION: CPT | Mod: GP | Performed by: PHYSICAL THERAPIST

## 2022-07-25 ENCOUNTER — THERAPY VISIT (OUTPATIENT)
Dept: PHYSICAL THERAPY | Facility: CLINIC | Age: 52
End: 2022-07-25
Payer: COMMERCIAL

## 2022-07-25 DIAGNOSIS — M54.9 BACK PAIN: Primary | ICD-10-CM

## 2022-07-25 DIAGNOSIS — J45.31 MILD PERSISTENT ASTHMA WITH ACUTE EXACERBATION: ICD-10-CM

## 2022-07-25 DIAGNOSIS — M54.2 CERVICALGIA: ICD-10-CM

## 2022-07-25 PROCEDURE — 97112 NEUROMUSCULAR REEDUCATION: CPT | Mod: GP | Performed by: PHYSICAL THERAPIST

## 2022-07-25 PROCEDURE — 97140 MANUAL THERAPY 1/> REGIONS: CPT | Mod: GP | Performed by: PHYSICAL THERAPIST

## 2022-07-26 RX ORDER — FLUTICASONE PROPIONATE AND SALMETEROL 250; 50 UG/1; UG/1
POWDER RESPIRATORY (INHALATION)
Qty: 180 EACH | Refills: 0 | Status: SHIPPED | OUTPATIENT
Start: 2022-07-26 | End: 2022-08-29

## 2022-07-26 NOTE — TELEPHONE ENCOUNTER
Medication is being filled for 1 time refill only due to:  Patient needs to be seen because need recheck.    ACT Total Scores 12/29/2020 8/4/2021 12/8/2021   ACT TOTAL SCORE - - -   ASTHMA ER VISITS - - -   ASTHMA HOSPITALIZATIONS - - -   ACT TOTAL SCORE (Goal Greater than or Equal to 20) 22 23 20   In the past 12 months, how many times did you visit the emergency room for your asthma without being admitted to the hospital? 0 0 0   In the past 12 months, how many times were you hospitalized overnight because of your asthma? 0 0 0

## 2022-08-01 ENCOUNTER — THERAPY VISIT (OUTPATIENT)
Dept: PHYSICAL THERAPY | Facility: CLINIC | Age: 52
End: 2022-08-01
Payer: COMMERCIAL

## 2022-08-01 DIAGNOSIS — M54.9 BACK PAIN: Primary | ICD-10-CM

## 2022-08-01 DIAGNOSIS — M54.2 CERVICALGIA: ICD-10-CM

## 2022-08-01 PROCEDURE — 97112 NEUROMUSCULAR REEDUCATION: CPT | Mod: GP | Performed by: PHYSICAL THERAPIST

## 2022-08-01 PROCEDURE — 97140 MANUAL THERAPY 1/> REGIONS: CPT | Mod: GP | Performed by: PHYSICAL THERAPIST

## 2022-08-16 ENCOUNTER — OFFICE VISIT (OUTPATIENT)
Dept: URGENT CARE | Facility: URGENT CARE | Age: 52
End: 2022-08-16

## 2022-08-16 ENCOUNTER — ANCILLARY PROCEDURE (OUTPATIENT)
Dept: GENERAL RADIOLOGY | Facility: CLINIC | Age: 52
End: 2022-08-16
Attending: PHYSICIAN ASSISTANT
Payer: COMMERCIAL

## 2022-08-16 VITALS
BODY MASS INDEX: 31.11 KG/M2 | DIASTOLIC BLOOD PRESSURE: 69 MMHG | SYSTOLIC BLOOD PRESSURE: 150 MMHG | WEIGHT: 182 LBS | OXYGEN SATURATION: 98 % | HEART RATE: 77 BPM | TEMPERATURE: 98.5 F

## 2022-08-16 DIAGNOSIS — S99.911A ANKLE INJURY, RIGHT, INITIAL ENCOUNTER: Primary | ICD-10-CM

## 2022-08-16 DIAGNOSIS — S99.921A FOOT INJURY, RIGHT, INITIAL ENCOUNTER: ICD-10-CM

## 2022-08-16 PROCEDURE — 99214 OFFICE O/P EST MOD 30 MIN: CPT | Performed by: PHYSICIAN ASSISTANT

## 2022-08-16 PROCEDURE — 73610 X-RAY EXAM OF ANKLE: CPT | Mod: TC | Performed by: RADIOLOGY

## 2022-08-16 PROCEDURE — 73630 X-RAY EXAM OF FOOT: CPT | Mod: TC | Performed by: RADIOLOGY

## 2022-08-16 ASSESSMENT — ENCOUNTER SYMPTOMS
WHEEZING: 0
WOUND: 0
CARDIOVASCULAR NEGATIVE: 1
MYALGIAS: 1
NECK STIFFNESS: 0
FEVER: 0
SHORTNESS OF BREATH: 0
CHEST TIGHTNESS: 0
COUGH: 0
COLOR CHANGE: 1
CONSTITUTIONAL NEGATIVE: 1
NECK PAIN: 0
PALPITATIONS: 0
ARTHRALGIAS: 1
CHILLS: 0
JOINT SWELLING: 1
FATIGUE: 0
BACK PAIN: 0

## 2022-08-16 NOTE — PROGRESS NOTES
Mitchell Sanchez is a 52 year old, presenting for the following health issues:  Foot Pain (Pt fell 1 week ago in [place], complains of right foot pain, swelling, and bruising. )    HPI   Musculoskeletal problem/pain  Onset/Duration: 1week  Description  Location: R foot and ankle  Joint Swelling: Yes  Redness: no  Pain: YES  Warmth: no  Intensity:  moderate  Progression of Symptoms:  same  Accompanying signs and symptoms:   Fevers: no  Numbness/tingling/weakness: Yes  History  Trauma to the area: Yes, sustained a R foot and ankle injury after slipping and falling on gravel while walking on steps in [place].  No head or neck injuries.  Recent illness:  no  Previous similar problem: no  Previous evaluation:  no  Precipitating or alleviating factors:  Aggravating factors include: standing, walking, overuse  Therapies tried and outcome: rest/inactivity, heat, ice, immobilization, with minimal relief  Patient Active Problem List   Diagnosis     CARDIOVASCULAR SCREENING; LDL GOAL LESS THAN 160     Irritable bowel syndrome     GERD (gastroesophageal reflux disease)     History of supraventricular tachycardia     Mild persistent asthma     Family history of breast cancer     S/P myomectomy     Nausea     S/P ANAID (total abdominal hysterectomy)     Hypovitaminosis D     Endometriosis     Heberden's nodes     POTS (postural orthostatic tachycardia syndrome)     Hypermobility syndrome     Cervicalgia     Autonomic dysfunction     Benign essential hypertension     Back pain     Current Outpatient Medications   Medication     Cholecalciferol (VITAMIN D) 2000 UNITS CAPS     fluticasone-salmeterol (ADVAIR) 250-50 MCG/ACT inhaler     ivabradine (CORLANOR) 5 MG tablet     levalbuterol (XOPENEX HFA) 45 MCG/ACT inhaler     Pyridoxine HCl (B-6) 100 MG TABS     vitamin B-12 (CYANOCOBALAMIN) 100 MCG tablet     famotidine (PEPCID) 40 MG tablet     metroNIDAZOLE (METROGEL) 0.75 % external gel     omeprazole (PRILOSEC) 40 MG DR capsule       Allergies   Allergen Reactions     Penicillins Swelling     Swelling throat; age 4     Adhesive Tape Hives     Penicillin G      Tetracycline GI Disturbance     Other reaction(s): Abdominal Pain  Vomiting; nauseous  Other reaction(s): Abdominal Pain  Vomiting; nauseous       Review of Systems   Constitutional: Negative.  Negative for chills, fatigue and fever.   Respiratory: Negative for cough, chest tightness, shortness of breath and wheezing.    Cardiovascular: Negative.  Negative for chest pain, palpitations and peripheral edema.   Musculoskeletal: Positive for arthralgias, gait problem, joint swelling and myalgias. Negative for back pain, neck pain and neck stiffness.   Skin: Positive for color change. Negative for pallor, rash and wound.   All other systems reviewed and are negative.           Objective    BP (!) 150/69 (BP Location: Left arm, Patient Position: Sitting, Cuff Size: Adult Regular)   Pulse 77   Temp 98.5  F (36.9  C) (Tympanic)   Wt 82.6 kg (182 lb)   LMP 07/28/2017   SpO2 98%   BMI 31.11 kg/m    Body mass index is 31.11 kg/m .  Physical Exam  Vitals and nursing note reviewed.   Constitutional:       General: She is not in acute distress.     Appearance: Normal appearance. She is well-developed and normal weight. She is not ill-appearing.   Musculoskeletal:      Right ankle: Swelling present. Tenderness present over the lateral malleolus, medial malleolus and AITF ligament. No CF ligament, posterior TF ligament or proximal fibula tenderness. Decreased range of motion. Normal pulse.      Right Achilles Tendon: Normal.      Left ankle: No swelling, deformity, ecchymosis or lacerations. No tenderness. No CF ligament, posterior TF ligament or proximal fibula tenderness. Normal range of motion. Normal pulse.      Left Achilles Tendon: Normal.      Right foot: Normal range of motion and normal capillary refill. Swelling, tenderness and bony tenderness present. No deformity, laceration or  crepitus.      Left foot: Normal range of motion and normal capillary refill. No swelling, deformity, laceration, tenderness, bony tenderness or crepitus.   Skin:     General: Skin is warm and dry.      Capillary Refill: Capillary refill takes less than 2 seconds.   Neurological:      Mental Status: She is alert and oriented to person, place, and time.      Sensory: Sensation is intact. No sensory deficit.      Motor: Motor function is intact.      Gait: Gait abnormal.      Deep Tendon Reflexes: Reflexes are normal and symmetric.   Psychiatric:         Mood and Affect: Mood normal.         Behavior: Behavior normal.         Thought Content: Thought content normal.         Judgment: Judgment normal.        Results for orders placed or performed in visit on 08/16/22 (from the past 24 hour(s))   XR Ankle Right G/E 3 Views    Narrative    EXAM: XR ANKLE RIGHT G/E 3 VIEWS  LOCATION: [facility]  DATE/TIME: 8/16/2022 5:10 PM    INDICATION:  Ankle injury, right, initial encounter  COMPARISON: None.      Impression    IMPRESSION: Tiny linear flake fracture of the tip of the lateral malleolus. No additional fractures. Moderate soft tissue swelling along the lateral aspect of the ankle. Small plantar calcaneal spur.       Assessment/Plan:  Ankle injury, right, initial encounter:  Xrays shows tiny flake fx of lateral malleolous vs strain/sprain/contusion.  Recommend RICE, walking boot and crutches were given in clinic and tylenol/ibuprofen prn pain.  Will also send to orthopedics for further evaluation and management.  Recheck in clinic if symptoms worsen or if symptoms do not improve.   -     XR Ankle Right G/E 3 Views  -     XR Foot Right G/E 3 Views  -     Orthopedic  Referral    Foot injury, right, initial encounter        ATIYA Kay.

## 2022-08-18 ENCOUNTER — OFFICE VISIT (OUTPATIENT)
Dept: PODIATRY | Facility: CLINIC | Age: 52
End: 2022-08-18
Payer: COMMERCIAL

## 2022-08-18 DIAGNOSIS — S93.491A SPRAIN OF ANTERIOR TALOFIBULAR LIGAMENT OF RIGHT ANKLE, INITIAL ENCOUNTER: Primary | ICD-10-CM

## 2022-08-18 PROCEDURE — 99213 OFFICE O/P EST LOW 20 MIN: CPT | Performed by: PODIATRIST

## 2022-08-18 NOTE — LETTER
8/18/2022         RE: Laura Jackson  [address]  [place] [address]        Dear Colleague,    Thank you [facility] referring your patient, Laura Jackson, [facility] Please see a copy of my visit note below.    Subjective:    Patient seen as a new patient consult from Isabel Bailey and is seen today  for right ankle sprain.  Had inversion sprain 9 days ago.  Was in [place] at the time and slipped on stairs with gravel on them.  Pain and swelling and bruising.  Aggravated by activity and relieved by rest.  Fortunately she had an ankle brace with her and wear this the rest of the trip.  Recently seen in clinic in the [place] and had x-rays and given an Aircast.  She states this is more comfortable for walking at this time.  Denies erythema weakness or increased deformity.  Denies numbness.  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      ROS: See above         Allergies   Allergen Reactions     Penicillins Swelling     Swelling throat; age 4     Adhesive Tape Hives     Penicillin G      Tetracycline GI Disturbance     Other reaction(s): Abdominal Pain  Vomiting; nauseous  Other reaction(s): Abdominal Pain  Vomiting; nauseous       Current Outpatient Medications   Medication Sig Dispense Refill     Cholecalciferol (VITAMIN D) 2000 UNITS CAPS Take 1 capsule by mouth daily       famotidine (PEPCID) 40 MG tablet Take 1 tablet (40 mg) by mouth At Bedtime (Patient not taking: Reported on 8/16/2022) 90 tablet 3     fluticasone-salmeterol (ADVAIR) 250-50 MCG/ACT inhaler INHALE 1 PUFF INTO THE LUNGS EVERY 12 HOURS 180 each 0     ivabradine (CORLANOR) 5 MG tablet Take 1 tablet (5 mg) by mouth 2 times daily (with meals) 720 tablet 0     levalbuterol (XOPENEX HFA) 45 MCG/ACT inhaler Inhale 1-2 puffs into the lungs every 4 hours as needed for shortness of breath / dyspnea 15 g 0     metroNIDAZOLE (METROGEL) 0.75 % external gel Apply to face BID (Patient not taking: Reported on  8/16/2022) 45 g 11     omeprazole (PRILOSEC) 40 MG DR capsule Take 1 capsule (40 mg) by mouth daily before breakfast (Patient not taking: Reported on 8/16/2022) 90 capsule 3     Pyridoxine HCl (B-6) 100 MG TABS        vitamin B-12 (CYANOCOBALAMIN) 100 MCG tablet          Patient Active Problem List   Diagnosis     CARDIOVASCULAR SCREENING; LDL GOAL LESS THAN 160     Irritable bowel syndrome     GERD (gastroesophageal reflux disease)     History of supraventricular tachycardia     Mild persistent asthma     Family history of breast cancer     S/P myomectomy     Nausea     S/P ANAID (total abdominal hysterectomy)     Hypovitaminosis D     Endometriosis     Heberden's nodes     POTS (postural orthostatic tachycardia syndrome)     Hypermobility syndrome     Cervicalgia     Autonomic dysfunction     Benign essential hypertension     Back pain       Past Medical History:   Diagnosis Date     Benign essential hypertension 7/31/2018     Family history of breast cancer 2/19/2014     Family history of breast cancer      SVT (supraventricular tachycardia):  history of, no recurrence since 2008 10/11/2013    no recurrence since 2008      Uncomplicated asthma        Past Surgical History:   Procedure Laterality Date     ABDOMEN SURGERY  6/2017    myectomy     APPENDECTOMY       COLONOSCOPY N/A 8/20/2018    Procedure: COMBINED COLONOSCOPY, SINGLE OR MULTIPLE BIOPSY/POLYPECTOMY BY BIOPSY;;  Surgeon: Osman Hahn MD;  [facility]     COLONOSCOPY WITH CO2 INSUFFLATION N/A 8/20/2018    Procedure: COLONOSCOPY WITH CO2 INSUFFLATION;  COLON-SCREENING / LUBKA ;  Surgeon: Osman Hahn MD;  Location: [facility]     DAVINCI HYSTERECTOMY TOTAL, SALPINGECTOMY BILATERAL N/A 8/4/2017    Procedure: DAVINCI XI HYSTERECTOMY TOTAL, SALPINGECTOMY BILATERAL;  DAVINCI TOTAL HYSTERECTOMY; BILATERAL SALPINGECTOMY. BILATERAL URETERAL LYSIS. UTEROSACRAL-COLPOPEXY. EXCISION OF ENDOMETRIOSIS;  Surgeon: George Loyola MD;  Location:  [facility]     DAVINCI LYSIS OF ADHESIONS Bilateral 8/4/2017    Procedure: DAVINCI LYSIS OF ADHESIONS;  BILATERAL URETERAL LYSIS;  Surgeon: George Loyola MD;  Location: [facility]     DAVINCI SACROCOLPOPEXY, CYSTOSCOPY, COMBINED N/A 8/4/2017    Procedure: COMBINED DAVINCI SACROCOLPOPEXY, CYSTOSCOPY;  UTEROSACRAL COLPOPEXY;  Surgeon: George Loyola MD;  Location: [facility]     DAVINCI XI ASSISTED ABLATION / EXCISION OF ENDOMETRIOSIS  8/4/2017    Procedure: DAVINCI XI ASSISTED ABLATION / EXCISION OF ENDOMETRIOSIS;;  Surgeon: George Loyola MD;  [facility]: [facility]     DILATION AND CURETTAGE N/A 6/20/2017    Procedure: DILATION AND CURETTAGE;  Uterine Curettings and Fibroid Removal, Cook catheter placement; (No hysterectomy done at this time);  Surgeon: Ernestina Saunders MD;  [facility]     HYSTERECTOMY, PAP NO LONGER INDICATED       ORTHOPEDIC SURGERY  6/1986    Heel spur , toe surgery     RELEASE CARPAL TUNNEL Right 10/20/2020    Procedure: RIGHT RELEASE, CARPAL TUNNEL;  Surgeon: Rickey Rea MD;  Location: [facility] OR     RELEASE CARPAL TUNNEL Left 11/6/2020    Procedure: LEFT RELEASE, CARPAL TUNNEL;  Surgeon: Rickey Rea MD;  Location: [facility] OR     TONSILLECTOMY         Family History   Problem Relation Age of Onset     Diabetes Mother      Hypertension Mother      Hyperlipidemia Mother      Arrhythmia Mother      Cardiovascular Father         CHF and COPD     Asthma Father      Breast Cancer Maternal Grandfather      Colon Cancer Maternal Grandfather      Breast Cancer Paternal Grandmother      Breast Cancer Paternal Aunt      C.A.D. Paternal Grandfather      Breast Cancer Paternal Grandfather      Breast Cancer Cousin      Asthma Son      Diabetes Maternal Grandmother      Hypertension Maternal Grandmother      Breast Cancer Cousin      Breast Cancer Cousin      Breast Cancer Cousin        Social History     Tobacco Use     Smoking status: Never Smoker     Smokeless tobacco: Never Used   Substance  Use Topics     Alcohol use: Yes     Comment: Rare- @1 month         Exam:    Vitals: LMP 07/28/2017   BMI: There is no height or weight on file to calculate BMI.  Height: Data Unavailable    Constitutional/ general:  Pt is in no apparent distress, appears well-nourished.  Cooperative with history and physical exam.     Psych:  The patient answered questions appropriately.  Normal affect.  Seems to have reasonable expectations, in terms of treatment.     Lungs:  Non labored breathing, non labored speech. No cough.  No audible wheezing. Even, quiet breathing.       Vascular:  positive pedal pulses bilaterally for both the DP and PT arteries.  CFT < 3 sec.  negative ankle edema.  positive pedal hair growth.    Neuro:  Alert and oriented x 3. Coordinated gait.  Light touch sensation is intact to the L4, L5, S1 distributions. No obvious deficits.  No evidence of neurological-based weakness, spasticity, or contracture in the lower extremities.      Derm: Normal texture and turgor.  No erythema, ecchymosis, or cyanosis.      Musculoskeletal:    Lower extremity muscle strength is normal.   No gross deformities.    negative ecchymosis  negative erythema  negative pain at TMTJs or styloid process.  negative Achilles or calcaneal tubercle pain  negative medial ankle pain  negative pain AITF ligament  negative pain with stressing or palpation peroneal tendons  negative peroneal subluxation  negative pain over medial or lateral malleoli  Some pain over dorsum of talonavicular joint.  No pain medial portion of this joint.  Pain over ATFL and CFL ligaments.  Positive pain posterior lateral malleoli but less than over ATFL.  Edema noted.  No erythema or ecchymosis.    Radiographic Exam:  X-Ray Findings:  I personally reviewed the films.    EXAM: XR ANKLE RIGHT G/E 3 VIEWS  LOCATION: [facility]  DATE/TIME: 8/16/2022 5:10 PM     INDICATION:  Ankle injury, right, initial encounter  COMPARISON:  None.                                                                      IMPRESSION: Tiny linear flake fracture of the tip of the lateral malleolus. No additional fractures. Moderate soft tissue swelling along the lateral aspect of the ankle. Small plantar calcaneal spur.      Assessment:  right ankle sprain       Plan:  X-rays from past personally reviewed.  Discussed mechanism of avulsion fractures.  We will treat conservatively.  Discussed PRICE.  Will continue Aircast until feeling better.  Discussed would be better if she could graduate into ankle brace with protected weightbearing as soon as possible.  She is already using compression on this.  Discussed the importance of not reinjuring this.  If she feels instability laterally will let me know and we will write order for physical therapy.  Will slowly increase activities as tolerated.  RTC as needed.  Thank you for allowing me participate in the care of this patient.        Vahid Montesinos DPM, FACFAS          Again, thank you for allowing me to participate in the care of your patient.        Sincerely,        Vahid Montesinos DPM

## 2022-08-18 NOTE — PATIENT INSTRUCTIONS
We wish you continued good healing. If you have any questions or concerns, please do not hesitate to contact us at  [phone]    ACHICAt (secure e-mail communication and access to your chart) to send a message or to make an appointment.    Please remember to call and schedule a follow up appointment if one was recommended at your earliest convenience.     PODIATRY CLINIC HOURS  TELEPHONE NUMBER    Dr. Vahid KAURPJULIENNE [facility]        Clinics:  [facility] Dave CMA   Tuesday 1PM-6PM  [place]  Wednesday 745AM-330PM  [place]  Thursday/Friday 745AM-230PM  [place] [facility]/[facility] APPOINTMENTS  [phone]    [place] APPOINTMENTS  [phone]        If you need a medication refill, please contact us you may need lab work and/or a follow up visit prior to your refill (i.e. Antifungal medications).  If MRI needed please call Imaging at [phone] or [phone]  HOW DO I GET MY KNEE SCOOTER? Knee scooters can be picked up at ANY Medical Supply stores with your knee scooter Prescription.  OR  Bring your signed prescription to an [facility] showroom.

## 2022-08-18 NOTE — PROGRESS NOTES
Subjective:    Patient seen as a new patient consult from Isabel Bailey and is seen today  for right ankle sprain.  Had inversion sprain 9 days ago.  Was in [place] at the time and slipped on stairs with gravel on them.  Pain and swelling and bruising.  Aggravated by activity and relieved by rest.  Fortunately she had an ankle brace with her and wear this the rest of the trip.  Recently seen in clinic in the [place] and had x-rays and given an Aircast.  She states this is more comfortable for walking at this time.  Denies erythema weakness or increased deformity.  Denies numbness.  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      ROS: See above         Allergies   Allergen Reactions     Penicillins Swelling     Swelling throat; age 4     Adhesive Tape Hives     Penicillin G      Tetracycline GI Disturbance     Other reaction(s): Abdominal Pain  Vomiting; nauseous  Other reaction(s): Abdominal Pain  Vomiting; nauseous       Current Outpatient Medications   Medication Sig Dispense Refill     Cholecalciferol (VITAMIN D) 2000 UNITS CAPS Take 1 capsule by mouth daily       famotidine (PEPCID) 40 MG tablet Take 1 tablet (40 mg) by mouth At Bedtime (Patient not taking: Reported on 8/16/2022) 90 tablet 3     fluticasone-salmeterol (ADVAIR) 250-50 MCG/ACT inhaler INHALE 1 PUFF INTO THE LUNGS EVERY 12 HOURS 180 each 0     ivabradine (CORLANOR) 5 MG tablet Take 1 tablet (5 mg) by mouth 2 times daily (with meals) 720 tablet 0     levalbuterol (XOPENEX HFA) 45 MCG/ACT inhaler Inhale 1-2 puffs into the lungs every 4 hours as needed for shortness of breath / dyspnea 15 g 0     metroNIDAZOLE (METROGEL) 0.75 % external gel Apply to face BID (Patient not taking: Reported on 8/16/2022) 45 g 11     omeprazole (PRILOSEC) 40 MG DR capsule Take 1 capsule (40 mg) by mouth daily before breakfast (Patient not taking: Reported on 8/16/2022) 90 capsule 3     Pyridoxine HCl (B-6) 100 MG TABS        vitamin B-12 (CYANOCOBALAMIN) 100 MCG  tablet          Patient Active Problem List   Diagnosis     CARDIOVASCULAR SCREENING; LDL GOAL LESS THAN 160     Irritable bowel syndrome     GERD (gastroesophageal reflux disease)     History of supraventricular tachycardia     Mild persistent asthma     Family history of breast cancer     S/P myomectomy     Nausea     S/P ANAID (total abdominal hysterectomy)     Hypovitaminosis D     Endometriosis     Heberden's nodes     POTS (postural orthostatic tachycardia syndrome)     Hypermobility syndrome     Cervicalgia     Autonomic dysfunction     Benign essential hypertension     Back pain       Past Medical History:   Diagnosis Date     Benign essential hypertension 7/31/2018     Family history of breast cancer 2/19/2014     Family history of breast cancer      SVT (supraventricular tachycardia):  history of, no recurrence since 2008 10/11/2013    no recurrence since 2008      Uncomplicated asthma        Past Surgical History:   Procedure Laterality Date     ABDOMEN SURGERY  6/2017    myectomy     APPENDECTOMY       COLONOSCOPY N/A 8/20/2018    Procedure: COMBINED COLONOSCOPY, SINGLE OR MULTIPLE BIOPSY/POLYPECTOMY BY BIOPSY;;  Surgeon: Osman Hahn MD;  Location: [facility]     COLONOSCOPY WITH CO2 INSUFFLATION N/A 8/20/2018    Procedure: COLONOSCOPY WITH CO2 INSUFFLATION;  COLON-SCREENING / LUBKA ;  Surgeon: Osman Hahn MD;  [facility]: [facility]     DAVINCI HYSTERECTOMY TOTAL, SALPINGECTOMY BILATERAL N/A 8/4/2017    Procedure: DAVINCI XI HYSTERECTOMY TOTAL, SALPINGECTOMY BILATERAL;  DAVINCI TOTAL HYSTERECTOMY; BILATERAL SALPINGECTOMY. BILATERAL URETERAL LYSIS. UTEROSACRAL-COLPOPEXY. EXCISION OF ENDOMETRIOSIS;  Surgeon: George Loyola MD;  [facility]     DAVINCI LYSIS OF ADHESIONS Bilateral 8/4/2017    Procedure: DAVINCI LYSIS OF ADHESIONS;  BILATERAL URETERAL LYSIS;  Surgeon: George Loyola MD;  [facility]     DAVINCI SACROCOLPOPEXY, CYSTOSCOPY, COMBINED N/A 8/4/2017    Procedure:  COMBINED DAVINCI SACROCOLPOPEXY, CYSTOSCOPY;  UTEROSACRAL COLPOPEXY;  Surgeon: George Loyola MD;  [facility]     DAVINCI XI ASSISTED ABLATION / EXCISION OF ENDOMETRIOSIS  8/4/2017    Procedure: DAVINCI XI ASSISTED ABLATION / EXCISION OF ENDOMETRIOSIS;;  Surgeon: George Loyola MD;  [facility]     DILATION AND CURETTAGE N/A 6/20/2017    Procedure: DILATION AND CURETTAGE;  Uterine Curettings and Fibroid Removal, Cook catheter placement; (No hysterectomy done at this time);  Surgeon: Ernestina Suanders MD;  [facility]     HYSTERECTOMY, PAP NO LONGER INDICATED       ORTHOPEDIC SURGERY  6/1986    Heel spur , toe surgery     RELEASE CARPAL TUNNEL Right 10/20/2020    Procedure: RIGHT RELEASE, CARPAL TUNNEL;  Surgeon: Rickey Rea MD;  Location: [facility] OR     RELEASE CARPAL TUNNEL Left 11/6/2020    Procedure: LEFT RELEASE, CARPAL TUNNEL;  Surgeon: Rickey Rea MD;  Location: [facility] OR     TONSILLECTOMY         Family History   Problem Relation Age of Onset     Diabetes Mother      Hypertension Mother      Hyperlipidemia Mother      Arrhythmia Mother      Cardiovascular Father         CHF and COPD     Asthma Father      Breast Cancer Maternal Grandfather      Colon Cancer Maternal Grandfather      Breast Cancer Paternal Grandmother      Breast Cancer Paternal Aunt      C.A.D. Paternal Grandfather      Breast Cancer Paternal Grandfather      Breast Cancer Cousin      Asthma Son      Diabetes Maternal Grandmother      Hypertension Maternal Grandmother      Breast Cancer Cousin      Breast Cancer Cousin      Breast Cancer Cousin        Social History     Tobacco Use     Smoking status: Never Smoker     Smokeless tobacco: Never Used   Substance Use Topics     Alcohol use: Yes     Comment: Rare- @1 month         Exam:    Vitals: LMP 07/28/2017   BMI: There is no height or weight on file to calculate BMI.  Height: Data Unavailable    Constitutional/ general:  Pt is in no apparent distress,  appears well-nourished.  Cooperative with history and physical exam.     Psych:  The patient answered questions appropriately.  Normal affect.  Seems to have reasonable expectations, in terms of treatment.     Lungs:  Non labored breathing, non labored speech. No cough.  No audible wheezing. Even, quiet breathing.       Vascular:  positive pedal pulses bilaterally for both the DP and PT arteries.  CFT < 3 sec.  negative ankle edema.  positive pedal hair growth.    Neuro:  Alert and oriented x 3. Coordinated gait.  Light touch sensation is intact to the L4, L5, S1 distributions. No obvious deficits.  No evidence of neurological-based weakness, spasticity, or contracture in the lower extremities.      Derm: Normal texture and turgor.  No erythema, ecchymosis, or cyanosis.      Musculoskeletal:    Lower extremity muscle strength is normal.   No gross deformities.    negative ecchymosis  negative erythema  negative pain at TMTJs or styloid process.  negative Achilles or calcaneal tubercle pain  negative medial ankle pain  negative pain AITF ligament  negative pain with stressing or palpation peroneal tendons  negative peroneal subluxation  negative pain over medial or lateral malleoli  Some pain over dorsum of talonavicular joint.  No pain medial portion of this joint.  Pain over ATFL and CFL ligaments.  Positive pain posterior lateral malleoli but less than over ATFL.  Edema noted.  No erythema or ecchymosis.    Radiographic Exam:  X-Ray Findings:  I personally reviewed the films.    EXAM: XR ANKLE RIGHT G/E 3 VIEWS  LOCATION: [facility]  DATE/TIME: 8/16/2022 5:10 PM     INDICATION:  Ankle injury, right, initial encounter  COMPARISON: None.                                                                      IMPRESSION: Tiny linear flake fracture of the tip of the lateral malleolus. No additional fractures. Moderate soft tissue swelling along the lateral aspect of the ankle. Small  plantar calcaneal spur.      Assessment:  right ankle sprain       Plan:  X-rays from past personally reviewed.  Discussed mechanism of avulsion fractures.  We will treat conservatively.  Discussed PRICE.  Will continue Aircast until feeling better.  Discussed would be better if she could graduate into ankle brace with protected weightbearing as soon as possible.  She is already using compression on this.  Discussed the importance of not reinjuring this.  If she feels instability laterally will let me know and we will write order for physical therapy.  Will slowly increase activities as tolerated.  RTC as needed.  Thank you for allowing me participate in the care of this patient.        Vahid Montesinos, JORDAN, FACFAS

## 2022-08-18 NOTE — LETTER
[facility]  [address] [place] [address]  Phone: [phone]    August 18, 2022        Laura Jackson  [address]  [place] [address]          To whom it may concern:    RE: Laura Jackson    Patient was seen and treated today at our clinic and missed work.  No work for 4 weeks. 08/18/22-9/15/22      Please contact me for questions or concerns.      Sincerely,        Dr. Vahid KAURPJULIENNE FAC FAS/lld    (Electronically Signed)

## 2022-08-22 ENCOUNTER — OFFICE VISIT (OUTPATIENT)
Dept: DERMATOLOGY | Facility: CLINIC | Age: 52
End: 2022-08-22
Payer: COMMERCIAL

## 2022-08-22 DIAGNOSIS — L82.0 INFLAMED SEBORRHEIC KERATOSIS: ICD-10-CM

## 2022-08-22 DIAGNOSIS — L82.1 SEBORRHEIC KERATOSIS: ICD-10-CM

## 2022-08-22 DIAGNOSIS — L81.4 LENTIGO: ICD-10-CM

## 2022-08-22 DIAGNOSIS — D22.9 NEVUS: Primary | ICD-10-CM

## 2022-08-22 DIAGNOSIS — D18.01 ANGIOMA OF SKIN: ICD-10-CM

## 2022-08-22 PROCEDURE — 17110 DESTRUCTION B9 LES UP TO 14: CPT | Performed by: PHYSICIAN ASSISTANT

## 2022-08-22 PROCEDURE — 99213 OFFICE O/P EST LOW 20 MIN: CPT | Mod: 25 | Performed by: PHYSICIAN ASSISTANT

## 2022-08-22 ASSESSMENT — PAIN SCALES - GENERAL: PAINLEVEL: NO PAIN (0)

## 2022-08-22 NOTE — LETTER
8/22/2022         RE: Laura Jackson  [address]  [place] [address]        Dear Colleague,    Thank you for referring your patient, Laura Jackson, to the [facility]. Please see a copy of my visit note below.    HPI:   Chief complaints: Laura Jackson is a pleasant 52 year old female who presents for Full skin cancer screening to rule out skin cancer   Last Skin Exam: 1 year ago      1st Baseline: No  Personal HX of Skin Cancer: no   Personal HX of Malignant Melanoma: no   Family HX of Skin Cancer / Malignant Melanoma: no  Personal HX of Atypical Moles:   no  Risk factors: history of sun exposure and burns  New / Changing lesions:Yes irritated spots on the right and left jawline.   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 Just got back from a 2 week trip to [place] with family   On review of systems, there are no further skin complaints, patient is feeling otherwise well.   ROS of the following were done and are negative: Constitutional, Eyes, Ears, Nose,   Mouth, Throat, Cardiovascular, Respiratory, GI, Genitourinary, Musculoskeletal,   Psychiatric, Endocrine, Allergic/Immunologic.    PHYSICAL EXAM:   LMP 07/28/2017   Skin exam performed as follows: Type 2 skin. Mood appropriate  Alert and Oriented X 3. Well developed, well nourished in no distress.  General appearance: Normal  Head including face: Normal  Eyes: conjunctiva and lids: Normal  Mouth: Lips, teeth, gums: Normal  Neck: Normal  Chest-breast/axillae: Normal  Back: Normal  Spleen and liver: Normal  Cardiovascular: Exam of peripheral vascular system by observation for swelling, varicosities, edema: Normal  Genitalia: groin, buttocks: Normal  Extremities: digits/nails (clubbing): Normal  Eccrine and Apocrine glands: Normal  Right upper extremity: Normal  Left upper extremity: Normal  Right lower extremity: Normal  Left lower extremity: Normal  Skin: Scalp and body hair: See below    Pt deferred exam of  breasts, groin, buttocks: No    Other physical findings:  1. Multiple pigmented macules on extremities and trunk  2. Multiple pigmented macules on face, trunk and extremities  3. Multiple vascular papules on trunk, arms and legs  4. Multiple scattered keratotic plaques  5. Inflamed keratotic papule on the right jawline x 3, left jawline x 2, left upper cheek x 1           Except as noted above, no other signs of skin cancer or melanoma.     ASSESSMENT/PLAN:   Benign Full skin cancer screening today. . Patient with history of none  Advised on monthly self exams and 1 year  Patient Education: Appropriate brochures given.    1. Multiple benign appearing nevi on arms, legs and trunk. Discussed ABCDEs of melanoma and sunscreen.   2. Multiple lentigos on arms, legs and trunk. Advised benign, no treatment needed.  3. Multiple scattered angiomas. Advised benign, no treatment needed.   4. Seborrheic keratosis on arms, legs and trunk. Advised benign, no treatment needed.  5. Inflamed seborrheic keratosis on the right jawline x 3, left jawline x 2, left upper cheek x 1. As physically tender cryosurgery performed. Advised on post op care.             Follow-up: yearly FSE/PRN sooner    1.) Patient was asked about new and changing moles. YES  2.) Patient received a complete physical skin examination: YES  3.) Patient was counseled to perform a monthly self skin examination: YES  Scribed By: Valeria Villa, MS, PASAIMA          Again, thank you for allowing me to participate in the care of your patient.        Sincerely,        Valeria Villa PA-C

## 2022-08-22 NOTE — PROGRESS NOTES
HPI:   Chief complaints: Laura Jackson is a pleasant 52 year old female who presents for Full skin cancer screening to rule out skin cancer   Last Skin Exam: 1 year ago      1st Baseline: No  Personal HX of Skin Cancer: no   Personal HX of Malignant Melanoma: no   Family HX of Skin Cancer / Malignant Melanoma: no  Personal HX of Atypical Moles:   no  Risk factors: history of sun exposure and burns  New / Changing lesions:Yes irritated spots on the right and left jawline.   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 Just got back from a 2 week trip to [place] with family   On review of systems, there are no further skin complaints, patient is feeling otherwise well.   ROS of the following were done and are negative: Constitutional, Eyes, Ears, Nose,   Mouth, Throat, Cardiovascular, Respiratory, GI, Genitourinary, Musculoskeletal,   Psychiatric, Endocrine, Allergic/Immunologic.    PHYSICAL EXAM:   [facility] 07/28/2017   Skin exam performed as follows: Type 2 skin. Mood appropriate  Alert and Oriented X 3. Well developed, well nourished in no distress.  General appearance: Normal  Head including face: Normal  Eyes: conjunctiva and lids: Normal  Mouth: Lips, teeth, gums: Normal  Neck: Normal  Chest-breast/axillae: Normal  Back: Normal  Spleen and liver: Normal  Cardiovascular: Exam of peripheral vascular system by observation for swelling, varicosities, edema: Normal  Genitalia: groin, buttocks: Normal  Extremities: digits/nails (clubbing): Normal  Eccrine and Apocrine glands: Normal  Right upper extremity: Normal  Left upper extremity: Normal  Right lower extremity: Normal  Left lower extremity: Normal  Skin: Scalp and body hair: See below    Pt deferred exam of breasts, groin, buttocks: No    Other physical findings:  1. Multiple pigmented macules on extremities and trunk  2. Multiple pigmented macules on face, trunk and extremities  3. Multiple vascular papules on trunk, arms and legs  4. Multiple  scattered keratotic plaques  5. Inflamed keratotic papule on the right jawline x 3, left jawline x 2, left upper cheek x 1           Except as noted above, no other signs of skin cancer or melanoma.     ASSESSMENT/PLAN:   Benign Full skin cancer screening today. . Patient with history of none  Advised on monthly self exams and 1 year  Patient Education: Appropriate brochures given.    1. Multiple benign appearing nevi on arms, legs and trunk. Discussed ABCDEs of melanoma and sunscreen.   2. Multiple lentigos on arms, legs and trunk. Advised benign, no treatment needed.  3. Multiple scattered angiomas. Advised benign, no treatment needed.   4. Seborrheic keratosis on arms, legs and trunk. Advised benign, no treatment needed.  5. Inflamed seborrheic keratosis on the right jawline x 3, left jawline x 2, left upper cheek x 1. As physically tender cryosurgery performed. Advised on post op care.             Follow-up: yearly FSE/PRN sooner    1.) Patient was asked about new and changing moles. YES  2.) Patient received a complete physical skin examination: YES  3.) Patient was counseled to perform a monthly self skin examination: YES  Scribed By: Valeria Villa MS, PASAIMA

## 2022-08-29 ENCOUNTER — OFFICE VISIT (OUTPATIENT)
Dept: FAMILY MEDICINE | Facility: CLINIC | Age: 52
End: 2022-08-29
Payer: COMMERCIAL

## 2022-08-29 VITALS
DIASTOLIC BLOOD PRESSURE: 88 MMHG | BODY MASS INDEX: 31.16 KG/M2 | HEART RATE: 72 BPM | OXYGEN SATURATION: 99 % | RESPIRATION RATE: 16 BRPM | HEIGHT: 64 IN | WEIGHT: 182.5 LBS | TEMPERATURE: 98.5 F | SYSTOLIC BLOOD PRESSURE: 132 MMHG

## 2022-08-29 DIAGNOSIS — H57.9 ALLERGIC EYE REACTION: ICD-10-CM

## 2022-08-29 DIAGNOSIS — R20.0 FINGER NUMBNESS: ICD-10-CM

## 2022-08-29 DIAGNOSIS — R00.0 TACHYCARDIA: ICD-10-CM

## 2022-08-29 DIAGNOSIS — J45.30 MILD PERSISTENT ASTHMA WITHOUT COMPLICATION: Primary | ICD-10-CM

## 2022-08-29 DIAGNOSIS — M77.11 LATERAL EPICONDYLITIS OF RIGHT ELBOW: ICD-10-CM

## 2022-08-29 DIAGNOSIS — Z12.31 VISIT FOR SCREENING MAMMOGRAM: ICD-10-CM

## 2022-08-29 DIAGNOSIS — Z11.59 NEED FOR HEPATITIS C SCREENING TEST: ICD-10-CM

## 2022-08-29 LAB — TSH SERPL DL<=0.005 MIU/L-ACNC: 1.96 MU/L (ref 0.4–4)

## 2022-08-29 PROCEDURE — 99215 OFFICE O/P EST HI 40 MIN: CPT | Performed by: FAMILY MEDICINE

## 2022-08-29 PROCEDURE — 84443 ASSAY THYROID STIM HORMONE: CPT | Performed by: FAMILY MEDICINE

## 2022-08-29 PROCEDURE — 36415 COLL VENOUS BLD VENIPUNCTURE: CPT | Performed by: FAMILY MEDICINE

## 2022-08-29 RX ORDER — FLUTICASONE PROPIONATE AND SALMETEROL 100; 50 UG/1; UG/1
1 POWDER RESPIRATORY (INHALATION) EVERY 12 HOURS
Qty: 60 EACH | Refills: 3 | Status: SHIPPED | OUTPATIENT
Start: 2022-08-29 | End: 2022-12-26

## 2022-08-29 ASSESSMENT — ASTHMA QUESTIONNAIRES
ACT_TOTALSCORE: 23
QUESTION_3 LAST FOUR WEEKS HOW OFTEN DID YOUR ASTHMA SYMPTOMS (WHEEZING, COUGHING, SHORTNESS OF BREATH, CHEST TIGHTNESS OR PAIN) WAKE YOU UP AT NIGHT OR EARLIER THAN USUAL IN THE MORNING: NOT AT ALL
QUESTION_4 LAST FOUR WEEKS HOW OFTEN HAVE YOU USED YOUR RESCUE INHALER OR NEBULIZER MEDICATION (SUCH AS ALBUTEROL): NOT AT ALL
QUESTION_2 LAST FOUR WEEKS HOW OFTEN HAVE YOU HAD SHORTNESS OF BREATH: ONCE OR TWICE A WEEK
ACT_TOTALSCORE: 23
QUESTION_1 LAST FOUR WEEKS HOW MUCH OF THE TIME DID YOUR ASTHMA KEEP YOU FROM GETTING AS MUCH DONE AT WORK, SCHOOL OR AT HOME: NONE OF THE TIME
QUESTION_5 LAST FOUR WEEKS HOW WOULD YOU RATE YOUR ASTHMA CONTROL: WELL CONTROLLED

## 2022-08-29 NOTE — PROGRESS NOTES
Assessment & Plan       ICD-10-CM    1. Mild persistent asthma without complication  J45.30 fluticasone-salmeterol (ADVAIR) 100-50 MCG/ACT inhaler   2. Lateral epicondylitis of right elbow  M77.11 Orthopedic  Referral     Physical Therapy Referral   3. Finger numbness  R20.0 Orthopedic  Referral     Physical Therapy Referral   4. Allergic eye reaction  H57.9    5. Tachycardia  R00.0 TSH with free T4 reflex     TSH with free T4 reflex   6. Need for hepatitis C screening test  Z11.59    7. Visit for screening mammogram  Z12.31      Recommend oral antihistamine at bedtime to prevent eye symptoms.  Continue trial of topical steroid cream.  Referrals for lateral epicondylitis and finger numbness.  Possible that numbness of the fifth digit is associated with ulnar entrapment syndrome.  We will check thyroid function per patient request, and decrease dose of inhaler.    Review of external notes as documented elsewhere in note  I spent a total of 40 minutes on the day of the visit.   Time spent doing chart review, history and exam, documentation and further activities per the note         There are no Patient Instructions on file for this visit.    No follow-ups on file.    Rickey Sorto MD  [facility] BLANKA Sanchez is a 52 year old accompanied by her none, presenting for the following health issues:  Heart Problem      History of Present Illness     Asthma:  She presents for follow up of asthma.  She has no cough, no wheezing, and no shortness of breath. She is not using a relief medication. She typically misses taking her controller medication 1 time(s) per week.Patient is aware of the following triggers: cold air, gastric reflux, humidity, smoke, strong odors and fumes and upper respiratory infections. The patient has not had a visit to the Emergency Room, Urgent Care or Hospital due to asthma since the last clinic visit.     Vascular Disease:  She presents for  follow up of vascular disease.  She never takes nitroglycerin. She is not taking daily aspirin.    Reason for visit:  Increased tachycardia, irritable bowel symptoms, eye swelling, pain redness peeling. Right arm tennis elbow, intermittent pinky numbness    She eats 2-3 servings of fruits and vegetables daily.She consumes 1 sweetened beverage(s) daily.She exercises with enough effort to increase her heart rate 30 to 60 minutes per day.  She exercises with enough effort to increase her heart rate 3 or less days per week.   She is taking medications regularly.     HEART RATE   Onset/Duration: Heart rate has been increasing the last 6 months. Patient noticed with her heart rate has gone up since increasing her Advair Inhaler. Patient got back from [place] 8/16/2022.   Description:   Location: left side  Character: Burning pain, possible from acid reflux.   Duration: Less than a minute    Progression of Symptoms: worsening  Accompanying Signs & Symptoms:  Shortness of breath: No  Sweating: YES- with hot flashes.   Nausea/vomiting: YES- nausea. No vomiting.   Lightheadedness: YES  Palpitations: YES  Fever/Chills: No  Cough: No           Heartburn: YES  History:   Family history of heart disease: No  Tobacco use: No  Precipitating factors:   Worse with exertion: YES  Worse with deep breaths: No           Related to eating: No           Better with burping: No  Therapies tried and outcome: None     Patient presents for a few issues today.    Right eye, skin burning, peeling  Upper and lower eyelids  Has happened over the past 6 months, flares suddenly, takes 2 weeks to go away  Almost always happens over night  Cortisone cream helps  No new skin products    Increased heart rate, in particular at night  Coincides with increase in advair  History of POTS  When she turns over in bed  Taking corlanor for POTS, which has been very helpful  Wonder if she can decrease the dose of Advair to see if this helps.    Right elbow  "pain/right forearm pain intermittent x 4 years  Has had this multiple times  Associated with pinky numbness  Numbness comes and goes - worse when laying on her right arm  X 1-6 hours  Located in the lateral elbow          Review of Systems   Constitutional, HEENT, cardiovascular, pulmonary, gi and gu systems are negative, except as otherwise noted.      Objective    /88   Pulse 72   Temp 98.5  F (36.9  C) (Oral)   Resp 16   Ht 1.629 m (5' 4.13\")   Wt 82.8 kg (182 lb 8 oz)   LMP 07/28/2017 (Exact Date)   SpO2 99%   Breastfeeding No   BMI 31.20 kg/m    Body mass index is 31.2 kg/m .  Physical Exam  Vitals reviewed.   Constitutional:       General: She is not in acute distress.     Appearance: Normal appearance. She is well-developed.   HENT:      Head: Normocephalic and atraumatic.      Right Ear: External ear normal.      Left Ear: External ear normal.      Nose: Nose normal.   Eyes:      General: No scleral icterus.     Conjunctiva/sclera: Conjunctivae normal.   Cardiovascular:      Rate and Rhythm: Normal rate.   Pulmonary:      Effort: Pulmonary effort is normal.   Musculoskeletal:         General: No deformity. Normal range of motion.      Cervical back: Normal range of motion.      Comments: Reproducible pain at the left lateral epicondyle with resisted wrist extension and resisted supination   Skin:     General: Skin is warm and dry.      Findings: No rash.   Neurological:      Mental Status: She is alert and oriented to person, place, and time.   Psychiatric:         Behavior: Behavior normal.         Thought Content: Thought content normal.         Judgment: Judgment normal.                            .  ..  "

## 2022-08-31 ENCOUNTER — THERAPY VISIT (OUTPATIENT)
Dept: PHYSICAL THERAPY | Facility: CLINIC | Age: 52
End: 2022-08-31
Payer: COMMERCIAL

## 2022-08-31 DIAGNOSIS — M54.2 CERVICALGIA: ICD-10-CM

## 2022-08-31 DIAGNOSIS — M54.9 BACK PAIN: Primary | ICD-10-CM

## 2022-08-31 PROCEDURE — 97112 NEUROMUSCULAR REEDUCATION: CPT | Mod: GP | Performed by: PHYSICAL THERAPIST

## 2022-08-31 PROCEDURE — 97140 MANUAL THERAPY 1/> REGIONS: CPT | Mod: GP | Performed by: PHYSICAL THERAPIST

## 2022-09-06 ENCOUNTER — OFFICE VISIT (OUTPATIENT)
Dept: AUDIOLOGY | Facility: CLINIC | Age: 52
End: 2022-09-06
Payer: COMMERCIAL

## 2022-09-06 DIAGNOSIS — H90.3 BILATERAL SENSORINEURAL HEARING LOSS: Primary | ICD-10-CM

## 2022-09-06 PROCEDURE — V5299 HEARING SERVICE: HCPCS | Performed by: AUDIOLOGIST

## 2022-09-06 NOTE — PROGRESS NOTES
AUDIOLOGY REPORT: HEARING AID RECHECK    SUBJECTIVE: Laura Jackson is a 52 year old female, :  1970, was seen in the Audiology Clinic at [facility] on 22 for a return check of their hearing aids. Patient was unaccompanied to today's visit.       Background:   Patient is here today with the complaint of wanting to go back to the previous settings.     Procedures:       SIDE: Right    : [place]    TYPE: More 3 RITE    S/N: [id number]    WARRANTY: 2024    The settings were returned to the May settings from last year, per patient request. During reprogramming it was found that the battery health was poor so the hearing aid will be sent in for in warranty repair.     Plan:   Patient will return as needed for hearing aid concerns. Laura will be called once the hearing aid is back from repair for  at the .     NO CHARGE VISIT      Hardy Noriega CCC-A  Licensed Audiologist #[id number]  2022

## 2022-09-07 ENCOUNTER — THERAPY VISIT (OUTPATIENT)
Dept: PHYSICAL THERAPY | Facility: CLINIC | Age: 52
End: 2022-09-07
Payer: COMMERCIAL

## 2022-09-07 DIAGNOSIS — M54.2 CERVICALGIA: Primary | ICD-10-CM

## 2022-09-07 DIAGNOSIS — M54.9 BACK PAIN: ICD-10-CM

## 2022-09-07 PROCEDURE — 97140 MANUAL THERAPY 1/> REGIONS: CPT | Mod: GP | Performed by: PHYSICAL THERAPIST

## 2022-09-07 PROCEDURE — 97112 NEUROMUSCULAR REEDUCATION: CPT | Mod: GP | Performed by: PHYSICAL THERAPIST

## 2022-09-08 ENCOUNTER — OFFICE VISIT (OUTPATIENT)
Dept: PODIATRY | Facility: CLINIC | Age: 52
End: 2022-09-08
Payer: COMMERCIAL

## 2022-09-08 VITALS — SYSTOLIC BLOOD PRESSURE: 156 MMHG | HEART RATE: 78 BPM | DIASTOLIC BLOOD PRESSURE: 87 MMHG

## 2022-09-08 DIAGNOSIS — M21.6X2 PRONATION OF BOTH FEET: ICD-10-CM

## 2022-09-08 DIAGNOSIS — M79.671 PAIN IN RIGHT FOOT: ICD-10-CM

## 2022-09-08 DIAGNOSIS — M21.6X1 PRONATION OF BOTH FEET: ICD-10-CM

## 2022-09-08 DIAGNOSIS — S93.491A SPRAIN OF ANTERIOR TALOFIBULAR LIGAMENT OF RIGHT ANKLE, INITIAL ENCOUNTER: Primary | ICD-10-CM

## 2022-09-08 PROCEDURE — 99214 OFFICE O/P EST MOD 30 MIN: CPT | Performed by: PODIATRIST

## 2022-09-08 NOTE — PATIENT INSTRUCTIONS
We wish you continued good healing. If you have any questions or concerns, please do not hesitate to contact us at  [phone]    Veeboxt (secure e-mail communication and access to your chart) to send a message or to make an appointment.    Please remember to call and schedule a follow up appointment if one was recommended at your earliest convenience.     PODIATRY CLINIC HOURS  TELEPHONE NUMBER    Dr. Vahid KAURPJULIENNE [facility]        Clinics:  [facility] Dave CMA   Tuesday 1PM-6PM  [place]  Wednesday 745AM-330PM  [place]  Thursday/Friday 745AM-230PM  [place] [facility] APPOINTMENTS  [phone]    [place] APPOINTMENTS  [phone]        If you need a medication refill, please contact us you may need lab work and/or a follow up visit prior to your refill (i.e. Antifungal medications).  If MRI needed please call Imaging at [phone] or [phone]  HOW DO I GET MY KNEE SCOOTER? Knee scooters can be picked up at ANY Medical Supply stores with your knee scooter Prescription.  OR  Bring your signed prescription to an [facility] Medical Equipment showroom.

## 2022-09-08 NOTE — LETTER
9/8/2022         RE: Laura Jackson  [address]  [place] [address]        Dear Colleague,    Thank you for referring your patient, Laura Jackson, [facility]. Please see a copy of my visit note below.    Subjective:    8/18/22   Patient seen as a new patient consult from [facility] and is seen today  for right ankle sprain.  Had inversion sprain 9 days ago.  Was in [place] at the time and slipped on stairs with gravel on them.  Pain and swelling and bruising.  Aggravated by activity and relieved by rest.  Fortunately she had an ankle brace with her and wear this the rest of the trip.  Recently seen in clinic in the [place] and had x-rays and given an Aircast.  She states this is more comfortable for walking at this time.  Denies erythema weakness or increased deformity.  Denies numbness.  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    9/8/22 patient returns for right ankle sprain  Sprained ankle approximately 1 month ago on August 10, 2022.  She has not been working yet.  Wearing Aircast intermittently around the house.  States feeling better but still painful.  Patient states that before this happened for the last year she has been having pain and perhaps some instability on her lateral ankle as well.      ROS: See above         Allergies   Allergen Reactions     Penicillins Swelling     Swelling throat; age 4     Adhesive Tape Hives     Penicillin G      Tetracycline GI Disturbance     Other reaction(s): Abdominal Pain  Vomiting; nauseous  Other reaction(s): Abdominal Pain  Vomiting; nauseous       Current Outpatient Medications   Medication Sig Dispense Refill     Cholecalciferol (VITAMIN D) 2000 UNITS CAPS Take 1 capsule by mouth daily       famotidine (PEPCID) 40 MG tablet Take 1 tablet (40 mg) by mouth At Bedtime 90 tablet 3     fluticasone-salmeterol (ADVAIR) 100-50 MCG/ACT inhaler Inhale 1 puff into the lungs every 12 hours 60 each 3     ivabradine  (CORLANOR) 5 MG tablet Take 1 tablet (5 mg) by mouth 2 times daily (with meals) 720 tablet 0     levalbuterol (XOPENEX HFA) 45 MCG/ACT inhaler Inhale 1-2 puffs into the lungs every 4 hours as needed for shortness of breath / dyspnea 15 g 0     metroNIDAZOLE (METROGEL) 0.75 % external gel Apply to face BID 45 g 11     omeprazole (PRILOSEC) 40 MG DR capsule Take 1 capsule (40 mg) by mouth daily before breakfast 90 capsule 3     Pyridoxine HCl (B-6) 100 MG TABS        vitamin B-12 (CYANOCOBALAMIN) 100 MCG tablet          Patient Active Problem List   Diagnosis     CARDIOVASCULAR SCREENING; LDL GOAL LESS THAN 160     Irritable bowel syndrome     GERD (gastroesophageal reflux disease)     History of supraventricular tachycardia     Mild persistent asthma     Family history of breast cancer     S/P myomectomy     Nausea     S/P ANAID (total abdominal hysterectomy)     Hypovitaminosis D     Endometriosis     Heberden's nodes     POTS (postural orthostatic tachycardia syndrome)     Hypermobility syndrome     Cervicalgia     Autonomic dysfunction     Benign essential hypertension     Back pain       Past Medical History:   Diagnosis Date     Benign essential hypertension 7/31/2018     Family history of breast cancer 2/19/2014     Family history of breast cancer      SVT (supraventricular tachycardia):  history of, no recurrence since 2008 10/11/2013    no recurrence since 2008      Uncomplicated asthma        Past Surgical History:   Procedure Laterality Date     ABDOMEN SURGERY  6/2017    myectomy     APPENDECTOMY       COLONOSCOPY N/A 8/20/2018    Procedure: COMBINED COLONOSCOPY, SINGLE OR MULTIPLE BIOPSY/POLYPECTOMY BY BIOPSY;;  Surgeon: Osman Hahn MD;  Location: [facility] OR     COLONOSCOPY WITH CO2 INSUFFLATION N/A 8/20/2018    Procedure: COLONOSCOPY WITH CO2 INSUFFLATION;  COLON-SCREENING / HANYKA ;  Surgeon: Osman Hahn MD;  Location: [facility] OR     DAVINCI HYSTERECTOMY TOTAL, SALPINGECTOMY BILATERAL N/A  8/4/2017    Procedure: DAVINCI XI HYSTERECTOMY TOTAL, SALPINGECTOMY BILATERAL;  DAVINCI TOTAL HYSTERECTOMY; BILATERAL SALPINGECTOMY. BILATERAL URETERAL LYSIS. UTEROSACRAL-COLPOPEXY. EXCISION OF ENDOMETRIOSIS;  Surgeon: George Loyola MD;  Location: [facility] OR     DAVINCI LYSIS OF ADHESIONS Bilateral 8/4/2017    Procedure: DAVINCI LYSIS OF ADHESIONS;  BILATERAL URETERAL LYSIS;  Surgeon: George Loyola MD;  Location: [facility] OR     DAVINCI SACROCOLPOPEXY, CYSTOSCOPY, COMBINED N/A 8/4/2017    Procedure: COMBINED DAVINCI SACROCOLPOPEXY, CYSTOSCOPY;  UTEROSACRAL COLPOPEXY;  Surgeon: George Loyola MD;  Location: [facility] OR     DAVINCI XI ASSISTED ABLATION / EXCISION OF ENDOMETRIOSIS  8/4/2017    Procedure: DAVINCI XI ASSISTED ABLATION / EXCISION OF ENDOMETRIOSIS;;  Surgeon: George Loyola MD;  Location: [facility] OR     DILATION AND CURETTAGE N/A 6/20/2017    Procedure: DILATION AND CURETTAGE;  Uterine Curettings and Fibroid Removal, Cook catheter placement; (No hysterectomy done at this time);  Surgeon: Ernestina Saunders MD;  Location: [facility] OR     HYSTERECTOMY, PAP NO LONGER INDICATED       ORTHOPEDIC SURGERY  6/1986    Heel spur , toe surgery     RELEASE CARPAL TUNNEL Right 10/20/2020    Procedure: RIGHT RELEASE, CARPAL TUNNEL;  Surgeon: Rickey Rea MD;  Location: [facility] OR     RELEASE CARPAL TUNNEL Left 11/6/2020    Procedure: LEFT RELEASE, CARPAL TUNNEL;  Surgeon: Rickey Rea MD;  Location: [facility] OR     TONSILLECTOMY         Family History   Problem Relation Age of Onset     Diabetes Mother      Hypertension Mother      Hyperlipidemia Mother      Arrhythmia Mother      Cardiovascular Father         CHF and COPD     Asthma Father      Breast Cancer Maternal Grandfather      Colon Cancer Maternal Grandfather      Breast Cancer Paternal Grandmother      Breast Cancer Paternal Aunt      C.A.D. Paternal Grandfather      Breast Cancer Paternal Grandfather      Breast Cancer Cousin      Asthma Son      Diabetes  Maternal Grandmother      Hypertension Maternal Grandmother      Breast Cancer Cousin      Breast Cancer Cousin      Breast Cancer Cousin        Social History     Tobacco Use     Smoking status: Never Smoker     Smokeless tobacco: Never Used   Substance Use Topics     Alcohol use: Yes     Comment: Rare- @1 month         Exam:    Vitals: LMP 07/28/2017 (Exact Date)   BMI: There is no height or weight on file to calculate BMI.  Height: Data Unavailable    Constitutional/ general:  Pt is in no apparent distress, appears well-nourished.  Cooperative with history and physical exam.     Psych:  The patient answered questions appropriately.  Normal affect.  Seems to have reasonable expectations, in terms of treatment.     Lungs:  Non labored breathing, non labored speech. No cough.  No audible wheezing. Even, quiet breathing.       Vascular:  positive pedal pulses bilaterally for both the DP and PT arteries.  CFT < 3 sec.  negative ankle edema.  positive pedal hair growth.    Neuro:  Alert and oriented x 3. Coordinated gait.  Light touch sensation is intact to the L4, L5, S1 distributions. No obvious deficits.  No evidence of neurological-based weakness, spasticity, or contracture in the lower extremities.      Derm: Normal texture and turgor.  No erythema, ecchymosis, or cyanosis.      Musculoskeletal:    Lower extremity muscle strength is normal.   No gross deformities.  With weightbearing patient somewhat pronated.  Right lower extremity has increased internal tibial torsion.  negative ecchymosis  negative erythema  negative pain at TMTJs or styloid process.  negative Achilles or calcaneal tubercle pain  negative medial ankle pain  negative pain AITF ligament  negative pain with stressing or palpation peroneal tendons  negative peroneal subluxation  negative pain over medial or lateral malleoli  Some pain over dorsum of talonavicular joint.  No pain medial portion of this joint.  Pain over ATFL and CFL ligaments.   Positive pain posterior lateral malleoli.  Slight pain anterior inferior tib-fib ligament.  Edema noted but decreased since last visit..  No erythema or ecchymosis.    Radiographic Exam:  X-Ray Findings:  I personally reviewed the films.    EXAM: XR ANKLE RIGHT G/E 3 VIEWS  LOCATION: [facility]  DATE/TIME: 8/16/2022 5:10 PM     INDICATION:  Ankle injury, right, initial encounter  COMPARISON: None.                                                                      IMPRESSION: Tiny linear flake fracture of the tip of the lateral malleolus. No additional fractures. Moderate soft tissue swelling along the lateral aspect of the ankle. Small plantar calcaneal spur.      Assessment:  right ankle sprain with continued pain                         Pronation and lateral foot pain       Plan:  X-rays from past personally reviewed.  Discussed further evaluation of ankle would be best done with MRI.  Patient would like to pursue this.  We placed an order today.  Patient does have history of Planter fasciitis in 2021 and reviewed past note.  Patient states the pain was more in a different area.  We wrote patient work note.  She will continue to do range of motion elevate and use cam walker intermittently.  Patient will see me after the MRI is done.      Vahid Montesinos DPM, FACFAS          Again, thank you for allowing me to participate in the care of your patient.        Sincerely,        Vahid Montesinos DPM

## 2022-09-08 NOTE — PROGRESS NOTES
Subjective:    8/18/22   Patient seen as a new patient consult from [facility] Bailey and is seen today  for right ankle sprain.  Had inversion sprain 9 days ago.  Was in [place] at the time and slipped on stairs with gravel on them.  Pain and swelling and bruising.  Aggravated by activity and relieved by rest.  Fortunately she had an ankle brace with her and wear this the rest of the trip.  Recently seen in clinic in the [place] and had x-rays and given an Aircast.  She states this is more comfortable for walking at this time.  Denies erythema weakness or increased deformity.  Denies numbness.  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    9/8/22 patient returns for right ankle sprain  Sprained ankle approximately 1 month ago on August 10, 2022.  She has not been working yet.  Wearing Aircast intermittently around the house.  States feeling better but still painful.  Patient states that before this happened for the last year she has been having pain and perhaps some instability on her lateral ankle as well.      ROS: See above         Allergies   Allergen Reactions     Penicillins Swelling     Swelling throat; age 4     Adhesive Tape Hives     Penicillin G      Tetracycline GI Disturbance     Other reaction(s): Abdominal Pain  Vomiting; nauseous  Other reaction(s): Abdominal Pain  Vomiting; nauseous       Current Outpatient Medications   Medication Sig Dispense Refill     Cholecalciferol (VITAMIN D) 2000 UNITS CAPS Take 1 capsule by mouth daily       famotidine (PEPCID) 40 MG tablet Take 1 tablet (40 mg) by mouth At Bedtime 90 tablet 3     fluticasone-salmeterol (ADVAIR) 100-50 MCG/ACT inhaler Inhale 1 puff into the lungs every 12 hours 60 each 3     ivabradine (CORLANOR) 5 MG tablet Take 1 tablet (5 mg) by mouth 2 times daily (with meals) 720 tablet 0     levalbuterol (XOPENEX HFA) 45 MCG/ACT inhaler Inhale 1-2 puffs into the lungs every 4 hours as needed for shortness of breath / dyspnea 15 g 0      metroNIDAZOLE (METROGEL) 0.75 % external gel Apply to face BID 45 g 11     omeprazole (PRILOSEC) 40 MG DR capsule Take 1 capsule (40 mg) by mouth daily before breakfast 90 capsule 3     Pyridoxine HCl (B-6) 100 MG TABS        vitamin B-12 (CYANOCOBALAMIN) 100 MCG tablet          Patient Active Problem List   Diagnosis     CARDIOVASCULAR SCREENING; LDL GOAL LESS THAN 160     Irritable bowel syndrome     GERD (gastroesophageal reflux disease)     History of supraventricular tachycardia     Mild persistent asthma     Family history of breast cancer     S/P myomectomy     Nausea     S/P ANAID (total abdominal hysterectomy)     Hypovitaminosis D     Endometriosis     Heberden's nodes     POTS (postural orthostatic tachycardia syndrome)     Hypermobility syndrome     Cervicalgia     Autonomic dysfunction     Benign essential hypertension     Back pain       Past Medical History:   Diagnosis Date     Benign essential hypertension 7/31/2018     Family history of breast cancer 2/19/2014     Family history of breast cancer      SVT (supraventricular tachycardia):  history of, no recurrence since 2008 10/11/2013    no recurrence since 2008      Uncomplicated asthma        Past Surgical History:   Procedure Laterality Date     ABDOMEN SURGERY  6/2017    myectomy     APPENDECTOMY       COLONOSCOPY N/A 8/20/2018    Procedure: COMBINED COLONOSCOPY, SINGLE OR MULTIPLE BIOPSY/POLYPECTOMY BY BIOPSY;;  Surgeon: Osman Hahn MD;  Location: [facility]     COLONOSCOPY WITH CO2 INSUFFLATION N/A 8/20/2018    Procedure: COLONOSCOPY WITH CO2 INSUFFLATION;  COLON-SCREENING / LUBKA ;  Surgeon: Osman aHhn MD;  Location: [facility]     DAVINCI HYSTERECTOMY TOTAL, SALPINGECTOMY BILATERAL N/A 8/4/2017    Procedure: DAVINCI XI HYSTERECTOMY TOTAL, SALPINGECTOMY BILATERAL;  DAVINCI TOTAL HYSTERECTOMY; BILATERAL SALPINGECTOMY. BILATERAL URETERAL LYSIS. UTEROSACRAL-COLPOPEXY. EXCISION OF ENDOMETRIOSIS;  Surgeon: George Loyola MD;   Location: [facility] OR     DAVINCI LYSIS OF ADHESIONS Bilateral 8/4/2017    Procedure: DAVINCI LYSIS OF ADHESIONS;  BILATERAL URETERAL LYSIS;  Surgeon: George Loyola MD;  Location: [facility]     DAVINCI SACROCOLPOPEXY, CYSTOSCOPY, COMBINED N/A 8/4/2017    Procedure: COMBINED DAVINCI SACROCOLPOPEXY, CYSTOSCOPY;  UTEROSACRAL COLPOPEXY;  Surgeon: George Loyola MD;  Location: [facility]     DAVINCI XI ASSISTED ABLATION / EXCISION OF ENDOMETRIOSIS  8/4/2017    Procedure: DAVINCI XI ASSISTED ABLATION / EXCISION OF ENDOMETRIOSIS;;  Surgeon: George Loyola MD;  Location: [facility]     DILATION AND CURETTAGE N/A 6/20/2017    Procedure: DILATION AND CURETTAGE;  Uterine Curettings and Fibroid Removal, Cook catheter placement; (No hysterectomy done at this time);  Surgeon: Ernestina Saunders MD;  Location: [facility]     HYSTERECTOMY, PAP NO LONGER INDICATED       ORTHOPEDIC SURGERY  6/1986    Heel spur , toe surgery     RELEASE CARPAL TUNNEL Right 10/20/2020    Procedure: RIGHT RELEASE, CARPAL TUNNEL;  Surgeon: Rickey Rea MD;  Location: [facility] OR     RELEASE CARPAL TUNNEL Left 11/6/2020    Procedure: LEFT RELEASE, CARPAL TUNNEL;  Surgeon: Rickey Rea MD;  Location: [facility] OR     TONSILLECTOMY         Family History   Problem Relation Age of Onset     Diabetes Mother      Hypertension Mother      Hyperlipidemia Mother      Arrhythmia Mother      Cardiovascular Father         CHF and COPD     Asthma Father      Breast Cancer Maternal Grandfather      Colon Cancer Maternal Grandfather      Breast Cancer Paternal Grandmother      Breast Cancer Paternal Aunt      C.A.D. Paternal Grandfather      Breast Cancer Paternal Grandfather      Breast Cancer Cousin      Asthma Son      Diabetes Maternal Grandmother      Hypertension Maternal Grandmother      Breast Cancer Cousin      Breast Cancer Cousin      Breast Cancer Cousin        Social History     Tobacco Use     Smoking status: Never Smoker     Smokeless tobacco: Never Used    Substance Use Topics     Alcohol use: Yes     Comment: Rare- @1 month         Exam:    Vitals: LMP 07/28/2017 (Exact Date)   BMI: There is no height or weight on file to calculate BMI.  Height: Data Unavailable    Constitutional/ general:  Pt is in no apparent distress, appears well-nourished.  Cooperative with history and physical exam.     Psych:  The patient answered questions appropriately.  Normal affect.  Seems to have reasonable expectations, in terms of treatment.     Lungs:  Non labored breathing, non labored speech. No cough.  No audible wheezing. Even, quiet breathing.       Vascular:  positive pedal pulses bilaterally for both the DP and PT arteries.  CFT < 3 sec.  negative ankle edema.  positive pedal hair growth.    Neuro:  Alert and oriented x 3. Coordinated gait.  Light touch sensation is intact to the L4, L5, S1 distributions. No obvious deficits.  No evidence of neurological-based weakness, spasticity, or contracture in the lower extremities.      Derm: Normal texture and turgor.  No erythema, ecchymosis, or cyanosis.      Musculoskeletal:    Lower extremity muscle strength is normal.   No gross deformities.  With weightbearing patient somewhat pronated.  Right lower extremity has increased internal tibial torsion.  negative ecchymosis  negative erythema  negative pain at TMTJs or styloid process.  negative Achilles or calcaneal tubercle pain  negative medial ankle pain  negative pain AITF ligament  negative pain with stressing or palpation peroneal tendons  negative peroneal subluxation  negative pain over medial or lateral malleoli  Some pain over dorsum of talonavicular joint.  No pain medial portion of this joint.  Pain over ATFL and CFL ligaments.  Positive pain posterior lateral malleoli.  Slight pain anterior inferior tib-fib ligament.  Edema noted but decreased since last visit..  No erythema or ecchymosis.    Radiographic Exam:  X-Ray Findings:  I personally reviewed the films.    EXAM:  XR ANKLE RIGHT G/E 3 VIEWS  LOCATION: [facility]  DATE/TIME: 8/16/2022 5:10 PM     INDICATION:  Ankle injury, right, initial encounter  COMPARISON: None.                                                                      IMPRESSION: Tiny linear flake fracture of the tip of the lateral malleolus. No additional fractures. Moderate soft tissue swelling along the lateral aspect of the ankle. Small plantar calcaneal spur.      Assessment:  right ankle sprain with continued pain                         Pronation and lateral foot pain       Plan:  X-rays from past personally reviewed.  Discussed further evaluation of ankle would be best done with MRI.  Patient would like to pursue this.  We placed an order today.  Patient does have history of Planter fasciitis in 2021 and reviewed past note.  Patient states the pain was more in a different area.  We wrote patient work note.  She will continue to do range of motion elevate and use cam walker intermittently.  Patient will see me after the MRI is done.      Vahid Montesinos DPM, FACFAS

## 2022-09-09 ENCOUNTER — TELEPHONE (OUTPATIENT)
Dept: FAMILY MEDICINE | Facility: CLINIC | Age: 52
End: 2022-09-09

## 2022-09-12 NOTE — TELEPHONE ENCOUNTER
Central Prior Authorization Team - Phone: [phone]     PA Initiation    Medication: Corlanor- PA INITIATED  Insurance Company:    Pharmacy Filling the Rx: [facility] - [address]  Filling Pharmacy Phone: [phone]  Filling Pharmacy Fax:    Start Date: 9/12/2022

## 2022-09-13 ENCOUNTER — ANCILLARY PROCEDURE (OUTPATIENT)
Dept: MRI IMAGING | Facility: CLINIC | Age: 52
End: 2022-09-13
Attending: PODIATRIST
Payer: COMMERCIAL

## 2022-09-13 DIAGNOSIS — S93.491A SPRAIN OF ANTERIOR TALOFIBULAR LIGAMENT OF RIGHT ANKLE, INITIAL ENCOUNTER: ICD-10-CM

## 2022-09-13 PROCEDURE — 73721 MRI JNT OF LWR EXTRE W/O DYE: CPT | Mod: TC | Performed by: RADIOLOGY

## 2022-09-13 NOTE — TELEPHONE ENCOUNTER
Central Prior Authorization Team - Phone: [phone]     Prior Authorization Approval    Authorization Effective Date: 9/13/2022  Authorization Expiration Date: 3/12/2023  Medication: Corlanor- PA APPROVED  Approved Dose/Quantity: 180  Reference #:     Insurance Company:    Expected CoPay:       CoPay Card Available:      Foundation Assistance Needed:    Which Pharmacy is filling the prescription (Not needed for infusion/clinic administered): [facility] [place], [place] - [address]  Pharmacy Notified: Yes  Patient Notified: YesComment:  pharmacy will notify when ready

## 2022-09-14 ENCOUNTER — THERAPY VISIT (OUTPATIENT)
Dept: PHYSICAL THERAPY | Facility: CLINIC | Age: 52
End: 2022-09-14
Payer: COMMERCIAL

## 2022-09-14 DIAGNOSIS — M54.9 BACK PAIN: Primary | ICD-10-CM

## 2022-09-14 PROCEDURE — 97112 NEUROMUSCULAR REEDUCATION: CPT | Mod: GP | Performed by: PHYSICAL THERAPIST

## 2022-09-14 PROCEDURE — 97140 MANUAL THERAPY 1/> REGIONS: CPT | Mod: GP | Performed by: PHYSICAL THERAPIST

## 2022-09-15 ENCOUNTER — TELEPHONE (OUTPATIENT)
Dept: AUDIOLOGY | Facility: CLINIC | Age: 52
End: 2022-09-15

## 2022-09-15 NOTE — TELEPHONE ENCOUNTER
Right hearing aid back from repair. Hearing aid charged and listening check revealed proper functioning. Device already programmed to patient's settings.     New serial number: [id number]. No charge, hearing aid still in warranty, 4/24/24.     Called Laura to let them know the hearing aid is ready for pickup at the [place] [facility], hours were provided. No appointment is needed.     Randa Ellis, Audiology Clinic Assistant

## 2022-09-19 ENCOUNTER — DOCUMENTATION ONLY (OUTPATIENT)
Dept: PODIATRY | Facility: CLINIC | Age: 52
End: 2022-09-19

## 2022-09-19 DIAGNOSIS — S93.491A SPRAIN OF ANTERIOR TALOFIBULAR LIGAMENT OF RIGHT ANKLE, INITIAL ENCOUNTER: Primary | ICD-10-CM

## 2022-09-21 ENCOUNTER — ANCILLARY PROCEDURE (OUTPATIENT)
Dept: GENERAL RADIOLOGY | Facility: CLINIC | Age: 52
End: 2022-09-21
Attending: PEDIATRICS
Payer: COMMERCIAL

## 2022-09-21 ENCOUNTER — OFFICE VISIT (OUTPATIENT)
Dept: ORTHOPEDICS | Facility: CLINIC | Age: 52
End: 2022-09-21

## 2022-09-21 VITALS
DIASTOLIC BLOOD PRESSURE: 62 MMHG | HEIGHT: 64 IN | WEIGHT: 180 LBS | SYSTOLIC BLOOD PRESSURE: 104 MMHG | BODY MASS INDEX: 30.73 KG/M2

## 2022-09-21 DIAGNOSIS — M79.631 RIGHT FOREARM PAIN: Primary | ICD-10-CM

## 2022-09-21 DIAGNOSIS — R20.0 FINGER NUMBNESS: ICD-10-CM

## 2022-09-21 DIAGNOSIS — M77.11 LATERAL EPICONDYLITIS OF RIGHT ELBOW: ICD-10-CM

## 2022-09-21 PROCEDURE — 99203 OFFICE O/P NEW LOW 30 MIN: CPT | Performed by: PEDIATRICS

## 2022-09-21 PROCEDURE — 73080 X-RAY EXAM OF ELBOW: CPT | Mod: TC | Performed by: RADIOLOGY

## 2022-09-21 NOTE — PATIENT INSTRUCTIONS
Right forearm pain consistent with muscular source.  Small finger symptoms most consistent with some degree of ulnar neuropathy at the elbow.  Discussed hand therapy next, referral placed for both issues.  Regarding support for the arm, okay to use forearm strap, elbow compression sleeve if desired, not required.  We also discussed use of a overnight towel roll during sleep, related to the finger numbness, suspected cubital tunnel syndrome.  No additional imaging required currently, x-rays today are reassuring.  Future consideration could be for EMG if persistent numbness, not improving with therapy.  Plan to monitor with therapy 1 month to start.    If you have any further questions for your physician or physician s care team you can call [phone] and use option 3 to leave a voice message. Calls received during business hours will be returned same day.

## 2022-09-21 NOTE — PROGRESS NOTES
ASSESSMENT & PLAN    Laura was seen today for pain.    Diagnoses and all orders for this visit:    Right forearm pain  -     XR Elbow Right G/E 3 Views; Future  -     Hand Therapy Referral; Future    Finger numbness  -     Orthopedic  Referral  -     Hand Therapy Referral; Future      Muscular source, not quite at lateral epicondyle currently.  Sounds like cubital tunnel syndrome, not provoked on exam today, with right small finger numbness.  Start with hand therapy.  Questions answered. Discussed signs and symptoms that may indicate more serious issues; the patient was instructed to seek appropriate care if noted. Laura indicates understanding of these issues and agrees with the plan.      See Patient Instructions  Patient Instructions   Right forearm pain consistent with muscular source.  Small finger symptoms most consistent with some degree of ulnar neuropathy at the elbow.  Discussed hand therapy next, referral placed for both issues.  Regarding support for the arm, okay to use forearm strap, elbow compression sleeve if desired, not required.  We also discussed use of a overnight towel roll during sleep, related to the finger numbness, suspected cubital tunnel syndrome.  No additional imaging required currently, x-rays today are reassuring.  Future consideration could be for EMG if persistent numbness, not improving with therapy.  Plan to monitor with therapy 1 month to start.    If you have any further questions for your physician or physician s care team you can call [phone] and use option 3 to leave a voice message. Calls received during business hours will be returned same day.        Glen Gutierrez DO  [facility]      CC: Rickey Chen      -----  Chief Complaint   Patient presents with     Right Elbow - Pain       SUBJECTIVE  Laura Jackson is a/an 52 year old female who is seen in consultation at the request of  Rickey  "Natalio Chen M.D. for evaluation of right lateral elbow pain which has been on and off for the past 5 years.  Did PT at initial onset.  Began after having an IV in her right elbow, feels she was hyperextended the whole time.    Does also now have numbness and tingling in her right small finger.  Worse with lifting      The patient is seen by themselves.  The patient is Right handed    Onset:  years(s) ago. Reports insidious onset without acute precipitating event.  Location of Pain: right lateral elbow, right small finger   Worsened by: use   Better with: rest   Treatments tried: PT in the past   Associated symptoms: numbness and tingling    Orthopedic/Surgical history: ongoing   Social History/Occupation: nurse     No family history pertinent to patient's problem today.   **  Couple weeks off/on numbness right small finger, would last for a few hours, but was not constant.  Numbness has been a bit better.  Still has tenderness in right forearm, elbow area. E.g., pain with pouring coffee.  Pain in dorsal forearm.  Tried compression sleeve for elbow, did not help. Also tried forearm strap, but had pain with that.    No current numbness in right hand. Notes with some sleep positions can have pain. Also can get some more symptoms with increased arm activities.    Hx of EDS, hypermobility.      REVIEW OF SYSTEMS:  Review of Systems   All other systems reviewed and are negative.        OBJECTIVE:  /62   Ht 1.626 m (5' 4\")   Wt 81.6 kg (180 lb)   LMP 07/28/2017 (Exact Date)   BMI 30.90 kg/m     General: healthy, alert and in no distress  HEENT: no scleral icterus or conjunctival erythema  Skin: no suspicious lesions or rash. No jaundice.  CV: distal perfusion intact   Resp: normal respiratory effort without conversational dyspnea   Psych: normal mood and affect  Gait: normal steady gait with appropriate coordination and balance   Neuro: Normal light sensory exam of extremity       Cervical Spine " Exam    Range of Motion:       forward flexion          extension         lateral flexion   Some neck area pain with motion, but no change to right UE    Special Tests:     neg (-) Spurling for radicular symptoms; does have some neck pain    Skin:     well perfused       capillary refill brisk    Lymphatics:      no edema noted in the upper extremities           Right Elbow exam:    Inspection:     no ecchymosis       no edema or effusion    ROM:     full with extension, forearm supination and pronation.  Some forearm pain with supination with elbow extended  Flexion to ~110-120    Strength:     Pain in forearm with forearm rotation, resisted wrist and long finger extension, also  with elbow extended    Tender:     Dorsal wad    Non-Tender: remainder elbow area, including lateral epicondyle, cubital tunnel    Sensation: grossly intact light touch    Tinel neg cubital tunnel    Hand with finger abduction grossly intact, symmetric         RADIOLOGY:  I independently ordered, visualized and reviewed these images with the patient  No acute bony abnormality. No clear joint effusion. Some calcification at lateral epicondyle, similar to previous x-ray 2017.    Recent Results (from the past 24 hour(s))   XR Elbow Right G/E 3 Views    Narrative    XR ELBOW RIGHT G/E 3 VIEWS   9/21/2022 10:03 AM     HISTORY: Lateral epicondylitis of right elbow  COMPARISON: 9/19/2017.       Impression    IMPRESSION: Normal joint spaces and alignment. No fracture. No joint  effusion. There is some redemonstrated soft tissue calcification at  the lateral epicondyle which could relate to remote injury or extensor  tendinopathy.    SAUL SUAREZ MD         SYSTEM ID:  [id number]

## 2022-09-21 NOTE — LETTER
9/21/2022         RE: Laura Jackson  [address]  [place] [address]        Dear Colleague,    Thank you for referring your patient, Laura Jackson, to the [facility]. Please see a copy of my visit note below.    ASSESSMENT & PLAN    Laura was seen today for pain.    Diagnoses and all orders for this visit:    Right forearm pain  -     XR Elbow Right G/E 3 Views; Future  -     Hand Therapy Referral; Future    Finger numbness  -     Orthopedic  Referral  -     Hand Therapy Referral; Future      Muscular source, not quite at lateral epicondyle currently.  Sounds like cubital tunnel syndrome, not provoked on exam today, with right small finger numbness.  Start with hand therapy.  Questions answered. Discussed signs and symptoms that may indicate more serious issues; the patient was instructed to seek appropriate care if noted. Laura indicates understanding of these issues and agrees with the plan.      See Patient Instructions  Patient Instructions   Right forearm pain consistent with muscular source.  Small finger symptoms most consistent with some degree of ulnar neuropathy at the elbow.  Discussed hand therapy next, referral placed for both issues.  Regarding support for the arm, okay to use forearm strap, elbow compression sleeve if desired, not required.  We also discussed use of a overnight towel roll during sleep, related to the finger numbness, suspected cubital tunnel syndrome.  No additional imaging required currently, x-rays today are reassuring.  Future consideration could be for EMG if persistent numbness, not improving with therapy.  Plan to monitor with therapy 1 month to start.    If you have any further questions for your physician or physician s care team you can call [phone] and use option 3 to leave a voice message. Calls received during business hours will be returned same day.        Glen Gutierrez,   [facility]  "[facility]      CC: Rickey Chen      -----  Chief Complaint   Patient presents with     Right Elbow - Pain       SUBJECTIVE  Laura Jackson is a/an 52 year old female who is seen in consultation at the request of  Rickey Chen M.D. for evaluation of right lateral elbow pain which has been on and off for the past 5 years.  Did PT at initial onset.  Began after having an IV in her right elbow, feels she was hyperextended the whole time.    Does also now have numbness and tingling in her right small finger.  Worse with lifting      The patient is seen by themselves.  The patient is Right handed    Onset:  years(s) ago. Reports insidious onset without acute precipitating event.  Location of Pain: right lateral elbow, right small finger   Worsened by: use   Better with: rest   Treatments tried: PT in the past   Associated symptoms: numbness and tingling    Orthopedic/Surgical history: ongoing   Social History/Occupation: nurse     No family history pertinent to patient's problem today.   **  Couple weeks off/on numbness right small finger, would last for a few hours, but was not constant.  Numbness has been a bit better.  Still has tenderness in right forearm, elbow area. E.g., pain with pouring coffee.  Pain in dorsal forearm.  Tried compression sleeve for elbow, did not help. Also tried forearm strap, but had pain with that.    No current numbness in right hand. Notes with some sleep positions can have pain. Also can get some more symptoms with increased arm activities.    Hx of EDS, hypermobility.      REVIEW OF SYSTEMS:  Review of Systems   All other systems reviewed and are negative.        OBJECTIVE:  /62   Ht 1.626 m (5' 4\")   Wt 81.6 kg (180 lb)   LMP 07/28/2017 (Exact Date)   BMI 30.90 kg/m     General: healthy, alert and in no distress  HEENT: no scleral icterus or conjunctival erythema  Skin: no suspicious lesions or rash. No " jaundice.  CV: distal perfusion intact   Resp: normal respiratory effort without conversational dyspnea   Psych: normal mood and affect  Gait: normal steady gait with appropriate coordination and balance   Neuro: Normal light sensory exam of extremity       Cervical Spine Exam    Range of Motion:       forward flexion          extension         lateral flexion   Some neck area pain with motion, but no change to right UE    Special Tests:     neg (-) Spurling for radicular symptoms; does have some neck pain    Skin:     well perfused       capillary refill brisk    Lymphatics:      no edema noted in the upper extremities           Right Elbow exam:    Inspection:     no ecchymosis       no edema or effusion    ROM:     full with extension, forearm supination and pronation.  Some forearm pain with supination with elbow extended  Flexion to ~110-120    Strength:     Pain in forearm with forearm rotation, resisted wrist and long finger extension, also  with elbow extended    Tender:     Dorsal wad    Non-Tender: remainder elbow area, including lateral epicondyle, cubital tunnel    Sensation: grossly intact light touch    Tinel neg cubital tunnel    Hand with finger abduction grossly intact, symmetric         RADIOLOGY:  I independently ordered, visualized and reviewed these images with the patient  No acute bony abnormality. No clear joint effusion. Some calcification at lateral epicondyle, similar to previous x-ray 2017.    Recent Results (from the past 24 hour(s))   XR Elbow Right G/E 3 Views    Narrative    XR ELBOW RIGHT G/E 3 VIEWS   9/21/2022 10:03 AM     HISTORY: Lateral epicondylitis of right elbow  COMPARISON: 9/19/2017.       Impression    IMPRESSION: Normal joint spaces and alignment. No fracture. No joint  effusion. There is some redemonstrated soft tissue calcification at  the lateral epicondyle which could relate to remote injury or extensor  tendinopathy.    SAUL SUAREZ MD         SYSTEM ID:   [id number]             Again, thank you for allowing me to participate in the care of your patient.        Sincerely,        Glen Gutierrez, DO

## 2022-09-22 ENCOUNTER — OFFICE VISIT (OUTPATIENT)
Dept: PODIATRY | Facility: CLINIC | Age: 52
End: 2022-09-22
Payer: COMMERCIAL

## 2022-09-22 VITALS — HEART RATE: 68 BPM | OXYGEN SATURATION: 98 %

## 2022-09-22 DIAGNOSIS — S93.491A SPRAIN OF ANTERIOR TALOFIBULAR LIGAMENT OF RIGHT ANKLE, INITIAL ENCOUNTER: Primary | ICD-10-CM

## 2022-09-22 PROCEDURE — 99213 OFFICE O/P EST LOW 20 MIN: CPT | Performed by: PODIATRIST

## 2022-09-22 NOTE — LETTER
9/22/2022         RE: Laura Jackson  [address]  [place] [address]        Dear Colleague,    Thank you for referring your patient, Laura Jackson, to the [facility]". Please see a copy of my visit note below.    Subjective:    8/18/22   Patient seen as a new patient consult from Isabel Bailey and is seen today  for right ankle sprain.  Had inversion sprain 9 days ago.  Was in [place] at the time and slipped on stairs with gravel on them.  Pain and swelling and bruising.  Aggravated by activity and relieved by rest.  Fortunately she had an ankle brace with her and wear this the rest of the trip.  Recently seen in clinic in the [place] and had x-rays and given an Aircast.  She states this is more comfortable for walking at this time.  Denies erythema weakness or increased deformity.  Denies numbness.  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    9/8/22 patient returns for right ankle sprain  Sprained ankle approximately 1 month ago on August 10, 2022.  She has not been working yet.  Wearing Aircast intermittently around the house.  States feeling better but still painful.  Patient states that before this happened for the last year she has been having pain and perhaps some instability on her lateral ankle as well.    9/22/22 patient is 6 weeks 1 day status post right ankle sprain on August 10, 2022.  Patient states has been wearing boot more and believes this is helped.  Pain is decreasing.  Edema is decreasing.  Has tried walking and ankle brace and somewhat uncomfortable.  She is still not working.  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         ROS: See above         Allergies   Allergen Reactions     Penicillins Swelling     Swelling throat; age 4     Adhesive Tape Hives     Penicillin G      Tetracycline GI Disturbance     Other reaction(s): Abdominal Pain  Vomiting; nauseous  Other reaction(s): Abdominal Pain  Vomiting;  nauseous       Current Outpatient Medications   Medication Sig Dispense Refill     Cholecalciferol (VITAMIN D) 2000 UNITS CAPS Take 1 capsule by mouth daily       famotidine (PEPCID) 40 MG tablet Take 1 tablet (40 mg) by mouth At Bedtime 90 tablet 3     fluticasone-salmeterol (ADVAIR) 100-50 MCG/ACT inhaler Inhale 1 puff into the lungs every 12 hours 60 each 3     ivabradine (CORLANOR) 5 MG tablet Take 1 tablet (5 mg) by mouth 2 times daily (with meals) 720 tablet 0     levalbuterol (XOPENEX HFA) 45 MCG/ACT inhaler Inhale 1-2 puffs into the lungs every 4 hours as needed for shortness of breath / dyspnea 15 g 0     metroNIDAZOLE (METROGEL) 0.75 % external gel Apply to face BID 45 g 11     omeprazole (PRILOSEC) 40 MG DR capsule Take 1 capsule (40 mg) by mouth daily before breakfast 90 capsule 3     Pyridoxine HCl (B-6) 100 MG TABS        vitamin B-12 (CYANOCOBALAMIN) 100 MCG tablet          Patient Active Problem List   Diagnosis     CARDIOVASCULAR SCREENING; LDL GOAL LESS THAN 160     Irritable bowel syndrome     GERD (gastroesophageal reflux disease)     History of supraventricular tachycardia     Mild persistent asthma     Family history of breast cancer     S/P myomectomy     Nausea     S/P ANAID (total abdominal hysterectomy)     Hypovitaminosis D     Endometriosis     Heberden's nodes     POTS (postural orthostatic tachycardia syndrome)     Hypermobility syndrome     Cervicalgia     Autonomic dysfunction     Benign essential hypertension     Back pain       Past Medical History:   Diagnosis Date     Benign essential hypertension 7/31/2018     Family history of breast cancer 2/19/2014     Family history of breast cancer      SVT (supraventricular tachycardia):  history of, no recurrence since 2008 10/11/2013    no recurrence since 2008      Uncomplicated asthma        Past Surgical History:   Procedure Laterality Date     ABDOMEN SURGERY  6/2017    myectomy     APPENDECTOMY       COLONOSCOPY N/A 8/20/2018     Procedure: COMBINED COLONOSCOPY, SINGLE OR MULTIPLE BIOPSY/POLYPECTOMY BY BIOPSY;;  Surgeon: Osman Hahn MD;  [facility]     COLONOSCOPY WITH CO2 INSUFFLATION N/A 8/20/2018    Procedure: COLONOSCOPY WITH CO2 INSUFFLATION;  COLON-SCREENING / LUBKA ;  Surgeon: Osman Hahn MD;  Location: [facility]     DAVINCI HYSTERECTOMY TOTAL, SALPINGECTOMY BILATERAL N/A 8/4/2017    Procedure: DAVINCI XI HYSTERECTOMY TOTAL, SALPINGECTOMY BILATERAL;  DAVINCI TOTAL HYSTERECTOMY; BILATERAL SALPINGECTOMY. BILATERAL URETERAL LYSIS. UTEROSACRAL-COLPOPEXY. EXCISION OF ENDOMETRIOSIS;  Surgeon: George Loyola MD;  Location: [facility]     DAVINCI LYSIS OF ADHESIONS Bilateral 8/4/2017    Procedure: DAVINCI LYSIS OF ADHESIONS;  BILATERAL URETERAL LYSIS;  Surgeon: George Loyola MD;  [facility]     DAVINCI SACROCOLPOPEXY, CYSTOSCOPY, COMBINED N/A 8/4/2017    Procedure: COMBINED DAVINCI SACROCOLPOPEXY, CYSTOSCOPY;  UTEROSACRAL COLPOPEXY;  Surgeon: George Loyola MD;  [facility]     DAVINCI XI ASSISTED ABLATION / EXCISION OF ENDOMETRIOSIS  8/4/2017    Procedure: DAVINCI XI ASSISTED ABLATION / EXCISION OF ENDOMETRIOSIS;;  Surgeon: George Loyola MD;  [facility]     DILATION AND CURETTAGE N/A 6/20/2017    Procedure: DILATION AND CURETTAGE;  Uterine Curettings and Fibroid Removal, Cook catheter placement; (No hysterectomy done at this time);  Surgeon: Ernestina Saunders MD;  [facility]     HYSTERECTOMY, PAP NO LONGER INDICATED       ORTHOPEDIC SURGERY  6/1986    Heel spur , toe surgery     RELEASE CARPAL TUNNEL Right 10/20/2020    Procedure: RIGHT RELEASE, CARPAL TUNNEL;  Surgeon: Rickey Rea MD;  Location: [facility] OR     RELEASE CARPAL TUNNEL Left 11/6/2020    Procedure: LEFT RELEASE, CARPAL TUNNEL;  Surgeon: Rickey Rea MD;  Location: [facility] OR     TONSILLECTOMY         Family History   Problem Relation Age of Onset     Diabetes Mother      Hypertension Mother       Hyperlipidemia Mother      Arrhythmia Mother      Cardiovascular Father         CHF and COPD     Asthma Father      Breast Cancer Maternal Grandfather      Colon Cancer Maternal Grandfather      Breast Cancer Paternal Grandmother      Breast Cancer Paternal Aunt      JAGDISHAJUAN DANIEL. Paternal Grandfather      Breast Cancer Paternal Grandfather      Breast Cancer Cousin      Asthma Son      Diabetes Maternal Grandmother      Hypertension Maternal Grandmother      Breast Cancer Cousin      Breast Cancer Cousin      Breast Cancer Cousin        Social History     Tobacco Use     Smoking status: Never Smoker     Smokeless tobacco: Never Used   Substance Use Topics     Alcohol use: Yes     Comment: Rare- @1 month         Exam:    Vitals: LMP 07/28/2017 (Exact Date)   BMI: There is no height or weight on file to calculate BMI.  Height: Data Unavailable    Constitutional/ general:  Pt is in no apparent distress, appears well-nourished.  Cooperative with history and physical exam.     Psych:  The patient answered questions appropriately.  Normal affect.  Seems to have reasonable expectations, in terms of treatment.     Lungs:  Non labored breathing, non labored speech. No cough.  No audible wheezing. Even, quiet breathing.       Vascular:  positive pedal pulses bilaterally for both the DP and PT arteries.  CFT < 3 sec.  negative ankle edema.  positive pedal hair growth.    Neuro:  Alert and oriented x 3. Coordinated gait.  Light touch sensation is intact to the L4, L5, S1 distributions. No obvious deficits.  No evidence of neurological-based weakness, spasticity, or contracture in the lower extremities.      Derm: Normal texture and turgor.  No erythema, ecchymosis, or cyanosis.      Musculoskeletal:    Lower extremity muscle strength is normal.   No gross deformities.  With weightbearing patient somewhat pronated.  Right lower extremity has increased internal tibial torsion.  negative ecchymosis  negative erythema  negative pain at  TMTJs or styloid process.  negative Achilles or calcaneal tubercle pain  negative medial ankle pain  negative pain AITF ligament  negative pain with stressing or palpation peroneal tendons  negative peroneal subluxation  negative pain over medial or lateral malleoli  Some pain over dorsum of talonavicular joint.  No pain medial portion of this joint.  Pain over ATFL and CFL ligaments.  Pain calcaneocuboid joint.  Slight pain anterior inferior tib-fib ligament.  Edema noted but decreased since last visit..  No erythema or ecchymosis.  No pain dorsum of tarsal bones or tarsometatarsal joint anywhere.  No appreciable medial ankle pain.    Radiographic Exam:  X-Ray Findings:  I personally reviewed the films.    EXAM: XR ANKLE RIGHT G/E 3 VIEWS  LOCATION: [facility]  DATE/TIME: 8/16/2022 5:10 PM     INDICATION:  Ankle injury, right, initial encounter  COMPARISON: None.                                                                      IMPRESSION: Tiny linear flake fracture of the tip of the lateral malleolus. No additional fractures. Moderate soft tissue swelling along the lateral aspect of the ankle. Small plantar calcaneal spur.          Narrative & Impression   EXAM: MR ANKLE RIGHT W/O CONTRAST  LOCATION: [facility]  DATE/TIME: 9/13/2022 6:57 PM     INDICATION: Pain after injury  COMPARISON: None.  TECHNIQUE: Unenhanced.     FINDINGS:      TENDONS:   -Peroneal: Mild peroneus brevis tendinopathy without tearing. The peroneus longus tendon is negative.  -Medial: Posterior tibialis tendon is intact. No tendinopathy or tenosynovitis. Flexor digitorum longus and flexor hallucis longus tendons are normal. No tenosynovitis.  -Anterior: Anterior tibialis, extensor hallucis longus, and extensor digitorum longus tendons are normal. No tenosynovitis.  -Achilles: No tendinopathy or paratenonitis.     LIGAMENTS:   -Anterior talofibular ligament: High-grade tear of the ATF.    -Calcaneofibular ligament: Grade I sprain of the calcaneofibular ligament without tearing.   -Posterior talofibular ligament: Intact.  -Syndesmotic inferior tibiofibular ligaments: Intact.  -Deltoid ligament complex: Intact.  -Spring ligament complex: Intact.     JOINTS AND BONES:   -The tibiotalar joint is intact. No osteochondral lesion. Tiny tibiotalar joint effusion. Mild reactive edema within the distal talus. Mild reactive edema within the cuboid at the calcaneocuboid articulation. No evidence for letha fracture.     SOFT TISSUES:  -Plantar fascia: Mild plantar calcaneal spurring. No significant signal abnormality. This is likely secondary to old resolved plantar fasciitis.  -Sinus tarsi and tarsal tunnel: Normal.  -Muscles: Normal.                                                                      IMPRESSION:  1.  Mild peroneus brevis tendinopathy without tearing.  2.  High-grade tear of the ATF.  3.  Grade I sprain of the calcaneofibular ligament.  4.  No additional tendinous or ligamentous pathology.  5.  Tiny tibiotalar joint effusion.  6.  Mild reactive edema distal talus.  7.  Mild reactive edema within the cuboid at the calcaneocuboid articulation.  8.  Plantar calcaneal spurring without signal abnormality is likely secondary to old resolved plantar fasciitis.           Assessment:  right ankle sprain     Plan: Discussed MRI results.  Discussed results consistent with sprain of ATFL and some mild bone bruising.  Will slowly try to graduate into ankle brace as tolerated.  We will try to slowly increase activities.  Discussed importance of not reinjuring this.  We will start physical therapy.  RTC in 3 weeks.      Vahid Montesinos DPM, FACFAS          Again, thank you for allowing me to participate in the care of your patient.        Sincerely,        Vahid Montesinos DPM

## 2022-09-22 NOTE — PATIENT INSTRUCTIONS
We wish you continued good healing. If you have any questions or concerns, please do not hesitate to contact us at  [phone]    [employer] (secure e-mail communication and access to your chart) to send a message or to make an appointment.    Please remember to call and schedule a follow up appointment if one was recommended at your earliest convenience.     PODIATRY CLINIC HOURS  TELEPHONE NUMBER    Dr. Vahid KAURPJULIENNE [facility]        Clinics:  [facility] CMA   Tuesday 1PM-6PM  [place]  Wednesday 745AM-330PM  [place] [place]  Thursday/Friday 745AM-230PM  [place] [facility] APPOINT[facility]  [phone]    [place] APPOINTMENTS  [phone]        If you need a medication refill, please contact us you may need lab work and/or a follow up visit prior to your refill (i.e. Antifungal medications).  If MRI needed please call Imaging at [phone] or [phone]  HOW DO I GET MY KNEE SCOOTER? Knee scooters can be picked up at ANY Medical Supply stores with your knee scooter Prescription.  OR  Bring your signed prescription to an [facility] showroom.

## 2022-09-22 NOTE — PROGRESS NOTES
Subjective:    8/18/22   Patient seen as a new patient consult from Isabel Bailey and is seen today  for right ankle sprain.  Had inversion sprain 9 days ago.  Was in [place] at the time and slipped on stairs with gravel on them.  Pain and swelling and bruising.  Aggravated by activity and relieved by rest.  Fortunately she had an ankle brace with her and wear this the rest of the trip.  Recently seen in clinic in the [place] and had x-rays and given an Aircast.  She states this is more comfortable for walking at this time.  Denies erythema weakness or increased deformity.  Denies numbness.  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    9/8/22 patient returns for right ankle sprain  Sprained ankle approximately 1 month ago on August 10, 2022.  She has not been working yet.  Wearing Aircast intermittently around the house.  States feeling better but still painful.  Patient states that before this happened for the last year she has been having pain and perhaps some instability on her lateral ankle as well.    9/22/22 patient is 6 weeks 1 day status post right ankle sprain on August 10, 2022.  Patient states has been wearing boot more and believes this is helped.  Pain is decreasing.  Edema is decreasing.  Has tried walking and ankle brace and somewhat uncomfortable.  She is still not working.  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         ROS: See above         Allergies   Allergen Reactions     Penicillins Swelling     Swelling throat; age 4     Adhesive Tape Hives     Penicillin G      Tetracycline GI Disturbance     Other reaction(s): Abdominal Pain  Vomiting; nauseous  Other reaction(s): Abdominal Pain  Vomiting; nauseous       Current Outpatient Medications   Medication Sig Dispense Refill     Cholecalciferol (VITAMIN D) 2000 UNITS CAPS Take 1 capsule by mouth daily       famotidine (PEPCID) 40 MG tablet Take 1 tablet (40 mg) by mouth At Bedtime 90 tablet 3      fluticasone-salmeterol (ADVAIR) 100-50 MCG/ACT inhaler Inhale 1 puff into the lungs every 12 hours 60 each 3     ivabradine (CORLANOR) 5 MG tablet Take 1 tablet (5 mg) by mouth 2 times daily (with meals) 720 tablet 0     levalbuterol (XOPENEX HFA) 45 MCG/ACT inhaler Inhale 1-2 puffs into the lungs every 4 hours as needed for shortness of breath / dyspnea 15 g 0     metroNIDAZOLE (METROGEL) 0.75 % external gel Apply to face BID 45 g 11     omeprazole (PRILOSEC) 40 MG DR capsule Take 1 capsule (40 mg) by mouth daily before breakfast 90 capsule 3     Pyridoxine HCl (B-6) 100 MG TABS        vitamin B-12 (CYANOCOBALAMIN) 100 MCG tablet          Patient Active Problem List   Diagnosis     CARDIOVASCULAR SCREENING; LDL GOAL LESS THAN 160     Irritable bowel syndrome     GERD (gastroesophageal reflux disease)     History of supraventricular tachycardia     Mild persistent asthma     Family history of breast cancer     S/P myomectomy     Nausea     S/P ANAID (total abdominal hysterectomy)     Hypovitaminosis D     Endometriosis     Heberden's nodes     POTS (postural orthostatic tachycardia syndrome)     Hypermobility syndrome     Cervicalgia     Autonomic dysfunction     Benign essential hypertension     Back pain       Past Medical History:   Diagnosis Date     Benign essential hypertension 7/31/2018     Family history of breast cancer 2/19/2014     Family history of breast cancer      SVT (supraventricular tachycardia):  history of, no recurrence since 2008 10/11/2013    no recurrence since 2008      Uncomplicated asthma        Past Surgical History:   Procedure Laterality Date     ABDOMEN SURGERY  6/2017    myectomy     APPENDECTOMY       COLONOSCOPY N/A 8/20/2018    Procedure: COMBINED COLONOSCOPY, SINGLE OR MULTIPLE BIOPSY/POLYPECTOMY BY BIOPSY;;  Surgeon: Osman Hahn MD;  Location: [facility]     COLONOSCOPY WITH CO2 INSUFFLATION N/A 8/20/2018    Procedure: COLONOSCOPY WITH CO2 INSUFFLATION;  COLON-SCREENING /  VICTORIANO ;  Surgeon: Osman Hahn MD;  Location: [facility]     DAVINCI HYSTERECTOMY TOTAL, SALPINGECTOMY BILATERAL N/A 8/4/2017    Procedure: DAVINCI XI HYSTERECTOMY TOTAL, SALPINGECTOMY BILATERAL;  DAVINCI TOTAL HYSTERECTOMY; BILATERAL SALPINGECTOMY. BILATERAL URETERAL LYSIS. UTEROSACRAL-COLPOPEXY. EXCISION OF ENDOMETRIOSIS;  Surgeon: George Loyola MD;  Location: [facility]     DAVINCI LYSIS OF ADHESIONS Bilateral 8/4/2017    Procedure: DAVINCI LYSIS OF ADHESIONS;  BILATERAL URETERAL LYSIS;  Surgeon: George Loyola MD;  [facility]     DAVINCI SACROCOLPOPEXY, CYSTOSCOPY, COMBINED N/A 8/4/2017    Procedure: COMBINED DAVINCI SACROCOLPOPEXY, CYSTOSCOPY;  UTEROSACRAL COLPOPEXY;  Surgeon: George Loyola MD;  [facility]     DAVINCI XI ASSISTED ABLATION / EXCISION OF ENDOMETRIOSIS  8/4/2017    Procedure: DAVINCI XI ASSISTED ABLATION / EXCISION OF ENDOMETRIOSIS;;  Surgeon: George Loyola MD;  [facility]     DILATION AND CURETTAGE N/A 6/20/2017    Procedure: DILATION AND CURETTAGE;  Uterine Curettings and Fibroid Removal, Cook catheter placement; (No hysterectomy done at this time);  Surgeon: Ernestina Saunders MD;  [facility]     HYSTERECTOMY, PAP NO LONGER INDICATED       ORTHOPEDIC SURGERY  6/1986    Heel spur , toe surgery     RELEASE CARPAL TUNNEL Right 10/20/2020    Procedure: RIGHT RELEASE, CARPAL TUNNEL;  Surgeon: Rickey Rea MD;  Location: [facility] OR     RELEASE CARPAL TUNNEL Left 11/6/2020    Procedure: LEFT RELEASE, CARPAL TUNNEL;  Surgeon: Rickey Rea MD;  Location: [facility] OR     TONSILLECTOMY         Family History   Problem Relation Age of Onset     Diabetes Mother      Hypertension Mother      Hyperlipidemia Mother      Arrhythmia Mother      Cardiovascular Father         CHF and COPD     Asthma Father      Breast Cancer Maternal Grandfather      Colon Cancer Maternal Grandfather      Breast Cancer Paternal Grandmother      Breast Cancer Paternal Aunt       DEANA Paternal Grandfather      Breast Cancer Paternal Grandfather      Breast Cancer Cousin      Asthma Son      Diabetes Maternal Grandmother      Hypertension Maternal Grandmother      Breast Cancer Cousin      Breast Cancer Cousin      Breast Cancer Cousin        Social History     Tobacco Use     Smoking status: Never Smoker     Smokeless tobacco: Never Used   Substance Use Topics     Alcohol use: Yes     Comment: Rare- @1 month         Exam:    Vitals: LMP 07/28/2017 (Exact Date)   BMI: There is no height or weight on file to calculate BMI.  Height: Data Unavailable    Constitutional/ general:  Pt is in no apparent distress, appears well-nourished.  Cooperative with history and physical exam.     Psych:  The patient answered questions appropriately.  Normal affect.  Seems to have reasonable expectations, in terms of treatment.     Lungs:  Non labored breathing, non labored speech. No cough.  No audible wheezing. Even, quiet breathing.       Vascular:  positive pedal pulses bilaterally for both the DP and PT arteries.  CFT < 3 sec.  negative ankle edema.  positive pedal hair growth.    Neuro:  Alert and oriented x 3. Coordinated gait.  Light touch sensation is intact to the L4, L5, S1 distributions. No obvious deficits.  No evidence of neurological-based weakness, spasticity, or contracture in the lower extremities.      Derm: Normal texture and turgor.  No erythema, ecchymosis, or cyanosis.      Musculoskeletal:    Lower extremity muscle strength is normal.   No gross deformities.  With weightbearing patient somewhat pronated.  Right lower extremity has increased internal tibial torsion.  negative ecchymosis  negative erythema  negative pain at TMTJs or styloid process.  negative Achilles or calcaneal tubercle pain  negative medial ankle pain  negative pain AITF ligament  negative pain with stressing or palpation peroneal tendons  negative peroneal subluxation  negative pain over medial or lateral  malleoli  Some pain over dorsum of talonavicular joint.  No pain medial portion of this joint.  Pain over ATFL and CFL ligaments.  Pain calcaneocuboid joint.  Slight pain anterior inferior tib-fib ligament.  Edema noted but decreased since last visit..  No erythema or ecchymosis.  No pain dorsum of tarsal bones or tarsometatarsal joint anywhere.  No appreciable medial ankle pain.    Radiographic Exam:  X-Ray Findings:  I personally reviewed the films.    EXAM: XR ANKLE RIGHT G/E 3 VIEWS  LOCATION: [facility]  DATE/TIME: 8/16/2022 5:10 PM     INDICATION:  Ankle injury, right, initial encounter  COMPARISON: None.                                                                      IMPRESSION: Tiny linear flake fracture of the tip of the lateral malleolus. No additional fractures. Moderate soft tissue swelling along the lateral aspect of the ankle. Small plantar calcaneal spur.          Narrative & Impression   EXAM: MR ANKLE RIGHT W/O CONTRAST  LOCATION: [facility]  DATE/TIME: 9/13/2022 6:57 PM     INDICATION: Pain after injury  COMPARISON: None.  TECHNIQUE: Unenhanced.     FINDINGS:      TENDONS:   -Peroneal: Mild peroneus brevis tendinopathy without tearing. The peroneus longus tendon is negative.  -Medial: Posterior tibialis tendon is intact. No tendinopathy or tenosynovitis. Flexor digitorum longus and flexor hallucis longus tendons are normal. No tenosynovitis.  -Anterior: Anterior tibialis, extensor hallucis longus, and extensor digitorum longus tendons are normal. No tenosynovitis.  -Achilles: No tendinopathy or paratenonitis.     LIGAMENTS:   -Anterior talofibular ligament: High-grade tear of the ATF.   -Calcaneofibular ligament: Grade I sprain of the calcaneofibular ligament without tearing.   -Posterior talofibular ligament: Intact.  -Syndesmotic inferior tibiofibular ligaments: Intact.  -Deltoid ligament complex: Intact.  -Spring ligament complex:  Intact.     JOINTS AND BONES:   -The tibiotalar joint is intact. No osteochondral lesion. Tiny tibiotalar joint effusion. Mild reactive edema within the distal talus. Mild reactive edema within the cuboid at the calcaneocuboid articulation. No evidence for letha fracture.     SOFT TISSUES:  -Plantar fascia: Mild plantar calcaneal spurring. No significant signal abnormality. This is likely secondary to old resolved plantar fasciitis.  -Sinus tarsi and tarsal tunnel: Normal.  -Muscles: Normal.                                                                      IMPRESSION:  1.  Mild peroneus brevis tendinopathy without tearing.  2.  High-grade tear of the ATF.  3.  Grade I sprain of the calcaneofibular ligament.  4.  No additional tendinous or ligamentous pathology.  5.  Tiny tibiotalar joint effusion.  6.  Mild reactive edema distal talus.  7.  Mild reactive edema within the cuboid at the calcaneocuboid articulation.  8.  Plantar calcaneal spurring without signal abnormality is likely secondary to old resolved plantar fasciitis.           Assessment:  right ankle sprain     Plan: Discussed MRI results.  Discussed results consistent with sprain of ATFL and some mild bone bruising.  Will slowly try to graduate into ankle brace as tolerated.  We will try to slowly increase activities.  Discussed importance of not reinjuring this.  We will start physical therapy.  RTC in 3 weeks.      Vahid Montesinos DPM, FACFAS

## 2022-09-26 ENCOUNTER — THERAPY VISIT (OUTPATIENT)
Dept: PHYSICAL THERAPY | Facility: CLINIC | Age: 52
End: 2022-09-26
Payer: COMMERCIAL

## 2022-09-26 DIAGNOSIS — M54.9 BACK PAIN: Primary | ICD-10-CM

## 2022-09-26 PROCEDURE — 97112 NEUROMUSCULAR REEDUCATION: CPT | Mod: GP | Performed by: PHYSICAL THERAPIST

## 2022-09-26 PROCEDURE — 97140 MANUAL THERAPY 1/> REGIONS: CPT | Mod: GP | Performed by: PHYSICAL THERAPIST

## 2022-09-28 ENCOUNTER — THERAPY VISIT (OUTPATIENT)
Dept: PHYSICAL THERAPY | Facility: CLINIC | Age: 52
End: 2022-09-28
Attending: PODIATRIST
Payer: COMMERCIAL

## 2022-09-28 DIAGNOSIS — M25.571 PAIN IN JOINT INVOLVING ANKLE AND FOOT, RIGHT: ICD-10-CM

## 2022-09-28 DIAGNOSIS — S93.491A SPRAIN OF ANTERIOR TALOFIBULAR LIGAMENT OF RIGHT ANKLE: ICD-10-CM

## 2022-09-28 DIAGNOSIS — S93.491A SPRAIN OF ANTERIOR TALOFIBULAR LIGAMENT OF RIGHT ANKLE, INITIAL ENCOUNTER: ICD-10-CM

## 2022-09-28 PROCEDURE — 97110 THERAPEUTIC EXERCISES: CPT | Mod: GP | Performed by: PHYSICAL THERAPIST

## 2022-09-28 PROCEDURE — 97161 PT EVAL LOW COMPLEX 20 MIN: CPT | Mod: GP | Performed by: PHYSICAL THERAPIST

## 2022-09-28 NOTE — PROGRESS NOTES
"Physical Therapy Initial Evaluation  Subjective:  The history is provided by the patient. No  was used.   Patient Health History  Laura Jackson being seen for R ankle injury, \"toes don't move correctly\".     Problem began: 8/10/2022.   Problem occurred: Slipped/fell on gravel stairs   Pain is reported as 4/10 (increases to 6/10) on pain scale.  General health as reported by patient is good.  Pertinent medical history includes: anemia, asthma, heart problems, migraines/headaches, osteoarthritis, overweight and other (POTS, tachycardia, fatigue,edilma danlos, joint pain).   Red flags:  None as reported by patient.  Medical allergies: other. Other medical allergies details: penicillin, tetracycline.   Surgeries include:  Other and orthopedic surgery (hysterectomy, tonsils, bone spur removal neck, , toe surgery B(second toe)).    Current medications:  Cardiac medication and other (vitamins).    Current occupation is RN ICU.   Primary job tasks include:  Computer work, lifting/carrying, prolonged standing and pushing/pulling.                  Therapist Generated HPI Evaluation  Problem details: Date of MD order for this episode was 9-22-22. Laura injured her R ankle while on a trip to [place] 8-10-22. An MRI since revealed high grade tear ATFL, calcaneofibular gr 1 sprain, others intact, mild peroneus brevis tendinopathy(no tear) She is wearing a boot and has a brace to wean into.   Laura states she did have R foot issues prior to the injury for about 1 yr, she states it felt unstable. The foot would roll in.   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   PMH Edilma Danlos.         Type of problem:  Right ankle.    This is a new condition.  Condition occurred with:  A fall/slip.  Where condition occurred: other (on vacation).  Patient reports pain:  Lateral, anterior and other (deep arch).  Pain is described as aching, burning, cramping, sharp, shooting and stabbing " and is constant.  Pain radiates to:  No radiation. Pain is worse during the day (when on the leg).  Since onset symptoms are gradually improving.  Associated symptoms:  Loss of strength, edema, numbness and tingling (numb/tingle to lateral toes). Symptoms are exacerbated by walking, weight bearing, standing, ascending stairs, descending stairs, activity and bending/squatting  and relieved by other, ice and bracing/immobilizing (elevation, walking with boot on).  Special tests included:  MRI.    Restrictions due to condition include:  Currently not working due to present treatment.  Barriers include:  None as reported by patient.                        Objective:    Gait:    Gait Type:  Antalgic   Assistive Devices:  CAM            Ankle/Foot Evaluation  ROM:  Arom ankle eval: difficulty abducting R toes.  AROM:    Dorsiflexion:  Left:   20  Right:   0  Plantarflexion:  Left:  70    Right:  40  Inversion: Left:      Right:  Limited with pain  Eversion:     Right:  Mild limitation    Great Toe Extension: Left:      Right: WFL  PROM:    Dorsiflexion:  Left:    23     Right:   7   Plantarflexion: Left:        Right:  45              Strength:    Dorsiflexion:  Left: 5/5     Pain:   Right: 4-/5   Pain:  Plantarflexion: Left: 5/5   Pain:     Inversion:Left: 5/5  Pain:     Right: 3/5  Pain:  Eversion:Left: 5/5  Pain:  Right: 4/5  Pain:                  LIGAMENT TESTING: Ligament testing ankle: see MRI.                PALPATION:     Right ankle tenderness present at:   achilles tendon; medial calcaneal; anterior talofibular ligament; posterior talofibular ligament; calcaneofibular ligament and medial malleolus  Right ankle tenderness not present at:  gastroc/soleus; anterior tibialis; deltoid ligament; plantar fascia; lateral malleolus or anterior tibiofibular ligament  EDEMA: Edema ankle: mild swelling R lateral ankle, mild warmth vs L, Laura reports pitting edema plantar surface of foot later in day, not observed this  morning.                                                              General     ROS    Assessment/Plan:    Patient is a 52 year old female with right side ankle complaints.    Patient has the following significant findings with corresponding treatment plan.                Diagnosis 1:  R ankle sprain/pain  Pain -  manual therapy, self management, education and home program  Decreased ROM/flexibility - manual therapy, therapeutic exercise and home program  Decreased strength - therapeutic exercise, therapeutic activities and home program  Impaired balance - neuro re-education, therapeutic activities and home program  Decreased proprioception - neuro re-education, therapeutic activities and home program  Edema - cold therapy and self management/home program  Impaired gait - gait training and home program  Impaired muscle performance - neuro re-education and home program  Decreased function - therapeutic activities and home program  Instability -  Therapeutic Activity  Therapeutic Exercise  Splinting/Taping/Bracing/Orthotic  home program    Therapy Evaluation Codes:   1) History comprised of:   Personal factors that impact the plan of care:      Past/current experiences and Time since onset of symptoms.    Comorbidity factors that impact the plan of care are:      alison-danlos, POTS, prior surgery B second toes(stiffness).     Medications impacting care: Cardiac.  2) Examination of Body Systems comprised of:   Body structures and functions that impact the plan of care:      Ankle and Toes.   Activity limitations that impact the plan of care are:      Bathing, Dressing, Squatting/kneeling, Stairs, Standing, Walking, Working, Sleeping and Laying down.  3) Clinical presentation characteristics are:   Stable/Uncomplicated.  4) Decision-Making    Low complexity using standardized patient assessment instrument and/or measureable assessment of functional outcome.  Cumulative Therapy Evaluation is: Low  complexity.    Previous and current functional limitations:  (See Goal Flow Sheet for this information)    Short term and Long term goals: (See Goal Flow Sheet for this information)     Communication ability:  Patient appears to be able to clearly communicate and understand verbal and written communication and follow directions correctly.  Treatment Explanation - The following has been discussed with the patient:   RX ordered/plan of care  Anticipated outcomes  Possible risks and side effects  This patient would benefit from PT intervention to resume normal activities.   Rehab potential is good.    Frequency:  1 X week, once daily  Duration:  for 6 weeks tapering to 2 X a month over 2 months   Discharge Plan:  Achieve all LTG.  Independent in home treatment program.  Reach maximal therapeutic benefit.    Please refer to the daily flowsheet for treatment today, total treatment time and time spent performing 1:1 timed codes.

## 2022-10-03 ENCOUNTER — THERAPY VISIT (OUTPATIENT)
Dept: PHYSICAL THERAPY | Facility: CLINIC | Age: 52
End: 2022-10-03
Payer: COMMERCIAL

## 2022-10-03 DIAGNOSIS — M54.9 BACK PAIN: Primary | ICD-10-CM

## 2022-10-03 DIAGNOSIS — M54.2 CERVICALGIA: ICD-10-CM

## 2022-10-03 PROCEDURE — 97112 NEUROMUSCULAR REEDUCATION: CPT | Mod: GP | Performed by: PHYSICAL THERAPIST

## 2022-10-03 PROCEDURE — 97140 MANUAL THERAPY 1/> REGIONS: CPT | Mod: GP | Performed by: PHYSICAL THERAPIST

## 2022-10-05 ENCOUNTER — THERAPY VISIT (OUTPATIENT)
Dept: PHYSICAL THERAPY | Facility: CLINIC | Age: 52
End: 2022-10-05
Payer: COMMERCIAL

## 2022-10-05 DIAGNOSIS — M25.571 PAIN IN JOINT INVOLVING ANKLE AND FOOT, RIGHT: ICD-10-CM

## 2022-10-05 DIAGNOSIS — S93.491D SPRAIN OF ANTERIOR TALOFIBULAR LIGAMENT OF RIGHT ANKLE, SUBSEQUENT ENCOUNTER: Primary | ICD-10-CM

## 2022-10-05 PROCEDURE — 97110 THERAPEUTIC EXERCISES: CPT | Mod: GP | Performed by: PHYSICAL THERAPIST

## 2022-10-06 ENCOUNTER — THERAPY VISIT (OUTPATIENT)
Dept: OCCUPATIONAL THERAPY | Facility: CLINIC | Age: 52
End: 2022-10-06
Attending: PEDIATRICS
Payer: COMMERCIAL

## 2022-10-06 DIAGNOSIS — M79.631 RIGHT FOREARM PAIN: ICD-10-CM

## 2022-10-06 DIAGNOSIS — R20.0 FINGER NUMBNESS: ICD-10-CM

## 2022-10-06 PROCEDURE — 97112 NEUROMUSCULAR REEDUCATION: CPT | Mod: GO | Performed by: OCCUPATIONAL THERAPIST

## 2022-10-06 PROCEDURE — 97110 THERAPEUTIC EXERCISES: CPT | Mod: GO | Performed by: OCCUPATIONAL THERAPIST

## 2022-10-06 PROCEDURE — 97166 OT EVAL MOD COMPLEX 45 MIN: CPT | Mod: GO | Performed by: OCCUPATIONAL THERAPIST

## 2022-10-06 NOTE — PROGRESS NOTES
Hand Therapy Initial Evaluation    Current Date:  10/6/2022    Subjective:  Laura Jackson is a 52 year old right hand dominant female.    Diagnosis:   Right forearm pain and ulnar neuropathy   DOI:  9/21/2022 (therapy referral)    Patient reports symptoms of pain, stiffness/loss of motion, weakness/loss of strength, numbness and tingling of the right forearm and ring and small finger which occurred due to gradual onset over the past 5 years. Since onset symptoms are gradually getting worse in the past 2 months.  Special tests:  x-ray.  Previous treatment: self treatment with rest and ice.  General health as reported by patient is good.  Pertinent medical history includes:Anemia, Asthma, Heart Problems, Migraines/Headaches, Numbness/Tingling, Osteoarthritis, Overweight, POTS, tachycardia, hypermobility.  Medical allergies:see list in EMR.  Surgical history: orthopedic: B CTR, heel spur, toe surgery, other: tonsils, hysterectomy.  Medication history: Cardiac, Asthma.    Occupational Profile Information:  Current occupation is RN  Currently working in normal job without restrictions  Job Tasks: Lifting, Carrying, Prolonged Standing, Pushing, Pulling  Prior functional level:  independent-shared household chores  Barriers include:none  Mobility: No difficulty, wearing boot due to ankle injury  Transportation: drives    Functional Outcome Measure:  Upper Extremity Functional Index  SCORE:   Column Totals: 56/80  (A lower score indicates greater disability.)    Objective:  Pain Level (Scale 0-10)   10/6/2022   At Rest 0   With Use 6     Pain Description  Date 10/6/2022   Location Forearm extensors and LEP   Pain Quality Sharp   Frequency intermittent     Pain is worst  daytime   Exacerbated by  varies with use   Relieved by cold and rest   Progression Gradually improving      Posture  Forward Neck Posture and Rounded Forward Shoulders    Sensation  Decreased intermittently in Ulnar Nerve distribution per pt report more  in the mornings     ROM  WNL's elbow, forearm and wrist, pain with elbow extension, supination and most with wrist extension  Note: mild thenar atrophy, pt reports weakness affects dexterity    Strength   (Measured in pounds)  Pain Report: - none  + mild    ++ moderate    +++ severe    10/6/2022 10/6/2022   Trials Left Right   1  2  3 56  49  47 30+  20+  20+   Average 51 23       10/6/2022 10/6/2022    Left Right   Elbow Ext 59 22++     Resisted Testing  Pain Report:  - none    + mild    ++ moderate    +++ severe    10/6/2022   Elbow Extension -   Elbow Flexion -   Supination  + slight   Pronation -   Wrist Ext with RD, Elbow Ext ++   Wrist Ext with UD, Elbow Ext -   Wrist Flex with RD, Elbow Ext -   Wrist Flex with UD, Elbow Ext -   EDC with Elbow Ext ++   Long Finger Test -     Special Tests   - none    + mild    ++ moderate    +++ severe       10/6/2022   Fred Test -   Elbow Flexion Test + 10 secs   Tinels Cubital Tunnel -   Tinels [place] -   Median Nerve Compression at Pronator -   Carpal-Compression Test -   Tinels at Carpal Tunnel -   Phalens -     Neural Tension Testing  UNT: Ulnar Neurodynamic Test (based on WILIAN Judd's ULNT)   10/6/2022   0-5 Scale 5-/5   0/5: Arm across abdomen in coronal plane  1/5: ER to neutral, Abd shoulder to 45 degrees  2/5: R shoulder to 90 degrees  3/5: Block shoulder and ABD shoulder to 110 degrees  4/5: Fully pronate forearm, extend wrist and ring and small fingers  5/5: Flex elbow and bring hand to side of face  Notes:    (+) indicates beyond grade level but less than MCC to next level    (-) indicates over MCC to level    S1 onset/change of patient's symptoms    S2 definite stop point based on patient's discomfort level    Palpation  Pain Report:  - none    + mild    ++ moderate    +++ severe    10/6/2022   Pec Major + slight   Proximal Triceps -   Spiral Groove -   Distal Triceps -   Anconeus -   ECRB at LEP ++   ECU at LEP -   EDC at LEP +   Radial Head  + slight   Extensor Wad +   PIN Site +   MEP -   Flexor Wad -   Cubital Tunnel -   Ulnar nerve subluxation at elbow -     Assessment:  Patient presents with symptoms consistent with diagnosis of right elbow pain/LEP and ulnar neuropathy, with conservative intervention.     Patient's limitations or Problem List includes:  Pain, Weakness, Sensory disturbance, Decreased stability, Decreased  and Tightness in musculature of the right elbow and hand which interferes with the patient's ability to perform Self Care Tasks (dressing, eating, bathing), Work Tasks, Sleep Patterns, Recreational Activities, Household Chores and Driving  as compared to previous level of function.    Rehab Potential:  Good - Return to full activity, some limitations    Patient will benefit from skilled Occupational Therapy to increase flexibility, overall strength,  strength, stability of thumb and sensation and decrease pain to return to previous activity level and resume normal daily tasks and to reach their rehab potential.    Barriers to Learning:  No barrier    Communication Issues:  Patient appears to be able to clearly communicate and understand verbal and written communication and follow directions correctly.    Chart Review: Chart Review and Simple history review with patient    Identified Performance Deficits: bathing/showering, dressing, hygiene and grooming, communication management, home establishment and management, meal preparation and cleanup, sleep, work and leisure activities    Assessment of Occupational Performance:  5 or more Performance Deficits    Clinical Decision Making (Complexity): Moderate complexity    Treatment Explanation:  The following has been discussed with the patient:  RX ordered/plan of care  Anticipated outcomes  Possible risks and side effects    Plan:  Frequency:  1 X week, once daily  Duration:  for 2 months    Treatment Plan:    Modalities:    US   Therapeutic Exercise:    AROM, PROM, Isotonics,  Isometrics and Stabilization  Neuromuscular re-ed:   Nerve Gliding, Posture and Kinesiotaping  Manual Techniques:   Friction massage and Myofascial release  Orthotic Fabrication:    Static, Hand and Forearm based  Self Care:    Self Care Tasks and Ergonomic Considerations    Discharge Plan:  Achieve all LTG.  Independent in home treatment program.  Reach maximal therapeutic benefit.    Home Program:   Warmth for stiffness to forearm extensors  Ice to lateral elbow after activity for pain  TFM to LEP  MFR to forearm extensors with tennis ball, avoid PIN site  PROM with stretch to forearm extensors and flexors  Trial Kinesiotaping to LEP and PIN site  Elbow strap/arm band as needed with activities, monitor for PIN site irritation  Wear OTC wrist splint sleeping  Avoid activities that exacerbate pain in the elbow  Lift with elbows at sides and palms up, avoid reaching with palm down  Avoid leaning on elbow and prolonged elbow flexion to avoid ulnar nerve irritation  Wear soft elbow flexion block sleeping    Next Visit:  See in 1 week  Assess response to HEP, kinesiotaping and soft elbow flexion block  Consider US to LEP if continued tenderness  Consider HBTS orthosis for thumb/thenar weakness  Progress to eccentric wrist extension strengthening and 3 position  strengthening   Add nerve flossing as indicated for radial and ulnar nerve   Add pec stretches and postural exercises

## 2022-10-11 ENCOUNTER — OFFICE VISIT (OUTPATIENT)
Dept: PODIATRY | Facility: CLINIC | Age: 52
End: 2022-10-11
Payer: COMMERCIAL

## 2022-10-11 DIAGNOSIS — M21.6X2 PRONATION OF BOTH FEET: ICD-10-CM

## 2022-10-11 DIAGNOSIS — S93.491A SPRAIN OF ANTERIOR TALOFIBULAR LIGAMENT OF RIGHT ANKLE, INITIAL ENCOUNTER: Primary | ICD-10-CM

## 2022-10-11 DIAGNOSIS — M21.6X1 PRONATION OF BOTH FEET: ICD-10-CM

## 2022-10-11 PROCEDURE — 99213 OFFICE O/P EST LOW 20 MIN: CPT | Performed by: PODIATRIST

## 2022-10-11 NOTE — PROGRESS NOTES
Subjective:    8/18/22   Patient seen as a new patient consult from [facility] and is seen today  for right ankle sprain.  Had inversion sprain 9 days ago.  Was in [place] at the time and slipped on stairs with gravel on them.  Pain and swelling and bruising.  Aggravated by activity and relieved by rest.  Fortunately she had an ankle brace with her and wear this the rest of the trip.  Recently seen in clinic in the [place] and had x-rays and given an Aircast.  She states this is more comfortable for walking at this time.  Denies erythema weakness or increased deformity.  Denies numbness.  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    9/8/22 patient returns for right ankle sprain  Sprained ankle approximately 1 month ago on August 10, 2022.  She has not been working yet.  Wearing Aircast intermittently around the house.  States feeling better but still painful.  Patient states that before this happened for the last year she has been having pain and perhaps some instability on her lateral ankle as well.    9/22/22 patient is 6 weeks 1 day status post right ankle sprain on August 10, 2022.  Patient states has been wearing boot more and believes this is helped.  Pain is decreasing.  Edema is decreasing.  Has tried walking and ankle brace and somewhat uncomfortable.  She is still not working.  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       10/11/22 patient is 8 weeks 6 days status post right ankle sprain.  Patient has been going to physical therapy and this has helped.  Generally she states pain and swelling decreasing.  She is seeing gradual improvement.  States still feels unstable.  States recently had slight eversion causing some medial pain.      ROS: See above         Allergies   Allergen Reactions     Penicillins Swelling     Swelling throat; age 4     Adhesive Tape Hives     Penicillin G      Tetracycline GI Disturbance     Other reaction(s): Abdominal  Pain  Vomiting; nauseous  Other reaction(s): Abdominal Pain  Vomiting; nauseous       Current Outpatient Medications   Medication Sig Dispense Refill     Cholecalciferol (VITAMIN D) 2000 UNITS CAPS Take 1 capsule by mouth daily       famotidine (PEPCID) 40 MG tablet Take 1 tablet (40 mg) by mouth At Bedtime 90 tablet 3     fluticasone-salmeterol (ADVAIR) 100-50 MCG/ACT inhaler Inhale 1 puff into the lungs every 12 hours 60 each 3     ivabradine (CORLANOR) 5 MG tablet Take 1 tablet (5 mg) by mouth 2 times daily (with meals) 720 tablet 0     levalbuterol (XOPENEX HFA) 45 MCG/ACT inhaler Inhale 1-2 puffs into the lungs every 4 hours as needed for shortness of breath / dyspnea 15 g 0     metroNIDAZOLE (METROGEL) 0.75 % external gel Apply to face BID 45 g 11     omeprazole (PRILOSEC) 40 MG DR capsule Take 1 capsule (40 mg) by mouth daily before breakfast 90 capsule 3     Pyridoxine HCl (B-6) 100 MG TABS        vitamin B-12 (CYANOCOBALAMIN) 100 MCG tablet          Patient Active Problem List   Diagnosis     CARDIOVASCULAR SCREENING; LDL GOAL LESS THAN 160     Irritable bowel syndrome     GERD (gastroesophageal reflux disease)     History of supraventricular tachycardia     Mild persistent asthma     Family history of breast cancer     S/P myomectomy     Nausea     S/P ANAID (total abdominal hysterectomy)     Hypovitaminosis D     Endometriosis     Heberden's nodes     POTS (postural orthostatic tachycardia syndrome)     Hypermobility syndrome     Cervicalgia     Autonomic dysfunction     Benign essential hypertension     Back pain     Sprain of anterior talofibular ligament of right ankle     Pain in joint involving ankle and foot, right     Finger numbness     Right forearm pain       Past Medical History:   Diagnosis Date     Benign essential hypertension 7/31/2018     Family history of breast cancer 2/19/2014     Family history of breast cancer      SVT (supraventricular tachycardia):  history of, no recurrence since  2008 10/11/2013    no recurrence since 2008      Uncomplicated asthma        Past Surgical History:   Procedure Laterality Date     ABDOMEN SURGERY  6/2017    myectomy     APPENDECTOMY       COLONOSCOPY N/A 8/20/2018    Procedure: COMBINED COLONOSCOPY, SINGLE OR MULTIPLE BIOPSY/POLYPECTOMY BY BIOPSY;;  Surgeon: Osman Hahn MD;  Location: [facility] OR     COLONOSCOPY WITH CO2 INSUFFLATION N/A 8/20/2018    Procedure: COLONOSCOPY WITH CO2 INSUFFLATION;  COLON-SCREENING / LUBKA ;  Surgeon: Osman Hahn MD;  Location: [facility] OR     DAVINCI HYSTERECTOMY TOTAL, SALPINGECTOMY BILATERAL N/A 8/4/2017    Procedure: DAVINCI XI HYSTERECTOMY TOTAL, SALPINGECTOMY BILATERAL;  DAVINCI TOTAL HYSTERECTOMY; BILATERAL SALPINGECTOMY. BILATERAL URETERAL LYSIS. UTEROSACRAL-COLPOPEXY. EXCISION OF ENDOMETRIOSIS;  Surgeon: George Loyola MD;  Location: [facility] OR     DAVINCI LYSIS OF ADHESIONS Bilateral 8/4/2017    Procedure: DAVINCI LYSIS OF ADHESIONS;  BILATERAL URETERAL LYSIS;  Surgeon: George Loyola MD;  Location: [facility]     DAVINCI SACROCOLPOPEXY, CYSTOSCOPY, COMBINED N/A 8/4/2017    Procedure: COMBINED DAVINCI SACROCOLPOPEXY, CYSTOSCOPY;  UTEROSACRAL COLPOPEXY;  Surgeon: George Loyola MD;  Location: [facility] OR     DAVINCI XI ASSISTED ABLATION / EXCISION OF ENDOMETRIOSIS  8/4/2017    Procedure: DAVINCI XI ASSISTED ABLATION / EXCISION OF ENDOMETRIOSIS;;  Surgeon: George Loyola MD;  Location: [facility] OR     DILATION AND CURETTAGE N/A 6/20/2017    Procedure: DILATION AND CURETTAGE;  Uterine Curettings and Fibroid Removal, Cook catheter placement; (No hysterectomy done at this time);  Surgeon: Ernestina Saunders MD;  Location: [facility]     HYSTERECTOMY, PAP NO LONGER INDICATED       ORTHOPEDIC SURGERY  6/1986    Heel spur , toe surgery     RELEASE CARPAL TUNNEL Right 10/20/2020    Procedure: RIGHT RELEASE, CARPAL TUNNEL;  Surgeon: Rickey Rea MD;  Location: [facility] OR     RELEASE CARPAL TUNNEL Left 11/6/2020    Procedure:  LEFT RELEASE, CARPAL TUNNEL;  Surgeon: Rickey Rea MD;  Location: [facility] OR     TONSILLECTOMY         Family History   Problem Relation Age of Onset     Diabetes Mother      Hypertension Mother      Hyperlipidemia Mother      Arrhythmia Mother      Cardiovascular Father         CHF and COPD     Asthma Father      Breast Cancer Maternal Grandfather      Colon Cancer Maternal Grandfather      Breast Cancer Paternal Grandmother      Breast Cancer Paternal Aunt      C.A.D. Paternal Grandfather      Breast Cancer Paternal Grandfather      Breast Cancer Cousin      Asthma Son      Diabetes Maternal Grandmother      Hypertension Maternal Grandmother      Breast Cancer Cousin      Breast Cancer Cousin      Breast Cancer Cousin        Social History     Tobacco Use     Smoking status: Never     Smokeless tobacco: Never   Substance Use Topics     Alcohol use: Yes     Comment: Rare- @1 month         Exam:    Vitals: LMP 07/28/2017 (Exact Date)   BMI: There is no height or weight on file to calculate BMI.  Height: Data Unavailable    Constitutional/ general:  Pt is in no apparent distress, appears well-nourished.  Cooperative with history and physical exam.     Psych:  The patient answered questions appropriately.  Normal affect.  Seems to have reasonable expectations, in terms of treatment.     Lungs:  Non labored breathing, non labored speech. No cough.  No audible wheezing. Even, quiet breathing.       Vascular:  positive pedal pulses bilaterally for both the DP and PT arteries.  CFT < 3 sec.  negative ankle edema.  positive pedal hair growth.    Neuro:  Alert and oriented x 3. Coordinated gait.  Light touch sensation is intact to the L4, L5, S1 distributions. No obvious deficits.  No evidence of neurological-based weakness, spasticity, or contracture in the lower extremities.      Derm: Normal texture and turgor.  No erythema, ecchymosis, or cyanosis.      Musculoskeletal:    Lower extremity muscle strength is  normal.   No gross deformities.  With weightbearing patient somewhat pronated.  Right lower extremity has increased internal tibial torsion.  negative ecchymosis  negative erythema  negative pain at TMTJs or styloid process.  negative Achilles or calcaneal tubercle pain  negative medial ankle pain  negative pain AITF ligament  negative pain with stressing or palpation peroneal tendons  negative peroneal subluxation  negative pain over medial or lateral malleoli  Some pain over dorsum of talonavicular joint.  No pain medial portion of this joint.  Pain over ATFL and CFL ligaments.  Pain calcaneocuboid joint.  Slight pain anterior inferior tib-fib ligament.  Edema mostly resolved..  No erythema or ecchymosis.  No pain dorsum of tarsal bones or tarsometatarsal joint anywhere.  Slight pain medial ankle but difficult to localize    Radiographic Exam:  X-Ray Findings:  I personally reviewed the films.    EXAM: XR ANKLE RIGHT G/E 3 VIEWS  LOCATION: [facility]  DATE/TIME: 8/16/2022 5:10 PM     INDICATION:  Ankle injury, right, initial encounter  COMPARISON: None.                                                                      IMPRESSION: Tiny linear flake fracture of the tip of the lateral malleolus. No additional fractures. Moderate soft tissue swelling along the lateral aspect of the ankle. Small plantar calcaneal spur.          Narrative & Impression   EXAM: MR ANKLE RIGHT W/O CONTRAST  LOCATION: [facility]  DATE/TIME: 9/13/2022 6:57 PM     INDICATION: Pain after injury  COMPARISON: None.  TECHNIQUE: Unenhanced.     FINDINGS:      TENDONS:   -Peroneal: Mild peroneus brevis tendinopathy without tearing. The peroneus longus tendon is negative.  -Medial: Posterior tibialis tendon is intact. No tendinopathy or tenosynovitis. Flexor digitorum longus and flexor hallucis longus tendons are normal. No tenosynovitis.  -Anterior: Anterior tibialis, extensor hallucis  longus, and extensor digitorum longus tendons are normal. No tenosynovitis.  -Achilles: No tendinopathy or paratenonitis.     LIGAMENTS:   -Anterior talofibular ligament: High-grade tear of the ATF.   -Calcaneofibular ligament: Grade I sprain of the calcaneofibular ligament without tearing.   -Posterior talofibular ligament: Intact.  -Syndesmotic inferior tibiofibular ligaments: Intact.  -Deltoid ligament complex: Intact.  -Spring ligament complex: Intact.     JOINTS AND BONES:   -The tibiotalar joint is intact. No osteochondral lesion. Tiny tibiotalar joint effusion. Mild reactive edema within the distal talus. Mild reactive edema within the cuboid at the calcaneocuboid articulation. No evidence for letha fracture.     SOFT TISSUES:  -Plantar fascia: Mild plantar calcaneal spurring. No significant signal abnormality. This is likely secondary to old resolved plantar fasciitis.  -Sinus tarsi and tarsal tunnel: Normal.  -Muscles: Normal.                                                                      IMPRESSION:  1.  Mild peroneus brevis tendinopathy without tearing.  2.  High-grade tear of the ATF.  3.  Grade I sprain of the calcaneofibular ligament.  4.  No additional tendinous or ligamentous pathology.  5.  Tiny tibiotalar joint effusion.  6.  Mild reactive edema distal talus.  7.  Mild reactive edema within the cuboid at the calcaneocuboid articulation.  8.  Plantar calcaneal spurring without signal abnormality is likely secondary to old resolved plantar fasciitis.           Assessment:  right ankle sprain     Plan: Discussed patient continues to improve.  Explained bad sprains can take several months to heal.  Continue physical therapy.  Will slowly become more active with protected weightbearing.  We offered different ankle brace as she was having some discomfort with her current 1.  Return to clinic in 4 weeks.      Vahid Montesinos DPM, FACFAS

## 2022-10-11 NOTE — PATIENT INSTRUCTIONS
We wish you continued good healing. If you have any questions or concerns, please do not hesitate to contact us at  [phone]    [facility] (secure e-mail communication and access to your chart) to send a message or to make an appointment.    Please remember to call and schedule a follow up appointment if one was recommended at your earliest convenience.     PODIATRY CLINIC HOURS  TELEPHONE NUMBER    Dr. Vahid KAURPJULIENNE [facility]        Clinics:  [facility] CMA   Tuesday 1PM-6PM  [place]  Wednesday 745AM-330PM  [place]  Thursday/Friday 745AM-230PM  [place] [facility] APPOINTMENTS  [phone]    [place] APPOINTMENTS  [phone]        If you need a medication refill, please contact us you may need lab work and/or a follow up visit prior to your refill (i.e. Antifungal medications).  If MRI needed please call Imaging at [phone] or [phone]  HOW DO I GET MY KNEE SCOOTER? Knee scooters can be picked up at ANY Medical Supply stores with your knee scooter Prescription.  OR  Bring your signed prescription to an [facility] showroom.

## 2022-10-11 NOTE — LETTER
10/11/2022         RE: Laura Jackson  [address]  [place] [address]        Dear Colleague,    Thank you for referring your patient, Laura Jackson, to the [facility]. Please see a copy of my visit note below.    Subjective:    8/18/22   Patient seen as a new patient consult from [facility] and is seen today  for right ankle sprain.  Had inversion sprain 9 days ago.  Was in [place] at the time and slipped on stairs with gravel on them.  Pain and swelling and bruising.  Aggravated by activity and relieved by rest.  Fortunately she had an ankle brace with her and wear this the rest of the trip.  Recently seen in clinic in the [place] and had x-rays and given an Aircast.  She states this is more comfortable for walking at this time.  Denies erythema weakness or increased deformity.  Denies numbness.  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    9/8/22 patient returns for right ankle sprain  Sprained ankle approximately 1 month ago on August 10, 2022.  She has not been working yet.  Wearing Aircast intermittently around the house.  States feeling better but still painful.  Patient states that before this happened for the last year she has been having pain and perhaps some instability on her lateral ankle as well.    9/22/22 patient is 6 weeks 1 day status post right ankle sprain on August 10, 2022.  Patient states has been wearing boot more and believes this is helped.  Pain is decreasing.  Edema is decreasing.  Has tried walking and ankle brace and somewhat uncomfortable.  She is still not working.  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       10/11/22 patient is 8 weeks 6 days status post right ankle sprain.  Patient has been going to physical therapy and this has helped.  Generally she states pain and swelling decreasing.  She is seeing gradual improvement.  States still feels unstable.  States recently had slight eversion  causing some medial pain.      ROS: See above         Allergies   Allergen Reactions     Penicillins Swelling     Swelling throat; age 4     Adhesive Tape Hives     Penicillin G      Tetracycline GI Disturbance     Other reaction(s): Abdominal Pain  Vomiting; nauseous  Other reaction(s): Abdominal Pain  Vomiting; nauseous       Current Outpatient Medications   Medication Sig Dispense Refill     Cholecalciferol (VITAMIN D) 2000 UNITS CAPS Take 1 capsule by mouth daily       famotidine (PEPCID) 40 MG tablet Take 1 tablet (40 mg) by mouth At Bedtime 90 tablet 3     fluticasone-salmeterol (ADVAIR) 100-50 MCG/ACT inhaler Inhale 1 puff into the lungs every 12 hours 60 each 3     ivabradine (CORLANOR) 5 MG tablet Take 1 tablet (5 mg) by mouth 2 times daily (with meals) 720 tablet 0     levalbuterol (XOPENEX HFA) 45 MCG/ACT inhaler Inhale 1-2 puffs into the lungs every 4 hours as needed for shortness of breath / dyspnea 15 g 0     metroNIDAZOLE (METROGEL) 0.75 % external gel Apply to face BID 45 g 11     omeprazole (PRILOSEC) 40 MG DR capsule Take 1 capsule (40 mg) by mouth daily before breakfast 90 capsule 3     Pyridoxine HCl (B-6) 100 MG TABS        vitamin B-12 (CYANOCOBALAMIN) 100 MCG tablet          Patient Active Problem List   Diagnosis     CARDIOVASCULAR SCREENING; LDL GOAL LESS THAN 160     Irritable bowel syndrome     GERD (gastroesophageal reflux disease)     History of supraventricular tachycardia     Mild persistent asthma     Family history of breast cancer     S/P myomectomy     Nausea     S/P ANAID (total abdominal hysterectomy)     Hypovitaminosis D     Endometriosis     Heberden's nodes     POTS (postural orthostatic tachycardia syndrome)     Hypermobility syndrome     Cervicalgia     Autonomic dysfunction     Benign essential hypertension     Back pain     Sprain of anterior talofibular ligament of right ankle     Pain in joint involving ankle and foot, right     Finger numbness     Right forearm pain        Past Medical History:   Diagnosis Date     Benign essential hypertension 7/31/2018     Family history of breast cancer 2/19/2014     Family history of breast cancer      SVT (supraventricular tachycardia):  history of, no recurrence since 2008 10/11/2013    no recurrence since 2008      Uncomplicated asthma        Past Surgical History:   Procedure Laterality Date     ABDOMEN SURGERY  6/2017    myectomy     APPENDECTOMY       COLONOSCOPY N/A 8/20/2018    Procedure: COMBINED COLONOSCOPY, SINGLE OR MULTIPLE BIOPSY/POLYPECTOMY BY BIOPSY;;  Surgeon: Osman Hahn MD;  Location: [facility] OR     COLONOSCOPY WITH CO2 INSUFFLATION N/A 8/20/2018    Procedure: COLONOSCOPY WITH CO2 INSUFFLATION;  COLON-SCREENING / LUBKA ;  Surgeon: Osman Hahn MD;  Location: [facility] OR     DAVINCI HYSTERECTOMY TOTAL, SALPINGECTOMY BILATERAL N/A 8/4/2017    Procedure: DAVINCI XI HYSTERECTOMY TOTAL, SALPINGECTOMY BILATERAL;  DAVINCI TOTAL HYSTERECTOMY; BILATERAL SALPINGECTOMY. BILATERAL URETERAL LYSIS. UTEROSACRAL-COLPOPEXY. EXCISION OF ENDOMETRIOSIS;  Surgeon: George Loyola MD;  Location: [facility] OR     DAVINCI LYSIS OF ADHESIONS Bilateral 8/4/2017    Procedure: DAVINCI LYSIS OF ADHESIONS;  BILATERAL URETERAL LYSIS;  Surgeon: George Loyola MD;  Location: [facility] OR     DAVINCI SACROCOLPOPEXY, CYSTOSCOPY, COMBINED N/A 8/4/2017    Procedure: COMBINED DAVINCI SACROCOLPOPEXY, CYSTOSCOPY;  UTEROSACRAL COLPOPEXY;  Surgeon: George Loyola MD;  Location: [facility] OR     DAVINCI XI ASSISTED ABLATION / EXCISION OF ENDOMETRIOSIS  8/4/2017    Procedure: DAVINCI XI ASSISTED ABLATION / EXCISION OF ENDOMETRIOSIS;;  Surgeon: George Loyola MD;  Location: [facility] OR     DILATION AND CURETTAGE N/A 6/20/2017    Procedure: DILATION AND CURETTAGE;  Uterine Curettings and Fibroid Removal, Cook catheter placement; (No hysterectomy done at this time);  Surgeon: Ernestina Saunders MD;  Location: [facility]     HYSTERECTOMY, PAP NO LONGER INDICATED        ORTHOPEDIC SURGERY  6/1986    Heel spur , toe surgery     RELEASE CARPAL TUNNEL Right 10/20/2020    Procedure: RIGHT RELEASE, CARPAL TUNNEL;  Surgeon: Rickey Rea MD;  Location: [facility] OR     RELEASE CARPAL TUNNEL Left 11/6/2020    Procedure: LEFT RELEASE, CARPAL TUNNEL;  Surgeon: Rickey Rea MD;  Location: [facility] OR     TONSILLECTOMY         Family History   Problem Relation Age of Onset     Diabetes Mother      Hypertension Mother      Hyperlipidemia Mother      Arrhythmia Mother      Cardiovascular Father         CHF and COPD     Asthma Father      Breast Cancer Maternal Grandfather      Colon Cancer Maternal Grandfather      Breast Cancer Paternal Grandmother      Breast Cancer Paternal Aunt      C.A.D. Paternal Grandfather      Breast Cancer Paternal Grandfather      Breast Cancer Cousin      Asthma Son      Diabetes Maternal Grandmother      Hypertension Maternal Grandmother      Breast Cancer Cousin      Breast Cancer Cousin      Breast Cancer Cousin        Social History     Tobacco Use     Smoking status: Never     Smokeless tobacco: Never   Substance Use Topics     Alcohol use: Yes     Comment: Rare- @1 month         Exam:    Vitals: LMP 07/28/2017 (Exact Date)   BMI: There is no height or weight on file to calculate BMI.  Height: Data Unavailable    Constitutional/ general:  Pt is in no apparent distress, appears well-nourished.  Cooperative with history and physical exam.     Psych:  The patient answered questions appropriately.  Normal affect.  Seems to have reasonable expectations, in terms of treatment.     Lungs:  Non labored breathing, non labored speech. No cough.  No audible wheezing. Even, quiet breathing.       Vascular:  positive pedal pulses bilaterally for both the DP and PT arteries.  CFT < 3 sec.  negative ankle edema.  positive pedal hair growth.    Neuro:  Alert and oriented x 3. Coordinated gait.  Light touch sensation is intact to the L4, L5, S1 distributions. No  obvious deficits.  No evidence of neurological-based weakness, spasticity, or contracture in the lower extremities.      Derm: Normal texture and turgor.  No erythema, ecchymosis, or cyanosis.      Musculoskeletal:    Lower extremity muscle strength is normal.   No gross deformities.  With weightbearing patient somewhat pronated.  Right lower extremity has increased internal tibial torsion.  negative ecchymosis  negative erythema  negative pain at TMTJs or styloid process.  negative Achilles or calcaneal tubercle pain  negative medial ankle pain  negative pain AITF ligament  negative pain with stressing or palpation peroneal tendons  negative peroneal subluxation  negative pain over medial or lateral malleoli  Some pain over dorsum of talonavicular joint.  No pain medial portion of this joint.  Pain over ATFL and CFL ligaments.  Pain calcaneocuboid joint.  Slight pain anterior inferior tib-fib ligament.  Edema mostly resolved..  No erythema or ecchymosis.  No pain dorsum of tarsal bones or tarsometatarsal joint anywhere.  Slight pain medial ankle but difficult to localize    Radiographic Exam:  X-Ray Findings:  I personally reviewed the films.    EXAM: XR ANKLE RIGHT G/E 3 VIEWS  LOCATION: [facility]  DATE/TIME: 8/16/2022 5:10 PM     INDICATION:  Ankle injury, right, initial encounter  COMPARISON: None.                                                                      IMPRESSION: Tiny linear flake fracture of the tip of the lateral malleolus. No additional fractures. Moderate soft tissue swelling along the lateral aspect of the ankle. Small plantar calcaneal spur.          Narrative & Impression   EXAM: MR ANKLE RIGHT W/O CONTRAST  LOCATION: [facility]  DATE/TIME: 9/13/2022 6:57 PM     INDICATION: Pain after injury  COMPARISON: None.  TECHNIQUE: Unenhanced.     FINDINGS:      TENDONS:   -Peroneal: Mild peroneus brevis tendinopathy without tearing. The peroneus  longus tendon is negative.  -Medial: Posterior tibialis tendon is intact. No tendinopathy or tenosynovitis. Flexor digitorum longus and flexor hallucis longus tendons are normal. No tenosynovitis.  -Anterior: Anterior tibialis, extensor hallucis longus, and extensor digitorum longus tendons are normal. No tenosynovitis.  -Achilles: No tendinopathy or paratenonitis.     LIGAMENTS:   -Anterior talofibular ligament: High-grade tear of the ATF.   -Calcaneofibular ligament: Grade I sprain of the calcaneofibular ligament without tearing.   -Posterior talofibular ligament: Intact.  -Syndesmotic inferior tibiofibular ligaments: Intact.  -Deltoid ligament complex: Intact.  -Spring ligament complex: Intact.     JOINTS AND BONES:   -The tibiotalar joint is intact. No osteochondral lesion. Tiny tibiotalar joint effusion. Mild reactive edema within the distal talus. Mild reactive edema within the cuboid at the calcaneocuboid articulation. No evidence for letha fracture.     SOFT TISSUES:  -Plantar fascia: Mild plantar calcaneal spurring. No significant signal abnormality. This is likely secondary to old resolved plantar fasciitis.  -Sinus tarsi and tarsal tunnel: Normal.  -Muscles: Normal.                                                                      IMPRESSION:  1.  Mild peroneus brevis tendinopathy without tearing.  2.  High-grade tear of the ATF.  3.  Grade I sprain of the calcaneofibular ligament.  4.  No additional tendinous or ligamentous pathology.  5.  Tiny tibiotalar joint effusion.  6.  Mild reactive edema distal talus.  7.  Mild reactive edema within the cuboid at the calcaneocuboid articulation.  8.  Plantar calcaneal spurring without signal abnormality is likely secondary to old resolved plantar fasciitis.           Assessment:  right ankle sprain     Plan: Discussed patient continues to improve.  Explained bad sprains can take several months to heal.  Continue physical therapy.  Will slowly become more  active with protected weightbearing.  We offered different ankle brace as she was having some discomfort with her current 1.  Return to clinic in 4 weeks.      Vahid Montesinos DPM, FACFAS          Again, thank you for allowing me to participate in the care of your patient.        Sincerely,        Vahid Montesinos DPM

## 2022-10-12 ENCOUNTER — THERAPY VISIT (OUTPATIENT)
Dept: OCCUPATIONAL THERAPY | Facility: CLINIC | Age: 52
End: 2022-10-12
Payer: COMMERCIAL

## 2022-10-12 ENCOUNTER — THERAPY VISIT (OUTPATIENT)
Dept: PHYSICAL THERAPY | Facility: CLINIC | Age: 52
End: 2022-10-12
Payer: COMMERCIAL

## 2022-10-12 DIAGNOSIS — R20.0 FINGER NUMBNESS: ICD-10-CM

## 2022-10-12 DIAGNOSIS — M25.571 PAIN IN JOINT INVOLVING ANKLE AND FOOT, RIGHT: ICD-10-CM

## 2022-10-12 DIAGNOSIS — M79.631 RIGHT FOREARM PAIN: Primary | ICD-10-CM

## 2022-10-12 DIAGNOSIS — S93.491D SPRAIN OF ANTERIOR TALOFIBULAR LIGAMENT OF RIGHT ANKLE, SUBSEQUENT ENCOUNTER: Primary | ICD-10-CM

## 2022-10-12 PROCEDURE — 97140 MANUAL THERAPY 1/> REGIONS: CPT | Mod: GO | Performed by: OCCUPATIONAL THERAPIST

## 2022-10-12 PROCEDURE — 97110 THERAPEUTIC EXERCISES: CPT | Mod: GP | Performed by: PHYSICAL THERAPIST

## 2022-10-12 PROCEDURE — 97110 THERAPEUTIC EXERCISES: CPT | Mod: GO | Performed by: OCCUPATIONAL THERAPIST

## 2022-10-12 PROCEDURE — 97035 APP MDLTY 1+ULTRASOUND EA 15: CPT | Mod: GO | Performed by: OCCUPATIONAL THERAPIST

## 2022-10-12 PROCEDURE — 97112 NEUROMUSCULAR REEDUCATION: CPT | Mod: GP | Performed by: PHYSICAL THERAPIST

## 2022-10-12 NOTE — PROGRESS NOTES
SOAP note objective information for 10/12/2022:    Diagnosis:   Right forearm pain and ulnar neuropathy   DOI:  9/21/2022 (therapy referral)    Pain Level (Scale 0-10)   10/6/2022 10/12/2022   At Rest 0    With Use 6 5     Special Tests   - none    + mild    ++ moderate    +++ severe       10/6/2022   Fred Test -   Elbow Flexion Test + 10 secs   Tinels Cubital Tunnel -   Tinels [place] -     Palpation  Pain Report:  - none    + mild    ++ moderate    +++ severe    10/6/2022 10/12/2022   Pec Major + slight NT   Proximal Triceps - -   Spiral Groove - -   Distal Triceps - -   Anconeus - -   ECRB at LEP ++ +   ECU at LEP - -   EDC at LEP + + slight   Radial Head + slight -   Extensor Wad + +   PIN Site + +   MEP - -   Flexor Wad - -   Cubital Tunnel - -   Ulnar nerve subluxation at elbow - -     Home Program:   Warmth for stiffness to forearm extensors  Ice to lateral elbow after activity for pain  TFM to LEP  MFR to forearm extensors with tennis ball, avoid PIN site  PROM with stretch to forearm extensors and flexors  Eccentric wrist extension strengthening  Kinesiotaping to LEP and PIN site  Elbow strap/arm band as needed with activities, monitor for PIN site irritation  Wear OTC wrist splint sleeping  Avoid activities that exacerbate pain in the elbow  Lift with elbows at sides and palms up, avoid reaching with palm down  Avoid leaning on elbow and prolonged elbow flexion to avoid ulnar nerve irritation  Wear soft elbow flexion block sleeping  Trial oval 8 for thumb IP hyperextension    Next Visit:  See in 1 week  Assess response to oval 8 orthosis for thumb and eccentric writ extension strengthening   Continue US to LEP  Progress to 3 position  strengthening   Add nerve flossing as indicated for radial and ulnar nerve   Add pec stretches and postural exercises    Please refer to the daily flowsheet for treatment today, total treatment time and time spent performing 1:1 timed codes.

## 2022-10-12 NOTE — PROGRESS NOTES
Subjective:  HPI  Physical Exam                    Objective:  System    Physical Exam    General     ROS    Assessment/Plan:    SUBJECTIVE  Subjective: Pt reports being able to spend a little more time in the brace compared to the boot.  HEP going well, although can be sore if ankle rests in PF.  Saw Dr Montesinos and is off work for another month or so.   Current Pain level:  (not rated numerically)   Changes in function:  Yes (See Goal flowsheet attached for changes in current functional level)     Adverse reaction to treatment or activity:  None    OBJECTIVE  Objective: Tender to palpation R ATF, although not as superficial today.  Discomfort with anterior drawer.  Pt ambulates in boot.     ASSESSMENT  Laura continues to require intervention to meet STG and LTG's: PT  Patient is progressing as expected.  Response to therapy has shown an improvement in sensitivity to touch  Progress made towards STG/LTG?  Yes (See Goal flowsheet attached for updates on achievement of STG and LTG)    PLAN  Current treatment program is being advanced to more complex exercises.    PTA/ATC plan:  N/A    Please refer to the daily flowsheet for treatment today, total treatment time and time spent performing 1:1 timed codes.

## 2022-10-13 ENCOUNTER — ALLIED HEALTH/NURSE VISIT (OUTPATIENT)
Dept: AUDIOLOGY | Facility: CLINIC | Age: 52
End: 2022-10-13
Payer: COMMERCIAL

## 2022-10-13 DIAGNOSIS — H90.3 BILATERAL SENSORINEURAL HEARING LOSS: Primary | ICD-10-CM

## 2022-10-13 PROCEDURE — V5299 HEARING SERVICE: HCPCS | Performed by: AUDIOLOGIST

## 2022-10-13 NOTE — PROGRESS NOTES
HEARING AID RECHECK    Patient Name:  Laura Jackson    Patient Age:   52 year old    :  1970    Background:   Patient dropped off her hearing aid with complaint of VC not working.    SIDE: Right    : Oticon    TYPE: More 3     S/N: [id number]    WARRANTY: 24    Procedures:   Hearing aid needed recharging. Following charging I programmed to previous settings and conducted a listening check to verify gain sound quality and VC function.  Patient was called to pick the aid up.    Plan:   If problems continue with this aid, it should be returned for factory replacement.    NO CHARGE VISIT    Glen Nation MA, CCC-A  [place] Licensed Audiologist #[id number]  10/13/2022

## 2022-10-14 ENCOUNTER — THERAPY VISIT (OUTPATIENT)
Dept: PHYSICAL THERAPY | Facility: CLINIC | Age: 52
End: 2022-10-14
Payer: COMMERCIAL

## 2022-10-14 DIAGNOSIS — M54.89 OTHER BACK PAIN, UNSPECIFIED CHRONICITY: Primary | ICD-10-CM

## 2022-10-14 PROCEDURE — 97140 MANUAL THERAPY 1/> REGIONS: CPT | Mod: GP | Performed by: PHYSICAL THERAPIST

## 2022-10-19 ENCOUNTER — THERAPY VISIT (OUTPATIENT)
Dept: OCCUPATIONAL THERAPY | Facility: CLINIC | Age: 52
End: 2022-10-19
Payer: COMMERCIAL

## 2022-10-19 ENCOUNTER — THERAPY VISIT (OUTPATIENT)
Dept: PHYSICAL THERAPY | Facility: CLINIC | Age: 52
End: 2022-10-19
Payer: COMMERCIAL

## 2022-10-19 DIAGNOSIS — S93.491D SPRAIN OF ANTERIOR TALOFIBULAR LIGAMENT OF RIGHT ANKLE, SUBSEQUENT ENCOUNTER: Primary | ICD-10-CM

## 2022-10-19 DIAGNOSIS — M25.571 PAIN IN JOINT INVOLVING ANKLE AND FOOT, RIGHT: ICD-10-CM

## 2022-10-19 DIAGNOSIS — R20.0 FINGER NUMBNESS: ICD-10-CM

## 2022-10-19 DIAGNOSIS — M79.631 RIGHT FOREARM PAIN: Primary | ICD-10-CM

## 2022-10-19 PROCEDURE — 97110 THERAPEUTIC EXERCISES: CPT | Mod: GO | Performed by: OCCUPATIONAL THERAPIST

## 2022-10-19 PROCEDURE — 97140 MANUAL THERAPY 1/> REGIONS: CPT | Mod: GO | Performed by: OCCUPATIONAL THERAPIST

## 2022-10-19 PROCEDURE — 97112 NEUROMUSCULAR REEDUCATION: CPT | Mod: GP | Performed by: PHYSICAL THERAPIST

## 2022-10-19 PROCEDURE — 97035 APP MDLTY 1+ULTRASOUND EA 15: CPT | Mod: GO | Performed by: OCCUPATIONAL THERAPIST

## 2022-10-19 PROCEDURE — 97110 THERAPEUTIC EXERCISES: CPT | Mod: GP | Performed by: PHYSICAL THERAPIST

## 2022-10-19 NOTE — PROGRESS NOTES
SOAP note objective information for 10/19/2022:    Diagnosis:   Right forearm pain and ulnar neuropathy   DOI:  9/21/2022 (therapy referral)    Pain Level (Scale 0-10)   10/6/2022 10/12/2022 10/19/2022   At Rest 0     With Use 6 5 2-5     Special Tests   - none    + mild    ++ moderate    +++ severe       10/6/2022   Fred Test -   Elbow Flexion Test + 10 secs   Tinels Cubital Tunnel -   Tinels [place] -     Palpation  Pain Report:  - none    + mild    ++ moderate    +++ severe    10/6/2022 10/12/2022 10/19/2022   Pec Major + slight NT NT   Proximal Triceps - - -   Spiral Groove - - -   Distal Triceps - - -   Anconeus - - -   ECRB at LEP ++ + +   ECU at LEP - - -   EDC at LEP + + slight + slight   Radial Head + slight - + slight   Extensor Wad + + + slight   PIN Site + + + slight   MEP - - -   Flexor Wad - - -   Cubital Tunnel - - -   Ulnar nerve subluxation at elbow - - -     Home Program:   Warmth for stiffness to forearm extensors  Ice to lateral elbow after activity for pain  TFM to LEP  MFR to forearm extensors with tennis ball, avoid PIN site  PROM with stretch to forearm extensors and flexors  Eccentric wrist extension strengthening  Postural exercises with chin tucks and scapular retraction   Kinesiotaping to LEP and PIN site  Elbow strap/arm band as needed with activities, monitor for PIN site irritation  Wear OTC wrist splint sleeping  Avoid activities that exacerbate pain in the elbow  Lift with elbows at sides and palms up, avoid reaching with palm down  Avoid leaning on elbow and prolonged elbow flexion to avoid ulnar nerve irritation  Wear soft elbow flexion block sleeping  Oval 8 for thumb IP hyperextension    Next Visit:  See in 1 week  Assess response to postural exercises  Continue US to LEP  Progress to 3 position  strengthening   Defer nerve flossing as numbness/tingling resloved  Add pec stretches  Taper to HEP    Please refer to the daily flowsheet for treatment today, total treatment  time and time spent performing 1:1 timed codes.

## 2022-10-25 ENCOUNTER — THERAPY VISIT (OUTPATIENT)
Dept: PHYSICAL THERAPY | Facility: CLINIC | Age: 52
End: 2022-10-25
Payer: COMMERCIAL

## 2022-10-25 DIAGNOSIS — S93.491D SPRAIN OF ANTERIOR TALOFIBULAR LIGAMENT OF RIGHT ANKLE, SUBSEQUENT ENCOUNTER: Primary | ICD-10-CM

## 2022-10-25 DIAGNOSIS — M25.571 PAIN IN JOINT INVOLVING ANKLE AND FOOT, RIGHT: ICD-10-CM

## 2022-10-25 PROCEDURE — 97112 NEUROMUSCULAR REEDUCATION: CPT | Mod: GP | Performed by: PHYSICAL THERAPIST

## 2022-10-25 PROCEDURE — 97110 THERAPEUTIC EXERCISES: CPT | Mod: GP | Performed by: PHYSICAL THERAPIST

## 2022-10-26 ENCOUNTER — THERAPY VISIT (OUTPATIENT)
Dept: PHYSICAL THERAPY | Facility: CLINIC | Age: 52
End: 2022-10-26
Payer: COMMERCIAL

## 2022-10-26 ENCOUNTER — THERAPY VISIT (OUTPATIENT)
Dept: OCCUPATIONAL THERAPY | Facility: CLINIC | Age: 52
End: 2022-10-26
Payer: COMMERCIAL

## 2022-10-26 DIAGNOSIS — R20.0 FINGER NUMBNESS: ICD-10-CM

## 2022-10-26 DIAGNOSIS — M79.631 RIGHT FOREARM PAIN: Primary | ICD-10-CM

## 2022-10-26 DIAGNOSIS — M54.2 CERVICALGIA: ICD-10-CM

## 2022-10-26 DIAGNOSIS — M54.89 OTHER BACK PAIN, UNSPECIFIED CHRONICITY: Primary | ICD-10-CM

## 2022-10-26 PROCEDURE — 97112 NEUROMUSCULAR REEDUCATION: CPT | Mod: GP | Performed by: PHYSICAL THERAPIST

## 2022-10-26 PROCEDURE — 97140 MANUAL THERAPY 1/> REGIONS: CPT | Mod: GO | Performed by: OCCUPATIONAL THERAPIST

## 2022-10-26 PROCEDURE — 97035 APP MDLTY 1+ULTRASOUND EA 15: CPT | Mod: GO | Performed by: OCCUPATIONAL THERAPIST

## 2022-10-26 PROCEDURE — 97110 THERAPEUTIC EXERCISES: CPT | Mod: GO | Performed by: OCCUPATIONAL THERAPIST

## 2022-10-26 PROCEDURE — 97140 MANUAL THERAPY 1/> REGIONS: CPT | Mod: GP | Performed by: PHYSICAL THERAPIST

## 2022-10-26 NOTE — PROGRESS NOTES
SOAP note objective information for 10/26/2022:    Diagnosis:   Right forearm pain and ulnar neuropathy   DOI:  9/21/2022 (therapy referral)    Pain Level (Scale 0-10)   10/6/2022 10/12/2022 10/19/2022 10/26/2022   At Rest 0      With Use 6 5 2-5 3     Special Tests   - none    + mild    ++ moderate    +++ severe       10/6/2022 10/26/2022   Fred Test - NT   Elbow Flexion Test + 10 secs + slight   Tinels Cubital Tunnel - -   Tinels [place] - -     Palpation  Pain Report:  - none    + mild    ++ moderate    +++ severe    10/6/2022 10/12/2022 10/19/2022 10/26/2022   Pec Major + slight NT NT NT   Proximal Triceps - - - -   Spiral Groove - - - -   Distal Triceps - - - -   Anconeus - - - + slight   ECRB at LEP ++ + + +   ECU at LEP - - - +   EDC at LEP + + slight + slight + slight   Radial Head + slight - + slight -   Extensor Wad + + + slight -   PIN Site + + + slight + slight   MEP - - - -   Flexor Wad - - - -   Cubital Tunnel - - - -   Ulnar nerve subluxation at elbow - - - -     Home Program:   Warmth for stiffness to forearm extensors  Ice to lateral elbow after activity for pain  TFM to LEP  MFR to forearm extensors with tennis ball, avoid PIN site  PROM with stretch to forearm extensors and flexors  Eccentric wrist extension strengthening  Postural exercises with chin tucks and scapular retraction   Progress to 3 position  strengthening   Kinesiotaping to LEP and PIN site  Elbow strap/arm band as needed with activities, monitor for PIN site irritation  Wear OTC wrist splint sleeping  Avoid activities that exacerbate pain in the elbow  Lift with elbows at sides and palms up, avoid reaching with palm down  Avoid leaning on elbow and prolonged elbow flexion to avoid ulnar nerve irritation  Wear soft elbow flexion block sleeping  Oval 8 for thumb IP hyperextension    Next Visit:  See in 2 weeks  Assess response to 3 position  strengthening, progress to elbow extended as tolerated  Continue US to LEP  Add  pec stretches  Taper to HEP    Please refer to the daily flowsheet for treatment today, total treatment time and time spent performing 1:1 timed codes.

## 2022-11-03 NOTE — TELEPHONE ENCOUNTER
----- Message from Jyoti Phoenix sent at 8/30/2022  3:57 PM CDT -----  Regarding: Orders  Hello,     You entered an order for this patient to see a hand therapist. Our hand therapists are all Occupational Therapists. Please enter a new order for Occupational Therapy and select Hand Therapy under Specialty Services    Thank you,   [facility] Central Scheduling  [phone]

## 2022-11-07 ENCOUNTER — THERAPY VISIT (OUTPATIENT)
Dept: PHYSICAL THERAPY | Facility: CLINIC | Age: 52
End: 2022-11-07
Payer: COMMERCIAL

## 2022-11-07 DIAGNOSIS — M25.571 PAIN IN JOINT INVOLVING ANKLE AND FOOT, RIGHT: ICD-10-CM

## 2022-11-07 DIAGNOSIS — S93.491D SPRAIN OF ANTERIOR TALOFIBULAR LIGAMENT OF RIGHT ANKLE, SUBSEQUENT ENCOUNTER: Primary | ICD-10-CM

## 2022-11-07 PROCEDURE — 97110 THERAPEUTIC EXERCISES: CPT | Mod: GP

## 2022-11-08 ENCOUNTER — THERAPY VISIT (OUTPATIENT)
Dept: OCCUPATIONAL THERAPY | Facility: CLINIC | Age: 52
End: 2022-11-08
Payer: COMMERCIAL

## 2022-11-08 ENCOUNTER — OFFICE VISIT (OUTPATIENT)
Dept: AUDIOLOGY | Facility: CLINIC | Age: 52
End: 2022-11-08
Payer: COMMERCIAL

## 2022-11-08 ENCOUNTER — THERAPY VISIT (OUTPATIENT)
Dept: PHYSICAL THERAPY | Facility: CLINIC | Age: 52
End: 2022-11-08
Payer: COMMERCIAL

## 2022-11-08 ENCOUNTER — OFFICE VISIT (OUTPATIENT)
Dept: PODIATRY | Facility: CLINIC | Age: 52
End: 2022-11-08
Payer: COMMERCIAL

## 2022-11-08 VITALS — HEART RATE: 80 BPM | OXYGEN SATURATION: 99 %

## 2022-11-08 DIAGNOSIS — S93.491A SPRAIN OF ANTERIOR TALOFIBULAR LIGAMENT OF RIGHT ANKLE, INITIAL ENCOUNTER: Primary | ICD-10-CM

## 2022-11-08 DIAGNOSIS — H90.3 BILATERAL SENSORINEURAL HEARING LOSS: Primary | ICD-10-CM

## 2022-11-08 DIAGNOSIS — M54.2 CERVICALGIA: ICD-10-CM

## 2022-11-08 DIAGNOSIS — R20.0 FINGER NUMBNESS: Primary | ICD-10-CM

## 2022-11-08 DIAGNOSIS — M54.89 OTHER BACK PAIN, UNSPECIFIED CHRONICITY: Primary | ICD-10-CM

## 2022-11-08 DIAGNOSIS — M21.6X1 PRONATION OF BOTH FEET: ICD-10-CM

## 2022-11-08 DIAGNOSIS — M21.6X2 PRONATION OF BOTH FEET: ICD-10-CM

## 2022-11-08 DIAGNOSIS — M79.631 RIGHT FOREARM PAIN: ICD-10-CM

## 2022-11-08 PROCEDURE — 97140 MANUAL THERAPY 1/> REGIONS: CPT | Mod: GP | Performed by: PHYSICAL THERAPIST

## 2022-11-08 PROCEDURE — 97035 APP MDLTY 1+ULTRASOUND EA 15: CPT | Mod: GO | Performed by: OCCUPATIONAL THERAPIST

## 2022-11-08 PROCEDURE — 97112 NEUROMUSCULAR REEDUCATION: CPT | Mod: GP | Performed by: PHYSICAL THERAPIST

## 2022-11-08 PROCEDURE — 92557 COMPREHENSIVE HEARING TEST: CPT | Performed by: AUDIOLOGIST

## 2022-11-08 PROCEDURE — V5299 HEARING SERVICE: HCPCS | Performed by: AUDIOLOGIST

## 2022-11-08 PROCEDURE — 99213 OFFICE O/P EST LOW 20 MIN: CPT | Performed by: PODIATRIST

## 2022-11-08 PROCEDURE — 97110 THERAPEUTIC EXERCISES: CPT | Mod: GO | Performed by: OCCUPATIONAL THERAPIST

## 2022-11-08 PROCEDURE — 92550 TYMPANOMETRY & REFLEX THRESH: CPT | Performed by: AUDIOLOGIST

## 2022-11-08 PROCEDURE — 97140 MANUAL THERAPY 1/> REGIONS: CPT | Mod: GO | Performed by: OCCUPATIONAL THERAPIST

## 2022-11-08 NOTE — PROGRESS NOTES
AUDIOLOGY REPORT    SUBJECTIVE:  Laura Jackson is a 52 year old female who was seen in the Audiology Clinic [facility] on 11/08/22 for audiologic evaluation, referred by Self.  The patient has been seen previously in this clinic on 5/12/2021 for assessment and results indicated bilateral asymmetrical hearing loss. The patient reports a possible decline in hearing of her right ear. The patient notes difficulty with communication in a variety of listening situations. Patient was unaccompanied to today's visit.     OBJECTIVE:    Otoscopic exam indicates ears are clear of cerumen bilaterally     Pure Tone Thresholds assessed using standard techniques  audiometry with good  reliability from 250-8000 Hz bilaterally using insert earphones     RIGHT:  normal and borderline-normal hearing sensitivity through 1000 Hz then a mild to moderate sensorineural hearing loss    LEFT:    normal and borderline-normal hearing sensitivity through 4000 Hz then a mild to moderat sensorineural hearing loss    Tympanogram:    RIGHT: hyper mobile     LEFT:   normal eardrum mobility    Reflexes (reported by stimulus ear): 1000 Hz  RIGHT: Ipsilateral is too noisy  RIGHT: Contralateral is absent at frequencies tested  LEFT:   Ipsilateral is present at normal levels  LEFT:   Contralateral is too noisy     Speech Reception Threshold:    RIGHT: 25 dB HL    LEFT:   15 dB HL    Word Recognition Score:     RIGHT: 100% at 65 dB HL using NU-6 recorded word list.    LEFT:   100% at 55 dB HL using NU-6 recorded word list.    AUDIOLOGY REPORT: HEARING AID RECHECK    Procedures:       SIDE: Right    : Oticon    TYPE: More 3R RITE    S/N: [id number]    WARRANTY: 4/24/2024    Gain was increased 1 step from 1000 through 5000 Hz for all input levels. The firmware was updated as well.     ASSESSMENT:   Bilateral asymmetric sensorineural hearing loss      Compared to patient's previous audiogram dated 5/12/2021, hearing  has improved slightly in the right ear. Today s results were discussed with the patient in detail.     PLAN:  Patient was counseled regarding hearing loss and impact on communication. It is recommended that the patient return as needed for hearing aid needs.  Please call this clinic with questions regarding these results or recommendations.    Hardy Noriega CCC-A  Licensed Audiologist #[id number]  11/8/2022

## 2022-11-08 NOTE — LETTER
11/8/2022         RE: Laura Jackson  [address]  [place] [address]        Dear Colleague,    Thank you for referring your patient, Laura Jackson, to the [facility]. Please see a copy of my visit note below.    Subjective:    8/18/22   Patient seen as a new patient consult from [facility] and is seen today  for right ankle sprain.  Had inversion sprain 9 days ago.  Was in [place] at the time and slipped on stairs with gravel on them.  Pain and swelling and bruising.  Aggravated by activity and relieved by rest.  Fortunately she had an ankle brace with her and wear this the rest of the trip.  Recently seen in clinic in the [place] and had x-rays and given an Aircast.  She states this is more comfortable for walking at this time.  Denies erythema weakness or increased deformity.  Denies numbness.  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    9/8/22 patient returns for right ankle sprain  Sprained ankle approximately 1 month ago on August 10, 2022.  She has not been working yet.  Wearing Aircast intermittently around the house.  States feeling better but still painful.  Patient states that before this happened for the last year she has been having pain and perhaps some instability on her lateral ankle as well.    9/22/22 patient is 6 weeks 1 day status post right ankle sprain on August 10, 2022.  Patient states has been wearing boot more and believes this is helped.  Pain is decreasing.  Edema is decreasing.  Has tried walking and ankle brace and somewhat uncomfortable.  She is still not working.  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       10/11/22 patient is 8 weeks 6 days status post right ankle sprain.  Patient has been going to physical therapy and this has helped.  Generally she states pain and swelling decreasing.  She is seeing gradual improvement.  States still feels unstable.  States recently had slight eversion  causing some medial pain.    11/8/22 patient is 12 weeks 6 days status post right ankle sprain.  Patient feeling better but still some side to side and stability.  Dorsiflexion and plantarflexion fine.  States edema generally decreasing.  She does not feel she is ready to go back to work yet.  States she is getting some numbness fourth and fifth toes on her display these.      ROS: See above         Allergies   Allergen Reactions     Penicillins Swelling     Swelling throat; age 4     Adhesive Tape Hives     Penicillin G      Tetracycline GI Disturbance     Other reaction(s): Abdominal Pain  Vomiting; nauseous  Other reaction(s): Abdominal Pain  Vomiting; nauseous       Current Outpatient Medications   Medication Sig Dispense Refill     Cholecalciferol (VITAMIN D) 2000 UNITS CAPS Take 1 capsule by mouth daily       famotidine (PEPCID) 40 MG tablet Take 1 tablet (40 mg) by mouth At Bedtime 90 tablet 3     fluticasone-salmeterol (ADVAIR) 100-50 MCG/ACT inhaler Inhale 1 puff into the lungs every 12 hours 60 each 3     ivabradine (CORLANOR) 5 MG tablet Take 1 tablet (5 mg) by mouth 2 times daily (with meals) 720 tablet 0     levalbuterol (XOPENEX HFA) 45 MCG/ACT inhaler Inhale 1-2 puffs into the lungs every 4 hours as needed for shortness of breath / dyspnea 15 g 0     metroNIDAZOLE (METROGEL) 0.75 % external gel Apply to face BID 45 g 11     omeprazole (PRILOSEC) 40 MG DR capsule Take 1 capsule (40 mg) by mouth daily before breakfast 90 capsule 3     Pyridoxine HCl (B-6) 100 MG TABS        vitamin B-12 (CYANOCOBALAMIN) 100 MCG tablet          Patient Active Problem List   Diagnosis     CARDIOVASCULAR SCREENING; LDL GOAL LESS THAN 160     Irritable bowel syndrome     GERD (gastroesophageal reflux disease)     History of supraventricular tachycardia     Mild persistent asthma     Family history of breast cancer     S/P myomectomy     Nausea     S/P ANAID (total abdominal hysterectomy)     Hypovitaminosis D      Endometriosis     Heberden's nodes     POTS (postural orthostatic tachycardia syndrome)     Hypermobility syndrome     Cervicalgia     Autonomic dysfunction     Benign essential hypertension     Back pain     Sprain of anterior talofibular ligament of right ankle     Pain in joint involving ankle and foot, right       Past Medical History:   Diagnosis Date     Benign essential hypertension 7/31/2018     Family history of breast cancer 2/19/2014     Family history of breast cancer      SVT (supraventricular tachycardia):  history of, no recurrence since 2008 10/11/2013    no recurrence since 2008      Uncomplicated asthma        Past Surgical History:   Procedure Laterality Date     ABDOMEN SURGERY  6/2017    myectomy     APPENDECTOMY       COLONOSCOPY N/A 8/20/2018    Procedure: COMBINED COLONOSCOPY, SINGLE OR MULTIPLE BIOPSY/POLYPECTOMY BY BIOPSY;;  Surgeon: Osman Hahn MD;  Location: [facility] OR     COLONOSCOPY WITH CO2 INSUFFLATION N/A 8/20/2018    Procedure: COLONOSCOPY WITH CO2 INSUFFLATION;  COLON-SCREENING / LUBKA ;  Surgeon: Osman Hahn MD;  Location: [facility] OR     DAVINCI HYSTERECTOMY TOTAL, SALPINGECTOMY BILATERAL N/A 8/4/2017    Procedure: DAVINCI XI HYSTERECTOMY TOTAL, SALPINGECTOMY BILATERAL;  DAVINCI TOTAL HYSTERECTOMY; BILATERAL SALPINGECTOMY. BILATERAL URETERAL LYSIS. UTEROSACRAL-COLPOPEXY. EXCISION OF ENDOMETRIOSIS;  Surgeon: George Loyola MD;  Location: [facility] OR     DAVINCI LYSIS OF ADHESIONS Bilateral 8/4/2017    Procedure: DAVINCI LYSIS OF ADHESIONS;  BILATERAL URETERAL LYSIS;  Surgeon: George Loyola MD;  Location: [facility]     DAVINCI SACROCOLPOPEXY, CYSTOSCOPY, COMBINED N/A 8/4/2017    Procedure: COMBINED DAVINCI SACROCOLPOPEXY, CYSTOSCOPY;  UTEROSACRAL COLPOPEXY;  Surgeon: George Loyola MD;  Location: [facility] OR     DAVINCI XI ASSISTED ABLATION / EXCISION OF ENDOMETRIOSIS  8/4/2017    Procedure: DAVINCI XI ASSISTED ABLATION / EXCISION OF ENDOMETRIOSIS;;  Surgeon: English  George Lou MD;  Location: [facility] OR     DILATION AND CURETTAGE N/A 6/20/2017    Procedure: DILATION AND CURETTAGE;  Uterine Curettings and Fibroid Removal, Cook catheter placement; (No hysterectomy done at this time);  Surgeon: Ernestina Saunders MD;  Location: [facility]     HYSTERECTOMY, PAP NO LONGER INDICATED       ORTHOPEDIC SURGERY  6/1986    Heel spur , toe surgery     RELEASE CARPAL TUNNEL Right 10/20/2020    Procedure: RIGHT RELEASE, CARPAL TUNNEL;  Surgeon: Rickey Rea MD;  Location: [facility] OR     RELEASE CARPAL TUNNEL Left 11/6/2020    Procedure: LEFT RELEASE, CARPAL TUNNEL;  Surgeon: Rickey Rea MD;  Location: [facility] OR     TONSILLECTOMY         Family History   Problem Relation Age of Onset     Diabetes Mother      Hypertension Mother      Hyperlipidemia Mother      Arrhythmia Mother      Cardiovascular Father         CHF and COPD     Asthma Father      Breast Cancer Maternal Grandfather      Colon Cancer Maternal Grandfather      Breast Cancer Paternal Grandmother      Breast Cancer Paternal Aunt      C.A.D. Paternal Grandfather      Breast Cancer Paternal Grandfather      Breast Cancer Cousin      Asthma Son      Diabetes Maternal Grandmother      Hypertension Maternal Grandmother      Breast Cancer Cousin      Breast Cancer Cousin      Breast Cancer Cousin        Social History     Tobacco Use     Smoking status: Never     Smokeless tobacco: Never   Substance Use Topics     Alcohol use: Yes     Comment: Rare- @1 month         Exam:    Vitals: LMP 07/28/2017 (Exact Date)   BMI: There is no height or weight on file to calculate BMI.  Height: Data Unavailable    Constitutional/ general:  Pt is in no apparent distress, appears well-nourished.  Cooperative with history and physical exam.     Psych:  The patient answered questions appropriately.  Normal affect.  Seems to have reasonable expectations, in terms of treatment.     Lungs:  Non labored breathing, non labored speech. No cough.  No audible  wheezing. Even, quiet breathing.       Vascular:  positive pedal pulses bilaterally for both the DP and PT arteries.  CFT < 3 sec.  negative ankle edema.  positive pedal hair growth.    Neuro:  Alert and oriented x 3. Coordinated gait.  Light touch sensation is intact to the L4, L5, S1 distributions. No obvious deficits.  No evidence of neurological-based weakness, spasticity, or contracture in the lower extremities.      Derm: Normal texture and turgor.  No erythema, ecchymosis, or cyanosis.      Musculoskeletal:    Lower extremity muscle strength is normal.   No gross deformities.  With weightbearing patient somewhat pronated.  Right lower extremity has increased internal tibial torsion.  negative ecchymosis  negative erythema  negative pain at TMTJs or styloid process.  negative Achilles or calcaneal tubercle pain  negative medial ankle pain  negative pain AITF ligament  negative pain with stressing or palpation peroneal tendons  negative peroneal subluxation  negative pain over medial or lateral malleoli    Minimal edema today.  Slight pain over ATFL.  Slight pain medial ankle but hard to localize.  No erythema or ecchymosis.  Is not able to splay fourth and fifth toes    Radiographic Exam:  X-Ray Findings:  I personally reviewed the films.    EXAM: XR ANKLE RIGHT G/E 3 VIEWS  LOCATION: [facility]  DATE/TIME: 8/16/2022 5:10 PM     INDICATION:  Ankle injury, right, initial encounter  COMPARISON: None.                                                                      IMPRESSION: Tiny linear flake fracture of the tip of the lateral malleolus. No additional fractures. Moderate soft tissue swelling along the lateral aspect of the ankle. Small plantar calcaneal spur.          Narrative & Impression   EXAM: MR ANKLE RIGHT W/O CONTRAST  LOCATION: [facility]  DATE/TIME: 9/13/2022 6:57 PM     INDICATION: Pain after injury  COMPARISON: None.  TECHNIQUE:  Unenhanced.     FINDINGS:      TENDONS:   -Peroneal: Mild peroneus brevis tendinopathy without tearing. The peroneus longus tendon is negative.  -Medial: Posterior tibialis tendon is intact. No tendinopathy or tenosynovitis. Flexor digitorum longus and flexor hallucis longus tendons are normal. No tenosynovitis.  -Anterior: Anterior tibialis, extensor hallucis longus, and extensor digitorum longus tendons are normal. No tenosynovitis.  -Achilles: No tendinopathy or paratenonitis.     LIGAMENTS:   -Anterior talofibular ligament: High-grade tear of the ATF.   -Calcaneofibular ligament: Grade I sprain of the calcaneofibular ligament without tearing.   -Posterior talofibular ligament: Intact.  -Syndesmotic inferior tibiofibular ligaments: Intact.  -Deltoid ligament complex: Intact.  -Spring ligament complex: Intact.     JOINTS AND BONES:   -The tibiotalar joint is intact. No osteochondral lesion. Tiny tibiotalar joint effusion. Mild reactive edema within the distal talus. Mild reactive edema within the cuboid at the calcaneocuboid articulation. No evidence for letha fracture.     SOFT TISSUES:  -Plantar fascia: Mild plantar calcaneal spurring. No significant signal abnormality. This is likely secondary to old resolved plantar fasciitis.  -Sinus tarsi and tarsal tunnel: Normal.  -Muscles: Normal.                                                                      IMPRESSION:  1.  Mild peroneus brevis tendinopathy without tearing.  2.  High-grade tear of the ATF.  3.  Grade I sprain of the calcaneofibular ligament.  4.  No additional tendinous or ligamentous pathology.  5.  Tiny tibiotalar joint effusion.  6.  Mild reactive edema distal talus.  7.  Mild reactive edema within the cuboid at the calcaneocuboid articulation.  8.  Plantar calcaneal spurring without signal abnormality is likely secondary to old resolved plantar fasciitis.           Assessment:  right ankle sprain     Plan: Discussed patient less edema and pain  than when I saw her last.  Discussed nerve which innervates abductor digit he minimi.  Will ice here to decrease edema.  We will give time to heal.  We will make sure she does not reinjure this.  We will use ankle brace inside and outside.  Continue physical therapy for strengthening.  Return to clinic in 4 weeks.      Vahid Montesinos DPM, FACFAS          Again, thank you for allowing me to participate in the care of your patient.        Sincerely,        Vahid Montesinos DPM

## 2022-11-08 NOTE — PROGRESS NOTES
Hand Therapy Progress/Discharge Note    Current Date:  11/8/2022    Reporting period is 10/6/2022 to 11/8/2022    Diagnosis:   Right forearm pain and ulnar neuropathy   DOI:  9/21/2022 (therapy referral)    Subjective:   Subjective changes noted by patient:  The arm is feeling pretty good and I used it a lot last week!  Functional changes noted by patient:  Improvement in Household Chores  Patient has noted adverse reaction to:  None    Functional Outcome Measure:  Upper Extremity Functional Index  SCORE:   Column Totals: 67/80  (A lower score indicates greater disability.)    Objective:  Pain Level (Scale 0-10)   10/6/2022 11/8/2022   At Rest 0 0   With Use 6 2     Pain Description  Date 10/6/2022 11/8/2022   Location Forearm extensors and LEP LEP and forearm   Pain Quality Sharp dull   Frequency intermittent   intermittent   Pain is worst  daytime daytime   Exacerbated by  varies with use lifting   Relieved by cold and rest rest   Progression Gradually improving  Improving     Posture  Forward Neck Posture and Rounded Forward Shoulders    Sensation  No longer decreased intermittently in Ulnar Nerve distribution per pt report     ROM  WNL's elbow, forearm and wrist, slight pain with elbow flexion and extension, most with wrist extension  Note: mild thenar atrophy, pt reports weakness affects dexterity    Strength   (Measured in pounds)  Pain Report: - none  + mild    ++ moderate    +++ severe    10/6/2022 10/6/2022 11/8/2022   Trials Left Right Right   1  2  3 56  49  47 30+  20+  20+ 40+ slight  39+ slight  36+ slight   Average 51 23 38       10/6/2022 10/6/2022 11/8/2022    Left Right Right   Elbow Ext 59 22++ 30+     Resisted Testing  Pain Report:  - none    + mild    ++ moderate    +++ severe    10/6/2022 11/8/2022   Elbow Extension - -   Elbow Flexion - -   Supination  + slight -   Pronation - -   Wrist Ext with RD, Elbow Ext ++ +   Wrist Ext with UD, Elbow Ext - +   Wrist Flex with RD, Elbow Ext - -    Wrist Flex with UD, Elbow Ext - -   EDC with Elbow Ext ++ -   Long Finger Test - -     Special Tests   - none    + mild    ++ moderate    +++ severe       10/6/2022 11/8/2022   Fred Test - NT   Elbow Flexion Test + 10 secs + slight   Tinels Cubital Tunnel - -   Tinels [place] - -   Median Nerve Compression at Pronator - -   Carpal-Compression Test - -   Tinels at Carpal Tunnel - -   Phalens - -     Neural Tension Testing  UNT: Ulnar Neurodynamic Test (based on WILIAN Judd's ULNT)   10/6/2022   0-5 Scale 5-/5   0/5: Arm across abdomen in coronal plane  1/5: ER to neutral, Abd shoulder to 45 degrees  2/5: R shoulder to 90 degrees  3/5: Block shoulder and ABD shoulder to 110 degrees  4/5: Fully pronate forearm, extend wrist and ring and small fingers  5/5: Flex elbow and bring hand to side of face  Notes:    (+) indicates beyond grade level but less than [facility] to next level    (-) indicates over [facility] to level    S1 onset/change of patient's symptoms    S2 definite stop point based on patient's discomfort level    Palpation  Pain Report:  - none    + mild    ++ moderate    +++ severe    10/6/2022 11/8/2022   Pec Major + slight + slight   Proximal Triceps - -   Spiral Groove - -   Distal Triceps - -   Anconeus - -   ECRB at LEP ++ -   ECU at LEP - + slight   EDC at LEP + -   Radial Head + slight -   Extensor Wad + -   PIN Site + + slight   MEP - -   Flexor Wad - -   Cubital Tunnel - -   Ulnar nerve subluxation at elbow - -     Please refer to the daily flowsheet for treatment provided today.     Assessment:  Response to therapy has been improvement to:  Flexibility:  less tightness in involved muscles  Strength:     Pain:  intensity of pain is decreased and less tender over affected area  Paresthesias:  Ulnar nerve - less intense numbness and tingling    Overall Assessment:  Patient's symptoms are resolving and patient is independent in home exercise program  STG/LTG:  STGoals have been reviewed and  progress or achievement has occurred;  see goal sheet for details and updates.  I have re-evaluated this patient and find that the nature, scope, duration and intensity of the therapy is appropriate for the medical condition of the patient.    Plan:  Frequency/Duration:  Discharge from Hand Therapy; continue home program.      Recommendations for Continued Therapy  Home Program:   Warmth for stiffness to forearm extensors  Ice to lateral elbow after activity for pain  TFM to LEP  MFR to forearm extensors with tennis ball, avoid PIN site  PROM with stretch to forearm extensors and flexors  Eccentric wrist extension strengthening  Postural exercises with chin tucks and scapular retraction   Progress to 3 position  strengthening   Pec stretches in doorway  Kinesiotaping to LEP and PIN site  Elbow strap/arm band as needed with activities, monitor for PIN site irritation  Wear OTC wrist splint sleeping  Avoid activities that exacerbate pain in the elbow  Lift with elbows at sides and palms up, avoid reaching with palm down  Avoid leaning on elbow and prolonged elbow flexion to avoid ulnar nerve irritation  Wear soft elbow flexion block sleeping  Oval 8 for thumb IP hyperextension

## 2022-11-14 ENCOUNTER — THERAPY VISIT (OUTPATIENT)
Dept: PHYSICAL THERAPY | Facility: CLINIC | Age: 52
End: 2022-11-14
Payer: COMMERCIAL

## 2022-11-14 DIAGNOSIS — M25.571 PAIN IN JOINT INVOLVING ANKLE AND FOOT, RIGHT: ICD-10-CM

## 2022-11-14 DIAGNOSIS — S93.491D SPRAIN OF ANTERIOR TALOFIBULAR LIGAMENT OF RIGHT ANKLE, SUBSEQUENT ENCOUNTER: Primary | ICD-10-CM

## 2022-11-14 PROCEDURE — 97112 NEUROMUSCULAR REEDUCATION: CPT | Mod: GP | Performed by: PHYSICAL THERAPIST

## 2022-11-14 PROCEDURE — 97110 THERAPEUTIC EXERCISES: CPT | Mod: GP | Performed by: PHYSICAL THERAPIST

## 2022-11-15 ENCOUNTER — THERAPY VISIT (OUTPATIENT)
Dept: PHYSICAL THERAPY | Facility: CLINIC | Age: 52
End: 2022-11-15
Payer: COMMERCIAL

## 2022-11-15 DIAGNOSIS — M54.2 CERVICALGIA: Primary | ICD-10-CM

## 2022-11-15 DIAGNOSIS — M54.89 OTHER BACK PAIN, UNSPECIFIED CHRONICITY: ICD-10-CM

## 2022-11-15 PROCEDURE — 97112 NEUROMUSCULAR REEDUCATION: CPT | Mod: GP | Performed by: PHYSICAL THERAPIST

## 2022-11-15 PROCEDURE — 97140 MANUAL THERAPY 1/> REGIONS: CPT | Mod: GP | Performed by: PHYSICAL THERAPIST

## 2022-11-20 ENCOUNTER — HEALTH MAINTENANCE LETTER (OUTPATIENT)
Age: 52
End: 2022-11-20

## 2022-11-22 ENCOUNTER — THERAPY VISIT (OUTPATIENT)
Dept: PHYSICAL THERAPY | Facility: CLINIC | Age: 52
End: 2022-11-22
Payer: COMMERCIAL

## 2022-11-22 DIAGNOSIS — M25.571 PAIN IN JOINT INVOLVING ANKLE AND FOOT, RIGHT: ICD-10-CM

## 2022-11-22 DIAGNOSIS — S93.491D SPRAIN OF ANTERIOR TALOFIBULAR LIGAMENT OF RIGHT ANKLE, SUBSEQUENT ENCOUNTER: Primary | ICD-10-CM

## 2022-11-22 PROCEDURE — 97110 THERAPEUTIC EXERCISES: CPT | Mod: GP | Performed by: PHYSICAL THERAPIST

## 2022-11-22 PROCEDURE — 97112 NEUROMUSCULAR REEDUCATION: CPT | Mod: GP | Performed by: PHYSICAL THERAPIST

## 2022-11-22 NOTE — PROGRESS NOTES
"Subjective:  HPI  Physical Exam                    Objective:  System    Physical Exam    General     ROS    Assessment/Plan:    PROGRESS  REPORT    Progress reporting period is from 9-29-22 to 11-22-22, 6 visits, 7 since SOC.       SUBJECTIVE  Subjective changes noted by patient:  .  Subjective: States she has a \"whole new pain\" medial R ankle which radiates anterior lower leg to about the knee-it started past Saturday(11-19) and can be/was debilitating. She did clean house but did not have any injury, it has gotten a little better since and is better in am,  getting worse as the day progresses. Still cant sleep at night without her elastic ankle support-she needs this to sleep since 11-2 when she restrained the ankle.  Able to be on feet about 4 hrs out of the day and then will need to rest(prior to this past weekend), has been using ice.     Current Pain level:  (3-7/10).      Initial Pain level: 5/10.   Changes in function:  Yes (See Goal flowsheet attached for changes in current functional level)  Adverse reaction to treatment or activity: activity - set back past weekend with medial ankle pain radiating up tibia, no injury    OBJECTIVE  Changes noted in objective findings:  Yes, though slow progress  Objective: No hand support needed for tandem stance today(significant improvement), very minimal support needed with SLS EO floor  Can now slightly move R lateral toes with concentration. Improving strength with theraband exercises. No limp with gait     ASSESSMENT/PLAN  Updated problem list and treatment plan: Diagnosis 1:  S/p R ankle sprain  Pain -  self management, education and home program  Decreased ROM/flexibility - therapeutic exercise and home program  Decreased strength - therapeutic exercise, therapeutic activities and home program  Impaired balance - neuro re-education, therapeutic activities and home program  Decreased proprioception - neuro re-education, therapeutic activities and home " program  Impaired muscle performance - neuro re-education and home program  Decreased function - therapeutic activities and home program  Instability -  Therapeutic Activity  Therapeutic Exercise  Splinting/Taping/Bracing/Orthotic  STG/LTGs have been met or progress has been made towards goals:  Yes (See Goal flow sheet completed today.)  Assessment of Progress: The patient's condition is improving.  The patient has had set backs in their progress.  Self Management Plans:  Patient has been instructed in a home treatment program.  Patient  has been instructed in self management of symptoms.  I have re-evaluated this patient and find that the nature, scope, duration and intensity of the therapy is appropriate for the medical condition of the patient.  Laura continues to require the following intervention to meet STG and LTG's:  PT    Recommendations:  This patient would benefit from continued therapy.     Frequency:  1 X week, once daily  Duration:  for 6-8 weeks        Please refer to the daily flowsheet for treatment today, total treatment time and time spent performing 1:1 timed codes.

## 2022-11-23 ENCOUNTER — THERAPY VISIT (OUTPATIENT)
Dept: PHYSICAL THERAPY | Facility: CLINIC | Age: 52
End: 2022-11-23
Payer: COMMERCIAL

## 2022-11-23 ENCOUNTER — E-VISIT (OUTPATIENT)
Dept: FAMILY MEDICINE | Facility: CLINIC | Age: 52
End: 2022-11-23

## 2022-11-23 DIAGNOSIS — R23.8 RASH, VESICULAR: ICD-10-CM

## 2022-11-23 DIAGNOSIS — M54.2 CERVICALGIA: Primary | ICD-10-CM

## 2022-11-23 DIAGNOSIS — M54.89 OTHER BACK PAIN, UNSPECIFIED CHRONICITY: ICD-10-CM

## 2022-11-23 DIAGNOSIS — B02.9 HERPES ZOSTER WITHOUT COMPLICATION: ICD-10-CM

## 2022-11-23 PROCEDURE — 97112 NEUROMUSCULAR REEDUCATION: CPT | Mod: GP | Performed by: PHYSICAL THERAPIST

## 2022-11-23 PROCEDURE — 97140 MANUAL THERAPY 1/> REGIONS: CPT | Mod: GP | Performed by: PHYSICAL THERAPIST

## 2022-11-23 PROCEDURE — 99421 OL DIG E/M SVC 5-10 MIN: CPT | Performed by: INTERNAL MEDICINE

## 2022-11-23 RX ORDER — VALACYCLOVIR HYDROCHLORIDE 1 G/1
1000 TABLET, FILM COATED ORAL 3 TIMES DAILY
Qty: 21 TABLET | Refills: 0 | Status: SHIPPED | OUTPATIENT
Start: 2022-11-23 | End: 2023-10-09

## 2022-11-23 NOTE — PATIENT INSTRUCTIONS
Thank you for choosing us for your care. I have placed an order for a prescription so that you can start treatment. View your full visit summary for details by clicking on the link below. Your pharmacist will able to address any questions you may have about the medication.     If you're not feeling better within 5-7 days, please schedule an appointment.  You can schedule an appointment right here in [facility], or call [phone]  If the visit is for the same symptoms as your eVisit, we'll refund the cost of your eVisit if seen within seven days.

## 2022-11-28 ENCOUNTER — THERAPY VISIT (OUTPATIENT)
Dept: PHYSICAL THERAPY | Facility: CLINIC | Age: 52
End: 2022-11-28
Payer: COMMERCIAL

## 2022-11-28 DIAGNOSIS — S93.491D SPRAIN OF ANTERIOR TALOFIBULAR LIGAMENT OF RIGHT ANKLE, SUBSEQUENT ENCOUNTER: Primary | ICD-10-CM

## 2022-11-28 DIAGNOSIS — M25.571 PAIN IN JOINT INVOLVING ANKLE AND FOOT, RIGHT: ICD-10-CM

## 2022-11-28 PROCEDURE — 97110 THERAPEUTIC EXERCISES: CPT | Mod: GP | Performed by: PHYSICAL THERAPIST

## 2022-11-28 PROCEDURE — 97140 MANUAL THERAPY 1/> REGIONS: CPT | Mod: GP | Performed by: PHYSICAL THERAPIST

## 2022-11-28 PROCEDURE — 97112 NEUROMUSCULAR REEDUCATION: CPT | Mod: GP | Performed by: PHYSICAL THERAPIST

## 2022-12-01 ENCOUNTER — OFFICE VISIT (OUTPATIENT)
Dept: PODIATRY | Facility: CLINIC | Age: 52
End: 2022-12-01
Payer: COMMERCIAL

## 2022-12-01 DIAGNOSIS — M21.6X2 PRONATION OF BOTH FEET: ICD-10-CM

## 2022-12-01 DIAGNOSIS — M21.6X1 PRONATION OF BOTH FEET: ICD-10-CM

## 2022-12-01 DIAGNOSIS — S93.491A SPRAIN OF ANTERIOR TALOFIBULAR LIGAMENT OF RIGHT ANKLE, INITIAL ENCOUNTER: Primary | ICD-10-CM

## 2022-12-01 PROBLEM — R14.0 ABDOMINAL BLOATING: Status: ACTIVE | Noted: 2022-12-01

## 2022-12-01 PROBLEM — E03.9 HYPOTHYROIDISM: Status: ACTIVE | Noted: 2022-12-01

## 2022-12-01 PROBLEM — R29.898 RANGE OF JOINT MOVEMENT INCREASED: Status: ACTIVE | Noted: 2018-09-19

## 2022-12-01 PROCEDURE — 99213 OFFICE O/P EST LOW 20 MIN: CPT | Performed by: PODIATRIST

## 2022-12-01 NOTE — PATIENT INSTRUCTIONS
We wish you continued good healing. If you have any questions or concerns, please do not hesitate to contact us at  [phone]    [employer] (secure e-mail communication and access to your chart) to send a message or to make an appointment.    Please remember to call and schedule a follow up appointment if one was recommended at your earliest convenience.     PODIATRY CLINIC HOURS  TELEPHONE NUMBER    Dr. Vahid KAURPJULIENNE [facility]        Clinics[facility]  [facility]   [place]  Tuesday 1PM-6PM  [place]  Wednesday 745AM-330PM  [place]  Thursday/Friday 745AM-230PM       EMILY APPOINTMENTS  [phone]    [place] APPOINTMENTS  [phone]        If you need a medication refill, please contact us you may need lab work and/or a follow up visit prior to your refill (i.e. Antifungal medications).  If MRI needed please call Imaging at [phone]   HOW DO I GET MY KNEE SCOOTER? Knee scooters can be picked up at ANY Medical Supply stores with your knee scooter Prescription.  OR  Bring your signed prescription to an [facility] Medical Equipment showroom.

## 2022-12-01 NOTE — LETTER
12/1/2022         RE: Laura Jackson  [address]  [place] [address]        Dear Colleague,    Thank you for referring your patient, Laura Jackson, to the [facility]. Please see a copy of my visit note below.    Subjective:    8/18/22   Patient seen as a new patient consult from [facility] and is seen today  for right ankle sprain.  Had inversion sprain 9 days ago.  Was in [place] at the time and slipped on stairs with gravel on them.  Pain and swelling and bruising.  Aggravated by activity and relieved by rest.  Fortunately she had an ankle brace with her and wear this the rest of the trip.  Recently seen in clinic in the [place] and had x-rays and given an Aircast.  She states this is more comfortable for walking at this time.  Denies erythema weakness or increased deformity.  Denies numbness.  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    9/8/22 patient returns for right ankle sprain  Sprained ankle approximately 1 month ago on August 10, 2022.  She has not been working yet.  Wearing Aircast intermittently around the house.  States feeling better but still painful.  Patient states that before this happened for the last year she has been having pain and perhaps some instability on her lateral ankle as well.    9/22/22 patient is 6 weeks 1 day status post right ankle sprain on August 10, 2022.  Patient states has been wearing boot more and believes this is helped.  Pain is decreasing.  Edema is decreasing.  Has tried walking and ankle brace and somewhat uncomfortable.  She is still not working.  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       10/11/22 patient is 8 weeks 6 days status post right ankle sprain.  Patient has been going to physical therapy and this has helped.  Generally she states pain and swelling decreasing.  She is seeing gradual improvement.  States still feels unstable.  States recently had slight eversion  causing some medial pain.    11/8/22 patient is 12 weeks 6 days status post right ankle sprain.  Patient feeling better but still some side to side and stability.  Dorsiflexion and plantarflexion fine.  States edema generally decreasing.  She does not feel she is ready to go back to work yet.  States she is getting some numbness fourth and fifth toes on her display these.    12/1/22 patient is approximately 16 weeks status post right ankle sprain.  Since last visit she is only been walking and ankle brace.  She is using good tennis shoes and arch supports.  She states she is getting more pain she points to both medial lateral and anterior.  Hard to localize.  No increase in swelling.  She states she has been going to physical therapy and her foot is feeling stronger.  Denies any weakness or increased deformity.         ROS: See above         Allergies   Allergen Reactions     Penicillins Swelling     Swelling throat; age 4     Adhesive Tape Hives     Penicillin G      Tetracycline GI Disturbance     Other reaction(s): Abdominal Pain  Vomiting; nauseous  Other reaction(s): Abdominal Pain  Vomiting; nauseous       Current Outpatient Medications   Medication Sig Dispense Refill     Cholecalciferol (VITAMIN D) 2000 UNITS CAPS Take 1 capsule by mouth daily       famotidine (PEPCID) 40 MG tablet Take 1 tablet (40 mg) by mouth At Bedtime 90 tablet 3     fluticasone-salmeterol (ADVAIR) 100-50 MCG/ACT inhaler Inhale 1 puff into the lungs every 12 hours 60 each 3     ivabradine (CORLANOR) 5 MG tablet Take 1 tablet (5 mg) by mouth 2 times daily (with meals) 180 tablet 1     levalbuterol (XOPENEX HFA) 45 MCG/ACT inhaler Inhale 1-2 puffs into the lungs every 4 hours as needed for shortness of breath / dyspnea 15 g 0     metroNIDAZOLE (METROGEL) 0.75 % external gel Apply to face BID 45 g 11     omeprazole (PRILOSEC) 40 MG DR capsule Take 1 capsule (40 mg) by mouth daily before breakfast 90 capsule 3     Pyridoxine HCl (B-6) 100  MG TABS        valACYclovir (VALTREX) 1000 mg tablet Take 1 tablet (1,000 mg) by mouth 3 times daily 21 tablet 0     vitamin B-12 (CYANOCOBALAMIN) 100 MCG tablet          Patient Active Problem List   Diagnosis     CARDIOVASCULAR SCREENING; LDL GOAL LESS THAN 160     Irritable bowel syndrome     GERD (gastroesophageal reflux disease)     History of supraventricular tachycardia     Mild persistent asthma     Family history of breast cancer     S/P myomectomy     Nausea     S/P ANAID (total abdominal hysterectomy)     Hypovitaminosis D     Endometriosis     Heberden's nodes     POTS (postural orthostatic tachycardia syndrome)     Hypermobility syndrome     Cervicalgia     Autonomic dysfunction     Benign essential hypertension     Back pain     Sprain of anterior talofibular ligament of right ankle     Pain in joint involving ankle and foot, right     Uterine leiomyoma     Range of joint movement increased     Hypothyroidism     Heartburn     Exercise-induced bronchospasm     Elevated TSH     Normal delivery     Complete spontaneous      Cardiac dysrhythmia     Abdominal bloating       Past Medical History:   Diagnosis Date     Benign essential hypertension 2018     Family history of breast cancer 2014     Family history of breast cancer      Hypothyroidism 2022     SVT (supraventricular tachycardia):  history of, no recurrence since 2008 10/11/2013    no recurrence since       Uncomplicated asthma        Past Surgical History:   Procedure Laterality Date     ABDOMEN SURGERY  2017    myectomy     APPENDECTOMY       COLONOSCOPY N/A 2018    Procedure: COMBINED COLONOSCOPY, SINGLE OR MULTIPLE BIOPSY/POLYPECTOMY BY BIOPSY;;  Surgeon: Osman Hahn MD;  Location: [facility] OR     COLONOSCOPY WITH CO2 INSUFFLATION N/A 2018    Procedure: COLONOSCOPY WITH CO2 INSUFFLATION;  COLON-SCREENING / LUBKA ;  Surgeon: Osman Hahn MD;  Location: [facility] OR     DAVINCI HYSTERECTOMY TOTAL,  SALPINGECTOMY BILATERAL N/A 8/4/2017    Procedure: DAVINCI XI HYSTERECTOMY TOTAL, SALPINGECTOMY BILATERAL;  DAVINCI TOTAL HYSTERECTOMY; BILATERAL SALPINGECTOMY. BILATERAL URETERAL LYSIS. UTEROSACRAL-COLPOPEXY. EXCISION OF ENDOMETRIOSIS;  Surgeon: George Loyola MD;  Location: [facility] OR     DAVINCI LYSIS OF ADHESIONS Bilateral 8/4/2017    Procedure: DAVINCI LYSIS OF ADHESIONS;  BILATERAL URETERAL LYSIS;  Surgeon: George Loyola MD;  Location: [facility]     DAVINCI SACROCOLPOPEXY, CYSTOSCOPY, COMBINED N/A 8/4/2017    Procedure: COMBINED DAVINCI SACROCOLPOPEXY, CYSTOSCOPY;  UTEROSACRAL COLPOPEXY;  Surgeon: George Loyola MD;  Location: [facility] OR     DAVINCI XI ASSISTED ABLATION / EXCISION OF ENDOMETRIOSIS  8/4/2017    Procedure: DAVINCI XI ASSISTED ABLATION / EXCISION OF ENDOMETRIOSIS;;  Surgeon: George Loyola MD;  Location: [facility]     DILATION AND CURETTAGE N/A 6/20/2017    Procedure: DILATION AND CURETTAGE;  Uterine Curettings and Fibroid Removal, Cook catheter placement; (No hysterectomy done at this time);  Surgeon: Ernestina Saunders MD;  Location: [facility]     HYSTERECTOMY, PAP NO LONGER INDICATED       ORTHOPEDIC SURGERY  6/1986    Heel spur , toe surgery     RELEASE CARPAL TUNNEL Right 10/20/2020    Procedure: RIGHT RELEASE, CARPAL TUNNEL;  Surgeon: Rickey Rea MD;  Location: [facility] OR     RELEASE CARPAL TUNNEL Left 11/6/2020    Procedure: LEFT RELEASE, CARPAL TUNNEL;  Surgeon: Rickey Rea MD;  Location: [facility] OR     TONSILLECTOMY         Family History   Problem Relation Age of Onset     Diabetes Mother      Hypertension Mother      Hyperlipidemia Mother      Arrhythmia Mother      Cardiovascular Father         CHF and COPD     Asthma Father      Breast Cancer Maternal Grandfather      Colon Cancer Maternal Grandfather      Breast Cancer Paternal Grandmother      Breast Cancer Paternal Aunt      C.A.D. Paternal Grandfather      Breast Cancer Paternal Grandfather      Breast Cancer Cousin       Asthma Son      Diabetes Maternal Grandmother      Hypertension Maternal Grandmother      Breast Cancer Cousin      Breast Cancer Cousin      Breast Cancer Cousin        Social History     Tobacco Use     Smoking status: Never     Smokeless tobacco: Never   Substance Use Topics     Alcohol use: Yes     Comment: Rare- @1 month         Exam:    Vitals: LMP 07/28/2017 (Exact Date)   BMI: There is no height or weight on file to calculate BMI.  Height: Data Unavailable    Constitutional/ general:  Pt is in no apparent distress, appears well-nourished.  Cooperative with history and physical exam.     Psych:  The patient answered questions appropriately.  Normal affect.  Seems to have reasonable expectations, in terms of treatment.     Lungs:  Non labored breathing, non labored speech. No cough.  No audible wheezing. Even, quiet breathing.       Vascular:  positive pedal pulses bilaterally for both the DP and PT arteries.  CFT < 3 sec.  negative ankle edema.  positive pedal hair growth.    Neuro:  Alert and oriented x 3. Coordinated gait.  Light touch sensation is intact to the L4, L5, S1 distributions. No obvious deficits.  No evidence of neurological-based weakness, spasticity, or contracture in the lower extremities.      Derm: Normal texture and turgor.  No erythema, ecchymosis, or cyanosis.      Musculoskeletal:    Lower extremity muscle strength is normal.   No gross deformities.  With weightbearing patient somewhat pronated.  Right lower extremity has increased internal tibial torsion.  negative ecchymosis  negative erythema  negative pain at TMTJs or styloid process.  negative Achilles or calcaneal tubercle pain  negative medial ankle pain  negative pain AITF ligament  negative pain with stressing or palpation peroneal tendons  negative peroneal subluxation  negative pain over medial or lateral malleoli    No edema noted.  Slight pain lateral medial anterior ankle and hard to localize.  No pain with stressing  peroneus longus or peroneus brevis.  No pain with stressing extensor tendons.  No pain over posterior tibial tendon.    Radiographic Exam:  X-Ray Findings:  I personally reviewed the films.    EXAM: XR ANKLE RIGHT G/E 3 VIEWS  LOCATION: [facility]  DATE/TIME: 8/16/2022 5:10 PM     INDICATION:  Ankle injury, right, initial encounter  COMPARISON: None.                                                                      IMPRESSION: Tiny linear flake fracture of the tip of the lateral malleolus. No additional fractures. Moderate soft tissue swelling along the lateral aspect of the ankle. Small plantar calcaneal spur.          Narrative & Impression   EXAM: MR ANKLE RIGHT W/O CONTRAST  LOCATION: [facility]  DATE/TIME: 9/13/2022 6:57 PM     INDICATION: Pain after injury  COMPARISON: None.  TECHNIQUE: Unenhanced.     FINDINGS:      TENDONS:   -Peroneal: Mild peroneus brevis tendinopathy without tearing. The peroneus longus tendon is negative.  -Medial: Posterior tibialis tendon is intact. No tendinopathy or tenosynovitis. Flexor digitorum longus and flexor hallucis longus tendons are normal. No tenosynovitis.  -Anterior: Anterior tibialis, extensor hallucis longus, and extensor digitorum longus tendons are normal. No tenosynovitis.  -Achilles: No tendinopathy or paratenonitis.     LIGAMENTS:   -Anterior talofibular ligament: High-grade tear of the ATF.   -Calcaneofibular ligament: Grade I sprain of the calcaneofibular ligament without tearing.   -Posterior talofibular ligament: Intact.  -Syndesmotic inferior tibiofibular ligaments: Intact.  -Deltoid ligament complex: Intact.  -Spring ligament complex: Intact.     JOINTS AND BONES:   -The tibiotalar joint is intact. No osteochondral lesion. Tiny tibiotalar joint effusion. Mild reactive edema within the distal talus. Mild reactive edema within the cuboid at the calcaneocuboid articulation. No evidence for letha  fracture.     SOFT TISSUES:  -Plantar fascia: Mild plantar calcaneal spurring. No significant signal abnormality. This is likely secondary to old resolved plantar fasciitis.  -Sinus tarsi and tarsal tunnel: Normal.  -Muscles: Normal.                                                                      IMPRESSION:  1.  Mild peroneus brevis tendinopathy without tearing.  2.  High-grade tear of the ATF.  3.  Grade I sprain of the calcaneofibular ligament.  4.  No additional tendinous or ligamentous pathology.  5.  Tiny tibiotalar joint effusion.  6.  Mild reactive edema distal talus.  7.  Mild reactive edema within the cuboid at the calcaneocuboid articulation.  8.  Plantar calcaneal spurring without signal abnormality is likely secondary to old resolved plantar fasciitis.           Assessment:  right ankle sprain     Plan: Discussed patient may have tried to walk too aggressively and ankle brace.  Will wear walking boot for 2 days to rest and then try ankle brace again.  She will slowly increase activities.  We will continue to strengthen foot and continue physical therapy.  Continue compression.  She takes ibuprofen occasionally for pain.  We will stop if any GI distress.  Patient will be careful not to reinjure this.  Return to clinic in 4 weeks.    Vahid Montesinos DPM, FACFAS          Again, thank you for allowing me to participate in the care of your patient.        Sincerely,        Vahid Montesinos DPM

## 2022-12-01 NOTE — PROGRESS NOTES
Subjective:    8/18/22   Patient seen as a new patient consult from [facility] and is seen today  for right ankle sprain.  Had inversion sprain 9 days ago.  Was in [place] at the time and slipped on stairs with gravel on them.  Pain and swelling and bruising.  Aggravated by activity and relieved by rest.  Fortunately she had an ankle brace with her and wear this the rest of the trip.  Recently seen in clinic in the [place] and had x-rays and given an Aircast.  She states this is more comfortable for walking at this time.  Denies erythema weakness or increased deformity.  Denies numbness.  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    9/8/22 patient returns for right ankle sprain  Sprained ankle approximately 1 month ago on August 10, 2022.  She has not been working yet.  Wearing Aircast intermittently around the house.  States feeling better but still painful.  Patient states that before this happened for the last year she has been having pain and perhaps some instability on her lateral ankle as well.    9/22/22 patient is 6 weeks 1 day status post right ankle sprain on August 10, 2022.  Patient states has been wearing boot more and believes this is helped.  Pain is decreasing.  Edema is decreasing.  Has tried walking and ankle brace and somewhat uncomfortable.  She is still not working.  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       10/11/22 patient is 8 weeks 6 days status post right ankle sprain.  Patient has been going to physical therapy and this has helped.  Generally she states pain and swelling decreasing.  She is seeing gradual improvement.  States still feels unstable.  States recently had slight eversion causing some medial pain.    11/8/22 patient is 12 weeks 6 days status post right ankle sprain.  Patient feeling better but still some side to side and stability.  Dorsiflexion and plantarflexion fine.  States edema generally decreasing.  She does not  feel she is ready to go back to work yet.  States she is getting some numbness fourth and fifth toes on her display these.    12/1/22 patient is approximately 16 weeks status post right ankle sprain.  Since last visit she is only been walking and ankle brace.  She is using good tennis shoes and arch supports.  She states she is getting more pain she points to both medial lateral and anterior.  Hard to localize.  No increase in swelling.  She states she has been going to physical therapy and her foot is feeling stronger.  Denies any weakness or increased deformity.         ROS: See above         Allergies   Allergen Reactions     Penicillins Swelling     Swelling throat; age 4     Adhesive Tape Hives     Penicillin G      Tetracycline GI Disturbance     Other reaction(s): Abdominal Pain  Vomiting; nauseous  Other reaction(s): Abdominal Pain  Vomiting; nauseous       Current Outpatient Medications   Medication Sig Dispense Refill     Cholecalciferol (VITAMIN D) 2000 UNITS CAPS Take 1 capsule by mouth daily       famotidine (PEPCID) 40 MG tablet Take 1 tablet (40 mg) by mouth At Bedtime 90 tablet 3     fluticasone-salmeterol (ADVAIR) 100-50 MCG/ACT inhaler Inhale 1 puff into the lungs every 12 hours 60 each 3     ivabradine (CORLANOR) 5 MG tablet Take 1 tablet (5 mg) by mouth 2 times daily (with meals) 180 tablet 1     levalbuterol (XOPENEX HFA) 45 MCG/ACT inhaler Inhale 1-2 puffs into the lungs every 4 hours as needed for shortness of breath / dyspnea 15 g 0     metroNIDAZOLE (METROGEL) 0.75 % external gel Apply to face BID 45 g 11     omeprazole (PRILOSEC) 40 MG DR capsule Take 1 capsule (40 mg) by mouth daily before breakfast 90 capsule 3     Pyridoxine HCl (B-6) 100 MG TABS        valACYclovir (VALTREX) 1000 mg tablet Take 1 tablet (1,000 mg) by mouth 3 times daily 21 tablet 0     vitamin B-12 (CYANOCOBALAMIN) 100 MCG tablet          Patient Active Problem List   Diagnosis     CARDIOVASCULAR SCREENING; LDL GOAL  LESS THAN 160     Irritable bowel syndrome     GERD (gastroesophageal reflux disease)     History of supraventricular tachycardia     Mild persistent asthma     Family history of breast cancer     S/P myomectomy     Nausea     S/P ANAID (total abdominal hysterectomy)     Hypovitaminosis D     Endometriosis     Heberden's nodes     POTS (postural orthostatic tachycardia syndrome)     Hypermobility syndrome     Cervicalgia     Autonomic dysfunction     Benign essential hypertension     Back pain     Sprain of anterior talofibular ligament of right ankle     Pain in joint involving ankle and foot, right     Uterine leiomyoma     Range of joint movement increased     Hypothyroidism     Heartburn     Exercise-induced bronchospasm     Elevated TSH     Normal delivery     Complete spontaneous      Cardiac dysrhythmia     Abdominal bloating       Past Medical History:   Diagnosis Date     Benign essential hypertension 2018     Family history of breast cancer 2014     Family history of breast cancer      Hypothyroidism 2022     SVT (supraventricular tachycardia):  history of, no recurrence since 2008 10/11/2013    no recurrence since       Uncomplicated asthma        Past Surgical History:   Procedure Laterality Date     ABDOMEN SURGERY  2017    myectomy     APPENDECTOMY       COLONOSCOPY N/A 2018    Procedure: COMBINED COLONOSCOPY, SINGLE OR MULTIPLE BIOPSY/POLYPECTOMY BY BIOPSY;;  Surgeon: Osman Hahn MD;  Location: [facility]     COLONOSCOPY WITH CO2 INSUFFLATION N/A 2018    Procedure: COLONOSCOPY WITH CO2 INSUFFLATION;  COLON-SCREENING / LUBKA ;  Surgeon: Osman Hahn MD;  Location: [facility] OR     DAVINCI HYSTERECTOMY TOTAL, SALPINGECTOMY BILATERAL N/A 2017    Procedure: DAVINCI XI HYSTERECTOMY TOTAL, SALPINGECTOMY BILATERAL;  DAVINCI TOTAL HYSTERECTOMY; BILATERAL SALPINGECTOMY. BILATERAL URETERAL LYSIS. UTEROSACRAL-COLPOPEXY. EXCISION OF ENDOMETRIOSIS;  Surgeon:  George Loyola MD;  Location: [facility] OR     DAVINCI LYSIS OF ADHESIONS Bilateral 8/4/2017    Procedure: DAVINCI LYSIS OF ADHESIONS;  BILATERAL URETERAL LYSIS;  Surgeon: George Loyola MD;  Location: [facility]     DAVINCI SACROCOLPOPEXY, CYSTOSCOPY, COMBINED N/A 8/4/2017    Procedure: COMBINED DAVINCI SACROCOLPOPEXY, CYSTOSCOPY;  UTEROSACRAL COLPOPEXY;  Surgeon: George Loyola MD;  Location: [facility] OR     DAVINCI XI ASSISTED ABLATION / EXCISION OF ENDOMETRIOSIS  8/4/2017    Procedure: DAVINCI XI ASSISTED ABLATION / EXCISION OF ENDOMETRIOSIS;;  Surgeon: George Loyola MD;  Location: [facility]     DILATION AND CURETTAGE N/A 6/20/2017    Procedure: DILATION AND CURETTAGE;  Uterine Curettings and Fibroid Removal, Cook catheter placement; (No hysterectomy done at this time);  Surgeon: Ernestina Saunders MD;  Location: [facility]     HYSTERECTOMY, PAP NO LONGER INDICATED       ORTHOPEDIC SURGERY  6/1986    Heel spur , toe surgery     RELEASE CARPAL TUNNEL Right 10/20/2020    Procedure: RIGHT RELEASE, CARPAL TUNNEL;  Surgeon: Rickey Rea MD;  Location: [facility] OR     RELEASE CARPAL TUNNEL Left 11/6/2020    Procedure: LEFT RELEASE, CARPAL TUNNEL;  Surgeon: Rickey Rea MD;  Location: [facility] OR     TONSILLECTOMY         Family History   Problem Relation Age of Onset     Diabetes Mother      Hypertension Mother      Hyperlipidemia Mother      Arrhythmia Mother      Cardiovascular Father         CHF and COPD     Asthma Father      Breast Cancer Maternal Grandfather      Colon Cancer Maternal Grandfather      Breast Cancer Paternal Grandmother      Breast Cancer Paternal Aunt      C.A.D. Paternal Grandfather      Breast Cancer Paternal Grandfather      Breast Cancer Cousin      Asthma Son      Diabetes Maternal Grandmother      Hypertension Maternal Grandmother      Breast Cancer Cousin      Breast Cancer Cousin      Breast Cancer Cousin        Social History     Tobacco Use     Smoking status: Never     Smokeless  tobacco: Never   Substance Use Topics     Alcohol use: Yes     Comment: Rare- @1 month         Exam:    Vitals: LMP 07/28/2017 (Exact Date)   BMI: There is no height or weight on file to calculate BMI.  Height: Data Unavailable    Constitutional/ general:  Pt is in no apparent distress, appears well-nourished.  Cooperative with history and physical exam.     Psych:  The patient answered questions appropriately.  Normal affect.  Seems to have reasonable expectations, in terms of treatment.     Lungs:  Non labored breathing, non labored speech. No cough.  No audible wheezing. Even, quiet breathing.       Vascular:  positive pedal pulses bilaterally for both the DP and PT arteries.  CFT < 3 sec.  negative ankle edema.  positive pedal hair growth.    Neuro:  Alert and oriented x 3. Coordinated gait.  Light touch sensation is intact to the L4, L5, S1 distributions. No obvious deficits.  No evidence of neurological-based weakness, spasticity, or contracture in the lower extremities.      Derm: Normal texture and turgor.  No erythema, ecchymosis, or cyanosis.      Musculoskeletal:    Lower extremity muscle strength is normal.   No gross deformities.  With weightbearing patient somewhat pronated.  Right lower extremity has increased internal tibial torsion.  negative ecchymosis  negative erythema  negative pain at TMTJs or styloid process.  negative Achilles or calcaneal tubercle pain  negative medial ankle pain  negative pain AITF ligament  negative pain with stressing or palpation peroneal tendons  negative peroneal subluxation  negative pain over medial or lateral malleoli    No edema noted.  Slight pain lateral medial anterior ankle and hard to localize.  No pain with stressing peroneus longus or peroneus brevis.  No pain with stressing extensor tendons.  No pain over posterior tibial tendon.    Radiographic Exam:  X-Ray Findings:  I personally reviewed the films.    EXAM: XR ANKLE RIGHT G/E 3 VIEWS  LOCATION: [facility]  [facility]  DATE/TIME: 8/16/2022 5:10 PM     INDICATION:  Ankle injury, right, initial encounter  COMPARISON: None.                                                                      IMPRESSION: Tiny linear flake fracture of the tip of the lateral malleolus. No additional fractures. Moderate soft tissue swelling along the lateral aspect of the ankle. Small plantar calcaneal spur.          Narrative & Impression   EXAM: MR ANKLE RIGHT W/O CONTRAST  LOCATION: [facility]  DATE/TIME: 9/13/2022 6:57 PM     INDICATION: Pain after injury  COMPARISON: None.  TECHNIQUE: Unenhanced.     FINDINGS:      TENDONS:   -Peroneal: Mild peroneus brevis tendinopathy without tearing. The peroneus longus tendon is negative.  -Medial: Posterior tibialis tendon is intact. No tendinopathy or tenosynovitis. Flexor digitorum longus and flexor hallucis longus tendons are normal. No tenosynovitis.  -Anterior: Anterior tibialis, extensor hallucis longus, and extensor digitorum longus tendons are normal. No tenosynovitis.  -Achilles: No tendinopathy or paratenonitis.     LIGAMENTS:   -Anterior talofibular ligament: High-grade tear of the ATF.   -Calcaneofibular ligament: Grade I sprain of the calcaneofibular ligament without tearing.   -Posterior talofibular ligament: Intact.  -Syndesmotic inferior tibiofibular ligaments: Intact.  -Deltoid ligament complex: Intact.  -Spring ligament complex: Intact.     JOINTS AND BONES:   -The tibiotalar joint is intact. No osteochondral lesion. Tiny tibiotalar joint effusion. Mild reactive edema within the distal talus. Mild reactive edema within the cuboid at the calcaneocuboid articulation. No evidence for letha fracture.     SOFT TISSUES:  -Plantar fascia: Mild plantar calcaneal spurring. No significant signal abnormality. This is likely secondary to old resolved plantar fasciitis.  -Sinus tarsi and tarsal tunnel: Normal.  -Muscles: Normal.                                                                       IMPRESSION:  1.  Mild peroneus brevis tendinopathy without tearing.  2.  High-grade tear of the ATF.  3.  Grade I sprain of the calcaneofibular ligament.  4.  No additional tendinous or ligamentous pathology.  5.  Tiny tibiotalar joint effusion.  6.  Mild reactive edema distal talus.  7.  Mild reactive edema within the cuboid at the calcaneocuboid articulation.  8.  Plantar calcaneal spurring without signal abnormality is likely secondary to old resolved plantar fasciitis.           Assessment:  right ankle sprain     Plan: Discussed patient may have tried to walk too aggressively and ankle brace.  Will wear walking boot for 2 days to rest and then try ankle brace again.  She will slowly increase activities.  We will continue to strengthen foot and continue physical therapy.  Continue compression.  She takes ibuprofen occasionally for pain.  We will stop if any GI distress.  Patient will be careful not to reinjure this.  Return to clinic in 4 weeks.    Vahid Montesinos DPM, FACFAS

## 2022-12-06 ENCOUNTER — THERAPY VISIT (OUTPATIENT)
Dept: PHYSICAL THERAPY | Facility: CLINIC | Age: 52
End: 2022-12-06
Payer: COMMERCIAL

## 2022-12-06 DIAGNOSIS — S93.491D SPRAIN OF ANTERIOR TALOFIBULAR LIGAMENT OF RIGHT ANKLE, SUBSEQUENT ENCOUNTER: Primary | ICD-10-CM

## 2022-12-06 DIAGNOSIS — M25.571 PAIN IN JOINT INVOLVING ANKLE AND FOOT, RIGHT: ICD-10-CM

## 2022-12-06 PROCEDURE — 97110 THERAPEUTIC EXERCISES: CPT | Mod: GP | Performed by: PHYSICAL THERAPIST

## 2022-12-06 PROCEDURE — 97112 NEUROMUSCULAR REEDUCATION: CPT | Mod: GP | Performed by: PHYSICAL THERAPIST

## 2022-12-07 ENCOUNTER — THERAPY VISIT (OUTPATIENT)
Dept: PHYSICAL THERAPY | Facility: CLINIC | Age: 52
End: 2022-12-07
Payer: COMMERCIAL

## 2022-12-07 DIAGNOSIS — M54.89 OTHER BACK PAIN, UNSPECIFIED CHRONICITY: ICD-10-CM

## 2022-12-07 DIAGNOSIS — M54.2 CERVICALGIA: Primary | ICD-10-CM

## 2022-12-07 PROCEDURE — 97140 MANUAL THERAPY 1/> REGIONS: CPT | Mod: GP | Performed by: PHYSICAL THERAPIST

## 2022-12-19 ENCOUNTER — THERAPY VISIT (OUTPATIENT)
Dept: PHYSICAL THERAPY | Facility: CLINIC | Age: 52
End: 2022-12-19
Payer: COMMERCIAL

## 2022-12-19 DIAGNOSIS — M25.571 PAIN IN JOINT INVOLVING ANKLE AND FOOT, RIGHT: ICD-10-CM

## 2022-12-19 DIAGNOSIS — S93.491D SPRAIN OF ANTERIOR TALOFIBULAR LIGAMENT OF RIGHT ANKLE, SUBSEQUENT ENCOUNTER: Primary | ICD-10-CM

## 2022-12-19 PROCEDURE — 97035 APP MDLTY 1+ULTRASOUND EA 15: CPT | Mod: GP | Performed by: PHYSICAL THERAPIST

## 2022-12-19 PROCEDURE — 97110 THERAPEUTIC EXERCISES: CPT | Mod: GP | Performed by: PHYSICAL THERAPIST

## 2022-12-19 NOTE — PROGRESS NOTES
Subjective:  HPI  Physical Exam                    Objective:  System    Physical Exam    General     ROS    Assessment/Plan:    PROGRESS  REPORT    Progress reporting period is from 11-23-22 to 12-19-22, 3 visits, 9 since SOC.       SUBJECTIVE  Subjective changes noted by patient:  .  Subjective: States she has had swelling in R anterior tibial area. Dr Montesinos wanted her in the boot but she cannot step normal in it and her R knee has been painful- she feels she is compensating with her ankle The pain in the anterior lower leg and ant med ankle continues. Using the brake in the car is painful and a longer trip as a passenger also irritated her ankle, she thinks due to the vibration. States she has not had the unstable sensation she has had before. 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 She will see Dr Montesinos on 12-26 for her next f/u but will contact him prior to see if he would like any imaging prior to that appt since her condtion is unchanged and she had 2 injuries after her original imaging in Sept     Current Pain level: 3/10.      Initial Pain level: 5/10.   Changes in function:  Set back, not progressing  Adverse reaction to treatment or activity: None but HEP modified as some of ex is now starting to bother her-her ex program is low level    OBJECTIVE  Changes noted in objective findings:    Objective: Trial of US for pain today. Discussed any ex that increases the pain she is to hold. 2 of the theraband exercises are currently uncomfortable(were not in the past) and she will hold for now. Active DF 18, PF 48, inver wnl and ever wnl with strain. Isometric strength testing is WNL except DF/ev 4/5.     ASSESSMENT/PLAN  Updated problem list and treatment plan: Diagnosis 1:  High grade ankle sprain(ATFL)  Pain -  hot/cold therapy, US, splint/taping/bracing/orthotics, self management, education and home program  Decreased  strength - therapeutic exercise and therapeutic activities  Decreased proprioception - neuro re-education, therapeutic activities and home program  Impaired gait - gait training and home program  Impaired muscle performance - neuro re-education and home program  Decreased function - therapeutic activities and home program  STG/LTGs have been met or progress has been made towards goals:  Initial progression, has not improved in last several weeks  Assessment of Progress: The patient is no longer making progress in all 3 of the following areas: subjectively, objectively and functionally.  Self Management Plans:  Patient has been instructed in a home treatment program.  Patient  has been instructed in self management of symptoms.  I have re-evaluated this patient and find that the nature, scope, duration and intensity of the therapy is appropriate for the medical condition of the patient.  Laura continues to require the following intervention to meet STG and LTG's:  PT and MD assessment    Recommendations:  Laura will f/u with Dr Montesinos on 12-26. She will call the office to see if he would like any imaging prior to that appt as she has not progressed with her pain/swelling/function    Please refer to the daily flowsheet for treatment today, total treatment time and time spent performing 1:1 timed codes.

## 2022-12-22 ENCOUNTER — THERAPY VISIT (OUTPATIENT)
Dept: PHYSICAL THERAPY | Facility: CLINIC | Age: 52
End: 2022-12-22
Payer: COMMERCIAL

## 2022-12-22 DIAGNOSIS — M54.89 OTHER BACK PAIN, UNSPECIFIED CHRONICITY: Primary | ICD-10-CM

## 2022-12-22 PROCEDURE — 97112 NEUROMUSCULAR REEDUCATION: CPT | Mod: GP | Performed by: PHYSICAL THERAPIST

## 2022-12-22 PROCEDURE — 97140 MANUAL THERAPY 1/> REGIONS: CPT | Mod: GP | Performed by: PHYSICAL THERAPIST

## 2022-12-24 DIAGNOSIS — J45.30 MILD PERSISTENT ASTHMA WITHOUT COMPLICATION: ICD-10-CM

## 2022-12-26 RX ORDER — FLUTICASONE PROPIONATE AND SALMETEROL 100; 50 UG/1; UG/1
1 POWDER RESPIRATORY (INHALATION) EVERY 12 HOURS
Qty: 60 EACH | Refills: 3 | Status: SHIPPED | OUTPATIENT
Start: 2022-12-26 | End: 2023-04-20

## 2022-12-26 NOTE — TELEPHONE ENCOUNTER
"Requested Prescriptions   Signed Prescriptions Disp Refills    fluticasone-salmeterol (ADVAIR) 100-50 MCG/ACT inhaler 60 each 3     Sig: INHALE 1 PUFF INTO THE LUNGS EVERY 12 HOURS       Inhaled Steroids Protocol Passed - 12/24/2022  5:03 AM        Passed - Patient is age 12 or older        Passed - Asthma control assessment score within normal limits in last 6 months     Please review ACT score.           Passed - Medication is active on med list        Passed - Recent (6 mo) or future (30 days) visit within the authorizing provider's specialty     Patient had office visit in the last 6 months or has a visit in the next 30 days with authorizing provider or within the authorizing provider's specialty.  See \"Patient Info\" tab in inbasket, or \"Choose Columns\" in Meds & Orders section of the refill encounter.           Long-Acting Beta Agonist Inhalers Protocol  Passed - 12/24/2022  5:03 AM        Passed - Patient is age 12 or older        Passed - Asthma control assessment score within normal limits in last 6 months     Please review ACT score.           Passed - Medication is active on med list        Passed - Recent (6 mo) or future (30 days) visit within the authorizing provider's specialty     Patient had office visit in the last 6 months or has a visit in the next 30 days with authorizing provider or within the authorizing provider's specialty.  See \"Patient Info\" tab in inbasket, or \"Choose Columns\" in Meds & Orders section of the refill encounter.               Thanks,  JAS Gfof  [facility]     "

## 2022-12-28 ENCOUNTER — THERAPY VISIT (OUTPATIENT)
Dept: PHYSICAL THERAPY | Facility: CLINIC | Age: 52
End: 2022-12-28
Payer: COMMERCIAL

## 2022-12-28 DIAGNOSIS — M25.571 PAIN IN JOINT INVOLVING ANKLE AND FOOT, RIGHT: ICD-10-CM

## 2022-12-28 DIAGNOSIS — S93.491D SPRAIN OF ANTERIOR TALOFIBULAR LIGAMENT OF RIGHT ANKLE, SUBSEQUENT ENCOUNTER: Primary | ICD-10-CM

## 2022-12-28 PROCEDURE — 97112 NEUROMUSCULAR REEDUCATION: CPT | Mod: GP | Performed by: PHYSICAL THERAPIST

## 2022-12-28 PROCEDURE — 97110 THERAPEUTIC EXERCISES: CPT | Mod: GP | Performed by: PHYSICAL THERAPIST

## 2022-12-29 ENCOUNTER — OFFICE VISIT (OUTPATIENT)
Dept: PODIATRY | Facility: CLINIC | Age: 52
End: 2022-12-29
Payer: COMMERCIAL

## 2022-12-29 VITALS — SYSTOLIC BLOOD PRESSURE: 150 MMHG | HEART RATE: 88 BPM | DIASTOLIC BLOOD PRESSURE: 80 MMHG

## 2022-12-29 DIAGNOSIS — M21.6X2 PRONATION OF BOTH FEET: ICD-10-CM

## 2022-12-29 DIAGNOSIS — S93.491A SPRAIN OF ANTERIOR TALOFIBULAR LIGAMENT OF RIGHT ANKLE, INITIAL ENCOUNTER: Primary | ICD-10-CM

## 2022-12-29 DIAGNOSIS — M21.6X1 PRONATION OF BOTH FEET: ICD-10-CM

## 2022-12-29 PROCEDURE — 99214 OFFICE O/P EST MOD 30 MIN: CPT | Performed by: PODIATRIST

## 2022-12-29 NOTE — LETTER
[facility]  [address] [place] [address]  Phone: [phone]    December 29, 2022        Laura Jackson  [address]  [place] [address]          To whom it may concern:    RE: Laura Jackson    Patient was seen and treated today at our clinic and missed work.  No work for 3 weeks  12/29/22-1/19/23      Please contact me for questions or concerns.      Sincerely,        Dr. Vahid KAURPJULIENNE FAC FAS/lld    (Electronically Signed)

## 2022-12-29 NOTE — PATIENT INSTRUCTIONS
We wish you continued good healing. If you have any questions or concerns, please do not hesitate to contact us at  [phone]    [facility] (secure e-mail communication and access to your chart) to send a message or to make an appointment.    Please remember to call and schedule a follow up appointment if one was recommended at your earliest convenience.     PODIATRY CLINIC HOURS  TELEPHONE NUMBER    Dr. Vahid KAURPJULIENNE [facility]        Clinics:  [facility]   [place]  Tuesday 1PM-6PM  [place]  Wednesday 745AM-330PM  [place]  Thursday/Friday 745AM-230PM       EMILY APPOINTMENTS  [phone]    [place] APPOINTMENTS  [phone]        If you need a medication refill, please contact us you may need lab work and/or a follow up visit prior to your refill (i.e. Antifungal medications).  If MRI needed please call Imaging at [phone]   HOW DO I GET MY KNEE SCOOTER? Knee scooters can be picked up at ANY Medical Supply stores with your knee scooter Prescription.  OR  Bring your signed prescription to an [facility] showroom.

## 2022-12-29 NOTE — LETTER
12/29/2022         RE: Laura Jackson  [address]  [place] [address]        Dear Colleague,    Thank you for referring your patient, Laura Jackson, to the [facility]. Please see a copy of my visit note below.    Subjective:    8/18/22   Patient seen as a new patient consult from [facility] and is seen today  for right ankle sprain.  Had inversion sprain 9 days ago.  Was in [place] at the time and slipped on stairs with gravel on them.  Pain and swelling and bruising.  Aggravated by activity and relieved by rest.  Fortunately she had an ankle brace with her and wear this the rest of the trip.  Recently seen in clinic in the [place] and had x-rays and given an Aircast.  She states this is more comfortable for walking at this time.  Denies erythema weakness or increased deformity.  Denies numbness.  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    9/8/22 patient returns for right ankle sprain  Sprained ankle approximately 1 month ago on August 10, 2022.  She has not been working yet.  Wearing Aircast intermittently around the house.  States feeling better but still painful.  Patient states that before this happened for the last year she has been having pain and perhaps some instability on her lateral ankle as well.    9/22/22 patient is 6 weeks 1 day status post right ankle sprain on August 10, 2022.  Patient states has been wearing boot more and believes this is helped.  Pain is decreasing.  Edema is decreasing.  Has tried walking and ankle brace and somewhat uncomfortable.  She is still not working.  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       10/11/22 patient is 8 weeks 6 days status post right ankle sprain.  Patient has been going to physical therapy and this has helped.  Generally she states pain and swelling decreasing.  She is seeing gradual improvement.  States still feels unstable.  States recently had slight eversion  causing some medial pain.    11/8/22 patient is 12 weeks 6 days status post right ankle sprain.  Patient feeling better but still some side to side and stability.  Dorsiflexion and plantarflexion fine.  States edema generally decreasing.  She does not feel she is ready to go back to work yet.  States she is getting some numbness fourth and fifth toes on her display these.    12/1/22 patient is approximately 16 weeks status post right ankle sprain.  Since last visit she is only been walking and ankle brace.  She is using good tennis shoes and arch supports.  She states she is getting more pain she points to both medial lateral and anterior.  Hard to localize.  No increase in swelling.  She states she has been going to physical therapy and her foot is feeling stronger.  Denies any weakness or increased deformity.      12/29/22   patient is approximately 4-1/2 months status post right ankle sprain on approximately 8/9/2021.  Still having pain around right ankle.  States over the last month she has seen no improvement at all.  Have gotten slightly worse.  She points to deep inside the ankle as to where this hurts.  She has been going to physical therapy.  Has not seen any decrease in pain.  Also getting medial and lateral pain.  Denies edema erythema ecchymosis or snapping.  Patient states that she did injure this twice after having past MRI.      ROS: See above         Allergies   Allergen Reactions     Penicillins Swelling     Swelling throat; age 4     Adhesive Tape Hives     Penicillin G      Tetracycline GI Disturbance     Other reaction(s): Abdominal Pain  Vomiting; nauseous  Other reaction(s): Abdominal Pain  Vomiting; nauseous       Current Outpatient Medications   Medication Sig Dispense Refill     Cholecalciferol (VITAMIN D) 2000 UNITS CAPS Take 1 capsule by mouth daily       famotidine (PEPCID) 40 MG tablet Take 1 tablet (40 mg) by mouth At Bedtime 90 tablet 3     fluticasone-salmeterol (ADVAIR) 100-50  MCG/ACT inhaler INHALE 1 PUFF INTO THE LUNGS EVERY 12 HOURS 60 each 3     ivabradine (CORLANOR) 5 MG tablet Take 1 tablet (5 mg) by mouth 2 times daily (with meals) 180 tablet 1     levalbuterol (XOPENEX HFA) 45 MCG/ACT inhaler Inhale 1-2 puffs into the lungs every 4 hours as needed for shortness of breath / dyspnea 15 g 0     metroNIDAZOLE (METROGEL) 0.75 % external gel Apply to face BID 45 g 11     omeprazole (PRILOSEC) 40 MG DR capsule Take 1 capsule (40 mg) by mouth daily before breakfast 90 capsule 3     Pyridoxine HCl (B-6) 100 MG TABS        valACYclovir (VALTREX) 1000 mg tablet Take 1 tablet (1,000 mg) by mouth 3 times daily 21 tablet 0     vitamin B-12 (CYANOCOBALAMIN) 100 MCG tablet          Patient Active Problem List   Diagnosis     CARDIOVASCULAR SCREENING; LDL GOAL LESS THAN 160     Irritable bowel syndrome     GERD (gastroesophageal reflux disease)     History of supraventricular tachycardia     Mild persistent asthma     Family history of breast cancer     S/P myomectomy     Nausea     S/P ANAID (total abdominal hysterectomy)     Hypovitaminosis D     Endometriosis     Heberden's nodes     POTS (postural orthostatic tachycardia syndrome)     Hypermobility syndrome     Cervicalgia     Autonomic dysfunction     Benign essential hypertension     Back pain     Sprain of anterior talofibular ligament of right ankle     Pain in joint involving ankle and foot, right     Uterine leiomyoma     Range of joint movement increased     Hypothyroidism     Heartburn     Exercise-induced bronchospasm     Elevated TSH     Normal delivery     Complete spontaneous      Cardiac dysrhythmia     Abdominal bloating       Past Medical History:   Diagnosis Date     Benign essential hypertension 2018     Family history of breast cancer 2014     Family history of breast cancer      Hypothyroidism 2022     SVT (supraventricular tachycardia):  history of, no recurrence since 2008 10/11/2013    no recurrence  since 2008      Uncomplicated asthma        Past Surgical History:   Procedure Laterality Date     ABDOMEN SURGERY  6/2017    myectomy     APPENDECTOMY       COLONOSCOPY N/A 8/20/2018    Procedure: COMBINED COLONOSCOPY, SINGLE OR MULTIPLE BIOPSY/POLYPECTOMY BY BIOPSY;;  Surgeon: Osman Hahn MD;  Location: [facility] OR     COLONOSCOPY WITH CO2 INSUFFLATION N/A 8/20/2018    Procedure: COLONOSCOPY WITH CO2 INSUFFLATION;  COLON-SCREENING / LUBKA ;  Surgeon: Osman Hahn MD;  Location: [facility] OR     DAVINCI HYSTERECTOMY TOTAL, SALPINGECTOMY BILATERAL N/A 8/4/2017    Procedure: DAVINCI XI HYSTERECTOMY TOTAL, SALPINGECTOMY BILATERAL;  DAVINCI TOTAL HYSTERECTOMY; BILATERAL SALPINGECTOMY. BILATERAL URETERAL LYSIS. UTEROSACRAL-COLPOPEXY. EXCISION OF ENDOMETRIOSIS;  Surgeon: George Loyola MD;  Location: [facility] OR     DAVINCI LYSIS OF ADHESIONS Bilateral 8/4/2017    Procedure: DAVINCI LYSIS OF ADHESIONS;  BILATERAL URETERAL LYSIS;  Surgeon: George Loyola MD;  Location: [facility] OR     DAVINCI SACROCOLPOPEXY, CYSTOSCOPY, COMBINED N/A 8/4/2017    Procedure: COMBINED DAVINCI SACROCOLPOPEXY, CYSTOSCOPY;  UTEROSACRAL COLPOPEXY;  Surgeon: George Loyola MD;  Location: [facility] OR     DAVINCI XI ASSISTED ABLATION / EXCISION OF ENDOMETRIOSIS  8/4/2017    Procedure: DAVINCI XI ASSISTED ABLATION / EXCISION OF ENDOMETRIOSIS;;  Surgeon: George Loyola MD;  Location: [facility] OR     DILATION AND CURETTAGE N/A 6/20/2017    Procedure: DILATION AND CURETTAGE;  Uterine Curettings and Fibroid Removal, Cook catheter placement; (No hysterectomy done at this time);  Surgeon: Ernestina Saunders MD;  Location: [facility] OR     HYSTERECTOMY, PAP NO LONGER INDICATED       ORTHOPEDIC SURGERY  6/1986    Heel spur , toe surgery     RELEASE CARPAL TUNNEL Right 10/20/2020    Procedure: RIGHT RELEASE, CARPAL TUNNEL;  Surgeon: Rickey Rea MD;  Location: [facility] OR     RELEASE CARPAL TUNNEL Left 11/6/2020    Procedure: LEFT RELEASE, CARPAL TUNNEL;   Surgeon: Rickey Rea MD;  Location: [facility] OR     TONSILLECTOMY         Family History   Problem Relation Age of Onset     Diabetes Mother      Hypertension Mother      Hyperlipidemia Mother      Arrhythmia Mother      Cardiovascular Father         CHF and COPD     Asthma Father      Breast Cancer Maternal Grandfather      Colon Cancer Maternal Grandfather      Breast Cancer Paternal Grandmother      Breast Cancer Paternal Aunt      JAGDISHAJUAN DANIEL. Paternal Grandfather      Breast Cancer Paternal Grandfather      Breast Cancer Cousin      Asthma Son      Diabetes Maternal Grandmother      Hypertension Maternal Grandmother      Breast Cancer Cousin      Breast Cancer Cousin      Breast Cancer Cousin        Social History     Tobacco Use     Smoking status: Never     Smokeless tobacco: Never   Substance Use Topics     Alcohol use: Yes     Comment: Rare- @1 month         Exam:    Vitals: LMP 07/28/2017 (Exact Date)   BMI: There is no height or weight on file to calculate BMI.  Height: Data Unavailable    Constitutional/ general:  Pt is in no apparent distress, appears well-nourished.  Cooperative with history and physical exam.     Psych:  The patient answered questions appropriately.  Normal affect.  Seems to have reasonable expectations, in terms of treatment.     Lungs:  Non labored breathing, non labored speech. No cough.  No audible wheezing. Even, quiet breathing.       Vascular:  positive pedal pulses bilaterally for both the DP and PT arteries.  CFT < 3 sec.  negative ankle edema.  positive pedal hair growth.    Neuro:  Alert and oriented x 3. Coordinated gait.  Light touch sensation is intact to the L4, L5, S1 distributions. No obvious deficits.  No evidence of neurological-based weakness, spasticity, or contracture in the lower extremities.      Derm: Normal texture and turgor.  No erythema, ecchymosis, or cyanosis.      Musculoskeletal:    Lower extremity muscle strength is normal.   No gross  deformities.  With weightbearing patient somewhat pronated.  Right lower extremity has increased internal tibial torsion.  negative ecchymosis  negative erythema  negative pain at TMTJs or styloid process.  negative Achilles or calcaneal tubercle pain  negative pain with stressing or palpation peroneal tendons  negative peroneal subluxation  I see no edema anywhere around her ankle.  With pressing all around her ankle hard to elicit any pain.  No pain with stressing or palpating extensor tendons.        Radiographic Exam:  X-Ray Findings:  I personally reviewed the films.    EXAM: XR ANKLE RIGHT G/E 3 VIEWS  LOCATION: [facility]  DATE/TIME: 8/16/2022 5:10 PM     INDICATION:  Ankle injury, right, initial encounter  COMPARISON: None.                                                                      IMPRESSION: Tiny linear flake fracture of the tip of the lateral malleolus. No additional fractures. Moderate soft tissue swelling along the lateral aspect of the ankle. Small plantar calcaneal spur.          Narrative & Impression   EXAM: MR ANKLE RIGHT W/O CONTRAST  LOCATION: [facility]  DATE/TIME: 9/13/2022 6:57 PM     INDICATION: Pain after injury  COMPARISON: None.  TECHNIQUE: Unenhanced.     FINDINGS:      TENDONS:   -Peroneal: Mild peroneus brevis tendinopathy without tearing. The peroneus longus tendon is negative.  -Medial: Posterior tibialis tendon is intact. No tendinopathy or tenosynovitis. Flexor digitorum longus and flexor hallucis longus tendons are normal. No tenosynovitis.  -Anterior: Anterior tibialis, extensor hallucis longus, and extensor digitorum longus tendons are normal. No tenosynovitis.  -Achilles: No tendinopathy or paratenonitis.     LIGAMENTS:   -Anterior talofibular ligament: High-grade tear of the ATF.   -Calcaneofibular ligament: Grade I sprain of the calcaneofibular ligament without tearing.   -Posterior talofibular ligament:  Intact.  -Syndesmotic inferior tibiofibular ligaments: Intact.  -Deltoid ligament complex: Intact.  -Spring ligament complex: Intact.     JOINTS AND BONES:   -The tibiotalar joint is intact. No osteochondral lesion. Tiny tibiotalar joint effusion. Mild reactive edema within the distal talus. Mild reactive edema within the cuboid at the calcaneocuboid articulation. No evidence for letha fracture.     SOFT TISSUES:  -Plantar fascia: Mild plantar calcaneal spurring. No significant signal abnormality. This is likely secondary to old resolved plantar fasciitis.  -Sinus tarsi and tarsal tunnel: Normal.  -Muscles: Normal.                                                                      IMPRESSION:  1.  Mild peroneus brevis tendinopathy without tearing.  2.  High-grade tear of the ATF.  3.  Grade I sprain of the calcaneofibular ligament.  4.  No additional tendinous or ligamentous pathology.  5.  Tiny tibiotalar joint effusion.  6.  Mild reactive edema distal talus.  7.  Mild reactive edema within the cuboid at the calcaneocuboid articulation.  8.  Plantar calcaneal spurring without signal abnormality is likely secondary to old resolved plantar fasciitis.           Assessment:  right ankle sprain     Plan: Discussed is possible if patient reinjured this she could have some type of injury to the talus since she is complaining of deep pain.  Will order MRI.  Reviewed past radiology and physical therapy notes.  Patient will continue ankle brace and physical therapy.  Patient will return after MRI to discuss results.  30 minutes spent in total time counseling patient, reviewing medical records, and coordinating care      Vahid Montesinos DPM, FACFAS          Again, thank you for allowing me to participate in the care of your patient.        Sincerely,        Vahid Montesinos DPM

## 2022-12-29 NOTE — PROGRESS NOTES
Subjective:    8/18/22   Patient seen as a new patient consult from [facility] and is seen today  for right ankle sprain.  Had inversion sprain 9 days ago.  Was in [place] at the time and slipped on stairs with gravel on them.  Pain and swelling and bruising.  Aggravated by activity and relieved by rest.  Fortunately she had an ankle brace with her and wear this the rest of the trip.  Recently seen in clinic in the [place] and had x-rays and given an Aircast.  She states this is more comfortable for walking at this time.  Denies erythema weakness or increased deformity.  Denies numbness.  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    9/8/22 patient returns for right ankle sprain  Sprained ankle approximately 1 month ago on August 10, 2022.  She has not been working yet.  Wearing Aircast intermittently around the house.  States feeling better but still painful.  Patient states that before this happened for the last year she has been having pain and perhaps some instability on her lateral ankle as well.    9/22/22 patient is 6 weeks 1 day status post right ankle sprain on August 10, 2022.  Patient states has been wearing boot more and believes this is helped.  Pain is decreasing.  Edema is decreasing.  Has tried walking and ankle brace and somewhat uncomfortable.  She is still not working.  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       10/11/22 patient is 8 weeks 6 days status post right ankle sprain.  Patient has been going to physical therapy and this has helped.  Generally she states pain and swelling decreasing.  She is seeing gradual improvement.  States still feels unstable.  States recently had slight eversion causing some medial pain.    11/8/22 patient is 12 weeks 6 days status post right ankle sprain.  Patient feeling better but still some side to side and stability.  Dorsiflexion and plantarflexion fine.  States edema generally decreasing.  She does not  feel she is ready to go back to work yet.  States she is getting some numbness fourth and fifth toes on her display these.    12/1/22 patient is approximately 16 weeks status post right ankle sprain.  Since last visit she is only been walking and ankle brace.  She is using good tennis shoes and arch supports.  She states she is getting more pain she points to both medial lateral and anterior.  Hard to localize.  No increase in swelling.  She states she has been going to physical therapy and her foot is feeling stronger.  Denies any weakness or increased deformity.      12/29/22   patient is approximately 4-1/2 months status post right ankle sprain on approximately 8/9/2021.  Still having pain around right ankle.  States over the last month she has seen no improvement at all.  Have gotten slightly worse.  She points to deep inside the ankle as to where this hurts.  She has been going to physical therapy.  Has not seen any decrease in pain.  Also getting medial and lateral pain.  Denies edema erythema ecchymosis or snapping.  Patient states that she did injure this twice after having past MRI.      ROS: See above         Allergies   Allergen Reactions     Penicillins Swelling     Swelling throat; age 4     Adhesive Tape Hives     Penicillin G      Tetracycline GI Disturbance     Other reaction(s): Abdominal Pain  Vomiting; nauseous  Other reaction(s): Abdominal Pain  Vomiting; nauseous       Current Outpatient Medications   Medication Sig Dispense Refill     Cholecalciferol (VITAMIN D) 2000 UNITS CAPS Take 1 capsule by mouth daily       famotidine (PEPCID) 40 MG tablet Take 1 tablet (40 mg) by mouth At Bedtime 90 tablet 3     fluticasone-salmeterol (ADVAIR) 100-50 MCG/ACT inhaler INHALE 1 PUFF INTO THE LUNGS EVERY 12 HOURS 60 each 3     ivabradine (CORLANOR) 5 MG tablet Take 1 tablet (5 mg) by mouth 2 times daily (with meals) 180 tablet 1     levalbuterol (XOPENEX HFA) 45 MCG/ACT inhaler Inhale 1-2 puffs into the  lungs every 4 hours as needed for shortness of breath / dyspnea 15 g 0     metroNIDAZOLE (METROGEL) 0.75 % external gel Apply to face BID 45 g 11     omeprazole (PRILOSEC) 40 MG DR capsule Take 1 capsule (40 mg) by mouth daily before breakfast 90 capsule 3     Pyridoxine HCl (B-6) 100 MG TABS        valACYclovir (VALTREX) 1000 mg tablet Take 1 tablet (1,000 mg) by mouth 3 times daily 21 tablet 0     vitamin B-12 (CYANOCOBALAMIN) 100 MCG tablet          Patient Active Problem List   Diagnosis     CARDIOVASCULAR SCREENING; LDL GOAL LESS THAN 160     Irritable bowel syndrome     GERD (gastroesophageal reflux disease)     History of supraventricular tachycardia     Mild persistent asthma     Family history of breast cancer     S/P myomectomy     Nausea     S/P ANAID (total abdominal hysterectomy)     Hypovitaminosis D     Endometriosis     Heberden's nodes     POTS (postural orthostatic tachycardia syndrome)     Hypermobility syndrome     Cervicalgia     Autonomic dysfunction     Benign essential hypertension     Back pain     Sprain of anterior talofibular ligament of right ankle     Pain in joint involving ankle and foot, right     Uterine leiomyoma     Range of joint movement increased     Hypothyroidism     Heartburn     Exercise-induced bronchospasm     Elevated TSH     Normal delivery     Complete spontaneous      Cardiac dysrhythmia     Abdominal bloating       Past Medical History:   Diagnosis Date     Benign essential hypertension 2018     Family history of breast cancer 2014     Family history of breast cancer      Hypothyroidism 2022     SVT (supraventricular tachycardia):  history of, no recurrence since 2008 10/11/2013    no recurrence since       Uncomplicated asthma        Past Surgical History:   Procedure Laterality Date     ABDOMEN SURGERY  2017    myectomy     APPENDECTOMY       COLONOSCOPY N/A 2018    Procedure: COMBINED COLONOSCOPY, SINGLE OR MULTIPLE  BIOPSY/POLYPECTOMY BY BIOPSY;;  Surgeon: Osman Hahn MD;  Location: [facility]     COLONOSCOPY WITH CO2 INSUFFLATION N/A 8/20/2018    Procedure: COLONOSCOPY WITH CO2 INSUFFLATION;  COLON-SCREENING / LUBKA ;  Surgeon: Osman Hahn MD;  Location: [facility] OR     DAVINCI HYSTERECTOMY TOTAL, SALPINGECTOMY BILATERAL N/A 8/4/2017    Procedure: DAVINCI XI HYSTERECTOMY TOTAL, SALPINGECTOMY BILATERAL;  DAVINCI TOTAL HYSTERECTOMY; BILATERAL SALPINGECTOMY. BILATERAL URETERAL LYSIS. UTEROSACRAL-COLPOPEXY. EXCISION OF ENDOMETRIOSIS;  Surgeon: George Loyola MD;  Location: [facility] OR     DAVINCI LYSIS OF ADHESIONS Bilateral 8/4/2017    Procedure: DAVINCI LYSIS OF ADHESIONS;  BILATERAL URETERAL LYSIS;  Surgeon: George Loyola MD;  Location: [facility]     DAVINCI SACROCOLPOPEXY, CYSTOSCOPY, COMBINED N/A 8/4/2017    Procedure: COMBINED DAVINCI SACROCOLPOPEXY, CYSTOSCOPY;  UTEROSACRAL COLPOPEXY;  Surgeon: George Loyola MD;  Location: [facility]     DAVINCI XI ASSISTED ABLATION / EXCISION OF ENDOMETRIOSIS  8/4/2017    Procedure: DAVINCI XI ASSISTED ABLATION / EXCISION OF ENDOMETRIOSIS;;  Surgeon: George Loyola MD;  Location: [facility]     DILATION AND CURETTAGE N/A 6/20/2017    Procedure: DILATION AND CURETTAGE;  Uterine Curettings and Fibroid Removal, Cook catheter placement; (No hysterectomy done at this time);  Surgeon: Ernestina Sanuders MD;  Location: [facility]     HYSTERECTOMY, PAP NO LONGER INDICATED       ORTHOPEDIC SURGERY  6/1986    Heel spur , toe surgery     RELEASE CARPAL TUNNEL Right 10/20/2020    Procedure: RIGHT RELEASE, CARPAL TUNNEL;  Surgeon: Rickey Rea MD;  Location: [facility] OR     RELEASE CARPAL TUNNEL Left 11/6/2020    Procedure: LEFT RELEASE, CARPAL TUNNEL;  Surgeon: Rickey Rea MD;  Location: [facility] OR     TONSILLECTOMY         Family History   Problem Relation Age of Onset     Diabetes Mother      Hypertension Mother      Hyperlipidemia Mother      Arrhythmia Mother      Cardiovascular  Father         CHF and COPD     Asthma Father      Breast Cancer Maternal Grandfather      Colon Cancer Maternal Grandfather      Breast Cancer Paternal Grandmother      Breast Cancer Paternal Aunt      C.A.D. Paternal Grandfather      Breast Cancer Paternal Grandfather      Breast Cancer Cousin      Asthma Son      Diabetes Maternal Grandmother      Hypertension Maternal Grandmother      Breast Cancer Cousin      Breast Cancer Cousin      Breast Cancer Cousin        Social History     Tobacco Use     Smoking status: Never     Smokeless tobacco: Never   Substance Use Topics     Alcohol use: Yes     Comment: Rare- @1 month         Exam:    Vitals: LMP 07/28/2017 (Exact Date)   BMI: There is no height or weight on file to calculate BMI.  Height: Data Unavailable    Constitutional/ general:  Pt is in no apparent distress, appears well-nourished.  Cooperative with history and physical exam.     Psych:  The patient answered questions appropriately.  Normal affect.  Seems to have reasonable expectations, in terms of treatment.     Lungs:  Non labored breathing, non labored speech. No cough.  No audible wheezing. Even, quiet breathing.       Vascular:  positive pedal pulses bilaterally for both the DP and PT arteries.  CFT < 3 sec.  negative ankle edema.  positive pedal hair growth.    Neuro:  Alert and oriented x 3. Coordinated gait.  Light touch sensation is intact to the L4, L5, S1 distributions. No obvious deficits.  No evidence of neurological-based weakness, spasticity, or contracture in the lower extremities.      Derm: Normal texture and turgor.  No erythema, ecchymosis, or cyanosis.      Musculoskeletal:    Lower extremity muscle strength is normal.   No gross deformities.  With weightbearing patient somewhat pronated.  Right lower extremity has increased internal tibial torsion.  negative ecchymosis  negative erythema  negative pain at TMTJs or styloid process.  negative Achilles or calcaneal tubercle  pain  negative pain with stressing or palpation peroneal tendons  negative peroneal subluxation  I see no edema anywhere around her ankle.  With pressing all around her ankle hard to elicit any pain.  No pain with stressing or palpating extensor tendons.        Radiographic Exam:  X-Ray Findings:  I personally reviewed the films.    EXAM: XR ANKLE RIGHT G/E 3 VIEWS  LOCATION: [facility]  DATE/TIME: 8/16/2022 5:10 PM     INDICATION:  Ankle injury, right, initial encounter  COMPARISON: None.                                                                      IMPRESSION: Tiny linear flake fracture of the tip of the lateral malleolus. No additional fractures. Moderate soft tissue swelling along the lateral aspect of the ankle. Small plantar calcaneal spur.          Narrative & Impression   EXAM: MR ANKLE RIGHT W/O CONTRAST  LOCATION: [facility]  DATE/TIME: 9/13/2022 6:57 PM     INDICATION: Pain after injury  COMPARISON: None.  TECHNIQUE: Unenhanced.     FINDINGS:      TENDONS:   -Peroneal: Mild peroneus brevis tendinopathy without tearing. The peroneus longus tendon is negative.  -Medial: Posterior tibialis tendon is intact. No tendinopathy or tenosynovitis. Flexor digitorum longus and flexor hallucis longus tendons are normal. No tenosynovitis.  -Anterior: Anterior tibialis, extensor hallucis longus, and extensor digitorum longus tendons are normal. No tenosynovitis.  -Achilles: No tendinopathy or paratenonitis.     LIGAMENTS:   -Anterior talofibular ligament: High-grade tear of the ATF.   -Calcaneofibular ligament: Grade I sprain of the calcaneofibular ligament without tearing.   -Posterior talofibular ligament: Intact.  -Syndesmotic inferior tibiofibular ligaments: Intact.  -Deltoid ligament complex: Intact.  -Spring ligament complex: Intact.     JOINTS AND BONES:   -The tibiotalar joint is intact. No osteochondral lesion. Tiny tibiotalar joint effusion. Mild  reactive edema within the distal talus. Mild reactive edema within the cuboid at the calcaneocuboid articulation. No evidence for letha fracture.     SOFT TISSUES:  -Plantar fascia: Mild plantar calcaneal spurring. No significant signal abnormality. This is likely secondary to old resolved plantar fasciitis.  -Sinus tarsi and tarsal tunnel: Normal.  -Muscles: Normal.                                                                      IMPRESSION:  1.  Mild peroneus brevis tendinopathy without tearing.  2.  High-grade tear of the ATF.  3.  Grade I sprain of the calcaneofibular ligament.  4.  No additional tendinous or ligamentous pathology.  5.  Tiny tibiotalar joint effusion.  6.  Mild reactive edema distal talus.  7.  Mild reactive edema within the cuboid at the calcaneocuboid articulation.  8.  Plantar calcaneal spurring without signal abnormality is likely secondary to old resolved plantar fasciitis.           Assessment:  right ankle sprain     Plan: Discussed is possible if patient reinjured this she could have some type of injury to the talus since she is complaining of deep pain.  Will order MRI.  Reviewed past radiology and physical therapy notes.  Patient will continue ankle brace and physical therapy.  Patient will return after MRI to discuss results.  30 minutes spent in total time counseling patient, reviewing medical records, and coordinating care      Vahid Montesinos DPM, FACFAS

## 2022-12-30 ENCOUNTER — THERAPY VISIT (OUTPATIENT)
Dept: PHYSICAL THERAPY | Facility: CLINIC | Age: 52
End: 2022-12-30
Payer: COMMERCIAL

## 2022-12-30 DIAGNOSIS — M54.89 OTHER BACK PAIN, UNSPECIFIED CHRONICITY: Primary | ICD-10-CM

## 2022-12-30 PROCEDURE — 97112 NEUROMUSCULAR REEDUCATION: CPT | Mod: GP | Performed by: PHYSICAL THERAPIST

## 2022-12-30 PROCEDURE — 97140 MANUAL THERAPY 1/> REGIONS: CPT | Mod: GP | Performed by: PHYSICAL THERAPIST

## 2023-01-03 ENCOUNTER — THERAPY VISIT (OUTPATIENT)
Dept: PHYSICAL THERAPY | Facility: CLINIC | Age: 53
End: 2023-01-03
Payer: COMMERCIAL

## 2023-01-03 DIAGNOSIS — M54.89 OTHER BACK PAIN, UNSPECIFIED CHRONICITY: Primary | ICD-10-CM

## 2023-01-03 PROCEDURE — 97140 MANUAL THERAPY 1/> REGIONS: CPT | Mod: GP | Performed by: PHYSICAL THERAPIST

## 2023-01-03 PROCEDURE — 97112 NEUROMUSCULAR REEDUCATION: CPT | Mod: GP | Performed by: PHYSICAL THERAPIST

## 2023-01-05 ENCOUNTER — ANCILLARY PROCEDURE (OUTPATIENT)
Dept: MRI IMAGING | Facility: CLINIC | Age: 53
End: 2023-01-05
Attending: PODIATRIST
Payer: COMMERCIAL

## 2023-01-05 DIAGNOSIS — M21.6X2 PRONATION OF BOTH FEET: ICD-10-CM

## 2023-01-05 DIAGNOSIS — M21.6X1 PRONATION OF BOTH FEET: ICD-10-CM

## 2023-01-05 DIAGNOSIS — S93.491A SPRAIN OF ANTERIOR TALOFIBULAR LIGAMENT OF RIGHT ANKLE, INITIAL ENCOUNTER: ICD-10-CM

## 2023-01-05 PROCEDURE — 73721 MRI JNT OF LWR EXTRE W/O DYE: CPT | Mod: RT | Performed by: RADIOLOGY

## 2023-01-10 ENCOUNTER — THERAPY VISIT (OUTPATIENT)
Dept: PHYSICAL THERAPY | Facility: CLINIC | Age: 53
End: 2023-01-10
Payer: COMMERCIAL

## 2023-01-10 ENCOUNTER — OFFICE VISIT (OUTPATIENT)
Dept: PODIATRY | Facility: CLINIC | Age: 53
End: 2023-01-10
Payer: COMMERCIAL

## 2023-01-10 VITALS — SYSTOLIC BLOOD PRESSURE: 148 MMHG | HEART RATE: 78 BPM | DIASTOLIC BLOOD PRESSURE: 82 MMHG

## 2023-01-10 DIAGNOSIS — S93.491A SPRAIN OF ANTERIOR TALOFIBULAR LIGAMENT OF RIGHT ANKLE, INITIAL ENCOUNTER: Primary | ICD-10-CM

## 2023-01-10 DIAGNOSIS — M54.89 OTHER BACK PAIN, UNSPECIFIED CHRONICITY: Primary | ICD-10-CM

## 2023-01-10 PROCEDURE — 97112 NEUROMUSCULAR REEDUCATION: CPT | Mod: GP | Performed by: PHYSICAL THERAPIST

## 2023-01-10 PROCEDURE — 97140 MANUAL THERAPY 1/> REGIONS: CPT | Mod: GP | Performed by: PHYSICAL THERAPIST

## 2023-01-10 PROCEDURE — 99213 OFFICE O/P EST LOW 20 MIN: CPT | Performed by: PODIATRIST

## 2023-01-10 NOTE — PATIENT INSTRUCTIONS
We wish you continued good healing. If you have any questions or concerns, please do not hesitate to contact us at  [phone]    [employer] (secure e-mail communication and access to your chart) to send a message or to make an appointment.    Please remember to call and schedule a follow up appointment if one was recommended at your earliest convenience.     PODIATRY CLINIC HOURS  TELEPHONE NUMBER    Dr. Vahid KAURPJULIENNE [facility]        Clinics:  [facility]   [place]  Tuesday 1PM-6PM  [place]  Wednesday 745AM-330PM  [place]  Thursday/Friday 745AM-230PM       [facility] APPOINTMENTS  [phone]    [place] APPOINTMENTS  [phone]        If you need a medication refill, please contact us you may need lab work and/or a follow up visit prior to your refill (i.e. Antifungal medications).  If MRI needed please call Imaging at [phone]   HOW DO I GET MY KNEE SCOOTER? Knee scooters can be picked up at ANY Medical Supply stores with your knee scooter Prescription.  OR  Bring your signed prescription to an [employer] [facility] showroom.

## 2023-01-10 NOTE — LETTER
[facility]  [address] [place] [address]  Phone: [phone]    January 10, 2023        Laura Jackson  [address]  [place] [address]          To whom it may concern:    RE: Laura Jackson    Patient was seen and treated today at our clinic and missed work.  No work for 3 weeks. 01/10/23-1/31/23        Please contact me for questions or concerns.      Sincerely,        Dr. Vaihd KAURPJULIENNE FAC FAS/lld    (Electronically Signed)

## 2023-01-10 NOTE — LETTER
1/10/2023         RE: Laura Jackson  [address]  [place] [address]        Dear Colleague,    Thank you for referring your patient, Laura Jackson, to the [facility]. Please see a copy of my visit note below.    Subjective:    8/18/22   Patient seen as a new patient consult from [facility] and is seen today  for right ankle sprain.  Had inversion sprain 9 days ago.  Was in [place] at the time and slipped on stairs with gravel on them.  Pain and swelling and bruising.  Aggravated by activity and relieved by rest.  Fortunately she had an ankle brace with her and wear this the rest of the trip.  Recently seen in clinic in the [place] and had x-rays and given an Aircast.  She states this is more comfortable for walking at this time.  Denies erythema weakness or increased deformity.  Denies numbness.  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    9/8/22 patient returns for right ankle sprain  Sprained ankle approximately 1 month ago on August 10, 2022.  She has not been working yet.  Wearing Aircast intermittently around the house.  States feeling better but still painful.  Patient states that before this happened for the last year she has been having pain and perhaps some instability on her lateral ankle as well.    9/22/22 patient is 6 weeks 1 day status post right ankle sprain on August 10, 2022.  Patient states has been wearing boot more and believes this is helped.  Pain is decreasing.  Edema is decreasing.  Has tried walking and ankle brace and somewhat uncomfortable.  She is still not working.  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       10/11/22 patient is 8 weeks 6 days status post right ankle sprain.  Patient has been going to physical therapy and this has helped.  Generally she states pain and swelling decreasing.  She is seeing gradual improvement.  States still feels unstable.  States recently had slight eversion  causing some medial pain.    11/8/22 patient is 12 weeks 6 days status post right ankle sprain.  Patient feeling better but still some side to side and stability.  Dorsiflexion and plantarflexion fine.  States edema generally decreasing.  She does not feel she is ready to go back to work yet.  States she is getting some numbness fourth and fifth toes on her display these.    12/1/22 patient is approximately 16 weeks status post right ankle sprain.  Since last visit she is only been walking and ankle brace.  She is using good tennis shoes and arch supports.  She states she is getting more pain she points to both medial lateral and anterior.  Hard to localize.  No increase in swelling.  She states she has been going to physical therapy and her foot is feeling stronger.  Denies any weakness or increased deformity.      12/29/22   patient is approximately 4-1/2 months status post right ankle sprain on approximately 8/9/2021.  Still having pain around right ankle.  States over the last month she has seen no improvement at all.  Have gotten slightly worse.  She points to deep inside the ankle as to where this hurts.  She has been going to physical therapy.  Has not seen any decrease in pain.  Also getting medial and lateral pain.  Denies edema erythema ecchymosis or snapping.  Patient states that she did injure this twice after having past MRI.    1/10/23   patient is approximately 5 months status post right ankle sprain on 8/9/2021.  Still having pain.  Points to medial and lateral.  Had recent MRI.  Using compression ankle brace.  She has been to physical therapy.  They have done strengthening and desensitization.  She wears an orthotic in a good shoe.  States it feels unstable.  Has had a right knee pain because of this.      ROS: See above         Allergies   Allergen Reactions     Penicillins Swelling     Swelling throat; age 4     Adhesive Tape Hives     Penicillin G      Tetracycline GI Disturbance     Other  reaction(s): Abdominal Pain  Vomiting; nauseous  Other reaction(s): Abdominal Pain  Vomiting; nauseous       Current Outpatient Medications   Medication Sig Dispense Refill     Cholecalciferol (VITAMIN D) 2000 UNITS CAPS Take 1 capsule by mouth daily       famotidine (PEPCID) 40 MG tablet Take 1 tablet (40 mg) by mouth At Bedtime 90 tablet 3     fluticasone-salmeterol (ADVAIR) 100-50 MCG/ACT inhaler INHALE 1 PUFF INTO THE LUNGS EVERY 12 HOURS 60 each 3     ivabradine (CORLANOR) 5 MG tablet Take 1 tablet (5 mg) by mouth 2 times daily (with meals) 180 tablet 1     levalbuterol (XOPENEX HFA) 45 MCG/ACT inhaler Inhale 1-2 puffs into the lungs every 4 hours as needed for shortness of breath / dyspnea 15 g 0     metroNIDAZOLE (METROGEL) 0.75 % external gel Apply to face BID 45 g 11     omeprazole (PRILOSEC) 40 MG DR capsule Take 1 capsule (40 mg) by mouth daily before breakfast 90 capsule 3     Pyridoxine HCl (B-6) 100 MG TABS        valACYclovir (VALTREX) 1000 mg tablet Take 1 tablet (1,000 mg) by mouth 3 times daily 21 tablet 0     vitamin B-12 (CYANOCOBALAMIN) 100 MCG tablet          Patient Active Problem List   Diagnosis     CARDIOVASCULAR SCREENING; LDL GOAL LESS THAN 160     Irritable bowel syndrome     GERD (gastroesophageal reflux disease)     History of supraventricular tachycardia     Mild persistent asthma     Family history of breast cancer     S/P myomectomy     Nausea     S/P ANAID (total abdominal hysterectomy)     Hypovitaminosis D     Endometriosis     Heberden's nodes     POTS (postural orthostatic tachycardia syndrome)     Hypermobility syndrome     Cervicalgia     Autonomic dysfunction     Benign essential hypertension     Back pain     Sprain of anterior talofibular ligament of right ankle     Pain in joint involving ankle and foot, right     Uterine leiomyoma     Range of joint movement increased     Hypothyroidism     Heartburn     Exercise-induced bronchospasm     Elevated TSH     Normal delivery      Complete spontaneous      Cardiac dysrhythmia     Abdominal bloating       Past Medical History:   Diagnosis Date     Benign essential hypertension 2018     Family history of breast cancer 2014     Family history of breast cancer      Hypothyroidism 2022     SVT (supraventricular tachycardia):  history of, no recurrence since 2008 10/11/2013    no recurrence since       Uncomplicated asthma        Past Surgical History:   Procedure Laterality Date     ABDOMEN SURGERY  2017    myectomy     APPENDECTOMY       COLONOSCOPY N/A 2018    Procedure: COMBINED COLONOSCOPY, SINGLE OR MULTIPLE BIOPSY/POLYPECTOMY BY BIOPSY;;  Surgeon: Osman Hahn MD;  Location: [facility] OR     COLONOSCOPY WITH CO2 INSUFFLATION N/A 2018    Procedure: COLONOSCOPY WITH CO2 INSUFFLATION;  COLON-SCREENING / LUBKA ;  Surgeon: Osman Hahn MD;  Location: [facility] OR     DAVINCI HYSTERECTOMY TOTAL, SALPINGECTOMY BILATERAL N/A 2017    Procedure: DAVINCI XI HYSTERECTOMY TOTAL, SALPINGECTOMY BILATERAL;  DAVINCI TOTAL HYSTERECTOMY; BILATERAL SALPINGECTOMY. BILATERAL URETERAL LYSIS. UTEROSACRAL-COLPOPEXY. EXCISION OF ENDOMETRIOSIS;  Surgeon: George Loyola MD;  Location: [facility] OR     DAVINCI LYSIS OF ADHESIONS Bilateral 2017    Procedure: DAVINCI LYSIS OF ADHESIONS;  BILATERAL URETERAL LYSIS;  Surgeon: George Loyola MD;  Location: [facility] OR     DAVINCI SACROCOLPOPEXY, CYSTOSCOPY, COMBINED N/A 2017    Procedure: COMBINED DAVINCI SACROCOLPOPEXY, CYSTOSCOPY;  UTEROSACRAL COLPOPEXY;  Surgeon: George Loyola MD;  Location: [facility] OR     DAVINCI XI ASSISTED ABLATION / EXCISION OF ENDOMETRIOSIS  2017    Procedure: DAVINCI XI ASSISTED ABLATION / EXCISION OF ENDOMETRIOSIS;;  Surgeon: George Loyola MD;  Location: [facility] OR     DILATION AND CURETTAGE N/A 2017    Procedure: DILATION AND CURETTAGE;  Uterine Curettings and Fibroid Removal, Cook catheter placement; (No hysterectomy done at  this time);  Surgeon: Ernestina Saunders MD;  Location: [facility] OR     HYSTERECTOMY, PAP NO LONGER INDICATED       ORTHOPEDIC SURGERY  6/1986    Heel spur , toe surgery     RELEASE CARPAL TUNNEL Right 10/20/2020    Procedure: RIGHT RELEASE, CARPAL TUNNEL;  Surgeon: Rickey Rea MD;  Location: [facility] OR     RELEASE CARPAL TUNNEL Left 11/6/2020    Procedure: LEFT RELEASE, CARPAL TUNNEL;  Surgeon: Rickey Rea MD;  Location: [facility] OR     TONSILLECTOMY         Family History   Problem Relation Age of Onset     Diabetes Mother      Hypertension Mother      Hyperlipidemia Mother      Arrhythmia Mother      Cardiovascular Father         CHF and COPD     Asthma Father      Breast Cancer Maternal Grandfather      Colon Cancer Maternal Grandfather      Breast Cancer Paternal Grandmother      Breast Cancer Paternal Aunt      C.A.D. Paternal Grandfather      Breast Cancer Paternal Grandfather      Breast Cancer Cousin      Asthma Son      Diabetes Maternal Grandmother      Hypertension Maternal Grandmother      Breast Cancer Cousin      Breast Cancer Cousin      Breast Cancer Cousin        Social History     Tobacco Use     Smoking status: Never     Smokeless tobacco: Never   Substance Use Topics     Alcohol use: Yes     Comment: Rare- @1 month         Exam:    Vitals: BP (!) 148/82   Pulse 78   LMP 07/28/2017 (Exact Date)   BMI: There is no height or weight on file to calculate BMI.  Height: Data Unavailable    Constitutional/ general:  Pt is in no apparent distress, appears well-nourished.  Cooperative with history and physical exam.     Psych:  The patient answered questions appropriately.  Normal affect.  Seems to have reasonable expectations, in terms of treatment.     Lungs:  Non labored breathing, non labored speech. No cough.  No audible wheezing. Even, quiet breathing.       Vascular:  positive pedal pulses bilaterally for both the DP and PT arteries.  CFT < 3 sec.  negative ankle edema.  positive pedal hair  growth.    Neuro:  Alert and oriented x 3. Coordinated gait.  Light touch sensation is intact to the L4, L5, S1 distributions. No obvious deficits.  No evidence of neurological-based weakness, spasticity, or contracture in the lower extremities.      Derm: Normal texture and turgor.  No erythema, ecchymosis, or cyanosis.      Musculoskeletal:    Lower extremity muscle strength is normal.   No gross deformities.  With weightbearing patient somewhat pronated.  Right lower extremity has increased internal tibial torsion.  negative ecchymosis  negative erythema  negative pain at TMTJs or styloid process.  negative Achilles or calcaneal tubercle pain  negative pain with stressing or palpation peroneal tendons  negative peroneal subluxation  I see no edema anywhere around her ankle.  No pain medial or anterior ankle.  Slight pain over ATFL and CFL.  No pain with stressing peroneus longus or peroneus brevis.  No peroneal subluxation.      Radiographic Exam:  X-Ray Findings:  I personally reviewed the films.    EXAM: XR ANKLE RIGHT G/E 3 VIEWS  LOCATION: [facility]  DATE/TIME: 8/16/2022 5:10 PM     INDICATION:  Ankle injury, right, initial encounter  COMPARISON: None.                                                                      IMPRESSION: Tiny linear flake fracture of the tip of the lateral malleolus. No additional fractures. Moderate soft tissue swelling along the lateral aspect of the ankle. Small plantar calcaneal spur.    Narrative & Impression   MR right ankle without  contrast 1/5/2023 9:58 AM     History: Sprain of anterior talofibular ligament of right ankle,  initial encounter; Pronation of both feet; Pronation of both feet     Techniques: Multiplanar multisequence imaging of the right ankle was  obtained without  administration of intra-articular or intravenous  contrast.     Comparison: MRI 9/13/2022     Findings:     MEDIAL COMPARTMENT     Tendons: Medial flexor tendons  are intact. Mild tenosynovial fluid  surrounding the tibialis posterior.     Ligaments: Deltoid and spring ligamentous complexes are intact.     LATERAL COMPARTMENT     Tendons: Short segment split thickness tearing of the peroneal brevis  with distal reconstitution.. Mild tenosynovial fluid surrounding the  pineal tendons in the region of the lateral malleolus, similar to  prior.     Ligaments: Intact syndesmotic ligaments. Thickened and edematous  anterior talofibular ligament, grossly similar to prior. Thickening of  the calcaneofibular ligament, similar to prior. Intact posterior  talofibular ligament.     POSTERIOR COMPARTMENT     Achilles tendon: Intact.     Plantar fascia: Thickening of the proximal central cord without  regional soft tissue edema, stable.     ANTERIOR COMPARTMENT     Tendons: Anterior extensor tendons are intact. Normal variant peroneal  tertius.     JOINTS     Small ankle joint effusion.     GENERAL FINDINGS     Bones: No fracture, marrow contusion or infiltrative change. No talar  dome osteochondral lesion. Interval resolution of multifocal bone  marrow edema.     Muscles: Normal.     Tarsal tunnel: Normal.      Sinus tarsi: Normal.      ANCILLARY FINDINGS     Intact Lisfranc interosseous ligament                                                                      Impression:     1. No acute osseous abnormality. Multifocal bone marrow edema noted on  prior MRI 9/13/2022 has resolved.     2. Similar high-grade sprain of the anterior talofibular ligament and  moderate grade sprain of the calcaneofibular ligament.     3. Short segment split thickness tearing of the peroneal brevis with  distal reconstitution, new or more conspicuous compared to prior. Mild  peroneal tenosynovitis         Assessment:  right ankle sprain     Plan: Discussed MRI results with patient.  Discussed ultrasound-guided injection into the peroneus brevis tendon to see how much relief this gives.  Since she has  continued pain and feels somewhat unstable lateral she is wondering about surgery.  Briefly discussed lateral ankle stabilization.  Will refer to Dr. Michael to discuss.        Vahid Montesinos DPM, FACFAS          Again, thank you for allowing me to participate in the care of your patient.        Sincerely,        Vahid Montesinos DPM

## 2023-01-10 NOTE — PROGRESS NOTES
Subjective:    8/18/22   Patient seen as a new patient consult from [facility] and is seen today  for right ankle sprain.  Had inversion sprain 9 days ago.  Was in [place] at the time and slipped on stairs with gravel on them.  Pain and swelling and bruising.  Aggravated by activity and relieved by rest.  Fortunately she had an ankle brace with her and wear this the rest of the trip.  Recently seen in clinic in the [place] and had x-rays and given an Aircast.  She states this is more comfortable for walking at this time.  Denies erythema weakness or increased deformity.  Denies numbness.  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    9/8/22 patient returns for right ankle sprain  Sprained ankle approximately 1 month ago on August 10, 2022.  She has not been working yet.  Wearing Aircast intermittently around the house.  States feeling better but still painful.  Patient states that before this happened for the last year she has been having pain and perhaps some instability on her lateral ankle as well.    9/22/22 patient is 6 weeks 1 day status post right ankle sprain on August 10, 2022.  Patient states has been wearing boot more and believes this is helped.  Pain is decreasing.  Edema is decreasing.  Has tried walking and ankle brace and somewhat uncomfortable.  She is still not working.  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       10/11/22 patient is 8 weeks 6 days status post right ankle sprain.  Patient has been going to physical therapy and this has helped.  Generally she states pain and swelling decreasing.  She is seeing gradual improvement.  States still feels unstable.  States recently had slight eversion causing some medial pain.    11/8/22 patient is 12 weeks 6 days status post right ankle sprain.  Patient feeling better but still some side to side and stability.  Dorsiflexion and plantarflexion fine.  States edema generally decreasing.  She does not  feel she is ready to go back to work yet.  States she is getting some numbness fourth and fifth toes on her display these.    12/1/22 patient is approximately 16 weeks status post right ankle sprain.  Since last visit she is only been walking and ankle brace.  She is using good tennis shoes and arch supports.  She states she is getting more pain she points to both medial lateral and anterior.  Hard to localize.  No increase in swelling.  She states she has been going to physical therapy and her foot is feeling stronger.  Denies any weakness or increased deformity.      12/29/22   patient is approximately 4-1/2 months status post right ankle sprain on approximately 8/9/2021.  Still having pain around right ankle.  States over the last month she has seen no improvement at all.  Have gotten slightly worse.  She points to deep inside the ankle as to where this hurts.  She has been going to physical therapy.  Has not seen any decrease in pain.  Also getting medial and lateral pain.  Denies edema erythema ecchymosis or snapping.  Patient states that she did injure this twice after having past MRI.    1/10/23   patient is approximately 5 months status post right ankle sprain on 8/9/2021.  Still having pain.  Points to medial and lateral.  Had recent MRI.  Using compression ankle brace.  She has been to physical therapy.  They have done strengthening and desensitization.  She wears an orthotic in a good shoe.  States it feels unstable.  Has had a right knee pain because of this.      ROS: See above         Allergies   Allergen Reactions     Penicillins Swelling     Swelling throat; age 4     Adhesive Tape Hives     Penicillin G      Tetracycline GI Disturbance     Other reaction(s): Abdominal Pain  Vomiting; nauseous  Other reaction(s): Abdominal Pain  Vomiting; nauseous       Current Outpatient Medications   Medication Sig Dispense Refill     Cholecalciferol (VITAMIN D) 2000 UNITS CAPS Take 1 capsule by mouth daily        famotidine (PEPCID) 40 MG tablet Take 1 tablet (40 mg) by mouth At Bedtime 90 tablet 3     fluticasone-salmeterol (ADVAIR) 100-50 MCG/ACT inhaler INHALE 1 PUFF INTO THE LUNGS EVERY 12 HOURS 60 each 3     ivabradine (CORLANOR) 5 MG tablet Take 1 tablet (5 mg) by mouth 2 times daily (with meals) 180 tablet 1     levalbuterol (XOPENEX HFA) 45 MCG/ACT inhaler Inhale 1-2 puffs into the lungs every 4 hours as needed for shortness of breath / dyspnea 15 g 0     metroNIDAZOLE (METROGEL) 0.75 % external gel Apply to face BID 45 g 11     omeprazole (PRILOSEC) 40 MG DR capsule Take 1 capsule (40 mg) by mouth daily before breakfast 90 capsule 3     Pyridoxine HCl (B-6) 100 MG TABS        valACYclovir (VALTREX) 1000 mg tablet Take 1 tablet (1,000 mg) by mouth 3 times daily 21 tablet 0     vitamin B-12 (CYANOCOBALAMIN) 100 MCG tablet          Patient Active Problem List   Diagnosis     CARDIOVASCULAR SCREENING; LDL GOAL LESS THAN 160     Irritable bowel syndrome     GERD (gastroesophageal reflux disease)     History of supraventricular tachycardia     Mild persistent asthma     Family history of breast cancer     S/P myomectomy     Nausea     S/P ANAID (total abdominal hysterectomy)     Hypovitaminosis D     Endometriosis     Heberden's nodes     POTS (postural orthostatic tachycardia syndrome)     Hypermobility syndrome     Cervicalgia     Autonomic dysfunction     Benign essential hypertension     Back pain     Sprain of anterior talofibular ligament of right ankle     Pain in joint involving ankle and foot, right     Uterine leiomyoma     Range of joint movement increased     Hypothyroidism     Heartburn     Exercise-induced bronchospasm     Elevated TSH     Normal delivery     Complete spontaneous      Cardiac dysrhythmia     Abdominal bloating       Past Medical History:   Diagnosis Date     Benign essential hypertension 2018     Family history of breast cancer 2014     Family history of breast cancer       Hypothyroidism 12/1/2022     SVT (supraventricular tachycardia):  history of, no recurrence since 2008 10/11/2013    no recurrence since 2008      Uncomplicated asthma        Past Surgical History:   Procedure Laterality Date     ABDOMEN SURGERY  6/2017    myectomy     APPENDECTOMY       COLONOSCOPY N/A 8/20/2018    Procedure: COMBINED COLONOSCOPY, SINGLE OR MULTIPLE BIOPSY/POLYPECTOMY BY BIOPSY;;  Surgeon: Osman Hahn MD;  Location: [facility]     COLONOSCOPY WITH CO2 INSUFFLATION N/A 8/20/2018    Procedure: COLONOSCOPY WITH CO2 INSUFFLATION;  COLON-SCREENING / LUBKA ;  Surgeon: Osman Hahn MD;  Location: [facility]     DAVINCI HYSTERECTOMY TOTAL, SALPINGECTOMY BILATERAL N/A 8/4/2017    Procedure: DAVINCI XI HYSTERECTOMY TOTAL, SALPINGECTOMY BILATERAL;  DAVINCI TOTAL HYSTERECTOMY; BILATERAL SALPINGECTOMY. BILATERAL URETERAL LYSIS. UTEROSACRAL-COLPOPEXY. EXCISION OF ENDOMETRIOSIS;  Surgeon: George Loyola MD;  Location: [facility] OR     DAVINCI LYSIS OF ADHESIONS Bilateral 8/4/2017    Procedure: DAVINCI LYSIS OF ADHESIONS;  BILATERAL URETERAL LYSIS;  Surgeon: George Loyola MD;  Location: [facility]     DAVINCI SACROCOLPOPEXY, CYSTOSCOPY, COMBINED N/A 8/4/2017    Procedure: COMBINED DAVINCI SACROCOLPOPEXY, CYSTOSCOPY;  UTEROSACRAL COLPOPEXY;  Surgeon: George Loyola MD;  Location: [facility]     DAVINCI XI ASSISTED ABLATION / EXCISION OF ENDOMETRIOSIS  8/4/2017    Procedure: DAVINCI XI ASSISTED ABLATION / EXCISION OF ENDOMETRIOSIS;;  Surgeon: George Loyola MD;  Location: [facility]     DILATION AND CURETTAGE N/A 6/20/2017    Procedure: DILATION AND CURETTAGE;  Uterine Curettings and Fibroid Removal, Cook catheter placement; (No hysterectomy done at this time);  Surgeon: Ernestina Saunders MD;  Location: [facility]     HYSTERECTOMY, PAP NO LONGER INDICATED       ORTHOPEDIC SURGERY  6/1986    Heel spur , toe surgery     RELEASE CARPAL TUNNEL Right 10/20/2020    Procedure: RIGHT RELEASE, CARPAL TUNNEL;  Surgeon:  Rickey Rea MD;  Location: [facility] OR     RELEASE CARPAL TUNNEL Left 11/6/2020    Procedure: LEFT RELEASE, CARPAL TUNNEL;  Surgeon: Rickey Rea MD;  Location: [facility] OR     TONSILLECTOMY         Family History   Problem Relation Age of Onset     Diabetes Mother      Hypertension Mother      Hyperlipidemia Mother      Arrhythmia Mother      Cardiovascular Father         CHF and COPD     Asthma Father      Breast Cancer Maternal Grandfather      Colon Cancer Maternal Grandfather      Breast Cancer Paternal Grandmother      Breast Cancer Paternal Aunt      C.A.D. Paternal Grandfather      Breast Cancer Paternal Grandfather      Breast Cancer Cousin      Asthma Son      Diabetes Maternal Grandmother      Hypertension Maternal Grandmother      Breast Cancer Cousin      Breast Cancer Cousin      Breast Cancer Cousin        Social History     Tobacco Use     Smoking status: Never     Smokeless tobacco: Never   Substance Use Topics     Alcohol use: Yes     Comment: Rare- @1 month         Exam:    Vitals: BP (!) 148/82   Pulse 78   LMP 07/28/2017 (Exact Date)   BMI: There is no height or weight on file to calculate BMI.  Height: Data Unavailable    Constitutional/ general:  Pt is in no apparent distress, appears well-nourished.  Cooperative with history and physical exam.     Psych:  The patient answered questions appropriately.  Normal affect.  Seems to have reasonable expectations, in terms of treatment.     Lungs:  Non labored breathing, non labored speech. No cough.  No audible wheezing. Even, quiet breathing.       Vascular:  positive pedal pulses bilaterally for both the DP and PT arteries.  CFT < 3 sec.  negative ankle edema.  positive pedal hair growth.    Neuro:  Alert and oriented x 3. Coordinated gait.  Light touch sensation is intact to the L4, L5, S1 distributions. No obvious deficits.  No evidence of neurological-based weakness, spasticity, or contracture in the lower extremities.      Derm:  Normal texture and turgor.  No erythema, ecchymosis, or cyanosis.      Musculoskeletal:    Lower extremity muscle strength is normal.   No gross deformities.  With weightbearing patient somewhat pronated.  Right lower extremity has increased internal tibial torsion.  negative ecchymosis  negative erythema  negative pain at TMTJs or styloid process.  negative Achilles or calcaneal tubercle pain  negative pain with stressing or palpation peroneal tendons  negative peroneal subluxation  I see no edema anywhere around her ankle.  No pain medial or anterior ankle.  Slight pain over ATFL and CFL.  No pain with stressing peroneus longus or peroneus brevis.  No peroneal subluxation.      Radiographic Exam:  X-Ray Findings:  I personally reviewed the films.    EXAM: XR ANKLE RIGHT G/E 3 VIEWS  LOCATION: [facility]  DATE/TIME: 8/16/2022 5:10 PM     INDICATION:  Ankle injury, right, initial encounter  COMPARISON: None.                                                                      IMPRESSION: Tiny linear flake fracture of the tip of the lateral malleolus. No additional fractures. Moderate soft tissue swelling along the lateral aspect of the ankle. Small plantar calcaneal spur.    Narrative & Impression   MR right ankle without  contrast 1/5/2023 9:58 AM     History: Sprain of anterior talofibular ligament of right ankle,  initial encounter; Pronation of both feet; Pronation of both feet     Techniques: Multiplanar multisequence imaging of the right ankle was  obtained without  administration of intra-articular or intravenous  contrast.     Comparison: MRI 9/13/2022     Findings:     MEDIAL COMPARTMENT     Tendons: Medial flexor tendons are intact. Mild tenosynovial fluid  surrounding the tibialis posterior.     Ligaments: Deltoid and spring ligamentous complexes are intact.     LATERAL COMPARTMENT     Tendons: Short segment split thickness tearing of the peroneal brevis  with distal  reconstitution.. Mild tenosynovial fluid surrounding the  pineal tendons in the region of the lateral malleolus, similar to  prior.     Ligaments: Intact syndesmotic ligaments. Thickened and edematous  anterior talofibular ligament, grossly similar to prior. Thickening of  the calcaneofibular ligament, similar to prior. Intact posterior  talofibular ligament.     POSTERIOR COMPARTMENT     Achilles tendon: Intact.     Plantar fascia: Thickening of the proximal central cord without  regional soft tissue edema, stable.     ANTERIOR COMPARTMENT     Tendons: Anterior extensor tendons are intact. Normal variant peroneal  tertius.     JOINTS     Small ankle joint effusion.     GENERAL FINDINGS     Bones: No fracture, marrow contusion or infiltrative change. No talar  dome osteochondral lesion. Interval resolution of multifocal bone  marrow edema.     Muscles: Normal.     Tarsal tunnel: Normal.      Sinus tarsi: Normal.      ANCILLARY FINDINGS     Intact Lisfranc interosseous ligament                                                                      Impression:     1. No acute osseous abnormality. Multifocal bone marrow edema noted on  prior MRI 9/13/2022 has resolved.     2. Similar high-grade sprain of the anterior talofibular ligament and  moderate grade sprain of the calcaneofibular ligament.     3. Short segment split thickness tearing of the peroneal brevis with  distal reconstitution, new or more conspicuous compared to prior. Mild  peroneal tenosynovitis         Assessment:  right ankle sprain     Plan: Discussed MRI results with patient.  Discussed ultrasound-guided injection into the peroneus brevis tendon to see how much relief this gives.  Since she has continued pain and feels somewhat unstable lateral she is wondering about surgery.  Briefly discussed lateral ankle stabilization.  Will refer to Dr. Michael to discuss.        Vahid Montesinos DPM, FACFAS

## 2023-01-11 ENCOUNTER — THERAPY VISIT (OUTPATIENT)
Dept: PHYSICAL THERAPY | Facility: CLINIC | Age: 53
End: 2023-01-11
Payer: COMMERCIAL

## 2023-01-11 ENCOUNTER — OFFICE VISIT (OUTPATIENT)
Dept: ORTHOPEDICS | Facility: CLINIC | Age: 53
End: 2023-01-11
Payer: COMMERCIAL

## 2023-01-11 ENCOUNTER — OFFICE VISIT (OUTPATIENT)
Dept: PODIATRY | Facility: CLINIC | Age: 53
End: 2023-01-11
Payer: COMMERCIAL

## 2023-01-11 ENCOUNTER — ANCILLARY PROCEDURE (OUTPATIENT)
Dept: GENERAL RADIOLOGY | Facility: CLINIC | Age: 53
End: 2023-01-11
Attending: PODIATRIST
Payer: COMMERCIAL

## 2023-01-11 VITALS — WEIGHT: 180 LBS | DIASTOLIC BLOOD PRESSURE: 80 MMHG | SYSTOLIC BLOOD PRESSURE: 153 MMHG | BODY MASS INDEX: 30.9 KG/M2

## 2023-01-11 VITALS
DIASTOLIC BLOOD PRESSURE: 83 MMHG | HEART RATE: 92 BPM | BODY MASS INDEX: 30.9 KG/M2 | SYSTOLIC BLOOD PRESSURE: 152 MMHG | WEIGHT: 180 LBS

## 2023-01-11 DIAGNOSIS — M25.371 ANKLE INSTABILITY, RIGHT: Primary | ICD-10-CM

## 2023-01-11 DIAGNOSIS — M65.332 TRIGGER MIDDLE FINGER OF LEFT HAND: Primary | ICD-10-CM

## 2023-01-11 DIAGNOSIS — M21.6X1 ACQUIRED PES VALGUS, RIGHT: ICD-10-CM

## 2023-01-11 DIAGNOSIS — M25.371 ANKLE INSTABILITY, RIGHT: ICD-10-CM

## 2023-01-11 DIAGNOSIS — S86.311A PERONEAL TENDON TEAR, RIGHT, INITIAL ENCOUNTER: ICD-10-CM

## 2023-01-11 DIAGNOSIS — S93.491A SPRAIN OF ANTERIOR TALOFIBULAR LIGAMENT OF RIGHT ANKLE, INITIAL ENCOUNTER: ICD-10-CM

## 2023-01-11 DIAGNOSIS — S93.491D SPRAIN OF ANTERIOR TALOFIBULAR LIGAMENT OF RIGHT ANKLE, SUBSEQUENT ENCOUNTER: Primary | ICD-10-CM

## 2023-01-11 DIAGNOSIS — M25.571 PAIN IN JOINT INVOLVING ANKLE AND FOOT, RIGHT: ICD-10-CM

## 2023-01-11 PROCEDURE — 20550 NJX 1 TENDON SHEATH/LIGAMENT: CPT | Mod: LT | Performed by: PEDIATRICS

## 2023-01-11 PROCEDURE — 99213 OFFICE O/P EST LOW 20 MIN: CPT | Mod: 25 | Performed by: PEDIATRICS

## 2023-01-11 PROCEDURE — 73600 X-RAY EXAM OF ANKLE: CPT | Mod: TC | Performed by: RADIOLOGY

## 2023-01-11 PROCEDURE — 99214 OFFICE O/P EST MOD 30 MIN: CPT | Performed by: PODIATRIST

## 2023-01-11 PROCEDURE — 97112 NEUROMUSCULAR REEDUCATION: CPT | Mod: GP | Performed by: PHYSICAL THERAPIST

## 2023-01-11 PROCEDURE — 73630 X-RAY EXAM OF FOOT: CPT | Mod: TC | Performed by: RADIOLOGY

## 2023-01-11 PROCEDURE — 97110 THERAPEUTIC EXERCISES: CPT | Mod: GP | Performed by: PHYSICAL THERAPIST

## 2023-01-11 RX ORDER — LIDOCAINE HYDROCHLORIDE 10 MG/ML
0.5 INJECTION, SOLUTION INFILTRATION; PERINEURAL
Status: DISCONTINUED | OUTPATIENT
Start: 2023-01-11 | End: 2024-05-20

## 2023-01-11 RX ORDER — TRIAMCINOLONE ACETONIDE 40 MG/ML
20 INJECTION, SUSPENSION INTRA-ARTICULAR; INTRAMUSCULAR
Status: DISCONTINUED | OUTPATIENT
Start: 2023-01-11 | End: 2024-05-20

## 2023-01-11 RX ADMIN — LIDOCAINE HYDROCHLORIDE 0.5 ML: 10 INJECTION, SOLUTION INFILTRATION; PERINEURAL at 12:22

## 2023-01-11 RX ADMIN — TRIAMCINOLONE ACETONIDE 20 MG: 40 INJECTION, SUSPENSION INTRA-ARTICULAR; INTRAMUSCULAR at 12:22

## 2023-01-11 ASSESSMENT — PAIN SCALES - GENERAL: PAINLEVEL: SEVERE PAIN (7)

## 2023-01-11 NOTE — LETTER
1/11/2023         RE: Laura Jackson  [address]  [place] [address]        Dear Colleague,    Thank you for referring your patient, Laura Jackson, to the [facility]. Please see a copy of my visit note below.    ASSESSMENT & PLAN    Laura was seen today for pain.    Diagnoses and all orders for this visit:    Trigger middle finger of left hand  -     Hand / Upper Extremity Injection/Arthrocentesis: L long A1    Other orders  -     Cancel: Small Joint Injection/Arthrocentesis          See Patient Instructions  Patient Instructions   Discussed the possible cause(s) of this condition, including direct trauma and repetitive trauma or activity such as gripping/grasping, also use of machinery/equipment.  Treatment includes avoiding the offending activity, when known.  Consider use of padded gloves for gripping/grasping activity.  Discussed benefits of splinting the digit to prevent triggering and overuse.  We discussed splinting the finger at night to start.  Also discussed potential benefit of steroid injection to relieve pain and symptoms.  Finally, additional consideration is seeing orthopedic surgery, particularly if failing conservative management, or if the digit gets stuck in a flexed position.  Following discussion, use of Oval 8 splint reviewed today. Also did steroid injection.  Plan to monitor ~2 weeks to start.    Regarding the mild nodularity in the palm, proximal to base of ring finger, discussed potential for some developing Dupuytren contracture. Monitor that for now.    If you have any further questions for your physician or physician s care team you can contact them thru [email or web address] or by calling  [phone] and use option 3 to leave a voice message.   Messages received during business hours will be returned same day.            Injection Discharge Instructions      You may shower, however avoid swimming, tub baths or hot tubs for 24 hours following  your procedure    You may have a mild to moderate increase in pain for several days following the injection.    It may take up to 14 days for the steroid medication to start working although you may feel the effect as early as a few days after the procedure.    You may use ice packs for 10-15 minutes, 3 to 4 times a day at the injection site for comfort    You may use anti-inflammatory medications (such as Ibuprofen or Aleve or Advil) if you are able to take safely, or acetaminophen (Tylenol) for pain control if necessary    If you were fasting, you may resume your normal diet and medications after the procedure    If you have diabetes, check your blood sugar more frequently than usual as your blood sugar may be higher than normal for 10-14 days following a steroid injection. Contact your doctor who manages your diabetes if your blood sugar is higher than usual      If you experience any of the following, call the clinic @ [phone]  - Fever over 100 degree F  - Swelling, bleeding, redness, drainage, warmth at the injection site  - New or worsening pain            [facility]rez[facility]    -----  Chief Complaint   Patient presents with     Left Hand - Pain       SUBJECTIVE  Laura Jackson is a/an 52 year old female who is seen as a self referral for evaluation of Left Middle finger, triggering.     The patient is seen by themselves.  The patient is Right handed    Onset: 6 week(s) ago. Reports insidious onset without acute precipitating event.  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     **  Previously seen for right UE issues, improved with therapy.    **  Onset of right long finger trigger finger about 6-7 weeks ago.  Has done some stretching, icing. Wearing splint at night. However, symptoms persist, perhaps somewhat worse.      REVIEW OF SYSTEMS:  Review of Systems      OBJECTIVE:  BP (!) 153/80   Wt 81.6 kg (180 lb)   LMP 07/28/2017 (Exact Date)   BMI 30.90 kg/m     General: healthy, alert and in no distress  HEENT: no scleral icterus or conjunctival erythema  Skin: no suspicious lesions or rash. No jaundice.  CV: distal perfusion intact   Resp: normal respiratory effort without conversational dyspnea   Psych: normal mood and affect  Gait: NORMAL  Neuro: Normal light sensory exam of extremity       Hand/wrist (left):    Inspection:  Slight nodularity in ulnar palm, near distal palmar crease.  No swelling. No ecchymosis.    Motion:    Digit motion lacking minimal composite flexion with stiffness long finger  Triggering long finger with motion    Sensation:  Grossly intact.    Radial pulses normal, +2/4, capillary refill brisk.    Palpation:  Tender palmar base long finger, with nodularity palpated along flexor tendon course      RADIOLOGY:  None today.               Hand / Upper Extremity Injection/Arthrocentesis: L long A1    Date/Time: 1/11/2023 12:22 PM  Performed by: Glen Gutierrez DO  Authorized by: Glen Gutierrez DO     Indications:  Pain  Needle Size:  25 G  Guidance: landmark    Approach:  Volar  Condition: trigger finger    Location:  Long finger    Site:  L long A1  Medications:  20 mg triamcinolone 40 MG/ML; 0.5 mL lidocaine 1 %  Outcome:  Tolerated well, no immediate complications  Procedure discussed: discussed risks, benefits, and alternatives    Consent Given by:  Patient  Timeout:  timeout called immediately prior to procedure    Prep: patient was prepped and draped in usual sterile fashion     Half volume injected            Again, thank you for allowing me to participate in the care of your patient.        Sincerely,        Glen Gutierrez, DO

## 2023-01-11 NOTE — PATIENT INSTRUCTIONS
Discussed the possible cause(s) of this condition, including direct trauma and repetitive trauma or activity such as gripping/grasping, also use of machinery/equipment.  Treatment includes avoiding the offending activity, when known.  Consider use of padded gloves for gripping/grasping activity.  Discussed benefits of splinting the digit to prevent triggering and overuse.  We discussed splinting the finger at night to start.  Also discussed potential benefit of steroid injection to relieve pain and symptoms.  Finally, additional consideration is seeing orthopedic surgery, particularly if failing conservative management, or if the digit gets stuck in a flexed position.  Following discussion, use of Oval 8 splint reviewed today. Also did steroid injection.  Plan to monitor ~2 weeks to start.    Regarding the mild nodularity in the palm, proximal to base of ring finger, discussed potential for some developing Dupuytren contracture. Monitor that for now.    If you have any further questions for your physician or physician s care team you can contact them thru [employer] or by calling  [phone] and use option 3 to leave a voice message.   Messages received during business hours will be returned same day.            Injection Discharge Instructions    You may shower, however avoid swimming, tub baths or hot tubs for 24 hours following your procedure  You may have a mild to moderate increase in pain for several days following the injection.  It may take up to 14 days for the steroid medication to start working although you may feel the effect as early as a few days after the procedure.  You may use ice packs for 10-15 minutes, 3 to 4 times a day at the injection site for comfort  You may use anti-inflammatory medications (such as Ibuprofen or Aleve or Advil) if you are able to take safely, or acetaminophen (Tylenol) for pain control if necessary  If you were fasting, you may resume your normal diet and medications after  the procedure  If you have diabetes, check your blood sugar more frequently than usual as your blood sugar may be higher than normal for 10-14 days following a steroid injection. Contact your doctor who manages your diabetes if your blood sugar is higher than usual    If you experience any of the following, call the clinic @ [phone]  - Fever over 100 degree F  - Swelling, bleeding, redness, drainage, warmth at the injection site  - New or worsening pain

## 2023-01-11 NOTE — PROGRESS NOTES
Assessment:      ICD-10-CM    1. Ankle instability, right  M25.371 XR Ankle Right 2 Views     XR Foot Right G/E 3 Views     Ankle/Foot Bracing Supplies DME Walking Boot; Right; Pneumatic; Tall      2. Acquired pes valgus, right  M21.6X1 XR Ankle Right 2 Views     XR Foot Right G/E 3 Views      3. Peroneal tendon tear, right, initial encounter  S86.311A       4. Sprain of anterior talofibular ligament of right ankle, initial encounter  S93.491A Orthopedic  Referral             Plan:  Orders Placed This Encounter   Procedures     XR Ankle Right 2 Views     XR Foot Right G/E 3 Views     Ankle/Foot Bracing Supplies DME Walking Boot; Right; Pneumatic; Tall       Discussed the etiology and treatment of the condition with the patient.  Imaging studies reviewed and discussed with the patient.  Discussed surgical and conservative options.    Surgical discussion on ankle scope, Brostrom, possible peroneal tendon repair  Recovery discussed - 6 wks NWB      -Injection- offered, if needed  -NSAID- no Rx today    WB foot, ankle x-rays on way out today    Pt will call if she desires surgery - Tania likely    Return:  No follow-ups on file.                Chief Complaint:     Patient presents with:  Right Foot - Pain  Right Ankle - Pain     right ankle pain    HPI:  Laura Jackson is a 52 year old year old female who presents for evaluation of ankle pain.    Pain location- lateral ankle, anterior ankle    Ankle gives out on her  Generalized hypermobility per pt  ICU nurse, works on her feet   Flatfoot, but orthotics have helped with this    Walks for exercise      Past Medical & Surgical History:  Past Medical History:   Diagnosis Date     Benign essential hypertension 7/31/2018     Family history of breast cancer 2/19/2014     Family history of breast cancer      Hypothyroidism 12/1/2022     SVT (supraventricular tachycardia):  history of, no recurrence since 2008 10/11/2013    no recurrence since 2008       Uncomplicated asthma       Past Surgical History:   Procedure Laterality Date     ABDOMEN SURGERY  6/2017    myectomy     APPENDECTOMY       COLONOSCOPY N/A 8/20/2018    Procedure: COMBINED COLONOSCOPY, SINGLE OR MULTIPLE BIOPSY/POLYPECTOMY BY BIOPSY;;  Surgeon: Osman Hahn MD;  Location: [facility]     COLONOSCOPY WITH CO2 INSUFFLATION N/A 8/20/2018    Procedure: COLONOSCOPY WITH CO2 INSUFFLATION;  COLON-SCREENING / LUBKA ;  Surgeon: Osman Hahn MD;  Location: [facility]     DAVINCI HYSTERECTOMY TOTAL, SALPINGECTOMY BILATERAL N/A 8/4/2017    Procedure: DAVINCI XI HYSTERECTOMY TOTAL, SALPINGECTOMY BILATERAL;  DAVINCI TOTAL HYSTERECTOMY; BILATERAL SALPINGECTOMY. BILATERAL URETERAL LYSIS. UTEROSACRAL-COLPOPEXY. EXCISION OF ENDOMETRIOSIS;  Surgeon: George Loyola MD;  Location: [facility]     DAVINCI LYSIS OF ADHESIONS Bilateral 8/4/2017    Procedure: DAVINCI LYSIS OF ADHESIONS;  BILATERAL URETERAL LYSIS;  Surgeon: George Loyola MD;  Location: [facility]     DAVINCI SACROCOLPOPEXY, CYSTOSCOPY, COMBINED N/A 8/4/2017    Procedure: COMBINED DAVINCI SACROCOLPOPEXY, CYSTOSCOPY;  UTEROSACRAL COLPOPEXY;  Surgeon: George Loyola MD;  Location: [facility]     DAVINCI XI ASSISTED ABLATION / EXCISION OF ENDOMETRIOSIS  8/4/2017    Procedure: DAVINCI XI ASSISTED ABLATION / EXCISION OF ENDOMETRIOSIS;;  Surgeon: George Loyola MD;  Location: [facility]     DILATION AND CURETTAGE N/A 6/20/2017    Procedure: DILATION AND CURETTAGE;  Uterine Curettings and Fibroid Removal, Cook catheter placement; (No hysterectomy done at this time);  Surgeon: Ernestina Saunders MD;  [facility]     HYSTERECTOMY, PAP NO LONGER INDICATED       ORTHOPEDIC SURGERY  6/1986    Heel spur , toe surgery     RELEASE CARPAL TUNNEL Right 10/20/2020    Procedure: RIGHT RELEASE, CARPAL TUNNEL;  Surgeon: Rickey Rea MD;  Location: [facility] OR     RELEASE CARPAL TUNNEL Left 11/6/2020    Procedure: LEFT RELEASE, CARPAL TUNNEL;  Surgeon: Álvaro  Rickey Lou MD;  Location: [facility] OR     TONSILLECTOMY        Family History   Problem Relation Age of Onset     Diabetes Mother      Hypertension Mother      Hyperlipidemia Mother      Arrhythmia Mother      Cardiovascular Father         CHF and COPD     Asthma Father      Breast Cancer Maternal Grandfather      Colon Cancer Maternal Grandfather      Breast Cancer Paternal Grandmother      Breast Cancer Paternal Aunt      DEANA Paternal Grandfather      Breast Cancer Paternal Grandfather      Breast Cancer Cousin      Asthma Son      Diabetes Maternal Grandmother      Hypertension Maternal Grandmother      Breast Cancer Cousin      Breast Cancer Cousin      Breast Cancer Cousin         Social History:  ?  History   Smoking Status     Never   Smokeless Tobacco     Never     History   Drug Use Unknown     Social History    Substance and Sexual Activity      Alcohol use: Yes        Comment: Rare- @1 month      Allergies:  ?   Allergies   Allergen Reactions     Penicillins Swelling     Swelling throat; age 4     Adhesive Tape Hives     Penicillin G      Tetracycline GI Disturbance     Other reaction(s): Abdominal Pain  Vomiting; nauseous  Other reaction(s): Abdominal Pain  Vomiting; nauseous        Medications:    Current Outpatient Medications   Medication     Cholecalciferol (VITAMIN D) 2000 UNITS CAPS     famotidine (PEPCID) 40 MG tablet     fluticasone-salmeterol (ADVAIR) 100-50 MCG/ACT inhaler     ivabradine (CORLANOR) 5 MG tablet     levalbuterol (XOPENEX HFA) 45 MCG/ACT inhaler     metroNIDAZOLE (METROGEL) 0.75 % external gel     omeprazole (PRILOSEC) 40 MG DR capsule     Pyridoxine HCl (B-6) 100 MG TABS     valACYclovir (VALTREX) 1000 mg tablet     vitamin B-12 (CYANOCOBALAMIN) 100 MCG tablet     Current Facility-Administered Medications   Medication     lidocaine 1 % injection 0.5 mL     ropivacaine (NAROPIN) injection 1 mL     triamcinolone (KENALOG-40) injection 20 mg     triamcinolone (KENALOG-40)  injection 20 mg     triamcinolone (KENALOG-40) injection 40 mg         Physical Exam:  ?  Vitals:  BP (!) 152/83 (BP Location: Left arm)   Pulse 92   Wt 81.6 kg (180 lb)   LMP 07/28/2017 (Exact Date)   BMI 30.90 kg/m     General:  WD/WN, in NAD.  A&O x3.  Dermatologic:    Skin is intact, open lesions absent.   Skin texture, turgor is normal.  Vascular:  Pulses palpable bilateral.  Digital capillary refill time normal bilateral.  Skin temperature is normal bilateral.  Generalized edema- none bilateral.  Focal edema- mild anterior ankle joint right, vs L.  Neurologic:    Gross sensation normal.  Gait and balance normal.  Musculoskeletal:  Maximal pain to palpation of lateral ankle gutter, right.  moderate pain to palpation of anteromedial ankle joint, right.  Mild pain to palpation peroneals supra and inframalleolar,   Manual Muscle Testing of PL, PB  pain free 5/5  right.  Ankle circumduction - no peroneal subluxation, R  Ankle ROM full, painful at end ankle DF, right.    Anterior drawer unstable,  right.  Talar tilt unstable,  right.    Stance:  RCSP Valgus bilateral.  Foot type:  Flexible valgus      Imaging:   x-ray independently reviewed and interpreted by myself today.  Weight-bearing views right foot and ankle dated 01/11/23, reveal congruent valgus, LDTA ~85 deg, jo ann avulsion fragment at distal fibula.  Moderate flatfoot w/ NCJ sag and 2nd TMTJ degeneration.  AP talar head uncovering ~50 %        MRI independently reviewed and interpreted by myself today.   right ankle dated 1/5/23, reveal absent ATFL, attenuation of CFL.  PB tearing above & below fibular groove.      x-ray independently reviewed and interpreted by myself today.  Non-Weight-bearing views bilateral foot and ankle dated 2021, reveal no fracture or degeneration

## 2023-01-11 NOTE — PATIENT INSTRUCTIONS
PATIENT INSTRUCTIONS - Podiatry / Foot & Ankle Surgery        Standing x-rays on the way out        Surgery Scheduling  Please call the surgery coordinator to schedule surgery at:  [phone]  The surgery coordinator will also arrange a pre-op exam with your PCP for surgical clearance.  Please return to see your surgeon for a pre-op exam within 30 days of your scheduled procedure.      Size 7 tall Aircast boot      Ankle arthroscopy, lateral ligament repair, possible peroneal tendon repair

## 2023-01-11 NOTE — LETTER
1/11/2023         RE: Laura Jackson  [address]  [place] [address]        Dear Colleague,    Thank you for referring your patient, Laura Jackson, to the [facility]. Please see a copy of my visit note below.      Assessment:      ICD-10-CM    1. Ankle instability, right  M25.371 XR Ankle Right 2 Views     XR Foot Right G/E 3 Views     Ankle/Foot Bracing Supplies DME Walking Boot; Right; Pneumatic; Tall      2. Acquired pes valgus, right  M21.6X1 XR Ankle Right 2 Views     XR Foot Right G/E 3 Views      3. Peroneal tendon tear, right, initial encounter  S86.311A       4. Sprain of anterior talofibular ligament of right ankle, initial encounter  S93.491A Orthopedic  Referral             Plan:  Orders Placed This Encounter   Procedures     XR Ankle Right 2 Views     XR Foot Right G/E 3 Views     Ankle/Foot Bracing Supplies DME Walking Boot; Right; Pneumatic; Tall       Discussed the etiology and treatment of the condition with the patient.  Imaging studies reviewed and discussed with the patient.  Discussed surgical and conservative options.    Surgical discussion on ankle scope, Brostrom, possible peroneal tendon repair  Recovery discussed - 6 wks NWB      -Injection- offered, if needed  -NSAID- no Rx today    WB foot, ankle x-rays on way out today    Pt will call if she desires surgery - Tania likely    Return:  No follow-ups on file.                Chief Complaint:     Patient presents with:  Right Foot - Pain  Right Ankle - Pain     right ankle pain    HPI:  Laura Jackson is a 52 year old year old female who presents for evaluation of ankle pain.    Pain location- lateral ankle, anterior ankle    Ankle gives out on her  Generalized hypermobility per pt  ICU nurse, works on her feet   Flatfoot, but orthotics have helped with this    Walks for exercise      Past Medical & Surgical History:  Past Medical History:   Diagnosis Date     Benign essential hypertension  7/31/2018     Family history of breast cancer 2/19/2014     Family history of breast cancer      Hypothyroidism 12/1/2022     SVT (supraventricular tachycardia):  history of, no recurrence since 2008 10/11/2013    no recurrence since 2008      Uncomplicated asthma       Past Surgical History:   Procedure Laterality Date     ABDOMEN SURGERY  6/2017    myectomy     APPENDECTOMY       COLONOSCOPY N/A 8/20/2018    Procedure: COMBINED COLONOSCOPY, SINGLE OR MULTIPLE BIOPSY/POLYPECTOMY BY BIOPSY;;  Surgeon: Osman Hahn MD;  Location: [facility]     COLONOSCOPY WITH CO2 INSUFFLATION N/A 8/20/2018    Procedure: COLONOSCOPY WITH CO2 INSUFFLATION;  COLON-SCREENING / LUBKA ;  Surgeon: Osman Hahn MD;  Location: [facility]     DAVINCI HYSTERECTOMY TOTAL, SALPINGECTOMY BILATERAL N/A 8/4/2017    Procedure: DAVINCI XI HYSTERECTOMY TOTAL, SALPINGECTOMY BILATERAL;  DAVINCI TOTAL HYSTERECTOMY; BILATERAL SALPINGECTOMY. BILATERAL URETERAL LYSIS. UTEROSACRAL-COLPOPEXY. EXCISION OF ENDOMETRIOSIS;  Surgeon: George Loyola MD;  Location: [facility]     DAVINCI LYSIS OF ADHESIONS Bilateral 8/4/2017    Procedure: DAVINCI LYSIS OF ADHESIONS;  BILATERAL URETERAL LYSIS;  Surgeon: George Loyola MD;  [facility]: [facility]     DAVINCI SACROCOLPOPEXY, CYSTOSCOPY, COMBINED N/A 8/4/2017    Procedure: COMBINED DAVINCI SACROCOLPOPEXY, CYSTOSCOPY;  UTEROSACRAL COLPOPEXY;  Surgeon: George Loyola MD;  [facility]: [facility]     DAVINCI XI ASSISTED ABLATION / EXCISION OF ENDOMETRIOSIS  8/4/2017    Procedure: DAVINCI XI ASSISTED ABLATION / EXCISION OF ENDOMETRIOSIS;;  Surgeon: George Loyola MD;  [facility]: [facility]     DILATION AND CURETTAGE N/A 6/20/2017    Procedure: DILATION AND CURETTAGE;  Uterine Curettings and Fibroid Removal, Cook catheter placement; (No hysterectomy done at this time);  Surgeon: Ernestina Saunders MD;  [facility]     HYSTERECTOMY, PAP NO LONGER INDICATED       ORTHOPEDIC SURGERY  6/1986    Heel spur , toe surgery      RELEASE CARPAL TUNNEL Right 10/20/2020    Procedure: RIGHT RELEASE, CARPAL TUNNEL;  Surgeon: Rickey Rea MD;  Location: [facility] OR     RELEASE CARPAL TUNNEL Left 11/6/2020    Procedure: LEFT RELEASE, CARPAL TUNNEL;  Surgeon: Rickey Rea MD;  Location: [facility] OR     TONSILLECTOMY        Family History   Problem Relation Age of Onset     Diabetes Mother      Hypertension Mother      Hyperlipidemia Mother      Arrhythmia Mother      Cardiovascular Father         CHF and COPD     Asthma Father      Breast Cancer Maternal Grandfather      Colon Cancer Maternal Grandfather      Breast Cancer Paternal Grandmother      Breast Cancer Paternal Aunt      C.A.D. Paternal Grandfather      Breast Cancer Paternal Grandfather      Breast Cancer Cousin      Asthma Son      Diabetes Maternal Grandmother      Hypertension Maternal Grandmother      Breast Cancer Cousin      Breast Cancer Cousin      Breast Cancer Cousin         Social History:  ?  History   Smoking Status     Never   Smokeless Tobacco     Never     History   Drug Use Unknown     Social History    Substance and Sexual Activity      Alcohol use: Yes        Comment: Rare- @1 month      Allergies:  ?   Allergies   Allergen Reactions     Penicillins Swelling     Swelling throat; age 4     Adhesive Tape Hives     Penicillin G      Tetracycline GI Disturbance     Other reaction(s): Abdominal Pain  Vomiting; nauseous  Other reaction(s): Abdominal Pain  Vomiting; nauseous        Medications:    Current Outpatient Medications   Medication     Cholecalciferol (VITAMIN D) 2000 UNITS CAPS     famotidine (PEPCID) 40 MG tablet     fluticasone-salmeterol (ADVAIR) 100-50 MCG/ACT inhaler     ivabradine (CORLANOR) 5 MG tablet     levalbuterol (XOPENEX HFA) 45 MCG/ACT inhaler     metroNIDAZOLE (METROGEL) 0.75 % external gel     omeprazole (PRILOSEC) 40 MG DR capsule     Pyridoxine HCl (B-6) 100 MG TABS     valACYclovir (VALTREX) 1000 mg tablet     vitamin B-12  (CYANOCOBALAMIN) 100 MCG tablet     Current Facility-Administered Medications   Medication     lidocaine 1 % injection 0.5 mL     ropivacaine (NAROPIN) injection 1 mL     triamcinolone (KENALOG-40) injection 20 mg     triamcinolone (KENALOG-40) injection 20 mg     triamcinolone (KENALOG-40) injection 40 mg         Physical Exam:  ?  Vitals:  BP (!) 152/83 (BP Location: Left arm)   Pulse 92   Wt 81.6 kg (180 lb)   LMP 07/28/2017 (Exact Date)   BMI 30.90 kg/m     General:  WD/WN, in NAD.  A&O x3.  Dermatologic:    Skin is intact, open lesions absent.   Skin texture, turgor is normal.  Vascular:  Pulses palpable bilateral.  Digital capillary refill time normal bilateral.  Skin temperature is normal bilateral.  Generalized edema- none bilateral.  Focal edema- mild anterior ankle joint right, vs L.  Neurologic:    Gross sensation normal.  Gait and balance normal.  Musculoskeletal:  Maximal pain to palpation of lateral ankle gutter, right.  moderate pain to palpation of anteromedial ankle joint, right.  Mild pain to palpation peroneals supra and inframalleolar,   Manual Muscle Testing of PL, PB  pain free 5/5  right.  Ankle circumduction - no peroneal subluxation, R  Ankle ROM full, painful at end ankle DF, right.    Anterior drawer unstable,  right.  Talar tilt unstable,  right.    Stance:  RCSP Valgus bilateral.  Foot type:  Flexible valgus      Imaging:   x-ray independently reviewed and interpreted by myself today.  Weight-bearing views right foot and ankle dated 01/11/23, reveal congruent valgus, LDTA ~85 deg, jo ann avulsion fragment at distal fibula.  Moderate flatfoot w/ NCJ sag and 2nd TMTJ degeneration.  AP talar head uncovering ~50 %        MRI independently reviewed and interpreted by myself today.   right ankle dated 1/5/23, reveal absent ATFL, attenuation of CFL.  PB tearing above & below fibular groove.      x-ray independently reviewed and interpreted by myself today.  Non-Weight-bearing views bilateral  foot and ankle dated 2021, reveal no fracture or degeneration                  Again, thank you for allowing me to participate in the care of your patient.        Sincerely,        Saida Michael DPM

## 2023-01-11 NOTE — PROGRESS NOTES
ASSESSMENT & PLAN    Laura was seen today for pain.    Diagnoses and all orders for this visit:    Trigger middle finger of left hand  -     Hand / Upper Extremity Injection/Arthrocentesis: L long A1    Other orders  -     Cancel: Small Joint Injection/Arthrocentesis          See Patient Instructions  Patient Instructions   Discussed the possible cause(s) of this condition, including direct trauma and repetitive trauma or activity such as gripping/grasping, also use of machinery/equipment.  Treatment includes avoiding the offending activity, when known.  Consider use of padded gloves for gripping/grasping activity.  Discussed benefits of splinting the digit to prevent triggering and overuse.  We discussed splinting the finger at night to start.  Also discussed potential benefit of steroid injection to relieve pain and symptoms.  Finally, additional consideration is seeing orthopedic surgery, particularly if failing conservative management, or if the digit gets stuck in a flexed position.  Following discussion, use of Oval 8 splint reviewed today. Also did steroid injection.  Plan to monitor ~2 weeks to start.    Regarding the mild nodularity in the palm, proximal to base of ring finger, discussed potential for some developing Dupuytren contracture. Monitor that for now.    If you have any further questions for your physician or physician s care team you can contact them thru [employer] or by calling  [phone] and use option 3 to leave a voice message.   Messages received during business hours will be returned same day.            Injection Discharge Instructions      You may shower, however avoid swimming, tub baths or hot tubs for 24 hours following your procedure    You may have a mild to moderate increase in pain for several days following the injection.    It may take up to 14 days for the steroid medication to start working although you may feel the effect as early as a few days after the procedure.    You  may use ice packs for 10-15 minutes, 3 to 4 times a day at the injection site for comfort    You may use anti-inflammatory medications (such as Ibuprofen or Aleve or Advil) if you are able to take safely, or acetaminophen (Tylenol) for pain control if necessary    If you were fasting, you may resume your normal diet and medications after the procedure    If you have diabetes, check your blood sugar more frequently than usual as your blood sugar may be higher than normal for 10-14 days following a steroid injection. Contact your doctor who manages your diabetes if your blood sugar is higher than usual      If you experience any of the following, call the clinic @ [phone]  - Fever over 100 degree F  - Swelling, bleeding, redness, drainage, warmth at the injection site  - New or worsening pain            [facility]    -----  Chief Complaint   Patient presents with     Left Hand - Pain       SUBJECTIVE  Laura Jackson is a/an 52 year old female who is seen as a self referral for evaluation of Left Middle finger, triggering.     The patient is seen by themselves.  The patient is Right handed    Onset: 6 week(s) ago. Reports insidious onset without acute precipitating event.  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     **  Previously seen for right UE issues, improved with therapy.    **  Onset of right  long finger trigger finger about 6-7 weeks ago.  Has done some stretching, icing. Wearing splint at night. However, symptoms persist, perhaps somewhat worse.      REVIEW OF SYSTEMS:  Review of Systems      OBJECTIVE:  BP (!) 153/80   Wt 81.6 kg (180 lb)   LMP 07/28/2017 (Exact Date)   BMI 30.90 kg/m     General: healthy, alert and in no distress  HEENT: no scleral icterus or conjunctival erythema  Skin: no suspicious lesions or rash. No jaundice.  CV: distal perfusion intact   Resp: normal respiratory effort without conversational dyspnea   Psych: normal mood and affect  Gait: NORMAL  Neuro: Normal light sensory exam of extremity       Hand/wrist (left):    Inspection:  Slight nodularity in ulnar palm, near distal palmar crease.  No swelling. No ecchymosis.    Motion:    Digit motion lacking minimal composite flexion with stiffness long finger  Triggering long finger with motion    Sensation:  Grossly intact.    Radial pulses normal, +2/4, capillary refill brisk.    Palpation:  Tender palmar base long finger, with nodularity palpated along flexor tendon course      RADIOLOGY:  None today.               Hand / Upper Extremity Injection/Arthrocentesis: L long A1    Date/Time: 1/11/2023 12:22 PM  Performed by: Glen Gutierrez DO  Authorized by: Glen Gutierrez DO     Indications:  Pain  Needle Size:  25 G  Guidance: landmark    Approach:  Volar  Condition: trigger finger    Location:  Long finger    Site:  L long A1  Medications:  20 mg triamcinolone 40 MG/ML; 0.5 mL lidocaine 1 %  Outcome:  Tolerated well, no immediate complications  Procedure discussed: discussed risks, benefits, and alternatives    Consent Given by:  Patient  Timeout: timeout called immediately prior to procedure    Prep: patient was prepped and draped in usual sterile fashion     Half volume injected

## 2023-01-13 ENCOUNTER — TELEPHONE (OUTPATIENT)
Dept: PODIATRY | Facility: CLINIC | Age: 53
End: 2023-01-13

## 2023-01-13 NOTE — TELEPHONE ENCOUNTER
Pt calls to schedule surgery soon since she's been out of work for abnout 5 months and want to go back.    Told her I am waiting for orders and will reach out likely Monday.    Routed to provider for orders.

## 2023-01-16 ENCOUNTER — PREP FOR PROCEDURE (OUTPATIENT)
Dept: PODIATRY | Facility: CLINIC | Age: 53
End: 2023-01-16

## 2023-01-16 DIAGNOSIS — S86.311A PERONEAL TENDON TEAR, RIGHT, INITIAL ENCOUNTER: ICD-10-CM

## 2023-01-16 DIAGNOSIS — M25.371 INSTABILITY OF RIGHT ANKLE JOINT: Primary | ICD-10-CM

## 2023-01-17 ENCOUNTER — THERAPY VISIT (OUTPATIENT)
Dept: PHYSICAL THERAPY | Facility: CLINIC | Age: 53
End: 2023-01-17
Payer: COMMERCIAL

## 2023-01-17 DIAGNOSIS — M54.89 OTHER BACK PAIN, UNSPECIFIED CHRONICITY: Primary | ICD-10-CM

## 2023-01-17 PROCEDURE — 97112 NEUROMUSCULAR REEDUCATION: CPT | Mod: GP | Performed by: PHYSICAL THERAPIST

## 2023-01-17 PROCEDURE — 97140 MANUAL THERAPY 1/> REGIONS: CPT | Mod: GP | Performed by: PHYSICAL THERAPIST

## 2023-01-18 ENCOUNTER — MYC MEDICAL ADVICE (OUTPATIENT)
Dept: PODIATRY | Facility: CLINIC | Age: 53
End: 2023-01-18
Payer: COMMERCIAL

## 2023-01-24 ENCOUNTER — THERAPY VISIT (OUTPATIENT)
Dept: PHYSICAL THERAPY | Facility: CLINIC | Age: 53
End: 2023-01-24
Payer: COMMERCIAL

## 2023-01-24 DIAGNOSIS — M54.89 OTHER BACK PAIN, UNSPECIFIED CHRONICITY: Primary | ICD-10-CM

## 2023-01-24 PROCEDURE — 97112 NEUROMUSCULAR REEDUCATION: CPT | Mod: GP | Performed by: PHYSICAL THERAPIST

## 2023-01-24 PROCEDURE — 97140 MANUAL THERAPY 1/> REGIONS: CPT | Mod: GP | Performed by: PHYSICAL THERAPIST

## 2023-01-25 ENCOUNTER — OFFICE VISIT (OUTPATIENT)
Dept: PODIATRY | Facility: CLINIC | Age: 53
End: 2023-01-25
Payer: COMMERCIAL

## 2023-01-25 VITALS
BODY MASS INDEX: 30.9 KG/M2 | SYSTOLIC BLOOD PRESSURE: 145 MMHG | HEART RATE: 88 BPM | DIASTOLIC BLOOD PRESSURE: 86 MMHG | WEIGHT: 180 LBS

## 2023-01-25 DIAGNOSIS — M21.6X1 ACQUIRED PES VALGUS, RIGHT: ICD-10-CM

## 2023-01-25 DIAGNOSIS — S86.311A PERONEAL TENDON TEAR, RIGHT, INITIAL ENCOUNTER: ICD-10-CM

## 2023-01-25 DIAGNOSIS — M25.371 ANKLE INSTABILITY, RIGHT: Primary | ICD-10-CM

## 2023-01-25 PROCEDURE — 99214 OFFICE O/P EST MOD 30 MIN: CPT | Performed by: PODIATRIST

## 2023-01-25 NOTE — LETTER
1/25/2023         RE: Laura Jackson  [address]  [place] [address]        Dear Colleague,    Thank you for referring your patient, Laura Jackson, to the [facility]. Please see a copy of my visit note below.      Assessment:      ICD-10-CM    1. Ankle instability, right  M25.371 Rolling Knee Walker/Scooter Order      2. Peroneal tendon tear, right, initial encounter  S86.311A Rolling Knee Walker/Scooter Order      3. Acquired pes valgus, right  M21.6X1 Rolling Knee Walker/Scooter Order             Plan:  Orders Placed This Encounter   Procedures     Rolling Knee Walker/Scooter Order       Discussed the etiology and treatment of the condition with the patient.  Imaging studies reviewed and discussed with the patient.  Discussed surgical and conservative options.    Shin splints R ankle - no periosteal reaction seen on recent ankle x-rays    -PARQ- Ankle arthrosopy, Brostrom De Guzman, peroneal tendon repair, medial mall exostectomy  -Janiya-operative instructions reviewed with patient, see AVS.  -Post-op- NWB in splint.  -Pain- oxycodone, scheduled tylenol.  -DVT Px- early ROM, ASA 81mg bid  -Knee scooter Rx today.  Pt has crutches  -Note for work today - off x10 wks        Return:  No follow-ups on file.                Chief Complaint:     Patient presents with:  Right Ankle - RECHECK     right ankle pain    HPI:  Laura Jackson is a 52 year old year old female who presents for evaluation of ankle pain.    Pain location- lateral ankle, anterior ankle    Ankle gives out on her  Generalized hypermobility per pt  ICU nurse, works on her feet   Flatfoot, but orthotics have helped with this    Walks for exercise    1/25/2023  Here today for concern for right shin pain   Anterior medial shin, can radiate down into the ankle and up towards the tibial tuberosity   Wearing ankle brace     Chief Complaint:     Patient presents with:  Right Ankle - RECHECK     Pre-operative History &  Physical    HPI:  Laura Jackson is a 52 year old year old female who presents for a pre-op exam.    They have failed conservative treatment and decided upon operative intervention.    -Prior anesthesia difficulties?- no  -Snoring/sleep apnea?- No.  -Prior post-op nausea/vomiting?- Yes: nausea.  -Prior DVT or PE?- No}.  -Clotting disorder, easy bleeding?- No.  -Anticoagulant, aspirin, or NSAID use? Yes: IBu  -Prior wound healing complications?- No.  -Prior opioid pain medication use; type (Vicodin, Norco, oxycodone) & tolerated? - Yes: oxy.  -Metal allergies? No.        Past Medical & Surgical History:  Past Medical History:   Diagnosis Date     Benign essential hypertension 7/31/2018     Family history of breast cancer 2/19/2014     Family history of breast cancer      Hypothyroidism 12/1/2022     SVT (supraventricular tachycardia):  history of, no recurrence since 2008 10/11/2013    no recurrence since 2008      Uncomplicated asthma       Past Surgical History:   Procedure Laterality Date     ABDOMEN SURGERY  6/2017    myectomy     APPENDECTOMY       COLONOSCOPY N/A 8/20/2018    Procedure: COMBINED COLONOSCOPY, SINGLE OR MULTIPLE BIOPSY/POLYPECTOMY BY BIOPSY;;  Surgeon: Osman Hahn MD;  [facility]     COLONOSCOPY WITH CO2 INSUFFLATION N/A 8/20/2018    Procedure: COLONOSCOPY WITH CO2 INSUFFLATION;  COLON-SCREENING / LUBKA ;  Surgeon: Osman Hahn MD;  Location: [facility]     DAVINCI HYSTERECTOMY TOTAL, SALPINGECTOMY BILATERAL N/A 8/4/2017    Procedure: DAVINCI XI HYSTERECTOMY TOTAL, SALPINGECTOMY BILATERAL;  DAVINCI TOTAL HYSTERECTOMY; BILATERAL SALPINGECTOMY. BILATERAL URETERAL LYSIS. UTEROSACRAL-COLPOPEXY. EXCISION OF ENDOMETRIOSIS;  Surgeon: George Loyola MD;  [facility]     DAVINCI LYSIS OF ADHESIONS Bilateral 8/4/2017    Procedure: DAVINCI LYSIS OF ADHESIONS;  BILATERAL URETERAL LYSIS;  Surgeon: George Loyola MD;  [facility]     DAVINCI SACROCOLPOPEXY,  CYSTOSCOPY, COMBINED N/A 8/4/2017    Procedure: COMBINED DAVINCI SACROCOLPOPEXY, CYSTOSCOPY;  UTEROSACRAL COLPOPEXY;  Surgeon: George Loyola MD;  [facility]     DAVINCI XI ASSISTED ABLATION / EXCISION OF ENDOMETRIOSIS  8/4/2017    Procedure: DAVINCI XI ASSISTED ABLATION / EXCISION OF ENDOMETRIOSIS;;  Surgeon: George Loyola MD;  [facility]     DILATION AND CURETTAGE N/A 6/20/2017    Procedure: DILATION AND CURETTAGE;  Uterine Curettings and Fibroid Removal, Cook catheter placement; (No hysterectomy done at this time);  Surgeon: Ernestina Saunders MD;  [facility]     HYSTERECTOMY, PAP NO LONGER INDICATED       ORTHOPEDIC SURGERY  6/1986    Heel spur , toe surgery     RELEASE CARPAL TUNNEL Right 10/20/2020    Procedure: RIGHT RELEASE, CARPAL TUNNEL;  Surgeon: Rickey Rea MD;  Location: [facility] OR     RELEASE CARPAL TUNNEL Left 11/6/2020    Procedure: LEFT RELEASE, CARPAL TUNNEL;  Surgeon: Rickey Rea MD;  Location: [facility] OR     TONSILLECTOMY        Family History   Problem Relation Age of Onset     Diabetes Mother      Hypertension Mother      Hyperlipidemia Mother      Arrhythmia Mother      Cardiovascular Father         CHF and COPD     Asthma Father      Breast Cancer Maternal Grandfather      Colon Cancer Maternal Grandfather      Breast Cancer Paternal Grandmother      Breast Cancer Paternal Aunt      C.A.D. Paternal Grandfather      Breast Cancer Paternal Grandfather      Breast Cancer Cousin      Asthma Son      Diabetes Maternal Grandmother      Hypertension Maternal Grandmother      Breast Cancer Cousin      Breast Cancer Cousin      Breast Cancer Cousin         Social History:  ?  History   Smoking Status     Never   Smokeless Tobacco     Never     History   Drug Use Unknown     Social History    Substance and Sexual Activity      Alcohol use: Yes        Comment: Rare- @1 month      Allergies:  ?   Allergies   Allergen Reactions     Penicillins Swelling     Swelling  throat; age 4     Adhesive Tape Hives     Penicillin G      Tetracycline GI Disturbance     Other reaction(s): Abdominal Pain  Vomiting; nauseous  Other reaction(s): Abdominal Pain  Vomiting; nauseous        Medications:    Current Outpatient Medications   Medication     Cholecalciferol (VITAMIN D) 2000 UNITS CAPS     famotidine (PEPCID) 40 MG tablet     fluticasone-salmeterol (ADVAIR) 100-50 MCG/ACT inhaler     ivabradine (CORLANOR) 5 MG tablet     levalbuterol (XOPENEX HFA) 45 MCG/ACT inhaler     metroNIDAZOLE (METROGEL) 0.75 % external gel     omeprazole (PRILOSEC) 40 MG DR capsule     Pyridoxine HCl (B-6) 100 MG TABS     valACYclovir (VALTREX) 1000 mg tablet     vitamin B-12 (CYANOCOBALAMIN) 100 MCG tablet     Current Facility-Administered Medications   Medication     lidocaine 1 % injection 0.5 mL     ropivacaine (NAROPIN) injection 1 mL     triamcinolone (KENALOG-40) injection 20 mg     triamcinolone (KENALOG-40) injection 20 mg     triamcinolone (KENALOG-40) injection 40 mg         Physical Exam:  ?  Vitals:  BP (!) 145/86   Pulse 88   Wt 81.6 kg (180 lb)   LMP 07/28/2017 (Exact Date)   BMI 30.90 kg/m     General:  WD/WN, in NAD.  A&O x3.  Dermatologic:    Skin is intact, open lesions absent.   Skin texture, turgor is normal.  Vascular:  Pulses palpable bilateral.  Digital capillary refill time normal bilateral.  Skin temperature is normal bilateral.  Generalized edema- none bilateral.  Focal edema- mild anterior ankle joint right, vs L.  Neurologic:    Gross sensation normal.  Gait and balance normal.  Musculoskeletal:  Maximal pain to palpation of lateral ankle gutter, right.  moderate pain to palpation of anteromedial ankle joint,  exostostis to superficial aspect of medial mall medially, right.  Mild pain to palpation peroneals supra and inframalleolar, R  Manual Muscle Testing of PL, PB  pain free 5/5  right.  Ankle circumduction - no peroneal subluxation, R  Ankle ROM full, painful at end ankle DF,  right.    Anterior drawer unstable,  right.  Talar tilt unstable,  right.    Stance:  RCSP Valgus bilateral.  Foot type:  Flexible valgus      Imaging:   x-ray independently reviewed and interpreted by myself today.  Weight-bearing views right foot and ankle dated 01/11/23, reveal congruent valgus, LDTA ~85 deg, jo ann avulsion fragment at distal fibula.  Moderate flatfoot w/ NCJ sag and 2nd TMTJ degeneration.    AP talar head uncovering ~50 %, AP meary's 7 deg  Lat meary's 25 deg        MRI independently reviewed and interpreted by myself today.  right ankle dated 1/5/23, reveal absent ATFL, attenuation of CFL.  PB tearing above & below fibular groove.      x-ray independently reviewed and interpreted by myself today.  Non-Weight-bearing views bilateral foot and ankle dated 2021, reveal no fracture or degeneration                    Again, thank you for allowing me to participate in the care of your patient.        Sincerely,        Saida Michael DPM

## 2023-01-25 NOTE — H&P (VIEW-ONLY)
Assessment:      ICD-10-CM    1. Ankle instability, right  M25.371 Rolling Knee Walker/Scooter Order      2. Peroneal tendon tear, right, initial encounter  S86.311A Rolling Knee Walker/Scooter Order      3. Acquired pes valgus, right  M21.6X1 Rolling Knee Walker/Scooter Order             Plan:  Orders Placed This Encounter   Procedures     Rolling Knee Walker/Scooter Order       Discussed the etiology and treatment of the condition with the patient.  Imaging studies reviewed and discussed with the patient.  Discussed surgical and conservative options.    Shin splints R ankle - no periosteal reaction seen on recent ankle x-rays    -PARQ- Ankle arthrosopy, Brostrom De Guzman, peroneal tendon repair, medial mall exostectomy  -Janiya-operative instructions reviewed with patient, see AVS.  -Post-op- NWB in splint.  -Pain- oxycodone, scheduled tylenol.  -DVT Px- early ROM, ASA 81mg bid  -Knee scooter Rx today.  Pt has crutches  -Note for work today - off x10 wks        Return:  No follow-ups on file.                Chief Complaint:     Patient presents with:  Right Ankle - RECHECK     right ankle pain    HPI:  Laura Jackson is a 52 year old year old female who presents for evaluation of ankle pain.    Pain location- lateral ankle, anterior ankle    Ankle gives out on her  Generalized hypermobility per pt  ICU nurse, works on her feet   Flatfoot, but orthotics have helped with this    Walks for exercise    1/25/2023  Here today for concern for right shin pain   Anterior medial shin, can radiate down into the ankle and up towards the tibial tuberosity   Wearing ankle brace     Chief Complaint:     Patient presents with:  Right Ankle - RECHECK     Pre-operative History & Physical    HPI:  Laura Jackson is a 52 year old year old female who presents for a pre-op exam.    They have failed conservative treatment and decided upon operative intervention.    -Prior anesthesia difficulties?- no  -Snoring/sleep apnea?- No.  -Prior  post-op nausea/vomiting?- Yes: nausea.  -Prior DVT or PE?- No}.  -Clotting disorder, easy bleeding?- No.  -Anticoagulant, aspirin, or NSAID use? Yes: IBu  -Prior wound healing complications?- No.  -Prior opioid pain medication use; type (Vicodin, Norco, oxycodone) & tolerated? - Yes: oxy.  -Metal allergies? No.        Past Medical & Surgical History:  Past Medical History:   Diagnosis Date     Benign essential hypertension 7/31/2018     Family history of breast cancer 2/19/2014     Family history of breast cancer      Hypothyroidism 12/1/2022     SVT (supraventricular tachycardia):  history of, no recurrence since 2008 10/11/2013    no recurrence since 2008      Uncomplicated asthma       Past Surgical History:   Procedure Laterality Date     ABDOMEN SURGERY  6/2017    myectomy     APPENDECTOMY       COLONOSCOPY N/A 8/20/2018    Procedure: COMBINED COLONOSCOPY, SINGLE OR MULTIPLE BIOPSY/POLYPECTOMY BY BIOPSY;;  Surgeon: Osman Hahn MD;  [facility]     COLONOSCOPY WITH CO2 INSUFFLATION N/A 8/20/2018    Procedure: COLONOSCOPY WITH CO2 INSUFFLATION;  COLON-SCREENING / LUBKA ;  Surgeon: Osman Hahn MD;  [facility]     DAVINCI HYSTERECTOMY TOTAL, SALPINGECTOMY BILATERAL N/A 8/4/2017    Procedure: DAVINCI XI HYSTERECTOMY TOTAL, SALPINGECTOMY BILATERAL;  DAVINCI TOTAL HYSTERECTOMY; BILATERAL SALPINGECTOMY. BILATERAL URETERAL LYSIS. UTEROSACRAL-COLPOPEXY. EXCISION OF ENDOMETRIOSIS;  Surgeon: George Loyola MD;  Location:  OR     DAVINCI LYSIS OF ADHESIONS Bilateral 8/4/2017    Procedure: DAVINCI LYSIS OF ADHESIONS;  BILATERAL URETERAL LYSIS;  Surgeon: George Loyola MD;  [facility]     DAVINCI SACROCOLPOPEXY, CYSTOSCOPY, COMBINED N/A 8/4/2017    Procedure: COMBINED DAVINCI SACROCOLPOPEXY, CYSTOSCOPY;  UTEROSACRAL COLPOPEXY;  Surgeon: George Loyola MD;  [facility]     DAVINCI XI ASSISTED ABLATION / EXCISION OF ENDOMETRIOSIS  8/4/2017    Procedure: DAVINCI XI  ASSISTED ABLATION / EXCISION OF ENDOMETRIOSIS;;  Surgeon: George Loyola MD;  [facility]     DILATION AND CURETTAGE N/A 6/20/2017    Procedure: DILATION AND CURETTAGE;  Uterine Curettings and Fibroid Removal, Cook catheter placement; (No hysterectomy done at this time);  Surgeon: Ernestina Saunders MD;  [facility]     HYSTERECTOMY, PAP NO LONGER INDICATED       ORTHOPEDIC SURGERY  6/1986    Heel spur , toe surgery     RELEASE CARPAL TUNNEL Right 10/20/2020    Procedure: RIGHT RELEASE, CARPAL TUNNEL;  Surgeon: Rickey Rea MD;  Location: [facility] OR     RELEASE CARPAL TUNNEL Left 11/6/2020    Procedure: LEFT RELEASE, CARPAL TUNNEL;  Surgeon: Rickey Rea MD;  Location: [facility] OR     TONSILLECTOMY        Family History   Problem Relation Age of Onset     Diabetes Mother      Hypertension Mother      Hyperlipidemia Mother      Arrhythmia Mother      Cardiovascular Father         CHF and COPD     Asthma Father      Breast Cancer Maternal Grandfather      Colon Cancer Maternal Grandfather      Breast Cancer Paternal Grandmother      Breast Cancer Paternal Aunt      C.A.D. Paternal Grandfather      Breast Cancer Paternal Grandfather      Breast Cancer Cousin      Asthma Son      Diabetes Maternal Grandmother      Hypertension Maternal Grandmother      Breast Cancer Cousin      Breast Cancer Cousin      Breast Cancer Cousin         Social History:  ?  History   Smoking Status     Never   Smokeless Tobacco     Never     History   Drug Use Unknown     Social History    Substance and Sexual Activity      Alcohol use: Yes        Comment: Rare- @1 month      Allergies:  ?   Allergies   Allergen Reactions     Penicillins Swelling     Swelling throat; age 4     Adhesive Tape Hives     Penicillin G      Tetracycline GI Disturbance     Other reaction(s): Abdominal Pain  Vomiting; nauseous  Other reaction(s): Abdominal Pain  Vomiting; nauseous        Medications:    Current Outpatient Medications   Medication      Cholecalciferol (VITAMIN D) 2000 UNITS CAPS     famotidine (PEPCID) 40 MG tablet     fluticasone-salmeterol (ADVAIR) 100-50 MCG/ACT inhaler     ivabradine (CORLANOR) 5 MG tablet     levalbuterol (XOPENEX HFA) 45 MCG/ACT inhaler     metroNIDAZOLE (METROGEL) 0.75 % external gel     omeprazole (PRILOSEC) 40 MG DR capsule     Pyridoxine HCl (B-6) 100 MG TABS     valACYclovir (VALTREX) 1000 mg tablet     vitamin B-12 (CYANOCOBALAMIN) 100 MCG tablet     Current Facility-Administered Medications   Medication     lidocaine 1 % injection 0.5 mL     ropivacaine (NAROPIN) injection 1 mL     triamcinolone (KENALOG-40) injection 20 mg     triamcinolone (KENALOG-40) injection 20 mg     triamcinolone (KENALOG-40) injection 40 mg         Physical Exam:  ?  Vitals:  BP (!) 145/86   Pulse 88   Wt 81.6 kg (180 lb)   LMP 07/28/2017 (Exact Date)   BMI 30.90 kg/m     General:  WD/WN, in NAD.  A&O x3.  Dermatologic:    Skin is intact, open lesions absent.   Skin texture, turgor is normal.  Vascular:  Pulses palpable bilateral.  Digital capillary refill time normal bilateral.  Skin temperature is normal bilateral.  Generalized edema- none bilateral.  Focal edema- mild anterior ankle joint right, vs L.  Neurologic:    Gross sensation normal.  Gait and balance normal.  Musculoskeletal:  Maximal pain to palpation of lateral ankle gutter, right.  moderate pain to palpation of anteromedial ankle joint,  exostostis to superficial aspect of medial mall medially, right.  Mild pain to palpation peroneals supra and inframalleolar, R  Manual Muscle Testing of PL, PB  pain free 5/5  right.  Ankle circumduction - no peroneal subluxation, R  Ankle ROM full, painful at end ankle DF, right.    Anterior drawer unstable,  right.  Talar tilt unstable,  right.    Stance:  RCSP Valgus bilateral.  Foot type:  Flexible valgus      Imaging:   x-ray independently reviewed and interpreted by myself today.  Weight-bearing views right foot and ankle dated  01/11/23, reveal congruent valgus, LDTA ~85 deg, jo ann avulsion fragment at distal fibula.  Moderate flatfoot w/ NCJ sag and 2nd TMTJ degeneration.    AP talar head uncovering ~50 %, AP meary's 7 deg  Lat meary's 25 deg        MRI independently reviewed and interpreted by myself today.  right ankle dated 1/5/23, reveal absent ATFL, attenuation of CFL.  PB tearing above & below fibular groove.      x-ray independently reviewed and interpreted by myself today.  Non-Weight-bearing views bilateral foot and ankle dated 2021, reveal no fracture or degeneration

## 2023-01-25 NOTE — PATIENT INSTRUCTIONS
PATIENT INSTRUCTIONS - Podiatry / Foot & Ankle Surgery    Sport compression sleeve - Zensah, CEP, etc - [employer], [employer]      Diclofenac / Voltaren - 1 pill twice daily x1 week.  Then take a 1 week break.  Repeat as needed.  Take with food & an acid blocker if stomach upset occurs.  Stop all other NSAIDs (aspirin, ibuprofen/Motrin, naproxen/ Aleve).    Post-op - 6 weeks no weight   Once WB in a boot, ok to swim          Pre-operative Instructions    -You should have a pre-op appointment with your PCP prior to surgery.  They will make recommendations on any additional labs, tests, or medication changes.  Additionally, your surgeon has the following requests:   Labs/testing:  None   Medication changes:  stop NSAIDs (ibuprofen, naproxen) 1 week prior,  None  -Prescriptions for pain medication will be dispensed the day of surgery.  -No solid food or milk for 8 hours prior to surgery  Clear liquids (water, clear soda, black coffee or clear tea (no milk or cream), Gatorade, Pedialyte] & chewing gum until 2 hours prior to surgery  Oral medications can be taken with a sip of water, but NO MORE than a sip  -You will be NON-weight bearing to the operative foot/ankle in a splint after surgery.  To help you mobilize post-operatively, while maintaining your weight-bearing restriction, please let the surgeon know if you need the following:  Pre-operative crutch training - Physical Therapy  Home health physical therapy  -Gait assistive devices:  available at [facility]   Crutches   Knee scooters   Walkers   Wheelchairs  -Bring your gait assistive device and boot/surgical shoe to surgery.  -Waterproof cast protectors, to keep your surgical dressing dry when bathing, are available on [employer] or at Medical supply stores.   Splints or casts - place protector directly over outer layer   Boot or surgical shoe - remove the boot or shoe, then place the protector over the surgical dressing.  -DVT (blood clot) prevention- aspirin 81mg  twice daily.          Note for off until 10 weeks post-op - on Form          [facility]  [place]  [address]   [place] [address]  Ph: [phone]  Fax: [phone] [place] [facility]  [address]  [place] [address]  Ph: [phone]  Fax: [phone]   [place]  [address]   [place] [address]  Ph: [phone]  Fax: [phone] [place]  [address]  [place] [address]   Ph: [phone]    [place]  [address]  [place] [address]   Ph: [phone]  [place]  [address]  [place] [address]   Ph: [phone]      [place]  [address]  [place] [address]  Ph: [phone]  [place]  [address]   [place] [address]  Ph: [phone]  Fax: [phone]       *OUTSIDE STORE OPTIONS*  [facility]   [address]  [place], [place] [id number] [phone] [place]  [place]  [address]   [place], [place] [id number] [phone] [place] [facility]  [address] [facility]  [place] [address] [phone]     ** Alternative Knee Scooter Rental/Purchase: [employer] [place]  [phone]  [email or web address]

## 2023-01-26 ENCOUNTER — OFFICE VISIT (OUTPATIENT)
Dept: FAMILY MEDICINE | Facility: CLINIC | Age: 53
End: 2023-01-26
Payer: COMMERCIAL

## 2023-01-26 VITALS
SYSTOLIC BLOOD PRESSURE: 127 MMHG | BODY MASS INDEX: 32.06 KG/M2 | HEIGHT: 64 IN | OXYGEN SATURATION: 98 % | DIASTOLIC BLOOD PRESSURE: 75 MMHG | HEART RATE: 78 BPM | TEMPERATURE: 98 F | WEIGHT: 187.8 LBS

## 2023-01-26 DIAGNOSIS — I47.10 SUPRAVENTRICULAR TACHYCARDIA (H): ICD-10-CM

## 2023-01-26 DIAGNOSIS — S93.491D SPRAIN OF ANTERIOR TALOFIBULAR LIGAMENT OF RIGHT ANKLE, SUBSEQUENT ENCOUNTER: ICD-10-CM

## 2023-01-26 DIAGNOSIS — J45.30 MILD PERSISTENT ASTHMA WITHOUT COMPLICATION: ICD-10-CM

## 2023-01-26 DIAGNOSIS — Z01.818 PREOP GENERAL PHYSICAL EXAM: Primary | ICD-10-CM

## 2023-01-26 DIAGNOSIS — M25.571 PAIN IN JOINT INVOLVING ANKLE AND FOOT, RIGHT: ICD-10-CM

## 2023-01-26 DIAGNOSIS — I10 BENIGN ESSENTIAL HYPERTENSION: ICD-10-CM

## 2023-01-26 LAB
ANION GAP SERPL CALCULATED.3IONS-SCNC: 6 MMOL/L (ref 3–14)
BUN SERPL-MCNC: 16 MG/DL (ref 7–30)
CALCIUM SERPL-MCNC: 9.7 MG/DL (ref 8.5–10.1)
CHLORIDE BLD-SCNC: 105 MMOL/L (ref 94–109)
CO2 SERPL-SCNC: 28 MMOL/L (ref 20–32)
CREAT SERPL-MCNC: 0.88 MG/DL (ref 0.52–1.04)
GFR SERPL CREATININE-BSD FRML MDRD: 78 ML/MIN/1.73M2
GLUCOSE BLD-MCNC: 105 MG/DL (ref 70–99)
POTASSIUM BLD-SCNC: 4.3 MMOL/L (ref 3.4–5.3)
SODIUM SERPL-SCNC: 139 MMOL/L (ref 133–144)

## 2023-01-26 PROCEDURE — 36415 COLL VENOUS BLD VENIPUNCTURE: CPT | Performed by: PHYSICIAN ASSISTANT

## 2023-01-26 PROCEDURE — 99214 OFFICE O/P EST MOD 30 MIN: CPT | Performed by: PHYSICIAN ASSISTANT

## 2023-01-26 PROCEDURE — 80048 BASIC METABOLIC PNL TOTAL CA: CPT | Performed by: PHYSICIAN ASSISTANT

## 2023-01-26 PROCEDURE — 93000 ELECTROCARDIOGRAM COMPLETE: CPT | Performed by: PHYSICIAN ASSISTANT

## 2023-01-26 NOTE — PROGRESS NOTES
[facility]  [address] [address]  Phone: [phone]  Primary Provider: Bj Butler  Pre-op Performing Provider: JOSÉ MIGUEL CHANDRA      PREOPERATIVE EVALUATION:  Today's date: 1/26/2023    Laura Jackson is a 52 year old female who presents for a preoperative evaluation.    Surgical Information:  Surgery/Procedure:  Right ankle arthroscopy, modified Brostrom, possible peroneal tendon repair  Surgery Location: [facility]  Surgeon: Dr. Michael  Surgery Date: 2/2/23  Time of Surgery: 1:15pm  Where patient plans to recover: At home with family  Fax number for surgical facility: Note does not need to be faxed, will be available electronically in Epic.    Type of Anesthesia Anticipated: General with Block     Assessment & Plan     The proposed surgical procedure is considered INTERMEDIATE risk.    Preop general physical exam  - Basic metabolic panel; Future  - EKG 12-lead complete w/read - Clinics  - Basic metabolic panel    Benign essential hypertension  - Basic metabolic panel; Future  - Basic metabolic panel    Supraventricular tachycardia (H)  Mild persistent asthma without complication  Sprain of anterior talofibular ligament of right ankle, subsequent encounter  Pain in joint involving ankle and foot, right           Risks and Recommendations:  The patient has the following additional risks and recommendations for perioperative complications:   - No identified additional risk factors other than previously addressed    Medication Instructions:  Patient is to take all scheduled medications on the day of surgery    RECOMMENDATION:  APPROVAL GIVEN to proceed with proposed procedure, without further diagnostic evaluation.            Subjective     HPI related to upcoming procedure: Patient with chronic ankle pain requiring surgery.       Preop Questions 1/26/2023   1. Have you ever had a heart attack or stroke? No   2. Have you ever had surgery on  your heart or blood vessels, such as a stent placement, a coronary artery bypass, or surgery on an artery in your head, neck, heart, or legs? No   3. Do you have chest pain with activity? No   4. Do you have a history of  heart failure? No   5. Do you currently have a cold, bronchitis or symptoms of other infection? No   6. Do you have a cough, shortness of breath, or wheezing? No   7. Do you or anyone in your family have previous history of blood clots? No   8. Do you or does anyone in your family have a serious bleeding problem such as prolonged bleeding following surgeries or cuts? No   9. Have you ever had problems with anemia or been told to take iron pills? YES - menorrhagia   10. Have you had any abnormal blood loss such as black, tarry or bloody stools, or abnormal vaginal bleeding? YES - menorrhagia   11. Have you ever had a blood transfusion? YES - 5 years.    11a. Have you ever had a transfusion reaction? No   12. Are you willing to have a blood transfusion if it is medically needed before, during, or after your surgery? Yes   13. Have you or any of your relatives ever had problems with anesthesia? No   14. Do you have sleep apnea, excessive snoring or daytime drowsiness? No   15. Do you have any artifical heart valves or other implanted medical devices like a pacemaker, defibrillator, or continuous glucose monitor? No   16. Do you have artificial joints? No   17. Are you allergic to latex? No   18. Is there any chance that you may be pregnant? No     Health Care Directive:  Patient does not have a Health Care Directive or Living Will: Discussed advance care planning with patient; however, patient declined at this time.    Preoperative Review of :   reviewed - no record of controlled substances prescribed.          Review of Systems  CONSTITUTIONAL: NEGATIVE for fever, chills, change in weight  INTEGUMENTARY/SKIN: NEGATIVE for worrisome rashes, moles or lesions  ENT/MOUTH: NEGATIVE for ear, mouth  and throat problems  RESP: NEGATIVE for significant cough or SOB  CV: NEGATIVE for chest pain, palpitations or peripheral edema  GI: NEGATIVE for nausea, abdominal pain, heartburn, or change in bowel habits  : NEGATIVE for frequency, dysuria, or hematuria  MUSCULOSKELETAL: NEGATIVE for significant arthralgias or myalgia  NEURO: NEGATIVE for weakness, dizziness or paresthesias  HEME: NEGATIVE for bleeding problems  PSYCHIATRIC: NEGATIVE for changes in mood or affect    Patient Active Problem List    Diagnosis Date Noted     Hypothyroidism 12/01/2022     Priority: Medium     Abdominal bloating 12/01/2022     Priority: Medium     Sprain of anterior talofibular ligament of right ankle 09/28/2022     Priority: Medium     Pain in joint involving ankle and foot, right 09/28/2022     Priority: Medium     Back pain 11/18/2021     Priority: Medium     Range of joint movement increased 09/19/2018     Priority: Medium     Autonomic dysfunction 07/31/2018     Priority: Medium     Benign essential hypertension 07/31/2018     Priority: Medium     Cervicalgia 07/16/2018     Priority: Medium     Hypermobility syndrome 02/16/2018     Priority: Medium     POTS (postural orthostatic tachycardia syndrome) 02/06/2018     Priority: Medium     Endometriosis 11/09/2017     Priority: Medium     Heberden's nodes 11/09/2017     Priority: Medium     S/P ANAID (total abdominal hysterectomy) 09/11/2017     Priority: Medium     Hypovitaminosis D 09/11/2017     Priority: Medium     Nausea 08/04/2017     Priority: Medium     S/P myomectomy 06/29/2017     Priority: Medium     Mild persistent asthma 02/19/2014     Priority: Medium     Family history of breast cancer 02/19/2014     Priority: Medium     History of supraventricular tachycardia 10/11/2013     Priority: Medium     no recurrence since 2008       CARDIOVASCULAR SCREENING; LDL GOAL LESS THAN 160 04/24/2013     Priority: Medium     Irritable bowel syndrome 04/24/2013     Priority: Medium      GERD (gastroesophageal reflux disease) 2013     Priority: Medium     Cardiac dysrhythmia 2010     Priority: Medium     Overview:   LW Onset:    ; Supraventricular Tachycardia       Elevated TSH 2010     Priority: Medium     Overview:   [facility]: TSH 7.45       Heartburn 2007     Priority: Medium     Normal delivery 2006     Priority: Medium     Overview:   LW Modifier:    LW Onset:  83Hgx20  ; Normal Spontaneous Vaginal Delivery       Uterine leiomyoma 2005     Priority: Medium     Overview:   [facility] CT finding [facility]  ICD 10  Overview:   LW Modifier:  fundal  LW Onset:    ; Leiomyoma Uterus       Exercise-induced bronchospasm 2004     Priority: Medium     Overview:   Asthma Exercise Induced       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     Priority: Medium     Overview:   LW Modifier:  D&C 10/04  LW Onset:    ; Spontaneous  Complete        Past Medical History:   Diagnosis Date     Benign essential hypertension 2018     Family history of breast cancer 2014     Family history of breast cancer      Hypothyroidism 2022     SVT (supraventricular tachycardia):  history of, no recurrence since 2008 10/11/2013    no recurrence since       Uncomplicated asthma      Past Surgical History:   Procedure Laterality Date     ABDOMEN SURGERY  2017    myectomy     APPENDECTOMY       COLONOSCOPY N/A 2018    Procedure: COMBINED COLONOSCOPY, SINGLE OR MULTIPLE BIOPSY/POLYPECTOMY BY BIOPSY;;  Surgeon: Osman Hahn MD;  Location: [facility] OR     COLONOSCOPY WITH CO2 INSUFFLATION N/A 2018    Procedure: COLONOSCOPY WITH CO2 INSUFFLATION;  COLON-SCREENING / LUBKA ;  Surgeon: Osman Hahn MD;  Location: [facility] OR     DAVINCI HYSTERECTOMY TOTAL, SALPINGECTOMY BILATERAL N/A 2017    Procedure: DAVINCI XI HYSTERECTOMY TOTAL, SALPINGECTOMY BILATERAL;  DAVINCI TOTAL HYSTERECTOMY; BILATERAL SALPINGECTOMY.  BILATERAL URETERAL LYSIS. UTEROSACRAL-COLPOPEXY. EXCISION OF ENDOMETRIOSIS;  Surgeon: George Loyola MD;  Location: [facility] OR     DAVINCI LYSIS OF ADHESIONS Bilateral 8/4/2017    Procedure: DAVINCI LYSIS OF ADHESIONS;  BILATERAL URETERAL LYSIS;  Surgeon: George Loyola MD;  Location: [facility] OR     DAVINCI SACROCOLPOPEXY, CYSTOSCOPY, COMBINED N/A 8/4/2017    Procedure: COMBINED DAVINCI SACROCOLPOPEXY, CYSTOSCOPY;  UTEROSACRAL COLPOPEXY;  Surgeon: George Loyola MD;  Location: [facility] OR     DAVINCI XI ASSISTED ABLATION / EXCISION OF ENDOMETRIOSIS  8/4/2017    Procedure: DAVINCI XI ASSISTED ABLATION / EXCISION OF ENDOMETRIOSIS;;  Surgeon: George Loyola MD;  Location: [facility] OR     DILATION AND CURETTAGE N/A 6/20/2017    Procedure: DILATION AND CURETTAGE;  Uterine Curettings and Fibroid Removal, Cook catheter placement; (No hysterectomy done at this time);  Surgeon: Ernestina Saunders MD;  Location: [facility] OR     HYSTERECTOMY, PAP NO LONGER INDICATED       ORTHOPEDIC SURGERY  6/1986    Heel spur , toe surgery     RELEASE CARPAL TUNNEL Right 10/20/2020    Procedure: RIGHT RELEASE, CARPAL TUNNEL;  Surgeon: Rickey Rea MD;  Location: [facility] OR     RELEASE CARPAL TUNNEL Left 11/6/2020    Procedure: LEFT RELEASE, CARPAL TUNNEL;  Surgeon: Rickey Rea MD;  Location: [facility] OR     TONSILLECTOMY       Current Outpatient Medications   Medication Sig Dispense Refill     Cholecalciferol (VITAMIN D) 2000 UNITS CAPS Take 1 capsule by mouth daily       fluticasone-salmeterol (ADVAIR) 100-50 MCG/ACT inhaler INHALE 1 PUFF INTO THE LUNGS EVERY 12 HOURS 60 each 3     ivabradine (CORLANOR) 5 MG tablet Take 1 tablet (5 mg) by mouth 2 times daily (with meals) 180 tablet 1     levalbuterol (XOPENEX HFA) 45 MCG/ACT inhaler Inhale 1-2 puffs into the lungs every 4 hours as needed for shortness of breath / dyspnea 15 g 0     metroNIDAZOLE (METROGEL) 0.75 % external gel Apply to face BID 45 g 11     Pyridoxine HCl (B-6) 100 MG TABS    "     valACYclovir (VALTREX) 1000 mg tablet Take 1 tablet (1,000 mg) by mouth 3 times daily 21 tablet 0     vitamin B-12 (CYANOCOBALAMIN) 100 MCG tablet          Allergies   Allergen Reactions     Penicillins Swelling     Swelling throat; age 4     Adhesive Tape Hives     Penicillin G      Tetracycline GI Disturbance     Other reaction(s): Abdominal Pain  Vomiting; nauseous  Other reaction(s): Abdominal Pain  Vomiting; nauseous        Social History     Tobacco Use     Smoking status: Never     Smokeless tobacco: Never   Substance Use Topics     Alcohol use: Yes     Comment: Rare- @1 month     Family History   Problem Relation Age of Onset     Diabetes Mother      Hypertension Mother      Hyperlipidemia Mother      Arrhythmia Mother      Cardiovascular Father         CHF and COPD     Asthma Father      Breast Cancer Maternal Grandfather      Colon Cancer Maternal Grandfather      Breast Cancer Paternal Grandmother      Breast Cancer Paternal Aunt      C.A.D. Paternal Grandfather      Breast Cancer Paternal Grandfather      Breast Cancer Cousin      Asthma Son      Diabetes Maternal Grandmother      Hypertension Maternal Grandmother      Breast Cancer Cousin      Breast Cancer Cousin      Breast Cancer Cousin      History   Drug Use Unknown         Objective     /75 (BP Location: Right arm, Patient Position: Chair, Cuff Size: Adult Regular)   Pulse 78   Temp 98  F (36.7  C) (Oral)   Ht 1.634 m (5' 4.33\")   Wt 85.2 kg (187 lb 12.8 oz)   LMP 07/28/2017 (Exact Date)   SpO2 98%   BMI 31.90 kg/m      Physical Exam    GENERAL APPEARANCE: healthy, alert and no distress     EYES: EOMI, PERRL     HENT: ear canals and TM's normal and nose and mouth without ulcers or lesions     NECK: no adenopathy, no asymmetry, masses, or scars and thyroid normal to palpation     RESP: lungs clear to auscultation - no rales, rhonchi or wheezes     CV: regular rates and rhythm, normal S1 S2, no S3 or S4 and no murmur, click or " rub     ABDOMEN:  soft, nontender, no HSM or masses and bowel sounds normal     MS: extremities normal- no gross deformities noted, no evidence of inflammation in joints, FROM in all extremities.- Right ankle in brace.      SKIN: no suspicious lesions or rashes     NEURO: Normal strength and tone, sensory exam grossly normal, mentation intact and speech normal     PSYCH: mentation appears normal. and affect normal/bright     LYMPHATICS: No cervical adenopathy    Recent Labs   Lab Test 03/31/21  1134   HGB 14.1         POTASSIUM 4.2   CR 0.99        Diagnostics:  Labs pending at this time.  Results will be reviewed when available.   EKG: appears normal, NSR, normal axis, normal intervals, no acute ST/T changes c/w ischemia, no LVH by voltage criteria    Revised Cardiac Risk Index (RCRI):  The patient has the following serious cardiovascular risks for perioperative complications:   - No serious cardiac risks = 0 points     RCRI Interpretation: 0 points: Class I (very low risk - 0.4% complication rate)           Signed Electronically by: Louise Pena PA-C  Copy of this evaluation report is provided to requesting physician.

## 2023-01-26 NOTE — PATIENT INSTRUCTIONS
For informational purposes only. Not to replace the advice of your health care provider. Copyright   2003,  [employer] [facility], [place]. All rights reserved. Clinically reviewed by Candi Joyce MD. Level 970751 - REV .  Preparing for Your Surgery  Getting started  A nurse will call you to review your health history and instructions. They will give you an arrival time based on your scheduled surgery time. Please be ready to share:    Your doctor's clinic name and phone number    Your medical, surgical, and anesthesia history    A list of allergies and sensitivities    A list of medicines, including herbal treatments and over-the-counter drugs    Whether the patient has a legal guardian (ask how to send us the papers in advance)  Please tell us if you're pregnant--or if there's any chance you might be pregnant. Some surgeries may injure a fetus (unborn baby), so they require a pregnancy test. Surgeries that are safe for a fetus don't always need a test, and you can choose whether to have one.   If you have a child who's having surgery, please ask for a copy of Preparing for Your Child's Surgery.    Preparing for surgery    Within 10 to 30 days of surgery: Have a pre-op exam (sometimes called an H&P, or History and Physical). This can be done at a clinic or pre-operative center.  ? If you're having a , you may not need this exam. Talk to your care team.    At your pre-op exam, talk to your care team about all medicines you take. If you need to stop any medicines before surgery, ask when to start taking them again.  ? We do this for your safety. Many medicines can make you bleed too much during surgery. Some change how well surgery (anesthesia) drugs work.    Call your insurance company to let them know you're having surgery. (If you don't have insurance, call [phone].)    Call your clinic if there's any change in your health. This includes signs of a cold or flu (sore throat, runny nose,  cough, rash, fever). It also includes a scrape or scratch near the surgery site.    If you have questions on the day of surgery, call your hospital or surgery center.  Eating and drinking guidelines  For your safety: Unless your surgeon tells you otherwise, follow the guidelines below.    Eat and drink as usual until 8 hours before you arrive for surgery. After that, no food or milk.    Drink clear liquids until 2 hours before you arrive. These are liquids you can see through, like water, Gatorade, and Propel Water. They also include plain black coffee and tea (no cream or milk), candy, and breath mints. You can spit out gum when you arrive.    If you drink alcohol: Stop drinking it the night before surgery.    If your care team tells you to take medicine on the morning of surgery, it's okay to take it with a sip of water.  Preventing infection    Shower or bathe the night before and morning of your surgery. Follow the instructions your clinic gave you. (If no instructions, use regular soap.)    Don't shave or clip hair near your surgery site. We'll remove the hair if needed.    Don't smoke or vape the morning of surgery. You may chew nicotine gum up to 2 hours before surgery. A nicotine patch is okay.  ? Note: Some surgeries require you to completely quit smoking and nicotine. Check with your surgeon.    Your care team will make every effort to keep you safe from infection. We will:  ? Clean our hands often with soap and water (or an alcohol-based hand rub).  ? Clean the skin at your surgery site with a special soap that kills germs.  ? Give you a special gown to keep you warm. (Cold raises the risk of infection.)  ? Wear special hair covers, masks, gowns and gloves during surgery.  ? Give antibiotic medicine, if prescribed. Not all surgeries need antibiotics.  What to bring on the day of surgery    Photo ID and insurance card    Copy of your health care directive, if you have one    Glasses and hearing aids (bring  cases)  ? You can't wear contacts during surgery    Inhaler and eye drops, if you use them (tell us about these when you arrive)    CPAP machine or breathing device, if you use them    A few personal items, if spending the night    If you have . . .  ? A pacemaker, ICD (cardiac defibrillator) or other implant: Bring the ID card.  ? An implanted stimulator: Bring the remote control.  ? A legal guardian: Bring a copy of the certified (court-stamped) guardianship papers.  Please remove any jewelry, including body piercings. Leave jewelry and other valuables at home.  If you're going home the day of surgery    You must have a responsible adult drive you home. They should stay with you overnight as well.    If you don't have someone to stay with you, and you aren't safe to go home alone, we may keep you overnight. Insurance often won't pay for this.  After surgery  If it's hard to control your pain or you need more pain medicine, please call your surgeon's office.  Questions?   If you have any questions for your care team, list them here: _________________________________________________________________________________________________________________________________________________________________________ ____________________________________ ____________________________________ ____________________________________

## 2023-01-26 NOTE — H&P (VIEW-ONLY)
[facility]  [address] [address]  Phone: [phone]  Primary Provider: Bj Butler  Pre-op Performing Provider: JOSÉ MIGUEL CHANDRA      PREOPERATIVE EVALUATION:  Today's date: 1/26/2023    Laura Jackson is a 52 year old female who presents for a preoperative evaluation.    Surgical Information:  Surgery/Procedure:  Right ankle arthroscopy, modified Brostrom, possible peroneal tendon repair  Surgery Location: [facility]  Surgeon: Dr. Michael  Surgery Date: 2/2/23  Time of Surgery: 1:15pm  Where patient plans to recover: At home with family  Fax number for surgical facility: Note does not need to be faxed, will be available electronically in Epic.    Type of Anesthesia Anticipated: General with Block     Assessment & Plan     The proposed surgical procedure is considered INTERMEDIATE risk.    Preop general physical exam  - Basic metabolic panel; Future  - EKG 12-lead complete w/read - Clinics  - Basic metabolic panel    Benign essential hypertension  - Basic metabolic panel; Future  - Basic metabolic panel    Supraventricular tachycardia (H)  Mild persistent asthma without complication  Sprain of anterior talofibular ligament of right ankle, subsequent encounter  Pain in joint involving ankle and foot, right           Risks and Recommendations:  The patient has the following additional risks and recommendations for perioperative complications:   - No identified additional risk factors other than previously addressed    Medication Instructions:  Patient is to take all scheduled medications on the day of surgery    RECOMMENDATION:  APPROVAL GIVEN to proceed with proposed procedure, without further diagnostic evaluation.            Subjective     HPI related to upcoming procedure: Patient with chronic ankle pain requiring surgery.       Preop Questions 1/26/2023   1. Have you ever had a heart attack or stroke? No   2. Have you ever had surgery on  your heart or blood vessels, such as a stent placement, a coronary artery bypass, or surgery on an artery in your head, neck, heart, or legs? No   3. Do you have chest pain with activity? No   4. Do you have a history of  heart failure? No   5. Do you currently have a cold, bronchitis or symptoms of other infection? No   6. Do you have a cough, shortness of breath, or wheezing? No   7. Do you or anyone in your family have previous history of blood clots? No   8. Do you or does anyone in your family have a serious bleeding problem such as prolonged bleeding following surgeries or cuts? No   9. Have you ever had problems with anemia or been told to take iron pills? YES - menorrhagia   10. Have you had any abnormal blood loss such as black, tarry or bloody stools, or abnormal vaginal bleeding? YES - menorrhagia   11. Have you ever had a blood transfusion? YES - 5 years.    11a. Have you ever had a transfusion reaction? No   12. Are you willing to have a blood transfusion if it is medically needed before, during, or after your surgery? Yes   13. Have you or any of your relatives ever had problems with anesthesia? No   14. Do you have sleep apnea, excessive snoring or daytime drowsiness? No   15. Do you have any artifical heart valves or other implanted medical devices like a pacemaker, defibrillator, or continuous glucose monitor? No   16. Do you have artificial joints? No   17. Are you allergic to latex? No   18. Is there any chance that you may be pregnant? No     Health Care Directive:  Patient does not have a Health Care Directive or Living Will: Discussed advance care planning with patient; however, patient declined at this time.    Preoperative Review of :   reviewed - no record of controlled substances prescribed.          Review of Systems  CONSTITUTIONAL: NEGATIVE for fever, chills, change in weight  INTEGUMENTARY/SKIN: NEGATIVE for worrisome rashes, moles or lesions  ENT/MOUTH: NEGATIVE for ear, mouth  and throat problems  RESP: NEGATIVE for significant cough or SOB  CV: NEGATIVE for chest pain, palpitations or peripheral edema  GI: NEGATIVE for nausea, abdominal pain, heartburn, or change in bowel habits  : NEGATIVE for frequency, dysuria, or hematuria  MUSCULOSKELETAL: NEGATIVE for significant arthralgias or myalgia  NEURO: NEGATIVE for weakness, dizziness or paresthesias  HEME: NEGATIVE for bleeding problems  PSYCHIATRIC: NEGATIVE for changes in mood or affect    Patient Active Problem List    Diagnosis Date Noted     Hypothyroidism 12/01/2022     Priority: Medium     Abdominal bloating 12/01/2022     Priority: Medium     Sprain of anterior talofibular ligament of right ankle 09/28/2022     Priority: Medium     Pain in joint involving ankle and foot, right 09/28/2022     Priority: Medium     Back pain 11/18/2021     Priority: Medium     Range of joint movement increased 09/19/2018     Priority: Medium     Autonomic dysfunction 07/31/2018     Priority: Medium     Benign essential hypertension 07/31/2018     Priority: Medium     Cervicalgia 07/16/2018     Priority: Medium     Hypermobility syndrome 02/16/2018     Priority: Medium     POTS (postural orthostatic tachycardia syndrome) 02/06/2018     Priority: Medium     Endometriosis 11/09/2017     Priority: Medium     Heberden's nodes 11/09/2017     Priority: Medium     S/P ANAID (total abdominal hysterectomy) 09/11/2017     Priority: Medium     Hypovitaminosis D 09/11/2017     Priority: Medium     Nausea 08/04/2017     Priority: Medium     S/P myomectomy 06/29/2017     Priority: Medium     Mild persistent asthma 02/19/2014     Priority: Medium     Family history of breast cancer 02/19/2014     Priority: Medium     History of supraventricular tachycardia 10/11/2013     Priority: Medium     no recurrence since 2008       CARDIOVASCULAR SCREENING; LDL GOAL LESS THAN 160 04/24/2013     Priority: Medium     Irritable bowel syndrome 04/24/2013     Priority: Medium      GERD (gastroesophageal reflux disease) 2013     Priority: Medium     Cardiac dysrhythmia 2010     Priority: Medium     Overview:   LW Onset:    ; Supraventricular Tachycardia       Elevated TSH 2010     Priority: Medium     Overview:   [facility]: TSH 7.45       Heartburn 2007     Priority: Medium     Normal delivery 2006     Priority: Medium     Overview:   LW Modifier:    LW Onset:  70Ifi23  ; Normal Spontaneous Vaginal Delivery       Uterine leiomyoma 2005     Priority: Medium     Overview:   [facility] CT finding [facility]  ICD 10  Overview:   LW Modifier:  fundal  LW Onset:    ; Leiomyoma Uterus       Exercise-induced bronchospasm 2004     Priority: Medium     Overview:   Asthma Exercise Induced       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     Priority: Medium     Overview:   LW Modifier:  D&C 10/04  LW Onset:    ; Spontaneous  Complete        Past Medical History:   Diagnosis Date     Benign essential hypertension 2018     Family history of breast cancer 2014     Family history of breast cancer      Hypothyroidism 2022     SVT (supraventricular tachycardia):  history of, no recurrence since 2008 10/11/2013    no recurrence since       Uncomplicated asthma      Past Surgical History:   Procedure Laterality Date     ABDOMEN SURGERY  2017    myectomy     APPENDECTOMY       COLONOSCOPY N/A 2018    Procedure: COMBINED COLONOSCOPY, SINGLE OR MULTIPLE BIOPSY/POLYPECTOMY BY BIOPSY;;  Surgeon: Osman Hahn MD;  Location: [facility] OR     COLONOSCOPY WITH CO2 INSUFFLATION N/A 2018    Procedure: COLONOSCOPY WITH CO2 INSUFFLATION;  COLON-SCREENING / LUBKA ;  Surgeon: Osman Hahn MD;  Location: [facility] OR     DAVINCI HYSTERECTOMY TOTAL, SALPINGECTOMY BILATERAL N/A 2017    Procedure: DAVINCI XI HYSTERECTOMY TOTAL, SALPINGECTOMY BILATERAL;  DAVINCI TOTAL HYSTERECTOMY; BILATERAL SALPINGECTOMY.  BILATERAL URETERAL LYSIS. UTEROSACRAL-COLPOPEXY. EXCISION OF ENDOMETRIOSIS;  Surgeon: George Loyola MD;  Location: [facility] OR     DAVINCI LYSIS OF ADHESIONS Bilateral 8/4/2017    Procedure: DAVINCI LYSIS OF ADHESIONS;  BILATERAL URETERAL LYSIS;  Surgeon: George Loyola MD;  Location: [facility] OR     DAVINCI SACROCOLPOPEXY, CYSTOSCOPY, COMBINED N/A 8/4/2017    Procedure: COMBINED DAVINCI SACROCOLPOPEXY, CYSTOSCOPY;  UTEROSACRAL COLPOPEXY;  Surgeon: George Loyola MD;  Location: [facility] OR     DAVINCI XI ASSISTED ABLATION / EXCISION OF ENDOMETRIOSIS  8/4/2017    Procedure: DAVINCI XI ASSISTED ABLATION / EXCISION OF ENDOMETRIOSIS;;  Surgeon: George Loyola MD;  Location: [facility] OR     DILATION AND CURETTAGE N/A 6/20/2017    Procedure: DILATION AND CURETTAGE;  Uterine Curettings and Fibroid Removal, Cook catheter placement; (No hysterectomy done at this time);  Surgeon: Ernestina Saunders MD;  Location: [facility] OR     HYSTERECTOMY, PAP NO LONGER INDICATED       ORTHOPEDIC SURGERY  6/1986    Heel spur , toe surgery     RELEASE CARPAL TUNNEL Right 10/20/2020    Procedure: RIGHT RELEASE, CARPAL TUNNEL;  Surgeon: Rickey Rea MD;  Location: [facility] OR     RELEASE CARPAL TUNNEL Left 11/6/2020    Procedure: LEFT RELEASE, CARPAL TUNNEL;  Surgeon: Rickey Rae MD;  Location: [facility] OR     TONSILLECTOMY       Current Outpatient Medications   Medication Sig Dispense Refill     Cholecalciferol (VITAMIN D) 2000 UNITS CAPS Take 1 capsule by mouth daily       fluticasone-salmeterol (ADVAIR) 100-50 MCG/ACT inhaler INHALE 1 PUFF INTO THE LUNGS EVERY 12 HOURS 60 each 3     ivabradine (CORLANOR) 5 MG tablet Take 1 tablet (5 mg) by mouth 2 times daily (with meals) 180 tablet 1     levalbuterol (XOPENEX HFA) 45 MCG/ACT inhaler Inhale 1-2 puffs into the lungs every 4 hours as needed for shortness of breath / dyspnea 15 g 0     metroNIDAZOLE (METROGEL) 0.75 % external gel Apply to face BID 45 g 11     Pyridoxine HCl (B-6) 100 MG TABS    "     valACYclovir (VALTREX) 1000 mg tablet Take 1 tablet (1,000 mg) by mouth 3 times daily 21 tablet 0     vitamin B-12 (CYANOCOBALAMIN) 100 MCG tablet          Allergies   Allergen Reactions     Penicillins Swelling     Swelling throat; age 4     Adhesive Tape Hives     Penicillin G      Tetracycline GI Disturbance     Other reaction(s): Abdominal Pain  Vomiting; nauseous  Other reaction(s): Abdominal Pain  Vomiting; nauseous        Social History     Tobacco Use     Smoking status: Never     Smokeless tobacco: Never   Substance Use Topics     Alcohol use: Yes     Comment: Rare- @1 month     Family History   Problem Relation Age of Onset     Diabetes Mother      Hypertension Mother      Hyperlipidemia Mother      Arrhythmia Mother      Cardiovascular Father         CHF and COPD     Asthma Father      Breast Cancer Maternal Grandfather      Colon Cancer Maternal Grandfather      Breast Cancer Paternal Grandmother      Breast Cancer Paternal Aunt      C.A.D. Paternal Grandfather      Breast Cancer Paternal Grandfather      Breast Cancer Cousin      Asthma Son      Diabetes Maternal Grandmother      Hypertension Maternal Grandmother      Breast Cancer Cousin      Breast Cancer Cousin      Breast Cancer Cousin      History   Drug Use Unknown         Objective     /75 (BP Location: Right arm, Patient Position: Chair, Cuff Size: Adult Regular)   Pulse 78   Temp 98  F (36.7  C) (Oral)   Ht 1.634 m (5' 4.33\")   Wt 85.2 kg (187 lb 12.8 oz)   LMP 07/28/2017 (Exact Date)   SpO2 98%   BMI 31.90 kg/m      Physical Exam    GENERAL APPEARANCE: healthy, alert and no distress     EYES: EOMI, PERRL     HENT: ear canals and TM's normal and nose and mouth without ulcers or lesions     NECK: no adenopathy, no asymmetry, masses, or scars and thyroid normal to palpation     RESP: lungs clear to auscultation - no rales, rhonchi or wheezes     CV: regular rates and rhythm, normal S1 S2, no S3 or S4 and no murmur, click or " rub     ABDOMEN:  soft, nontender, no HSM or masses and bowel sounds normal     MS: extremities normal- no gross deformities noted, no evidence of inflammation in joints, FROM in all extremities.- Right ankle in brace.      SKIN: no suspicious lesions or rashes     NEURO: Normal strength and tone, sensory exam grossly normal, mentation intact and speech normal     PSYCH: mentation appears normal. and affect normal/bright     LYMPHATICS: No cervical adenopathy    Recent Labs   Lab Test 03/31/21  1134   HGB 14.1         POTASSIUM 4.2   CR 0.99        Diagnostics:  Labs pending at this time.  Results will be reviewed when available.   EKG: appears normal, NSR, normal axis, normal intervals, no acute ST/T changes c/w ischemia, no LVH by voltage criteria    Revised Cardiac Risk Index (RCRI):  The patient has the following serious cardiovascular risks for perioperative complications:   - No serious cardiac risks = 0 points     RCRI Interpretation: 0 points: Class I (very low risk - 0.4% complication rate)           Signed Electronically by: Louise Pena PA-C  Copy of this evaluation report is provided to requesting physician.

## 2023-01-31 ENCOUNTER — THERAPY VISIT (OUTPATIENT)
Dept: PHYSICAL THERAPY | Facility: CLINIC | Age: 53
End: 2023-01-31
Payer: COMMERCIAL

## 2023-01-31 DIAGNOSIS — M54.89 OTHER BACK PAIN, UNSPECIFIED CHRONICITY: Primary | ICD-10-CM

## 2023-01-31 PROCEDURE — 97140 MANUAL THERAPY 1/> REGIONS: CPT | Mod: GP | Performed by: PHYSICAL THERAPIST

## 2023-01-31 PROCEDURE — 97112 NEUROMUSCULAR REEDUCATION: CPT | Mod: GP | Performed by: PHYSICAL THERAPIST

## 2023-02-01 ENCOUNTER — THERAPY VISIT (OUTPATIENT)
Dept: PHYSICAL THERAPY | Facility: CLINIC | Age: 53
End: 2023-02-01
Payer: COMMERCIAL

## 2023-02-01 DIAGNOSIS — S93.491D SPRAIN OF ANTERIOR TALOFIBULAR LIGAMENT OF RIGHT ANKLE, SUBSEQUENT ENCOUNTER: Primary | ICD-10-CM

## 2023-02-01 DIAGNOSIS — M25.571 PAIN IN JOINT INVOLVING ANKLE AND FOOT, RIGHT: ICD-10-CM

## 2023-02-01 PROBLEM — S93.491A SPRAIN OF ANTERIOR TALOFIBULAR LIGAMENT OF RIGHT ANKLE: Status: RESOLVED | Noted: 2022-09-28 | Resolved: 2023-02-01

## 2023-02-01 PROCEDURE — 97110 THERAPEUTIC EXERCISES: CPT | Mod: GP | Performed by: PHYSICAL THERAPIST

## 2023-02-02 ENCOUNTER — APPOINTMENT (OUTPATIENT)
Dept: GENERAL RADIOLOGY | Facility: CLINIC | Age: 53
End: 2023-02-02
Attending: PODIATRIST
Payer: COMMERCIAL

## 2023-02-02 ENCOUNTER — ANESTHESIA (OUTPATIENT)
Dept: SURGERY | Facility: CLINIC | Age: 53
End: 2023-02-02
Payer: COMMERCIAL

## 2023-02-02 ENCOUNTER — ANESTHESIA EVENT (OUTPATIENT)
Dept: SURGERY | Facility: CLINIC | Age: 53
End: 2023-02-02
Payer: COMMERCIAL

## 2023-02-02 ENCOUNTER — HOSPITAL ENCOUNTER (OUTPATIENT)
Facility: CLINIC | Age: 53
Discharge: HOME | End: 2023-02-02
Attending: PODIATRIST | Admitting: PODIATRIST
Payer: COMMERCIAL

## 2023-02-02 VITALS
OXYGEN SATURATION: 99 % | TEMPERATURE: 97.1 F | RESPIRATION RATE: 14 BRPM | WEIGHT: 187 LBS | DIASTOLIC BLOOD PRESSURE: 85 MMHG | HEART RATE: 76 BPM | HEIGHT: 64 IN | BODY MASS INDEX: 31.92 KG/M2 | SYSTOLIC BLOOD PRESSURE: 146 MMHG

## 2023-02-02 DIAGNOSIS — M25.371 ANKLE INSTABILITY, RIGHT: Primary | ICD-10-CM

## 2023-02-02 DIAGNOSIS — S86.311A PERONEAL TENDON TEAR, RIGHT, INITIAL ENCOUNTER: ICD-10-CM

## 2023-02-02 PROCEDURE — C1713 ANCHOR/SCREW BN/BN,TIS/BN: HCPCS | Performed by: PODIATRIST

## 2023-02-02 PROCEDURE — 250N000011 HC RX IP 250 OP 636: Performed by: NURSE ANESTHETIST, CERTIFIED REGISTERED

## 2023-02-02 PROCEDURE — 272N000001 HC OR GENERAL SUPPLY STERILE: Performed by: PODIATRIST

## 2023-02-02 PROCEDURE — 710N000009 HC RECOVERY PHASE 1, LEVEL 1, PER MIN: Performed by: PODIATRIST

## 2023-02-02 PROCEDURE — 258N000003 HC RX IP 258 OP 636: Performed by: NURSE ANESTHETIST, CERTIFIED REGISTERED

## 2023-02-02 PROCEDURE — 710N000012 HC RECOVERY PHASE 2, PER MINUTE: Performed by: PODIATRIST

## 2023-02-02 PROCEDURE — 250N000011 HC RX IP 250 OP 636: Performed by: ANESTHESIOLOGY

## 2023-02-02 PROCEDURE — 999N000141 HC STATISTIC PRE-PROCEDURE NURSING ASSESSMENT: Performed by: PODIATRIST

## 2023-02-02 PROCEDURE — 370N000017 HC ANESTHESIA TECHNICAL FEE, PER MIN: Performed by: PODIATRIST

## 2023-02-02 PROCEDURE — 271N000001 HC OR GENERAL SUPPLY NON-STERILE: Performed by: PODIATRIST

## 2023-02-02 PROCEDURE — 29898 ANKLE ARTHROSCOPY/SURGERY: CPT | Mod: 51 | Performed by: PODIATRIST

## 2023-02-02 PROCEDURE — 360N000083 HC SURGERY LEVEL 3 W/ FLUORO, PER MIN: Performed by: PODIATRIST

## 2023-02-02 PROCEDURE — 250N000025 HC SEVOFLURANE, PER MIN: Performed by: PODIATRIST

## 2023-02-02 PROCEDURE — 258N000003 HC RX IP 258 OP 636: Performed by: ANESTHESIOLOGY

## 2023-02-02 PROCEDURE — 27635 REMOVE LOWER LEG BONE LESION: CPT | Mod: 51 | Performed by: PODIATRIST

## 2023-02-02 PROCEDURE — 250N000011 HC RX IP 250 OP 636: Performed by: PODIATRIST

## 2023-02-02 PROCEDURE — 250N000009 HC RX 250: Performed by: PODIATRIST

## 2023-02-02 PROCEDURE — 250N000009 HC RX 250: Performed by: NURSE ANESTHETIST, CERTIFIED REGISTERED

## 2023-02-02 PROCEDURE — 250N000009 HC RX 250: Performed by: ANESTHESIOLOGY

## 2023-02-02 PROCEDURE — 999N000180 XR SURGERY CARM FLUORO LESS THAN 5 MIN

## 2023-02-02 PROCEDURE — 27698 REPAIR OF ANKLE LIGAMENT: CPT | Mod: RT | Performed by: PODIATRIST

## 2023-02-02 PROCEDURE — 258N000003 HC RX IP 258 OP 636: Performed by: PODIATRIST

## 2023-02-02 DEVICE — KIT INST IMP ARTHREX BRACE SWIVELK 4.75X3.5MM AR-1788J-CP: Type: IMPLANTABLE DEVICE | Site: ANKLE | Status: FUNCTIONAL

## 2023-02-02 RX ORDER — OXYCODONE HYDROCHLORIDE 5 MG/1
10 TABLET ORAL EVERY 4 HOURS PRN
Status: DISCONTINUED | OUTPATIENT
Start: 2023-02-02 | End: 2023-02-02 | Stop reason: HOSPADM

## 2023-02-02 RX ORDER — ONDANSETRON 4 MG/1
4 TABLET, ORALLY DISINTEGRATING ORAL EVERY 30 MIN PRN
Status: DISCONTINUED | OUTPATIENT
Start: 2023-02-02 | End: 2023-02-02 | Stop reason: HOSPADM

## 2023-02-02 RX ORDER — HYDROMORPHONE HCL IN WATER/PF 6 MG/30 ML
0.4 PATIENT CONTROLLED ANALGESIA SYRINGE INTRAVENOUS EVERY 5 MIN PRN
Status: DISCONTINUED | OUTPATIENT
Start: 2023-02-02 | End: 2023-02-02 | Stop reason: HOSPADM

## 2023-02-02 RX ORDER — FENTANYL CITRATE 0.05 MG/ML
50 INJECTION, SOLUTION INTRAMUSCULAR; INTRAVENOUS
Status: DISCONTINUED | OUTPATIENT
Start: 2023-02-02 | End: 2023-02-02 | Stop reason: HOSPADM

## 2023-02-02 RX ORDER — ACETAMINOPHEN 325 MG/1
650 TABLET ORAL
Status: DISCONTINUED | OUTPATIENT
Start: 2023-02-02 | End: 2023-02-02 | Stop reason: HOSPADM

## 2023-02-02 RX ORDER — SODIUM CHLORIDE, SODIUM LACTATE, POTASSIUM CHLORIDE, CALCIUM CHLORIDE 600; 310; 30; 20 MG/100ML; MG/100ML; MG/100ML; MG/100ML
INJECTION, SOLUTION INTRAVENOUS CONTINUOUS
Status: DISCONTINUED | OUTPATIENT
Start: 2023-02-02 | End: 2023-02-02 | Stop reason: HOSPADM

## 2023-02-02 RX ORDER — NEOSTIGMINE METHYLSULFATE 1 MG/ML
VIAL (ML) INJECTION PRN
Status: DISCONTINUED | OUTPATIENT
Start: 2023-02-02 | End: 2023-02-02

## 2023-02-02 RX ORDER — PROPOFOL 10 MG/ML
INJECTION, EMULSION INTRAVENOUS CONTINUOUS PRN
Status: DISCONTINUED | OUTPATIENT
Start: 2023-02-02 | End: 2023-02-02

## 2023-02-02 RX ORDER — HYDROMORPHONE HCL IN WATER/PF 6 MG/30 ML
0.2 PATIENT CONTROLLED ANALGESIA SYRINGE INTRAVENOUS EVERY 5 MIN PRN
Status: DISCONTINUED | OUTPATIENT
Start: 2023-02-02 | End: 2023-02-02 | Stop reason: HOSPADM

## 2023-02-02 RX ORDER — ONDANSETRON 2 MG/ML
4 INJECTION INTRAMUSCULAR; INTRAVENOUS EVERY 30 MIN PRN
Status: DISCONTINUED | OUTPATIENT
Start: 2023-02-02 | End: 2023-02-02 | Stop reason: HOSPADM

## 2023-02-02 RX ORDER — LIDOCAINE HYDROCHLORIDE 20 MG/ML
INJECTION, SOLUTION INFILTRATION; PERINEURAL PRN
Status: DISCONTINUED | OUTPATIENT
Start: 2023-02-02 | End: 2023-02-02

## 2023-02-02 RX ORDER — PROPOFOL 10 MG/ML
INJECTION, EMULSION INTRAVENOUS PRN
Status: DISCONTINUED | OUTPATIENT
Start: 2023-02-02 | End: 2023-02-02

## 2023-02-02 RX ORDER — DEXAMETHASONE SODIUM PHOSPHATE 4 MG/ML
INJECTION, SOLUTION INTRA-ARTICULAR; INTRALESIONAL; INTRAMUSCULAR; INTRAVENOUS; SOFT TISSUE PRN
Status: DISCONTINUED | OUTPATIENT
Start: 2023-02-02 | End: 2023-02-02

## 2023-02-02 RX ORDER — ONDANSETRON 2 MG/ML
INJECTION INTRAMUSCULAR; INTRAVENOUS PRN
Status: DISCONTINUED | OUTPATIENT
Start: 2023-02-02 | End: 2023-02-02

## 2023-02-02 RX ORDER — DEXMEDETOMIDINE HYDROCHLORIDE 4 UG/ML
INJECTION, SOLUTION INTRAVENOUS PRN
Status: DISCONTINUED | OUTPATIENT
Start: 2023-02-02 | End: 2023-02-02

## 2023-02-02 RX ORDER — OXYCODONE HYDROCHLORIDE 5 MG/1
5 TABLET ORAL EVERY 6 HOURS PRN
Qty: 28 TABLET | Refills: 0 | Status: SHIPPED | OUTPATIENT
Start: 2023-02-02 | End: 2023-02-09

## 2023-02-02 RX ORDER — FENTANYL CITRATE 0.05 MG/ML
50 INJECTION, SOLUTION INTRAMUSCULAR; INTRAVENOUS EVERY 5 MIN PRN
Status: DISCONTINUED | OUTPATIENT
Start: 2023-02-02 | End: 2023-02-02 | Stop reason: HOSPADM

## 2023-02-02 RX ORDER — CLINDAMYCIN PHOSPHATE 900 MG/50ML
900 INJECTION, SOLUTION INTRAVENOUS
Status: COMPLETED | OUTPATIENT
Start: 2023-02-02 | End: 2023-02-02

## 2023-02-02 RX ORDER — OXYCODONE HYDROCHLORIDE 5 MG/1
5 TABLET ORAL EVERY 4 HOURS PRN
Status: DISCONTINUED | OUTPATIENT
Start: 2023-02-02 | End: 2023-02-02 | Stop reason: HOSPADM

## 2023-02-02 RX ORDER — FENTANYL CITRATE 50 UG/ML
INJECTION, SOLUTION INTRAMUSCULAR; INTRAVENOUS PRN
Status: DISCONTINUED | OUTPATIENT
Start: 2023-02-02 | End: 2023-02-02

## 2023-02-02 RX ORDER — FENTANYL CITRATE 0.05 MG/ML
25 INJECTION, SOLUTION INTRAMUSCULAR; INTRAVENOUS EVERY 5 MIN PRN
Status: DISCONTINUED | OUTPATIENT
Start: 2023-02-02 | End: 2023-02-02 | Stop reason: HOSPADM

## 2023-02-02 RX ORDER — OXYCODONE HYDROCHLORIDE 5 MG/1
5 TABLET ORAL
Status: DISCONTINUED | OUTPATIENT
Start: 2023-02-02 | End: 2023-02-02 | Stop reason: HOSPADM

## 2023-02-02 RX ORDER — MAGNESIUM HYDROXIDE 1200 MG/15ML
LIQUID ORAL PRN
Status: DISCONTINUED | OUTPATIENT
Start: 2023-02-02 | End: 2023-02-02 | Stop reason: HOSPADM

## 2023-02-02 RX ORDER — GLYCOPYRROLATE 0.2 MG/ML
INJECTION, SOLUTION INTRAMUSCULAR; INTRAVENOUS PRN
Status: DISCONTINUED | OUTPATIENT
Start: 2023-02-02 | End: 2023-02-02

## 2023-02-02 RX ORDER — CLINDAMYCIN PHOSPHATE 900 MG/50ML
900 INJECTION, SOLUTION INTRAVENOUS SEE ADMIN INSTRUCTIONS
Status: DISCONTINUED | OUTPATIENT
Start: 2023-02-02 | End: 2023-02-02 | Stop reason: HOSPADM

## 2023-02-02 RX ORDER — LIDOCAINE 40 MG/G
CREAM TOPICAL
Status: DISCONTINUED | OUTPATIENT
Start: 2023-02-02 | End: 2023-02-02 | Stop reason: HOSPADM

## 2023-02-02 RX ADMIN — SODIUM CHLORIDE, POTASSIUM CHLORIDE, SODIUM LACTATE AND CALCIUM CHLORIDE: 600; 310; 30; 20 INJECTION, SOLUTION INTRAVENOUS at 12:24

## 2023-02-02 RX ADMIN — PHENYLEPHRINE HYDROCHLORIDE 100 MCG: 10 INJECTION INTRAVENOUS at 13:08

## 2023-02-02 RX ADMIN — PROPOFOL 170 MG: 10 INJECTION, EMULSION INTRAVENOUS at 12:51

## 2023-02-02 RX ADMIN — PROPOFOL 50 MCG/KG/MIN: 10 INJECTION, EMULSION INTRAVENOUS at 12:52

## 2023-02-02 RX ADMIN — GLYCOPYRROLATE 0.6 MG: 0.2 INJECTION, SOLUTION INTRAMUSCULAR; INTRAVENOUS at 16:03

## 2023-02-02 RX ADMIN — PHENYLEPHRINE HYDROCHLORIDE 100 MCG: 10 INJECTION INTRAVENOUS at 12:59

## 2023-02-02 RX ADMIN — ONDANSETRON 4 MG: 2 INJECTION INTRAMUSCULAR; INTRAVENOUS at 16:00

## 2023-02-02 RX ADMIN — SODIUM CHLORIDE, POTASSIUM CHLORIDE, SODIUM LACTATE AND CALCIUM CHLORIDE: 600; 310; 30; 20 INJECTION, SOLUTION INTRAVENOUS at 13:46

## 2023-02-02 RX ADMIN — HYDROMORPHONE HYDROCHLORIDE 0.5 MG: 1 INJECTION, SOLUTION INTRAMUSCULAR; INTRAVENOUS; SUBCUTANEOUS at 15:41

## 2023-02-02 RX ADMIN — NEOSTIGMINE METHYLSULFATE 4 MG: 1 INJECTION, SOLUTION INTRAVENOUS at 16:03

## 2023-02-02 RX ADMIN — CLINDAMYCIN PHOSPHATE 900 MG: 900 INJECTION, SOLUTION INTRAVENOUS at 12:25

## 2023-02-02 RX ADMIN — FENTANYL CITRATE 100 MCG: 50 INJECTION, SOLUTION INTRAMUSCULAR; INTRAVENOUS at 12:51

## 2023-02-02 RX ADMIN — BUPIVACAINE HYDROCHLORIDE 15 ML: 5 INJECTION, SOLUTION EPIDURAL; INTRACAUDAL at 12:28

## 2023-02-02 RX ADMIN — PHENYLEPHRINE HYDROCHLORIDE 100 MCG: 10 INJECTION INTRAVENOUS at 13:38

## 2023-02-02 RX ADMIN — PHENYLEPHRINE HYDROCHLORIDE 100 MCG: 10 INJECTION INTRAVENOUS at 13:18

## 2023-02-02 RX ADMIN — LIDOCAINE HYDROCHLORIDE 100 MG: 20 INJECTION, SOLUTION INFILTRATION; PERINEURAL at 12:51

## 2023-02-02 RX ADMIN — DEXMEDETOMIDINE HYDROCHLORIDE 12 MCG: 200 INJECTION INTRAVENOUS at 15:31

## 2023-02-02 RX ADMIN — PHENYLEPHRINE HYDROCHLORIDE 100 MCG: 10 INJECTION INTRAVENOUS at 13:02

## 2023-02-02 RX ADMIN — ROCURONIUM BROMIDE 40 MG: 50 INJECTION, SOLUTION INTRAVENOUS at 12:51

## 2023-02-02 RX ADMIN — GLYCOPYRROLATE 0.2 MG: 0.2 INJECTION, SOLUTION INTRAMUSCULAR; INTRAVENOUS at 13:54

## 2023-02-02 RX ADMIN — PHENYLEPHRINE HYDROCHLORIDE 100 MCG: 10 INJECTION INTRAVENOUS at 12:58

## 2023-02-02 RX ADMIN — DEXAMETHASONE SODIUM PHOSPHATE 4 MG: 4 INJECTION, SOLUTION INTRA-ARTICULAR; INTRALESIONAL; INTRAMUSCULAR; INTRAVENOUS; SOFT TISSUE at 12:51

## 2023-02-02 RX ADMIN — PHENYLEPHRINE HYDROCHLORIDE 100 MCG: 10 INJECTION INTRAVENOUS at 13:32

## 2023-02-02 RX ADMIN — MIDAZOLAM 2 MG: 1 INJECTION INTRAMUSCULAR; INTRAVENOUS at 12:49

## 2023-02-02 RX ADMIN — PHENYLEPHRINE HYDROCHLORIDE 0.3 MCG/KG/MIN: 10 INJECTION INTRAVENOUS at 13:36

## 2023-02-02 RX ADMIN — MIDAZOLAM 2 MG: 1 INJECTION INTRAMUSCULAR; INTRAVENOUS at 12:15

## 2023-02-02 ASSESSMENT — ENCOUNTER SYMPTOMS: DYSRHYTHMIAS: 1

## 2023-02-02 ASSESSMENT — ACTIVITIES OF DAILY LIVING (ADL)
ADLS_ACUITY_SCORE: 35

## 2023-02-02 NOTE — PROGRESS NOTES
Subjective:  HPI  Physical Exam                    Objective:  System    Physical Exam    General     ROS    Assessment/Plan:    DISCHARGE REPORT    Progress reporting period is from 9-28-22 to 2-1-23, 12 visits.       SUBJECTIVE  Subjective changes noted by patient:  .  Subjective: Will be having ankle surgery on 2-2-23 Dr Michael-lateral repair, bone spur, peroneus brevis repair.  R knee is very sore and has been giving way with sharp pain due to walking different with the ankle issue. States overall the ankle has never felt stable as she tried to increase her activity level. It really bothers her to walk and is really difficult to drive due to the pressure on the ankle. Reliant on ibuprofen to sleep and an ankle support. Has not been able to RTW. Looking forward to recovery     Current Pain level:  (3-5/10 ankle and significant R knee pain).      Initial Pain level: 5/10.   Changes in function:  Not able to steadily progress program or function  Adverse reaction to treatment or activity: increased pain as became slightly more active    OBJECTIVE  Changes noted in objective findings:    Objective: Heavy limp today due to knee pain, reviewed knee ROM and strength exercises(NWB) that she will be able to do during her recovery time. Isometric ankle strength testing is WNL. No swelling of ankle noted today(external). ROM is WNL. Improving ability to move her toes but not wnl as yet.     ASSESSMENT/PLAN  Updated problem list and treatment plan: Diagnosis 1:  R ankle pain/high grade ATFL tear -will be having surgery tomorrow  STG/LTGs have been met or progress has been made towards goals:  None  Assessment of Progress: The patient has had set backs in their progress.  Self Management Plans:  Laura will have surgery tomorrow. She has been instructed in some knee exercises(NWB) she can perform during her ankle recovery time as her knee has also been bothering her  I have re-evaluated this patient and find that the  nature, scope, duration and intensity of the therapy is appropriate for the medical condition of the patient.  aLura continues to require the following intervention to meet STG and LTG's:  surgery    Recommendations:  discharge from PT, will have ankle surgery tomorrow    Please refer to the daily flowsheet for treatment today, total treatment time and time spent performing 1:1 timed codes.

## 2023-02-02 NOTE — ANESTHESIA CARE TRANSFER NOTE
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    Anesthesia Type:   General     Note:    Oropharynx: oropharynx clear of all foreign objects and spontaneously breathing  Level of Consciousness: awake  Oxygen Supplementation: face mask  Level of Supplemental Oxygen (L/min / FiO2): 6  Independent Airway: airway patency satisfactory and stable  Dentition: dentition unchanged  Vital Signs Stable: post-procedure vital signs reviewed and stable  Report to RN Given: handoff report given  Patient transferred to: PACU  Comments: Neuromuscular blockade reversed after TOF 4/4, spontaneous respirations, adequate tidal volumes, followed commands to voice, oropharynx suctioned with soft flexible catheter, extubated atraumatically, extubated with suction, airway patent after extubation.  Oxygen via facemask at 6 liters per minute to PACU. Oxygen tubing connected to wall O2 in PACU, SpO2, NiBP, and EKG monitors and alarms on and functioning, Terrell Hugger warmer connected to patient gown, report on patient's clinical status given to PACU RN, RN questions answered.     Handoff Report: Identifed the Patient, Identified the Reponsible Provider, Reviewed the pertinent medical history, Discussed the surgical course, Reviewed Intra-OP anesthesia mangement and issues during anesthesia, Set expectations for post-procedure period and Allowed opportunity for questions and acknowledgement of understanding      Vitals:  Vitals Value Taken Time   BP     Temp     Pulse 77 02/02/23 1645   Resp 18 02/02/23 1645   SpO2 96 % 02/02/23 1645   Vitals shown include unvalidated device data.    Electronically Signed By: SALAZAR Browne CRNA  February 2, 2023  4:46 PM

## 2023-02-02 NOTE — ANESTHESIA PROCEDURE NOTES
Sciatic Procedure Note    Pre-Procedure   Staff -        Anesthesiologist:  Carmine Avitia MD       Performed By: anesthesiologist       Location: pre-op       Pre-Anesthestic Checklist: patient identified, IV checked, site marked, risks and benefits discussed, informed consent, monitors and equipment checked, pre-op evaluation, at physician/surgeon's request and post-op pain management  Timeout:       Correct Patient: Yes        Correct Procedure: Yes        Correct Site: Yes        Correct Position: Yes        Correct Laterality: Yes        Site Marked: Yes  Procedure Documentation  Procedure: Sciatic       Laterality: right       Patient Position: supine       Patient Prep/Sterile Barriers: sterile gloves, mask, patient draped       Skin prep: Chloraprep       Local skin infiltrated with 2 mL of 1% lidocaine.  (popliteal approach).       Needle Type: insulated       Ultrasound guided       1. Ultrasound was used to identify targeted nerve, plexus, vascular marker, or fascial plane and place a needle adjacent to it in real-time.       2. Ultrasound was used to visualize the spread of anesthetic in close proximity to the above referenced structure.       3. A permanent image is entered into the patient's record.       4. The visualized anatomic structures appeared normal.       5. There were no apparent abnormal pathologic findings.    Assessment/Narrative         The placement was negative for: blood aspirated, painful injection and site bleeding       Paresthesias: No.       Bolus given via needle..        Secured via.        Insertion/Infusion Method: Single Shot       Complications: none       Injection made incrementally with aspirations every 5 mL.    Medication(s) Administered   Bupivacaine 0.5% w/ 1:400K Epi (Injection) - Injection   20 mL - 2/2/2023 12:35:00 PM   Comments:  Patient tolerated the procedure well.    The surgeon has given a verbal order transferring care of this patient to me for the  "performance of a regional analgesia block for post-op pain control. It is requested of me because I am uniquely trained and qualified to perform this block and the surgeon is neither trained nor qualified to perform this procedure.Ultrasound Interpretation, peripheral nerve block.        FOR [facility] ([facility]) ONLY:   Pain Team Contact information: please page the Pain Team Via everbill. Search \"Pain\". During daytime hours, please page the attending first. At night please page the resident first.    "

## 2023-02-02 NOTE — ANESTHESIA PREPROCEDURE EVALUATION
Anesthesia Pre-Procedure Evaluation    Patient: Laura Jackson   MRN: [id number] : 1970        Procedure : Procedure(s):  ankle arthroscopy, modified Brostrom, possible peroneal tendon repair          Past Medical History:   Diagnosis Date     Benign essential hypertension 2018     Family history of breast cancer 2014     Family history of breast cancer      Hypothyroidism 2022     SVT (supraventricular tachycardia):  history of, no recurrence since 2008 10/11/2013    no recurrence since       Uncomplicated asthma       Past Surgical History:   Procedure Laterality Date     ABDOMEN SURGERY  2017    myectomy     APPENDECTOMY       COLONOSCOPY N/A 2018    Procedure: COMBINED COLONOSCOPY, SINGLE OR MULTIPLE BIOPSY/POLYPECTOMY BY BIOPSY;;  Surgeon: Osman Hahn MD;  Location: [facility]     COLONOSCOPY WITH CO2 INSUFFLATION N/A 2018    Procedure: COLONOSCOPY WITH CO2 INSUFFLATION;  COLON-SCREENING / LUBKA ;  Surgeon: Osman Hahn MD;  Location: [facility] OR     DAVINCI HYSTERECTOMY TOTAL, SALPINGECTOMY BILATERAL N/A 2017    Procedure: DAVINCI XI HYSTERECTOMY TOTAL, SALPINGECTOMY BILATERAL;  DAVINCI TOTAL HYSTERECTOMY; BILATERAL SALPINGECTOMY. BILATERAL URETERAL LYSIS. UTEROSACRAL-COLPOPEXY. EXCISION OF ENDOMETRIOSIS;  Surgeon: George Loyola MD;  Location: [facility] OR     DAVINCI LYSIS OF ADHESIONS Bilateral 2017    Procedure: DAVINCI LYSIS OF ADHESIONS;  BILATERAL URETERAL LYSIS;  Surgeon: George Loyola MD;  Location: [facility]     DAVINCI SACROCOLPOPEXY, CYSTOSCOPY, COMBINED N/A 2017    Procedure: COMBINED DAVINCI SACROCOLPOPEXY, CYSTOSCOPY;  UTEROSACRAL COLPOPEXY;  Surgeon: George Loyola MD;  Location: [facility]     DAVINCI XI ASSISTED ABLATION / EXCISION OF ENDOMETRIOSIS  2017    Procedure: DAVINCI XI ASSISTED ABLATION / EXCISION OF ENDOMETRIOSIS;;  Surgeon: George Loyola MD;  Location:  OR     DILATION AND CURETTAGE N/A 2017     Procedure: DILATION AND CURETTAGE;  Uterine Curettings and Fibroid Removal, Cook catheter placement; (No hysterectomy done at this time);  Surgeon: Ernestina Saunders MD;  Location: [facility]     HYSTERECTOMY, PAP NO LONGER INDICATED       ORTHOPEDIC SURGERY  6/1986    Heel spur , toe surgery     RELEASE CARPAL TUNNEL Right 10/20/2020    Procedure: RIGHT RELEASE, CARPAL TUNNEL;  Surgeon: Rickey Rea MD;  Location: [facility] OR     RELEASE CARPAL TUNNEL Left 11/6/2020    Procedure: LEFT RELEASE, CARPAL TUNNEL;  Surgeon: Rickey Rea MD;  Location: [facility] OR     TONSILLECTOMY        Allergies   Allergen Reactions     Penicillins Swelling     Swelling throat; age 4     Adhesive Tape Hives     Penicillin G      Tetracycline GI Disturbance     Other reaction(s): Abdominal Pain  Vomiting; nauseous  Other reaction(s): Abdominal Pain  Vomiting; nauseous      Social History     Tobacco Use     Smoking status: Never     Smokeless tobacco: Never   Substance Use Topics     Alcohol use: Yes     Comment: Rare- @1 month      Wt Readings from Last 1 Encounters:   01/26/23 85.2 kg (187 lb 12.8 oz)        Anesthesia Evaluation            ROS/MED HX  ENT/Pulmonary:     (+) asthma  (-) sleep apnea   Neurologic:       Cardiovascular: Comment: POTS    (+) hypertension-----dysrhythmias, Other,     METS/Exercise Tolerance:     Hematologic:     (+) anemia,     Musculoskeletal:       GI/Hepatic: Comment: IBS    (+) GERD,     Renal/Genitourinary:       Endo:     (+) thyroid problem, hypothyroidism,     Psychiatric/Substance Use:       Infectious Disease:       Malignancy:       Other:            Physical Exam    Airway        Mallampati: II   TM distance: > 3 FB   Neck ROM: full   Mouth opening: > 3 cm    Respiratory Devices and Support         Dental       (+) Completely normal teeth      Cardiovascular   cardiovascular exam normal          Pulmonary   pulmonary exam normal                OUTSIDE LABS:  CBC:   Lab Results   Component  Value Date    WBC 6.2 03/31/2021    WBC 5.1 03/15/2019    HGB 14.1 03/31/2021    HGB 13.4 10/08/2020    HCT 43.2 03/31/2021    HCT 43.4 03/15/2019     03/31/2021     03/15/2019     BMP:   Lab Results   Component Value Date     01/26/2023     03/31/2021    POTASSIUM 4.3 01/26/2023    POTASSIUM 4.2 03/31/2021    CHLORIDE 105 01/26/2023    CHLORIDE 104 03/31/2021    CO2 28 01/26/2023    CO2 31 03/31/2021    BUN 16 01/26/2023    BUN 15 03/31/2021    CR 0.88 01/26/2023    CR 0.99 03/31/2021     (H) 01/26/2023    GLC 87 03/31/2021     COAGS:   Lab Results   Component Value Date    INR 1.12 06/20/2017    FIBR 215 06/20/2017     POC:   Lab Results   Component Value Date    HCG Negative 06/19/2017    HCGS Negative 08/04/2017     HEPATIC:   Lab Results   Component Value Date    ALBUMIN 4.2 03/31/2021    PROTTOTAL 7.5 03/31/2021    ALT 62 (H) 03/31/2021    AST 28 03/31/2021    ALKPHOS 61 03/31/2021    BILITOTAL 0.3 03/31/2021     OTHER:   Lab Results   Component Value Date    AZAEL 9.7 01/26/2023    PHOS 3.1 08/26/2017    MAG 2.1 04/29/2018    LIPASE 181 07/12/2017    TSH 1.96 08/29/2022    CRP <2.9 03/31/2021    SED 6 03/31/2021       Anesthesia Plan    ASA Status:  2   NPO Status:  NPO Appropriate    Anesthesia Type: General.     - Airway: ETT   Induction: Intravenous, Propofol.   Maintenance: Balanced.        Consents    Anesthesia Plan(s) and associated risks, benefits, and realistic alternatives discussed. Questions answered and patient/representative(s) expressed understanding.    - Discussed:     - Discussed with:  Patient         Postoperative Care    Pain management: Multi-modal analgesia.   PONV prophylaxis: Ondansetron (or other 5HT-3), Dexamethasone or Solumedrol, Background Propofol Infusion     Comments:                Carmine Avitia MD

## 2023-02-02 NOTE — INTERVAL H&P NOTE
"I have reviewed the surgical (or preoperative) H&P that is linked to this encounter, and examined the patient. There are no significant changes    Clinical Conditions Present on Arrival:  Clinically Significant Risk Factors Present on Admission                    # Obesity: Estimated body mass index is 32.1 kg/m  as calculated from the following:    Height as of this encounter: 1.626 m (5' 4\").    Weight as of this encounter: 84.8 kg (187 lb).       "

## 2023-02-02 NOTE — DISCHARGE INSTRUCTIONS
Same Day Surgery Discharge Instructions for  Sedation and General Anesthesia     It's not unusual to feel dizzy, light-headed or faint for up to 24 hours after surgery or while taking pain medication.  If you have these symptoms: sit for a few minutes before standing and have someone assist you when you get up to walk or use the bathroom.    You should rest and relax for the next 24 hours. We recommend you make arrangements to have an adult stay with you for at least 24 hours after your discharge.  Avoid hazardous and strenuous activity.    DO NOT DRIVE any vehicle or operate mechanical equipment for 24 hours following the end of your surgery.  Even though you may feel normal, your reactions may be affected by the medication you have received.    Do not drink alcoholic beverages for 24 hours following surgery.     Slowly progress to your regular diet as you feel able. It's not unusual to feel nauseated and/or vomit after receiving anesthesia.  If you develop these symptoms, drink clear liquids (apple juice, ginger ale, broth, 7-up, etc. ) until you feel better.  If your nausea and vomiting persists for 24 hours, please notify your surgeon.      All narcotic pain medications, along with inactivity and anesthesia, can cause constipation. Drinking plenty of liquids and increasing fiber intake will help.    For any questions of a medical nature, call your surgeon.    Do not make important decisions for 24 hours.    If you had general anesthesia, you may have a sore throat for a couple of days related to the breathing tube used during surgery.  You may use Cepacol lozenges to help with this discomfort.  If it worsens or if you develop a fever, contact your surgeon.     If you feel your pain is not well managed with the pain medications prescribed by your surgeon, please contact your surgeon's office to let them know so they can address your concerns.       PATIENT INSTRUCTIONS - Podiatry / Foot & Ankle  Surgery    Post-operative Instructions    -Dressing:    Keep your dressing clean, dry, and intact.    DO NOT get the surgical dressing dirty or wet.    DO NOT remove the surgical dressing.    Call the clinic immediately if the dressing becomes dirty, wet, or comes off.  Waterproof cast protectors (for showering) are available at [employer] or on [employer].    -Weight-bearing:    NONweight-bearing to the operative foot/ankle in the splint.  Use knee scooter, crutches, or other gait assistive device.    -Activity:    Minimal activity, around home, until your 1st post-operative follow-up.  Elevate the operative limb at or above heart level when at rest.   Do not hang the operative limb below heart level for >10 minutes per hour.  Ice (behind the knee) for 15-20 minutes, several times daily.  Do not place ice over the surgical dressing.    -Pain medication:  Take on a scheduled basis for the first 2-3 days, then as needed.  This will better control your pain and require less pain medication use overall.    A prescription for oxycodone has been sent to your pharmacy.  Also begin scheduled Tylenol (acetaminophen) every 6 hours.  Do not exceed 4,000mg of acetaminophen daily, from all sources; this includes acetaminophen contained in pain pills (Norco) & any additional Tylenol (acetaminophen).      -Constipation:  Miralax (polyethylene glycol) or Senekot (docusate/sennosides) are available over the counter, or by prescription if requested.  Take as directed.  For use ONLY if needed.      -Blood clot prevention:  Perform knee and hip bends and deep breathing each hour while awake.  Take aspirin, 325mg twice daily, starting 24 hours post-op.    -Call the clinic with any questions or concerns, 24 hours a day.  A triage service is available after hours for urgent matters also:   [phone]    -Call if you develop:  Redness advancing up the leg.  Increasing pain, uncontrolled by elevation, rest, and medication.  Soaked  "(blood or other) dressing.    -If you do not have a post-operative appointment (usually 7-10 days after surgery) call the office to schedule.    Saida Michael DPM    **If you have questions or concerns about your procedure,   call Dr. Michael [phone]**                 [facility]      DISCHARGE INSTRUCTIONS FOLLOWING   REGIONAL BLOCK ANESTHESIA    Numbness or lack of feeling in the arm/leg that was operated may last up to 24 hours.  The average time is usually 10-15 hours.  You may not be able to lift or move the arm or leg where the operation was by itself during that time.  Long-acting local anesthetic medicines were used to give you long-lasting pain relief.  Wear a sling until your arm is completely \"awake\"  Avoid bumping your arm, leg or foot while it is numb  Avoid extremes of hot or cold while it is numb  Remain quiet and restful the day of surgery.  Resume normal activities gradually over the next day or so as advise by your surgeon.  Do not drive or operate  Any machinery until your extremity is full  \"awake\"        You will have a tingling and prickly sensation in your arm/leg as the feeling begins to return; you can also expect some discomfort. The amount of discomfort is unpredictable, but if you have more pain that can be controlled with the pain medication you received, you should contact your surgeon.  Start to take your pain pills as soon as you start to feel any discomfort or pain.                 "

## 2023-02-02 NOTE — INTERVAL H&P NOTE
"I have reviewed the surgical (or preoperative) H&P that is linked to this encounter, and examined the patient. Noted changes include: adding exostectomy anterior medial malleolus.      Clinical Conditions Present on Arrival:  Clinically Significant Risk Factors Present on Admission                    # Obesity: Estimated body mass index is 31.9 kg/m  as calculated from the following:    Height as of 1/26/23: 1.634 m (5' 4.33\").    Weight as of 1/26/23: 85.2 kg (187 lb 12.8 oz).       "

## 2023-02-02 NOTE — BRIEF OP NOTE
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]  Peroneal tendon tear, right, initial encounter [S86.311A]  Anterior ankle impingement  Exostosis, ankle  Post-operative diagnosis Same as pre-operative diagnosis    Procedure: Procedure(s):  ankle arthroscopy, modified Brostrom, peroneal tendon repair, exostectomy of medial malleolus  Surgeon: Surgeon(s) and Role:     * Saida Michael DPM - Primary  Anesthesia: General with Block   Estimated Blood Loss: 30 mL from 2/2/2023 12:49 PM to 2/2/2023  4:42 PM      Drains: None  Specimens: * No specimens in log *  Findings:   significant peroneus brevis tendon tearing, thickened / scarred lateral ligaments, Cam type anterior ankle impingement.  Complications: None.  Implants:   Implant Name Type Inv. Item Serial No.  Lot No. LRB No. Used Action   KIT [facility] [employer] BRACE SWIVELK 4.75X3.5MM [id number] - [id number] Metallic Hardware/Oden KIT [facility] [employer] BRACE SWIVELK 4.75X3.5MM [id number]  AR[employer] [id number] Right 1 Implanted   Suture [employer] double loaded 3x14.5mm     [id number] Right 1 Implanted   Suture [employer] double loaded 3x14.5mm     [id number] Right 1 Implanted

## 2023-02-02 NOTE — ANESTHESIA PROCEDURE NOTES
Airway       Patient location during procedure: OR       Procedure Start/Stop Times: 2/2/2023 12:55 PM  Staff -        Anesthesiologist:  Carmine Avitia MD       CRNA: Monica Salgado APRN CRNA       Other Anesthesia Staff: Mary Calixto RN       Performed By: SRNAIndications and Patient Condition       Indications for airway management: abi-procedural         Mask difficulty assessment: 1 - vent by mask    Final Airway Details       Final airway type: endotracheal airway       Successful airway: ETT - single  Endotracheal Airway Details        ETT size (mm): 2.0       Successful intubation technique: direct laryngoscopy       DL Blade Type: Moya 2       Grade View of Cords: 1       Adjucts: stylet       Position: Right       Measured from: lips       Secured at (cm): 22       Bite block used: None    Post intubation assessment        Placement verified by: capnometry, equal breath sounds and chest rise        Number of attempts at approach: 1       Number of other approaches attempted: 0       Secured with: pink tape       Ease of procedure: easy       Dentition: Intact and Unchanged    Medication(s) Administered   Medication Administration Time: 2/2/2023 12:55 PM

## 2023-02-02 NOTE — ANESTHESIA PROCEDURE NOTES
Adductor canal and Femoral Procedure Note    Pre-Procedure   Staff -        Anesthesiologist:  Carmine Avitia MD       Performed By: anesthesiologist       Location: pre-op       Pre-Anesthestic Checklist: patient identified, IV checked, site marked, risks and benefits discussed, informed consent, monitors and equipment checked, pre-op evaluation, at physician/surgeon's request and post-op pain management  Timeout:       Correct Patient: Yes        Correct Procedure: Yes        Correct Site: Yes        Correct Position: Yes        Correct Laterality: Yes        Site Marked: Yes  Procedure Documentation  Procedure: Adductor canal, Femoral       Laterality: right       Patient Position: supine       Patient Prep/Sterile Barriers: sterile gloves, mask, patient draped       Skin prep: Chloraprep       Local skin infiltrated with 2 mL of 1% lidocaine.        Needle Type: insulated       Ultrasound guided       1. Ultrasound was used to identify targeted nerve, plexus, vascular marker, or fascial plane and place a needle adjacent to it in real-time.       2. Ultrasound was used to visualize the spread of anesthetic in close proximity to the above referenced structure.       3. A permanent image is entered into the patient's record.       4. The visualized anatomic structures appeared normal.       5. There were no apparent abnormal pathologic findings.    Assessment/Narrative         The placement was negative for: blood aspirated, painful injection and site bleeding       Paresthesias: No.       Bolus given via needle..        Secured via.        Insertion/Infusion Method: Single Shot       Complications: none       Injection made incrementally with aspirations every 5 mL.    Medication(s) Administered   Bupivacaine 0.5% w/ 1:400K Epi (Injection) - Injection   15 mL - 2/2/2023 12:28:00 PM   Comments:  Patient tolerated the procedure well.    The surgeon has given a verbal order transferring care of this patient to  "me for the performance of a regional analgesia block for post-op pain control. It is requested of me because I am uniquely trained and qualified to perform this block and the surgeon is neither trained nor qualified to perform this procedure.Ultrasound Interpretation, peripheral nerve block.        FOR [facility] ([facility] [place] ONLY:   Pain Team Contact information: please page the Pain Team Via [employer]. Search \"Pain\". During daytime hours, please page the attending first. At night please page the resident first.    "

## 2023-02-03 NOTE — OP NOTE
Procedure Date: 02/02/2023    SURGEON:  Saida Michael DPM     PREOPERATIVE DIAGNOSIS:  Ankle instability, peroneal tendon tear, ankle impingement, exostosis, right.    POSTOPERATIVE DIAGNOSIS:  Ankle instability, peroneal tendon tear, ankle impingement, exostosis, right.    PROCEDURE PERFORMED:  Ankle arthroscopy with debridement, anterior exostectomy including debridement of Cam type lesion, modified Brostrom-De Guzman peroneal-peroneus brevis tendon repair, medial malleolar exostectomy.    ANESTHESIA:  General with popliteal block.    HEMOSTASIS:  Pneumatic thigh tourniquet at 275 mmHg.    ESTIMATED BLOOD LOSS:  Approximately 30 mL.     INJECTABLES:  Popliteal block preprocedurally.  Additional 8 mL of 1% lidocaine with epinephrine to ankle portals.    COMPLICATIONS:  None.    INDICATIONS FOR PROCEDURE:  This is a 52-year-old female with history of multiple ankle sprains and ongoing lateral instability with significant limitation of function.  MRI showed attenuation of the ATF and CFL but possible peroneal tendon tearing.  The patient also had medial joint pain as well as visibly prominent exostosis to the anterior medial malleolus.  Therefore, after a risk/benefit analysis and conference was held, the patient agreed to proceed with surgical intervention.    PROCEDURE IN DETAIL:  The patient underwent a popliteal block in the preoperative holding area.  The patient was then brought to the OR and a brief timeout was held to identify the correct patient, procedure, side and site, and all were in agreement.  The patient was placed supine on the operating table.  A well-padded pneumatic thigh tourniquet was applied.  A skin marker was used to dejuan the anteromedial, anterolateral, and posterolateral portals along with the Achilles tendon and the fibula.  Subsequently, 1% lidocaine with epinephrine was used to anesthetize the anteromedial, anterolateral, and posterolateral portals.  Subsequently, the limb was placed  in a well leg becerril, the pad at the end of the table was removed, and the limb was prepped and draped in the usual sterile fashion.    The limb was placed into the noninvasive ankle distractor with the Guhl strap and mild distraction was applied.  An 18-gauge spinal needle on a 30 mL syringe with 1% lidocaine with epinephrine was used to establish anteromedial portal.  The joint was insufflated and then the anteromedial portal was developed and the fluoroscope introduced.  There was noted to be significant synovitis in the lateral gutter and scarring as well as anterior distal tibial spurring.  The Coblation wand was used to debride the lateral gutter, the anterolateral joint, and the syndesmosis was inspected.  It was noted to be intact; however, there was synovitis within the syndesmosis and this additionally was debrided.  The 4.0 bur was used to carefully debride the spurring off the anterior distal tibia until smooth resection was confirmed.  Subsequently, portals were exchanged and the scope was placed in the lateral portal and the medial joint was visualized.  The medial malleolus was noted to be without significant spurring; however, upon visualization and debridement of the medial gutter and the anterior medial ankle joint there was noted to be a Cam type impingement, a flattening of the anterior medial dome of the talus compared to the lateral and with release of distraction and dorsiflexion of the ankle there was found to be contact in the area of the anteromedial ankle joint and therefore, this Cam lesion was debrided very carefully with 4.0 bur.  An excellent decompression was noted.  A smaller 3.0 yanci was used to mildly debrided the anterior aspect of the medial malleolus in the same area and ankle was ranged and excellent decompression was noted.  Additional soft tissue debridement using electrocautery was carried out thoroughly in the medial aspect of the joint.  The scope was removed and  subsequently the bone foam was placed for intraoperative imaging purposes and the limb was exsanguinated and the tourniquet was inflated.    Attention was directed to the anterolateral aspect of the ankle where a curvilinear incision was carried out midline over the fibula between the anterior ankle and the fibula and the peroneals posteriorly.  Dissection was carried out bluntly and the anterior ankle joint was identified.  After the joint was identified, a full-thickness cuff of tissue was developed off the fibula starting at the scope arthrotomy and carried out distally.  This included elevation of the attachment of the anterior talofibular and calcaneofibular ligament.  Both these tissues were noted to be significantly scarred and thickened in this area.  Subsequently, blunt dissection was carried out to elevate the extensor retinaculum tissue as well as in the sinus tarsi for placement of the internal brace.  Subsequently, attention was directed to the peroneals where the sheath was opened both proximal and distal to the fibula.  The peroneus longus was noted to be intact; however, the peroneus brevis was noted to have significant split tearing within the retromalleolar groove.  There was also very low lying muscle belly that reached distal to the retrofibular groove.  The low-lying muscle belly was sharply transected several centimeters proximally and the peroneus brevis tendon was debrided followed by retubularization of the tendon over an area of about 5-6 cm centered on the retrofibular groove using 3-0 FiberWire in a Overton type technique with buried ends.    Excellent repair was noted.  Attention was then directed back to the lateral ankle where mini C-arm was used to localize placement of internal brace in the medial and the lateral body of the talus using the sinus tarsi guide.  The K-wire was placed and checked with fluoroscopy as well as fluoroscopic confirmation of the two SutureTak anchors and  internal brace into the fibula.  Cannulated drilling and tapping was carried out and the talar body SwiveLock was placed external to the ATFL.  Subsequently, the two SutureTak anchors were placed and the most distal limb was woven through the calcaneofibular ligament in a Krackow type fashion.  Subsequently, the assistant held the ankle in dorsiflexion and eversion while the sutures were tied inferior to superior.  Excellent stabilization of the ankle was carried out.  Additionally, the SwiveLock was placed with a hemostat underneath for appropriate tensioning while the fibular limb was placed over the lateral ligament repair.  Excellent stabilization was noted.  All the sutures were cut except for 2, which were used to bring the fibular periosteum over the repair.  Subsequently, 2-0 Monocryl was used to bring the extensor retinaculum up to the periosteum again with the assistant holding the foot in dorsiflexion and eversion.  Excellent stabilization of the ankle was noted.  Copious saline irrigation was carried out.  The peroneal sheath was closed with 2-0 Monocryl, subcutaneous with 2-0 Monocryl.  Attention was then directed to the medial ankle where the anteromedial arthrotomy was lengthened proximally and distally in order to access the medial malleolus.  There was noted to be an exostosis anteriorly after the periosteum was elevated.  A rongeur and a rasp were used to remove this exostosis and this area was smoothed.  Copious saline irrigation was carried out.  Subcutaneous was closed with 2-0 Monocryl and the skin with 2-0 nylon.  A sterile Betadine dressing was applied followed by a well-padded side-to-side and posterior splint.  The patient tolerated the procedure well and was transferred to the PACU with vital signs stable and vascular status intact to the upper extremity.  She will be nonweightbearing in a splint, have postoperative analgesics, and postoperative clinic followup.    Saida Michael,  LALAM        D: 2023   T: 2023   MT: JATIN    Name:     HUSEYIN MENDIOLA  MRN:      [id number]        Account:        [id number]   :      1970           Procedure Date: 2023     Document: [id number]

## 2023-02-03 NOTE — ANESTHESIA POSTPROCEDURE EVALUATION
Patient: Laura Jackson    Procedure: Procedure(s):  ankle arthroscopy, modified Brostrom, peroneal tendon repair, exostectomy of medial malleolus       Anesthesia Type:  General    Note:  Disposition: Outpatient   Postop Pain Control: Uneventful            Sign Out: Well controlled pain   PONV: No   Neuro/Psych: Uneventful            Sign Out: Acceptable/Baseline neuro status   Airway/Respiratory: Uneventful            Sign Out: Acceptable/Baseline resp. status   CV/Hemodynamics: Uneventful            Sign Out: Acceptable CV status   Other NRE: NONE   DID A NON-ROUTINE EVENT OCCUR? No           Last vitals:  Vitals Value Taken Time   /78 02/02/23 1745   Temp 36  C (96.8  F) 02/02/23 1643   Pulse 65 02/02/23 1747   Resp 15 02/02/23 1747   SpO2 96 % 02/02/23 1745   Vitals shown include unvalidated device data.    Electronically Signed By: David Roberts MD  February 2, 2023  6:51 PM

## 2023-02-14 ENCOUNTER — TELEPHONE (OUTPATIENT)
Dept: ORTHOPEDICS | Facility: CLINIC | Age: 53
End: 2023-02-14

## 2023-02-14 DIAGNOSIS — M25.561 RIGHT KNEE PAIN, UNSPECIFIED CHRONICITY: Primary | ICD-10-CM

## 2023-02-14 NOTE — TELEPHONE ENCOUNTER
I left a message for the patient letting her know that I placed the MRI order for her but there is a high chance that the MRI would not be covered by her insurance since she hasn't been seen. I provided the MRI scheduling number and call back number for any questions.     MELANI Guerra

## 2023-02-14 NOTE — TELEPHONE ENCOUNTER
[facility] Call Center    Phone Message    May a detailed message be left on voicemail: yes     Reason for Call:Other: Laura called she is scheduled to see Dr. Espitia on 3/13/23. She would like to have him order an MRI of her knee to be done prior to the appointment so he can have the results to discuss with her at the appointment. Please call patient to discuss      Action Taken: Other: [facility]    Travel Screening: Not Applicable

## 2023-02-15 ENCOUNTER — ANCILLARY PROCEDURE (OUTPATIENT)
Dept: GENERAL RADIOLOGY | Facility: CLINIC | Age: 53
End: 2023-02-15
Attending: PODIATRIST
Payer: COMMERCIAL

## 2023-02-15 ENCOUNTER — OFFICE VISIT (OUTPATIENT)
Dept: PODIATRY | Facility: CLINIC | Age: 53
End: 2023-02-15
Payer: COMMERCIAL

## 2023-02-15 VITALS
WEIGHT: 187 LBS | DIASTOLIC BLOOD PRESSURE: 86 MMHG | SYSTOLIC BLOOD PRESSURE: 136 MMHG | HEART RATE: 64 BPM | BODY MASS INDEX: 32.1 KG/M2

## 2023-02-15 DIAGNOSIS — M25.371 ANKLE INSTABILITY, RIGHT: ICD-10-CM

## 2023-02-15 DIAGNOSIS — S86.311A PERONEAL TENDON TEAR, RIGHT, INITIAL ENCOUNTER: ICD-10-CM

## 2023-02-15 DIAGNOSIS — Z98.890 POSTOPERATIVE STATE: ICD-10-CM

## 2023-02-15 DIAGNOSIS — I47.11 INAPPROPRIATE SINUS TACHYCARDIA (H): ICD-10-CM

## 2023-02-15 PROCEDURE — 99024 POSTOP FOLLOW-UP VISIT: CPT | Performed by: PODIATRIST

## 2023-02-15 PROCEDURE — 73600 X-RAY EXAM OF ANKLE: CPT | Mod: TC | Performed by: RADIOLOGY

## 2023-02-15 RX ORDER — OXYCODONE HYDROCHLORIDE 5 MG/1
5 TABLET ORAL EVERY 8 HOURS PRN
Qty: 21 TABLET | Refills: 0 | Status: SHIPPED | OUTPATIENT
Start: 2023-02-15 | End: 2023-02-22

## 2023-02-15 NOTE — LETTER
2/15/2023         RE: Laura Jackson  [address]  [place] [address]        Dear Colleague,    Thank you for referring your patient, Laura Jackson, to the [facility]. Please see a copy of my visit note below.    Assessment:      ICD-10-CM    1. Postoperative state  Z98.890 XR Ankle Right 2 Views     oxyCODONE (ROXICODONE) 5 MG tablet     Wheelchair Order      2. Ankle instability, right  M25.371 oxyCODONE (ROXICODONE) 5 MG tablet     Wheelchair Order      3. Peroneal tendon tear, right, initial encounter  S86.311A oxyCODONE (ROXICODONE) 5 MG tablet     Wheelchair Order          12 days s/p Ankle arthroscopy with debridement, anterior exostectomy including debridement of Cam type lesion, modified Brostrom-De Guzman peroneal-peroneus brevis tendon repair, medial malleolar exostectomy    Plan:  Orders Placed This Encounter   Procedures     XR Ankle Right 2 Views     Wheelchair Order       Discussed the post-operative recovery plan with the patient.  Excellent repair & ant ankle exostectomy/ decompression  Sig peroneal tendon tearing      -Sutures- every other removed  Sterile dressing re-applied.  -WB- NWB to operative limb in boot  -Activity- Elevate above heart level at rest.  Limit periods of dependency to <5mins per hour.  Ice behind knee of operative limb.  -Pain- scheduled Tylenol, opioids- oxy refill  -DVT prophylaxis- aROM hip, knee, upper extremity.  ASA 325mg BID.  -Abx- none  Wheelchair Rx today        Return:  No follow-ups on file.     Saida Michael DPM                Chief Complaint:     Patient presents with:  Right Ankle - Surgical Followup     Post-op Exam    HPI:  Laura Jackson is a 52 year old year old female who presents for post-op exam.    Surgery Date- 2/2/23  Post-operative day #13  Procedure- Ankle arthroscopy with debridement, anterior exostectomy including debridement of Cam type lesion, modified Brostrom-De Guzman peroneal-peroneus brevis tendon  repair, medial malleolar exostectomy  Pain- minimal  Pain medication use- at night only  Abx- non3  WB status- NWB  DVT Px- AASA  Dressing clean/dry/intact?- yes    Knee is too painful to use knee scooter so using wheelchair from a friend instead        Past Medical & Surgical History:  Past Medical History:   Diagnosis Date     Benign essential hypertension 7/31/2018     Family history of breast cancer 2/19/2014     Family history of breast cancer      Hypothyroidism 12/1/2022     SVT (supraventricular tachycardia):  history of, no recurrence since 2008 10/11/2013    no recurrence since 2008      Uncomplicated asthma       Past Surgical History:   Procedure Laterality Date     ABDOMEN SURGERY  6/2017    myectomy     APPENDECTOMY       ARTHROSCOPY ANKLE, OPEN REPAIR LIGAMENT, COMBINED Right 2/2/2023    Procedure: ankle arthroscopy, modified Brostrom, peroneal tendon repair, exostectomy of medial malleolus;  Surgeon: Saida Michael DPM;  Location:  OR     COLONOSCOPY N/A 8/20/2018    Procedure: COMBINED COLONOSCOPY, SINGLE OR MULTIPLE BIOPSY/POLYPECTOMY BY BIOPSY;;  Surgeon: Osman Hahn MD;  Location: [facility] OR     COLONOSCOPY WITH CO2 INSUFFLATION N/A 8/20/2018    Procedure: COLONOSCOPY WITH CO2 INSUFFLATION;  COLON-SCREENING / LUBKA ;  Surgeon: Osman Hahn MD;  Location: [facility] OR     DAVINCI HYSTERECTOMY TOTAL, SALPINGECTOMY BILATERAL N/A 8/4/2017    Procedure: DAVINCI XI HYSTERECTOMY TOTAL, SALPINGECTOMY BILATERAL;  DAVINCI TOTAL HYSTERECTOMY; BILATERAL SALPINGECTOMY. BILATERAL URETERAL LYSIS. UTEROSACRAL-COLPOPEXY. EXCISION OF ENDOMETRIOSIS;  Surgeon: George Loyola MD;  Location: [facility] OR     DAVINCI LYSIS OF ADHESIONS Bilateral 8/4/2017    Procedure: DAVINCI LYSIS OF ADHESIONS;  BILATERAL URETERAL LYSIS;  Surgeon: George Loyola MD;  Location: [facility] OR     DAVINCI SACROCOLPOPEXY, CYSTOSCOPY, COMBINED N/A 8/4/2017    Procedure: COMBINED DAVINCI SACROCOLPOPEXY, CYSTOSCOPY;   UTEROSACRAL COLPOPEXY;  Surgeon: George Loyola MD;  Location: [facility] OR     DAVINCI XI ASSISTED ABLATION / EXCISION OF ENDOMETRIOSIS  8/4/2017    Procedure: DAVINCI XI ASSISTED ABLATION / EXCISION OF ENDOMETRIOSIS;;  Surgeon: George Loyola MD;  Location: [facility] OR     DILATION AND CURETTAGE N/A 6/20/2017    Procedure: DILATION AND CURETTAGE;  Uterine Curettings and Fibroid Removal, Cook catheter placement; (No hysterectomy done at this time);  Surgeon: Ernestina Saunders MD;  Location: [facility] OR     HYSTERECTOMY, PAP NO LONGER INDICATED       ORTHOPEDIC SURGERY  6/1986    Heel spur , toe surgery     RELEASE CARPAL TUNNEL Right 10/20/2020    Procedure: RIGHT RELEASE, CARPAL TUNNEL;  Surgeon: Rickey Rea MD;  Location: [facility] OR     RELEASE CARPAL TUNNEL Left 11/6/2020    Procedure: LEFT RELEASE, CARPAL TUNNEL;  Surgeon: Rickey Rea MD;  Location: [facility] OR     TONSILLECTOMY        Family History   Problem Relation Age of Onset     Diabetes Mother      Hypertension Mother      Hyperlipidemia Mother      Arrhythmia Mother      Cardiovascular Father         CHF and COPD     Asthma Father      Breast Cancer Maternal Grandfather      Colon Cancer Maternal Grandfather      Breast Cancer Paternal Grandmother      Breast Cancer Paternal Aunt      C.A.D. Paternal Grandfather      Breast Cancer Paternal Grandfather      Breast Cancer Cousin      Asthma Son      Diabetes Maternal Grandmother      Hypertension Maternal Grandmother      Breast Cancer Cousin      Breast Cancer Cousin      Breast Cancer Cousin         Social History:  ?  History   Smoking Status     Never   Smokeless Tobacco     Never     History   Drug Use Unknown     Social History    Substance and Sexual Activity      Alcohol use: Yes        Comment: Rare- @1 month      Allergies:  ?   Allergies   Allergen Reactions     Penicillins Swelling     Swelling throat; age 4     Adhesive Tape Hives     Penicillin G      Tetracycline GI Disturbance     Other  reaction(s): Abdominal Pain  Vomiting; nauseous  Other reaction(s): Abdominal Pain  Vomiting; nauseous        Medications:    Current Outpatient Medications   Medication     oxyCODONE (ROXICODONE) 5 MG tablet     Cholecalciferol (VITAMIN D) 2000 UNITS CAPS     fluticasone-salmeterol (ADVAIR) 100-50 MCG/ACT inhaler     ivabradine (CORLANOR) 5 MG tablet     levalbuterol (XOPENEX HFA) 45 MCG/ACT inhaler     metroNIDAZOLE (METROGEL) 0.75 % external gel     Pyridoxine HCl (B-6) 100 MG TABS     valACYclovir (VALTREX) 1000 mg tablet     vitamin B-12 (CYANOCOBALAMIN) 100 MCG tablet     Current Facility-Administered Medications   Medication     lidocaine 1 % injection 0.5 mL     ropivacaine (NAROPIN) injection 1 mL     triamcinolone (KENALOG-40) injection 20 mg     triamcinolone (KENALOG-40) injection 20 mg     triamcinolone (KENALOG-40) injection 40 mg       Physical Exam:  ?  Vitals:  /86   Pulse 64   Wt 84.8 kg (187 lb)   LMP 07/28/2017 (Exact Date)   BMI 32.10 kg/m     General:  WD/WN, in NAD.  A&O x3.    Dermatologic:    Incision  is well coapted, dehiscence absent.   Janiya-incisional erythema absent, ecchymosis absent.  Vascular:  Digital capillary refill time normal bilateral.  Skin temperature is warm  to operative site.  Focal edema- moderate to operative site.   Calf of operative leg supple, without induration, generalized edema, or venous hue.  Neurologic:    Gross sensation normal to operative limb.  Gait and balance abnormal, NWB  to operative limb.  Musculoskeletal:  mild pain to palpation of ankle right.  Ankle, MTPJ ROM  intact to operative limb.  Muscle strength intact to contralateral limb.    Imaging:   x-ray independently reviewed and interpreted by myself today.  Non-Weight-bearing views right ankle dated 02/15/23, reveal interval ant exostectomy, medial mall exostectomy, anchor sites for internal brace visuzlized                Again, thank you for allowing me to participate in the care of your  patient.        Sincerely,        Saida Michael DPM

## 2023-02-15 NOTE — PATIENT INSTRUCTIONS
PATIENT INSTRUCTIONS - Podiatry / Foot & Ankle Surgery    Post-operative Instructions    -Dressing:    Keep your dressing clean, dry, and intact.    DO NOT get the surgical dressing dirty or wet.    DO NOT remove the surgical dressing.    Call the clinic immediately if the dressing becomes dirty, wet, or comes off.  Waterproof cast protectors (for showering) are available at [employer] or on [employer].    -Weight-bearing:    NON-weight-bearing to the operative foot/ankle in the boot.  Use knee scooter, crutches, or other gait assistive device.    -Activity:    Continue minimal activity, around home, until instructed by your surgeon.  Elevate the operative limb at or above heart level when at rest.   Ice (behind the knee) for 15-20 minutes, several times daily.  Do not place ice over the surgical dressing.    -Pain medication:  Continue  oxycodone.  Try to decrease use of this over the next 1 week.  Continue Tylenol (acetaminophen) every 6h.    Do not exceed 4,000mg of acetaminophen daily, from all sources; this includes acetaminophen contained in pain pills (Norco) & any additional Tylenol (acetaminophen).    -Constipation:  Miralax (polyethylene glycol) or Senekot (docusate/sennosides) are available over the counter, or by prescription if requested.  Take as directed.   For use ONLY if needed.    -Blood clot prevention:  Perform knee and hip bends and deep breathing each hour while awake.  Continue aspirin, 325mg twice daily, until instructed to stop by your surgeon.      -Call the clinic with any questions or concerns, 24 hours a day.  A triage service is available after hours for urgent matters also:   [phone]    -Call if you develop:  Redness advancing up the leg.  Increasing pain, uncontrolled by elevation, rest, and medication.  Soaked (blood or other) dressing.    Wheelchair order today

## 2023-02-15 NOTE — PROGRESS NOTES
Assessment:      ICD-10-CM    1. Postoperative state  Z98.890 XR Ankle Right 2 Views     oxyCODONE (ROXICODONE) 5 MG tablet     Wheelchair Order      2. Ankle instability, right  M25.371 oxyCODONE (ROXICODONE) 5 MG tablet     Wheelchair Order      3. Peroneal tendon tear, right, initial encounter  S86.311A oxyCODONE (ROXICODONE) 5 MG tablet     Wheelchair Order          12 days s/p Ankle arthroscopy with debridement, anterior exostectomy including debridement of Cam type lesion, modified Brostrom-De Guzman peroneal-peroneus brevis tendon repair, medial malleolar exostectomy    Plan:  Orders Placed This Encounter   Procedures     XR Ankle Right 2 Views     Wheelchair Order       Discussed the post-operative recovery plan with the patient.  Excellent repair & ant ankle exostectomy/ decompression  Sig peroneal tendon tearing      -Sutures- every other removed  Sterile dressing re-applied.  -WB- NWB to operative limb in boot  -Activity- Elevate above heart level at rest.  Limit periods of dependency to <5mins per hour.  Ice behind knee of operative limb.  -Pain- scheduled Tylenol, opioids- oxy refill  -DVT prophylaxis- aROM hip, knee, upper extremity.  ASA 325mg BID.  -Abx- none  Wheelchair Rx today        Return:  No follow-ups on file.     Saida Michael DPM                Chief Complaint:     Patient presents with:  Right Ankle - Surgical Followup     Post-op Exam    HPI:  Laura Jackson is a 52 year old year old female who presents for post-op exam.    Surgery Date- 2/2/23  Post-operative day #13  Procedure- Ankle arthroscopy with debridement, anterior exostectomy including debridement of Cam type lesion, modified Brostrom-De Guzman peroneal-peroneus brevis tendon repair, medial malleolar exostectomy  Pain- minimal  Pain medication use- at night only  Abx- non3  WB status- NWB  DVT Px- AASA  Dressing clean/dry/intact?- yes    Knee is too painful to use knee scooter so using wheelchair from a friend  instead        Past Medical & Surgical History:  Past Medical History:   Diagnosis Date     Benign essential hypertension 7/31/2018     Family history of breast cancer 2/19/2014     Family history of breast cancer      Hypothyroidism 12/1/2022     SVT (supraventricular tachycardia):  history of, no recurrence since 2008 10/11/2013    no recurrence since 2008      Uncomplicated asthma       Past Surgical History:   Procedure Laterality Date     ABDOMEN SURGERY  6/2017    myectomy     APPENDECTOMY       ARTHROSCOPY ANKLE, OPEN REPAIR LIGAMENT, COMBINED Right 2/2/2023    Procedure: ankle arthroscopy, modified Brostrom, peroneal tendon repair, exostectomy of medial malleolus;  Surgeon: Saida Michael DPM;  [facility]     COLONOSCOPY N/A 8/20/2018    Procedure: COMBINED COLONOSCOPY, SINGLE OR MULTIPLE BIOPSY/POLYPECTOMY BY BIOPSY;;  Surgeon: Osman Hahn MD;  [facility]     COLONOSCOPY WITH CO2 INSUFFLATION N/A 8/20/2018    Procedure: COLONOSCOPY WITH CO2 INSUFFLATION;  COLON-SCREENING / LUBKA ;  Surgeon: Osman Hahn MD;  [facility]     DAVINCI HYSTERECTOMY TOTAL, SALPINGECTOMY BILATERAL N/A 8/4/2017    Procedure: DAVINCI XI HYSTERECTOMY TOTAL, SALPINGECTOMY BILATERAL;  DAVINCI TOTAL HYSTERECTOMY; BILATERAL SALPINGECTOMY. BILATERAL URETERAL LYSIS. UTEROSACRAL-COLPOPEXY. EXCISION OF ENDOMETRIOSIS;  Surgeon: George Loyoal MD;  [facility]     DAVINCI LYSIS OF ADHESIONS Bilateral 8/4/2017    Procedure: DAVINCI LYSIS OF ADHESIONS;  BILATERAL URETERAL LYSIS;  Surgeon: George Loyola MD;  [facility]     DAVINCI SACROCOLPOPEXY, CYSTOSCOPY, COMBINED N/A 8/4/2017    Procedure: COMBINED DAVINCI SACROCOLPOPEXY, CYSTOSCOPY;  UTEROSACRAL COLPOPEXY;  Surgeon: George Loyola MD;  [facility]     DAVINCI XI ASSISTED ABLATION / EXCISION OF ENDOMETRIOSIS  8/4/2017    Procedure: DAVINCI XI ASSISTED ABLATION / EXCISION OF ENDOMETRIOSIS;;  Surgeon: George Loyola,  MD;  [facility]     DILATION AND CURETTAGE N/A 6/20/2017    Procedure: DILATION AND CURETTAGE;  Uterine Curettings and Fibroid Removal, Cook catheter placement; (No hysterectomy done at this time);  Surgeon: Ernestina Saunders MD;  [facility]     HYSTERECTOMY, PAP NO LONGER INDICATED       ORTHOPEDIC SURGERY  6/1986    Heel spur , toe surgery     RELEASE CARPAL TUNNEL Right 10/20/2020    Procedure: RIGHT RELEASE, CARPAL TUNNEL;  Surgeon: Rickey Rea MD;  Location: [facility] OR     RELEASE CARPAL TUNNEL Left 11/6/2020    Procedure: LEFT RELEASE, CARPAL TUNNEL;  Surgeon: Rickey Rea MD;  Location: [facility] OR     TONSILLECTOMY        Family History   Problem Relation Age of Onset     Diabetes Mother      Hypertension Mother      Hyperlipidemia Mother      Arrhythmia Mother      Cardiovascular Father         CHF and COPD     Asthma Father      Breast Cancer Maternal Grandfather      Colon Cancer Maternal Grandfather      Breast Cancer Paternal Grandmother      Breast Cancer Paternal Aunt      C.A.D. Paternal Grandfather      Breast Cancer Paternal Grandfather      Breast Cancer Cousin      Asthma Son      Diabetes Maternal Grandmother      Hypertension Maternal Grandmother      Breast Cancer Cousin      Breast Cancer Cousin      Breast Cancer Cousin         Social History:  ?  History   Smoking Status     Never   Smokeless Tobacco     Never     History   Drug Use Unknown     Social History    Substance and Sexual Activity      Alcohol use: Yes        Comment: Rare- @1 month      Allergies:  ?   Allergies   Allergen Reactions     Penicillins Swelling     Swelling throat; age 4     Adhesive Tape Hives     Penicillin G      Tetracycline GI Disturbance     Other reaction(s): Abdominal Pain  Vomiting; nauseous  Other reaction(s): Abdominal Pain  Vomiting; nauseous        Medications:    Current Outpatient Medications   Medication     oxyCODONE (ROXICODONE) 5 MG tablet     Cholecalciferol (VITAMIN D) 2000  UNITS CAPS     fluticasone-salmeterol (ADVAIR) 100-50 MCG/ACT inhaler     ivabradine (CORLANOR) 5 MG tablet     levalbuterol (XOPENEX HFA) 45 MCG/ACT inhaler     metroNIDAZOLE (METROGEL) 0.75 % external gel     Pyridoxine HCl (B-6) 100 MG TABS     valACYclovir (VALTREX) 1000 mg tablet     vitamin B-12 (CYANOCOBALAMIN) 100 MCG tablet     Current Facility-Administered Medications   Medication     lidocaine 1 % injection 0.5 mL     ropivacaine (NAROPIN) injection 1 mL     triamcinolone (KENALOG-40) injection 20 mg     triamcinolone (KENALOG-40) injection 20 mg     triamcinolone (KENALOG-40) injection 40 mg       Physical Exam:  ?  Vitals:  /86   Pulse 64   Wt 84.8 kg (187 lb)   LMP 07/28/2017 (Exact Date)   BMI 32.10 kg/m     General:  WD/WN, in NAD.  A&O x3.    Dermatologic:    Incision  is well coapted, dehiscence absent.   Janiya-incisional erythema absent, ecchymosis absent.  Vascular:  Digital capillary refill time normal bilateral.  Skin temperature is warm  to operative site.  Focal edema- moderate to operative site.   Calf of operative leg supple, without induration, generalized edema, or venous hue.  Neurologic:    Gross sensation normal to operative limb.  Gait and balance abnormal, NWB  to operative limb.  Musculoskeletal:  mild pain to palpation of ankle right.  Ankle, MTPJ ROM  intact to operative limb.  Muscle strength intact to contralateral limb.    Imaging:   x-ray independently reviewed and interpreted by myself today.  Non-Weight-bearing views right ankle dated 02/15/23, reveal interval ant exostectomy, medial mall exostectomy, anchor sites for internal brace visuzlized

## 2023-02-17 RX ORDER — IVABRADINE 5 MG/1
5 TABLET, FILM COATED ORAL 2 TIMES DAILY WITH MEALS
Qty: 180 TABLET | Refills: 1 | OUTPATIENT
Start: 2023-02-17

## 2023-02-17 NOTE — TELEPHONE ENCOUNTER
ivabradine (CORLANOR) 5 MG tablet Take 1 tablet (5 mg) by mouth 2 times daily (with meals)  Last Written Prescription Date:  11/16/22  Last Fill Quantity: 180,   # refills: 1  Sent to  [facility] - [address]    Refills on file

## 2023-02-20 ENCOUNTER — ANCILLARY PROCEDURE (OUTPATIENT)
Dept: MRI IMAGING | Facility: CLINIC | Age: 53
End: 2023-02-20
Attending: ORTHOPAEDIC SURGERY
Payer: COMMERCIAL

## 2023-02-20 DIAGNOSIS — M25.561 RIGHT KNEE PAIN, UNSPECIFIED CHRONICITY: ICD-10-CM

## 2023-02-20 PROCEDURE — 73721 MRI JNT OF LWR EXTRE W/O DYE: CPT | Mod: RT | Performed by: RADIOLOGY

## 2023-02-22 ENCOUNTER — OFFICE VISIT (OUTPATIENT)
Dept: PODIATRY | Facility: CLINIC | Age: 53
End: 2023-02-22
Payer: COMMERCIAL

## 2023-02-22 VITALS
WEIGHT: 187 LBS | RESPIRATION RATE: 16 BRPM | SYSTOLIC BLOOD PRESSURE: 147 MMHG | DIASTOLIC BLOOD PRESSURE: 82 MMHG | OXYGEN SATURATION: 94 % | HEART RATE: 80 BPM | BODY MASS INDEX: 32.1 KG/M2

## 2023-02-22 DIAGNOSIS — S86.311A PERONEAL TENDON TEAR, RIGHT, INITIAL ENCOUNTER: ICD-10-CM

## 2023-02-22 DIAGNOSIS — Z98.890 POSTOPERATIVE STATE: Primary | ICD-10-CM

## 2023-02-22 DIAGNOSIS — M25.371 ANKLE INSTABILITY, RIGHT: ICD-10-CM

## 2023-02-22 PROCEDURE — 99024 POSTOP FOLLOW-UP VISIT: CPT | Performed by: PODIATRIST

## 2023-02-22 ASSESSMENT — PAIN SCALES - GENERAL: PAINLEVEL: MILD PAIN (3)

## 2023-02-22 NOTE — PATIENT INSTRUCTIONS
PATIENT INSTRUCTIONS - Podiatry / Foot & Ankle Surgery      Suture Removal:  -Keep the incision(s) dry for 48 hours; then you may shower.    -Allow the Steri-strips to fall off by themselves over several days.  Remove any strips that remain after 1 week.  -Do not soak the incision until instructed by your surgeon.  -Keep any scabs clean, dry & intact.  Clean with alcohol or betadine. Do not use hydrogen peroxide. Let them dry out during the day.  Cover with dry band-aid if needed.  -Do not apply ointment or any other topical unless specifically instructed by your surgeon.  -Continue to use compression (ACE bandage or Tubigrip) to the operative foot/ ankle.    -Continue NO WEIGHT to the operative extremity for 3 more weeks, then start putting weight in the boot x2-4 weeks  -No impact until 3 months form surgery    -You may remove the boot for ankle, toe motion and showering but no weight to the foot/ankle.        Physical Therapy  You have been referred to [facility].  Locations can be found online.  Please call to schedule an appointment:  [phone]

## 2023-02-22 NOTE — LETTER
2/22/2023         RE: Laura Jackson  [address]  [place] [address]        Dear Colleague,    Thank you for referring your patient, Laura Jackson, to the [facility]. Please see a copy of my visit note below.    Assessment:      ICD-10-CM    1. Postoperative state  Z98.890 Physical Therapy Referral      2. Ankle instability, right  M25.371 Physical Therapy Referral      3. Peroneal tendon tear, right, initial encounter  S86.311A Physical Therapy Referral            3 weeks s/p Ankle arthroscopy with debridement, anterior exostectomy including debridement of Cam type lesion, modified Brostrom-De Guzman peroneal-peroneus brevis tendon repair, medial malleolar exostectomy      Plan:  Orders Placed This Encounter   Procedures     Physical Therapy Referral       Discussed the post-operative recovery plan with the patient.  Excellent repair & ant ankle exostectomy/ decompression  Sig peroneal tendon tearing      -Sutures- removed  Shower in 48h  -WB- NWB to operative limb in boot  -Activity- Elevate above heart level at rest.  Limit periods of dependency to <5mins per hour.  Ice behind knee of operative limb.  -Pain- scheduled Tylenol, opioids- no refill  -DVT prophylaxis- aROM hip, knee, upper extremity.  ASA 325mg BID.  -Abx- none  Wheelchair Rx today        Return:  Return in about 5 weeks (around 3/29/2023).     Saida Michael DPM                Chief Complaint:     Patient presents with:  Right Ankle - Surgical Followup: s/p Ankle arthroscopy with debridement DOS: 2/2/23     Post-op Exam    HPI:  Laura Jackson is a 52 year old year old female who presents for post-op exam.    Surgery Date- 2/2/23  Post-operative week #3  Procedure- Ankle arthroscopy with debridement, anterior exostectomy including debridement of Cam type lesion, modified Brostrom-De Guzman peroneal-peroneus brevis tendon repair, medial malleolar exostectomy  Pain- minimal  Pain medication use- at night  only  Abx- non3  WB status- NWB  DVT Px- ASA  Dressing clean/dry/intact?- yes    Knee is too painful to use knee scooter so using wheelchair from a friend instead        Past Medical & Surgical History:  Past Medical History:   Diagnosis Date     Benign essential hypertension 7/31/2018     Family history of breast cancer 2/19/2014     Family history of breast cancer      Hypothyroidism 12/1/2022     SVT (supraventricular tachycardia):  history of, no recurrence since 2008 10/11/2013    no recurrence since 2008      Uncomplicated asthma       Past Surgical History:   Procedure Laterality Date     ABDOMEN SURGERY  6/2017    myectomy     APPENDECTOMY       ARTHROSCOPY ANKLE, OPEN REPAIR LIGAMENT, COMBINED Right 2/2/2023    Procedure: ankle arthroscopy, modified Brostrom, peroneal tendon repair, exostectomy of medial malleolus;  Surgeon: Saida Michael DPM;  [facility]     COLONOSCOPY N/A 8/20/2018    Procedure: COMBINED COLONOSCOPY, SINGLE OR MULTIPLE BIOPSY/POLYPECTOMY BY BIOPSY;;  Surgeon: Osman Hahn MD;  [facility]     COLONOSCOPY WITH CO2 INSUFFLATION N/A 8/20/2018    Procedure: COLONOSCOPY WITH CO2 INSUFFLATION;  COLON-SCREENING / LUBKA ;  Surgeon: Osman Hahn MD;  [facility]     DAVINCI HYSTERECTOMY TOTAL, SALPINGECTOMY BILATERAL N/A 8/4/2017    Procedure: DAVINCI XI HYSTERECTOMY TOTAL, SALPINGECTOMY BILATERAL;  DAVINCI TOTAL HYSTERECTOMY; BILATERAL SALPINGECTOMY. BILATERAL URETERAL LYSIS. UTEROSACRAL-COLPOPEXY. EXCISION OF ENDOMETRIOSIS;  Surgeon: George Loyola MD;  [facility]     DAVINCI LYSIS OF ADHESIONS Bilateral 8/4/2017    Procedure: DAVINCI LYSIS OF ADHESIONS;  BILATERAL URETERAL LYSIS;  Surgeon: George Loyola MD;  [facility]     DAVINCI SACROCOLPOPEXY, CYSTOSCOPY, COMBINED N/A 8/4/2017    Procedure: COMBINED DAVINCI SACROCOLPOPEXY, CYSTOSCOPY;  UTEROSACRAL COLPOPEXY;  Surgeon: George Loyola MD;  [facility]     DAVINCI XI  ASSISTED ABLATION / EXCISION OF ENDOMETRIOSIS  8/4/2017    Procedure: DAVINCI XI ASSISTED ABLATION / EXCISION OF ENDOMETRIOSIS;;  Surgeon: George Loyola MD;  [facility]     DILATION AND CURETTAGE N/A 6/20/2017    Procedure: DILATION AND CURETTAGE;  Uterine Curettings and Fibroid Removal, Cook catheter placement; (No hysterectomy done at this time);  Surgeon: Ernestina Saunders MD;  [facility]     HYSTERECTOMY, PAP NO LONGER INDICATED       ORTHOPEDIC SURGERY  6/1986    Heel spur , toe surgery     RELEASE CARPAL TUNNEL Right 10/20/2020    Procedure: RIGHT RELEASE, CARPAL TUNNEL;  Surgeon: Rickey Rea MD;  Location: [facility]     RELEASE CARPAL TUNNEL Left 11/6/2020    Procedure: LEFT RELEASE, CARPAL TUNNEL;  Surgeon: Rickey Rea MD;  Location: [facility]     TONSILLECTOMY        Family History   Problem Relation Age of Onset     Diabetes Mother      Hypertension Mother      Hyperlipidemia Mother      Arrhythmia Mother      Cardiovascular Father         CHF and COPD     Asthma Father      Breast Cancer Maternal Grandfather      Colon Cancer Maternal Grandfather      Breast Cancer Paternal Grandmother      Breast Cancer Paternal Aunt      C.A.D. Paternal Grandfather      Breast Cancer Paternal Grandfather      Breast Cancer Cousin      Asthma Son      Diabetes Maternal Grandmother      Hypertension Maternal Grandmother      Breast Cancer Cousin      Breast Cancer Cousin      Breast Cancer Cousin         Social History:  ?  History   Smoking Status     Never   Smokeless Tobacco     Never     History   Drug Use Unknown     Social History    Substance and Sexual Activity      Alcohol use: Yes        Comment: Rare- @1 month      Allergies:  ?   Allergies   Allergen Reactions     Penicillins Swelling     Swelling throat; age 4     Adhesive Tape Hives     Penicillin G      Tetracycline GI Disturbance     Other reaction(s): Abdominal Pain  Vomiting; nauseous  Other reaction(s): Abdominal  Pain  Vomiting; nauseous          Medications:    Current Outpatient Medications   Medication     Cholecalciferol (VITAMIN D) 2000 UNITS CAPS     fluticasone-salmeterol (ADVAIR) 100-50 MCG/ACT inhaler     ivabradine (CORLANOR) 5 MG tablet     levalbuterol (XOPENEX HFA) 45 MCG/ACT inhaler     metroNIDAZOLE (METROGEL) 0.75 % external gel     oxyCODONE (ROXICODONE) 5 MG tablet     Pyridoxine HCl (B-6) 100 MG TABS     vitamin B-12 (CYANOCOBALAMIN) 100 MCG tablet     valACYclovir (VALTREX) 1000 mg tablet     Current Facility-Administered Medications   Medication     lidocaine 1 % injection 0.5 mL     ropivacaine (NAROPIN) injection 1 mL     triamcinolone (KENALOG-40) injection 20 mg     triamcinolone (KENALOG-40) injection 20 mg     triamcinolone (KENALOG-40) injection 40 mg       Physical Exam:  ?  Vitals:  BP (!) 147/82   Pulse 80   Resp 16   Wt 84.8 kg (187 lb)   LMP 07/28/2017 (Exact Date)   SpO2 94%   BMI 32.10 kg/m     General:  WD/WN, in NAD.  A&O x3.  Dermatologic:    Incision  is well coapted, dehiscence absent.   Janiya-incisional erythema absent, ecchymosis absent.  Vascular:  Digital capillary refill time normal bilateral.  Skin temperature is warm  to operative site.  Focal edema- moderate to operative site.   Calf of operative leg supple, without induration, generalized edema, or venous hue.  Neurologic:    Gross sensation normal to operative limb.  Gait and balance abnormal, NWB  to operative limb.  Musculoskeletal:  mild pain to palpation of ankle right.  Ankle, MTPJ ROM  intact to operative limb.  Muscle strength intact to contralateral limb.    Imaging:   x-ray independently reviewed and interpreted by myself today.  Non-Weight-bearing views right ankle dated 02/15/23, reveal interval ant exostectomy, medial mall exostectomy, anchor sites for internal brace visualized              Again, thank you for allowing me to participate in the care of your patient.        Sincerely,        Saida DUBON  JORDAN Michael

## 2023-02-22 NOTE — PROGRESS NOTES
Assessment:      ICD-10-CM    1. Postoperative state  Z98.890 Physical Therapy Referral      2. Ankle instability, right  M25.371 Physical Therapy Referral      3. Peroneal tendon tear, right, initial encounter  S86.311A Physical Therapy Referral            3 weeks s/p Ankle arthroscopy with debridement, anterior exostectomy including debridement of Cam type lesion, modified Brostrom-De Guzman peroneal-peroneus brevis tendon repair, medial malleolar exostectomy      Plan:  Orders Placed This Encounter   Procedures     Physical Therapy Referral       Discussed the post-operative recovery plan with the patient.  Excellent repair & ant ankle exostectomy/ decompression  Sig peroneal tendon tearing      -Sutures- removed  Shower in 48h  -WB- NWB to operative limb in boot  -Activity- Elevate above heart level at rest.  Limit periods of dependency to <5mins per hour.  Ice behind knee of operative limb.  -Pain- scheduled Tylenol, opioids- no refill  -DVT prophylaxis- aROM hip, knee, upper extremity.  ASA 325mg BID.  -Abx- none  Wheelchair Rx today        Return:  Return in about 5 weeks (around 3/29/2023).     Saida Michael DPM                Chief Complaint:     Patient presents with:  Right Ankle - Surgical Followup: s/p Ankle arthroscopy with debridement DOS: 2/2/23     Post-op Exam    HPI:  Laura Jackson is a 52 year old year old female who presents for post-op exam.    Surgery Date- 2/2/23  Post-operative week #3  Procedure- Ankle arthroscopy with debridement, anterior exostectomy including debridement of Cam type lesion, modified Brostrom-De Guzman peroneal-peroneus brevis tendon repair, medial malleolar exostectomy  Pain- minimal  Pain medication use- at night only  Abx- non3  WB status- NWB  DVT Px- ASA  Dressing clean/dry/intact?- yes    Knee is too painful to use knee scooter so using wheelchair from a friend instead        Past Medical & Surgical History:  Past Medical History:   Diagnosis Date     Benign  essential hypertension 7/31/2018     Family history of breast cancer 2/19/2014     Family history of breast cancer      Hypothyroidism 12/1/2022     SVT (supraventricular tachycardia):  history of, no recurrence since 2008 10/11/2013    no recurrence since 2008      Uncomplicated asthma       Past Surgical History:   Procedure Laterality Date     ABDOMEN SURGERY  6/2017    myectomy     APPENDECTOMY       ARTHROSCOPY ANKLE, OPEN REPAIR LIGAMENT, COMBINED Right 2/2/2023    Procedure: ankle arthroscopy, modified Brostrom, peroneal tendon repair, exostectomy of medial malleolus;  Surgeon: Saida Michael DPM;  [facility]     COLONOSCOPY N/A 8/20/2018    Procedure: COMBINED COLONOSCOPY, SINGLE OR MULTIPLE BIOPSY/POLYPECTOMY BY BIOPSY;;  Surgeon: Osman Hahn MD;  [facility]     COLONOSCOPY WITH CO2 INSUFFLATION N/A 8/20/2018    Procedure: COLONOSCOPY WITH CO2 INSUFFLATION;  COLON-SCREENING / LUBKA ;  Surgeon: Osman Hahn MD;  [facility]     DAVINCI HYSTERECTOMY TOTAL, SALPINGECTOMY BILATERAL N/A 8/4/2017    Procedure: DAVINCI XI HYSTERECTOMY TOTAL, SALPINGECTOMY BILATERAL;  DAVINCI TOTAL HYSTERECTOMY; BILATERAL SALPINGECTOMY. BILATERAL URETERAL LYSIS. UTEROSACRAL-COLPOPEXY. EXCISION OF ENDOMETRIOSIS;  Surgeon: George Loyola MD;  [facility]     DAVINCI LYSIS OF ADHESIONS Bilateral 8/4/2017    Procedure: DAVINCI LYSIS OF ADHESIONS;  BILATERAL URETERAL LYSIS;  Surgeon: George Loyola MD;  [facility]     DAVINCI SACROCOLPOPEXY, CYSTOSCOPY, COMBINED N/A 8/4/2017    Procedure: COMBINED DAVINCI SACROCOLPOPEXY, CYSTOSCOPY;  UTEROSACRAL COLPOPEXY;  Surgeon: George Loyola MD;  [facility]     DAVINCI XI ASSISTED ABLATION / EXCISION OF ENDOMETRIOSIS  8/4/2017    Procedure: DAVINCI XI ASSISTED ABLATION / EXCISION OF ENDOMETRIOSIS;;  Surgeon: George Loyola MD;  [facility]     DILATION AND CURETTAGE N/A 6/20/2017    Procedure: DILATION AND CURETTAGE;   Uterine Curettings and Fibroid Removal, Cook catheter placement; (No hysterectomy done at this time);  Surgeon: Ernestina Saunders MD;  [facility]     HYSTERECTOMY, PAP NO LONGER INDICATED       ORTHOPEDIC SURGERY  6/1986    Heel spur , toe surgery     RELEASE CARPAL TUNNEL Right 10/20/2020    Procedure: RIGHT RELEASE, CARPAL TUNNEL;  Surgeon: Rickey Rea MD;  Location: [facility]     RELEASE CARPAL TUNNEL Left 11/6/2020    Procedure: LEFT RELEASE, CARPAL TUNNEL;  Surgeon: Rickey Rea MD;  Location: [facility]     TONSILLECTOMY        Family History   Problem Relation Age of Onset     Diabetes Mother      Hypertension Mother      Hyperlipidemia Mother      Arrhythmia Mother      Cardiovascular Father         CHF and COPD     Asthma Father      Breast Cancer Maternal Grandfather      Colon Cancer Maternal Grandfather      Breast Cancer Paternal Grandmother      Breast Cancer Paternal Aunt      C.A.D. Paternal Grandfather      Breast Cancer Paternal Grandfather      Breast Cancer Cousin      Asthma Son      Diabetes Maternal Grandmother      Hypertension Maternal Grandmother      Breast Cancer Cousin      Breast Cancer Cousin      Breast Cancer Cousin         Social History:  ?  History   Smoking Status     Never   Smokeless Tobacco     Never     History   Drug Use Unknown     Social History    Substance and Sexual Activity      Alcohol use: Yes        Comment: Rare- @1 month      Allergies:  ?   Allergies   Allergen Reactions     Penicillins Swelling     Swelling throat; age 4     Adhesive Tape Hives     Penicillin G      Tetracycline GI Disturbance     Other reaction(s): Abdominal Pain  Vomiting; nauseous  Other reaction(s): Abdominal Pain  Vomiting; nauseous          Medications:    Current Outpatient Medications   Medication     Cholecalciferol (VITAMIN D) 2000 UNITS CAPS     fluticasone-salmeterol (ADVAIR) 100-50 MCG/ACT inhaler     ivabradine (CORLANOR) 5 MG tablet     levalbuterol (XOPENEX HFA)  45 MCG/ACT inhaler     metroNIDAZOLE (METROGEL) 0.75 % external gel     oxyCODONE (ROXICODONE) 5 MG tablet     Pyridoxine HCl (B-6) 100 MG TABS     vitamin B-12 (CYANOCOBALAMIN) 100 MCG tablet     valACYclovir (VALTREX) 1000 mg tablet     Current Facility-Administered Medications   Medication     lidocaine 1 % injection 0.5 mL     ropivacaine (NAROPIN) injection 1 mL     triamcinolone (KENALOG-40) injection 20 mg     triamcinolone (KENALOG-40) injection 20 mg     triamcinolone (KENALOG-40) injection 40 mg       Physical Exam:  ?  Vitals:  BP (!) 147/82   Pulse 80   Resp 16   Wt 84.8 kg (187 lb)   LMP 07/28/2017 (Exact Date)   SpO2 94%   BMI 32.10 kg/m     General:  WD/WN, in NAD.  A&O x3.  Dermatologic:    Incision  is well coapted, dehiscence absent.   Janiya-incisional erythema absent, ecchymosis absent.  Vascular:  Digital capillary refill time normal bilateral.  Skin temperature is warm  to operative site.  Focal edema- moderate to operative site.   Calf of operative leg supple, without induration, generalized edema, or venous hue.  Neurologic:    Gross sensation normal to operative limb.  Gait and balance abnormal, NWB  to operative limb.  Musculoskeletal:  mild pain to palpation of ankle right.  Ankle, MTPJ ROM  intact to operative limb.  Muscle strength intact to contralateral limb.    Imaging:   x-ray independently reviewed and interpreted by myself today.  Non-Weight-bearing views right ankle dated 02/15/23, reveal interval ant exostectomy, medial mall exostectomy, anchor sites for internal brace visualized

## 2023-02-28 ENCOUNTER — THERAPY VISIT (OUTPATIENT)
Dept: PHYSICAL THERAPY | Facility: CLINIC | Age: 53
End: 2023-02-28
Payer: COMMERCIAL

## 2023-02-28 DIAGNOSIS — M54.89 OTHER BACK PAIN, UNSPECIFIED CHRONICITY: Primary | ICD-10-CM

## 2023-02-28 PROCEDURE — 97140 MANUAL THERAPY 1/> REGIONS: CPT | Mod: GP | Performed by: PHYSICAL THERAPIST

## 2023-02-28 PROCEDURE — 97112 NEUROMUSCULAR REEDUCATION: CPT | Mod: GP | Performed by: PHYSICAL THERAPIST

## 2023-03-01 ENCOUNTER — TELEPHONE (OUTPATIENT)
Dept: PODIATRY | Facility: CLINIC | Age: 53
End: 2023-03-01
Payer: COMMERCIAL

## 2023-03-01 DIAGNOSIS — Z98.890 POSTOPERATIVE STATE: Primary | ICD-10-CM

## 2023-03-01 DIAGNOSIS — S86.311A PERONEAL TENDON TEAR, RIGHT, INITIAL ENCOUNTER: ICD-10-CM

## 2023-03-01 DIAGNOSIS — M25.371 ANKLE INSTABILITY, RIGHT: ICD-10-CM

## 2023-03-01 NOTE — TELEPHONE ENCOUNTER
Patient calling regarding needing order for adjustable leg rests for wheelchair as the wheelchair came with standard leg rests and was told she will need an order for the adjustable ones.     Fax number: [phone]  [facility]      Patient would like call once rx is sent    Valeria MOODY Specialty RN 3/1/2023 12:32 PM

## 2023-03-03 ENCOUNTER — TELEPHONE (OUTPATIENT)
Dept: PODIATRY | Facility: CLINIC | Age: 53
End: 2023-03-03
Payer: COMMERCIAL

## 2023-03-03 NOTE — TELEPHONE ENCOUNTER
DIAGNOSIS: Right knee pain, knee   APPOINTMENT DATE: 3.13.23   NOTES STATUS DETAILS   OFFICE NOTE from other specialist Internal 2.22.23 Saida Michael, DPLAVONNE  10.12.20 Julio Espitia MD  2.21.20 Glen Gutierrez, DO    More in epic   MEDICATION LIST Internal    LABS     CBC/DIFF Internal 3.31.21   MRI Internal 2.20.23 R knee  2.25.20 R knee     XRAYS (IMAGES & REPORTS) Internal 2.21.20 B knee

## 2023-03-03 NOTE — TELEPHONE ENCOUNTER
[facility] Call Center    Phone Message    May a detailed message be left on voicemail: yes     Reason for Call: Other: Pt is requesting an order be faxed to [facility] needs order to state wheelchair with elevated leg rest, Please fax to  fax#  [phone]           Action Taken: Other: [facility] podiatry    Travel Screening: Not Applicable

## 2023-03-06 NOTE — TELEPHONE ENCOUNTER
New wheelchair order completed and faxed to [facility]. Number listed below.     Lashon, ATC

## 2023-03-09 DIAGNOSIS — M25.561 RIGHT KNEE PAIN, UNSPECIFIED CHRONICITY: Primary | ICD-10-CM

## 2023-03-13 ENCOUNTER — PRE VISIT (OUTPATIENT)
Dept: ORTHOPEDICS | Facility: CLINIC | Age: 53
End: 2023-03-13

## 2023-03-13 ENCOUNTER — THERAPY VISIT (OUTPATIENT)
Dept: PHYSICAL THERAPY | Facility: CLINIC | Age: 53
End: 2023-03-13
Payer: COMMERCIAL

## 2023-03-13 ENCOUNTER — OFFICE VISIT (OUTPATIENT)
Dept: ORTHOPEDICS | Facility: CLINIC | Age: 53
End: 2023-03-13
Payer: COMMERCIAL

## 2023-03-13 ENCOUNTER — ANCILLARY PROCEDURE (OUTPATIENT)
Dept: GENERAL RADIOLOGY | Facility: CLINIC | Age: 53
End: 2023-03-13
Attending: ORTHOPAEDIC SURGERY
Payer: COMMERCIAL

## 2023-03-13 VITALS — WEIGHT: 190 LBS | HEIGHT: 64 IN | BODY MASS INDEX: 32.44 KG/M2

## 2023-03-13 DIAGNOSIS — M25.561 RIGHT KNEE PAIN, UNSPECIFIED CHRONICITY: ICD-10-CM

## 2023-03-13 DIAGNOSIS — M54.2 CERVICALGIA: Primary | ICD-10-CM

## 2023-03-13 DIAGNOSIS — G89.29 CHRONIC PAIN OF RIGHT KNEE: Primary | ICD-10-CM

## 2023-03-13 DIAGNOSIS — M54.89 OTHER BACK PAIN, UNSPECIFIED CHRONICITY: ICD-10-CM

## 2023-03-13 DIAGNOSIS — M25.561 CHRONIC PAIN OF RIGHT KNEE: Primary | ICD-10-CM

## 2023-03-13 PROCEDURE — 73562 X-RAY EXAM OF KNEE 3: CPT | Mod: RT | Performed by: RADIOLOGY

## 2023-03-13 PROCEDURE — 97112 NEUROMUSCULAR REEDUCATION: CPT | Mod: GP | Performed by: PHYSICAL THERAPIST

## 2023-03-13 PROCEDURE — 97140 MANUAL THERAPY 1/> REGIONS: CPT | Mod: GP | Performed by: PHYSICAL THERAPIST

## 2023-03-13 PROCEDURE — 99203 OFFICE O/P NEW LOW 30 MIN: CPT | Mod: GC | Performed by: ORTHOPAEDIC SURGERY

## 2023-03-13 NOTE — LETTER
3/13/2023         RE: Laura Jackson  [address]  [place] [address]        Dear Colleague,    Thank you for referring your patient, Laura Jackson, to the [facility] [place]. Please see a copy of my visit note below.    CHIEF CONCERN: Right knee pain     HISTORY:   Patient is a 52F here for evaluation of the above noted complaint. She had some issues with the right knee in the remote past and thinks she may have had an injection but it has largely been normal for her up till august. She had a fall and hurt her right ankle. While attempting non operative management her gait was altered in a cam boot and she noticed severe right knee pain. She describes events where during motion the knee would catch and be very painful. She failed non operative treatment of the ankle and ended up undergoing right ankle surgery and has been non weight bearing a cam boot. Since that time her right knee symptoms have completely resolved.     PAST MEDICAL HISTORY: (Reviewed with the patient and in the [facility] medical record)  1. Denies    PAST SURGICAL HISTORY: (Reviewed with the patient and in the [facility] medical record)  1. 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 Articulate and Interactive    Body mass index is 32.61 kg/m .    Right Lower extremity :    Skin is Warm and Well perfused, no suggestion of infection    Full painless rom of the right knee    TTP about the medial and lateral patellar facets and the medial and lateral joint lines    Knee stable to varus and valgus stressing as well as lachmans and posterior drawer    Negative Xavi    Able to flex and extend her toes, SILT throughout the toes, Toes wwp    IMAGING:    Radiographs of the right knee from today were independently reviewed by me and findings were discussed with the patient today. The imaging demonstrates mild degenerative changes tricompartmentaly. No fractures or dislocations noted    MRI of the right knee from 2/22/23 were independently reviewed by me and findings were discussed with the patient today. The imaging demonstrates Grade IV changes about the patella femoral joint and in the medial compartment. Tearing apparent in the posterior horn of the medial mensicus. Complex tearing of the lateral meniscus that appears to be a discoid variant.     ASSESSMENT:  1. 52F with medial and lateral meniscus tear and OA of the right knee    PLAN:  1. Plan is to give the knee a trial of life as she continues to rehab from her right ankle surgery. If the patient has recurrence of her pain we will likely have her come back to do a steroid injection. In the future if that was not to work then we would consider an arthroscopic procedure with a conversation that it may not address any potential symptoms coming from the arthritis present.       Patient seen and examined with the fellow. I also personally reviewed the images and interpreted the imaging myself.     Assesment: Right knee pain with osteoarthritis both medial lateral compartments and evidence of medial and lateral meniscus tears currently improved secondary to rest from her foot surgery    Plan:  Long discussion with the patient.  Reviewed the diagnosis potential treatment options.  At this time the patient is much improved and I think that this is excellent.  Now that her foot has been taking care of I think we can continue to observe her.  And see how she responds as she begins weightbearing again.  If she should have recurrence of her symptoms I would consider corticosteroid injection try to exhaust all nonsurgical things before proceeding with surgery    I agree with history, physical and imaging as well as the assessment and plan as detailed by Dr. Low.         Again, thank you for allowing me to participate in the care of your patient.        Sincerely,        Julio Espitia MD

## 2023-03-13 NOTE — PROGRESS NOTES
CHIEF CONCERN: Right knee pain     HISTORY:   Patient is a 52F here for evaluation of the above noted complaint. She had some issues with the right knee in the remote past and thinks she may have had an injection but it has largely been normal for her up till august. She had a fall and hurt her right ankle. While attempting non operative management her gait was altered in a cam boot and she noticed severe right knee pain. She describes events where during motion the knee would catch and be very painful. She failed non operative treatment of the ankle and ended up undergoing right ankle surgery and has been non weight bearing a cam boot. Since that time her right knee symptoms have completely resolved.     PAST MEDICAL HISTORY: (Reviewed with the patient and in the [facility] medical record)  1. Denies    PAST SURGICAL HISTORY: (Reviewed with the patient and in the [facility] medical record)  1. 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 Articulate and Interactive    Body mass index is 32.61 kg/m .    Right Lower extremity :    Skin is Warm and Well perfused, no suggestion of infection    Full painless rom of the right knee    TTP about the  medial and lateral patellar facets and the medial and lateral joint lines    Knee stable to varus and valgus stressing as well as lachmans and posterior drawer    Negative [facility]    Able to flex and extend her toes, SILT throughout the toes, Toes wwp    IMAGING:    Radiographs of the right knee from today were independently reviewed by me and findings were discussed with the patient today. The imaging demonstrates mild degenerative changes tricompartmentaly. No fractures or dislocations noted    MRI of the right knee from 2/22/23 were independently reviewed by me and findings were discussed with the patient today. The imaging demonstrates Grade IV changes about the patella femoral joint and in the medial compartment. Tearing apparent in the posterior horn of the medial mensicus. Complex tearing of the lateral meniscus that appears to be a discoid variant.     ASSESSMENT:  1. 52F with medial and lateral meniscus tear and OA of the right knee    PLAN:  1. Plan is to give the knee a trial of life as she continues to rehab from her right ankle surgery. If the patient has recurrence of her pain we will likely have her come back to do a steroid injection. In the future if that was not to work then we would consider an arthroscopic procedure with a conversation that it may not address any potential symptoms coming from the arthritis present.

## 2023-03-13 NOTE — NURSING NOTE
Reason For Visit:   Chief Complaint   Patient presents with     Consult     Right knee pain       ?  No  Occupation Out on disability.  Currently working? No.  Work status?  On disability.  Date of injury: 8/10/22  Type of injury: Fell on steps.  Date of surgery: No previous right knee surgeries  Smoker: No  Request smoking cessation information: No    Meniscus tear due to multiple ligament injury in ankle.     Sane Score  Right  knee - Affected  Left Knee- 100  Right Knee- 50-60      Marcella Clemente, EMT

## 2023-03-14 ENCOUNTER — TELEPHONE (OUTPATIENT)
Dept: PODIATRY | Facility: CLINIC | Age: 53
End: 2023-03-14
Payer: COMMERCIAL

## 2023-03-14 DIAGNOSIS — Z98.890 POSTOPERATIVE STATE: ICD-10-CM

## 2023-03-14 DIAGNOSIS — M25.371 ANKLE INSTABILITY, RIGHT: Primary | ICD-10-CM

## 2023-03-14 DIAGNOSIS — S86.311A PERONEAL TENDON TEAR, RIGHT, INITIAL ENCOUNTER: ICD-10-CM

## 2023-03-14 NOTE — TELEPHONE ENCOUNTER
Boot order signed.  No need to fax, [facility] medical locations will see it in Epic    Saida Michael DPM

## 2023-03-14 NOTE — PROGRESS NOTES
Patient seen and examined with the fellow. I also personally reviewed the images and interpreted the imaging myself.     Assesment: Right knee pain with osteoarthritis both medial lateral compartments and evidence of medial and lateral meniscus tears currently improved secondary to rest from her foot surgery    Plan: Long discussion with the patient.  Reviewed the diagnosis potential treatment options.  At this time the patient is much improved and I think that this is excellent.  Now that her foot has been taking care of I think we can continue to observe her.  And see how she responds as she begins weightbearing again.  If she should have recurrence of her symptoms I would consider corticosteroid injection try to exhaust all nonsurgical things before proceeding with surgery    I agree with history, physical and imaging as well as the assessment and plan as detailed by Dr. Low.

## 2023-03-14 NOTE — TELEPHONE ENCOUNTER
[facility] Call Center    Phone Message    May a detailed message be left on voicemail: no     Reason for Call: Other: Requesting c/b asap- boot broke and is still non WB

## 2023-03-14 NOTE — TELEPHONE ENCOUNTER
Phone call to [facility] in [place] and confirmed they do not look at Epic orders or work in a queue. The quickest way to get orders to them is to fax them. Otherwise, there could be a long delay.     Orders faxed and confirmed they went through via rightfax.     BLANCA Nino RN

## 2023-03-14 NOTE — TELEPHONE ENCOUNTER
Phone call to patient. She states she received a size small boot after surgery that was too small. She kept in the bag and returned it when she had her first post op appointment with Dr. Michael. She had been using a medium sized boot. However, now that the swelling has decreased, the boot is too big. She had an old small sized one she has been wearing. The air chamber in it has broken and doesn't work. She would like to get a new small sized boot.     Discussed that Dr. Michael is out of the office until tomorrow. We will have her place a new order for a new boot. Explained that her insurance will probably not cover another boot so soon, but asked that she discuss that further with [facility].   She will need orders faxed to the [facility] location.   F: [phone]    Ok to leave message : YES    Please see order pending and complete if appropriate.     BLANCA Nino RN

## 2023-03-20 DIAGNOSIS — L71.9 ROSACEA: ICD-10-CM

## 2023-03-20 DIAGNOSIS — J45.30 ASTHMATIC BRONCHITIS, MILD PERSISTENT, UNCOMPLICATED: ICD-10-CM

## 2023-03-20 RX ORDER — METRONIDAZOLE 7.5 MG/G
GEL TOPICAL
Qty: 45 G | Refills: 3 | Status: SHIPPED | OUTPATIENT
Start: 2023-03-20

## 2023-03-20 NOTE — TELEPHONE ENCOUNTER
Needs appointment in August. Courtesy letter sent and note sent to pharmacy as well. Darcy Holden RN

## 2023-03-20 NOTE — TELEPHONE ENCOUNTER
Requested Prescriptions   Pending Prescriptions Disp Refills     metroNIDAZOLE (METROGEL) 0.75 % external gel 45 g 11     Sig: Apply to face BID       There is no refill protocol information for this order        Last office visit: 8/22/2022 with prescribing provider:  FLORECITA LUU    Future Office Visit:          Jhon Wall  [facility]

## 2023-03-20 NOTE — LETTER
[facility] [place]  [address]  [facility] [address]  Phone: [phone] Scheduling all Dermatology locations    2023    Laura Jackson                                                                                                                 [address]  [facility] [address]            Dear Ms. Jackson,    We recently provided you with a medication refill. Prescription medications require routine follow-up appointments with your Dermatology Provider.      Per [facility] medication refill protocol, you do need to be seen at least annually to renew a prescription while on prescribed medication(s). A prescription is valid for only one year before it expires.      At this time we ask that: You schedule a routine 2023 follow up Dermatology office visit to follow your Rosacea and renew your Metrogel prescription.    Your prescription: Has been refilled, but prescription will  in August.     We are currently booking Dermatology appointments into July, so this letter is sent as a courtesy so you may obtain an appointment date and time that works for you, please schedule an August follow up Dermatology appointment as soon as possible.    [phone] to schedule or you may schedule via My chart. You may be seen for follow up with any of our 3 Dermatology Providers via telephone or virtual video if you prefer. We also have Dermatology Providers at our [place] location.     Thank you,      Valeria BUTTS / nya

## 2023-03-21 ENCOUNTER — THERAPY VISIT (OUTPATIENT)
Dept: PHYSICAL THERAPY | Facility: CLINIC | Age: 53
End: 2023-03-21
Payer: COMMERCIAL

## 2023-03-21 DIAGNOSIS — M54.89 OTHER BACK PAIN, UNSPECIFIED CHRONICITY: Primary | ICD-10-CM

## 2023-03-21 PROCEDURE — 97112 NEUROMUSCULAR REEDUCATION: CPT | Mod: GP | Performed by: PHYSICAL THERAPIST

## 2023-03-21 PROCEDURE — 97140 MANUAL THERAPY 1/> REGIONS: CPT | Mod: GP | Performed by: PHYSICAL THERAPIST

## 2023-03-21 RX ORDER — LEVALBUTEROL TARTRATE 45 UG/1
1-2 AEROSOL, METERED ORAL EVERY 4 HOURS PRN
Qty: 15 G | Refills: 0 | Status: SHIPPED | OUTPATIENT
Start: 2023-03-21 | End: 2023-12-11

## 2023-03-22 ENCOUNTER — THERAPY VISIT (OUTPATIENT)
Dept: PHYSICAL THERAPY | Facility: CLINIC | Age: 53
End: 2023-03-22
Payer: COMMERCIAL

## 2023-03-22 DIAGNOSIS — M25.571 PAIN IN JOINT, ANKLE AND FOOT, RIGHT: ICD-10-CM

## 2023-03-22 DIAGNOSIS — Z47.89 AFTERCARE FOLLOWING SURGERY OF THE MUSCULOSKELETAL SYSTEM: ICD-10-CM

## 2023-03-22 DIAGNOSIS — R26.9 ABNORMALITY OF GAIT: ICD-10-CM

## 2023-03-22 PROCEDURE — 97140 MANUAL THERAPY 1/> REGIONS: CPT | Mod: GP | Performed by: PHYSICAL THERAPIST

## 2023-03-22 PROCEDURE — 97161 PT EVAL LOW COMPLEX 20 MIN: CPT | Mod: GP | Performed by: PHYSICAL THERAPIST

## 2023-03-22 PROCEDURE — 97110 THERAPEUTIC EXERCISES: CPT | Mod: GP | Performed by: PHYSICAL THERAPIST

## 2023-03-22 NOTE — PROGRESS NOTES
Physical Therapy Initial Evaluation  Subjective:  The history is provided by the patient. No  was used.   Patient Health History  Laura Jackson being seen for PT post ankle surgery.     Date of Onset: surgery 2-2-23, injury 8-10-22.   Problem occurred: fell on stairs   Pain is reported as 4/10 (at worst 6/10) on pain scale.  General health as reported by patient is good.  Pertinent medical history includes: asthma, heart problems, menopausal, overweight and other (POTS idiopathic sinus tachycardia).   Red flags:  None as reported by patient.  Medical allergies: other. Other medical allergies details: penicillin, tetracycline.   Surgeries include:  Orthopedic surgery (2-2-23 ankle).    Current medications:  Anti-inflammatory, pain medication, cardiac medication and other (asthma).    Current occupation is RN.   Primary job tasks include:  Computer work, lifting/carrying, prolonged standing, pushing/pulling and other (prolonged walking).                  Therapist Generated HPI Evaluation  Problem details: Date of MD order for this episode was 2-22-23. Date of surgery was 2-2-23-R ankle debridement, peroneal-peroneus brevis tendon repair. Laura also has been having R knee pain(see's Dr Espitia, OA, meniscus tears) but the pain has improved since she has been NWB post op. Laura did start some very light weight bearing on 3-15 and is using crutches today with about 25% weight on the R foot. She is in her boot. She will use a wheel chair at home for cooking etc. States her toes are not moving very well yet.   HEP while NWB-bridge. SLR, clam, heel slides for her knee She has been using the stationary bike for 4 days, about 10min each time  Per MD order on 2-22  NWB until about 3-15-23, then start putting weight on in boot over 2-4 wks  No impact until 3 mos PO(5-2-23) Can remove boot for foot and ankle ROM6  .         Type of problem:  Right ankle.    This is a new (post op)  condition.  Condition occurred with:  A fall/slip.  Where condition occurred: in the community.  Patient reports pain:  Medial, lateral, joint and longitudinal arch (calf).  Pain is described as aching and sharp (ache med, sharp lateral) and is intermittent.  Pain radiates to:  Knee and hip. Pain is worse in the P.M. (after being on it all day).  Since onset symptoms are gradually improving.  Associated symptoms:  Loss of motion/stiffness, loss of strength, edema and numbness (R lateral 2 toes numb). Symptoms are exacerbated by weight bearing, standing and walking  and relieved by analgesics, NSAID's and other (occasional oxycodone).      Restrictions due to condition include:  Currently not working due to present treatment.  Barriers include:  None as reported by patient (can stay on main level, laundry is downstairs).                        Objective:  System    Ankle/Foot Evaluation  ROM:    AROM:    Dorsiflexion:  Left:   20  Right:   5  Plantarflexion:  Left:  70    Right:  35  Inversion: Left:      Right:  15  Eversion:     Right:  10  Great toe flexion: Left:      Right:  45  Great Toe Extension: Left:      Right: 35  PROM:    Dorsiflexion: Left:        Right:   8   Plantarflexion: Left:        Right:  43              Strength wnl ankle: deferred R, L is WNL.  LIGAMENT TESTING: not assessed              SPECIAL TESTS: not assessed    PALPATION: Palpation of ankle: scars healing well, decreased scar mobility lateral more than medial.    Right ankle tenderness present at:   anterior tibialis; deltoid ligament; plantar fascia and medial calcaneal  Right ankle tenderness not present at:  gastroc/soleus or achilles tendon  EDEMA: Edema ankle: very minimal swelling noted today but Laura states the ankle can get very swollen later in the day.            FUNCTIONAL TESTS: Functional test ankle: deferred due to minimal WB status.                                                              General      ROS    Assessment/Plan:    Patient is a 52 year old female with right side ankle complaints.    Patient has the following significant findings with corresponding treatment plan.                Diagnosis 1:  S/p R ankle repair, R ankle pain, aftercare following surgery, gait  Pain -  hot/cold therapy, manual therapy, self management, education and home program  Decreased ROM/flexibility - manual therapy, therapeutic exercise and home program  Decreased strength - therapeutic exercise, therapeutic activities and home program  Impaired balance - neuro re-education, gait training, therapeutic activities, adaptive equipment/assistive device and home program  Decreased proprioception - neuro re-education, gait training, therapeutic activities and home program  Edema - cold therapy and self management/home program  Impaired gait - gait training, assistive devices and home program  Impaired muscle performance - neuro re-education and home program  Decreased function - therapeutic activities and home program  Instability -  Therapeutic Activity  Therapeutic Exercise  Neuromuscular Re-education  Splinting/Taping/Bracing/Orthotic  home program    All per MD restrictions  Therapy Evaluation Codes:   1) History comprised of:   Personal factors that impact the plan of care:      Past/current experiences, Profession and Time since onset of symptoms.    Comorbidity factors that impact the plan of care are:      None.     Medications impacting care: Pain.  2) Examination of Body Systems comprised of:   Body structures and functions that impact the plan of care:      Ankle.   Activity limitations that impact the plan of care are:      Bathing, Bending, Cooking, Driving, Dressing, Lifting, Squatting/kneeling, Stairs, Standing, Walking, Working, Sleeping and Laying down.  3) Clinical presentation characteristics are:   Stable/Uncomplicated.  4) Decision-Making    Low complexity using standardized patient assessment instrument  and/or measureable assessment of functional outcome.  Cumulative Therapy Evaluation is: Low complexity.    Previous and current functional limitations:  (See Goal Flow Sheet for this information)    Short term and Long term goals: (See Goal Flow Sheet for this information)     Communication ability:  Patient appears to be able to clearly communicate and understand verbal and written communication and follow directions correctly.  Treatment Explanation - The following has been discussed with the patient:   RX ordered/plan of care  Anticipated outcomes  Possible risks and side effects  This patient would benefit from PT intervention to resume normal activities.   Rehab potential is good.    Frequency:  1 X week, once daily  Duration:  for 6-8 weeks tapering to 2x/mo for 2-3 mos  Discharge Plan:  Achieve all LTG.  Independent in home treatment program.  Reach maximal therapeutic benefit.    Please refer to the daily flowsheet for treatment today, total treatment time and time spent performing 1:1 timed codes.

## 2023-03-27 ENCOUNTER — THERAPY VISIT (OUTPATIENT)
Dept: PHYSICAL THERAPY | Facility: CLINIC | Age: 53
End: 2023-03-27
Attending: PODIATRIST
Payer: COMMERCIAL

## 2023-03-27 DIAGNOSIS — Z47.89 AFTERCARE FOLLOWING SURGERY OF THE MUSCULOSKELETAL SYSTEM: ICD-10-CM

## 2023-03-27 DIAGNOSIS — M25.371 ANKLE INSTABILITY, RIGHT: ICD-10-CM

## 2023-03-27 DIAGNOSIS — M25.571 PAIN IN JOINT, ANKLE AND FOOT, RIGHT: Primary | ICD-10-CM

## 2023-03-27 DIAGNOSIS — S86.311A PERONEAL TENDON TEAR, RIGHT, INITIAL ENCOUNTER: ICD-10-CM

## 2023-03-27 DIAGNOSIS — Z98.890 POSTOPERATIVE STATE: ICD-10-CM

## 2023-03-27 DIAGNOSIS — R26.9 ABNORMALITY OF GAIT: ICD-10-CM

## 2023-03-27 PROCEDURE — 97110 THERAPEUTIC EXERCISES: CPT | Mod: GP | Performed by: PHYSICAL THERAPIST

## 2023-03-27 PROCEDURE — 97140 MANUAL THERAPY 1/> REGIONS: CPT | Mod: GP | Performed by: PHYSICAL THERAPIST

## 2023-03-28 ENCOUNTER — THERAPY VISIT (OUTPATIENT)
Dept: PHYSICAL THERAPY | Facility: CLINIC | Age: 53
End: 2023-03-28
Payer: COMMERCIAL

## 2023-03-28 DIAGNOSIS — M54.89 OTHER BACK PAIN, UNSPECIFIED CHRONICITY: Primary | ICD-10-CM

## 2023-03-28 PROCEDURE — 97112 NEUROMUSCULAR REEDUCATION: CPT | Mod: GP | Performed by: PHYSICAL THERAPIST

## 2023-03-28 PROCEDURE — 97140 MANUAL THERAPY 1/> REGIONS: CPT | Mod: GP | Performed by: PHYSICAL THERAPIST

## 2023-03-28 NOTE — PROGRESS NOTES
Assessment:      ICD-10-CM    1. Postoperative state  Z98.890 diclofenac (VOLTAREN) 50 MG EC tablet      2. Ankle instability, right  M25.371 diclofenac (VOLTAREN) 50 MG EC tablet      3. Peroneal tendon tear, right, initial encounter  S86.311A diclofenac (VOLTAREN) 50 MG EC tablet            8 weeks s/p Ankle arthroscopy with debridement, anterior exostectomy including debridement of Cam type lesion, modified Brostrom-De Guzman peroneal-peroneus brevis tendon repair, medial malleolar exostectomy      Plan:  No orders of the defined types were placed in this encounter.      Discussed the post-operative recovery plan with the patient.  Excellent repair & ant ankle exostectomy/ decompression  Sig peroneal tendon tearing      -WB- Progress WB to operative limb in boot  -Activity- low impact, cycling in ankle brace ok  -PT -continue  -Diclofenac Rx today  -Compression  -Updated note for work today        Return:  No follow-ups on file.     Saida Michael DPM                Chief Complaint:     Patient presents with:  Right Ankle - RECHECK     Post-op Exam    HPI:  Laura Jackson is a 52 year old year old female who presents for post-op exam.    Surgery Date- 2/2/23  Post-operative week #8  Procedure- Ankle arthroscopy with debridement, anterior exostectomy including debridement of Cam type lesion, modified Brostrom-De Guzman peroneal-peroneus brevis tendon repair, medial malleolar exostectomy  Pain- minimal  Pain medication use- at night only  Abx- non3  WB status- WB in boot    Is having swelling, some soreness along peroneal tendons    Ankle feels stiff still    Using boot & crutches occasionally    Past Medical & Surgical History:  Past Medical History:   Diagnosis Date     Benign essential hypertension 7/31/2018     Family history of breast cancer 2/19/2014     Family history of breast cancer      Hypothyroidism 12/1/2022     SVT (supraventricular tachycardia):  history of, no recurrence since 2008 10/11/2013    no  recurrence since 2008      Uncomplicated asthma       Past Surgical History:   Procedure Laterality Date     ABDOMEN SURGERY  6/2017    myectomy     APPENDECTOMY       ARTHROSCOPY ANKLE, OPEN REPAIR LIGAMENT, COMBINED Right 2/2/2023    Procedure: ankle arthroscopy, modified Brostrom, peroneal tendon repair, exostectomy of medial malleolus;  Surgeon: Saida Michael DPM;  [facility]     COLONOSCOPY N/A 8/20/2018    Procedure: COMBINED COLONOSCOPY, SINGLE OR MULTIPLE BIOPSY/POLYPECTOMY BY BIOPSY;;  Surgeon: Osman Hahn MD;  Location[facility]     COLONOSCOPY WITH CO2 INSUFFLATION N/A 8/20/2018    Procedure: COLONOSCOPY WITH CO2 INSUFFLATION;  COLON-SCREENING / LUBKA ;  Surgeon: Osman Hahn MD;  [facility]: [facility]     DAVINCI HYSTERECTOMY TOTAL, SALPINGECTOMY BILATERAL N/A 8/4/2017    Procedure: DAVINCI XI HYSTERECTOMY TOTAL, SALPINGECTOMY BILATERAL;  DAVINCI TOTAL HYSTERECTOMY; BILATERAL SALPINGECTOMY. BILATERAL URETERAL LYSIS. UTEROSACRAL-COLPOPEXY. EXCISION OF ENDOMETRIOSIS;  Surgeon: George Loyola MD;  [facility]     DAVINCI LYSIS OF ADHESIONS Bilateral 8/4/2017    Procedure: DAVINCI LYSIS OF ADHESIONS;  BILATERAL URETERAL LYSIS;  Surgeon: George Loyola MD;  [facility]     DAVINCI SACROCOLPOPEXY, CYSTOSCOPY, COMBINED N/A 8/4/2017    Procedure: COMBINED DAVINCI SACROCOLPOPEXY, CYSTOSCOPY;  UTEROSACRAL COLPOPEXY;  Surgeon: George Loyola MD;  [facility]     DAVINCI XI ASSISTED ABLATION / EXCISION OF ENDOMETRIOSIS  8/4/2017    Procedure: DAVINCI XI ASSISTED ABLATION / EXCISION OF ENDOMETRIOSIS;;  Surgeon: George Loyola MD;  [facility]     DILATION AND CURETTAGE N/A 6/20/2017    Procedure: DILATION AND CURETTAGE;  Uterine Curettings and Fibroid Removal, Cook catheter placement; (No hysterectomy done at this time);  Surgeon: Ernestina Saunders MD;  [facility]     HYSTERECTOMY, PAP NO LONGER INDICATED       ORTHOPEDIC SURGERY  6/1986    Heel spur ,  toe surgery     RELEASE CARPAL TUNNEL Right 10/20/2020    Procedure: RIGHT RELEASE, CARPAL TUNNEL;  Surgeon: Rickey Rea MD;  Location: [facility] OR     RELEASE CARPAL TUNNEL Left 11/6/2020    Procedure: LEFT RELEASE, CARPAL TUNNEL;  Surgeon: Rickey Rea MD;  Location: [facility] OR     TONSILLECTOMY        Family History   Problem Relation Age of Onset     Diabetes Mother      Hypertension Mother      Hyperlipidemia Mother      Arrhythmia Mother      Cardiovascular Father         CHF and COPD     Asthma Father      Breast Cancer Maternal Grandfather      Colon Cancer Maternal Grandfather      Breast Cancer Paternal Grandmother      Breast Cancer Paternal Aunt      C.A.D. Paternal Grandfather      Breast Cancer Paternal Grandfather      Breast Cancer Cousin      Asthma Son      Diabetes Maternal Grandmother      Hypertension Maternal Grandmother      Breast Cancer Cousin      Breast Cancer Cousin      Breast Cancer Cousin         Social History:  ?  History   Smoking Status     Never   Smokeless Tobacco     Never     History   Drug Use Unknown     Social History    Substance and Sexual Activity      Alcohol use: Yes        Comment: Rare- @1 month      Allergies:  ?   Allergies   Allergen Reactions     Penicillins Swelling     Swelling throat; age 4     Adhesive Tape Hives     Penicillin G      Tetracycline GI Disturbance     Other reaction(s): Abdominal Pain  Vomiting; nauseous  Other reaction(s): Abdominal Pain  Vomiting; nauseous          Medications:    Current Outpatient Medications   Medication     diclofenac (VOLTAREN) 50 MG EC tablet     Cholecalciferol (VITAMIN D) 2000 UNITS CAPS     fluticasone-salmeterol (ADVAIR) 100-50 MCG/ACT inhaler     ivabradine (CORLANOR) 5 MG tablet     levalbuterol (XOPENEX HFA) 45 MCG/ACT inhaler     metroNIDAZOLE (METROGEL) 0.75 % external gel     Pyridoxine HCl (B-6) 100 MG TABS     valACYclovir (VALTREX) 1000 mg tablet     vitamin B-12 (CYANOCOBALAMIN) 100 MCG tablet      Current Facility-Administered Medications   Medication     lidocaine 1 % injection 0.5 mL     ropivacaine (NAROPIN) injection 1 mL     triamcinolone (KENALOG-40) injection 20 mg     triamcinolone (KENALOG-40) injection 20 mg     triamcinolone (KENALOG-40) injection 40 mg       Physical Exam:  ?  Vitals:  BP (!) 148/90   Pulse 78   LMP 07/28/2017 (Exact Date)   SpO2 97%    General:  WD/WN, in NAD.  A&O x3.  Dermatologic:    Incision  is well coapted, dehiscence absent.   Janiya-incisional erythema absent, ecchymosis absent.  Vascular:  Digital capillary refill time normal bilateral.  Skin temperature is normal to operative site.  Focal edema- moderate to operative site.   Neurologic:    Gross sensation normal to operative limb.  Gait and balance abnormal, NWB  to operative limb.  Musculoskeletal:  mild pain to palpation of peroneals right.  Ankle ROM smooth, reduced, not painful, R  Ankle stable to drawer, tilt R.  Manual Muscle Testing of peroneals  painful  4/5  right.      Imaging:   x-ray independently reviewed and interpreted by myself today.  Non-Weight-bearing views right ankle dated 02/15/23, reveal interval ant exostectomy, medial mall exostectomy, anchor sites for internal brace visualized

## 2023-03-29 ENCOUNTER — OFFICE VISIT (OUTPATIENT)
Dept: PODIATRY | Facility: CLINIC | Age: 53
End: 2023-03-29
Payer: COMMERCIAL

## 2023-03-29 VITALS — DIASTOLIC BLOOD PRESSURE: 90 MMHG | OXYGEN SATURATION: 97 % | HEART RATE: 78 BPM | SYSTOLIC BLOOD PRESSURE: 148 MMHG

## 2023-03-29 DIAGNOSIS — Z98.890 POSTOPERATIVE STATE: Primary | ICD-10-CM

## 2023-03-29 DIAGNOSIS — M25.371 ANKLE INSTABILITY, RIGHT: ICD-10-CM

## 2023-03-29 DIAGNOSIS — S86.311A PERONEAL TENDON TEAR, RIGHT, INITIAL ENCOUNTER: ICD-10-CM

## 2023-03-29 PROCEDURE — 99024 POSTOP FOLLOW-UP VISIT: CPT | Performed by: PODIATRIST

## 2023-03-29 NOTE — LETTER
3/29/2023         RE: Laura Jcakson  [address]  [place] [address]        Dear Colleague,    Thank you for referring your patient, Laura Jackson, to the [facility]. Please see a copy of my visit note below.    Assessment:      ICD-10-CM    1. Postoperative state  Z98.890 diclofenac (VOLTAREN) 50 MG EC tablet      2. Ankle instability, right  M25.371 diclofenac (VOLTAREN) 50 MG EC tablet      3. Peroneal tendon tear, right, initial encounter  S86.311A diclofenac (VOLTAREN) 50 MG EC tablet            8 weeks s/p Ankle arthroscopy with debridement, anterior exostectomy including debridement of Cam type lesion, modified Brostrom-De Guzman peroneal-peroneus brevis tendon repair, medial malleolar exostectomy      Plan:  No orders of the defined types were placed in this encounter.      Discussed the post-operative recovery plan with the patient.  Excellent repair & ant ankle exostectomy/ decompression  Sig peroneal tendon tearing      -WB- Progress WB to operative limb in boot  -Activity- low impact, cycling in ankle brace ok  -PT -continue  -Diclofenac Rx today  -Compression  -Updated note for work today        Return:  No follow-ups on file.     Saida Michael DPM                Chief Complaint:     Patient presents with:  Right Ankle - RECHECK     Post-op Exam    HPI:  Laura Jackson is a 52 year old year old female who presents for post-op exam.    Surgery Date- 2/2/23  Post-operative week #8  Procedure- Ankle arthroscopy with debridement, anterior exostectomy including debridement of Cam type lesion, modified Brostrom-De Guzman peroneal-peroneus brevis tendon repair, medial malleolar exostectomy  Pain- minimal  Pain medication use- at night only  Abx- non3  WB status- WB in boot    Is having swelling, some soreness along peroneal tendons    Ankle feels stiff still    Using boot & crutches occasionally    Past Medical & Surgical History:  Past Medical History:   Diagnosis Date      Benign essential hypertension 7/31/2018     Family history of breast cancer 2/19/2014     Family history of breast cancer      Hypothyroidism 12/1/2022     SVT (supraventricular tachycardia):  history of, no recurrence since 2008 10/11/2013    no recurrence since 2008      Uncomplicated asthma       Past Surgical History:   Procedure Laterality Date     ABDOMEN SURGERY  6/2017    myectomy     APPENDECTOMY       ARTHROSCOPY ANKLE, OPEN REPAIR LIGAMENT, COMBINED Right 2/2/2023    Procedure: ankle arthroscopy, modified Brostrom, peroneal tendon repair, exostectomy of medial malleolus;  Surgeon: Saida Michael DPM;  [facility]     COLONOSCOPY N/A 8/20/2018    Procedure: COMBINED COLONOSCOPY, SINGLE OR MULTIPLE BIOPSY/POLYPECTOMY BY BIOPSY;;  Surgeon: Osman Hahn MD;  [facility]     COLONOSCOPY WITH CO2 INSUFFLATION N/A 8/20/2018    Procedure: COLONOSCOPY WITH CO2 INSUFFLATION;  COLON-SCREENING / LUBKA ;  Surgeon: Osman Hahn MD;  [facility]     DAVINCI HYSTERECTOMY TOTAL, SALPINGECTOMY BILATERAL N/A 8/4/2017    Procedure: DAVINCI XI HYSTERECTOMY TOTAL, SALPINGECTOMY BILATERAL;  DAVINCI TOTAL HYSTERECTOMY; BILATERAL SALPINGECTOMY. BILATERAL URETERAL LYSIS. UTEROSACRAL-COLPOPEXY. EXCISION OF ENDOMETRIOSIS;  Surgeon: George Loyola MD;  [facility]     DAVINCI LYSIS OF ADHESIONS Bilateral 8/4/2017    Procedure: DAVINCI LYSIS OF ADHESIONS;  BILATERAL URETERAL LYSIS;  Surgeon: George Loyola MD;  [facility]     DAVINCI SACROCOLPOPEXY, CYSTOSCOPY, COMBINED N/A 8/4/2017    Procedure: COMBINED DAVINCI SACROCOLPOPEXY, CYSTOSCOPY;  UTEROSACRAL COLPOPEXY;  Surgeon: George Loyola MD;  [facility]     DAVINCI XI ASSISTED ABLATION / EXCISION OF ENDOMETRIOSIS  8/4/2017    Procedure: DAVINCI XI ASSISTED ABLATION / EXCISION OF ENDOMETRIOSIS;;  Surgeon: George Loyola MD;  [facility]     DILATION AND CURETTAGE N/A 6/20/2017    Procedure: DILATION AND  CURETTAGE;  Uterine Curettings and Fibroid Removal, Cook catheter placement; (No hysterectomy done at this time);  Surgeon: Ernestina Saunders MD;  [facility]     HYSTERECTOMY, PAP NO LONGER INDICATED       ORTHOPEDIC SURGERY  6/1986    Heel spur , toe surgery     RELEASE CARPAL TUNNEL Right 10/20/2020    Procedure: RIGHT RELEASE, CARPAL TUNNEL;  Surgeon: Rickey Rea MD;  Location: [facility] OR     RELEASE CARPAL TUNNEL Left 11/6/2020    Procedure: LEFT RELEASE, CARPAL TUNNEL;  Surgeon: Rickey Rea MD;  Location: [facility] OR     TONSILLECTOMY        Family History   Problem Relation Age of Onset     Diabetes Mother      Hypertension Mother      Hyperlipidemia Mother      Arrhythmia Mother      Cardiovascular Father         CHF and COPD     Asthma Father      Breast Cancer Maternal Grandfather      Colon Cancer Maternal Grandfather      Breast Cancer Paternal Grandmother      Breast Cancer Paternal Aunt      C.A.D. Paternal Grandfather      Breast Cancer Paternal Grandfather      Breast Cancer Cousin      Asthma Son      Diabetes Maternal Grandmother      Hypertension Maternal Grandmother      Breast Cancer Cousin      Breast Cancer Cousin      Breast Cancer Cousin         Social History:  ?  History   Smoking Status     Never   Smokeless Tobacco     Never     History   Drug Use Unknown     Social History    Substance and Sexual Activity      Alcohol use: Yes        Comment: Rare- @1 month      Allergies:  ?   Allergies   Allergen Reactions     Penicillins Swelling     Swelling throat; age 4     Adhesive Tape Hives     Penicillin G      Tetracycline GI Disturbance     Other reaction(s): Abdominal Pain  Vomiting; nauseous  Other reaction(s): Abdominal Pain  Vomiting; nauseous          Medications:    Current Outpatient Medications   Medication     diclofenac (VOLTAREN) 50 MG EC tablet     Cholecalciferol (VITAMIN D) 2000 UNITS CAPS     fluticasone-salmeterol (ADVAIR) 100-50 MCG/ACT inhaler     ivabradine  (CORLANOR) 5 MG tablet     levalbuterol (XOPENEX HFA) 45 MCG/ACT inhaler     metroNIDAZOLE (METROGEL) 0.75 % external gel     Pyridoxine HCl (B-6) 100 MG TABS     valACYclovir (VALTREX) 1000 mg tablet     vitamin B-12 (CYANOCOBALAMIN) 100 MCG tablet     Current Facility-Administered Medications   Medication     lidocaine 1 % injection 0.5 mL     ropivacaine (NAROPIN) injection 1 mL     triamcinolone (KENALOG-40) injection 20 mg     triamcinolone (KENALOG-40) injection 20 mg     triamcinolone (KENALOG-40) injection 40 mg       Physical Exam:  ?  Vitals:  BP (!) 148/90   Pulse 78   LMP 07/28/2017 (Exact Date)   SpO2 97%    General:  WD/WN, in NAD.  A&O x3.  Dermatologic:    Incision  is well coapted, dehiscence absent.   Janiya-incisional erythema absent, ecchymosis absent.  Vascular:  Digital capillary refill time normal bilateral.  Skin temperature is normal to operative site.  Focal edema- moderate to operative site.   Neurologic:    Gross sensation normal to operative limb.  Gait and balance abnormal, NWB  to operative limb.  Musculoskeletal:  mild pain to palpation of peroneals right.  Ankle ROM smooth, reduced, not painful, R  Ankle stable to drawer, tilt R.  Manual Muscle Testing of peroneals  painful  4/5  right.      Imaging:   x-ray independently reviewed and interpreted by myself today.  Non-Weight-bearing views right ankle dated 02/15/23, reveal interval ant exostectomy, medial mall exostectomy, anchor sites for internal brace visualized            Again, thank you for allowing me to participate in the care of your patient.        Sincerely,        Saida Michael DPM

## 2023-03-29 NOTE — LETTER
March 29, 2023      Laura Jackson  [address]  [place] [address]        To Whom It May Concern:    Laura Jackson  was seen on 3/29/23 for recheck of her right ankle. Please excuse her from work for the next 6 weeks. (3/29/23- - 5/10/23)     For questions or concerns, please contact the number listed above.     Sincerely,        Saida Michael DPM    (Electronically signed)

## 2023-03-29 NOTE — PATIENT INSTRUCTIONS
PATIENT INSTRUCTIONS - Podiatry / Foot & Ankle Surgery      Note for off work x6 weeks today      Diclofenac / Voltaren - 1 pill twice daily x1 week.  Then take a 1 week break.  Repeat as needed.  Take with food & an acid blocker if stomach upset occurs.  Stop all other NSAIDs (aspirin, ibuprofen/Motrin, naproxen/ Aleve).      Continue PT, compression  OK to remove the boot & use brace for stationary bike

## 2023-04-03 ENCOUNTER — THERAPY VISIT (OUTPATIENT)
Dept: PHYSICAL THERAPY | Facility: CLINIC | Age: 53
End: 2023-04-03
Attending: PODIATRIST
Payer: COMMERCIAL

## 2023-04-03 DIAGNOSIS — M25.571 PAIN IN JOINT, ANKLE AND FOOT, RIGHT: Primary | ICD-10-CM

## 2023-04-03 DIAGNOSIS — R26.9 ABNORMALITY OF GAIT: ICD-10-CM

## 2023-04-03 DIAGNOSIS — Z47.89 AFTERCARE FOLLOWING SURGERY OF THE MUSCULOSKELETAL SYSTEM: ICD-10-CM

## 2023-04-03 PROCEDURE — 97110 THERAPEUTIC EXERCISES: CPT | Mod: GP | Performed by: PHYSICAL THERAPIST

## 2023-04-03 PROCEDURE — 97140 MANUAL THERAPY 1/> REGIONS: CPT | Mod: GP | Performed by: PHYSICAL THERAPIST

## 2023-04-06 ENCOUNTER — TELEPHONE (OUTPATIENT)
Dept: PODIATRY | Facility: CLINIC | Age: 53
End: 2023-04-06
Payer: COMMERCIAL

## 2023-04-06 ENCOUNTER — DOCUMENTATION ONLY (OUTPATIENT)
Dept: ORTHOPEDICS | Facility: CLINIC | Age: 53
End: 2023-04-06
Payer: COMMERCIAL

## 2023-04-06 NOTE — TELEPHONE ENCOUNTER
REASON FOR MESSAGE  Patient is requesting:        FORMS  Surgery  Done 2/2/23    Type of form needing completion:  Work Disability    Date form needed:  4/10/23    Once complete:  Fax form to:  [facility] [phone]    Has patient seen provider for appointment related to diagnosis or condition in form?  Yes      Name of provider:  Dr. ALBERTA Michael    Date of visit:  03/29/23      Forms faxed to Dr. Michael at [facility] due to MD not back to [place] location until 4/12/23    [facility]

## 2023-04-07 NOTE — TELEPHONE ENCOUNTER
MA spoke to patient today on the phone, patient is OK having us fill out and fax the paperwork from [facility] on 4/12/23.    MA today is not comfortable filling out and I am out on Mon 4/10 so next day will be Wed 4/12/23 at [facility].    MA related patient is ok waiting for forms to be completed until this time.    Saida Michael DPM

## 2023-04-07 NOTE — PROGRESS NOTES
Received Completed forms Yes   Faxed Forms Faxed To: [facility]  Fax Number: [phone]   Sent to HIM (Date) 4/5/23

## 2023-04-10 ENCOUNTER — THERAPY VISIT (OUTPATIENT)
Dept: PHYSICAL THERAPY | Facility: CLINIC | Age: 53
End: 2023-04-10
Attending: PODIATRIST
Payer: COMMERCIAL

## 2023-04-10 DIAGNOSIS — M25.571 PAIN IN JOINT, ANKLE AND FOOT, RIGHT: Primary | ICD-10-CM

## 2023-04-10 DIAGNOSIS — Z47.89 AFTERCARE FOLLOWING SURGERY OF THE MUSCULOSKELETAL SYSTEM: ICD-10-CM

## 2023-04-10 DIAGNOSIS — R26.9 ABNORMALITY OF GAIT: ICD-10-CM

## 2023-04-10 PROCEDURE — 97140 MANUAL THERAPY 1/> REGIONS: CPT | Mod: GP | Performed by: PHYSICAL THERAPIST

## 2023-04-10 PROCEDURE — 97110 THERAPEUTIC EXERCISES: CPT | Mod: GP | Performed by: PHYSICAL THERAPIST

## 2023-04-13 ENCOUNTER — THERAPY VISIT (OUTPATIENT)
Dept: PHYSICAL THERAPY | Facility: CLINIC | Age: 53
End: 2023-04-13
Payer: COMMERCIAL

## 2023-04-13 DIAGNOSIS — M54.89 OTHER BACK PAIN, UNSPECIFIED CHRONICITY: Primary | ICD-10-CM

## 2023-04-13 PROCEDURE — 97112 NEUROMUSCULAR REEDUCATION: CPT | Mod: GP | Performed by: PHYSICAL THERAPIST

## 2023-04-13 PROCEDURE — 97140 MANUAL THERAPY 1/> REGIONS: CPT | Mod: GP | Performed by: PHYSICAL THERAPIST

## 2023-04-15 ENCOUNTER — HEALTH MAINTENANCE LETTER (OUTPATIENT)
Age: 53
End: 2023-04-15

## 2023-04-19 DIAGNOSIS — J45.30 MILD PERSISTENT ASTHMA WITHOUT COMPLICATION: ICD-10-CM

## 2023-04-20 RX ORDER — CEPHALEXIN 250 MG/1
CAPSULE ORAL
Qty: 60 EACH | Refills: 3 | Status: SHIPPED | OUTPATIENT
Start: 2023-04-20 | End: 2023-08-10

## 2023-04-24 ENCOUNTER — THERAPY VISIT (OUTPATIENT)
Dept: PHYSICAL THERAPY | Facility: CLINIC | Age: 53
End: 2023-04-24
Payer: COMMERCIAL

## 2023-04-24 DIAGNOSIS — M54.89 OTHER BACK PAIN, UNSPECIFIED CHRONICITY: Primary | ICD-10-CM

## 2023-04-24 PROCEDURE — 97112 NEUROMUSCULAR REEDUCATION: CPT | Mod: GP | Performed by: PHYSICAL THERAPIST

## 2023-04-24 PROCEDURE — 97140 MANUAL THERAPY 1/> REGIONS: CPT | Mod: GP | Performed by: PHYSICAL THERAPIST

## 2023-04-26 ENCOUNTER — THERAPY VISIT (OUTPATIENT)
Dept: PHYSICAL THERAPY | Facility: CLINIC | Age: 53
End: 2023-04-26
Payer: COMMERCIAL

## 2023-04-26 DIAGNOSIS — R26.9 ABNORMALITY OF GAIT: ICD-10-CM

## 2023-04-26 DIAGNOSIS — M25.571 PAIN IN JOINT, ANKLE AND FOOT, RIGHT: Primary | ICD-10-CM

## 2023-04-26 DIAGNOSIS — Z47.89 AFTERCARE FOLLOWING SURGERY OF THE MUSCULOSKELETAL SYSTEM: ICD-10-CM

## 2023-04-26 PROCEDURE — 97110 THERAPEUTIC EXERCISES: CPT | Mod: GP | Performed by: PHYSICAL THERAPIST

## 2023-04-26 PROCEDURE — 97112 NEUROMUSCULAR REEDUCATION: CPT | Mod: GP | Performed by: PHYSICAL THERAPIST

## 2023-04-26 PROCEDURE — 97140 MANUAL THERAPY 1/> REGIONS: CPT | Mod: GP | Performed by: PHYSICAL THERAPIST

## 2023-05-01 ENCOUNTER — THERAPY VISIT (OUTPATIENT)
Dept: PHYSICAL THERAPY | Facility: CLINIC | Age: 53
End: 2023-05-01
Payer: COMMERCIAL

## 2023-05-01 ENCOUNTER — ANCILLARY PROCEDURE (OUTPATIENT)
Dept: GENERAL RADIOLOGY | Facility: CLINIC | Age: 53
End: 2023-05-01
Attending: PODIATRIST
Payer: COMMERCIAL

## 2023-05-01 ENCOUNTER — OFFICE VISIT (OUTPATIENT)
Dept: PODIATRY | Facility: CLINIC | Age: 53
End: 2023-05-01
Payer: COMMERCIAL

## 2023-05-01 VITALS
HEART RATE: 85 BPM | SYSTOLIC BLOOD PRESSURE: 161 MMHG | DIASTOLIC BLOOD PRESSURE: 95 MMHG | BODY MASS INDEX: 32.61 KG/M2 | WEIGHT: 190 LBS

## 2023-05-01 DIAGNOSIS — Z98.890 POSTOPERATIVE STATE: Primary | ICD-10-CM

## 2023-05-01 DIAGNOSIS — Z98.890 POSTOPERATIVE STATE: ICD-10-CM

## 2023-05-01 DIAGNOSIS — S86.311A PERONEAL TENDON TEAR, RIGHT, INITIAL ENCOUNTER: ICD-10-CM

## 2023-05-01 DIAGNOSIS — M25.371 ANKLE INSTABILITY, RIGHT: ICD-10-CM

## 2023-05-01 DIAGNOSIS — M54.89 OTHER BACK PAIN, UNSPECIFIED CHRONICITY: Primary | ICD-10-CM

## 2023-05-01 PROCEDURE — 73600 X-RAY EXAM OF ANKLE: CPT | Mod: TC | Performed by: RADIOLOGY

## 2023-05-01 PROCEDURE — 97112 NEUROMUSCULAR REEDUCATION: CPT | Mod: GP | Performed by: PHYSICAL THERAPIST

## 2023-05-01 PROCEDURE — 99024 POSTOP FOLLOW-UP VISIT: CPT | Performed by: PODIATRIST

## 2023-05-01 PROCEDURE — 73620 X-RAY EXAM OF FOOT: CPT | Mod: TC | Performed by: RADIOLOGY

## 2023-05-01 PROCEDURE — 97140 MANUAL THERAPY 1/> REGIONS: CPT | Mod: GP | Performed by: PHYSICAL THERAPIST

## 2023-05-01 NOTE — LETTER
5/1/2023         RE: Laura Jackson  [address]  [place] [address]        Dear Colleague,    Thank you for referring your patient, Laura Jackson, to the [facility]. Please see a copy of my visit note below.    Assessment:      ICD-10-CM    1. Postoperative state  Z98.890 XR Foot Right 2 Views     XR Ankle Right 1 View     diclofenac (VOLTAREN) 50 MG EC tablet      2. Ankle instability, right  M25.371 diclofenac (VOLTAREN) 50 MG EC tablet      3. Peroneal tendon tear, right, initial encounter  S86.311A diclofenac (VOLTAREN) 50 MG EC tablet            3 months s/p Ankle arthroscopy with debridement, anterior exostectomy including debridement of Cam type lesion, modified Brostrom-De Guzman peroneal-peroneus brevis tendon repair, medial malleolar exostectomy      Plan:  Orders Placed This Encounter   Procedures     XR Foot Right 2 Views     XR Ankle Right 1 View       Discussed the post-operative recovery plan with the patient.  Excellent healing, still some swelling and soreness during prolonged activity    -WB- full in regular shoe.  -Activity- progress normal   -PT -continue  -Diclofenac Rx today  -Compression  -Updated note for work today        Return:  No follow-ups on file.     Saida Michael DPM                Chief Complaint:     Patient presents with:  Right Ankle - Surgical Followup     Post-op Exam    HPI:  Laura Jackson is a 52 year old year old female who presents for post-op exam.    Surgery Date- 2/2/23  Post-operative month #3  Procedure- Ankle arthroscopy with debridement, anterior exostectomy including debridement of Cam type lesion, modified Brostrom-De Guzman peroneal-peroneus brevis tendon repair, medial malleolar exostectomy  Pain- minimal  Pain medication use- at night only  Abx- none  WB status- WB in boot    Is having swelling, some soreness along peroneal tendons    Ankle feels stiff still    Using boot & crutches occasionally    Past Medical & Surgical  History:  Past Medical History:   Diagnosis Date     Benign essential hypertension 7/31/2018     Family history of breast cancer 2/19/2014     Family history of breast cancer      Hypothyroidism 12/1/2022     SVT (supraventricular tachycardia):  history of, no recurrence since 2008 10/11/2013    no recurrence since 2008      Uncomplicated asthma       Past Surgical History:   Procedure Laterality Date     ABDOMEN SURGERY  6/2017    myectomy     APPENDECTOMY       ARTHROSCOPY ANKLE, OPEN REPAIR LIGAMENT, COMBINED Right 2/2/2023    Procedure: ankle arthroscopy, modified Brostrom, peroneal tendon repair, exostectomy of medial malleolus;  Surgeon: Saida Michael DPM;  [facility]     COLONOSCOPY N/A 8/20/2018    Procedure: COMBINED COLONOSCOPY, SINGLE OR MULTIPLE BIOPSY/POLYPECTOMY BY BIOPSY;;  Surgeon: Osman Hahn MD;  [facility]     COLONOSCOPY WITH CO2 INSUFFLATION N/A 8/20/2018    Procedure: COLONOSCOPY WITH CO2 INSUFFLATION;  COLON-SCREENING / LUBKA ;  Surgeon: Osman Hahn MD;  [facility]     DAVINCI HYSTERECTOMY TOTAL, SALPINGECTOMY BILATERAL N/A 8/4/2017    Procedure: DAVINCI XI HYSTERECTOMY TOTAL, SALPINGECTOMY BILATERAL;  DAVINCI TOTAL HYSTERECTOMY; BILATERAL SALPINGECTOMY. BILATERAL URETERAL LYSIS. UTEROSACRAL-COLPOPEXY. EXCISION OF ENDOMETRIOSIS;  Surgeon: George Loyola MD;  [facility]     DAVINCI LYSIS OF ADHESIONS Bilateral 8/4/2017    Procedure: DAVINCI LYSIS OF ADHESIONS;  BILATERAL URETERAL LYSIS;  Surgeon: George Loyola MD;  [facility]     DAVINCI SACROCOLPOPEXY, CYSTOSCOPY, COMBINED N/A 8/4/2017    Procedure: COMBINED DAVINCI SACROCOLPOPEXY, CYSTOSCOPY;  UTEROSACRAL COLPOPEXY;  Surgeon: George Loyola MD;  [facility]     DAVINCI XI ASSISTED ABLATION / EXCISION OF ENDOMETRIOSIS  8/4/2017    Procedure: DAVINCI XI ASSISTED ABLATION / EXCISION OF ENDOMETRIOSIS;;  Surgeon: George Loyola MD;  [facility]     DILATION AND  CURETTAGE N/A 6/20/2017    Procedure: DILATION AND CURETTAGE;  Uterine Curettings and Fibroid Removal, Cook catheter placement; (No hysterectomy done at this time);  Surgeon: Ernestina Saunders MD;  [facility]     HYSTERECTOMY, PAP NO LONGER INDICATED       ORTHOPEDIC SURGERY  6/1986    Heel spur , toe surgery     RELEASE CARPAL TUNNEL Right 10/20/2020    Procedure: RIGHT RELEASE, CARPAL TUNNEL;  Surgeon: Rickey Rea MD;  Location: [facility] OR     RELEASE CARPAL TUNNEL Left 11/6/2020    Procedure: LEFT RELEASE, CARPAL TUNNEL;  Surgeon: Rickey Rea MD;  Location: [facility] OR     TONSILLECTOMY        Family History   Problem Relation Age of Onset     Diabetes Mother      Hypertension Mother      Hyperlipidemia Mother      Arrhythmia Mother      Cardiovascular Father         CHF and COPD     Asthma Father      Breast Cancer Maternal Grandfather      Colon Cancer Maternal Grandfather      Breast Cancer Paternal Grandmother      Breast Cancer Paternal Aunt      C.A.D. Paternal Grandfather      Breast Cancer Paternal Grandfather      Breast Cancer Cousin      Asthma Son      Diabetes Maternal Grandmother      Hypertension Maternal Grandmother      Breast Cancer Cousin      Breast Cancer Cousin      Breast Cancer Cousin         Social History:  ?  History   Smoking Status     Never   Smokeless Tobacco     Never     History   Drug Use Unknown     Social History    Substance and Sexual Activity      Alcohol use: Yes        Comment: Rare- @1 month      Allergies:  ?   Allergies   Allergen Reactions     Penicillins Swelling     Swelling throat; age 4     Adhesive Tape Hives     Penicillin G      Tetracycline GI Disturbance     Other reaction(s): Abdominal Pain  Vomiting; nauseous  Other reaction(s): Abdominal Pain  Vomiting; nauseous          Medications:    Current Outpatient Medications   Medication     ADVAIR DISKUS 100-50 MCG/ACT inhaler     Cholecalciferol (VITAMIN D) 2000 UNITS CAPS     diclofenac  (VOLTAREN) 50 MG EC tablet     ivabradine (CORLANOR) 5 MG tablet     levalbuterol (XOPENEX HFA) 45 MCG/ACT inhaler     metroNIDAZOLE (METROGEL) 0.75 % external gel     Pyridoxine HCl (B-6) 100 MG TABS     vitamin B-12 (CYANOCOBALAMIN) 100 MCG tablet     diclofenac (VOLTAREN) 50 MG EC tablet     valACYclovir (VALTREX) 1000 mg tablet     Current Facility-Administered Medications   Medication     lidocaine 1 % injection 0.5 mL     ropivacaine (NAROPIN) injection 1 mL     triamcinolone (KENALOG-40) injection 20 mg     triamcinolone (KENALOG-40) injection 20 mg     triamcinolone (KENALOG-40) injection 40 mg       Physical Exam:  ?  Vitals:  BP (!) 161/95   Pulse 85   Wt 86.2 kg (190 lb)   LMP 07/28/2017 (Exact Date)   BMI 32.61 kg/m     General:  WD/WN, in NAD.  A&O x3.  Dermatologic:    Incision  is well coapted, dehiscence absent.   Janiya-incisional erythema absent, ecchymosis absent.  Vascular:  Digital capillary refill time normal bilateral.  Skin temperature is normal to operative site.  Focal edema- moderate to operative site.   Neurologic:    Gross sensation normal to operative limb.  Gait and balance abnormal, NWB  to operative limb.  Musculoskeletal:  mild pain to palpation of medial ankle at site of exostectomy and medial ankle gutter, R.  Minld pain to palption peroneals, R  Ankle ROM smooth, reduced, not painful, R  Ankle stable to drawer, tilt R.  Manual Muscle Testing of peroneals pain free 5/5  right.      Imaging:   x-ray independently reviewed and interpreted by myself today.  Weight-bearing views left ankle dated 5/1/23, reveal rectus ankle, no tilt.        x-ray independently reviewed and interpreted by myself today.  Non-Weight-bearing views right ankle dated 02/15/23, reveal interval ant exostectomy, medial mall exostectomy, anchor sites for internal brace visualized          Again, thank you for allowing me to participate in the care of your patient.        Sincerely,        Saida Michael,  JORDAN

## 2023-05-01 NOTE — PATIENT INSTRUCTIONS
PATIENT INSTRUCTIONS - Podiatry / Foot & Ankle Surgery        Off 1 more month - fill out / fax paperwork        Diclofenac / Voltaren - 1 pill twice daily x1 week.  Then take a 1 week break.  Repeat as needed.  Take with food & an acid blocker if stomach upset occurs.  Stop all other NSAIDs (aspirin, ibuprofen/Motrin, naproxen/ Aleve).      Use ankle compression sleeve and orthotic   Do not need to use the large brace      Continue PT

## 2023-05-01 NOTE — PROGRESS NOTES
Assessment:      ICD-10-CM    1. Postoperative state  Z98.890 XR Foot Right 2 Views     XR Ankle Right 1 View     diclofenac (VOLTAREN) 50 MG EC tablet      2. Ankle instability, right  M25.371 diclofenac (VOLTAREN) 50 MG EC tablet      3. Peroneal tendon tear, right, initial encounter  S86.311A diclofenac (VOLTAREN) 50 MG EC tablet            3 months s/p Ankle arthroscopy with debridement, anterior exostectomy including debridement of Cam type lesion, modified Brostrom-De Guzman peroneal-peroneus brevis tendon repair, medial malleolar exostectomy      Plan:  Orders Placed This Encounter   Procedures     XR Foot Right 2 Views     XR Ankle Right 1 View       Discussed the post-operative recovery plan with the patient.  Excellent healing, still some swelling and soreness during prolonged activity    -WB- full in regular shoe.  -Activity- progress normal   -PT -continue  -Diclofenac Rx today  -Compression  -Updated note for work today        Return:  No follow-ups on file.     Saida Michael DPM                Chief Complaint:     Patient presents with:  Right Ankle - Surgical Followup     Post-op Exam    HPI:  Laura Jackson is a 52 year old year old female who presents for post-op exam.    Surgery Date- 2/2/23  Post-operative month #3  Procedure- Ankle arthroscopy with debridement, anterior exostectomy including debridement of Cam type lesion, modified Brostrom-De Guzman peroneal-peroneus brevis tendon repair, medial malleolar exostectomy  Pain- minimal  Pain medication use- at night only  Abx- none  WB status- WB in boot    Is having swelling, some soreness along peroneal tendons    Ankle feels stiff still    Using boot & crutches occasionally    Past Medical & Surgical History:  Past Medical History:   Diagnosis Date     Benign essential hypertension 7/31/2018     Family history of breast cancer 2/19/2014     Family history of breast cancer      Hypothyroidism 12/1/2022     SVT (supraventricular tachycardia):   history of, no recurrence since 2008 10/11/2013    no recurrence since 2008      Uncomplicated asthma       Past Surgical History:   Procedure Laterality Date     ABDOMEN SURGERY  6/2017    myectomy     APPENDECTOMY       ARTHROSCOPY ANKLE, OPEN REPAIR LIGAMENT, COMBINED Right 2/2/2023    Procedure: ankle arthroscopy, modified Brostrom, peroneal tendon repair, exostectomy of medial malleolus;  Surgeon: Saida Michael DPM;  [facility]     COLONOSCOPY N/A 8/20/2018    Procedure: COMBINED COLONOSCOPY, SINGLE OR MULTIPLE BIOPSY/POLYPECTOMY BY BIOPSY;;  Surgeon: Osman Hahn MD;  [facility]     COLONOSCOPY WITH CO2 INSUFFLATION N/A 8/20/2018    Procedure: COLONOSCOPY WITH CO2 INSUFFLATION;  COLON-SCREENING / LUBKA ;  Surgeon: Osman Hahn MD;  [facility]     DAVINCI HYSTERECTOMY TOTAL, SALPINGECTOMY BILATERAL N/A 8/4/2017    Procedure: DAVINCI XI HYSTERECTOMY TOTAL, SALPINGECTOMY BILATERAL;  DAVINCI TOTAL HYSTERECTOMY; BILATERAL SALPINGECTOMY. BILATERAL URETERAL LYSIS. UTEROSACRAL-COLPOPEXY. EXCISION OF ENDOMETRIOSIS;  Surgeon: George Loyola MD;  [facility]     DAVINCI LYSIS OF ADHESIONS Bilateral 8/4/2017    Procedure: DAVINCI LYSIS OF ADHESIONS;  BILATERAL URETERAL LYSIS;  Surgeon: George Loyola MD;  [facility]     DAVINCI SACROCOLPOPEXY, CYSTOSCOPY, COMBINED N/A 8/4/2017    Procedure: COMBINED DAVINCI SACROCOLPOPEXY, CYSTOSCOPY;  UTEROSACRAL COLPOPEXY;  Surgeon: George Loyola MD;  [facility]     DAVINCI XI ASSISTED ABLATION / EXCISION OF ENDOMETRIOSIS  8/4/2017    Procedure: DAVINCI XI ASSISTED ABLATION / EXCISION OF ENDOMETRIOSIS;;  Surgeon: George Loyola MD;  [facility]     DILATION AND CURETTAGE N/A 6/20/2017    Procedure: DILATION AND CURETTAGE;  Uterine Curettings and Fibroid Removal, Cook catheter placement; (No hysterectomy done at this time);  Surgeon: Ernestina Saunders MD;  [facility]     HYSTERECTOMY, PAP NO LONGER  INDICATED       ORTHOPEDIC SURGERY  6/1986    Heel spur , toe surgery     RELEASE CARPAL TUNNEL Right 10/20/2020    Procedure: RIGHT RELEASE, CARPAL TUNNEL;  Surgeon: Rickey Rea MD;  Location: [facility] OR     RELEASE CARPAL TUNNEL Left 11/6/2020    Procedure: LEFT RELEASE, CARPAL TUNNEL;  Surgeon: Rickey Rea MD;  Location: [facility] OR     TONSILLECTOMY        Family History   Problem Relation Age of Onset     Diabetes Mother      Hypertension Mother      Hyperlipidemia Mother      Arrhythmia Mother      Cardiovascular Father         CHF and COPD     Asthma Father      Breast Cancer Maternal Grandfather      Colon Cancer Maternal Grandfather      Breast Cancer Paternal Grandmother      Breast Cancer Paternal Aunt      C.A.D. Paternal Grandfather      Breast Cancer Paternal Grandfather      Breast Cancer Cousin      Asthma Son      Diabetes Maternal Grandmother      Hypertension Maternal Grandmother      Breast Cancer Cousin      Breast Cancer Cousin      Breast Cancer Cousin         Social History:  ?  History   Smoking Status     Never   Smokeless Tobacco     Never     History   Drug Use Unknown     Social History    Substance and Sexual Activity      Alcohol use: Yes        Comment: Rare- @1 month      Allergies:  ?   Allergies   Allergen Reactions     Penicillins Swelling     Swelling throat; age 4     Adhesive Tape Hives     Penicillin G      Tetracycline GI Disturbance     Other reaction(s): Abdominal Pain  Vomiting; nauseous  Other reaction(s): Abdominal Pain  Vomiting; nauseous          Medications:    Current Outpatient Medications   Medication     ADVAIR DISKUS 100-50 MCG/ACT inhaler     Cholecalciferol (VITAMIN D) 2000 UNITS CAPS     diclofenac (VOLTAREN) 50 MG EC tablet     ivabradine (CORLANOR) 5 MG tablet     levalbuterol (XOPENEX HFA) 45 MCG/ACT inhaler     metroNIDAZOLE (METROGEL) 0.75 % external gel     Pyridoxine HCl (B-6) 100 MG TABS     vitamin B-12 (CYANOCOBALAMIN) 100 MCG tablet      diclofenac (VOLTAREN) 50 MG EC tablet     valACYclovir (VALTREX) 1000 mg tablet     Current Facility-Administered Medications   Medication     lidocaine 1 % injection 0.5 mL     ropivacaine (NAROPIN) injection 1 mL     triamcinolone (KENALOG-40) injection 20 mg     triamcinolone (KENALOG-40) injection 20 mg     triamcinolone (KENALOG-40) injection 40 mg       Physical Exam:  ?  Vitals:  BP (!) 161/95   Pulse 85   Wt 86.2 kg (190 lb)   LMP 07/28/2017 (Exact Date)   BMI 32.61 kg/m     General:  WD/WN, in NAD.  A&O x3.  Dermatologic:    Incision  is well coapted, dehiscence absent.   Janiya-incisional erythema absent, ecchymosis absent.  Vascular:  Digital capillary refill time normal bilateral.  Skin temperature is normal to operative site.  Focal edema- moderate to operative site.   Neurologic:    Gross sensation normal to operative limb.  Gait and balance abnormal, NWB  to operative limb.  Musculoskeletal:  mild pain to palpation of medial ankle at site of exostectomy and medial ankle gutter, R.  Minld pain to palption peroneals, R  Ankle ROM smooth, reduced, not painful, R  Ankle stable to drawer, tilt R.  Manual Muscle Testing of peroneals pain free 5/5  right.      Imaging:   x-ray independently reviewed and interpreted by myself today.  Weight-bearing views left ankle dated 5/1/23, reveal rectus ankle, no tilt.        x-ray independently reviewed and interpreted by myself today.  Non-Weight-bearing views right ankle dated 02/15/23, reveal interval ant exostectomy, medial mall exostectomy, anchor sites for internal brace visualized

## 2023-05-02 ENCOUNTER — THERAPY VISIT (OUTPATIENT)
Dept: PHYSICAL THERAPY | Facility: CLINIC | Age: 53
End: 2023-05-02
Payer: COMMERCIAL

## 2023-05-02 DIAGNOSIS — Z47.89 AFTERCARE FOLLOWING SURGERY OF THE MUSCULOSKELETAL SYSTEM: ICD-10-CM

## 2023-05-02 DIAGNOSIS — R26.9 ABNORMALITY OF GAIT: ICD-10-CM

## 2023-05-02 DIAGNOSIS — M25.571 PAIN IN JOINT, ANKLE AND FOOT, RIGHT: Primary | ICD-10-CM

## 2023-05-02 PROCEDURE — 97110 THERAPEUTIC EXERCISES: CPT | Mod: GP | Performed by: PHYSICAL THERAPIST

## 2023-05-02 PROCEDURE — 97140 MANUAL THERAPY 1/> REGIONS: CPT | Mod: GP | Performed by: PHYSICAL THERAPIST

## 2023-05-02 PROCEDURE — 97112 NEUROMUSCULAR REEDUCATION: CPT | Mod: GP | Performed by: PHYSICAL THERAPIST

## 2023-05-08 ENCOUNTER — THERAPY VISIT (OUTPATIENT)
Dept: PHYSICAL THERAPY | Facility: CLINIC | Age: 53
End: 2023-05-08
Payer: COMMERCIAL

## 2023-05-08 DIAGNOSIS — M54.89 OTHER BACK PAIN, UNSPECIFIED CHRONICITY: Primary | ICD-10-CM

## 2023-05-08 PROCEDURE — 97112 NEUROMUSCULAR REEDUCATION: CPT | Mod: GP | Performed by: PHYSICAL THERAPIST

## 2023-05-08 PROCEDURE — 97140 MANUAL THERAPY 1/> REGIONS: CPT | Mod: GP | Performed by: PHYSICAL THERAPIST

## 2023-05-10 ASSESSMENT — ASTHMA QUESTIONNAIRES
QUESTION_2 LAST FOUR WEEKS HOW OFTEN HAVE YOU HAD SHORTNESS OF BREATH: ONCE OR TWICE A WEEK
QUESTION_4 LAST FOUR WEEKS HOW OFTEN HAVE YOU USED YOUR RESCUE INHALER OR NEBULIZER MEDICATION (SUCH AS ALBUTEROL): NOT AT ALL
QUESTION_1 LAST FOUR WEEKS HOW MUCH OF THE TIME DID YOUR ASTHMA KEEP YOU FROM GETTING AS MUCH DONE AT WORK, SCHOOL OR AT HOME: NONE OF THE TIME
QUESTION_3 LAST FOUR WEEKS HOW OFTEN DID YOUR ASTHMA SYMPTOMS (WHEEZING, COUGHING, SHORTNESS OF BREATH, CHEST TIGHTNESS OR PAIN) WAKE YOU UP AT NIGHT OR EARLIER THAN USUAL IN THE MORNING: NOT AT ALL
QUESTION_5 LAST FOUR WEEKS HOW WOULD YOU RATE YOUR ASTHMA CONTROL: WELL CONTROLLED
ACT_TOTALSCORE: 23
ACT_TOTALSCORE: 23

## 2023-05-11 ENCOUNTER — OFFICE VISIT (OUTPATIENT)
Dept: FAMILY MEDICINE | Facility: CLINIC | Age: 53
End: 2023-05-11
Payer: COMMERCIAL

## 2023-05-11 VITALS
BODY MASS INDEX: 32.47 KG/M2 | HEART RATE: 65 BPM | SYSTOLIC BLOOD PRESSURE: 134 MMHG | DIASTOLIC BLOOD PRESSURE: 82 MMHG | OXYGEN SATURATION: 97 % | WEIGHT: 190.2 LBS | HEIGHT: 64 IN | TEMPERATURE: 98.4 F

## 2023-05-11 DIAGNOSIS — G90.A POTS (POSTURAL ORTHOSTATIC TACHYCARDIA SYNDROME): ICD-10-CM

## 2023-05-11 DIAGNOSIS — I10 BENIGN ESSENTIAL HYPERTENSION: ICD-10-CM

## 2023-05-11 DIAGNOSIS — E03.9 HYPOTHYROIDISM, UNSPECIFIED TYPE: ICD-10-CM

## 2023-05-11 DIAGNOSIS — J45.30 MILD PERSISTENT ASTHMA WITHOUT COMPLICATION: ICD-10-CM

## 2023-05-11 DIAGNOSIS — Z01.818 PREOP GENERAL PHYSICAL EXAM: Primary | ICD-10-CM

## 2023-05-11 LAB
ANION GAP SERPL CALCULATED.3IONS-SCNC: 13 MMOL/L (ref 7–15)
BUN SERPL-MCNC: 13.7 MG/DL (ref 6–20)
CALCIUM SERPL-MCNC: 9.9 MG/DL (ref 8.6–10)
CHLORIDE SERPL-SCNC: 104 MMOL/L (ref 98–107)
CREAT SERPL-MCNC: 0.92 MG/DL (ref 0.51–0.95)
DEPRECATED HCO3 PLAS-SCNC: 24 MMOL/L (ref 22–29)
GFR SERPL CREATININE-BSD FRML MDRD: 73 ML/MIN/1.73M2
GLUCOSE SERPL-MCNC: 101 MG/DL (ref 70–99)
HGB BLD-MCNC: 13.8 G/DL (ref 11.7–15.7)
POTASSIUM SERPL-SCNC: 4.5 MMOL/L (ref 3.4–5.3)
SODIUM SERPL-SCNC: 141 MMOL/L (ref 136–145)

## 2023-05-11 PROCEDURE — 36415 COLL VENOUS BLD VENIPUNCTURE: CPT | Performed by: PHYSICIAN ASSISTANT

## 2023-05-11 PROCEDURE — 99214 OFFICE O/P EST MOD 30 MIN: CPT | Performed by: PHYSICIAN ASSISTANT

## 2023-05-11 PROCEDURE — 80048 BASIC METABOLIC PNL TOTAL CA: CPT | Performed by: PHYSICIAN ASSISTANT

## 2023-05-11 PROCEDURE — 85018 HEMOGLOBIN: CPT | Performed by: PHYSICIAN ASSISTANT

## 2023-05-11 NOTE — PATIENT INSTRUCTIONS
Ok to use your Advair the morning of surgery. Bring your xopenex.     Ok to take your ivabradine with a small sip of water.     [employer] [place]- for advanced health care directive.     Patient Education    For informational purposes only. Not to replace the advice of your health care provider. Copyright   2003, 2019 [facility], [place]. All rights reserved. Clinically reviewed by Candi Joyce MD. [id number] [id number] - REV .  Preparing for Your Surgery  Getting started  A nurse will call you to review your health history and instructions. They will give you an arrival time based on your scheduled surgery time. Please be ready to share:  Your doctor's clinic name and phone number  Your medical, surgical, and anesthesia history  A list of allergies and sensitivities  A list of medicines, including herbal treatments and over-the-counter drugs  Whether the patient has a legal guardian (ask how to send us the papers in advance)  Please tell us if you're pregnant--or if there's any chance you might be pregnant. Some surgeries may injure a fetus (unborn baby), so they require a pregnancy test. Surgeries that are safe for a fetus don't always need a test, and you can choose whether to have one.   If you have a child who's having surgery, please ask for a copy of Preparing for Your Child's Surgery.    Preparing for surgery  Within 10 to 30 days of surgery: Have a pre-op exam (sometimes called an H&P, or History and Physical). This can be done at a clinic or pre-operative center.  If you're having a , you may not need this exam. Talk to your care team.  At your pre-op exam, talk to your care team about all medicines you take. If you need to stop any medicines before surgery, ask when to start taking them again.  We do this for your safety. Many medicines can make you bleed too much during surgery. Some change how well surgery (anesthesia) drugs work.  Call your insurance company to let them know you're  having surgery. (If you don't have insurance, call [phone].)  Call your clinic if there's any change in your health. This includes signs of a cold or flu (sore throat, runny nose, cough, rash, fever). It also includes a scrape or scratch near the surgery site.  If you have questions on the day of surgery, call your hospital or surgery center.  Eating and drinking guidelines  For your safety: Unless your surgeon tells you otherwise, follow the guidelines below.  Eat and drink as usual until 8 hours before you arrive for surgery. After that, no food or milk.  Drink clear liquids until 2 hours before you arrive. These are liquids you can see through, like water, Gatorade, and Propel Water. They also include plain black coffee and tea (no cream or milk), candy, and breath mints. You can spit out gum when you arrive.  If you drink alcohol: Stop drinking it the night before surgery.  If your care team tells you to take medicine on the morning of surgery, it's okay to take it with a sip of water.  Preventing infection  Shower or bathe the night before and morning of your surgery. Follow the instructions your clinic gave you. (If no instructions, use regular soap.)  Don't shave or clip hair near your surgery site. We'll remove the hair if needed.  Don't smoke or vape the morning of surgery. You may chew nicotine gum up to 2 hours before surgery. A nicotine patch is okay.  Note: Some surgeries require you to completely quit smoking and nicotine. Check with your surgeon.  Your care team will make every effort to keep you safe from infection. We will:  Clean our hands often with soap and water (or an alcohol-based hand rub).  Clean the skin at your surgery site with a special soap that kills germs.  Give you a special gown to keep you warm. (Cold raises the risk of infection.)  Wear special hair covers, masks, gowns and gloves during surgery.  Give antibiotic medicine, if prescribed. Not all surgeries need  antibiotics.  What to bring on the day of surgery  Photo ID and insurance card  Copy of your health care directive, if you have one  Glasses and hearing aids (bring cases)  You can't wear contacts during surgery  Inhaler and eye drops, if you use them (tell us about these when you arrive)  CPAP machine or breathing device, if you use them  A few personal items, if spending the night  If you have . . .  A pacemaker, ICD (cardiac defibrillator) or other implant: Bring the ID card.  An implanted stimulator: Bring the remote control.  A legal guardian: Bring a copy of the certified (court-stamped) guardianship papers.  Please remove any jewelry, including body piercings. Leave jewelry and other valuables at home.  If you're going home the day of surgery  You must have a responsible adult drive you home. They should stay with you overnight as well.  If you don't have someone to stay with you, and you aren't safe to go home alone, we may keep you overnight. Insurance often won't pay for this.  After surgery  If it's hard to control your pain or you need more pain medicine, please call your surgeon's office.  Questions?   If you have any questions for your care team, list them here: _________________________________________________________________________________________________________________________________________________________________________ ____________________________________ ____________________________________ ____________________________________

## 2023-05-11 NOTE — PROGRESS NOTES
[facility]  [address]  [place] [address]  Phone: [phone]  Primary Provider: Bj Butler  Pre-op Performing Provider: JOSÉ MIGUEL CHANDRA      PREOPERATIVE EVALUATION:  Today's date: 5/11/2023    Laura Jackson is a 53 year old female who presents for a preoperative evaluation.      5/11/2023     8:17 AM   Additional Questions   Roomed by silverio etienne     Surgical Information:  Surgery/Procedure: Perineorrhaphy   Surgery Location: [facility] [place]  Surgeon: Dr. Loyola  Surgery Date: 5/19/2023  Time of Surgery: 730am  Where patient plans to recover: At home with family  Fax number for surgical facility: [phone]    Assessment & Plan     The proposed surgical procedure is considered INTERMEDIATE risk.    Preop general physical exam  POTS (postural orthostatic tachycardia syndrome)  - Basic metabolic panel; Future  - Hemoglobin; Future  - Basic metabolic panel  - Hemoglobin    Benign essential hypertension  - Basic metabolic panel; Future  - Hemoglobin; Future  - Basic metabolic panel  - Hemoglobin    Mild persistent asthma without complication  Hypothyroidism, unspecified type          - No identified additional risk factors other than previously addressed    Antiplatelet or Anticoagulation Medication Instructions:   - Patient is on no antiplatelet or anticoagulation medications.    Additional Medication Instructions:  Patient is to take all scheduled medications on the day of surgery EXCEPT for modifications listed below:  Hold all vitamins.     RECOMMENDATION:  APPROVAL GIVEN to proceed with proposed procedure, without further diagnostic evaluation.            Subjective     HPI related to upcoming procedure: Perineal repair after previous post partum injuries         5/10/2023     7:52 PM   Preop Questions   1. Have you ever had a heart attack or stroke? No   2. Have you ever had surgery on your heart or blood vessels, such as a stent placement, a coronary  artery bypass, or surgery on an artery in your head, neck, heart, or legs? No   3. Do you have chest pain with activity? No   4. Do you have a history of  heart failure? No   5. Do you currently have a cold, bronchitis or symptoms of other infection? No   6. Do you have a cough, shortness of breath, or wheezing? No   7. Do you or anyone in your family have previous history of blood clots? No   8. Do you or does anyone in your family have a serious bleeding problem such as prolonged bleeding following surgeries or cuts? No   9. Have you ever had problems with anemia or been told to take iron pills? YES - 2017   10. Have you had any abnormal blood loss such as black, tarry or bloody stools, or abnormal vaginal bleeding? YES - 2017   11. Have you ever had a blood transfusion? YES - 2017   11a. Have you ever had a transfusion reaction? No   12. Are you willing to have a blood transfusion if it is medically needed before, during, or after your surgery? Yes   13. Have you or any of your relatives ever had problems with anesthesia? No   14. Do you have sleep apnea, excessive snoring or daytime drowsiness? No   15. Do you have any artifical heart valves or other implanted medical devices like a pacemaker, defibrillator, or continuous glucose monitor? No   16. Do you have artificial joints? No   17. Are you allergic to latex? No   18. Is there any chance that you may be pregnant? No       Health Care Directive:  Patient does not have a Health Care Directive or Living Will: Discussed advance care planning with patient; however, patient declined at this time.    Preoperative Review of :   reviewed - no record of controlled substances prescribed.          Review of Systems  CONSTITUTIONAL: NEGATIVE for fever, chills, change in weight  INTEGUMENTARY/SKIN: NEGATIVE for worrisome rashes, moles or lesions  ENT/MOUTH: NEGATIVE for ear, mouth and throat problems  RESP: NEGATIVE for significant cough or SOB  CV: NEGATIVE for  chest pain, palpitations or peripheral edema  GI: NEGATIVE for nausea, abdominal pain, heartburn, or change in bowel habits  : NEGATIVE for frequency, dysuria, or hematuria  MUSCULOSKELETAL: NEGATIVE for significant arthralgias or myalgia  NEURO: NEGATIVE for weakness, dizziness or paresthesias  HEME: NEGATIVE for bleeding problems  PSYCHIATRIC: NEGATIVE for changes in mood or affect    Patient Active Problem List    Diagnosis Date Noted     Pain in joint, ankle and foot, right 03/22/2023     Priority: Medium     Aftercare following surgery of the musculoskeletal system 03/22/2023     Priority: Medium     Abnormality of gait 03/22/2023     Priority: Medium     Hypothyroidism 12/01/2022     Priority: Medium     Abdominal bloating 12/01/2022     Priority: Medium     Back pain 11/18/2021     Priority: Medium     Range of joint movement increased 09/19/2018     Priority: Medium     Autonomic dysfunction 07/31/2018     Priority: Medium     Benign essential hypertension 07/31/2018     Priority: Medium     Hypermobility syndrome 02/16/2018     Priority: Medium     POTS (postural orthostatic tachycardia syndrome) 02/06/2018     Priority: Medium     Endometriosis 11/09/2017     Priority: Medium     Heberden's nodes 11/09/2017     Priority: Medium     S/P ANAID (total abdominal hysterectomy) 09/11/2017     Priority: Medium     Hypovitaminosis D 09/11/2017     Priority: Medium     Nausea 08/04/2017     Priority: Medium     S/P myomectomy 06/29/2017     Priority: Medium     Mild persistent asthma 02/19/2014     Priority: Medium     Family history of breast cancer 02/19/2014     Priority: Medium     History of supraventricular tachycardia 10/11/2013     Priority: Medium     no recurrence since 2008       CARDIOVASCULAR SCREENING; LDL GOAL LESS THAN 160 04/24/2013     Priority: Medium     Irritable bowel syndrome 04/24/2013     Priority: Medium     GERD (gastroesophageal reflux disease) 04/24/2013     Priority: Medium     Cardiac  dysrhythmia 2010     Priority: Medium     Overview:   LW Onset:    ; Supraventricular Tachycardia       Elevated TSH 2010     Priority: Medium     Overview:   [facility]: TSH 7.45       Heartburn 2007     Priority: Medium     Normal delivery 2006     Priority: Medium     Overview:   LW Modifier:    LW Onset:  75Tot53  ; Normal Spontaneous Vaginal Delivery       Uterine leiomyoma 2005     Priority: Medium     Overview:   Per CT finding from [facility]  ICD 10  Overview:   LW Modifier:  fundal  LW Onset:    ; Leiomyoma Uterus       Exercise-induced bronchospasm 2004     Priority: Medium     Overview:   Asthma Exercise Induced       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     Priority: Medium     Overview:   LW Modifier:  D&C 10/04  LW Onset:    ; Spontaneous  Complete        Past Medical History:   Diagnosis Date     Benign essential hypertension 2018     Family history of breast cancer 2014     Family history of breast cancer      Hypothyroidism 2022     SVT (supraventricular tachycardia):  history of, no recurrence since 2008 10/11/2013    no recurrence since       Uncomplicated asthma      Past Surgical History:   Procedure Laterality Date     ABDOMEN SURGERY  2017    myectomy     APPENDECTOMY       ARTHROSCOPY ANKLE, OPEN REPAIR LIGAMENT, COMBINED Right 2023    Procedure: ankle arthroscopy, modified Brostrom, peroneal tendon repair, exostectomy of medial malleolus;  Surgeon: Saida Michael DPM;  Location:  OR     COLONOSCOPY N/A 2018    Procedure: COMBINED COLONOSCOPY, SINGLE OR MULTIPLE BIOPSY/POLYPECTOMY BY BIOPSY;;  Surgeon: Osman Hahn MD;  Location: [facility] OR     COLONOSCOPY WITH CO2 INSUFFLATION N/A 2018    Procedure: COLONOSCOPY WITH CO2 INSUFFLATION;  COLON-SCREENING / LUBKA ;  Surgeon: Osman Hahn MD;  Location: [facility] OR     DAVINCI HYSTERECTOMY TOTAL,  SALPINGECTOMY BILATERAL N/A 08/04/2017    Procedure: DAVINCI XI HYSTERECTOMY TOTAL, SALPINGECTOMY BILATERAL;  DAVINCI TOTAL HYSTERECTOMY; BILATERAL SALPINGECTOMY. BILATERAL URETERAL LYSIS. UTEROSACRAL-COLPOPEXY. EXCISION OF ENDOMETRIOSIS;  Surgeon: George Loyola MD;  Location: [facility] OR     DAVINCI LYSIS OF ADHESIONS Bilateral 08/04/2017    Procedure: DAVINCI LYSIS OF ADHESIONS;  BILATERAL URETERAL LYSIS;  Surgeon: George Loyola MD;  Location: [facility] OR     DAVINCI SACROCOLPOPEXY, CYSTOSCOPY, COMBINED N/A 08/04/2017    Procedure: COMBINED DAVINCI SACROCOLPOPEXY, CYSTOSCOPY;  UTEROSACRAL COLPOPEXY;  Surgeon: George Loyola MD;  Location: [facility] OR     DAVINCI XI ASSISTED ABLATION / EXCISION OF ENDOMETRIOSIS  08/04/2017    Procedure: DAVINCI XI ASSISTED ABLATION / EXCISION OF ENDOMETRIOSIS;;  Surgeon: George Loyola MD;  Location: [facility] OR     DILATION AND CURETTAGE N/A 06/20/2017    Procedure: DILATION AND CURETTAGE;  Uterine Curettings and Fibroid Removal, Cook catheter placement; (No hysterectomy done at this time);  Surgeon: Ernestina Saunders MD;  Location: [facility] OR     HYSTERECTOMY, PAP NO LONGER INDICATED       ORTHOPEDIC SURGERY  06/1986    Heel spur , toe surgery     RELEASE CARPAL TUNNEL Right 10/20/2020    Procedure: RIGHT RELEASE, CARPAL TUNNEL;  Surgeon: Rickey Rea MD;  Location: [facility] OR     RELEASE CARPAL TUNNEL Left 11/06/2020    Procedure: LEFT RELEASE, CARPAL TUNNEL;  Surgeon: Rickey Rea MD;  Location: [facility] OR     SOFT TISSUE SURGERY  2/2/2023     TONSILLECTOMY       Current Outpatient Medications   Medication Sig Dispense Refill     ADVAIR DISKUS 100-50 MCG/ACT inhaler INHALE 1 PUFF INTO THE LUNGS EVERY 12 HOURS 60 each 3     Cholecalciferol (VITAMIN D) 2000 UNITS CAPS Take 1 capsule by mouth daily       ivabradine (CORLANOR) 5 MG tablet Take 1 tablet (5 mg) by mouth 2 times daily (with meals) 180 tablet 1     levalbuterol (XOPENEX HFA) 45 MCG/ACT inhaler Inhale 1-2 puffs into  "the lungs every 4 hours as needed for shortness of breath No further refills until appointment 15 g 0     metroNIDAZOLE (METROGEL) 0.75 % external gel Apply to face BID 45 g 3     Pyridoxine HCl (B-6) 100 MG TABS        vitamin B-12 (CYANOCOBALAMIN) 100 MCG tablet        valACYclovir (VALTREX) 1000 mg tablet Take 1 tablet (1,000 mg) by mouth 3 times daily 21 tablet 0       Allergies   Allergen Reactions     Penicillins Swelling     Swelling throat; age 4     Adhesive Tape Hives     Penicillin G      Tetracycline GI Disturbance     Other reaction(s): Abdominal Pain  Vomiting; nauseous  Other reaction(s): Abdominal Pain  Vomiting; nauseous        Social History     Tobacco Use     Smoking status: Never     Smokeless tobacco: Never   Vaping Use     Vaping status: Never Used   Substance Use Topics     Alcohol use: Yes     Comment: Rare- 1 every 2-3month     Family History   Problem Relation Age of Onset     Diabetes Mother      Hypertension Mother      Hyperlipidemia Mother      Arrhythmia Mother      Cardiovascular Father         CHF and COPD     Asthma Father      Breast Cancer Maternal Grandfather      Colon Cancer Maternal Grandfather         His mother  of cancer too     Breast Cancer Paternal Grandmother      Breast Cancer Paternal Aunt      C.A.D. Paternal Grandfather      Breast Cancer Paternal Grandfather      Breast Cancer Cousin      Asthma Son      Diabetes Maternal Grandmother      Hypertension Maternal Grandmother      Breast Cancer Cousin      Breast Cancer Cousin      Breast Cancer Cousin         Maternal     Breast Cancer Other      Breast Cancer Other         Maternal aunt     History   Drug Use Unknown         Objective     /82 (BP Location: Left arm, Patient Position: Chair, Cuff Size: Adult Large)   Pulse 65   Temp 98.4  F (36.9  C) (Oral)   Ht 1.626 m (5' 4\")   Wt 86.3 kg (190 lb 3.2 oz)   LMP 2017 (Exact Date)   SpO2 97%   BMI 32.65 kg/m      Physical Exam    GENERAL " APPEARANCE: healthy, alert and no distress     EYES: EOMI, PERRL     HENT: ear canals and TM's normal and nose and mouth without ulcers or lesions     NECK: no adenopathy, no asymmetry, masses, or scars and thyroid normal to palpation     RESP: lungs clear to auscultation - no rales, rhonchi or wheezes     CV: regular rates and rhythm, normal S1 S2, no S3 or S4 and no murmur, click or rub     ABDOMEN:  soft, nontender, no HSM or masses and bowel sounds normal     MS: extremities normal- no gross deformities noted, no evidence of inflammation in joints, FROM in all extremities.     SKIN: no suspicious lesions or rashes     NEURO: Normal strength and tone, sensory exam grossly normal, mentation intact and speech normal     PSYCH: mentation appears normal. and affect normal/bright     LYMPHATICS: No cervical adenopathy    Recent Labs   Lab Test 01/26/23  0951      POTASSIUM 4.3   CR 0.88        Diagnostics:  Labs pending at this time.  Results will be reviewed when available.   No EKG this visit, completed in the last 180 days.    Revised Cardiac Risk Index (RCRI):  The patient has the following serious cardiovascular risks for perioperative complications:   - No serious cardiac risks = 0 points     RCRI Interpretation: 0 points: Class I (very low risk - 0.4% complication rate)           Signed Electronically by: Louise Pena PA-C  Copy of this evaluation report is provided to requesting physician.

## 2023-05-15 ENCOUNTER — THERAPY VISIT (OUTPATIENT)
Dept: PHYSICAL THERAPY | Facility: CLINIC | Age: 53
End: 2023-05-15
Payer: COMMERCIAL

## 2023-05-15 DIAGNOSIS — Z47.89 AFTERCARE FOLLOWING SURGERY OF THE MUSCULOSKELETAL SYSTEM: ICD-10-CM

## 2023-05-15 DIAGNOSIS — M25.571 PAIN IN JOINT, ANKLE AND FOOT, RIGHT: Primary | ICD-10-CM

## 2023-05-15 DIAGNOSIS — R26.9 ABNORMALITY OF GAIT: ICD-10-CM

## 2023-05-15 PROCEDURE — 97110 THERAPEUTIC EXERCISES: CPT | Mod: GP | Performed by: PHYSICAL THERAPIST

## 2023-05-15 PROCEDURE — 97140 MANUAL THERAPY 1/> REGIONS: CPT | Mod: GP | Performed by: PHYSICAL THERAPIST

## 2023-05-15 PROCEDURE — 97112 NEUROMUSCULAR REEDUCATION: CPT | Mod: GP | Performed by: PHYSICAL THERAPIST

## 2023-05-15 NOTE — PROGRESS NOTES
Subjective:  HPI  Physical Exam                    Objective:  System    Physical Exam    General     ROS    Assessment/Plan:    PROGRESS  REPORT    Progress reporting period is from 3-23-23 to 5-15-23, 6 visits, 7 since SOC.       SUBJECTIVE  Subjective changes noted by patient:  .  Subjective: States the pain is getting a lot better with the cycling of med she has been on.  PL on ave R ankle/foot is 3-4/10, the pain on the plantar surface of the foot has reduced. Most pain is R lateral foot 3-5/10 vs 7-8/10 initially  A couple days last week was able to walk short distances without pain. Is using less supportive ankle brace and it is better as does not not bother bottom of foot. Achilles pain today and anterior shin pain. Swimming 3x/wk and water ex, consistent with HEP   States she will be ready for light duty at work, to start in about 2 wks. Feels the kinesiotape has been very helpful with pain.     Current Pain level:  (3-5/10).      Initial Pain level: 4/10 (can increase to 6/10).   Changes in function:  Yes (See Goal flowsheet attached for changes in current functional level)  Adverse reaction to treatment or activity: None    OBJECTIVE  Changes noted in objective findings:  Yes,   Objective: Able to use a better heel toe gait pattern today. SLS EO no touches 30 sec each foot. EC unable without touches. Able to stand on airex pad and use u/e's with good balance. A/PROM DF 15/18, PF 45/48. Able to asc stairs recip, one steps down due to pressure anterior ankle. Strength improving, inversion causes lateral ankle pain, toe ROM improving but still limited.     ASSESSMENT/PLAN  Updated problem list and treatment plan: Diagnosis 1:  S/p R ankle surgery, R ankle/foot pain, gait  Pain -  manual therapy, splint/taping/bracing/orthotics, self management, education and home program  Decreased strength - therapeutic exercise, therapeutic activities and home program  Impaired balance - neuro re-education, therapeutic  activities and home program  Decreased proprioception - neuro re-education, therapeutic activities and home program  Impaired gait - gait training and home program  Impaired muscle performance - neuro re-education and home program  Decreased function - therapeutic activities and home program  STG/LTGs have been met or progress has been made towards goals:  Yes (See Goal flow sheet completed today.)  Assessment of Progress: The patient's condition is improving.  Self Management Plans:  Patient has been instructed in a home treatment program.  Patient  has been instructed in self management of symptoms.  I have re-evaluated this patient and find that the nature, scope, duration and intensity of the therapy is appropriate for the medical condition of the patient.  Laura continues to require the following intervention to meet STG and LTG's:  PT    Recommendations:  This patient would benefit from continued therapy.     Frequency:  1 X week, once daily  Duration:  for 5 weeks        Please refer to the daily flowsheet for treatment today, total treatment time and time spent performing 1:1 timed codes.

## 2023-05-16 ENCOUNTER — OFFICE VISIT (OUTPATIENT)
Dept: CARDIOLOGY | Facility: CLINIC | Age: 53
End: 2023-05-16
Attending: INTERNAL MEDICINE
Payer: COMMERCIAL

## 2023-05-16 VITALS
HEART RATE: 69 BPM | DIASTOLIC BLOOD PRESSURE: 79 MMHG | WEIGHT: 189.7 LBS | HEIGHT: 64 IN | SYSTOLIC BLOOD PRESSURE: 127 MMHG | BODY MASS INDEX: 32.39 KG/M2 | OXYGEN SATURATION: 95 %

## 2023-05-16 DIAGNOSIS — I47.11 INAPPROPRIATE SINUS TACHYCARDIA (H): Primary | ICD-10-CM

## 2023-05-16 PROCEDURE — 99215 OFFICE O/P EST HI 40 MIN: CPT | Performed by: INTERNAL MEDICINE

## 2023-05-16 PROCEDURE — G0463 HOSPITAL OUTPT CLINIC VISIT: HCPCS | Performed by: INTERNAL MEDICINE

## 2023-05-16 ASSESSMENT — PAIN SCALES - GENERAL: PAINLEVEL: NO PAIN (0)

## 2023-05-16 NOTE — PROGRESS NOTES
HPI:   Laura is a 53-year-old woman with a history of inappropriate sinus tachycardia and hypertension who had been doing quite well on ivabradine 5 mg twice daily.  However she did suffer a major orthopedic injury on her right leg and required surgery and considerable physical rehab.  During that time she had a lot of pain which resulted in increased heart rate.  Initially she tried to tolerate it but ultimately thought that an increase in the ivabradine dose might be useful.    Before coming to this clinic Laura's pain condition seems to have been managed better and she noticed that with less pain there was substantial improvement in her heart rate.  Consequently she no longer feels the need for increased ivabradine dose and we will maintain 5 mg twice daily.    Laura has a history of Edilma-Danlos in the family.  Some of her right joint issues may be related to that diagnosis along with her susceptibility to elevated sinus rates.  Additionally she has occasional asthma flareups which require inhalers and/or steroids.  Under these conditions she again has some difficulty with heart rate control.    Patient is followed by  in [place] and receives periodic care for her asthma there.    We agreed that we would maintain the same dose of ivabradine which is now being supplied by her insurance coverage.  We will reassess in 1 years time or earlier if needed..    PAST MEDICAL HISTORY:  Past Medical History:   Diagnosis Date     Benign essential hypertension 7/31/2018     Family history of breast cancer 2/19/2014     Family history of breast cancer      Hypothyroidism 12/1/2022     SVT (supraventricular tachycardia):  history of, no recurrence since 2008 10/11/2013    no recurrence since 2008      Uncomplicated asthma        CURRENT MEDICATIONS:  Current Outpatient Medications   Medication Sig Dispense Refill     ADVAIR DISKUS 100-50 MCG/ACT inhaler INHALE 1 PUFF INTO THE LUNGS EVERY 12 HOURS 60 each 3      Cholecalciferol (VITAMIN D) 2000 UNITS CAPS Take 1 capsule by mouth daily       ivabradine (CORLANOR) 5 MG tablet Take 1 tablet (5 mg) by mouth 2 times daily (with meals) 180 tablet 1     levalbuterol (XOPENEX HFA) 45 MCG/ACT inhaler Inhale 1-2 puffs into the lungs every 4 hours as needed for shortness of breath No further refills until appointment 15 g 0     metroNIDAZOLE (METROGEL) 0.75 % external gel Apply to face BID 45 g 3     Pyridoxine HCl (B-6) 100 MG TABS        vitamin B-12 (CYANOCOBALAMIN) 100 MCG tablet        valACYclovir (VALTREX) 1000 mg tablet Take 1 tablet (1,000 mg) by mouth 3 times daily 21 tablet 0       PAST SURGICAL HISTORY:  Past Surgical History:   Procedure Laterality Date     ABDOMEN SURGERY  06/2017    myectomy     APPENDECTOMY       ARTHROSCOPY ANKLE, OPEN REPAIR LIGAMENT, COMBINED Right 02/02/2023    Procedure: ankle arthroscopy, modified Brostrom, peroneal tendon repair, exostectomy of medial malleolus;  Surgeon: Saida Michael DPM;  Location:  OR     COLONOSCOPY N/A 08/20/2018    Procedure: COMBINED COLONOSCOPY, SINGLE OR MULTIPLE BIOPSY/POLYPECTOMY BY BIOPSY;;  Surgeon: Osman Hahn MD;  [facility]     COLONOSCOPY WITH CO2 INSUFFLATION N/A 08/20/2018    Procedure: COLONOSCOPY WITH CO2 INSUFFLATION;  COLON-SCREENING / LUBKA ;  Surgeon: Osman Hahn MD;  Location: [facility]     DAVINCI HYSTERECTOMY TOTAL, SALPINGECTOMY BILATERAL N/A 08/04/2017    Procedure: DAVINCI XI HYSTERECTOMY TOTAL, SALPINGECTOMY BILATERAL;  DAVINCI TOTAL HYSTERECTOMY; BILATERAL SALPINGECTOMY. BILATERAL URETERAL LYSIS. UTEROSACRAL-COLPOPEXY. EXCISION OF ENDOMETRIOSIS;  Surgeon: George Loyola MD;  [facility]: [facility]     DAVINCI LYSIS OF ADHESIONS Bilateral 08/04/2017    Procedure: DAVINCI LYSIS OF ADHESIONS;  BILATERAL URETERAL LYSIS;  Surgeon: George Loyola MD;  [facility]     DAVINCI SACROCOLPOPEXY, CYSTOSCOPY, COMBINED N/A 08/04/2017    Procedure: COMBINED DAVINCI  SACROCOLPOPEXY, CYSTOSCOPY;  UTEROSACRAL COLPOPEXY;  Surgeon: George Loyola MD;  [facility]     DAVINCI XI ASSISTED ABLATION / EXCISION OF ENDOMETRIOSIS  2017    Procedure: DAVINCI XI ASSISTED ABLATION / EXCISION OF ENDOMETRIOSIS;;  Surgeon: George Loyola MD;  [facility]     DILATION AND CURETTAGE N/A 2017    Procedure: DILATION AND CURETTAGE;  Uterine Curettings and Fibroid Removal, Cook catheter placement; (No hysterectomy done at this time);  Surgeon: Ernestina Saunders MD;  [facility]     HYSTERECTOMY, PAP NO LONGER INDICATED       ORTHOPEDIC SURGERY  1986    Heel spur , toe surgery     RELEASE CARPAL TUNNEL Right 10/20/2020    Procedure: RIGHT RELEASE, CARPAL TUNNEL;  Surgeon: Rickey Rea MD;  Location: [facility] OR     RELEASE CARPAL TUNNEL Left 2020    Procedure: LEFT RELEASE, CARPAL TUNNEL;  Surgeon: Rickey Rea MD;  Location: [facility] OR     SOFT TISSUE SURGERY  2023     TONSILLECTOMY         ALLERGIES:     Allergies   Allergen Reactions     Penicillins Swelling     Swelling throat; age 4     Adhesive Tape Hives     Penicillin G      Tetracycline GI Disturbance     Other reaction(s): Abdominal Pain  Vomiting; nauseous  Other reaction(s): Abdominal Pain  Vomiting; nauseous       FAMILY HISTORY:  Family History   Problem Relation Age of Onset     Diabetes Mother      Hypertension Mother      Hyperlipidemia Mother      Arrhythmia Mother      Cardiovascular Father         CHF and COPD     Asthma Father      Breast Cancer Maternal Grandfather      Colon Cancer Maternal Grandfather         His mother  of cancer too     Breast Cancer Paternal Grandmother      Breast Cancer Paternal Aunt      C.A.D. Paternal Grandfather      Breast Cancer Paternal Grandfather      Breast Cancer Cousin      Asthma Son      Diabetes Maternal Grandmother      Hypertension Maternal Grandmother      Breast Cancer Cousin      Breast Cancer Cousin      Breast Cancer Cousin   "       Maternal     Breast Cancer Other      Breast Cancer Other         Maternal aunt       SOCIAL HISTORY:  Social History     Tobacco Use     Smoking status: Never     Smokeless tobacco: Never   Vaping Use     Vaping status: Never Used   Substance Use Topics     Alcohol use: Yes     Comment: Rare- 1 every 2-3month     Drug use: Never       ROS:   Constitutional: No fever, chills, or sweats. Weight stable.   ENT: No visual disturbance, ear ache, epistaxis, sore throat.   Cardiovascular: As per HPI.   Respiratory: No cough, hemoptysis.    GI: No nausea, vomiting, .   : No hematuria.   Integument: Negative.   Psychiatric: Negative.   Hematologic:   no easy bleeding.  Neuro: Negative.   Endocrinology: No significant heat or cold intolerance   Musculoskeletal: No myalgia.    Exam:  /79 (BP Location: Right arm, Patient Position: Sitting, Cuff Size: Adult Regular)   Pulse 69   Ht 1.626 m (5' 4.02\")   Wt 86 kg (189 lb 11.2 oz)   LMP 07/28/2017 (Exact Date)   SpO2 95%   BMI 32.55 kg/m    GENERAL APPEARANCE: healthy, alert and no distress  HEENT: no icterus,  no central cyanosis  NECK: no adenopathy, , JVP not elevated  RESPIRATORY: lungs clear to auscultation - no rales, rhonchi or wheezes, no use of accessory muscles, no retractions, respirations are unlabored, normal respiratory rate  CARDIOVASCULAR: regular rhythm,   NEURO: alert and oriented to person/place/time, normal speech, gait and affect  SKIN: no ecchymoses, no rashes    Labs:  CBC RESULTS:   Lab Results   Component Value Date    WBC 6.2 03/31/2021    RBC 4.75 03/31/2021    HGB 13.8 05/11/2023    HGB 14.1 03/31/2021    HCT 43.2 03/31/2021    MCV 91 03/31/2021    MCH 29.7 03/31/2021    MCHC 32.6 03/31/2021    RDW 14.0 03/31/2021     03/31/2021       BMP RESULTS:  Lab Results   Component Value Date     05/11/2023     03/31/2021    POTASSIUM 4.5 05/11/2023    POTASSIUM 4.3 01/26/2023    POTASSIUM 4.2 03/31/2021    CHLORIDE 104 " 05/11/2023    CHLORIDE 105 01/26/2023    CHLORIDE 104 03/31/2021    CO2 24 05/11/2023    CO2 28 01/26/2023    CO2 31 03/31/2021    ANIONGAP 13 05/11/2023    ANIONGAP 6 01/26/2023    ANIONGAP 3 03/31/2021     (H) 05/11/2023     (H) 01/26/2023    GLC 87 03/31/2021    BUN 13.7 05/11/2023    BUN 16 01/26/2023    BUN 15 03/31/2021    CR 0.92 05/11/2023    CR 0.99 03/31/2021    GFRESTIMATED 73 05/11/2023    GFRESTIMATED 65 03/31/2021    GFRESTBLACK 76 03/31/2021    AZAEL 9.9 05/11/2023    AZAEL 9.4 03/31/2021        INR RESULTS:  Lab Results   Component Value Date    INR 1.12 06/20/2017    INR 1.12 06/20/2017       Procedures:  PULMONARY FUNCTION TESTS:        View : No data to display.                  ECHOCARDIOGRAM:   No results found for this or any previous visit (from the past 8760 hour(s)).      Assessment and Plan:   1.  Inappropriate sinus tachycardia  2.  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  Recent orthopedic trauma with prolonged recovery  4.  Asthma    Plan  1.  Continue current ivabradine dosage  2.  Follow-up 1 year or earlier if needed    Total elapsed time today with chart review, clinic visit and documentation 40 minutes    I very much appreciated the opportunity to see and assess Laura Jackson in the clinic today. Please do not hesitate to contact my office if you have any questions or concerns.      Phil Mitchell MD  Cardiac Arrhythmia Service  [facility]  [phone]      CC  SELF, REFERRED

## 2023-05-16 NOTE — LETTER
5/16/2023      RE: Laura Jackson  [address]  [place] [address]       Dear Colleague,    Thank you for the opportunity to participate in the care of your patient, Laura Jackson, at the [facility] [place] [facility]. Please see a copy of my visit note below.    HPI:   Laura is a 53-year-old woman with a history of inappropriate sinus tachycardia and hypertension who had been doing quite well on ivabradine 5 mg twice daily.  However she did suffer a major orthopedic injury on her right leg and required surgery and considerable physical rehab.  During that time she had a lot of pain which resulted in increased heart rate.  Initially she tried to tolerate it but ultimately thought that an increase in the ivabradine dose might be useful.    Before coming to this clinic Laura's pain condition seems to have been managed better and she noticed that with less pain there was substantial improvement in her heart rate.  Consequently she no longer feels the need for increased ivabradine dose and we will maintain 5 mg twice daily.    Laura has a history of Edilma-Danlos in the family.  Some of her right joint issues may be related to that diagnosis along with her susceptibility to elevated sinus rates.  Additionally she has occasional asthma flareups which require inhalers and/or steroids.  Under these conditions she again has some difficulty with heart rate control.    Patient is followed by  in [place] and receives periodic care for her asthma there.    We agreed that we would maintain the same dose of ivabradine which is now being supplied by her insurance coverage.  We will reassess in 1 years time or earlier if needed..    PAST MEDICAL HISTORY:  Past Medical History:   Diagnosis Date    Benign essential hypertension 7/31/2018    Family history of breast cancer 2/19/2014    Family history of breast cancer     Hypothyroidism  12/1/2022    SVT (supraventricular tachycardia):  history of, no recurrence since 2008 10/11/2013    no recurrence since 2008     Uncomplicated asthma        CURRENT MEDICATIONS:  Current Outpatient Medications   Medication Sig Dispense Refill    ADVAIR DISKUS 100-50 MCG/ACT inhaler INHALE 1 PUFF INTO THE LUNGS EVERY 12 HOURS 60 each 3    Cholecalciferol (VITAMIN D) 2000 UNITS CAPS Take 1 capsule by mouth daily      ivabradine (CORLANOR) 5 MG tablet Take 1 tablet (5 mg) by mouth 2 times daily (with meals) 180 tablet 1    levalbuterol (XOPENEX HFA) 45 MCG/ACT inhaler Inhale 1-2 puffs into the lungs every 4 hours as needed for shortness of breath No further refills until appointment 15 g 0    metroNIDAZOLE (METROGEL) 0.75 % external gel Apply to face BID 45 g 3    Pyridoxine HCl (B-6) 100 MG TABS       vitamin B-12 (CYANOCOBALAMIN) 100 MCG tablet       valACYclovir (VALTREX) 1000 mg tablet Take 1 tablet (1,000 mg) by mouth 3 times daily 21 tablet 0       PAST SURGICAL HISTORY:  Past Surgical History:   Procedure Laterality Date    ABDOMEN SURGERY  06/2017    myectomy    APPENDECTOMY      ARTHROSCOPY ANKLE, OPEN REPAIR LIGAMENT, COMBINED Right 02/02/2023    Procedure: ankle arthroscopy, modified Brostrom, peroneal tendon repair, exostectomy of medial malleolus;  Surgeon: Saida Michael DPM;  [facility]    COLONOSCOPY N/A 08/20/2018    Procedure: COMBINED COLONOSCOPY, SINGLE OR MULTIPLE BIOPSY/POLYPECTOMY BY BIOPSY;;  Surgeon: Osman Hahn MD;  [facility]    COLONOSCOPY WITH CO2 INSUFFLATION N/A 08/20/2018    Procedure: COLONOSCOPY WITH CO2 INSUFFLATION;  COLON-SCREENING / LUBKA ;  Surgeon: Osman Hahn MD;  [facility]    DAVINCI HYSTERECTOMY TOTAL, SALPINGECTOMY BILATERAL N/A 08/04/2017    Procedure: DAVINCI XI HYSTERECTOMY TOTAL, SALPINGECTOMY BILATERAL;  DAVINCI TOTAL HYSTERECTOMY; BILATERAL SALPINGECTOMY. BILATERAL URETERAL LYSIS. UTEROSACRAL-COLPOPEXY. EXCISION OF  ENDOMETRIOSIS;  Surgeon: George Loyola MD;  [facility]    DAVINCI LYSIS OF ADHESIONS Bilateral 2017    Procedure: DAVINCI LYSIS OF ADHESIONS;  BILATERAL URETERAL LYSIS;  Surgeon: George Loyola MD;  [facility]    DAVINCI SACROCOLPOPEXY, CYSTOSCOPY, COMBINED N/A 2017    Procedure: COMBINED DAVINCI SACROCOLPOPEXY, CYSTOSCOPY;  UTEROSACRAL COLPOPEXY;  Surgeon: George Loyola MD;  [facility]    DAVINCI XI ASSISTED ABLATION / EXCISION OF ENDOMETRIOSIS  2017    Procedure: DAVINCI XI ASSISTED ABLATION / EXCISION OF ENDOMETRIOSIS;;  Surgeon: George Loyola MD;  [facility]    DILATION AND CURETTAGE N/A 2017    Procedure: DILATION AND CURETTAGE;  Uterine Curettings and Fibroid Removal, Cook catheter placement; (No hysterectomy done at this time);  Surgeon: Ernestina Saunders MD;  [facility]    HYSTERECTOMY, PAP NO LONGER INDICATED      ORTHOPEDIC SURGERY  1986    Heel spur , toe surgery    RELEASE CARPAL TUNNEL Right 10/20/2020    Procedure: RIGHT RELEASE, CARPAL TUNNEL;  Surgeon: Rickey Rea MD;  Location: [facility] OR    RELEASE CARPAL TUNNEL Left 2020    Procedure: LEFT RELEASE, CARPAL TUNNEL;  Surgeon: Rickey Rea MD;  Location: [facility] OR    SOFT TISSUE SURGERY  2023    TONSILLECTOMY         ALLERGIES:     Allergies   Allergen Reactions    Penicillins Swelling     Swelling throat; age 4    Adhesive Tape Hives    Penicillin G     Tetracycline GI Disturbance     Other reaction(s): Abdominal Pain  Vomiting; nauseous  Other reaction(s): Abdominal Pain  Vomiting; nauseous       FAMILY HISTORY:  Family History   Problem Relation Age of Onset    Diabetes Mother     Hypertension Mother     Hyperlipidemia Mother     Arrhythmia Mother     Cardiovascular Father         CHF and COPD    Asthma Father     Breast Cancer Maternal Grandfather     Colon Cancer Maternal Grandfather         His mother  of cancer too    Breast Cancer Paternal  "Grandmother     Breast Cancer Paternal Aunt     DEANA Paternal Grandfather     Breast Cancer Paternal Grandfather     Breast Cancer Cousin     Asthma Son     Diabetes Maternal Grandmother     Hypertension Maternal Grandmother     Breast Cancer Cousin     Breast Cancer Cousin     Breast Cancer Cousin         Maternal    Breast Cancer Other     Breast Cancer Other         Maternal aunt       SOCIAL HISTORY:  Social History     Tobacco Use    Smoking status: Never    Smokeless tobacco: Never   Vaping Use    Vaping status: Never Used   Substance Use Topics    Alcohol use: Yes     Comment: Rare- 1 every 2-3month    Drug use: Never       ROS:   Constitutional: No fever, chills, or sweats. Weight stable.   ENT: No visual disturbance, ear ache, epistaxis, sore throat.   Cardiovascular: As per HPI.   Respiratory: No cough, hemoptysis.    GI: No nausea, vomiting, .   : No hematuria.   Integument: Negative.   Psychiatric: Negative.   Hematologic:   no easy bleeding.  Neuro: Negative.   Endocrinology: No significant heat or cold intolerance   Musculoskeletal: No myalgia.    Exam:  /79 (BP Location: Right arm, Patient Position: Sitting, Cuff Size: Adult Regular)   Pulse 69   Ht 1.626 m (5' 4.02\")   Wt 86 kg (189 lb 11.2 oz)   LMP 07/28/2017 (Exact Date)   SpO2 95%   BMI 32.55 kg/m    GENERAL APPEARANCE: healthy, alert and no distress  HEENT: no icterus,  no central cyanosis  NECK: no adenopathy, , JVP not elevated  RESPIRATORY: lungs clear to auscultation - no rales, rhonchi or wheezes, no use of accessory muscles, no retractions, respirations are unlabored, normal respiratory rate  CARDIOVASCULAR: regular rhythm,   NEURO: alert and oriented to person/place/time, normal speech, gait and affect  SKIN: no ecchymoses, no rashes    Labs:  CBC RESULTS:   Lab Results   Component Value Date    WBC 6.2 03/31/2021    RBC 4.75 03/31/2021    HGB 13.8 05/11/2023    HGB 14.1 03/31/2021    HCT 43.2 03/31/2021    MCV 91 " 03/31/2021    MCH 29.7 03/31/2021    MCHC 32.6 03/31/2021    RDW 14.0 03/31/2021     03/31/2021       BMP RESULTS:  Lab Results   Component Value Date     05/11/2023     03/31/2021    POTASSIUM 4.5 05/11/2023    POTASSIUM 4.3 01/26/2023    POTASSIUM 4.2 03/31/2021    CHLORIDE 104 05/11/2023    CHLORIDE 105 01/26/2023    CHLORIDE 104 03/31/2021    CO2 24 05/11/2023    CO2 28 01/26/2023    CO2 31 03/31/2021    ANIONGAP 13 05/11/2023    ANIONGAP 6 01/26/2023    ANIONGAP 3 03/31/2021     (H) 05/11/2023     (H) 01/26/2023    GLC 87 03/31/2021    BUN 13.7 05/11/2023    BUN 16 01/26/2023    BUN 15 03/31/2021    CR 0.92 05/11/2023    CR 0.99 03/31/2021    GFRESTIMATED 73 05/11/2023    GFRESTIMATED 65 03/31/2021    GFRESTBLACK 76 03/31/2021    AZAEL 9.9 05/11/2023    AZAEL 9.4 03/31/2021        INR RESULTS:  Lab Results   Component Value Date    INR 1.12 06/20/2017    INR 1.12 06/20/2017       Procedures:  PULMONARY FUNCTION TESTS:        View : No data to display.                  ECHOCARDIOGRAM:   No results found for this or any previous visit (from the past 8760 hour(s)).      Assessment and Plan:   1.  Inappropriate sinus tachycardia  2.  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  Recent orthopedic trauma with prolonged recovery  4.  Asthma    Plan  1.  Continue current ivabradine dosage  2.  Follow-up 1 year or earlier if needed    Total elapsed time today with chart review, clinic visit and documentation 40 minutes    I very much appreciated the opportunity to see and assess Laura Jackson in the clinic today. Please do not hesitate to contact my office if you have any questions or concerns.      Phil Mitchell MD  Cardiac Arrhythmia Service  [facility]  [phone]      CC  SELF, REFERRED

## 2023-05-16 NOTE — PATIENT INSTRUCTIONS
You were seen in the Electrophysiology Clinic today by: Dr Mitchell    Plan:     Follow up Visit:  1 year with EP CARLOS         If you have further questions, please utilize [employer] to contact us.     Your Care Team:    [facility] Cardiology   Telephone Number     Nurse Line  Tatyana Cisneros, RN   Ruthy Baeza, RN   [phone]     For scheduling appointments:   Ama   [phone]   For procedure scheduling:    Gina Walker     [phone]   For the Device Clinic (Pacemakers, ICDs, Loop Recorders)    During business hours: [phone]  After business hours:   [phone]- select option 4 and ask for job code [id number].       On-call cardiologist for after hours or on weekends:   [phone], option #4, and ask to speak to the on-call cardiologist.     Cardiovascular Clinic:   [address] [place] [address]. [place] [address]      As always, Thank you for trusting us with your health care needs!

## 2023-05-16 NOTE — NURSING NOTE
Chief Complaint   Patient presents with     Follow Up       Vitals were taken, medications reconciled.    Agueda Randhawa, EMT   2:12 PM

## 2023-05-17 ENCOUNTER — THERAPY VISIT (OUTPATIENT)
Dept: PHYSICAL THERAPY | Facility: CLINIC | Age: 53
End: 2023-05-17
Payer: COMMERCIAL

## 2023-05-17 DIAGNOSIS — M54.89 OTHER BACK PAIN, UNSPECIFIED CHRONICITY: Primary | ICD-10-CM

## 2023-05-17 PROCEDURE — 97140 MANUAL THERAPY 1/> REGIONS: CPT | Mod: GP | Performed by: PHYSICAL THERAPIST

## 2023-05-17 PROCEDURE — 97112 NEUROMUSCULAR REEDUCATION: CPT | Mod: GP | Performed by: PHYSICAL THERAPIST

## 2023-05-19 PROCEDURE — 88305 TISSUE EXAM BY PATHOLOGIST: CPT | Mod: TC,ORL | Performed by: OBSTETRICS & GYNECOLOGY

## 2023-05-22 ENCOUNTER — LAB REQUISITION (OUTPATIENT)
Dept: LAB | Facility: CLINIC | Age: 53
End: 2023-05-22
Payer: COMMERCIAL

## 2023-05-25 LAB
PATH REPORT.COMMENTS IMP SPEC: NORMAL
PATH REPORT.COMMENTS IMP SPEC: NORMAL
PATH REPORT.FINAL DX SPEC: NORMAL
PATH REPORT.GROSS SPEC: NORMAL
PATH REPORT.MICROSCOPIC SPEC OTHER STN: NORMAL
PATH REPORT.RELEVANT HX SPEC: NORMAL
PHOTO IMAGE: NORMAL

## 2023-05-25 PROCEDURE — 88305 TISSUE EXAM BY PATHOLOGIST: CPT | Mod: 26 | Performed by: PATHOLOGY

## 2023-05-30 NOTE — PROGRESS NOTES
Subjective:    8/18/22   Patient seen as a new patient consult from [facility] and is seen today  for right ankle sprain.  Had inversion sprain 9 days ago.  Was in [place] at the time and slipped on stairs with gravel on them.  Pain and swelling and bruising.  Aggravated by activity and relieved by rest.  Fortunately she had an ankle brace with her and wear this the rest of the trip.  Recently seen in clinic in the [place] and had x-rays and given an Aircast.  She states this is more comfortable for walking at this time.  Denies erythema weakness or increased deformity.  Denies numbness.  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    9/8/22 patient returns for right ankle sprain  Sprained ankle approximately 1 month ago on August 10, 2022.  She has not been working yet.  Wearing Aircast intermittently around the house.  States feeling better but still painful.  Patient states that before this happened for the last year she has been having pain and perhaps some instability on her lateral ankle as well.    9/22/22 patient is 6 weeks 1 day status post right ankle sprain on August 10, 2022.  Patient states has been wearing boot more and believes this is helped.  Pain is decreasing.  Edema is decreasing.  Has tried walking and ankle brace and somewhat uncomfortable.  She is still not working.  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       10/11/22 patient is 8 weeks 6 days status post right ankle sprain.  Patient has been going to physical therapy and this has helped.  Generally she states pain and swelling decreasing.  She is seeing gradual improvement.  States still feels unstable.  States recently had slight eversion causing some medial pain.    11/8/22 patient is 12 weeks 6 days status post right ankle sprain.  Patient feeling better but still some side to side and stability.  Dorsiflexion and plantarflexion fine.  States edema generally decreasing.  She does not  feel she is ready to go back to work yet.  States she is getting some numbness fourth and fifth toes on her display these.      ROS: See above         Allergies   Allergen Reactions     Penicillins Swelling     Swelling throat; age 4     Adhesive Tape Hives     Penicillin G      Tetracycline GI Disturbance     Other reaction(s): Abdominal Pain  Vomiting; nauseous  Other reaction(s): Abdominal Pain  Vomiting; nauseous       Current Outpatient Medications   Medication Sig Dispense Refill     Cholecalciferol (VITAMIN D) 2000 UNITS CAPS Take 1 capsule by mouth daily       famotidine (PEPCID) 40 MG tablet Take 1 tablet (40 mg) by mouth At Bedtime 90 tablet 3     fluticasone-salmeterol (ADVAIR) 100-50 MCG/ACT inhaler Inhale 1 puff into the lungs every 12 hours 60 each 3     ivabradine (CORLANOR) 5 MG tablet Take 1 tablet (5 mg) by mouth 2 times daily (with meals) 720 tablet 0     levalbuterol (XOPENEX HFA) 45 MCG/ACT inhaler Inhale 1-2 puffs into the lungs every 4 hours as needed for shortness of breath / dyspnea 15 g 0     metroNIDAZOLE (METROGEL) 0.75 % external gel Apply to face BID 45 g 11     omeprazole (PRILOSEC) 40 MG DR capsule Take 1 capsule (40 mg) by mouth daily before breakfast 90 capsule 3     Pyridoxine HCl (B-6) 100 MG TABS        vitamin B-12 (CYANOCOBALAMIN) 100 MCG tablet          Patient Active Problem List   Diagnosis     CARDIOVASCULAR SCREENING; LDL GOAL LESS THAN 160     Irritable bowel syndrome     GERD (gastroesophageal reflux disease)     History of supraventricular tachycardia     Mild persistent asthma     Family history of breast cancer     S/P myomectomy     Nausea     S/P ANAID (total abdominal hysterectomy)     Hypovitaminosis D     Endometriosis     Heberden's nodes     POTS (postural orthostatic tachycardia syndrome)     Hypermobility syndrome     Cervicalgia     Autonomic dysfunction     Benign essential hypertension     Back pain     Sprain of anterior talofibular ligament of right ankle      Pain in joint involving ankle and foot, right       Past Medical History:   Diagnosis Date     Benign essential hypertension 7/31/2018     Family history of breast cancer 2/19/2014     Family history of breast cancer      SVT (supraventricular tachycardia):  history of, no recurrence since 2008 10/11/2013    no recurrence since 2008      Uncomplicated asthma        Past Surgical History:   Procedure Laterality Date     ABDOMEN SURGERY  6/2017    myectomy     APPENDECTOMY       COLONOSCOPY N/A 8/20/2018    Procedure: COMBINED COLONOSCOPY, SINGLE OR MULTIPLE BIOPSY/POLYPECTOMY BY BIOPSY;;  Surgeon: Osman Hahn MD;  Location: [facility]     COLONOSCOPY WITH CO2 INSUFFLATION N/A 8/20/2018    Procedure: COLONOSCOPY WITH CO2 INSUFFLATION;  COLON-SCREENING / LUBKA ;  Surgeon: Osman Hahn MD;  Location: [facility]     DAVINCI HYSTERECTOMY TOTAL, SALPINGECTOMY BILATERAL N/A 8/4/2017    Procedure: DAVINCI XI HYSTERECTOMY TOTAL, SALPINGECTOMY BILATERAL;  DAVINCI TOTAL HYSTERECTOMY; BILATERAL SALPINGECTOMY. BILATERAL URETERAL LYSIS. UTEROSACRAL-COLPOPEXY. EXCISION OF ENDOMETRIOSIS;  Surgeon: George Loyola MD;  Location: [facility]     DAVINCI LYSIS OF ADHESIONS Bilateral 8/4/2017    Procedure: DAVINCI LYSIS OF ADHESIONS;  BILATERAL URETERAL LYSIS;  Surgeon: George Loyola MD;  Location: [facility]     DAVINCI SACROCOLPOPEXY, CYSTOSCOPY, COMBINED N/A 8/4/2017    Procedure: COMBINED DAVINCI SACROCOLPOPEXY, CYSTOSCOPY;  UTEROSACRAL COLPOPEXY;  Surgeon: George Loyola MD;  Location: [facility]     DAVINCI XI ASSISTED ABLATION / EXCISION OF ENDOMETRIOSIS  8/4/2017    Procedure: DAVINCI XI ASSISTED ABLATION / EXCISION OF ENDOMETRIOSIS;;  Surgeon: George Loyola MD;  [facility]: [facility]     DILATION AND CURETTAGE N/A 6/20/2017    Procedure: DILATION AND CURETTAGE;  Uterine Curettings and Fibroid Removal, Cook catheter placement; (No hysterectomy done at this time);  Surgeon: Ernestina Saunders MD;  [facility]      HYSTERECTOMY, PAP NO LONGER INDICATED       ORTHOPEDIC SURGERY  6/1986    Heel spur , toe surgery     RELEASE CARPAL TUNNEL Right 10/20/2020    Procedure: RIGHT RELEASE, CARPAL TUNNEL;  Surgeon: Rickey Rea MD;  Location: [facility] OR     RELEASE CARPAL TUNNEL Left 11/6/2020    Procedure: LEFT RELEASE, CARPAL TUNNEL;  Surgeon: Rickey Rea MD;  Location: [facility] OR     TONSILLECTOMY         Family History   Problem Relation Age of Onset     Diabetes Mother      Hypertension Mother      Hyperlipidemia Mother      Arrhythmia Mother      Cardiovascular Father         CHF and COPD     Asthma Father      Breast Cancer Maternal Grandfather      Colon Cancer Maternal Grandfather      Breast Cancer Paternal Grandmother      Breast Cancer Paternal Aunt      C.A.D. Paternal Grandfather      Breast Cancer Paternal Grandfather      Breast Cancer Cousin      Asthma Son      Diabetes Maternal Grandmother      Hypertension Maternal Grandmother      Breast Cancer Cousin      Breast Cancer Cousin      Breast Cancer Cousin        Social History     Tobacco Use     Smoking status: Never     Smokeless tobacco: Never   Substance Use Topics     Alcohol use: Yes     Comment: Rare- @1 month         Exam:    Vitals: LMP 07/28/2017 (Exact Date)   BMI: There is no height or weight on file to calculate BMI.  Height: Data Unavailable    Constitutional/ general:  Pt is in no apparent distress, appears well-nourished.  Cooperative with history and physical exam.     Psych:  The patient answered questions appropriately.  Normal affect.  Seems to have reasonable expectations, in terms of treatment.     Lungs:  Non labored breathing, non labored speech. No cough.  No audible wheezing. Even, quiet breathing.       Vascular:  positive pedal pulses bilaterally for both the DP and PT arteries.  CFT < 3 sec.  negative ankle edema.  positive pedal hair growth.    Neuro:  Alert and oriented x 3. Coordinated gait.  Light touch sensation is  intact to the L4, L5, S1 distributions. No obvious deficits.  No evidence of neurological-based weakness, spasticity, or contracture in the lower extremities.      Derm: Normal texture and turgor.  No erythema, ecchymosis, or cyanosis.      Musculoskeletal:    Lower extremity muscle strength is normal.   No gross deformities.  With weightbearing patient somewhat pronated.  Right lower extremity has increased internal tibial torsion.  negative ecchymosis  negative erythema  negative pain at TMTJs or styloid process.  negative Achilles or calcaneal tubercle pain  negative medial ankle pain  negative pain AITF ligament  negative pain with stressing or palpation peroneal tendons  negative peroneal subluxation  negative pain over medial or lateral malleoli    Minimal edema today.  Slight pain over ATFL.  Slight pain medial ankle but hard to localize.  No erythema or ecchymosis.  Is not able to splay fourth and fifth toes    Radiographic Exam:  X-Ray Findings:  I personally reviewed the films.    EXAM: XR ANKLE RIGHT G/E 3 VIEWS  LOCATION: [facility]  DATE/TIME: 8/16/2022 5:10 PM     INDICATION:  Ankle injury, right, initial encounter  COMPARISON: None.                                                                      IMPRESSION: Tiny linear flake fracture of the tip of the lateral malleolus. No additional fractures. Moderate soft tissue swelling along the lateral aspect of the ankle. Small plantar calcaneal spur.          Narrative & Impression   EXAM: MR ANKLE RIGHT W/O CONTRAST  LOCATION: [facility]  DATE/TIME: 9/13/2022 6:57 PM     INDICATION: Pain after injury  COMPARISON: None.  TECHNIQUE: Unenhanced.     FINDINGS:      TENDONS:   -Peroneal: Mild peroneus brevis tendinopathy without tearing. The peroneus longus tendon is negative.  -Medial: Posterior tibialis tendon is intact. No tendinopathy or tenosynovitis. Flexor digitorum longus and flexor hallucis longus  tendons are normal. No tenosynovitis.  -Anterior: Anterior tibialis, extensor hallucis longus, and extensor digitorum longus tendons are normal. No tenosynovitis.  -Achilles: No tendinopathy or paratenonitis.     LIGAMENTS:   -Anterior talofibular ligament: High-grade tear of the ATF.   -Calcaneofibular ligament: Grade I sprain of the calcaneofibular ligament without tearing.   -Posterior talofibular ligament: Intact.  -Syndesmotic inferior tibiofibular ligaments: Intact.  -Deltoid ligament complex: Intact.  -Spring ligament complex: Intact.     JOINTS AND BONES:   -The tibiotalar joint is intact. No osteochondral lesion. Tiny tibiotalar joint effusion. Mild reactive edema within the distal talus. Mild reactive edema within the cuboid at the calcaneocuboid articulation. No evidence for letha fracture.     SOFT TISSUES:  -Plantar fascia: Mild plantar calcaneal spurring. No significant signal abnormality. This is likely secondary to old resolved plantar fasciitis.  -Sinus tarsi and tarsal tunnel: Normal.  -Muscles: Normal.                                                                      IMPRESSION:  1.  Mild peroneus brevis tendinopathy without tearing.  2.  High-grade tear of the ATF.  3.  Grade I sprain of the calcaneofibular ligament.  4.  No additional tendinous or ligamentous pathology.  5.  Tiny tibiotalar joint effusion.  6.  Mild reactive edema distal talus.  7.  Mild reactive edema within the cuboid at the calcaneocuboid articulation.  8.  Plantar calcaneal spurring without signal abnormality is likely secondary to old resolved plantar fasciitis.           Assessment:  right ankle sprain     Plan: Discussed patient less edema and pain than when I saw her last.  Discussed nerve which innervates abductor digit he minimi.  Will ice here to decrease edema.  We will give time to heal.  We will make sure she does not reinjure this.  We will use ankle brace inside and outside.  Continue physical therapy for  strengthening.  Return to clinic in 4 weeks.      Vahid Montesinos DPM, FACFAS       [Normal] : normal breast inspection and palpation of axillas [Normal Neck Lymph Nodes] : normal neck lymph nodes  [Normal Supraclavicular Lymph Nodes] : normal supraclavicular lymph nodes [Normal Groin Lymph Nodes] : normal groin lymph nodes [Normal Axillary Lymph Nodes] : normal axillary lymph nodes [Normal] : oriented to person, place and time, with appropriate affect [de-identified] : No dominant masses on either side.  No axillary lymphadenopathy on either side.

## 2023-05-30 NOTE — PROGRESS NOTES
Assessment:      ICD-10-CM    1. Postoperative state  Z98.890 diclofenac (VOLTAREN) 50 MG EC tablet      2. Ankle instability, right  M25.371 diclofenac (VOLTAREN) 50 MG EC tablet      3. Peroneal tendon tear, right, initial encounter  S86.311A diclofenac (VOLTAREN) 50 MG EC tablet            4 months s/p Ankle arthroscopy with debridement, anterior exostectomy including debridement of Cam type lesion, modified Brostrom-De Guzman peroneal-peroneus brevis tendon repair, medial malleolar exostectomy      Plan:  No orders of the defined types were placed in this encounter.      Discussed the post-operative recovery plan with the patient.  Ankle very stable laterally  Getting some medial ankle / deltoid discomfort due to increased  lateral stability    -WB- full in regular shoe.  -Activity- progress normal   -PT -continue  -Diclofenac refill today  -Compression  -Updated note for work today - see AVS      Return:  No follow-ups on file.     Saida Michael DPM                Chief Complaint:     Patient presents with:  Right Ankle - Surgical Followup, RECHECK     Post-op Exam    HPI:  Laura Jackson is a 52 year old year old female who presents for post-op exam.    Surgery Date- 2/2/23  Post-operative month #4  Procedure- Ankle arthroscopy with debridement, anterior exostectomy including debridement of Cam type lesion, modified Brostrom-De Guzman peroneal-peroneus brevis tendon repair, medial malleolar exostectomy  Pain- minimal  Pain medication use- at night only  Abx- none  WB status- WB in boot    Is having swelling, some soreness along peroneal tendons    Ankle feels stiff still    Using boot & crutches occasionally    Past Medical & Surgical History:  Past Medical History:   Diagnosis Date     Benign essential hypertension 7/31/2018     Family history of breast cancer 2/19/2014     Family history of breast cancer      Hypothyroidism 12/1/2022     SVT (supraventricular tachycardia):  history of, no recurrence since  2008 10/11/2013    no recurrence since 2008      Uncomplicated asthma       Past Surgical History:   Procedure Laterality Date     ABDOMEN SURGERY  06/2017    myectomy     APPENDECTOMY       ARTHROSCOPY ANKLE, OPEN REPAIR LIGAMENT, COMBINED Right 02/02/2023    Procedure: ankle arthroscopy, modified Brostrom, peroneal tendon repair, exostectomy of medial malleolus;  Surgeon: Saida Michael DPM;  Location: [facility]     COLONOSCOPY N/A 08/20/2018    Procedure: COMBINED COLONOSCOPY, SINGLE OR MULTIPLE BIOPSY/POLYPECTOMY BY BIOPSY;;  Surgeon: Osman Hahn MD;  Location: [facility]     COLONOSCOPY WITH CO2 INSUFFLATION N/A 08/20/2018    Procedure: COLONOSCOPY WITH CO2 INSUFFLATION;  COLON-SCREENING / LUBKA ;  Surgeon: Osman Hahn MD;  Location: [facility]     DAVINCI HYSTERECTOMY TOTAL, SALPINGECTOMY BILATERAL N/A 08/04/2017    Procedure: DAVINCI XI HYSTERECTOMY TOTAL, SALPINGECTOMY BILATERAL;  DAVINCI TOTAL HYSTERECTOMY; BILATERAL SALPINGECTOMY. BILATERAL URETERAL LYSIS. UTEROSACRAL-COLPOPEXY. EXCISION OF ENDOMETRIOSIS;  Surgeon: George Loyola MD;  Location: [facility]     DAVINCI LYSIS OF ADHESIONS Bilateral 08/04/2017    Procedure: DAVINCI LYSIS OF ADHESIONS;  BILATERAL URETERAL LYSIS;  Surgeon: George Loyola MD;  [facility]     DAVINCI SACROCOLPOPEXY, CYSTOSCOPY, COMBINED N/A 08/04/2017    Procedure: COMBINED DAVINCI SACROCOLPOPEXY, CYSTOSCOPY;  UTEROSACRAL COLPOPEXY;  Surgeon: George Loyola MD;  [facility]: [facility]     DAVINCI XI ASSISTED ABLATION / EXCISION OF ENDOMETRIOSIS  08/04/2017    Procedure: DAVINCI XI ASSISTED ABLATION / EXCISION OF ENDOMETRIOSIS;;  Surgeon: George Loyola MD;  [facility]: [facility]     DILATION AND CURETTAGE N/A 06/20/2017    Procedure: DILATION AND CURETTAGE;  Uterine Curettings and Fibroid Removal, Cook catheter placement; (No hysterectomy done at this time);  Surgeon: Ernestina Saunders MD;  [facility]     HYSTERECTOMY, PAP NO LONGER INDICATED        ORTHOPEDIC SURGERY  1986    Heel spur , toe surgery     RELEASE CARPAL TUNNEL Right 10/20/2020    Procedure: RIGHT RELEASE, CARPAL TUNNEL;  Surgeon: Rikcey Rea MD;  Location: [facility] OR     RELEASE CARPAL TUNNEL Left 2020    Procedure: LEFT RELEASE, CARPAL TUNNEL;  Surgeon: Rickey Rea MD;  Location: [facility] OR     SOFT TISSUE SURGERY  2023     TONSILLECTOMY        Family History   Problem Relation Age of Onset     Diabetes Mother      Hypertension Mother      Hyperlipidemia Mother      Arrhythmia Mother      Cardiovascular Father         CHF and COPD     Asthma Father      Breast Cancer Maternal Grandfather      Colon Cancer Maternal Grandfather         His mother  of cancer too     Breast Cancer Paternal Grandmother      Breast Cancer Paternal Aunt      C.A.D. Paternal Grandfather      Breast Cancer Paternal Grandfather      Breast Cancer Cousin      Asthma Son      Diabetes Maternal Grandmother      Hypertension Maternal Grandmother      Breast Cancer Cousin      Breast Cancer Cousin      Breast Cancer Cousin         Maternal     Breast Cancer Other      Breast Cancer Other         Maternal aunt        Social History:  ?  History   Smoking Status     Never   Smokeless Tobacco     Never     History   Drug Use Unknown     Social History    Substance and Sexual Activity      Alcohol use: Yes        Comment: Rare- 1 every 2-3month      Allergies:  ?   Allergies   Allergen Reactions     Penicillins Swelling     Swelling throat; age 4     Adhesive Tape Hives     Penicillin G      Tetracycline GI Disturbance     Other reaction(s): Abdominal Pain  Vomiting; nauseous  Other reaction(s): Abdominal Pain  Vomiting; nauseous          Medications:    Current Outpatient Medications   Medication     ADVAIR DISKUS 100-50 MCG/ACT inhaler     Cholecalciferol (VITAMIN D) 2000 UNITS CAPS     diclofenac (VOLTAREN) 50 MG EC tablet     ivabradine (CORLANOR) 5 MG tablet     levalbuterol (XOPENEX HFA) 45  MCG/ACT inhaler     metroNIDAZOLE (METROGEL) 0.75 % external gel     Pyridoxine HCl (B-6) 100 MG TABS     vitamin B-12 (CYANOCOBALAMIN) 100 MCG tablet     valACYclovir (VALTREX) 1000 mg tablet     Current Facility-Administered Medications   Medication     lidocaine 1 % injection 0.5 mL     ropivacaine (NAROPIN) injection 1 mL     triamcinolone (KENALOG-40) injection 20 mg     triamcinolone (KENALOG-40) injection 20 mg     triamcinolone (KENALOG-40) injection 40 mg       Physical Exam:  ?  Vitals:  BP (!) 150/85   Pulse 89   Wt 85.7 kg (189 lb)   LMP 07/28/2017 (Exact Date)   BMI 32.43 kg/m     General:  WD/WN, in NAD.  A&O x3.  Dermatologic:    Incision  is well coapted, dehiscence absent.   Janiya-incisional erythema absent, ecchymosis absent.  Vascular:  Digital capillary refill time normal bilateral.  Skin temperature is normal to operative site.  Focal edema- moderate to operative site.   Neurologic:    Gross sensation normal to operative limb.  Gait and balance abnormal, NWB  to operative limb.  Musculoskeletal:  mild pain to palpation of medial ankle /  medial ankle gutter, R.  No pain to palption peroneals, R  Ankle ROM smooth, full, not painful, R  Ankle stable to drawer, tilt R.  Manual Muscle Testing of peroneals pain free 5/5  right.      Imaging:   x-ray independently reviewed and interpreted by myself today.  Weight-bearing views left ankle dated 5/1/23, reveal rectus ankle, no tilt.        x-ray independently reviewed and interpreted by myself today.  Non-Weight-bearing views right ankle dated 02/15/23, reveal interval ant exostectomy, medial mall exostectomy, anchor sites for internal brace visualized

## 2023-05-31 ENCOUNTER — OFFICE VISIT (OUTPATIENT)
Dept: PODIATRY | Facility: CLINIC | Age: 53
End: 2023-05-31
Payer: COMMERCIAL

## 2023-05-31 VITALS
SYSTOLIC BLOOD PRESSURE: 150 MMHG | DIASTOLIC BLOOD PRESSURE: 85 MMHG | BODY MASS INDEX: 32.43 KG/M2 | HEART RATE: 89 BPM | WEIGHT: 189 LBS

## 2023-05-31 DIAGNOSIS — S86.311A PERONEAL TENDON TEAR, RIGHT, INITIAL ENCOUNTER: ICD-10-CM

## 2023-05-31 DIAGNOSIS — Z98.890 POSTOPERATIVE STATE: Primary | ICD-10-CM

## 2023-05-31 DIAGNOSIS — M25.371 ANKLE INSTABILITY, RIGHT: ICD-10-CM

## 2023-05-31 PROCEDURE — 99213 OFFICE O/P EST LOW 20 MIN: CPT | Performed by: PODIATRIST

## 2023-05-31 NOTE — PATIENT INSTRUCTIONS
PATIENT INSTRUCTIONS - Podiatry / Foot & Ankle Surgery    Post-operative Instructions - 3 Months    -Resume all physical activity, including impact.  -Finish physical therpay, have them help you return to regular activity.  -Continue compression to the affected foot/ankle.  -Continue use of orthotic / ankle brace to support affected foot/ankle for several months for more significant physical / athletic activity.         Return to seated work for 1 month starting Monday,   Then 8h shifts for 1 more month  Then full time, full duty

## 2023-05-31 NOTE — LETTER
5/31/2023         RE: Laura Jackson  [address]  [place] [address]        Dear Colleague,    Thank you for referring your patient, Laura Jackson, to the [facility]. Please see a copy of my visit note below.    Assessment:      ICD-10-CM    1. Postoperative state  Z98.890 diclofenac (VOLTAREN) 50 MG EC tablet      2. Ankle instability, right  M25.371 diclofenac (VOLTAREN) 50 MG EC tablet      3. Peroneal tendon tear, right, initial encounter  S86.311A diclofenac (VOLTAREN) 50 MG EC tablet            4 months s/p Ankle arthroscopy with debridement, anterior exostectomy including debridement of Cam type lesion, modified Brostrom-De Guzman peroneal-peroneus brevis tendon repair, medial malleolar exostectomy      Plan:  No orders of the defined types were placed in this encounter.      Discussed the post-operative recovery plan with the patient.  Ankle very stable laterally  Getting some medial ankle / deltoid discomfort due to increased  lateral stability    -WB- full in regular shoe.  -Activity- progress normal   -PT -continue  -Diclofenac refill today  -Compression  -Updated note for work today - see AVS      Return:  No follow-ups on file.     Saida Michael DPM                Chief Complaint:     Patient presents with:  Right Ankle - Surgical Followup, RECHECK     Post-op Exam    HPI:  Laura Jackson is a 52 year old year old female who presents for post-op exam.    Surgery Date- 2/2/23  Post-operative month #4  Procedure- Ankle arthroscopy with debridement, anterior exostectomy including debridement of Cam type lesion, modified Brostrom-De Guzman peroneal-peroneus brevis tendon repair, medial malleolar exostectomy  Pain- minimal  Pain medication use- at night only  Abx- none  WB status- WB in boot    Is having swelling, some soreness along peroneal tendons    Ankle feels stiff still    Using boot & crutches occasionally    Past Medical & Surgical History:  Past Medical  History:   Diagnosis Date     Benign essential hypertension 7/31/2018     Family history of breast cancer 2/19/2014     Family history of breast cancer      Hypothyroidism 12/1/2022     SVT (supraventricular tachycardia):  history of, no recurrence since 2008 10/11/2013    no recurrence since 2008      Uncomplicated asthma       Past Surgical History:   Procedure Laterality Date     ABDOMEN SURGERY  06/2017    myectomy     APPENDECTOMY       ARTHROSCOPY ANKLE, OPEN REPAIR LIGAMENT, COMBINED Right 02/02/2023    Procedure: ankle arthroscopy, modified Brostrom, peroneal tendon repair, exostectomy of medial malleolus;  Surgeon: Saida Michael DPM;  Location: [facility]     COLONOSCOPY N/A 08/20/2018    Procedure: COMBINED COLONOSCOPY, SINGLE OR MULTIPLE BIOPSY/POLYPECTOMY BY BIOPSY;;  Surgeon: Osman Hahn MD;  Location: [facility]     COLONOSCOPY WITH CO2 INSUFFLATION N/A 08/20/2018    Procedure: COLONOSCOPY WITH CO2 INSUFFLATION;  COLON-SCREENING / LUBKA ;  Surgeon: Osman Hahn MD;  Location: [facility]     DAVINCI HYSTERECTOMY TOTAL, SALPINGECTOMY BILATERAL N/A 08/04/2017    Procedure: DAVINCI XI HYSTERECTOMY TOTAL, SALPINGECTOMY BILATERAL;  DAVINCI TOTAL HYSTERECTOMY; BILATERAL SALPINGECTOMY. BILATERAL URETERAL LYSIS. UTEROSACRAL-COLPOPEXY. EXCISION OF ENDOMETRIOSIS;  Surgeon: George Loyola MD;  Location: [facility]     DAVINCI LYSIS OF ADHESIONS Bilateral 08/04/2017    Procedure: DAVINCI LYSIS OF ADHESIONS;  BILATERAL URETERAL LYSIS;  Surgeon: George Loyola MD;  Location: [facility]     DAVINCI SACROCOLPOPEXY, CYSTOSCOPY, COMBINED N/A 08/04/2017    Procedure: COMBINED DAVINCI SACROCOLPOPEXY, CYSTOSCOPY;  UTEROSACRAL COLPOPEXY;  Surgeon: George Loyola MD;  Location: [facility]     DAVINCI XI ASSISTED ABLATION / EXCISION OF ENDOMETRIOSIS  08/04/2017    Procedure: DAVINCI XI ASSISTED ABLATION / EXCISION OF ENDOMETRIOSIS;;  Surgeon: George Loyola MD;  Location: [facility]     DILATION AND CURETTAGE  N/A 2017    Procedure: DILATION AND CURETTAGE;  Uterine Curettings and Fibroid Removal, Cook catheter placement; (No hysterectomy done at this time);  Surgeon: Ernestina Saunders MD;  Location: [facility]     HYSTERECTOMY, PAP NO LONGER INDICATED       ORTHOPEDIC SURGERY  1986    Heel spur , toe surgery     RELEASE CARPAL TUNNEL Right 10/20/2020    Procedure: RIGHT RELEASE, CARPAL TUNNEL;  Surgeon: Rickey Rea MD;  Location: [facility] OR     RELEASE CARPAL TUNNEL Left 2020    Procedure: LEFT RELEASE, CARPAL TUNNEL;  Surgeon: Rickey Rea MD;  Location: [facility] OR     SOFT TISSUE SURGERY  2023     TONSILLECTOMY        Family History   Problem Relation Age of Onset     Diabetes Mother      Hypertension Mother      Hyperlipidemia Mother      Arrhythmia Mother      Cardiovascular Father         CHF and COPD     Asthma Father      Breast Cancer Maternal Grandfather      Colon Cancer Maternal Grandfather         His mother  of cancer too     Breast Cancer Paternal Grandmother      Breast Cancer Paternal Aunt      C.A.D. Paternal Grandfather      Breast Cancer Paternal Grandfather      Breast Cancer Cousin      Asthma Son      Diabetes Maternal Grandmother      Hypertension Maternal Grandmother      Breast Cancer Cousin      Breast Cancer Cousin      Breast Cancer Cousin         Maternal     Breast Cancer Other      Breast Cancer Other         Maternal aunt        Social History:  ?  History   Smoking Status     Never   Smokeless Tobacco     Never     History   Drug Use Unknown     Social History    Substance and Sexual Activity      Alcohol use: Yes        Comment: Rare- 1 every 2-3month      Allergies:  ?   Allergies   Allergen Reactions     Penicillins Swelling     Swelling throat; age 4     Adhesive Tape Hives     Penicillin G      Tetracycline GI Disturbance     Other reaction(s): Abdominal Pain  Vomiting; nauseous  Other reaction(s): Abdominal Pain  Vomiting; nauseous          Medications:     Current Outpatient Medications   Medication     ADVAIR DISKUS 100-50 MCG/ACT inhaler     Cholecalciferol (VITAMIN D) 2000 UNITS CAPS     diclofenac (VOLTAREN) 50 MG EC tablet     ivabradine (CORLANOR) 5 MG tablet     levalbuterol (XOPENEX HFA) 45 MCG/ACT inhaler     metroNIDAZOLE (METROGEL) 0.75 % external gel     Pyridoxine HCl (B-6) 100 MG TABS     vitamin B-12 (CYANOCOBALAMIN) 100 MCG tablet     valACYclovir (VALTREX) 1000 mg tablet     Current Facility-Administered Medications   Medication     lidocaine 1 % injection 0.5 mL     ropivacaine (NAROPIN) injection 1 mL     triamcinolone (KENALOG-40) injection 20 mg     triamcinolone (KENALOG-40) injection 20 mg     triamcinolone (KENALOG-40) injection 40 mg       Physical Exam:  ?  Vitals:  BP (!) 150/85   Pulse 89   Wt 85.7 kg (189 lb)   LMP 07/28/2017 (Exact Date)   BMI 32.43 kg/m     General:  WD/WN, in NAD.  A&O x3.  Dermatologic:    Incision  is well coapted, dehiscence absent.   Janiya-incisional erythema absent, ecchymosis absent.  Vascular:  Digital capillary refill time normal bilateral.  Skin temperature is normal to operative site.  Focal edema- moderate to operative site.   Neurologic:    Gross sensation normal to operative limb.  Gait and balance abnormal, NWB  to operative limb.  Musculoskeletal:  mild pain to palpation of medial ankle /  medial ankle gutter, R.  No pain to palption peroneals, R  Ankle ROM smooth, full, not painful, R  Ankle stable to drawer, tilt R.  Manual Muscle Testing of peroneals pain free 5/5  right.      Imaging:   x-ray independently reviewed and interpreted by myself today.  Weight-bearing views left ankle dated 5/1/23, reveal rectus ankle, no tilt.        x-ray independently reviewed and interpreted by myself today.  Non-Weight-bearing views right ankle dated 02/15/23, reveal interval ant exostectomy, medial mall exostectomy, anchor sites for internal brace visualized        Again, thank you for allowing me to participate  in the care of your patient.        Sincerely,        Saida Michael DPM

## 2023-06-06 ENCOUNTER — THERAPY VISIT (OUTPATIENT)
Dept: PHYSICAL THERAPY | Facility: CLINIC | Age: 53
End: 2023-06-06
Payer: COMMERCIAL

## 2023-06-06 DIAGNOSIS — M25.571 PAIN IN JOINT, ANKLE AND FOOT, RIGHT: Primary | ICD-10-CM

## 2023-06-06 DIAGNOSIS — Z47.89 AFTERCARE FOLLOWING SURGERY OF THE MUSCULOSKELETAL SYSTEM: ICD-10-CM

## 2023-06-06 DIAGNOSIS — R26.9 ABNORMALITY OF GAIT: ICD-10-CM

## 2023-06-06 PROCEDURE — 97112 NEUROMUSCULAR REEDUCATION: CPT | Mod: GP | Performed by: PHYSICAL THERAPIST

## 2023-06-06 PROCEDURE — 97140 MANUAL THERAPY 1/> REGIONS: CPT | Mod: GP | Performed by: PHYSICAL THERAPIST

## 2023-06-06 PROCEDURE — 97110 THERAPEUTIC EXERCISES: CPT | Mod: GP | Performed by: PHYSICAL THERAPIST

## 2023-06-12 ENCOUNTER — THERAPY VISIT (OUTPATIENT)
Dept: PHYSICAL THERAPY | Facility: CLINIC | Age: 53
End: 2023-06-12
Payer: COMMERCIAL

## 2023-06-12 DIAGNOSIS — M54.89 OTHER BACK PAIN, UNSPECIFIED CHRONICITY: Primary | ICD-10-CM

## 2023-06-12 PROCEDURE — 97112 NEUROMUSCULAR REEDUCATION: CPT | Mod: GP | Performed by: PHYSICAL THERAPIST

## 2023-06-12 PROCEDURE — 97140 MANUAL THERAPY 1/> REGIONS: CPT | Mod: GP | Performed by: PHYSICAL THERAPIST

## 2023-06-13 ENCOUNTER — THERAPY VISIT (OUTPATIENT)
Dept: PHYSICAL THERAPY | Facility: CLINIC | Age: 53
End: 2023-06-13
Payer: COMMERCIAL

## 2023-06-13 DIAGNOSIS — Z47.89 AFTERCARE FOLLOWING SURGERY OF THE MUSCULOSKELETAL SYSTEM: ICD-10-CM

## 2023-06-13 DIAGNOSIS — R26.9 ABNORMALITY OF GAIT: ICD-10-CM

## 2023-06-13 DIAGNOSIS — M25.571 PAIN IN JOINT, ANKLE AND FOOT, RIGHT: Primary | ICD-10-CM

## 2023-06-13 PROCEDURE — 97112 NEUROMUSCULAR REEDUCATION: CPT | Mod: GP | Performed by: PHYSICAL THERAPIST

## 2023-06-13 PROCEDURE — 97110 THERAPEUTIC EXERCISES: CPT | Mod: GP | Performed by: PHYSICAL THERAPIST

## 2023-06-14 ENCOUNTER — MYC MEDICAL ADVICE (OUTPATIENT)
Dept: PODIATRY | Facility: CLINIC | Age: 53
End: 2023-06-14
Payer: COMMERCIAL

## 2023-06-14 DIAGNOSIS — S86.311A PERONEAL TENDON TEAR, RIGHT, INITIAL ENCOUNTER: ICD-10-CM

## 2023-06-14 DIAGNOSIS — M25.371 ANKLE INSTABILITY, RIGHT: Primary | ICD-10-CM

## 2023-06-14 DIAGNOSIS — M21.6X1 ACQUIRED PES VALGUS, RIGHT: ICD-10-CM

## 2023-06-21 ENCOUNTER — THERAPY VISIT (OUTPATIENT)
Dept: PHYSICAL THERAPY | Facility: CLINIC | Age: 53
End: 2023-06-21
Payer: COMMERCIAL

## 2023-06-21 DIAGNOSIS — M54.89 OTHER BACK PAIN, UNSPECIFIED CHRONICITY: Primary | ICD-10-CM

## 2023-06-21 PROCEDURE — 97112 NEUROMUSCULAR REEDUCATION: CPT | Mod: GP | Performed by: PHYSICAL THERAPIST

## 2023-06-21 PROCEDURE — 97140 MANUAL THERAPY 1/> REGIONS: CPT | Mod: GP | Performed by: PHYSICAL THERAPIST

## 2023-06-26 ENCOUNTER — MYC MEDICAL ADVICE (OUTPATIENT)
Dept: PODIATRY | Facility: CLINIC | Age: 53
End: 2023-06-26
Payer: COMMERCIAL

## 2023-06-26 NOTE — LETTER
June 26, 2023      Laura Jackson  [address]  [place] [address]        To Whom It May Concern:    Laura Jackson was seen in our clinic. Please allow her to return to work with the following work restrictions:     Extend light duty work for an additional 4 weeks (July 28th)       Sincerely,        Saida Michael DPM    (Electronically signed)

## 2023-06-26 NOTE — TELEPHONE ENCOUNTER
Extension on work restrictions sent to pt [employer].     MELANI Oates

## 2023-06-27 ENCOUNTER — OFFICE VISIT (OUTPATIENT)
Dept: ORTHOPEDICS | Facility: CLINIC | Age: 53
End: 2023-06-27
Payer: COMMERCIAL

## 2023-06-27 ENCOUNTER — ANCILLARY PROCEDURE (OUTPATIENT)
Dept: GENERAL RADIOLOGY | Facility: CLINIC | Age: 53
End: 2023-06-27
Attending: PEDIATRICS
Payer: COMMERCIAL

## 2023-06-27 ENCOUNTER — THERAPY VISIT (OUTPATIENT)
Dept: PHYSICAL THERAPY | Facility: CLINIC | Age: 53
End: 2023-06-27
Payer: COMMERCIAL

## 2023-06-27 VITALS
WEIGHT: 189 LBS | DIASTOLIC BLOOD PRESSURE: 98 MMHG | SYSTOLIC BLOOD PRESSURE: 137 MMHG | HEIGHT: 64 IN | BODY MASS INDEX: 32.27 KG/M2

## 2023-06-27 DIAGNOSIS — M79.642 LEFT HAND PAIN: ICD-10-CM

## 2023-06-27 DIAGNOSIS — M25.571 PAIN IN JOINT, ANKLE AND FOOT, RIGHT: Primary | ICD-10-CM

## 2023-06-27 DIAGNOSIS — Z47.89 AFTERCARE FOLLOWING SURGERY OF THE MUSCULOSKELETAL SYSTEM: ICD-10-CM

## 2023-06-27 DIAGNOSIS — M79.642 LEFT HAND PAIN: Primary | ICD-10-CM

## 2023-06-27 DIAGNOSIS — M65.332 TRIGGER MIDDLE FINGER OF LEFT HAND: ICD-10-CM

## 2023-06-27 DIAGNOSIS — R26.9 ABNORMALITY OF GAIT: ICD-10-CM

## 2023-06-27 PROCEDURE — 73130 X-RAY EXAM OF HAND: CPT | Mod: TC | Performed by: RADIOLOGY

## 2023-06-27 PROCEDURE — 97110 THERAPEUTIC EXERCISES: CPT | Mod: GP | Performed by: PHYSICAL THERAPIST

## 2023-06-27 PROCEDURE — 99214 OFFICE O/P EST MOD 30 MIN: CPT | Performed by: PEDIATRICS

## 2023-06-27 PROCEDURE — 97112 NEUROMUSCULAR REEDUCATION: CPT | Mod: GP | Performed by: PHYSICAL THERAPIST

## 2023-06-27 NOTE — PROGRESS NOTES
ASSESSMENT & PLAN    Laura was seen today for pain.    Diagnoses and all orders for this visit:    Left hand pain  -     XR Hand Left G/E 3 Views; Future  -     MR Hand Left w/o Contrast; Future    Trigger middle finger of left hand      Previous rheumatologic work up without clearly inflammatory condition in 2021. Had borderline positive MANDO at that time.  We discussed the following: symptom treatment, activity modification/rest, imaging, rehab, injection therapy, medication, support for the affected area, lab studies and referral to rheum. Following discussion, plan:  See below. MRI next, question inflammatory condition. If not noted, then likely hand therapy.  Questions answered. Discussed signs and symptoms that may indicate more serious issues; the patient was instructed to seek appropriate care if noted. Laura indicates understanding of these issues and agrees with the plan.        See Patient Instructions  Patient Instructions   Reviewed options for left hand symptoms. Some inflammation around MCP joints in left hand, with recurrence of long trigger finger.  We discussed considerations for inflammatory condition, though previous work-up in 2021 did not clearly demonstrate this.  Updated x-ray today does not demonstrate significant degenerative change or significant interval change when compared to previous hand x-rays either.  For next steps, we discussed considerations around further imaging with MRI given the pain and swelling, referral to hand therapy, use of splints, also potential medication and injection options, as well as further work-up with lab studies and/worsening rheumatology.  Following discussion, plan MRI of the left hand next, order placed.  Plan to contact you with MRI results.    Advanced imaging is done by appointment. Please call [facility] ([facility]/[place]) [phone]  or [facility] ([facility]/[place]) [phone]  to schedule  your MRI.     Some insurance companies may require a prior authorization to be completed which can delay the time until you are able to schedule your appointment.       If you are active on [employer], you may have access to your test results before your provider is able to review the study and advise on next steps.      The clinic will contact you with results. If you have not heard from the clinic within 2-3 days following your MRI, please contact us at the number listed below.      If you have any further questions for your physician or physician s care team you can contact them thru [employer] or by calling  [phone] and use option 3 to leave a voice message.   Messages received during business hours will be returned same day.          Glen Gutierrez, [facility]    SUBJECTIVE- Interim History June 27, 2023    Chief Complaint   Patient presents with     Left Hand - Pain       Laura Jackson is a 53 year old female who is seen in f/u up for    Left hand pain  Trigger middle finger of left hand. Since last visit on 1/11/23 patient has felt like her trigger finger in the middle finger has come back, but notes that the pinky and ring finger are more painful.      Location of Pain: Long, little, and ring fingers, stiff and painful in the 4th and 5th fingers (numbness reported in the 5th), notes locking in the middle finger.    Notes that previous injection, done January 11th 2023, helped.    Worsened by: wearing ring, catches, contracture at night, tight in am, increased pain with activity  Better with: rest  Treatments tried: ibuprofen, home exercises/stretching and oval-8 (no longer using it, stopped after the shot)       Orthopedic/Surgical history: YES - Date: 10/ 2020 carpal tunnel, Bilateral  Recent imaging 5/2021     Social History/Occupation: ICU nurse, is doing light duty at work     No family history pertinent to patient's problem today.     **  Above  information per rooming staff.  Additional history:  Injection helped with left long trigger finger. Coming back again, not as bad as prior to injection.  Some swelling base of long, ring, small fingers on left.  Some tightness, stiffness ring and small fingers right. With exercises, left long finger triggering, but not that much pain.  Question whether small finger left has some numbness.  Also some pain in left ring finger PIP joint.     Exercises for the fingers related to past therapy for CTS and hypermobility.      REVIEW OF SYSTEMS:  Review of Systems    OBJECTIVE:  [facility] 07/28/2017 (Exact Date)          Left hand:      Inspection:  No deformity noted.  Area of ulnar palmar swelling, base small and ring fingers. No ecchymosis.    Motion:  Digit motion stiffness, some discomfort per pt with full small and ring digit flexion  Intermittent triggering long finger    Sensation:  Grossly intact .    Radial pulses normal, +2/4, capillary refill brisk.    Palpation:  Tender palmar base ring, small fingers, ulnar 5th MTP joint          RADIOLOGY:  Final results and radiologist's interpretation, available in the [facility] health record.  Images were reviewed with the patient in the office today.  My personal interpretation of the performed imaging: no acute bony abnormality noted. No significant interval change when compared to x-rays from 2021.    Recent Results (from the past 24 hour(s))   XR Hand Left G/E 3 Views    Narrative    XR HAND LEFT G/E 3 VIEWS 6/27/2023 12:14 PM     HISTORY: pain and swelling around MCP joints long, ring, small  fingers; Left hand pain    COMPARISON: None.         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 Degenerative change of the first MCP joint. No evidence for  fracture.    JARET PRIDE MD         SYSTEM ID:  [id number]               Previous x-ray:    XR HAND BILATERAL G/E 3 VIEWS 5/12/2021 10:38 AM      HISTORY: Multiple joint pain                                                                       IMPRESSION: Negative exam.     ADAM MENJIVAR MD           Lab:  See studies from 5/12/21. RF neg.  Previous CRP, ESR normal on 3/31/21.           Medication teaching and assessment/Observation and assessment/Rehabilitation services/Teaching and training

## 2023-06-27 NOTE — LETTER
6/27/2023         RE: Laura Jackson  [address]  [place] [address]        Dear Colleague,    Thank you for referring your patient, Laura Jackson, to the [facility]. Please see a copy of my visit note below.    ASSESSMENT & PLAN    Laura was seen today for pain.    Diagnoses and all orders for this visit:    Left hand pain  -     XR Hand Left G/E 3 Views; Future  -     MR Hand Left w/o Contrast; Future    Trigger middle finger of left hand      Previous rheumatologic work up without clearly inflammatory condition in 2021. Had borderline positive MANDO at that time.  We discussed the following: symptom treatment, activity modification/rest, imaging, rehab, injection therapy, medication, support for the affected area, lab studies and referral to rheum. Following discussion, plan:  See below. MRI next, question inflammatory condition. If not noted, then likely hand therapy.  Questions answered. Discussed signs and symptoms that may indicate more serious issues; the patient was instructed to seek appropriate care if noted. Laura indicates understanding of these issues and agrees with the plan.        See Patient Instructions  Patient Instructions   Reviewed options for left hand symptoms. Some inflammation around MCP joints in left hand, with recurrence of long trigger finger.  We discussed considerations for inflammatory condition, though previous work-up in 2021 did not clearly demonstrate this.  Updated x-ray today does not demonstrate significant degenerative change or significant interval change when compared to previous hand x-rays either.  For next steps, we discussed considerations around further imaging with MRI given the pain and swelling, referral to hand therapy, use of splints, also potential medication and injection options, as well as further work-up with lab studies and/worsening rheumatology.  Following discussion, plan MRI of the left hand next,  EARS HURTING FOR 2 DAYS/RUNNY NOSE TODAY order placed.  Plan to contact you with MRI results.    Advanced imaging is done by appointment. Please call [facility] ([facility]) [phone]  or [facility] ([facility]/[facility]) [phone]  to schedule your MRI.     Some insurance companies may require a prior authorization to be completed which can delay the time until you are able to schedule your appointment.       If you are active on [employer], you may have access to your test results before your provider is able to review the study and advise on next steps.      The clinic will contact you with results. If you have not heard from the clinic within 2-3 days following your MRI, please contact us at the number listed below.      If you have any further questions for your physician or physician s care team you can contact them thru [employer] or by calling  [phone] and use option 3 to leave a voice message.   Messages received during business hours will be returned same day.          Glen Gutierrez, [facility]    SUBJECTIVE- Interim History June 27, 2023    Chief Complaint   Patient presents with     Left Hand - Pain       Laura Jackson is a 53 year old female who is seen in f/u up for    Left hand pain  Trigger middle finger of left hand. Since last visit on 1/11/23 patient has felt like her trigger finger in the middle finger has come back, but notes that the pinky and ring finger are more painful.      Location of Pain: Long, little, and ring fingers, stiff and painful in the 4th and 5th fingers (numbness reported in the 5th), notes locking in the middle finger.    Notes that previous injection, done January 11th 2023, helped.    Worsened by: wearing ring, catches, contracture at night, tight in am, increased pain with activity  Better with: rest  Treatments tried: ibuprofen, home exercises/stretching and oval-8 (no longer using it, stopped after  the shot)       Orthopedic/Surgical history: YES - Date: 10/ 2020 carpal tunnel, Bilateral  Recent imaging 5/2021     Social History/Occupation: ICU nurse, is doing light duty at work     No family history pertinent to patient's problem today.     **  Above information per rooming staff.  Additional history:  Injection helped with left long trigger finger. Coming back again, not as bad as prior to injection.  Some swelling base of long, ring, small fingers on left.  Some tightness, stiffness ring and small fingers right. With exercises, left long finger triggering, but not that much pain.  Question whether small finger left has some numbness.  Also some pain in left ring finger PIP joint.     Exercises for the fingers related to past therapy for CTS and hypermobility.      REVIEW OF SYSTEMS:  Review of Systems    OBJECTIVE:  LMP 07/28/2017 (Exact Date)          Left hand:      Inspection:  No deformity noted.  Area of ulnar palmar swelling, base small and ring fingers. No ecchymosis.    Motion:  Digit motion stiffness, some discomfort per pt with full small and ring digit flexion  Intermittent triggering long finger    Sensation:  Grossly intact .    Radial pulses normal, +2/4, capillary refill brisk.    Palpation:  Tender palmar base ring, small fingers, ulnar 5th MTP joint          RADIOLOGY:  Final results and radiologist's interpretation, available in the [facility] health record.  Images were reviewed with the patient in the office today.  My personal interpretation of the performed imaging: no acute bony abnormality noted. No significant interval change when compared to x-rays from 2021.    Recent Results (from the past 24 hour(s))   XR Hand Left G/E 3 Views    Narrative    XR HAND LEFT G/E 3 VIEWS 6/27/2023 12:14 PM     HISTORY: pain and swelling around MCP joints long, ring, small  fingers; Left hand pain    COMPARISON: None.         Impression    IMPRESSION: There is degenerative change of the STT joint and  first  CMC joint. Degenerative change of the first MCP joint. No evidence for  fracture.    JARET PRIDE MD         SYSTEM ID:  [id number]               Previous x-ray:    XR HAND BILATERAL G/E 3 VIEWS 5/12/2021 10:38 AM      HISTORY: Multiple joint pain                                                                      IMPRESSION: Negative exam.     ADAM MENJIVAR MD           Lab:  See studies from 5/12/21. RF neg.  Previous CRP, ESR normal on 3/31/21.              Again, thank you for allowing me to participate in the care of your patient.        Sincerely,        Glen Gutierrez DO

## 2023-06-27 NOTE — PATIENT INSTRUCTIONS
Reviewed options for left hand symptoms. Some inflammation around MCP joints in left hand, with recurrence of long trigger finger.  We discussed considerations for inflammatory condition, though previous work-up in 2021 did not clearly demonstrate this.  Updated x-ray today does not demonstrate significant degenerative change or significant interval change when compared to previous hand x-rays either.  For next steps, we discussed considerations around further imaging with MRI given the pain and swelling, referral to hand therapy, use of splints, also potential medication and injection options, as well as further work-up with lab studies and/worsening rheumatology.  Following discussion, plan MRI of the left hand next, order placed.  Plan to contact you with MRI results.    Advanced imaging is done by appointment. Please call [facility] ([facility]/[place]) [phone]  or [facility] ([facility]/[place]) [phone]  to schedule your MRI.     Some insurance companies may require a prior authorization to be completed which can delay the time until you are able to schedule your appointment.       If you are active on [employer] you may have access to your test results before your provider is able to review the study and advise on next steps.      The clinic will contact you with results. If you have not heard from the clinic within 2-3 days following your MRI, please contact us at the number listed below.      If you have any further questions for your physician or physician s care team you can contact them thru [employer] or by calling  [phone] and use option 3 to leave a voice message.   Messages received during business hours will be returned same day.

## 2023-07-02 ENCOUNTER — HEALTH MAINTENANCE LETTER (OUTPATIENT)
Age: 53
End: 2023-07-02

## 2023-07-13 ENCOUNTER — ANCILLARY PROCEDURE (OUTPATIENT)
Dept: MRI IMAGING | Facility: CLINIC | Age: 53
End: 2023-07-13
Attending: PEDIATRICS
Payer: COMMERCIAL

## 2023-07-13 DIAGNOSIS — M79.642 LEFT HAND PAIN: ICD-10-CM

## 2023-07-13 PROCEDURE — 73218 MRI UPPER EXTREMITY W/O DYE: CPT | Mod: TC | Performed by: RADIOLOGY

## 2023-07-17 ENCOUNTER — OFFICE VISIT (OUTPATIENT)
Dept: INTERNAL MEDICINE | Facility: CLINIC | Age: 53
End: 2023-07-17
Payer: COMMERCIAL

## 2023-07-17 ENCOUNTER — OFFICE VISIT (OUTPATIENT)
Dept: ORTHOPEDICS | Facility: CLINIC | Age: 53
End: 2023-07-17
Payer: COMMERCIAL

## 2023-07-17 VITALS
WEIGHT: 190.4 LBS | DIASTOLIC BLOOD PRESSURE: 78 MMHG | RESPIRATION RATE: 18 BRPM | OXYGEN SATURATION: 96 % | BODY MASS INDEX: 32.68 KG/M2 | HEART RATE: 72 BPM | SYSTOLIC BLOOD PRESSURE: 134 MMHG | TEMPERATURE: 99.1 F

## 2023-07-17 DIAGNOSIS — M25.561 CHRONIC PAIN OF RIGHT KNEE: Primary | ICD-10-CM

## 2023-07-17 DIAGNOSIS — M25.50 MULTIPLE JOINT PAIN: ICD-10-CM

## 2023-07-17 DIAGNOSIS — Z11.59 NEED FOR HEPATITIS C SCREENING TEST: ICD-10-CM

## 2023-07-17 DIAGNOSIS — M35.7 HYPERMOBILITY SYNDROME: ICD-10-CM

## 2023-07-17 DIAGNOSIS — G89.29 CHRONIC PAIN OF RIGHT KNEE: Primary | ICD-10-CM

## 2023-07-17 DIAGNOSIS — Z12.12 SCREENING FOR COLORECTAL CANCER: ICD-10-CM

## 2023-07-17 DIAGNOSIS — Z12.11 SCREENING FOR COLORECTAL CANCER: ICD-10-CM

## 2023-07-17 DIAGNOSIS — K92.1 BLOODY STOOLS: Primary | ICD-10-CM

## 2023-07-17 DIAGNOSIS — Z12.31 VISIT FOR SCREENING MAMMOGRAM: ICD-10-CM

## 2023-07-17 PROCEDURE — 86803 HEPATITIS C AB TEST: CPT | Performed by: INTERNAL MEDICINE

## 2023-07-17 PROCEDURE — 36415 COLL VENOUS BLD VENIPUNCTURE: CPT | Performed by: INTERNAL MEDICINE

## 2023-07-17 PROCEDURE — 20610 DRAIN/INJ JOINT/BURSA W/O US: CPT | Mod: RT | Performed by: ORTHOPAEDIC SURGERY

## 2023-07-17 PROCEDURE — 99214 OFFICE O/P EST MOD 30 MIN: CPT | Performed by: INTERNAL MEDICINE

## 2023-07-17 RX ORDER — LIDOCAINE HYDROCHLORIDE 5 MG/ML
8 INJECTION, SOLUTION INFILTRATION; INTRAVENOUS
Status: DISCONTINUED | OUTPATIENT
Start: 2023-07-17 | End: 2024-05-20

## 2023-07-17 RX ORDER — TRIAMCINOLONE ACETONIDE 40 MG/ML
40 INJECTION, SUSPENSION INTRA-ARTICULAR; INTRAMUSCULAR
Status: DISCONTINUED | OUTPATIENT
Start: 2023-07-17 | End: 2024-05-20

## 2023-07-17 RX ADMIN — TRIAMCINOLONE ACETONIDE 40 MG: 40 INJECTION, SUSPENSION INTRA-ARTICULAR; INTRAMUSCULAR at 12:46

## 2023-07-17 RX ADMIN — LIDOCAINE HYDROCHLORIDE 8 ML: 5 INJECTION, SOLUTION INFILTRATION; INTRAVENOUS at 12:46

## 2023-07-17 NOTE — PROGRESS NOTES
"  Assessment & Plan     Need for hepatitis C screening test  Patient well known to me. She's in for a follow up appointment after around 18 months. 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 She has Edilma-Danlos Syndrome and hypermobility diagnoses     Hypermobility syndrome  As above   - REVIEW OF HEALTH MAINTENANCE PROTOCOL ORDERS  - Adult Rheumatology  Referral; Future    Screening for colorectal cancer  As detailed above   - REVIEW OF HEALTH MAINTENANCE PROTOCOL ORDERS  - Adult GI  Referral - Procedure Only; Future    Review of the result(s) of each unique test - todays tests  Prescription drug management  26 minutes spent by me on the date of the encounter doing chart review, history and exam, documentation and further activities per the note       BMI:   Estimated body mass index is 32.68 kg/m  as calculated from the following:    Height as of 6/27/23: 1.626 m (5' 4\").    Weight as of this encounter: 86.4 kg (190 lb 6.4 oz).           Bj Butler MD  [facility] [place] is a 53 year old, presenting for the following health issues:  Referral (Mri, rheum ) and Eye Exam (Eye is leaking all the time, dry around eyes, )        5/11/2023     8:17 AM   Additional Questions   Roomed by silverio a     History of Present Illness     Asthma:  She presents for follow up of asthma.  She has some cough, no wheezing, and " some shortness of breath. She is not using a relief medication. She typically misses taking her controller medication 1 time(s) per week.Patient is aware of the following triggers: gastric reflux, humidity, smoke, strong odors and fumes and upper respiratory infections. The patient has not had a visit to the Emergency Room, Urgent Care or Hospital due to asthma since the last clinic visit.     Reason for visit:  Rheumotology referrel,asthma/allergy,bloody stool,breast MRI referral    She eats 2-3 servings of fruits and vegetables daily.She consumes 0 sweetened beverage(s) daily.She exercises with enough effort to increase her heart rate 20 to 29 minutes per day.  She exercises with enough effort to increase her heart rate 4 days per week.   She is taking medications regularly.     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 Elliot Sebastian, rheumatologist with [facility] [place]   Asthma - wheezing more, takes inhaler every single month [ controlling medication ] and overall a lot more coughing   Had Coronavirus (COVID-19) roughly 18 months ago  Coughing up sticky phlegm every single day this is new just over the last year or so.  Eyes are leaky and swollen and puffy      Review of Systems         Objective    /78   Pulse 72   Temp 99.1  F (37.3  C) (Temporal)   Resp 18   Wt 86.4 kg (190 lb 6.4 oz)   LMP 07/28/2017 (Exact Date)   SpO2 96%   BMI 32.68 kg/m     Body mass index is 32.68 kg/m .  Physical Exam

## 2023-07-17 NOTE — LETTER
July 20, 2023    Laura Jackson  [address]  [place] [address]          Dear ,    We are writing to inform you of your test results.  All of these tests are within acceptable limits , things look good ! Negative hepatitis C testing       Resulted Orders   Hepatitis C Screen Reflex to HCV RNA Quant and Genotype   Result Value Ref Range    Hepatitis C Antibody Nonreactive Nonreactive    Narrative    Assay performance characteristics have not been established for newborns, infants, and children.       If you have any questions or concerns, please call the clinic at the number listed above.       Sincerely,      Bj Butler MD

## 2023-07-17 NOTE — LETTER
7/17/2023         RE: Laura Jackson  [address]  [place] [address]        Dear Colleague,    Thank you for referring your patient, Laura Jackson, to the [facility] [place]. Please see a copy of my visit note below.    DIAGNOSIS:   Right knee osteoarthritis, medial & lateral meniscal tears    PROCEDURES:  None surgical thus far    HISTORY:  Patient was last seen 4 months ago on 3/13/2023. She reports she has been recovering well from ankle surgery. She takes oral voltarin which helps both her ankle and knee pain. She also reports shin splints.   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 She reports that she has some occassional catching and locking in the knee which cause sharp pain.  At last visit we discussed that we could perform steroid injection and patient wishes to proceed today. She did have an injection performed with Dr. Espitia approximately 2 years ago which provided relief.     EXAM:     General: Awake, Alert, and oriented. Articulates and communicates with a normal affect     R Lower Extremity:  Range of motion 0-100  Neurovascularly intact  Well healed ankle incisions  Tenderness to palpation along the medial tibial joint line consistent with shin splints  Tenderness to palpation of the medial and lateral joint lines.  Knee stable to varus and valgus testing, lachmans and posterior drawer    IMAGING:  Radiographs of the right knee from 3/13/2023 were previously reviewed and findings were discussed with the patient at that time. The imaging demonstrates mild degenerative changes tricompartmentaly. No fractures or dislocations noted     MRI of the right knee from 2/22/23 were previously reviewed and findings were discussed with the patient. The imaging demonstrates Grade IV changes about the patella femoral joint and in the medial compartment. Tearing apparent in the posterior horn of the  medial mensicus. Complex tearing of the lateral meniscus that appears to be a discoid variant    ASSESSMENT:  53F with medial and lateral meniscus tear and OA of the right knee    PLAN:   1. After obtaining consent , corticosteroid Injection into R knee joint was performed  2. Patient was advised to continue oral pain medication and quadriceps strengthening/ankle rehabilitation  3. Patient may follow up PRN with any questions or concerns.     After written informed consent obtained and signed, after sufficient prepping and sterile technique, 40 mg of kenalog and , 8 cc of 1% lidocaine were injected without complication into the right knee. The patient tolerated the injection well and a sterile dressing was applied.       Large Joint Injection: R knee joint    Date/Time: 7/17/2023 12:46 PM    Performed by: Julio Espitia MD  Authorized by: Julio Espitia MD    Indications:  Pain and osteoarthritis  Needle Size comment:  23G  Guidance: landmark guided    Approach:  Anterolateral  Location:  Knee      Medications:  40 mg triamcinolone 40 MG/ML; 8 mL lidocaine (PF) 0.5 %  Outcome:  Tolerated well, no immediate complications  Procedure discussed: discussed risks, benefits, and alternatives    Consent Given by:  Patient  Timeout: timeout called immediately prior to procedure    Prep: patient was prepped and draped in usual sterile fashion              Again, thank you for allowing me to participate in the care of your patient.        Sincerely,        Julio Espitia MD

## 2023-07-17 NOTE — PATIENT INSTRUCTIONS
For swollen eyes and symptoms probably associated with asthma activity / hay fever type symptoms , the standard of care  would be    Fluticasone (FLONASE) 50 MCG/ACT nasal spray   Nonsedating antihistamine such as Zyrtec [ cetirizine ] 10 milligrams per day [ better then Claritin (Loratadine) ] or Allegra (fexofenadine)  Some eye drops like PATANOL (olopatadine hydrochloride ophthalmic solution) 0.1 %   If using these treatments is not adequate then a follow up appointment with a allergy specialist  would be reasonable

## 2023-07-17 NOTE — NURSING NOTE
[address] [address]  Dept: [phone]  ______________________________________________________________________________    Patient: Laura Jackson   : 1970   MRN: [id number]   2023    INVASIVE PROCEDURE SAFETY CHECKLIST    Date: 2023   Procedure: right knee joint injection with kenalog   Patient Name: Laura Jackson  MRN: [id number]  YOB: 1970    Action: Complete sections as appropriate. Any discrepancy results in a HARD COPY until resolved.     PRE PROCEDURE:  Patient ID verified with 2 identifiers (name and  or MRN): Yes  Procedure and site verified with patient/designee (when able): Yes  Accurate consent documentation in medical record: Yes  H&P (or appropriate assessment) documented in medical record: Yes  H&P must be up to 20 days prior to procedure and updates within 24 hours of procedure as applicable: NA  Relevant diagnostic and radiology test results appropriately labeled and displayed as applicable: NA  Procedure site(s) marked with provider initials: NA    TIMEOUT:  Time-Out performed immediately prior to starting procedure, including verbal and active participation of all team members addressing the following:Yes  * Correct patient identify  * Confirmed that the correct side and site are marked  * An accurate procedure consent form  * Agreement on the procedure to be done  * Correct patient position  * Relevant images and results are properly labeled and appropriately displayed  * The need to administer antibiotics or fluids for irrigation purposes during the procedure as applicable   * Safety precautions based on patient history or medication use    DURING PROCEDURE: Verification of correct person, site, and procedures any time the responsibility for care of the patient is transferred to another member of the care team.       Prior to injection, verified patient identity using patient's name  and date of birth.  Due to injection administration, patient instructed to remain in clinic for 15 minutes  afterwards, and to report any adverse reaction to me immediately.    Joint injection was performed.      Lido   Drug Amount Wasted:  Yes: 42 mg/ml   Vial/Syringe: Single dose vial  Expiration Date:  12/01/2025    Kenalog   Drug Amount Wasted: No  Vial/Syringe: Single dose vial  Expiration Date: 03/01/2025    Shannan Ayers, ATC  July 17, 2023

## 2023-07-17 NOTE — PROGRESS NOTES
DIAGNOSIS:   1. Right knee osteoarthritis, medial & lateral meniscal tears    PROCEDURES:  1. None surgical thus far    HISTORY:  Patient was last seen 4 months ago on 3/13/2023. She reports she has been recovering well from ankle surgery. She takes oral voltarin which helps both her ankle and knee pain. She also reports shin splints.   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 She reports that she has some occassional catching and locking in the knee which cause sharp pain.  At last visit we discussed that we could perform steroid injection and patient wishes to proceed today. She did have an injection performed with Dr. Espitia approximately 2 years ago which provided relief.     EXAM:     General: Awake, Alert, and oriented. Articulates and communicates with a normal affect     R Lower Extremity:    Range of motion 0-100    Neurovascularly intact    Well healed ankle incisions    Tenderness to palpation along the medial tibial joint line consistent with shin splints    Tenderness to palpation of the medial and lateral joint lines.    Knee stable to varus and valgus testing, lachmans and posterior drawer    IMAGING:  Radiographs of the right knee from 3/13/2023 were previously reviewed and findings were discussed with the patient at that time. The imaging demonstrates mild degenerative changes tricompartmentaly. No fractures or dislocations noted     MRI of the right knee from 2/22/23 were previously reviewed and findings were discussed with the patient. The imaging demonstrates Grade IV changes about the patella femoral joint and in the medial compartment. Tearing apparent in the posterior horn of the medial mensicus. Complex tearing of the lateral meniscus that appears to be a discoid variant    ASSESSMENT:  1. 53F with medial and lateral meniscus tear and OA of the right knee    PLAN:   1. After obtaining consent , corticosteroid Injection  into R knee joint was performed  2. Patient was advised to continue oral pain medication and quadriceps strengthening/ankle rehabilitation  3. Patient may follow up PRN with any questions or concerns.     After written informed consent obtained and signed, after sufficient prepping and sterile technique, 40 mg of kenalog and , 8 cc of 1% lidocaine were injected without complication into the right knee. The patient tolerated the injection well and a sterile dressing was applied.

## 2023-07-17 NOTE — PROGRESS NOTES
Large Joint Injection: R knee joint    Date/Time: 7/17/2023 12:46 PM    Performed by: Julio Espitia MD  Authorized by: Julio Espitia MD    Indications:  Pain and osteoarthritis  Needle Size comment:  23G  Guidance: landmark guided    Approach:  Anterolateral  Location:  Knee      Medications:  40 mg triamcinolone 40 MG/ML; 8 mL lidocaine (PF) 0.5 %  Outcome:  Tolerated well, no immediate complications  Procedure discussed: discussed risks, benefits, and alternatives    Consent Given by:  Patient  Timeout: timeout called immediately prior to procedure    Prep: patient was prepped and draped in usual sterile fashion

## 2023-07-17 NOTE — NURSING NOTE
"Reason For Visit:   Chief Complaint   Patient presents with     Right Knee - RECHECK     ?  No    Date of injury: None  Type of injury:     Patient was last seen on 3/13/2023. Since that time she reports as she was able to progress her WB status from ankle injury, she has had an increase of pain and catching in her right knee.     She does state that she recently purchased a pair of \"HOKA\" shoes, which has been extremely helpful. She will use diclofenac medication prn. Diclofenac is helpful to treat her pain but causes GI irritation.     She continues to be on light duty for work through the end of July 2023. She works in ICU. She is concerned that her right knee will give out at our if she progresses back to work without restrictions.     Date of surgery: None  Type of surgery: NA.    SANE Score  Left Knee: 90  Right Knee: 50      LMP 07/28/2017 (Exact Date)          Allergies   Allergen Reactions     Penicillins Swelling     Swelling throat; age 4     Adhesive Tape Hives     Penicillin G      Tetracycline GI Disturbance     Other reaction(s): Abdominal Pain  Vomiting; nauseous  Other reaction(s): Abdominal Pain  Vomiting; nauseous       Current Outpatient Medications   Medication     ADVAIR DISKUS 100-50 MCG/ACT inhaler     Cholecalciferol (VITAMIN D) 2000 UNITS CAPS     diclofenac (VOLTAREN) 50 MG EC tablet     ivabradine (CORLANOR) 5 MG tablet     levalbuterol (XOPENEX HFA) 45 MCG/ACT inhaler     metroNIDAZOLE (METROGEL) 0.75 % external gel     Pyridoxine HCl (B-6) 100 MG TABS     valACYclovir (VALTREX) 1000 mg tablet     vitamin B-12 (CYANOCOBALAMIN) 100 MCG tablet     Current Facility-Administered Medications   Medication     lidocaine 1 % injection 0.5 mL     ropivacaine (NAROPIN) injection 1 mL     triamcinolone (KENALOG-40) injection 20 mg     triamcinolone (KENALOG-40) injection 20 mg     triamcinolone (KENALOG-40) injection 40 mg         Shannan Ayers, ATC    "

## 2023-07-18 ENCOUNTER — THERAPY VISIT (OUTPATIENT)
Dept: PHYSICAL THERAPY | Facility: CLINIC | Age: 53
End: 2023-07-18
Payer: COMMERCIAL

## 2023-07-18 DIAGNOSIS — Z47.89 AFTERCARE FOLLOWING SURGERY OF THE MUSCULOSKELETAL SYSTEM: ICD-10-CM

## 2023-07-18 DIAGNOSIS — R26.9 ABNORMALITY OF GAIT: ICD-10-CM

## 2023-07-18 DIAGNOSIS — M25.571 PAIN IN JOINT, ANKLE AND FOOT, RIGHT: Primary | ICD-10-CM

## 2023-07-18 LAB — HCV AB SERPL QL IA: NONREACTIVE

## 2023-07-18 PROCEDURE — 97112 NEUROMUSCULAR REEDUCATION: CPT | Mod: GP | Performed by: PHYSICAL THERAPIST

## 2023-07-18 PROCEDURE — 97110 THERAPEUTIC EXERCISES: CPT | Mod: GP | Performed by: PHYSICAL THERAPIST

## 2023-07-19 ENCOUNTER — THERAPY VISIT (OUTPATIENT)
Dept: PHYSICAL THERAPY | Facility: CLINIC | Age: 53
End: 2023-07-19
Payer: COMMERCIAL

## 2023-07-19 DIAGNOSIS — M54.89 OTHER BACK PAIN, UNSPECIFIED CHRONICITY: Primary | ICD-10-CM

## 2023-07-19 PROCEDURE — 97112 NEUROMUSCULAR REEDUCATION: CPT | Mod: GP | Performed by: PHYSICAL THERAPIST

## 2023-07-19 PROCEDURE — 97140 MANUAL THERAPY 1/> REGIONS: CPT | Mod: GP | Performed by: PHYSICAL THERAPIST

## 2023-07-19 NOTE — PROGRESS NOTES
"    PLAN  Continue therapy per current plan of care(2 visits remaining, every 2-3 wks   07/18/23 0500   Appointment Info   Signing clinician's name / credentials Trisha Strange, PT   Total/Authorized Visits [employer], plan 12   Visits Used 10   Medical Diagnosis s/p R ankle surgery-aftercare, R ankle foot pain, abnormality of gait   PT Tx Diagnosis s/p R ankle surgery-aftercare, R ankle foot pain, abnormality of gait   Progress Note/Certification   Onset of illness/injury or Date of Surgery 02/02/23  (date of MD order 2-22-23)   Therapy Frequency 1x/wk for 2 wks taper every 2-3 wks for 4 visits   Predicted Duration 12 wks   Progress Note Due Date 07/18/23   Progress Note Completed Date 05/15/23   PT Goal 1   Goal Identifier walking   Goal Description patient will be able to walk 60 min painfree   Rationale to maximize safety and independence within the community  (for work tasks)   Goal Progress does not have othotics yet, no long walks recently, 15-20 min at a time. more active throughout the day on her feet   Target Date 08/29/23  (extended as not yet met)   Subjective Report   Subjective Report Does not have new orthotics yet. R knee is better, injection yesteday. Shin pain improving, does not keep her up at night anymore. States the ankle has rolled out about 2x while walking(second time on grass), it has rolled in 3x over past couple weeks. She had stopped wearing her elastic ankle support. Daily PL continues 3-6/10, mainly medial. Started light duty June 5th and is tolerating much better. Overall feels \"something is not quite right\" Will make f/u with surgeon   Objective Measures   Objective Measures Objective Measure 1;Objective Measure 3;Objective Measure 4   Objective Measure 1   Objective Measure ROM ankle   Details Active R ankle DF 18, PF 55  inv and ev wnl   Objective Measure 2   Objective Measure strength R ankle   Details combined PF ev 4/5, others 5/5   Objective Measure 3   Objective Measure pain  ankle " "  Details daily 3-6/10PL, mainly medial   Objective Measure 4   Objective Measure strength hip   Details hip abd R 4-/5, L 4+/5 hip ext R 3+/5, L 4/5(will be more intentional with hip strengthening now)   Treatment Interventions (PT)   Interventions Neuromuscular Re-education;Manual Therapy;Therapeutic Procedure/Exercise   Therapeutic Procedure/Exercise   Therapeutic Procedures: strength, endurance, ROM, flexibillity minutes (83683) 20   Therapeutic Procedures Ther Proc 2;Ther Proc 3   Ther Proc 1 BAPS board #3   Ther Proc 1 - Details f/b10R circles R and L 5 each, did not monse side to side today   Ther Proc 2 rec bike s=6   Ther Proc 2 - Details 5' warmup   Ther Proc 3 isometric diagonals(by PT) R foot   Ther Proc 3 - Details each corner 5R 10\" holds   PTRx Ther Proc 1 Self Talocrural Joint Mobility with Band   PTRx Ther Proc 1 - Details  hold has been increasing knee pain   PTRx Ther Proc 2 Foot Doming   PTRx Ther Proc 2 - Details VR continues as part of HEP   PTRx Ther Proc 3 Foot Yoga   PTRx Ther Proc 3 - Details VR continues as part of HEP   PTRx Ther Proc 4 Ankle Active Range of Motion Dorsiflexion and Plantarflexion   PTRx Ther Proc 4 - Details  HEP 3-4 sessions/day 10R each way   PTRx Ther Proc 5 Manual Plantar Fascia Stretch   PTRx Ther Proc 5 - Details HEP 1. massage the bottom of your foot 2. stretch the big toe back and down(extension and flexion) 5-10 reps each 5\" hold 3-4x/day no pain 3. work gently on the scar tissue daily for 1-3 min   PTRx Ther Proc 6 Towel Stretch Gastroc   PTRx Ther Proc 6 - Details HEP 4x/day 5R 15' hold   PTRx Ther Proc 7 Toe Raises   PTRx Ther Proc 7 - Details in clinic 1s 25R with most wt on R  foot progress more wt to R as tolerated HEP 1-2s 10-15R 1x/day   PTRx Ther Proc 8 Standing Gastroc Stretch   PTRx Ther Proc 8 - Details in clinic 2R 15\" B HEP 2x/day 2R 15-30 sec hold   PTRx Ther Proc 9 Theraband Ankle Inversion   PTRx Ther Proc 9 - Details in clinic 1s 25R grn band HEP " "5x/wk work to 30R   PTRx Ther Proc 10 Ankle Eversion Strengthening With Theraband   PTRx Ther Proc 10 - Details in clinic 1s 25R grn HEP 5x/wk work to 30R   PTRx Ther Proc 11 Theraband Ankle Dorsiflexion   PTRx Ther Proc 11 - Details HEP work to 30R   PTRx Ther Proc 12 Towel Gather   PTRx Ther Proc 12 - Details HEP 1x daily 30 sec to 3 min   PTRx Ther Proc 13 Clamshell Feet Apart   PTRx Ther Proc 13 - Details  HEP 3-5x/wk work to 25R   PTRx Ther Proc 14 Bridging #1   PTRx Ther Proc 14 - Details 1s 10R also practice mini squat to activate glutes 1s 10R   Neuromuscular Re-education   Neuromuscular re-ed of mvmt, balance, coord, kinesthetic sense, posture, proprioception minutes (22500) 8   Neuromuscular Re-education Neuro Re-ed 3   Neuro Re-ed 1 gait drills- stork forward and backward  with control/pause   Neuro Re-ed 1 - Details not today   PTRx Neuro Re-ed 1 Balance Single Leg Stance Supported and Unsupported   PTRx Neuro Re-ed 1 - Details  EO 30\" R and 30+ sec L on floor SLS EC multiple touches B BOSU B stance 1' with fingertip stabilization stork walking forwd and bkwd 15 steps each   Manual Therapy   Manual Therapy: Mobilization, MFR, MLD, friction massage minutes (54582) 5   Manual Therapy 1 gentle calcaneal distraction   Manual Therapy 1 - Details 30\"x3/oscillation   Skilled Intervention MT to improve pain and joint  mobility   Patient Response/Progress monse well   Manual Therapy 2 scar mobilization   Manual Therapy 2 - Details VR for HEP   Manual Therapy Manual Therapy 2   Education   Learner/Method Listening;Pictures/Video   Plan   Home program ptrx   Updates to plan of care progress hip strength   Plan for next session progress balance strength   Comments   Comments NC kinesiotape for R ant med ankle   Total Session Time   Timed Code Treatment Minutes 33   Total Treatment Time (sum of timed and untimed services) 33       Beginning/End Dates of Progress Note Reporting Period:  05/15/23 to 07/18/2023, 10 visits " since SOC    Referring Provider:  Saida Michael

## 2023-07-24 NOTE — PROGRESS NOTES
Assessment:      ICD-10-CM    1. Ankle instability, right  M25.371       2. Ankle impingement syndrome, right  M25.871               6 months s/p Ankle arthroscopy with debridement, anterior exostectomy including debridement of Cam type lesion, modified Brostrom-De Guzman peroneal-peroneus brevis tendon repair, medial malleolar exostectomy      Plan:  No orders of the defined types were placed in this encounter.      Discussed the post-operative recovery plan with the patient.  Ankle very stable laterally  Getting some medial ankle / deltoid discomfort due to increased  lateral stability.      -WB- full in regular shoe.  -Activity- progress normal   -PT -continue  -Updated note for work today - see AVS    Ankle injection at f/unit(s) discussed      Return:  No follow-ups on file.     Saida Michael DPM                Chief Complaint:     Patient presents with:  Right Foot - RECHECK  Right Ankle - RECHECK     Post-op Exam    HPI:  Laura Jackson is a 52 year old year old female who presents for post-op exam.    Surgery Date- 2/2/23  Post-operative month #6  Procedure- Ankle arthroscopy with debridement, anterior exostectomy including debridement of Cam type lesion, modified Brostrom-De Guzman peroneal-peroneus brevis tendon repair, medial malleolar exostectomy  Pain- minimal  Pain medication use- at night only  Abx- none  WB status- WB in boot    States she feels like she re-sprained the sarika once  Medial ankle still most painful - feels like she wants to give out towards the inside        Past Medical & Surgical History:  Past Medical History:   Diagnosis Date    Benign essential hypertension 7/31/2018    Family history of breast cancer 2/19/2014    Family history of breast cancer     Hypothyroidism 12/1/2022    SVT (supraventricular tachycardia):  history of, no recurrence since 2008 10/11/2013    no recurrence since 2008     Uncomplicated asthma       Past Surgical History:   Procedure Laterality Date    ABDOMEN  SURGERY  06/2017    myectomy    APPENDECTOMY      ARTHROSCOPY ANKLE, OPEN REPAIR LIGAMENT, COMBINED Right 02/02/2023    Procedure: ankle arthroscopy, modified Brostrom, peroneal tendon repair, exostectomy of medial malleolus;  Surgeon: Saida Michael DPM;  [facility]    COLONOSCOPY N/A 08/20/2018    Procedure: COMBINED COLONOSCOPY, SINGLE OR MULTIPLE BIOPSY/POLYPECTOMY BY BIOPSY;;  Surgeon: Osman Hahn MD;  [facility]    COLONOSCOPY WITH CO2 INSUFFLATION N/A 08/20/2018    Procedure: COLONOSCOPY WITH CO2 INSUFFLATION;  COLON-SCREENING / LUBKA ;  Surgeon: Osman Hahn MD;  [facility]    DAVINCI HYSTERECTOMY TOTAL, SALPINGECTOMY BILATERAL N/A 08/04/2017    Procedure: DAVINCI XI HYSTERECTOMY TOTAL, SALPINGECTOMY BILATERAL;  DAVINCI TOTAL HYSTERECTOMY; BILATERAL SALPINGECTOMY. BILATERAL URETERAL LYSIS. UTEROSACRAL-COLPOPEXY. EXCISION OF ENDOMETRIOSIS;  Surgeon: George Loyola MD;  [facility]    DAVINCI LYSIS OF ADHESIONS Bilateral 08/04/2017    Procedure: DAVINCI LYSIS OF ADHESIONS;  BILATERAL URETERAL LYSIS;  Surgeon: George Loyola MD;  [facility]    DAVINCI SACROCOLPOPEXY, CYSTOSCOPY, COMBINED N/A 08/04/2017    Procedure: COMBINED DAVINCI SACROCOLPOPEXY, CYSTOSCOPY;  UTEROSACRAL COLPOPEXY;  Surgeon: George Loyola MD;  [facility]    DAVINCI XI ASSISTED ABLATION / EXCISION OF ENDOMETRIOSIS  08/04/2017    Procedure: DAVINCI XI ASSISTED ABLATION / EXCISION OF ENDOMETRIOSIS;;  Surgeon: George Loyola MD;  [facility]    DILATION AND CURETTAGE N/A 06/20/2017    Procedure: DILATION AND CURETTAGE;  Uterine Curettings and Fibroid Removal, Cook catheter placement; (No hysterectomy done at this time);  Surgeon: Ernestina Saunders MD;  [facility]    HYSTERECTOMY, PAP NO LONGER INDICATED      ORTHOPEDIC SURGERY  06/1986    Heel spur , toe surgery    RELEASE CARPAL TUNNEL Right 10/20/2020    Procedure: RIGHT RELEASE, CARPAL TUNNEL;  Surgeon:  Rickey Rea MD;  Location: [facility] OR    RELEASE CARPAL TUNNEL Left 2020    Procedure: LEFT RELEASE, CARPAL TUNNEL;  Surgeon: Rickey Rea MD;  Location: [facility]    SOFT TISSUE SURGERY  2023    TONSILLECTOMY        Family History   Problem Relation Age of Onset    Diabetes Mother     Hypertension Mother     Hyperlipidemia Mother     Arrhythmia Mother     Cardiovascular Father         CHF and COPD    Asthma Father     Breast Cancer Maternal Grandfather     Colon Cancer Maternal Grandfather         His mother  of cancer too    Breast Cancer Paternal Grandmother     Breast Cancer Paternal Aunt     C.A.D. Paternal Grandfather     Breast Cancer Paternal Grandfather     Breast Cancer Cousin     Asthma Son     Diabetes Maternal Grandmother     Hypertension Maternal Grandmother     Breast Cancer Cousin     Breast Cancer Cousin     Breast Cancer Cousin         Maternal    Breast Cancer Other     Breast Cancer Other         Maternal aunt        Social History:  ?  History   Smoking Status    Never   Smokeless Tobacco    Never     History   Drug Use Unknown     Social History    Substance and Sexual Activity      Alcohol use: Yes        Comment: Rare- 1 every 2-3month      Allergies:  ?   Allergies   Allergen Reactions    Penicillins Swelling     Swelling throat; age 4    Adhesive Tape Hives    Penicillin G     Tetracycline GI Disturbance     Other reaction(s): Abdominal Pain  Vomiting; nauseous  Other reaction(s): Abdominal Pain  Vomiting; nauseous          Medications:    Current Outpatient Medications   Medication    ADVAIR DISKUS 100-50 MCG/ACT inhaler    Cholecalciferol (VITAMIN D) 2000 UNITS CAPS    diclofenac (VOLTAREN) 50 MG EC tablet    ivabradine (CORLANOR) 5 MG tablet    levalbuterol (XOPENEX HFA) 45 MCG/ACT inhaler    metroNIDAZOLE (METROGEL) 0.75 % external gel    Pyridoxine HCl (B-6) 100 MG TABS    valACYclovir (VALTREX) 1000 mg tablet    vitamin B-12 (CYANOCOBALAMIN) 100 MCG  tablet     Current Facility-Administered Medications   Medication    lidocaine (PF) 0.5 % injection SOLN 8 mL    lidocaine 1 % injection 0.5 mL    ropivacaine (NAROPIN) injection 1 mL    triamcinolone (KENALOG-40) injection 20 mg    triamcinolone (KENALOG-40) injection 20 mg    triamcinolone (KENALOG-40) injection 40 mg    triamcinolone (KENALOG-40) injection 40 mg       Physical Exam:  ?  Vitals:  BP (!) 158/85   Pulse 96   Wt 86.2 kg (190 lb)   LMP 07/28/2017 (Exact Date)   BMI 32.61 kg/m     General:  WD/WN, in NAD.  A&O x3.  Dermatologic:    Incision  is well coapted, dehiscence absent.   Janiya-incisional erythema absent, ecchymosis absent.  Vascular:  Digital capillary refill time normal bilateral.  Skin temperature is normal to operative site.  Focal edema- moderate to operative site.   Neurologic:    Gross sensation normal to operative limb.  Gait and balance abnormal, NWB  to operative limb.  Musculoskeletal:  Mod pain to palpation of medial ankle R.  Mild pain to anterior deltoid, R  No pain to medial malleolus exostectomy site, R  Ankle ROM smooth, full, not painful, R  Ankle stable to drawer, tilt R.  Manual Muscle Testing of peroneals pain free 5/5  right.  No peroneal subluxation w/th circumduction      Imaging:   x-ray independently reviewed and interpreted by myself today.  Weight-bearing views left ankle dated 5/1/23, reveal rectus ankle, no tilt.        x-ray independently reviewed and interpreted by myself today.  Non-Weight-bearing views right ankle dated 02/15/23, reveal interval ant exostectomy, medial mall exostectomy, anchor sites for internal brace visualized

## 2023-07-26 ENCOUNTER — OFFICE VISIT (OUTPATIENT)
Dept: PODIATRY | Facility: CLINIC | Age: 53
End: 2023-07-26
Payer: COMMERCIAL

## 2023-07-26 VITALS
DIASTOLIC BLOOD PRESSURE: 85 MMHG | WEIGHT: 190 LBS | BODY MASS INDEX: 32.61 KG/M2 | SYSTOLIC BLOOD PRESSURE: 158 MMHG | HEART RATE: 96 BPM

## 2023-07-26 DIAGNOSIS — M25.871 ANKLE IMPINGEMENT SYNDROME, RIGHT: ICD-10-CM

## 2023-07-26 DIAGNOSIS — M25.371 ANKLE INSTABILITY, RIGHT: Primary | ICD-10-CM

## 2023-07-26 PROCEDURE — 99213 OFFICE O/P EST LOW 20 MIN: CPT | Performed by: PODIATRIST

## 2023-07-26 NOTE — LETTER
7/26/2023         RE: Laura Jackson  [address]  [place] [address]        Dear Colleague,    Thank you for referring your patient, Laura Jackson, to the [facility]. Please see a copy of my visit note below.    Assessment:      ICD-10-CM    1. Ankle instability, right  M25.371       2. Ankle impingement syndrome, right  M25.871               6 months s/p Ankle arthroscopy with debridement, anterior exostectomy including debridement of Cam type lesion, modified Brostrom-De Guzman peroneal-peroneus brevis tendon repair, medial malleolar exostectomy      Plan:  No orders of the defined types were placed in this encounter.      Discussed the post-operative recovery plan with the patient.  Ankle very stable laterally  Getting some medial ankle / deltoid discomfort due to increased  lateral stability.      -WB- full in regular shoe.  -Activity- progress normal   -PT -continue  -Updated note for work today - see AVS    Ankle injection at f/unit(s) discussed      Return:  No follow-ups on file.     Saida Michael DPM                Chief Complaint:     Patient presents with:  Right Foot - RECHECK  Right Ankle - RECHECK     Post-op Exam    HPI:  Laura Jackson is a 52 year old year old female who presents for post-op exam.    Surgery Date- 2/2/23  Post-operative month #6  Procedure- Ankle arthroscopy with debridement, anterior exostectomy including debridement of Cam type lesion, modified Brostrom-De Guzman peroneal-peroneus brevis tendon repair, medial malleolar exostectomy  Pain- minimal  Pain medication use- at night only  Abx- none  WB status- WB in boot    States she feels like she re-sprained the sarika once  Medial ankle still most painful - feels like she wants to give out towards the inside        Past Medical & Surgical History:  Past Medical History:   Diagnosis Date     Benign essential hypertension 7/31/2018     Family history of breast cancer 2/19/2014     Family history of  breast cancer      Hypothyroidism 12/1/2022     SVT (supraventricular tachycardia):  history of, no recurrence since 2008 10/11/2013    no recurrence since 2008      Uncomplicated asthma       Past Surgical History:   Procedure Laterality Date     ABDOMEN SURGERY  06/2017    myectomy     APPENDECTOMY       ARTHROSCOPY ANKLE, OPEN REPAIR LIGAMENT, COMBINED Right 02/02/2023    Procedure: ankle arthroscopy, modified Brostrom, peroneal tendon repair, exostectomy of medial malleolus;  Surgeon: Saida Michael DPM;  [facility]     COLONOSCOPY N/A 08/20/2018    Procedure: COMBINED COLONOSCOPY, SINGLE OR MULTIPLE BIOPSY/POLYPECTOMY BY BIOPSY;;  Surgeon: Osman Hahn MD;  [facility]     COLONOSCOPY WITH CO2 INSUFFLATION N/A 08/20/2018    Procedure: COLONOSCOPY WITH CO2 INSUFFLATION;  COLON-SCREENING / LUBKA ;  Surgeon: Osman Hahn MD;  [facility]     DAVINCI HYSTERECTOMY TOTAL, SALPINGECTOMY BILATERAL N/A 08/04/2017    Procedure: DAVINCI XI HYSTERECTOMY TOTAL, SALPINGECTOMY BILATERAL;  DAVINCI TOTAL HYSTERECTOMY; BILATERAL SALPINGECTOMY. BILATERAL URETERAL LYSIS. UTEROSACRAL-COLPOPEXY. EXCISION OF ENDOMETRIOSIS;  Surgeon: George Loyola MD;  [facility]     DAVINCI LYSIS OF ADHESIONS Bilateral 08/04/2017    Procedure: DAVINCI LYSIS OF ADHESIONS;  BILATERAL URETERAL LYSIS;  Surgeon: George Loyola MD;  [facility]     DAVINCI SACROCOLPOPEXY, CYSTOSCOPY, COMBINED N/A 08/04/2017    Procedure: COMBINED DAVINCI SACROCOLPOPEXY, CYSTOSCOPY;  UTEROSACRAL COLPOPEXY;  Surgeon: George Loyola MD;  [facility]     DAVINCI XI ASSISTED ABLATION / EXCISION OF ENDOMETRIOSIS  08/04/2017    Procedure: DAVINCI XI ASSISTED ABLATION / EXCISION OF ENDOMETRIOSIS;;  Surgeon: George Loyola MD;  [facility]     DILATION AND CURETTAGE N/A 06/20/2017    Procedure: DILATION AND CURETTAGE;  Uterine Curettings and Fibroid Removal, Cook catheter placement; (No hysterectomy done  at this time);  Surgeon: Ernestina Saunders MD;  [facility]     HYSTERECTOMY, PAP NO LONGER INDICATED       ORTHOPEDIC SURGERY  1986    Heel spur , toe surgery     RELEASE CARPAL TUNNEL Right 10/20/2020    Procedure: RIGHT RELEASE, CARPAL TUNNEL;  Surgeon: Rickey Rea MD;  Location: [facility] OR     RELEASE CARPAL TUNNEL Left 2020    Procedure: LEFT RELEASE, CARPAL TUNNEL;  Surgeon: Rickey Rea MD;  Location: [facility] OR     SOFT TISSUE SURGERY  2023     TONSILLECTOMY        Family History   Problem Relation Age of Onset     Diabetes Mother      Hypertension Mother      Hyperlipidemia Mother      Arrhythmia Mother      Cardiovascular Father         CHF and COPD     Asthma Father      Breast Cancer Maternal Grandfather      Colon Cancer Maternal Grandfather         His mother  of cancer too     Breast Cancer Paternal Grandmother      Breast Cancer Paternal Aunt      C.A.D. Paternal Grandfather      Breast Cancer Paternal Grandfather      Breast Cancer Cousin      Asthma Son      Diabetes Maternal Grandmother      Hypertension Maternal Grandmother      Breast Cancer Cousin      Breast Cancer Cousin      Breast Cancer Cousin         Maternal     Breast Cancer Other      Breast Cancer Other         Maternal aunt        Social History:  ?  History   Smoking Status     Never   Smokeless Tobacco     Never     History   Drug Use Unknown     Social History    Substance and Sexual Activity      Alcohol use: Yes        Comment: Rare- 1 every 2-3month      Allergies:  ?   Allergies   Allergen Reactions     Penicillins Swelling     Swelling throat; age 4     Adhesive Tape Hives     Penicillin G      Tetracycline GI Disturbance     Other reaction(s): Abdominal Pain  Vomiting; nauseous  Other reaction(s): Abdominal Pain  Vomiting; nauseous          Medications:    Current Outpatient Medications   Medication     ADVAIR DISKUS 100-50 MCG/ACT inhaler     Cholecalciferol (VITAMIN D) 2000 UNITS CAPS      diclofenac (VOLTAREN) 50 MG EC tablet     ivabradine (CORLANOR) 5 MG tablet     levalbuterol (XOPENEX HFA) 45 MCG/ACT inhaler     metroNIDAZOLE (METROGEL) 0.75 % external gel     Pyridoxine HCl (B-6) 100 MG TABS     valACYclovir (VALTREX) 1000 mg tablet     vitamin B-12 (CYANOCOBALAMIN) 100 MCG tablet     Current Facility-Administered Medications   Medication     lidocaine (PF) 0.5 % injection SOLN 8 mL     lidocaine 1 % injection 0.5 mL     ropivacaine (NAROPIN) injection 1 mL     triamcinolone (KENALOG-40) injection 20 mg     triamcinolone (KENALOG-40) injection 20 mg     triamcinolone (KENALOG-40) injection 40 mg     triamcinolone (KENALOG-40) injection 40 mg       Physical Exam:  ?  Vitals:  BP (!) 158/85   Pulse 96   Wt 86.2 kg (190 lb)   LMP 07/28/2017 (Exact Date)   BMI 32.61 kg/m     General:  WD/WN, in NAD.  A&O x3.  Dermatologic:    Incision  is well coapted, dehiscence absent.   Janiya-incisional erythema absent, ecchymosis absent.  Vascular:  Digital capillary refill time normal bilateral.  Skin temperature is normal to operative site.  Focal edema- moderate to operative site.   Neurologic:    Gross sensation normal to operative limb.  Gait and balance abnormal, NWB  to operative limb.  Musculoskeletal:  Mod pain to palpation of medial ankle R.  Mild pain to anterior deltoid, R  No pain to medial malleolus exostectomy site, R  Ankle ROM smooth, full, not painful, R  Ankle stable to drawer, tilt R.  Manual Muscle Testing of peroneals pain free 5/5  right.  No peroneal subluxation w/th circumduction      Imaging:   x-ray independently reviewed and interpreted by myself today.  Weight-bearing views left ankle dated 5/1/23, reveal rectus ankle, no tilt.        x-ray independently reviewed and interpreted by myself today.  Non-Weight-bearing views right ankle dated 02/15/23, reveal interval ant exostectomy, medial mall exostectomy, anchor sites for internal brace visualized      Again, thank you for  allowing me to participate in the care of your patient.        Sincerely,        Saida Michael DPM

## 2023-07-26 NOTE — LETTER
July 26, 2023      Laura Jackson  [address]  [facility] [address]        To Whom It May Concern:    Laura Jackson was seen in our clinic. She may return to work with the following work restrictions:     Seated work for 1 month (7/26/23 - 8/26/23)       Sincerely,        Saida Michael DPM

## 2023-08-01 ENCOUNTER — THERAPY VISIT (OUTPATIENT)
Dept: PHYSICAL THERAPY | Facility: CLINIC | Age: 53
End: 2023-08-01
Payer: COMMERCIAL

## 2023-08-01 DIAGNOSIS — Z47.89 AFTERCARE FOLLOWING SURGERY OF THE MUSCULOSKELETAL SYSTEM: ICD-10-CM

## 2023-08-01 DIAGNOSIS — M25.571 PAIN IN JOINT, ANKLE AND FOOT, RIGHT: Primary | ICD-10-CM

## 2023-08-01 DIAGNOSIS — R26.9 ABNORMALITY OF GAIT: ICD-10-CM

## 2023-08-01 PROCEDURE — 97110 THERAPEUTIC EXERCISES: CPT | Mod: GP | Performed by: PHYSICAL THERAPIST

## 2023-08-01 PROCEDURE — 97112 NEUROMUSCULAR REEDUCATION: CPT | Mod: GP | Performed by: PHYSICAL THERAPIST

## 2023-08-03 ENCOUNTER — THERAPY VISIT (OUTPATIENT)
Dept: PHYSICAL THERAPY | Facility: CLINIC | Age: 53
End: 2023-08-03
Payer: COMMERCIAL

## 2023-08-03 DIAGNOSIS — M54.89 OTHER BACK PAIN, UNSPECIFIED CHRONICITY: Primary | ICD-10-CM

## 2023-08-03 PROCEDURE — 97140 MANUAL THERAPY 1/> REGIONS: CPT | Mod: GP | Performed by: PHYSICAL THERAPIST

## 2023-08-03 PROCEDURE — 97112 NEUROMUSCULAR REEDUCATION: CPT | Mod: GP | Performed by: PHYSICAL THERAPIST

## 2023-08-10 DIAGNOSIS — J45.30 MILD PERSISTENT ASTHMA WITHOUT COMPLICATION: ICD-10-CM

## 2023-08-10 DIAGNOSIS — I47.11 INAPPROPRIATE SINUS TACHYCARDIA (H): ICD-10-CM

## 2023-08-10 RX ORDER — CEPHALEXIN 250 MG/1
CAPSULE ORAL
Qty: 60 EACH | Refills: 3 | Status: SHIPPED | OUTPATIENT
Start: 2023-08-10 | End: 2023-12-04

## 2023-08-15 ENCOUNTER — THERAPY VISIT (OUTPATIENT)
Dept: PHYSICAL THERAPY | Facility: CLINIC | Age: 53
End: 2023-08-15
Payer: COMMERCIAL

## 2023-08-15 DIAGNOSIS — M54.89 OTHER BACK PAIN, UNSPECIFIED CHRONICITY: Primary | ICD-10-CM

## 2023-08-15 PROCEDURE — 97140 MANUAL THERAPY 1/> REGIONS: CPT | Mod: GP | Performed by: PHYSICAL THERAPIST

## 2023-08-15 PROCEDURE — 97112 NEUROMUSCULAR REEDUCATION: CPT | Mod: GP | Performed by: PHYSICAL THERAPIST

## 2023-08-15 NOTE — TELEPHONE ENCOUNTER
ivabradine (CORLANOR) 5 MG tablet      Last Written Prescription Date:  11/16/2022  Last Fill Quantity: 180,   # refills: 1  Last Office Visit : 5/16/2023  [facility]  Future Office visit:  none    Routing refill request to provider for review/approval because:  Medication not on the Cardiology refill protocol.

## 2023-08-16 ENCOUNTER — OFFICE VISIT (OUTPATIENT)
Dept: PODIATRY | Facility: CLINIC | Age: 53
End: 2023-08-16
Payer: COMMERCIAL

## 2023-08-16 VITALS
DIASTOLIC BLOOD PRESSURE: 82 MMHG | WEIGHT: 190 LBS | SYSTOLIC BLOOD PRESSURE: 137 MMHG | BODY MASS INDEX: 32.61 KG/M2 | HEART RATE: 79 BPM

## 2023-08-16 DIAGNOSIS — M25.371 ANKLE INSTABILITY, RIGHT: ICD-10-CM

## 2023-08-16 DIAGNOSIS — Z98.890 POSTOPERATIVE STATE: Primary | ICD-10-CM

## 2023-08-16 DIAGNOSIS — M25.871 ANKLE IMPINGEMENT SYNDROME, RIGHT: ICD-10-CM

## 2023-08-16 PROCEDURE — 20605 DRAIN/INJ JOINT/BURSA W/O US: CPT | Mod: RT | Performed by: PODIATRIST

## 2023-08-16 RX ORDER — IVABRADINE 5 MG/1
5 TABLET, FILM COATED ORAL 2 TIMES DAILY WITH MEALS
Qty: 180 TABLET | Refills: 3 | Status: SHIPPED | OUTPATIENT
Start: 2023-08-16 | End: 2024-08-01

## 2023-08-16 RX ORDER — DEXAMETHASONE SODIUM PHOSPHATE 4 MG/ML
4 INJECTION, SOLUTION INTRA-ARTICULAR; INTRALESIONAL; INTRAMUSCULAR; INTRAVENOUS; SOFT TISSUE ONCE
Status: COMPLETED | OUTPATIENT
Start: 2023-08-16 | End: 2023-08-16

## 2023-08-16 RX ORDER — TRIAMCINOLONE ACETONIDE 40 MG/ML
40 INJECTION, SUSPENSION INTRA-ARTICULAR; INTRAMUSCULAR ONCE
Status: COMPLETED | OUTPATIENT
Start: 2023-08-16 | End: 2023-08-16

## 2023-08-16 RX ORDER — BUPIVACAINE HYDROCHLORIDE 5 MG/ML
1 INJECTION, SOLUTION PERINEURAL ONCE
Status: COMPLETED | OUTPATIENT
Start: 2023-08-16 | End: 2023-08-16

## 2023-08-16 RX ADMIN — TRIAMCINOLONE ACETONIDE 40 MG: 40 INJECTION, SUSPENSION INTRA-ARTICULAR; INTRAMUSCULAR at 16:41

## 2023-08-16 RX ADMIN — DEXAMETHASONE SODIUM PHOSPHATE 4 MG: 4 INJECTION, SOLUTION INTRA-ARTICULAR; INTRALESIONAL; INTRAMUSCULAR; INTRAVENOUS; SOFT TISSUE at 16:40

## 2023-08-16 RX ADMIN — BUPIVACAINE HYDROCHLORIDE 5 MG: 5 INJECTION, SOLUTION PERINEURAL at 16:40

## 2023-08-16 NOTE — Clinical Note
8/16/2023         RE: Laura Jackson  [address]  [place] [address]        Dear Colleague,    Thank you for referring your patient, Laura Jackson, to the [facility]. Please see a copy of my visit note below.    Assessment:      ICD-10-CM    1. Postoperative state  Z98.890       2. Ankle instability, right  M25.371       3. Ankle impingement syndrome, right  M25.871                 6 months s/p Ankle scope / brostrom      Plan:  No orders of the defined types were placed in this encounter.      Discussed the post-operative recovery plan with the patient.  Ankle very stable laterally  Getting some medial ankle / deltoid discomfort due to increased  lateral stability.      Procedure:  injection  PARQ session held, verbal consent obtained.  Sterile skin prep.    Location:  Right ankle  Contents:  3ml total, marcaine, kenalog-40, dexamethasone phosphate.  Dressing applied.    Post-injection instructions reviewed including close attention to amount and duration of pain relief.        Continue Prior        -WB- full in regular shoe.  -Activity- progress normal   -PT -continue  -Updated note for work today - see AVS    Ankle injection at f/unit(s) discussed      Return:  No follow-ups on file.     Saida Michael DPM                Chief Complaint:     No chief complaint on file.     Post-op Exam    HPI:  Laura Jackson is a 52 year old year old female who presents for post-op exam.    Surgery Date- 2/2/23  Post-operative month #6  Procedure- Ankle arthroscopy with debridement, anterior exostectomy including debridement of Cam type lesion, modified Brostrom-De Guzman peroneal-peroneus brevis tendon repair, medial malleolar exostectomy  Pain- minimal  Pain medication use- at night only  Abx- none  WB status- WB in boot    States she feels like she re-sprained the sarika once  Medial ankle still most painful - feels like she wants to give out towards the inside        Past Medical &  Surgical History:  Past Medical History:   Diagnosis Date    Benign essential hypertension 7/31/2018    Family history of breast cancer 2/19/2014    Family history of breast cancer     Hypothyroidism 12/1/2022    SVT (supraventricular tachycardia):  history of, no recurrence since 2008 10/11/2013    no recurrence since 2008     Uncomplicated asthma       Past Surgical History:   Procedure Laterality Date    ABDOMEN SURGERY  06/2017    myectomy    APPENDECTOMY      ARTHROSCOPY ANKLE, OPEN REPAIR LIGAMENT, COMBINED Right 02/02/2023    Procedure: ankle arthroscopy, modified Brostrom, peroneal tendon repair, exostectomy of medial malleolus;  Surgeon: Saida Michael DPM;  [facility]    COLONOSCOPY N/A 08/20/2018    Procedure: COMBINED COLONOSCOPY, SINGLE OR MULTIPLE BIOPSY/POLYPECTOMY BY BIOPSY;;  Surgeon: Osman Hahn MD;  [facility]    COLONOSCOPY WITH CO2 INSUFFLATION N/A 08/20/2018    Procedure: COLONOSCOPY WITH CO2 INSUFFLATION;  COLON-SCREENING / LUBKA ;  Surgeon: Osman Hahn MD;  [facility]    DAVINCI HYSTERECTOMY TOTAL, SALPINGECTOMY BILATERAL N/A 08/04/2017    Procedure: DAVINCI XI HYSTERECTOMY TOTAL, SALPINGECTOMY BILATERAL;  DAVINCI TOTAL HYSTERECTOMY; BILATERAL SALPINGECTOMY. BILATERAL URETERAL LYSIS. UTEROSACRAL-COLPOPEXY. EXCISION OF ENDOMETRIOSIS;  Surgeon: George Loyola MD;  [facility]    DAVINCI LYSIS OF ADHESIONS Bilateral 08/04/2017    Procedure: DAVINCI LYSIS OF ADHESIONS;  BILATERAL URETERAL LYSIS;  Surgeon: George Loyola MD;  [facility]    DAVINCI SACROCOLPOPEXY, CYSTOSCOPY, COMBINED N/A 08/04/2017    Procedure: COMBINED DAVINCI SACROCOLPOPEXY, CYSTOSCOPY;  UTEROSACRAL COLPOPEXY;  Surgeon: George Loyola MD;  [facility]    DAVINCI XI ASSISTED ABLATION / EXCISION OF ENDOMETRIOSIS  08/04/2017    Procedure: DAVINCI XI ASSISTED ABLATION / EXCISION OF ENDOMETRIOSIS;;  Surgeon: George Loyola MD;  [facility]    DILATION  AND CURETTAGE N/A 2017    Procedure: DILATION AND CURETTAGE;  Uterine Curettings and Fibroid Removal, Cook catheter placement; (No hysterectomy done at this time);  Surgeon: Ernestina Saunders MD;  [facility]    HYSTERECTOMY, PAP NO LONGER INDICATED      ORTHOPEDIC SURGERY  1986    Heel spur , toe surgery    RELEASE CARPAL TUNNEL Right 10/20/2020    Procedure: RIGHT RELEASE, CARPAL TUNNEL;  Surgeon: Rickey Rea MD;  Location: [facility] OR    RELEASE CARPAL TUNNEL Left 2020    Procedure: LEFT RELEASE, CARPAL TUNNEL;  Surgeon: Rickey Rea MD;  Location: [facility] OR    SOFT TISSUE SURGERY  2023    TONSILLECTOMY        Family History   Problem Relation Age of Onset    Diabetes Mother     Hypertension Mother     Hyperlipidemia Mother     Arrhythmia Mother     Cardiovascular Father         CHF and COPD    Asthma Father     Breast Cancer Maternal Grandfather     Colon Cancer Maternal Grandfather         His mother  of cancer too    Breast Cancer Paternal Grandmother     Breast Cancer Paternal Aunt     C.A.D. Paternal Grandfather     Breast Cancer Paternal Grandfather     Breast Cancer Cousin     Asthma Son     Diabetes Maternal Grandmother     Hypertension Maternal Grandmother     Breast Cancer Cousin     Breast Cancer Cousin     Breast Cancer Cousin         Maternal    Breast Cancer Other     Breast Cancer Other         Maternal aunt        Social History:  ?  History   Smoking Status    Never   Smokeless Tobacco    Never     History   Drug Use Unknown     Social History    Substance and Sexual Activity      Alcohol use: Yes        Comment: Rare- 1 every 2-3month      Allergies:  ?   Allergies   Allergen Reactions    Penicillins Swelling     Swelling throat; age 4    Adhesive Tape Hives    Penicillin G     Tetracycline GI Disturbance     Other reaction(s): Abdominal Pain  Vomiting; nauseous  Other reaction(s): Abdominal Pain  Vomiting; nauseous          Medications:    Current  Outpatient Medications   Medication    Cholecalciferol (VITAMIN D) 2000 UNITS CAPS    diclofenac (VOLTAREN) 50 MG EC tablet    fluticasone-salmeterol (ADVAIR DISKUS) 100-50 MCG/ACT inhaler    ivabradine (CORLANOR) 5 MG tablet    levalbuterol (XOPENEX HFA) 45 MCG/ACT inhaler    metroNIDAZOLE (METROGEL) 0.75 % external gel    Pyridoxine HCl (B-6) 100 MG TABS    valACYclovir (VALTREX) 1000 mg tablet    vitamin B-12 (CYANOCOBALAMIN) 100 MCG tablet     Current Facility-Administered Medications   Medication    lidocaine (PF) 0.5 % injection SOLN 8 mL    lidocaine 1 % injection 0.5 mL    ropivacaine (NAROPIN) injection 1 mL    triamcinolone (KENALOG-40) injection 20 mg    triamcinolone (KENALOG-40) injection 20 mg    triamcinolone (KENALOG-40) injection 40 mg    triamcinolone (KENALOG-40) injection 40 mg       Physical Exam:  ?  Vitals:  LMP 07/28/2017 (Exact Date)    General:  WD/WN, in NAD.  A&O x3.  Dermatologic:    Incision  is well coapted, dehiscence absent.   Janiya-incisional erythema absent, ecchymosis absent.  Vascular:  Digital capillary refill time normal bilateral.  Skin temperature is normal to operative site.  Focal edema- moderate to operative site.   Neurologic:    Gross sensation normal to operative limb.  Gait and balance abnormal, NWB  to operative limb.  Musculoskeletal:  Mod pain to palpation of medial ankle R.  Mild pain to anterior deltoid, R  No pain to medial malleolus exostectomy site, R  Ankle ROM smooth, full, not painful, R  Ankle stable to drawer, tilt R.  Manual Muscle Testing of peroneals pain free 5/5  right.  No peroneal subluxation w/th circumduction      Imaging:   x-ray independently reviewed and interpreted by myself today.  Weight-bearing views left ankle dated 5/1/23, reveal rectus ankle, no tilt.        x-ray independently reviewed and interpreted by myself today.  Non-Weight-bearing views right ankle dated 02/15/23, reveal interval ant exostectomy, medial mall exostectomy, anchor  sites for internal brace visualized      Again, thank you for allowing me to participate in the care of your patient.        Sincerely,        Saida Michael DPM

## 2023-08-16 NOTE — PROGRESS NOTES
Assessment:      ICD-10-CM    1. Postoperative state  Z98.890 dexAMETHasone (DECADRON) injection 4 mg     triamcinolone (KENALOG-40) injection 40 mg     Bupivacaine 0.5 % Injection     DRAIN/INJECT MEDIUM JOINT/BURSA      2. Ankle instability, right  M25.371       3. Ankle impingement syndrome, right  M25.871 dexAMETHasone (DECADRON) injection 4 mg     triamcinolone (KENALOG-40) injection 40 mg     Bupivacaine 0.5 % Injection     DRAIN/INJECT MEDIUM JOINT/BURSA                6 months s/p Ankle scope / brostrom      Plan:  Orders Placed This Encounter   Procedures    DRAIN/INJECT MEDIUM JOINT/BURSA       Discussed the post-operative recovery plan with the patient.  Ankle very stable laterally  Getting some medial ankle / deltoid discomfort due to increased  lateral stability.      Procedure:  injection  PARQ session held, verbal consent obtained.  Sterile skin prep.    Location:  Right ankle  Contents:  3ml total, marcaine, kenalog-40, dexamethasone phosphate.  Dressing applied.    Post-injection instructions reviewed including close attention to amount and duration of pain relief.        Continue Prior        -WB- full in regular shoe.  -Activity- progress normal   -PT -continue  -Updated note for work today - see AVS    Ankle injection at f/unit(s) discussed      Return:  No follow-ups on file.     Saida Michael DPM                Chief Complaint:     Patient presents with:  Right Ankle - Injections     Post-op Exam    HPI:  Laura Jackson is a 52 year old year old female who presents for post-op exam.    Surgery Date- 2/2/23  Post-operative month #6  Procedure- Ankle arthroscopy with debridement, anterior exostectomy including debridement of Cam type lesion, modified Brostrom-De Guzman peroneal-peroneus brevis tendon repair, medial malleolar exostectomy  Pain- minimal  Pain medication use- at night only  Abx- none  WB status- WB in boot    States she feels like she re-sprained the sarika once  Medial ankle  still most painful - feels like she wants to give out towards the inside        Past Medical & Surgical History:  Past Medical History:   Diagnosis Date    Benign essential hypertension 7/31/2018    Family history of breast cancer 2/19/2014    Family history of breast cancer     Hypothyroidism 12/1/2022    SVT (supraventricular tachycardia):  history of, no recurrence since 2008 10/11/2013    no recurrence since 2008     Uncomplicated asthma       Past Surgical History:   Procedure Laterality Date    ABDOMEN SURGERY  06/2017    myectomy    APPENDECTOMY      ARTHROSCOPY ANKLE, OPEN REPAIR LIGAMENT, COMBINED Right 02/02/2023    Procedure: ankle arthroscopy, modified Brostrom, peroneal tendon repair, exostectomy of medial malleolus;  Surgeon: Saida Michael DPM;  Location: [facility]    COLONOSCOPY N/A 08/20/2018    Procedure: COMBINED COLONOSCOPY, SINGLE OR MULTIPLE BIOPSY/POLYPECTOMY BY BIOPSY;;  Surgeon: Osman Hahn MD;  Location: [facility]    COLONOSCOPY WITH CO2 INSUFFLATION N/A 08/20/2018    Procedure: COLONOSCOPY WITH CO2 INSUFFLATION;  COLON-SCREENING / LUBKA ;  Surgeon: Osman Hahn MD;  Location: [facility]    DAVINCI HYSTERECTOMY TOTAL, SALPINGECTOMY BILATERAL N/A 08/04/2017    Procedure: DAVINCI XI HYSTERECTOMY TOTAL, SALPINGECTOMY BILATERAL;  DAVINCI TOTAL HYSTERECTOMY; BILATERAL SALPINGECTOMY. BILATERAL URETERAL LYSIS. UTEROSACRAL-COLPOPEXY. EXCISION OF ENDOMETRIOSIS;  Surgeon: George Loyola MD;  Location: [facility]    DAVINCI LYSIS OF ADHESIONS Bilateral 08/04/2017    Procedure: DAVINCI LYSIS OF ADHESIONS;  BILATERAL URETERAL LYSIS;  Surgeon: George Loyola MD;  Location: [facility]    DAVINCI SACROCOLPOPEXY, CYSTOSCOPY, COMBINED N/A 08/04/2017    Procedure: COMBINED DAVINCI SACROCOLPOPEXY, CYSTOSCOPY;  UTEROSACRAL COLPOPEXY;  Surgeon: George Loyola MD;  Location: [facility]    DAVINCI XI ASSISTED ABLATION / EXCISION OF ENDOMETRIOSIS  08/04/2017    Procedure: DAVINCI XI ASSISTED  ABLATION / EXCISION OF ENDOMETRIOSIS;;  Surgeon: George Loyola MD;  Location: [facility]    DILATION AND CURETTAGE N/A 2017    Procedure: DILATION AND CURETTAGE;  Uterine Curettings and Fibroid Removal, Cook catheter placement; (No hysterectomy done at this time);  Surgeon: Ernestina Saunders MD;  Location: [facility]    HYSTERECTOMY, PAP NO LONGER INDICATED      ORTHOPEDIC SURGERY  1986    Heel spur , toe surgery    RELEASE CARPAL TUNNEL Right 10/20/2020    Procedure: RIGHT RELEASE, CARPAL TUNNEL;  Surgeon: Rickey Rea MD;  Location: [facility] OR    RELEASE CARPAL TUNNEL Left 2020    Procedure: LEFT RELEASE, CARPAL TUNNEL;  Surgeon: Rickey Rea MD;  Location: [facility] OR    SOFT TISSUE SURGERY  2023    TONSILLECTOMY        Family History   Problem Relation Age of Onset    Diabetes Mother     Hypertension Mother     Hyperlipidemia Mother     Arrhythmia Mother     Cardiovascular Father         CHF and COPD    Asthma Father     Breast Cancer Maternal Grandfather     Colon Cancer Maternal Grandfather         His mother  of cancer too    Breast Cancer Paternal Grandmother     Breast Cancer Paternal Aunt     C.A.D. Paternal Grandfather     Breast Cancer Paternal Grandfather     Breast Cancer Cousin     Asthma Son     Diabetes Maternal Grandmother     Hypertension Maternal Grandmother     Breast Cancer Cousin     Breast Cancer Cousin     Breast Cancer Cousin         Maternal    Breast Cancer Other     Breast Cancer Other         Maternal aunt        Social History:  ?  History   Smoking Status    Never   Smokeless Tobacco    Never     History   Drug Use Unknown     Social History    Substance and Sexual Activity      Alcohol use: Yes        Comment: Rare- 1 every 2-3month      Allergies:  ?   Allergies   Allergen Reactions    Penicillins Swelling     Swelling throat; age 4    Adhesive Tape Hives    Penicillin G     Tetracycline GI Disturbance     Other reaction(s): Abdominal Pain  Vomiting;  nauseous  Other reaction(s): Abdominal Pain  Vomiting; nauseous          Medications:    Current Outpatient Medications   Medication    Cholecalciferol (VITAMIN D) 2000 UNITS CAPS    diclofenac (VOLTAREN) 50 MG EC tablet    fluticasone-salmeterol (ADVAIR DISKUS) 100-50 MCG/ACT inhaler    ivabradine (CORLANOR) 5 MG tablet    levalbuterol (XOPENEX HFA) 45 MCG/ACT inhaler    metroNIDAZOLE (METROGEL) 0.75 % external gel    Pyridoxine HCl (B-6) 100 MG TABS    valACYclovir (VALTREX) 1000 mg tablet    vitamin B-12 (CYANOCOBALAMIN) 100 MCG tablet     Current Facility-Administered Medications   Medication    lidocaine (PF) 0.5 % injection SOLN 8 mL    lidocaine 1 % injection 0.5 mL    ropivacaine (NAROPIN) injection 1 mL    triamcinolone (KENALOG-40) injection 20 mg    triamcinolone (KENALOG-40) injection 20 mg    triamcinolone (KENALOG-40) injection 40 mg    triamcinolone (KENALOG-40) injection 40 mg       Physical Exam:  ?  Vitals:  /82   Pulse 79   Wt 86.2 kg (190 lb)   LMP 07/28/2017 (Exact Date)   BMI 32.61 kg/m     General:  WD/WN, in NAD.  A&O x3.  Dermatologic:    Incision  is well coapted, dehiscence absent.   Janiya-incisional erythema absent, ecchymosis absent.  Vascular:  Digital capillary refill time normal bilateral.  Skin temperature is normal to operative site.  Focal edema- moderate to operative site.   Neurologic:    Gross sensation normal to operative limb.  Gait and balance abnormal, NWB  to operative limb.  Musculoskeletal:  Mod pain to palpation of medial ankle R.  Mild pain to anterior deltoid, R  No pain to medial malleolus exostectomy site, R  Ankle ROM smooth, full, not painful, R  Ankle stable to drawer, tilt R.  Manual Muscle Testing of peroneals pain free 5/5  right.  No peroneal subluxation w/th circumduction      Imaging:   x-ray independently reviewed and interpreted by myself today.  Weight-bearing views left ankle dated 5/1/23, reveal rectus ankle, no tilt.        x-ray independently  reviewed and interpreted by myself today.  Non-Weight-bearing views right ankle dated 02/15/23, reveal interval ant exostectomy, medial mall exostectomy, anchor sites for internal brace visualized

## 2023-08-16 NOTE — PATIENT INSTRUCTIONS
"PATIENT INSTRUCTIONS - Podiatry / Foot & Ankle Surgery      Note for 8h work day starting 8/28 for 1 month,  then resume 12h work day       Aftercare for Steroid Injection  Activity:  -Resume normal activity as tolerated.  -Tendon, ligament, fascia injectionons- avoid high-impact activity for 1 week.  Icing:  -May apply to the injection site if needed.  Infection:  -Risk is generally low (<1%), but must be aware of the signs/symptoms.    -Both infection and an inflammatory reaction to the steroid (\"steroid flare\") will cause redness, warmth, and increased pain.   -If these symptoms develop within 12-24h & resolve within 2-3 days- \"steroid flare\"- ice (non-worrisome)  -If these symptoms develop after 24-48h, progressively get worse, & are associated with a fever- possible infection; call the clinic or present to urgent care immediately      "

## 2023-08-16 NOTE — LETTER
August 16, 2023      Laura Jackson  [address]  [place] [address]        To Whom It May Concern:    Laura Jackson was seen in our clinic. She may return to work with the following: beginning 8/28/23, 8 hour work days for one month. May resume 12 hour work days after.      Sincerely,        Saida Michael DPM

## 2023-08-28 ENCOUNTER — THERAPY VISIT (OUTPATIENT)
Dept: PHYSICAL THERAPY | Facility: CLINIC | Age: 53
End: 2023-08-28
Payer: COMMERCIAL

## 2023-08-28 DIAGNOSIS — M25.571 PAIN IN JOINT, ANKLE AND FOOT, RIGHT: Primary | ICD-10-CM

## 2023-08-28 DIAGNOSIS — R26.9 ABNORMALITY OF GAIT: ICD-10-CM

## 2023-08-28 DIAGNOSIS — Z47.89 AFTERCARE FOLLOWING SURGERY OF THE MUSCULOSKELETAL SYSTEM: ICD-10-CM

## 2023-08-28 PROCEDURE — 97112 NEUROMUSCULAR REEDUCATION: CPT | Mod: GP | Performed by: PHYSICAL THERAPIST

## 2023-08-28 PROCEDURE — 97110 THERAPEUTIC EXERCISES: CPT | Mod: GP | Performed by: PHYSICAL THERAPIST

## 2023-09-05 ENCOUNTER — THERAPY VISIT (OUTPATIENT)
Dept: PHYSICAL THERAPY | Facility: CLINIC | Age: 53
End: 2023-09-05
Payer: COMMERCIAL

## 2023-09-05 DIAGNOSIS — M54.89 OTHER BACK PAIN, UNSPECIFIED CHRONICITY: Primary | ICD-10-CM

## 2023-09-05 PROCEDURE — 97112 NEUROMUSCULAR REEDUCATION: CPT | Mod: GP | Performed by: PHYSICAL THERAPIST

## 2023-09-05 PROCEDURE — 97140 MANUAL THERAPY 1/> REGIONS: CPT | Mod: GP | Performed by: PHYSICAL THERAPIST

## 2023-09-18 ENCOUNTER — THERAPY VISIT (OUTPATIENT)
Dept: PHYSICAL THERAPY | Facility: CLINIC | Age: 53
End: 2023-09-18
Payer: COMMERCIAL

## 2023-09-18 DIAGNOSIS — M54.89 OTHER BACK PAIN, UNSPECIFIED CHRONICITY: Primary | ICD-10-CM

## 2023-09-18 PROCEDURE — 97140 MANUAL THERAPY 1/> REGIONS: CPT | Mod: GP | Performed by: PHYSICAL THERAPIST

## 2023-09-18 PROCEDURE — 97112 NEUROMUSCULAR REEDUCATION: CPT | Mod: GP | Performed by: PHYSICAL THERAPIST

## 2023-09-19 ENCOUNTER — THERAPY VISIT (OUTPATIENT)
Dept: PHYSICAL THERAPY | Facility: CLINIC | Age: 53
End: 2023-09-19
Payer: COMMERCIAL

## 2023-09-19 DIAGNOSIS — M25.571 PAIN IN JOINT, ANKLE AND FOOT, RIGHT: Primary | ICD-10-CM

## 2023-09-19 DIAGNOSIS — R26.9 ABNORMALITY OF GAIT: ICD-10-CM

## 2023-09-19 DIAGNOSIS — Z47.89 AFTERCARE FOLLOWING SURGERY OF THE MUSCULOSKELETAL SYSTEM: ICD-10-CM

## 2023-09-19 PROCEDURE — 97110 THERAPEUTIC EXERCISES: CPT | Mod: GP | Performed by: PHYSICAL THERAPIST

## 2023-09-19 NOTE — PROGRESS NOTES
DISCHARGE  Reason for Discharge: progressing, improved and feels ready to continue with her HEP on her own    Equipment Issued: jose    Discharge Plan:    09/19/23 0500   Appointment Info   Signing clinician's name / credentials Trisha Strange, PT   Total/Authorized Visits E&T, plan 13  (adding one f/u next month to see how tolerates RTW)   Visits Used 13   Medical Diagnosis s/p R ankle surgery-aftercare, R ankle foot pain, abnormality of gait   PT Tx Diagnosis s/p R ankle surgery-aftercare, R ankle foot pain, abnormality of gait   Progress Note/Certification   Onset of illness/injury or Date of Surgery 02/02/23  (date of MD order 2-22-23)   Therapy Frequency 1x/wk for 2 wks taper every 2-3 wks for 4 visits   Predicted Duration 12 wks   Progress Note Completed Date 07/18/23   PT Goal 1   Goal Identifier walking   Goal Description patient will be able to walk 60 min painfree   Rationale to maximize safety and independence within the community  (for work tasks)   Goal Progress can walk 45 min with 2-3/10 foot pain for exercise, no brace   Target Date 09/25/23  (has improved but is ongoing)   Subjective Report   Subjective Report States the cortisone shot helped tremendously and her foot looks so much better, just slight swelling. Has been working 8 hr shifts and is sore at end of shift. PL at end of shift about 3-4/10. Not wearing the brace at all. Will start 12 hr shifts next week. Started back on her bike and is ok, she does have some painfree catching in the R knee. She can walk 45 min with 2-3 /10 PL in the R foot now  Able to sleep through the night now, has not been able to do that for many mos.Has not been able to wear her orthotics due to pain but will get back into orthotist. Unable to be barefoot in the house. Overall feels much improved and ready to be on her own now. She does have some concerns regarding decreased ability to move her toes and the toe strength, it has improved but did discuss  "exercises she will continue to work on. Balance ex will also be a priority.   Objective Measures   Objective Measures Objective Measure 1;Objective Measure 3;Objective Measure 4;Objective Measure 2;Objective Measure 5   Objective Measure 1   Objective Measure ROM ankle   Details Active R ankle DF 20, PF 65  inv and ev wnl   Objective Measure 2   Objective Measure strength R ankle   Details 4 corner strength is 5/5 each way. She does have more difficulty with heel raise on R (less control, weaker than L) but is able to do 15R single leg without pain   Objective Measure 3   Objective Measure pain  ankle   Details daily 0-4/10PL, mainly med arch, med ant ankle and some lateral ankle   Objective Measure 4   Objective Measure strength hip   Details hip abd R 4+/5, L 5-/5 hip ext R 5/5, L 4+/5   Objective Measure 5   Objective Measure balance   Details SLS EO floor R 15\", L 30\", EC < 5\" B   Treatment Interventions (PT)   Interventions Neuromuscular Re-education;Therapeutic Procedure/Exercise   Therapeutic Procedure/Exercise   Therapeutic Procedures: strength, endurance, ROM, flexibillity minutes (34734) 25   Therapeutic Procedures Ther Proc 2;Ther Proc 3   Ther Proc 1 BAPS board #3   Ther Proc 1 - Details not in clinic today   Ther Proc 2 rec bike s=6   Ther Proc 2 - Details not in clinic today   Ther Proc 3 isometric diagonals(by PT) R foot   Ther Proc 3 - Details each corner 15R 10\" holds   PTRx Ther Proc 1 Foot Doming   PTRx Ther Proc 1 - Details VR continues as part of HEP   PTRx Ther Proc 2 Foot Yoga   PTRx Ther Proc 2 - Details VR continues as part of HEP   PTRx Ther Proc 3 Standing Y Balance   PTRx Ther Proc 3 - Details VR for balance one of key exercises for HEP   PTRx Ther Proc 4 Ankle Active Range of Motion Dorsiflexion and Plantarflexion   PTRx Ther Proc 4 - Details VR HEP for toe motions as ROM is good ankle   PTRx Ther Proc 5 Manual Plantar Fascia Stretch   PTRx Ther Proc 5 - Details HEP 1. massage the bottom " "of your foot 2. stretch the big toe back and down(extension and flexion) 5-10 reps each 5\" hold 3-4x/day no pain 3. work gently on the scar tissue daily for 1-3 min   PTRx Ther Proc 6 Towel Stretch Gastroc   PTRx Ther Proc 6 - Details HEP 4x/day 5R 15' hold   PTRx Ther Proc 7 Toe Raises   PTRx Ther Proc 7 - Details in clinic 1s 15R on R single, monse well though less coordinated and more fatigued than on L   PTRx Ther Proc 8 Standing Gastroc Stretch   PTRx Ther Proc 8 - Details in clinic 2R 15\" B HEP 2x/day 2R 15-30 sec hold   PTRx Ther Proc 9 Theraband Diagonal Peroneals   PTRx Ther Proc 9 - Details discussed can hold at this time as tested strong can return to if needed at later date   PTRx Ther Proc 10 Ankle Diagonal Posterior Tibialis   PTRx Ther Proc 10 - Details discussed can hold at this time as tested strong can return to if needed at later date   PTRx Ther Proc 11 Towel Gather   PTRx Ther Proc 11 - Details HEP 1x daily 30 sec to 3 min   PTRx Ther Proc 12 Clamshell Feet Apart   PTRx Ther Proc 12 - Details changed ex 1s 10R each side to progress strength, contiue as part of HEP   PTRx Ther Proc 13 Single Leg Bridge   PTRx Ther Proc 13 - Details 1s 10R each(was not doing ball bridge) form cues HEP   Skilled Intervention ex selection, discussed HEP at length and most important ex to continue with if time is a factor   Patient Response/Progress monse well, still needs to be consistent with toe exercises   Neuromuscular Re-education   Neuromuscular re-ed of mvmt, balance, coord, kinesthetic sense, posture, proprioception minutes (45620) 3   PTRx Neuro Re-ed 1 Balance Single Leg Stance Supported and Unsupported   PTRx Neuro Re-ed 1 - Details EO 30\" each foot on floor SLS EC multiple touches B, continue with HEP, rationale reviewed   PTRx Neuro Re-ed 2 Balance Toe Taps   PTRx Neuro Re-ed 2 - Details VR for HEP   Patient Response/Progress monse well   Skilled Intervention ex selection for proprioception/balance throughout " day   Manual Therapy   Manual Therapy: Mobilization, MFR, MLD, friction massage minutes (18087) 3   Manual Therapy 1 TFM medial scar R ankle   Manual Therapy 1 - Details in clinic 2' (skin rolling, diagonal)and instructed for self as part of home program   Skilled Intervention scar mobilization   Patient Response/Progress demonstrated understanding for HEP   Education   Learner/Method Patient;Listening;Reading;Demonstration;Pictures/Video   Education Comments understands and continues with HEP   Plan   Home program ptrx   Plan for next session final planned visit   Total Session Time   Timed Code Treatment Minutes 31   Total Treatment Time (sum of timed and untimed services) 31       Referring Provider:  Saida Michael

## 2023-10-04 ENCOUNTER — THERAPY VISIT (OUTPATIENT)
Dept: PHYSICAL THERAPY | Facility: CLINIC | Age: 53
End: 2023-10-04
Payer: COMMERCIAL

## 2023-10-04 DIAGNOSIS — M54.89 OTHER BACK PAIN, UNSPECIFIED CHRONICITY: Primary | ICD-10-CM

## 2023-10-04 PROCEDURE — 97112 NEUROMUSCULAR REEDUCATION: CPT | Mod: GP | Performed by: PHYSICAL THERAPIST

## 2023-10-04 PROCEDURE — 97140 MANUAL THERAPY 1/> REGIONS: CPT | Mod: GP | Performed by: PHYSICAL THERAPIST

## 2023-10-09 ENCOUNTER — VIRTUAL VISIT (OUTPATIENT)
Dept: INTERNAL MEDICINE | Facility: CLINIC | Age: 53
End: 2023-10-09
Payer: COMMERCIAL

## 2023-10-09 DIAGNOSIS — M54.50 CHRONIC BILATERAL LOW BACK PAIN WITHOUT SCIATICA: Primary | ICD-10-CM

## 2023-10-09 DIAGNOSIS — M54.2 CHRONIC NECK PAIN: ICD-10-CM

## 2023-10-09 DIAGNOSIS — G89.29 CHRONIC BILATERAL LOW BACK PAIN WITHOUT SCIATICA: Primary | ICD-10-CM

## 2023-10-09 DIAGNOSIS — G89.29 CHRONIC NECK PAIN: ICD-10-CM

## 2023-10-09 PROCEDURE — 99213 OFFICE O/P EST LOW 20 MIN: CPT | Mod: VID | Performed by: INTERNAL MEDICINE

## 2023-10-09 ASSESSMENT — ENCOUNTER SYMPTOMS: BACK PAIN: 1
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
## 2023-10-12 ENCOUNTER — ANCILLARY PROCEDURE (OUTPATIENT)
Dept: MRI IMAGING | Facility: CLINIC | Age: 53
End: 2023-10-12
Attending: INTERNAL MEDICINE
Payer: COMMERCIAL

## 2023-10-12 DIAGNOSIS — Z12.31 VISIT FOR SCREENING MAMMOGRAM: ICD-10-CM

## 2023-10-12 PROCEDURE — A9585 GADOBUTROL INJECTION: HCPCS | Mod: JZ

## 2023-10-12 PROCEDURE — 77049 MRI BREAST C-+ W/CAD BI: CPT | Mod: GC

## 2023-10-12 RX ORDER — GADOBUTROL 604.72 MG/ML
10 INJECTION INTRAVENOUS ONCE
Status: COMPLETED | OUTPATIENT
Start: 2023-10-12 | End: 2023-10-12

## 2023-10-12 RX ADMIN — GADOBUTROL 8.5 ML: 604.72 INJECTION INTRAVENOUS at 11:58

## 2023-10-16 ENCOUNTER — THERAPY VISIT (OUTPATIENT)
Dept: PHYSICAL THERAPY | Facility: CLINIC | Age: 53
End: 2023-10-16
Payer: COMMERCIAL

## 2023-10-16 DIAGNOSIS — M54.50 CHRONIC BILATERAL LOW BACK PAIN WITHOUT SCIATICA: ICD-10-CM

## 2023-10-16 DIAGNOSIS — G89.29 CHRONIC BILATERAL LOW BACK PAIN WITHOUT SCIATICA: ICD-10-CM

## 2023-10-16 DIAGNOSIS — M54.2 CHRONIC NECK PAIN: ICD-10-CM

## 2023-10-16 DIAGNOSIS — G89.29 CHRONIC NECK PAIN: ICD-10-CM

## 2023-10-16 PROCEDURE — 97112 NEUROMUSCULAR REEDUCATION: CPT | Mod: GP | Performed by: PHYSICAL THERAPIST

## 2023-10-16 PROCEDURE — 97140 MANUAL THERAPY 1/> REGIONS: CPT | Mod: GP | Performed by: PHYSICAL THERAPIST

## 2023-10-26 ENCOUNTER — OFFICE VISIT (OUTPATIENT)
Dept: DERMATOLOGY | Facility: CLINIC | Age: 53
End: 2023-10-26
Payer: COMMERCIAL

## 2023-10-26 DIAGNOSIS — D18.01 ANGIOMA OF SKIN: ICD-10-CM

## 2023-10-26 DIAGNOSIS — L82.0 INFLAMED SEBORRHEIC KERATOSIS: ICD-10-CM

## 2023-10-26 DIAGNOSIS — L82.1 SEBORRHEIC KERATOSIS: ICD-10-CM

## 2023-10-26 DIAGNOSIS — L81.4 LENTIGO: ICD-10-CM

## 2023-10-26 DIAGNOSIS — D22.9 NEVUS: Primary | ICD-10-CM

## 2023-10-26 PROCEDURE — 17111 DESTRUCTION B9 LESIONS 15/>: CPT | Performed by: PHYSICIAN ASSISTANT

## 2023-10-26 PROCEDURE — 99213 OFFICE O/P EST LOW 20 MIN: CPT | Mod: 25 | Performed by: PHYSICIAN ASSISTANT

## 2023-10-26 ASSESSMENT — PAIN SCALES - GENERAL: PAINLEVEL: NO PAIN (0)
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
## 2023-10-26 NOTE — PROGRESS NOTES
HPI:   Chief complaints: Laura Jackson is a pleasant 53 year old female who presents for Full skin cancer screening to rule out skin cancer   Last Skin Exam: 1 year ago      1st Baseline: No  Personal HX of Skin Cancer: no   Personal HX of Malignant Melanoma: no   Family HX of Skin Cancer / Malignant Melanoma: no  Personal HX of Atypical Moles:   no  Risk factors: history of sun exposure and burns  New / Changing lesions:Yes irritated spots on the right and left jawline.   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 Just back to work - she had a very bad ankle fracture and tendon tears   On review of systems, there are no further skin complaints, patient is feeling otherwise well.   ROS of the following were done and are negative: Constitutional, Eyes, Ears, Nose,   Mouth, Throat, Cardiovascular, Respiratory, GI, Genitourinary, Musculoskeletal,   Psychiatric, Endocrine, Allergic/Immunologic.    PHYSICAL EXAM:   [facility] 07/28/2017 (Exact Date)   Skin exam performed as follows: Type 2 skin. Mood appropriate  Alert and Oriented X 3. Well developed, well nourished in no distress.  General appearance: Normal  Head including face: Normal  Eyes: conjunctiva and lids: Normal  Mouth: Lips, teeth, gums: Normal  Neck: Normal  Chest-breast/axillae: Normal  Back: Normal  Spleen and liver: Normal  Cardiovascular: Exam of peripheral vascular system by observation for swelling, varicosities, edema: Normal  Genitalia: groin, buttocks: Normal  Extremities: digits/nails (clubbing): Normal  Eccrine and Apocrine glands: Normal  Right upper extremity: Normal  Left upper extremity: Normal  Right lower extremity: Normal  Left lower extremity: Normal  Skin: Scalp and body hair: See below    Pt deferred exam of breasts, groin, buttocks: No    Other physical findings:  1. Multiple pigmented macules on extremities and trunk  2. Multiple pigmented macules on face, trunk and extremities  3. Multiple vascular papules on trunk, arms  and legs  4. Multiple scattered keratotic plaques  5. Inflamed keratotic papule on the right upper arm x 2, flank x 5, left PA scalp x 1, right abdomen x 1, left abdomen x 6           Except as noted above, no other signs of skin cancer or melanoma.     ASSESSMENT/PLAN:   Benign Full skin cancer screening today. . Patient with history of none  Advised on monthly self exams and 1 year  Patient Education: Appropriate brochures given.    Multiple benign appearing nevi on arms, legs and trunk. Discussed ABCDEs of melanoma and sunscreen.   Multiple lentigos on arms, legs and trunk. Advised benign, no treatment needed.  Multiple scattered angiomas. Advised benign, no treatment needed.   Seborrheic keratosis on arms, legs and trunk. Advised benign, no treatment needed.  Inflamed seborrheic keratosis on the right upper arm x 2, flank x 5, left PA scalp x 1, right abdomen x 1, left abdomen x 6 As physically tender cryosurgery performed. Advised on post op care.             Follow-up: yearly FSE/PRN sooner    1.) Patient was asked about new and changing moles. YES  2.) Patient received a complete physical skin examination: YES  3.) Patient was counseled to perform a monthly self skin examination: YES  Scribed By: Valeria Villa MS, PASAIMA

## 2023-10-26 NOTE — PATIENT INSTRUCTIONS
WOUND CARE INSTRUCTIONS   FOR CRYOSURGERY   This area treated with liquid nitrogen should form a blister (areas treated may or may not blister-skin may just turn dark and slough off). You do not need to bandage the area unless a blister forms and breaks (which may be a few days). When the blister breaks, begin daily dressing changes as follows:  1) Clean and dry the area with tap water using clean Q-tip or sterile gauze pad.   2) Apply Polysporin ointment or Bacitracin ointment over entire wound. Do NOT use Neosporin ointment.   3) Cover the wound with a band-aid or sterile non-stick gauze pad and micropore paper tape.   REPEAT THESE INSTRUCTIONS AT LEAST ONCE A DAY UNTIL THE WOUND HAS COMPLETELY HEALED.   It is an old wives tale that a wound heals better when it is exposed to air and allowed to dry out. The wound will heal faster with a better cosmetic result if it is kept moist with ointment and covered with a bandage.   Do not let the wound dry out.   IMPORTANT INFORMATION ON REVERSE SIDE   Supplies Needed:   *Cotton tipped applicators (Q-tips)   *Polysporin ointment or Bacitracin ointment (NOT NEOSPORIN)   *Band-aids, or non stick gauze pads and micropore paper tape   PATIENT INFORMATION   During the healing process you will notice a number of changes. All wounds develop a small halo of redness surrounding the wound. This means healing is occurring. Severe itching with extensive redness usually indicates sensitivity to the ointment or bandage tape used to dress the wound. You should call our office if this develops.   Swelling and/or discoloration around your surgical site is common, particularly when performed around the eye.   All wounds normally drain. The larger the wound the more drainage there will be. After 7-10 days, you will notice the wound beginning to shrink and new skin will begin to grow. The wound is healed when you can see skin has formed over the entire area. A healed wound has a healthy, shiny  look to the surface and is red to dark pink in color to normalize. Wounds may take approximately 4-6 weeks to heal. Larger wounds may take 6-8 weeks. After the wound is healed you may discontinue dressing changes.   You may experience a sensation of tightness as your wound heals. This is normal and will gradually subside.   Your healed wound may be sensitive to temperature changes. This sensitivity improves with time, but if you re having a lot of discomfort, try to avoid temperature extremes.   Patients frequently experience itching after their wound appears to have healed because of the continue healing under the skin. Plain Vaseline will help relieve the itching.          Patient Education       Proper skin care from [facility]:    -Eliminate harsh soaps as they strip the natural oils from the skin, often resulting in dry itchy skin ( i.e. Dial, Zest, [place])  -Use mild soaps such as Cetaphil or Dove Sensitive Skin in the shower. You do not need to use soap on arms, legs, and trunk every time you shower unless visibly soiled.   -Avoid hot or cold showers.  -After showering, lightly dry off and apply moisturizing within 2-3 minutes. This will help trap moisture in the skin.   -Aggressive use of a moisturizer at least 1-2 times a day to the entire body (including -Vanicream, Cetaphil, Aquaphor or Cerave) and moisturize hands after every washing.  -We recommend using moisturizers that come in a tub that needs to be scooped out, not a pump. This has more of an oil base. It will hold moisture in your skin much better than a water base moisturizer. The above recommended are non-pore clogging.      Wear a sunscreen with at least SPF 30 on your face, ears, neck and V of the chest daily. Wear sunscreen on other areas of the body if those areas are exposed to the sun throughout the day. Sunscreens can contain physical and/or chemical blockers. Physical blockers are less likely to clog pores, these include  zinc oxide and titanium dioxide. Reapply every two hour and after swimming.     Sunscreen examples: https://www.ewg.org/sunscreen/    UV radiation  UVA radiation remains constant throughout the day and throughout the year. It is a longer wavelength than UVB and therefore penetrates deeper into the skin leading to immediate and delayed tanning, photoaging, and skin cancer. 70-80% of UVA and UVB radiation occurs between the hours of 10am-2pm.  UVB radiation  UVB radiation causes the most harmful effects and is more significant during the summer months. However, snow and ice can reflect UVB radiation leading to skin damage during the winter months as well. UVB radiation is responsible for tanning, burning, inflammation, delayed erythema (pinkness), pigmentation (brown spots), and skin cancer.     I recommend self monthly full body exams and yearly full body exams with a dermatology provider. If you develop a new or changing lesion please follow up for examination. Most skin cancers are pink and scaly or pink and pearly. However, we do see blue/brown/black skin cancers.  Consider the ABCDEs of melanoma when giving yourself your monthly full body exam ( don't forget the groin, buttocks, feet, toes, etc). A-asymmetry, B-borders, C-color, D-diameter, E-elevation or evolving. If you see any of these changes please follow up in clinic. If you cannot see your back I recommend purchasing a hand held mirror to use with a larger wall mirror.       Checking for Skin Cancer  You can find cancer early by checking your skin each month. There are 3 kinds of skin cancer. They are melanoma, basal cell carcinoma, and squamous cell carcinoma. Doing monthly skin checks is the best way to find new marks or skin changes. Follow the instructions below for checking your skin.   The ABCDEs of checking moles for melanoma   Check your moles or growths for signs of melanoma using ABCDE:   Asymmetry: the sides of the mole or growth don t  match  Border: the edges are ragged, notched, or blurred  Color: the color within the mole or growth varies  Diameter: the mole or growth is larger than 6 mm (size of a pencil eraser)  Evolving: the size, shape, or color of the mole or growth is changing (evolving is not shown in the images below)    Checking for other types of skin cancer  Basal cell carcinoma or squamous cell carcinoma have symptoms such as:     A spot or mole that looks different from all other marks on your skin  Changes in how an area feels, such as itching, tenderness, or pain  Changes in the skin's surface, such as oozing, bleeding, or scaliness  A sore that does not heal  New swelling or redness beyond the border of a mole    Who s at risk?  Anyone can get skin cancer. But you are at greater risk if you have:   Fair skin, light-colored hair, or light-colored eyes  Many moles or abnormal moles on your skin  A history of sunburns from sunlight or tanning beds  A family history of skin cancer  A history of exposure to radiation or chemicals  A weakened immune system  If you have had skin cancer in the past, you are at risk for recurring skin cancer.   How to check your skin  Do your monthly skin checkups in front of a full-length mirror. Check all parts of your body, including your:   Head (ears, face, neck, and scalp)  Torso (front, back, and sides)  Arms (tops, undersides, upper, and lower armpits)  Hands (palms, backs, and fingers, including under the nails)  Buttocks and genitals  Legs (front, back, and sides)  Feet (tops, soles, toes, including under the nails, and between toes)  If you have a lot of moles, take digital photos of them each month. Make sure to take photos both up close and from a distance. These can help you see if any moles change over time.   Most skin changes are not cancer. But if you see any changes in your skin, call your doctor right away. Only he or she can diagnose a problem. If you have skin cancer, seeing your  doctor can be the first step toward getting the treatment that could save your life.   [employer] last reviewed this educational content on 4/1/2019 2000-2020 [employer]. [address] [place] [address], [place] [address]. All rights reserved. This information is not intended as a substitute for professional medical care. Always follow your healthcare professional's instructions.       When should I call my doctor?  If you are worsening or not improving, please, contact us or seek urgent care as noted below.     Who should I call with questions (adults)?  [facility] (adult and pediatric): [phone]  [facility] (adult): [phone]  [facility] [place]) [phone]  For urgent needs outside of business hours call the [facility] at [phone] and ask for the dermatology resident on call to be paged  If this is a medical emergency and you are unable to reach an ER, Call [phone]      If you need a prescription refill, please contact your pharmacy. Refills are approved or denied by our Physicians during normal business hours, Monday through Fridays  Per office policy, refills will not be granted if you have not been seen within the past year (or sooner depending on your child's condition)

## 2023-10-26 NOTE — NURSING NOTE
Laura Jackson's chief complaint for this visit includes:  Chief Complaint   Patient presents with    Skin Check     Patient has a surgical scar on right ankle she would like to address treatment suggestions.      PCP: Bj Butler    Referring Provider:  No referring provider defined for this encounter.    LMP 07/28/2017 (Exact Date)   No Pain (0)        Allergies   Allergen Reactions    Penicillins Swelling     Swelling throat; age 4    Adhesive Tape Hives    Penicillin G     Tetracycline GI Disturbance     Other reaction(s): Abdominal Pain  Vomiting; nauseous  Other reaction(s): Abdominal Pain  Vomiting; nauseous         Do you need any medication refills at today's visit? No    Brittni Tejada MA

## 2023-10-30 ENCOUNTER — THERAPY VISIT (OUTPATIENT)
Dept: PHYSICAL THERAPY | Facility: CLINIC | Age: 53
End: 2023-10-30
Payer: COMMERCIAL

## 2023-10-30 DIAGNOSIS — M54.89 OTHER BACK PAIN, UNSPECIFIED CHRONICITY: Primary | ICD-10-CM

## 2023-10-30 PROCEDURE — 97112 NEUROMUSCULAR REEDUCATION: CPT | Mod: GP | Performed by: PHYSICAL THERAPIST

## 2023-10-30 PROCEDURE — 97140 MANUAL THERAPY 1/> REGIONS: CPT | Mod: GP | Performed by: PHYSICAL THERAPIST

## 2023-10-31 ENCOUNTER — IMMUNIZATION (OUTPATIENT)
Dept: FAMILY MEDICINE | Facility: CLINIC | Age: 53
End: 2023-10-31
Payer: COMMERCIAL

## 2023-10-31 DIAGNOSIS — Z23 NEED FOR PROPHYLACTIC VACCINATION AND INOCULATION AGAINST INFLUENZA: Primary | ICD-10-CM

## 2023-10-31 PROCEDURE — 90471 IMMUNIZATION ADMIN: CPT

## 2023-10-31 PROCEDURE — 99207 PR NO CHARGE NURSE ONLY: CPT

## 2023-10-31 PROCEDURE — 90682 RIV4 VACC RECOMBINANT DNA IM: CPT

## 2023-11-06 ENCOUNTER — THERAPY VISIT (OUTPATIENT)
Dept: PHYSICAL THERAPY | Facility: CLINIC | Age: 53
End: 2023-11-06
Payer: COMMERCIAL

## 2023-11-06 DIAGNOSIS — M54.89 OTHER BACK PAIN, UNSPECIFIED CHRONICITY: Primary | ICD-10-CM

## 2023-11-06 PROCEDURE — 97140 MANUAL THERAPY 1/> REGIONS: CPT | Mod: GP | Performed by: PHYSICAL THERAPIST

## 2023-11-06 PROCEDURE — 97112 NEUROMUSCULAR REEDUCATION: CPT | Mod: GP | Performed by: PHYSICAL THERAPIST

## 2023-11-21 ENCOUNTER — THERAPY VISIT (OUTPATIENT)
Dept: PHYSICAL THERAPY | Facility: CLINIC | Age: 53
End: 2023-11-21
Payer: COMMERCIAL

## 2023-11-21 DIAGNOSIS — M54.89 OTHER BACK PAIN, UNSPECIFIED CHRONICITY: Primary | ICD-10-CM

## 2023-11-21 PROCEDURE — 97112 NEUROMUSCULAR REEDUCATION: CPT | Mod: GP | Performed by: PHYSICAL THERAPIST

## 2023-11-21 PROCEDURE — 97140 MANUAL THERAPY 1/> REGIONS: CPT | Mod: GP | Performed by: PHYSICAL THERAPIST

## 2023-12-01 ENCOUNTER — THERAPY VISIT (OUTPATIENT)
Dept: PHYSICAL THERAPY | Facility: CLINIC | Age: 53
End: 2023-12-01
Payer: COMMERCIAL

## 2023-12-01 DIAGNOSIS — M54.89 OTHER BACK PAIN, UNSPECIFIED CHRONICITY: Primary | ICD-10-CM

## 2023-12-01 PROCEDURE — 97140 MANUAL THERAPY 1/> REGIONS: CPT | Mod: GP | Performed by: PHYSICAL THERAPIST

## 2023-12-01 PROCEDURE — 97112 NEUROMUSCULAR REEDUCATION: CPT | Mod: GP | Performed by: PHYSICAL THERAPIST

## 2023-12-02 DIAGNOSIS — J45.30 MILD PERSISTENT ASTHMA WITHOUT COMPLICATION: ICD-10-CM

## 2023-12-04 RX ORDER — CEPHALEXIN 250 MG/1
CAPSULE ORAL
Qty: 60 EACH | Refills: 0 | Status: SHIPPED | OUTPATIENT
Start: 2023-12-04 | End: 2024-01-04

## 2023-12-11 ENCOUNTER — MYC REFILL (OUTPATIENT)
Dept: FAMILY MEDICINE | Facility: CLINIC | Age: 53
End: 2023-12-11
Payer: COMMERCIAL

## 2023-12-11 DIAGNOSIS — J45.30 ASTHMATIC BRONCHITIS, MILD PERSISTENT, UNCOMPLICATED: ICD-10-CM

## 2023-12-12 RX ORDER — LEVALBUTEROL TARTRATE 45 UG/1
1-2 AEROSOL, METERED ORAL EVERY 4 HOURS PRN
Qty: 15 G | Refills: 11 | Status: SHIPPED | OUTPATIENT
Start: 2023-12-12

## 2023-12-14 ENCOUNTER — THERAPY VISIT (OUTPATIENT)
Dept: PHYSICAL THERAPY | Facility: CLINIC | Age: 53
End: 2023-12-14
Payer: COMMERCIAL

## 2023-12-14 DIAGNOSIS — M54.89 OTHER BACK PAIN, UNSPECIFIED CHRONICITY: Primary | ICD-10-CM

## 2023-12-14 PROCEDURE — 97140 MANUAL THERAPY 1/> REGIONS: CPT | Mod: GP | Performed by: PHYSICAL THERAPIST

## 2023-12-14 PROCEDURE — 97112 NEUROMUSCULAR REEDUCATION: CPT | Mod: GP | Performed by: PHYSICAL THERAPIST

## 2023-12-22 ENCOUNTER — THERAPY VISIT (OUTPATIENT)
Dept: PHYSICAL THERAPY | Facility: CLINIC | Age: 53
End: 2023-12-22
Payer: COMMERCIAL

## 2023-12-22 DIAGNOSIS — M54.89 OTHER BACK PAIN, UNSPECIFIED CHRONICITY: Primary | ICD-10-CM

## 2023-12-22 PROCEDURE — 97112 NEUROMUSCULAR REEDUCATION: CPT | Mod: GP | Performed by: PHYSICAL THERAPIST

## 2023-12-22 PROCEDURE — 97140 MANUAL THERAPY 1/> REGIONS: CPT | Mod: GP | Performed by: PHYSICAL THERAPIST

## 2024-01-04 DIAGNOSIS — J45.30 MILD PERSISTENT ASTHMA WITHOUT COMPLICATION: ICD-10-CM

## 2024-01-04 RX ORDER — CEPHALEXIN 250 MG/1
CAPSULE ORAL
Qty: 1 EACH | Refills: 5 | Status: SHIPPED | OUTPATIENT
Start: 2024-01-04

## 2024-01-09 ENCOUNTER — TELEPHONE (OUTPATIENT)
Dept: ORTHOPEDICS | Facility: CLINIC | Age: 54
End: 2024-01-09
Payer: COMMERCIAL

## 2024-01-09 NOTE — TELEPHONE ENCOUNTER
MRI results below.  Lazaro Crespo, LAT, ATC      EXAM: MR HAND LEFT W/O CONTRAST  LOCATION: [facility]  DATE: 7/13/2023     INDICATION: Evaluate hand pain and swelling, MCP joints long, ring, small fingers.  COMPARISON: 06/27/2023 radiographs.  TECHNIQUE: Unenhanced.     FINDINGS:      TENDONS:   -Extensor tendons: No tendon tear, tendinopathy, or tenosynovitis.  -Flexor tendons: No tendinopathy or tearing. Very mild short segment tenosynovitis involving the flexor tendons of the middle finger at and just proximal to the level of the MCP joint. Remaining flexor tendons are intact and unremarkable.     LIGAMENTS:  -Visualized collateral and capsular ligaments: Normal.     JOINTS AND BONES:   -No fracture. Mild degenerative changes at the 1st CMC joint.     MUSCLES AND SOFT TISSUES:   -No muscle atrophy or edema. No soft tissue mass or fluid collection.                                                                      IMPRESSION:  1.  No evidence of acute injury.     2.  Mild short segment tenosynovitis involving the flexor tendons of the middle finger.     3.  Mild degenerative changes at the 1st CMC joint.

## 2024-01-11 NOTE — TELEPHONE ENCOUNTER
No indication of follow-up for the MRI results noted. May be able to send via [employer], though appears results already seen by pt.  MRI hand showed mild tenosynovitis (inflammation) near long finger flexor tendons; this is site of trigger finger. Otherwise mild first CMC joint arthritis. No other inflammatory changes, no erosive changes.  At last visit we discussed potential for hand therapy depending on findings. Can refer if desired.  Otherwise, can follow-up in clinic if other questions/concerns.  Thanks.  Glen Gutierrez, , CAQ

## 2024-01-12 ENCOUNTER — THERAPY VISIT (OUTPATIENT)
Dept: PHYSICAL THERAPY | Facility: CLINIC | Age: 54
End: 2024-01-12
Payer: COMMERCIAL

## 2024-01-12 DIAGNOSIS — M54.89 OTHER BACK PAIN, UNSPECIFIED CHRONICITY: Primary | ICD-10-CM

## 2024-01-12 PROCEDURE — 97140 MANUAL THERAPY 1/> REGIONS: CPT | Mod: GP | Performed by: PHYSICAL THERAPIST

## 2024-01-12 PROCEDURE — 97112 NEUROMUSCULAR REEDUCATION: CPT | Mod: GP | Performed by: PHYSICAL THERAPIST

## 2024-02-02 ENCOUNTER — OFFICE VISIT (OUTPATIENT)
Dept: INTERNAL MEDICINE | Facility: CLINIC | Age: 54
End: 2024-02-02
Payer: COMMERCIAL

## 2024-02-02 VITALS
DIASTOLIC BLOOD PRESSURE: 80 MMHG | OXYGEN SATURATION: 97 % | RESPIRATION RATE: 14 BRPM | WEIGHT: 192.2 LBS | BODY MASS INDEX: 32.99 KG/M2 | HEART RATE: 65 BPM | SYSTOLIC BLOOD PRESSURE: 136 MMHG

## 2024-02-02 DIAGNOSIS — M25.50 MULTIPLE JOINT PAIN: Primary | ICD-10-CM

## 2024-02-02 DIAGNOSIS — Z80.3 FAMILY HISTORY OF MALIGNANT NEOPLASM OF BREAST: ICD-10-CM

## 2024-02-02 DIAGNOSIS — G90.A POTS (POSTURAL ORTHOSTATIC TACHYCARDIA SYNDROME): ICD-10-CM

## 2024-02-02 DIAGNOSIS — Z12.31 VISIT FOR SCREENING MAMMOGRAM: ICD-10-CM

## 2024-02-02 DIAGNOSIS — I47.10 SVT (SUPRAVENTRICULAR TACHYCARDIA) (H): ICD-10-CM

## 2024-02-02 PROCEDURE — 99214 OFFICE O/P EST MOD 30 MIN: CPT | Performed by: INTERNAL MEDICINE

## 2024-02-02 ASSESSMENT — ASTHMA QUESTIONNAIRES
ACT_TOTALSCORE: 23
QUESTION_1 LAST FOUR WEEKS HOW MUCH OF THE TIME DID YOUR ASTHMA KEEP YOU FROM GETTING AS MUCH DONE AT WORK, SCHOOL OR AT HOME: NONE OF THE TIME
QUESTION_2 LAST FOUR WEEKS HOW OFTEN HAVE YOU HAD SHORTNESS OF BREATH: ONCE OR TWICE A WEEK
QUESTION_4 LAST FOUR WEEKS HOW OFTEN HAVE YOU USED YOUR RESCUE INHALER OR NEBULIZER MEDICATION (SUCH AS ALBUTEROL): NOT AT ALL
ACT_TOTALSCORE: 23
QUESTION_3 LAST FOUR WEEKS HOW OFTEN DID YOUR ASTHMA SYMPTOMS (WHEEZING, COUGHING, SHORTNESS OF BREATH, CHEST TIGHTNESS OR PAIN) WAKE YOU UP AT NIGHT OR EARLIER THAN USUAL IN THE MORNING: NOT AT ALL
QUESTION_5 LAST FOUR WEEKS HOW WOULD YOU RATE YOUR ASTHMA CONTROL: WELL CONTROLLED

## 2024-02-02 NOTE — PROGRESS NOTES
"  Assessment & Plan     Multiple joint pain  Patient well known to me. She deals with several different chronic pain issues including multiple joint pains. 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 She had a rheumatological consultation ordered some time back and did not following through with making the appointment and now this is  so she needs a new referral placed  - Adult Rheumatology  Referral; Future    POTS (postural orthostatic tachycardia syndrome)  Noted as a point of historical importance and ongoing multiple issues and concerns secondary to this. We wrote a physicians letter of support for her needs at work, for complete details please see letter section of Epic electronic medical records     SVT (supraventricular tachycardia)  Problem is stable and ongoing monitoring      Visit for screening mammogram  We discussed her positive family history of breast cancer and she's been advised to have regular mammogram annually and apparently breast MRI at the 6 month dejuan in between her regular mammograms. We agreed to order the plain mammogram today   - MA SCREENING DIGITAL BILAT - Future  (s+30); Future    Family history of malignant neoplasm of breast  As detailed above     Prescription drug management  22 minutes spent by me on the date of the encounter doing chart review, history and exam, documentation and further activities per the note      BMI  Estimated body mass index is 32.99 kg/m  as calculated from the following:    Height as of 23: 1.626 m (5' 4\").    Weight as of this encounter: 87.2 kg (192 lb 3.2 oz).   Weight management plan: Discussed healthy diet and exercise guidelines          Mitchell Sanchez is a 53 year old, presenting for the following health issues:  Forms (Cory for her pots syndrom ), Chest Pain (Intermittent chest pain, possible related to " "stomach issue, taking omeprazole has helped, but also having some jaw pain ), and Referral (Rheumatology )        10/9/2023     5:18 PM   Additional Questions   Roomed by silverio etienne     Chest Pain     History of Present Illness       Reason for visit:  Increased POTS symptoms with return to work.Increased GI symptoms -chest vs gi pain  ,left lower jaw pain brief,joint pain .Would like rheum referal,intermit MARIA L .    She eats 2-3 servings of fruits and vegetables daily.She consumes 0 sweetened beverage(s) daily.She exercises with enough effort to increase her heart rate 20 to 29 minutes per day.  She exercises with enough effort to increase her heart rate 3 or less days per week.   She is taking medications regularly.     Suspicious for POTS (postural orthostatic tachycardia syndrome) symptoms   Chronic pain and jaw pain  Had ankle surgery roughly a year ago  In August was having chest pain , she has gained some weight and maybe this means she is needing a higher dose of the Corlanor  (ivabradine)   Has been swimming due to knee and ankle pain  She'd had some torn ligaments and a tendon rupture that as repaired and removal of a bone spurs from lateral aspect of ankle and now has chronic pain there , now that provider is no longer here so now she plans on seeing [facility] for her orthopedic issues     Several chronic joint pains   She had a rheumatological consultation but she never did make this appointment and needs a new referral   Was taking non-steroidal anti-inflammatory drugs \" with a shovel\" last year and has developed some gastroesophageal reflux disease symptoms and using over the counter nonprescription Omeprazole [ Prilosec ] and things are finally getting better , the chest pain has also stopped and we wonder if this was gastrointestinal-mediated non-cardiac chest pain ?    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      Objective    /80   Pulse 65   Resp 14   Wt 87.2 kg (192 lb 3.2 oz)   LMP 07/28/2017 (Exact Date)   SpO2 97%   BMI 32.99 kg/m    Body mass index is 32.99 kg/m .  Physical Exam   GENERAL: alert and no distress  MS: no gross musculoskeletal defects noted, no edema  NEURO: Normal strength and tone, mentation intact and speech normal  PSYCH: mentation appears normal, affect normal/bright    Orders Placed This Encounter   Procedures    MA SCREENING DIGITAL BILAT - Future  (s+30)    Adult Rheumatology  Referral             Signed Electronically by: Bj Butler MD

## 2024-02-02 NOTE — COMMUNITY RESOURCES LIST (ENGLISH)
02/02/2024   [facility]  N/A  For questions about this resource list or additional care needs, please contact your primary care clinic or care manager.  Phone: [phone]   Email: N/A   Address: [address] [place] [id number]   Hours: N/A        Financial Stability       Utility payment assistance  1  [facility]  Office - [facility] Distance: 3.06 miles      Phone/Virtual   [address] [place] [address]  Language: English  Hours: Mon - Fri 8:30 AM - 12:00 PM , Mon - Fri 1:00 PM - 4:00 PM  Fees: Free   Phone: (581) 201-2541 Email: [email or web address].[employer].Turn Website: https://[email or web address].org/usn/     2  [facility] [employer] ([employer]) - Energy Assistance Program Distance: 3.07 miles      In-Person, Phone/Virtual   [address] [place] [address]  Language: English  Hours: Mon - Fri 8:00 AM - 4:30 PM  Fees: Free   Phone: (401) 653-3287 Email: accap@accap.org Website: http://www.accap.org          Important Numbers & Websites       Emergency Services   911  [facility]   311  Poison Control   (672) 523-8939  Suicide Prevention Lifeline   (367) 406-3236 (TALK)  Child Abuse Hotline   (377) 703-3123 (4-A-Child)  Sexual Assault Hotline   (567) 900-6811 (HOPE)  National Runaway Safeline   (121) 372-2711 (RUNAWAY)  All-Options Talkline   (794) 779-9915  Substance Abuse Referral   (228) 530-6377 (HELP)

## 2024-02-02 NOTE — LETTER
[facility]  [address]  [place] [address]  [phone]          February 2, 2024    Laura Jackson                                                                                                                     [address] [facility]  [place] [address]            Dear Laura, and To Whom it May Concern,     Due to symptoms from POTS (postural orthostatic tachycardia syndrome), this patient has a need for an intermittent leave of absence , approximately 10 days per year.    Sincerely,         Bj Butler MD

## 2024-02-05 ENCOUNTER — THERAPY VISIT (OUTPATIENT)
Dept: PHYSICAL THERAPY | Facility: CLINIC | Age: 54
End: 2024-02-05
Payer: COMMERCIAL

## 2024-02-05 DIAGNOSIS — M54.89 OTHER BACK PAIN, UNSPECIFIED CHRONICITY: Primary | ICD-10-CM

## 2024-02-05 PROCEDURE — 97140 MANUAL THERAPY 1/> REGIONS: CPT | Mod: GP | Performed by: PHYSICAL THERAPIST

## 2024-02-05 PROCEDURE — 97112 NEUROMUSCULAR REEDUCATION: CPT | Mod: GP | Performed by: PHYSICAL THERAPIST

## 2024-02-19 ENCOUNTER — THERAPY VISIT (OUTPATIENT)
Dept: PHYSICAL THERAPY | Facility: CLINIC | Age: 54
End: 2024-02-19
Payer: COMMERCIAL

## 2024-02-19 DIAGNOSIS — M54.89 OTHER BACK PAIN, UNSPECIFIED CHRONICITY: Primary | ICD-10-CM

## 2024-02-19 PROCEDURE — 97112 NEUROMUSCULAR REEDUCATION: CPT | Mod: GP | Performed by: PHYSICAL THERAPIST

## 2024-02-19 PROCEDURE — 97140 MANUAL THERAPY 1/> REGIONS: CPT | Mod: GP | Performed by: PHYSICAL THERAPIST

## 2024-03-04 ENCOUNTER — THERAPY VISIT (OUTPATIENT)
Dept: PHYSICAL THERAPY | Facility: CLINIC | Age: 54
End: 2024-03-04
Payer: COMMERCIAL

## 2024-03-04 DIAGNOSIS — M54.89 OTHER BACK PAIN, UNSPECIFIED CHRONICITY: Primary | ICD-10-CM

## 2024-03-04 PROCEDURE — 97140 MANUAL THERAPY 1/> REGIONS: CPT | Mod: GP | Performed by: PHYSICAL THERAPIST

## 2024-03-04 PROCEDURE — 97112 NEUROMUSCULAR REEDUCATION: CPT | Mod: GP | Performed by: PHYSICAL THERAPIST

## 2024-03-18 ENCOUNTER — THERAPY VISIT (OUTPATIENT)
Dept: PHYSICAL THERAPY | Facility: CLINIC | Age: 54
End: 2024-03-18
Payer: COMMERCIAL

## 2024-03-18 DIAGNOSIS — M54.89 OTHER BACK PAIN, UNSPECIFIED CHRONICITY: Primary | ICD-10-CM

## 2024-03-18 PROCEDURE — 97112 NEUROMUSCULAR REEDUCATION: CPT | Mod: GP | Performed by: PHYSICAL THERAPIST

## 2024-03-18 PROCEDURE — 97140 MANUAL THERAPY 1/> REGIONS: CPT | Mod: GP | Performed by: PHYSICAL THERAPIST

## 2024-04-01 ENCOUNTER — THERAPY VISIT (OUTPATIENT)
Dept: PHYSICAL THERAPY | Facility: CLINIC | Age: 54
End: 2024-04-01
Payer: COMMERCIAL

## 2024-04-01 DIAGNOSIS — M54.89 OTHER BACK PAIN, UNSPECIFIED CHRONICITY: Primary | ICD-10-CM

## 2024-04-01 PROCEDURE — 97112 NEUROMUSCULAR REEDUCATION: CPT | Mod: GP | Performed by: PHYSICAL THERAPIST

## 2024-04-01 PROCEDURE — 97140 MANUAL THERAPY 1/> REGIONS: CPT | Mod: GP | Performed by: PHYSICAL THERAPIST

## 2024-04-09 ENCOUNTER — TELEPHONE (OUTPATIENT)
Dept: CARDIOLOGY | Facility: CLINIC | Age: 54
End: 2024-04-09
Payer: COMMERCIAL

## 2024-04-09 NOTE — TELEPHONE ENCOUNTER
Left Voicemail (1st Attempt) for the patient to call back and schedule the following:    Appointment type: return ep  Provider: isabel   Return date: 05/16/24  Specialty phone number: [phone] opt 1  Additional appointment(s) needed: n/a   Additonal Notes: n/a

## 2024-04-11 ENCOUNTER — TELEPHONE (OUTPATIENT)
Dept: CARDIOLOGY | Facility: CLINIC | Age: 54
End: 2024-04-11
Payer: COMMERCIAL

## 2024-04-15 ENCOUNTER — THERAPY VISIT (OUTPATIENT)
Dept: PHYSICAL THERAPY | Facility: CLINIC | Age: 54
End: 2024-04-15
Payer: COMMERCIAL

## 2024-04-15 ENCOUNTER — MYC REFILL (OUTPATIENT)
Dept: FAMILY MEDICINE | Facility: CLINIC | Age: 54
End: 2024-04-15

## 2024-04-15 DIAGNOSIS — J45.30 MILD PERSISTENT ASTHMA WITHOUT COMPLICATION: ICD-10-CM

## 2024-04-15 DIAGNOSIS — M54.89 OTHER BACK PAIN, UNSPECIFIED CHRONICITY: Primary | ICD-10-CM

## 2024-04-15 PROCEDURE — 97112 NEUROMUSCULAR REEDUCATION: CPT | Mod: GP | Performed by: PHYSICAL THERAPIST

## 2024-04-15 PROCEDURE — 97140 MANUAL THERAPY 1/> REGIONS: CPT | Mod: GP | Performed by: PHYSICAL THERAPIST

## 2024-04-15 RX ORDER — FLUTICASONE PROPIONATE AND SALMETEROL 100; 50 UG/1; UG/1
POWDER RESPIRATORY (INHALATION)
Qty: 1 EACH | Refills: 5 | OUTPATIENT
Start: 2024-04-15

## 2024-04-29 ENCOUNTER — THERAPY VISIT (OUTPATIENT)
Dept: PHYSICAL THERAPY | Facility: CLINIC | Age: 54
End: 2024-04-29
Payer: COMMERCIAL

## 2024-04-29 DIAGNOSIS — M54.89 OTHER BACK PAIN, UNSPECIFIED CHRONICITY: Primary | ICD-10-CM

## 2024-04-29 PROCEDURE — 97140 MANUAL THERAPY 1/> REGIONS: CPT | Mod: GP | Performed by: PHYSICAL THERAPIST

## 2024-04-29 PROCEDURE — 97112 NEUROMUSCULAR REEDUCATION: CPT | Mod: GP | Performed by: PHYSICAL THERAPIST

## 2024-05-14 NOTE — PROGRESS NOTES
Rheumatology Clinic Visit  [facility]  Valeria Joseph RUFUS     Laura Jackson MRN# [id number]   YOB: 1970 Age: 54 year old   Date of Visit: 5/20/2024  Primary care provider: Bj Butler          Assessment and Plan:     1.  Multiple joint pain  2.  Positive MANDO    Patient presents today for an initial evaluation of multiple joint pain.  She has had pain in her joints for many years.  She has a history of Edilma-Danlos syndrome and POTS.  She does have pain secondary to her hypermobility as well as osteoarthritis.  Physical examination today did not show any active synovitis, dactylitis, tenosynovitis or enthesopathy.  She did have decreased  strength on the right but full fist formation.  Previous laboratory evaluations and imaging studies were reviewed, results below.    Discussed the specialty of rheumatology with the patient today.  Discussed with the patient that a positive MANDO is of unknown significance.  We discussed that the majority of positive ANAs are false positives.  10 to 30% of the healthy population can also have a positive MANDO that is not associated with any disorder.  Thyroiditis and hepatitis have also been known to be associated with a positive MANDO.  We also discussed that viral and bacterial infections could also have a positive MANDO.  Discussed that 99% of people who have systemic lupus erythematous have a positive MANDO but the majority of people that have a positive MANDO do not have lupus.  An MANDO can also be seen with other rheumatological autoimmune disorders such as scleroderma.  Reassured the patient that there is no evidence on physical examination or previous laboratory or imaging studies that are suggestive of an inflammatory arthritic condition or connective tissue disease.  However given the persistent joint symptoms we will recheck the labs that were checked in 2021.  I will contact the patient with the results of the evaluation once it is  complete.    Plan:     Schedule follow-up with Valeria Joseph PA-C as needed  Labs: dsDNA, complement levels, WILIAN panel, antiphospholipid panel, Urine (looking for protein), thyroid peroxidase antibody, Scl-70, Centromere antibody, CCP antibody, Rheumatoid factor, CRP and Sed Rate, CBC    Valeria Joseph, RUFUS  Rheumatology         History of Present Illness:   Laura Jackson presents for evaluation of joint pain.  Past medical history includes Edilma-Danlos syndrome, POTS, SVT, endometriosis, asthma, irritable bowel syndrome, GERD.    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 She states that she lost her hearing from the COVID vaccine. She states that she has a lot of joint reina. She is trying to be proactive on her health. She states that she is getting arthritis. She states that she is no longer able to wear her wedding ring. She states that the right pinky is acting up and getting crooked. She has pain and weakness in her right thumb. She saw orthopedics and they briefly discussed braces. She states that the thumb does not have any  and has loss of function. She has pain in her hips, knees, ankles and neck. She states that sometimes she is unable to sleep due to pain and Tylenol usually helps. She states that she started to notice arthritis in her hands since her late 30's.     No skin rashes but states that she will get random hives. Occasionally they will be on her legs, face or neck. She states that she has dry eyes. No unexplained fevers or weight loss. She states that she has vaginal dryness as well. She has shortness of breath with her asthma. No history of blood clots or seizures. 1 miscarriage. No history of delirium or psychosis. She states that she had some hair loss last summer after a cortisone injection. She reports having a lot of fatigue. She works part-time with her POTS as this will flare if she is overtired. She states that with bug bites, they'll be severe and she will need  to take Benadryl.  She does report that when she was in her 30s she would have episodes of redness of her face that was very tender.    No known family history of RA or lupus. No personal or family history of psoriasis, ulcerative colitis or crohn's.     Patient was evaluated by rheumatology in 2018 and then again in 2021.  In 2018 there is no evidence suggestive of an autoimmune disorder and follow-up with rheumatology was as needed.  Testing in 2021 was negative, therefore follow-up with rheumatology was recommended to be as needed.         Review of Systems:     Constitutional: as above  Skin: negative  Eyes: as above  Ears/Nose/Throat: negative  Respiratory: No shortness of breath, dyspnea on exertion, cough, or hemoptysis  Cardiovascular: negative  Gastrointestinal: as above  Genitourinary: negative  Musculoskeletal: as above  Neurologic: negative  Psychiatric: negative  Hematologic/Lymphatic/Immunologic: negative  Endocrine: negative         Active Problem List:     Patient Active Problem List    Diagnosis Date Noted    SVT (supraventricular tachycardia) (H24) 02/02/2024     Priority: Medium    Multiple joint pain 02/02/2024     Priority: Medium    Chronic neck pain 10/09/2023     Priority: Medium    Abdominal bloating 12/01/2022     Priority: Medium    Back pain 11/18/2021     Priority: Medium    Range of joint movement increased 09/19/2018     Priority: Medium    Autonomic dysfunction 07/31/2018     Priority: Medium    Benign essential hypertension 07/31/2018     Priority: Medium    Hypermobility syndrome 02/16/2018     Priority: Medium    POTS (postural orthostatic tachycardia syndrome) 02/06/2018     Priority: Medium    Endometriosis 11/09/2017     Priority: Medium    Heberden's nodes 11/09/2017     Priority: Medium    S/P ANAID (total abdominal hysterectomy) 09/11/2017     Priority: Medium    Hypovitaminosis D 09/11/2017     Priority: Medium    Nausea 08/04/2017     Priority: Medium    S/P myomectomy  2017     Priority: Medium    Mild persistent asthma 2014     Priority: Medium    Family history of malignant neoplasm of breast 2014     Priority: Medium    History of supraventricular tachycardia 10/11/2013     Priority: Medium     no recurrence since       CARDIOVASCULAR SCREENING; LDL GOAL LESS THAN 160 2013     Priority: Medium    Irritable bowel syndrome 2013     Priority: Medium    GERD (gastroesophageal reflux disease) 2013     Priority: Medium    Cardiac dysrhythmia 2010     Priority: Medium     Overview:   LW Onset:    ; Supraventricular Tachycardia      Elevated TSH 2010     Priority: Medium     Overview:   [facility]: TSH 7.45      Heartburn 2007     Priority: Medium    Normal delivery 2006     Priority: Medium     Overview:   LW Modifier:    LW Onset:    ; Normal Spontaneous Vaginal Delivery      Uterine leiomyoma 2005     Priority: Medium     Overview:   [facility]  ICD 10  Overview:   LW Modifier:  fundal  LW Onset:    ; Leiomyoma Uterus      Exercise-induced bronchospasm 2004     Priority: Medium     Overview:   Asthma Exercise Induced      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     Priority: Medium     Overview:   LW Modifier:  D&C 10/04  LW Onset:    ; Spontaneous  Complete              Past Medical History:     Past Medical History:   Diagnosis Date    Benign essential hypertension 2018    Family history of breast cancer 2014    Family history of breast cancer     Hypothyroidism 2022    Mechanical low back pain 2019    SVT (supraventricular tachycardia):  history of, no recurrence since 2008 10/11/2013    no recurrence since      Uncomplicated asthma      Past Surgical History:   Procedure Laterality Date    ABDOMEN SURGERY  2017    myectomy    APPENDECTOMY      ARTHROSCOPY ANKLE, OPEN REPAIR LIGAMENT, COMBINED Right  02/02/2023    Procedure: ankle arthroscopy, modified Brostrom, peroneal tendon repair, exostectomy of medial malleolus;  Surgeon: Saida Michael DPM;  Location: [facility]    COLONOSCOPY N/A 08/20/2018    Procedure: COMBINED COLONOSCOPY, SINGLE OR MULTIPLE BIOPSY/POLYPECTOMY BY BIOPSY;;  Surgeon: Osman Hahn MD;  Location: [facility]    COLONOSCOPY WITH CO2 INSUFFLATION N/A 08/20/2018    Procedure: COLONOSCOPY WITH CO2 INSUFFLATION;  COLON-SCREENING / LUBKA ;  Surgeon: Osman Hahn MD;  Location: [facility] OR    DAVINCI HYSTERECTOMY TOTAL, SALPINGECTOMY BILATERAL N/A 08/04/2017    Procedure: DAVINCI XI HYSTERECTOMY TOTAL, SALPINGECTOMY BILATERAL;  DAVINCI TOTAL HYSTERECTOMY; BILATERAL SALPINGECTOMY. BILATERAL URETERAL LYSIS. UTEROSACRAL-COLPOPEXY. EXCISION OF ENDOMETRIOSIS;  Surgeon: George Loyola MD;  Location: [facility] OR    DAVINCI LYSIS OF ADHESIONS Bilateral 08/04/2017    Procedure: DAVINCI LYSIS OF ADHESIONS;  BILATERAL URETERAL LYSIS;  Surgeon: George Loyola MD;  Location: [facility]    DAVINCI SACROCOLPOPEXY, CYSTOSCOPY, COMBINED N/A 08/04/2017    Procedure: COMBINED DAVINCI SACROCOLPOPEXY, CYSTOSCOPY;  UTEROSACRAL COLPOPEXY;  Surgeon: George Loyola MD;  Location: [facility]    DAVINCI XI ASSISTED ABLATION / EXCISION OF ENDOMETRIOSIS  08/04/2017    Procedure: DAVINCI XI ASSISTED ABLATION / EXCISION OF ENDOMETRIOSIS;;  Surgeon: George Loyola MD;  Location: [facility]    DILATION AND CURETTAGE N/A 06/20/2017    Procedure: DILATION AND CURETTAGE;  Uterine Curettings and Fibroid Removal, Cook catheter placement; (No hysterectomy done at this time);  Surgeon: Ernestina Saunders MD;  Location: [facility]    HYSTERECTOMY, PAP NO LONGER INDICATED      ORTHOPEDIC SURGERY  06/1986    Heel spur , toe surgery    RELEASE CARPAL TUNNEL Right 10/20/2020    Procedure: RIGHT RELEASE, CARPAL TUNNEL;  Surgeon: Rickey Rea MD;  Location: [facility] OR    RELEASE CARPAL TUNNEL Left 11/06/2020    Procedure: LEFT  RELEASE, CARPAL TUNNEL;  Surgeon: Rickey Rea MD;  Location: [facility] OR    SOFT TISSUE SURGERY  2/2/2023    TONSILLECTOMY              Social History:     Social History     Socioeconomic History    Marital status:      Spouse name: Not on file    Number of children: 2    Years of education: Not on file    Highest education level: Not on file   Occupational History    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 No   Social History Narrative    Not on file     Social Determinants of Health     Financial Resource Strain: High Risk (2/2/2024)    Financial Resource Strain     Within the past 12 months, have you or your family members you live with been unable to get utilities (heat, electricity) when it was really needed?: Yes   Food Insecurity: Low Risk  (2/2/2024)    Food Insecurity     Within the past 12 months, did you worry that your food would run out before you got money to buy more?: No     Within the past 12 months, did the food you bought just not last and you didn t have money to get more?: No   Transportation Needs: Low Risk  (2/2/2024)    Transportation Needs     Within the past 12 months, has lack of transportation kept you from medical appointments, getting your medicines, non-medical meetings or appointments, work, or from getting things that you need?: No   Physical Activity: Not on file   Stress: Not on file   Social Connections: Not on file   Interpersonal Safety: Low Risk  (2/2/2024)    Interpersonal Safety     Do you feel physically and emotionally safe  where you currently live?: Yes     Within the past 12 months, have you been hit, slapped, kicked or otherwise physically hurt by someone?: No     Within the past 12 months, have you been humiliated or emotionally abused in other ways by your partner or ex-partner?: No   Housing Stability: Low Risk  (2024)    Housing Stability     Do you have housing? : Yes     Are you worried about losing your housing?: No          Family History:     Family History   Problem Relation Age of Onset    Diabetes Mother     Hypertension Mother     Hyperlipidemia Mother     Arrhythmia Mother     Cardiovascular Father         CHF and COPD    Asthma Father     Breast Cancer Maternal Grandfather     Colon Cancer Maternal Grandfather         His mother  of cancer too    Breast Cancer Paternal Grandmother     Breast Cancer Paternal Aunt     C.A.D. Paternal Grandfather     Breast Cancer Paternal Grandfather     Breast Cancer Cousin     Asthma Son     Diabetes Maternal Grandmother     Hypertension Maternal Grandmother     Breast Cancer Cousin     Breast Cancer Cousin     Breast Cancer Cousin         Maternal    Breast Cancer Other     Breast Cancer Other         Maternal aunt            Allergies:     Allergies   Allergen Reactions    Penicillins Swelling     Swelling throat; age 4    Adhesive Tape Hives    Penicillin G     Tetracycline GI Disturbance     Other reaction(s): Abdominal Pain  Vomiting; nauseous  Other reaction(s): Abdominal Pain  Vomiting; nauseous            Medications:     Current Outpatient Medications   Medication Sig Dispense Refill    Cholecalciferol (VITAMIN D) 2000 UNITS CAPS Take 1 capsule by mouth daily      fluticasone-salmeterol (ADVAIR DISKUS) 100-50 MCG/ACT inhaler INHALE 1 PUFF BY MOUTH INTO THE LUNGS EVERY 12 HOURS 1 each 5    ivabradine (CORLANOR) 5 MG tablet Take 1 tablet (5 mg) by mouth 2 times daily (with meals) 180 tablet 3    levalbuterol (XOPENEX HFA) 45 MCG/ACT inhaler Inhale 1-2 puffs into the  "lungs every 4 hours as needed for shortness of breath No further refills until appointment 15 g 11    metroNIDAZOLE (METROGEL) 0.75 % external gel Apply to face BID (Patient taking differently: 2 times daily as needed Apply to face BID) 45 g 3    Pyridoxine HCl (B-6) 100 MG TABS       vitamin B-12 (CYANOCOBALAMIN) 100 MCG tablet               Physical Exam:   Blood pressure 125/87, pulse 87, resp. rate 16, height 1.626 m (5' 4\"), weight 87.2 kg (192 lb 3.2 oz), last menstrual period 07/28/2017, SpO2 98%, not currently breastfeeding.  Wt Readings from Last 6 Encounters:   02/02/24 87.2 kg (192 lb 3.2 oz)   08/16/23 86.2 kg (190 lb)   07/26/23 86.2 kg (190 lb)   07/17/23 86.4 kg (190 lb 6.4 oz)   06/27/23 85.7 kg (189 lb)   05/31/23 85.7 kg (189 lb)     Constitutional: well-developed, appearing stated age; cooperative  Eyes: nl PERRLA, conjunctiva, sclera  ENT: nl external ears, nose, hearing, lips, teeth, gums, throat. No mucositis.   No mucous membrane lesions, normal saliva pool  Neck: no mass or thyroid enlargement  Resp: No shortness of breath with normal conversation  Lymph: no cervical, supraclavicular or epitrochlear nodes  MS: The TMJ, neck, shoulder, elbow, wrist, MCP/PIP/DIP, spine,  knee, ankle, and foot MTP/IP joints were examined and found normal. No active synovitis or altered joint anatomy. Full joint ROM.  Decreased  strength on the right. No dactylitis,  tenosynovitis, enthesopathy.  Tenderness to palpation of the right SI joint and midline thoracic spine  Skin: no nail pitting, alopecia, rash, nodules or lesions.   Psych: nl judgement, orientation, memory, affect.           Data:   Imaging:  MRI right ankle 01/05/2023  Impression:     1. No acute osseous abnormality. Multifocal bone marrow edema noted on  prior MRI 9/13/2022 has resolved.     2. Similar high-grade sprain of the anterior talofibular ligament and  moderate grade sprain of the calcaneofibular ligament.     3. Short segment split " thickness tearing of the peroneal brevis with  distal reconstitution, new or more conspicuous compared to prior. Mild  peroneal tenosynovitis    MRI Right knee 02/20/2023  Impression:     1. Discoid lateral meniscus with horizontal cleavage tear at the  meniscal body.     2. Horizontal tear at the posterior body/horn of medial meniscus.  Multidirectional tearing at the junction of the posterior horn and  posterior root ligament. Focal edema at the posterior meniscocapsular  junction, may represent ramp lesion.     3. Grade 4 chondromalacia at the patellofemoral and medial  compartments.    MRI left hand 07/13/2023  IMPRESSION:  1.  No evidence of acute injury.     2.  Mild short segment tenosynovitis involving the flexor tendons of the middle finger.     3.  Mild degenerative changes at the 1st CMC joint    Laboratory:  2021  Negative RF  Negative CCP  Negative CRP  Negative Centromere  Negative Sed rate  MANDO borderline positive with a speckled pattern and a titer of 1: 80  C3 136, C4 30  Negative double-stranded DNA  Negative RNP  Negative SCL 70  Negative Smith  Negative SSA and SSB

## 2024-05-17 ENCOUNTER — THERAPY VISIT (OUTPATIENT)
Dept: PHYSICAL THERAPY | Facility: CLINIC | Age: 54
End: 2024-05-17
Payer: COMMERCIAL

## 2024-05-17 DIAGNOSIS — M54.89 OTHER BACK PAIN, UNSPECIFIED CHRONICITY: Primary | ICD-10-CM

## 2024-05-17 PROCEDURE — 97112 NEUROMUSCULAR REEDUCATION: CPT | Mod: GP | Performed by: PHYSICAL THERAPIST

## 2024-05-17 PROCEDURE — 97140 MANUAL THERAPY 1/> REGIONS: CPT | Mod: GP | Performed by: PHYSICAL THERAPIST

## 2024-05-20 ENCOUNTER — OFFICE VISIT (OUTPATIENT)
Dept: RHEUMATOLOGY | Facility: CLINIC | Age: 54
End: 2024-05-20
Payer: COMMERCIAL

## 2024-05-20 VITALS
SYSTOLIC BLOOD PRESSURE: 125 MMHG | BODY MASS INDEX: 32.81 KG/M2 | WEIGHT: 192.2 LBS | HEART RATE: 87 BPM | OXYGEN SATURATION: 98 % | DIASTOLIC BLOOD PRESSURE: 87 MMHG | RESPIRATION RATE: 16 BRPM | HEIGHT: 64 IN

## 2024-05-20 DIAGNOSIS — R76.8 POSITIVE ANA (ANTINUCLEAR ANTIBODY): ICD-10-CM

## 2024-05-20 DIAGNOSIS — M25.50 MULTIPLE JOINT PAIN: Primary | ICD-10-CM

## 2024-05-20 LAB
ALBUMIN UR-MCNC: NEGATIVE MG/DL
APPEARANCE UR: ABNORMAL
BACTERIA #/AREA URNS HPF: ABNORMAL /HPF
BILIRUB UR QL STRIP: NEGATIVE
COLOR UR AUTO: YELLOW
CRP SERPL-MCNC: 9.38 MG/L
ERYTHROCYTE [DISTWIDTH] IN BLOOD BY AUTOMATED COUNT: 13 % (ref 10–15)
ERYTHROCYTE [SEDIMENTATION RATE] IN BLOOD BY WESTERGREN METHOD: 8 MM/HR (ref 0–30)
GLUCOSE UR STRIP-MCNC: NEGATIVE MG/DL
HCT VFR BLD AUTO: 42.9 % (ref 35–47)
HGB BLD-MCNC: 14.1 G/DL (ref 11.7–15.7)
HGB UR QL STRIP: NEGATIVE
KETONES UR STRIP-MCNC: NEGATIVE MG/DL
LEUKOCYTE ESTERASE UR QL STRIP: ABNORMAL
MCH RBC QN AUTO: 28.6 PG (ref 26.5–33)
MCHC RBC AUTO-ENTMCNC: 32.9 G/DL (ref 31.5–36.5)
MCV RBC AUTO: 87 FL (ref 78–100)
MUCOUS THREADS #/AREA URNS LPF: PRESENT /LPF
NITRATE UR QL: NEGATIVE
PH UR STRIP: 5.5 [PH] (ref 5–7)
PLATELET # BLD AUTO: 255 10E3/UL (ref 150–450)
RBC # BLD AUTO: 4.93 10E6/UL (ref 3.8–5.2)
RBC #/AREA URNS AUTO: ABNORMAL /HPF
RHEUMATOID FACT SERPL-ACNC: <10 IU/ML
SP GR UR STRIP: >=1.03 (ref 1–1.03)
SQUAMOUS #/AREA URNS AUTO: ABNORMAL /LPF
URATE CRY #/AREA URNS HPF: ABNORMAL /HPF
UROBILINOGEN UR STRIP-ACNC: 0.2 E.U./DL
WBC # BLD AUTO: 8.1 10E3/UL (ref 4–11)
WBC #/AREA URNS AUTO: ABNORMAL /HPF

## 2024-05-20 PROCEDURE — 85730 THROMBOPLASTIN TIME PARTIAL: CPT | Performed by: PHYSICIAN ASSISTANT

## 2024-05-20 PROCEDURE — 86431 RHEUMATOID FACTOR QUANT: CPT | Performed by: PHYSICIAN ASSISTANT

## 2024-05-20 PROCEDURE — 85652 RBC SED RATE AUTOMATED: CPT | Performed by: PHYSICIAN ASSISTANT

## 2024-05-20 PROCEDURE — 86140 C-REACTIVE PROTEIN: CPT | Performed by: PHYSICIAN ASSISTANT

## 2024-05-20 PROCEDURE — 85027 COMPLETE CBC AUTOMATED: CPT | Performed by: PHYSICIAN ASSISTANT

## 2024-05-20 PROCEDURE — 36415 COLL VENOUS BLD VENIPUNCTURE: CPT | Performed by: PHYSICIAN ASSISTANT

## 2024-05-20 PROCEDURE — 86235 NUCLEAR ANTIGEN ANTIBODY: CPT | Performed by: PHYSICIAN ASSISTANT

## 2024-05-20 PROCEDURE — 85613 RUSSELL VIPER VENOM DILUTED: CPT | Performed by: PHYSICIAN ASSISTANT

## 2024-05-20 PROCEDURE — 86146 BETA-2 GLYCOPROTEIN ANTIBODY: CPT | Performed by: PHYSICIAN ASSISTANT

## 2024-05-20 PROCEDURE — 86225 DNA ANTIBODY NATIVE: CPT | Performed by: PHYSICIAN ASSISTANT

## 2024-05-20 PROCEDURE — 86147 CARDIOLIPIN ANTIBODY EA IG: CPT | Performed by: PHYSICIAN ASSISTANT

## 2024-05-20 PROCEDURE — 86376 MICROSOMAL ANTIBODY EACH: CPT | Performed by: PHYSICIAN ASSISTANT

## 2024-05-20 PROCEDURE — 99204 OFFICE O/P NEW MOD 45 MIN: CPT | Performed by: PHYSICIAN ASSISTANT

## 2024-05-20 PROCEDURE — 86200 CCP ANTIBODY: CPT | Performed by: PHYSICIAN ASSISTANT

## 2024-05-20 PROCEDURE — 85390 FIBRINOLYSINS SCREEN I&R: CPT | Performed by: PATHOLOGY

## 2024-05-20 PROCEDURE — 81001 URINALYSIS AUTO W/SCOPE: CPT | Performed by: PHYSICIAN ASSISTANT

## 2024-05-20 PROCEDURE — 86160 COMPLEMENT ANTIGEN: CPT | Performed by: PHYSICIAN ASSISTANT

## 2024-05-20 ASSESSMENT — PAIN SCALES - GENERAL: PAINLEVEL: SEVERE PAIN (6)

## 2024-05-20 NOTE — PATIENT INSTRUCTIONS
After Visit Instructions:     Thank you for coming to [facility] for your care. It is my goal to partner with you to help you reach your optimal state of health.       Plan:     Schedule follow-up with Valeria Joseph PA-C as needed  Labs: dsDNA, complement levels, WILIAN panel, antiphospholipid panel, Urine (looking for protein), thyroid peroxidase antibody, Scl-70, Centromere antibody, CCP antibody, Rheumatoid factor, CRP and Sed Rate, CBC    Valeria Joseph PA-C  [facility]  [facility]    Contact information: [facility]  Clinic Number:  [phone]  Please call or send a Baton message with any questions about your care

## 2024-05-21 LAB
C3 SERPL-MCNC: 165 MG/DL (ref 81–157)
C4 SERPL-MCNC: 33 MG/DL (ref 13–39)
CCP AB SER IA-ACNC: 0.9 U/ML
DRVVT SCREEN RATIO: 1.05
DSDNA AB SER-ACNC: 1 IU/ML
ENA SM IGG SER IA-ACNC: <0.7 U/ML
ENA SM IGG SER IA-ACNC: NEGATIVE
ENA SS-A AB SER IA-ACNC: <0.5 U/ML
ENA SS-A AB SER IA-ACNC: NEGATIVE
ENA SS-B IGG SER IA-ACNC: <0.6 U/ML
ENA SS-B IGG SER IA-ACNC: NEGATIVE
INR PPP: 0.95 (ref 0.85–1.15)
LA PPP-IMP: NEGATIVE
LUPUS INTERPRETATION: NORMAL
PTT RATIO: 0.92
THROMBIN TIME: 17.1 SECONDS (ref 13–19)
U1 SNRNP IGG SER IA-ACNC: <1.1 U/ML
U1 SNRNP IGG SER IA-ACNC: NEGATIVE

## 2024-05-22 LAB
B2 GLYCOPROT1 IGG SERPL IA-ACNC: <0.8 U/ML
B2 GLYCOPROT1 IGM SERPL IA-ACNC: <2.4 U/ML
CARDIOLIPIN IGG SER IA-ACNC: 5.1 GPL-U/ML
CARDIOLIPIN IGG SER IA-ACNC: NEGATIVE
CARDIOLIPIN IGM SER IA-ACNC: <2 MPL-U/ML
CARDIOLIPIN IGM SER IA-ACNC: NEGATIVE
CENTROMERE B AB SER-ACNC: <0.7 U/ML
CENTROMERE B AB SER-ACNC: NEGATIVE
ENA SCL70 IGG SER IA-ACNC: <0.6 U/ML
ENA SCL70 IGG SER IA-ACNC: NEGATIVE
THYROPEROXIDASE AB SERPL-ACNC: <10 IU/ML

## 2024-05-28 ENCOUNTER — THERAPY VISIT (OUTPATIENT)
Dept: PHYSICAL THERAPY | Facility: CLINIC | Age: 54
End: 2024-05-28
Payer: COMMERCIAL

## 2024-05-28 DIAGNOSIS — M54.89 OTHER BACK PAIN, UNSPECIFIED CHRONICITY: Primary | ICD-10-CM

## 2024-05-28 PROCEDURE — 97112 NEUROMUSCULAR REEDUCATION: CPT | Mod: GP | Performed by: PHYSICAL THERAPIST

## 2024-05-28 PROCEDURE — 97140 MANUAL THERAPY 1/> REGIONS: CPT | Mod: GP | Performed by: PHYSICAL THERAPIST

## 2024-06-16 ENCOUNTER — HEALTH MAINTENANCE LETTER (OUTPATIENT)
Age: 54
End: 2024-06-16

## 2024-07-03 ENCOUNTER — THERAPY VISIT (OUTPATIENT)
Dept: PHYSICAL THERAPY | Facility: CLINIC | Age: 54
End: 2024-07-03
Payer: COMMERCIAL

## 2024-07-03 DIAGNOSIS — M54.89 OTHER BACK PAIN, UNSPECIFIED CHRONICITY: Primary | ICD-10-CM

## 2024-07-03 PROCEDURE — 97140 MANUAL THERAPY 1/> REGIONS: CPT | Mod: GP | Performed by: PHYSICAL THERAPIST

## 2024-07-03 PROCEDURE — 97112 NEUROMUSCULAR REEDUCATION: CPT | Mod: GP | Performed by: PHYSICAL THERAPIST

## 2024-07-29 DIAGNOSIS — I47.11 INAPPROPRIATE SINUS TACHYCARDIA (H): ICD-10-CM

## 2024-08-01 RX ORDER — IVABRADINE 5 MG/1
5 TABLET, FILM COATED ORAL 2 TIMES DAILY WITH MEALS
Qty: 180 TABLET | Refills: 0 | Status: SHIPPED | OUTPATIENT
Start: 2024-08-01

## 2024-08-01 NOTE — TELEPHONE ENCOUNTER
ivabradine (CORLANOR) 5 MG tablet       Last Written Prescription Date:  8/16/23  Last Fill Quantity: 180,   # refills: 3  Last Office Visit : 5/16/23  Future Office visit:  10/29/24    Routing refill request to provider for review/approval because:  Drug not on the [facility], [facility] or [facility] refill protocol

## 2024-08-05 ENCOUNTER — THERAPY VISIT (OUTPATIENT)
Dept: PHYSICAL THERAPY | Facility: CLINIC | Age: 54
End: 2024-08-05
Payer: COMMERCIAL

## 2024-08-05 DIAGNOSIS — M54.89 OTHER BACK PAIN, UNSPECIFIED CHRONICITY: Primary | ICD-10-CM

## 2024-08-05 PROCEDURE — 97140 MANUAL THERAPY 1/> REGIONS: CPT | Mod: GP | Performed by: PHYSICAL THERAPIST

## 2024-08-05 PROCEDURE — 97112 NEUROMUSCULAR REEDUCATION: CPT | Mod: GP | Performed by: PHYSICAL THERAPIST

## 2024-08-19 ENCOUNTER — THERAPY VISIT (OUTPATIENT)
Dept: PHYSICAL THERAPY | Facility: CLINIC | Age: 54
End: 2024-08-19
Payer: COMMERCIAL

## 2024-08-19 DIAGNOSIS — M54.89 OTHER BACK PAIN, UNSPECIFIED CHRONICITY: Primary | ICD-10-CM

## 2024-08-19 PROCEDURE — 97140 MANUAL THERAPY 1/> REGIONS: CPT | Mod: GP | Performed by: PHYSICAL THERAPIST

## 2024-08-19 PROCEDURE — 97112 NEUROMUSCULAR REEDUCATION: CPT | Mod: GP | Performed by: PHYSICAL THERAPIST

## 2024-08-25 ENCOUNTER — HEALTH MAINTENANCE LETTER (OUTPATIENT)
Age: 54
End: 2024-08-25

## 2024-09-04 ENCOUNTER — THERAPY VISIT (OUTPATIENT)
Dept: PHYSICAL THERAPY | Facility: CLINIC | Age: 54
End: 2024-09-04
Payer: COMMERCIAL

## 2024-09-04 DIAGNOSIS — M54.89 OTHER BACK PAIN, UNSPECIFIED CHRONICITY: Primary | ICD-10-CM

## 2024-09-04 PROCEDURE — 97112 NEUROMUSCULAR REEDUCATION: CPT | Mod: GP | Performed by: PHYSICAL THERAPIST

## 2024-09-04 PROCEDURE — 97140 MANUAL THERAPY 1/> REGIONS: CPT | Mod: GP | Performed by: PHYSICAL THERAPIST

## 2024-09-12 ENCOUNTER — OFFICE VISIT (OUTPATIENT)
Dept: INTERNAL MEDICINE | Facility: CLINIC | Age: 54
End: 2024-09-12
Payer: COMMERCIAL

## 2024-09-12 VITALS
DIASTOLIC BLOOD PRESSURE: 85 MMHG | HEIGHT: 64 IN | HEART RATE: 85 BPM | SYSTOLIC BLOOD PRESSURE: 133 MMHG | BODY MASS INDEX: 32.61 KG/M2 | OXYGEN SATURATION: 98 % | TEMPERATURE: 98 F | RESPIRATION RATE: 16 BRPM | WEIGHT: 191 LBS

## 2024-09-12 DIAGNOSIS — M13.0 POLYARTHROPATHY: ICD-10-CM

## 2024-09-12 DIAGNOSIS — R25.2 LEG CRAMPS: ICD-10-CM

## 2024-09-12 DIAGNOSIS — M21.42 ACQUIRED PES PLANUS OF BOTH FEET: Primary | ICD-10-CM

## 2024-09-12 DIAGNOSIS — M21.41 ACQUIRED PES PLANUS OF BOTH FEET: Primary | ICD-10-CM

## 2024-09-12 DIAGNOSIS — Z13.6 CARDIOVASCULAR SCREENING; LDL GOAL LESS THAN 160: ICD-10-CM

## 2024-09-12 PROCEDURE — 99213 OFFICE O/P EST LOW 20 MIN: CPT | Performed by: INTERNAL MEDICINE

## 2024-09-12 RX ORDER — MULTIVITAMIN WITH IRON
1 TABLET ORAL DAILY
COMMUNITY

## 2024-09-12 ASSESSMENT — ASTHMA QUESTIONNAIRES
QUESTION_2 LAST FOUR WEEKS HOW OFTEN HAVE YOU HAD SHORTNESS OF BREATH: ONCE OR TWICE A WEEK
QUESTION_1 LAST FOUR WEEKS HOW MUCH OF THE TIME DID YOUR ASTHMA KEEP YOU FROM GETTING AS MUCH DONE AT WORK, SCHOOL OR AT HOME: NONE OF THE TIME
QUESTION_5 LAST FOUR WEEKS HOW WOULD YOU RATE YOUR ASTHMA CONTROL: WELL CONTROLLED
QUESTION_4 LAST FOUR WEEKS HOW OFTEN HAVE YOU USED YOUR RESCUE INHALER OR NEBULIZER MEDICATION (SUCH AS ALBUTEROL): NOT AT ALL
ACT_TOTALSCORE: 23
ACT_TOTALSCORE: 23
QUESTION_3 LAST FOUR WEEKS HOW OFTEN DID YOUR ASTHMA SYMPTOMS (WHEEZING, COUGHING, SHORTNESS OF BREATH, CHEST TIGHTNESS OR PAIN) WAKE YOU UP AT NIGHT OR EARLIER THAN USUAL IN THE MORNING: NOT AT ALL

## 2024-09-12 NOTE — PROGRESS NOTES
Assessment & Plan     Acquired pes planus of both feet  Seeing this patient today for a review of several different problems . First and foremost is that she is continuing with some foot pain problems that tie back to a diagnosis of a fallen arch, and she's had never-ending problems since then. Now she feels the same condition is happening with her other foot, she has had orthotics and she wants new ones back-up agrees that she needs a good podiatry consultation and wishes to be seen by the [facility] and not within [facility]   - REVIEW OF HEALTH MAINTENANCE PROTOCOL ORDERS  - Orthopedic  Referral; Future    Polyarthropathy  We reviewed her symptoms and I also reviewed her recent rheumatological consultation. She had quite with workup and m=Tika Bonilla from rheumatology, ultimately did not find evidence of active synovitis and all the workup was negative and further follow up with rheumatology is not warrant right now. We spent time in review of Edilma-Danlos Syndrome which is a diagnosis she carries from [facility] . Suggested physical therapy referral and she is already actively being followed by Jun Frank, physical therapist with [facility]     Leg cramps  She has had a few symptoms that sound convincingly like nocturnal leg cramps although these are occurring first thing in the morning . We reviewed this diagnosis at some length . Laboratory studies are generally not revealing and we discussed the pathophysiology of this condition. Forr now recommended continue physical activity and increased fluid intake. Further follow up depending on how things go . I have tried muscle relaxers and some other medications but the results are mixed at best.     CARDIOVASCULAR SCREENING; LDL GOAL LESS THAN 160  She is due for a screening fasting lipid panel and this is future ordered for her upcoming cardiology  appointment and it is perfectly acceptable to have this done  "when she has her laboratory studies done in the fall with cardiology    - Lipid panel reflex to direct LDL Non-fasting; Future  - REVIEW OF HEALTH MAINTENANCE PROTOCOL ORDERS    I also completed a form for her intermittent days missed at work secondary to her diagnosis from POTS (postural orthostatic tachycardia syndrome) , this is scanned into Epic electronic medical records        BMI  Estimated body mass index is 32.79 kg/m  as calculated from the following:    Height as of this encounter: 1.626 m (5' 4\").    Weight as of this encounter: 86.6 kg (191 lb).   Weight management plan: Discussed healthy diet and exercise guidelines      Mitchell Sanchez is a 54 year old, presenting for the following health issues:  Forms and Joint Pain (Increased joint pain, and muscle cramps, back, knee pain )      9/12/2024     2:45 PM   Additional Questions   Roomed by filiberto     History of Present Illness       Back Pain:  She presents for follow up of back pain. Patient's back pain is a recurring problem.  Location of back pain:  Right lower back, left lower back, right upper back, left upper back, right side of neck, left side of neck, right shoulder, left shoulder, right hip and left hip  Description of back pain: gnawing  Back pain spreads: right buttocks    Since patient first noticed back pain, pain is: always present, but gets better and worse  Does back pain interfere with her job:  No       Reason for visit:  Joint/muscle pain ,have psperwork for intermittant MARIA L @POTS  Symptom onset:  More than a month  Symptom intensity:  Moderate  Symptom progression:  Staying the same  Had these symptoms before:  No  What makes it worse:  Unsure  What makes it better:  Tylenol   She is taking medications regularly.     Patient has 2 concerns     Last office visit we discussed an intermittent leave of absence  / Family Medical Leave Act for POTS (postural orthostatic tachycardia syndrome), it's overall doing ok but she still needs " "this once a year , also sees a cardiologist annually. Still uses Corlanor  (ivabradine)   She feels symptoms of muscle aches and generalized weakness , 2 calf-spasms , I asked if this was a nocturnal leg cramp - this was first thing in the morning with tightness of the muscle, it is both sides of the posterior calf. She had some c;lose calls a few times. She's tried some magnesium supplements , trying to drink enough fluids  Ankles and feet - she had surgery about 18 months ago with persistent pains and issues , and last couple of months pain in that left side of her foot and foot pain, had a fallen arch right side was first and then newer symptoms with left foot, she was seen by Podiatrist with [facility]  but was unhappy and now seeks an appointment with [facility]  , she's tried orthotics supplier   Has complaints of polyarthropathy but saw a rheumatologist - she says she was seen at the [facility] and was diagnosed with Edilma-Danlos Syndrome - also she has a son diagnosed with this disorder          Review of Systems  Constitutional, HEENT, cardiovascular, pulmonary, gi and gu systems are negative, except as otherwise noted.      Objective    /85   Pulse 85   Temp 98  F (36.7  C) (Temporal)   Resp 16   Ht 1.626 m (5' 4\")   Wt 86.6 kg (191 lb)   LMP 07/28/2017 (Exact Date)   SpO2 98%   BMI 32.79 kg/m    Body mass index is 32.79 kg/m .  Physical Exam   GENERAL: alert and no distress  EYES: Eyes grossly normal to inspection, PERRL and conjunctivae and sclerae normal  MS: no gross musculoskeletal defects noted, no edema  NEURO: Normal strength and tone, mentation intact and speech normal  PSYCH: mentation appears normal, affect normal/bright    Orders Placed This Encounter   Procedures    REVIEW OF HEALTH MAINTENANCE PROTOCOL ORDERS    Lipid panel reflex to direct LDL Non-fasting    Orthopedic  Referral             Signed Electronically by: Bj Butler MD    "

## 2024-09-16 ENCOUNTER — PATIENT OUTREACH (OUTPATIENT)
Dept: CARE COORDINATION | Facility: CLINIC | Age: 54
End: 2024-09-16

## 2024-09-16 ENCOUNTER — THERAPY VISIT (OUTPATIENT)
Dept: PHYSICAL THERAPY | Facility: CLINIC | Age: 54
End: 2024-09-16
Payer: COMMERCIAL

## 2024-09-16 DIAGNOSIS — M54.89 OTHER BACK PAIN, UNSPECIFIED CHRONICITY: Primary | ICD-10-CM

## 2024-09-16 PROCEDURE — 97112 NEUROMUSCULAR REEDUCATION: CPT | Mod: GP | Performed by: PHYSICAL THERAPIST

## 2024-09-16 PROCEDURE — 97140 MANUAL THERAPY 1/> REGIONS: CPT | Mod: GP | Performed by: PHYSICAL THERAPIST

## 2024-09-19 NOTE — LETTER
10/30/2020         RE: Laura Jackson  [address]  [place] [address]        Dear Colleague,    Thank you for referring your patient, Laura Jackson, to the [facility] [place]. Please see a copy of my visit note below.    Follow-up right carpal tunnel release.  Doing well.  No further numbness or tingling in the right hand.  No further night pain.  Some pain over the small finger, ulnar aspect of the base of her hand.  Some without with gripping and grasping.  No fevers or chills.  Ongoing symptoms in the left hand, numbness and tingling in the median nerve distribution.    The past medical history was reviewed and documented in the chart.  This includes medications, surgeries, social history as well as review of systems.    Physical examination the right hand demonstrates a well-healed incision, no signs of any infection.  Tinel's and Phalen's are negative now.  These remain positive on the contralateral left hand.  Does have some tenderness over the hypothenar eminence.  No tenderness over the thenar eminence.  Bilaterally, no motor, no sensory deficits are noted.  No significant atrophy.  There is brisk capillary refill in all digits and a palpable radial pulse.  No overlying skin changes noted.    Impression: Status post right carpal tunnel release, left carpal tunnel syndrome, right hand mild pillar pain    Plan: We will have her see therapy for her right hand.  She was given that referral today.  We have already planned on a left carpal tunnel release next Friday.  Arrangements have been made.  We again discussed the risk and benefits and anticipated perioperative course.    Rickey Rea MD     Received call from patient. She is calling to report abdominal discomfort and diarrhea. Pain will wrap around to the R back at times which has been ongoing for about 1 month. Not currently present, this is intermittent pain. She also experiences diarrhea, which has been ongoing for that same timeframe. She reports the pain as mild, aching/heaviness in the upper abdomen. No other symptoms. Patient was triaged and advised to be seen same-day. She is going to go to UC today or ER if acutely worsening; reviewed red flags.    Reason for Disposition   MODERATE pain that comes and goes (cramps) lasts > 24 hours    Additional Information   Negative: SEVERE difficulty breathing (e.g., struggling for each breath, speaks in single words)   Negative: Shock suspected (e.g., cold/pale/clammy skin, too weak to stand, low BP, rapid pulse)   Negative: Difficult to awaken or acting confused (e.g., disoriented, slurred speech)   Negative: Passed out (i.e., lost consciousness, collapsed and was not responding)   Negative: Visible sweat on face or sweat is dripping down   Negative: Sounds like a life-threatening emergency to the triager   Negative: Followed an abdomen (stomach) injury   Negative: Chest pain   Negative: Abdominal pain and pregnant < 20 weeks   Negative: Abdominal pain and pregnant 20 or more weeks   Negative: Abdomen bloating or swelling are main symptoms   Negative: SEVERE abdominal pain (e.g., excruciating)   Negative: Pain lasting > 10 minutes and over 50 years old   Negative: Pain lasting > 10 minutes and over 40 years old and associated chest, arm, neck, upper back, or jaw pain   Negative: Pain lasting > 10 minutes and over 35 years old and at least one cardiac risk factor (e.g., diabetes, high cholesterol, hypertension, obesity, smoker or strong family history of heart disease)   Negative: Pain lasting > 10 minutes and history of heart disease (i.e., heart attack, bypass surgery, angina, angioplasty, CHF)    "Negative: Pain lasts > 10 minutes and difficulty breathing   Negative: Vomiting red blood or black (coffee ground) material  (Exception: Few streaks and only occurred once.)   Negative: Blood in bowel movements  (Exception: Blood on surface of BM with constipation.)   Negative: Black or tarry bowel movements  (Exception: Chronic-unchanged black-grey BMs AND is taking iron pills or Pepto-Bismol.)   Negative: Pregnant 20 weeks or more and new hand or face swelling   Negative: MILD TO MODERATE constant pain lasting > 2 hours   Negative: MILD TO MODERATE pain and not relieved by antacid medicine   Negative: Vomiting bile (green color)   Negative: Patient sounds very sick or weak to the triager   Negative: Vomiting and abdomen looks much more swollen than usual   Negative: White of the eyes have turned yellow (i.e., jaundice)   Negative: Fever > 103 F (39.4 C)   Negative: Fever > 101 F (38.3 C) and over 60 years of age   Negative: Fever > 100.0 F (37.8 C) and has diabetes mellitus or a weak immune system (e.g., HIV positive, cancer chemotherapy, organ transplant, splenectomy, chronic steroids)   Negative: Fever > 100.0 F (37.8 C) and bedridden (e.g., CVA, chronic illness, recovering from surgery)   Negative: Patient wants to be seen    Answer Assessment - Initial Assessment Questions  1. LOCATION: \"Where does it hurt?\"       Across upper abdomen. Bloating and achy.  2. RADIATION: \"Does the pain shoot anywhere else?\" (e.g., chest, back)      R back was painful, since Monday   3. ONSET: \"When did the pain begin?\" (e.g., minutes, hours or days ago)       1 mo ago.   4. SUDDEN: \"Gradual or sudden onset?\"      Sudden onset.   5. PATTERN \"Does the pain come and go, or is it constant?\"     - If it comes and goes: \"How long does it last?\" \"Do you have pain now?\"      (Note: Comes and goes means the pain is intermittent. It goes away completely between bouts.)     - If constant: \"Is it getting better, staying the same, or getting " "worse?\"       (Note: Constant means the pain never goes away completely; most serious pain is constant and gets worse.)       It comes and goes.   6. SEVERITY: \"How bad is the pain?\"  (e.g., Scale 1-10; mild, moderate, or severe)     - MILD (1-3): Doesn't interfere with normal activities, abdomen soft and not tender to touch..      - MODERATE (4-7): Interferes with normal activities or awakens from sleep, abdomen tender to touch.      - SEVERE (8-10): Excruciating pain, doubled over, unable to do any normal activities.        3/10. Achy, heaviness.   7. RECURRENT SYMPTOM: \"Have you ever had this type of stomach pain before?\" If Yes, ask: \"When was the last time?\" and \"What happened that time?\"       No.   8. AGGRAVATING FACTORS: \"Does anything seem to cause this pain?\" (e.g., foods, stress, alcohol)      Foods seem to worsen it.   9. CARDIAC SYMPTOMS: \"Do you have any of the following symptoms: chest pain, difficulty breathing, sweating, nausea?\"      No.   10. OTHER SYMPTOMS: \"Do you have any other symptoms?\" (e.g., back pain, diarrhea, fever, urination pain, vomiting)        Diarrhea.   11. PREGNANCY: \"Is there any chance you are pregnant?\" \"When was your last menstrual period?\"        No.    Protocols used: Abdominal Pain - Upper-A-[place]    BALJIT Morgan RN  [facility]  "

## 2024-10-11 ENCOUNTER — THERAPY VISIT (OUTPATIENT)
Dept: PHYSICAL THERAPY | Facility: CLINIC | Age: 54
End: 2024-10-11
Payer: COMMERCIAL

## 2024-10-11 DIAGNOSIS — M54.89 OTHER BACK PAIN, UNSPECIFIED CHRONICITY: Primary | ICD-10-CM

## 2024-10-11 PROCEDURE — 97112 NEUROMUSCULAR REEDUCATION: CPT | Mod: GP | Performed by: PHYSICAL THERAPIST

## 2024-10-11 PROCEDURE — 97140 MANUAL THERAPY 1/> REGIONS: CPT | Mod: GP | Performed by: PHYSICAL THERAPIST

## 2024-10-17 ENCOUNTER — TELEPHONE (OUTPATIENT)
Dept: FAMILY MEDICINE | Facility: CLINIC | Age: 54
End: 2024-10-17
Payer: COMMERCIAL

## 2024-10-17 NOTE — TELEPHONE ENCOUNTER
Patient Quality Outreach    Patient is due for the following:   Breast Cancer Screening - Mammogram  Physical Preventive Adult Physical    Next Steps:   Schedule a Adult Preventative    Type of outreach:    Sent [employer] message.      Questions for provider review:    None           Rocio Berger CMA  Chart routed to none.

## 2024-10-21 ENCOUNTER — THERAPY VISIT (OUTPATIENT)
Dept: PHYSICAL THERAPY | Facility: CLINIC | Age: 54
End: 2024-10-21
Payer: COMMERCIAL

## 2024-10-21 DIAGNOSIS — M54.89 OTHER BACK PAIN, UNSPECIFIED CHRONICITY: Primary | ICD-10-CM

## 2024-10-21 PROCEDURE — 97112 NEUROMUSCULAR REEDUCATION: CPT | Mod: GP | Performed by: PHYSICAL THERAPIST

## 2024-10-21 PROCEDURE — 97140 MANUAL THERAPY 1/> REGIONS: CPT | Mod: GP | Performed by: PHYSICAL THERAPIST

## 2024-10-28 ENCOUNTER — OFFICE VISIT (OUTPATIENT)
Dept: DERMATOLOGY | Facility: CLINIC | Age: 54
End: 2024-10-28
Attending: PHYSICIAN ASSISTANT
Payer: COMMERCIAL

## 2024-10-28 DIAGNOSIS — L81.4 LENTIGO: ICD-10-CM

## 2024-10-28 DIAGNOSIS — D22.9 NEVUS: Primary | ICD-10-CM

## 2024-10-28 DIAGNOSIS — L82.0 INFLAMED SEBORRHEIC KERATOSIS: ICD-10-CM

## 2024-10-28 DIAGNOSIS — D18.01 ANGIOMA OF SKIN: ICD-10-CM

## 2024-10-28 DIAGNOSIS — L82.1 SEBORRHEIC KERATOSIS: ICD-10-CM

## 2024-10-28 DIAGNOSIS — L71.9 ROSACEA: ICD-10-CM

## 2024-10-28 PROCEDURE — 99213 OFFICE O/P EST LOW 20 MIN: CPT | Mod: 25 | Performed by: PHYSICIAN ASSISTANT

## 2024-10-28 PROCEDURE — 17110 DESTRUCTION B9 LES UP TO 14: CPT | Performed by: PHYSICIAN ASSISTANT

## 2024-10-28 RX ORDER — METRONIDAZOLE 7.5 MG/G
GEL TOPICAL
Qty: 45 G | Refills: 3 | Status: SHIPPED | OUTPATIENT
Start: 2024-10-28

## 2024-10-28 NOTE — LETTER
10/28/2024      Laura Jackson  [address]  [place] [address]      Dear Colleague,    Thank you for referring your patient, Laura Jackson, to the [facility]. Please see a copy of my visit note below.    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   ROS of the following were done and are negative: Constitutional, Eyes, Ears, Nose,   Mouth, Throat, Cardiovascular, Respiratory, GI, Genitourinary, Musculoskeletal,   Psychiatric, Endocrine, Allergic/Immunologic.    PHYSICAL EXAM:   LMP 07/28/2017 (Exact Date)   Skin exam performed as follows: Type 2 skin. Mood appropriate  Alert and Oriented X 3. Well developed, well nourished in no distress.  General appearance: Normal  Head including face: Normal  Eyes: conjunctiva and lids: Normal  Mouth: Lips, teeth, gums: Normal  Neck: Normal  Chest-breast/axillae: Normal  Back: Normal  Spleen and liver: Normal  Cardiovascular: Exam of peripheral vascular system by observation for swelling, varicosities, edema: Normal  Genitalia: groin, buttocks: Normal  Extremities: digits/nails (clubbing): Normal  Eccrine and Apocrine glands: Normal  Right upper extremity: Normal  Left upper extremity: Normal  Right lower extremity: Normal  Left lower extremity: Normal  Skin: Scalp and body hair: See below    Pt deferred exam of breasts, groin, buttocks: No    Other physical findings:  1. Multiple pigmented macules  on extremities and trunk  2. Multiple pigmented macules on face, trunk and extremities  3. Multiple vascular papules on trunk, arms and legs  4. Multiple scattered keratotic plaques  5. Inflamed keratotic papule on the back x 3, right neck x 1, scalp x 1, right PA cheek x 2, right upper eyelid x 1           Except as noted above, no other signs of skin cancer or melanoma.     ASSESSMENT/PLAN:   Benign Full skin cancer screening today. . Patient with history of none  Advised on monthly self exams and 1 year  Patient Education: Appropriate brochures given.    Multiple benign appearing nevi on arms, legs and trunk. Discussed ABCDEs of melanoma and sunscreen.   Multiple lentigos on arms, legs and trunk. Advised benign, no treatment needed.  Multiple scattered angiomas. Advised benign, no treatment needed.   Seborrheic keratosis on arms, legs and trunk. Advised benign, no treatment needed.  Inflamed seborrheic keratosis on the back x 3, right neck x 1, scalp x 1, right PA cheek x 2, right upper eyelid x 1As physically tender cryosurgery performed. Advised on post op care.   Rosacea; doing well on metrogel. Refill provided.             Follow-up: yearly FSE/PRN sooner    1.) Patient was asked about new and changing moles. YES  2.) Patient received a complete physical skin examination: YES  3.) Patient was counseled to perform a monthly self skin examination: YES  Scribed By: Valeria Villa, MS, PASAIMA        Again, thank you for allowing me to participate in the care of your patient.        Sincerely,        Valeria Villa PA-C

## 2024-10-28 NOTE — PROGRESS NOTES
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   ROS of the following were done and are negative: Constitutional, Eyes, Ears, Nose,   Mouth, Throat, Cardiovascular, Respiratory, GI, Genitourinary, Musculoskeletal,   Psychiatric, Endocrine, Allergic/Immunologic.    PHYSICAL EXAM:   LMP 07/28/2017 (Exact Date)   Skin exam performed as follows: Type 2 skin. Mood appropriate  Alert and Oriented X 3. Well developed, well nourished in no distress.  General appearance: Normal  Head including face: Normal  Eyes: conjunctiva and lids: Normal  Mouth: Lips, teeth, gums: Normal  Neck: Normal  Chest-breast/axillae: Normal  Back: Normal  Spleen and liver: Normal  Cardiovascular: Exam of peripheral vascular system by observation for swelling, varicosities, edema: Normal  Genitalia: groin, buttocks: Normal  Extremities: digits/nails (clubbing): Normal  Eccrine and Apocrine glands: Normal  Right upper extremity: Normal  Left upper extremity: Normal  Right lower extremity: Normal  Left lower extremity: Normal  Skin: Scalp and body hair: See below    Pt deferred exam of breasts, groin, buttocks: No    Other physical findings:  1. Multiple pigmented macules on extremities and trunk  2. Multiple pigmented macules on face, trunk and extremities  3. Multiple vascular papules on trunk, arms and legs  4. Multiple scattered keratotic plaques  5. Inflamed keratotic papule on the back x 3, right neck  x 1, scalp x 1, right PA cheek x 2, right upper eyelid x 1           Except as noted above, no other signs of skin cancer or melanoma.     ASSESSMENT/PLAN:   Benign Full skin cancer screening today. . Patient with history of none  Advised on monthly self exams and 1 year  Patient Education: Appropriate brochures given.    Multiple benign appearing nevi on arms, legs and trunk. Discussed ABCDEs of melanoma and sunscreen.   Multiple lentigos on arms, legs and trunk. Advised benign, no treatment needed.  Multiple scattered angiomas. Advised benign, no treatment needed.   Seborrheic keratosis on arms, legs and trunk. Advised benign, no treatment needed.  Inflamed seborrheic keratosis on the back x 3, right neck x 1, scalp x 1, right PA cheek x 2, right upper eyelid x 1As physically tender cryosurgery performed. Advised on post op care.   Rosacea; doing well on metrogel. Refill provided.             Follow-up: yearly FSE/PRN sooner    1.) Patient was asked about new and changing moles. YES  2.) Patient received a complete physical skin examination: YES  3.) Patient was counseled to perform a monthly self skin examination: YES  Scribed By: Valeria Villa, MS, PASAIMA

## 2024-10-29 ENCOUNTER — OFFICE VISIT (OUTPATIENT)
Dept: CARDIOLOGY | Facility: CLINIC | Age: 54
End: 2024-10-29
Attending: INTERNAL MEDICINE
Payer: COMMERCIAL

## 2024-10-29 VITALS
DIASTOLIC BLOOD PRESSURE: 88 MMHG | HEART RATE: 65 BPM | SYSTOLIC BLOOD PRESSURE: 133 MMHG | WEIGHT: 194.7 LBS | OXYGEN SATURATION: 99 % | BODY MASS INDEX: 33.42 KG/M2

## 2024-10-29 DIAGNOSIS — I47.11 INAPPROPRIATE SINUS TACHYCARDIA (H): ICD-10-CM

## 2024-10-29 DIAGNOSIS — R07.2 PRECORDIAL PAIN: Primary | ICD-10-CM

## 2024-10-29 DIAGNOSIS — R00.2 PALPITATIONS: ICD-10-CM

## 2024-10-29 PROCEDURE — 99214 OFFICE O/P EST MOD 30 MIN: CPT | Mod: 24 | Performed by: INTERNAL MEDICINE

## 2024-10-29 PROCEDURE — 99213 OFFICE O/P EST LOW 20 MIN: CPT | Performed by: INTERNAL MEDICINE

## 2024-10-29 RX ORDER — IVABRADINE 5 MG/1
5 TABLET, FILM COATED ORAL 2 TIMES DAILY WITH MEALS
Qty: 180 TABLET | Refills: 3 | Status: SHIPPED | OUTPATIENT
Start: 2024-10-29

## 2024-10-29 ASSESSMENT — PAIN SCALES - GENERAL: PAINLEVEL_OUTOF10: NO PAIN (0)

## 2024-10-29 NOTE — PROGRESS NOTES
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  She has a past history of hypertension and sinus tachycardia.  The latter has been well treated with ivabradine and she would like to continue it.  In part her tachycardia may be related to chronic arthritic issues including Edilma-Danlos in the family.  In any case, her symptoms today seem to be unrelated to rapid heart beating apart from some increased irregularity that she has noticed.    At today's visit Laura's principal problems appear to be arthritis in both knees and and particularly currently the left knee.  She may be considering knee replacements in the not-too-distant future.  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    Laura is concerned about weight gain and in addition notes that she has had some discomfort in her upper chest and jaw unassociated with exertion.  Nonetheless she is worried about cardiac status given her desire to increase physical activity for weight control.  We will obtain a Lay scan for that purpose.    In addition in terms of the increased irregularity of her heart we will obtain a 1 week Zio patch and reassess whether any change in medications as needed.    Patient indicates that apparently she had some abnormal liver studies done by her family physician.  I do not have access to those results today.  She does show some increased inflammatory markers but these may be related to ongoing arthritic issues that are particularly bothersome recently.      PAST MEDICAL HISTORY:  Past Medical History:   Diagnosis Date    Benign essential hypertension 07/31/2018    Family history of breast cancer 02/19/2014    Family history of breast cancer     Hypothyroidism 12/01/2022    Mechanical low back pain 06/03/2019    SVT (supraventricular tachycardia):  history of, no recurrence since 2008 10/11/2013     no recurrence since 2008     Uncomplicated asthma        CURRENT MEDICATIONS:  Current Outpatient Medications   Medication Sig Dispense Refill    Cholecalciferol (VITAMIN D) 2000 UNITS CAPS Take 1 capsule by mouth daily      fluticasone-salmeterol (ADVAIR DISKUS) 100-50 MCG/ACT inhaler INHALE 1 PUFF BY MOUTH INTO THE LUNGS EVERY 12 HOURS 1 each 5    ivabradine (CORLANOR) 5 MG tablet Take 1 tablet (5 mg) by mouth 2 times daily (with meals) Keep cardiology appointment for further refills 180 tablet 0    levalbuterol (XOPENEX HFA) 45 MCG/ACT inhaler Inhale 1-2 puffs into the lungs every 4 hours as needed for shortness of breath No further refills until appointment 15 g 11    magnesium 250 MG tablet Take 1 tablet by mouth daily.      metroNIDAZOLE (METROGEL) 0.75 % external gel Apply to face BID 45 g 3    Pyridoxine HCl (B-6) 100 MG TABS       vitamin B-12 (CYANOCOBALAMIN) 100 MCG tablet          PAST SURGICAL HISTORY:  Past Surgical History:   Procedure Laterality Date    ABDOMEN SURGERY  06/2017    myectomy    APPENDECTOMY      ARTHROSCOPY ANKLE, OPEN REPAIR LIGAMENT, COMBINED Right 02/02/2023    Procedure: ankle arthroscopy, modified Brostrom, peroneal tendon repair, exostectomy of medial malleolus;  Surgeon: Saida Michael DPM;  Location:  OR    COLONOSCOPY N/A 08/20/2018    Procedure: COMBINED COLONOSCOPY, SINGLE OR MULTIPLE BIOPSY/POLYPECTOMY BY BIOPSY;;  Surgeon: Osman Hahn MD;  Location: [facility] OR    COLONOSCOPY WITH CO2 INSUFFLATION N/A 08/20/2018    Procedure: COLONOSCOPY WITH CO2 INSUFFLATION;  COLON-SCREENING / LUBKA ;  Surgeon: Osman Hahn MD;  Location: [facility] OR    DAVINCI HYSTERECTOMY TOTAL, SALPINGECTOMY BILATERAL N/A 08/04/2017    Procedure: DAVINCI XI HYSTERECTOMY TOTAL, SALPINGECTOMY BILATERAL;  DAVINCI TOTAL HYSTERECTOMY; BILATERAL SALPINGECTOMY. BILATERAL URETERAL LYSIS. UTEROSACRAL-COLPOPEXY. EXCISION OF ENDOMETRIOSIS;  Surgeon: George Loyola MD;  Location:  OR     DAVINCI LYSIS OF ADHESIONS Bilateral 2017    Procedure: DAVINCI LYSIS OF ADHESIONS;  BILATERAL URETERAL LYSIS;  Surgeon: George Loyola MD;  Location: [facility] OR    DAVINCI SACROCOLPOPEXY, CYSTOSCOPY, COMBINED N/A 2017    Procedure: COMBINED DAVINCI SACROCOLPOPEXY, CYSTOSCOPY;  UTEROSACRAL COLPOPEXY;  Surgeon: George Loyola MD;  Location: [facility] OR    DAVINCI XI ASSISTED ABLATION / EXCISION OF ENDOMETRIOSIS  2017    Procedure: DAVINCI XI ASSISTED ABLATION / EXCISION OF ENDOMETRIOSIS;;  Surgeon: George Loyola MD;  Location: [facility] OR    DILATION AND CURETTAGE N/A 2017    Procedure: DILATION AND CURETTAGE;  Uterine Curettings and Fibroid Removal, Cook catheter placement; (No hysterectomy done at this time);  Surgeon: Ernestina Saunders MD;  Location: [facility] OR    HYSTERECTOMY, PAP NO LONGER INDICATED      ORTHOPEDIC SURGERY  1986    Heel spur , toe surgery    RELEASE CARPAL TUNNEL Right 10/20/2020    Procedure: RIGHT RELEASE, CARPAL TUNNEL;  Surgeon: Rickey Rea MD;  Location: [facility] OR    RELEASE CARPAL TUNNEL Left 2020    Procedure: LEFT RELEASE, CARPAL TUNNEL;  Surgeon: Rickey Rea MD;  Location: [facility] OR    SOFT TISSUE SURGERY  2023    TONSILLECTOMY         ALLERGIES:     Allergies   Allergen Reactions    Penicillins Swelling     Swelling throat; age 4    Adhesive Tape Hives    Penicillin G     Tetracycline GI Disturbance     Other reaction(s): Abdominal Pain  Vomiting; nauseous  Other reaction(s): Abdominal Pain  Vomiting; nauseous       FAMILY HISTORY:  Family History   Problem Relation Age of Onset    Diabetes Mother     Hypertension Mother     Hyperlipidemia Mother     Arrhythmia Mother     Cardiovascular Father         CHF and COPD    Asthma Father     Breast Cancer Maternal Grandfather     Colon Cancer Maternal Grandfather         His mother  of cancer too    Breast Cancer Paternal Grandmother     Breast Cancer Paternal Aunt     C.A.D. Paternal  Grandfather     Breast Cancer Paternal Grandfather     Breast Cancer Cousin     Asthma Son     Diabetes Maternal Grandmother     Hypertension Maternal Grandmother     Breast Cancer Cousin     Breast Cancer Cousin     Breast Cancer Cousin         Maternal    Breast Cancer Other     Breast Cancer Other         Maternal aunt     SOCIAL HISTORY:  Social History     Tobacco Use    Smoking status: Never    Smokeless tobacco: Never   Vaping Use    Vaping status: Never Used   Substance Use Topics    Alcohol use: Yes     Comment: Rare- 1 every 2-3month    Drug use: Never       ROS:   Constitutional: No fever, chills, or sweats. Weight stable.   ENT: No visual disturbance, ear ache, epistaxis, sore throat.   Cardiovascular: As per HPI.   Respiratory: No cough, hemoptysis.    GI: No nausea, vomiting, .   : No hematuria.   Integument: Negative.   Psychiatric: Negative.   Hematologic:  , no easy bleeding.  Neuro: Negative.   Endocrinology: No significant heat or cold intolerance   Musculoskeletal: See HPI.    Exam:  /88 (BP Location: Right arm, Patient Position: Chair, Cuff Size: Adult Large)   Pulse 65   Wt 88.3 kg (194 lb 11.2 oz)   LMP 07/28/2017 (Exact Date)   SpO2 99%   BMI 33.42 kg/m    GENERAL APPEARANCE: healthy, alert and no distress  HEENT: no icterus, no xanthelasmas, normal pupil size and reaction, normal palate, mucosa moist, no central cyanosis  NECK: JVP not elevated  RESPIRATORY: lungs clear to auscultation - no rales, rhonchi or wheezes, no use of accessory muscles, no retractions, respirations are unlabored, normal respiratory rate  CARDIOVASCULAR: regular rhythm, normal S1 with physiologic split S2, no S3 or S4 and no murmur, click or rub, precordium quiet with normal PMI.  ABDOMEN: soft, non tender, kyung, no bruits  NEURO: alert and oriented to person/place/time, normal speech, gait and affect  SKIN: no ecchymoses, no rashes    Labs:  CBC RESULTS:   Lab Results   Component Value Date    WBC 8.1  05/20/2024    WBC 6.2 03/31/2021    RBC 4.93 05/20/2024    RBC 4.75 03/31/2021    HGB 14.1 05/20/2024    HGB 14.1 03/31/2021    HCT 42.9 05/20/2024    HCT 43.2 03/31/2021    MCV 87 05/20/2024    MCV 91 03/31/2021    MCH 28.6 05/20/2024    MCH 29.7 03/31/2021    MCHC 32.9 05/20/2024    MCHC 32.6 03/31/2021    RDW 13.0 05/20/2024    RDW 14.0 03/31/2021     05/20/2024     03/31/2021       BMP RESULTS:  Lab Results   Component Value Date     05/11/2023     03/31/2021    POTASSIUM 4.5 05/11/2023    POTASSIUM 4.3 01/26/2023    POTASSIUM 4.2 03/31/2021    CHLORIDE 104 05/11/2023    CHLORIDE 105 01/26/2023    CHLORIDE 104 03/31/2021    CO2 24 05/11/2023    CO2 28 01/26/2023    CO2 31 03/31/2021    ANIONGAP 13 05/11/2023    ANIONGAP 6 01/26/2023    ANIONGAP 3 03/31/2021     (H) 05/11/2023     (H) 01/26/2023    GLC 87 03/31/2021    BUN 13.7 05/11/2023    BUN 16 01/26/2023    BUN 15 03/31/2021    CR 0.92 05/11/2023    CR 0.99 03/31/2021    GFRESTIMATED 73 05/11/2023    GFRESTIMATED 65 03/31/2021    GFRESTBLACK 76 03/31/2021    AZAEL 9.9 05/11/2023    AZAEL 9.4 03/31/2021        INR RESULTS:  Lab Results   Component Value Date    INR 0.95 05/20/2024    INR 1.12 06/20/2017    INR 1.12 06/20/2017       Procedures:  PULMONARY FUNCTION TESTS:        No data to display                  ECHOCARDIOGRAM:   No results found for this or any previous visit (from the past 8760 hours).      Assessment and Plan:   1.  Sinus tachycardia likely secondary to other comorbidities but under reasonable control with ivabradine  2.  Multiple arthritic complaints--particularly in the pain  3.  New complaints of upper chest and jaw pain--Lay scan planned  4.  New complaints of some increased irregularity of heart rhythm-- Zio patch plan    Plan  1. 1 week Zio patch--irregular heart rhythm  2.  Lay scan--chest pain evaluation  3. Repeat Liver fcn tests  4 chart check when results are available  5.  follow-up 1  year    Total elapsed time for chart review, clinic visit and documentation 30 minutes    I very much appreciated the opportunity to see and assess Laura Jackson in the clinic today. Please do not hesitate to contact my office if you have any questions or concerns.      Phil Mitchell MD  Cardiac Arrhythmia Service  [facility]  [phone]       4.  Follow-up 1 year      CC  PHIL MITCHELL

## 2024-10-29 NOTE — NURSING NOTE
Chief Complaint   Patient presents with    Follow Up     RETURN EP     Vitals were taken and medications reconciled.    Sammy Peres, EMT  10:22 AM

## 2024-10-29 NOTE — PATIENT INSTRUCTIONS
You were seen in the Electrophysiology Clinic today by: Dr. Mitchell    Plan:   Medication Changes: no changes    Labs/Tests Needed: Lexiscan stress test and then 7 day ziopatch after stress test completed    Follow up Visit: Follow up with Dr Mitchell in 1 year, sooner as needed    If you have further questions, please utilize [employer] to contact us.     Your Care Team:    EP Cardiology   Telephone Number     Nurse Line  Tatyana Cisneros, RN   Ruthy Baeza, RN  Michael Ku RN   [phone]     For scheduling appointments:   Ama   [phone]   For procedure scheduling:    Gina Walker     [phone]   For the Device Clinic (Pacemakers, ICDs, Loop Recorders)    During business hours: [phone]  After business hours:   [phone]- select option 4 and ask for job code [id number].       On-call cardiologist for after hours or on weekends:   [phone], option #4, and ask to speak to the on-call cardiologist.     Cardiovascular Clinic:   [address] [place] [address]. [place] [address]    As always, Thank you for trusting us with your health care needs!

## 2024-10-29 NOTE — NURSING NOTE
Cardiac Monitors: Patient was instructed regarding the indication, function, care and prompt return of a holter  monitor. The monitor was placed on the patient with instructions regarding care of the skin electrodes and monitor, as well as documentation in the patient diary. Patient demonstrated understanding of this information and agreed to call with further questions or concerns.  Cardiac Testing: Patient given instructions regarding  nuclear pharmacologic thallium (Lexiscan). Discussed purpose, preparation, procedure and when to expect results reported back to the patient. Patient demonstrated understanding of this information and agreed to call with further questions or concerns.  Return Appointment: Patient given instructions regarding scheduling next clinic visit. Patient demonstrated understanding of this information and agreed to call with further questions or concerns.  Patient stated she understood all health information given and agreed to call with further questions or concerns.    Whitney Burris RN

## 2024-10-30 ENCOUNTER — ORDERS ONLY (AUTO-RELEASED) (OUTPATIENT)
Dept: CARDIOLOGY | Facility: CLINIC | Age: 54
End: 2024-10-30
Payer: COMMERCIAL

## 2024-10-30 DIAGNOSIS — R00.2 PALPITATIONS: ICD-10-CM

## 2024-10-30 DIAGNOSIS — I47.11 INAPPROPRIATE SINUS TACHYCARDIA (H): ICD-10-CM

## 2024-11-04 ENCOUNTER — THERAPY VISIT (OUTPATIENT)
Dept: PHYSICAL THERAPY | Facility: CLINIC | Age: 54
End: 2024-11-04
Payer: COMMERCIAL

## 2024-11-04 DIAGNOSIS — M54.89 OTHER BACK PAIN, UNSPECIFIED CHRONICITY: Primary | ICD-10-CM

## 2024-11-04 PROCEDURE — 97112 NEUROMUSCULAR REEDUCATION: CPT | Mod: GP | Performed by: PHYSICAL THERAPIST

## 2024-11-04 PROCEDURE — 97140 MANUAL THERAPY 1/> REGIONS: CPT | Mod: GP | Performed by: PHYSICAL THERAPIST

## 2024-11-05 ENCOUNTER — LAB (OUTPATIENT)
Dept: LAB | Facility: CLINIC | Age: 54
End: 2024-11-05
Payer: COMMERCIAL

## 2024-11-05 DIAGNOSIS — R07.2 PRECORDIAL PAIN: ICD-10-CM

## 2024-11-05 DIAGNOSIS — I47.11 INAPPROPRIATE SINUS TACHYCARDIA (H): ICD-10-CM

## 2024-11-05 PROCEDURE — 36415 COLL VENOUS BLD VENIPUNCTURE: CPT

## 2024-11-05 PROCEDURE — 80053 COMPREHEN METABOLIC PANEL: CPT

## 2024-11-06 LAB
ALBUMIN SERPL BCG-MCNC: 4.7 G/DL (ref 3.5–5.2)
ALP SERPL-CCNC: 71 U/L (ref 40–150)
ALT SERPL W P-5'-P-CCNC: 39 U/L (ref 0–50)
ANION GAP SERPL CALCULATED.3IONS-SCNC: 11 MMOL/L (ref 7–15)
AST SERPL W P-5'-P-CCNC: 28 U/L (ref 0–45)
BILIRUB SERPL-MCNC: 0.2 MG/DL
BUN SERPL-MCNC: 13.2 MG/DL (ref 6–20)
CALCIUM SERPL-MCNC: 9.9 MG/DL (ref 8.8–10.4)
CHLORIDE SERPL-SCNC: 101 MMOL/L (ref 98–107)
CREAT SERPL-MCNC: 1.02 MG/DL (ref 0.51–0.95)
EGFRCR SERPLBLD CKD-EPI 2021: 64 ML/MIN/1.73M2
GLUCOSE SERPL-MCNC: 87 MG/DL (ref 70–99)
HCO3 SERPL-SCNC: 28 MMOL/L (ref 22–29)
POTASSIUM SERPL-SCNC: 4.1 MMOL/L (ref 3.4–5.3)
PROT SERPL-MCNC: 7.2 G/DL (ref 6.4–8.3)
SODIUM SERPL-SCNC: 140 MMOL/L (ref 135–145)

## 2024-11-08 ENCOUNTER — HOSPITAL ENCOUNTER (OUTPATIENT)
Dept: NUCLEAR MEDICINE | Facility: CLINIC | Age: 54
Setting detail: NUCLEAR MEDICINE
Discharge: HOME | End: 2024-11-08
Attending: INTERNAL MEDICINE
Payer: COMMERCIAL

## 2024-11-08 ENCOUNTER — HOSPITAL ENCOUNTER (OUTPATIENT)
Dept: CARDIOLOGY | Facility: CLINIC | Age: 54
Setting detail: NUCLEAR MEDICINE
Discharge: HOME | End: 2024-11-08
Attending: INTERNAL MEDICINE
Payer: COMMERCIAL

## 2024-11-08 DIAGNOSIS — I47.11 INAPPROPRIATE SINUS TACHYCARDIA (H): ICD-10-CM

## 2024-11-08 DIAGNOSIS — R07.2 PRECORDIAL PAIN: ICD-10-CM

## 2024-11-08 LAB
CV STRESS MAX HR HE: 88
NUC STRESS EJECTION FRACTION: 66 %
RATE PRESSURE PRODUCT: NORMAL
STRESS ECHO BASELINE DIASTOLIC HE: 81
STRESS ECHO BASELINE HR: 70 BPM
STRESS ECHO BASELINE SYSTOLIC BP: 125
STRESS ECHO CALCULATED PERCENT HR: 54 %
STRESS ECHO LAST STRESS DIASTOLIC BP: 88
STRESS ECHO LAST STRESS SYSTOLIC BP: 163
STRESS ECHO TARGET HR: 164

## 2024-11-08 PROCEDURE — A9502 TC99M TETROFOSMIN: HCPCS | Performed by: INTERNAL MEDICINE

## 2024-11-08 PROCEDURE — 250N000011 HC RX IP 250 OP 636: Performed by: INTERNAL MEDICINE

## 2024-11-08 PROCEDURE — 343N000001 HC RX 343 MED OP 636: Performed by: INTERNAL MEDICINE

## 2024-11-08 PROCEDURE — 93017 CV STRESS TEST TRACING ONLY: CPT

## 2024-11-08 PROCEDURE — 93016 CV STRESS TEST SUPVJ ONLY: CPT | Performed by: INTERNAL MEDICINE

## 2024-11-08 PROCEDURE — 78452 HT MUSCLE IMAGE SPECT MULT: CPT | Mod: 26 | Performed by: RADIOLOGY

## 2024-11-08 PROCEDURE — 78452 HT MUSCLE IMAGE SPECT MULT: CPT

## 2024-11-08 RX ORDER — AMINOPHYLLINE 25 MG/ML
50-100 INJECTION, SOLUTION INTRAVENOUS
Status: DISCONTINUED | OUTPATIENT
Start: 2024-11-08 | End: 2024-11-09 | Stop reason: HOSPADM

## 2024-11-08 RX ORDER — DIAZEPAM 5 MG/1
5 TABLET ORAL EVERY 30 MIN PRN
Status: DISCONTINUED | OUTPATIENT
Start: 2024-11-08 | End: 2024-11-09 | Stop reason: HOSPADM

## 2024-11-08 RX ORDER — REGADENOSON 0.08 MG/ML
0.4 INJECTION, SOLUTION INTRAVENOUS ONCE
Status: COMPLETED | OUTPATIENT
Start: 2024-11-08 | End: 2024-11-08

## 2024-11-08 RX ORDER — LORAZEPAM 0.5 MG/1
0.5 TABLET ORAL EVERY 30 MIN PRN
Status: DISCONTINUED | OUTPATIENT
Start: 2024-11-08 | End: 2024-11-09 | Stop reason: HOSPADM

## 2024-11-08 RX ORDER — DIPHENHYDRAMINE HCL 25 MG
25 CAPSULE ORAL
Status: DISCONTINUED | OUTPATIENT
Start: 2024-11-08 | End: 2024-11-09 | Stop reason: HOSPADM

## 2024-11-08 RX ORDER — LIDOCAINE 40 MG/G
CREAM TOPICAL
Status: DISCONTINUED | OUTPATIENT
Start: 2024-11-08 | End: 2024-11-09 | Stop reason: HOSPADM

## 2024-11-08 RX ORDER — DIPHENHYDRAMINE HYDROCHLORIDE 50 MG/ML
25-50 INJECTION INTRAMUSCULAR; INTRAVENOUS
Status: DISCONTINUED | OUTPATIENT
Start: 2024-11-08 | End: 2024-11-09 | Stop reason: HOSPADM

## 2024-11-08 RX ORDER — METHYLPREDNISOLONE SODIUM SUCCINATE 125 MG/2ML
125 INJECTION INTRAMUSCULAR; INTRAVENOUS
Status: DISCONTINUED | OUTPATIENT
Start: 2024-11-08 | End: 2024-11-09 | Stop reason: HOSPADM

## 2024-11-08 RX ORDER — ALBUTEROL SULFATE 90 UG/1
2 INHALANT RESPIRATORY (INHALATION) EVERY 5 MIN PRN
Status: DISCONTINUED | OUTPATIENT
Start: 2024-11-08 | End: 2024-11-09 | Stop reason: HOSPADM

## 2024-11-08 RX ADMIN — REGADENOSON 0.4 MG: 0.4 INJECTION INTRAVENOUS at 10:23

## 2024-11-08 RX ADMIN — TETROFOSMIN 8.2 MILLICURIE: 1.38 INJECTION, POWDER, LYOPHILIZED, FOR SOLUTION INTRAVENOUS at 09:04

## 2024-11-08 RX ADMIN — TETROFOSMIN 25.5 MILLICURIE: 1.38 INJECTION, POWDER, LYOPHILIZED, FOR SOLUTION INTRAVENOUS at 09:55

## 2024-11-11 DIAGNOSIS — J45.30 MILD PERSISTENT ASTHMA WITHOUT COMPLICATION: ICD-10-CM

## 2024-11-11 RX ORDER — FLUTICASONE PROPIONATE AND SALMETEROL 100; 50 UG/1; UG/1
1 POWDER RESPIRATORY (INHALATION) EVERY 12 HOURS
Qty: 1 EACH | Refills: 3 | Status: SHIPPED | OUTPATIENT
Start: 2024-11-11

## 2024-11-12 DIAGNOSIS — G89.29 CHRONIC PAIN OF RIGHT KNEE: Primary | ICD-10-CM

## 2024-11-12 DIAGNOSIS — M25.561 CHRONIC PAIN OF RIGHT KNEE: Primary | ICD-10-CM

## 2024-11-14 ENCOUNTER — LAB (OUTPATIENT)
Dept: LAB | Facility: CLINIC | Age: 54
End: 2024-11-14
Payer: COMMERCIAL

## 2024-11-14 ENCOUNTER — TELEPHONE (OUTPATIENT)
Dept: ORTHOPEDICS | Facility: CLINIC | Age: 54
End: 2024-11-14

## 2024-11-14 DIAGNOSIS — Z13.6 CARDIOVASCULAR SCREENING; LDL GOAL LESS THAN 160: ICD-10-CM

## 2024-11-14 LAB
CHOLEST SERPL-MCNC: 189 MG/DL
FASTING STATUS PATIENT QL REPORTED: YES
HDLC SERPL-MCNC: 59 MG/DL
LDLC SERPL CALC-MCNC: 107 MG/DL
NONHDLC SERPL-MCNC: 130 MG/DL
TRIGL SERPL-MCNC: 116 MG/DL

## 2024-11-14 PROCEDURE — 36415 COLL VENOUS BLD VENIPUNCTURE: CPT

## 2024-11-14 PROCEDURE — 80061 LIPID PANEL: CPT

## 2024-11-14 NOTE — TELEPHONE ENCOUNTER
Patient Returning Call    Reason for call:  PT CALLING REQUESTING : left knee brace. Asking for orders before Monday appt 11/18/24 with Dr. Espitia. Patient have not talk to Dr. Espitia about this order.     Information relayed to patient:  n/a    Patient has additional questions:  No      Could we send this information to you in [employer] or would you prefer to receive a phone call?:   Patient would prefer a phone call   Okay to leave a detailed message?: Yes at Cell number on file:    Telephone Information:   Mobile [phone]

## 2024-11-15 DIAGNOSIS — M25.561 CHRONIC PAIN OF RIGHT KNEE: Primary | ICD-10-CM

## 2024-11-15 DIAGNOSIS — G89.29 CHRONIC PAIN OF RIGHT KNEE: Primary | ICD-10-CM

## 2024-11-15 NOTE — TELEPHONE ENCOUNTER
Left a message for the patient stating that an order for a knee brace was entered. Call back number was given.     MELANI Guerra

## 2024-11-18 ENCOUNTER — OFFICE VISIT (OUTPATIENT)
Dept: ORTHOPEDICS | Facility: CLINIC | Age: 54
End: 2024-11-18
Payer: COMMERCIAL

## 2024-11-18 ENCOUNTER — ANCILLARY PROCEDURE (OUTPATIENT)
Dept: GENERAL RADIOLOGY | Facility: CLINIC | Age: 54
End: 2024-11-18
Attending: ORTHOPAEDIC SURGERY
Payer: COMMERCIAL

## 2024-11-18 ENCOUNTER — THERAPY VISIT (OUTPATIENT)
Dept: PHYSICAL THERAPY | Facility: CLINIC | Age: 54
End: 2024-11-18
Payer: COMMERCIAL

## 2024-11-18 ENCOUNTER — MYC MEDICAL ADVICE (OUTPATIENT)
Dept: ORTHOPEDICS | Facility: CLINIC | Age: 54
End: 2024-11-18

## 2024-11-18 VITALS — BODY MASS INDEX: 33.12 KG/M2 | HEIGHT: 64 IN | WEIGHT: 194 LBS

## 2024-11-18 DIAGNOSIS — G89.29 CHRONIC PAIN OF LEFT KNEE: Primary | ICD-10-CM

## 2024-11-18 DIAGNOSIS — M54.50 CHRONIC LOW BACK PAIN WITHOUT SCIATICA, UNSPECIFIED BACK PAIN LATERALITY: ICD-10-CM

## 2024-11-18 DIAGNOSIS — G89.29 CHRONIC LOW BACK PAIN WITHOUT SCIATICA, UNSPECIFIED BACK PAIN LATERALITY: ICD-10-CM

## 2024-11-18 DIAGNOSIS — M25.562 CHRONIC PAIN OF LEFT KNEE: Primary | ICD-10-CM

## 2024-11-18 DIAGNOSIS — M54.2 CHRONIC NECK PAIN: Primary | ICD-10-CM

## 2024-11-18 DIAGNOSIS — G89.29 CHRONIC NECK PAIN: Primary | ICD-10-CM

## 2024-11-18 DIAGNOSIS — G44.209 TENSION HEADACHE: ICD-10-CM

## 2024-11-18 DIAGNOSIS — M54.89 OTHER BACK PAIN, UNSPECIFIED CHRONICITY: Primary | ICD-10-CM

## 2024-11-18 DIAGNOSIS — M25.561 CHRONIC PAIN OF RIGHT KNEE: ICD-10-CM

## 2024-11-18 DIAGNOSIS — G89.29 CHRONIC PAIN OF RIGHT KNEE: ICD-10-CM

## 2024-11-18 PROCEDURE — 97112 NEUROMUSCULAR REEDUCATION: CPT | Mod: GP | Performed by: PHYSICAL THERAPIST

## 2024-11-18 PROCEDURE — 97140 MANUAL THERAPY 1/> REGIONS: CPT | Mod: GP | Performed by: PHYSICAL THERAPIST

## 2024-11-18 RX ORDER — LIDOCAINE HYDROCHLORIDE 5 MG/ML
8 INJECTION, SOLUTION INFILTRATION; INTRAVENOUS
Status: COMPLETED | OUTPATIENT
Start: 2024-11-18 | End: 2024-11-18

## 2024-11-18 RX ORDER — TRIAMCINOLONE ACETONIDE 40 MG/ML
40 INJECTION, SUSPENSION INTRA-ARTICULAR; INTRAMUSCULAR
Status: COMPLETED | OUTPATIENT
Start: 2024-11-18 | End: 2024-11-18

## 2024-11-18 RX ADMIN — LIDOCAINE HYDROCHLORIDE 8 ML: 5 INJECTION, SOLUTION INFILTRATION; INTRAVENOUS at 08:43

## 2024-11-18 RX ADMIN — TRIAMCINOLONE ACETONIDE 40 MG: 40 INJECTION, SUSPENSION INTRA-ARTICULAR; INTRAMUSCULAR at 08:43

## 2024-11-18 NOTE — LETTER
11/18/2024      Laura Jackson  [address]  [place] [address]      Dear Colleague,    Thank you for referring your patient, Laura Jackson, to the [facility] [place]. Please see a copy of my visit note below.    I did opportunity to see Laura in clinic today she is a very pleasant 54-year-old female.  She sustained an injury to her left knee a few weeks ago which is exacerbated some underlying symptoms.    I saw her previously and did a corticosteroid injection to her right knee with excellent outcome.  This was very helpful to the patient she is incredibly happy with it.  She now complains of left knee pain, swelling, locking, catching she was unable to go to work last week.  She cannot do the things that she wants to do.  She came in today interested in getting the side worked up.    Examination of her left knee today shows Lachman 0, no pivot shift.  Stable to varus valgus stress testing.  Stable anterior posterior drawer testing.  1 quadrant medial lateral translation patella.  Positive crepitation is noted.  Trace medial joint line tenderness.  Trace effusion.    Plain radiographs obtained today do show joint space noted the medial compartment of the right greater than the left knee though it is present on both sides subtle osteophyte formation is noted medially on the left side.    Clinical assessment: Presumed early osteoarthritis left knee     Osteoarthritis right knee responding well to corticosteroid injection management    Plan: Long discussion of the patient.  Reviewed the diagnosis potential treatment options.  To go through combined decision making approach elected proceed with the following course of action: Corticosteroid injection to her left knee today.  I have explained to her the expected recovery.  2 days to start for 2 weeks for its full effect.  If she does well no further intervention is necessary.    As a general concept she can get 2 to 3  injections/year.  No lifetime maximum.    If she fails to see lasting improvement from this injection she will reach out to my office via phone call or MyChart we will plan to order an MRI of her left knee    After written informed consent obtained and signed, after sufficient prepping and sterile technique, 40 mg of kenalog and , 8 cc of 1% lidocaine were injected without complication into the left knee. The patient tolerated the injection well and a sterile dressing was applied.       Large Joint Injection: L knee joint    Date/Time: 11/18/2024 8:43 AM    Performed by: Julio Espitia MD  Authorized by: Julio Espitia MD    Indications:  Pain  Needle Size:  21 G  Guidance: landmark guided    Approach:  Anterolateral  Location:  Knee      Medications:  40 mg triamcinolone 40 MG/ML; 8 mL lidocaine (PF) 0.5 %  Outcome:  Tolerated well, no immediate complications  Procedure discussed: discussed risks, benefits, and alternatives    Consent Given by:  Patient  Timeout: timeout called immediately prior to procedure    Prep: patient was prepped and draped in usual sterile fashion            Again, thank you for allowing me to participate in the care of your patient.      Sincerely,    Julio Espitia MD

## 2024-11-18 NOTE — LETTER
November 18, 2024      Laura Jackson  [address]  [place] [address]        To Whom It May Concern:    Laura Jackson is under my professional care following a knee injury. Please excuse her from work November 18, 2024 through November 24, 2025 to recover from this injury. Please contact my office with any questions or concerns.       Sincerely,        Julio Espitia MD

## 2024-11-18 NOTE — PROGRESS NOTES
I did opportunity to see Laura in clinic today she is a very pleasant 54-year-old female.  She sustained an injury to her left knee a few weeks ago which is exacerbated some underlying symptoms.    I saw her previously and did a corticosteroid injection to her right knee with excellent outcome.  This was very helpful to the patient she is incredibly happy with it.  She now complains of left knee pain, swelling, locking, catching she was unable to go to work last week.  She cannot do the things that she wants to do.  She came in today interested in getting the side worked up.    Examination of her left knee today shows Lachman 0, no pivot shift.  Stable to varus valgus stress testing.  Stable anterior posterior drawer testing.  1 quadrant medial lateral translation patella.  Positive crepitation is noted.  Trace medial joint line tenderness.  Trace effusion.    Plain radiographs obtained today do show joint space noted the medial compartment of the right greater than the left knee though it is present on both sides subtle osteophyte formation is noted medially on the left side.    Clinical assessment: Presumed early osteoarthritis left knee     Osteoarthritis right knee responding well to corticosteroid injection management    Plan: Long discussion of the patient.  Reviewed the diagnosis potential treatment options.  To go through combined decision making approach elected proceed with the following course of action: Corticosteroid injection to her left knee today.  I have explained to her the expected recovery.  2 days to start for 2 weeks for its full effect.  If she does well no further intervention is necessary.    As a general concept she can get 2 to 3 injections/year.  No lifetime maximum.    If she fails to see lasting improvement from this injection she will reach out to my office via phone call or MyChart we will plan to order an MRI of her left knee    After written informed consent obtained and signed,  after sufficient prepping and sterile technique, 40 mg of kenalog and , 8 cc of 1% lidocaine were injected without complication into the left knee. The patient tolerated the injection well and a sterile dressing was applied.

## 2024-11-18 NOTE — LETTER
November 18, 2024      Laura Jackson  [address]  [place] [address]        To Whom It May Concern:      Laura Jackson is under my professional care following a knee injury. Please excuse her from work November 11, 2024 through November 24, 2025 to recover from this injury. Please contact my office with any questions or concerns         Sincerely,        Julio Espitia MD

## 2024-11-18 NOTE — NURSING NOTE
[address] [address]  Dept: [phone]  ______________________________________________________________________________    Patient: Laura Jackson   : 1970   MRN: [id number]   2024    INVASIVE PROCEDURE SAFETY CHECKLIST    Date: 24   Procedure: Left knee kenalog injection  Patient Name: Laura Jackson  MRN: [id number]  YOB: 1970    Action: Complete sections as appropriate. Any discrepancy results in a HARD COPY until resolved.     PRE PROCEDURE:  Patient ID verified with 2 identifiers (name and  or MRN): Yes  Procedure and site verified with patient/designee (when able): Yes  Accurate consent documentation in medical record: Yes  H&P (or appropriate assessment) documented in medical record: Yes  H&P must be up to 20 days prior to procedure and updates within 24 hours of procedure as applicable: NA  Relevant diagnostic and radiology test results appropriately labeled and displayed as applicable: Yes  Procedure site(s) marked with provider initials: NA    TIMEOUT:  Time-Out performed immediately prior to starting procedure, including verbal and active participation of all team members addressing the following:Yes  * Correct patient identify  * Confirmed that the correct side and site are marked  * An accurate procedure consent form  * Agreement on the procedure to be done  * Correct patient position  * Relevant images and results are properly labeled and appropriately displayed  * The need to administer antibiotics or fluids for irrigation purposes during the procedure as applicable   * Safety precautions based on patient history or medication use    DURING PROCEDURE: Verification of correct person, site, and procedures any time the responsibility for care of the patient is transferred to another member of the care team.       Prior to injection, verified patient identity using patient's name and date of  birth.  Due to injection administration, patient instructed to remain in clinic for 15 minutes  afterwards, and to report any adverse reaction to me immediately.    Joint injection was performed.      Drug Amount Wasted:  Yes: 42 mg/ml  lidocaine  Vial/Syringe: Single dose vial  Expiration Date:  06/30/2025      Mari Gallo, ATC  November 18, 2024

## 2024-11-18 NOTE — NURSING NOTE
"Reason For Visit:   Chief Complaint   Patient presents with    Consult     Left knee       ?  No  Occupation: ICU RN  Currently working? Yes.  Work status?  Full time.  Date of injury: 10/19/24  Type of injury: Slipped on rocks  Date of surgery: NA  Type of surgery: NA.    She started to have left knee pain on 10/19/24 after slipping [place] a [place]. She has had constant pain since then. The majority of her pain is on the lateral and posterior knee. She reports popping, catching and locking in her knee that has worsened recently. She also reports swelling in the knee as well. She has occasional numbness in her left foot as well since her knee has become painful.    SANE Score  Left Knee: 50  Right Knee: 75-80    Pain Assessment  Patient Currently in Pain: Yes  0-10 Pain Scale: 5  Primary Pain Location: Knee    Ht 1.626 m (5' 4\")   Wt 88 kg (194 lb)   LMP 07/28/2017 (Exact Date)   BMI 33.30 kg/m           Allergies   Allergen Reactions    Penicillins Swelling     Swelling throat; age 4    Adhesive Tape Hives    Penicillin G     Tetracycline GI Disturbance     Other reaction(s): Abdominal Pain  Vomiting; nauseous  Other reaction(s): Abdominal Pain  Vomiting; nauseous       Current Outpatient Medications   Medication Sig Dispense Refill    Cholecalciferol (VITAMIN D) 2000 UNITS CAPS Take 1 capsule by mouth daily      fluticasone-salmeterol (ADVAIR) 100-50 MCG/ACT inhaler INHALE ONE PUFF BY MOUTH EVERY 12 HOURS 1 each 3    ivabradine (CORLANOR) 5 MG tablet Take 1 tablet (5 mg) by mouth 2 times daily (with meals). 180 tablet 3    levalbuterol (XOPENEX HFA) 45 MCG/ACT inhaler Inhale 1-2 puffs into the lungs every 4 hours as needed for shortness of breath No further refills until appointment 15 g 11    magnesium 250 MG tablet Take 1 tablet by mouth daily.      metroNIDAZOLE (METROGEL) 0.75 % external gel Apply to face BID 45 g 3    Pyridoxine HCl (B-6) 100 MG TABS       vitamin B-12 " (CYANOCOBALAMIN) 100 MCG tablet        No current facility-administered medications for this visit.         Mari Gallo, ATC

## 2024-11-18 NOTE — PROGRESS NOTES
Large Joint Injection: L knee joint    Date/Time: 11/18/2024 8:43 AM    Performed by: Julio Espitia MD  Authorized by: Julio Espitia MD    Indications:  Pain  Needle Size:  21 G  Guidance: landmark guided    Approach:  Anterolateral  Location:  Knee      Medications:  40 mg triamcinolone 40 MG/ML; 8 mL lidocaine (PF) 0.5 %  Outcome:  Tolerated well, no immediate complications  Procedure discussed: discussed risks, benefits, and alternatives    Consent Given by:  Patient  Timeout: timeout called immediately prior to procedure    Prep: patient was prepped and draped in usual sterile fashion

## 2024-11-25 DIAGNOSIS — G89.29 CHRONIC PAIN OF LEFT KNEE: Primary | ICD-10-CM

## 2024-11-25 DIAGNOSIS — M25.562 CHRONIC PAIN OF LEFT KNEE: Primary | ICD-10-CM

## 2024-11-27 PROCEDURE — 99283 EMERGENCY DEPT VISIT LOW MDM: CPT | Performed by: EMERGENCY MEDICINE

## 2024-11-27 PROCEDURE — 99284 EMERGENCY DEPT VISIT MOD MDM: CPT | Performed by: EMERGENCY MEDICINE

## 2024-11-27 NOTE — TELEPHONE ENCOUNTER
----- Message from Moreno Frank sent at 11/18/2024  1:41 PM CST -----  Dr BANERJEE,  Could I ask you to enter a continuation order for Laura's PT for her neck/Headaches and low back pain? Thank you!    Lexus Frank PT

## 2024-11-28 ENCOUNTER — APPOINTMENT (OUTPATIENT)
Dept: GENERAL RADIOLOGY | Facility: CLINIC | Age: 54
End: 2024-11-28
Attending: EMERGENCY MEDICINE
Payer: COMMERCIAL

## 2024-11-28 ENCOUNTER — HOSPITAL ENCOUNTER (EMERGENCY)
Facility: CLINIC | Age: 54
Discharge: HOME | End: 2024-11-28
Attending: EMERGENCY MEDICINE | Admitting: EMERGENCY MEDICINE
Payer: COMMERCIAL

## 2024-11-28 VITALS
OXYGEN SATURATION: 95 % | TEMPERATURE: 98.5 F | DIASTOLIC BLOOD PRESSURE: 89 MMHG | SYSTOLIC BLOOD PRESSURE: 134 MMHG | HEART RATE: 76 BPM | RESPIRATION RATE: 18 BRPM

## 2024-11-28 DIAGNOSIS — R26.89 UNABLE TO BEAR WEIGHT: Primary | ICD-10-CM

## 2024-11-28 DIAGNOSIS — M25.562 ACUTE PAIN OF LEFT KNEE: ICD-10-CM

## 2024-11-28 PROCEDURE — 73562 X-RAY EXAM OF KNEE 3: CPT | Mod: LT

## 2024-11-28 PROCEDURE — 73562 X-RAY EXAM OF KNEE 3: CPT | Mod: 26 | Performed by: RADIOLOGY

## 2024-11-28 PROCEDURE — 250N000013 HC RX MED GY IP 250 OP 250 PS 637: Performed by: EMERGENCY MEDICINE

## 2024-11-28 RX ORDER — HYDROCODONE BITARTRATE AND ACETAMINOPHEN 5; 325 MG/1; MG/1
1 TABLET ORAL ONCE
Status: COMPLETED | OUTPATIENT
Start: 2024-11-28 | End: 2024-11-28

## 2024-11-28 RX ORDER — HYDROCODONE BITARTRATE AND ACETAMINOPHEN 5; 325 MG/1; MG/1
1 TABLET ORAL EVERY 6 HOURS PRN
Qty: 10 TABLET | Refills: 0 | Status: SHIPPED | OUTPATIENT
Start: 2024-11-28 | End: 2024-12-01

## 2024-11-28 RX ADMIN — HYDROCODONE BITARTRATE AND ACETAMINOPHEN 1 TABLET: 5; 325 TABLET ORAL at 00:23

## 2024-11-28 ASSESSMENT — ACTIVITIES OF DAILY LIVING (ADL)
ADLS_ACUITY_SCORE: 42

## 2024-11-28 NOTE — ED PROVIDER NOTES
"ED Provider Note  [facility]      History     Chief Complaint   Patient presents with    Knee Pain     HPI  Laura Jackson is a 54 year old female who presents to the ED for evaluation of knee pain.  She has a history of osteoarthritis in her bilateral knees, most recently has been worse in her left knee.  Has been following with the orthopedic service.  10 days ago, she received a triamcinolone injection to her left knee for suspected arthritis.  She was supposed to get a knee brace fit at follow-up appointment today, unfortunately, she found out that insurance would not cover it.  She has MRI scheduled for 12/4/2024.  Historically, her pain has been posterior and lateral.  Today she suffered a new injury while walking down the stairs.  She states that she stepped awkwardly onto her left leg, heard a cracking sound, now has severe left anterior knee pain that \"radiates internally\".  She is unable to bear weight.  Pain gets significantly more severe when she attempts to flex the left knee.  No additional injuries.      Past Medical History  Past Medical History:   Diagnosis Date    Benign essential hypertension 07/31/2018    Family history of breast cancer 02/19/2014    Family history of breast cancer     Hypothyroidism 12/01/2022    Mechanical low back pain 06/03/2019    SVT (supraventricular tachycardia):  history of, no recurrence since 2008 10/11/2013    no recurrence since 2008     Uncomplicated asthma      Past Surgical History:   Procedure Laterality Date    ABDOMEN SURGERY  06/2017    myectomy    APPENDECTOMY      ARTHROSCOPY ANKLE, OPEN REPAIR LIGAMENT, COMBINED Right 02/02/2023    Procedure: ankle arthroscopy, modified Brostrom, peroneal tendon repair, exostectomy of medial malleolus;  Surgeon: Saida Michael DPM;  Location: [facility] OR    COLONOSCOPY N/A 08/20/2018    Procedure: COMBINED COLONOSCOPY, SINGLE OR MULTIPLE BIOPSY/POLYPECTOMY BY BIOPSY;;  Surgeon: Osman Hahn " MD Vahid;  Location: [facility] OR    COLONOSCOPY WITH CO2 INSUFFLATION N/A 08/20/2018    Procedure: COLONOSCOPY WITH CO2 INSUFFLATION;  COLON-SCREENING / LUBKA ;  Surgeon: Osman Hahn MD;  Location: [facility] OR    DAVINCI HYSTERECTOMY TOTAL, SALPINGECTOMY BILATERAL N/A 08/04/2017    Procedure: DAVINCI XI HYSTERECTOMY TOTAL, SALPINGECTOMY BILATERAL;  DAVINCI TOTAL HYSTERECTOMY; BILATERAL SALPINGECTOMY. BILATERAL URETERAL LYSIS. UTEROSACRAL-COLPOPEXY. EXCISION OF ENDOMETRIOSIS;  Surgeon: George Loyola MD;  Location: [facility] OR    DAVINCI LYSIS OF ADHESIONS Bilateral 08/04/2017    Procedure: DAVINCI LYSIS OF ADHESIONS;  BILATERAL URETERAL LYSIS;  Surgeon: George Loyola MD;  Location: [facility]    DAVINCI SACROCOLPOPEXY, CYSTOSCOPY, COMBINED N/A 08/04/2017    Procedure: COMBINED DAVINCI SACROCOLPOPEXY, CYSTOSCOPY;  UTEROSACRAL COLPOPEXY;  Surgeon: George Loyola MD;  Location: [facility] OR    DAVINCI XI ASSISTED ABLATION / EXCISION OF ENDOMETRIOSIS  08/04/2017    Procedure: DAVINCI XI ASSISTED ABLATION / EXCISION OF ENDOMETRIOSIS;;  Surgeon: George Loyola MD;  Location: [facility] OR    DILATION AND CURETTAGE N/A 06/20/2017    Procedure: DILATION AND CURETTAGE;  Uterine Curettings and Fibroid Removal, Cook catheter placement; (No hysterectomy done at this time);  Surgeon: Ernestina Saunders MD;  Location: [facility]    HYSTERECTOMY, PAP NO LONGER INDICATED      ORTHOPEDIC SURGERY  06/1986    Heel spur , toe surgery    RELEASE CARPAL TUNNEL Right 10/20/2020    Procedure: RIGHT RELEASE, CARPAL TUNNEL;  Surgeon: Rickey Rea MD;  Location: [facility] OR    RELEASE CARPAL TUNNEL Left 11/06/2020    Procedure: LEFT RELEASE, CARPAL TUNNEL;  Surgeon: Rickey Rea MD;  Location: [facility] OR    SOFT TISSUE SURGERY  2/2/2023    TONSILLECTOMY       HYDROcodone-acetaminophen (NORCO) 5-325 MG tablet  Cholecalciferol (VITAMIN D) 2000 UNITS CAPS  fluticasone-salmeterol (ADVAIR) 100-50 MCG/ACT inhaler  ivabradine (CORLANOR) 5 MG  tablet  levalbuterol (XOPENEX HFA) 45 MCG/ACT inhaler  magnesium 250 MG tablet  metroNIDAZOLE (METROGEL) 0.75 % external gel  Pyridoxine HCl (B-6) 100 MG TABS  vitamin B-12 (CYANOCOBALAMIN) 100 MCG tablet      Allergies   Allergen Reactions    Penicillins Swelling     Swelling throat; age 4    Adhesive Tape Hives    Kenalog [Triamcinolone] Itching    Penicillin G     Tetracycline GI Disturbance     Other reaction(s): Abdominal Pain  Vomiting; nauseous  Other reaction(s): Abdominal Pain  Vomiting; nauseous     Family History  Family History   Problem Relation Age of Onset    Diabetes Mother     Hypertension Mother     Hyperlipidemia Mother     Arrhythmia Mother     Cardiovascular Father         CHF and COPD    Asthma Father     Breast Cancer Maternal Grandfather     Colon Cancer Maternal Grandfather         His mother  of cancer too    Breast Cancer Paternal Grandmother     Breast Cancer Paternal Aunt     C.A.D. Paternal Grandfather     Breast Cancer Paternal Grandfather     Breast Cancer Cousin     Asthma Son     Diabetes Maternal Grandmother     Hypertension Maternal Grandmother     Breast Cancer Cousin     Breast Cancer Cousin     Breast Cancer Cousin         Maternal    Breast Cancer Other     Breast Cancer Other         Maternal aunt     Social History   Social History     Tobacco Use    Smoking status: Never    Smokeless tobacco: Never   Vaping Use    Vaping status: Never Used   Substance Use Topics    Alcohol use: Yes     Comment: Rare- 1 every 2-3month    Drug use: Never      A medically appropriate review of systems was performed with pertinent positives and negatives noted in the HPI, and all other systems negative.    Physical Exam   BP: 134/89  Pulse: 76  Temp: 98.5  F (36.9  C)  Resp: 18  SpO2: 95 %  Physical Exam  Vitals and nursing note reviewed.   Constitutional:       General: She is not in acute distress.     Appearance: Normal appearance.   HENT:      Head: Normocephalic.      Nose: Nose  normal.   Eyes:      Pupils: Pupils are equal, round, and reactive to light.   Cardiovascular:      Rate and Rhythm: Normal rate and regular rhythm.   Pulmonary:      Effort: Pulmonary effort is normal.   Abdominal:      General: There is no distension.   Musculoskeletal:         General: No deformity. Normal range of motion.      Cervical back: Normal range of motion.      Comments: Patient is able to fully extend knee, has considerable pain with any amount of knee flexion.  No ligament laxity noticed with varus valgus stress testing.  Negative anterior/posterior drawer testing.  Neurovascularly intact.  Unable to tolerate Ryland or Apley compression tests.  Unable to bear weight d/t pain   Skin:     General: Skin is warm.   Neurological:      Mental Status: She is alert and oriented to person, place, and time.   Psychiatric:         Mood and Affect: Mood normal.           ED Course, Procedures, & Data           Results for orders placed or performed during the hospital encounter of 11/28/24   XR Knee Left 3 Views     Status: None    Narrative    EXAM: XR KNEE LEFT 3 VIEWS  LOCATION: [facility]  DATE: 11/28/2024    INDICATION: L anterior knee pain after stepping awkwardly off steps, heard cracking sound  COMPARISON: 11/18/2024      Impression    IMPRESSION: No fracture or dislocation. Small joint effusion.     Medications   HYDROcodone-acetaminophen (NORCO) 5-325 MG per tablet 1 tablet (1 tablet Oral $Given 11/28/24 0023)     Labs Ordered and Resulted from Time of ED Arrival to Time of ED Departure - No data to display  XR Knee Left 3 Views   Final Result   IMPRESSION: No fracture or dislocation. Small joint effusion.             Critical care was not performed.     Medical Decision Making  The patient's presentation was of moderate complexity (an acute complicated injury).    The patient's evaluation involved:  review of external note(s) from 1 sources (reviewed  "outpatient orthopedic office note)  ordering and/or review of 1 test(s) in this encounter (see separate area of note for details)    The patient's management necessitated moderate risk (prescription drug management including medications given in the ED).    Assessment & Plan    Patient history of chronic knee pain presents the ED with acute worsening of knee pain after stepping awkwardly down the stairs and hearing a \"cracking\" sound.  On exam, she has some mild tenderness over the anterior knee.  She has difficulty flexing the knee.  Independent review of x-rays negative for acute fracture or dislocation.  Exam does not suggest ACL MCL PCL injury.  Patient unable to tolerate Rylnad/Apley compression testing, however, history and significant pain is worrisome for meniscus injury.      Patient already has crutches.  She was given a knee brace in the ED.  She will follow-up with her orthopedic team.  She is already scheduled for MRI on 12/4/2024.  Short course of pain medication provided.  Educated reasons to return to the ED    I have reviewed the nursing notes. I have reviewed the findings, diagnosis, plan and need for follow up with the patient.    Discharge Medication List as of 11/28/2024  2:58 AM        START taking these medications    Details   HYDROcodone-acetaminophen (NORCO) 5-325 MG tablet Take 1 tablet by mouth every 6 hours as needed for severe pain., Disp-10 tablet, R-0, Local Print             Final diagnoses:   Acute pain of left knee   Unable to bear weight       Ralph Scott DO  [facility] EMERGENCY DEPARTMENT  11/27/2024     Ralph Scott DO  11/28/24 0542    "

## 2024-11-28 NOTE — ED TRIAGE NOTES
Pt using wheelchair/crutches to triage. Pt reports having a L knee injury in October where she tripped on rocks. Today pt was walking and felt a crunch when taking a step down the stairs. Reports 10/10 pain during that time. Now reports 4/10 when sitting. Pt reports getting steroid shots on 11/18 on L knee.      Triage Assessment (Adult)       Row Name 11/27/24 7454          Triage Assessment    Airway WDL WDL        Respiratory WDL    Respiratory WDL WDL        Skin Circulation/Temperature WDL    Skin Circulation/Temperature WDL WDL        Cardiac WDL    Cardiac WDL WDL        Peripheral/Neurovascular WDL    Peripheral Neurovascular WDL WDL        Cognitive/Neuro/Behavioral WDL    Cognitive/Neuro/Behavioral WDL WDL

## 2024-12-03 ENCOUNTER — E-VISIT (OUTPATIENT)
Dept: URGENT CARE | Facility: CLINIC | Age: 54
End: 2024-12-03
Payer: COMMERCIAL

## 2024-12-03 DIAGNOSIS — J06.9 ACUTE UPPER RESPIRATORY INFECTION, UNSPECIFIED: Primary | ICD-10-CM

## 2024-12-03 RX ORDER — BENZONATATE 100 MG/1
100 CAPSULE ORAL 3 TIMES DAILY PRN
Qty: 30 CAPSULE | Refills: 0 | Status: SHIPPED | OUTPATIENT
Start: 2024-12-03

## 2024-12-03 NOTE — PATIENT INSTRUCTIONS
Viral Respiratory Infection: Care Instructions  Overview     A viral respiratory infection is an infection of the nose, sinuses, or throat caused by a virus. Colds and the flu are common types of viral respiratory infections.  The symptoms of a viral respiratory infection often start quickly. They include a fever, sore throat, and runny nose. You may also just not feel well. Or you may not want to eat much.  Most viral infections can be treated with home care. This may include drinking lots of fluids and taking over-the-counter pain medicine. You will probably feel better in 4 to 10 days.  Antibiotics are not used to treat a viral infection. Antibiotics don't kill viruses, so they won't help cure a viral illness.  In some cases, a doctor may prescribe antiviral medicine to help your body fight a serious viral infection.  Follow-up care is a key part of your treatment and safety. Be sure to make and go to all appointments, and call your doctor if you are having problems. It's also a good idea to know your test results and keep a list of the medicines you take.  How can you care for yourself at home?  To prevent dehydration, drink plenty of fluids. Choose water and other clear liquids until you feel better. If you have kidney, heart, or liver disease and have to limit fluids, talk with your doctor before you increase the amount of fluids you drink.  Ask your doctor if you can take an over-the-counter pain medicine, such as acetaminophen (Tylenol), ibuprofen (Advil, Motrin), or naproxen (Aleve). Be safe with medicines. Read and follow all instructions on the label. No one younger than 20 should take aspirin. It has been linked to Reye syndrome, a serious illness.  Be careful when taking over-the-counter cold or flu medicines and Tylenol at the same time. Many of these medicines have acetaminophen, which is Tylenol. Read the labels to make sure that you are not taking more than the recommended dose. Too much  "acetaminophen (Tylenol) can be harmful.  Get plenty of rest.  Use saline (saltwater) nasal washes to help keep your nasal passages open and wash out mucus and allergens. You can buy saline nose sprays at a grocery store or drugstore. Follow the instructions on the package. Or you can make your own at home. Add 1 teaspoon of non-iodized salt and 1 teaspoon of baking soda to 2 cups of distilled or boiled and cooled water. Fill a squeeze bottle or neti pot with the nasal wash. Then put the tip into your nostril, and lean over the sink. With your mouth open, gently squirt the liquid. Repeat on the other side.  Use a vaporizer or humidifier to add moisture to your bedroom. Follow the instructions for cleaning the machine.  Do not smoke or allow others to smoke around you. If you need help quitting, talk to your doctor about stop-smoking programs and medicines. These can increase your chances of quitting for good.  When should you call for help?   Call 911 anytime you think you may need emergency care. For example, call if:    You have severe trouble breathing.   Call your doctor now or seek immediate medical care if:    You have a new or higher fever.     Your fever lasts more than 48 hours.     You have trouble breathing.     You have a fever with a stiff neck or a severe headache.     You are sensitive to light.     You feel very sleepy or confused.   Watch closely for changes in your health, and be sure to contact your doctor if:    You do not get better as expected.   Where can you learn more?  Go to https://www.[employer].net/patiented  Enter Q795 in the search box to learn more about \"Viral Respiratory Infection: Care Instructions.\"  Current as of: June 12, 2023  Content Version: 14.2 2024 [facility] [employer].   Care instructions adapted under license by your healthcare professional. If you have questions about a medical condition or this instruction, always ask your healthcare professional. [employer] disclaims any warranty or liability for your use of this information.    Thank you for choosing us for your care. I have placed an order for a prescription so that you can start treatment. View your full visit summary for details by clicking on the link below. Your pharmacist will able to address any questions you may have about the medication.     If you're not feeling better within 5-7 days, please schedule an appointment.  You can schedule an appointment right here in [employer], or call [phone]  If the visit is for the same symptoms as your eVisit, we'll refund the cost of your eVisit if seen within seven days.

## 2024-12-04 ENCOUNTER — HOSPITAL ENCOUNTER (OUTPATIENT)
Dept: MRI IMAGING | Facility: CLINIC | Age: 54
Discharge: HOME | End: 2024-12-04
Attending: ORTHOPAEDIC SURGERY
Payer: COMMERCIAL

## 2024-12-04 DIAGNOSIS — G89.29 CHRONIC PAIN OF LEFT KNEE: ICD-10-CM

## 2024-12-04 DIAGNOSIS — M25.562 CHRONIC PAIN OF LEFT KNEE: ICD-10-CM

## 2024-12-04 PROCEDURE — 73721 MRI JNT OF LWR EXTRE W/O DYE: CPT | Mod: LT

## 2024-12-09 ENCOUNTER — PATIENT OUTREACH (OUTPATIENT)
Dept: CARE COORDINATION | Facility: CLINIC | Age: 54
End: 2024-12-09
Payer: COMMERCIAL

## 2024-12-11 ENCOUNTER — TELEPHONE (OUTPATIENT)
Dept: ORTHOPEDICS | Facility: CLINIC | Age: 54
End: 2024-12-11
Payer: COMMERCIAL

## 2024-12-11 NOTE — TELEPHONE ENCOUNTER
Patient Returning Call    Reason for call:  Mounika from [facility] [employer] called, requesting for call back    Information relayed to patient:  n/a    Patient has additional questions:  No      Could we send this information to you in [facility] or would you prefer to receive a phone call?:   Patient would prefer a phone call   Okay to leave a detailed message?: Yes at Other phone number:  Mounika [phone]

## 2024-12-16 ENCOUNTER — TELEPHONE (OUTPATIENT)
Dept: ORTHOPEDICS | Facility: CLINIC | Age: 54
End: 2024-12-16
Payer: COMMERCIAL

## 2024-12-16 ENCOUNTER — TRANSFERRED RECORDS (OUTPATIENT)
Dept: HEALTH INFORMATION MANAGEMENT | Facility: CLINIC | Age: 54
End: 2024-12-16
Payer: COMMERCIAL

## 2024-12-16 NOTE — TELEPHONE ENCOUNTER
Other: Patients paperwork for [employer] was missing a start of leave date. Patient would like to speak with Tika. Please call her back.      Could we send this information to you in [employer] or would you prefer to receive a phone call?:   Patient would prefer a phone call   Okay to leave a detailed message?: Yes at Cell number on file:    Telephone Information:   Mobile [phone]

## 2024-12-18 ENCOUNTER — THERAPY VISIT (OUTPATIENT)
Dept: PHYSICAL THERAPY | Facility: CLINIC | Age: 54
End: 2024-12-18
Payer: COMMERCIAL

## 2024-12-18 DIAGNOSIS — M54.89 OTHER BACK PAIN, UNSPECIFIED CHRONICITY: Primary | ICD-10-CM

## 2024-12-18 PROCEDURE — 97112 NEUROMUSCULAR REEDUCATION: CPT | Mod: GP | Performed by: PHYSICAL THERAPIST

## 2024-12-18 PROCEDURE — 97140 MANUAL THERAPY 1/> REGIONS: CPT | Mod: GP | Performed by: PHYSICAL THERAPIST

## 2024-12-30 ENCOUNTER — THERAPY VISIT (OUTPATIENT)
Dept: PHYSICAL THERAPY | Facility: CLINIC | Age: 54
End: 2024-12-30
Payer: COMMERCIAL

## 2024-12-30 DIAGNOSIS — M54.89 OTHER BACK PAIN, UNSPECIFIED CHRONICITY: Primary | ICD-10-CM

## 2024-12-30 PROCEDURE — 97140 MANUAL THERAPY 1/> REGIONS: CPT | Mod: GP | Performed by: PHYSICAL THERAPIST

## 2024-12-30 PROCEDURE — 97112 NEUROMUSCULAR REEDUCATION: CPT | Mod: GP | Performed by: PHYSICAL THERAPIST

## 2025-01-15 ENCOUNTER — ANCILLARY PROCEDURE (OUTPATIENT)
Dept: MAMMOGRAPHY | Facility: CLINIC | Age: 55
End: 2025-01-15
Attending: INTERNAL MEDICINE
Payer: COMMERCIAL

## 2025-01-15 DIAGNOSIS — Z12.31 VISIT FOR SCREENING MAMMOGRAM: ICD-10-CM

## 2025-01-15 PROCEDURE — 77063 BREAST TOMOSYNTHESIS BI: CPT | Mod: TC | Performed by: RADIOLOGY

## 2025-01-15 PROCEDURE — 77067 SCR MAMMO BI INCL CAD: CPT | Mod: TC | Performed by: RADIOLOGY

## 2025-01-27 ENCOUNTER — THERAPY VISIT (OUTPATIENT)
Dept: PHYSICAL THERAPY | Facility: CLINIC | Age: 55
End: 2025-01-27
Payer: COMMERCIAL

## 2025-01-27 DIAGNOSIS — M54.89 OTHER BACK PAIN, UNSPECIFIED CHRONICITY: Primary | ICD-10-CM

## 2025-01-27 PROCEDURE — 97140 MANUAL THERAPY 1/> REGIONS: CPT | Mod: GP | Performed by: PHYSICAL THERAPIST

## 2025-01-27 PROCEDURE — 97112 NEUROMUSCULAR REEDUCATION: CPT | Mod: GP | Performed by: PHYSICAL THERAPIST

## 2025-02-10 ENCOUNTER — OFFICE VISIT (OUTPATIENT)
Dept: INTERNAL MEDICINE | Facility: CLINIC | Age: 55
End: 2025-02-10
Payer: COMMERCIAL

## 2025-02-10 VITALS
DIASTOLIC BLOOD PRESSURE: 89 MMHG | OXYGEN SATURATION: 98 % | HEIGHT: 64 IN | TEMPERATURE: 98.6 F | BODY MASS INDEX: 33.63 KG/M2 | SYSTOLIC BLOOD PRESSURE: 142 MMHG | WEIGHT: 197 LBS | HEART RATE: 73 BPM | RESPIRATION RATE: 14 BRPM

## 2025-02-10 DIAGNOSIS — J45.30 MILD PERSISTENT ASTHMA WITHOUT COMPLICATION: ICD-10-CM

## 2025-02-10 DIAGNOSIS — G90.A POTS (POSTURAL ORTHOSTATIC TACHYCARDIA SYNDROME): ICD-10-CM

## 2025-02-10 DIAGNOSIS — Z01.818 PREOP GENERAL PHYSICAL EXAM: Primary | ICD-10-CM

## 2025-02-10 LAB
ANION GAP SERPL CALCULATED.3IONS-SCNC: 13 MMOL/L (ref 7–15)
BASOPHILS # BLD AUTO: 0 10E3/UL (ref 0–0.2)
BASOPHILS NFR BLD AUTO: 1 %
BUN SERPL-MCNC: 12.6 MG/DL (ref 6–20)
CALCIUM SERPL-MCNC: 9.7 MG/DL (ref 8.8–10.4)
CHLORIDE SERPL-SCNC: 104 MMOL/L (ref 98–107)
CREAT SERPL-MCNC: 0.86 MG/DL (ref 0.51–0.95)
EGFRCR SERPLBLD CKD-EPI 2021: 79 ML/MIN/1.73M2
EOSINOPHIL # BLD AUTO: 0.1 10E3/UL (ref 0–0.7)
EOSINOPHIL NFR BLD AUTO: 2 %
ERYTHROCYTE [DISTWIDTH] IN BLOOD BY AUTOMATED COUNT: 13.2 % (ref 10–15)
GLUCOSE SERPL-MCNC: 100 MG/DL (ref 70–99)
HCO3 SERPL-SCNC: 24 MMOL/L (ref 22–29)
HCT VFR BLD AUTO: 41.8 % (ref 35–47)
HGB BLD-MCNC: 13.5 G/DL (ref 11.7–15.7)
IMM GRANULOCYTES # BLD: 0 10E3/UL
IMM GRANULOCYTES NFR BLD: 0 %
LYMPHOCYTES # BLD AUTO: 1.4 10E3/UL (ref 0.8–5.3)
LYMPHOCYTES NFR BLD AUTO: 30 %
MCH RBC QN AUTO: 29 PG (ref 26.5–33)
MCHC RBC AUTO-ENTMCNC: 32.3 G/DL (ref 31.5–36.5)
MCV RBC AUTO: 90 FL (ref 78–100)
MONOCYTES # BLD AUTO: 0.3 10E3/UL (ref 0–1.3)
MONOCYTES NFR BLD AUTO: 7 %
NEUTROPHILS # BLD AUTO: 2.8 10E3/UL (ref 1.6–8.3)
NEUTROPHILS NFR BLD AUTO: 60 %
PLATELET # BLD AUTO: 251 10E3/UL (ref 150–450)
POTASSIUM SERPL-SCNC: 4.4 MMOL/L (ref 3.4–5.3)
RBC # BLD AUTO: 4.65 10E6/UL (ref 3.8–5.2)
SODIUM SERPL-SCNC: 141 MMOL/L (ref 135–145)
WBC # BLD AUTO: 4.7 10E3/UL (ref 4–11)

## 2025-02-10 PROCEDURE — 36415 COLL VENOUS BLD VENIPUNCTURE: CPT | Performed by: INTERNAL MEDICINE

## 2025-02-10 PROCEDURE — 80048 BASIC METABOLIC PNL TOTAL CA: CPT | Performed by: INTERNAL MEDICINE

## 2025-02-10 PROCEDURE — 99214 OFFICE O/P EST MOD 30 MIN: CPT | Performed by: INTERNAL MEDICINE

## 2025-02-10 PROCEDURE — 85025 COMPLETE CBC W/AUTO DIFF WBC: CPT | Performed by: INTERNAL MEDICINE

## 2025-02-10 NOTE — LETTER
February 11, 2025      Laura Jackson  [address]  [place] [address]        Dear ,    We are writing to inform you of your test results.    All of these tests are within acceptable limits , things look good ! Ok for surgery     Resulted Orders   Basic metabolic panel  (Ca, Cl, CO2, Creat, Gluc, K, Na, BUN)   Result Value Ref Range    Sodium 141 135 - 145 mmol/L    Potassium 4.4 3.4 - 5.3 mmol/L    Chloride 104 98 - 107 mmol/L    Carbon Dioxide (CO2) 24 22 - 29 mmol/L    Anion Gap 13 7 - 15 mmol/L    Urea Nitrogen 12.6 6.0 - 20.0 mg/dL    Creatinine 0.86 0.51 - 0.95 mg/dL    GFR Estimate 79 >60 mL/min/1.73m2      Comment:      eGFR calculated using 2021 CKD-EPI equation.    Calcium 9.7 8.8 - 10.4 mg/dL    Glucose 100 (H) 70 - 99 mg/dL   CBC with platelets and differential   Result Value Ref Range    WBC Count 4.7 4.0 - 11.0 10e3/uL    RBC Count 4.65 3.80 - 5.20 10e6/uL    Hemoglobin 13.5 11.7 - 15.7 g/dL    Hematocrit 41.8 35.0 - 47.0 %    MCV 90 78 - 100 fL    MCH 29.0 26.5 - 33.0 pg    MCHC 32.3 31.5 - 36.5 g/dL    RDW 13.2 10.0 - 15.0 %    Platelet Count 251 150 - 450 10e3/uL    % Neutrophils 60 %    % Lymphocytes 30 %    % Monocytes 7 %    % Eosinophils 2 %    % Basophils 1 %    % Immature Granulocytes 0 %    Absolute Neutrophils 2.8 1.6 - 8.3 10e3/uL    Absolute Lymphocytes 1.4 0.8 - 5.3 10e3/uL    Absolute Monocytes 0.3 0.0 - 1.3 10e3/uL    Absolute Eosinophils 0.1 0.0 - 0.7 10e3/uL    Absolute Basophils 0.0 0.0 - 0.2 10e3/uL    Absolute Immature Granulocytes 0.0 <=0.4 10e3/uL       If you have any questions or concerns, please call the clinic at the number listed above.       Sincerely,      Bj Butler MD

## 2025-02-10 NOTE — PATIENT INSTRUCTIONS
How to Take Your Medication Before Surgery  Preoperative Medication Instructions   Antiplatelet or Anticoagulation Medication Instructions   - Patient is on no antiplatelet or anticoagulation medications.    Additional Medication Instructions  Take all scheduled medications on the day of surgery       Patient Education   Preparing for Your Surgery  For Adults  Getting started  In most cases, a nurse will call to review your health history and instructions. They will give you an arrival time based on your scheduled surgery time. Please be ready to share:  Your doctor's clinic name and phone number  Your medical, surgical, and anesthesia history  A list of allergies and sensitivities  A list of medicines, including herbal treatments and over-the-counter drugs  Whether the patient has a legal guardian (ask how to send us the papers in advance)  Note: You may not receive a call if you were seen at our PAC (Preoperative Assessment Center).  Please tell us if you're pregnant--or if there's any chance you might be pregnant. Some surgeries may injure a fetus (unborn baby), so they require a pregnancy test. Surgeries that are safe for a fetus don't always need a test, and you can choose whether to have one.   Preparing for surgery  Within 10 to 30 days of surgery: Have a pre-op exam (sometimes called an H&P, or History and Physical). This can be done at a clinic or pre-operative center.  If you're having a , you may not need this exam. Talk to your care team.  At your pre-op exam, talk to your care team about all medicines you take. (This includes CBD oil and any drugs, such as THC, marijuana, and other forms of cannabis.) If you need to stop any medicine before surgery, ask when to start taking it again.  This is for your safety. Many medicines and drugs can make you bleed too much during surgery. Some change how well surgery (anesthesia) drugs work.  Call your insurance company to let them know you're having  surgery. (If you don't have insurance, call [phone].)  Call your clinic if there's any change in your health. This includes a scrape or scratch near the surgery site, or any signs of a cold (sore throat, runny nose, cough, rash, fever).  Eating and drinking guidelines  For your safety: Unless your surgeon tells you otherwise, follow the guidelines below.  Eat and drink as normal until 8 hours before you arrive for surgery. After that, no food or milk. You can spit out gum when you arrive.  Drink clear liquids until 2 hours before you arrive. These are liquids you can see through, like water, Gatorade, and Propel Water. They also include plain black coffee and tea (no cream or milk).  No alcohol for 24 hours before you arrive. The night before surgery, stop any drinks that contain THC.  If your care team tells you to take medicine on the morning of surgery, it's okay to take it with a sip of water. No other medicines or drugs are allowed (including CBD oil)--follow your care team's instructions.  If you have questions the day of surgery, call your hospital or surgery center.   Preventing infection  Shower or bathe the night before and the morning of surgery. Follow the instructions your clinic gave you. (If no instructions, use regular soap.)  Don't shave or clip hair near your surgery site. We'll remove the hair if needed.  Don't smoke or vape the morning of surgery. No chewing tobacco for 6 hours before you arrive. A nicotine patch is okay. You may spit out nicotine gum when you arrive.  For some surgeries, the surgeon will tell you to fully quit smoking and nicotine.  We will make every effort to keep you safe from infection. We will:  Clean our hands often with soap and water (or an alcohol-based hand rub).  Clean the skin at your surgery site with a special soap that kills germs.  Give you a special gown to keep you warm. (Cold raises the risk of infection.)  Wear hair covers, masks, gowns, and gloves  during surgery.  Give antibiotic medicine, if prescribed. Not all surgeries need this medicine.  What to bring on the day of surgery  Photo ID and insurance card  Copy of your health care directive, if you have one  Glasses and hearing aids (bring cases)  You can't wear contacts during surgery  Inhaler and eye drops, if you use them (tell us about these when you arrive)  CPAP machine or breathing device, if you use them  A few personal items, if spending the night  If you have . . .  A pacemaker, ICD (cardiac defibrillator), or other implant: Bring the ID card.  An implanted stimulator: Bring the remote control.  A legal guardian: Bring a copy of the certified (court-stamped) guardianship papers.  Please remove any jewelry, including body piercings. Leave jewelry and other valuables at home.  If you're going home the day of surgery  You must have a responsible adult drive you home. They should stay with you overnight as well.  If you don't have someone to stay with you, and you aren't safe to go home alone, we may keep you overnight. Insurance often won't pay for this.  After surgery  If it's hard to control your pain or you need more pain medicine, please call your surgeon's office.  Questions?   If you have any questions for your care team, list them here:   ____________________________________________________________________________________________________________________________________________________________________________________________________________________________________________________________  For informational purposes only. Not to replace the advice of your health care provider. Copyright   2003, 2019 [facility]. All rights reserved. Clinically reviewed by Bo Guerra MD. [employer] [id number] - REV 08/24.

## 2025-02-10 NOTE — PROGRESS NOTES
Preoperative Evaluation  [facility]  [address]  [place] [address]  Phone: [phone]  Primary Provider: Bj Butler MD  Pre-op Performing Provider: Bj Butler MD  Feb 10, 2025             2/10/2025   Surgical Information   What procedure is being done? left knee replacement   Facility or Hospital where procedure/surgery will be performed: [facility]   Who is doing the procedure / surgery? dr Jefe Cespedes   Date of surgery / procedure: 02/19/2025   Time of surgery / procedure: tbd   Where do you plan to recover after surgery? at home with family     Fax number for surgical facility: [phone]    Assessment & Plan     The proposed surgical procedure is considered INTERMEDIATE risk.    Preop general physical exam  This is a fully suitable operative candidate  , approved with surgery without reservation   - CBC with platelets and differential; Future  - Basic metabolic panel  (Ca, Cl, CO2, Creat, Gluc, K, Na, BUN); Future  - CBC with platelets and differential  - Basic metabolic panel  (Ca, Cl, CO2, Creat, Gluc, K, Na, BUN)    POTS (postural orthostatic tachycardia syndrome)  Treated with Corlanor  (ivabradine) and doing well at this point     Mild persistent asthma without complication  It is possible this patient could develop wheezing during this hospitalization for total knee replacement. If so , beta agonist therapy will be necessary             - No identified additional risk factors other than previously addressed    Preoperative Medication Instructions  Antiplatelet or Anticoagulation Medication Instructions   - Patient is on no antiplatelet or anticoagulation medications.    Additional Medication Instructions  Take all scheduled medications on the day of surgery    Recommendation  Approval given to proceed with proposed procedure, without further diagnostic evaluation.    Mitchell Sanchez is a 54 year old, presenting for the  following:  Pre-Op Exam          2/10/2025     9:47 AM   Additional Questions   Roomed by filiberto RUSSO related to upcoming procedure: progressive end stage osteoarthritic joints with bilateral knees        2/10/2025   Pre-Op Questionnaire   Have you ever had a heart attack or stroke? No   Have you ever had surgery on your heart or blood vessels, such as a stent placement, a coronary artery bypass, or surgery on an artery in your head, neck, heart, or legs? No   Do you have chest pain with activity? No   Do you have a history of heart failure? No   Do you currently have a cold, bronchitis or symptoms of other infection? No   Do you have a cough, shortness of breath, or wheezing? No   Do you or anyone in your family have previous history of blood clots? No   Do you or does anyone in your family have a serious bleeding problem such as prolonged bleeding following surgeries or cuts? No   Have you ever had problems with anemia or been told to take iron pills? (!) YES told to take iron pills, anemic    Have you had any abnormal blood loss such as black, tarry or bloody stools, or abnormal vaginal bleeding? (!) YES Hemoraged    Have you ever had a blood transfusion? (!) YES    Have you ever had a transfusion reaction? No   Are you willing to have a blood transfusion if it is medically needed before, during, or after your surgery? Yes   Have you or any of your relatives ever had problems with anesthesia? No   Do you have sleep apnea, excessive snoring or daytime drowsiness? No   Do you have any artifical heart valves or other implanted medical devices like a pacemaker, defibrillator, or continuous glucose monitor? No   Do you have artificial joints? No   Are you allergic to latex? No     Health Care Directive  Patient does not have a Health Care Directive: Discussed advance care planning with patient; however, patient declined at this time.    Preoperative Review of    reviewed - no record of controlled substances  prescribed.      Status of Chronic Conditions:  See problem list for active medical problems.  Problems all longstanding and stable, except as noted/documented.  See ROS for pertinent symptoms related to these conditions.    Patient Active Problem List    Diagnosis Date Noted    SVT (supraventricular tachycardia) 02/02/2024     Priority: Medium    Multiple joint pain 02/02/2024     Priority: Medium    Chronic neck pain 10/09/2023     Priority: Medium    Abdominal bloating 12/01/2022     Priority: Medium    Back pain 11/18/2021     Priority: Medium    Range of joint movement increased 09/19/2018     Priority: Medium    Autonomic dysfunction 07/31/2018     Priority: Medium    Benign essential hypertension 07/31/2018     Priority: Medium    Hypermobility syndrome 02/16/2018     Priority: Medium    POTS (postural orthostatic tachycardia syndrome) 02/06/2018     Priority: Medium    Endometriosis 11/09/2017     Priority: Medium    Heberden's nodes 11/09/2017     Priority: Medium    S/P ANAID (total abdominal hysterectomy) 09/11/2017     Priority: Medium    Hypovitaminosis D 09/11/2017     Priority: Medium    Nausea 08/04/2017     Priority: Medium    S/P myomectomy 06/29/2017     Priority: Medium    Mild persistent asthma 02/19/2014     Priority: Medium    Family history of malignant neoplasm of breast 02/19/2014     Priority: Medium    History of supraventricular tachycardia 10/11/2013     Priority: Medium     no recurrence since 2008      CARDIOVASCULAR SCREENING; LDL GOAL LESS THAN 160 04/24/2013     Priority: Medium    Irritable bowel syndrome 04/24/2013     Priority: Medium    GERD (gastroesophageal reflux disease) 04/24/2013     Priority: Medium    Cardiac dysrhythmia 11/12/2010     Priority: Medium     Overview:   LW Onset:  2010  ; Supraventricular Tachycardia      Elevated TSH 11/06/2010     Priority: Medium     Overview:   [facility]: TSH 7.45      Heartburn 04/17/2007     Priority: Medium    Normal delivery  2006     Priority: Medium     Overview:   LW Modifier:    LW Onset:  [id number]  ; Normal Spontaneous Vaginal Delivery      Uterine leiomyoma 2005     Priority: Medium     Overview:   Per CT finding from [facility]  ICD 10  Overview:   LW Modifier:  fundal  LW Onset:    ; Leiomyoma Uterus      Exercise-induced bronchospasm 2004     Priority: Medium     Overview:   Asthma Exercise Induced      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     Priority: Medium     Overview:   LW Modifier:  D&C 10/04  LW Onset:    ; Spontaneous  Complete        Past Medical History:   Diagnosis Date    Benign essential hypertension 2018    Family history of breast cancer 2014    Family history of breast cancer     Hypothyroidism 2022    Mechanical low back pain 2019    SVT (supraventricular tachycardia):  history of, no recurrence since 2008 10/11/2013    no recurrence since      Uncomplicated asthma      Past Surgical History:   Procedure Laterality Date    ABDOMEN SURGERY  2017    myectomy    APPENDECTOMY      ARTHROSCOPY ANKLE, OPEN REPAIR LIGAMENT, COMBINED Right 2023    Procedure: ankle arthroscopy, modified Brostrom, peroneal tendon repair, exostectomy of medial malleolus;  Surgeon: Saida Michael DPM;  Location: [facility] OR    COLONOSCOPY N/A 2018    Procedure: COMBINED COLONOSCOPY, SINGLE OR MULTIPLE BIOPSY/POLYPECTOMY BY BIOPSY;;  Surgeon: Osman Hahn MD;  Location: [facility] OR    COLONOSCOPY WITH CO2 INSUFFLATION N/A 2018    Procedure: COLONOSCOPY WITH CO2 INSUFFLATION;  COLON-SCREENING / LUBKA ;  Surgeon: Osman Hahn MD;  Location: [facility] OR    DAVINCI HYSTERECTOMY TOTAL, SALPINGECTOMY BILATERAL N/A 2017    Procedure: DAVINCI XI HYSTERECTOMY TOTAL, SALPINGECTOMY BILATERAL;  DAVINCI TOTAL HYSTERECTOMY; BILATERAL SALPINGECTOMY. BILATERAL URETERAL LYSIS. UTEROSACRAL-COLPOPEXY. EXCISION OF ENDOMETRIOSIS;  Surgeon:  George Loyola MD;  Location: [facility] OR    DAVINCI LYSIS OF ADHESIONS Bilateral 08/04/2017    Procedure: DAVINCI LYSIS OF ADHESIONS;  BILATERAL URETERAL LYSIS;  Surgeon: George Loyola MD;  Location: [facility] OR    DAVINCI SACROCOLPOPEXY, CYSTOSCOPY, COMBINED N/A 08/04/2017    Procedure: COMBINED DAVINCI SACROCOLPOPEXY, CYSTOSCOPY;  UTEROSACRAL COLPOPEXY;  Surgeon: George Loyola MD;  Location: [facility] OR    DAVINCI XI ASSISTED ABLATION / EXCISION OF ENDOMETRIOSIS  08/04/2017    Procedure: DAVINCI XI ASSISTED ABLATION / EXCISION OF ENDOMETRIOSIS;;  Surgeon: George Loyola MD;  Location: [facility] OR    DILATION AND CURETTAGE N/A 06/20/2017    Procedure: DILATION AND CURETTAGE;  Uterine Curettings and Fibroid Removal, Cook catheter placement; (No hysterectomy done at this time);  Surgeon: Ernestina Saunders MD;  Location: [facility] OR    HYSTERECTOMY, PAP NO LONGER INDICATED      ORTHOPEDIC SURGERY  06/1986    Heel spur , toe surgery    RELEASE CARPAL TUNNEL Right 10/20/2020    Procedure: RIGHT RELEASE, CARPAL TUNNEL;  Surgeon: Rickey Rea MD;  Location: [facility] OR    RELEASE CARPAL TUNNEL Left 11/06/2020    Procedure: LEFT RELEASE, CARPAL TUNNEL;  Surgeon: Rickey Rea MD;  Location: [facility] OR    SOFT TISSUE SURGERY  2/2/2023    TONSILLECTOMY       Current Outpatient Medications   Medication Sig Dispense Refill    benzonatate (TESSALON) 100 MG capsule Take 1 capsule (100 mg) by mouth 3 times daily as needed for cough. 30 capsule 0    Cholecalciferol (VITAMIN D) 2000 UNITS CAPS Take 1 capsule by mouth daily      fluticasone-salmeterol (ADVAIR) 100-50 MCG/ACT inhaler INHALE ONE PUFF BY MOUTH EVERY 12 HOURS 1 each 3    ivabradine (CORLANOR) 5 MG tablet Take 1 tablet (5 mg) by mouth 2 times daily (with meals). 180 tablet 3    levalbuterol (XOPENEX HFA) 45 MCG/ACT inhaler Inhale 1-2 puffs into the lungs every 4 hours as needed for shortness of breath No further refills until appointment 15 g 11    magnesium 250 MG  "tablet Take 1 tablet by mouth daily.      metroNIDAZOLE (METROGEL) 0.75 % external gel Apply to face BID 45 g 3    Pyridoxine HCl (B-6) 100 MG TABS       vitamin B-12 (CYANOCOBALAMIN) 100 MCG tablet          Allergies   Allergen Reactions    Penicillins Swelling     Swelling throat; age 4    Adhesive Tape Hives    Kenalog [Triamcinolone] Itching    Penicillin G     Tetracycline GI Disturbance     Other reaction(s): Abdominal Pain  Vomiting; nauseous  Other reaction(s): Abdominal Pain  Vomiting; nauseous        Social History     Tobacco Use    Smoking status: Never    Smokeless tobacco: Never   Substance Use Topics    Alcohol use: Yes     Comment: Rare- 1 every 2-3month     Family History   Problem Relation Age of Onset    Diabetes Mother     Hypertension Mother     Hyperlipidemia Mother     Arrhythmia Mother     Cardiovascular Father         CHF and COPD    Asthma Father     Breast Cancer Maternal Grandfather     Colon Cancer Maternal Grandfather         His mother  of cancer too    Breast Cancer Paternal Grandmother     Breast Cancer Paternal Aunt     C.A.D. Paternal Grandfather     Breast Cancer Paternal Grandfather     Breast Cancer Cousin     Asthma Son     Diabetes Maternal Grandmother     Hypertension Maternal Grandmother     Breast Cancer Cousin     Breast Cancer Cousin     Breast Cancer Cousin         Maternal    Breast Cancer Other     Breast Cancer Other         Maternal aunt     History   Drug Use Unknown             Review of Systems  Constitutional, HEENT, cardiovascular, pulmonary, gi and gu systems are negative, except as otherwise noted.    Objective    BP (!) 142/89   Pulse 73   Temp 98.6  F (37  C) (Temporal)   Resp 14   Ht 1.626 m (5' 4\")   Wt 89.4 kg (197 lb)   LMP 2017 (Exact Date)   SpO2 98%   BMI 33.81 kg/m     Estimated body mass index is 33.3 kg/m  as calculated from the following:    Height as of 24: 1.626 m (5' 4\").    Weight as of 24: 88 kg (194 " lb).  Physical Exam  GENERAL: alert and no distress  EYES: Eyes grossly normal to inspection, PERRL and conjunctivae and sclerae normal  NECK: no adenopathy, no asymmetry, masses, or scars  RESP: lungs clear to auscultation - no rales, rhonchi or wheezes  CV: regular rate and rhythm, normal S1 S2, no S3 or S4, no murmur, click or rub, no peripheral edema  ABDOMEN: soft, nontender, no hepatosplenomegaly, no masses and bowel sounds normal  MS: no gross musculoskeletal defects noted, no edema    Recent Labs   Lab Test 11/05/24  1346 05/20/24  0933   HGB  --  14.1   PLT  --  255   INR  --  0.95     --    POTASSIUM 4.1  --    CR 1.02*  --         Diagnostics  No labs were ordered during this visit.   Patient completed a negative stress test in October 2024.    Revised Cardiac Risk Index (RCRI)  The patient has the following serious cardiovascular risks for perioperative complications:   - No serious cardiac risks = 0 points     RCRI Interpretation: 0 points: Class I (very low risk - 0.4% complication rate)         Signed Electronically by: Bj Butler MD  A copy of this evaluation report is provided to the requesting physician.

## 2025-02-12 ENCOUNTER — THERAPY VISIT (OUTPATIENT)
Dept: PHYSICAL THERAPY | Facility: CLINIC | Age: 55
End: 2025-02-12
Payer: COMMERCIAL

## 2025-02-12 DIAGNOSIS — M54.89 OTHER BACK PAIN, UNSPECIFIED CHRONICITY: Primary | ICD-10-CM

## 2025-02-12 PROCEDURE — 97112 NEUROMUSCULAR REEDUCATION: CPT | Mod: GP | Performed by: PHYSICAL THERAPIST

## 2025-02-12 PROCEDURE — 97140 MANUAL THERAPY 1/> REGIONS: CPT | Mod: GP | Performed by: PHYSICAL THERAPIST

## 2025-03-03 ENCOUNTER — TRANSFERRED RECORDS (OUTPATIENT)
Dept: HEALTH INFORMATION MANAGEMENT | Facility: CLINIC | Age: 55
End: 2025-03-03
Payer: COMMERCIAL

## 2025-03-05 DIAGNOSIS — J45.30 MILD PERSISTENT ASTHMA WITHOUT COMPLICATION: ICD-10-CM

## 2025-03-05 RX ORDER — FLUTICASONE PROPIONATE AND SALMETEROL 100; 50 UG/1; UG/1
POWDER RESPIRATORY (INHALATION)
Qty: 60 EACH | Refills: 3 | Status: SHIPPED | OUTPATIENT
Start: 2025-03-05

## 2025-03-19 ENCOUNTER — THERAPY VISIT (OUTPATIENT)
Dept: PHYSICAL THERAPY | Facility: CLINIC | Age: 55
End: 2025-03-19
Payer: COMMERCIAL

## 2025-03-19 DIAGNOSIS — M54.89 OTHER BACK PAIN, UNSPECIFIED CHRONICITY: Primary | ICD-10-CM

## 2025-03-19 PROCEDURE — 97112 NEUROMUSCULAR REEDUCATION: CPT | Mod: GP | Performed by: PHYSICAL THERAPIST

## 2025-03-19 PROCEDURE — 97140 MANUAL THERAPY 1/> REGIONS: CPT | Mod: GP | Performed by: PHYSICAL THERAPIST

## 2025-03-24 ENCOUNTER — TRANSFERRED RECORDS (OUTPATIENT)
Dept: HEALTH INFORMATION MANAGEMENT | Facility: CLINIC | Age: 55
End: 2025-03-24
Payer: COMMERCIAL

## 2025-04-01 ENCOUNTER — OFFICE VISIT (OUTPATIENT)
Dept: FAMILY MEDICINE | Facility: CLINIC | Age: 55
End: 2025-04-01
Payer: COMMERCIAL

## 2025-04-01 ENCOUNTER — TRANSFERRED RECORDS (OUTPATIENT)
Dept: HEALTH INFORMATION MANAGEMENT | Facility: CLINIC | Age: 55
End: 2025-04-01

## 2025-04-01 VITALS
HEIGHT: 64 IN | WEIGHT: 187.6 LBS | OXYGEN SATURATION: 97 % | HEART RATE: 69 BPM | RESPIRATION RATE: 16 BRPM | SYSTOLIC BLOOD PRESSURE: 131 MMHG | BODY MASS INDEX: 32.03 KG/M2 | TEMPERATURE: 98 F | DIASTOLIC BLOOD PRESSURE: 85 MMHG

## 2025-04-01 DIAGNOSIS — J45.30 MILD PERSISTENT ASTHMA WITHOUT COMPLICATION: ICD-10-CM

## 2025-04-01 DIAGNOSIS — G90.A POTS (POSTURAL ORTHOSTATIC TACHYCARDIA SYNDROME): ICD-10-CM

## 2025-04-01 DIAGNOSIS — M17.11 PRIMARY OSTEOARTHRITIS OF RIGHT KNEE: ICD-10-CM

## 2025-04-01 DIAGNOSIS — Z01.818 PREOP GENERAL PHYSICAL EXAM: Primary | ICD-10-CM

## 2025-04-01 PROCEDURE — 3075F SYST BP GE 130 - 139MM HG: CPT | Performed by: STUDENT IN AN ORGANIZED HEALTH CARE EDUCATION/TRAINING PROGRAM

## 2025-04-01 PROCEDURE — 1125F AMNT PAIN NOTED PAIN PRSNT: CPT | Performed by: STUDENT IN AN ORGANIZED HEALTH CARE EDUCATION/TRAINING PROGRAM

## 2025-04-01 PROCEDURE — 3079F DIAST BP 80-89 MM HG: CPT | Performed by: STUDENT IN AN ORGANIZED HEALTH CARE EDUCATION/TRAINING PROGRAM

## 2025-04-01 PROCEDURE — 99214 OFFICE O/P EST MOD 30 MIN: CPT | Performed by: STUDENT IN AN ORGANIZED HEALTH CARE EDUCATION/TRAINING PROGRAM

## 2025-04-01 RX ORDER — METHOCARBAMOL 750 MG/1
750 TABLET, FILM COATED ORAL PRN
COMMUNITY
Start: 2025-02-21

## 2025-04-01 ASSESSMENT — ASTHMA QUESTIONNAIRES
QUESTION_5 LAST FOUR WEEKS HOW WOULD YOU RATE YOUR ASTHMA CONTROL: WELL CONTROLLED
QUESTION_2 LAST FOUR WEEKS HOW OFTEN HAVE YOU HAD SHORTNESS OF BREATH: NOT AT ALL
QUESTION_3 LAST FOUR WEEKS HOW OFTEN DID YOUR ASTHMA SYMPTOMS (WHEEZING, COUGHING, SHORTNESS OF BREATH, CHEST TIGHTNESS OR PAIN) WAKE YOU UP AT NIGHT OR EARLIER THAN USUAL IN THE MORNING: NOT AT ALL
QUESTION_4 LAST FOUR WEEKS HOW OFTEN HAVE YOU USED YOUR RESCUE INHALER OR NEBULIZER MEDICATION (SUCH AS ALBUTEROL): NOT AT ALL
QUESTION_1 LAST FOUR WEEKS HOW MUCH OF THE TIME DID YOUR ASTHMA KEEP YOU FROM GETTING AS MUCH DONE AT WORK, SCHOOL OR AT HOME: NONE OF THE TIME
ACT_TOTALSCORE: 24

## 2025-04-01 ASSESSMENT — PAIN SCALES - GENERAL: PAINLEVEL_OUTOF10: MODERATE PAIN (5)

## 2025-04-01 NOTE — PROGRESS NOTES
Preoperative Evaluation  [address] [address]  Phone: [phone]  Fax: [phone]  Primary Provider: Bj Butler MD  Pre-op Performing Provider: Fermin Crowe MD  Apr 1, 2025 4/1/2025   Surgical Information   What procedure is being done? Right total knee arthroplasty   Facility or Hospital where procedure/surgery [facility]   Who is doing the procedure / surgery? Dr Jefe Cespedes   Date of surgery / procedure: April 10,2025   Time of surgery / procedure: Unknown   Where do you plan to recover after surgery? At home with family     Fax number for surgical facility: [phone]    The proposed surgical procedure is considered INTERMEDIATE risk.     Preop general physical exam  Primary osteoarthritis of right knee  Patient is a suitable operative candidate, approved for surgery without reservation   No identified additional risk factors other than addressed below  - CBC with platelets and differential; Future  - Basic metabolic panel  (Ca, Cl, CO2, Creat, Gluc, K, Na, BUN); Future  - CBC with platelets and differential  - Basic metabolic panel  (Ca, Cl, CO2, Creat, Gluc, K, Na, BUN)     Preoperative Medication Instructions  Antiplatelet or Anticoagulation Medication Instructions   - Patient is on no antiplatelet or anticoagulation medications.     Additional Medication Instructions  Take all scheduled medications on the day of surgery     Recommendation  Approval given to proceed with proposed procedure, without further diagnostic evaluation.    OF NOTE: patient reports history of contact vs allergic dermatitis noted postop from wound dressing bandage used after her recent left knee total arthroplasty. Patient will notify surgical team and discuss alternative bandage options.       Chronic medical issues:  POTS (postural orthostatic tachycardia syndrome)  Stable with Corlanor   (ivabradine).  Continue current management      Mild persistent asthma without complication  Stable with rescue inhaler only.  If patient develops wheezing during this hospitalization for total knee replacement, treat with rescue inhaler.        Fermin Crowe MD  Family Medicine   [facility]  [phone]           Mitchell Sanchez is a 54 year old, presenting for the following:  Pre-Op Exam          4/1/2025     9:54 AM   Additional Questions   Roomed by marissa donovan ma   Accompanied by Omeprazole and Pepcid         4/1/2025     9:54 AM   Patient Reported Additional Medications   Patient reports taking the following new medications none     HPI: progressive end stage osteoarthritis of right knee.            4/1/2025   Pre-Op Questionnaire   Have you ever had a heart attack or stroke? No   Have you ever had surgery on your heart or blood vessels, such as a stent placement, a coronary artery bypass, or surgery on an artery in your head, neck, heart, or legs? No   Do you have chest pain with activity? No   Do you have a history of heart failure? No   Do you currently have a cold, bronchitis or symptoms of other infection? No   Do you have a cough, shortness of breath, or wheezing? No   Do you or anyone in your family have previous history of blood clots? No   Do you or does anyone in your family have a serious bleeding problem such as prolonged bleeding following surgeries or cuts? No   Have you ever had problems with anemia or been told to take iron pills? (!) YES - last CBC WNL   Have you had any abnormal blood loss such as black, tarry or bloody stools, or abnormal vaginal bleeding? (!) YES    Have you ever had a blood transfusion? (!) YES   Have you ever had a transfusion reaction? No   Are you willing to have a blood transfusion if it is medically needed before, during, or after your surgery? Yes   Have you or any of your relatives ever had problems with anesthesia? No   Do you have sleep apnea,  excessive snoring or daytime drowsiness? No   Do you have any artifical heart valves or other implanted medical devices like a pacemaker, defibrillator, or continuous glucose monitor? No   Do you have artificial joints? (!) YES   Are you allergic to latex? No     Health Care Directive  Patient does not have a Health Care Directive: Discussed advance care planning with patient; however, patient declined at this time.    Preoperative Review of    reviewed - controlled substances prescribed by other outside provider(s).        Patient Active Problem List    Diagnosis Date Noted    SVT (supraventricular tachycardia) 02/02/2024     Priority: Medium    Multiple joint pain 02/02/2024     Priority: Medium    Chronic neck pain 10/09/2023     Priority: Medium    Abdominal bloating 12/01/2022     Priority: Medium    Back pain 11/18/2021     Priority: Medium    Range of joint movement increased 09/19/2018     Priority: Medium    Autonomic dysfunction 07/31/2018     Priority: Medium    Benign essential hypertension 07/31/2018     Priority: Medium    Hypermobility syndrome 02/16/2018     Priority: Medium    POTS (postural orthostatic tachycardia syndrome) 02/06/2018     Priority: Medium    Endometriosis 11/09/2017     Priority: Medium    Heberden's nodes 11/09/2017     Priority: Medium    S/P ANAID (total abdominal hysterectomy) 09/11/2017     Priority: Medium    Hypovitaminosis D 09/11/2017     Priority: Medium    Nausea 08/04/2017     Priority: Medium    S/P myomectomy 06/29/2017     Priority: Medium    Mild persistent asthma 02/19/2014     Priority: Medium    Family history of malignant neoplasm of breast 02/19/2014     Priority: Medium    History of supraventricular tachycardia 10/11/2013     Priority: Medium     no recurrence since 2008      CARDIOVASCULAR SCREENING; LDL GOAL LESS THAN 160 04/24/2013     Priority: Medium    Irritable bowel syndrome 04/24/2013     Priority: Medium    GERD (gastroesophageal reflux disease)  2013     Priority: Medium    Cardiac dysrhythmia 2010     Priority: Medium     Overview:   LW Onset:    ; Supraventricular Tachycardia      Elevated TSH 2010     Priority: Medium     Overview:   [facility]: TSH 7.45      Heartburn 2007     Priority: Medium    Normal delivery 2006     Priority: Medium     Overview:   LW Modifier:    LW Onset:  41Mps21  ; Normal Spontaneous Vaginal Delivery      Uterine leiomyoma 2005     Priority: Medium     Overview:   Per CT finding from [facility]  ICD 10  Overview:   LW Modifier:  fundal  LW Onset:    ; Leiomyoma Uterus      Exercise-induced bronchospasm 2004     Priority: Medium     Overview:   Asthma Exercise Induced      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     Priority: Medium     Overview:   LW Modifier:  D&C 10/04  LW Onset:  95Mji76  ; Spontaneous  Complete        Past Medical History:   Diagnosis Date    Benign essential hypertension 2018    Family history of breast cancer 2014    Family history of breast cancer     Hypothyroidism 2022    Mechanical low back pain 2019    SVT (supraventricular tachycardia):  history of, no recurrence since 2008 10/11/2013    no recurrence since      Uncomplicated asthma      Past Surgical History:   Procedure Laterality Date    ABDOMEN SURGERY  2017    myectomy    APPENDECTOMY      ARTHROSCOPY ANKLE, OPEN REPAIR LIGAMENT, COMBINED Right 2023    Procedure: ankle arthroscopy, modified Brostrom, peroneal tendon repair, exostectomy of medial malleolus;  Surgeon: Saida Michael DPM;  Location: [facility] OR    COLONOSCOPY N/A 2018    Procedure: COMBINED COLONOSCOPY, SINGLE OR MULTIPLE BIOPSY/POLYPECTOMY BY BIOPSY;;  Surgeon: Osman Hahn MD;  Location: [facility]    COLONOSCOPY WITH CO2 INSUFFLATION N/A 2018    Procedure: COLONOSCOPY WITH CO2 INSUFFLATION;  COLON-SCREENING / LUBKA ;  Surgeon: Osman Hahn  MD Vahid;  Location: [facility] OR    DAVINCI HYSTERECTOMY TOTAL, SALPINGECTOMY BILATERAL N/A 08/04/2017    Procedure: DAVINCI XI HYSTERECTOMY TOTAL, SALPINGECTOMY BILATERAL;  DAVINCI TOTAL HYSTERECTOMY; BILATERAL SALPINGECTOMY. BILATERAL URETERAL LYSIS. UTEROSACRAL-COLPOPEXY. EXCISION OF ENDOMETRIOSIS;  Surgeon: George Loyola MD;  Location: [facility]    DAVINCI LYSIS OF ADHESIONS Bilateral 08/04/2017    Procedure: DAVINCI LYSIS OF ADHESIONS;  BILATERAL URETERAL LYSIS;  Surgeon: George Loyola MD;  Location: [facility]    DAVINCI SACROCOLPOPEXY, CYSTOSCOPY, COMBINED N/A 08/04/2017    Procedure: COMBINED DAVINCI SACROCOLPOPEXY, CYSTOSCOPY;  UTEROSACRAL COLPOPEXY;  Surgeon: George Loyola MD;  Location: [facility]    DAVINCI XI ASSISTED ABLATION / EXCISION OF ENDOMETRIOSIS  08/04/2017    Procedure: DAVINCI XI ASSISTED ABLATION / EXCISION OF ENDOMETRIOSIS;;  Surgeon: George Loyola MD;  Location: [facility]    DILATION AND CURETTAGE N/A 06/20/2017    Procedure: DILATION AND CURETTAGE;  Uterine Curettings and Fibroid Removal, Cook catheter placement; (No hysterectomy done at this time);  Surgeon: Ernestina Saunders MD;  Location: [facility]    HYSTERECTOMY, PAP NO LONGER INDICATED      ORTHOPEDIC SURGERY  06/1986    Heel spur , toe surgery    RELEASE CARPAL TUNNEL Right 10/20/2020    Procedure: RIGHT RELEASE, CARPAL TUNNEL;  Surgeon: Rickey Rea MD;  Location: [facility] OR    RELEASE CARPAL TUNNEL Left 11/06/2020    Procedure: LEFT RELEASE, CARPAL TUNNEL;  Surgeon: Rickey Rea MD;  [place] [facility] [place]    SOFT TISSUE SURGERY  2/2/2023    TONSILLECTOMY       Current Outpatient Medications   Medication Sig Dispense Refill    benzonatate (TESSALON) 100 MG capsule Take 1 capsule (100 mg) by mouth 3 times daily as needed for cough. 30 capsule 0    Cholecalciferol (VITAMIN D) 2000 UNITS CAPS Take 1 capsule by mouth daily      Famotidine (PEPCID PO) Take 10 mg by mouth daily.      fluticasone-salmeterol (ADVAIR) 100-50 MCG/ACT  "inhaler INHALE ONE PUFF BY MOUTH INTO THE LUNGS EVERY 12 HOURS 60 each 3    ivabradine (CORLANOR) 5 MG tablet Take 1 tablet (5 mg) by mouth 2 times daily (with meals). 180 tablet 3    levalbuterol (XOPENEX HFA) 45 MCG/ACT inhaler Inhale 1-2 puffs into the lungs every 4 hours as needed for shortness of breath No further refills until appointment 15 g 11    magnesium 250 MG tablet Take 1 tablet by mouth daily.      methocarbamol (ROBAXIN) 750 MG tablet Take 750 mg by mouth as needed.      metroNIDAZOLE (METROGEL) 0.75 % external gel Apply to face BID 45 g 3    OMEPRAZOLE PO Take 20 mg by mouth daily.      Pyridoxine HCl (B-6) 100 MG TABS       vitamin B-12 (CYANOCOBALAMIN) 100 MCG tablet          Allergies   Allergen Reactions    Penicillins Swelling     Swelling throat; age 4    Adhesive Tape Hives    Kenalog [Triamcinolone] Itching    Penicillin G     Tetracycline GI Disturbance     Other reaction(s): Abdominal Pain  Vomiting; nauseous  Other reaction(s): Abdominal Pain  Vomiting; nauseous        Social History     Tobacco Use    Smoking status: Never    Smokeless tobacco: Never   Substance Use Topics    Alcohol use: Yes     Comment: Rare- 1 every 2-3month     History   Drug Use Unknown         Review of Systems: Please refer to HPI for pertinent positives and negatives.          Objective    LMP 07/28/2017 (Exact Date)    Estimated body mass index is 33.81 kg/m  as calculated from the following:    Height as of 2/10/25: 1.626 m (5' 4\").    Weight as of 2/10/25: 89.4 kg (197 lb).    Physical Exam  Vitals reviewed.   Constitutional:       General: She is not in acute distress.     Appearance: Normal appearance. She is not ill-appearing.   HENT:      Head: Normocephalic and atraumatic.      Nose: Nose normal.      Mouth/Throat:      Mouth: Mucous membranes are moist.      Pharynx: Oropharynx is clear.   Eyes:      Extraocular Movements: Extraocular movements intact.      Conjunctiva/sclera: Conjunctivae normal.      " Pupils: Pupils are equal, round, and reactive to light.   Cardiovascular:      Rate and Rhythm: Normal rate and regular rhythm.      Heart sounds: Normal heart sounds.   Pulmonary:      Effort: Pulmonary effort is normal. No respiratory distress.      Breath sounds: Normal breath sounds.   Abdominal:      General: Abdomen is flat.      Palpations: Abdomen is soft.   Musculoskeletal:         General: No deformity.      Cervical back: Normal range of motion and neck supple. No rigidity or tenderness.      Right lower leg: No edema.      Left lower leg: No edema.   Lymphadenopathy:      Cervical: No cervical adenopathy.   Skin:     General: Skin is warm and dry.   Neurological:      Mental Status: She is alert.   Psychiatric:         Mood and Affect: Mood normal.         Behavior: Behavior normal.           Recent Labs   Lab Test 02/10/25  1046 11/05/24  1346 05/20/24  0933   HGB 13.5  --  14.1     --  255   INR  --   --  0.95    140  --    POTASSIUM 4.4 4.1  --    CR 0.86 1.02*  --       Diagnostics  No labs were ordered during this visit.   Patient completed a negative stress test in October 2024.    Revised Cardiac Risk Index (RCRI)  The patient has the following serious cardiovascular risks for perioperative complications:   - No serious cardiac risks = 0 points     RCRI Interpretation: 0 points: Class I (very low risk - 0.4% complication rate)       Signed Electronically by: Fermin Crowe MD  A copy of this evaluation report is provided to the requesting physician.

## 2025-04-01 NOTE — Clinical Note
Hello team,  Please fax preop chart note to [facility] at [phone].  Thank you! APM Crowe

## 2025-04-01 NOTE — PATIENT INSTRUCTIONS
How to Take Your Medication Before Surgery  Preoperative Medication Instructions  Antiplatelet or Anticoagulation Medication Instructions   - Patient is on no antiplatelet or anticoagulation medications.     Additional Medication Instructions  Take all scheduled medications on the day of surgery       Patient Education   Preparing for Your Surgery  For Adults  Getting started  In most cases, a nurse will call to review your health history and instructions. They will give you an arrival time based on your scheduled surgery time. Please be ready to share:  Your doctor's clinic name and phone number  Your medical, surgical, and anesthesia history  A list of allergies and sensitivities  A list of medicines, including herbal treatments and over-the-counter drugs  Whether the patient has a legal guardian (ask how to send us the papers in advance)  Note: You may not receive a call if you were seen at our PAC (Preoperative Assessment Center).  Please tell us if you're pregnant--or if there's any chance you might be pregnant. Some surgeries may injure a fetus (unborn baby), so they require a pregnancy test. Surgeries that are safe for a fetus don't always need a test, and you can choose whether to have one.   Preparing for surgery  Within 10 to 30 days of surgery: Have a pre-op exam (sometimes called an H&P, or History and Physical). This can be done at a clinic or pre-operative center.  If you're having a , you may not need this exam. Talk to your care team.  At your pre-op exam, talk to your care team about all medicines you take. (This includes CBD oil and any drugs, such as THC, marijuana, and other forms of cannabis.) If you need to stop any medicine before surgery, ask when to start taking it again.  This is for your safety. Many medicines and drugs can make you bleed too much during surgery. Some change how well surgery (anesthesia) drugs work.  Call your insurance company to let them know you're having  surgery. (If you don't have insurance, call [phone].)  Call your clinic if there's any change in your health. This includes a scrape or scratch near the surgery site, or any signs of a cold (sore throat, runny nose, cough, rash, fever).  Eating and drinking guidelines  For your safety: Unless your surgeon tells you otherwise, follow the guidelines below.  Eat and drink as normal until 8 hours before you arrive for surgery. After that, no food or milk. You can spit out gum when you arrive.  Drink clear liquids until 2 hours before you arrive. These are liquids you can see through, like water, Gatorade, and Propel Water. They also include plain black coffee and tea (no cream or milk).  No alcohol for 24 hours before you arrive. The night before surgery, stop any drinks that contain THC.  If your care team tells you to take medicine on the morning of surgery, it's okay to take it with a sip of water. No other medicines or drugs are allowed (including CBD oil)--follow your care team's instructions.  If you have questions the day of surgery, call your hospital or surgery center.   Preventing infection  Shower or bathe the night before and the morning of surgery. Follow the instructions your clinic gave you. (If no instructions, use regular soap.)  Don't shave or clip hair near your surgery site. We'll remove the hair if needed.  Don't smoke or vape the morning of surgery. No chewing tobacco for 6 hours before you arrive. A nicotine patch is okay. You may spit out nicotine gum when you arrive.  For some surgeries, the surgeon will tell you to fully quit smoking and nicotine.  We will make every effort to keep you safe from infection. We will:  Clean our hands often with soap and water (or an alcohol-based hand rub).  Clean the skin at your surgery site with a special soap that kills germs.  Give you a special gown to keep you warm. (Cold raises the risk of infection.)  Wear hair covers, masks, gowns, and gloves  during surgery.  Give antibiotic medicine, if prescribed. Not all surgeries need this medicine.  What to bring on the day of surgery  Photo ID and insurance card  Copy of your health care directive, if you have one  Glasses and hearing aids (bring cases)  You can't wear contacts during surgery  Inhaler and eye drops, if you use them (tell us about these when you arrive)  CPAP machine or breathing device, if you use them  A few personal items, if spending the night  If you have . . .  A pacemaker, ICD (cardiac defibrillator), or other implant: Bring the ID card.  An implanted stimulator: Bring the remote control.  A legal guardian: Bring a copy of the certified (court-stamped) guardianship papers.  Please remove any jewelry, including body piercings. Leave jewelry and other valuables at home.  If you're going home the day of surgery  You must have a responsible adult drive you home. They should stay with you overnight as well.  If you don't have someone to stay with you, and you aren't safe to go home alone, we may keep you overnight. Insurance often won't pay for this.  After surgery  If it's hard to control your pain or you need more pain medicine, please call your surgeon's office.  Questions?   If you have any questions for your care team, list them here:   ____________________________________________________________________________________________________________________________________________________________________________________________________________________________________________________________  For informational purposes only. Not to replace the advice of your health care provider. Copyright   2003, 2019 [facility]. All rights reserved. Clinically reviewed by Bo Guerra MD. [facility] [id number] - REV 08/24.

## 2025-04-02 ENCOUNTER — THERAPY VISIT (OUTPATIENT)
Dept: PHYSICAL THERAPY | Facility: CLINIC | Age: 55
End: 2025-04-02
Payer: COMMERCIAL

## 2025-04-02 DIAGNOSIS — M54.89 OTHER BACK PAIN, UNSPECIFIED CHRONICITY: Primary | ICD-10-CM

## 2025-04-02 PROCEDURE — 97112 NEUROMUSCULAR REEDUCATION: CPT | Mod: GP | Performed by: PHYSICAL THERAPIST

## 2025-04-02 PROCEDURE — 97140 MANUAL THERAPY 1/> REGIONS: CPT | Mod: GP | Performed by: PHYSICAL THERAPIST

## 2025-04-03 ENCOUNTER — HOSPITAL ENCOUNTER (EMERGENCY)
Facility: CLINIC | Age: 55
Discharge: HOME | End: 2025-04-04
Attending: EMERGENCY MEDICINE | Admitting: EMERGENCY MEDICINE
Payer: COMMERCIAL

## 2025-04-03 DIAGNOSIS — R10.13 DYSPEPSIA: ICD-10-CM

## 2025-04-03 PROCEDURE — 99285 EMERGENCY DEPT VISIT HI MDM: CPT | Mod: 25 | Performed by: EMERGENCY MEDICINE

## 2025-04-03 PROCEDURE — 99284 EMERGENCY DEPT VISIT MOD MDM: CPT | Performed by: EMERGENCY MEDICINE

## 2025-04-03 ASSESSMENT — COLUMBIA-SUICIDE SEVERITY RATING SCALE - C-SSRS
1. IN THE PAST MONTH, HAVE YOU WISHED YOU WERE DEAD OR WISHED YOU COULD GO TO SLEEP AND NOT WAKE UP?: NO
6. HAVE YOU EVER DONE ANYTHING, STARTED TO DO ANYTHING, OR PREPARED TO DO ANYTHING TO END YOUR LIFE?: NO
2. HAVE YOU ACTUALLY HAD ANY THOUGHTS OF KILLING YOURSELF IN THE PAST MONTH?: NO

## 2025-04-04 ENCOUNTER — APPOINTMENT (OUTPATIENT)
Dept: ULTRASOUND IMAGING | Facility: CLINIC | Age: 55
End: 2025-04-04
Attending: EMERGENCY MEDICINE
Payer: COMMERCIAL

## 2025-04-04 VITALS
HEART RATE: 96 BPM | WEIGHT: 187 LBS | TEMPERATURE: 98.7 F | BODY MASS INDEX: 31.16 KG/M2 | OXYGEN SATURATION: 96 % | HEIGHT: 65 IN | RESPIRATION RATE: 16 BRPM | SYSTOLIC BLOOD PRESSURE: 144 MMHG | DIASTOLIC BLOOD PRESSURE: 86 MMHG

## 2025-04-04 LAB
ALBUMIN SERPL BCG-MCNC: 4.4 G/DL (ref 3.5–5.2)
ALP SERPL-CCNC: 72 U/L (ref 40–150)
ALT SERPL W P-5'-P-CCNC: 35 U/L (ref 0–50)
ANION GAP SERPL CALCULATED.3IONS-SCNC: 14 MMOL/L (ref 7–15)
AST SERPL W P-5'-P-CCNC: 19 U/L (ref 0–45)
BASOPHILS # BLD AUTO: 0 10E3/UL (ref 0–0.2)
BASOPHILS NFR BLD AUTO: 0 %
BILIRUB SERPL-MCNC: 0.5 MG/DL
BUN SERPL-MCNC: 18.3 MG/DL (ref 6–20)
CALCIUM SERPL-MCNC: 9.6 MG/DL (ref 8.8–10.4)
CHLORIDE SERPL-SCNC: 101 MMOL/L (ref 98–107)
CREAT SERPL-MCNC: 0.81 MG/DL (ref 0.51–0.95)
EGFRCR SERPLBLD CKD-EPI 2021: 85 ML/MIN/1.73M2
EOSINOPHIL # BLD AUTO: 0.1 10E3/UL (ref 0–0.7)
EOSINOPHIL NFR BLD AUTO: 1 %
ERYTHROCYTE [DISTWIDTH] IN BLOOD BY AUTOMATED COUNT: 13 % (ref 10–15)
GLUCOSE SERPL-MCNC: 117 MG/DL (ref 70–99)
HCO3 SERPL-SCNC: 23 MMOL/L (ref 22–29)
HCT VFR BLD AUTO: 38.5 % (ref 35–47)
HGB BLD-MCNC: 13.3 G/DL (ref 11.7–15.7)
IMM GRANULOCYTES # BLD: 0 10E3/UL
IMM GRANULOCYTES NFR BLD: 0 %
LIPASE SERPL-CCNC: 26 U/L (ref 13–60)
LYMPHOCYTES # BLD AUTO: 0.4 10E3/UL (ref 0.8–5.3)
LYMPHOCYTES NFR BLD AUTO: 5 %
MCH RBC QN AUTO: 29.6 PG (ref 26.5–33)
MCHC RBC AUTO-ENTMCNC: 34.5 G/DL (ref 31.5–36.5)
MCV RBC AUTO: 86 FL (ref 78–100)
MONOCYTES # BLD AUTO: 0.3 10E3/UL (ref 0–1.3)
MONOCYTES NFR BLD AUTO: 4 %
NEUTROPHILS # BLD AUTO: 7.2 10E3/UL (ref 1.6–8.3)
NEUTROPHILS NFR BLD AUTO: 90 %
NRBC # BLD AUTO: 0 10E3/UL
NRBC BLD AUTO-RTO: 0 /100
PLATELET # BLD AUTO: 232 10E3/UL (ref 150–450)
POTASSIUM SERPL-SCNC: 4 MMOL/L (ref 3.4–5.3)
PROT SERPL-MCNC: 7.3 G/DL (ref 6.4–8.3)
RBC # BLD AUTO: 4.5 10E6/UL (ref 3.8–5.2)
SODIUM SERPL-SCNC: 138 MMOL/L (ref 135–145)
WBC # BLD AUTO: 8 10E3/UL (ref 4–11)

## 2025-04-04 PROCEDURE — 83690 ASSAY OF LIPASE: CPT | Performed by: EMERGENCY MEDICINE

## 2025-04-04 PROCEDURE — 76705 ECHO EXAM OF ABDOMEN: CPT

## 2025-04-04 PROCEDURE — 80053 COMPREHEN METABOLIC PANEL: CPT | Performed by: EMERGENCY MEDICINE

## 2025-04-04 PROCEDURE — 96374 THER/PROPH/DIAG INJ IV PUSH: CPT | Performed by: EMERGENCY MEDICINE

## 2025-04-04 PROCEDURE — 36415 COLL VENOUS BLD VENIPUNCTURE: CPT | Performed by: EMERGENCY MEDICINE

## 2025-04-04 PROCEDURE — 85025 COMPLETE CBC W/AUTO DIFF WBC: CPT | Performed by: EMERGENCY MEDICINE

## 2025-04-04 PROCEDURE — 96375 TX/PRO/DX INJ NEW DRUG ADDON: CPT | Performed by: EMERGENCY MEDICINE

## 2025-04-04 PROCEDURE — 250N000013 HC RX MED GY IP 250 OP 250 PS 637: Performed by: EMERGENCY MEDICINE

## 2025-04-04 PROCEDURE — 250N000011 HC RX IP 250 OP 636: Mod: JZ | Performed by: EMERGENCY MEDICINE

## 2025-04-04 RX ORDER — ONDANSETRON 2 MG/ML
4 INJECTION INTRAMUSCULAR; INTRAVENOUS ONCE
Status: COMPLETED | OUTPATIENT
Start: 2025-04-04 | End: 2025-04-04

## 2025-04-04 RX ORDER — DIPHENHYDRAMINE HYDROCHLORIDE 50 MG/ML
25 INJECTION, SOLUTION INTRAMUSCULAR; INTRAVENOUS ONCE
Status: COMPLETED | OUTPATIENT
Start: 2025-04-04 | End: 2025-04-04

## 2025-04-04 RX ORDER — MAGNESIUM HYDROXIDE/ALUMINUM HYDROXICE/SIMETHICONE 120; 1200; 1200 MG/30ML; MG/30ML; MG/30ML
15 SUSPENSION ORAL ONCE
Status: COMPLETED | OUTPATIENT
Start: 2025-04-04 | End: 2025-04-04

## 2025-04-04 RX ORDER — METOCLOPRAMIDE HYDROCHLORIDE 5 MG/ML
5 INJECTION INTRAMUSCULAR; INTRAVENOUS ONCE
Status: COMPLETED | OUTPATIENT
Start: 2025-04-04 | End: 2025-04-04

## 2025-04-04 RX ORDER — OMEPRAZOLE 20 MG/1
40 CAPSULE, DELAYED RELEASE ORAL DAILY
Qty: 21 CAPSULE | Refills: 0 | Status: SHIPPED | OUTPATIENT
Start: 2025-04-04

## 2025-04-04 RX ORDER — METOCLOPRAMIDE 5 MG/1
5 TABLET ORAL 4 TIMES DAILY PRN
Qty: 20 TABLET | Refills: 0 | Status: SHIPPED | OUTPATIENT
Start: 2025-04-04

## 2025-04-04 RX ORDER — FAMOTIDINE 20 MG/1
20 TABLET, FILM COATED ORAL 2 TIMES DAILY
Qty: 30 TABLET | Refills: 0 | Status: SHIPPED | OUTPATIENT
Start: 2025-04-04

## 2025-04-04 RX ADMIN — ALUMINUM HYDROXIDE, MAGNESIUM HYDROXIDE, AND SIMETHICONE 15 ML: 200; 200; 20 SUSPENSION ORAL at 01:11

## 2025-04-04 RX ADMIN — DIPHENHYDRAMINE HYDROCHLORIDE 25 MG: 50 INJECTION, SOLUTION INTRAMUSCULAR; INTRAVENOUS at 01:52

## 2025-04-04 RX ADMIN — ONDANSETRON 4 MG: 2 INJECTION INTRAMUSCULAR; INTRAVENOUS at 01:07

## 2025-04-04 RX ADMIN — METOCLOPRAMIDE HYDROCHLORIDE 5 MG: 5 INJECTION INTRAMUSCULAR; INTRAVENOUS at 01:52

## 2025-04-04 ASSESSMENT — ACTIVITIES OF DAILY LIVING (ADL)
ADLS_ACUITY_SCORE: 42

## 2025-04-04 NOTE — ED TRIAGE NOTES
Hx pots, belching, nausea, vomiting, since 1500. took gas-X, pepcid, omeprazole this AM. Upper abd pain, 4/10, sharp pain.   Temp 100.6 at home, took tylenol around.   L knee, 6 weeks post op knee replacement, still having pain. 5/10. No longer taking ibuprofen because scheduled for other knee replacement next week.   Triage Assessment (Adult)       Row Name 04/03/25 [id number]          Triage Assessment    Airway WDL WDL        Respiratory WDL    Respiratory WDL WDL        Skin Circulation/Temperature WDL    Skin Circulation/Temperature WDL WDL        Cardiac WDL    Cardiac WDL WDL        Peripheral/Neurovascular WDL    Peripheral Neurovascular WDL WDL        Cognitive/Neuro/Behavioral WDL    Cognitive/Neuro/Behavioral WDL WDL

## 2025-04-04 NOTE — ED PROVIDER NOTES
"ED Provider Note  [facility]      History     Chief Complaint   Patient presents with    Nausea & Vomiting     Hx pots, belching, nausea, vomiting, since 1500. took gas-X, pepcid, omeprazole this AM. Upper abd pain, 4/10, sharp pain.   Temp 100.6 at home, took tylenol around. Did at home influenza test- was negative     Knee Pain     L knee, 6 weeks post op knee replacement, still having pain. 5/10. No longer taking ibuprofen because scheduled for other knee replacement next week.     HPI  Laura Jackson is a 54 year old female who presents to the emergency department for nausea, vomiting and knee pain. The patient states that today she was woke up with upper abdominal pain.  She then tried to eat but was unable to eat much. She took Gasx,Tums and omeprazole.  She tried to each lunch but was unable to eat much again. She then went to sleep. She woke up and vomited 5-6 times. She did feel better after vomiting but her pain did not go away. She laid down again and vomited 5-6 times around 5:30pm. She did take her temperature and had a fever of 100.6. She took ibuprofen along with Pepcid and Gasx. She has been able to drink about 8oz of Gatorade. She does note that she has been belching frequently all day. She did have a hysterectomy and appendectomy.     Per chart review: The patient has a left knee replacement on 2/19/25.        Physical Exam   BP: (!) 147/113  Pulse: (!) 122  Temp: 98.7  F (37.1  C)  Resp: 16  Height: 165.1 cm (5' 5\")  Weight: 84.8 kg (187 lb)  SpO2: 98 %  Physical Exam  Vitals and nursing note reviewed.   Constitutional:       General: She is not in acute distress.     Appearance: Normal appearance.   HENT:      Head: Normocephalic.      Nose: Nose normal.   Eyes:      Pupils: Pupils are equal, round, and reactive to light.   Cardiovascular:      Rate and Rhythm: Normal rate and regular rhythm.   Pulmonary:      Effort: Pulmonary effort is normal.   Abdominal:      " General: There is no distension.      Comments: Frequent belching.  Tenderness to palpation in the epigastric area and bilateral upper quadrants.  Mild guarding present.  No rebound or other signs peritonitis.  Abdomen soft and nondistended.  No CVA tenderness.  No overlying skin changes.  Normal bowel sounds x 4   Musculoskeletal:         General: No deformity. Normal range of motion.      Cervical back: Normal range of motion.   Skin:     General: Skin is warm.   Neurological:      Mental Status: She is alert and oriented to person, place, and time.   Psychiatric:         Mood and Affect: Mood normal.           ED Course, Procedures, & Data           Results for orders placed or performed during the hospital encounter of 04/03/25   US Abdomen Limited     Status: None    Narrative    EXAM: US ABDOMEN LIMITED  LOCATION: [facility]  DATE: 4/4/2025    INDICATION: ruq pain  COMPARISON: 7/12/2017.  TECHNIQUE: Limited abdominal ultrasound.    FINDINGS:    GALLBLADDER: Contains a small amount of sludge. No shadowing stones visualized. Normal wall thickness. No pericholecystic fluid. Negative ultrasound Meza sign.    BILE DUCTS: No biliary dilatation. The common duct measures 3 mm.    LIVER: Echogenic parenchyma consistent with steatosis. Sparing of fat deposition near the gallbladder fossa. No focal mass. The portal vein is patent with flow in the normal direction.    RIGHT KIDNEY: No hydronephrosis.    PANCREAS: The visualized portions are normal.    No ascites.      Impression    IMPRESSION:  1.  Small amount of sludge in the gallbladder. No evidence for acute cholecystitis.  2.  Hepatic steatosis.       Comprehensive metabolic panel     Status: Abnormal   Result Value Ref Range    Sodium 138 135 - 145 mmol/L    Potassium 4.0 3.4 - 5.3 mmol/L    Carbon Dioxide (CO2) 23 22 - 29 mmol/L    Anion Gap 14 7 - 15 mmol/L    Urea Nitrogen 18.3 6.0 - 20.0 mg/dL    Creatinine 0.81  0.51 - 0.95 mg/dL    GFR Estimate 85 >60 mL/min/1.73m2    Calcium 9.6 8.8 - 10.4 mg/dL    Chloride 101 98 - 107 mmol/L    Glucose 117 (H) 70 - 99 mg/dL    Alkaline Phosphatase 72 40 - 150 U/L    AST 19 0 - 45 U/L    ALT 35 0 - 50 U/L    Protein Total 7.3 6.4 - 8.3 g/dL    Albumin 4.4 3.5 - 5.2 g/dL    Bilirubin Total 0.5 <=1.2 mg/dL   Lipase     Status: Normal   Result Value Ref Range    Lipase 26 13 - 60 U/L   CBC with platelets and differential     Status: Abnormal   Result Value Ref Range    WBC Count 8.0 4.0 - 11.0 10e3/uL    RBC Count 4.50 3.80 - 5.20 10e6/uL    Hemoglobin 13.3 11.7 - 15.7 g/dL    Hematocrit 38.5 35.0 - 47.0 %    MCV 86 78 - 100 fL    MCH 29.6 26.5 - 33.0 pg    MCHC 34.5 31.5 - 36.5 g/dL    RDW 13.0 10.0 - 15.0 %    Platelet Count 232 150 - 450 10e3/uL    % Neutrophils 90 %    % Lymphocytes 5 %    % Monocytes 4 %    % Eosinophils 1 %    % Basophils 0 %    % Immature Granulocytes 0 %    NRBCs per 100 WBC 0 <1 /100    Absolute Neutrophils 7.2 1.6 - 8.3 10e3/uL    Absolute Lymphocytes 0.4 (L) 0.8 - 5.3 10e3/uL    Absolute Monocytes 0.3 0.0 - 1.3 10e3/uL    Absolute Eosinophils 0.1 0.0 - 0.7 10e3/uL    Absolute Basophils 0.0 0.0 - 0.2 10e3/uL    Absolute Immature Granulocytes 0.0 <=0.4 10e3/uL    Absolute NRBCs 0.0 10e3/uL   CBC with platelets differential     Status: Abnormal    Narrative    The following orders were created for panel order CBC with platelets differential.  Procedure                               Abnormality         Status                     ---------                               -----------         ------                     CBC with platelets and ...[[id number]]  Abnormal            Final result                 Please view results for these tests on the individual orders.     Medications   ondansetron (ZOFRAN) injection 4 mg (4 mg Intravenous $Given 4/4/25 0107)   alum & mag hydroxide-simethicone (MAALOX) suspension 15 mL (15 mLs Oral $Given 4/4/25 0111)   metoclopramide  (REGLAN) injection 5 mg (5 mg Intravenous $Given 4/4/25 0152)   diphenhydrAMINE (BENADRYL) injection 25 mg (25 mg Intravenous $Given 4/4/25 0152)     Labs Ordered and Resulted from Time of ED Arrival to Time of ED Departure   COMPREHENSIVE METABOLIC PANEL - Abnormal       Result Value    Sodium 138      Potassium 4.0      Carbon Dioxide (CO2) 23      Anion Gap 14      Urea Nitrogen 18.3      Creatinine 0.81      GFR Estimate 85      Calcium 9.6      Chloride 101      Glucose 117 (*)     Alkaline Phosphatase 72      AST 19      ALT 35      Protein Total 7.3      Albumin 4.4      Bilirubin Total 0.5     CBC WITH PLATELETS AND DIFFERENTIAL - Abnormal    WBC Count 8.0      RBC Count 4.50      Hemoglobin 13.3      Hematocrit 38.5      MCV 86      MCH 29.6      MCHC 34.5      RDW 13.0      Platelet Count 232      % Neutrophils 90      % Lymphocytes 5      % Monocytes 4      % Eosinophils 1      % Basophils 0      % Immature Granulocytes 0      NRBCs per 100 WBC 0      Absolute Neutrophils 7.2      Absolute Lymphocytes 0.4 (*)     Absolute Monocytes 0.3      Absolute Eosinophils 0.1      Absolute Basophils 0.0      Absolute Immature Granulocytes 0.0      Absolute NRBCs 0.0     LIPASE - Normal    Lipase 26       US Abdomen Limited   Final Result   IMPRESSION:   1.  Small amount of sludge in the gallbladder. No evidence for acute cholecystitis.   2.  Hepatic steatosis.                   Critical care was not performed.     Medical Decision Making  The patient's presentation was of moderate complexity (an acute illness with systemic symptoms).    The patient's evaluation involved:  ordering and/or review of 3+ test(s) in this encounter (see separate area of note for details)    The patient's management necessitated moderate risk (prescription drug management including medications given in the ED).    Assessment & Plan    Patient presents to the ED for evaluation of frequent belching and upper abdominal pain.  On arrival,  patient appears very uncomfortable.  She has tenderness throughout her upper abdomen without signs of peritonitis.  Ultrasound is negative for evidence of acute cholecystitis.  No bilirubin elevation or transaminitis on metabolic panel, low suspicion for acute hepatobiliary pathology.  No lipase elevation to suggest acute pancreatitis.  No leukocytosis or fever to suggest major infectious etiology.  CBC without anemia.    Initial symptoms were treated with GI cocktail and Zofran without relief.  Was then given Reglan and Benadryl.  On reassessment, patient notes that her symptoms have resolved.  Patient does have a history of gastritis/peptic ulcer disease which is likely the cause of her symptoms today.  She has been instructed to restart omeprazole/Pepcid.  Will also give Reglan for her nausea.  Will follow-up with PCP.  Educated restrictively deep    I have reviewed the nursing notes. I have reviewed the findings, diagnosis, plan and need for follow up with the patient.    Discharge Medication List as of 4/4/2025  3:29 AM        START taking these medications    Details   metoclopramide (REGLAN) 5 MG tablet Take 1 tablet (5 mg) by mouth 4 times daily as needed (nausea, indigestion)., Disp-20 tablet, R-0, Local Print             Final diagnoses:   Dyspepsia   IVera, am serving as a trained medical scribe to document services personally performed by Ralph Scott DO, based on the provider's statements to me.     Ralph HYDE DO, was physically present and have reviewed and verified the accuracy of this note documented by Vera Darden.     Ralph Scott DO  [facility] EMERGENCY DEPARTMENT  4/3/2025     Ralph Scott DO  04/06/25 0055

## 2025-04-08 ENCOUNTER — OFFICE VISIT (OUTPATIENT)
Dept: FAMILY MEDICINE | Facility: CLINIC | Age: 55
End: 2025-04-08
Payer: COMMERCIAL

## 2025-04-08 VITALS
OXYGEN SATURATION: 98 % | RESPIRATION RATE: 18 BRPM | SYSTOLIC BLOOD PRESSURE: 134 MMHG | HEART RATE: 69 BPM | TEMPERATURE: 98.2 F | DIASTOLIC BLOOD PRESSURE: 82 MMHG | HEIGHT: 64 IN | WEIGHT: 186.8 LBS | BODY MASS INDEX: 31.89 KG/M2

## 2025-04-08 DIAGNOSIS — M17.11 PRIMARY OSTEOARTHRITIS OF RIGHT KNEE: ICD-10-CM

## 2025-04-08 DIAGNOSIS — R11.2 NAUSEA AND VOMITING, UNSPECIFIED VOMITING TYPE: Primary | ICD-10-CM

## 2025-04-08 PROBLEM — E55.9 HYPOVITAMINOSIS D: Status: RESOLVED | Noted: 2017-09-11 | Resolved: 2025-04-08

## 2025-04-08 PROBLEM — I10 BENIGN ESSENTIAL HYPERTENSION: Status: RESOLVED | Noted: 2018-07-31 | Resolved: 2025-04-08

## 2025-04-08 PROCEDURE — 99214 OFFICE O/P EST MOD 30 MIN: CPT | Performed by: STUDENT IN AN ORGANIZED HEALTH CARE EDUCATION/TRAINING PROGRAM

## 2025-04-08 PROCEDURE — 3075F SYST BP GE 130 - 139MM HG: CPT | Performed by: STUDENT IN AN ORGANIZED HEALTH CARE EDUCATION/TRAINING PROGRAM

## 2025-04-08 PROCEDURE — 3079F DIAST BP 80-89 MM HG: CPT | Performed by: STUDENT IN AN ORGANIZED HEALTH CARE EDUCATION/TRAINING PROGRAM

## 2025-04-08 PROCEDURE — 1126F AMNT PAIN NOTED NONE PRSNT: CPT | Performed by: STUDENT IN AN ORGANIZED HEALTH CARE EDUCATION/TRAINING PROGRAM

## 2025-04-08 ASSESSMENT — PAIN SCALES - GENERAL: PAINLEVEL_OUTOF10: NO PAIN (0)

## 2025-04-08 NOTE — PATIENT INSTRUCTIONS
Laura,    Thank you for allowing [facility] to manage your care today.    Our plan is as follows:    Continue current acid-suppression regimen with omeprazole and Pepcid.     Proceed with knee surgery as planned.     If you have any questions or concerns, please use [employer] message and/or feel free to call us at [phone].    Your partner in health,    Dr. Crowe      Did you know?    You can schedule a video visit for follow-up appointments as well as future appointments for certain conditions. Please see the below link.     [email or web address]    If you have not already done so, I encourage you to sign up for [employer] ([email or web address]). This will allow you to review your results, securely communicate with a provider, and schedule virtual visits as well.

## 2025-04-08 NOTE — PROGRESS NOTES
[facility] Note    Assessment & Plan     Primary osteoarthritis of right knee  Laura Jackson is a 54 year old female here for ED follow-up and nausea vomiting prior to surgery in 2 days.  Patient remains a suitable operative candidate, approved for surgery without reservation   No identified additional risk factors other than addressed prior  Additional Medication Instructions: no change in medication instructions      Recommendation  Approval given to proceed with proposed procedure, without further diagnostic evaluation.    ED follow-up:  Nausea and vomiting, unspecified vomiting type  Stable acute issue now improved since restarting acid suppression regimen.   Suspect 2/2 to gastritis/PUD given patient's history of chronic NSAID use  Overall workup in the ED including labs and US was reassuring.   Recommend the following:  Continue current acid suppression regimen  Limit NSAID use as much as possible, plan to discontinue following upcoming knee replacement   After shared decision making, patient agreeable with plan     MED REC REQUIRED  Post Medication Reconciliation Status: completed       AVS provided to patient during office visit via hardcopy and/or MyChart.    Follow-up: Return if symptoms worsen or fail to improve.    Fermin Crowe MD 4/8/2025 9:54 AM    Note to patient: The 21st Century Cures Act makes medical notes like this available to patients in the interest of transparency. However, be advised this is a medical document. It is intended as mhoz-yh-nhhl communication. It is written in medical language and may contain abbreviations or verbiage that are unfamiliar. It may appear blunt or direct. Medical documents are intended to carry relevant information, facts as evident, and the clinical opinion of the practitioner.          Mitchell Sanchez is a 54 year old, presenting for the following health issues:  [facility] F/U        4/8/2025     9:35 AM   Additional  "Questions   Roomed by Naya CARBAJAL MA     [facility]      ED/UC Followup:  Facility:  [facility] ED  Date of visit: 4/3/25  Reason for visit: Nausea & Vomiting  Current Status: feeling back to normal  ED doctor thought symptoms were from gastritis/PUD 2/2 to NSAID use  Patient continues to need NSAIDs for knee pain  Started taking omeprazole again until after knee surgery and stopping NSAID use  Denies nausea, vomiting, melena, blood in stool, fevers    Review of Systems: Please refer to HPI for pertinent positives and negatives.            Objective    /82 (BP Location: Right arm, Patient Position: Sitting, Cuff Size: Adult Large)   Pulse 69   Temp 98.2  F (36.8  C) (Oral)   Resp 18   Ht 1.626 m (5' 4\")   Wt 84.7 kg (186 lb 12.8 oz)   LMP 07/28/2017 (Exact Date)   SpO2 98%   BMI 32.06 kg/m    Body mass index is 32.06 kg/m .    Physical Exam  Vitals reviewed.   Constitutional:       General: She is not in acute distress.     Appearance: Normal appearance. She is not ill-appearing.   HENT:      Head: Normocephalic and atraumatic.      Nose: Nose normal.      Mouth/Throat:      Mouth: Mucous membranes are moist.      Pharynx: Oropharynx is clear.   Eyes:      Extraocular Movements: Extraocular movements intact.      Conjunctiva/sclera: Conjunctivae normal.      Pupils: Pupils are equal, round, and reactive to light.   Cardiovascular:      Rate and Rhythm: Normal rate and regular rhythm.      Heart sounds: Normal heart sounds.   Pulmonary:      Effort: Pulmonary effort is normal. No respiratory distress.      Breath sounds: Normal breath sounds.   Abdominal:      General: Abdomen is flat. Bowel sounds are normal. There is no distension.      Palpations: Abdomen is soft.      Tenderness: There is no abdominal tenderness. There is no guarding or rebound.   Skin:     General: Skin is warm and dry.   Neurological:      Mental Status: She is alert.   Psychiatric:         Mood and Affect: Mood normal.         Behavior: Behavior " normal.              Signed Electronically by: Fermin Crowe MD

## 2025-04-08 NOTE — LETTER
2025    Laura Jackson   1970        To Whom it May Concern;      Laura Jackson was seen in our clinic for an ED follow-up visit on 2025. Laura is doing well at this time, and I give my approval to proceed with proposed procedure, without further diagnostic evaluation.     Preop appointment completed with myself on 2025.            Sincerely,          Fermin Crowe MD

## 2025-04-09 ENCOUNTER — THERAPY VISIT (OUTPATIENT)
Dept: PHYSICAL THERAPY | Facility: CLINIC | Age: 55
End: 2025-04-09
Payer: COMMERCIAL

## 2025-04-09 DIAGNOSIS — M54.89 OTHER BACK PAIN, UNSPECIFIED CHRONICITY: Primary | ICD-10-CM

## 2025-04-09 PROCEDURE — 97140 MANUAL THERAPY 1/> REGIONS: CPT | Mod: GP | Performed by: PHYSICAL THERAPIST

## 2025-04-09 PROCEDURE — 97112 NEUROMUSCULAR REEDUCATION: CPT | Mod: GP | Performed by: PHYSICAL THERAPIST

## 2025-04-16 NOTE — TELEPHONE ENCOUNTER
Called and spoke with rep regarding Laura's paperwork for long term disability. Unfortunately, Dr. Michael does not complete paperwork for long term disability and Laura will have to follow up with her PCP for long term care. Dr. Michael will excuse Laura from work 3 months after her surgery date, 2/2/23-5/2/23.    Grossly Intact

## 2025-04-21 ENCOUNTER — TRANSFERRED RECORDS (OUTPATIENT)
Dept: HEALTH INFORMATION MANAGEMENT | Facility: CLINIC | Age: 55
End: 2025-04-21
Payer: COMMERCIAL

## 2025-05-07 ENCOUNTER — THERAPY VISIT (OUTPATIENT)
Dept: PHYSICAL THERAPY | Facility: CLINIC | Age: 55
End: 2025-05-07
Payer: COMMERCIAL

## 2025-05-07 DIAGNOSIS — M54.89 OTHER BACK PAIN, UNSPECIFIED CHRONICITY: Primary | ICD-10-CM

## 2025-05-07 PROCEDURE — 97112 NEUROMUSCULAR REEDUCATION: CPT | Mod: GP | Performed by: PHYSICAL THERAPIST

## 2025-05-07 PROCEDURE — 97140 MANUAL THERAPY 1/> REGIONS: CPT | Mod: GP | Performed by: PHYSICAL THERAPIST

## 2025-05-19 ENCOUNTER — TRANSFERRED RECORDS (OUTPATIENT)
Dept: HEALTH INFORMATION MANAGEMENT | Facility: CLINIC | Age: 55
End: 2025-05-19
Payer: COMMERCIAL

## 2025-06-17 PROBLEM — M54.9 BACK PAIN: Status: RESOLVED | Noted: 2021-11-18 | Resolved: 2025-06-17

## 2025-06-21 ENCOUNTER — HEALTH MAINTENANCE LETTER (OUTPATIENT)
Age: 55
End: 2025-06-21

## 2025-06-30 ENCOUNTER — TRANSFERRED RECORDS (OUTPATIENT)
Dept: HEALTH INFORMATION MANAGEMENT | Facility: CLINIC | Age: 55
End: 2025-06-30
Payer: COMMERCIAL

## 2025-07-17 ENCOUNTER — OFFICE VISIT (OUTPATIENT)
Dept: INTERNAL MEDICINE | Facility: CLINIC | Age: 55
End: 2025-07-17
Payer: COMMERCIAL

## 2025-07-17 VITALS
BODY MASS INDEX: 31.18 KG/M2 | WEIGHT: 182.6 LBS | DIASTOLIC BLOOD PRESSURE: 75 MMHG | HEIGHT: 64 IN | SYSTOLIC BLOOD PRESSURE: 139 MMHG | OXYGEN SATURATION: 99 % | HEART RATE: 74 BPM | RESPIRATION RATE: 16 BRPM | TEMPERATURE: 98.2 F

## 2025-07-17 DIAGNOSIS — Z00.00 ROUTINE GENERAL MEDICAL EXAMINATION AT A HEALTH CARE FACILITY: Primary | ICD-10-CM

## 2025-07-17 DIAGNOSIS — Z91.09 ENVIRONMENTAL ALLERGIES: ICD-10-CM

## 2025-07-17 DIAGNOSIS — G90.A POTS (POSTURAL ORTHOSTATIC TACHYCARDIA SYNDROME): ICD-10-CM

## 2025-07-17 DIAGNOSIS — Z79.899 MEDICAL MARIJUANA USE: ICD-10-CM

## 2025-07-17 DIAGNOSIS — Z96.653 STATUS POST TOTAL BILATERAL KNEE REPLACEMENT: ICD-10-CM

## 2025-07-17 DIAGNOSIS — I47.11 INAPPROPRIATE SINUS TACHYCARDIA: ICD-10-CM

## 2025-07-17 DIAGNOSIS — J45.30 MILD PERSISTENT ASTHMA WITHOUT COMPLICATION: ICD-10-CM

## 2025-07-17 DIAGNOSIS — K21.9 GASTROESOPHAGEAL REFLUX DISEASE WITHOUT ESOPHAGITIS: ICD-10-CM

## 2025-07-17 RX ORDER — FEXOFENADINE HCL 60 MG/1
60 TABLET, FILM COATED ORAL 2 TIMES DAILY
COMMUNITY
End: 2025-07-17

## 2025-07-17 RX ORDER — FEXOFENADINE HCL 60 MG/1
60 TABLET, FILM COATED ORAL 2 TIMES DAILY
Qty: 180 TABLET | Refills: 3 | Status: SHIPPED | OUTPATIENT
Start: 2025-07-17

## 2025-07-17 RX ORDER — FLUTICASONE PROPIONATE AND SALMETEROL 100; 50 UG/1; UG/1
1 POWDER RESPIRATORY (INHALATION) 2 TIMES DAILY
Qty: 180 EACH | Refills: 3 | Status: SHIPPED | OUTPATIENT
Start: 2025-07-17

## 2025-07-17 RX ORDER — FAMOTIDINE 20 MG/1
20 TABLET, FILM COATED ORAL 2 TIMES DAILY
Qty: 90 TABLET | Refills: 3 | Status: SHIPPED | OUTPATIENT
Start: 2025-07-17

## 2025-07-17 RX ORDER — IVABRADINE 5 MG/1
5 TABLET, FILM COATED ORAL 2 TIMES DAILY WITH MEALS
Qty: 180 TABLET | Refills: 3 | Status: SHIPPED | OUTPATIENT
Start: 2025-07-17

## 2025-07-17 RX ORDER — FEXOFENADINE HCL 60 MG/1
60 TABLET, FILM COATED ORAL DAILY
Qty: 90 TABLET | Refills: 3 | Status: CANCELLED | OUTPATIENT
Start: 2025-07-17

## 2025-07-17 RX ORDER — OMEPRAZOLE 20 MG/1
40 CAPSULE, DELAYED RELEASE ORAL DAILY
Qty: 180 CAPSULE | Refills: 3 | Status: SHIPPED | OUTPATIENT
Start: 2025-07-17

## 2025-07-17 SDOH — HEALTH STABILITY: PHYSICAL HEALTH: ON AVERAGE, HOW MANY DAYS PER WEEK DO YOU ENGAGE IN MODERATE TO STRENUOUS EXERCISE (LIKE A BRISK WALK)?: 0 DAYS

## 2025-07-17 ASSESSMENT — SOCIAL DETERMINANTS OF HEALTH (SDOH): HOW OFTEN DO YOU GET TOGETHER WITH FRIENDS OR RELATIVES?: MORE THAN THREE TIMES A WEEK

## 2025-07-17 NOTE — PROGRESS NOTES
"Preventive Care Visit  [facility] BLANKA Butler MD, Internal Medicine  Jul 17, 2025      Assessment & Plan     Routine general medical examination at a health care facility  -health maintenance reviewed; recommended pneumococcal, TDAP, hepatitis B, and zoster vaccines. She would like these vaccines but does not want them today.  -She doesn't have high dietary intake of calcium and so recommended to start calcium supplementation.   - PRIMARY CARE FOLLOW-UP SCHEDULING; Future    Mild persistent asthma without complication  - fluticasone-salmeterol (ADVAIR) 100-50 MCG/ACT inhaler; Inhale 1 puff into the lungs 2 times daily.    Inappropriate sinus tachycardia  POTS (postural orthostatic tachycardia syndrome)  -well controlled   - ivabradine (CORLANOR) 5 MG tablet; Take 1 tablet (5 mg) by mouth 2 times daily (with meals).    Gastroesophageal reflux disease without esophagitis  -Symptoms are well controlled with medications  - omeprazole (PRILOSEC) 20 MG DR capsule; Take 2 capsules (40 mg) by mouth daily.  - famotidine (PEPCID) 20 MG tablet; Take 1 tablet (20 mg) by mouth 2 times daily.    Environmental allergies  - fexofenadine (ALLEGRA) 60 MG tablet; Take 1 tablet (60 mg) by mouth 2 times daily.    Medical marijuana use  Provided with a Medical Marijuana certification today , see Office of Medical Cannabis registry     Status post total bilateral knee replacement  -Seen by orthopedics 6/30  -Bilateral knees appear to be healing well. No signs of infection  -Continue physical therapy  -Continue tylenol and robaxin PRN  - diclofenac (VOLTAREN) 1 % topical gel; Apply 2 g topically 4 times daily.      BMI  Estimated body mass index is 31.34 kg/m  as calculated from the following:    Height as of this encounter: 1.626 m (5' 4\").    Weight as of this encounter: 82.8 kg (182 lb 9.6 oz).     Counseling  Appropriate preventive services were addressed with this patient via screening, questionnaire, or " discussion as appropriate for fall prevention, nutrition, physical activity, Tobacco-use cessation, social engagement, weight loss and cognition.  Checklist reviewing preventive services available has been given to the patient.  Reviewed patient's diet, addressing concerns and/or questions.   Reviewed preventive health counseling, as reflected in patient instructions       Regular exercise       Healthy diet/nutrition      Mitchell Sanchez is a 55 year old, presenting for the following:  Physical        7/17/2025     9:12 AM   Additional Questions   Roomed by filiberto RUSSO  She had bilateral knee replacement (L TKA 2/2025 & R TKA 4/2025). She is recovering slowly from this surgery but making some progress. She was previously taking ibuproften which helped with pain but developed gastritis symptoms and stopped taking it. She currently takes tylenol and robaxin at night which gives her mild relief.   Her POTS is well controlled. She occasionally feels like her heart is racing but no other symptoms associated with POTS. She also has monitored her blood pressure and it is occasionally elevated with SBPs 130s, but after rest it is in the 120s. This is likely due to some deconditioning associated with surgery and potentially pain.      Her GERD symptoms have been managed well with taking famotidine and omeprazole and stopping ibuprofen.     Her asthma symptoms are well controlled with medication.     She is due for TDAP, pneumococcal, hepatitis B, and zoster vaccines. She is interested but does not want them today.       Advance Care Planning    Document on file is a Health Care Directive or POLST.        7/17/2025   General Health   How would you rate your overall physical health? (!) FAIR, slow recovery after hip replacement   Feel stress (tense, anxious, or unable to sleep) Not at all         7/17/2025   Nutrition   Three or more servings of calcium each day? (!) NO, is going to start taking calcium  supplements   Diet: Regular (no restrictions)   How many servings of fruit and vegetables per day? (!) 2-3   How many sweetened beverages each day? 0-1         7/17/2025   Exercise   Days per week of moderate/strenous exercise 0 days   (!) EXERCISE CONCERN      7/17/2025   Social Factors   Frequency of gathering with friends or relatives More than three times a week   Worry food won't last until get money to buy more No   Food not last or not have enough money for food? No   Do you have housing? (Housing is defined as stable permanent housing and does not include staying outside in a car, in a tent, in an abandoned building, in an overnight shelter, or couch-surfing.) Yes   Are you worried about losing your housing? No   Lack of transportation? No   Unable to get utilities (heat,electricity)? No         7/17/2025   Fall Risk   Fallen 2 or more times in the past year? No   Trouble with walking or balance? Yes Due to recent surgery but slowly improving. She is working with PT.   Reason Gait Speed Test Not Completed Recent surgery          7/17/2025   Dental   Dentist two times every year? Yes           Today's PHQ-2 Score:       2/10/2025     9:48 AM   PHQ-2 ( 1999 [employer])   Q1: Little interest or pleasure in doing things 0   Q2: Feeling down, depressed or hopeless 0   PHQ-2 Score 0    Q1: Little interest or pleasure in doing things Not at all   Q2: Feeling down, depressed or hopeless Not at all   PHQ-2 Score 0       Patient-reported         7/17/2025   Substance Use   Alcohol more than 3/day or more than 7/wk No   Do you use any other substances recreationally? No     Social History     Tobacco Use    Smoking status: Never    Smokeless tobacco: Never   Vaping Use    Vaping status: Never Used   Substance Use Topics    Alcohol use: Yes     Comment: Rare- 1 every 2-3month    Drug use: Never           1/15/2025         7/17/2025   STI Screening   New sexual partner(s) since last STI/HIV test? No     History of abnormal  "Pap smear: Status post hysterectomy with removal of cervix and no history of CIN2 or greater or cervical cancer. Health Maintenance and Surgical History updated.        5/8/2015    12:00 AM   PAP / HPV   PAP (Historical) NIL      ASCVD Risk   The 10-year ASCVD risk score (Blessing PHILIP, et al., 2019) is: 2.4%    Values used to calculate the score:      Age: 55 years      Sex: Female      Is Non- : No      Diabetic: No      Tobacco smoker: No      Systolic Blood Pressure: 134 mmHg      Is BP treated: No      HDL Cholesterol: 59 mg/dL      Total Cholesterol: 189 mg/dL         Reviewed and updated as needed this visit by Provider                   Review of Systems  Constitutional, HEENT, cardiovascular, pulmonary, gi and gu systems are negative, except as otherwise noted.     Objective    Exam  /75   Pulse 74   Temp 98.2  F (36.8  C) (Temporal)   Resp 16   Ht 1.626 m (5' 4\")   Wt 82.8 kg (182 lb 9.6 oz)   LMP 07/28/2017 (Exact Date)   SpO2 99%   BMI 31.34 kg/m        Physical Exam  GENERAL: alert and pleasant and no distress  EYES: Eyes grossly normal to inspection, PERRL and conjunctivae and sclerae normal  HENT: ear canals and TM's normal, nose and mouth without ulcers or lesions  NECK: no adenopathy, no asymmetry, masses, or scars  RESP: lungs clear to auscultation - no rales, rhonchi or wheezes  CV: regular rate and rhythm, normal S1 S2, no S3 or S4, no murmur, click or rub, no peripheral edema  ABDOMEN: soft, nontender, no hepatosplenomegaly, no masses and bowel sounds normal  MS: no gross musculoskeletal defects noted, no edema  SKIN: no suspicious lesions or rashes. Bilateral scars on knees, no redness, warmth or swelling.   NEURO: Normal strength and tone, mentation intact and speech normal  PSYCH: mentation appears normal, affect normal/bright      Holly Cordova RN, NP student    This patient office visit today was staffed with me, I did review the entire " clinical presentation and history, exam and medical decision making with DNP student Holly Cordova I agree with and have approved the office visit entirely. Patient seen with me and DNP student Holly Cordova together today. I agree with assessment and plans.        Signed Electronically by: Bj Butler MD

## 2025-07-17 NOTE — PATIENT INSTRUCTIONS
Patient Education   Preventive Care Advice   This is general advice given by our system to help you stay healthy. However, your care team may have specific advice just for you. Please talk to your care team about your preventive care needs.  Nutrition  Eat 5 or more servings of fruits and vegetables each day.  Try wheat bread, brown rice and whole grain pasta (instead of white bread, rice, and pasta).  Get enough calcium and vitamin D. Check the label on foods and aim for 100% of the RDA (recommended daily allowance).  Lifestyle  Exercise at least 150 minutes each week  (30 minutes a day, 5 days a week).  Do muscle strengthening activities 2 days a week. These help control your weight and prevent disease.  No smoking.  Wear sunscreen to prevent skin cancer.  Have a dental exam and cleaning every 6 months.  Yearly exams  See your health care team every year to talk about:  Any changes in your health.  Any medicines your care team has prescribed.  Preventive care, family planning, and ways to prevent chronic diseases.  Shots (vaccines)   HPV shots (up to age 26), if you've never had them before.  Hepatitis B shots (up to age 59), if you've never had them before.  COVID-19 shot: Get this shot when it's due.  Flu shot: Get a flu shot every year.  Tetanus shot: Get a tetanus shot every 10 years.  Pneumococcal, hepatitis A, and RSV shots: Ask your care team if you need these based on your risk.  Shingles shot (for age 50 and up)  General health tests  Diabetes screening:  Starting at age 35, Get screened for diabetes at least every 3 years.  If you are younger than age 35, ask your care team if you should be screened for diabetes.  Cholesterol test: At age 39, start having a cholesterol test every 5 years, or more often if advised.  Bone density scan (DEXA): At age 50, ask your care team if you should have this scan for osteoporosis (brittle bones).  Hepatitis C: Get tested at least once in your life.  STIs (sexually  transmitted infections)  Before age 24: Ask your care team if you should be screened for STIs.  After age 24: Get screened for STIs if you're at risk. You are at risk for STIs (including HIV) if:  You are sexually active with more than one person.  You don't use condoms every time.  You or a partner was diagnosed with a sexually transmitted infection.  If you are at risk for HIV, ask about PrEP medicine to prevent HIV.  Get tested for HIV at least once in your life, whether you are at risk for HIV or not.  Cancer screening tests  Cervical cancer screening: If you have a cervix, begin getting regular cervical cancer screening tests starting at age 21.  Breast cancer scan (mammogram): If you've ever had breasts, begin having regular mammograms starting at age 40. This is a scan to check for breast cancer.  Colon cancer screening: It is important to start screening for colon cancer at age 45.  Have a colonoscopy test every 10 years (or more often if you're at risk) Or, ask your provider about stool tests like a FIT test every year or Cologuard test every 3 years.  To learn more about your testing options, visit:   .  For help making a decision, visit:   https://bit.ly/ow27555.  Prostate cancer screening test: If you have a prostate, ask your care team if a prostate cancer screening test (PSA) at age 55 is right for you.  Lung cancer screening: If you are a current or former smoker ages 50 to 80, ask your care team if ongoing lung cancer screenings are right for you.  For informational purposes only. Not to replace the advice of your health care provider. Copyright   2023 [facility]. All rights reserved. Clinically reviewed by the [facility]. [employer] [id number] - REV 01/24.  Preventing Falls: Care Instructions  Injuries and health problems such as trouble walking or poor eyesight can increase your risk of falling. So can some medicines. But there are things you can do to help  "prevent falls. You can exercise to get stronger. You can also arrange your home to make it safer.    Talk to your doctor about the medicines you take. Ask if any of them increase the risk of falls and whether they can be changed or stopped.   Try to exercise regularly. It can help improve your strength and balance. This can help lower your risk of falling.         Practice fall safety and prevention.   Wear low-heeled shoes that fit well and give your feet good support. Talk to your doctor if you have foot problems that make this hard.  Carry a cellphone or wear a medical alert device that you can use to call for help.  Use stepladders instead of chairs to reach high objects. Don't climb if you're at risk for falls. Ask for help, if needed.  Wear the correct eyeglasses, if you need them.        Make your home safer.   Remove rugs, cords, clutter, and furniture from walkways.  Keep your house well lit. Use night-lights in hallways and bathrooms.  Install and use sturdy handrails on stairways.  Wear nonskid footwear, even inside. Don't walk barefoot or in socks without shoes.        Be safe outside.   Use handrails, curb cuts, and ramps whenever possible.  Keep your hands free by using a shoulder bag or backpack.  Try to walk in well-lit areas. Watch out for uneven ground, changes in pavement, and debris.  Be careful in the winter. Walk on the grass or gravel when sidewalks are slippery. Use de-icer on steps and walkways. Add non-slip devices to shoes.    Put grab bars and nonskid mats in your shower or tub and near the toilet. Try to use a shower chair or bath bench when bathing.   Get into a tub or shower by putting in your weaker leg first. Get out with your strong side first. Have a phone or medical alert device in the bathroom with you.   Where can you learn more?  Go to https://www."[employer]wise.net/patiented  Enter G117 in the search box to learn more about \"Preventing Falls: Care Instructions.\"  Current as of: " July 31, 2024  Content Version: 14.5    2649-4826 [employer].   Care instructions adapted under license by your healthcare professional. If you have questions about a medical condition or this instruction, always ask your healthcare professional. [employer] disclaims any warranty or liability for your use of this information.

## 2025-08-11 ENCOUNTER — TRANSFERRED RECORDS (OUTPATIENT)
Dept: HEALTH INFORMATION MANAGEMENT | Facility: CLINIC | Age: 55
End: 2025-08-11
Payer: COMMERCIAL

## (undated) DEVICE — GOWN XLG DISP 9545

## (undated) DEVICE — SOL WATER IRRIG 500ML BOTTLE 2F7113

## (undated) DEVICE — BUR ARTHREX COOLCUT OVAL 8 FLUTE 4.0MMX13CM AR-8400OBE

## (undated) DEVICE — DRSG STERI STRIP 1/2X4" R1547

## (undated) DEVICE — BUR ARTHREX COOLCUT ROUND SJ 8 FLUTE 3.0MMX7CM AR-7300RBE

## (undated) DEVICE — DRAPE STOCKINETTE 4" 8544

## (undated) DEVICE — PREP SKIN SCRUB TRAY 4461A

## (undated) DEVICE — COVER CAMERA IN-LIGHT DISP LT-C02

## (undated) DEVICE — DAVINCI XI SEAL UNIVERSAL 5-8MM 470361

## (undated) DEVICE — GOWN IMPERVIOUS SPECIALTY XLG/XLONG 32474

## (undated) DEVICE — TUBING CONMED AIRSEAL SMOKE EVAC INSUFFLATION ASM-EVAC

## (undated) DEVICE — DRAPE STERI TOWEL LG 1010

## (undated) DEVICE — LINEN TOWEL PACK X5 5464

## (undated) DEVICE — NDL 27GA 1.25" 305136

## (undated) DEVICE — BNDG ELASTIC 4" DBL LENGTH UNSTERILE 6611-14

## (undated) DEVICE — ESU GROUND PAD ADULT W/CORD E7507

## (undated) DEVICE — PREP POVIDONE IODINE SCRUB 7.5% 120ML

## (undated) DEVICE — DRAPE EXTREMITY W/ARMBOARD 29405

## (undated) DEVICE — BLADE CLIPPER SGL USE 9680

## (undated) DEVICE — KIT ANCHOR ARTHREX SM JOINT BIOCOMP SUTURETAK AR-8934DSC

## (undated) DEVICE — Device

## (undated) DEVICE — DRAPE GYN/UROLOGY FLUID POUCH TUR 29455

## (undated) DEVICE — SU WND CLOSURE VLOC 180 ABS 2-0 12" GS-21 VLOCL0315

## (undated) DEVICE — SOL NACL 0.9% IRRIG 500ML BOTTLE 2F7123

## (undated) DEVICE — SU ETHILON 5-0 P-3 18" BLACK 698G

## (undated) DEVICE — PACK HAND CUSTOM ASC

## (undated) DEVICE — TUBING SUCTION MEDI-VAC 1/4"X20' N620A

## (undated) DEVICE — SOL RINGERS LACTATED 1000ML BAG 07953-09

## (undated) DEVICE — SPONGE RAY-TEC 4X8" 7318

## (undated) DEVICE — DAVINCI XI DRAPE COLUMN 470341

## (undated) DEVICE — SU MONOCRYL 5-0 P-3 18" UND Y493G

## (undated) DEVICE — TOURNIQUET SGL BLADDER 18"X4" RED 5921-218-135

## (undated) DEVICE — LINEN ORTHO PACK 5446

## (undated) DEVICE — SU VICRYL 0 CTB-1 36" UND JB946

## (undated) DEVICE — SUCTION MANIFOLD DORNOCH ULTRA CART UL-CL500

## (undated) DEVICE — ESU GROUND PAD UNIVERSAL W/O CORD

## (undated) DEVICE — KIT PATIENT POSITIONING PIGAZZI LATEX FREE 40580

## (undated) DEVICE — STRAP STIRRUP W/SLIP 30187-030

## (undated) DEVICE — DAVINCI TROCAR 8MM BLADELESS 470357

## (undated) DEVICE — BLADE KNIFE SURG 15 371115

## (undated) DEVICE — TUBING ARTHROSCOPY PUMP ARTHREX AR-6410

## (undated) DEVICE — SYR 10ML LL W/O NDL

## (undated) DEVICE — PROBE ASCP ASPIRATE ABLATOR APOLLORF S JNT 50D AR-9845

## (undated) DEVICE — SU MONOCRYL 5-0 PS-2 18" UND Y495G

## (undated) DEVICE — ESU PENCIL W/HOLSTER

## (undated) DEVICE — SOL NACL 0.9% IRRIG 1000ML BOTTLE 2F7124

## (undated) DEVICE — DAVINCI XI VESSEL SEALER 480322

## (undated) DEVICE — SU MONOCRYL 2-0 CT-1 36" UND Y945H

## (undated) DEVICE — PACK DAVINCI GYN SMA15GDFS1

## (undated) DEVICE — CAST ORTHOGLASS SPLINT 4X15" OG-4L2

## (undated) DEVICE — NDL 25GA 1.5" 305127

## (undated) DEVICE — PREP CHLORAPREP 26ML TINTED HI-LITE ORANGE 930815

## (undated) DEVICE — DAVINCI HOT SHEARS TIP COVER  400180

## (undated) DEVICE — PREP CHLORHEXIDINE 4% 4OZ (HIBICLENS) 57504

## (undated) DEVICE — SUCTION CANISTER MEDIVAC LINER 3000ML W/LID 65651-530

## (undated) DEVICE — PREP POVIDONE IODINE SOLUTION 10% 120ML

## (undated) DEVICE — PREP CHLORAPREP 26ML TINTED ORANGE  260815

## (undated) DEVICE — BNDG KLING 2" 2231

## (undated) DEVICE — CATH TRAY FOLEY SURESTEP 16FR WDRAIN BAG STLK LATEX A300316A

## (undated) DEVICE — SU DERMABOND MINI DHVM12

## (undated) DEVICE — PAD CHUX UNDERPAD 30X30"

## (undated) DEVICE — GLOVE PROTEXIS POWDER FREE 8.0 ORTHOPEDIC 2D73ET80

## (undated) DEVICE — SUCTION IRR TRUMPET VALVE LAP ASU1201

## (undated) DEVICE — GLOVE PROTEXIS W/NEU-THERA 7.5  2D73TE75

## (undated) DEVICE — SU VICRYL 4-0 PS-2 18" UND J496H

## (undated) DEVICE — SOL WATER IRRIG 1000ML BOTTLE 2F7114

## (undated) DEVICE — LIGHT HANDLE X2

## (undated) DEVICE — SOL NACL 0.9% INJ 1000ML BAG 2B1324X

## (undated) DEVICE — WIPES FOLEY CARE SURESTEP PROVON DFC100

## (undated) DEVICE — SU VICRYL 0 TIE 54" J608H

## (undated) DEVICE — NDL COUNTER 20CT 31142493

## (undated) DEVICE — TAPE MICROFOAM 4" 1528-4

## (undated) DEVICE — SU ETHILON 2-0 FS 18" 664H

## (undated) DEVICE — SU PDS II 0 CTX 60" Z990G

## (undated) DEVICE — SU FIBERWIRE 3-0 18" AR-7227-01

## (undated) DEVICE — CAST PADDING 4" UNSTERILE 9044

## (undated) DEVICE — SOL WATER IRRIG 1000ML BOTTLE 07139-09

## (undated) DEVICE — SUCTION TIP YANKAUER W/O VENT K86

## (undated) DEVICE — JELLY LUBRICATING SURGILUBE 2OZ TUBE

## (undated) DEVICE — SU VICRYL 0 CT-1 3X27" J430T

## (undated) DEVICE — SPONGE LAP 4X18" X8415

## (undated) DEVICE — GLOVE PROTEXIS POWDER FREE 7.0 ORTHOPEDIC 2D73ET70

## (undated) DEVICE — SU VICRYL 0 CT-2 27" J334H

## (undated) DEVICE — LINEN GOWN X4 5410

## (undated) DEVICE — DRAPE POUCH IRR 1016

## (undated) DEVICE — SU VICRYL 0 CT 36" J358H

## (undated) DEVICE — SUCTION TIP YANKAUER STR K87

## (undated) DEVICE — DISTRACTOR STRAP ANKLE ARTHRO AR-1712

## (undated) DEVICE — SOLUTION WOUND CLEANSING 3/4OZ 10% PVP EA-L3011FB-50

## (undated) DEVICE — IMM LIMB ELEVATOR DC40-0203

## (undated) DEVICE — PACK ARTHROSCOPY KNEE SOP15AKFSM

## (undated) DEVICE — ENDO TROCAR CONMED AIRSEAL BLADELESS 08X120MM IAS8-120LP

## (undated) DEVICE — DAVINCI XI DRAPE ARM 470015

## (undated) DEVICE — DRAPE C-ARM 60X42" 1013

## (undated) RX ORDER — CIPROFLOXACIN 2 MG/ML
INJECTION, SOLUTION INTRAVENOUS
Status: DISPENSED
Start: 2017-08-04

## (undated) RX ORDER — FENTANYL CITRATE 50 UG/ML
INJECTION, SOLUTION INTRAMUSCULAR; INTRAVENOUS
Status: DISPENSED
Start: 2023-02-02

## (undated) RX ORDER — REGADENOSON 0.08 MG/ML
INJECTION, SOLUTION INTRAVENOUS
Status: DISPENSED
Start: 2024-11-08

## (undated) RX ORDER — BUPIVACAINE HYDROCHLORIDE 2.5 MG/ML
INJECTION, SOLUTION EPIDURAL; INFILTRATION; INTRACAUDAL
Status: DISPENSED
Start: 2017-08-04

## (undated) RX ORDER — FENTANYL CITRATE 50 UG/ML
INJECTION, SOLUTION INTRAMUSCULAR; INTRAVENOUS
Status: DISPENSED
Start: 2017-06-20

## (undated) RX ORDER — DEXAMETHASONE SODIUM PHOSPHATE 4 MG/ML
INJECTION, SOLUTION INTRA-ARTICULAR; INTRALESIONAL; INTRAMUSCULAR; INTRAVENOUS; SOFT TISSUE
Status: DISPENSED
Start: 2017-08-04

## (undated) RX ORDER — ACETAMINOPHEN 325 MG/1
TABLET ORAL
Status: DISPENSED
Start: 2017-08-04

## (undated) RX ORDER — MEPERIDINE HYDROCHLORIDE 25 MG/ML
INJECTION INTRAMUSCULAR; INTRAVENOUS; SUBCUTANEOUS
Status: DISPENSED
Start: 2017-08-04

## (undated) RX ORDER — HYDROMORPHONE HYDROCHLORIDE 1 MG/ML
INJECTION, SOLUTION INTRAMUSCULAR; INTRAVENOUS; SUBCUTANEOUS
Status: DISPENSED
Start: 2017-08-04

## (undated) RX ORDER — CLINDAMYCIN PHOSPHATE 900 MG/50ML
INJECTION, SOLUTION INTRAVENOUS
Status: DISPENSED
Start: 2023-02-02

## (undated) RX ORDER — SODIUM CHLORIDE 9 MG/ML
INJECTION, SOLUTION INTRAVENOUS
Status: DISPENSED
Start: 2017-06-20

## (undated) RX ORDER — PHENYLEPHRINE HCL IN 0.9% NACL 1 MG/10 ML
SYRINGE (ML) INTRAVENOUS
Status: DISPENSED
Start: 2017-06-20

## (undated) RX ORDER — FENTANYL CITRATE 50 UG/ML
INJECTION, SOLUTION INTRAMUSCULAR; INTRAVENOUS
Status: DISPENSED
Start: 2017-08-04

## (undated) RX ORDER — FENTANYL CITRATE 0.05 MG/ML
INJECTION, SOLUTION INTRAMUSCULAR; INTRAVENOUS
Status: DISPENSED
Start: 2023-02-02

## (undated) RX ORDER — BUPIVACAINE HYDROCHLORIDE AND EPINEPHRINE 5; 5 MG/ML; UG/ML
INJECTION, SOLUTION EPIDURAL; INTRACAUDAL; PERINEURAL
Status: DISPENSED
Start: 2017-08-04

## (undated) RX ORDER — TRIAMCINOLONE ACETONIDE 40 MG/ML
INJECTION, SUSPENSION INTRA-ARTICULAR; INTRAMUSCULAR
Status: DISPENSED
Start: 2023-07-17

## (undated) RX ORDER — GLYCOPYRROLATE 0.2 MG/ML
INJECTION, SOLUTION INTRAMUSCULAR; INTRAVENOUS
Status: DISPENSED
Start: 2023-02-02

## (undated) RX ORDER — OXYCODONE HYDROCHLORIDE 5 MG/1
TABLET ORAL
Status: DISPENSED
Start: 2017-08-04

## (undated) RX ORDER — NEOSTIGMINE METHYLSULFATE 1 MG/ML
VIAL (ML) INJECTION
Status: DISPENSED
Start: 2023-02-02

## (undated) RX ORDER — LIDOCAINE HYDROCHLORIDE 20 MG/ML
INJECTION, SOLUTION EPIDURAL; INFILTRATION; INTRACAUDAL; PERINEURAL
Status: DISPENSED
Start: 2017-06-20

## (undated) RX ORDER — HYDROMORPHONE HYDROCHLORIDE 1 MG/ML
INJECTION, SOLUTION INTRAMUSCULAR; INTRAVENOUS; SUBCUTANEOUS
Status: DISPENSED
Start: 2023-02-02

## (undated) RX ORDER — LIDOCAINE HYDROCHLORIDE AND EPINEPHRINE 10; 10 MG/ML; UG/ML
INJECTION, SOLUTION INFILTRATION; PERINEURAL
Status: DISPENSED
Start: 2020-11-06

## (undated) RX ORDER — ONDANSETRON 2 MG/ML
INJECTION INTRAMUSCULAR; INTRAVENOUS
Status: DISPENSED
Start: 2017-06-20

## (undated) RX ORDER — EPINEPHRINE 1 MG/ML
INJECTION, SOLUTION INTRAMUSCULAR; SUBCUTANEOUS
Status: DISPENSED
Start: 2023-02-02

## (undated) RX ORDER — LIDOCAINE HYDROCHLORIDE 5 MG/ML
INJECTION, SOLUTION INFILTRATION; INTRAVENOUS
Status: DISPENSED
Start: 2020-10-12

## (undated) RX ORDER — ONDANSETRON 2 MG/ML
INJECTION INTRAMUSCULAR; INTRAVENOUS
Status: DISPENSED
Start: 2017-08-04

## (undated) RX ORDER — FENTANYL CITRATE 50 UG/ML
INJECTION, SOLUTION INTRAMUSCULAR; INTRAVENOUS
Status: DISPENSED
Start: 2018-08-20

## (undated) RX ORDER — KETOROLAC TROMETHAMINE 30 MG/ML
INJECTION, SOLUTION INTRAMUSCULAR; INTRAVENOUS
Status: DISPENSED
Start: 2017-08-04

## (undated) RX ORDER — TRIAMCINOLONE ACETONIDE 40 MG/ML
INJECTION, SUSPENSION INTRA-ARTICULAR; INTRAMUSCULAR
Status: DISPENSED
Start: 2024-11-18

## (undated) RX ORDER — SODIUM CHLORIDE, SODIUM LACTATE, POTASSIUM CHLORIDE, CALCIUM CHLORIDE 600; 310; 30; 20 MG/100ML; MG/100ML; MG/100ML; MG/100ML
INJECTION, SOLUTION INTRAVENOUS
Status: DISPENSED
Start: 2017-06-20

## (undated) RX ORDER — SIMETHICONE 40MG/0.6ML
SUSPENSION, DROPS(FINAL DOSAGE FORM)(ML) ORAL
Status: DISPENSED
Start: 2018-08-20

## (undated) RX ORDER — CITRIC ACID/SODIUM CITRATE 334-500MG
SOLUTION, ORAL ORAL
Status: DISPENSED
Start: 2017-08-04

## (undated) RX ORDER — PROPOFOL 10 MG/ML
INJECTION, EMULSION INTRAVENOUS
Status: DISPENSED
Start: 2017-06-20

## (undated) RX ORDER — LIDOCAINE HYDROCHLORIDE AND EPINEPHRINE 10; 10 MG/ML; UG/ML
INJECTION, SOLUTION INFILTRATION; PERINEURAL
Status: DISPENSED
Start: 2020-10-20

## (undated) RX ORDER — TRIAMCINOLONE ACETONIDE 40 MG/ML
INJECTION, SUSPENSION INTRA-ARTICULAR; INTRAMUSCULAR
Status: DISPENSED
Start: 2020-10-12

## (undated) RX ORDER — ACETAMINOPHEN 325 MG/1
TABLET ORAL
Status: DISPENSED
Start: 2017-06-20

## (undated) RX ORDER — LIDOCAINE HYDROCHLORIDE 5 MG/ML
INJECTION, SOLUTION INFILTRATION; INTRAVENOUS
Status: DISPENSED
Start: 2024-11-18

## (undated) RX ORDER — LIDOCAINE HYDROCHLORIDE 5 MG/ML
INJECTION, SOLUTION INFILTRATION; INTRAVENOUS
Status: DISPENSED
Start: 2023-07-17